# Patient Record
Sex: FEMALE | Race: WHITE | URBAN - METROPOLITAN AREA
[De-identification: names, ages, dates, MRNs, and addresses within clinical notes are randomized per-mention and may not be internally consistent; named-entity substitution may affect disease eponyms.]

---

## 2017-01-10 ENCOUNTER — INPATIENT (INPATIENT)
Facility: HOSPITAL | Age: 82
LOS: 9 days | Discharge: HOME HEALTH | End: 2017-01-20
Attending: FAMILY MEDICINE | Admitting: FAMILY MEDICINE
Payer: MEDICARE

## 2017-01-10 PROCEDURE — 71010: CPT | Mod: 26

## 2017-01-10 PROCEDURE — 93306 TTE W/DOPPLER COMPLETE: CPT | Mod: 26

## 2017-01-10 PROCEDURE — 93010 ELECTROCARDIOGRAM REPORT: CPT

## 2017-01-10 PROCEDURE — 70450 CT HEAD/BRAIN W/O DYE: CPT | Mod: 26

## 2017-01-10 PROCEDURE — 71275 CT ANGIOGRAPHY CHEST: CPT | Mod: 26

## 2017-01-10 PROCEDURE — 99285 EMERGENCY DEPT VISIT HI MDM: CPT

## 2017-01-11 PROCEDURE — 93010 ELECTROCARDIOGRAM REPORT: CPT

## 2017-01-12 PROCEDURE — 93975 VASCULAR STUDY: CPT | Mod: 26

## 2017-01-12 PROCEDURE — 93010 ELECTROCARDIOGRAM REPORT: CPT

## 2017-01-13 PROCEDURE — 93010 ELECTROCARDIOGRAM REPORT: CPT

## 2017-01-14 PROCEDURE — 70547 MR ANGIOGRAPHY NECK W/O DYE: CPT | Mod: 26

## 2017-01-14 PROCEDURE — 70544 MR ANGIOGRAPHY HEAD W/O DYE: CPT | Mod: 26,59

## 2017-01-14 PROCEDURE — 70551 MRI BRAIN STEM W/O DYE: CPT | Mod: 26

## 2017-01-16 PROCEDURE — 93970 EXTREMITY STUDY: CPT | Mod: 26

## 2017-01-18 PROCEDURE — 71010: CPT | Mod: 26

## 2017-01-24 ENCOUNTER — INPATIENT (INPATIENT)
Facility: HOSPITAL | Age: 82
LOS: 2 days | Discharge: HOME HEALTH | End: 2017-01-27
Attending: FAMILY MEDICINE | Admitting: FAMILY MEDICINE
Payer: MEDICARE

## 2017-01-24 PROCEDURE — 99285 EMERGENCY DEPT VISIT HI MDM: CPT

## 2017-01-24 PROCEDURE — 70450 CT HEAD/BRAIN W/O DYE: CPT | Mod: 26

## 2017-01-24 PROCEDURE — 71250 CT THORAX DX C-: CPT | Mod: 26

## 2017-01-24 PROCEDURE — 93010 ELECTROCARDIOGRAM REPORT: CPT

## 2017-01-24 PROCEDURE — 71010: CPT | Mod: 26

## 2017-01-25 DIAGNOSIS — I16.1 HYPERTENSIVE EMERGENCY: ICD-10-CM

## 2017-01-25 DIAGNOSIS — I24.8 OTHER FORMS OF ACUTE ISCHEMIC HEART DISEASE: ICD-10-CM

## 2017-01-25 DIAGNOSIS — M25.60 STIFFNESS OF UNSPECIFIED JOINT, NOT ELSEWHERE CLASSIFIED: ICD-10-CM

## 2017-01-25 DIAGNOSIS — M54.9 DORSALGIA, UNSPECIFIED: ICD-10-CM

## 2017-01-25 DIAGNOSIS — E78.5 HYPERLIPIDEMIA, UNSPECIFIED: ICD-10-CM

## 2017-01-25 DIAGNOSIS — J06.9 ACUTE UPPER RESPIRATORY INFECTION, UNSPECIFIED: ICD-10-CM

## 2017-01-25 DIAGNOSIS — K21.9 GASTRO-ESOPHAGEAL REFLUX DISEASE WITHOUT ESOPHAGITIS: ICD-10-CM

## 2017-01-25 DIAGNOSIS — Z88.8 ALLERGY STATUS TO OTHER DRUGS, MEDICAMENTS AND BIOLOGICAL SUBSTANCES STATUS: ICD-10-CM

## 2017-01-25 DIAGNOSIS — M19.90 UNSPECIFIED OSTEOARTHRITIS, UNSPECIFIED SITE: ICD-10-CM

## 2017-01-25 DIAGNOSIS — E03.9 HYPOTHYROIDISM, UNSPECIFIED: ICD-10-CM

## 2017-01-25 DIAGNOSIS — I25.10 ATHEROSCLEROTIC HEART DISEASE OF NATIVE CORONARY ARTERY WITHOUT ANGINA PECTORIS: ICD-10-CM

## 2017-01-25 DIAGNOSIS — T44.6X5A ADVERSE EFFECT OF ALPHA-ADRENORECEPTOR ANTAGONISTS, INITIAL ENCOUNTER: ICD-10-CM

## 2017-01-25 DIAGNOSIS — M85.80 OTHER SPECIFIED DISORDERS OF BONE DENSITY AND STRUCTURE, UNSPECIFIED SITE: ICD-10-CM

## 2017-01-25 DIAGNOSIS — Z85.42 PERSONAL HISTORY OF MALIGNANT NEOPLASM OF OTHER PARTS OF UTERUS: ICD-10-CM

## 2017-01-25 DIAGNOSIS — I10 ESSENTIAL (PRIMARY) HYPERTENSION: ICD-10-CM

## 2017-01-25 DIAGNOSIS — Z88.2 ALLERGY STATUS TO SULFONAMIDES: ICD-10-CM

## 2017-01-25 DIAGNOSIS — K27.9 PEPTIC ULCER, SITE UNSPECIFIED, UNSPECIFIED AS ACUTE OR CHRONIC, WITHOUT HEMORRHAGE OR PERFORATION: ICD-10-CM

## 2017-01-25 DIAGNOSIS — I95.1 ORTHOSTATIC HYPOTENSION: ICD-10-CM

## 2017-01-25 DIAGNOSIS — Z95.5 PRESENCE OF CORONARY ANGIOPLASTY IMPLANT AND GRAFT: ICD-10-CM

## 2017-01-25 PROCEDURE — 93010 ELECTROCARDIOGRAM REPORT: CPT

## 2017-01-26 PROCEDURE — 93010 ELECTROCARDIOGRAM REPORT: CPT

## 2017-01-26 PROCEDURE — 71020: CPT | Mod: 26

## 2017-01-26 PROCEDURE — 93308 TTE F-UP OR LMTD: CPT | Mod: 26

## 2017-01-27 PROCEDURE — 93306 TTE W/DOPPLER COMPLETE: CPT | Mod: 26

## 2017-01-27 PROCEDURE — 93010 ELECTROCARDIOGRAM REPORT: CPT

## 2017-01-30 DIAGNOSIS — R06.02 SHORTNESS OF BREATH: ICD-10-CM

## 2017-01-30 DIAGNOSIS — I10 ESSENTIAL (PRIMARY) HYPERTENSION: ICD-10-CM

## 2017-01-30 DIAGNOSIS — R79.89 OTHER SPECIFIED ABNORMAL FINDINGS OF BLOOD CHEMISTRY: ICD-10-CM

## 2017-01-31 DIAGNOSIS — I95.1 ORTHOSTATIC HYPOTENSION: ICD-10-CM

## 2017-01-31 DIAGNOSIS — Z85.42 PERSONAL HISTORY OF MALIGNANT NEOPLASM OF OTHER PARTS OF UTERUS: ICD-10-CM

## 2017-01-31 DIAGNOSIS — Z66 DO NOT RESUSCITATE: ICD-10-CM

## 2017-01-31 DIAGNOSIS — Z90.49 ACQUIRED ABSENCE OF OTHER SPECIFIED PARTS OF DIGESTIVE TRACT: ICD-10-CM

## 2017-01-31 DIAGNOSIS — Z88.5 ALLERGY STATUS TO NARCOTIC AGENT: ICD-10-CM

## 2017-01-31 DIAGNOSIS — K27.9 PEPTIC ULCER, SITE UNSPECIFIED, UNSPECIFIED AS ACUTE OR CHRONIC, WITHOUT HEMORRHAGE OR PERFORATION: ICD-10-CM

## 2017-01-31 DIAGNOSIS — J20.9 ACUTE BRONCHITIS, UNSPECIFIED: ICD-10-CM

## 2017-01-31 DIAGNOSIS — F41.8 OTHER SPECIFIED ANXIETY DISORDERS: ICD-10-CM

## 2017-01-31 DIAGNOSIS — Z79.02 LONG TERM (CURRENT) USE OF ANTITHROMBOTICS/ANTIPLATELETS: ICD-10-CM

## 2017-01-31 DIAGNOSIS — K21.9 GASTRO-ESOPHAGEAL REFLUX DISEASE WITHOUT ESOPHAGITIS: ICD-10-CM

## 2017-01-31 DIAGNOSIS — I16.1 HYPERTENSIVE EMERGENCY: ICD-10-CM

## 2017-01-31 DIAGNOSIS — Z90.710 ACQUIRED ABSENCE OF BOTH CERVIX AND UTERUS: ICD-10-CM

## 2017-01-31 DIAGNOSIS — I25.10 ATHEROSCLEROTIC HEART DISEASE OF NATIVE CORONARY ARTERY WITHOUT ANGINA PECTORIS: ICD-10-CM

## 2017-01-31 DIAGNOSIS — I24.8 OTHER FORMS OF ACUTE ISCHEMIC HEART DISEASE: ICD-10-CM

## 2017-01-31 DIAGNOSIS — R53.1 WEAKNESS: ICD-10-CM

## 2017-01-31 DIAGNOSIS — Z88.2 ALLERGY STATUS TO SULFONAMIDES: ICD-10-CM

## 2017-01-31 DIAGNOSIS — Z95.5 PRESENCE OF CORONARY ANGIOPLASTY IMPLANT AND GRAFT: ICD-10-CM

## 2017-01-31 DIAGNOSIS — E03.9 HYPOTHYROIDISM, UNSPECIFIED: ICD-10-CM

## 2017-01-31 DIAGNOSIS — E78.5 HYPERLIPIDEMIA, UNSPECIFIED: ICD-10-CM

## 2017-02-01 DIAGNOSIS — J84.10 PULMONARY FIBROSIS, UNSPECIFIED: ICD-10-CM

## 2017-02-15 ENCOUNTER — EMERGENCY (EMERGENCY)
Facility: HOSPITAL | Age: 82
LOS: 0 days | Discharge: HOME | End: 2017-02-15
Attending: EMERGENCY MEDICINE | Admitting: EMERGENCY MEDICINE
Payer: MEDICARE

## 2017-02-15 VITALS — DIASTOLIC BLOOD PRESSURE: 89 MMHG | SYSTOLIC BLOOD PRESSURE: 234 MMHG

## 2017-02-15 VITALS — DIASTOLIC BLOOD PRESSURE: 93 MMHG | HEART RATE: 89 BPM | SYSTOLIC BLOOD PRESSURE: 193 MMHG
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
## 2017-02-15 DIAGNOSIS — R51 HEADACHE: ICD-10-CM

## 2017-02-15 DIAGNOSIS — Z85.89 PERSONAL HISTORY OF MALIGNANT NEOPLASM OF OTHER ORGANS AND SYSTEMS: ICD-10-CM

## 2017-02-15 DIAGNOSIS — Z95.1 PRESENCE OF AORTOCORONARY BYPASS GRAFT: ICD-10-CM

## 2017-02-15 DIAGNOSIS — I25.10 ATHEROSCLEROTIC HEART DISEASE OF NATIVE CORONARY ARTERY WITHOUT ANGINA PECTORIS: ICD-10-CM

## 2017-02-15 DIAGNOSIS — E78.5 HYPERLIPIDEMIA, UNSPECIFIED: ICD-10-CM

## 2017-02-15 DIAGNOSIS — S00.83XA CONTUSION OF OTHER PART OF HEAD, INITIAL ENCOUNTER: ICD-10-CM
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
## 2017-02-15 DIAGNOSIS — I10 ESSENTIAL (PRIMARY) HYPERTENSION: ICD-10-CM
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
## 2017-02-15 PROCEDURE — 73130 X-RAY EXAM OF HAND: CPT | Mod: 26,LT

## 2017-02-15 PROCEDURE — 71010: CPT | Mod: 26

## 2017-02-15 PROCEDURE — 72125 CT NECK SPINE W/O DYE: CPT | Mod: 26

## 2017-02-15 PROCEDURE — 73502 X-RAY EXAM HIP UNI 2-3 VIEWS: CPT | Mod: 26

## 2017-02-15 PROCEDURE — 99284 EMERGENCY DEPT VISIT MOD MDM: CPT

## 2017-02-15 PROCEDURE — 73110 X-RAY EXAM OF WRIST: CPT | Mod: 26,LT

## 2017-02-15 PROCEDURE — 70450 CT HEAD/BRAIN W/O DYE: CPT | Mod: 26

## 2017-02-15 PROCEDURE — 73552 X-RAY EXAM OF FEMUR 2/>: CPT | Mod: 26

## 2017-02-15 PROCEDURE — 76377 3D RENDER W/INTRP POSTPROCES: CPT | Mod: 26

## 2017-02-15 PROCEDURE — 70480 CT ORBIT/EAR/FOSSA W/O DYE: CPT | Mod: 26,59

## 2017-02-15 RX ORDER — IBUPROFEN 200 MG
400 TABLET ORAL ONCE
Qty: 0 | Refills: 0 | Status: DISCONTINUED | OUTPATIENT
Start: 2017-02-15 | End: 2017-02-15

## 2017-02-15 RX ORDER — KETOROLAC TROMETHAMINE 30 MG/ML
30 SYRINGE (ML) INJECTION ONCE
Qty: 0 | Refills: 0 | Status: DISCONTINUED | OUTPATIENT
Start: 2017-02-15 | End: 2017-02-15

## 2017-02-15 RX ORDER — ACETAMINOPHEN 500 MG
650 TABLET ORAL ONCE
Qty: 0 | Refills: 0 | Status: COMPLETED | OUTPATIENT
Start: 2017-02-15 | End: 2017-02-15

## 2017-02-15 RX ORDER — METOPROLOL TARTRATE 50 MG
50 TABLET ORAL ONCE
Qty: 0 | Refills: 0 | Status: COMPLETED | OUTPATIENT
Start: 2017-02-15 | End: 2017-02-15

## 2017-02-15 RX ADMIN — Medication 50 MILLIGRAM(S): at 18:59

## 2017-02-15 RX ADMIN — Medication 650 MILLIGRAM(S): at 19:26

## 2017-02-15 NOTE — ED PROVIDER NOTE - MUSCULOSKELETAL MINIMAL EXAM
+ecchymosis and TTP to the left 4th and 5th metacarpal. +ecchymosis and TTP around the left eye with small abrasion. +ecchymosis and TTP to the left 4th and 5th metacarpal. +ecchymosis and TTP around the left eye with small abrasion. +TTP to the right proximal femur.

## 2017-02-15 NOTE — ED PROVIDER NOTE - OBJECTIVE STATEMENT
89 y/o female with PMHx of CAD with stents, HTN presents to the ED brought in by EMS s/p trip and fall PTA. Pt states that she 89 y/o female with PMHx of CAD with stents, HTN presents to the ED brought in by EMS s/p trip and fall PTA. Pt states that she tripped over a step and fell hitting the left side of her face on the floor, no LOC. Pt is c/o left wrist pain. Currently pt has no other complaints and denies chest pain, SOB, abd pain and n/v/d.

## 2017-02-15 NOTE — ED PROVIDER NOTE - DETAILS:
I, Omar Jorge, performed the initial face to face bedside interview with this patient regarding history of present illness, review of symptoms and relevant past medical, social and family history.  I completed an independent physical examination.  I was the initial provider who evaluated this patient.  The history, relevant review of systems, past medical and surgical history, medical decision making, and physical examination was documented by the scribe in my presence and I attest to the accuracy of the documentation.

## 2017-02-15 NOTE — ED PROVIDER NOTE - PSH
Ankle fracture, right  surgery 25 yrs ago - hardware removed  S/P coronary angiogram  2005, 2008, 2011 - drug eluding stents  S/P hernia repair  umbilical - 2008  S/P laparoscopic cholecystectomy  2008

## 2017-02-15 NOTE — ED PROVIDER NOTE - PMH
CAD (coronary artery disease)    Endometrial adenocarcinoma    GERD (gastroesophageal reflux disease)    [place] (hard of hearing)    HTN (hypertension)    Hyperlipidemia    Hypothyroid    Macular degeneration    Stented coronary artery

## 2017-06-24 ENCOUNTER — EMERGENCY (EMERGENCY)
Facility: HOSPITAL | Age: 82
LOS: 0 days | Discharge: HOME | End: 2017-06-25
Attending: EMERGENCY MEDICINE | Admitting: EMERGENCY MEDICINE
Payer: MEDICARE

## 2017-06-24 VITALS
DIASTOLIC BLOOD PRESSURE: 60 MMHG | OXYGEN SATURATION: 100 % | TEMPERATURE: 98 F | RESPIRATION RATE: 16 BRPM | SYSTOLIC BLOOD PRESSURE: 190 MMHG | HEART RATE: 77 BPM

## 2017-06-24 DIAGNOSIS — Z98.890 OTHER SPECIFIED POSTPROCEDURAL STATES: ICD-10-CM

## 2017-06-24 DIAGNOSIS — E03.9 HYPOTHYROIDISM, UNSPECIFIED: ICD-10-CM

## 2017-06-24 DIAGNOSIS — K21.9 GASTRO-ESOPHAGEAL REFLUX DISEASE WITHOUT ESOPHAGITIS: ICD-10-CM

## 2017-06-24 DIAGNOSIS — K11.21 ACUTE SIALOADENITIS: ICD-10-CM

## 2017-06-24 DIAGNOSIS — Z98.61 CORONARY ANGIOPLASTY STATUS: ICD-10-CM

## 2017-06-24 DIAGNOSIS — Z85.42 PERSONAL HISTORY OF MALIGNANT NEOPLASM OF OTHER PARTS OF UTERUS: ICD-10-CM

## 2017-06-24 DIAGNOSIS — R22.0 LOCALIZED SWELLING, MASS AND LUMP, HEAD: ICD-10-CM

## 2017-06-24 DIAGNOSIS — I25.10 ATHEROSCLEROTIC HEART DISEASE OF NATIVE CORONARY ARTERY WITHOUT ANGINA PECTORIS: ICD-10-CM

## 2017-06-24 DIAGNOSIS — E78.5 HYPERLIPIDEMIA, UNSPECIFIED: ICD-10-CM

## 2017-06-24 DIAGNOSIS — H91.90 UNSPECIFIED HEARING LOSS, UNSPECIFIED EAR: ICD-10-CM

## 2017-06-24 DIAGNOSIS — H35.30 UNSPECIFIED MACULAR DEGENERATION: ICD-10-CM

## 2017-06-24 PROCEDURE — 99284 EMERGENCY DEPT VISIT MOD MDM: CPT | Mod: 25

## 2017-06-24 NOTE — ED ADULT TRIAGE NOTE - CHIEF COMPLAINT QUOTE
swelling and pain to right mouth/face. pain for a few days. noticed swelling at 6pm. sent by Dr. Garcia (dentist).

## 2017-06-25 LAB
ALBUMIN SERPL ELPH-MCNC: 3.6 G/DL — SIGNIFICANT CHANGE UP (ref 3.3–5)
ALP SERPL-CCNC: 80 U/L — SIGNIFICANT CHANGE UP (ref 40–120)
ALT FLD-CCNC: 19 U/L — SIGNIFICANT CHANGE UP (ref 12–78)
ANION GAP SERPL CALC-SCNC: 5 MMOL/L — SIGNIFICANT CHANGE UP (ref 5–17)
AST SERPL-CCNC: 23 U/L — SIGNIFICANT CHANGE UP (ref 15–37)
BASOPHILS # BLD AUTO: 0.1 K/UL — SIGNIFICANT CHANGE UP (ref 0–0.2)
BASOPHILS NFR BLD AUTO: 1.1 % — SIGNIFICANT CHANGE UP (ref 0–2)
BILIRUB SERPL-MCNC: 0.4 MG/DL — SIGNIFICANT CHANGE UP (ref 0.2–1.2)
BUN SERPL-MCNC: 23 MG/DL — SIGNIFICANT CHANGE UP (ref 7–23)
CALCIUM SERPL-MCNC: 9.2 MG/DL — SIGNIFICANT CHANGE UP (ref 8.5–10.1)
CHLORIDE SERPL-SCNC: 107 MMOL/L — SIGNIFICANT CHANGE UP (ref 96–108)
CO2 SERPL-SCNC: 28 MMOL/L — SIGNIFICANT CHANGE UP (ref 22–31)
CREAT SERPL-MCNC: 1.12 MG/DL — SIGNIFICANT CHANGE UP (ref 0.5–1.3)
EOSINOPHIL # BLD AUTO: 0.2 K/UL — SIGNIFICANT CHANGE UP (ref 0–0.5)
EOSINOPHIL NFR BLD AUTO: 1.8 % — SIGNIFICANT CHANGE UP (ref 0–6)
GLUCOSE SERPL-MCNC: 101 MG/DL — HIGH (ref 70–99)
HCT VFR BLD CALC: 40.7 % — SIGNIFICANT CHANGE UP (ref 34.5–45)
HGB BLD-MCNC: 13.1 G/DL — SIGNIFICANT CHANGE UP (ref 11.5–15.5)
LYMPHOCYTES # BLD AUTO: 2.3 K/UL — SIGNIFICANT CHANGE UP (ref 1–3.3)
LYMPHOCYTES # BLD AUTO: 23 % — SIGNIFICANT CHANGE UP (ref 13–44)
MCHC RBC-ENTMCNC: 27.4 PG — SIGNIFICANT CHANGE UP (ref 27–34)
MCHC RBC-ENTMCNC: 32.1 GM/DL — SIGNIFICANT CHANGE UP (ref 32–36)
MCV RBC AUTO: 85.4 FL — SIGNIFICANT CHANGE UP (ref 80–100)
MONOCYTES # BLD AUTO: 0.8 K/UL — SIGNIFICANT CHANGE UP (ref 0–0.9)
MONOCYTES NFR BLD AUTO: 7.6 % — SIGNIFICANT CHANGE UP (ref 2–14)
NEUTROPHILS # BLD AUTO: 6.7 K/UL — SIGNIFICANT CHANGE UP (ref 1.8–7.4)
NEUTROPHILS NFR BLD AUTO: 66.4 % — SIGNIFICANT CHANGE UP (ref 43–77)
PLATELET # BLD AUTO: 301 K/UL — SIGNIFICANT CHANGE UP (ref 150–400)
POTASSIUM SERPL-MCNC: 4.9 MMOL/L — SIGNIFICANT CHANGE UP (ref 3.5–5.3)
POTASSIUM SERPL-SCNC: 4.9 MMOL/L — SIGNIFICANT CHANGE UP (ref 3.5–5.3)
PROT SERPL-MCNC: 7.3 GM/DL — SIGNIFICANT CHANGE UP (ref 6–8.3)
RBC # BLD: 4.77 M/UL — SIGNIFICANT CHANGE UP (ref 3.8–5.2)
RBC # FLD: 13.5 % — SIGNIFICANT CHANGE UP (ref 10.3–14.5)
SODIUM SERPL-SCNC: 140 MMOL/L — SIGNIFICANT CHANGE UP (ref 135–145)
WBC # BLD: 10.1 K/UL — SIGNIFICANT CHANGE UP (ref 3.8–10.5)
WBC # FLD AUTO: 10.1 K/UL — SIGNIFICANT CHANGE UP (ref 3.8–10.5)

## 2017-06-25 PROCEDURE — 70487 CT MAXILLOFACIAL W/DYE: CPT | Mod: 26

## 2017-06-25 RX ORDER — PENICILLIN V POTASSIUM 250 MG
500 TABLET ORAL ONCE
Qty: 0 | Refills: 0 | Status: COMPLETED | OUTPATIENT
Start: 2017-06-25 | End: 2017-06-25

## 2017-06-25 RX ORDER — PENICILLIN V POTASSIUM 250 MG
1 TABLET ORAL
Qty: 14 | Refills: 0 | OUTPATIENT
Start: 2017-06-25 | End: 2017-07-02

## 2017-06-25 RX ORDER — ACETAMINOPHEN 500 MG
650 TABLET ORAL ONCE
Qty: 0 | Refills: 0 | Status: COMPLETED | OUTPATIENT
Start: 2017-06-25 | End: 2017-06-25

## 2017-06-25 RX ADMIN — Medication 500 MILLIGRAM(S): at 02:57

## 2017-06-25 RX ADMIN — Medication 650 MILLIGRAM(S): at 01:21

## 2017-06-25 RX ADMIN — Medication 650 MILLIGRAM(S): at 02:49

## 2017-06-25 NOTE — ED PROVIDER NOTE - MEDICAL DECISION MAKING DETAILS
CT with left parotitis, without abscess.  Question extension of dental infection.  Pt started on PCN.  F/u with dentist and PCP this week.  Return precautions given.

## 2017-06-25 NOTE — ED PROVIDER NOTE - ENMT, MLM
Airway patent, Nasal mucosa clear. Mouth with normal mucosa. Throat has no vesicles, no oropharyngeal exudates and uvula is midline.  R first molar, crown, no abscess present.  Mild facial edema on right, without signs of overlying cellulitis, no fluctuance.  TTP over the right mandible.

## 2017-06-25 NOTE — ED PROVIDER NOTE - PMH
CAD (coronary artery disease)    Endometrial adenocarcinoma    GERD (gastroesophageal reflux disease)    [place] (hard of hearing)    HTN (hypertension)    Hyperlipidemia    Hypothyroid    Macular degeneration    Stented coronary artery

## 2017-06-25 NOTE — ED PROVIDER NOTE - OBJECTIVE STATEMENT
89 yo F presents with R facial edema, pain.  Pt states she's been having R dental pain at least 1 week, first molar, which she has a crown.  Denies purulent discharge.  Denies fever or chills.  Pt had R facial swelling noticed tonight, along with increased facial pain, R ear pain.  Denies any other symptoms.  No meds taken at home.  Called her dentist her urged her to come to ED for further evaluation.

## 2017-07-14 ENCOUNTER — EMERGENCY (EMERGENCY)
Facility: HOSPITAL | Age: 82
LOS: 0 days | Discharge: HOME | End: 2017-07-15
Attending: EMERGENCY MEDICINE | Admitting: EMERGENCY MEDICINE
Payer: MEDICARE

## 2017-07-14 VITALS
HEIGHT: 62 IN | DIASTOLIC BLOOD PRESSURE: 77 MMHG | HEART RATE: 75 BPM | TEMPERATURE: 97 F | RESPIRATION RATE: 18 BRPM | OXYGEN SATURATION: 100 % | SYSTOLIC BLOOD PRESSURE: 480 MMHG | WEIGHT: 149.91 LBS

## 2017-07-14 DIAGNOSIS — I10 ESSENTIAL (PRIMARY) HYPERTENSION: ICD-10-CM

## 2017-07-14 DIAGNOSIS — Z98.890 OTHER SPECIFIED POSTPROCEDURAL STATES: ICD-10-CM

## 2017-07-14 DIAGNOSIS — H35.30 UNSPECIFIED MACULAR DEGENERATION: ICD-10-CM

## 2017-07-14 DIAGNOSIS — Z85.42 PERSONAL HISTORY OF MALIGNANT NEOPLASM OF OTHER PARTS OF UTERUS: ICD-10-CM

## 2017-07-14 DIAGNOSIS — H91.90 UNSPECIFIED HEARING LOSS, UNSPECIFIED EAR: ICD-10-CM

## 2017-07-14 DIAGNOSIS — R55 SYNCOPE AND COLLAPSE: ICD-10-CM

## 2017-07-14 DIAGNOSIS — Z87.81 PERSONAL HISTORY OF (HEALED) TRAUMATIC FRACTURE: ICD-10-CM

## 2017-07-14 DIAGNOSIS — I25.10 ATHEROSCLEROTIC HEART DISEASE OF NATIVE CORONARY ARTERY WITHOUT ANGINA PECTORIS: ICD-10-CM

## 2017-07-14 DIAGNOSIS — Z88.5 ALLERGY STATUS TO NARCOTIC AGENT: ICD-10-CM

## 2017-07-14 DIAGNOSIS — Z79.02 LONG TERM (CURRENT) USE OF ANTITHROMBOTICS/ANTIPLATELETS: ICD-10-CM

## 2017-07-14 DIAGNOSIS — I67.89 OTHER CEREBROVASCULAR DISEASE: ICD-10-CM

## 2017-07-14 DIAGNOSIS — K21.9 GASTRO-ESOPHAGEAL REFLUX DISEASE WITHOUT ESOPHAGITIS: ICD-10-CM

## 2017-07-14 DIAGNOSIS — E03.9 HYPOTHYROIDISM, UNSPECIFIED: ICD-10-CM

## 2017-07-14 DIAGNOSIS — Z88.8 ALLERGY STATUS TO OTHER DRUGS, MEDICAMENTS AND BIOLOGICAL SUBSTANCES STATUS: ICD-10-CM

## 2017-07-14 DIAGNOSIS — Z88.2 ALLERGY STATUS TO SULFONAMIDES: ICD-10-CM

## 2017-07-14 DIAGNOSIS — Z79.82 LONG TERM (CURRENT) USE OF ASPIRIN: ICD-10-CM

## 2017-07-14 DIAGNOSIS — Z95.5 PRESENCE OF CORONARY ANGIOPLASTY IMPLANT AND GRAFT: ICD-10-CM

## 2017-07-14 DIAGNOSIS — Z98.61 CORONARY ANGIOPLASTY STATUS: ICD-10-CM

## 2017-07-14 DIAGNOSIS — E78.5 HYPERLIPIDEMIA, UNSPECIFIED: ICD-10-CM

## 2017-07-14 LAB
ALBUMIN SERPL ELPH-MCNC: 4.1 G/DL — SIGNIFICANT CHANGE UP (ref 3.3–5)
ALP SERPL-CCNC: 82 U/L — SIGNIFICANT CHANGE UP (ref 40–120)
ALT FLD-CCNC: 23 U/L — SIGNIFICANT CHANGE UP (ref 12–78)
ANION GAP SERPL CALC-SCNC: 8 MMOL/L — SIGNIFICANT CHANGE UP (ref 5–17)
AST SERPL-CCNC: 27 U/L — SIGNIFICANT CHANGE UP (ref 15–37)
BASOPHILS # BLD AUTO: 0.1 K/UL — SIGNIFICANT CHANGE UP (ref 0–0.2)
BASOPHILS NFR BLD AUTO: 0.9 % — SIGNIFICANT CHANGE UP (ref 0–2)
BILIRUB SERPL-MCNC: 0.4 MG/DL — SIGNIFICANT CHANGE UP (ref 0.2–1.2)
BUN SERPL-MCNC: 27 MG/DL — HIGH (ref 7–23)
CALCIUM SERPL-MCNC: 9.2 MG/DL — SIGNIFICANT CHANGE UP (ref 8.5–10.1)
CHLORIDE SERPL-SCNC: 103 MMOL/L — SIGNIFICANT CHANGE UP (ref 96–108)
CK SERPL-CCNC: 81 U/L — SIGNIFICANT CHANGE UP (ref 26–192)
CO2 SERPL-SCNC: 26 MMOL/L — SIGNIFICANT CHANGE UP (ref 22–31)
CREAT SERPL-MCNC: 1.25 MG/DL — SIGNIFICANT CHANGE UP (ref 0.5–1.3)
EOSINOPHIL # BLD AUTO: 0.1 K/UL — SIGNIFICANT CHANGE UP (ref 0–0.5)
EOSINOPHIL NFR BLD AUTO: 0.9 % — SIGNIFICANT CHANGE UP (ref 0–6)
GLUCOSE SERPL-MCNC: 88 MG/DL — SIGNIFICANT CHANGE UP (ref 70–99)
HCT VFR BLD CALC: 44 % — SIGNIFICANT CHANGE UP (ref 34.5–45)
HGB BLD-MCNC: 14.5 G/DL — SIGNIFICANT CHANGE UP (ref 11.5–15.5)
INR BLD: 0.94 RATIO — SIGNIFICANT CHANGE UP (ref 0.88–1.16)
LYMPHOCYTES # BLD AUTO: 2.9 K/UL — SIGNIFICANT CHANGE UP (ref 1–3.3)
LYMPHOCYTES # BLD AUTO: 25.8 % — SIGNIFICANT CHANGE UP (ref 13–44)
MCHC RBC-ENTMCNC: 28.1 PG — SIGNIFICANT CHANGE UP (ref 27–34)
MCHC RBC-ENTMCNC: 32.9 GM/DL — SIGNIFICANT CHANGE UP (ref 32–36)
MCV RBC AUTO: 85.5 FL — SIGNIFICANT CHANGE UP (ref 80–100)
MONOCYTES # BLD AUTO: 1.1 K/UL — HIGH (ref 0–0.9)
MONOCYTES NFR BLD AUTO: 9.9 % — SIGNIFICANT CHANGE UP (ref 2–14)
NEUTROPHILS # BLD AUTO: 7 K/UL — SIGNIFICANT CHANGE UP (ref 1.8–7.4)
NEUTROPHILS NFR BLD AUTO: 62.6 % — SIGNIFICANT CHANGE UP (ref 43–77)
PLATELET # BLD AUTO: 308 K/UL — SIGNIFICANT CHANGE UP (ref 150–400)
POTASSIUM SERPL-MCNC: 4.6 MMOL/L — SIGNIFICANT CHANGE UP (ref 3.5–5.3)
POTASSIUM SERPL-SCNC: 4.6 MMOL/L — SIGNIFICANT CHANGE UP (ref 3.5–5.3)
PROT SERPL-MCNC: 8.2 GM/DL — SIGNIFICANT CHANGE UP (ref 6–8.3)
PROTHROM AB SERPL-ACNC: 10.1 SEC — SIGNIFICANT CHANGE UP (ref 9.8–12.7)
RBC # BLD: 5.15 M/UL — SIGNIFICANT CHANGE UP (ref 3.8–5.2)
RBC # FLD: 13.3 % — SIGNIFICANT CHANGE UP (ref 10.3–14.5)
SODIUM SERPL-SCNC: 137 MMOL/L — SIGNIFICANT CHANGE UP (ref 135–145)
TROPONIN I SERPL-MCNC: 0.02 NG/ML — SIGNIFICANT CHANGE UP (ref 0.01–0.04)
WBC # BLD: 11.2 K/UL — HIGH (ref 3.8–10.5)
WBC # FLD AUTO: 11.2 K/UL — HIGH (ref 3.8–10.5)

## 2017-07-14 PROCEDURE — 99285 EMERGENCY DEPT VISIT HI MDM: CPT | Mod: 25

## 2017-07-14 PROCEDURE — 93010 ELECTROCARDIOGRAM REPORT: CPT

## 2017-07-14 PROCEDURE — 70450 CT HEAD/BRAIN W/O DYE: CPT | Mod: 26

## 2017-07-14 PROCEDURE — 71010: CPT | Mod: 26

## 2017-07-14 RX ORDER — SODIUM CHLORIDE 9 MG/ML
500 INJECTION INTRAMUSCULAR; INTRAVENOUS; SUBCUTANEOUS ONCE
Qty: 0 | Refills: 0 | Status: COMPLETED | OUTPATIENT
Start: 2017-07-14 | End: 2017-07-14

## 2017-07-14 RX ADMIN — SODIUM CHLORIDE 500 MILLILITER(S): 9 INJECTION INTRAMUSCULAR; INTRAVENOUS; SUBCUTANEOUS at 21:30

## 2017-07-14 NOTE — ED ADULT NURSE REASSESSMENT NOTE - NS ED NURSE REASSESS COMMENT FT1
pt assisted to the bathroom, pt and family updated on plan for 2nd trop and possible DC home if negative, pt resting comfortably will cont to monitor

## 2017-07-14 NOTE — ED PROVIDER NOTE - PMH
CAD (coronary artery disease)    Endometrial adenocarcinoma    GERD (gastroesophageal reflux disease)    [place] (hard of hearing)    HTN (hypertension)    Hyperlipidemia    Hypothyroid    Macular degeneration    Stented coronary artery

## 2017-07-14 NOTE — ED PROVIDER NOTE - OBJECTIVE STATEMENT
89 year old pt, with hx of orthostatic HTN, presents to the ED for evaluation. Pt states sx started with some blurred vision, then pt developed burning sensation to posterior head, neck and shoulders. Pt then woke up on the floor. Son who witnessed event states pt they had just finished eating dinner, pt walked from table to sink and then syncopized. LOC'ed for a few seconds. No HA prior to incident. Currently c/o weakness/no energy. BP taken at home immediately after incident: 142/61. No CP or SOB today or currently. 89 year old pt, with hx of orthostatic hypotension, presents to the ED for evaluation. Pt states sx started with some blurred vision, then pt developed burning sensation to posterior head, neck and shoulders. Pt then woke up on the floor. Son who witnessed event states pt they had just finished eating dinner, pt walked from table to sink and then syncopized. LOC'ed for a few seconds. No HA prior to incident. Currently c/o weakness/no energy. Denies chest pain, SOB, nause, vomiting, or any other symptoms.  BP taken at home immediately after incident: 142/61. No CP or SOB today or currently.

## 2017-07-14 NOTE — ED ADULT NURSE NOTE - OBJECTIVE STATEMENT
Per pt, has been feeling weak and a burning pain across back and arms over the past few days. Today experienced near syncopal episode, denies hitting head, denies LOC but unable to recall if she actually passed out. Pt denies any urinary f/u/d, denies N/V/D.

## 2017-07-14 NOTE — ED PROVIDER NOTE - MEDICAL DECISION MAKING DETAILS
CT head WNL.  CXR WNL.  Labs WNL, including two sets of troponin.  Pt feeling well, asymptomatic, able to ambulate without becoming symptomatic, asking for d/c home.  Okay for d/c home at this time, question vasovagal episode.  F/u with PCP in 1 week.  Return precautions given.

## 2017-07-15 VITALS
TEMPERATURE: 98 F | DIASTOLIC BLOOD PRESSURE: 67 MMHG | RESPIRATION RATE: 18 BRPM | HEART RATE: 76 BPM | SYSTOLIC BLOOD PRESSURE: 143 MMHG | OXYGEN SATURATION: 100 %

## 2017-07-15 LAB — TROPONIN I SERPL-MCNC: 0.03 NG/ML — SIGNIFICANT CHANGE UP (ref 0.01–0.04)

## 2017-07-15 RX ORDER — METOPROLOL TARTRATE 50 MG
50 TABLET ORAL DAILY
Qty: 0 | Refills: 0 | Status: DISCONTINUED | OUTPATIENT
Start: 2017-07-15 | End: 2017-07-15

## 2017-07-15 RX ADMIN — Medication 50 MILLIGRAM(S): at 00:53

## 2017-09-05 ENCOUNTER — INPATIENT (INPATIENT)
Facility: HOSPITAL | Age: 82
LOS: 4 days | Discharge: NURSING, REHAB OR LONG-TERM CARE | End: 2017-09-10
Attending: INTERNAL MEDICINE | Admitting: INTERNAL MEDICINE
Payer: MEDICARE

## 2017-09-05 VITALS
RESPIRATION RATE: 18 BRPM | SYSTOLIC BLOOD PRESSURE: 181 MMHG | WEIGHT: 139.99 LBS | OXYGEN SATURATION: 100 % | HEIGHT: 62 IN | HEART RATE: 105 BPM | DIASTOLIC BLOOD PRESSURE: 90 MMHG | TEMPERATURE: 99 F

## 2017-09-05 DIAGNOSIS — I25.10 ATHEROSCLEROTIC HEART DISEASE OF NATIVE CORONARY ARTERY WITHOUT ANGINA PECTORIS: ICD-10-CM

## 2017-09-05 DIAGNOSIS — R94.31 ABNORMAL ELECTROCARDIOGRAM [ECG] [EKG]: ICD-10-CM

## 2017-09-05 DIAGNOSIS — R07.89 OTHER CHEST PAIN: ICD-10-CM

## 2017-09-05 LAB
ANION GAP SERPL CALC-SCNC: 7 MMOL/L — SIGNIFICANT CHANGE UP (ref 5–17)
APTT BLD: 29.7 SEC — SIGNIFICANT CHANGE UP (ref 27.5–37.4)
BASOPHILS # BLD AUTO: 0.1 K/UL — SIGNIFICANT CHANGE UP (ref 0–0.2)
BASOPHILS NFR BLD AUTO: 0.6 % — SIGNIFICANT CHANGE UP (ref 0–2)
BUN SERPL-MCNC: 21 MG/DL — SIGNIFICANT CHANGE UP (ref 7–23)
CALCIUM SERPL-MCNC: 9.8 MG/DL — SIGNIFICANT CHANGE UP (ref 8.5–10.1)
CHLORIDE SERPL-SCNC: 101 MMOL/L — SIGNIFICANT CHANGE UP (ref 96–108)
CK SERPL-CCNC: 68 U/L — SIGNIFICANT CHANGE UP (ref 26–192)
CO2 SERPL-SCNC: 26 MMOL/L — SIGNIFICANT CHANGE UP (ref 22–31)
CREAT SERPL-MCNC: 1.02 MG/DL — SIGNIFICANT CHANGE UP (ref 0.5–1.3)
EOSINOPHIL # BLD AUTO: 0.1 K/UL — SIGNIFICANT CHANGE UP (ref 0–0.5)
EOSINOPHIL NFR BLD AUTO: 0.4 % — SIGNIFICANT CHANGE UP (ref 0–6)
ERYTHROCYTE [SEDIMENTATION RATE] IN BLOOD: 22 MM/HR — HIGH (ref 0–20)
GLUCOSE SERPL-MCNC: 106 MG/DL — HIGH (ref 70–99)
HCT VFR BLD CALC: 44.3 % — SIGNIFICANT CHANGE UP (ref 34.5–45)
HGB BLD-MCNC: 14.3 G/DL — SIGNIFICANT CHANGE UP (ref 11.5–15.5)
INR BLD: 0.96 RATIO — SIGNIFICANT CHANGE UP (ref 0.88–1.16)
LYMPHOCYTES # BLD AUTO: 12.6 % — LOW (ref 13–44)
LYMPHOCYTES # BLD AUTO: 2.7 K/UL — SIGNIFICANT CHANGE UP (ref 1–3.3)
MCHC RBC-ENTMCNC: 27.9 PG — SIGNIFICANT CHANGE UP (ref 27–34)
MCHC RBC-ENTMCNC: 32.3 GM/DL — SIGNIFICANT CHANGE UP (ref 32–36)
MCV RBC AUTO: 86.4 FL — SIGNIFICANT CHANGE UP (ref 80–100)
MONOCYTES # BLD AUTO: 1.2 K/UL — HIGH (ref 0–0.9)
MONOCYTES NFR BLD AUTO: 5.5 % — SIGNIFICANT CHANGE UP (ref 2–14)
NEUTROPHILS # BLD AUTO: 17.4 K/UL — HIGH (ref 1.8–7.4)
NEUTROPHILS NFR BLD AUTO: 81 % — HIGH (ref 43–77)
PLATELET # BLD AUTO: 313 K/UL — SIGNIFICANT CHANGE UP (ref 150–400)
POTASSIUM SERPL-MCNC: 3.9 MMOL/L — SIGNIFICANT CHANGE UP (ref 3.5–5.3)
POTASSIUM SERPL-SCNC: 3.9 MMOL/L — SIGNIFICANT CHANGE UP (ref 3.5–5.3)
PROTHROM AB SERPL-ACNC: 10.4 SEC — SIGNIFICANT CHANGE UP (ref 9.8–12.7)
RBC # BLD: 5.12 M/UL — SIGNIFICANT CHANGE UP (ref 3.8–5.2)
RBC # FLD: 14 % — SIGNIFICANT CHANGE UP (ref 10.3–14.5)
SODIUM SERPL-SCNC: 134 MMOL/L — LOW (ref 135–145)
TROPONIN I SERPL-MCNC: 0.03 NG/ML — SIGNIFICANT CHANGE UP (ref 0.01–0.04)
TROPONIN I SERPL-MCNC: 0.04 NG/ML — SIGNIFICANT CHANGE UP (ref 0.01–0.04)
TROPONIN I SERPL-MCNC: 0.04 NG/ML — SIGNIFICANT CHANGE UP (ref 0.01–0.04)
TROPONIN I SERPL-MCNC: 0.27 NG/ML — HIGH (ref 0.01–0.04)
WBC # BLD: 21.4 K/UL — HIGH (ref 3.8–10.5)
WBC # FLD AUTO: 21.4 K/UL — HIGH (ref 3.8–10.5)

## 2017-09-05 PROCEDURE — 99223 1ST HOSP IP/OBS HIGH 75: CPT | Mod: 25

## 2017-09-05 PROCEDURE — 93458 L HRT ARTERY/VENTRICLE ANGIO: CPT | Mod: 26

## 2017-09-05 PROCEDURE — 71275 CT ANGIOGRAPHY CHEST: CPT | Mod: 26

## 2017-09-05 PROCEDURE — 93010 ELECTROCARDIOGRAM REPORT: CPT

## 2017-09-05 PROCEDURE — 99285 EMERGENCY DEPT VISIT HI MDM: CPT

## 2017-09-05 PROCEDURE — 71010: CPT | Mod: 26

## 2017-09-05 RX ORDER — TRAMADOL HYDROCHLORIDE 50 MG/1
0 TABLET ORAL
Qty: 0 | Refills: 0 | COMMUNITY

## 2017-09-05 RX ORDER — ASPIRIN/CALCIUM CARB/MAGNESIUM 324 MG
162 TABLET ORAL ONCE
Qty: 0 | Refills: 0 | Status: COMPLETED | OUTPATIENT
Start: 2017-09-05 | End: 2017-09-05

## 2017-09-05 RX ORDER — LOSARTAN POTASSIUM 100 MG/1
25 TABLET, FILM COATED ORAL EVERY 12 HOURS
Qty: 0 | Refills: 0 | Status: DISCONTINUED | OUTPATIENT
Start: 2017-09-05 | End: 2017-09-07

## 2017-09-05 RX ORDER — METOPROLOL TARTRATE 50 MG
25 TABLET ORAL AT BEDTIME
Qty: 0 | Refills: 0 | Status: DISCONTINUED | OUTPATIENT
Start: 2017-09-05 | End: 2017-09-05

## 2017-09-05 RX ORDER — CHOLECALCIFEROL (VITAMIN D3) 125 MCG
2000 CAPSULE ORAL DAILY
Qty: 0 | Refills: 0 | Status: DISCONTINUED | OUTPATIENT
Start: 2017-09-05 | End: 2017-09-10

## 2017-09-05 RX ORDER — PANTOPRAZOLE SODIUM 20 MG/1
40 TABLET, DELAYED RELEASE ORAL
Qty: 0 | Refills: 0 | Status: DISCONTINUED | OUTPATIENT
Start: 2017-09-05 | End: 2017-09-10

## 2017-09-05 RX ORDER — NITROGLYCERIN 6.5 MG
10 CAPSULE, EXTENDED RELEASE ORAL
Qty: 50 | Refills: 0 | Status: DISCONTINUED | OUTPATIENT
Start: 2017-09-05 | End: 2017-09-05

## 2017-09-05 RX ORDER — ASPIRIN/CALCIUM CARB/MAGNESIUM 324 MG
325 TABLET ORAL EVERY 8 HOURS
Qty: 0 | Refills: 0 | Status: DISCONTINUED | OUTPATIENT
Start: 2017-09-05 | End: 2017-09-08

## 2017-09-05 RX ORDER — ACETAMINOPHEN 500 MG
650 TABLET ORAL ONCE
Qty: 0 | Refills: 0 | Status: COMPLETED | OUTPATIENT
Start: 2017-09-05 | End: 2017-09-05

## 2017-09-05 RX ORDER — INFLUENZA VIRUS VACCINE 15; 15; 15; 15 UG/.5ML; UG/.5ML; UG/.5ML; UG/.5ML
0.5 SUSPENSION INTRAMUSCULAR ONCE
Qty: 0 | Refills: 0 | Status: COMPLETED | OUTPATIENT
Start: 2017-09-05 | End: 2017-09-09

## 2017-09-05 RX ORDER — ZALEPLON 10 MG
5 CAPSULE ORAL AT BEDTIME
Qty: 0 | Refills: 0 | Status: DISCONTINUED | OUTPATIENT
Start: 2017-09-05 | End: 2017-09-10

## 2017-09-05 RX ORDER — CLOPIDOGREL BISULFATE 75 MG/1
75 TABLET, FILM COATED ORAL DAILY
Qty: 0 | Refills: 0 | Status: DISCONTINUED | OUTPATIENT
Start: 2017-09-05 | End: 2017-09-10

## 2017-09-05 RX ORDER — METOPROLOL TARTRATE 50 MG
50 TABLET ORAL DAILY
Qty: 0 | Refills: 0 | Status: DISCONTINUED | OUTPATIENT
Start: 2017-09-05 | End: 2017-09-08

## 2017-09-05 RX ORDER — TRAMADOL HYDROCHLORIDE 50 MG/1
25 TABLET ORAL
Qty: 0 | Refills: 0 | Status: DISCONTINUED | OUTPATIENT
Start: 2017-09-05 | End: 2017-09-10

## 2017-09-05 RX ORDER — ALPRAZOLAM 0.25 MG
0.25 TABLET ORAL DAILY
Qty: 0 | Refills: 0 | Status: DISCONTINUED | OUTPATIENT
Start: 2017-09-05 | End: 2017-09-09

## 2017-09-05 RX ORDER — ALPRAZOLAM 0.25 MG
0 TABLET ORAL
Qty: 0 | Refills: 0 | COMMUNITY

## 2017-09-05 RX ORDER — MIRTAZAPINE 45 MG/1
0.75 TABLET, ORALLY DISINTEGRATING ORAL
Qty: 0 | Refills: 0 | COMMUNITY

## 2017-09-05 RX ORDER — HEPARIN SODIUM 5000 [USP'U]/ML
5000 INJECTION INTRAVENOUS; SUBCUTANEOUS EVERY 12 HOURS
Qty: 0 | Refills: 0 | Status: DISCONTINUED | OUTPATIENT
Start: 2017-09-05 | End: 2017-09-10

## 2017-09-05 RX ORDER — LEVOTHYROXINE SODIUM 125 MCG
75 TABLET ORAL DAILY
Qty: 0 | Refills: 0 | Status: DISCONTINUED | OUTPATIENT
Start: 2017-09-05 | End: 2017-09-10

## 2017-09-05 RX ORDER — COLCHICINE 0.6 MG
0.6 TABLET ORAL DAILY
Qty: 0 | Refills: 0 | Status: DISCONTINUED | OUTPATIENT
Start: 2017-09-05 | End: 2017-09-10

## 2017-09-05 RX ADMIN — Medication 2000 UNIT(S): at 17:54

## 2017-09-05 RX ADMIN — Medication 0.25 MILLIGRAM(S): at 19:33

## 2017-09-05 RX ADMIN — Medication 325 MILLIGRAM(S): at 21:32

## 2017-09-05 RX ADMIN — Medication 650 MILLIGRAM(S): at 13:16

## 2017-09-05 RX ADMIN — Medication 5 MILLIGRAM(S): at 21:33

## 2017-09-05 RX ADMIN — Medication 75 MICROGRAM(S): at 17:54

## 2017-09-05 RX ADMIN — Medication 0.6 MILLIGRAM(S): at 17:54

## 2017-09-05 RX ADMIN — HEPARIN SODIUM 5000 UNIT(S): 5000 INJECTION INTRAVENOUS; SUBCUTANEOUS at 21:32

## 2017-09-05 RX ADMIN — CLOPIDOGREL BISULFATE 75 MILLIGRAM(S): 75 TABLET, FILM COATED ORAL at 17:54

## 2017-09-05 RX ADMIN — TRAMADOL HYDROCHLORIDE 25 MILLIGRAM(S): 50 TABLET ORAL at 19:53

## 2017-09-05 RX ADMIN — PANTOPRAZOLE SODIUM 40 MILLIGRAM(S): 20 TABLET, DELAYED RELEASE ORAL at 17:54

## 2017-09-05 RX ADMIN — Medication 50 MILLIGRAM(S): at 17:53

## 2017-09-05 RX ADMIN — LOSARTAN POTASSIUM 25 MILLIGRAM(S): 100 TABLET, FILM COATED ORAL at 17:54

## 2017-09-05 NOTE — H&P ADULT - NSHPOUTPATIENTPROVIDERS_GEN_ALL_CORE
PCP/card  Dr. FRANCISCO JAVIER Sánchez was contacted by Dr. Kim PCP/card  Dr. FRANCISCO JAVIER Sánchez was contacted by Dr. Judy Muhammad  ortho Dr. Pollack  gyn  Dr. Magana  pain management  Dr Ochoa

## 2017-09-05 NOTE — ED PROVIDER NOTE - CARE PLAN
Principal Discharge DX:	STEMI (ST elevation myocardial infarction) Principal Discharge DX:	STEMI (ST elevation myocardial infarction)  Secondary Diagnosis:	Chest pain  Secondary Diagnosis:	Weakness

## 2017-09-05 NOTE — ED ADULT NURSE REASSESSMENT NOTE - NS ED NURSE REASSESS COMMENT FT1
Pt had increased chest pain and repeat EKG was done. Pt having a STEMI. 2nd #20 IV placed in left forearm. Cardiac RN at bedside Pt had increased chest pain and repeat EKG was done. Pt having a STEMI. 2nd #20 IV placed in left forearm and repeat roponin drawn.. Cardiac RNs at bedside

## 2017-09-05 NOTE — CHART NOTE - NSCHARTNOTEFT_GEN_A_CORE
s/p [facility]     Patient feels well.  Denies  shortness of breath, dizziness or palpitations at this time. CP has subsided  Tridil drip started in cath lab for CP and hypertension    Right groin procedure site CDI.  no bleeding, no hematoma, site soft, non tender, positive pedal pulses bilaterally    HPI: 89 year old female with PMHx CAD, PCI, HTN, hypothyroidisms presented to ER with generalized weakness and CP which worsens on inspiration. Denies chills, fever N/V. Repeated EKG in ER revealed BRYON in II, III, AVF. Pt was taken urgently to the cath lab    now s/p [facility] revealing patent stents    -Admit to CCU  hospitalist service  -vital signs, diet and activity per post procedure orders  -ambulate ad clemente post bedrest  -encourage PO fluids  -plan of care discussed with patient, family and MD   -Colchicine 0.6 mg PO daily for suspected pericarditis  -Echo in AM  -BP control  -Increase Losartan 25 mg BID  -Toprol 50 mg in AM; Toprol 25 mg PO in PM  -post procedure instructions reviewed s/p [facility]     Patient feels well.  Denies  shortness of breath, dizziness or palpitations at this time. CP has subsided  Tridil drip started in cath lab for CP and hypertension    Right groin procedure site CDI.  no bleeding, no hematoma, site soft, non tender, positive pedal pulses bilaterally    HPI: 89 year old female with PMHx CAD, PCI, HTN, hypothyroidisms presented to ER with generalized weakness and CP which worsens on inspiration. Denies chills, fever N/V. Repeated EKG in ER revealed BRYON in II, III, AVF. Pt was taken urgently to the cath lab    now s/p [facility] revealing patent stents, non obstructive CAD    -Admit to CCU  hospitalist service  -vital signs, diet and activity per post procedure orders  -ambulate ad clemente post bedrest  -encourage PO fluids  -plan of care discussed with patient, family and MD   -Colchicine 0.6 mg PO daily for pericarditis  - mg PO every 8 hrs for pericarditis  -Echo in AM  -BP control  -Increase Losartan 25 mg BID  -Toprol 50 mg in AM; Toprol 25 mg PO in PM  -post procedure instructions reviewed

## 2017-09-05 NOTE — H&P ADULT - NSHPPHYSICALEXAM_GEN_ALL_CORE
Vital Signs Last 24 Hrs  T(C): 36.7 (05 Sep 2017 11:16), Max: 37 (05 Sep 2017 06:43)  T(F): 98.1 (05 Sep 2017 11:16), Max: 98.6 (05 Sep 2017 06:43)  HR: 87 (05 Sep 2017 19:00) (87 - 106)  BP: 131/52 (05 Sep 2017 19:00) (112/90 - 181/90)  BP(mean): 73 (05 Sep 2017 19:00) (73 - 95)  RR: 17 (05 Sep 2017 19:00) (16 - 21)  SpO2: 95% (05 Sep 2017 18:00) (95% - 100%)    Pleasant female w mild discomfrt lying flat in CCU bed  Appears younger than stated age  HEENT NC pupils reactive, anicteric  Neck no JVD no bruit  Chest clear breath sounds bilaterally  CW nontender to gentle palpation     Breats not examined  Cor RR S1 + S2   no murmur or rub appreciated  ( cannot have pt change positions to ausculatate due to groin cath site restrictions)  Abd + bowel sounds soft and nontender  Extrem no CCE distal NVI  MSK: + DJD w heberden's nodes  Neuro alert, O x 3, normal speech, follows commands  Skin: no lesions  /Vag/rectal not examined as not indicated for current complaint

## 2017-09-05 NOTE — PHYSICAL THERAPY INITIAL EVALUATION ADULT - MODALITIES TREATMENT COMMENTS
new pain c/o R UE into chest and back 8/10; nsg and MD aware new pain c/o R UE into chest and back 8/10; nsg and MD aware; spoke with MD/nsg regarding findings.MD awaiting CT chest. If negative, will start process of dc from ER to [facility] for rehab.

## 2017-09-05 NOTE — CONSULT NOTE ADULT - PROBLEM SELECTOR RECOMMENDATION 9
Chest pain with abnormal ECG suspicious for STEMI.  Plan Emergent cardiac catheterization for further evaluation.  Other possible etiologies include pericarditis, or coronary spasm for Malignant HTN. Further recommendations and treatment based on angiography results.

## 2017-09-05 NOTE — ED PROVIDER NOTE - PMH
CAD (coronary artery disease)    Endometrial adenocarcinoma    GERD (gastroesophageal reflux disease)    [place] (hard of hearing)    HTN (hypertension)    Hyperlipidemia    Hypothyroid    Macular degeneration    Stented coronary artery

## 2017-09-05 NOTE — CONSULT NOTE ADULT - ATTENDING COMMENTS
Risks, benefits, and alternatives of cardiac catheterization with percutaneous coronary intervention discussed with the patient/family including but not limited to death, myocardial infarction, stroke, bleeding, infection, or vascular injury. Patient/family expressed understanding of these risks and patient signed informed consent for procedure.

## 2017-09-05 NOTE — ED PROVIDER NOTE - MEDICAL DECISION MAKING DETAILS
see progress note. need for multiple bedside reassessments for cardivovasculat stability and any detioration in neurological exam

## 2017-09-05 NOTE — H&P ADULT - HISTORY OF PRESENT ILLNESS
89 yr old female w CAD s/p stents, HTN, HLD, hypothyroidism, hx uterine CA s/p hysterectomy presented to  ED by ambulance c/o weakness and chest and back pain that started at 04:00 AM.       She could not tell me what made it better or what made it worse.        SHe has felt very weak for over a month and has come to the ED on previous occasions but today had pain.  Chest > back w no other radiation       In the ED she had a CT angio that ruled out dissection or PE.  1st EKG was normal and cardiac enzymes were neg x 2.  Then additional EKG were done and she had ST-T elevations in inferior leads.  She was emergently taken to the cath lab by Dr. Kim.  Her prior cardiac stents are patent and her LV fx is normal.  He reported that she had pericarditis.       During my interview post cath, she still has discomfort but it is better than previously.    Past Med Hx  1) CAD s/p non-drug eluting stents  2) HTN  3) HLD  4) Hypothyroid  5) Uterine CA dx 5 years ago; s/p hysterectomy 89 yr old female w CAD s/p stents, HTN, HLD, hypothyroidism, hx uterine CA s/p hysterectomy presented to  ED by ambulance c/o weakness and chest and back pain that started at 04:00 AM.       She could not tell me what made it better or what made it worse. Grades it 7-10/10       SHe has felt very weak for over a month and has come to the ED on previous occasions but today had pain.  Chest > back w no other radiation.  No palpitations, diaphoresis, N, V       In the ED she had a CT angio that ruled out dissection or PE.  1st EKG was normal and cardiac enzymes were neg x 2.  Then additional EKG were done and she had ST-T elevations in inferior leads.  She was emergently taken to the cath lab by Dr. Kim.  Her prior cardiac stents are patent and her LV fx is normal.  He reported that she had pericarditis.       During my interview post cath, she still has discomfort but it is better than previously.    Past Med Hx  1) CAD s/p non-drug eluting stents  2) HTN  3) HLD  4) Hypothyroid  5) Uterine CA dx 5 years ago; s/p hysterectomy; RT 06 to   6) hx pericarditis in her 50s, twice    Past Surg Hx:  1) Cardiac stents placed at [facility] and here  2) Cholecystectomy   3) Hernia repair   4) Hysterectomy EMILY- BSO  at [facility] for CA  5) spinal epidural 02-

## 2017-09-05 NOTE — ED PROVIDER NOTE - PROGRESS NOTE DETAILS
pt with worsening pleuritic chest pain thorughout ed stay pe negative repeat ekg shows st elevation inferior leads consulted PCI interventional cardio taked to cath lab family and pt agree to plan of care

## 2017-09-05 NOTE — H&P ADULT - NSHPLABSRESULTS_GEN_ALL_CORE
3 vessel CAD with widely patent stents and NL LV FX.    1) Cath  LMCA: Diffuse irregularity.There is mild diffuse disease noted.  LAD: Diffuse irregularity.Widely patent stent.  Prox LAD: Proximal subsection.20% stenosis 16 mm length. Good runoff was   present.The lesion was previously treated with the following devices: non drug   eluting stent.    LCx: Diffuse irregularity.Widely patent stent.  Prox CX: Proximal subsection.0% stenosis 16 mm length.  The lesion was previously treated with the following devices: non drug   eluting stent.   2nd Ob Vonnie: Proximal subsection.10% stenosis 16 mm length.Pre procedure   SANDHYA III flow was noted. The lesion was diagnosed as a low risk lesion.   The lesion was previously treated with the following devices: non drug   eluting stent.     1st Ob Vonnie: Proximal subsection.10% stenosis 16 mm length.Pre procedure   SANDHYA III flow was noted. Good runoff was present.The lesion was diagnosed   as a low risk lesion.     The lesion was previously treated with the following devices: non drug   eluting stent.    RCA: Diffuse irregularity.Widely patent stent.     Prox RCA: Proximal subsection.20% stenosis 16 mm length. Pre procedure   SANDHYA   III flow was noted. Good runoff was present.The lesion was diagnosed as a   low risk lesion.    2) Initial EKG NSR no acute changes  additional EKGs in ED SR w ST elevation in inferior leads and hyperacute T wave changes    3) CTA no PE or dissection 3 vessel CAD with widely patent stents and NL LV FX.    1) Cath  LMCA: Diffuse irregularity.There is mild diffuse disease noted.  LAD: Diffuse irregularity.Widely patent stent.  Prox LAD: Proximal subsection.20% stenosis 16 mm length. Good runoff was   present.The lesion was previously treated with the following devices: non drug   eluting stent.    LCx: Diffuse irregularity.Widely patent stent.  Prox CX: Proximal subsection.0% stenosis 16 mm length.  The lesion was previously treated with the following devices: non drug   eluting stent.   2nd Ob Vonnie: Proximal subsection.10% stenosis 16 mm length.Pre procedure   SANDHYA III flow was noted. The lesion was diagnosed as a low risk lesion.   The lesion was previously treated with the following devices: non drug   eluting stent.     1st Ob Vonnie: Proximal subsection.10% stenosis 16 mm length.Pre procedure   SANDHYA III flow was noted. Good runoff was present. The lesion was diagnosed   as a low risk lesion.     The lesion was previously treated with the following devices: non drug   eluting stent.    RCA: Diffuse irregularity.Widely patent stent.     Prox RCA: Proximal subsection.20% stenosis 16 mm length. Pre procedure   SANDHYA   III flow was noted. Good runoff was present.The lesion was diagnosed as a   low risk lesion.    2) Initial EKG NSR no acute changes  additional EKGs in ED SR w ST elevation in inferior leads and hyperacute T wave changes    3) CTA no PE or dissection

## 2017-09-05 NOTE — PHYSICAL THERAPY INITIAL EVALUATION ADULT - PATIENT/FAMILY/SIGNIFICANT OTHER GOALS STATEMENT, PT EVAL
concerned about pt going home with bouts of weakness/diaphoresis she experiences; are open to rehab services

## 2017-09-05 NOTE — PHYSICAL THERAPY INITIAL EVALUATION ADULT - GENERAL OBSERVATIONS, REHAB EVAL
supine in bed with monitor supine in bed with monitor; 2 sons by bedside; states in the past couple of months, pt would have hot flashes where she would sweat/generalized weakness with a bout of passing out

## 2017-09-05 NOTE — PHYSICAL THERAPY INITIAL EVALUATION ADULT - ADDITIONAL COMMENTS
Live in a house alone with 1 small step to enter with no HR. bedroom and bathroom on first floor. laundry on first floor. PLOF: son would check in on pt in AM and unlock door for HHA to enter. HHA assists with breakfast/lunch/showering with HH showerhead and dressing post shower. Son comes back in evening to assist pt into bed for the night. Has a bedside commode for evening. Has dinner delivered. Has BareedEE/Lumatix. Also has a walker and cane. HHA puts laundry in and pt able to finish laundry.

## 2017-09-05 NOTE — PACU DISCHARGE NOTE - COMMENTS
Patient transferred to CCU. Report given to OFE Ceja RN. Patient a & o x4, V/S/S, tridal gtt infusing at 10mcg/min as ordered. Right groin dressing clean dry and intact, no signs bleeding or hematoma.

## 2017-09-05 NOTE — ED PROVIDER NOTE - OBJECTIVE STATEMENT
89 y/o female with PMHx of CAD, HTN, HLD, hypothyroidism presents to the ED c/o generalized weakness and gait instability for 1 month, worsening.  No new meds.  Lives alone. denies fever. denies HA or neck pain. no chest pain or sob. no abd pain. no n/v/d. no urinary f/u/d. no back pain. denies illicit drug use. no recent travel. no rash. no other acute issues symptoms or concerns 89 y/o female with PMHx of CAD, HTN, HLD, hypothyroidism presents to the ED c/o generalized weakness and gait instability for 1 month, worsening.  No new meds.  Lives alone. denies fever. denies HA or neck pain. + intermittent pleuritic chest pain no sob. no abd pain. no n/v/d. no urinary f/u/d. no back pain. denies illicit drug use. no recent travel. no rash. no other acute issues symptoms or concerns

## 2017-09-05 NOTE — ED ADULT NURSE REASSESSMENT NOTE - NS ED NURSE REASSESS COMMENT FT1
Assumed care of pt. @ 7:37 pt states her pain is from shoulder to shoulder across her chest and back, no difficulty breathing. Pt A&Ox3, calm and cooperative.

## 2017-09-05 NOTE — H&P ADULT - NSHPSOCIALHISTORY_GEN_ALL_CORE
2 years ago (  68 years)    Lives alone    Non-smoker, no alcohol    Son and daughter-in-law so to see pt multiple times a day

## 2017-09-05 NOTE — H&P ADULT - ASSESSMENT
1) Acute chest pain  A) concern for STEMI inf wall by new EKG changes  B) cath indicated patent stents, nl LV fx  C) chest + back pain CT ruled out dissection or PE  1 1) Acute chest pain  A) concern for STEMI inf wall by new EKG changes  B) cath indicated patent stents, nl LV fx  C) chest + back pain CT ruled out dissection or PE  D) acute pericarditis  1. ASA q 8 as per Dr. Kim  2. plavix  3. warm compresses  4. tramadol for added pain    2) HTN  1. continue beta blocker  2. continue ACE-I    3) HLD  diet  statin intolerant    4) hypothyroid  1. continue replacement    5) VTE sq hep

## 2017-09-05 NOTE — CONSULT NOTE ADULT - SUBJECTIVE AND OBJECTIVE BOX
PCP: Ailyn Álvarez MD    REQUESTING PHYSICIAN: Abram Edmonds MD    REASON FOR CONSULT/CC: Chest pain, ST elevations on ECG    HPI:  89 year old female who presented with chest pain described as pressure and pleuritic and some what positional for last 24 hrs and acute worsening this AM and came to ED.  In ER, initial ECG showed NSR without acute ischemic changes and initial tropinin was normal. Pt. was kept in ED for observation and she then experienced more pain around 2:40 PM and repeat ECG showed inferolateral ST elevations.  Emergent PCI consult was then called.     PAST MEDICAL & SURGICAL HISTORY:  Endometrial adenocarcinoma  Macular degeneration  Stented coronary artery  Hypothyroid  Hyperlipidemia  HTN (hypertension)  GERD (gastroesophageal reflux disease)  CAD (coronary artery disease)  [place] ([place] of hearing)  S/P hernia repair: umbilical - 2008  S/P laparoscopic cholecystectomy: 2008  Ankle fracture, right: surgery 25 yrs ago - hardware removed  S/P coronary angiogram: 2005, 2008, 2011 - drug eluding stents  Pericarditis 40 yrs. ago.    SOCIAL HISTORY: Non-Smoker/Social ETOH/ No Ilicit Drug use.    FAMILY HISTORY: No family history of CAD     ALLERGIES:  amlodipine (Unknown)  clonidine (Unknown)  Levatol (Unknown)  Lipitor (Unknown)  Lotrel (Unknown)  morphine (Other)  Plaquenil (Unknown)  statins (Other (Unknown))  sulfa drugs (Rash)    HOME MEDICATIONS: See Med Rec. Reviewed.    REVIEW OF SYSTEMS: 13 systems were reviewed and all negative except for comments above.    Vital Signs Last 24 Hrs  T(C): 36.7 (05 Sep 2017 11:16), Max: 37 (05 Sep 2017 06:43)  T(F): 98.1 (05 Sep 2017 11:16), Max: 98.6 (05 Sep 2017 06:43)  HR: 93 (05 Sep 2017 16:25) (91 - 106)  BP: 163/71 (05 Sep 2017 16:25) (131/63 - 220/120)  RR: 16 (05 Sep 2017 16:25) (16 - 21)  SpO2: 100% (05 Sep 2017 16:25) (97% - 100%)Daily Height in cm: 157.48 (05 Sep 2017 06:43)      PHYSICAL EXAM:    Constitutional: NAD, awake and alert, well-developed, 4/10 chest pain.   HEENT: PERRLA, EOMI,  No oral cyanosis. Oropharnx Clean and Dry.  Neck:  supple,  No JVD, No Thyroid enlargement. No Carotid Bruits bilaterally.  Respiratory: Breath sounds are clear bilaterally, No wheezing, rales or rhonchi  Cardiovascular: NL S1 and S2, RRR, +s4, 1/6 VICTORIANO to LUSB.  Gastrointestinal: Bowel Sounds present, soft, NT, ND, No HSM.   Extremities: No peripheral edema. No clubbing or cyanosis. Barbeau Test performed in RUE and Positive.  Vascular: 2+ peripheral pulses in LE   Neurological: A/O x 3, no gross focal motor deficits  Musculoskeletal: no calf tenderness or joint swelling.  Skin: No rashes.                          14.3   21.4  )-----------( 313      ( 05 Sep 2017 06:57 )             44.3     09-05    134<L>  |  101  |  21  ----------------------------<  106<H>  3.9   |  26  |  1.02    Ca    9.8      05 Sep 2017 06:57      CARDIAC MARKERS ( 05 Sep 2017 15:03 )  0.041 ng/mL / x     / x     / x     / x      CARDIAC MARKERS ( 05 Sep 2017 10:35 )  0.036 ng/mL / x     / x     / x     / x      CARDIAC MARKERS ( 05 Sep 2017 06:57 )  0.033 ng/mL / x     / x     / x     / x        PT/INR - ( 05 Sep 2017 06:57 )   PT: 10.4 sec;   INR: 0.96 ratio         PTT - ( 05 Sep 2017 06:57 )  PTT:29.7 sec    EKG: NSR, ST Elevations II, III, AVF, V4-V6.  KS segment depression. (II, III) KS seg elevation AVR.    < from: Xray Chest 1 View AP/PA. (09.05.17 @ 07:40) >  EXAM:  CHEST SINGLE VIEW FRONTAL                            PROCEDURE DATE:  09/05/2017          INTERPRETATION:  Portable chest radiograph dated 9/5/2017.    COMPARISON: 7/14/2017.    CLINICAL INFORMATION: Chest pain.    FINDINGS:    The airway ismidline. Poor inspiratory effort.  There are no airspace consolidations.  There is no pleural effusion or pneumothorax.   The cardiac size  can not be evaluated on this AP portable study.   The bones are normal.     IMPRESSION:    No acute cardiopulmonary findings.

## 2017-09-05 NOTE — H&P ADULT - NSHPREVIEWOFSYSTEMS_GEN_ALL_CORE
1) joint pains mariola R ankle, L knee    2) overall weakness    3) difficulty eating at times    4) chronic constipation

## 2017-09-06 LAB
ALBUMIN SERPL ELPH-MCNC: 3.3 G/DL — SIGNIFICANT CHANGE UP (ref 3.3–5)
ALP SERPL-CCNC: 64 U/L — SIGNIFICANT CHANGE UP (ref 40–120)
ALT FLD-CCNC: 19 U/L — SIGNIFICANT CHANGE UP (ref 12–78)
ANION GAP SERPL CALC-SCNC: 8 MMOL/L — SIGNIFICANT CHANGE UP (ref 5–17)
AST SERPL-CCNC: 16 U/L — SIGNIFICANT CHANGE UP (ref 15–37)
BILIRUB SERPL-MCNC: 1.2 MG/DL — SIGNIFICANT CHANGE UP (ref 0.2–1.2)
BUN SERPL-MCNC: 20 MG/DL — SIGNIFICANT CHANGE UP (ref 7–23)
CALCIUM SERPL-MCNC: 9 MG/DL — SIGNIFICANT CHANGE UP (ref 8.5–10.1)
CHLORIDE SERPL-SCNC: 103 MMOL/L — SIGNIFICANT CHANGE UP (ref 96–108)
CHOLEST SERPL-MCNC: 167 MG/DL — SIGNIFICANT CHANGE UP (ref 10–199)
CK SERPL-CCNC: 136 U/L — SIGNIFICANT CHANGE UP (ref 26–192)
CK SERPL-CCNC: 74 U/L — SIGNIFICANT CHANGE UP (ref 26–192)
CO2 SERPL-SCNC: 25 MMOL/L — SIGNIFICANT CHANGE UP (ref 22–31)
CREAT SERPL-MCNC: 1.42 MG/DL — HIGH (ref 0.5–1.3)
ERYTHROCYTE [SEDIMENTATION RATE] IN BLOOD: 33 MM/HR — HIGH (ref 0–20)
GLUCOSE SERPL-MCNC: 109 MG/DL — HIGH (ref 70–99)
HCT VFR BLD CALC: 36.6 % — SIGNIFICANT CHANGE UP (ref 34.5–45)
HDLC SERPL-MCNC: 90 MG/DL — SIGNIFICANT CHANGE UP (ref 40–125)
HGB BLD-MCNC: 12.1 G/DL — SIGNIFICANT CHANGE UP (ref 11.5–15.5)
LIPID PNL WITH DIRECT LDL SERPL: 60 MG/DL — SIGNIFICANT CHANGE UP
MCHC RBC-ENTMCNC: 27.8 PG — SIGNIFICANT CHANGE UP (ref 27–34)
MCHC RBC-ENTMCNC: 33.1 GM/DL — SIGNIFICANT CHANGE UP (ref 32–36)
MCV RBC AUTO: 84 FL — SIGNIFICANT CHANGE UP (ref 80–100)
PLATELET # BLD AUTO: 270 K/UL — SIGNIFICANT CHANGE UP (ref 150–400)
POTASSIUM SERPL-MCNC: 4.3 MMOL/L — SIGNIFICANT CHANGE UP (ref 3.5–5.3)
POTASSIUM SERPL-SCNC: 4.3 MMOL/L — SIGNIFICANT CHANGE UP (ref 3.5–5.3)
PROT SERPL-MCNC: 6.8 GM/DL — SIGNIFICANT CHANGE UP (ref 6–8.3)
RBC # BLD: 4.36 M/UL — SIGNIFICANT CHANGE UP (ref 3.8–5.2)
RBC # FLD: 14.3 % — SIGNIFICANT CHANGE UP (ref 10.3–14.5)
SODIUM SERPL-SCNC: 136 MMOL/L — SIGNIFICANT CHANGE UP (ref 135–145)
TOTAL CHOLESTEROL/HDL RATIO MEASUREMENT: 1.9 RATIO — LOW (ref 3.3–7.1)
TRIGL SERPL-MCNC: 83 MG/DL — SIGNIFICANT CHANGE UP (ref 10–149)
TROPONIN I SERPL-MCNC: 0.2 NG/ML — HIGH (ref 0.01–0.04)
TROPONIN I SERPL-MCNC: 0.25 NG/ML — HIGH (ref 0.01–0.04)
TSH SERPL-MCNC: 2.59 UIU/ML — SIGNIFICANT CHANGE UP (ref 0.36–3.74)
WBC # BLD: 12.7 K/UL — HIGH (ref 3.8–10.5)
WBC # FLD AUTO: 12.7 K/UL — HIGH (ref 3.8–10.5)

## 2017-09-06 PROCEDURE — 93306 TTE W/DOPPLER COMPLETE: CPT | Mod: 26

## 2017-09-06 RX ORDER — SODIUM CHLORIDE 9 MG/ML
1000 INJECTION INTRAMUSCULAR; INTRAVENOUS; SUBCUTANEOUS
Qty: 0 | Refills: 0 | Status: DISCONTINUED | OUTPATIENT
Start: 2017-09-06 | End: 2017-09-08

## 2017-09-06 RX ADMIN — Medication 2000 UNIT(S): at 11:28

## 2017-09-06 RX ADMIN — Medication 75 MICROGRAM(S): at 05:59

## 2017-09-06 RX ADMIN — CLOPIDOGREL BISULFATE 75 MILLIGRAM(S): 75 TABLET, FILM COATED ORAL at 11:28

## 2017-09-06 RX ADMIN — PANTOPRAZOLE SODIUM 40 MILLIGRAM(S): 20 TABLET, DELAYED RELEASE ORAL at 06:01

## 2017-09-06 RX ADMIN — Medication 50 MILLIGRAM(S): at 06:00

## 2017-09-06 RX ADMIN — LOSARTAN POTASSIUM 25 MILLIGRAM(S): 100 TABLET, FILM COATED ORAL at 06:00

## 2017-09-06 RX ADMIN — HEPARIN SODIUM 5000 UNIT(S): 5000 INJECTION INTRAVENOUS; SUBCUTANEOUS at 05:59

## 2017-09-06 RX ADMIN — Medication 325 MILLIGRAM(S): at 14:02

## 2017-09-06 RX ADMIN — Medication 0.6 MILLIGRAM(S): at 11:28

## 2017-09-06 RX ADMIN — HEPARIN SODIUM 5000 UNIT(S): 5000 INJECTION INTRAVENOUS; SUBCUTANEOUS at 17:10

## 2017-09-06 RX ADMIN — TRAMADOL HYDROCHLORIDE 25 MILLIGRAM(S): 50 TABLET ORAL at 06:00

## 2017-09-06 RX ADMIN — LOSARTAN POTASSIUM 25 MILLIGRAM(S): 100 TABLET, FILM COATED ORAL at 17:10

## 2017-09-06 RX ADMIN — Medication 325 MILLIGRAM(S): at 05:59

## 2017-09-06 RX ADMIN — Medication 325 MILLIGRAM(S): at 21:27

## 2017-09-06 NOTE — PROGRESS NOTE ADULT - SUBJECTIVE AND OBJECTIVE BOX
Patient is a 89y old  Female who presents with a chief complaint of chest pain and weakness (05 Sep 2017 17:51)      89 year old female who presented with chest pain described as pressure and pleuritic and some what positional for last 24 hrs and acute worsening this AM and came to ED.  In ER, initial ECG showed NSR without acute ischemic changes and initial tropinin was normal. Pt. was kept in ED for observation and she then experienced more pain around 2:40 PM and repeat ECG showed inferolateral ST elevations.  Emergent PCI consult was then called.   Cardiac Cath showed patent stents. Positive Troponins. Clinically, appears as though she has pericarditis/myocarditis.  Being treated with NSAIDS and feeling better today. Rather than Indocin, was placed on TID aspirin.     Allergies    amlodipine (Unknown)  clonidine (Unknown)  Levatol (Unknown)  Lipitor (Unknown)  Lotrel (Unknown)  methylPREDNISolone (Unknown)  morphine (Other)  Plaquenil (Unknown)  statins (Other (Unknown))  sulfa drugs (Rash)    Intolerances        MEDICATIONS  (STANDING):  clopidogrel Tablet 75 milliGRAM(s) Oral daily  metoprolol succinate ER 50 milliGRAM(s) Oral daily  pantoprazole    Tablet 40 milliGRAM(s) Oral before breakfast  levothyroxine 75 MICROGram(s) Oral daily  cholecalciferol 2000 Unit(s) Oral daily  losartan 25 milliGRAM(s) Oral every 12 hours  colchicine 0.6 milliGRAM(s) Oral daily  heparin  Injectable 5000 Unit(s) SubCutaneous every 12 hours  aspirin 325 milliGRAM(s) Oral every 8 hours  influenza   Vaccine 0.5 milliLiter(s) IntraMuscular once    MEDICATIONS  (PRN):  ALPRAZolam 0.25 milliGRAM(s) Oral daily PRN anxiety  zaleplon 5 milliGRAM(s) Oral at bedtime PRN Insomnia  traMADol 25 milliGRAM(s) Oral two times a day PRN Moderate Pain (4 - 6)    REVIEW OF SYSTEMS:    RESPIRATORY: No cough, wheezing, hemoptysis; No shortness of breath  CARDIOVASCULAR: No chest pain or palpitations  All other review of systems is negative unless indicated above      PHYSICAL EXAM:  Daily     Daily Weight in k (06 Sep 2017 05:00)  Vital Signs Last 24 Hrs  T(C): 36.7 (06 Sep 2017 07:30), Max: 36.9 (06 Sep 2017 05:00)  T(F): 98.1 (06 Sep 2017 07:30), Max: 98.4 (06 Sep 2017 05:00)  HR: 74 (06 Sep 2017 07:00) (74 - 106)  BP: 127/55 (06 Sep 2017 07:00) (102/42 - 177/65)  BP(mean): 71 (06 Sep 2017 07:00) (57 - 95)  RR: 17 (06 Sep 2017 07:00) (15 - 23)  SpO2: 95% (06 Sep 2017 07:00) (95% - 100%)    Constitutional: NAD, awake and alert, well-developed  HEENT: PERR, EOMI, Normal Hearing, MMM  Neck: Soft and supple, No LAD, No JVD  Respiratory: Breath sounds are clear bilaterally, No wheezing, rales or rhonchi  Cardiovascular: S1 and S2, regular rate and rhythm, no Murmurs, gallops or rubs  Gastrointestinal: Bowel Sounds present, soft, nontender, nondistended, no guarding, no rebound  Extremities: No peripheral edema  Vascular: 2+ peripheral pulses  Neurological: A/O x 3, no focal deficits  Musculoskeletal: 5/5 strength b/l upper and lower extremities  Skin: No rashes    LABS: All Labs Reviewed:                        12.1   12.7  )-----------( 270      ( 06 Sep 2017 05:55 )             36.6     09-06    136  |  103  |  20  ----------------------------<  109<H>  4.3   |  25  |  1.42<H>    Ca    9.0      06 Sep 2017 05:55    TPro  6.8  /  Alb  3.3  /  TBili  1.2  /  DBili  x   /  AST  16  /  ALT  19  /  AlkPhos  64  09-06    PT/INR - ( 05 Sep 2017 06:57 )   PT: 10.4 sec;   INR: 0.96 ratio         PTT - ( 05 Sep 2017 06:57 )  PTT:29.7 sec  CARDIAC MARKERS ( 06 Sep 2017 05:55 )  0.248 ng/mL / x     / 74 U/L / x     / x      CARDIAC MARKERS ( 05 Sep 2017 21:59 )  0.271 ng/mL / x     / 68 U/L / x     / x      CARDIAC MARKERS ( 05 Sep 2017 15:03 )  0.041 ng/mL / x     / x     / x     / x      CARDIAC MARKERS ( 05 Sep 2017 10:35 )  0.036 ng/mL / x     / x     / x     / x      CARDIAC MARKERS ( 05 Sep 2017 06:57 )  0.033 ng/mL / x     / x     / x     / x              TELEMETRY/EKG: NSR    Cardiac Cath:     Angiographic Findings     Cardiac Arteries and Lesion Findings    LMCA: Diffuse irregularity.There is mild diffuse disease noted.    LAD: Diffuse irregularity.Widely patent stent.     Prox LAD: Proximal subsection.20% stenosis 16 mm length.Good runoff was   present.The lesion was diagnosed as a low risk lesion.     The lesion was previously treated with the following devices: non drug   eluting stent.    LCx: Diffuse irregularity.Widely patent stent.     Prox CX: Proximal subsection.0% stenosis 16 mm length.     The lesion was previously treated with the following devices: non drug   eluting stent.     2nd Ob Vonnie: Proximal subsection.10% stenosis 16 mm length.Pre procedure   SANDHYA III flow was noted. The lesion was diagnosed as a lowrisk lesion.     The lesion was previously treated with the following devices: non drug   eluting stent.     1st Ob Vonnie: Proximal subsection.10% stenosis 16 mm length.Pre procedure   SANDHYA III flow was noted. Good runoff was present.The lesion was diagnosed   as a low risk lesion.     The lesion was previously treated with the following devices: non drug   eluting stent.    RCA: Diffuse irregularity.Widely patent stent.     Prox RCA: Proximal subsection.20% stenosis 16 mm length.Pre procedure   SANDHYA   III flow was noted. Good runoff was present.The lesion was diagnosed as a   low risk lesion.    Valves  +------+----------------------------+----------------------------+---------  +  !Valve !Stenosis                    !Insufficiency     !Comments !  +------+----------------------------+----------------------------+---------  +  !Aortic!No                          !Not assessed                !           !  +------+----------------------------+----------------------------+---------  +  !Mitral!Not assessed                !No insufficiency            !           !  +------+----------------------------+----------------------------+---------  +    LV function assessed as:Normal.  Ejection Fraction  +----------------------------------------------------------------------+---  +  !Method                                                                  !EF%!  +----------------------------------------------------------------------+---  +  !LV gram                                !70 !  +----------------------------------------------------------------------+---  +    VA  Ventriculography Findings:  Hyperdynamic left ventricular systolic function.     Impression     Diagnostic Conclusions     3 vessel CAD with widely patent stents and NL LV FX.     Recommendations     Manage with medical therapy for possible Pericarditis.    Estimated Blood Loss:4ml.    Complications:  No complication.     Signatures     ----------------------------------------------------------------   Electronically signed by Abram Kim MD([facility]   Physician) on 2017 16:52   ----------------------------------------

## 2017-09-06 NOTE — PROGRESS NOTE ADULT - SUBJECTIVE AND OBJECTIVE BOX
HPI:  89 yr old female w CAD s/p stents, HTN, HLD, hypothyroidism, hx uterine CA s/p hysterectomy presented to  ED by ambulance c/o weakness and chest and back pain, intensity  7-10/10, no aggregating factor, non radiating      #Review of system- rest of review of systems are negative except as mentioned in HPI    PHYSICAL EXAM:    Vital Signs Last 24 Hrs  T(C): 36.7 (06 Sep 2017 07:30), Max: 36.9 (06 Sep 2017 05:00)  T(F): 98.1 (06 Sep 2017 07:30), Max: 98.4 (06 Sep 2017 05:00)  HR: 70 (06 Sep 2017 11:00) (70 - 103)  BP: 116/51 (06 Sep 2017 11:00) (97/41 - 177/65)  BP(mean): 66 (06 Sep 2017 11:00) (56 - 95)  RR: 17 (06 Sep 2017 11:00) (14 - 23)  SpO2: 95% (06 Sep 2017 10:00) (95% - 100%)    GENERAL: comfortable   HEAD:  Atraumatic, Normocephalic  EYES: EOMI, PERRLA, conjunctiva and sclera clear  HEENT: Moist mucous membranes  NECK: Supple, No JVD  NERVOUS SYSTEM:  Alert & Oriented X3, Motor Strength 5/5 B/L upper and lower extremities; DTRs 2+ intact and symmetric  CHEST/LUNG: Clear to auscultation bilaterally; No rales, rhonchi, wheezing, or rubs  HEART:S1S2 normal, no murmer  ABDOMEN: Soft, Nontender, Nondistended; Bowel sounds present  GENITOURINARY- Voiding, no palpable bladder  EXTREMITIES:  2+ Peripheral Pulses, No clubbing, cyanosis, or edema  MUSCULOSKELTAL- No muscle tenderness, Muscle tone normal, No joint tenderness, no Joint swelling, Joint range of motion-normal  SKIN-no rash, no lesion  PSYCH- Mood stable  LYMPH Node- No palpable lymph node    LABS:                        12.1   12.7  )-----------( 270      ( 06 Sep 2017 05:55 )             36.6     09-06    136  |  103  |  20  ----------------------------<  109<H>  4.3   |  25  |  1.42<H>    Ca    9.0      06 Sep 2017 05:55    TPro  6.8  /  Alb  3.3  /  TBili  1.2  /  DBili  x   /  AST  16  /  ALT  19  /  AlkPhos  64  09-06    PT/INR - ( 05 Sep 2017 06:57 )   PT: 10.4 sec;   INR: 0.96 ratio         PTT - ( 05 Sep 2017 06:57 )  PTT:29.7 sec      CAPILLARY BLOOD GLUCOSE          CARDIAC MARKERS ( 06 Sep 2017 05:55 )  0.248 ng/mL / x     / 74 U/L / x     / x      CARDIAC MARKERS ( 05 Sep 2017 21:59 )  0.271 ng/mL / x     / 68 U/L / x     / x      CARDIAC MARKERS ( 05 Sep 2017 15:03 )  0.041 ng/mL / x     / x     / x     / x      CARDIAC MARKERS ( 05 Sep 2017 10:35 )  0.036 ng/mL / x     / x     / x     / x      CARDIAC MARKERS ( 05 Sep 2017 06:57 )  0.033 ng/mL / x     / x     / x     / x            Standing medicine  clopidogrel Tablet 75 milliGRAM(s) Oral daily  ALPRAZolam 0.25 milliGRAM(s) Oral daily PRN  metoprolol succinate ER 50 milliGRAM(s) Oral daily  pantoprazole    Tablet 40 milliGRAM(s) Oral before breakfast  levothyroxine 75 MICROGram(s) Oral daily  cholecalciferol 2000 Unit(s) Oral daily  losartan 25 milliGRAM(s) Oral every 12 hours  colchicine 0.6 milliGRAM(s) Oral daily  heparin  Injectable 5000 Unit(s) SubCutaneous every 12 hours  aspirin 325 milliGRAM(s) Oral every 8 hours  zaleplon 5 milliGRAM(s) Oral at bedtime PRN  traMADol 25 milliGRAM(s) Oral two times a day PRN  influenza   Vaccine 0.5 milliLiter(s) IntraMuscular once  sodium chloride 0.9%. 1000 milliLiter(s) IV Continuous <Continuous>

## 2017-09-07 RX ORDER — LOSARTAN POTASSIUM 100 MG/1
25 TABLET, FILM COATED ORAL ONCE
Qty: 0 | Refills: 0 | Status: COMPLETED | OUTPATIENT
Start: 2017-09-07 | End: 2017-09-07

## 2017-09-07 RX ORDER — LOSARTAN POTASSIUM 100 MG/1
50 TABLET, FILM COATED ORAL
Qty: 0 | Refills: 0 | Status: DISCONTINUED | OUTPATIENT
Start: 2017-09-07 | End: 2017-09-09

## 2017-09-07 RX ORDER — POLYETHYLENE GLYCOL 3350 17 G/17G
17 POWDER, FOR SOLUTION ORAL DAILY
Qty: 0 | Refills: 0 | Status: DISCONTINUED | OUTPATIENT
Start: 2017-09-07 | End: 2017-09-10

## 2017-09-07 RX ADMIN — Medication 325 MILLIGRAM(S): at 21:43

## 2017-09-07 RX ADMIN — POLYETHYLENE GLYCOL 3350 17 GRAM(S): 17 POWDER, FOR SOLUTION ORAL at 11:57

## 2017-09-07 RX ADMIN — Medication 325 MILLIGRAM(S): at 06:04

## 2017-09-07 RX ADMIN — LOSARTAN POTASSIUM 25 MILLIGRAM(S): 100 TABLET, FILM COATED ORAL at 06:04

## 2017-09-07 RX ADMIN — HEPARIN SODIUM 5000 UNIT(S): 5000 INJECTION INTRAVENOUS; SUBCUTANEOUS at 17:44

## 2017-09-07 RX ADMIN — CLOPIDOGREL BISULFATE 75 MILLIGRAM(S): 75 TABLET, FILM COATED ORAL at 11:57

## 2017-09-07 RX ADMIN — Medication 2 DROP(S): at 18:56

## 2017-09-07 RX ADMIN — Medication 0.6 MILLIGRAM(S): at 11:58

## 2017-09-07 RX ADMIN — Medication 325 MILLIGRAM(S): at 14:05

## 2017-09-07 RX ADMIN — Medication 0.25 MILLIGRAM(S): at 17:44

## 2017-09-07 RX ADMIN — Medication 2000 UNIT(S): at 11:57

## 2017-09-07 RX ADMIN — HEPARIN SODIUM 5000 UNIT(S): 5000 INJECTION INTRAVENOUS; SUBCUTANEOUS at 06:05

## 2017-09-07 RX ADMIN — PANTOPRAZOLE SODIUM 40 MILLIGRAM(S): 20 TABLET, DELAYED RELEASE ORAL at 11:57

## 2017-09-07 RX ADMIN — Medication 50 MILLIGRAM(S): at 06:05

## 2017-09-07 RX ADMIN — Medication 75 MICROGRAM(S): at 06:04

## 2017-09-07 RX ADMIN — LOSARTAN POTASSIUM 50 MILLIGRAM(S): 100 TABLET, FILM COATED ORAL at 17:44

## 2017-09-07 NOTE — PROGRESS NOTE ADULT - SUBJECTIVE AND OBJECTIVE BOX
Patient is a 89y old  Female who presents with a chief complaint of chest pain and weakness (05 Sep 2017 17:51)    89 year old female who presented with chest pain described as pressure and pleuritic and some what positional for last 24 hrs and acute worsening this AM and came to ED.  In ER, initial ECG showed NSR without acute ischemic changes and initial tropinin was normal. Pt. was kept in ED for observation and she then experienced more pain around 2:40 PM and repeat ECG showed inferolateral ST elevations.  Emergent PCI consult was then called.   Cardiac Cath showed patent stents. Positive Troponins. Clinically, appears as though she has pericarditis/myocarditis.  Being treated with NSAIDS and feeling better today. Rather than Indocin, was placed on TID aspirin.       feeling better. Complaining of knee and shoulder pain, constipation, dry eyes.   concerned that the aspirin will upset her stomach.   doesn't feel she is ready for discharge home.       Allergies    amlodipine (Unknown)  clonidine (Unknown)  Levatol (Unknown)  Lipitor (Unknown)  Lotrel (Unknown)  methylPREDNISolone (Unknown)  morphine (Other)  Plaquenil (Unknown)  statins (Other (Unknown))  sulfa drugs (Rash)    Intolerances        MEDICATIONS  (STANDING):  clopidogrel Tablet 75 milliGRAM(s) Oral daily  metoprolol succinate ER 50 milliGRAM(s) Oral daily  pantoprazole    Tablet 40 milliGRAM(s) Oral before breakfast  levothyroxine 75 MICROGram(s) Oral daily  cholecalciferol 2000 Unit(s) Oral daily  losartan 25 milliGRAM(s) Oral every 12 hours  colchicine 0.6 milliGRAM(s) Oral daily  heparin  Injectable 5000 Unit(s) SubCutaneous every 12 hours  aspirin 325 milliGRAM(s) Oral every 8 hours  influenza   Vaccine 0.5 milliLiter(s) IntraMuscular once  sodium chloride 0.9%. 1000 milliLiter(s) (75 mL/Hr) IV Continuous <Continuous>  polyethylene glycol 3350 Oral Powder - Peds 17 Gram(s) Oral daily  polyvinyl alcohol 1.4%/povidone 0.6% Ophthalmic Solution - Peds 2 Drop(s) Both EYES two times a day    MEDICATIONS  (PRN):  ALPRAZolam 0.25 milliGRAM(s) Oral daily PRN anxiety  zaleplon 5 milliGRAM(s) Oral at bedtime PRN Insomnia  traMADol 25 milliGRAM(s) Oral two times a day PRN Moderate Pain (4 - 6)    REVIEW OF SYSTEMS:    RESPIRATORY: No cough, wheezing, hemoptysis; No shortness of breath  CARDIOVASCULAR: No chest pain or palpitations  All other review of systems is negative unless indicated above      PHYSICAL EXAM:  Daily     Daily Weight in k (07 Sep 2017 01:37)  Vital Signs Last 24 Hrs  T(C): 36.8 (07 Sep 2017 05:00), Max: 36.8 (07 Sep 2017 00:36)  T(F): 98.2 (07 Sep 2017 05:00), Max: 98.2 (07 Sep 2017 00:36)  HR: 65 (07 Sep 2017 06:00) (63 - 88)  BP: 113/40 (07 Sep 2017 06:00) (89/30 - 190/148)  BP(mean): 59 (07 Sep 2017 06:00) (44 - 160)  RR: 17 (07 Sep 2017 06:00) (14 - 22)  SpO2: 100% (06 Sep 2017 19:00) (95% - 100%)    Constitutional: NAD, awake and alert, well-developed  HEENT: PERR, EOMI, Normal Hearing, MMM  Neck: Soft and supple, No LAD, No JVD  Respiratory: Breath sounds are clear bilaterally, No wheezing, rales or rhonchi  Cardiovascular: S1 and S2, regular rate and rhythm, no Murmurs, gallops or rubs  Gastrointestinal: Bowel Sounds present, soft, nontender, nondistended, no guarding, no rebound  Extremities: No peripheral edema  Vascular: 2+ peripheral pulses  Neurological: A/O x 3, no focal deficits  Musculoskeletal: 5/5 strength b/l upper and lower extremities  Skin: No rashes    LABS: All Labs Reviewed:                        12.1   12.7  )-----------( 270      ( 06 Sep 2017 05:55 )             36.6     09-06    136  |  103  |  20  ----------------------------<  109<H>  4.3   |  25  |  1.42<H>    Ca    9.0      06 Sep 2017 05:55    TPro  6.8  /  Alb  3.3  /  TBili  1.2  /  DBili  x   /  AST  16  /  ALT  19  /  AlkPhos  64  09-06      CARDIAC MARKERS ( 06 Sep 2017 13:53 )  0.202 ng/mL / x     / 136 U/L / x     / x      CARDIAC MARKERS ( 06 Sep 2017 05:55 )  0.248 ng/mL / x     / 74 U/L / x     / x      CARDIAC MARKERS ( 05 Sep 2017 21:59 )  0.271 ng/mL / x     / 68 U/L / x     / x      CARDIAC MARKERS ( 05 Sep 2017 15:03 )  0.041 ng/mL / x     / x     / x     / x      CARDIAC MARKERS ( 05 Sep 2017 10:35 )  0.036 ng/mL / x     / x     / x     / x              TELEMETRY/EKG: NSR

## 2017-09-07 NOTE — PROGRESS NOTE ADULT - SUBJECTIVE AND OBJECTIVE BOX
Patient is a 89y old  Female who presents with a chief complaint of chest pain and weakness (05 Sep 2017 17:51)    89 year old female who presented with chest pain described as pressure and pleuritic and some what positional for last 24 hrs and acute worsening this AM and came to ED.  In ER, initial ECG showed NSR without acute ischemic changes and initial tropinin was normal. Pt. was kept in ED for observation and she then experienced more pain around 2:40 PM and repeat ECG showed inferolateral ST elevations.  Emergent PCI consult was then called.   Cardiac Cath showed patent stents. Positive Troponins. Clinically, appears as though she has pericarditis/myocarditis.  Being treated with NSAIDS and feeling better today. Rather than Indocin, was placed on TID aspirin.     9.7: feeling better, less chest pain, less dyspnea. Complaining of knee and shoulder pain (chronic), constipation, dry eyes.   doesn't feel she is ready for discharge home.             REVIEW OF SYSTEMS:    CONSTITUTIONAL: + gen weakness, No fevers or chills  ENT: No ear ache, No sorethroat  NECK: No pain, No stiffness  RESPIRATORY: No cough, No wheezing, No hemoptysis; No dyspnea  CARDIOVASCULAR: No chest pain, No palpitations  GASTROINTESTINAL: No abd pain, No nausea, No vomiting, No hematemesis, No diarrhea or constipation. No melena, No hematochezia.  GENITOURINARY: No dysuria, No  hematuria  NEUROLOGICAL: No diplopia, No paresthesia, No motor dysfunction  MUSCULOSKELETAL: No arthralgia, No myalgia  SKIN: No rashes, or lesions   PSYCH: no anxiety, no suicidal ideation    All other review of systems is negative unless indicated above    Vital Signs Last 24 Hrs  T(C): 36.6 (07 Sep 2017 09:00), Max: 36.8 (07 Sep 2017 00:36)  T(F): 97.9 (07 Sep 2017 09:00), Max: 98.2 (07 Sep 2017 00:36)  HR: 70 (07 Sep 2017 08:00) (63 - 88)  BP: 161/62 (07 Sep 2017 08:00) (89/30 - 190/148)  BP(mean): 88 (07 Sep 2017 08:00) (44 - 160)  RR: 13 (07 Sep 2017 08:00) (13 - 18)  SpO2: 100% (06 Sep 2017 19:00) (100% - 100%)    PHYSICAL EXAM:    GENERAL: NAD, Well nourished  HEENT:  NC/AT, EOMI, PERRLA, No scleral icterus, Moist mucous membranes  NECK: Supple, No JVD  CNS:  Alert & Oriented X3, Motor Strength 5/5 B/L upper and lower extremities; DTRs 2+ intact   LUNG: Normal Breath sounds, Clear to auscultation bilaterally, No rales, No rhonchi, No wheezing  HEART: RRR; No murmurs, No rubs  ABDOMEN: +BS, ST/ND/NT  GENITOURINARY: Voiding, Bladder not distended  EXTREMITIES:  2+ Peripheral Pulses, No clubbing, No cyanosis, No tibial edema  MUSCULOSKELTAL: Joints normal ROM, No TTP, No effusion  VAGINAL: deferred  SKIN: no rashes  RECTAL: deferred, not indicated  BREAST: deferred                          12.1   12.7  )-----------( 270      ( 06 Sep 2017 05:55 )             36.6     09-06    136  |  103  |  20  ----------------------------<  109<H>  4.3   |  25  |  1.42<H>    Ca    9.0      06 Sep 2017 05:55    TPro  6.8  /  Alb  3.3  /  TBili  1.2  /  DBili  x   /  AST  16  /  ALT  19  /  AlkPhos  64  09-06    Vancomycin levels:   Cultures:     MEDICATIONS  (STANDING):  clopidogrel Tablet 75 milliGRAM(s) Oral daily  metoprolol succinate ER 50 milliGRAM(s) Oral daily  pantoprazole    Tablet 40 milliGRAM(s) Oral before breakfast  levothyroxine 75 MICROGram(s) Oral daily  cholecalciferol 2000 Unit(s) Oral daily  losartan 25 milliGRAM(s) Oral every 12 hours  colchicine 0.6 milliGRAM(s) Oral daily  heparin  Injectable 5000 Unit(s) SubCutaneous every 12 hours  aspirin 325 milliGRAM(s) Oral every 8 hours  influenza   Vaccine 0.5 milliLiter(s) IntraMuscular once  sodium chloride 0.9%. 1000 milliLiter(s) (75 mL/Hr) IV Continuous <Continuous>  polyethylene glycol 3350 Oral Powder - Peds 17 Gram(s) Oral daily  polyvinyl alcohol 1.4%/povidone 0.6% Ophthalmic Solution - Peds 2 Drop(s) Both EYES two times a day    MEDICATIONS  (PRN):  ALPRAZolam 0.25 milliGRAM(s) Oral daily PRN anxiety  zaleplon 5 milliGRAM(s) Oral at bedtime PRN Insomnia  traMADol 25 milliGRAM(s) Oral two times a day PRN Moderate Pain (4 - 6)      all labs reviewed  all imaging reviewed    Assessment and Plan:    1. Pericarditis/Myocarditis:  cw ASA 325mg po tid, cw Colchicine  GI prophy with PPI  Cardiac cath noted: patent stents    2. h/o CAD:  cw antiplatelets/statins/bblockers    3. OA:  cw analgesia prn

## 2017-09-08 RX ORDER — ASPIRIN/CALCIUM CARB/MAGNESIUM 324 MG
325 TABLET ORAL
Qty: 0 | Refills: 0 | Status: DISCONTINUED | OUTPATIENT
Start: 2017-09-08 | End: 2017-09-10

## 2017-09-08 RX ORDER — METOPROLOL TARTRATE 50 MG
50 TABLET ORAL ONCE
Qty: 0 | Refills: 0 | Status: COMPLETED | OUTPATIENT
Start: 2017-09-08 | End: 2017-09-08

## 2017-09-08 RX ORDER — FAMOTIDINE 10 MG/ML
20 INJECTION INTRAVENOUS DAILY
Qty: 0 | Refills: 0 | Status: DISCONTINUED | OUTPATIENT
Start: 2017-09-08 | End: 2017-09-10

## 2017-09-08 RX ADMIN — HEPARIN SODIUM 5000 UNIT(S): 5000 INJECTION INTRAVENOUS; SUBCUTANEOUS at 06:59

## 2017-09-08 RX ADMIN — POLYETHYLENE GLYCOL 3350 17 GRAM(S): 17 POWDER, FOR SOLUTION ORAL at 11:24

## 2017-09-08 RX ADMIN — Medication 2 DROP(S): at 07:56

## 2017-09-08 RX ADMIN — Medication 2 DROP(S): at 17:41

## 2017-09-08 RX ADMIN — LOSARTAN POTASSIUM 50 MILLIGRAM(S): 100 TABLET, FILM COATED ORAL at 17:36

## 2017-09-08 RX ADMIN — Medication 325 MILLIGRAM(S): at 17:37

## 2017-09-08 RX ADMIN — HEPARIN SODIUM 5000 UNIT(S): 5000 INJECTION INTRAVENOUS; SUBCUTANEOUS at 17:37

## 2017-09-08 RX ADMIN — Medication 0.6 MILLIGRAM(S): at 11:24

## 2017-09-08 RX ADMIN — LOSARTAN POTASSIUM 50 MILLIGRAM(S): 100 TABLET, FILM COATED ORAL at 06:59

## 2017-09-08 RX ADMIN — Medication 50 MILLIGRAM(S): at 12:13

## 2017-09-08 RX ADMIN — CLOPIDOGREL BISULFATE 75 MILLIGRAM(S): 75 TABLET, FILM COATED ORAL at 11:24

## 2017-09-08 RX ADMIN — Medication 50 MILLIGRAM(S): at 06:59

## 2017-09-08 RX ADMIN — Medication 75 MICROGRAM(S): at 06:59

## 2017-09-08 RX ADMIN — Medication 325 MILLIGRAM(S): at 13:02

## 2017-09-08 RX ADMIN — FAMOTIDINE 20 MILLIGRAM(S): 10 INJECTION INTRAVENOUS at 17:36

## 2017-09-08 RX ADMIN — PANTOPRAZOLE SODIUM 40 MILLIGRAM(S): 20 TABLET, DELAYED RELEASE ORAL at 06:59

## 2017-09-08 RX ADMIN — Medication 325 MILLIGRAM(S): at 06:59

## 2017-09-08 RX ADMIN — Medication 0.25 MILLIGRAM(S): at 21:24

## 2017-09-08 RX ADMIN — Medication 2000 UNIT(S): at 11:24

## 2017-09-08 NOTE — PROGRESS NOTE ADULT - ASSESSMENT
A/P    #Chest pain- possible pericarditis   -ct colchicine and monitoring   -s/p cardiac cath   -ct same cardiac meds, transfer to tele     #ARF- start iv fluids and monitoring     #CAD- ct home meds     #HLD- statin     #Hypothyroidism- home meds     #dvt pr
89 year old female who presented with chest pain described as pressure and pleuritic and some what positional for last 24 hrs and acute worsening this AM and came to ED.    suspect Percarditis/Myocarditis as the cardiac cath showed patent native arteries and stents.    Recommendations:  continue cardiac medications including TID aspirin.   transfer to telemetry and start gentle ambulation. if pain continues/breaks through, would switch to Indocin and even consider the addition of colchicine.   Will follow along.
89 year old female who presented with chest pain described as pressure and pleuritic and some what positional for last 24 hrs and acute worsening this AM and came to ED.    suspect Percarditis/Myocarditis as the cardiac cath showed patent native arteries and stents.    Recommendations:  continue cardiac medications including TID aspirin.   transfer to telemetry and start gentle ambulation. if pain continues/breaks through, would switch to Indocin and even consider the addition of colchicine.   Will follow along.     9/7-  transfer to med surg unit today with physical therapy.   miralax for constipation   artificial tears for eyes.   continue TID aspirin.
89 year old female who presented with chest pain described as pressure and pleuritic and some what positional for last 24 hrs and acute worsening this AM and came to ED.    suspect Percarditis/Myocarditis as the cardiac cath showed patent native arteries and stents.    Recommendations:  continue cardiac medications including TID aspirin.   transfer to telemetry and start gentle ambulation. if pain continues/breaks through, would switch to Indocin and even consider the addition of colchicine.   Will follow along.     9/7-  transfer to med surg unit today with physical therapy.   miralax for constipation   artificial tears for eyes.   continue TID aspirin.    9/8-  suspect her nausea and other symptoms related to the high dose of aspirin. she states she has had problems in the past with aspirin.  would decrease the aspirin to bid and change to an enteric coated tablet.   transfer to med surg.  physical therapy

## 2017-09-08 NOTE — PROGRESS NOTE ADULT - SUBJECTIVE AND OBJECTIVE BOX
Patient is a 89y old  Female who presents with a chief complaint of chest pain and weakness (05 Sep 2017 17:51)    89 year old female who presented with chest pain described as pressure and pleuritic and some what positional for last 24 hrs and acute worsening this AM and came to ED.  In ER, initial ECG showed NSR without acute ischemic changes and initial tropinin was normal. Pt. was kept in ED for observation and she then experienced more pain around 2:40 PM and repeat ECG showed inferolateral ST elevations.  Emergent PCI consult was then called.   Cardiac Cath showed patent stents. Positive Troponins. Clinically, appears as though she has pericarditis/myocarditis.  Being treated with NSAIDS and feeling better today. Rather than Indocin, was placed on TID aspirin.     9.7: feeling better, less chest pain, less dyspnea. Complaining of knee and shoulder pain (chronic), constipation, dry eyes.   doesn't feel she is ready for discharge home.  9.8: reluctant to go home, concerned that BP is not well controlled;              REVIEW OF SYSTEMS:    CONSTITUTIONAL: + gen weakness, No fevers or chills  ENT: No ear ache, No sorethroat  NECK: No pain, No stiffness  RESPIRATORY: No cough, No wheezing, No hemoptysis; No dyspnea  CARDIOVASCULAR: No chest pain, No palpitations  GASTROINTESTINAL: No abd pain, No nausea, No vomiting, No hematemesis, No diarrhea or constipation. No melena, No hematochezia.  GENITOURINARY: No dysuria, No  hematuria  NEUROLOGICAL: No diplopia, No paresthesia, No motor dysfunction  MUSCULOSKELETAL: No arthralgia, No myalgia  SKIN: No rashes, or lesions   PSYCH: no anxiety, no suicidal ideation    All other review of systems is negative unless indicated above    Vital Signs Last 24 Hrs  T(C): 36.4 (08 Sep 2017 15:38), Max: 36.9 (08 Sep 2017 05:00)  T(F): 97.5 (08 Sep 2017 15:38), Max: 98.4 (08 Sep 2017 05:00)  HR: 86 (08 Sep 2017 13:00) (69 - 86)  BP: 147/70 (08 Sep 2017 13:00) (109/53 - 191/65)  BP(mean): 89 (08 Sep 2017 13:00) (60 - 106)  RR: 22 (08 Sep 2017 13:00) (12 - 23)  SpO2: 99% (08 Sep 2017 12:12) (95% - 99%)  PHYSICAL EXAM:    GENERAL: NAD, Well nourished  HEENT:  NC/AT, EOMI, PERRLA, No scleral icterus, Moist mucous membranes  NECK: Supple, No JVD  CNS:  Alert & Oriented X3, Motor Strength 5/5 B/L upper and lower extremities; DTRs 2+ intact   LUNG: Normal Breath sounds, Clear to auscultation bilaterally, No rales, No rhonchi, No wheezing  HEART: RRR; No murmurs, No rubs  ABDOMEN: +BS, ST/ND/NT  GENITOURINARY: Voiding, Bladder not distended  EXTREMITIES:  2+ Peripheral Pulses, No clubbing, No cyanosis, No tibial edema  MUSCULOSKELTAL: Joints normal ROM, No TTP, No effusion  VAGINAL: deferred  SKIN: no rashes  RECTAL: deferred, not indicated  BREAST: deferred                          12.1   12.7  )-----------( 270      ( 06 Sep 2017 05:55 )             36.6     09-06    136  |  103  |  20  ----------------------------<  109<H>  4.3   |  25  |  1.42<H>    Ca    9.0      06 Sep 2017 05:55    TPro  6.8  /  Alb  3.3  /  TBili  1.2  /  DBili  x   /  AST  16  /  ALT  19  /  AlkPhos  64  09-06    Vancomycin levels:   Cultures:     MEDICATIONS  (STANDING):  clopidogrel Tablet 75 milliGRAM(s) Oral daily  pantoprazole    Tablet 40 milliGRAM(s) Oral before breakfast  levothyroxine 75 MICROGram(s) Oral daily  cholecalciferol 2000 Unit(s) Oral daily  colchicine 0.6 milliGRAM(s) Oral daily  heparin  Injectable 5000 Unit(s) SubCutaneous every 12 hours  influenza   Vaccine 0.5 milliLiter(s) IntraMuscular once  polyethylene glycol 3350 Oral Powder - Peds 17 Gram(s) Oral daily  polyvinyl alcohol 1.4%/povidone 0.6% Ophthalmic Solution - Peds 2 Drop(s) Both EYES two times a day  losartan 50 milliGRAM(s) Oral two times a day  aspirin enteric coated 325 milliGRAM(s) Oral two times a day        all labs reviewed  all imaging reviewed    Assessment and Plan:    1. Pericarditis/Myocarditis:  cw ASA 325mg po tid, cw Colchicine  GI prophy with PPI  Cardiac cath noted: patent stents    2. h/o CAD:  cw antiplatelets/statins/bblockers    3. OA:  cw analgesia prn    4. Htn: not controlled  Increase Toprol to 100mg/day  Increase Cozaar to 50mg po Bid    d/c planing to rehab in 24hrs Patient is a 89y old  Female who presents with a chief complaint of chest pain and weakness (05 Sep 2017 17:51)    89 year old female who presented with chest pain described as pressure and pleuritic and some what positional for last 24 hrs and acute worsening this AM and came to ED.  In ER, initial ECG showed NSR without acute ischemic changes and initial tropinin was normal. Pt. was kept in ED for observation and she then experienced more pain around 2:40 PM and repeat ECG showed inferolateral ST elevations.  Emergent PCI consult was then called.   Cardiac Cath showed patent stents. Positive Troponins. Clinically, appears as though she has pericarditis/myocarditis.  Being treated with NSAIDS and feeling better today. Rather than Indocin, was placed on TID aspirin.     9.7: feeling better, less chest pain, less dyspnea. Complaining of knee and shoulder pain (chronic), constipation, dry eyes.   doesn't feel she is ready for discharge home.  9.8: reluctant to go home, concerned that BP is not well controlled;    c/o nausea       REVIEW OF SYSTEMS:    CONSTITUTIONAL: + gen weakness, No fevers or chills  ENT: No ear ache, No sorethroat  NECK: No pain, No stiffness  RESPIRATORY: No cough, No wheezing, No hemoptysis; No dyspnea  CARDIOVASCULAR: No chest pain, No palpitations  GASTROINTESTINAL: No abd pain, + nausea, No vomiting, No hematemesis, No diarrhea or constipation. No melena, No hematochezia.  GENITOURINARY: No dysuria, No  hematuria  NEUROLOGICAL: No diplopia, No paresthesia, No motor dysfunction  MUSCULOSKELETAL: No arthralgia, No myalgia  SKIN: No rashes, or lesions   PSYCH: no anxiety, no suicidal ideation    All other review of systems is negative unless indicated above    Vital Signs Last 24 Hrs  T(C): 36.4 (08 Sep 2017 15:38), Max: 36.9 (08 Sep 2017 05:00)  T(F): 97.5 (08 Sep 2017 15:38), Max: 98.4 (08 Sep 2017 05:00)  HR: 86 (08 Sep 2017 13:00) (69 - 86)  BP: 147/70 (08 Sep 2017 13:00) (109/53 - 191/65)  BP(mean): 89 (08 Sep 2017 13:00) (60 - 106)  RR: 22 (08 Sep 2017 13:00) (12 - 23)  SpO2: 99% (08 Sep 2017 12:12) (95% - 99%)  PHYSICAL EXAM:    GENERAL: NAD, Well nourished  HEENT:  NC/AT, EOMI, PERRLA, No scleral icterus, Moist mucous membranes  NECK: Supple, No JVD  CNS:  Alert & Oriented X3, Motor Strength 5/5 B/L upper and lower extremities; DTRs 2+ intact   LUNG: Normal Breath sounds, Clear to auscultation bilaterally, No rales, No rhonchi, No wheezing  HEART: RRR; No murmurs, No rubs  ABDOMEN: +BS, ST/ND/NT  GENITOURINARY: Voiding, Bladder not distended  EXTREMITIES:  2+ Peripheral Pulses, No clubbing, No cyanosis, No tibial edema  MUSCULOSKELTAL: Joints normal ROM, No TTP, No effusion  VAGINAL: deferred  SKIN: no rashes  RECTAL: deferred, not indicated  BREAST: deferred                          12.1   12.7  )-----------( 270      ( 06 Sep 2017 05:55 )             36.6     09-06    136  |  103  |  20  ----------------------------<  109<H>  4.3   |  25  |  1.42<H>    Ca    9.0      06 Sep 2017 05:55    TPro  6.8  /  Alb  3.3  /  TBili  1.2  /  DBili  x   /  AST  16  /  ALT  19  /  AlkPhos  64  09-06    Vancomycin levels:   Cultures:     MEDICATIONS  (STANDING):  clopidogrel Tablet 75 milliGRAM(s) Oral daily  pantoprazole    Tablet 40 milliGRAM(s) Oral before breakfast  levothyroxine 75 MICROGram(s) Oral daily  cholecalciferol 2000 Unit(s) Oral daily  colchicine 0.6 milliGRAM(s) Oral daily  heparin  Injectable 5000 Unit(s) SubCutaneous every 12 hours  influenza   Vaccine 0.5 milliLiter(s) IntraMuscular once  polyethylene glycol 3350 Oral Powder - Peds 17 Gram(s) Oral daily  polyvinyl alcohol 1.4%/povidone 0.6% Ophthalmic Solution - Peds 2 Drop(s) Both EYES two times a day  losartan 50 milliGRAM(s) Oral two times a day  aspirin enteric coated 325 milliGRAM(s) Oral two times a day        all labs reviewed  all imaging reviewed    Assessment and Plan:    1. Pericarditis/Myocarditis:  cw ASA 325mg po Bid, cw Colchicine  GI prophy with PPI/Pepcid  Cardiac cath noted: patent stents    2. h/o CAD:  cw antiplatelets/statins/bblockers    3. OA:  cw analgesia prn    4. Htn: not controlled  Increase Toprol to 100mg/day  Increase Cozaar to 50mg po Bid    5. Nausea:  likely due to ASA; will decrease ASA to 325mg Bid  add Pepcid  cw PPI    d/c planing to rehab in 24hrs

## 2017-09-08 NOTE — PROGRESS NOTE ADULT - SUBJECTIVE AND OBJECTIVE BOX
Patient is a 89y old  Female who presents with a chief complaint of chest pain and weakness (05 Sep 2017 17:51)    89 year old female who presented with chest pain described as pressure and pleuritic and some what positional for last 24 hrs and acute worsening this AM and came to ED.  In ER, initial ECG showed NSR without acute ischemic changes and initial tropinin was normal. Pt. was kept in ED for observation and she then experienced more pain around 2:40 PM and repeat ECG showed inferolateral ST elevations.  Emergent PCI consult was then called.   Cardiac Cath showed patent stents. Positive Troponins. Clinically, appears as though she has pericarditis/myocarditis.  Being treated with NSAIDS and feeling better today. Rather than Indocin, was placed on TID aspirin.     9.7: feeling better, less chest pain, less dyspnea. Complaining of knee and shoulder pain (chronic), constipation, dry eyes.   doesn't feel she is ready for discharge home.     - having alot of nausea and GI distress. Complains of being weak. no chest pain or shortness of breath.      Allergies    amlodipine (Unknown)  clonidine (Unknown)  Levatol (Unknown)  Lipitor (Unknown)  Lotrel (Unknown)  methylPREDNISolone (Unknown)  morphine (Other)  Plaquenil (Unknown)  statins (Other (Unknown))  sulfa drugs (Rash)    Intolerances        MEDICATIONS  (STANDING):  clopidogrel Tablet 75 milliGRAM(s) Oral daily  pantoprazole    Tablet 40 milliGRAM(s) Oral before breakfast  levothyroxine 75 MICROGram(s) Oral daily  cholecalciferol 2000 Unit(s) Oral daily  colchicine 0.6 milliGRAM(s) Oral daily  heparin  Injectable 5000 Unit(s) SubCutaneous every 12 hours  influenza   Vaccine 0.5 milliLiter(s) IntraMuscular once  polyethylene glycol 3350 Oral Powder - Peds 17 Gram(s) Oral daily  polyvinyl alcohol 1.4%/povidone 0.6% Ophthalmic Solution - Peds 2 Drop(s) Both EYES two times a day  losartan 50 milliGRAM(s) Oral two times a day  aspirin enteric coated 325 milliGRAM(s) Oral two times a day    MEDICATIONS  (PRN):  ALPRAZolam 0.25 milliGRAM(s) Oral daily PRN anxiety  zaleplon 5 milliGRAM(s) Oral at bedtime PRN Insomnia  traMADol 25 milliGRAM(s) Oral two times a day PRN Moderate Pain (4 - 6)    REVIEW OF SYSTEMS:    RESPIRATORY: No cough, wheezing, hemoptysis; No shortness of breath  CARDIOVASCULAR: No chest pain or palpitations  All other review of systems is negative unless indicated above      PHYSICAL EXAM:  Daily     Daily Weight in k (08 Sep 2017 01:52)  Vital Signs Last 24 Hrs  T(C): 36.7 (08 Sep 2017 08:29), Max: 36.9 (08 Sep 2017 05:00)  T(F): 98 (08 Sep 2017 08:29), Max: 98.4 (08 Sep 2017 05:00)  HR: 81 (08 Sep 2017 12:12) (69 - 84)  BP: 157/71 (08 Sep 2017 12:12) (109/53 - 191/65)  BP(mean): 92 (08 Sep 2017 12:12) (60 - 106)  RR: 22 (08 Sep 2017 12:12) (12 - 23)  SpO2: 99% (08 Sep 2017 12:12) (95% - 99%)    Constitutional: NAD, awake and alert, well-developed  HEENT: PERR, EOMI, Normal Hearing, MMM  Neck: Soft and supple, No LAD, No JVD  Respiratory: Breath sounds are clear bilaterally, No wheezing, rales or rhonchi  Cardiovascular: S1 and S2, regular rate and rhythm, no Murmurs, gallops or rubs  Gastrointestinal: Bowel Sounds present, soft, nontender, nondistended, no guarding, no rebound  Extremities: No peripheral edema  Vascular: 2+ peripheral pulses  Neurological: A/O x 3, no focal deficits  Musculoskeletal: 5/5 strength b/l upper and lower extremities  Skin: No rashes    LABS: All Labs Reviewed:            CARDIAC MARKERS ( 06 Sep 2017 13:53 )  0.202 ng/mL / x     / 136 U/L / x     / x              TELEMETRY/EKG: NSR    ECHO:       EXAM:  2D ECHOCARDIOGRAM AD         PROCEDURE DATE:  2017        INTERPRETATION:  Transthoracic Echocardiography Report (TTE)     Demographics     Patient name        DAVIDA MIRZA  Age           89 year(s)     Med Rec #           [id number]         Gender        Female     Account #           [id number]           Date of Birth 1926     Interpreting        Zena Garcia MD   Room Number   0037   Physician     Referring Physician Vivian Dumont       Sonographer   Dallas Burnett RDCS     Date of study       2017 10:36                       AM     Height              62 in             Weight        169.1 pounds    Type of Study:     TTE procedure: 2D echocardiogram     BP: 127/55 mmHg     Study Location: [facility] Quality: Technically Difficullt    Indications   1) R07.9 - Chest pain    M-Mode Measurements (cm)     LVEDd: 3.81 cm            LVESd: 2.37 cm   IVSEd: 1.04 cm   LVPWd: 1.11 cm            AO Root Dimension: 2.9 cm                             ACS: 1.8 cm                        LA: 4.3 cm    Doppler Measurements:     AV Velocity:144 cm/s               MV Peak E-Wave: 80.5 cm/s   AV Peak Gradient: 8.29 mmHg        MV Peak A-Wave: 70.6 cm/s                                      MV E/A Ratio: 1.14 %   TR Velocity:162 cm/s               MV Peak Gradient: 2.59 mmHg   TR Gradient:10.4976 mmHg   Estimated RAP:5 mmHg   RVSP:19 mmHg     Findings     Mitral Valve   Fibrocalcific changes noted to the mitral valve leaflets with preserved   leaflet excursion.   Mild mitral annular calcification is present.   Trace to mild mitral regurgitation is present.     Aortic Valve   Fibrocalcific changes noted to the Aortic valve leaflets with preserved   leaflet excursion.     Tricuspid Valve   Mild (1+) tricuspid valve regurgitation is present.     Pulmonic Valve   Pulmonic valve not well seen, probably normal pulmonic valve function.   Mild pulmonic valvular regurgitation (1+) is present.     Left Atrium   The left atrium is mildly dilated.     Left Ventricle  Mild concentric left ventricular hypertrophy is present.   Estimated left ventricular ejection fraction is 60-65 %.     Right Atrium   Normal appearing right atrium.     Right Ventricle   Normal appearing right ventricle structure and function.     Pericardial Effusion   No evidence of pericardial effusion.     Pleural Effusion   No evidence of pleural effusion.     Miscellaneous   All visualized extra cardiac structures appears to be normal.     Impression     Summary     Fibrocalcific changes noted to the mitral valve leaflets with preserved   leaflet excursion.   Mild mitral annular calcification is present.   Trace to mild mitral regurgitation is present.   Fibrocalcific changes noted to the Aortic valve leaflets with preserved   leaflet excursion.   Mild (1+) tricuspid valve regurgitation is present.   Pulmonic valve not well seen, probably normal pulmonic valve function.   Mild pulmonic valvular regurgitation (1+) is present.   The left atrium is mildly dilated.   Mild concentric left ventricular hypertrophy is present.   Estimated left ventricular ejection fraction is 60-65 %.   Normal appearing right atrium.   Normal appearing right ventricle structure and function.     Signature     ----------------------------------------------------------------   Electronically signed by Zena Garcia MD(Interpreting

## 2017-09-09 ENCOUNTER — TRANSCRIPTION ENCOUNTER (OUTPATIENT)
Age: 82
End: 2017-09-09

## 2017-09-09 LAB
ANION GAP SERPL CALC-SCNC: 5 MMOL/L — SIGNIFICANT CHANGE UP (ref 5–17)
BUN SERPL-MCNC: 19 MG/DL — SIGNIFICANT CHANGE UP (ref 7–23)
CALCIUM SERPL-MCNC: 9.3 MG/DL — SIGNIFICANT CHANGE UP (ref 8.5–10.1)
CHLORIDE SERPL-SCNC: 107 MMOL/L — SIGNIFICANT CHANGE UP (ref 96–108)
CO2 SERPL-SCNC: 23 MMOL/L — SIGNIFICANT CHANGE UP (ref 22–31)
CREAT SERPL-MCNC: 1.01 MG/DL — SIGNIFICANT CHANGE UP (ref 0.5–1.3)
GLUCOSE SERPL-MCNC: 85 MG/DL — SIGNIFICANT CHANGE UP (ref 70–99)
HCT VFR BLD CALC: 41.7 % — SIGNIFICANT CHANGE UP (ref 34.5–45)
HGB BLD-MCNC: 13.3 G/DL — SIGNIFICANT CHANGE UP (ref 11.5–15.5)
MAGNESIUM SERPL-MCNC: 2.5 MG/DL — SIGNIFICANT CHANGE UP (ref 1.6–2.6)
MCHC RBC-ENTMCNC: 27.4 PG — SIGNIFICANT CHANGE UP (ref 27–34)
MCHC RBC-ENTMCNC: 31.9 GM/DL — LOW (ref 32–36)
MCV RBC AUTO: 85.8 FL — SIGNIFICANT CHANGE UP (ref 80–100)
PLATELET # BLD AUTO: 303 K/UL — SIGNIFICANT CHANGE UP (ref 150–400)
POTASSIUM SERPL-MCNC: 4.4 MMOL/L — SIGNIFICANT CHANGE UP (ref 3.5–5.3)
POTASSIUM SERPL-SCNC: 4.4 MMOL/L — SIGNIFICANT CHANGE UP (ref 3.5–5.3)
RBC # BLD: 4.85 M/UL — SIGNIFICANT CHANGE UP (ref 3.8–5.2)
RBC # FLD: 13.6 % — SIGNIFICANT CHANGE UP (ref 10.3–14.5)
SODIUM SERPL-SCNC: 135 MMOL/L — SIGNIFICANT CHANGE UP (ref 135–145)
WBC # BLD: 7.7 K/UL — SIGNIFICANT CHANGE UP (ref 3.8–10.5)
WBC # FLD AUTO: 7.7 K/UL — SIGNIFICANT CHANGE UP (ref 3.8–10.5)

## 2017-09-09 RX ORDER — CLOPIDOGREL BISULFATE 75 MG/1
1 TABLET, FILM COATED ORAL
Qty: 0 | Refills: 0 | COMMUNITY

## 2017-09-09 RX ORDER — LANOLIN ALCOHOL/MO/W.PET/CERES
1 CREAM (GRAM) TOPICAL
Qty: 30 | Refills: 11 | OUTPATIENT
Start: 2017-09-09 | End: 2018-09-03

## 2017-09-09 RX ORDER — ALPRAZOLAM 0.25 MG
0.25 TABLET ORAL THREE TIMES A DAY
Qty: 0 | Refills: 0 | Status: DISCONTINUED | OUTPATIENT
Start: 2017-09-09 | End: 2017-09-10

## 2017-09-09 RX ORDER — ASPIRIN/CALCIUM CARB/MAGNESIUM 324 MG
1 TABLET ORAL
Qty: 0 | Refills: 0 | COMMUNITY
Start: 2017-09-09

## 2017-09-09 RX ORDER — LOSARTAN POTASSIUM 100 MG/1
100 TABLET, FILM COATED ORAL AT BEDTIME
Qty: 0 | Refills: 0 | Status: DISCONTINUED | OUTPATIENT
Start: 2017-09-09 | End: 2017-09-10

## 2017-09-09 RX ORDER — COLCHICINE 0.6 MG
1 TABLET ORAL
Qty: 90 | Refills: 0 | OUTPATIENT
Start: 2017-09-09 | End: 2017-12-08

## 2017-09-09 RX ORDER — LOSARTAN POTASSIUM 100 MG/1
1 TABLET, FILM COATED ORAL
Qty: 0 | Refills: 0 | COMMUNITY

## 2017-09-09 RX ORDER — LOSARTAN POTASSIUM 100 MG/1
1 TABLET, FILM COATED ORAL
Qty: 0 | Refills: 0 | COMMUNITY
Start: 2017-09-09

## 2017-09-09 RX ORDER — ASPIRIN/CALCIUM CARB/MAGNESIUM 324 MG
1 TABLET ORAL
Qty: 0 | Refills: 0 | COMMUNITY

## 2017-09-09 RX ORDER — ACETAMINOPHEN 500 MG
2 TABLET ORAL
Qty: 0 | Refills: 0 | COMMUNITY
Start: 2017-09-09

## 2017-09-09 RX ORDER — ACETAMINOPHEN 500 MG
1000 TABLET ORAL EVERY 6 HOURS
Qty: 0 | Refills: 0 | Status: DISCONTINUED | OUTPATIENT
Start: 2017-09-09 | End: 2017-09-10

## 2017-09-09 RX ORDER — LABETALOL HCL 100 MG
100 TABLET ORAL EVERY 8 HOURS
Qty: 0 | Refills: 0 | Status: DISCONTINUED | OUTPATIENT
Start: 2017-09-09 | End: 2017-09-10

## 2017-09-09 RX ADMIN — CLOPIDOGREL BISULFATE 75 MILLIGRAM(S): 75 TABLET, FILM COATED ORAL at 12:36

## 2017-09-09 RX ADMIN — Medication 75 MICROGRAM(S): at 12:21

## 2017-09-09 RX ADMIN — HEPARIN SODIUM 5000 UNIT(S): 5000 INJECTION INTRAVENOUS; SUBCUTANEOUS at 17:38

## 2017-09-09 RX ADMIN — HEPARIN SODIUM 5000 UNIT(S): 5000 INJECTION INTRAVENOUS; SUBCUTANEOUS at 06:00

## 2017-09-09 RX ADMIN — Medication 1000 MILLIGRAM(S): at 12:45

## 2017-09-09 RX ADMIN — INFLUENZA VIRUS VACCINE 0.5 MILLILITER(S): 15; 15; 15; 15 SUSPENSION INTRAMUSCULAR at 14:34

## 2017-09-09 RX ADMIN — LOSARTAN POTASSIUM 50 MILLIGRAM(S): 100 TABLET, FILM COATED ORAL at 14:44

## 2017-09-09 RX ADMIN — Medication 100 MILLIGRAM(S): at 17:38

## 2017-09-09 RX ADMIN — LOSARTAN POTASSIUM 50 MILLIGRAM(S): 100 TABLET, FILM COATED ORAL at 12:20

## 2017-09-09 RX ADMIN — Medication 325 MILLIGRAM(S): at 12:19

## 2017-09-09 RX ADMIN — Medication 0.25 MILLIGRAM(S): at 21:54

## 2017-09-09 RX ADMIN — PANTOPRAZOLE SODIUM 40 MILLIGRAM(S): 20 TABLET, DELAYED RELEASE ORAL at 12:18

## 2017-09-09 RX ADMIN — Medication 0.25 MILLIGRAM(S): at 14:43

## 2017-09-09 RX ADMIN — Medication 1000 MILLIGRAM(S): at 12:15

## 2017-09-09 RX ADMIN — LOSARTAN POTASSIUM 100 MILLIGRAM(S): 100 TABLET, FILM COATED ORAL at 21:54

## 2017-09-09 RX ADMIN — Medication 2 DROP(S): at 06:00

## 2017-09-09 RX ADMIN — FAMOTIDINE 20 MILLIGRAM(S): 10 INJECTION INTRAVENOUS at 12:36

## 2017-09-09 RX ADMIN — Medication 0.6 MILLIGRAM(S): at 12:36

## 2017-09-09 RX ADMIN — Medication 2000 UNIT(S): at 12:36

## 2017-09-09 RX ADMIN — POLYETHYLENE GLYCOL 3350 17 GRAM(S): 17 POWDER, FOR SOLUTION ORAL at 12:36

## 2017-09-09 RX ADMIN — Medication 325 MILLIGRAM(S): at 17:38

## 2017-09-09 RX ADMIN — Medication 2 DROP(S): at 17:42

## 2017-09-09 RX ADMIN — Medication 100 MILLIGRAM(S): at 21:54

## 2017-09-09 NOTE — DISCHARGE NOTE ADULT - MEDICATION SUMMARY - MEDICATIONS TO TAKE
I will START or STAY ON the medications listed below when I get home from the hospital:    traMADol  -- 37.5 milligram(s) by mouth once a day  -- Indication: For pain    aspirin 325 mg oral delayed release tablet  -- 1 tab(s) by mouth 2 times a day  -- Indication: For pericarditis    acetaminophen 500 mg oral tablet  -- 2 tab(s) by mouth every 6 hours, As needed, Mild Pain (1 - 3) or HA  -- Indication: For pain, HA    losartan 100 mg oral tablet  -- 1 tab(s) by mouth once a day (at bedtime)  -- Indication: For HTN    Colcrys 0.6 mg oral tablet  -- 1 tab(s) by mouth once a day  -- Indication: For pericarditis    ALPRAZolam  -- 0.25 milligram(s) by mouth once a day, As Needed  -- Indication: For Anxiety    Toprol-XL 50 mg oral tablet, extended release  -- 1 tab(s) by mouth once a day  -- Indication: For HTn    melatonin 3 mg oral tablet  -- 1 tab(s) by mouth once a day (at bedtime)   -- Indication: For insomnia    ocular lubricant preserved ophthalmic solution  -- 2 drop(s) to each affected eye 2 times a day  -- Indication: For xerophthalmia    Protonix 40 mg oral delayed release tablet  -- 1 tab(s) by mouth once a day  -- Indication: For GI PPx    Synthroid 75 mcg (0.075 mg) oral tablet  -- 1 tab(s) by mouth once a day  -- Indication: For hypothyroidism    Vitamin D3 2000 intl units oral capsule  -- 1 cap(s) by mouth once a day  -- Indication: For general health I will START or STAY ON the medications listed below when I get home from the hospital:    traMADol  -- 37.5 milligram(s) by mouth once a day  -- Indication: For pain    aspirin 325 mg oral delayed release tablet  -- 1 tab(s) by mouth 2 times a day  -- Indication: For pericarditis    acetaminophen 500 mg oral tablet  -- 2 tab(s) by mouth every 6 hours, As needed, Mild Pain (1 - 3) or HA  -- Indication: For pain, HA    losartan 50 mg oral tablet  -- 1 tab(s) by mouth once a day  -- Indication: For HTN (hypertension)    Colcrys 0.6 mg oral tablet  -- 1 tab(s) by mouth once a day  -- Indication: For pericarditis    ALPRAZolam  -- 0.25 milligram(s) by mouth 3 times a day, As Needed  -- Indication: For Anxiety    melatonin 3 mg oral tablet  -- 1 tab(s) by mouth once a day (at bedtime)   -- Indication: For insomnia    ocular lubricant preserved ophthalmic solution  -- 2 drop(s) to each affected eye 2 times a day  -- Indication: For xerophthalmia    Protonix 40 mg oral delayed release tablet  -- 1 tab(s) by mouth once a day  -- Indication: For GI PPx    Synthroid 75 mcg (0.075 mg) oral tablet  -- 1 tab(s) by mouth once a day  -- Indication: For hypothyroidism    Vitamin D3 2000 intl units oral capsule  -- 1 cap(s) by mouth once a day  -- Indication: For general health

## 2017-09-09 NOTE — DISCHARGE NOTE ADULT - HOSPITAL COURSE
89 year old female who presented with chest pain described as pressure and pleuritic and some what positional for last 24 hrs and acute worsening this AM and came to ED.  In ER, initial ECG showed NSR without acute ischemic changes and initial tropinin was normal. Pt. was kept in ED for observation and she then experienced more pain around 2:40 PM and repeat ECG showed inferolateral ST elevations.  Emergent PCI consult was then called.   Cardiac Cath showed patent stents. Positive Troponins. Clinically, appears as though she has pericarditis/myocarditis.  Being treated with NSAIDS and feeling better today. Rather than Indocin, was placed on TID aspirin.     9.7: feeling better, less chest pain, less dyspnea. Complaining of knee and shoulder pain (chronic), constipation, dry eyes.   doesn't feel she is ready for discharge home.  9.8: reluctant to go home, concerned that BP is not well controlled;    c/o nausea     9/9: No O/N events. Confused about medication dosing, now more comfortable to leave for POOL. Denies focal complaints. Afebrile.  PHYSICAL EXAM:    GENERAL: NAD, Well nourished  HEENT:  NC/AT, EOMI, PERRLA, No scleral icterus, Moist mucous membranes  NECK: Supple, No JVD  CNS:  Alert & Oriented X3, Motor Strength 5/5 B/L upper and lower extremities; DTRs 2+ intact   LUNG: Normal Breath sounds, Clear to auscultation bilaterally, No rales, No rhonchi, No wheezing  HEART: RRR; No murmurs, No rubs  ABDOMEN: +BS, ST/ND/NT  GENITOURINARY: Voiding, Bladder not distended  EXTREMITIES:  2+ Peripheral Pulses, No clubbing, No cyanosis, No tibial edema  MUSCULOSKELTAL: Joints normal ROM, No TTP, No effusion  VAGINAL: deferred  SKIN: no rashes  RECTAL: deferred, not indicated  BREAST: deferred  T(C): 36.3 (09-09-17 @ 08:00), Max: 36.5 (09-08-17 @ 21:06)  HR: 69 (09-08-17 @ 21:00) (69 - 81)  BP: 163/58 (09-08-17 @ 21:00) (151/74 - 180/64)  RR: 24 (09-08-17 @ 21:00) (17 - 24)  SpO2: 100% (09-08-17 @ 21:00) (100% - 100%)  Wt(kg): --             13.3   7.7   )-----------( 303      ( 09 Sep 2017 11:46 )             41.7     09 Sep 2017 11:46    135    |  107    |  19     ----------------------------<  85     4.4     |  23     |  1.01     Ca    9.3        09 Sep 2017 11:46  Mg     2.5       09 Sep 2017 11:46  Assessment and Plan:    * Pericarditis/myocarditis- improving  -  BID until symptoms resolve  - colchicine x 3 months    * CAD, stable  - ASA/statin/BB  - hold Plavix while on high-dose ASA; discuss long-term need with Cardio, as you are 4+ out of PCI    * OA, stable  - c/w current management    * HTN, acceptable  - c/w adjusted regimen    * Nausea, 2/2 high-dose ASA, improving with reduced frequency  - c/w GI PPx  - take meds with food always    Dispo: D/C to POOL

## 2017-09-09 NOTE — DISCHARGE NOTE ADULT - CARE PLAN
Principal Discharge DX:	Pericarditis  Goal:	treat completely  Instructions for follow-up, activity and diet:	- take colchicine daily, with food, for 3 months  - take aspirin 325mg twice daily until chest pain sensation resolves, then resume aspirin 81mg daily  - hold Plavix while taking high-dose aspirin, OK to resume once you are taking low-dose aspirin  - discuss with your cardiologist long-term need for Plavix   - take Protonix everyday as prophylaxis against gastrointestinal issues while on multiple irritating medications  Secondary Diagnosis:	HTN (hypertension)  Goal:	optimize  Instructions for follow-up, activity and diet:	- note increased doses of medications  - monitor blood pressure daily and followup with primary care doctor once you leave rehab Principal Discharge DX:	Pericarditis  Goal:	treat completely  Instructions for follow-up, activity and diet:	- take colchicine daily, with food, for 3 months  - take aspirin 325mg twice daily until chest pain sensation resolves, then resume aspirin 81mg daily  - hold Plavix while taking high-dose aspirin, OK to resume once you are taking low-dose aspirin  - discuss with your cardiologist long-term need for Plavix   - take Protonix everyday as prophylaxis against gastrointestinal issues while on multiple irritating medications  Secondary Diagnosis:	HTN (hypertension)  Goal:	optimize  Instructions for follow-up, activity and diet:	- increase losartan dose & take in the morning  - continue Toprol XL but take dose in the evening before bed  - monitor blood pressure daily and followup with primary care doctor once you leave rehab

## 2017-09-09 NOTE — DISCHARGE NOTE ADULT - MEDICATION SUMMARY - MEDICATIONS TO CHANGE
I will SWITCH the dose or number of times a day I take the medications listed below when I get home from the hospital:    losartan 25 mg oral tablet  -- 1 tab(s) by mouth once a day    aspirin 81 mg oral tablet  -- 1 tab(s) by mouth once a day I will SWITCH the dose or number of times a day I take the medications listed below when I get home from the hospital:    losartan 25 mg oral tablet  -- 1 tab(s) by mouth once a day    aspirin 81 mg oral tablet  -- 1 tab(s) by mouth once a day    Toprol-XL 50 mg oral tablet, extended release  -- 1 tab(s) by mouth once a day

## 2017-09-09 NOTE — DISCHARGE NOTE ADULT - PLAN OF CARE
treat completely - take colchicine daily, with food, for 3 months  - take aspirin 325mg twice daily until chest pain sensation resolves, then resume aspirin 81mg daily  - hold Plavix while taking high-dose aspirin, OK to resume once you are taking low-dose aspirin  - discuss with your cardiologist long-term need for Plavix   - take Protonix everyday as prophylaxis against gastrointestinal issues while on multiple irritating medications optimize - note increased doses of medications  - monitor blood pressure daily and followup with primary care doctor once you leave rehab - increase losartan dose & take in the morning  - continue Toprol XL but take dose in the evening before bed  - monitor blood pressure daily and followup with primary care doctor once you leave rehab

## 2017-09-09 NOTE — DISCHARGE NOTE ADULT - CARE PROVIDER_API CALL
Mayra Sánchez), Cardiovascular Disease; Internal Medicine  [address] [place] [address]  Phone: [phone]  Fax: [phone]

## 2017-09-09 NOTE — DISCHARGE NOTE ADULT - PATIENT PORTAL LINK FT
“You can access the [facility] Patient Portal, offered by [facility], by registering with the following website: [email or web address]”

## 2017-09-09 NOTE — CHART NOTE - NSCHARTNOTEFT_GEN_A_CORE
Persistently elevated BP despite adjustments. Pt feels "not right."   Denies focal symptoms. Anxious @ baseline.  - hold D/C  - D/C Toprol  - start labetalol Q8 for added alpha blockade  - c/w max-dose losartan  - PRN anxiolytics

## 2017-09-10 VITALS — RESPIRATION RATE: 23 BRPM | HEART RATE: 85 BPM

## 2017-09-10 RX ORDER — LABETALOL HCL 100 MG
0.5 TABLET ORAL
Qty: 45 | Refills: 0 | OUTPATIENT
Start: 2017-09-10 | End: 2017-10-10

## 2017-09-10 RX ORDER — ALPRAZOLAM 0.25 MG
0.25 TABLET ORAL
Qty: 0 | Refills: 0 | COMMUNITY

## 2017-09-10 RX ORDER — METOPROLOL TARTRATE 50 MG
1 TABLET ORAL
Qty: 30 | Refills: 0 | OUTPATIENT
Start: 2017-09-10 | End: 2017-10-10

## 2017-09-10 RX ORDER — LABETALOL HCL 100 MG
50 TABLET ORAL EVERY 8 HOURS
Qty: 0 | Refills: 0 | Status: DISCONTINUED | OUTPATIENT
Start: 2017-09-10 | End: 2017-09-10

## 2017-09-10 RX ORDER — LOSARTAN POTASSIUM 100 MG/1
1 TABLET, FILM COATED ORAL
Qty: 0 | Refills: 0 | COMMUNITY

## 2017-09-10 RX ORDER — METOPROLOL TARTRATE 50 MG
1 TABLET ORAL
Qty: 0 | Refills: 0 | COMMUNITY

## 2017-09-10 RX ADMIN — Medication 100 MILLIGRAM(S): at 06:02

## 2017-09-10 RX ADMIN — PANTOPRAZOLE SODIUM 40 MILLIGRAM(S): 20 TABLET, DELAYED RELEASE ORAL at 06:02

## 2017-09-10 RX ADMIN — FAMOTIDINE 20 MILLIGRAM(S): 10 INJECTION INTRAVENOUS at 11:44

## 2017-09-10 RX ADMIN — HEPARIN SODIUM 5000 UNIT(S): 5000 INJECTION INTRAVENOUS; SUBCUTANEOUS at 06:02

## 2017-09-10 RX ADMIN — Medication 75 MICROGRAM(S): at 06:02

## 2017-09-10 RX ADMIN — Medication 2000 UNIT(S): at 11:44

## 2017-09-10 RX ADMIN — Medication 2 DROP(S): at 06:03

## 2017-09-10 RX ADMIN — Medication 325 MILLIGRAM(S): at 06:01

## 2017-09-10 RX ADMIN — Medication 0.6 MILLIGRAM(S): at 11:44

## 2017-09-13 DIAGNOSIS — Z88.2 ALLERGY STATUS TO SULFONAMIDES: ICD-10-CM

## 2017-09-13 DIAGNOSIS — E78.5 HYPERLIPIDEMIA, UNSPECIFIED: ICD-10-CM

## 2017-09-13 DIAGNOSIS — Z90.49 ACQUIRED ABSENCE OF OTHER SPECIFIED PARTS OF DIGESTIVE TRACT: ICD-10-CM

## 2017-09-13 DIAGNOSIS — Z95.5 PRESENCE OF CORONARY ANGIOPLASTY IMPLANT AND GRAFT: ICD-10-CM

## 2017-09-13 DIAGNOSIS — R07.9 CHEST PAIN, UNSPECIFIED: ICD-10-CM

## 2017-09-13 DIAGNOSIS — K21.9 GASTRO-ESOPHAGEAL REFLUX DISEASE WITHOUT ESOPHAGITIS: ICD-10-CM

## 2017-09-13 DIAGNOSIS — I25.10 ATHEROSCLEROTIC HEART DISEASE OF NATIVE CORONARY ARTERY WITHOUT ANGINA PECTORIS: ICD-10-CM

## 2017-09-13 DIAGNOSIS — H04.129 DRY EYE SYNDROME OF UNSPECIFIED LACRIMAL GLAND: ICD-10-CM

## 2017-09-13 DIAGNOSIS — R11.0 NAUSEA: ICD-10-CM

## 2017-09-13 DIAGNOSIS — R94.31 ABNORMAL ELECTROCARDIOGRAM [ECG] [EKG]: ICD-10-CM

## 2017-09-13 DIAGNOSIS — K59.09 OTHER CONSTIPATION: ICD-10-CM

## 2017-09-13 DIAGNOSIS — M19.90 UNSPECIFIED OSTEOARTHRITIS, UNSPECIFIED SITE: ICD-10-CM

## 2017-09-13 DIAGNOSIS — Z79.82 LONG TERM (CURRENT) USE OF ASPIRIN: ICD-10-CM

## 2017-09-13 DIAGNOSIS — Z79.02 LONG TERM (CURRENT) USE OF ANTITHROMBOTICS/ANTIPLATELETS: ICD-10-CM

## 2017-09-13 DIAGNOSIS — Z88.5 ALLERGY STATUS TO NARCOTIC AGENT: ICD-10-CM

## 2017-09-13 DIAGNOSIS — Z88.8 ALLERGY STATUS TO OTHER DRUGS, MEDICAMENTS AND BIOLOGICAL SUBSTANCES: ICD-10-CM

## 2017-09-13 DIAGNOSIS — I10 ESSENTIAL (PRIMARY) HYPERTENSION: ICD-10-CM

## 2017-09-13 DIAGNOSIS — E03.9 HYPOTHYROIDISM, UNSPECIFIED: ICD-10-CM

## 2017-09-13 DIAGNOSIS — N17.9 ACUTE KIDNEY FAILURE, UNSPECIFIED: ICD-10-CM

## 2017-09-13 DIAGNOSIS — I51.4 MYOCARDITIS, UNSPECIFIED: ICD-10-CM

## 2017-09-13 DIAGNOSIS — Z85.42 PERSONAL HISTORY OF MALIGNANT NEOPLASM OF OTHER PARTS OF UTERUS: ICD-10-CM
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
## 2017-09-13 DIAGNOSIS — T39.015A ADVERSE EFFECT OF ASPIRIN, INITIAL ENCOUNTER: ICD-10-CM

## 2017-09-13 DIAGNOSIS — I31.9 DISEASE OF PERICARDIUM, UNSPECIFIED: ICD-10-CM

## 2017-09-13 DIAGNOSIS — Z92.3 PERSONAL HISTORY OF IRRADIATION: ICD-10-CM

## 2017-11-09 NOTE — PATIENT PROFILE ADULT. - NS PRO ABUSE SCREEN SUSPICION NEGLECT YN
Encounter Date: 11/9/2017       History     Chief Complaint   Patient presents with    Shaking     i thing something with my nerves, twitching, i quit taking my psych meds, hx schizo affective denies suicidal/homicidal      24 y.o. male with medical history of depression and anxiety presents to ED for tremors.  Patient reports tremors began this morning around 8:20 AM.  Patient has a past medical history of schizoaffective disorder and was taken off psychiatric meds (Geodon, prozac and ativan) 1 year ago.  Since he was taken off psych meds, patient will have episodes of tremors that resolve within 10 minutes.  Patient reports he had a seizure 3 years ago but was not placed on prophylactic medication.  Patient denies fever, chills, chest pain, shortness of breath, weakness, syncope, confusion, changes in bowel, changes in urination, numbness, changes in vision, new medications.          Review of patient's allergies indicates:   Allergen Reactions    Amoxicillin      Past Medical History:   Diagnosis Date    Anxiety     Depression      Past Surgical History:   Procedure Laterality Date    APPENDECTOMY       Family History   Problem Relation Age of Onset    Mental illness Brother     Schizophrenia Maternal Uncle     Diabetes Maternal Grandmother     Diabetes Paternal Grandmother      Social History   Substance Use Topics    Smoking status: Light Tobacco Smoker     Types: Cigarettes    Smokeless tobacco: Never Used    Alcohol use 0.6 oz/week     1 Cans of beer per week      Comment: rarely     Review of Systems   Constitutional: Negative for chills, diaphoresis, fatigue and fever.   HENT: Negative for congestion and nosebleeds.    Eyes: Negative for visual disturbance.   Respiratory: Negative for cough and shortness of breath.    Cardiovascular: Negative for chest pain and leg swelling.   Gastrointestinal: Negative for abdominal distention, abdominal pain, nausea and vomiting.   Genitourinary: Negative for  dysuria, flank pain and hematuria.   Musculoskeletal: Negative for back pain and neck pain.   Skin: Negative for rash.   Neurological: Positive for tremors. Negative for syncope, weakness and headaches.   Psychiatric/Behavioral: Negative for confusion. The patient is not nervous/anxious.        Physical Exam     Initial Vitals [11/09/17 0838]   BP Pulse Resp Temp SpO2   139/61 80 16 98.3 °F (36.8 °C) 97 %      MAP       87         Physical Exam    Vitals reviewed.  Constitutional: He appears well-developed and well-nourished. He is not diaphoretic. No distress.   HENT:   Head: Normocephalic and atraumatic.   Nose: Nose normal.   Mouth/Throat: Oropharynx is clear and moist. No oropharyngeal exudate.   Eyes: Conjunctivae and EOM are normal. Pupils are equal, round, and reactive to light.   Neck: Normal range of motion. Neck supple. No thyromegaly present.   Cardiovascular: Normal rate and regular rhythm.   Pulmonary/Chest: Breath sounds normal. No respiratory distress. He has no wheezes. He exhibits no tenderness.   Abdominal: Soft. Bowel sounds are normal. He exhibits no distension. There is no tenderness. There is no rebound.   Musculoskeletal: Normal range of motion. He exhibits no edema.   Lymphadenopathy:     He has no cervical adenopathy.   Neurological: He is alert and oriented to person, place, and time. He has normal strength and normal reflexes. He displays normal reflexes. No cranial nerve deficit or sensory deficit.   Patient able to control tremors during conversation.   Skin: Skin is warm and dry. Capillary refill takes less than 2 seconds. No rash noted.   Psychiatric: He has a normal mood and affect.         ED Course   Procedures  Labs Reviewed   ISTAT PROCEDURE                   APC / Resident Notes:   24 y.o. male with medical history of depression and anxiety presents to ED for tremors.    DDX includes but is not limited to tremors, electrolyte abnormality, arrhythmia, conversion disorder, seizure,  "tardive dyskinesia, tourrettes. Will get EKG and IStat. EKG shows sinus bradycardia. Istat benign. "Shaking" has ceased and patient feels comfortable going home. Do not suspect seizure as etiology of tremors. Will give neurology follow up information. Discharged to home in stable condition, return to ED warnings given, follow up and patient care instructions given.      I have discussed and reviewed with my supervising physician.              ED Course      Clinical Impression:   The encounter diagnosis was Shaking.    Disposition:   Disposition: Discharged  Condition: Stable                        Cinthya Sheppard PA-C  11/09/17 1550    " no

## 2018-03-22 ENCOUNTER — EMERGENCY (EMERGENCY)
Facility: HOSPITAL | Age: 83
LOS: 0 days | Discharge: HOME | End: 2018-03-22
Attending: EMERGENCY MEDICINE | Admitting: EMERGENCY MEDICINE
Payer: MEDICARE

## 2018-03-22 VITALS
DIASTOLIC BLOOD PRESSURE: 103 MMHG | HEART RATE: 89 BPM | OXYGEN SATURATION: 98 % | SYSTOLIC BLOOD PRESSURE: 238 MMHG | TEMPERATURE: 98 F | RESPIRATION RATE: 19 BRPM

## 2018-03-22 VITALS
DIASTOLIC BLOOD PRESSURE: 71 MMHG | HEART RATE: 75 BPM | TEMPERATURE: 98 F | RESPIRATION RATE: 18 BRPM | SYSTOLIC BLOOD PRESSURE: 207 MMHG | OXYGEN SATURATION: 100 %

## 2018-03-22 PROCEDURE — 99283 EMERGENCY DEPT VISIT LOW MDM: CPT

## 2018-03-22 RX ORDER — METOPROLOL TARTRATE 50 MG
50 TABLET ORAL ONCE
Qty: 0 | Refills: 0 | Status: COMPLETED | OUTPATIENT
Start: 2018-03-22 | End: 2018-03-22

## 2018-03-22 RX ADMIN — Medication 50 MILLIGRAM(S): at 18:44

## 2018-03-22 NOTE — ED ADULT NURSE REASSESSMENT NOTE - NS ED NURSE REASSESS COMMENT FT1
Report taken at the change of shift at bedside. Pt awake alert and oriented x4 resting comfortably in bed with no acute distress noted. BP improved at thist hever. Dr. Zhao made aware. Ambulate to and from the bathroom with assistance. Denies cp,sob,ha,dz,n/v/d/fever/chills or urinary sx. Plan of care updated to pt and family at bedside.  Will cont to monitor for safety and comfort.
bp trending down, assisted to bathroom.  gait at baseline

## 2018-03-22 NOTE — ED ADULT NURSE NOTE - OBJECTIVE STATEMENT
patient states she an arthritic knee and wears a brace on her right foot.  states microwave door was open, she stepped backwards and lost her balance, falling backwards onto her buttocks. she pushed her emergency alert button because she couldn't get up from the floor.  no change in baseline pain, however bp was noted to be elevated

## 2018-03-22 NOTE — ED PROVIDER NOTE - MEDICAL DECISION MAKING DETAILS
No acute traumatic injuries on exam.  Pt able to ambulate without difficulty.  Asymptomatic HTN.  GIven night dose of metoprolol, with trending down BP.  Okay for d/c home.

## 2018-03-22 NOTE — ED ADULT TRIAGE NOTE - CHIEF COMPLAINT QUOTE
fall on asa. no head injury. denies pain. EMS noted BP high in route. Dr. Zhao notified in triage no trauma alert called.

## 2018-03-22 NOTE — ED PROVIDER NOTE - PMH
CAD (coronary artery disease)    Endometrial adenocarcinoma    GERD (gastroesophageal reflux disease)    [place] (hard of hearing)    HTN (hypertension)    Hyperlipidemia    Hypothyroid    Macular degeneration    Stented coronary artery

## 2018-03-22 NOTE — ED PROVIDER NOTE - OBJECTIVE STATEMENT
89 y/o f with PMHx of CAD with stents, HTN, HLD, ankle fx presenting to the ED BIBA for evaluation s/p fall today. Pt states she stepped backwards to avoid hitting open microwave door and lost her balance, falling backwards onto her buttocks. She pushed her emergency alert button because she couldn't get up from the floor and EMS brought her to ED after findings of elevated BP. States she has an arthritic knee and wears a brace on her right foot. Pt's condition is at baseline currently with no acute c/o at this time.
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
## 2018-03-23 DIAGNOSIS — T14.90XA INJURY, UNSPECIFIED, INITIAL ENCOUNTER: ICD-10-CM
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
## 2018-03-23 DIAGNOSIS — I10 ESSENTIAL (PRIMARY) HYPERTENSION: ICD-10-CM

## 2018-06-02 RX ADMIN — Medication 1000 MILLIGRAM(S): at 03:30

## 2018-06-26 ENCOUNTER — INPATIENT (INPATIENT)
Facility: HOSPITAL | Age: 83
LOS: 2 days | Discharge: NURSING, REHAB OR LONG-TERM CARE | End: 2018-06-29
Attending: INTERNAL MEDICINE | Admitting: INTERNAL MEDICINE
Payer: MEDICARE

## 2018-06-26 VITALS
SYSTOLIC BLOOD PRESSURE: 176 MMHG | OXYGEN SATURATION: 100 % | TEMPERATURE: 98 F | RESPIRATION RATE: 18 BRPM | WEIGHT: 179.9 LBS | HEART RATE: 81 BPM | DIASTOLIC BLOOD PRESSURE: 82 MMHG

## 2018-06-26 DIAGNOSIS — Z90.710 ACQUIRED ABSENCE OF BOTH CERVIX AND UTERUS: Chronic | ICD-10-CM

## 2018-06-26 LAB
ALBUMIN SERPL ELPH-MCNC: 4.2 G/DL — SIGNIFICANT CHANGE UP (ref 3.3–5)
ANION GAP SERPL CALC-SCNC: 6 MMOL/L — SIGNIFICANT CHANGE UP (ref 5–17)
APPEARANCE UR: CLEAR — SIGNIFICANT CHANGE UP
AST SERPL-CCNC: 28 U/L — SIGNIFICANT CHANGE UP (ref 15–37)
BASOPHILS # BLD AUTO: 0.06 K/UL — SIGNIFICANT CHANGE UP (ref 0–0.2)
BASOPHILS NFR BLD AUTO: 0.7 % — SIGNIFICANT CHANGE UP (ref 0–2)
BILIRUB UR-MCNC: NEGATIVE — SIGNIFICANT CHANGE UP
BUN SERPL-MCNC: 19 MG/DL — SIGNIFICANT CHANGE UP (ref 7–23)
CALCIUM SERPL-MCNC: 9.6 MG/DL — SIGNIFICANT CHANGE UP (ref 8.5–10.1)
CHLORIDE SERPL-SCNC: 104 MMOL/L — SIGNIFICANT CHANGE UP (ref 96–108)
CO2 SERPL-SCNC: 24 MMOL/L — SIGNIFICANT CHANGE UP (ref 22–31)
COLOR SPEC: YELLOW — SIGNIFICANT CHANGE UP
CREAT SERPL-MCNC: 1.12 MG/DL — SIGNIFICANT CHANGE UP (ref 0.5–1.3)
DIFF PNL FLD: NEGATIVE — SIGNIFICANT CHANGE UP
EOSINOPHIL # BLD AUTO: 0.08 K/UL — SIGNIFICANT CHANGE UP (ref 0–0.5)
EOSINOPHIL NFR BLD AUTO: 0.9 % — SIGNIFICANT CHANGE UP (ref 0–6)
GLUCOSE SERPL-MCNC: 141 MG/DL — HIGH (ref 70–99)
GLUCOSE UR QL: NEGATIVE MG/DL — SIGNIFICANT CHANGE UP
HCT VFR BLD CALC: 46.7 % — HIGH (ref 34.5–45)
HGB BLD-MCNC: 14.6 G/DL — SIGNIFICANT CHANGE UP (ref 11.5–15.5)
IMM GRANULOCYTES NFR BLD AUTO: 0.6 % — SIGNIFICANT CHANGE UP (ref 0–1.5)
KETONES UR-MCNC: NEGATIVE — SIGNIFICANT CHANGE UP
LACTATE SERPL-SCNC: 1.2 MMOL/L — SIGNIFICANT CHANGE UP (ref 0.7–2)
LEUKOCYTE ESTERASE UR-ACNC: NEGATIVE — SIGNIFICANT CHANGE UP
LYMPHOCYTES # BLD AUTO: 2.15 K/UL — SIGNIFICANT CHANGE UP (ref 1–3.3)
LYMPHOCYTES # BLD AUTO: 24.3 % — SIGNIFICANT CHANGE UP (ref 13–44)
MCHC RBC-ENTMCNC: 27.7 PG — SIGNIFICANT CHANGE UP (ref 27–34)
MCHC RBC-ENTMCNC: 31.3 GM/DL — LOW (ref 32–36)
MCV RBC AUTO: 88.6 FL — SIGNIFICANT CHANGE UP (ref 80–100)
MONOCYTES # BLD AUTO: 0.52 K/UL — SIGNIFICANT CHANGE UP (ref 0–0.9)
MONOCYTES NFR BLD AUTO: 5.9 % — SIGNIFICANT CHANGE UP (ref 2–14)
NEUTROPHILS # BLD AUTO: 6 K/UL — SIGNIFICANT CHANGE UP (ref 1.8–7.4)
NEUTROPHILS NFR BLD AUTO: 67.6 % — SIGNIFICANT CHANGE UP (ref 43–77)
NITRITE UR-MCNC: NEGATIVE — SIGNIFICANT CHANGE UP
PH UR: 6 — SIGNIFICANT CHANGE UP (ref 5–8)
PLATELET # BLD AUTO: 329 K/UL — SIGNIFICANT CHANGE UP (ref 150–400)
POTASSIUM SERPL-MCNC: 4 MMOL/L — SIGNIFICANT CHANGE UP (ref 3.5–5.3)
POTASSIUM SERPL-SCNC: 4 MMOL/L — SIGNIFICANT CHANGE UP (ref 3.5–5.3)
PROT UR-MCNC: NEGATIVE MG/DL — SIGNIFICANT CHANGE UP
RBC # BLD: 5.27 M/UL — HIGH (ref 3.8–5.2)
RBC # FLD: 15.1 % — HIGH (ref 10.3–14.5)
SODIUM SERPL-SCNC: 134 MMOL/L — LOW (ref 135–145)
SP GR SPEC: 1.01 — SIGNIFICANT CHANGE UP (ref 1.01–1.02)
UROBILINOGEN FLD QL: NEGATIVE MG/DL — SIGNIFICANT CHANGE UP
WBC # BLD: 8.86 K/UL — SIGNIFICANT CHANGE UP (ref 3.8–10.5)
WBC # FLD AUTO: 8.86 K/UL — SIGNIFICANT CHANGE UP (ref 3.8–10.5)

## 2018-06-26 PROCEDURE — 93010 ELECTROCARDIOGRAM REPORT: CPT

## 2018-06-26 PROCEDURE — 99285 EMERGENCY DEPT VISIT HI MDM: CPT

## 2018-06-26 PROCEDURE — 73721 MRI JNT OF LWR EXTRE W/O DYE: CPT | Mod: 26,RT

## 2018-06-26 PROCEDURE — 73630 X-RAY EXAM OF FOOT: CPT | Mod: 26

## 2018-06-26 PROCEDURE — 73600 X-RAY EXAM OF ANKLE: CPT | Mod: 26

## 2018-06-26 PROCEDURE — 73030 X-RAY EXAM OF SHOULDER: CPT | Mod: 26,RT

## 2018-06-26 RX ORDER — PANTOPRAZOLE SODIUM 20 MG/1
40 TABLET, DELAYED RELEASE ORAL
Qty: 0 | Refills: 0 | Status: DISCONTINUED | OUTPATIENT
Start: 2018-06-26 | End: 2018-06-29

## 2018-06-26 RX ORDER — LEVOTHYROXINE SODIUM 125 MCG
75 TABLET ORAL DAILY
Qty: 0 | Refills: 0 | Status: DISCONTINUED | OUTPATIENT
Start: 2018-06-26 | End: 2018-06-29

## 2018-06-26 RX ORDER — OXYCODONE AND ACETAMINOPHEN 5; 325 MG/1; MG/1
1 TABLET ORAL ONCE
Qty: 0 | Refills: 0 | Status: DISCONTINUED | OUTPATIENT
Start: 2018-06-26 | End: 2018-06-26

## 2018-06-26 RX ORDER — TRAMADOL HYDROCHLORIDE 50 MG/1
50 TABLET ORAL EVERY 6 HOURS
Qty: 0 | Refills: 0 | Status: DISCONTINUED | OUTPATIENT
Start: 2018-06-26 | End: 2018-06-29

## 2018-06-26 RX ORDER — CHOLECALCIFEROL (VITAMIN D3) 125 MCG
2000 CAPSULE ORAL DAILY
Qty: 0 | Refills: 0 | Status: DISCONTINUED | OUTPATIENT
Start: 2018-06-26 | End: 2018-06-29

## 2018-06-26 RX ORDER — ENOXAPARIN SODIUM 100 MG/ML
40 INJECTION SUBCUTANEOUS EVERY 24 HOURS
Qty: 0 | Refills: 0 | Status: DISCONTINUED | OUTPATIENT
Start: 2018-06-26 | End: 2018-06-29

## 2018-06-26 RX ORDER — LOSARTAN POTASSIUM 100 MG/1
1 TABLET, FILM COATED ORAL
Qty: 0 | Refills: 0 | COMMUNITY

## 2018-06-26 RX ORDER — SENNA PLUS 8.6 MG/1
2 TABLET ORAL AT BEDTIME
Qty: 0 | Refills: 0 | Status: DISCONTINUED | OUTPATIENT
Start: 2018-06-26 | End: 2018-06-29

## 2018-06-26 RX ORDER — ACETAMINOPHEN 500 MG
1000 TABLET ORAL ONCE
Qty: 0 | Refills: 0 | Status: COMPLETED | OUTPATIENT
Start: 2018-06-26 | End: 2018-06-26

## 2018-06-26 RX ORDER — SPIRONOLACTONE 25 MG/1
25 TABLET, FILM COATED ORAL DAILY
Qty: 0 | Refills: 0 | Status: DISCONTINUED | OUTPATIENT
Start: 2018-06-26 | End: 2018-06-28

## 2018-06-26 RX ORDER — METOPROLOL TARTRATE 50 MG
50 TABLET ORAL AT BEDTIME
Qty: 0 | Refills: 0 | Status: DISCONTINUED | OUTPATIENT
Start: 2018-06-26 | End: 2018-06-29

## 2018-06-26 RX ORDER — SODIUM CHLORIDE 9 MG/ML
500 INJECTION INTRAMUSCULAR; INTRAVENOUS; SUBCUTANEOUS ONCE
Qty: 0 | Refills: 0 | Status: DISCONTINUED | OUTPATIENT
Start: 2018-06-26 | End: 2018-06-26

## 2018-06-26 RX ORDER — ASPIRIN/CALCIUM CARB/MAGNESIUM 324 MG
325 TABLET ORAL DAILY
Qty: 0 | Refills: 0 | Status: DISCONTINUED | OUTPATIENT
Start: 2018-06-26 | End: 2018-06-29

## 2018-06-26 RX ORDER — SPIRONOLACTONE 25 MG/1
25 TABLET, FILM COATED ORAL ONCE
Qty: 0 | Refills: 0 | Status: COMPLETED | OUTPATIENT
Start: 2018-06-26 | End: 2018-06-26

## 2018-06-26 RX ORDER — ALPRAZOLAM 0.25 MG
0.25 TABLET ORAL EVERY 8 HOURS
Qty: 0 | Refills: 0 | Status: DISCONTINUED | OUTPATIENT
Start: 2018-06-26 | End: 2018-06-29

## 2018-06-26 RX ORDER — ONDANSETRON 8 MG/1
4 TABLET, FILM COATED ORAL EVERY 6 HOURS
Qty: 0 | Refills: 0 | Status: DISCONTINUED | OUTPATIENT
Start: 2018-06-26 | End: 2018-06-29

## 2018-06-26 RX ORDER — TRAMADOL HYDROCHLORIDE 50 MG/1
37.5 TABLET ORAL
Qty: 0 | Refills: 0 | COMMUNITY

## 2018-06-26 RX ORDER — ALPRAZOLAM 0.25 MG
0.25 TABLET ORAL ONCE
Qty: 0 | Refills: 0 | Status: DISCONTINUED | OUTPATIENT
Start: 2018-06-26 | End: 2018-06-26

## 2018-06-26 RX ORDER — TRAMADOL HYDROCHLORIDE 50 MG/1
25 TABLET ORAL EVERY 4 HOURS
Qty: 0 | Refills: 0 | Status: DISCONTINUED | OUTPATIENT
Start: 2018-06-26 | End: 2018-06-29

## 2018-06-26 RX ORDER — DOCUSATE SODIUM 100 MG
100 CAPSULE ORAL THREE TIMES A DAY
Qty: 0 | Refills: 0 | Status: DISCONTINUED | OUTPATIENT
Start: 2018-06-26 | End: 2018-06-29

## 2018-06-26 RX ORDER — ACETAMINOPHEN 500 MG
1000 TABLET ORAL EVERY 6 HOURS
Qty: 0 | Refills: 0 | Status: DISCONTINUED | OUTPATIENT
Start: 2018-06-26 | End: 2018-06-29

## 2018-06-26 RX ADMIN — Medication 50 MILLIGRAM(S): at 21:50

## 2018-06-26 RX ADMIN — Medication 100 MILLIGRAM(S): at 21:55

## 2018-06-26 RX ADMIN — ENOXAPARIN SODIUM 40 MILLIGRAM(S): 100 INJECTION SUBCUTANEOUS at 18:36

## 2018-06-26 RX ADMIN — OXYCODONE AND ACETAMINOPHEN 1 TABLET(S): 5; 325 TABLET ORAL at 11:00

## 2018-06-26 RX ADMIN — OXYCODONE AND ACETAMINOPHEN 1 TABLET(S): 5; 325 TABLET ORAL at 10:01

## 2018-06-26 RX ADMIN — Medication 1000 MILLIGRAM(S): at 15:17

## 2018-06-26 RX ADMIN — Medication 1 DROP(S): at 21:51

## 2018-06-26 RX ADMIN — Medication 2000 UNIT(S): at 18:37

## 2018-06-26 RX ADMIN — SPIRONOLACTONE 25 MILLIGRAM(S): 25 TABLET, FILM COATED ORAL at 10:01

## 2018-06-26 RX ADMIN — Medication 325 MILLIGRAM(S): at 21:51

## 2018-06-26 NOTE — H&P ADULT - HISTORY OF PRESENT ILLNESS
89yo/F with PMH CAD s/p stents x5, HTN, hypothyroidism, GERD, uterine CA s/p EMILY presented with worsening pain and swelling RT ankle. Patient was diagnosed with RT ankle stress fracture 2 months ago, was seen and f/u by DR Pollack as outpatient. Patient was then cleared for PT and was feeling better. Last few days she is c/o worsening of RT ankle pain and swelling and could not bear weight on it today. Denies any trauma, no falls. No fever/chills

## 2018-06-26 NOTE — H&P ADULT - NSHPPHYSICALEXAM_GEN_ALL_CORE
Vital Signs Last 24 Hrs  T(C): 36.6 (26 Jun 2018 15:02), Max: 36.6 (26 Jun 2018 08:09)  T(F): 97.9 (26 Jun 2018 15:02), Max: 97.9 (26 Jun 2018 08:09)  HR: 78 (26 Jun 2018 15:02) (75 - 81)  BP: 178/72 (26 Jun 2018 15:02) (176/82 - 180/81)  BP(mean): --  RR: 16 (26 Jun 2018 15:02) (16 - 18)  SpO2: 100% (26 Jun 2018 15:02) (100% - 100%)

## 2018-06-26 NOTE — H&P ADULT - PMH
CAD (coronary artery disease)    Endometrial adenocarcinoma    GERD (gastroesophageal reflux disease)    [place] (hard of hearing)    HTN (hypertension)    Hyperlipidemia    Hypothyroid    Macular degeneration    Stented coronary artery

## 2018-06-26 NOTE — H&P ADULT - NSHPLABSRESULTS_GEN_ALL_CORE
14.6   8.86  )-----------( 329      ( 2018 13:42 )             46.7     2018 13:42    134    |  104    |  19     ----------------------------<  141    4.0     |  24     |  1.12     Ca    9.6        2018 13:42  TPro  x      /  Alb  4.2    /  TBili  x      /  DBili  x      /  AST  28     /  ALT  x      /  AlkPhos  x      2018 13:42    LIVER FUNCTIONS - ( 2018 13:42 )  Alb: 4.2 g/dL / Pro: x     / ALK PHOS: x     / ALT: x     / AST: 28 U/L / GGT: x           Urinalysis Basic - ( 2018 14:16 )  Color: Yellow / Appearance: Clear / S.010 / pH: x  Gluc: x / Ketone: Negative  / Bili: Negative / Urobili: Negative mg/dL   Blood: x / Protein: Negative mg/dL / Nitrite: Negative   Leuk Esterase: Negative / RBC: x / WBC x   Sq Epi: x / Non Sq Epi: x / Bacteria: x

## 2018-06-26 NOTE — H&P ADULT - ASSESSMENT
#RT ankle pain and swelling  Admit to 2 [facility] eval  Imaging studies reviewed, no acute fracture  MRI RT ankle to r/o ligaments pathology  Pain meds prn  PT if MRI is negative    #CAD s/p stents x5  Usually on Aspirin 325mg BID at home, will reduce to daily dose as on Lovenox DVT proph as well    #HTN- continue home meds    #Dispo- inpatient admit, possible POOL on discharge

## 2018-06-26 NOTE — ED ADULT NURSE NOTE - OBJECTIVE STATEMENT
Pt reports that she is unable to bear weight on R ankle, describes injury 30 years prior.  Pain on movement, no visible deformity.  Pt also complains of pain in R shoulder and L knee.  Daughter at bedside.

## 2018-06-26 NOTE — CONSULT NOTE ADULT - SUBJECTIVE AND OBJECTIVE BOX
89y Female mostly household ambulator lives alone presents c/o Right ankle pain. She had seen Dr. Pollack several months back and was worked up with and MRI 3/20/18 that showed a possible stress fx. She was casted at that time. Then the cast was removed 3 weeks later and she began weight bearing. She was getting along fairly well until a few days ago when the pain came back to the point she cannot bear much weight. She had seen Dr. Tucker yesterday in the office for her Right shoulder, and said she could barely walk down the hallway in his office.  Denies any new injury. Denies numbness/tingling. No other. Denies fevers/chills. Notably she had a Right ankle fracture many years ago with ORIF and subsequent JONEL and developed "ankle issues" after that she says. She is now admitted for inability to ambulate. ortho is consulted for her ankle pain. Right ankle Xrays today are negative for fracture.    HEALTH ISSUES - PROBLEM Dx:  Stents, takes Aspirin       MEDICATIONS  (STANDING):  artificial  tears Solution 1 Drop(s) Both EYES three times a day  aspirin enteric coated 325 milliGRAM(s) Oral daily  cholecalciferol 2000 Unit(s) Oral daily  docusate sodium 100 milliGRAM(s) Oral three times a day  enoxaparin Injectable 40 milliGRAM(s) SubCutaneous every 24 hours  levothyroxine 75 MICROGram(s) Oral daily  metoprolol succinate ER 50 milliGRAM(s) Oral at bedtime  pantoprazole    Tablet 40 milliGRAM(s) Oral before breakfast  spironolactone 25 milliGRAM(s) Oral daily    Allergies    amlodipine (Unknown)  clonidine (Unknown)  Levatol (Unknown)  Lipitor (Unknown)  Lotrel (Unknown)  methylPREDNISolone (Unknown)  morphine (Other)  Plaquenil (Unknown)  statins (Other (Unknown))  sulfa drugs (Rash)    Intolerances      Imaging: < from: Xray Ankle 2 Views, Right (06.26.18 @ 10:03) >  EXAM:  XR FOOT-RIGHT-2 VIEWS                          EXAM:  XR ANKLE-RIGHT-2 VIEWS                            PROCEDURE DATE:  06/26/2018          INTERPRETATION:  XR ANKLE-RIGHT-2 VIEWS, XR FOOT-RIGHT-2 VIEWS    HISTORY: Right ankle and foot pain    VIEWS:  AP, oblique and lateral views of the right ankle and foot;        COMPARISON:  Right ankle 6/7/2012      FINDINGS:      Osseous structures reveal intact cortical margins. Trabecular   architecture is normal. There are no fractures.    Ankle mortise is well maintained. Severe degenerative changes at the   ankle joint. There is midfoot osteoarthritis.    There is focal lateral soft tissue swelling.    IMPRESSION:    Focal lateral ankle soft tissue swelling without evidence for acute   fracture or dislocation.    Severe ankle joint degeneration.    < end of copied text >                          14.6   8.86  )-----------( 329      ( 26 Jun 2018 13:42 )             46.7     26 Jun 2018 13:42    134    |  104    |  19     ----------------------------<  141    4.0     |  24     |  1.12     Ca    9.6        26 Jun 2018 13:42    TPro  x      /  Alb  4.2    /  TBili  x      /  DBili  x      /  AST  28     /  ALT  x      /  AlkPhos  x      26 Jun 2018 13:42      Vital Signs Last 24 Hrs  T(C): 36.6 (06-26-18 @ 15:02), Max: 36.6 (06-26-18 @ 08:09)  T(F): 97.9 (06-26-18 @ 15:02), Max: 97.9 (06-26-18 @ 08:09)  HR: 75 (06-26-18 @ 17:05) (75 - 81)  BP: 176/61 (06-26-18 @ 17:05) (176/61 - 180/81)  BP(mean): --  RR: 16 (06-26-18 @ 15:02) (16 - 18)  SpO2: 100% (06-26-18 @ 15:02) (100% - 100%)    Physical Exam  Gen: Nad, calm, awake, pleasant  Right LE: Skin intact, No erythema. No bony TTP medial/lateral malleolus, no bony ttp elsewhere of the foot or ankle. She is able to plantar and dorsiflex with some limited ROM, non painful. No pain on PROM of the ankle. +ehl/fhl/ta/gs, +dp/pt pulses palpable.  She has some increased sensitivity to touch of the medial ankle above the joint line that is sensitive to light touch.          A/P: 89y Female with Right ankle advanced arthritis, no fracture seen on Xray.  FU MRI ankle  Analgesia, antiinflammatories as tolerated  NWB Right LE until MRI results  No surgery planned or indicated at this time  SCDs, DVT PE ppx  I discussed with Dr. Pollack this evening 1730h

## 2018-06-26 NOTE — PHYSICAL THERAPY INITIAL EVALUATION ADULT - ADDITIONAL COMMENTS
Pt. resides in a ran house, alone where pt. ambulates w/ tripod rollator independently. Pt. has a HHA.

## 2018-06-26 NOTE — ED PROVIDER NOTE - OBJECTIVE STATEMENT
89 y/o female with a PMHx of CAD s/p coronary stent, endometrial adenocarcinoma, GERD, HTN, HLD, hypothyroid, macular degeneration  presents to the ED c/o right lower extremity pain. Pt notes she had a compound fracture 15 years ago in right ankle and then found a fracture in the same ankle 2 weeks ago. She states her pain is now worsening and is unable to ambulate x1 week. Pt took Tramadol for pain with no relief. Morning meds (Snythroid 75mg , Vitamin D3 2000, Protonix 40mg, ASA 325mg, Spironolactone 25mg), evening meds (Evening ASA 325mg, Torprol-xl 50 mg). Ortho Dr. Pollack [phone]. PCP Dr. Antonio 89 y/o female with a PMHx of CAD s/p coronary stent, endometrial adenocarcinoma, GERD, HTN, HLD, hypothyroid, macular degeneration  presents to the ED c/o right lower extremity pain. Pt notes she had a compound fracture 30 years ago in right ankle and then found a fracture in the same ankle 2 months ago. She states her pain is now worsening and is unable to ambulate x1 week. Pt took Tramadol for pain with no relief. Morning meds (Snythroid 75mg , Vitamin D3 2000, Protonix 40mg, ASA 325mg, Spironolactone 25mg), evening meds (Evening ASA 325mg, Torprol-xl 50 mg). Ortho Dr. Pollack [phone]. PCP Dr. Antonio

## 2018-06-26 NOTE — H&P ADULT - PSH
Ankle fracture, right  surgery 25 yrs ago - hardware removed  S/P coronary angiogram  2005, 2008, 2011 - drug eluding stents  S/P hernia repair  umbilical - 2008  S/P laparoscopic cholecystectomy  2008  S/P EMILY (total abdominal hysterectomy)

## 2018-06-26 NOTE — ED PROVIDER NOTE - MEDICAL DECISION MAKING DETAILS
89 yo f with right ankle pain, chronic but worse in the last 1 week rendering her unable to walk.  imaging, pt kavon, reassess

## 2018-06-26 NOTE — ED PROVIDER NOTE - PMH
CAD (coronary artery disease)    Endometrial adenocarcinoma    GERD (gastroesophageal reflux disease)    [place] (hard of hearing)    HTN (hypertension)    Hyperlipidemia    Hypothyroid    Macular degeneration    Stented coronary artery

## 2018-06-27 LAB
ANION GAP SERPL CALC-SCNC: 6 MMOL/L — SIGNIFICANT CHANGE UP (ref 5–17)
BUN SERPL-MCNC: 19 MG/DL — SIGNIFICANT CHANGE UP (ref 7–23)
CALCIUM SERPL-MCNC: 9.2 MG/DL — SIGNIFICANT CHANGE UP (ref 8.5–10.1)
CHLORIDE SERPL-SCNC: 105 MMOL/L — SIGNIFICANT CHANGE UP (ref 96–108)
CO2 SERPL-SCNC: 25 MMOL/L — SIGNIFICANT CHANGE UP (ref 22–31)
CREAT SERPL-MCNC: 1.07 MG/DL — SIGNIFICANT CHANGE UP (ref 0.5–1.3)
GLUCOSE SERPL-MCNC: 94 MG/DL — SIGNIFICANT CHANGE UP (ref 70–99)
HCT VFR BLD CALC: 42.6 % — SIGNIFICANT CHANGE UP (ref 34.5–45)
HGB BLD-MCNC: 13.1 G/DL — SIGNIFICANT CHANGE UP (ref 11.5–15.5)
MCHC RBC-ENTMCNC: 27.2 PG — SIGNIFICANT CHANGE UP (ref 27–34)
MCHC RBC-ENTMCNC: 30.8 GM/DL — LOW (ref 32–36)
MCV RBC AUTO: 88.6 FL — SIGNIFICANT CHANGE UP (ref 80–100)
NRBC # BLD: 0 /100 WBCS — SIGNIFICANT CHANGE UP (ref 0–0)
PLATELET # BLD AUTO: 299 K/UL — SIGNIFICANT CHANGE UP (ref 150–400)
POTASSIUM SERPL-MCNC: 4.9 MMOL/L — SIGNIFICANT CHANGE UP (ref 3.5–5.3)
POTASSIUM SERPL-SCNC: 4.9 MMOL/L — SIGNIFICANT CHANGE UP (ref 3.5–5.3)
RBC # BLD: 4.81 M/UL — SIGNIFICANT CHANGE UP (ref 3.8–5.2)
RBC # FLD: 15.4 % — HIGH (ref 10.3–14.5)
SODIUM SERPL-SCNC: 136 MMOL/L — SIGNIFICANT CHANGE UP (ref 135–145)
WBC # BLD: 9.23 K/UL — SIGNIFICANT CHANGE UP (ref 3.8–10.5)
WBC # FLD AUTO: 9.23 K/UL — SIGNIFICANT CHANGE UP (ref 3.8–10.5)

## 2018-06-27 RX ORDER — METOPROLOL TARTRATE 50 MG
25 TABLET ORAL ONCE
Qty: 0 | Refills: 0 | Status: COMPLETED | OUTPATIENT
Start: 2018-06-27 | End: 2018-06-27

## 2018-06-27 RX ORDER — METOPROLOL TARTRATE 50 MG
12.5 TABLET ORAL ONCE
Qty: 0 | Refills: 0 | Status: COMPLETED | OUTPATIENT
Start: 2018-06-27 | End: 2018-06-27

## 2018-06-27 RX ADMIN — Medication 1000 MILLIGRAM(S): at 11:07

## 2018-06-27 RX ADMIN — Medication 50 MILLIGRAM(S): at 21:16

## 2018-06-27 RX ADMIN — Medication 12.5 MILLIGRAM(S): at 13:09

## 2018-06-27 RX ADMIN — Medication 2000 UNIT(S): at 11:07

## 2018-06-27 RX ADMIN — Medication 100 MILLIGRAM(S): at 06:15

## 2018-06-27 RX ADMIN — Medication 1 DROP(S): at 06:15

## 2018-06-27 RX ADMIN — SPIRONOLACTONE 25 MILLIGRAM(S): 25 TABLET, FILM COATED ORAL at 06:15

## 2018-06-27 RX ADMIN — Medication 75 MICROGRAM(S): at 06:15

## 2018-06-27 RX ADMIN — Medication 325 MILLIGRAM(S): at 11:07

## 2018-06-27 RX ADMIN — ENOXAPARIN SODIUM 40 MILLIGRAM(S): 100 INJECTION SUBCUTANEOUS at 18:41

## 2018-06-27 RX ADMIN — Medication 1 DROP(S): at 13:10

## 2018-06-27 RX ADMIN — Medication 100 MILLIGRAM(S): at 21:15

## 2018-06-27 RX ADMIN — Medication 100 MILLIGRAM(S): at 13:09

## 2018-06-27 RX ADMIN — PANTOPRAZOLE SODIUM 40 MILLIGRAM(S): 20 TABLET, DELAYED RELEASE ORAL at 06:15

## 2018-06-27 RX ADMIN — Medication 25 MILLIGRAM(S): at 16:45

## 2018-06-27 NOTE — PROGRESS NOTE ADULT - SUBJECTIVE AND OBJECTIVE BOX
c/c right ankle swelling, inability to ambulate    HPI:  89yo/F with PMH CAD s/p stents x5, HTN, hypothyroidism, GERD, uterine CA s/p EMILY presented with worsening pain and swelling RT ankle. Patient was diagnosed with RT ankle stress fracture 2 months ago, was seen and f/u by DR Pollack as outpatient. Patient was then cleared for PT and was feeling better. Last few days she is c/o worsening of RT ankle pain and swelling and could not bear weight on it. She was admitted for further mx.     :pt seen and examined this am. No acute overnight events. Still with ankle pain.    Review of system- All 10 systems reviewed and is as per HPI otherwise negative.     VITALS  T(C): 36.4 (18 @ 10:58), Max: 36.7 (18 @ 00:00)  HR: 77 (18 @ 10:58) (67 - 88)  BP: 172/65 (18 @ 10:58) (155/56 - 178/72)  RR: 16 (18 @ 10:58) (16 - 18)  SpO2: 100% (18 @ 10:58) (98% - 100%)      PHYSICAL EXAM:    GENERAL: Comfortable, no acute distress  HEAD:  Atraumatic, Normocephalic  EYES: EOMI, PERRLA  HEENT: Moist mucous membranes  NECK: Supple, No JVD  NERVOUS SYSTEM:  Alert & Oriented X3, Motor Strength 5/5 B/L upper and lower extremities  CHEST/LUNG: Clear to auscultation bilaterally  HEART: Regular rate and rhythm; No murmurs, rubs, or gallops  ABDOMEN: Soft, Nontender, Nondistended; Bowel sounds present  GENITOURINARY- Voiding, no palpable bladder  EXTREMITIES:  No clubbing, cyanosis, or edema  MUSCULOSKELTAL-decreased rom right ankle, right shoulder  SKIN-no rash        LABS:                        13.1   9.23  )-----------( 299      ( 2018 05:49 )             42.6         136  |  105  |  19  ----------------------------<  94  4.9   |  25  |  1.07    Ca    9.2      2018 05:49    TPro  x   /  Alb  4.2  /  TBili  x   /  DBili  x   /  AST  28  /  ALT  x   /  AlkPhos  x         Urinalysis Basic - ( 2018 14:16 )    Color: Yellow / Appearance: Clear / S.010 / pH: x  Gluc: x / Ketone: Negative  / Bili: Negative / Urobili: Negative mg/dL   Blood: x / Protein: Negative mg/dL / Nitrite: Negative   Leuk Esterase: Negative / RBC: x / WBC x           MEDS  acetaminophen   Tablet. 1000 milliGRAM(s) Oral every 6 hours PRN  ALPRAZolam 0.25 milliGRAM(s) Oral every 8 hours PRN  aluminum hydroxide/magnesium hydroxide/simethicone Suspension 30 milliLiter(s) Oral every 4 hours PRN  artificial  tears Solution 1 Drop(s) Both EYES three times a day  aspirin enteric coated 325 milliGRAM(s) Oral daily  cholecalciferol 2000 Unit(s) Oral daily  docusate sodium 100 milliGRAM(s) Oral three times a day  enoxaparin Injectable 40 milliGRAM(s) SubCutaneous every 24 hours  levothyroxine 75 MICROGram(s) Oral daily  metoprolol succinate ER 50 milliGRAM(s) Oral at bedtime  ondansetron Injectable 4 milliGRAM(s) IV Push every 6 hours PRN  pantoprazole    Tablet 40 milliGRAM(s) Oral before breakfast  senna 2 Tablet(s) Oral at bedtime PRN  spironolactone 25 milliGRAM(s) Oral daily  traMADol 25 milliGRAM(s) Oral every 4 hours PRN  traMADol 50 milliGRAM(s) Oral every 6 hours PRN    89yo/F with PMH CAD s/p stents x5, HTN, hypothyroidism, GERD, uterine CA s/p EMILY, right ankle stress fx a/w:    1. 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  -no surgical intervention.  -pain control  -physical therapy  -will likely need rehab.     2. HTN:  -continue bb, spironolactone    3. Hypothyroidism:  -synthroid    4. GERD:  -ppi    5. DVT px

## 2018-06-27 NOTE — PROGRESS NOTE ADULT - SUBJECTIVE AND OBJECTIVE BOX
S: Feeling a little better, but still hard to walk on right ankle  O: Exam unchanged from last axel.  No new ankle swelling  +DP  Toes warm  Able to actively plantar and dorsiflex  Ankle ROM limited due to OA.   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 Pollack who saw and examined pt at bedside this AM.

## 2018-06-27 NOTE — PROGRESS NOTE ADULT - ASSESSMENT
A/P: 89y Female with Right ankle advanced arthritis  FU MRI ankle  Analgesia, antiinflammatories as tolerated  NWB Right LE until MRI results  No surgery planned or indicated at this time  SCDs, DVT PE ppx

## 2018-06-27 NOTE — PROGRESS NOTE ADULT - SUBJECTIVE AND OBJECTIVE BOX
Pt S/E at bedside, no acute events overnight, pain controlled    Vital Signs Last 24 Hrs  T(C): 36.7 (27 Jun 2018 00:00), Max: 36.7 (27 Jun 2018 00:00)  T(F): 98.1 (27 Jun 2018 00:00), Max: 98.1 (27 Jun 2018 00:00)  HR: 67 (27 Jun 2018 00:00) (67 - 88)  BP: 155/56 (27 Jun 2018 00:00) (155/56 - 180/81)  BP(mean): --  RR: 18 (27 Jun 2018 00:00) (16 - 18)  SpO2: 99% (27 Jun 2018 00:00) (98% - 100%)    Physical Exam  Gen: Nad, calm, awake, pleasant  Right LE: Skin intact, No erythema. No bony TTP medial/lateral malleolus, no bony ttp elsewhere of the foot or ankle. She is able to plantar and dorsiflex with some limited ROM, non painful. No pain on PROM of the ankle. +ehl/fhl/ta/gs, +dp/pt pulses palpable.  She has some increased sensitivity to touch of the medial ankle above the joint line that is sensitive to light touch.

## 2018-06-28 RX ORDER — SPIRONOLACTONE 25 MG/1
50 TABLET, FILM COATED ORAL DAILY
Qty: 0 | Refills: 0 | Status: DISCONTINUED | OUTPATIENT
Start: 2018-06-28 | End: 2018-06-29

## 2018-06-28 RX ORDER — SPIRONOLACTONE 25 MG/1
25 TABLET, FILM COATED ORAL ONCE
Qty: 0 | Refills: 0 | Status: COMPLETED | OUTPATIENT
Start: 2018-06-28 | End: 2018-06-28

## 2018-06-28 RX ADMIN — Medication 100 MILLIGRAM(S): at 22:11

## 2018-06-28 RX ADMIN — TRAMADOL HYDROCHLORIDE 25 MILLIGRAM(S): 50 TABLET ORAL at 09:43

## 2018-06-28 RX ADMIN — Medication 2000 UNIT(S): at 11:13

## 2018-06-28 RX ADMIN — ENOXAPARIN SODIUM 40 MILLIGRAM(S): 100 INJECTION SUBCUTANEOUS at 16:43

## 2018-06-28 RX ADMIN — TRAMADOL HYDROCHLORIDE 25 MILLIGRAM(S): 50 TABLET ORAL at 16:46

## 2018-06-28 RX ADMIN — Medication 50 MILLIGRAM(S): at 22:11

## 2018-06-28 RX ADMIN — Medication 100 MILLIGRAM(S): at 05:52

## 2018-06-28 RX ADMIN — SPIRONOLACTONE 25 MILLIGRAM(S): 25 TABLET, FILM COATED ORAL at 11:14

## 2018-06-28 RX ADMIN — SPIRONOLACTONE 25 MILLIGRAM(S): 25 TABLET, FILM COATED ORAL at 05:52

## 2018-06-28 RX ADMIN — Medication 100 MILLIGRAM(S): at 13:07

## 2018-06-28 RX ADMIN — TRAMADOL HYDROCHLORIDE 25 MILLIGRAM(S): 50 TABLET ORAL at 10:40

## 2018-06-28 RX ADMIN — Medication 325 MILLIGRAM(S): at 11:14

## 2018-06-28 RX ADMIN — PANTOPRAZOLE SODIUM 40 MILLIGRAM(S): 20 TABLET, DELAYED RELEASE ORAL at 05:52

## 2018-06-28 RX ADMIN — Medication 1 DROP(S): at 13:07

## 2018-06-28 RX ADMIN — Medication 75 MICROGRAM(S): at 05:51

## 2018-06-28 RX ADMIN — Medication 1 DROP(S): at 22:11

## 2018-06-28 NOTE — PROGRESS NOTE ADULT - SUBJECTIVE AND OBJECTIVE BOX
c/c right ankle swelling, inability to ambulate    HPI:  89yo/F with PMH CAD s/p stents x5, HTN, Orthostatic hypotension, Anxiety, hypothyroidism, GERD, uterine CA s/p EMILY presented with worsening pain and swelling RT ankle. Patient was diagnosed with RT ankle stress fracture 2 months ago, was seen and f/u by DR Pollack as outpatient. Patient was then cleared for PT and was feeling better. Last few days she is c/o worsening of RT ankle pain and swelling and could not bear weight on it. She was admitted for further mx.   Seen by ortho, recommended air cast/physical therapy. Hospital course notable for uncontrolled HTN.     : pt seen and examined. c/o aches all over, but predominantly Right ankle and right shoulder. No sob/chest pain. Takes metoprolol 50qhs and spironlactone 25mg daily at home. Was taken off losartan 2 weeks ago due to gen weakness. Was able to walk to bathroom but with a lot of difficulty due to pain.     Review of system- All 10 systems reviewed and is as per HPI otherwise negative.     Vital Signs Last 24 Hrs  T(C): 36.6 (2018 05:56), Max: 36.6 (2018 05:56)  T(F): 97.8 (2018 05:56), Max: 97.8 (2018 05:56)  HR: 74 (2018 05:56) (70 - 77)  BP: 178/79 (2018 05:56) (149/52 - 178/79)  BP(mean): 91 (2018 10:58) (91 - 91)  RR: 16 (2018 05:56) (16 - 16)  SpO2: 100% (2018 05:56) (98% - 100%)    PHYSICAL EXAM:    GENERAL: anxious  HEAD:  Atraumatic, Normocephalic  EYES: EOMI, PERRLA  HEENT: Moist mucous membranes  NECK: Supple, No JVD  NERVOUS SYSTEM:  Alert & Oriented X3, non focal.  CHEST/LUNG: Clear to auscultation bilaterally  HEART: Regular rate and rhythm; No murmurs, rubs, or gallops  ABDOMEN: Soft, Nontender, Nondistended; Bowel sounds present  GENITOURINARY- Voiding, no palpable bladder  EXTREMITIES:  No clubbing, cyanosis, edema.   MUSCULOSKELETAL-decreased rom right ankle, right shoulder  SKIN-no rash    LABS:                        13.1   9.23  )-----------( 299      ( 2018 05:49 )             42.6         136  |  105  |  19  ----------------------------<  94  4.9   |  25  |  1.07    Ca    9.2      2018 05:49    TPro  x   /  Alb  4.2  /  TBili  x   /  DBili  x   /  AST  28  /  ALT  x   /  AlkPhos  x         Urinalysis Basic - ( 2018 14:16 )    Color: Yellow / Appearance: Clear / S.010 / pH: x  Gluc: x / Ketone: Negative  / Bili: Negative / Urobili: Negative mg/dL   Blood: x / Protein: Negative mg/dL / Nitrite: Negative   Leuk Esterase: Negative / RBC: x / WBC x   Sq Epi: x / Non Sq Epi: x / Bacteria: x      MEDS  acetaminophen   Tablet. 1000 milliGRAM(s) Oral every 6 hours PRN  ALPRAZolam 0.25 milliGRAM(s) Oral every 8 hours PRN  aluminum hydroxide/magnesium hydroxide/simethicone Suspension 30 milliLiter(s) Oral every 4 hours PRN  artificial  tears Solution 1 Drop(s) Both EYES three times a day  aspirin enteric coated 325 milliGRAM(s) Oral daily  cholecalciferol 2000 Unit(s) Oral daily  docusate sodium 100 milliGRAM(s) Oral three times a day  enoxaparin Injectable 40 milliGRAM(s) SubCutaneous every 24 hours  levothyroxine 75 MICROGram(s) Oral daily  metoprolol succinate ER 50 milliGRAM(s) Oral at bedtime  ondansetron Injectable 4 milliGRAM(s) IV Push every 6 hours PRN  pantoprazole    Tablet 40 milliGRAM(s) Oral before breakfast  senna 2 Tablet(s) Oral at bedtime PRN  spironolactone 25 milliGRAM(s) Oral daily  traMADol 25 milliGRAM(s) Oral every 4 hours PRN  traMADol 50 milliGRAM(s) Oral every 6 hours PRN    89yo/F with PMH CAD s/p stents x5, HTN, hypothyroidism, GERD, uterine CA s/p EMILY, right ankle stress fx a/w:    1. 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  -no surgical intervention.  -pain control  -physical therapy      2. HTN with orthostatic hypotension  -cardio eval appreciated  -spironolactone increased to 50mg.  -continue bb at home dose.   -continue bb, spironolactone    3. Hypothyroidism:  -synthroid    4. GERD:  -ppi    5. Anxiety:  -xanax    6. DVT px

## 2018-06-28 NOTE — CONSULT NOTE ADULT - SUBJECTIVE AND OBJECTIVE BOX
89yo/F with PMH CAD s/p stents x5, HTN, hypothyroidism, GERD, uterine CA s/p EMILY presented with worsening pain and swelling RT ankle. Patient was diagnosed with RT ankle stress fracture 2 months ago, was seen and f/u by DR Pollack as outpatient. Patient was then cleared for PT and was feeling better. Last few days she is c/o worsening of RT ankle pain and swelling and could not bear weight on it today. Denies any trauma, no falls. No fever/chills (26 Jun 2018 16:03)    6/28-  has had intolerance to many blood pressure medications, the last being Losartan. It made her feel weak.  denies chest pain or shortness of breath.   weakness and pain at the left ankle. decreased range of motion at the right shoulder.       PAST MEDICAL & SURGICAL HISTORY:  Endometrial adenocarcinoma  Macular degeneration  Stented coronary artery  Hypothyroid  Hyperlipidemia  HTN (hypertension)  GERD (gastroesophageal reflux disease)  CAD (coronary artery disease)  [place] (hard of hearing)  S/P EMILY (total abdominal hysterectomy)  S/P hernia repair: umbilical - 2008  S/P laparoscopic cholecystectomy: 2008  Ankle fracture, right: surgery 25 yrs ago - hardware removed  S/P coronary angiogram: 2005, 2008, 2011 - drug eluding stents    MEDICATIONS  (STANDING):  artificial  tears Solution 1 Drop(s) Both EYES three times a day  aspirin enteric coated 325 milliGRAM(s) Oral daily  cholecalciferol 2000 Unit(s) Oral daily  docusate sodium 100 milliGRAM(s) Oral three times a day  enoxaparin Injectable 40 milliGRAM(s) SubCutaneous every 24 hours  levothyroxine 75 MICROGram(s) Oral daily  metoprolol succinate ER 50 milliGRAM(s) Oral at bedtime  pantoprazole    Tablet 40 milliGRAM(s) Oral before breakfast  spironolactone 50 milliGRAM(s) Oral daily    MEDICATIONS  (PRN):  acetaminophen   Tablet. 1000 milliGRAM(s) Oral every 6 hours PRN Mild Pain (1 - 3) or HA  ALPRAZolam 0.25 milliGRAM(s) Oral every 8 hours PRN anxiety  aluminum hydroxide/magnesium hydroxide/simethicone Suspension 30 milliLiter(s) Oral every 4 hours PRN Dyspepsia  ondansetron Injectable 4 milliGRAM(s) IV Push every 6 hours PRN Nausea  senna 2 Tablet(s) Oral at bedtime PRN Constipation  traMADol 25 milliGRAM(s) Oral every 4 hours PRN Moderate Pain (4 - 6)  traMADol 50 milliGRAM(s) Oral every 6 hours PRN Severe Pain (7 - 10)    Allergies    amlodipine (Unknown)  clonidine (Unknown)  Levatol (Unknown)  Lipitor (Unknown)  Lotrel (Unknown)  methylPREDNISolone (Unknown)  morphine (Other)  Plaquenil (Unknown)  statins (Other (Unknown))  sulfa drugs (Rash)    Intolerances      FAMILY HISTORY:  No pertinent family history in first degree relatives        REVIEW OF SYSTEMS:    CONSTITUTIONAL: No weakness, fevers or chills  EYES/ENT: No visual changes;  No vertigo or throat pain   NECK: No pain or stiffness  RESPIRATORY: No cough, wheezing, hemoptysis; No shortness of breath  CARDIOVASCULAR: No chest pain or palpitations  GASTROINTESTINAL: No abdominal or epigastric pain. No nausea, vomiting, or hematemesis; No diarrhea or constipation. No melena or hematochezia.  GENITOURINARY: No dysuria, frequency or hematuria  NEUROLOGICAL: No numbness or weakness  SKIN: No itching, burning, rashes, or lesions   All other review of systems is negative unless indicated above      PHYSICAL EXAM:  Daily     Daily   Vital Signs Last 24 Hrs  T(C): 36.6 (28 Jun 2018 05:56), Max: 36.6 (28 Jun 2018 05:56)  T(F): 97.8 (28 Jun 2018 05:56), Max: 97.8 (28 Jun 2018 05:56)  HR: 74 (28 Jun 2018 05:56) (70 - 77)  BP: 178/79 (28 Jun 2018 05:56) (149/52 - 178/79)  BP(mean): 91 (27 Jun 2018 10:58) (91 - 91)  RR: 16 (28 Jun 2018 05:56) (16 - 16)  SpO2: 100% (28 Jun 2018 05:56) (98% - 100%)    Constitutional: NAD, awake and alert, well-developed  HEENT: PERR, EOMI, Normal Hearing, MMM  Neck: Soft and supple, No LAD, No JVD  Respiratory: Breath sounds are clear bilaterally, No wheezing, rales or rhonchi  Cardiovascular: S1 and S2, regular rate and rhythm, no Murmurs, gallops or rubs  Gastrointestinal: Bowel Sounds present, soft, nontender, nondistended, no guarding, no rebound  Extremities: No peripheral edema  Vascular: 2+ peripheral pulses  Neurological: A/O x 3, no focal deficits  Musculoskeletal: 5/5 strength b/l upper and lower extremities  Skin: No rashes    LABS: All Labs Reviewed:                        13.1   9.23  )-----------( 299      ( 27 Jun 2018 05:49 )             42.6     06-27    136  |  105  |  19  ----------------------------<  94  4.9   |  25  |  1.07    Ca    9.2      27 Jun 2018 05:49    TPro  x   /  Alb  4.2  /  TBili  x   /  DBili  x   /  AST  28  /  ALT  x   /  AlkPhos  x   06-26      EKG:  < from: 12 Lead ECG (06.26.18 @ 08:19) >  Ventricular Rate 76 BPM    Atrial Rate 76 BPM    P-R Interval 260 ms    QRS Duration 88 ms    Q-T Interval 368 ms    QTC Calculation(Bezet) 414 ms    P Axis 58 degrees    R Axis 20 degrees    T Axis 84 degrees    Diagnosis Line Sinus rhythm with sinus arrhythmia with 1st degree A-V block  Otherwise normal ECG  Confirmed by ARELIS CASTELLANOS MD ([id number]) on 6/26/2018 8:04:35 PM    < end of copied text >

## 2018-06-28 NOTE — CONSULT NOTE ADULT - ASSESSMENT
89yo/F with PMH CAD s/p stents x5, HTN, hypothyroidism, GERD, uterine CA s/p EMILY presented with worsening pain and swelling RT ankle. Patient was diagnosed with RT ankle stress fracture 2 months ago, was seen and f/u by DR Pollack as outpatient. Patient was then cleared for PT and was feeling better. Last few days she is c/o worsening of RT ankle pain and swelling and could not bear weight on it today. Denies any trauma, no falls. No fever/chills (26 Jun 2018 16:03)  has had intolerance to many blood pressure medications, the last being Losartan. It made her feel weak.  denies chest pain or shortness of breath.   weakness and pain at the left ankle. decreased range of motion    6/28-  1. increase Spironolactone to 50 mg daily  2 continue metoprolol  3. goal systolic blood pressure less than 150 mmHG

## 2018-06-29 ENCOUNTER — TRANSCRIPTION ENCOUNTER (OUTPATIENT)
Age: 83
End: 2018-06-29

## 2018-06-29 VITALS
RESPIRATION RATE: 18 BRPM | OXYGEN SATURATION: 96 % | SYSTOLIC BLOOD PRESSURE: 190 MMHG | DIASTOLIC BLOOD PRESSURE: 73 MMHG | TEMPERATURE: 98 F

## 2018-06-29 RX ORDER — METOPROLOL TARTRATE 50 MG
1 TABLET ORAL
Qty: 0 | Refills: 0 | COMMUNITY

## 2018-06-29 RX ORDER — ASPIRIN/CALCIUM CARB/MAGNESIUM 324 MG
1 TABLET ORAL
Qty: 0 | Refills: 0 | COMMUNITY
Start: 2018-06-29

## 2018-06-29 RX ORDER — DOCUSATE SODIUM 100 MG
1 CAPSULE ORAL
Qty: 0 | Refills: 0 | COMMUNITY
Start: 2018-06-29

## 2018-06-29 RX ORDER — METOPROLOL TARTRATE 50 MG
1 TABLET ORAL
Qty: 0 | Refills: 0 | COMMUNITY
Start: 2018-06-29

## 2018-06-29 RX ORDER — ASPIRIN/CALCIUM CARB/MAGNESIUM 324 MG
325 TABLET ORAL
Qty: 0 | Refills: 0 | Status: DISCONTINUED | OUTPATIENT
Start: 2018-06-29 | End: 2018-06-29

## 2018-06-29 RX ORDER — ACETAMINOPHEN 500 MG
2 TABLET ORAL
Qty: 0 | Refills: 0 | COMMUNITY
Start: 2018-06-29

## 2018-06-29 RX ORDER — SENNA PLUS 8.6 MG/1
2 TABLET ORAL
Qty: 0 | Refills: 0 | COMMUNITY
Start: 2018-06-29

## 2018-06-29 RX ORDER — ASPIRIN/CALCIUM CARB/MAGNESIUM 324 MG
1 TABLET ORAL
Qty: 0 | Refills: 0 | DISCHARGE
Start: 2018-06-29

## 2018-06-29 RX ORDER — SPIRONOLACTONE 25 MG/1
25 TABLET, FILM COATED ORAL DAILY
Qty: 0 | Refills: 0 | Status: DISCONTINUED | OUTPATIENT
Start: 2018-06-30 | End: 2018-06-29

## 2018-06-29 RX ORDER — METOPROLOL TARTRATE 50 MG
25 TABLET ORAL DAILY
Qty: 0 | Refills: 0 | Status: DISCONTINUED | OUTPATIENT
Start: 2018-06-29 | End: 2018-06-29

## 2018-06-29 RX ADMIN — Medication 100 MILLIGRAM(S): at 06:05

## 2018-06-29 RX ADMIN — Medication 100 MILLIGRAM(S): at 13:05

## 2018-06-29 RX ADMIN — Medication 1000 MILLIGRAM(S): at 12:45

## 2018-06-29 RX ADMIN — ENOXAPARIN SODIUM 40 MILLIGRAM(S): 100 INJECTION SUBCUTANEOUS at 17:31

## 2018-06-29 RX ADMIN — Medication 1 DROP(S): at 20:39

## 2018-06-29 RX ADMIN — SPIRONOLACTONE 50 MILLIGRAM(S): 25 TABLET, FILM COATED ORAL at 06:04

## 2018-06-29 RX ADMIN — Medication 75 MICROGRAM(S): at 06:04

## 2018-06-29 RX ADMIN — Medication 1 DROP(S): at 06:04

## 2018-06-29 RX ADMIN — Medication 50 MILLIGRAM(S): at 20:47

## 2018-06-29 RX ADMIN — PANTOPRAZOLE SODIUM 40 MILLIGRAM(S): 20 TABLET, DELAYED RELEASE ORAL at 06:04

## 2018-06-29 RX ADMIN — Medication 325 MILLIGRAM(S): at 15:57

## 2018-06-29 RX ADMIN — Medication 25 MILLIGRAM(S): at 11:26

## 2018-06-29 RX ADMIN — Medication 2000 UNIT(S): at 11:26

## 2018-06-29 RX ADMIN — Medication 1000 MILLIGRAM(S): at 02:39

## 2018-06-29 RX ADMIN — Medication 1000 MILLIGRAM(S): at 13:40

## 2018-06-29 RX ADMIN — Medication 100 MILLIGRAM(S): at 20:39

## 2018-06-29 NOTE — DISCHARGE NOTE ADULT - CARE PLAN
Principal Discharge DX:	Ankle pain  Goal:	pain control/gait training  Assessment and plan of treatment:	physical therapy at rehab.  Secondary Diagnosis:	Orthostatic hypotension  Goal:	prevent symptoms.  Assessment and plan of treatment:	stay hydrated.   change positions very slowly  can try compression stockings while sitting in chair or walking.   sleep with head of bed elevated.

## 2018-06-29 NOTE — DISCHARGE NOTE ADULT - INSTRUCTIONS
Turn & reposition within the bed/chair to promote skin health & prevent and skin break down.  Never get out of bed/chair alone at rehab- always call for assistance first for safety

## 2018-06-29 NOTE — DISCHARGE NOTE ADULT - HOSPITAL COURSE
89yo/F with PMH CAD s/p stents x5, HTN, Orthostatic hypotension, Pericaridititis on asa bid, recently taken off colchicine, Anxiety, hypothyroidism, GERD, uterine CA s/p EMILY presented with worsening pain and swelling RT ankle. Patient was diagnosed with RT ankle stress fracture 2 months ago, was seen and f/u by Dr. Pollack as outpatient.  Patient was then cleared for PT and was feeling better. Last few days she is c/o worsening of RT ankle pain and swelling and could not bear weight on it. She was admitted for further mx.   Seen by ortho, recommended air cast/physical therapy. Hospital course notable for uncontrolled HTN. Then syncope due to orthostatic hypotension. Her medications have been adjusted. SHe is now medically stable for dc. She is somewhat still hypertensive, but will allow for some amount of hypertension given orthostatic hypotension.     Vital Signs Last 24 Hrs  T(C): 36.7 (29 Jun 2018 13:03), Max: 36.7 (29 Jun 2018 05:15)  T(F): 98.1 (29 Jun 2018 13:03), Max: 98.1 (29 Jun 2018 13:03)  HR: 73 (29 Jun 2018 13:03) (68 - 87)  BP: 177/71 (29 Jun 2018 13:03) (157/64 - 192/81)  RR: 17 (29 Jun 2018 13:03) (16 - 18)  SpO2: 100% (29 Jun 2018 09:05) (97% - 100%)    PHYSICAL EXAM:    GENERAL: anxious at times.  HEAD:  Normocephalic, atraumatic  EYES: EOMI, PERRLA  HEENT: Moist mucous membranes  NECK: Supple, No JVD  NERVOUS SYSTEM:  Alert & Oriented X3, Motor Strength 5/5 B/L upper and lower extremities  CHEST/LUNG: Clear to auscultation bilaterally  HEART: Regular rate and rhythm  ABDOMEN: Soft, Nontender, Nondistended, Bowel sounds present  GENITOURINARY: Voiding, no palpable bladder  EXTREMITIES:   No clubbing, cyanosis, or edema  MUSCULOSKELETAL- Right ankle with mild swelling  SKIN-no rash    LABS:  Complete Blood Count in AM (06.27.18 @ 05:49)    Nucleated RBC: 0 /100 WBCs    WBC Count: 9.23 K/uL    RBC Count: 4.81 M/uL    Hemoglobin: 13.1 g/dL    Hematocrit: 42.6 %    Mean Cell Volume: 88.6 fl    Mean Cell Hemoglobin: 27.2 pg    Mean Cell Hemoglobin Conc: 30.8 gm/dL    Red Cell Distrib Width: 15.4 %    Platelet Count - Automated: 299 K/uL    Basic Metabolic Panel in AM (06.27.18 @ 05:49)    Sodium, Serum: 136 mmol/L    Potassium, Serum: 4.9 mmol/L    Chloride, Serum: 105 mmol/L    Carbon Dioxide, Serum: 25 mmol/L    Anion Gap, Serum: 6 mmol/L    Blood Urea Nitrogen, Serum: 19 mg/dL    Creatinine, Serum: 1.07 mg/dL    Glucose, Serum: 94 mg/dL    Calcium, Total Serum: 9.2 mg/dL      1. Severe OA Right ankle with subchondral stress reaction on MRI  -wbat with platform walker  -aircast right ankle.  2. HTN with orthostatic hypotension  3. Syncopal episode due to orthostatic hypotension.  4. Hypothyroidism  5. GERD  6. Anxiety      time taken for dc 65 min  plan d/w patient/daughter at bedside in detail. 89yo/F with PMH CAD s/p stents x5, HTN, Orthostatic hypotension, Pericaridititis on asa bid, recently taken off colchicine, Anxiety, hypothyroidism, GERD, uterine CA s/p EMILY presented with worsening pain and swelling RT ankle. Patient was diagnosed with RT ankle stress fracture 2 months ago, was seen and f/u by Dr. Pollack as outpatient.  Patient was then cleared for PT and was feeling better. Last few days she is c/o worsening of RT ankle pain and swelling and could not bear weight on it. She was admitted for further mx.   Seen by ortho, recommended air cast/physical therapy. Hospital course notable for uncontrolled HTN. Then syncope due to orthostatic hypotension. Her medications have been adjusted. She is now medically stable for dc. She is somewhat still hypertensive, but will allow for some amount of hypertension given orthostatic hypotension.   She would benefit from platform walker given her RUE shoulder OA, RTC Arthropathy).     Vital Signs Last 24 Hrs  T(C): 36.7 (29 Jun 2018 13:03), Max: 36.7 (29 Jun 2018 05:15)  T(F): 98.1 (29 Jun 2018 13:03), Max: 98.1 (29 Jun 2018 13:03)  HR: 73 (29 Jun 2018 13:03) (68 - 87)  BP: 177/71 (29 Jun 2018 13:03) (157/64 - 192/81)  RR: 17 (29 Jun 2018 13:03) (16 - 18)  SpO2: 100% (29 Jun 2018 09:05) (97% - 100%)    PHYSICAL EXAM:    GENERAL: anxious at times.  HEAD:  Normocephalic, atraumatic  EYES: EOMI, PERRLA  HEENT: Moist mucous membranes  NECK: Supple, No JVD  NERVOUS SYSTEM:  Alert & Oriented X3, Motor Strength 5/5 B/L upper and lower extremities  CHEST/LUNG: Clear to auscultation bilaterally  HEART: Regular rate and rhythm  ABDOMEN: Soft, Nontender, Nondistended, Bowel sounds present  GENITOURINARY: Voiding, no palpable bladder  EXTREMITIES:   No clubbing, cyanosis, or edema  MUSCULOSKELETAL- Right ankle with mild swelling  SKIN-no rash    LABS:  Complete Blood Count in AM (06.27.18 @ 05:49)    Nucleated RBC: 0 /100 WBCs    WBC Count: 9.23 K/uL    RBC Count: 4.81 M/uL    Hemoglobin: 13.1 g/dL    Hematocrit: 42.6 %    Mean Cell Volume: 88.6 fl    Mean Cell Hemoglobin: 27.2 pg    Mean Cell Hemoglobin Conc: 30.8 gm/dL    Red Cell Distrib Width: 15.4 %    Platelet Count - Automated: 299 K/uL    Basic Metabolic Panel in AM (06.27.18 @ 05:49)    Sodium, Serum: 136 mmol/L    Potassium, Serum: 4.9 mmol/L    Chloride, Serum: 105 mmol/L    Carbon Dioxide, Serum: 25 mmol/L    Anion Gap, Serum: 6 mmol/L    Blood Urea Nitrogen, Serum: 19 mg/dL    Creatinine, Serum: 1.07 mg/dL    Glucose, Serum: 94 mg/dL    Calcium, Total Serum: 9.2 mg/dL      1. Severe OA Right ankle with subchondral stress reaction on MRI  -wbat with platform walker  -aircast right ankle.  2. HTN with orthostatic hypotension  3. Syncopal episode due to orthostatic hypotension.  4. Hypothyroidism  5. GERD  6. Anxiety      time taken for dc 65 min  plan d/w patient/daughter at bedside in detail.

## 2018-06-29 NOTE — DISCHARGE NOTE ADULT - MEDICATION SUMMARY - MEDICATIONS TO CHANGE
I will SWITCH the dose or number of times a day I take the medications listed below when I get home from the hospital:    Toprol-XL 50 mg oral tablet, extended release  -- 1 tab(s) by mouth once a day

## 2018-06-29 NOTE — DISCHARGE NOTE ADULT - MEDICATION SUMMARY - MEDICATIONS TO TAKE
I will START or STAY ON the medications listed below when I get home from the hospital:    spironolactone 25 mg oral tablet  -- 1 tab(s) by mouth once a day  -- Indication: For home medication    acetaminophen 500 mg oral tablet  -- 2 tab(s) by mouth every 6 hours, As needed, Mild Pain (1 - 3) or HA  -- Indication: For pain    aspirin 325 mg oral tablet  -- 1 tab(s) by mouth 2 times a day  -- Indication: For pericarditis    traMADol 50 mg oral tablet  -- 25 milligram(s) by mouth every 6 hours, As Needed for pain  -- Indication: For pain    ALPRAZolam  -- 0.25 milligram(s) by mouth 3 times a day, As Needed  -- Indication: For home med    metoprolol succinate 50 mg oral tablet, extended release  -- 1 tab(s) by mouth once a day (at bedtime)  -- Indication: For home med    metoprolol succinate 25 mg oral tablet, extended release  -- 1 tab(s) by mouth once a day in the morning  -- Indication: For extra dose to be taken in the morning for better blood pressure control    senna oral tablet  -- 2 tab(s) by mouth once a day (at bedtime), As needed, Constipation  -- Indication: For constipation    docusate sodium 100 mg oral capsule  -- 1 cap(s) by mouth 3 times a day, As Needed for constipation  -- Indication: For constipation    ocular lubricant ophthalmic solution  -- 1 drop(s) to each affected eye 3 times a day  -- Indication: For home med    Protonix 40 mg oral delayed release tablet  -- 1 tab(s) by mouth once a day  -- Indication: For home med    Synthroid 75 mcg (0.075 mg) oral tablet  -- 1 tab(s) by mouth once a day  -- Indication: For home med    Vitamin D3 2000 intl units oral capsule  -- 1 cap(s) by mouth once a day  -- Indication: For home med

## 2018-06-29 NOTE — DISCHARGE NOTE ADULT - PATIENT PORTAL LINK FT
You can access the [facility] Portal, offered by [facility], by registering with the following website: [email or web address]

## 2018-06-29 NOTE — DISCHARGE NOTE ADULT - CARE PROVIDER_API CALL
Mayra Sánchez), Cardiovascular Disease; Internal Medicine  [address] [place] [address]  Phone: [phone]  Fax: [phone]    Elkin Pollack), Orthopaedic Surgery  [address] [place] [address]  Phone: [phone]  Fax: [phone]

## 2018-06-29 NOTE — PROGRESS NOTE ADULT - SUBJECTIVE AND OBJECTIVE BOX
c/c right ankle swelling, inability to ambulate    HPI:  89yo/F with PMH CAD s/p stents x5, HTN, Orthostatic hypotension, Anxiety, hypothyroidism, GERD, uterine CA s/p EMILY presented with worsening pain and swelling RT ankle. Patient was diagnosed with RT ankle stress fracture 2 months ago, was seen and f/u by DR Pollack as outpatient. Patient was then cleared for PT and was feeling better. Last few days she is c/o worsening of RT ankle pain and swelling and could not bear weight on it. She was admitted for further mx.   Seen by ortho, recommended air cast/physical therapy. Hospital course notable for uncontrolled HTN. Then syncope due to othostatic hypotension.     6/29: pt seen and examined Multiple times throughout the day. Very nervous about medication changes. DOes not want to be discharged today.   Very hesitant to take tramadol although she took it the other day and did well with it.    Review of system- All 10 systems reviewed and is as per HPI otherwise negative.     Vital Signs Last 24 Hrs  T(C): 36.7 (29 Jun 2018 13:03), Max: 36.7 (29 Jun 2018 05:15)  T(F): 98.1 (29 Jun 2018 13:03), Max: 98.1 (29 Jun 2018 13:03)  HR: 73 (29 Jun 2018 13:03) (68 - 87)  BP: 177/71 (29 Jun 2018 13:03) (157/64 - 192/81)  RR: 17 (29 Jun 2018 13:03) (16 - 18)  SpO2: 100% (29 Jun 2018 09:05) (97% - 100%)    PHYSICAL EXAM:    GENERAL: anxious  HEAD:  Atraumatic, Normocephalic  EYES: EOMI, PERRLA  HEENT: Moist mucous membranes  NECK: Supple, No JVD  NERVOUS SYSTEM:  Alert & Oriented X3, non focal.  CHEST/LUNG: Clear to auscultation bilaterally  HEART: Regular rate and rhythm; No murmurs, rubs, or gallops  ABDOMEN: Soft, Nontender, Nondistended; Bowel sounds present  GENITOURINARY- Voiding, no palpable bladder  EXTREMITIES:  No clubbing, cyanosis, edema.   MUSCULOSKELETAL-decreased rom right ankle, right shoulder  SKIN-no rash  LABS: none today                  MEDS  acetaminophen   Tablet. 1000 milliGRAM(s) Oral every 6 hours PRN  ALPRAZolam 0.25 milliGRAM(s) Oral every 8 hours PRN  aluminum hydroxide/magnesium hydroxide/simethicone Suspension 30 milliLiter(s) Oral every 4 hours PRN  artificial  tears Solution 1 Drop(s) Both EYES three times a day  aspirin enteric coated 325 milliGRAM(s) Oral daily  cholecalciferol 2000 Unit(s) Oral daily  docusate sodium 100 milliGRAM(s) Oral three times a day  enoxaparin Injectable 40 milliGRAM(s) SubCutaneous every 24 hours  levothyroxine 75 MICROGram(s) Oral daily  metoprolol succinate ER 50 milliGRAM(s) Oral at bedtime  ondansetron Injectable 4 milliGRAM(s) IV Push every 6 hours PRN  pantoprazole    Tablet 40 milliGRAM(s) Oral before breakfast  senna 2 Tablet(s) Oral at bedtime PRN  spironolactone 25 milliGRAM(s) Oral daily  traMADol 25 milliGRAM(s) Oral every 4 hours PRN  traMADol 50 milliGRAM(s) Oral every 6 hours PRN    89yo/F with PMH CAD s/p stents x5, HTN, hypothyroidism, GERD, uterine CA s/p EMILY, right ankle stress fx a/w:    1. 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  -no surgical intervention.  -pain control  -physical therapy      2. HTN with orthostatic hypotension  -d/w Dr. Sánchez and patient  -decrease spironolactone back to home dose 25mg daily  -increase toprol to 25 bid    3. Hypothyroidism:  -synthroid    4. GERD:  -ppi    5. Anxiety:  -xanax    6. DVT px    7. Dispo:  medically stable for dc.

## 2018-07-05 DIAGNOSIS — I10 ESSENTIAL (PRIMARY) HYPERTENSION: ICD-10-CM

## 2018-07-05 DIAGNOSIS — E78.5 HYPERLIPIDEMIA, UNSPECIFIED: ICD-10-CM

## 2018-07-05 DIAGNOSIS — Z85.42 PERSONAL HISTORY OF MALIGNANT NEOPLASM OF OTHER PARTS OF UTERUS: ICD-10-CM

## 2018-07-05 DIAGNOSIS — F41.9 ANXIETY DISORDER, UNSPECIFIED: ICD-10-CM

## 2018-07-05 DIAGNOSIS — M02.871: ICD-10-CM

## 2018-07-05 DIAGNOSIS — I25.10 ATHEROSCLEROTIC HEART DISEASE OF NATIVE CORONARY ARTERY WITHOUT ANGINA PECTORIS: ICD-10-CM

## 2018-07-05 DIAGNOSIS — I95.1 ORTHOSTATIC HYPOTENSION: ICD-10-CM

## 2018-07-05 DIAGNOSIS — M17.0 BILATERAL PRIMARY OSTEOARTHRITIS OF KNEE: ICD-10-CM

## 2018-07-05 DIAGNOSIS — K21.9 GASTRO-ESOPHAGEAL REFLUX DISEASE WITHOUT ESOPHAGITIS: ICD-10-CM

## 2018-07-05 DIAGNOSIS — M19.071 PRIMARY OSTEOARTHRITIS, RIGHT ANKLE AND FOOT: ICD-10-CM

## 2018-07-05 DIAGNOSIS — M84.371D: ICD-10-CM

## 2018-07-05 DIAGNOSIS — E03.9 HYPOTHYROIDISM, UNSPECIFIED: ICD-10-CM

## 2018-07-05 DIAGNOSIS — H35.30 UNSPECIFIED MACULAR DEGENERATION: ICD-10-CM

## 2018-07-31 RX ORDER — CIPROFLOXACIN LACTATE 400MG/40ML
1 VIAL (ML) INTRAVENOUS
Qty: 0 | Refills: 0 | COMMUNITY
Start: 2018-07-31

## 2018-08-01 ENCOUNTER — INPATIENT (INPATIENT)
Facility: HOSPITAL | Age: 83
LOS: 3 days | Discharge: HOME HEALTH | End: 2018-08-05
Attending: FAMILY MEDICINE | Admitting: FAMILY MEDICINE
Payer: MEDICARE

## 2018-08-01 VITALS
WEIGHT: 171.96 LBS | SYSTOLIC BLOOD PRESSURE: 194 MMHG | DIASTOLIC BLOOD PRESSURE: 97 MMHG | OXYGEN SATURATION: 100 % | RESPIRATION RATE: 18 BRPM | TEMPERATURE: 98 F | HEART RATE: 101 BPM

## 2018-08-01 DIAGNOSIS — Z90.710 ACQUIRED ABSENCE OF BOTH CERVIX AND UTERUS: Chronic | ICD-10-CM

## 2018-08-01 LAB
ADD ON TEST-SPECIMEN IN LAB: SIGNIFICANT CHANGE UP
ALBUMIN SERPL ELPH-MCNC: 3.7 G/DL — SIGNIFICANT CHANGE UP (ref 3.3–5)
ALP SERPL-CCNC: 93 U/L — SIGNIFICANT CHANGE UP (ref 40–120)
ALT FLD-CCNC: 18 U/L — SIGNIFICANT CHANGE UP (ref 12–78)
ANION GAP SERPL CALC-SCNC: 10 MMOL/L — SIGNIFICANT CHANGE UP (ref 5–17)
APPEARANCE UR: CLEAR — SIGNIFICANT CHANGE UP
AST SERPL-CCNC: 15 U/L — SIGNIFICANT CHANGE UP (ref 15–37)
BILIRUB SERPL-MCNC: 0.6 MG/DL — SIGNIFICANT CHANGE UP (ref 0.2–1.2)
BILIRUB UR-MCNC: NEGATIVE — SIGNIFICANT CHANGE UP
BUN SERPL-MCNC: 16 MG/DL — SIGNIFICANT CHANGE UP (ref 7–23)
CALCIUM SERPL-MCNC: 9.4 MG/DL — SIGNIFICANT CHANGE UP (ref 8.5–10.1)
CHLORIDE SERPL-SCNC: 103 MMOL/L — SIGNIFICANT CHANGE UP (ref 96–108)
CK SERPL-CCNC: 62 U/L — SIGNIFICANT CHANGE UP (ref 26–192)
CO2 SERPL-SCNC: 24 MMOL/L — SIGNIFICANT CHANGE UP (ref 22–31)
COLOR SPEC: YELLOW — SIGNIFICANT CHANGE UP
CREAT SERPL-MCNC: 1.22 MG/DL — SIGNIFICANT CHANGE UP (ref 0.5–1.3)
DIFF PNL FLD: ABNORMAL
GLUCOSE SERPL-MCNC: 132 MG/DL — HIGH (ref 70–99)
GLUCOSE UR QL: NEGATIVE MG/DL — SIGNIFICANT CHANGE UP
HCT VFR BLD CALC: 43.9 % — SIGNIFICANT CHANGE UP (ref 34.5–45)
HGB BLD-MCNC: 13.7 G/DL — SIGNIFICANT CHANGE UP (ref 11.5–15.5)
INR BLD: 1.05 RATIO — SIGNIFICANT CHANGE UP (ref 0.88–1.16)
KETONES UR-MCNC: NEGATIVE — SIGNIFICANT CHANGE UP
LEUKOCYTE ESTERASE UR-ACNC: ABNORMAL
MCHC RBC-ENTMCNC: 27.7 PG — SIGNIFICANT CHANGE UP (ref 27–34)
MCHC RBC-ENTMCNC: 31.2 GM/DL — LOW (ref 32–36)
MCV RBC AUTO: 88.9 FL — SIGNIFICANT CHANGE UP (ref 80–100)
NITRITE UR-MCNC: NEGATIVE — SIGNIFICANT CHANGE UP
NRBC # BLD: 0 /100 WBCS — SIGNIFICANT CHANGE UP (ref 0–0)
PH UR: 7 — SIGNIFICANT CHANGE UP (ref 5–8)
PLATELET # BLD AUTO: 326 K/UL — SIGNIFICANT CHANGE UP (ref 150–400)
POTASSIUM SERPL-MCNC: 4.2 MMOL/L — SIGNIFICANT CHANGE UP (ref 3.5–5.3)
POTASSIUM SERPL-SCNC: 4.2 MMOL/L — SIGNIFICANT CHANGE UP (ref 3.5–5.3)
PROT SERPL-MCNC: 7.7 GM/DL — SIGNIFICANT CHANGE UP (ref 6–8.3)
PROT UR-MCNC: NEGATIVE MG/DL — SIGNIFICANT CHANGE UP
PROTHROM AB SERPL-ACNC: 11.3 SEC — SIGNIFICANT CHANGE UP (ref 9.8–12.7)
RBC # BLD: 4.94 M/UL — SIGNIFICANT CHANGE UP (ref 3.8–5.2)
RBC # FLD: 14.6 % — HIGH (ref 10.3–14.5)
SODIUM SERPL-SCNC: 137 MMOL/L — SIGNIFICANT CHANGE UP (ref 135–145)
SP GR SPEC: 1 — LOW (ref 1.01–1.02)
TROPONIN I SERPL-MCNC: 0.03 NG/ML — SIGNIFICANT CHANGE UP (ref 0.01–0.04)
TROPONIN I SERPL-MCNC: <0.015 NG/ML — SIGNIFICANT CHANGE UP (ref 0.01–0.04)
UROBILINOGEN FLD QL: NEGATIVE MG/DL — SIGNIFICANT CHANGE UP
WBC # BLD: 8.7 K/UL — SIGNIFICANT CHANGE UP (ref 3.8–10.5)
WBC # FLD AUTO: 8.7 K/UL — SIGNIFICANT CHANGE UP (ref 3.8–10.5)

## 2018-08-01 PROCEDURE — 99285 EMERGENCY DEPT VISIT HI MDM: CPT

## 2018-08-01 PROCEDURE — 70450 CT HEAD/BRAIN W/O DYE: CPT | Mod: 26

## 2018-08-01 PROCEDURE — 71045 X-RAY EXAM CHEST 1 VIEW: CPT | Mod: 26

## 2018-08-01 PROCEDURE — 93010 ELECTROCARDIOGRAM REPORT: CPT

## 2018-08-01 RX ORDER — SENNA PLUS 8.6 MG/1
2 TABLET ORAL AT BEDTIME
Qty: 0 | Refills: 0 | Status: DISCONTINUED | OUTPATIENT
Start: 2018-08-01 | End: 2018-08-02

## 2018-08-01 RX ORDER — CHOLECALCIFEROL (VITAMIN D3) 125 MCG
2000 CAPSULE ORAL DAILY
Qty: 0 | Refills: 0 | Status: DISCONTINUED | OUTPATIENT
Start: 2018-08-01 | End: 2018-08-05

## 2018-08-01 RX ORDER — ENOXAPARIN SODIUM 100 MG/ML
40 INJECTION SUBCUTANEOUS EVERY 24 HOURS
Qty: 0 | Refills: 0 | Status: DISCONTINUED | OUTPATIENT
Start: 2018-08-01 | End: 2018-08-02

## 2018-08-01 RX ORDER — ALPRAZOLAM 0.25 MG
0.25 TABLET ORAL
Qty: 0 | Refills: 0 | COMMUNITY

## 2018-08-01 RX ORDER — ALPRAZOLAM 0.25 MG
0.12 TABLET ORAL AT BEDTIME
Qty: 0 | Refills: 0 | Status: DISCONTINUED | OUTPATIENT
Start: 2018-08-01 | End: 2018-08-05

## 2018-08-01 RX ORDER — CIPROFLOXACIN LACTATE 400MG/40ML
250 VIAL (ML) INTRAVENOUS
Qty: 0 | Refills: 0 | Status: DISCONTINUED | OUTPATIENT
Start: 2018-08-01 | End: 2018-08-04

## 2018-08-01 RX ORDER — PANTOPRAZOLE SODIUM 20 MG/1
40 TABLET, DELAYED RELEASE ORAL
Qty: 0 | Refills: 0 | Status: DISCONTINUED | OUTPATIENT
Start: 2018-08-01 | End: 2018-08-05

## 2018-08-01 RX ORDER — DOCUSATE SODIUM 100 MG
100 CAPSULE ORAL THREE TIMES A DAY
Qty: 0 | Refills: 0 | Status: DISCONTINUED | OUTPATIENT
Start: 2018-08-01 | End: 2018-08-05

## 2018-08-01 RX ORDER — ACETAMINOPHEN 500 MG
500 TABLET ORAL EVERY 6 HOURS
Qty: 0 | Refills: 0 | Status: DISCONTINUED | OUTPATIENT
Start: 2018-08-01 | End: 2018-08-05

## 2018-08-01 RX ORDER — LEVOTHYROXINE SODIUM 125 MCG
75 TABLET ORAL ONCE
Qty: 0 | Refills: 0 | Status: COMPLETED | OUTPATIENT
Start: 2018-08-01 | End: 2018-08-01

## 2018-08-01 RX ORDER — SODIUM CHLORIDE 9 MG/ML
1000 INJECTION INTRAMUSCULAR; INTRAVENOUS; SUBCUTANEOUS
Qty: 0 | Refills: 0 | Status: DISCONTINUED | OUTPATIENT
Start: 2018-08-01 | End: 2018-08-05

## 2018-08-01 RX ORDER — ASPIRIN/CALCIUM CARB/MAGNESIUM 324 MG
325 TABLET ORAL DAILY
Qty: 0 | Refills: 0 | Status: DISCONTINUED | OUTPATIENT
Start: 2018-08-01 | End: 2018-08-05

## 2018-08-01 RX ORDER — METOPROLOL TARTRATE 50 MG
50 TABLET ORAL ONCE
Qty: 0 | Refills: 0 | Status: COMPLETED | OUTPATIENT
Start: 2018-08-01 | End: 2018-08-01

## 2018-08-01 RX ORDER — TRAMADOL HYDROCHLORIDE 50 MG/1
25 TABLET ORAL
Qty: 0 | Refills: 0 | COMMUNITY

## 2018-08-01 RX ORDER — SPIRONOLACTONE 25 MG/1
25 TABLET, FILM COATED ORAL ONCE
Qty: 0 | Refills: 0 | Status: COMPLETED | OUTPATIENT
Start: 2018-08-01 | End: 2018-08-01

## 2018-08-01 RX ADMIN — SODIUM CHLORIDE 50 MILLILITER(S): 9 INJECTION INTRAMUSCULAR; INTRAVENOUS; SUBCUTANEOUS at 18:26

## 2018-08-01 RX ADMIN — Medication 75 MICROGRAM(S): at 13:22

## 2018-08-01 RX ADMIN — Medication 0.12 MILLIGRAM(S): at 21:47

## 2018-08-01 RX ADMIN — Medication 100 MILLIGRAM(S): at 15:22

## 2018-08-01 RX ADMIN — ENOXAPARIN SODIUM 40 MILLIGRAM(S): 100 INJECTION SUBCUTANEOUS at 18:44

## 2018-08-01 RX ADMIN — Medication 250 MILLIGRAM(S): at 15:22

## 2018-08-01 RX ADMIN — PANTOPRAZOLE SODIUM 40 MILLIGRAM(S): 20 TABLET, DELAYED RELEASE ORAL at 15:22

## 2018-08-01 RX ADMIN — Medication 50 MILLIGRAM(S): at 13:22

## 2018-08-01 RX ADMIN — Medication 325 MILLIGRAM(S): at 15:22

## 2018-08-01 RX ADMIN — Medication 100 MILLIGRAM(S): at 21:44

## 2018-08-01 RX ADMIN — Medication 2000 UNIT(S): at 15:22

## 2018-08-01 RX ADMIN — SPIRONOLACTONE 25 MILLIGRAM(S): 25 TABLET, FILM COATED ORAL at 13:22

## 2018-08-01 NOTE — ED PROVIDER NOTE - OBJECTIVE STATEMENT
89 y/o female with a PMHx of CAD, GERD, HTN, HLD, stented coronary artery, macular degeneration presents to the ED s/p syncope. This morning, pt told home aid that she felt weak and then pt passed out. +nausea, abd pain.  Denies vomiting, diarrhea. Cardiologist: Dr. Antonio.

## 2018-08-01 NOTE — ED ADULT NURSE REASSESSMENT NOTE - NS ED NURSE REASSESS COMMENT FT1
Patient A&Ox4, resting in bed. Denies pain/discomfort, denies dizziness; no s/s of distress present. Hand-off report to [facility], awaiting transport to . Safety & comfort measures in place, hourly rounding on my time.

## 2018-08-01 NOTE — H&P ADULT - RS GEN PE MLT RESP DETAILS PC
no wheezes/no chest wall tenderness/no rhonchi/breath sounds equal/good air movement/no rales/clear to auscultation bilaterally/respirations non-labored

## 2018-08-01 NOTE — ED PROVIDER NOTE - CONSTITUTIONAL, MLM
normal... Elderly female laying in bed, well appearing, well nourished, awake, alert, oriented to person, place, time/situation and in no acute distress.

## 2018-08-01 NOTE — ED ADULT NURSE NOTE - OBJECTIVE STATEMENT
BIBA with c/o syncope today. Per son at bedside patient "passed out for a few seconds". Patient denies fall, reports feeling weak and reports vaginal bleeding yesterday. Per patient she was seen by PCP yesterday placed on antibiotics for UTI. EKG completed, cardiac monitoring in progress. Patient reports hx of cardiac stents.

## 2018-08-01 NOTE — H&P ADULT - HISTORY OF PRESENT ILLNESS
89 y/o F PMHx CAD s/p 5 stents (last- 6/2013), GERD, HTN, HLD, hypothyroidism presented to ED after syncopal event. As per patient, she had been feeling very fatigued over past few days, went to PCP yesterday, noted to have blood in urine, started on ciprofloxacin 250mg BID for UTI. This AM, patient states she was walking when she began feeling dizzy and started sweating. Patient states she blacked out. As per son at bedside, aide was with patient at time of syncopal event, aide was able to catch patient before fall, and put her carefully in a chair. As per son, patient was only out for 5-6 seconds. As per patient, does not remember passing out. Patient states she has had a syncopal event in the past- attributed to orthostatic hypotension. Patient states she had not taken any of her medications this AM.    In ED, patient found to be tachycardic- , BP- 194/97, SpO2- 100% on RA. Troponins neg x2, UA showing large blood, 25-50 RBC, trace leukocyte, few bacteria. EKG done showing NSR with 1st degree AV block- 83bpm.

## 2018-08-01 NOTE — H&P ADULT - NSHPOUTPATIENTPROVIDERS_GEN_ALL_CORE
Dr. Antonio (PCP/Cardio)  Dr. Muhammad (GI)  Dr. Magana (OB/GYN) Dr. Sánchez (PCP/Cardio)  Dr. Muhammad (GI)  Dr. Magana (OB/GYN)

## 2018-08-01 NOTE — ED ADULT TRIAGE NOTE - CHIEF COMPLAINT QUOTE
Pt BIBA after witnessed syncopal episode at home, 3-4 seconds, no fall.  Pt states she feels "very weak, very dizzy," and that she saw her PMD Paco yesterday because she "was bleeding."

## 2018-08-01 NOTE — ED PROVIDER NOTE - PMH
CAD (coronary artery disease)    Endometrial adenocarcinoma    GERD (gastroesophageal reflux disease)    [place] (hard of hearing)    HTN (hypertension)    Hyperlipidemia    Hypothyroid    Macular degeneration    Stented coronary artery

## 2018-08-01 NOTE — H&P ADULT - PMH
CAD (coronary artery disease)    Endometrial adenocarcinoma    GERD (gastroesophageal reflux disease)    [place] (hard of hearing)    HTN (hypertension)    Hyperlipidemia    Hypothyroid    Macular degeneration    Stented coronary artery

## 2018-08-01 NOTE — ED ADULT NURSE NOTE - PMH
CAD (coronary artery disease)    Endometrial adenocarcinoma    GERD (gastroesophageal reflux disease)    [place] (hard of hearing)    HTN (hypertension)    Hyperlipidemia    Hypothyroid    Macular degeneration    Stented coronary artery

## 2018-08-01 NOTE — H&P ADULT - ASSESSMENT
89 y/o F PMHx CAD s/p 5 stents (last- 6/2013), GERD, HTN, HLD, hypothyroidism presented to ED after syncopal event being admitted for syncope    #Syncope and collapse  -Admit to telemetry  -Possibly 2/2 orthostatic hypotension  -F/U orthostatics  -F/U 3rd set of troponin  -Continue ASA 325mg daily  -Cardiology consulted- F/U recommendations  -As per patient and son, had TTE about 2 months ago, F/U with cardiology if need for repeat TTE during this visit  -PT evaluation- fall risk protocol    #UTI  -UA positive for trace leukocyte, large blood, RBC 25-50, few bacteria- was started on ciprofloxacin 250mg BID- will continue  -F/U urine cx  -Monitor clinically    #HTN  -Continue metoprolol BID, spironolactone daily    #CAD  -Continue ASA 325mg daily    #GERD  -Patient does complain of generalized discomfort  -Continue protonix 40mg daily    #DVT ppx  -Lovenox 40mg daily 89 y/o F PMHx CAD s/p 5 stents (last- 6/2013), GERD, HTN, HLD, hypothyroidism presented to ED after syncopal event being admitted for syncope    #Syncope and collapse  -Admit to telemetry  -Possibly 2/2 orthostatic hypotension  -F/U orthostatics  -F/U 3rd set of troponin  -Continue ASA 325mg daily  -F/U CT head  -Cardiology consulted- F/U recommendations  -As per patient and son, had TTE about 2 months ago, F/U with cardiology if need for repeat TTE during this visit  -PT evaluation- fall risk protocol  -F/U AM labs    #UTI  -UA positive for trace leukocyte, large blood, RBC 25-50, few bacteria- was started on ciprofloxacin 250mg BID- will continue  -F/U urine cx  -Monitor clinically    #HTN  -Continue metoprolol BID, spironolactone daily    #CAD  -Continue ASA 325mg daily  -F/U Lipid panel    #GERD  -Patient does complain of generalized discomfort  -Continue protonix 40mg daily    #DVT ppx  -Lovenox 40mg daily    Total time spent on admission: 67 minutes 89 y/o F PMHx CAD s/p 5 stents (last- 6/2013), GERD, HTN, HLD, hypothyroidism presented to ED after syncopal event being admitted for syncope    #Syncope and collapse  -Admit to telemetry  -Possibly 2/2 orthostatic hypotension  -F/U orthostatics  -F/U 3rd set of troponin  -Continue ASA 325mg daily  -F/U CT head  -Cardiology consulted- F/U recommendations  -As per patient and son, had TTE about 2 months ago, F/U with cardiology if need for repeat TTE during this visit  -PT evaluation- fall risk protocol  -F/U AM labs    #UTI  -UA positive for trace leukocyte, large blood, RBC 25-50, few bacteria- was started on ciprofloxacin 250mg BID- will continue  -F/U urine cx  -Monitor clinically    #HTN  -Continue metoprolol BID, spironolactone daily    #CAD  -Continue ASA 325mg daily  -F/U Lipid panel    #GERD  -Patient does complain of generalized discomfort  -Continue protonix 40mg daily    #DVT ppx  -Lovenox 40mg daily    PCP aware of admission  Total time spent on admission: 67 minutes 89 y/o F PMHx CAD s/p 5 stents (last- 6/2013), GERD, HTN, HLD, hypothyroidism presented to ED after syncopal event being admitted for syncope    #Syncope and collapse  -Admit to telemetry  -Possibly 2/2 orthostatic hypotension  -F/U orthostatics  -F/U 3rd set of troponin  -Continue ASA 325mg daily  -F/U CT head  -Cardiology consulted- F/U recommendations  -As per patient and son, had TTE about 2 months ago, F/U with cardiology if need for repeat TTE during this visit  -PT evaluation- fall risk protocol  -F/U AM labs    #UTI  -UA positive for trace leukocyte, large blood, RBC 25-50, few bacteria- was started on ciprofloxacin 250mg BID- will continue  -F/U urine cx  -Monitor clinically    #HTN  -Continue metoprolol BID, spironolactone daily    #CAD  -Continue ASA 325mg daily  -F/U Lipid panel    #GERD  -Patient does complain of generalized discomfort  -Continue protonix 40mg daily    #DVT ppx  -Lovenox 40mg daily    PCP aware of admission  Total time spent on admission: 74 minutes

## 2018-08-01 NOTE — ED PROVIDER NOTE - PROGRESS NOTE DETAILS
Graeme Connelly for attending Dr. Thomas:  discussed with Dr. Antonio. Requests urine and urinalysis. Pt had full hysterectomy.  Will cancel CT at this time. Dr. Antonio would like pt to be admitted for syncope and further workup.

## 2018-08-02 LAB
ALBUMIN SERPL ELPH-MCNC: 3.5 G/DL — SIGNIFICANT CHANGE UP (ref 3.3–5)
ALP SERPL-CCNC: 86 U/L — SIGNIFICANT CHANGE UP (ref 40–120)
ALT FLD-CCNC: 16 U/L — SIGNIFICANT CHANGE UP (ref 12–78)
ANION GAP SERPL CALC-SCNC: 9 MMOL/L — SIGNIFICANT CHANGE UP (ref 5–17)
AST SERPL-CCNC: 14 U/L — LOW (ref 15–37)
BASOPHILS # BLD AUTO: 0.09 K/UL — SIGNIFICANT CHANGE UP (ref 0–0.2)
BASOPHILS NFR BLD AUTO: 0.9 % — SIGNIFICANT CHANGE UP (ref 0–2)
BILIRUB SERPL-MCNC: 0.6 MG/DL — SIGNIFICANT CHANGE UP (ref 0.2–1.2)
BUN SERPL-MCNC: 19 MG/DL — SIGNIFICANT CHANGE UP (ref 7–23)
CALCIUM SERPL-MCNC: 9.3 MG/DL — SIGNIFICANT CHANGE UP (ref 8.5–10.1)
CHLORIDE SERPL-SCNC: 105 MMOL/L — SIGNIFICANT CHANGE UP (ref 96–108)
CHOLEST SERPL-MCNC: 212 MG/DL — HIGH (ref 10–199)
CO2 SERPL-SCNC: 24 MMOL/L — SIGNIFICANT CHANGE UP (ref 22–31)
CREAT SERPL-MCNC: 1.1 MG/DL — SIGNIFICANT CHANGE UP (ref 0.5–1.3)
CULTURE RESULTS: SIGNIFICANT CHANGE UP
EOSINOPHIL # BLD AUTO: 0.21 K/UL — SIGNIFICANT CHANGE UP (ref 0–0.5)
EOSINOPHIL NFR BLD AUTO: 2 % — SIGNIFICANT CHANGE UP (ref 0–6)
GLUCOSE SERPL-MCNC: 98 MG/DL — SIGNIFICANT CHANGE UP (ref 70–99)
HCT VFR BLD CALC: 43.3 % — SIGNIFICANT CHANGE UP (ref 34.5–45)
HDLC SERPL-MCNC: 67 MG/DL — SIGNIFICANT CHANGE UP (ref 40–125)
HGB BLD-MCNC: 13.2 G/DL — SIGNIFICANT CHANGE UP (ref 11.5–15.5)
IMM GRANULOCYTES NFR BLD AUTO: 0.7 % — SIGNIFICANT CHANGE UP (ref 0–1.5)
LIPID PNL WITH DIRECT LDL SERPL: 114 MG/DL — SIGNIFICANT CHANGE UP
LYMPHOCYTES # BLD AUTO: 2.74 K/UL — SIGNIFICANT CHANGE UP (ref 1–3.3)
LYMPHOCYTES # BLD AUTO: 26.5 % — SIGNIFICANT CHANGE UP (ref 13–44)
MAGNESIUM SERPL-MCNC: 2.7 MG/DL — HIGH (ref 1.6–2.6)
MCHC RBC-ENTMCNC: 27.3 PG — SIGNIFICANT CHANGE UP (ref 27–34)
MCHC RBC-ENTMCNC: 30.5 GM/DL — LOW (ref 32–36)
MCV RBC AUTO: 89.6 FL — SIGNIFICANT CHANGE UP (ref 80–100)
MONOCYTES # BLD AUTO: 0.95 K/UL — HIGH (ref 0–0.9)
MONOCYTES NFR BLD AUTO: 9.2 % — SIGNIFICANT CHANGE UP (ref 2–14)
NEUTROPHILS # BLD AUTO: 6.27 K/UL — SIGNIFICANT CHANGE UP (ref 1.8–7.4)
NEUTROPHILS NFR BLD AUTO: 60.7 % — SIGNIFICANT CHANGE UP (ref 43–77)
NRBC # BLD: 0 /100 WBCS — SIGNIFICANT CHANGE UP (ref 0–0)
OB PNL STL: NEGATIVE — SIGNIFICANT CHANGE UP
PHOSPHATE SERPL-MCNC: 3.5 MG/DL — SIGNIFICANT CHANGE UP (ref 2.5–4.5)
PLATELET # BLD AUTO: 340 K/UL — SIGNIFICANT CHANGE UP (ref 150–400)
POTASSIUM SERPL-MCNC: 4.1 MMOL/L — SIGNIFICANT CHANGE UP (ref 3.5–5.3)
POTASSIUM SERPL-SCNC: 4.1 MMOL/L — SIGNIFICANT CHANGE UP (ref 3.5–5.3)
PROT SERPL-MCNC: 7.3 GM/DL — SIGNIFICANT CHANGE UP (ref 6–8.3)
RBC # BLD: 4.83 M/UL — SIGNIFICANT CHANGE UP (ref 3.8–5.2)
RBC # FLD: 14.6 % — HIGH (ref 10.3–14.5)
SODIUM SERPL-SCNC: 138 MMOL/L — SIGNIFICANT CHANGE UP (ref 135–145)
SPECIMEN SOURCE: SIGNIFICANT CHANGE UP
T3 SERPL-MCNC: 84 NG/DL — SIGNIFICANT CHANGE UP (ref 80–200)
T4 AB SER-ACNC: 7.8 UG/DL — SIGNIFICANT CHANGE UP (ref 4.6–12)
TOTAL CHOLESTEROL/HDL RATIO MEASUREMENT: 3.2 RATIO — LOW (ref 3.3–7.1)
TRIGL SERPL-MCNC: 156 MG/DL — HIGH (ref 10–149)
TSH SERPL-MCNC: 1.6 UU/ML — SIGNIFICANT CHANGE UP (ref 0.34–4.82)
WBC # BLD: 10.33 K/UL — SIGNIFICANT CHANGE UP (ref 3.8–10.5)
WBC # FLD AUTO: 10.33 K/UL — SIGNIFICANT CHANGE UP (ref 3.8–10.5)

## 2018-08-02 PROCEDURE — 74176 CT ABD & PELVIS W/O CONTRAST: CPT | Mod: 26

## 2018-08-02 RX ORDER — SENNA PLUS 8.6 MG/1
2 TABLET ORAL AT BEDTIME
Qty: 0 | Refills: 0 | Status: DISCONTINUED | OUTPATIENT
Start: 2018-08-02 | End: 2018-08-05

## 2018-08-02 RX ORDER — LEVOTHYROXINE SODIUM 125 MCG
75 TABLET ORAL DAILY
Qty: 0 | Refills: 0 | Status: DISCONTINUED | OUTPATIENT
Start: 2018-08-02 | End: 2018-08-05

## 2018-08-02 RX ORDER — METOPROLOL TARTRATE 50 MG
50 TABLET ORAL
Qty: 0 | Refills: 0 | Status: DISCONTINUED | OUTPATIENT
Start: 2018-08-02 | End: 2018-08-05

## 2018-08-02 RX ORDER — SPIRONOLACTONE 25 MG/1
25 TABLET, FILM COATED ORAL DAILY
Qty: 0 | Refills: 0 | Status: DISCONTINUED | OUTPATIENT
Start: 2018-08-02 | End: 2018-08-03

## 2018-08-02 RX ORDER — POLYETHYLENE GLYCOL 3350 17 G/17G
17 POWDER, FOR SOLUTION ORAL DAILY
Qty: 0 | Refills: 0 | Status: DISCONTINUED | OUTPATIENT
Start: 2018-08-02 | End: 2018-08-05

## 2018-08-02 RX ADMIN — Medication 50 MILLIGRAM(S): at 05:31

## 2018-08-02 RX ADMIN — SPIRONOLACTONE 25 MILLIGRAM(S): 25 TABLET, FILM COATED ORAL at 05:31

## 2018-08-02 RX ADMIN — Medication 50 MILLIGRAM(S): at 18:06

## 2018-08-02 RX ADMIN — Medication 75 MICROGRAM(S): at 05:31

## 2018-08-02 RX ADMIN — Medication 250 MILLIGRAM(S): at 05:32

## 2018-08-02 RX ADMIN — POLYETHYLENE GLYCOL 3350 17 GRAM(S): 17 POWDER, FOR SOLUTION ORAL at 13:08

## 2018-08-02 RX ADMIN — ENOXAPARIN SODIUM 40 MILLIGRAM(S): 100 INJECTION SUBCUTANEOUS at 18:05

## 2018-08-02 RX ADMIN — Medication 250 MILLIGRAM(S): at 18:06

## 2018-08-02 RX ADMIN — Medication 100 MILLIGRAM(S): at 05:32

## 2018-08-02 RX ADMIN — PANTOPRAZOLE SODIUM 40 MILLIGRAM(S): 20 TABLET, DELAYED RELEASE ORAL at 05:32

## 2018-08-02 RX ADMIN — SENNA PLUS 2 TABLET(S): 8.6 TABLET ORAL at 21:28

## 2018-08-02 RX ADMIN — Medication 100 MILLIGRAM(S): at 13:08

## 2018-08-02 RX ADMIN — Medication 100 MILLIGRAM(S): at 21:28

## 2018-08-02 RX ADMIN — Medication 2000 UNIT(S): at 13:08

## 2018-08-02 RX ADMIN — Medication 325 MILLIGRAM(S): at 13:08

## 2018-08-02 NOTE — PHYSICAL THERAPY INITIAL EVALUATION ADULT - MODALITIES TREATMENT COMMENTS
pt left seated in chair post Eval; chair alarm donned; HM in place; Dr. Wright present; callbell in reach; pt instructed not to get up alone; call nursing for assist; jaylen well; denied pain; RN Olivia made aware pt OOB

## 2018-08-02 NOTE — CONSULT NOTE ADULT - SUBJECTIVE AND OBJECTIVE BOX
89 y/o F PMHx CAD s/p 5 stents (last- 6/2013), GERD, HTN, HLD, hypothyroidism presented to ED after syncopal event. As per patient, she had been feeling very fatigued over past few days, went to PCP yesterday, noted to have blood in urine, started on ciprofloxacin 250mg BID for UTI. This AM, patient states she was walking when she began feeling dizzy and started sweating. Patient states she blacked out. As per son at bedside, aide was with patient at time of syncopal event, aide was able to catch patient before fall, and put her carefully in a chair. As per son, patient was only out for 5-6 seconds. As per patient, does not remember passing out. Patient states she has had a syncopal event in the past- attributed to orthostatic hypotension. Patient states she had not taken any of her medications this AM.    In ED, patient found to be tachycardic- , BP- 194/97, SpO2- 100% on RA. Troponins neg x2, UA showing large blood, 25-50 RBC, trace leukocyte, few bacteria. EKG done showing NSR with 1st degree AV block- 83bpm. (01 Aug 2018 13:23)    8/2-  Continues to have bleeding from the "vaginal area". she is s/p a total hysterectomy. denies urinary symptoms. feels weak.   no rhythm abnormalities on telemetry monitoring.       PAST MEDICAL & SURGICAL HISTORY:  Endometrial adenocarcinoma  Macular degeneration  Stented coronary artery  Hypothyroid  Hyperlipidemia  HTN (hypertension)  GERD (gastroesophageal reflux disease)  CAD (coronary artery disease)  [place] (hard of hearing)  S/P EMILY (total abdominal hysterectomy)  S/P hernia repair: umbilical - 2008  S/P laparoscopic cholecystectomy: 2008  Ankle fracture, right: surgery 25 yrs ago - hardware removed  S/P coronary angiogram: 2005, 2008, 2011 - drug eluding stents    MEDICATIONS  (STANDING):  aspirin 325 milliGRAM(s) Oral daily  cholecalciferol 2000 Unit(s) Oral daily  ciprofloxacin     Tablet 250 milliGRAM(s) Oral two times a day  docusate sodium 100 milliGRAM(s) Oral three times a day  enoxaparin Injectable 40 milliGRAM(s) SubCutaneous every 24 hours  levothyroxine 75 MICROGram(s) Oral daily  metoprolol succinate ER 50 milliGRAM(s) Oral two times a day  pantoprazole    Tablet 40 milliGRAM(s) Oral before breakfast  sodium chloride 0.9%. 1000 milliLiter(s) (50 mL/Hr) IV Continuous <Continuous>  spironolactone 25 milliGRAM(s) Oral daily    MEDICATIONS  (PRN):  acetaminophen   Tablet. 500 milliGRAM(s) Oral every 6 hours PRN Mild Pain (1 - 3) or HA  ALPRAZolam 0.125 milliGRAM(s) Oral at bedtime PRN SUSSY, insomnia  artificial  tears Solution 1 Drop(s) Both EYES three times a day PRN Dry Eyes  senna 2 Tablet(s) Oral at bedtime PRN Constipation    Allergies    amlodipine (Unknown)  clonidine (Unknown)  Levatol (Unknown)  Lipitor (Unknown)  Lotrel (Unknown)  methylPREDNISolone (Unknown)  morphine (Other)  Plaquenil (Unknown)  statins (Other (Unknown))  sulfa drugs (Rash)    Intolerances      FAMILY HISTORY:  Family history of brain cancer (Father)        REVIEW OF SYSTEMS:    CONSTITUTIONAL: No weakness, fevers or chills  EYES/ENT: No visual changes;  No vertigo or throat pain   NECK: No pain or stiffness  RESPIRATORY: No cough, wheezing, hemoptysis; No shortness of breath  CARDIOVASCULAR: No chest pain or palpitations  GASTROINTESTINAL: No abdominal or epigastric pain. No nausea, vomiting, or hematemesis; No diarrhea or constipation. No melena or hematochezia.  GENITOURINARY: No dysuria, frequency or hematuria  NEUROLOGICAL: No numbness or weakness  SKIN: No itching, burning, rashes, or lesions   All other review of systems is negative unless indicated above      PHYSICAL EXAM:  Daily     Daily   Vital Signs Last 24 Hrs  T(C): 36.7 (02 Aug 2018 04:29), Max: 36.7 (01 Aug 2018 08:45)  T(F): 98 (02 Aug 2018 04:29), Max: 98 (01 Aug 2018 08:45)  HR: 69 (02 Aug 2018 04:29) (69 - 101)  BP: 169/64 (02 Aug 2018 04:29) (161/78 - 194/97)  BP(mean): --  RR: 18 (01 Aug 2018 18:19) (16 - 18)  SpO2: 97% (02 Aug 2018 04:29) (95% - 100%)    Constitutional: NAD, awake and alert, well-developed  HEENT: PERR, EOMI, Normal Hearing, MMM  Neck: Soft and supple, No LAD, No JVD  Respiratory: Breath sounds are clear bilaterally, No wheezing, rales or rhonchi  Cardiovascular: S1 and S2, regular rate and rhythm, no Murmurs, gallops or rubs  Gastrointestinal: Bowel Sounds present, soft, nontender, nondistended, no guarding, no rebound  Extremities: No peripheral edema  Vascular: 2+ peripheral pulses  Neurological: A/O x 3, no focal deficits  Musculoskeletal: 5/5 strength b/l upper and lower extremities  Skin: No rashes    LABS: All Labs Reviewed:                        13.2   10.33 )-----------( 340      ( 02 Aug 2018 05:39 )             43.3     08-02    138  |  105  |  19  ----------------------------<  98  4.1   |  24  |  1.10    Ca    9.3      02 Aug 2018 05:39  Phos  3.5     08-02  Mg     2.7     08-02    TPro  7.3  /  Alb  3.5  /  TBili  0.6  /  DBili  x   /  AST  14<L>  /  ALT  16  /  AlkPhos  86  08-02    PT/INR - ( 01 Aug 2018 09:10 )   PT: 11.3 sec;   INR: 1.05 ratio           CARDIAC MARKERS ( 01 Aug 2018 20:54 )  0.030 ng/mL / x     / 62 U/L / x     / x      CARDIAC MARKERS ( 01 Aug 2018 12:15 )  <0.015 ng/mL / x     / x     / x     / x      CARDIAC MARKERS ( 01 Aug 2018 09:10 )  0.017 ng/mL / x     / x     / x     / x          Blood Culture:     RADIOLOGY:< from: CT Head No Cont (08.01.18 @ 14:42) >  EXAM:  CT BRAIN                            PROCEDURE DATE:  08/01/2018          INTERPRETATION:      CT head without IV contrast        CLINICAL INFORMATION:  Syncope collapse          TECHNIQUE: Contiguous axial 5 mm sections were obtained throughthe head.   Sagittal and coronal 2-D reformatted images were also obtained.   This   scan was performed using automatic exposure control (radiation dose   reduction software) to obtain a diagnostic image quality scan with   patient dose as low as reasonably achievable.     FINDINGS:   CT dated 7/14/2017 available for review.    The brain demonstrates mild periventricular white matter ischemia.   No   acute cerebral cortical infarct is seen.  No intracranial hemorrhage is   found.  No mass effect is found in the brain.      The ventricles, sulci and basal cisterns appear unremarkable.         The orbits are unremarkable.  The paranasal sinuses are clear.  The nasal   cavity appears intact.  The nasopharynx is symmetric.  The central skull   base, petrous temporal bones and calvarium remain intact.      IMPRESSION:   Mild periventricular white matter ischemia.              < end of copied text >    EKG: NSR, No ischemic changes

## 2018-08-02 NOTE — CONSULT NOTE ADULT - ASSESSMENT
89 y/o F PMHx CAD s/p 5 stents (last- 6/2013), GERD, HTN, HLD, hypothyroidism presented to ED after syncopal event. As per patient, she had been feeling very fatigued over past few days, went to PCP yesterday, noted to have blood in urine, started on ciprofloxacin 250mg BID for UTI. This AM, patient states she was walking when she began feeling dizzy and started sweating. Patient states she blacked out. As per son at bedside, aide was with patient at time of syncopal event, aide was able to catch patient before fall, and put her carefully in a chair. As per son, patient was only out for 5-6 seconds. As per patient, does not remember passing out. Patient states she has had a syncopal event in the past- attributed to orthostatic hypotension. Patient states she had not taken any of her medications this AM.  In ED, patient found to be tachycardic- , BP- 194/97, SpO2- 100% on RA. Troponins neg x2, UA showing large blood, 25-50 RBC, trace leukocyte, few bacteria. EKG done showing NSR with 1st degree AV block- 83bpm. (01 Aug 2018 13:23)    8/2-  This am, saw blood when she went to the bathroom.   continues on Cipro po for presumed UTI  will order a CT scan of the abdomen and pelvis. based on the results, will determine if she needs a gyn evaluation or urology eval.

## 2018-08-02 NOTE — PHYSICAL THERAPY INITIAL EVALUATION ADULT - CRITERIA FOR SKILLED THERAPEUTIC INTERVENTIONS
pt does not require further PT intervention in the acute-care setting; pt currently @ baseline functional status; recommend daily OOB / amb with RW as jaylen on nursing floor care

## 2018-08-03 LAB
ANION GAP SERPL CALC-SCNC: 8 MMOL/L — SIGNIFICANT CHANGE UP (ref 5–17)
BASOPHILS # BLD AUTO: 0.04 K/UL — SIGNIFICANT CHANGE UP (ref 0–0.2)
BASOPHILS NFR BLD AUTO: 0.4 % — SIGNIFICANT CHANGE UP (ref 0–2)
BUN SERPL-MCNC: 20 MG/DL — SIGNIFICANT CHANGE UP (ref 7–23)
CALCIUM SERPL-MCNC: 9.2 MG/DL — SIGNIFICANT CHANGE UP (ref 8.5–10.1)
CHLORIDE SERPL-SCNC: 105 MMOL/L — SIGNIFICANT CHANGE UP (ref 96–108)
CO2 SERPL-SCNC: 25 MMOL/L — SIGNIFICANT CHANGE UP (ref 22–31)
CREAT SERPL-MCNC: 0.96 MG/DL — SIGNIFICANT CHANGE UP (ref 0.5–1.3)
EOSINOPHIL # BLD AUTO: 0.15 K/UL — SIGNIFICANT CHANGE UP (ref 0–0.5)
EOSINOPHIL NFR BLD AUTO: 1.6 % — SIGNIFICANT CHANGE UP (ref 0–6)
GLUCOSE SERPL-MCNC: 97 MG/DL — SIGNIFICANT CHANGE UP (ref 70–99)
HCT VFR BLD CALC: 41.6 % — SIGNIFICANT CHANGE UP (ref 34.5–45)
HGB BLD-MCNC: 12.6 G/DL — SIGNIFICANT CHANGE UP (ref 11.5–15.5)
IMM GRANULOCYTES NFR BLD AUTO: 0.4 % — SIGNIFICANT CHANGE UP (ref 0–1.5)
LYMPHOCYTES # BLD AUTO: 1.98 K/UL — SIGNIFICANT CHANGE UP (ref 1–3.3)
LYMPHOCYTES # BLD AUTO: 21.6 % — SIGNIFICANT CHANGE UP (ref 13–44)
MCHC RBC-ENTMCNC: 27 PG — SIGNIFICANT CHANGE UP (ref 27–34)
MCHC RBC-ENTMCNC: 30.3 GM/DL — LOW (ref 32–36)
MCV RBC AUTO: 89.1 FL — SIGNIFICANT CHANGE UP (ref 80–100)
MONOCYTES # BLD AUTO: 0.85 K/UL — SIGNIFICANT CHANGE UP (ref 0–0.9)
MONOCYTES NFR BLD AUTO: 9.3 % — SIGNIFICANT CHANGE UP (ref 2–14)
NEUTROPHILS # BLD AUTO: 6.1 K/UL — SIGNIFICANT CHANGE UP (ref 1.8–7.4)
NEUTROPHILS NFR BLD AUTO: 66.7 % — SIGNIFICANT CHANGE UP (ref 43–77)
NRBC # BLD: 0 /100 WBCS — SIGNIFICANT CHANGE UP (ref 0–0)
PLATELET # BLD AUTO: 308 K/UL — SIGNIFICANT CHANGE UP (ref 150–400)
POTASSIUM SERPL-MCNC: 4.5 MMOL/L — SIGNIFICANT CHANGE UP (ref 3.5–5.3)
POTASSIUM SERPL-SCNC: 4.5 MMOL/L — SIGNIFICANT CHANGE UP (ref 3.5–5.3)
RBC # BLD: 4.67 M/UL — SIGNIFICANT CHANGE UP (ref 3.8–5.2)
RBC # FLD: 14.9 % — HIGH (ref 10.3–14.5)
SODIUM SERPL-SCNC: 138 MMOL/L — SIGNIFICANT CHANGE UP (ref 135–145)
WBC # BLD: 9.16 K/UL — SIGNIFICANT CHANGE UP (ref 3.8–10.5)
WBC # FLD AUTO: 9.16 K/UL — SIGNIFICANT CHANGE UP (ref 3.8–10.5)

## 2018-08-03 PROCEDURE — 76770 US EXAM ABDO BACK WALL COMP: CPT | Mod: 26

## 2018-08-03 RX ORDER — SPIRONOLACTONE 25 MG/1
25 TABLET, FILM COATED ORAL
Qty: 0 | Refills: 0 | Status: DISCONTINUED | OUTPATIENT
Start: 2018-08-03 | End: 2018-08-03

## 2018-08-03 RX ORDER — SPIRONOLACTONE 25 MG/1
25 TABLET, FILM COATED ORAL DAILY
Qty: 0 | Refills: 0 | Status: DISCONTINUED | OUTPATIENT
Start: 2018-08-03 | End: 2018-08-05

## 2018-08-03 RX ADMIN — PANTOPRAZOLE SODIUM 40 MILLIGRAM(S): 20 TABLET, DELAYED RELEASE ORAL at 06:03

## 2018-08-03 RX ADMIN — Medication 75 MICROGRAM(S): at 06:02

## 2018-08-03 RX ADMIN — Medication 50 MILLIGRAM(S): at 06:03

## 2018-08-03 RX ADMIN — Medication 0.12 MILLIGRAM(S): at 21:34

## 2018-08-03 RX ADMIN — SPIRONOLACTONE 25 MILLIGRAM(S): 25 TABLET, FILM COATED ORAL at 06:03

## 2018-08-03 RX ADMIN — Medication 50 MILLIGRAM(S): at 18:48

## 2018-08-03 RX ADMIN — Medication 100 MILLIGRAM(S): at 06:01

## 2018-08-03 RX ADMIN — Medication 2000 UNIT(S): at 13:27

## 2018-08-03 RX ADMIN — Medication 325 MILLIGRAM(S): at 13:27

## 2018-08-03 RX ADMIN — Medication 250 MILLIGRAM(S): at 06:01

## 2018-08-03 RX ADMIN — Medication 250 MILLIGRAM(S): at 18:48

## 2018-08-03 NOTE — CONSULT NOTE ADULT - SUBJECTIVE AND OBJECTIVE BOX
History and Physical:   Source of Information	Chart(s), Patient, Child	  Outpatient Providers	Dr. Sánchez (PCP/Cardio) Dr. Muhammad (GI) Dr. Magana (OB/GYN)	      History of Present Illness:  Reason for Admission: syncope	  History of Present Illness: 	  89 y/o F PMHx CAD s/p 5 stents (last- 6/2013), GERD, HTN, HLD, hypothyroidism presented to ED after syncopal event. As per patient, she had been feeling very fatigued over past few days, went to PCP yesterday, noted to have blood in urine, started on ciprofloxacin 250mg BID for UTI. This AM, patient states she was walking when she began feeling dizzy and started sweating. Patient states she blacked out. As per son at bedside, aide was with patient at time of syncopal event, aide was able to catch patient before fall, and put her carefully in a chair. As per son, patient was only out for 5-6 seconds. As per patient, does not remember passing out. Patient states she has had a syncopal event in the past- attributed to orthostatic hypotension. Patient states she had not taken any of her medications this AM.    Urology was consulted for gross hematuria. Pt states it has occurred intermittently. Mild dysuria, frequency, no flank pain, no hesitancy, no straining.    Review of Systems:  · Negative General Symptoms	no fever; no anorexia; no weight loss	  · General Symptoms	fatigue; weakness	  · Negative Skin Symptoms	no rash	  · Negative Ophthalmologic Symptoms	no blurred vision L; no blurred vision R	  · ENMT Comments	+ [place]	  · Negative Respiratory and Thorax Symptoms	no wheezing; no dyspnea; no cough	  · Negative Cardiovascular Symptoms	no chest pain; no palpitations; no orthopnea; no peripheral edema	  · Negative Gastrointestinal Symptoms	no nausea; no vomiting; no diarrhea; no constipation	  · Gastrointestinal Symptoms	abdominal pain	  · Negative General Genitourinary Symptoms	no flank pain L; no flank pain R; no dysuria	  · General Genitourinary Symptoms	hematuria; increased urinary frequency	  · Musculoskeletal Symptoms	joint pain	  · Neurological	negative	  · Hematology/Lymphatics	negative	  · Endocrine	negative	      Allergies and Intolerances:        Allergies:  	sulfa drugs: Drug Category, Rash  	morphine: Drug, Other, unknown  	statins: Drug Category, Other (Unknown), Originally Entered as [Unknown see Comment: Per pt, generalized muscle aches and weakness] reaction to [Statins-Hmg-Coa Reductase Inhibitors]  	Plaquenil: Drug, Unknown  	Levatol: Drug, Unknown  	methylPREDNISolone: Drug, Unknown  	clonidine: Drug, Unknown  	amlodipine: Drug, Unknown, Originally Entered as [Unknown] reaction to [AMLODIPINE]  	Lotrel: Drug, Unknown, Originally Entered as [Unknown] reaction to [LOTREL]  	Lipitor: Drug, Unknown, Originally Entered as [Unknown] reaction to [Lipitor]    Home Medications:   * Patient Currently Takes Medications as of 01-Aug-2018 13:43 documented in Structured Notes  · 	ocular lubricant ophthalmic solution: 1 drop(s) to each affected eye 3 times a day, Last Dose Taken:    · 	metoprolol succinate 50 mg oral tablet, extended release: 1 tab(s) orally 2 times a day, Last Dose Taken:    · 	metoprolol succinate 50 mg oral tablet, extended release: 1 tab(s) orally 2 times a day, Last Dose Taken:    · 	aspirin 325 mg oral tablet: 1 tab(s) orally once a day, Last Dose Taken:    · 	acetaminophen 500 mg oral tablet: 2 tab(s) orally every 6 hours, As needed, Mild Pain (1 - 3) or HA, Last Dose Taken:    · 	Protonix 40 mg oral delayed release tablet: 1 tab(s) orally once a day, Last Dose Taken:    · 	Synthroid 75 mcg (0.075 mg) oral tablet: 1 tab(s) orally once a day, Last Dose Taken:    · 	Vitamin D3 2000 intl units oral capsule: 1 cap(s) orally once a day, Last Dose Taken:    · 	spironolactone 25 mg oral tablet: 1 tab(s) orally once a day, Last Dose Taken:    · 	ALPRAZolam 0.25 mg oral tablet: 0.5 tab(s) orally once a day (at bedtime), As Needed, Last Dose Taken:      .    Patient History:   Past Medical History:  CAD (coronary artery disease)    Endometrial adenocarcinoma    GERD (gastroesophageal reflux disease)    [place] (hard of hearing)    HTN (hypertension)    Hyperlipidemia    Hypothyroid    Macular degeneration    Stented coronary artery.    Past Surgical History:  Ankle fracture, right  surgery 25 yrs ago - hardware removed  S/P coronary angiogram  2005, 2008, 2011 - drug eluding stents  S/P hernia repair  umbilical - 2008  S/P laparoscopic cholecystectomy  2008  S/P EMILY (total abdominal hysterectomy).    Family History:  Father  Still living? Unknown  Family history of brain cancer, Age at diagnosis: Age Unknown.    Social History:  Social History (marital status, living situation, occupation, tobacco use, alcohol and drug use, and sexual history): , lives alone, never smoked, never drank EtOH	      Physical Exam:  · Constitutional	detailed exam	  · Constitutional Details	well-developed; well-groomed; well-nourished; no distress	  · Eyes	detailed exam	  · Eyes Details	PERRL; EOMI; conjunctiva clear	  · ENMT	detailed exam	  · ENMT Details	nose; mouth	  · Nose	normal	  · Mouth	moist	  · Neck	detailed exam	  · Neck Details	supple; no JVD	  · Breasts	not examined	  · Back	detailed exam	  · Back Details	normal shape	  · Respiratory	detailed exam	  · Respiratory Details	breath sounds equal; good air movement; respirations non-labored; clear to auscultation bilaterally; no chest wall tenderness; no rales; no rhonchi; no wheezes	  · Cardiovascular	detailed exam	  · Cardiovascular Details	regular rate and rhythm	  · Cardiovascular Details	positive S1; positive S2	  · Gastrointestinal	detailed exam	  · GI Normal	soft; nontender; no distention; bowel sounds normal; no rebound tenderness; no guarding; no rigidity	  · Genitourinary	no SP tenderness, bladder nonpalpable	  · Rectal	patient refused	  · Extremities	detailed exam	  · Extremities Details	no clubbing; no cyanosis	  · Extremities Comments	mild edema R ankle	  · Vascular	detailed exam	  · Radial Pulse	right normal; left normal	  · DP Pulse	right normal; left normal	  · Neurological	detailed exam	  · Neurological Details	alert and oriented x 3; sensation intact; no spontaneous movement	  · Skin	detailed exam	  · Skin Details	warm and dry	  · Musculoskeletal	detailed exam	  · Musculoskeletal Details	normal; no joint erythema; no calf tenderness; decreased ROM due to pain	                            12.6   9.16  )-----------( 308      ( 03 Aug 2018 06:06 )             41.6     08-03    138  |  105  |  20  ----------------------------<  97  4.5   |  25  |  0.96    Ca    9.2      03 Aug 2018 06:06  Phos  3.5     08-02  Mg     2.7     08-02    TPro  7.3  /  Alb  3.5  /  TBili  0.6  /  DBili  x   /  AST  14<L>  /  ALT  16  /  AlkPhos  86  08-02    Urinalysis (08.01.18 @ 12:17)    pH Urine: 7.0    Glucose Qualitative, Urine: Negative mg/dL    Blood, Urine: Large    Color: Yellow    Urine Appearance: Clear    Bilirubin: Negative    Ketone - Urine: Negative    Specific Gravity: 1.005    Protein, Urine: Negative mg/dL    Urobilinogen: Negative mg/dL    Nitrite: Negative    Leukocyte Esterase Concentration: Trace    Urine Microscopic-Add On (NC) (08.01.18 @ 12:17)    Bacteria: Few    Epithelial Cells: Few    Red Blood Cell - Urine: 25-50 /HPF    White Blood Cell - Urine: 3-5    Culture - Urine (08.01.18 @ 12:17)    Specimen Source: .Urine None    Culture Results:   <10,000 CFU/ml Normal Urogenital nelson present    < from: CT Abdomen and Pelvis w/ Oral Cont (08.02.18 @ 15:42) >    EXAM:  CT ABDOMEN AND PELVIS OC                            PROCEDURE DATE:  08/02/2018          INTERPRETATION:    CT OF THE ABDOMEN AND PELVIS WITHOUT IV CONTRAST     CLINICAL INDICATION: vag bleeding, hx uterine ca    TECHNIQUE: A CT scan of the abdomen and pelvis was performed from the   domes of the diaphragm to the symphysis pubis with oral contrast only.    Sagittal and coronal reformatted images were provided.    COMPARISON: None    FINDINGS:   Note limited evaluation of the solid organsand vasculature in the   absence of intravenous contrast.    LOWER THORAX: Minimal bibasilar atelectasis..    LIVER:  Unremarkable.  GALLBLADDER: Not visualized.  BILIARY TREE: Unremarkable.  PANCREAS: Unremarkable.  SPLEEN: Unremarkable.  ADRENALS: Unremarkable.  KIDNEYS/URETERS: No nephrolithiasis or hydronephrosis.    BOWEL:  No bowel obstruction or inflammatory process.  Normal appendix.   PERITONEUM/RETROPERITONEUM:  No ascites, free air or significant   lymphadenopathy.    BLADDER: Unremarkable.  REPRODUCTIVE ORGANS: Status post supracervical hysterectomy. Evaluation   of the cervix is limited by CT exam.    VASCULATURE: Atherosclerotic changes  ABDOMINAL WALL:  Unremarkable  BONES: No aggressive osseous lesion.    IMPRESSION:  No acuteabnormalities. Status post supracervical hysterectomy.   Evaluation of the cervix is limited by CT exam..          < from: US Kidney and Bladder (08.03.18 @ 11:48) >    EXAM:  US KIDNEYS AND BLADDER                            PROCEDURE DATE:  08/03/2018          INTERPRETATION:  Undergoing information: Syncope. Bleeding. History of   hysterectomy.    COMPARISON: CT abdomen/pelvis August 02, 2018    TECHNIQUE: Sonography of the kidneys and bladder    FINDINGS:    Right kidney:  9.0 cm. No renal mass, hydronephrosis or calculus.  Normal   cortical echogenicity.    Left kidney:  8.5 cm. No renal mass, hydronephrosis or calculus.  Normal   cortical echogenicity.    Urinary bladder: No mass or wall thickening. Normally distended.   Bilateral ureteral jets noted.    Aorta: Normal caliber.    IMPRESSION:     Normal renal ultrasound.                    CHERI LINDSEY   This document has been electronically signed. Aug 3 2018  2:39PM                < end of copied text >

## 2018-08-03 NOTE — CONSULT NOTE ADULT - ASSESSMENT
89 yo F with gross hematuria. UA showed Leuks, few bacteria, but urine culture was negative. No evidence of renal stones or masses. Voiding freely, no evidence of retention. H/H stable.   No acute gu intervention. Pt will require routine outpatient cystoscopy for completion of hematuria work up.

## 2018-08-04 RX ORDER — LACTOBACILLUS ACIDOPHILUS 100MM CELL
1 CAPSULE ORAL
Qty: 0 | Refills: 0 | Status: DISCONTINUED | OUTPATIENT
Start: 2018-08-04 | End: 2018-08-05

## 2018-08-04 RX ADMIN — Medication 2000 UNIT(S): at 13:48

## 2018-08-04 RX ADMIN — PANTOPRAZOLE SODIUM 40 MILLIGRAM(S): 20 TABLET, DELAYED RELEASE ORAL at 06:08

## 2018-08-04 RX ADMIN — Medication 500 MILLIGRAM(S): at 13:51

## 2018-08-04 RX ADMIN — Medication 1 TABLET(S): at 18:50

## 2018-08-04 RX ADMIN — SPIRONOLACTONE 25 MILLIGRAM(S): 25 TABLET, FILM COATED ORAL at 06:08

## 2018-08-04 RX ADMIN — Medication 75 MICROGRAM(S): at 06:08

## 2018-08-04 RX ADMIN — Medication 0.12 MILLIGRAM(S): at 21:26

## 2018-08-04 RX ADMIN — Medication 50 MILLIGRAM(S): at 06:09

## 2018-08-04 RX ADMIN — Medication 325 MILLIGRAM(S): at 13:48

## 2018-08-04 RX ADMIN — Medication 50 MILLIGRAM(S): at 18:48

## 2018-08-04 RX ADMIN — Medication 250 MILLIGRAM(S): at 06:08

## 2018-08-04 NOTE — PROGRESS NOTE ADULT - SUBJECTIVE AND OBJECTIVE BOX
Subjective:  Patient is a 89y old  Female who presents with a chief complaint of syncope     HPI:     89 y/o F PMHx CAD s/p 5 stents (last- 2013), GERD, HTN, HLD, hypothyroidism presented to ED on 18  after syncopal event. As per patient, she had been feeling very fatigued over past few days, went to PCP yesterday, noted to have blood in urine, started on ciprofloxacin 250mg BID for UTI. This AM, patient states she was walking when she began feeling dizzy and started sweating. Patient states she blacked out. As per son at bedside, aide was with patient at time of syncopal event, aide was able to catch patient before fall, and put her carefully in a chair. As per son, patient was only out for 5-6 seconds. As per patient, does not remember passing out. Patient states she has had a syncopal event in the past- attributed to orthostatic hypotension. Patient states she had not taken any of her medications this AM.    In ED, patient found to be tachycardic- , BP- 194/97, SpO2- 100% on RA. Troponins neg x2, UA showing large blood, 25-50 RBC, trace leukocyte, few bacteria. EKG done showing NSR with 1st degree AV block- 83bpm. (01 Aug 2018 13:23)    18 - Patient seen and examined at bedside earlier today,  reports vaginal bleeding , denies abdominal pain, urinary sx, CP, palpitations, feels dizzy on standing, tele - no events     Review of system- Rest of the review of system are negative except mentioned in HPI    OBJECTIVE:   T(C): 36.4 (18 @ 16:24), Max: 36.8 (18 @ 10:54)  HR: 68 (18 @ 16:24) (68 - 78)  BP: 196/75 (18 @ 16:24) (131/61 - 196/75)  RR: 20 (18 @ 16:24) (17 - 20)  SpO2: 100% (18 @ 16:24) (97% - 100%)  Wt(kg): --  Daily     Daily     PHYSICAL EXAM:  GENERAL: NAD  NERVOUS SYSTEM:  Alert & Oriented X3, non- focal exam, Motor Strength 5/5 B/L upper and lower extremities; DTRs 2+ intact and symmetric  HEAD:  Atraumatic, Normocephalic  EYES: EOMI, PERRLA, conjunctiva and sclera clear  HEENT: Moist mucous membranes  NECK: Supple, No JVD  CHEST/LUNG: Clear to auscultation bilaterally; No rales, no rhonchi, no wheezing, or rubs  HEART: Regular rate and rhythm; No murmurs, rubs, or gallops  ABDOMEN: Soft, Nontender, Nondistended; Bowel sounds present  GENITOURINARY- Voiding, no suprapubic tenderness  EXTREMITIES:  2+ Peripheral Pulses, No clubbing, cyanosis, or edema  MUSCULOSKELETAL:- No muscle tenderness, Muscle tone normal, No joint tenderness, no Joint swelling, Joint range of motion-normal  SKIN-no rash, no lesion    LABS:                        13.2   10.33 )-----------( 340      ( 02 Aug 2018 05:39 )             43.3     08-02    138  |  105  |  19  ----------------------------<  98  4.1   |  24  |  1.10    Ca    9.3      02 Aug 2018 05:39  Phos  3.5     08-  Mg     2.7     08-02    TPro  7.3  /  Alb  3.5  /  TBili  0.6  /  DBili  x   /  AST  14<L>  /  ALT  16  /  AlkPhos  86  08-02    PT/INR - ( 01 Aug 2018 09:10 )   PT: 11.3 sec;   INR: 1.05 ratio        CARDIAC MARKERS ( 01 Aug 2018 20:54 )  0.030 ng/mL / x     / 62 U/L / x     / x      CARDIAC MARKERS ( 01 Aug 2018 12:15 )  <0.015 ng/mL / x     / x     / x     / x      CARDIAC MARKERS ( 01 Aug 2018 09:10 )  0.017 ng/mL / x     / x     / x     / x      Thyroid Stimulating Hormone, Serum (18 @ 05:39)    Thyroid Stimulating Hormone, Serum: 1.60 uU/mL        Urinalysis Basic - ( 01 Aug 2018 12:17 )    Color: Yellow / Appearance: Clear / S.005 / pH: x  Gluc: x / Ketone: Negative  / Bili: Negative / Urobili: Negative mg/dL   Blood: x / Protein: Negative mg/dL / Nitrite: Negative   Leuk Esterase: Trace / RBC: 25-50 /HPF / WBC 3-5   Sq Epi: x / Non Sq Epi: Few / Bacteria: Few        CAPILLARY BLOOD GLUCOSE    RECENT CULTURES:    RADIOLOGY & ADDITIONAL TESTS:    < from: CT Head No Cont (18 @ 14:42) >   Mild periventricular white matter ischemia.         < from: Xray Chest 1 View AP/PA. (18 @ 11:45) >  IMPRESSION:          The lungs are clear.  No pleural abnormality is seen.      Cardiomediastinal silhouette is intact.      < end of copied text >    < end of copied text >  Current medications:  acetaminophen   Tablet. 500 milliGRAM(s) Oral every 6 hours PRN  ALPRAZolam 0.125 milliGRAM(s) Oral at bedtime PRN  artificial  tears Solution 1 Drop(s) Both EYES three times a day PRN  aspirin 325 milliGRAM(s) Oral daily  cholecalciferol 2000 Unit(s) Oral daily  ciprofloxacin     Tablet 250 milliGRAM(s) Oral two times a day  docusate sodium 100 milliGRAM(s) Oral three times a day  enoxaparin Injectable 40 milliGRAM(s) SubCutaneous every 24 hours  levothyroxine 75 MICROGram(s) Oral daily  metoprolol succinate ER 50 milliGRAM(s) Oral two times a day  pantoprazole    Tablet 40 milliGRAM(s) Oral before breakfast  polyethylene glycol 3350 17 Gram(s) Oral daily  senna 2 Tablet(s) Oral at bedtime  sodium chloride 0.9%. 1000 milliLiter(s) IV Continuous <Continuous>  spironolactone 25 milliGRAM(s) Oral daily
Patient is a 89y old  Female who presents with a chief complaint of syncope (01 Aug 2018 13:23)  89 y/o F PMHx CAD s/p 5 stents (last- 6/2013), GERD, HTN, HLD, hypothyroidism presented to ED after syncopal event. As per patient, she had been feeling very fatigued over past few days, went to PCP yesterday, noted to have blood in urine, started on ciprofloxacin 250mg BID for UTI. This AM, patient states she was walking when she began feeling dizzy and started sweating. Patient states she blacked out. As per son at bedside, aide was with patient at time of syncopal event, aide was able to catch patient before fall, and put her carefully in a chair. As per son, patient was only out for 5-6 seconds. As per patient, does not remember passing out. Patient states she has had a syncopal event in the past- attributed to orthostatic hypotension. Patient states she had not taken any of her medications this AM.    In ED, patient found to be tachycardic- , BP- 194/97, SpO2- 100% on RA. Troponins neg x2, UA showing large blood, 25-50 RBC, trace leukocyte, few bacteria. EKG done showing NSR with 1st degree AV block- 83bpm. (01 Aug 2018 13:23)    8/2-  Continues to have bleeding from the "vaginal area". she is s/p a total hysterectomy. denies urinary symptoms. feels weak.   no rhythm abnormalities on telemetry monitoring.     8/3-  decreased bleeding.   CT scan was essentially negative.   complains of weakness.     Allergies    amlodipine (Unknown)  clonidine (Unknown)  Levatol (Unknown)  Lipitor (Unknown)  Lotrel (Unknown)  methylPREDNISolone (Unknown)  morphine (Other)  Plaquenil (Unknown)  statins (Other (Unknown))  sulfa drugs (Rash)    Intolerances        MEDICATIONS  (STANDING):  aspirin 325 milliGRAM(s) Oral daily  cholecalciferol 2000 Unit(s) Oral daily  ciprofloxacin     Tablet 250 milliGRAM(s) Oral two times a day  docusate sodium 100 milliGRAM(s) Oral three times a day  levothyroxine 75 MICROGram(s) Oral daily  metoprolol succinate ER 50 milliGRAM(s) Oral two times a day  pantoprazole    Tablet 40 milliGRAM(s) Oral before breakfast  polyethylene glycol 3350 17 Gram(s) Oral daily  senna 2 Tablet(s) Oral at bedtime  sodium chloride 0.9%. 1000 milliLiter(s) (50 mL/Hr) IV Continuous <Continuous>  spironolactone 25 milliGRAM(s) Oral daily    MEDICATIONS  (PRN):  acetaminophen   Tablet. 500 milliGRAM(s) Oral every 6 hours PRN Mild Pain (1 - 3) or HA  ALPRAZolam 0.125 milliGRAM(s) Oral at bedtime PRN SUSSY, insomnia  artificial  tears Solution 1 Drop(s) Both EYES three times a day PRN Dry Eyes    REVIEW OF SYSTEMS:    RESPIRATORY: No cough, wheezing, hemoptysis; No shortness of breath  CARDIOVASCULAR: No chest pain or palpitations  All other review of systems is negative unless indicated above      PHYSICAL EXAM:  Daily     Daily   Vital Signs Last 24 Hrs  T(C): 36.3 (03 Aug 2018 04:45), Max: 36.8 (02 Aug 2018 10:54)  T(F): 97.3 (03 Aug 2018 04:45), Max: 98.2 (02 Aug 2018 10:54)  HR: 68 (03 Aug 2018 04:45) (68 - 78)  BP: 163/56 (03 Aug 2018 04:45) (131/61 - 196/75)  BP(mean): --  RR: 20 (02 Aug 2018 16:24) (17 - 20)  SpO2: 99% (03 Aug 2018 04:45) (99% - 100%)    Constitutional: NAD, awake and alert, well-developed  HEENT: PERR, EOMI, Normal Hearing, MMM  Neck: Soft and supple, No LAD, No JVD  Respiratory: Breath sounds are clear bilaterally, No wheezing, rales or rhonchi  Cardiovascular: S1 and S2, regular rate and rhythm, no Murmurs, gallops or rubs  Gastrointestinal: Bowel Sounds present, soft, nontender, nondistended, no guarding, no rebound  Extremities: No peripheral edema  Vascular: 2+ peripheral pulses  Neurological: A/O x 3, no focal deficits  Musculoskeletal: 5/5 strength b/l upper and lower extremities  Skin: No rashes    LABS: All Labs Reviewed:                        12.6   9.16  )-----------( 308      ( 03 Aug 2018 06:06 )             41.6     08-03    138  |  105  |  20  ----------------------------<  97  4.5   |  25  |  0.96    Ca    9.2      03 Aug 2018 06:06  Phos  3.5     08-02  Mg     2.7     08-02    TPro  7.3  /  Alb  3.5  /  TBili  0.6  /  DBili  x   /  AST  14<L>  /  ALT  16  /  AlkPhos  86  08-02    PT/INR - ( 01 Aug 2018 09:10 )   PT: 11.3 sec;   INR: 1.05 ratio           CARDIAC MARKERS ( 01 Aug 2018 20:54 )  0.030 ng/mL / x     / 62 U/L / x     / x      CARDIAC MARKERS ( 01 Aug 2018 12:15 )  <0.015 ng/mL / x     / x     / x     / x      CARDIAC MARKERS ( 01 Aug 2018 09:10 )  0.017 ng/mL / x     / x     / x     / x              TELEMETRY/EKG: NSR
Subjective:  Patient is a 89y old  Female who presents with a chief complaint of syncope     HPI:     89 y/o F PMHx CAD s/p 5 stents (last- 2013), GERD, HTN, HLD, hypothyroidism presented to ED on 18  after syncopal event. As per patient, she had been feeling very fatigued over past few days, went to PCP yesterday, noted to have blood in urine, started on ciprofloxacin 250mg BID for UTI. This AM, patient states she was walking when she began feeling dizzy and started sweating. Patient states she blacked out. As per son at bedside, aide was with patient at time of syncopal event, aide was able to catch patient before fall, and put her carefully in a chair. As per son, patient was only out for 5-6 seconds. As per patient, does not remember passing out. Patient states she has had a syncopal event in the past- attributed to orthostatic hypotension. Patient states she had not taken any of her medications this AM.    In ED, patient found to be tachycardic- , BP- 194/97, SpO2- 100% on RA. Troponins neg x2, UA showing large blood, 25-50 RBC, trace leukocyte, few bacteria. EKG done showing NSR with 1st degree AV block- 83bpm. (01 Aug 2018 13:23)    18 - Patient seen and examined at bedside earlier today,  reports vaginal bleeding , denies abdominal pain, urinary sx, CP, palpitations, feels dizzy on standing, tele - no events   8/3 - pt reports minimal blood after urination, denies Cp, palpitations, tele - 1 st degree Av block, afebrile, denies new sx , POC discussed in length  18 - pt reports gross blood after urination this am, pt not sure if it rectal, vaginal or coming from uretra , denies other complains   Review of system- Rest of the review of system are negative except mentioned in HPI    T(C): 36.3 (18 @ 11:50), Max: 36.6 (18 @ 04:12)  T(F): 97.3 (18 @ 11:50), Max: 97.8 (18 @ 04:12)  HR: 70 (18 @ 11:50) (66 - 110)  BP: 101/35 (18 @ 11:50) (101/35 - 210/76)  RR: 19 (18 @ 11:50) (19 - 19)  SpO2: 99% (18 @ 11:50) (99% - 100%)  Wt(kg): --    PHYSICAL EXAM:  GENERAL: NAD  NERVOUS SYSTEM:  Alert & Oriented X3, non- focal exam, Motor Strength 5/5 B/L upper and lower extremities; DTRs 2+ intact and symmetric  HEAD:  Atraumatic, Normocephalic  EYES: EOMI, PERRLA, conjunctiva and sclera clear  HEENT: Moist mucous membranes  NECK: Supple, No JVD  CHEST/LUNG: Clear to auscultation bilaterally; No rales, no rhonchi, no wheezing, or rubs  HEART: Regular rate and rhythm; No murmurs, rubs, or gallops  ABDOMEN: Soft, Nontender, Nondistended; Bowel sounds present  GENITOURINARY- Voiding, no suprapubic tenderness  EXTREMITIES:  2+ Peripheral Pulses, No clubbing, cyanosis, or edema  MUSCULOSKELETAL:- No muscle tenderness, Muscle tone normal, No joint tenderness, no Joint swelling, Joint range of motion-normal  SKIN-no rash, no lesion    LABS:      138  |  105  |  20  ----------------------------<  97  4.5   |  25  |  0.96    Ca    9.2      03 Aug 2018 06:06                          12.6   9.16  )-----------( 308      ( 03 Aug 2018 06:06 )             41.6         138  |  105  |  20  ----------------------------<  97  4.5   |  25  |  0.96    Ca    9.2      03 Aug 2018 06:06  Phos  3.5     08  Mg     2.7         TPro  7.3  /  Alb  3.5  /  TBili  0.6  /  DBili  x   /  AST  14<L>  /  ALT  16  /  AlkPhos  86  02                            12.6   9.16  )-----------( 308      ( 03 Aug 2018 06:06 )             41.6       CARDIAC MARKERS ( 01 Aug 2018 20:54 )  0.030 ng/mL / x     / 62 U/L / x     / x        LIVER FUNCTIONS - ( 02 Aug 2018 05:39 )  Alb: 3.5 g/dL / Pro: 7.3 gm/dL / ALK PHOS: 86 U/L / ALT: 16 U/L / AST: 14 U/L / GGT: x                              13.2   10.33 )-----------( 340      ( 02 Aug 2018 05:39 )             43.3     08-02    138  |  105  |  19  ----------------------------<  98  4.1   |  24  |  1.10    Ca    9.3      02 Aug 2018 05:39  Phos  3.5     08-02  Mg     2.7     08-    TPro  7.3  /  Alb  3.5  /  TBili  0.6  /  DBili  x   /  AST  14<L>  /  ALT  16  /  AlkPhos  86  08-02    PT/INR - ( 01 Aug 2018 09:10 )   PT: 11.3 sec;   INR: 1.05 ratio        CARDIAC MARKERS ( 01 Aug 2018 20:54 )  0.030 ng/mL / x     / 62 U/L / x     / x      CARDIAC MARKERS ( 01 Aug 2018 12:15 )  <0.015 ng/mL / x     / x     / x     / x      CARDIAC MARKERS ( 01 Aug 2018 09:10 )  0.017 ng/mL / x     / x     / x     / x      Thyroid Stimulating Hormone, Serum (18 @ 05:39)    Thyroid Stimulating Hormone, Serum: 1.60 uU/mL        Urinalysis Basic - ( 01 Aug 2018 12:17 )    Color: Yellow / Appearance: Clear / S.005 / pH: x  Gluc: x / Ketone: Negative  / Bili: Negative / Urobili: Negative mg/dL   Blood: x / Protein: Negative mg/dL / Nitrite: Negative   Leuk Esterase: Trace / RBC: 25-50 /HPF / WBC 3-5   Sq Epi: x / Non Sq Epi: Few / Bacteria: Few        CAPILLARY BLOOD GLUCOSE    RECENT CULTURES:  Culture - Urine (18 @ 12:17)    Specimen Source: .Urine None    Culture Results:   <10,000 CFU/ml Normal Urogenital nelson present  Urinalysis (18 @ 12:17)    Glucose Qualitative, Urine: Negative mg/dL    Blood, Urine: Large    pH Urine: 7.0    Color: Yellow    Urine Appearance: Clear    Bilirubin: Negative    Ketone - Urine: Negative    Specific Gravity: 1.005    Protein, Urine: Negative mg/dL    Urobilinogen: Negative mg/dL    Nitrite: Negative    Leukocyte Esterase Concentration: Trace  Urine Microscopic-Add On (NC) (18 @ 12:17)    Bacteria: Few    Epithelial Cells: Few    Red Blood Cell - Urine: 25-50 /HPF    White Blood Cell - Urine: 3-5    RADIOLOGY & ADDITIONAL TESTS:    < from: US Kidney and Bladder (18 @ 11:48) >  Right kidney:  9.0 cm. No renal mass, hydronephrosis or calculus.  Normal   cortical echogenicity.    Left kidney:  8.5 cm. No renal mass, hydronephrosis or calculus.  Normal   cortical echogenicity.    Urinary bladder: No mass or wall thickening. Normally distended.   Bilateral ureteral jets noted.    Aorta: Normal caliber.    IMPRESSION:     Normal renal ultrasound.    < end of copied text >  < from: CT Abdomen and Pelvis w/ Oral Cont (18 @ 15:42) >  LOWER THORAX: Minimal bibasilar atelectasis..    LIVER:  Unremarkable.  GALLBLADDER: Not visualized.  BILIARY TREE: Unremarkable.  PANCREAS: Unremarkable.  SPLEEN: Unremarkable.  ADRENALS: Unremarkable.  KIDNEYS/URETERS: No nephrolithiasis or hydronephrosis.    BOWEL:  No bowel obstruction or inflammatory process.  Normal appendix.   PERITONEUM/RETROPERITONEUM:  No ascites, free air or significant   lymphadenopathy.    BLADDER: Unremarkable.  REPRODUCTIVE ORGANS: Status post supracervical hysterectomy. Evaluation   of the cervix is limited by CT exam.    VASCULATURE: Atherosclerotic changes  ABDOMINAL WALL:  Unremarkable  BONES: No aggressive osseous lesion.    IMPRESSION:  No acuteabnormalities. Status post supracervical hysterectomy.   Evaluation of the cervix is limited by CT exam..    < end of copied text >      < from: CT Head No Cont (18 @ 14:42) >   Mild periventricular white matter ischemia.         < from: Xray Chest 1 View AP/PA. (18 @ 11:45) >  IMPRESSION:          The lungs are clear.  No pleural abnormality is seen.      Cardiomediastinal silhouette is intact.      < end of copied text >  Culture - Urine (18 @ 12:17)    Specimen Source: .Urine None    Culture Results:   <10,000 CFU/ml Normal Urogenital nelson present      < end of copied text >      MEDICATIONS  (STANDING):  aspirin 325 milliGRAM(s) Oral daily  cholecalciferol 2000 Unit(s) Oral daily  docusate sodium 100 milliGRAM(s) Oral three times a day  levothyroxine 75 MICROGram(s) Oral daily  metoprolol succinate ER 50 milliGRAM(s) Oral two times a day  pantoprazole    Tablet 40 milliGRAM(s) Oral before breakfast  polyethylene glycol 3350 17 Gram(s) Oral daily  senna 2 Tablet(s) Oral at bedtime  sodium chloride 0.9%. 1000 milliLiter(s) (50 mL/Hr) IV Continuous <Continuous>  spironolactone 25 milliGRAM(s) Oral daily    MEDICATIONS  (PRN):  acetaminophen   Tablet. 500 milliGRAM(s) Oral every 6 hours PRN Mild Pain (1 - 3) or HA  ALPRAZolam 0.125 milliGRAM(s) Oral at bedtime PRN SUSSY, insomnia  artificial  tears Solution 1 Drop(s) Both EYES three times a day PRN Dry Eyes
Subjective:  Patient is a 89y old  Female who presents with a chief complaint of syncope     HPI:     89 y/o F PMHx CAD s/p 5 stents (last- 2013), GERD, HTN, HLD, hypothyroidism presented to ED on 18  after syncopal event. As per patient, she had been feeling very fatigued over past few days, went to PCP yesterday, noted to have blood in urine, started on ciprofloxacin 250mg BID for UTI. This AM, patient states she was walking when she began feeling dizzy and started sweating. Patient states she blacked out. As per son at bedside, aide was with patient at time of syncopal event, aide was able to catch patient before fall, and put her carefully in a chair. As per son, patient was only out for 5-6 seconds. As per patient, does not remember passing out. Patient states she has had a syncopal event in the past- attributed to orthostatic hypotension. Patient states she had not taken any of her medications this AM.    In ED, patient found to be tachycardic- , BP- 194/97, SpO2- 100% on RA. Troponins neg x2, UA showing large blood, 25-50 RBC, trace leukocyte, few bacteria. EKG done showing NSR with 1st degree AV block- 83bpm. (01 Aug 2018 13:23)    18 - Patient seen and examined at bedside earlier today,  reports vaginal bleeding , denies abdominal pain, urinary sx, CP, palpitations, feels dizzy on standing, tele - no events   8/3 - pt reports minimal blood after urination, denies Cp, palpitations, tele - 1 st degree Av block, afebrile, denies new sx , POC discussed in length  Review of system- Rest of the review of system are negative except mentioned in HPI    OBJECTIVE:   T(C): 36.3 (18 @ 16:02), Max: 36.6 (18 @ 12:05)  T(F): 97.3 (18 @ 16:02), Max: 97.8 (18 @ 12:05)  HR: 82 (18 @ 17:37) (68 - 110)  BP: 126/62 (08-03-18 @ 17:37) (126/62 - 210/76)  RR: 20 (18 @ 12:05) (20 - 20)  SpO2: 99% (18 @ 16:02) (99% - 99%)  Wt(kg): --  Daily     PHYSICAL EXAM:  GENERAL: NAD  NERVOUS SYSTEM:  Alert & Oriented X3, non- focal exam, Motor Strength 5/5 B/L upper and lower extremities; DTRs 2+ intact and symmetric  HEAD:  Atraumatic, Normocephalic  EYES: EOMI, PERRLA, conjunctiva and sclera clear  HEENT: Moist mucous membranes  NECK: Supple, No JVD  CHEST/LUNG: Clear to auscultation bilaterally; No rales, no rhonchi, no wheezing, or rubs  HEART: Regular rate and rhythm; No murmurs, rubs, or gallops  ABDOMEN: Soft, Nontender, Nondistended; Bowel sounds present  GENITOURINARY- Voiding, no suprapubic tenderness  EXTREMITIES:  2+ Peripheral Pulses, No clubbing, cyanosis, or edema  MUSCULOSKELETAL:- No muscle tenderness, Muscle tone normal, No joint tenderness, no Joint swelling, Joint range of motion-normal  SKIN-no rash, no lesion    LABS:      138  |  105  |  20  ----------------------------<  97  4.5   |  25  |  0.96    Ca    9.2      03 Aug 2018 06:06  Phos  3.5     -  Mg     2.7     -    TPro  7.3  /  Alb  3.5  /  TBili  0.6  /  DBili  x   /  AST  14<L>  /  ALT  16  /  AlkPhos  86  -                            12.6   9.16  )-----------( 308      ( 03 Aug 2018 06:06 )             41.6       CARDIAC MARKERS ( 01 Aug 2018 20:54 )  0.030 ng/mL / x     / 62 U/L / x     / x            LIVER FUNCTIONS - ( 02 Aug 2018 05:39 )  Alb: 3.5 g/dL / Pro: 7.3 gm/dL / ALK PHOS: 86 U/L / ALT: 16 U/L / AST: 14 U/L / GGT: x                                         13.2   10.33 )-----------( 340      ( 02 Aug 2018 05:39 )             43.3     08-    138  |  105  |  19  ----------------------------<  98  4.1   |  24  |  1.10    Ca    9.3      02 Aug 2018 05:39  Phos  3.5     08-  Mg     2.7     -    TPro  7.3  /  Alb  3.5  /  TBili  0.6  /  DBili  x   /  AST  14<L>  /  ALT  16  /  AlkPhos  86      PT/INR - ( 01 Aug 2018 09:10 )   PT: 11.3 sec;   INR: 1.05 ratio        CARDIAC MARKERS ( 01 Aug 2018 20:54 )  0.030 ng/mL / x     / 62 U/L / x     / x      CARDIAC MARKERS ( 01 Aug 2018 12:15 )  <0.015 ng/mL / x     / x     / x     / x      CARDIAC MARKERS ( 01 Aug 2018 09:10 )  0.017 ng/mL / x     / x     / x     / x      Thyroid Stimulating Hormone, Serum (18 @ 05:39)    Thyroid Stimulating Hormone, Serum: 1.60 uU/mL        Urinalysis Basic - ( 01 Aug 2018 12:17 )    Color: Yellow / Appearance: Clear / S.005 / pH: x  Gluc: x / Ketone: Negative  / Bili: Negative / Urobili: Negative mg/dL   Blood: x / Protein: Negative mg/dL / Nitrite: Negative   Leuk Esterase: Trace / RBC: 25-50 /HPF / WBC 3-5   Sq Epi: x / Non Sq Epi: Few / Bacteria: Few        CAPILLARY BLOOD GLUCOSE    RECENT CULTURES:    RADIOLOGY & ADDITIONAL TESTS:  < from: US Kidney and Bladder (18 @ 11:48) >  Right kidney:  9.0 cm. No renal mass, hydronephrosis or calculus.  Normal   cortical echogenicity.    Left kidney:  8.5 cm. No renal mass, hydronephrosis or calculus.  Normal   cortical echogenicity.    Urinary bladder: No mass or wall thickening. Normally distended.   Bilateral ureteral jets noted.    Aorta: Normal caliber.    IMPRESSION:     Normal renal ultrasound.    < end of copied text >  < from: CT Abdomen and Pelvis w/ Oral Cont (18 @ 15:42) >  LOWER THORAX: Minimal bibasilar atelectasis..    LIVER:  Unremarkable.  GALLBLADDER: Not visualized.  BILIARY TREE: Unremarkable.  PANCREAS: Unremarkable.  SPLEEN: Unremarkable.  ADRENALS: Unremarkable.  KIDNEYS/URETERS: No nephrolithiasis or hydronephrosis.    BOWEL:  No bowel obstruction or inflammatory process.  Normal appendix.   PERITONEUM/RETROPERITONEUM:  No ascites, free air or significant   lymphadenopathy.    BLADDER: Unremarkable.  REPRODUCTIVE ORGANS: Status post supracervical hysterectomy. Evaluation   of the cervix is limited by CT exam.    VASCULATURE: Atherosclerotic changes  ABDOMINAL WALL:  Unremarkable  BONES: No aggressive osseous lesion.    IMPRESSION:  No acuteabnormalities. Status post supracervical hysterectomy.   Evaluation of the cervix is limited by CT exam..    < end of copied text >      < from: CT Head No Cont (18 @ 14:42) >   Mild periventricular white matter ischemia.         < from: Xray Chest 1 View AP/PA. (18 @ 11:45) >  IMPRESSION:          The lungs are clear.  No pleural abnormality is seen.      Cardiomediastinal silhouette is intact.      < end of copied text >  Culture - Urine (18 @ 12:17)    Specimen Source: .Urine None    Culture Results:   <10,000 CFU/ml Normal Urogenital nelson present      < end of copied text >  MEDICATIONS  (STANDING):  aspirin 325 milliGRAM(s) Oral daily  cholecalciferol 2000 Unit(s) Oral daily  ciprofloxacin     Tablet 250 milliGRAM(s) Oral two times a day  docusate sodium 100 milliGRAM(s) Oral three times a day  levothyroxine 75 MICROGram(s) Oral daily  metoprolol succinate ER 50 milliGRAM(s) Oral two times a day  pantoprazole    Tablet 40 milliGRAM(s) Oral before breakfast  polyethylene glycol 3350 17 Gram(s) Oral daily  senna 2 Tablet(s) Oral at bedtime  sodium chloride 0.9%. 1000 milliLiter(s) (50 mL/Hr) IV Continuous <Continuous>  spironolactone 25 milliGRAM(s) Oral daily    MEDICATIONS  (PRN):  acetaminophen   Tablet. 500 milliGRAM(s) Oral every 6 hours PRN Mild Pain (1 - 3) or HA  ALPRAZolam 0.125 milliGRAM(s) Oral at bedtime PRN SUSSY, insomnia  artificial  tears Solution 1 Drop(s) Both EYES three times a day PRN Dry Eyes

## 2018-08-04 NOTE — PROGRESS NOTE ADULT - ASSESSMENT
89 y/o F PMHx CAD s/p 5 stents (last- 6/2013), GERD, HTN, HLD, hypothyroidism presented to ED after syncopal event being admitted for syncope    * Syncope and collapse, known h/o orthostatics hypotension   -telemetry  -Possibly 2/2 orthostatic hypotension  -F/U orthostatics  -F/U 3rd set of troponin  -Continue ASA 325mg daily  -F/U CT head - neg   -Cardiology consulted- F/U recommendations  -As per patient and son, had TTE about 2 months ago, F/U with cardiology if need for repeat TTE during this visit  -PT evaluation- fall risk protocol  -F/U AM labs    * Hematuria suspected UTI vs hematuria due to bladder pathology   * Vaginal bleeding h/o hysterectomy   -UA positive for  large blood, RBC 25-50, few bacteria  - was started on ciprofloxacin 250mg BID- will continue  -F/U urine cx  - pending    - Ct abd/pelv - to r/o bladder vs pelvic pathology   - d/c lovenox - will increase chance of worsening of the bleeding  - IPC   - check FOBT to r/o rectal bleeding   - Hb stable     * HTN  -Continue metoprolol BID, spironolactone daily  - orthostatics     * CAD  -Continue ASA 325mg daily  -F/U Lipid panel    * GERD  -Patient does complain of generalized discomfort  -Continue protonix 40mg daily    #DVT ppx - IPC , d/c lovenox  IMPROVE VTE Individual Risk Assessment    RISK                                                                Points    [  ] Previous VTE                                                  3    [  ] Thrombophilia                                               2    [  ] Lower limb paralysis                                      2        (unable to hold up >15 seconds)      [  ] Current Cancer                                              2         (within 6 months)    [  ] Immobilization > 24 hrs                                1    [  ] ICU/CCU stay > 24 hours                              1    [ x ] Age > 60                                                      1    IMPROVE VTE Score ______1___
89 y/o F PMHx CAD s/p 5 stents (last- 6/2013), GERD, HTN, HLD, hypothyroidism presented to ED after syncopal event being admitted for syncope    * Syncope and collapse, known h/o orthostatics hypotension   -telemetry  -Possibly 2/2 orthostatic hypotension  -F/U orthostatics  - 3rd set of troponin x3 neg   -Continue ASA 325mg daily  -F/U CT head - neg   -Cardiology consulted- F/U recommendations  -As per patient and son, had TTE about 2 months ago, F/U with cardiology if need for repeat TTE during this visit  -PT evaluation- fall risk protocol      * Hematuria ruled out UTI ,  can not rule out bleeder pathology, plan for outpatient cystoscopy   * Suspected intermittent  Vaginal bleeding h/o hysterectomy due to uterine ca   -UA positive for  large blood, RBC 25-50, few bacteria  - was started on ciprofloxacin 250mg BID- will  stop urine culture neg   -F/U urine cx  - neg    - Ct abd/pelv - neg   - US kidneys and bladder - neg   - d/c lovenox - will increase chance of worsening of the bleeding  - IPC   - check FOBT to r/o rectal bleeding  - neg   - Hb stable   - urology evaluation - need o/p cystoscopy   - GYN oncology consult     * HTN  -Continue metoprolol BID, spironolactone daily  - orthostatics     * CAD  -Continue ASA 325mg daily  -F/U Lipid panel       * GERD  -Patient does complain of generalized discomfort  -Continue protonix 40mg daily    #DVT ppx - IPC , d/c lovenox
89 y/o F PMHx CAD s/p 5 stents (last- 6/2013), GERD, HTN, HLD, hypothyroidism presented to ED after syncopal event being admitted for syncope    * Syncope and collapse, known h/o orthostatics hypotension   -telemetry  -Possibly 2/2 orthostatic hypotension  -F/U orthostatics  -F/U 3rd set of troponin  -Continue ASA 325mg daily  -F/U CT head - neg   -Cardiology consulted- F/U recommendations  -As per patient and son, had TTE about 2 months ago, F/U with cardiology if need for repeat TTE during this visit  -PT evaluation- fall risk protocol  -F/U AM labs    * Hematuria suspected UTI vs hematuria due to bladder pathology   * Vaginal bleeding h/o hysterectomy   -UA positive for  large blood, RBC 25-50, few bacteria  - was started on ciprofloxacin 250mg BID- will continue  -F/U urine cx  - pending    - Ct abd/pelv - neg   - US kidneys and bladder - neg   - d/c lovenox - will increase chance of worsening of the bleeding  - IPC   - check FOBT to r/o rectal bleeding  - neg   - Hb stable     * HTN  -Continue metoprolol BID, spironolactone daily  - orthostatics     * CAD  -Continue ASA 325mg daily  -F/U Lipid panel       * GERD  -Patient does complain of generalized discomfort  -Continue protonix 40mg daily    #DVT ppx - IPC , d/c lovenox
89 y/o F PMHx CAD s/p 5 stents (last- 6/2013), GERD, HTN, HLD, hypothyroidism presented to ED after syncopal event. As per patient, she had been feeling very fatigued over past few days, went to PCP yesterday, noted to have blood in urine, started on ciprofloxacin 250mg BID for UTI. This AM, patient states she was walking when she began feeling dizzy and started sweating. Patient states she blacked out. As per son at bedside, aide was with patient at time of syncopal event, aide was able to catch patient before fall, and put her carefully in a chair. As per son, patient was only out for 5-6 seconds. As per patient, does not remember passing out. Patient states she has had a syncopal event in the past- attributed to orthostatic hypotension. Patient states she had not taken any of her medications this AM.  In ED, patient found to be tachycardic- , BP- 194/97, SpO2- 100% on RA. Troponins neg x2, UA showing large blood, 25-50 RBC, trace leukocyte, few bacteria. EKG done showing NSR with 1st degree AV block- 83bpm. (01 Aug 2018 13:23)    8/2-  This am, saw blood when she went to the bathroom.   continues on Cipro po for presumed UTI  will order a CT scan of the abdomen and pelvis. based on the results, will determine if she needs a gyn evaluation or urology eval.     8/3-  US of the abdomen to check bladder  urology consult.   No active cardiac issues at this time.

## 2018-08-05 ENCOUNTER — TRANSCRIPTION ENCOUNTER (OUTPATIENT)
Age: 83
End: 2018-08-05

## 2018-08-05 VITALS
HEART RATE: 75 BPM | RESPIRATION RATE: 16 BRPM | OXYGEN SATURATION: 100 % | TEMPERATURE: 98 F | DIASTOLIC BLOOD PRESSURE: 51 MMHG | SYSTOLIC BLOOD PRESSURE: 153 MMHG

## 2018-08-05 DIAGNOSIS — C54.1 MALIGNANT NEOPLASM OF ENDOMETRIUM: ICD-10-CM

## 2018-08-05 DIAGNOSIS — R55 SYNCOPE AND COLLAPSE: ICD-10-CM

## 2018-08-05 LAB
ANION GAP SERPL CALC-SCNC: 9 MMOL/L — SIGNIFICANT CHANGE UP (ref 5–17)
APTT BLD: 28 SEC — SIGNIFICANT CHANGE UP (ref 27.5–37.4)
BUN SERPL-MCNC: 24 MG/DL — HIGH (ref 7–23)
CALCIUM SERPL-MCNC: 9.5 MG/DL — SIGNIFICANT CHANGE UP (ref 8.5–10.1)
CHLORIDE SERPL-SCNC: 105 MMOL/L — SIGNIFICANT CHANGE UP (ref 96–108)
CO2 SERPL-SCNC: 24 MMOL/L — SIGNIFICANT CHANGE UP (ref 22–31)
CREAT SERPL-MCNC: 0.9 MG/DL — SIGNIFICANT CHANGE UP (ref 0.5–1.3)
GLUCOSE SERPL-MCNC: 105 MG/DL — HIGH (ref 70–99)
HCT VFR BLD CALC: 40.5 % — SIGNIFICANT CHANGE UP (ref 34.5–45)
HGB BLD-MCNC: 12.6 G/DL — SIGNIFICANT CHANGE UP (ref 11.5–15.5)
INR BLD: 0.97 RATIO — SIGNIFICANT CHANGE UP (ref 0.88–1.16)
MCHC RBC-ENTMCNC: 27.6 PG — SIGNIFICANT CHANGE UP (ref 27–34)
MCHC RBC-ENTMCNC: 31.1 GM/DL — LOW (ref 32–36)
MCV RBC AUTO: 88.8 FL — SIGNIFICANT CHANGE UP (ref 80–100)
NRBC # BLD: 0 /100 WBCS — SIGNIFICANT CHANGE UP (ref 0–0)
PLATELET # BLD AUTO: 291 K/UL — SIGNIFICANT CHANGE UP (ref 150–400)
POTASSIUM SERPL-MCNC: 4 MMOL/L — SIGNIFICANT CHANGE UP (ref 3.5–5.3)
POTASSIUM SERPL-SCNC: 4 MMOL/L — SIGNIFICANT CHANGE UP (ref 3.5–5.3)
PROTHROM AB SERPL-ACNC: 10.5 SEC — SIGNIFICANT CHANGE UP (ref 9.8–12.7)
RBC # BLD: 4.56 M/UL — SIGNIFICANT CHANGE UP (ref 3.8–5.2)
RBC # FLD: 14.7 % — HIGH (ref 10.3–14.5)
SODIUM SERPL-SCNC: 138 MMOL/L — SIGNIFICANT CHANGE UP (ref 135–145)
WBC # BLD: 10.26 K/UL — SIGNIFICANT CHANGE UP (ref 3.8–10.5)
WBC # FLD AUTO: 10.26 K/UL — SIGNIFICANT CHANGE UP (ref 3.8–10.5)

## 2018-08-05 PROCEDURE — 99221 1ST HOSP IP/OBS SF/LOW 40: CPT

## 2018-08-05 RX ORDER — DOCUSATE SODIUM 100 MG
1 CAPSULE ORAL
Qty: 0 | Refills: 0 | COMMUNITY
Start: 2018-08-05

## 2018-08-05 RX ORDER — SENNA PLUS 8.6 MG/1
2 TABLET ORAL
Qty: 0 | Refills: 0 | COMMUNITY
Start: 2018-08-05

## 2018-08-05 RX ADMIN — Medication 100 MILLIGRAM(S): at 13:23

## 2018-08-05 RX ADMIN — Medication 50 MILLIGRAM(S): at 06:06

## 2018-08-05 RX ADMIN — Medication 2000 UNIT(S): at 13:20

## 2018-08-05 RX ADMIN — Medication 1 TABLET(S): at 13:21

## 2018-08-05 RX ADMIN — Medication 75 MICROGRAM(S): at 06:06

## 2018-08-05 RX ADMIN — Medication 325 MILLIGRAM(S): at 13:20

## 2018-08-05 RX ADMIN — PANTOPRAZOLE SODIUM 40 MILLIGRAM(S): 20 TABLET, DELAYED RELEASE ORAL at 06:06

## 2018-08-05 RX ADMIN — Medication 50 MILLIGRAM(S): at 17:42

## 2018-08-05 RX ADMIN — SPIRONOLACTONE 25 MILLIGRAM(S): 25 TABLET, FILM COATED ORAL at 06:06

## 2018-08-05 RX ADMIN — Medication 1 TABLET(S): at 17:42

## 2018-08-05 NOTE — DISCHARGE NOTE ADULT - OTHER SIGNIFICANT FINDINGS
< from: US Kidney and Bladder (08.03.18 @ 11:48) >  Right kidney:  9.0 cm. No renal mass, hydronephrosis or calculus.  Normal   cortical echogenicity.    Left kidney:  8.5 cm. No renal mass, hydronephrosis or calculus.  Normal   cortical echogenicity.    Urinary bladder: No mass or wall thickening. Normally distended.   Bilateral ureteral jets noted.    Aorta: Normal caliber.    IMPRESSION:     Normal renal ultrasound.      < end of copied text >  < from: CT Abdomen and Pelvis w/ Oral Cont (08.02.18 @ 15:42) >  LOWER THORAX: Minimal bibasilar atelectasis..    LIVER:  Unremarkable.  GALLBLADDER: Not visualized.  BILIARY TREE: Unremarkable.  PANCREAS: Unremarkable.  SPLEEN: Unremarkable.  ADRENALS: Unremarkable.  KIDNEYS/URETERS: No nephrolithiasis or hydronephrosis.    BOWEL:  No bowel obstruction or inflammatory process.  Normal appendix.   PERITONEUM/RETROPERITONEUM:  No ascites, free air or significant   lymphadenopathy.    BLADDER: Unremarkable.  REPRODUCTIVE ORGANS: Status post supracervical hysterectomy. Evaluation   of the cervix is limited by CT exam.    VASCULATURE: Atherosclerotic changes  ABDOMINAL WALL:  Unremarkable  BONES: No aggressive osseous lesion.    IMPRESSION:  No acuteabnormalities. Status post supracervical hysterectomy.   Evaluation of the cervix is limited by CT exam..      < end of copied text >  < from: CT Head No Cont (08.01.18 @ 14:42) >  The brain demonstrates mild periventricular white matter ischemia.   No   acute cerebral cortical infarct is seen.  No intracranial hemorrhage is   found.  No mass effect is found in the brain.      The ventricles, sulci and basal cisterns appear unremarkable.         The orbits are unremarkable.  The paranasal sinuses are clear.  The nasal   cavity appears intact.  The nasopharynx is symmetric.  The central skull   base, petrous temporal bones and calvarium remain intact.      IMPRESSION:   Mild periventricular white matter ischemia.                 < end of copied text >  Complete Blood Count in AM (08.05.18 @ 06:55)    Nucleated RBC: 0 /100 WBCs    WBC Count: 10.26 K/uL    RBC Count: 4.56 M/uL    Hemoglobin: 12.6 g/dL    Hematocrit: 40.5 %    Mean Cell Volume: 88.8 fl    Mean Cell Hemoglobin: 27.6 pg    Mean Cell Hemoglobin Conc: 31.1 gm/dL    Red Cell Distrib Width: 14.7 %    Platelet Count - Automated: 291 K/uL  Basic Metabolic Panel in AM (08.05.18 @ 06:55)    Sodium, Serum: 138 mmol/L    Potassium, Serum: 4.0 mmol/L    Chloride, Serum: 105 mmol/L    Carbon Dioxide, Serum: 24 mmol/L    Anion Gap, Serum: 9 mmol/L    Blood Urea Nitrogen, Serum: 24 mg/dL    Creatinine, Serum: 0.90 mg/dL    Glucose, Serum: 105 mg/dL    Calcium, Total Serum: 9.5 mg/dL   Thyroid Stimulating Hormone, Serum (08.02.18 @ 05:39)    Thyroid Stimulating Hormone, Serum: 1.60 uU/mL

## 2018-08-05 NOTE — DISCHARGE NOTE ADULT - HOSPITAL COURSE
Subjective:  Patient is a 89y old  Female who presents with a chief complaint of syncope     HPI:     89 y/o F PMHx CAD s/p 5 stents (last- 6/2013), GERD, HTN, HLD, hypothyroidism presented to ED on 8/1/18  after syncopal event. As per patient, she had been feeling very fatigued over past few days, went to PCP yesterday, noted to have blood in urine, started on ciprofloxacin 250mg BID for UTI. This AM, patient states she was walking when she began feeling dizzy and started sweating. Patient states she blacked out. As per son at bedside, aide was with patient at time of syncopal event, aide was able to catch patient before fall, and put her carefully in a chair. As per son, patient was only out for 5-6 seconds. As per patient, does not remember passing out. Patient states she has had a syncopal event in the past- attributed to orthostatic hypotension. Patient states she had not taken any of her medications this AM.    In ED, patient found to be tachycardic- , BP- 194/97, SpO2- 100% on RA. Troponins neg x2, UA showing large blood, 25-50 RBC, trace leukocyte, few bacteria. EKG done showing NSR with 1st degree AV block- 83bpm. (01 Aug 2018 13:23)    8/2/18 - Patient seen and examined at bedside earlier today,  reports vaginal bleeding , denies abdominal pain, urinary sx, CP, palpitations, feels dizzy on standing, tele - no events   8/3 - pt reports minimal blood after urination, denies Cp, palpitations, tele - 1 st degree Av block, afebrile, denies new sx , POC discussed in length  8/4/18 - pt reports gross blood after urination this am, pt not sure if it rectal, vaginal or coming from uretra , denies other complains   8/5/18 - pt reports again scant amount of blood after urination, denies dizziness, chest pains, palpitations, BP better controlled,   Review of system- Rest of the review of system are negative except mentioned in HPI  T(C): 36.7 (08-05-18 @ 11:13), Max: 36.7 (08-05-18 @ 11:13)  T(F): 98.1 (08-05-18 @ 11:13), Max: 98.1 (08-05-18 @ 11:13)  HR: 70 (08-05-18 @ 11:13) (70 - 75)  BP: 147/56 (08-05-18 @ 11:13) (147/56 - 169/58)  RR: 16 (08-05-18 @ 11:13) (16 - 16)  SpO2: 99% (08-05-18 @ 11:13) (98% - 100%)  Wt(kg): --  GENERAL: NAD  NERVOUS SYSTEM:  Alert & Oriented X3, non- focal exam, Motor Strength 5/5 B/L upper and lower extremities; DTRs 2+ intact and symmetric  HEAD:  Atraumatic, Normocephalic  EYES: EOMI, PERRLA, conjunctiva and sclera clear  HEENT: Moist mucous membranes  NECK: Supple, No JVD  CHEST/LUNG: Clear to auscultation bilaterally; No rales, no rhonchi, no wheezing, or rubs  HEART: Regular rate and rhythm; No murmurs, rubs, or gallops  ABDOMEN: Soft, Nontender, Nondistended; Bowel sounds present  GENITOURINARY- Voiding, no suprapubic tenderness  EXTREMITIES:  2+ Peripheral Pulses, No clubbing, cyanosis, or edema  MUSCULOSKELETAL:- No muscle tenderness, Muscle tone normal, No joint tenderness, no Joint swelling, Joint range of motion-normal  SKIN-no rash, no lesion  89 y/o F PMHx CAD s/p 5 stents (last- 6/2013), GERD, HTN, HLD, hypothyroidism presented to ED after syncopal event being admitted for syncope  * Syncope and collapse, known h/o orthostatics hypotension  due to orthostatic hypotension   -telemetry  -  3rd set of troponin x3 neg   -Continue ASA 325mg daily  -F/U CT head - neg   -Cardiology consulted- F/U recommendations  -As per patient and son, had TTE about 2 months ago, F/U with cardiology if need for repeat TTE during this visit  -PT evaluation- fall risk protocol  * Hematuria ruled out UTI ,  can not rule out bleeder pathology, plan for outpatient cystoscopy   * Suspected intermittent  Vaginal bleeding h/o hysterectomy due to uterine ca   -UA positive for  large blood, RBC 25-50, few bacteria  - was started on ciprofloxacin 250mg BID- will  stop urine culture neg   -F/U urine cx  - neg    - Ct abd/pelv - neg   - US kidneys and bladder - neg   - d/c lovenox - will increase chance of worsening of the bleeding  - check FOBT to r/o rectal bleeding  - neg   - Hb stable   - urology evaluation - need o/p cystoscopy   - GYN oncology consult  - o/p follow with her GYN as o/p , no need further evaluation, low suspicious for GYN bleeding     * HTN  -Continue metoprolol BID, spironolactone daily  - orthostatics     * CAD  -Continue ASA 325mg daily  -F/U Lipid panel       * GERD  -Patient does complain of generalized discomfort  -Continue protonix 40mg daily    Disposition - medically optimized to be discharged home with close follow up with PCP, cardiology and urologist within 1 week  return to ED if fever, abdominal pain, nausea, vomiting, chest pain, dyspnea  Discharge plan discussed with patient, RN  Patient advised to follow up with PCP within 3-7 days  time spend 40 min  PCP was following pt during admission

## 2018-08-05 NOTE — DISCHARGE NOTE ADULT - LAUNCH MEDICATION RECONCILIATION
Problem: Potential for Falls  Goal: Patient will remain free of falls  INTERVENTIONS:  - Assess patient frequently for physical needs  -  Identify cognitive and physical deficits and behaviors that affect risk of falls    -  [place] fall precautions as indicated by assessment   - Educate patient/family on patient safety including physical limitations  - Instruct patient to call for assistance with activity based on assessment  - Modify environment to reduce risk of injury  - Consider OT/PT consult to assist with strengthening/mobility   Outcome: Progressing <<-----Click here for Discharge Medication Review

## 2018-08-05 NOTE — DISCHARGE NOTE ADULT - PLAN OF CARE
prevent recurrence drink plenty of fluids, follow up with PCP , cardiologist within 1 week follow up with GYN Dr. Martinez within 1 week for further monitoring to r/o vaginal bleeding follow up with urologist within 1 week for outpatient cystoscopy

## 2018-08-05 NOTE — CONSULT NOTE ADULT - PROBLEM SELECTOR RECOMMENDATION 2
S/P HYSTEROMETRY AND STAGING FOLLOWED BY RADIATION 5 YEARS AGO .WITH ACTIVE VAGINAL BLEEDING . S/P HYSTEROMETRY AND STAGING FOLLOWED BY RADIATION 5 YEARS AGO .WITH  no ACTIVE VAGINAL BLEEDING .

## 2018-08-05 NOTE — DISCHARGE NOTE ADULT - MEDICATION SUMMARY - MEDICATIONS TO TAKE
I will START or STAY ON the medications listed below when I get home from the hospital:    spironolactone 25 mg oral tablet  -- 1 tab(s) by mouth once a day  -- Indication: For hypertention    acetaminophen 500 mg oral tablet  -- 2 tab(s) by mouth every 6 hours, As needed, Mild Pain (1 - 3) or HA  -- Indication: For pain    aspirin 325 mg oral tablet  -- 1 tab(s) by mouth once a day  -- Indication: For home meds    ALPRAZolam 0.25 mg oral tablet  -- 0.5 tab(s) by mouth once a day (at bedtime), As Needed  -- Indication: For home meds    metoprolol succinate 50 mg oral tablet, extended release  -- 1 tab(s) by mouth 2 times a day  -- Indication: For HTN    docusate sodium 100 mg oral capsule  -- 1 cap(s) by mouth 3 times a day  -- Indication: For constipation OTC     senna oral tablet  -- 2 tab(s) by mouth once a day (at bedtime)  -- Indication: For constipation OTC    ocular lubricant ophthalmic solution  -- 1 drop(s) to each affected eye 3 times a day  -- Indication: For home meds    Protonix 40 mg oral delayed release tablet  -- 1 tab(s) by mouth once a day  -- Indication: For home meds    Synthroid 75 mcg (0.075 mg) oral tablet  -- 1 tab(s) by mouth once a day  -- Indication: For home meds    Vitamin D3 2000 intl units oral capsule  -- 1 cap(s) by mouth once a day  -- Indication: For home meds

## 2018-08-05 NOTE — DISCHARGE NOTE ADULT - CARE PROVIDERS DIRECT ADDRESSES
[email or web address]

## 2018-08-05 NOTE — CONSULT NOTE ADULT - ATTENDING COMMENTS
Based on limited pelvic exam which reveled no evidence of bleeding in the vaginal area or bloody discharge  also no evidence of lump or mass appreciated . the suspicion of vaginal bleeding is slim  . other sources of bleeding may be investigated , patient can follow up with her gynecologist as out patient , thanks you for the consult . will discuss with DR guerrero

## 2018-08-05 NOTE — CONSULT NOTE ADULT - ASSESSMENT
89 Y/O female  with h/o endometrial cancer 5 years ago followed by radiation . stage not available at this point . presented with syncope and also complains of finding blood when wiping the vaginal area . limited pelvic exam failed to reveled any evidence of blood in the vaginal area and absent cervix .no masses or lesion was felt .

## 2018-08-05 NOTE — DISCHARGE NOTE ADULT - CARE PLAN
Principal Discharge DX:	Syncope  Goal:	prevent recurrence  Assessment and plan of treatment:	drink plenty of fluids, follow up with PCP , cardiologist within 1 week  Secondary Diagnosis:	Endometrial adenocarcinoma  Assessment and plan of treatment:	follow up with GYN Dr. Martinez within 1 week for further monitoring to r/o vaginal bleeding  Secondary Diagnosis:	Hematuria  Assessment and plan of treatment:	follow up with urologist within 1 week for outpatient cystoscopy

## 2018-08-05 NOTE — CONSULT NOTE ADULT - SUBJECTIVE AND OBJECTIVE BOX
HPI : 89 y/o female  with multiple medical problems presented to ER with suspected syncope, patient had history of CAD, HTN, HYPOTHYROIDISM , MULTIPLE HEART STENTS . patient was also complaining of blood traces every time she wipe the vaginal area , denies any heavy vaginal bleeding , no pelvic pain . no nausea or vomiting , been diagnosed with with endometrial cancer 5 years ago where she had complete hysterectomy by Dr paz at [facility] followed by radiation per patient stated  . she also noticed blood in urine went to her PCP  and treated for UTI with Cipro     PAST MEDICAL :  GERD, HTN, HLD, hypothyroidism , CAD     PAST SURGICAL : cholecystomy , hysterectomy for endometrial cancer .    ALLERGIES :  amlodipine , clonidine , levatol . Lipitor , methylprednisolone    FAMILY HISTORY : insignificant     SOCIAL HISTORY : no smoking alcohol or drug abuse      OB-GYN : 3 vaginal delivers . h/o hysterectomy     ROS :   NEURO : NO HEADACHES OR VISUAL CHANCES  RESP : NO CHEST PAIN OR SOB  CVS: NO CHEST PAIN OR PALPITATION   GI: COMPLAINS OF MILD DIARRHEA , NO ABDOMINAL PAIN ,   MUSCLE : NO MUSCLE OR JOINT PAIN   SKIN: NO SKIN CHANGES OR RASH   UROLOGY : NO FREQUENCY OR URGENCY   ENDOCRINE : NO HEAT OR COLD INTOLERANCE    PHYSICAL EXAM     PATIENT IS AWARE ALERT ORIENTED TO PLACE TIME AND PERSON   T: 97.3  P:68 /56  RR: 20   CHEST : was cleat to ascultation bilateral   cvs : normal s1 s1 no murmur   abdomin : soft lax no tenderness or organomegaly   ext : no cuff tenderness or edema   pelvic was limited due to absent of gyn bed , but bimanual exam failed to revel any evidence of lumps or lesion in the vaginal area , absent cervix NO EVIDENCE OF BLOOD OR BLOODY DISCHARGE ON PELVIC EXAM     CT SCAN OF ABDOMIN AND PELVIC REVIEWED HPI : 89 y/o female  with multiple medical problems presented to ER with suspected syncope, patient had history of CAD, HTN, HYPOTHYROIDISM , MULTIPLE HEART STENTS . patient was also complaining of blood traces every time she wipe the vaginal area , denies any heavy vaginal bleeding , no pelvic pain . no nausea or vomiting , been diagnosed with with endometrial cancer 5 years ago where she had complete hysterectomy by Dr paz at [facility] followed by radiation per patient stated  . she also noticed blood in urine went to her PCP  and treated for UTI with Cipro     PAST MEDICAL :  GERD, HTN, HLD, hypothyroidism , CAD     PAST SURGICAL : cholecystomy , hysterectomy for endometrial cancer .    ALLERGIES :  amlodipine , clonidine , levatol . Lipitor , methylprednisolone    FAMILY HISTORY : insignificant     SOCIAL HISTORY : no smoking alcohol or drug abuse      OB-GYN : 3 vaginal delivers . h/o hysterectomy     ROS :   NEURO : NO HEADACHES OR VISUAL CHANCES  RESP : NO CHEST PAIN OR SOB  CVS: NO CHEST PAIN OR PALPITATION   GI: COMPLAINS OF MILD DIARRHEA , NO ABDOMINAL PAIN ,   MUSCLE : NO MUSCLE OR JOINT PAIN   SKIN: NO SKIN CHANGES OR RASH   UROLOGY : NO FREQUENCY OR URGENCY   ENDOCRINE : NO HEAT OR COLD INTOLERANCE    PHYSICAL EXAM     PATIENT IS AWARE ALERT ORIENTED TO PLACE TIME AND PERSON   T: 97.3  P:68 /56  RR: 20   CHEST : was cleat to ascultation bilateral   cvs : normal s1 s1 no murmur   abdomin : soft lax no tenderness or organomegaly   ext : no cuff tenderness or edema   pelvic was limited due to absent of gyn bed , but bimanual exam failed to revel any evidence of lumps or lesion in the vaginal area , absent cervix NO EVIDENCE OF BLOOD OR BLOODY DISCHARGE ON PELVIC EXAM     CT SCAN OF ABDOMIN AND PELVIC REVIEWED .

## 2018-08-05 NOTE — DISCHARGE NOTE ADULT - CARE PROVIDER_API CALL
Mayra Sánchez), Cardiovascular Disease; Internal Medicine  [address] [place] [address]  Phone: [phone]  Fax: [phone]    Mary Magana), Obstetrics and Gynecology  [address] [place], [place] [id number]  Phone: [phone]  Fax: [phone]    Caesar Ramos), Urology  [address] [place] [address]  Phone: [phone]  Fax: [phone]

## 2018-08-05 NOTE — DISCHARGE NOTE ADULT - PATIENT PORTAL LINK FT
You can access the [facility] Patient Portal, offered by [facility], by registering with the following website: [email or web address]

## 2018-08-08 ENCOUNTER — APPOINTMENT (OUTPATIENT)
Dept: OBGYN | Facility: CLINIC | Age: 83
End: 2018-08-08

## 2018-08-10 DIAGNOSIS — E78.5 HYPERLIPIDEMIA, UNSPECIFIED: ICD-10-CM

## 2018-08-10 DIAGNOSIS — H91.90 UNSPECIFIED HEARING LOSS, UNSPECIFIED EAR: ICD-10-CM

## 2018-08-10 DIAGNOSIS — E03.9 HYPOTHYROIDISM, UNSPECIFIED: ICD-10-CM

## 2018-08-10 DIAGNOSIS — H35.30 UNSPECIFIED MACULAR DEGENERATION: ICD-10-CM

## 2018-08-10 DIAGNOSIS — Z90.710 ACQUIRED ABSENCE OF BOTH CERVIX AND UTERUS: ICD-10-CM

## 2018-08-10 DIAGNOSIS — Z92.3 PERSONAL HISTORY OF IRRADIATION: ICD-10-CM

## 2018-08-10 DIAGNOSIS — I44.0 ATRIOVENTRICULAR BLOCK, FIRST DEGREE: ICD-10-CM

## 2018-08-10 DIAGNOSIS — Z90.49 ACQUIRED ABSENCE OF OTHER SPECIFIED PARTS OF DIGESTIVE TRACT: ICD-10-CM

## 2018-08-10 DIAGNOSIS — N93.9 ABNORMAL UTERINE AND VAGINAL BLEEDING, UNSPECIFIED: ICD-10-CM

## 2018-08-10 DIAGNOSIS — Z79.82 LONG TERM (CURRENT) USE OF ASPIRIN: ICD-10-CM

## 2018-08-10 DIAGNOSIS — I10 ESSENTIAL (PRIMARY) HYPERTENSION: ICD-10-CM

## 2018-08-10 DIAGNOSIS — Z95.5 PRESENCE OF CORONARY ANGIOPLASTY IMPLANT AND GRAFT: ICD-10-CM

## 2018-08-10 DIAGNOSIS — I95.1 ORTHOSTATIC HYPOTENSION: ICD-10-CM

## 2018-08-10 DIAGNOSIS — R31.0 GROSS HEMATURIA: ICD-10-CM

## 2018-08-10 DIAGNOSIS — I25.10 ATHEROSCLEROTIC HEART DISEASE OF NATIVE CORONARY ARTERY WITHOUT ANGINA PECTORIS: ICD-10-CM

## 2018-08-10 DIAGNOSIS — R55 SYNCOPE AND COLLAPSE: ICD-10-CM

## 2018-08-10 DIAGNOSIS — Z85.42 PERSONAL HISTORY OF MALIGNANT NEOPLASM OF OTHER PARTS OF UTERUS: ICD-10-CM

## 2018-08-10 DIAGNOSIS — K21.9 GASTRO-ESOPHAGEAL REFLUX DISEASE WITHOUT ESOPHAGITIS: ICD-10-CM

## 2018-08-16 ENCOUNTER — APPOINTMENT (OUTPATIENT)
Dept: UROLOGY | Facility: CLINIC | Age: 83
End: 2018-08-16

## 2018-09-06 VITALS
WEIGHT: 169.98 LBS | HEART RATE: 73 BPM | RESPIRATION RATE: 16 BRPM | HEIGHT: 62 IN | OXYGEN SATURATION: 100 % | TEMPERATURE: 98 F | SYSTOLIC BLOOD PRESSURE: 196 MMHG | DIASTOLIC BLOOD PRESSURE: 102 MMHG

## 2018-09-06 NOTE — ASU PATIENT PROFILE, ADULT - PMH
CAD (coronary artery disease)    Endometrial adenocarcinoma    GERD (gastroesophageal reflux disease)    [place] (hard of hearing)    HTN (hypertension)    Hyperlipidemia    Hypothyroid    Macular degeneration    Stented coronary artery

## 2018-09-07 ENCOUNTER — INPATIENT (INPATIENT)
Facility: HOSPITAL | Age: 83
LOS: 4 days | Discharge: HOME | End: 2018-09-12
Attending: FAMILY MEDICINE | Admitting: HOSPITALIST
Payer: MEDICARE

## 2018-09-07 DIAGNOSIS — Z90.710 ACQUIRED ABSENCE OF BOTH CERVIX AND UTERUS: Chronic | ICD-10-CM

## 2018-09-07 LAB
ALBUMIN SERPL ELPH-MCNC: 3.7 G/DL — SIGNIFICANT CHANGE UP (ref 3.3–5)
ANION GAP SERPL CALC-SCNC: 10 MMOL/L — SIGNIFICANT CHANGE UP (ref 5–17)
APPEARANCE UR: CLEAR — SIGNIFICANT CHANGE UP
BILIRUB UR-MCNC: NEGATIVE — SIGNIFICANT CHANGE UP
BUN SERPL-MCNC: 16 MG/DL — SIGNIFICANT CHANGE UP (ref 7–23)
CALCIUM SERPL-MCNC: 9.5 MG/DL — SIGNIFICANT CHANGE UP (ref 8.5–10.1)
CHLORIDE SERPL-SCNC: 105 MMOL/L — SIGNIFICANT CHANGE UP (ref 96–108)
CO2 SERPL-SCNC: 23 MMOL/L — SIGNIFICANT CHANGE UP (ref 22–31)
COLOR SPEC: YELLOW — SIGNIFICANT CHANGE UP
CREAT SERPL-MCNC: 1 MG/DL — SIGNIFICANT CHANGE UP (ref 0.5–1.3)
DIFF PNL FLD: NEGATIVE — SIGNIFICANT CHANGE UP
GLUCOSE SERPL-MCNC: 117 MG/DL — HIGH (ref 70–99)
GLUCOSE UR QL: NEGATIVE MG/DL — SIGNIFICANT CHANGE UP
HCT VFR BLD CALC: 41.7 % — SIGNIFICANT CHANGE UP (ref 34.5–45)
HGB BLD-MCNC: 13.1 G/DL — SIGNIFICANT CHANGE UP (ref 11.5–15.5)
KETONES UR-MCNC: NEGATIVE — SIGNIFICANT CHANGE UP
LEUKOCYTE ESTERASE UR-ACNC: NEGATIVE — SIGNIFICANT CHANGE UP
MCHC RBC-ENTMCNC: 27.3 PG — SIGNIFICANT CHANGE UP (ref 27–34)
MCHC RBC-ENTMCNC: 31.4 GM/DL — LOW (ref 32–36)
MCV RBC AUTO: 87.1 FL — SIGNIFICANT CHANGE UP (ref 80–100)
NITRITE UR-MCNC: NEGATIVE — SIGNIFICANT CHANGE UP
NRBC # BLD: 0 /100 WBCS — SIGNIFICANT CHANGE UP (ref 0–0)
PH UR: 7 — SIGNIFICANT CHANGE UP (ref 5–8)
PHOSPHATE SERPL-MCNC: 3.1 MG/DL — SIGNIFICANT CHANGE UP (ref 2.5–4.5)
PLATELET # BLD AUTO: 323 K/UL — SIGNIFICANT CHANGE UP (ref 150–400)
POTASSIUM SERPL-MCNC: 4.5 MMOL/L — SIGNIFICANT CHANGE UP (ref 3.5–5.3)
POTASSIUM SERPL-SCNC: 4.5 MMOL/L — SIGNIFICANT CHANGE UP (ref 3.5–5.3)
PROT UR-MCNC: NEGATIVE MG/DL — SIGNIFICANT CHANGE UP
RBC # BLD: 4.79 M/UL — SIGNIFICANT CHANGE UP (ref 3.8–5.2)
RBC # FLD: 14.6 % — HIGH (ref 10.3–14.5)
SODIUM SERPL-SCNC: 138 MMOL/L — SIGNIFICANT CHANGE UP (ref 135–145)
SP GR SPEC: 1 — LOW (ref 1.01–1.02)
UROBILINOGEN FLD QL: NEGATIVE MG/DL — SIGNIFICANT CHANGE UP
WBC # BLD: 8.58 K/UL — SIGNIFICANT CHANGE UP (ref 3.8–10.5)
WBC # FLD AUTO: 8.58 K/UL — SIGNIFICANT CHANGE UP (ref 3.8–10.5)

## 2018-09-07 PROCEDURE — 93010 ELECTROCARDIOGRAM REPORT: CPT

## 2018-09-07 RX ORDER — FAMOTIDINE 10 MG/ML
20 INJECTION INTRAVENOUS ONCE
Qty: 0 | Refills: 0 | Status: DISCONTINUED | OUTPATIENT
Start: 2018-09-07 | End: 2018-09-07

## 2018-09-07 RX ORDER — ALPRAZOLAM 0.25 MG
0.25 TABLET ORAL AT BEDTIME
Qty: 0 | Refills: 0 | Status: DISCONTINUED | OUTPATIENT
Start: 2018-09-07 | End: 2018-09-09

## 2018-09-07 RX ORDER — HYDRALAZINE HCL 50 MG
10 TABLET ORAL EVERY 4 HOURS
Qty: 0 | Refills: 0 | Status: DISCONTINUED | OUTPATIENT
Start: 2018-09-07 | End: 2018-09-08

## 2018-09-07 RX ORDER — SPIRONOLACTONE 25 MG/1
50 TABLET, FILM COATED ORAL
Qty: 0 | Refills: 0 | Status: DISCONTINUED | OUTPATIENT
Start: 2018-09-07 | End: 2018-09-09

## 2018-09-07 RX ORDER — METOPROLOL TARTRATE 50 MG
50 TABLET ORAL DAILY
Qty: 0 | Refills: 0 | Status: DISCONTINUED | OUTPATIENT
Start: 2018-09-07 | End: 2018-09-08

## 2018-09-07 RX ORDER — ACETAMINOPHEN 500 MG
650 TABLET ORAL EVERY 8 HOURS
Qty: 0 | Refills: 0 | Status: DISCONTINUED | OUTPATIENT
Start: 2018-09-07 | End: 2018-09-12

## 2018-09-07 RX ORDER — PANTOPRAZOLE SODIUM 20 MG/1
40 TABLET, DELAYED RELEASE ORAL
Qty: 0 | Refills: 0 | Status: DISCONTINUED | OUTPATIENT
Start: 2018-09-07 | End: 2018-09-12

## 2018-09-07 RX ORDER — LEVOTHYROXINE SODIUM 125 MCG
75 TABLET ORAL DAILY
Qty: 0 | Refills: 0 | Status: DISCONTINUED | OUTPATIENT
Start: 2018-09-07 | End: 2018-09-12

## 2018-09-07 RX ORDER — SPIRONOLACTONE 25 MG/1
25 TABLET, FILM COATED ORAL DAILY
Qty: 0 | Refills: 0 | Status: DISCONTINUED | OUTPATIENT
Start: 2018-09-07 | End: 2018-09-07

## 2018-09-07 RX ORDER — ACETAMINOPHEN 500 MG
650 TABLET ORAL ONCE
Qty: 0 | Refills: 0 | Status: COMPLETED | OUTPATIENT
Start: 2018-09-07 | End: 2018-09-07

## 2018-09-07 RX ORDER — ALPRAZOLAM 0.25 MG
0.25 TABLET ORAL ONCE
Qty: 0 | Refills: 0 | Status: DISCONTINUED | OUTPATIENT
Start: 2018-09-07 | End: 2018-09-07

## 2018-09-07 RX ORDER — ACETAMINOPHEN 500 MG
975 TABLET ORAL ONCE
Qty: 0 | Refills: 0 | Status: COMPLETED | OUTPATIENT
Start: 2018-09-07 | End: 2018-09-07

## 2018-09-07 RX ADMIN — Medication 50 MILLIGRAM(S): at 16:07

## 2018-09-07 RX ADMIN — Medication 650 MILLIGRAM(S): at 22:03

## 2018-09-07 RX ADMIN — Medication 975 MILLIGRAM(S): at 11:58

## 2018-09-07 RX ADMIN — SPIRONOLACTONE 50 MILLIGRAM(S): 25 TABLET, FILM COATED ORAL at 17:34

## 2018-09-07 RX ADMIN — Medication 650 MILLIGRAM(S): at 17:35

## 2018-09-07 RX ADMIN — Medication 10 MILLIGRAM(S): at 12:40

## 2018-09-07 RX ADMIN — Medication 0.25 MILLIGRAM(S): at 17:35

## 2018-09-07 RX ADMIN — Medication 975 MILLIGRAM(S): at 12:00

## 2018-09-07 NOTE — H&P ADULT - ASSESSMENT
A/P    #Hypertensive urgency  -admit, monitor her bp and adjust meds, iv hydralazine stat, ct home meds     #CAD- ct her home meds     #Planned cystoscopy on monday for hematuria     #GERD- home meds     #HLD- home meds     #hypothyroidism -home meds     #dvt pr -scd for now

## 2018-09-07 NOTE — PATIENT PROFILE ADULT. - PMH
CAD (coronary artery disease)    Endometrial adenocarcinoma    GERD (gastroesophageal reflux disease)    [place] (hard of hearing)    HTN (hypertension)    Hyperlipidemia    Hypothyroid    Macular degeneration    Stented coronary artery

## 2018-09-07 NOTE — H&P ADULT - HISTORY OF PRESENT ILLNESS
89 y/o female came today for cystoscopy and found to have elevated blood pressure even though she has her home meds. She waited in presurgical area abut her blood pressure did not get better. On leading questions she denied any chest pain, dizzy, blurry vision

## 2018-09-07 NOTE — H&P ADULT - PMH
CAD (coronary artery disease)    Endometrial adenocarcinoma    GERD (gastroesophageal reflux disease)    [place] (hard of hearing)    HTN (hypertension)    Hyperlipidemia    Hypothyroid    Macular degeneration    Stented coronary artery

## 2018-09-07 NOTE — H&P ADULT - NSHPPHYSICALEXAM_GEN_ALL_CORE
Vital Signs Last 24 Hrs  T(C): 36.8 (07 Sep 2018 11:08), Max: 36.8 (06 Sep 2018 15:37)  T(F): 98.3 (07 Sep 2018 11:08), Max: 98.3 (06 Sep 2018 15:37)  HR: 73 (07 Sep 2018 11:08) (73 - 73)  BP: 188/80 (07 Sep 2018 11:08) (188/80 - 196/102)  BP(mean): --  RR: 16 (07 Sep 2018 11:08) (16 - 16)  SpO2: 99% (07 Sep 2018 11:08) (99% - 100%)    General: WN/WD NAD  Head- Atraumatic, normocephalic   Neurology: A&Ox3, nonfocal, CN II to XII intact, power intact 5/5 in all muscle group  HEENT- PERRLA, moist muscous membrane  Neck-supple, no JVD  Respiratory: Air entry equal b/l, CTA   CVS:  S1S2, no murmurs, rubs or gallops  Abdominal: Soft, NT, ND +BS,   Genitourinary- voiding, non palpable bladder  Extremities: No edema, + peripheral pulses  Skin- no rash, no ulcer  Psychiatric- mood stable   LN- no lymphadenopathy

## 2018-09-08 DIAGNOSIS — I25.10 ATHEROSCLEROTIC HEART DISEASE OF NATIVE CORONARY ARTERY WITHOUT ANGINA PECTORIS: ICD-10-CM

## 2018-09-08 DIAGNOSIS — R31.9 HEMATURIA, UNSPECIFIED: ICD-10-CM

## 2018-09-08 DIAGNOSIS — E03.9 HYPOTHYROIDISM, UNSPECIFIED: ICD-10-CM

## 2018-09-08 DIAGNOSIS — Z29.9 ENCOUNTER FOR PROPHYLACTIC MEASURES, UNSPECIFIED: ICD-10-CM

## 2018-09-08 DIAGNOSIS — K21.9 GASTRO-ESOPHAGEAL REFLUX DISEASE WITHOUT ESOPHAGITIS: ICD-10-CM

## 2018-09-08 DIAGNOSIS — I16.0 HYPERTENSIVE URGENCY: ICD-10-CM

## 2018-09-08 DIAGNOSIS — E78.5 HYPERLIPIDEMIA, UNSPECIFIED: ICD-10-CM

## 2018-09-08 DIAGNOSIS — I10 ESSENTIAL (PRIMARY) HYPERTENSION: ICD-10-CM

## 2018-09-08 LAB
ANION GAP SERPL CALC-SCNC: 10 MMOL/L — SIGNIFICANT CHANGE UP (ref 5–17)
BUN SERPL-MCNC: 19 MG/DL — SIGNIFICANT CHANGE UP (ref 7–23)
CALCIUM SERPL-MCNC: 9.4 MG/DL — SIGNIFICANT CHANGE UP (ref 8.5–10.1)
CHLORIDE SERPL-SCNC: 106 MMOL/L — SIGNIFICANT CHANGE UP (ref 96–108)
CO2 SERPL-SCNC: 23 MMOL/L — SIGNIFICANT CHANGE UP (ref 22–31)
CREAT SERPL-MCNC: 1.07 MG/DL — SIGNIFICANT CHANGE UP (ref 0.5–1.3)
GLUCOSE SERPL-MCNC: 102 MG/DL — HIGH (ref 70–99)
POTASSIUM SERPL-MCNC: 4.2 MMOL/L — SIGNIFICANT CHANGE UP (ref 3.5–5.3)
POTASSIUM SERPL-SCNC: 4.2 MMOL/L — SIGNIFICANT CHANGE UP (ref 3.5–5.3)
SODIUM SERPL-SCNC: 139 MMOL/L — SIGNIFICANT CHANGE UP (ref 135–145)

## 2018-09-08 RX ORDER — ONDANSETRON 8 MG/1
8 TABLET, FILM COATED ORAL THREE TIMES A DAY
Qty: 0 | Refills: 0 | Status: DISCONTINUED | OUTPATIENT
Start: 2018-09-08 | End: 2018-09-12

## 2018-09-08 RX ORDER — METOPROLOL TARTRATE 50 MG
75 TABLET ORAL
Qty: 0 | Refills: 0 | Status: DISCONTINUED | OUTPATIENT
Start: 2018-09-08 | End: 2018-09-11

## 2018-09-08 RX ORDER — METOPROLOL TARTRATE 50 MG
50 TABLET ORAL EVERY 12 HOURS
Qty: 0 | Refills: 0 | Status: DISCONTINUED | OUTPATIENT
Start: 2018-09-08 | End: 2018-09-08

## 2018-09-08 RX ORDER — METOPROLOL TARTRATE 50 MG
25 TABLET ORAL ONCE
Qty: 0 | Refills: 0 | Status: COMPLETED | OUTPATIENT
Start: 2018-09-08 | End: 2018-09-08

## 2018-09-08 RX ORDER — LABETALOL HCL 100 MG
10 TABLET ORAL ONCE
Qty: 0 | Refills: 0 | Status: DISCONTINUED | OUTPATIENT
Start: 2018-09-08 | End: 2018-09-08

## 2018-09-08 RX ORDER — LANOLIN ALCOHOL/MO/W.PET/CERES
3 CREAM (GRAM) TOPICAL AT BEDTIME
Qty: 0 | Refills: 0 | Status: DISCONTINUED | OUTPATIENT
Start: 2018-09-08 | End: 2018-09-12

## 2018-09-08 RX ADMIN — Medication 25 MILLIGRAM(S): at 15:35

## 2018-09-08 RX ADMIN — Medication 0.25 MILLIGRAM(S): at 20:10

## 2018-09-08 RX ADMIN — SPIRONOLACTONE 50 MILLIGRAM(S): 25 TABLET, FILM COATED ORAL at 05:24

## 2018-09-08 RX ADMIN — Medication 650 MILLIGRAM(S): at 18:50

## 2018-09-08 RX ADMIN — Medication 50 MILLIGRAM(S): at 17:16

## 2018-09-08 RX ADMIN — Medication 650 MILLIGRAM(S): at 19:49

## 2018-09-08 RX ADMIN — PANTOPRAZOLE SODIUM 40 MILLIGRAM(S): 20 TABLET, DELAYED RELEASE ORAL at 05:23

## 2018-09-08 RX ADMIN — Medication 75 MICROGRAM(S): at 05:24

## 2018-09-08 RX ADMIN — Medication 650 MILLIGRAM(S): at 12:26

## 2018-09-08 RX ADMIN — SPIRONOLACTONE 50 MILLIGRAM(S): 25 TABLET, FILM COATED ORAL at 17:16

## 2018-09-08 RX ADMIN — ONDANSETRON 8 MILLIGRAM(S): 8 TABLET, FILM COATED ORAL at 18:32

## 2018-09-08 RX ADMIN — Medication 50 MILLIGRAM(S): at 05:23

## 2018-09-08 NOTE — PROGRESS NOTE ADULT - SUBJECTIVE AND OBJECTIVE BOX
CHIEF COMPLAINT: Elevated Blood Pressure    SUBJECTIVE: HPI:  89 y/o female came in on 9/7/18 for cystoscopy and found to have elevated blood pressure even though she has her home meds. She waited in presurgical area but her blood pressure did not get better. On leading questions she denied any chest pain, dizzy, blurry vision (07 Sep 2018 11:33)      9/8: Pt was seen and examined at bedside this morning.  She denies any headache, blurry vision,     REVIEW OF SYSTEMS:    CONSTITUTIONAL: No weakness, fevers or chills  EYES/ENT: No visual changes;  No vertigo or throat pain   NECK: No pain or stiffness  RESPIRATORY: No cough, wheezing, hemoptysis; No shortness of breath  CARDIOVASCULAR: No chest pain or palpitations  GASTROINTESTINAL: No abdominal or epigastric pain. No nausea, vomiting, or hematemesis; No diarrhea or constipation. No melena or hematochezia.  GENITOURINARY: No dysuria, frequency or hematuria  NEUROLOGICAL: No numbness or weakness  SKIN: No itching, burning, rashes, or lesions   All other review of systems is negative unless indicated above    Vital Signs Last 24 Hrs  T(C): 36.6 (08 Sep 2018 17:16), Max: 36.7 (07 Sep 2018 20:54)  T(F): 97.8 (08 Sep 2018 17:16), Max: 98.1 (08 Sep 2018 05:12)  HR: 72 (08 Sep 2018 18:33) (70 - 81)  BP: 153/55 (08 Sep 2018 18:33) (139/56 - 179/64)  BP(mean): --  RR: 16 (08 Sep 2018 17:16) (14 - 18)  SpO2: 98% (08 Sep 2018 17:16) (98% - 100%)    I&O's Summary      CAPILLARY BLOOD GLUCOSE          PHYSICAL EXAM:    Constitutional: NAD, awake and alert, well-developed  HEENT: PERR, EOMI, Normal Hearing, MMM  Neck: Soft and supple, No LAD, No JVD  Respiratory: Breath sounds are clear bilaterally, No wheezing, rales or rhonchi  Cardiovascular: S1 and S2, regular rate and rhythm, no Murmurs, gallops or rubs  Gastrointestinal: Bowel Sounds present, soft, nontender, nondistended, no guarding, no rebound  Extremities: No peripheral edema  Vascular: 2+ peripheral pulses  Neurological: A/O x 3, no focal deficits  Musculoskeletal: 5/5 strength b/l upper and lower extremities  Skin: No rashes    MEDICATIONS:  MEDICATIONS  (STANDING):  levothyroxine 75 MICROGram(s) Oral daily  melatonin 3 milliGRAM(s) Oral at bedtime  metoprolol tartrate 50 milliGRAM(s) Oral every 12 hours  pantoprazole    Tablet 40 milliGRAM(s) Oral before breakfast  spironolactone 50 milliGRAM(s) Oral two times a day      LABS: All Labs Reviewed:                        13.1   8.58  )-----------( 323      ( 07 Sep 2018 11:54 )             41.7     09-08    139  |  106  |  19  ----------------------------<  102<H>  4.2   |  23  |  1.07    Ca    9.4      08 Sep 2018 07:46  Phos  3.1     09-07    TPro  x   /  Alb  3.7  /  TBili  x   /  DBili  x   /  AST  x   /  ALT  x   /  AlkPhos  x   09-07          Blood Culture:     RADIOLOGY/EKG:    DVT PPX:    ADVANCED DIRECTIVE:    DISPOSITION: CHIEF COMPLAINT: Elevated Blood Pressure    SUBJECTIVE: HPI:  89 y/o female came in on 9/7/18 for cystoscopy and found to have elevated blood pressure even though she has her home meds. She waited in presurgical area but her blood pressure did not get better. On leading questions she denied any chest pain, dizzy, blurry vision (07 Sep 2018 11:33)      9/8: Pt was seen and examined at bedside this morning.  She denies any dizziness, numbness, blurry vision, chest pain or SOB. She is experiencing HA and nausea intermittently, but denies vomiting. Pt is anxious because procedure as been deferred.    REVIEW OF SYSTEMS:    CONSTITUTIONAL: No weakness, fevers or chills  EYES/ENT: No visual changes;  No vertigo or throat pain   NECK: No pain or stiffness  RESPIRATORY: No cough, wheezing, hemoptysis; No shortness of breath  CARDIOVASCULAR: No chest pain or palpitations  GASTROINTESTINAL: No abdominal or epigastric pain. +Nausea, No vomiting, or hematemesis; No diarrhea or constipation. No melena or hematochezia.  GENITOURINARY + Hematuria,   NEUROLOGICAL: No numbness or weakness  SKIN: No itching, burning, rashes, or lesions   All other review of systems is negative unless indicated above    Vital Signs Last 24 Hrs  T(C): 36.6 (08 Sep 2018 17:16), Max: 36.7 (07 Sep 2018 20:54)  T(F): 97.8 (08 Sep 2018 17:16), Max: 98.1 (08 Sep 2018 05:12)  HR: 72 (08 Sep 2018 18:33) (70 - 81)  BP: 153/55 (08 Sep 2018 18:33) (139/56 - 179/64)  RR: 16 (08 Sep 2018 17:16) (14 - 18)  SpO2: 98% (08 Sep 2018 17:16) (98% - 100%)    PHYSICAL EXAM:  Constitutional: elderly woman in NAD, awake and alert, well-developed  HEENT: PERR, EOMI, Normal Hearing, MMM  Neck: Soft and supple, No LAD, No JVD  Respiratory: Breath sounds are clear bilaterally, No wheezing, rales or rhonchi  Cardiovascular: S1 and S2, regular rate and rhythm, no Murmurs, gallops or rubs  Gastrointestinal: Bowel Sounds present, soft, nontender, nondistended, no guarding, no rebound  Extremities: No peripheral edema  Vascular: 2+ peripheral pulses  Neurological: A/O x 3, no focal deficits  Musculoskeletal: 5/5 strength b/l upper and lower extremities  Skin: No rashes    MEDICATIONS:  MEDICATIONS  (STANDING):  levothyroxine 75 MICROGram(s) Oral daily  melatonin 3 milliGRAM(s) Oral at bedtime  metoprolol tartrate 50 milliGRAM(s) Oral every 12 hours  pantoprazole    Tablet 40 milliGRAM(s) Oral before breakfast  spironolactone 50 milliGRAM(s) Oral two times a day      LABS: All Labs Reviewed:                        13.1   8.58  )-----------( 323      ( 07 Sep 2018 11:54 )             41.7     09-08    139  |  106  |  19  ----------------------------<  102<H>  4.2   |  23  |  1.07    Ca    9.4      08 Sep 2018 07:46  Phos  3.1     09-07    TPro  x   /  Alb  3.7  /  TBili  x   /  DBili  x   /  AST  x   /  ALT  x   /  AlkPhos  x   09-07          DVT PPX: Ambulation/SCDs

## 2018-09-08 NOTE — PROGRESS NOTE ADULT - ASSESSMENT
89 year old female with bladder tumors scheduled for TURBT today but had to cancel due to elevated BP on 9/7. BP better controlled now    -cardiology note appreciated  -continue meds as per cardiology / medicine for BP control  -if BP remains improved and she is stable medically, will plan for TURBT on Monday. Currently scheduled for 12:30pm on Monday  -care per medicine

## 2018-09-08 NOTE — PROGRESS NOTE ADULT - SUBJECTIVE AND OBJECTIVE BOX
Patient seen and examined at bedside. Seen by cardiology this AM. BP is better controlled. Afebrile, otherwise hemodynamically stable. No fevers, chills, nausea, vomiting. Ambulating. Voiding well with no hematuria.     Home Medications:   * Patient Currently Takes Medications as of 07-Sep-2018 08:58 documented in Structured Notes  · 	ocular lubricant ophthalmic solution: 1 drop(s) to each affected eye 3 times a day, Last Dose Taken: 07-Sep-2018 8:00 AM  · 	metoprolol succinate 50 mg oral tablet, extended release: 1 tab(s) orally 2 times a day, Last Dose Taken: 07-Sep-2018 7:00 AM  · 	aspirin 325 mg oral tablet: 1 tab(s) orally once a day, Last Dose Taken: 31-Aug-2018   · 	acetaminophen 500 mg oral tablet: 2 tab(s) orally every 6 hours, As needed, Mild Pain (1 - 3) or HA, Last Dose Taken: 06-Sep-2018 4:00 PM  · 	Protonix 40 mg oral delayed release tablet: 1 tab(s) orally once a day, Last Dose Taken: 07-Sep-2018 7:00 AM  · 	Synthroid 75 mcg (0.075 mg) oral tablet: 1 tab(s) orally once a day, Last Dose Taken: 07-Sep-2018 7:00 AM  · 	Vitamin D3 2000 intl units oral capsule: 1 cap(s) orally once a day, Last Dose Taken: 31-Aug-2018   · 	spironolactone 25 mg oral tablet: 1 tab(s) orally once a day, Last Dose Taken: 07-Sep-2018 7:00 AM  · 	ALPRAZolam 0.25 mg oral tablet: 0.5 tab(s) orally once a day (at bedtime), As Needed, Last Dose Taken: 06-Sep-2018 8:00 PM     Past Medical History:  CAD (coronary artery disease)    Endometrial adenocarcinoma    GERD (gastroesophageal reflux disease)    [place] (hard of hearing)    HTN (hypertension)    Hyperlipidemia    Hypothyroid    Macular degeneration    Stented coronary artery.     Past Surgical History:  Ankle fracture, right  surgery 25 yrs ago - hardware removed  S/P coronary angiogram  2005, 2008, 2011 - drug eluding stents  S/P hernia repair  umbilical - 2008  S/P laparoscopic cholecystectomy  2008  S/P EMILY (total abdominal hysterectomy).    PE:  	General: NAD, lying in bed comfortably  	Head- Atraumatic, normocephalic   	HEENT- PERRLA, moist muscous membrane  	Neck-supple, no JVD  	Respiratory: no labored breathing  	Abdominal: Soft, NT, ND +BS,   	Genitourinary- non palpable bladder, no CVAT b/l   	Extremities: No edema, + peripheral pulses  	Skin- no rash, no ulcer  	Psychiatric- mood stable

## 2018-09-08 NOTE — PROGRESS NOTE ADULT - ASSESSMENT
89 yo F as described above presenting with elevated BP prior to scheduled procedure admitted for hypertensive urgency

## 2018-09-08 NOTE — CONSULT NOTE ADULT - SUBJECTIVE AND OBJECTIVE BOX
CHIEF COMPLAINT:    HPI:  89 y/o female came today for cystoscopy and found to have elevated blood pressure even though she has her home meds. She waited in presurgical area abut her blood pressure did not get better. On leading questions she denied any chest pain, dizzy, blurry vision (07 Sep 2018 11:33)  /56 with HR 72. Pt was taking lopressor 50 mg bid at home and aldactone once a day.  EKG showed sinus rhythm with 1st degree AVB, rate 78, nonspecific T wave abnormality. Daughter at bedside and I spoke to her and pt.     PAST MEDICAL & SURGICAL HISTORY:  Endometrial adenocarcinoma  Macular degeneration  Stented coronary artery  Hypothyroid  Hyperlipidemia  HTN (hypertension)  GERD (gastroesophageal reflux disease)  CAD (coronary artery disease)  [place] (hard of hearing)  S/P EMILY (total abdominal hysterectomy)  S/P hernia repair: umbilical - 2008  S/P laparoscopic cholecystectomy: 2008  Ankle fracture, right: surgery 25 yrs ago - hardware removed  S/P coronary angiogram: 2005, 2008, 2011 - drug eluding stents      Allergies    amlodipine (Unknown)  clonidine (Unknown)  Levatol (Unknown)  Lipitor (Unknown)  Lotrel (Unknown)  methylPREDNISolone (Unknown)  morphine (Other)  Plaquenil (Unknown)  statins (Other (Unknown))  sulfa drugs (Rash)    Intolerances        Occupation:  Alochol: Denied  Smoking: Denied  Drug Use: Denied  Marital Status:         FAMILY HISTORY:  Family history of brain cancer (Father)      REVIEW OF SYSTEMS:    CONSTITUTIONAL: No weakness, fevers or chills  EYES/ENT: No visual changes;  No vertigo or throat pain   NECK: No pain or stiffness  RESPIRATORY: No cough, wheezing, hemoptysis; No shortness of breath  CARDIOVASCULAR: No chest pain or palpitations  GASTROINTESTINAL: No abdominal or epigastric pain. No nausea, vomiting, or hematemesis; No diarrhea or constipation. No melena or hematochezia.  GENITOURINARY: No dysuria, frequency or hematuria  NEUROLOGICAL: No numbness or weakness  SKIN: No itching, burning, rashes, or lesions   All other review of systems is negative unless indicated above    Vital Signs Last 24 Hrs  T(C): 36.7 (08 Sep 2018 05:12), Max: 36.8 (07 Sep 2018 11:08)  T(F): 98.1 (08 Sep 2018 05:12), Max: 98.3 (07 Sep 2018 11:08)  HR: 70 (08 Sep 2018 05:12) (70 - 79)  BP: 139/56 (08 Sep 2018 05:12) (122/54 - 188/80)  BP(mean): --  RR: 18 (08 Sep 2018 05:12) (14 - 18)  SpO2: 98% (08 Sep 2018 05:12) (98% - 100%)    I&O's Summary      PHYSICAL EXAM:    Constitutional: NAD, awake and alert, well-developed  HEENT: PERR, EOMI,  No oral cyananosis.  Neck:  supple,  No JVD  Respiratory: Breath sounds are clear bilaterally, No wheezing, rales or rhonchi  Cardiovascular: S1 and S2, regular rate and rhythm, no Murmurs, gallops or rubs  Gastrointestinal: Bowel Sounds present, soft, nontender.   Extremities: No peripheral edema. No clubbing or cyanosis.  Vascular: 2+ peripheral pulses  Neurological: A/O x 3, no focal deficits  Musculoskeletal: no calf tenderness.  Skin: No rashes.    MEDICATIONS:  MEDICATIONS  (STANDING):  levothyroxine 75 MICROGram(s) Oral daily  metoprolol tartrate 50 milliGRAM(s) Oral every 12 hours  pantoprazole    Tablet 40 milliGRAM(s) Oral before breakfast  spironolactone 50 milliGRAM(s) Oral two times a day      LABS: All Labs Reviewed:                        13.1   8.58  )-----------( 323      ( 07 Sep 2018 11:54 )             41.7     08 Sep 2018 07:46    139    |  106    |  19     ----------------------------<  102    4.2     |  23     |  1.07   07 Sep 2018 11:54    138    |  105    |  16     ----------------------------<  117    4.5     |  23     |  1.00     Ca    9.4        08 Sep 2018 07:46  Ca    9.5        07 Sep 2018 11:54  Phos  3.1       07 Sep 2018 11:54    TPro  x      /  Alb  3.7    /  TBili  x      /  DBili  x      /  AST  x      /  ALT  x      /  AlkPhos  x      07 Sep 2018 11:54          Blood Culture:         RADIOLOGY/EKG:
89 year old female who was scheduled today for a cystoscopy, TURBT for bladder tumors found on cystoscopy done in office on 8/16/2018. Of note, she has positive urine cytology but recent CT urogram did not show any evidence of upper tract disease but revealed bladder tumors. Upon arrival to the holding area in the ambulatory surgery unit, her BP was found to be elevated to ~200/100. Her elevated BP persisted in holding despite her taking her BP meds this morning. After a lengthy discussion with the anesthesiologist, decision was made to cancel the surgery for today to no endanger patient given severely elevated BP. Patient and her family were informed and agreeable. Patient admitted to medicine team for BP control and will re-schedule TURBT for Monday. Currently scheduled for 12:30pm on Monday. Notes mild hematuria intermittently. No dysuria. No fevers, chills, nausea, vomiting.     Home Medications:   * Patient Currently Takes Medications as of 07-Sep-2018 08:58 documented in Structured Notes  · 	ocular lubricant ophthalmic solution: 1 drop(s) to each affected eye 3 times a day, Last Dose Taken: 07-Sep-2018 8:00 AM  · 	metoprolol succinate 50 mg oral tablet, extended release: 1 tab(s) orally 2 times a day, Last Dose Taken: 07-Sep-2018 7:00 AM  · 	aspirin 325 mg oral tablet: 1 tab(s) orally once a day, Last Dose Taken: 31-Aug-2018   · 	acetaminophen 500 mg oral tablet: 2 tab(s) orally every 6 hours, As needed, Mild Pain (1 - 3) or HA, Last Dose Taken: 06-Sep-2018 4:00 PM  · 	Protonix 40 mg oral delayed release tablet: 1 tab(s) orally once a day, Last Dose Taken: 07-Sep-2018 7:00 AM  · 	Synthroid 75 mcg (0.075 mg) oral tablet: 1 tab(s) orally once a day, Last Dose Taken: 07-Sep-2018 7:00 AM  · 	Vitamin D3 2000 intl units oral capsule: 1 cap(s) orally once a day, Last Dose Taken: 31-Aug-2018   · 	spironolactone 25 mg oral tablet: 1 tab(s) orally once a day, Last Dose Taken: 07-Sep-2018 7:00 AM  · 	ALPRAZolam 0.25 mg oral tablet: 0.5 tab(s) orally once a day (at bedtime), As Needed, Last Dose Taken: 06-Sep-2018 8:00 PM     Past Medical History:  CAD (coronary artery disease)    Endometrial adenocarcinoma    GERD (gastroesophageal reflux disease)    [place] (hard of hearing)    HTN (hypertension)    Hyperlipidemia    Hypothyroid    Macular degeneration    Stented coronary artery.     Past Surgical History:  Ankle fracture, right  surgery 25 yrs ago - hardware removed  S/P coronary angiogram  2005, 2008, 2011 - drug eluding stents  S/P hernia repair  umbilical - 2008  S/P laparoscopic cholecystectomy  2008  S/P EMILY (total abdominal hysterectomy).    PE:  	General: NAD, lying in bed comfortably  	Head- Atraumatic, normocephalic   	HEENT- PERRLA, moist muscous membrane  	Neck-supple, no JVD  	Respiratory: no labored breathing  	Abdominal: Soft, NT, ND +BS,   	Genitourinary- non palpable bladder, no CVAT b/l   	Extremities: No edema, + peripheral pulses  	Skin- no rash, no ulcer  	Psychiatric- mood stable

## 2018-09-08 NOTE — CONSULT NOTE ADULT - ASSESSMENT
#Hypertensive urgency  -admit, monitor her bp and adjust meds  -aldactone increased, watch for hyperkalemia  -continue lopressor 50 mg bid/q12h with parameters  -BP controlled at present    #CAD- ct her home meds     #Planned cystoscopy on monday for hematuria     #GERD- home meds     #HLD- home meds     #hypothyroidism -home meds     #dvt pr -scd for now
89 year old female with bladder tumors scheduled for TURBT today but had to cancel due to elevated BP.     -BP control as per medicine  -if BP is improved and she is stable medically, will plan for TURBT on Monday. Currently scheduled for 12:30pm on Monday  -care per medicine      Please don't hesitate to call with any questions or concerns - [phone].

## 2018-09-09 DIAGNOSIS — F41.9 ANXIETY DISORDER, UNSPECIFIED: ICD-10-CM

## 2018-09-09 LAB
ANION GAP SERPL CALC-SCNC: 8 MMOL/L — SIGNIFICANT CHANGE UP (ref 5–17)
BUN SERPL-MCNC: 17 MG/DL — SIGNIFICANT CHANGE UP (ref 7–23)
CALCIUM SERPL-MCNC: 9.7 MG/DL — SIGNIFICANT CHANGE UP (ref 8.5–10.1)
CHLORIDE SERPL-SCNC: 104 MMOL/L — SIGNIFICANT CHANGE UP (ref 96–108)
CO2 SERPL-SCNC: 27 MMOL/L — SIGNIFICANT CHANGE UP (ref 22–31)
CREAT SERPL-MCNC: 1.13 MG/DL — SIGNIFICANT CHANGE UP (ref 0.5–1.3)
GLUCOSE SERPL-MCNC: 98 MG/DL — SIGNIFICANT CHANGE UP (ref 70–99)
POTASSIUM SERPL-MCNC: 4.6 MMOL/L — SIGNIFICANT CHANGE UP (ref 3.5–5.3)
POTASSIUM SERPL-SCNC: 4.6 MMOL/L — SIGNIFICANT CHANGE UP (ref 3.5–5.3)
SODIUM SERPL-SCNC: 139 MMOL/L — SIGNIFICANT CHANGE UP (ref 135–145)

## 2018-09-09 RX ORDER — DIAZEPAM 5 MG
2.5 TABLET ORAL AT BEDTIME
Qty: 0 | Refills: 0 | Status: DISCONTINUED | OUTPATIENT
Start: 2018-09-09 | End: 2018-09-09

## 2018-09-09 RX ORDER — SPIRONOLACTONE 25 MG/1
50 TABLET, FILM COATED ORAL DAILY
Qty: 0 | Refills: 0 | Status: DISCONTINUED | OUTPATIENT
Start: 2018-09-09 | End: 2018-09-12

## 2018-09-09 RX ADMIN — PANTOPRAZOLE SODIUM 40 MILLIGRAM(S): 20 TABLET, DELAYED RELEASE ORAL at 05:27

## 2018-09-09 RX ADMIN — Medication 75 MILLIGRAM(S): at 17:59

## 2018-09-09 RX ADMIN — Medication 75 MILLIGRAM(S): at 05:27

## 2018-09-09 RX ADMIN — SPIRONOLACTONE 50 MILLIGRAM(S): 25 TABLET, FILM COATED ORAL at 05:27

## 2018-09-09 RX ADMIN — Medication 2.5 MILLIGRAM(S): at 21:11

## 2018-09-09 RX ADMIN — Medication 75 MICROGRAM(S): at 05:27

## 2018-09-09 RX ADMIN — Medication 650 MILLIGRAM(S): at 13:01

## 2018-09-09 NOTE — PROGRESS NOTE ADULT - SUBJECTIVE AND OBJECTIVE BOX
Pt has been seen and examined with FP resident, resident supervised agree with a/p       Patient is a 89y old  Female who presents with a chief complaint of elevated blood pressure (09 Sep 2018 08:32)        HPI:  89 y/o female came today for cystoscopy and found to have elevated blood pressure even though she has her home meds. She waited in presurgical area abut her blood pressure did not get better. On leading questions she denied any chest pain, dizzy, blurry vision (07 Sep 2018 11:33)        PHYSICAL EXAM:  Vital Signs Last 24 Hrs  T(C): 36.6 (09 Sep 2018 05:12), Max: 36.7 (08 Sep 2018 20:00)  T(F): 97.9 (09 Sep 2018 05:12), Max: 98 (08 Sep 2018 20:00)  HR: 68 (09 Sep 2018 05:12) (68 - 75)  BP: 156/68 (09 Sep 2018 05:12) (153/55 - 179/64)  BP(mean): --  RR: 17 (09 Sep 2018 05:12) (16 - 18)  SpO2: 98% (09 Sep 2018 05:12) (97% - 98%)  general- comfortable   -rs-aeeb,cta  -cvs-s1s2 normal   -p/a-soft,bs+  -extremity- no asymmetrical swelling noted   -cns- non focal         A/P    #ct same rx, bp within acceptable range for now     #Diazepam tonight for planned procedure tomorrow

## 2018-09-09 NOTE — PROGRESS NOTE ADULT - ASSESSMENT
#Hypertensive urgency  -admit, monitor her bp and adjust meds  -aldactone increased, watch for hyperkalemia  -continue lopressor 50 mg bid/q12h with parameters  -BP controlled at present    #CAD- ct her home meds     #Planned cystoscopy on monday for hematuria     #GERD- home meds     #HLD- home meds     #hypothyroidism -home meds     #dvt pr -scd for now

## 2018-09-09 NOTE — PROGRESS NOTE ADULT - PROBLEM SELECTOR PLAN 3
- Continue on Synthroid - Diazepan 2.5mg x1 dose at bedtime tonight  - Continue with Xanax PRN after tonight- skip tonight

## 2018-09-09 NOTE — PROGRESS NOTE ADULT - PROBLEM SELECTOR PLAN 2
- Cystoscopy with TURBT scheduled for Monday, 9/10/2018, with Urology - Cystoscopy with TURBT scheduled for Monday, 9/10/2018, with Urology  -NPO after midnight

## 2018-09-09 NOTE — PROGRESS NOTE ADULT - SUBJECTIVE AND OBJECTIVE BOX
HPI:  89 y/o female came in on 9/7/18 for cystoscopy and found to have elevated blood pressure even though she has her home meds. She waited in presurgical area but her blood pressure did not get better. On leading questions she denied any chest pain, dizzy, blurry vision (07 Sep 2018 11:33)      SUBJECTIVE:   9/9/2018:  No acute issues overnight. Pt this morning has no new complaints. BP overnight and today within range of 145-161/59-61. Pt denies palpitations, CP or dizziness. Pt is anxious about cystoscopy tomorrow. All pt's questions were answered at bedside.      REVIEW OF SYSTEMS:  CONSTITUTIONAL: No fevers or chills  EYES/ENT: No visual changes  NECK: No pain or stiffness  RESPIRATORY: No cough, no shortness of breath  CARDIOVASCULAR: No chest pain or palpitations  GASTROINTESTINAL: No abdominal pain. no nausea or vomiting, or hematemesis; No diarrhea or constipation  GENITOURINARY: No dysuria, +hematuria  NEUROLOGICAL: No numbness or weakness  SKIN: No itching, burning, rashes, or lesions   All other review of systems is negative unless indicated above    Vital Signs Last 24 Hrs  T(C): 36.6 (09 Sep 2018 05:12), Max: 36.7 (08 Sep 2018 20:00)  T(F): 97.9 (09 Sep 2018 05:12), Max: 98 (08 Sep 2018 20:00)  HR: 86 (09 Sep 2018 13:08) (68 - 86)  BP: 145/61 (09 Sep 2018 13:08) (145/61 - 179/64)  RR: 17 (09 Sep 2018 13:08) (16 - 18)  SpO2: 100% (09 Sep 2018 13:08) (97% - 100%)    I&O's Summary    08 Sep 2018 07:01  -  09 Sep 2018 07:00  --------------------------------------------------------  IN: 100 mL / OUT: 0 mL / NET: 100 mL      PHYSICAL EXAM:  Constitutional: NAD, awake and alert, well-appearing  HEENT: PERR, EOMI, Normal Hearing, MMM  Neck: Soft and supple, No LAD, No JVD  Respiratory: Breath sounds are clear bilaterally, no wheezing, rales or rhonchi  Cardiovascular: S1 and S2, regular rate and rhythm, no murmurs, gallops or rubs  Gastrointestinal: Bowel Sounds present, soft, nontender, nondistended, no guarding, no rebound  Extremities: No peripheral edema  Vascular: 2+ peripheral pulses  Neurological: A/O x 3, no focal deficits  Musculoskeletal: 5/5 strength b/l upper and lower extremities  Skin: No rashes    MEDICATIONS:  MEDICATIONS  (STANDING):  diazepam    Tablet 2.5 milliGRAM(s) Oral at bedtime  levothyroxine 75 MICROGram(s) Oral daily  melatonin 3 milliGRAM(s) Oral at bedtime  metoprolol tartrate 75 milliGRAM(s) Oral two times a day  pantoprazole    Tablet 40 milliGRAM(s) Oral before breakfast  spironolactone 50 milliGRAM(s) Oral daily      LABS: All Labs Reviewed:    09-09    139  |  104  |  17  ----------------------------<  98  4.6   |  27  |  1.13    Ca    9.7      09 Sep 2018 07:28

## 2018-09-09 NOTE — PROGRESS NOTE ADULT - ASSESSMENT
89 yo F a with h/o endometrial cancer, CAD s/p stents, Hypothyroidism, GERD and HLD, presenting with elevated BP prior to scheduled cystoscopy procedure, admitted for hypertensive urgency.

## 2018-09-09 NOTE — PROGRESS NOTE ADULT - SUBJECTIVE AND OBJECTIVE BOX
CHIEF COMPLAINT:    HPI:  89 y/o female came today for cystoscopy and found to have elevated blood pressure even though she has her home meds. She waited in presurgical area abut her blood pressure did not get better. On leading questions she denied any chest pain, dizzy, blurry vision (07 Sep 2018 11:33)  /56 with HR 72. Pt was taking lopressor 50 mg bid at home and aldactone once a day.  EKG showed sinus rhythm with 1st degree AVB, rate 78, nonspecific T wave abnormality. Daughter at bedside and I spoke to her and pt.     9/9: /68. Lopressor was increased to 75 mg bid. HR 68. She c/o frequent urination. She was only taking spironolactone 25 mg daily at home. Dose was increased, but I would decrease to 50 mg daily, not bid. Cleared for surgery tomorrow.     PAST MEDICAL & SURGICAL HISTORY:  Endometrial adenocarcinoma  Macular degeneration  Stented coronary artery  Hypothyroid  Hyperlipidemia  HTN (hypertension)  GERD (gastroesophageal reflux disease)  CAD (coronary artery disease)  [place] (hard of hearing)  S/P EMILY (total abdominal hysterectomy)  S/P hernia repair: umbilical - 2008  S/P laparoscopic cholecystectomy: 2008  Ankle fracture, right: surgery 25 yrs ago - hardware removed  S/P coronary angiogram: 2005, 2008, 2011 - drug eluding stents      Allergies    amlodipine (Unknown)  clonidine (Unknown)  Levatol (Unknown)  Lipitor (Unknown)  Lotrel (Unknown)  methylPREDNISolone (Unknown)  morphine (Other)  Plaquenil (Unknown)  statins (Other (Unknown))  sulfa drugs (Rash)    Intolerances        Occupation:  Alochol: Denied  Smoking: Denied  Drug Use: Denied  Marital Status:         FAMILY HISTORY:  Family history of brain cancer (Father)      REVIEW OF SYSTEMS:    CONSTITUTIONAL: No weakness, fevers or chills  EYES/ENT: No visual changes;  No vertigo or throat pain   NECK: No pain or stiffness  RESPIRATORY: No cough, wheezing, hemoptysis; No shortness of breath  CARDIOVASCULAR: No chest pain or palpitations  GASTROINTESTINAL: No abdominal or epigastric pain. No nausea, vomiting, or hematemesis; No diarrhea or constipation. No melena or hematochezia.  GENITOURINARY: No dysuria, frequency or hematuria  NEUROLOGICAL: No numbness or weakness  SKIN: No itching, burning, rashes, or lesions   All other review of systems is negative unless indicated above    Vital Signs Last 24 Hrs  T(C): 36.7 (08 Sep 2018 05:12), Max: 36.8 (07 Sep 2018 11:08)  T(F): 98.1 (08 Sep 2018 05:12), Max: 98.3 (07 Sep 2018 11:08)  HR: 70 (08 Sep 2018 05:12) (70 - 79)  BP: 139/56 (08 Sep 2018 05:12) (122/54 - 188/80)  BP(mean): --  RR: 18 (08 Sep 2018 05:12) (14 - 18)  SpO2: 98% (08 Sep 2018 05:12) (98% - 100%)    I&O's Summary      PHYSICAL EXAM:    Constitutional: NAD, awake and alert, well-developed  HEENT: PERR, EOMI,  No oral cyananosis.  Neck:  supple,  No JVD  Respiratory: Breath sounds are clear bilaterally, No wheezing, rales or rhonchi  Cardiovascular: S1 and S2, regular rate and rhythm, no Murmurs, gallops or rubs  Gastrointestinal: Bowel Sounds present, soft, nontender.   Extremities: No peripheral edema. No clubbing or cyanosis.  Vascular: 2+ peripheral pulses  Neurological: A/O x 3, no focal deficits  Musculoskeletal: no calf tenderness.  Skin: No rashes.    MEDICATIONS:  MEDICATIONS  (STANDING):  levothyroxine 75 MICROGram(s) Oral daily  metoprolol tartrate 50 milliGRAM(s) Oral every 12 hours  pantoprazole    Tablet 40 milliGRAM(s) Oral before breakfast  spironolactone 50 milliGRAM(s) Oral two times a day      LABS: All Labs Reviewed:                        13.1   8.58  )-----------( 323      ( 07 Sep 2018 11:54 )             41.7     08 Sep 2018 07:46    139    |  106    |  19     ----------------------------<  102    4.2     |  23     |  1.07   07 Sep 2018 11:54    138    |  105    |  16     ----------------------------<  117    4.5     |  23     |  1.00     Ca    9.4        08 Sep 2018 07:46  Ca    9.5        07 Sep 2018 11:54  Phos  3.1       07 Sep 2018 11:54    TPro  x      /  Alb  3.7    /  TBili  x      /  DBili  x      /  AST  x      /  ALT  x      /  AlkPhos  x      07 Sep 2018 11:54          Blood Culture:         RADIOLOGY/EKG:

## 2018-09-10 ENCOUNTER — RESULT REVIEW (OUTPATIENT)
Age: 83
End: 2018-09-10

## 2018-09-10 LAB
ANION GAP SERPL CALC-SCNC: 8 MMOL/L — SIGNIFICANT CHANGE UP (ref 5–17)
BUN SERPL-MCNC: 22 MG/DL — SIGNIFICANT CHANGE UP (ref 7–23)
CALCIUM SERPL-MCNC: 9.6 MG/DL — SIGNIFICANT CHANGE UP (ref 8.5–10.1)
CHLORIDE SERPL-SCNC: 105 MMOL/L — SIGNIFICANT CHANGE UP (ref 96–108)
CO2 SERPL-SCNC: 24 MMOL/L — SIGNIFICANT CHANGE UP (ref 22–31)
CREAT SERPL-MCNC: 1.18 MG/DL — SIGNIFICANT CHANGE UP (ref 0.5–1.3)
GLUCOSE SERPL-MCNC: 93 MG/DL — SIGNIFICANT CHANGE UP (ref 70–99)
HCT VFR BLD CALC: 42.1 % — SIGNIFICANT CHANGE UP (ref 34.5–45)
HGB BLD-MCNC: 13.1 G/DL — SIGNIFICANT CHANGE UP (ref 11.5–15.5)
MCHC RBC-ENTMCNC: 27.2 PG — SIGNIFICANT CHANGE UP (ref 27–34)
MCHC RBC-ENTMCNC: 31.1 GM/DL — LOW (ref 32–36)
MCV RBC AUTO: 87.5 FL — SIGNIFICANT CHANGE UP (ref 80–100)
NRBC # BLD: 0 /100 WBCS — SIGNIFICANT CHANGE UP (ref 0–0)
PLATELET # BLD AUTO: 327 K/UL — SIGNIFICANT CHANGE UP (ref 150–400)
POTASSIUM SERPL-MCNC: 4.8 MMOL/L — SIGNIFICANT CHANGE UP (ref 3.5–5.3)
POTASSIUM SERPL-SCNC: 4.8 MMOL/L — SIGNIFICANT CHANGE UP (ref 3.5–5.3)
RBC # BLD: 4.81 M/UL — SIGNIFICANT CHANGE UP (ref 3.8–5.2)
RBC # FLD: 14.8 % — HIGH (ref 10.3–14.5)
SODIUM SERPL-SCNC: 137 MMOL/L — SIGNIFICANT CHANGE UP (ref 135–145)
WBC # BLD: 10.34 K/UL — SIGNIFICANT CHANGE UP (ref 3.8–10.5)
WBC # FLD AUTO: 10.34 K/UL — SIGNIFICANT CHANGE UP (ref 3.8–10.5)

## 2018-09-10 PROCEDURE — 88342 IMHCHEM/IMCYTCHM 1ST ANTB: CPT | Mod: 26

## 2018-09-10 PROCEDURE — 88305 TISSUE EXAM BY PATHOLOGIST: CPT | Mod: 26

## 2018-09-10 PROCEDURE — 88341 IMHCHEM/IMCYTCHM EA ADD ANTB: CPT | Mod: 26

## 2018-09-10 PROCEDURE — 88307 TISSUE EXAM BY PATHOLOGIST: CPT | Mod: 26

## 2018-09-10 RX ORDER — MEPERIDINE HYDROCHLORIDE 50 MG/ML
12.5 INJECTION INTRAMUSCULAR; INTRAVENOUS; SUBCUTANEOUS
Qty: 0 | Refills: 0 | Status: DISCONTINUED | OUTPATIENT
Start: 2018-09-10 | End: 2018-09-10

## 2018-09-10 RX ORDER — SODIUM CHLORIDE 9 MG/ML
1000 INJECTION, SOLUTION INTRAVENOUS
Qty: 0 | Refills: 0 | Status: DISCONTINUED | OUTPATIENT
Start: 2018-09-10 | End: 2018-09-10

## 2018-09-10 RX ORDER — PANTOPRAZOLE SODIUM 20 MG/1
40 TABLET, DELAYED RELEASE ORAL ONCE
Qty: 0 | Refills: 0 | Status: COMPLETED | OUTPATIENT
Start: 2018-09-10 | End: 2018-09-10

## 2018-09-10 RX ORDER — ACETAMINOPHEN 500 MG
1000 TABLET ORAL ONCE
Qty: 0 | Refills: 0 | Status: COMPLETED | OUTPATIENT
Start: 2018-09-10 | End: 2018-09-10

## 2018-09-10 RX ORDER — ALPRAZOLAM 0.25 MG
0.25 TABLET ORAL AT BEDTIME
Qty: 0 | Refills: 0 | Status: DISCONTINUED | OUTPATIENT
Start: 2018-09-10 | End: 2018-09-12

## 2018-09-10 RX ORDER — FENTANYL CITRATE 50 UG/ML
25 INJECTION INTRAVENOUS
Qty: 0 | Refills: 0 | Status: DISCONTINUED | OUTPATIENT
Start: 2018-09-10 | End: 2018-09-10

## 2018-09-10 RX ORDER — ONDANSETRON 8 MG/1
4 TABLET, FILM COATED ORAL ONCE
Qty: 0 | Refills: 0 | Status: COMPLETED | OUTPATIENT
Start: 2018-09-10 | End: 2018-09-10

## 2018-09-10 RX ADMIN — Medication 75 MILLIGRAM(S): at 05:19

## 2018-09-10 RX ADMIN — PANTOPRAZOLE SODIUM 40 MILLIGRAM(S): 20 TABLET, DELAYED RELEASE ORAL at 16:22

## 2018-09-10 RX ADMIN — Medication 1000 MILLIGRAM(S): at 16:54

## 2018-09-10 RX ADMIN — ONDANSETRON 4 MILLIGRAM(S): 8 TABLET, FILM COATED ORAL at 14:36

## 2018-09-10 RX ADMIN — Medication 75 MICROGRAM(S): at 05:20

## 2018-09-10 RX ADMIN — Medication 0.62 MILLIGRAM(S): at 15:00

## 2018-09-10 RX ADMIN — Medication 75 MILLIGRAM(S): at 17:50

## 2018-09-10 RX ADMIN — Medication 0.25 MILLIGRAM(S): at 20:48

## 2018-09-10 RX ADMIN — Medication 400 MILLIGRAM(S): at 14:38

## 2018-09-10 RX ADMIN — SODIUM CHLORIDE 75 MILLILITER(S): 9 INJECTION, SOLUTION INTRAVENOUS at 15:09

## 2018-09-10 RX ADMIN — SPIRONOLACTONE 50 MILLIGRAM(S): 25 TABLET, FILM COATED ORAL at 05:19

## 2018-09-10 RX ADMIN — Medication 0.62 MILLIGRAM(S): at 16:07

## 2018-09-10 NOTE — PROGRESS NOTE ADULT - ASSESSMENT
89 yo F a with h/o endometrial cancer, CAD s/p stents, Hypothyroidism, GERD and HLD, presenting with elevated BP prior to scheduled cystoscopy procedure, admitted for hypertensive urgency, currently with improved and stable BP. Pt is s/p cystoscopy with biopsy and fulguration of the bladder.

## 2018-09-10 NOTE — BRIEF OPERATIVE NOTE - SPECIMENS
1. Bladder tumor  2. posterior wall of bladder  3. right lateral wall 4. dome of bladder  5. left lateral wall of bladder   6. trigone of bladder

## 2018-09-10 NOTE — PROGRESS NOTE ADULT - ASSESSMENT
89 year old female s/p cystoscopy, TURBT, bladder biopsies today. Tolerated procedure well. No intra-operative issues.     Plan:    -if remains medically stable, okay for discharge from  perspective with verma to leg bag  -can resume ASA 81mg in 24 hours if urine remains clear  -to follow up on Friday, 9/14, for TOV in office ([phone])  -to follow up in 2 weeks for pathology review  -plan discussed with son, Adama.

## 2018-09-10 NOTE — STUDENT SIGN OFF DOCUMENT - CO-SIGNATURE DATE
10-Sep-2018 42 yo female s/p trying to catch her falling kate tree 1 hour ago with her right hand, tree pulled on her right arm now c/o right shoulder and right sided neck/trapezius pain.  +numbness tingling.  No chest pain, no shortness of breath.

## 2018-09-10 NOTE — PROGRESS NOTE ADULT - SUBJECTIVE AND OBJECTIVE BOX
CHIEF COMPLAINT:    HPI:  89 y/o female came today for cystoscopy and found to have elevated blood pressure even though she has her home meds. She waited in presurgical area abut her blood pressure did not get better. On leading questions she denied any chest pain, dizzy, blurry vision (07 Sep 2018 11:33)  /56 with HR 72. Pt was taking lopressor 50 mg bid at home and aldactone once a day.  EKG showed sinus rhythm with 1st degree AVB, rate 78, nonspecific T wave abnormality. Daughter at bedside and I spoke to her and pt.     9/9: /68. Lopressor was increased to 75 mg bid. HR 68. She c/o frequent urination. She was only taking spironolactone 25 mg daily at home. Dose was increased, but I would decrease to 50 mg daily, not bid. Cleared for surgery tomorrow.     9/10: /56. Cleared for surgery.    PAST MEDICAL & SURGICAL HISTORY:  Endometrial adenocarcinoma  Macular degeneration  Stented coronary artery  Hypothyroid  Hyperlipidemia  HTN (hypertension)  GERD (gastroesophageal reflux disease)  CAD (coronary artery disease)  [place] (hard of hearing)  S/P EMILY (total abdominal hysterectomy)  S/P hernia repair: umbilical - 2008  S/P laparoscopic cholecystectomy: 2008  Ankle fracture, right: surgery 25 yrs ago - hardware removed  S/P coronary angiogram: 2005, 2008, 2011 - drug eluding stents      Allergies    amlodipine (Unknown)  clonidine (Unknown)  Levatol (Unknown)  Lipitor (Unknown)  Lotrel (Unknown)  methylPREDNISolone (Unknown)  morphine (Other)  Plaquenil (Unknown)  statins (Other (Unknown))  sulfa drugs (Rash)    Intolerances        Occupation:  Alochol: Denied  Smoking: Denied  Drug Use: Denied  Marital Status:         FAMILY HISTORY:  Family history of brain cancer (Father)      REVIEW OF SYSTEMS:    CONSTITUTIONAL: No weakness, fevers or chills  EYES/ENT: No visual changes;  No vertigo or throat pain   NECK: No pain or stiffness  RESPIRATORY: No cough, wheezing, hemoptysis; No shortness of breath  CARDIOVASCULAR: No chest pain or palpitations  GASTROINTESTINAL: No abdominal or epigastric pain. No nausea, vomiting, or hematemesis; No diarrhea or constipation. No melena or hematochezia.  GENITOURINARY: No dysuria, frequency or hematuria  NEUROLOGICAL: No numbness or weakness  SKIN: No itching, burning, rashes, or lesions   All other review of systems is negative unless indicated above    Vital Signs Last 24 Hrs  T(C): 36.7 (08 Sep 2018 05:12), Max: 36.8 (07 Sep 2018 11:08)  T(F): 98.1 (08 Sep 2018 05:12), Max: 98.3 (07 Sep 2018 11:08)  HR: 70 (08 Sep 2018 05:12) (70 - 79)  BP: 139/56 (08 Sep 2018 05:12) (122/54 - 188/80)  BP(mean): --  RR: 18 (08 Sep 2018 05:12) (14 - 18)  SpO2: 98% (08 Sep 2018 05:12) (98% - 100%)    I&O's Summary      PHYSICAL EXAM:    Constitutional: NAD, awake and alert, well-developed  HEENT: PERR, EOMI,  No oral cyananosis.  Neck:  supple,  No JVD  Respiratory: Breath sounds are clear bilaterally, No wheezing, rales or rhonchi  Cardiovascular: S1 and S2, regular rate and rhythm, no Murmurs, gallops or rubs  Gastrointestinal: Bowel Sounds present, soft, nontender.   Extremities: No peripheral edema. No clubbing or cyanosis.  Vascular: 2+ peripheral pulses  Neurological: A/O x 3, no focal deficits  Musculoskeletal: no calf tenderness.  Skin: No rashes.    MEDICATIONS:  MEDICATIONS  (STANDING):  levothyroxine 75 MICROGram(s) Oral daily  metoprolol tartrate 50 milliGRAM(s) Oral every 12 hours  pantoprazole    Tablet 40 milliGRAM(s) Oral before breakfast  spironolactone 50 milliGRAM(s) Oral two times a day      LABS: All Labs Reviewed:                        13.1   8.58  )-----------( 323      ( 07 Sep 2018 11:54 )             41.7     08 Sep 2018 07:46    139    |  106    |  19     ----------------------------<  102    4.2     |  23     |  1.07   07 Sep 2018 11:54    138    |  105    |  16     ----------------------------<  117    4.5     |  23     |  1.00     Ca    9.4        08 Sep 2018 07:46  Ca    9.5        07 Sep 2018 11:54  Phos  3.1       07 Sep 2018 11:54    TPro  x      /  Alb  3.7    /  TBili  x      /  DBili  x      /  AST  x      /  ALT  x      /  AlkPhos  x      07 Sep 2018 11:54          Blood Culture:         RADIOLOGY/EKG:

## 2018-09-10 NOTE — PROGRESS NOTE ADULT - SUBJECTIVE AND OBJECTIVE BOX
Patient seen and examined at bedside. BP better controlled. Cleared for surgery by cardiology. No f/c/n/v. No CP, SOB. No hematuria.     Discussed risks of TURBT, possible ureteral stent placement, possible bladder biopsy including risks of pain, infection, bleeding, ureteral injury, bladder perforation, recurrence of tumor, need for staged surgery, DVT/PE/MI/stroke/pneumonia, and death. She verbalized understanding of these risks and is willing to proceed today with proposed procedure. I answered all questions.

## 2018-09-10 NOTE — PHYSICAL THERAPY INITIAL EVALUATION ADULT - PERTINENT HX OF CURRENT PROBLEM, REHAB EVAL
Pt is a 89 y/o F admitted secondary to scheduled cystoscopy on 9/7/18 but procedure cancelled due to elevated BP (~200/100).

## 2018-09-10 NOTE — BRIEF OPERATIVE NOTE - PRE-OP DX
Abnormal urine cytology  09/10/2018    Active  John Cano  Bladder tumor  09/10/2018    Active  John Cano  Gross hematuria  09/10/2018    Active  John Cano

## 2018-09-10 NOTE — PROGRESS NOTE ADULT - PROBLEM SELECTOR PLAN 2
- s/p Cystoscopy with biopsy obtained and bladder fulguration - see Operative note  - Will f/u with pathology report of biopsy

## 2018-09-10 NOTE — BRIEF OPERATIVE NOTE - PROCEDURE
<<-----Click on this checkbox to enter Procedure Cystoscopy with biopsy and fulguration of bladder  09/10/2018    Active  CHIRAG  TURBT, using monopolar cautery  09/10/2018    Active  CHIRAG

## 2018-09-10 NOTE — PROGRESS NOTE ADULT - SUBJECTIVE AND OBJECTIVE BOX
HPI:  89 y/o female came in on 9/7/18 for cystoscopy and found to have elevated blood pressure even though she has her home meds. She waited in presurgical area but her blood pressure did not get better. On leading questions she denied any chest pain, dizzy, blurry vision (07 Sep 2018 11:33)      SUBJECTIVE:     9/10/2018:  Pt has BP of 145/56 which is improved from previous days. Pt denies any symptoms today. She was kept NPO overnight for cystoscopy procedure, which was done today, and was cleared by Cardiology prior to the procedure.      REVIEW OF SYSTEMS:  CONSTITUTIONAL: No weakness, fevers or chills  EYES/ENT: No visual changes  NECK: No pain or stiffness  RESPIRATORY: No cough, no shortness of breath  CARDIOVASCULAR: No chest pain or palpitations  GASTROINTESTINAL: No abdominal or epigastric pain. No nausea, vomiting, no diarrhea or constipation  GENITOURINARY: No dysuria  NEUROLOGICAL: No numbness or weakness  SKIN: No itching, burning, rashes, or lesions   All other review of systems is negative unless indicated above    Vital Signs Last 24 Hrs  T(C): 36.4 (10 Sep 2018 16:47), Max: 36.8 (10 Sep 2018 16:30)  T(F): 97.5 (10 Sep 2018 16:47), Max: 98.2 (10 Sep 2018 16:30)  HR: 70 (10 Sep 2018 16:47) (65 - 97)  BP: 152/55 (10 Sep 2018 16:47) (145/56 - 229/89)  RR: 18 (10 Sep 2018 16:47) (12 - 18)  SpO2: 100% (10 Sep 2018 16:47) (92% - 100%)    I&O's Summary    09 Sep 2018 07:01  -  10 Sep 2018 07:00  --------------------------------------------------------  IN: 50 mL / OUT: 0 mL / NET: 50 mL    10 Sep 2018 07:01  -  10 Sep 2018 20:11  --------------------------------------------------------  IN: 600 mL / OUT: 140 mL / NET: 460 mL        PHYSICAL EXAM:  Constitutional: NAD, awake and alert, appears well  HEENT: PERR, EOMI, Normal Hearing, MMM  Neck: Soft and supple, No LAD, No JVD  Respiratory: Breath sounds are clear bilaterally, no wheezing, rales or rhonchi  Cardiovascular: S1 and S2, regular rate and rhythm, no Murmurs, gallops or rubs  Gastrointestinal: Bowel sounds present, soft, nontender, nondistended  Extremities: No peripheral edema  Vascular: 2+ peripheral pulses  Neurological: A/O x 3, no focal deficits  Musculoskeletal: 5/5 strength b/l upper and lower extremities  Skin: No rashes    MEDICATIONS:  MEDICATIONS  (STANDING):  levothyroxine 75 MICROGram(s) Oral daily  melatonin 3 milliGRAM(s) Oral at bedtime  metoprolol tartrate 75 milliGRAM(s) Oral two times a day  pantoprazole    Tablet 40 milliGRAM(s) Oral before breakfast  spironolactone 50 milliGRAM(s) Oral daily      LABS: All Labs Reviewed:                        13.1   10.34 )-----------( 327      ( 10 Sep 2018 10:26 )             42.1     09-10    137  |  105  |  22  ----------------------------<  93  4.8   |  24  |  1.18    Ca    9.6      10 Sep 2018 10:26

## 2018-09-10 NOTE — BRIEF OPERATIVE NOTE - OPERATION/FINDINGS
+bladder tumors along left lateral wall of bladder encompassing ~3.0cm. +erythema on posterior wall of bladder.

## 2018-09-11 ENCOUNTER — TRANSCRIPTION ENCOUNTER (OUTPATIENT)
Age: 83
End: 2018-09-11

## 2018-09-11 DIAGNOSIS — R79.89 OTHER SPECIFIED ABNORMAL FINDINGS OF BLOOD CHEMISTRY: ICD-10-CM

## 2018-09-11 LAB
ANION GAP SERPL CALC-SCNC: 8 MMOL/L — SIGNIFICANT CHANGE UP (ref 5–17)
BUN SERPL-MCNC: 21 MG/DL — SIGNIFICANT CHANGE UP (ref 7–23)
CALCIUM SERPL-MCNC: 8.8 MG/DL — SIGNIFICANT CHANGE UP (ref 8.5–10.1)
CHLORIDE SERPL-SCNC: 100 MMOL/L — SIGNIFICANT CHANGE UP (ref 96–108)
CO2 SERPL-SCNC: 24 MMOL/L — SIGNIFICANT CHANGE UP (ref 22–31)
CREAT SERPL-MCNC: 1.32 MG/DL — HIGH (ref 0.5–1.3)
GLUCOSE SERPL-MCNC: 99 MG/DL — SIGNIFICANT CHANGE UP (ref 70–99)
HCT VFR BLD CALC: 37.6 % — SIGNIFICANT CHANGE UP (ref 34.5–45)
HGB BLD-MCNC: 11.7 G/DL — SIGNIFICANT CHANGE UP (ref 11.5–15.5)
MCHC RBC-ENTMCNC: 26.8 PG — LOW (ref 27–34)
MCHC RBC-ENTMCNC: 31.1 GM/DL — LOW (ref 32–36)
MCV RBC AUTO: 86 FL — SIGNIFICANT CHANGE UP (ref 80–100)
NRBC # BLD: 0 /100 WBCS — SIGNIFICANT CHANGE UP (ref 0–0)
PLATELET # BLD AUTO: 298 K/UL — SIGNIFICANT CHANGE UP (ref 150–400)
POTASSIUM SERPL-MCNC: 4.4 MMOL/L — SIGNIFICANT CHANGE UP (ref 3.5–5.3)
POTASSIUM SERPL-SCNC: 4.4 MMOL/L — SIGNIFICANT CHANGE UP (ref 3.5–5.3)
RBC # BLD: 4.37 M/UL — SIGNIFICANT CHANGE UP (ref 3.8–5.2)
RBC # FLD: 14.7 % — HIGH (ref 10.3–14.5)
SODIUM SERPL-SCNC: 132 MMOL/L — LOW (ref 135–145)
WBC # BLD: 12.89 K/UL — HIGH (ref 3.8–10.5)
WBC # FLD AUTO: 12.89 K/UL — HIGH (ref 3.8–10.5)

## 2018-09-11 RX ORDER — METOPROLOL TARTRATE 50 MG
50 TABLET ORAL
Qty: 0 | Refills: 0 | Status: DISCONTINUED | OUTPATIENT
Start: 2018-09-11 | End: 2018-09-12

## 2018-09-11 RX ORDER — ASPIRIN/CALCIUM CARB/MAGNESIUM 324 MG
325 TABLET ORAL DAILY
Qty: 0 | Refills: 0 | Status: DISCONTINUED | OUTPATIENT
Start: 2018-09-11 | End: 2018-09-12

## 2018-09-11 RX ORDER — POLYETHYLENE GLYCOL 3350 17 G/17G
17 POWDER, FOR SOLUTION ORAL ONCE
Qty: 0 | Refills: 0 | Status: COMPLETED | OUTPATIENT
Start: 2018-09-11 | End: 2018-09-11

## 2018-09-11 RX ORDER — LANOLIN ALCOHOL/MO/W.PET/CERES
1 CREAM (GRAM) TOPICAL
Qty: 0 | Refills: 0 | COMMUNITY
Start: 2018-09-11

## 2018-09-11 RX ORDER — SODIUM CHLORIDE 9 MG/ML
1000 INJECTION INTRAMUSCULAR; INTRAVENOUS; SUBCUTANEOUS
Qty: 0 | Refills: 0 | Status: DISCONTINUED | OUTPATIENT
Start: 2018-09-11 | End: 2018-09-12

## 2018-09-11 RX ADMIN — PANTOPRAZOLE SODIUM 40 MILLIGRAM(S): 20 TABLET, DELAYED RELEASE ORAL at 04:56

## 2018-09-11 RX ADMIN — POLYETHYLENE GLYCOL 3350 17 GRAM(S): 17 POWDER, FOR SOLUTION ORAL at 09:49

## 2018-09-11 RX ADMIN — Medication 650 MILLIGRAM(S): at 10:23

## 2018-09-11 RX ADMIN — Medication 650 MILLIGRAM(S): at 18:52

## 2018-09-11 RX ADMIN — SPIRONOLACTONE 50 MILLIGRAM(S): 25 TABLET, FILM COATED ORAL at 04:56

## 2018-09-11 RX ADMIN — Medication 0.25 MILLIGRAM(S): at 21:01

## 2018-09-11 RX ADMIN — Medication 650 MILLIGRAM(S): at 04:54

## 2018-09-11 RX ADMIN — Medication 650 MILLIGRAM(S): at 10:53

## 2018-09-11 RX ADMIN — Medication 325 MILLIGRAM(S): at 16:08

## 2018-09-11 RX ADMIN — Medication 30 MILLILITER(S): at 09:11

## 2018-09-11 RX ADMIN — Medication 75 MILLIGRAM(S): at 04:56

## 2018-09-11 RX ADMIN — Medication 75 MICROGRAM(S): at 04:56

## 2018-09-11 RX ADMIN — Medication 50 MILLIGRAM(S): at 18:52

## 2018-09-11 RX ADMIN — SODIUM CHLORIDE 50 MILLILITER(S): 9 INJECTION INTRAMUSCULAR; INTRAVENOUS; SUBCUTANEOUS at 09:12

## 2018-09-11 RX ADMIN — Medication 650 MILLIGRAM(S): at 03:25

## 2018-09-11 NOTE — DISCHARGE NOTE ADULT - CARE PROVIDER_API CALL
Mayra Sánchez), Cardiovascular Disease; Internal Medicine  [address] [place] [address]  Phone: [phone]  Fax: [phone]    John Cano), Urology Surgery  [address] [place] [address]  Phone: [phone]  Fax: [phone]

## 2018-09-11 NOTE — DISCHARGE NOTE ADULT - PLAN OF CARE
BP -130-140 -Continue to take Metoprolol and Aldactone as instructed   - Low salt diet  -Monitor your BP closely   -Take your blood pressure medicine exactly as directed. Don't skip doses. Missing doses can cause your blood pressure to get out of control.  If you do miss a dose (or doses) check with your healthcare provider about what to do.  -Avoid medicine that contain heart stimulants, including over-the-counter drugs. Check for warnings about high blood pressure on the label. Ask the pharmacist before purchasing something you haven't used before  -Check with your doctor or pharmacist before taking a decongestant. Some decongestants can worsen high blood pressure  -Limit canned, dried, packaged, and fast foods.  -Don't add salt to your food at the table.  -Season foods with herbs instead of salt when you cook.  -Request no added salt when you go to a restaurant.  -The American Heart Association's (AHA) "ideal" sodium intake recommendation is 1,500 milligrams per day. However, since American's eat so much salt, the AHA says a positive change can occur by cutting back to even 2,400 milligrams of sodium a day.  -Follow the DASH (Dietary Approaches to Stop Hypertension) eating plan. This plan recommends vegetables, fruits, whole gains, and other heart healthy foods. No bleeding ,abdominal pain -s/p cystoscopy, Transurethral Bladder resection, bladder biopsies 9/10.   -Tolerated procedure well. No intra-operative issues.   -follow up on Friday, 9/14, for [place] in office ([phone])  - follow up in 2 weeks for pathology review No chest pain ,Shortness of breath -continue home medication stable

## 2018-09-11 NOTE — DISCHARGE NOTE ADULT - MEDICATION SUMMARY - MEDICATIONS TO TAKE
I will START or STAY ON the medications listed below when I get home from the hospital:    spironolactone 25 mg oral tablet  -- 1 tab(s) by mouth once a day  -- Indication: For HTN (hypertension)    acetaminophen 500 mg oral tablet  -- 2 tab(s) by mouth every 6 hours, As needed, Mild Pain (1 - 3) or HA  -- Indication: For Analgesia    aspirin 325 mg oral tablet  -- 1 tab(s) by mouth once a day  -- Indication: For CAD (coronary artery disease)    ALPRAZolam 0.25 mg oral tablet  -- 0.5 tab(s) by mouth once a day (at bedtime), As Needed  -- Indication: For Anxiety    metoprolol succinate 50 mg oral tablet, extended release  -- 1 tab(s) by mouth 2 times a day  -- Indication: For HTN (hypertension)    ocular lubricant ophthalmic solution  -- 1 drop(s) to each affected eye 3 times a day  -- Indication: For Eye drops    Protonix 40 mg oral delayed release tablet  -- 1 tab(s) by mouth once a day  -- Indication: For GERD (gastroesophageal reflux disease)    Synthroid 75 mcg (0.075 mg) oral tablet  -- 1 tab(s) by mouth once a day  -- Indication: For Hypothyroid    Vitamin D3 2000 intl units oral capsule  -- 1 cap(s) by mouth once a day  -- Indication: For nutritional supplement

## 2018-09-11 NOTE — DISCHARGE NOTE ADULT - CARE PROVIDERS DIRECT ADDRESSES
[email or web address]

## 2018-09-11 NOTE — PROGRESS NOTE ADULT - SUBJECTIVE AND OBJECTIVE BOX
HPI:  89 y/o female came in on 9/7/18 for cystoscopy and found to have elevated blood pressure even though she has her home meds. She waited in presurgical area but her blood pressure did not get better. On leading questions she denied any chest pain, dizzy, blurry vision (07 Sep 2018 11:33)      INTERVAL HPI/OVERNIGHT EVENTS: Patient states procedure yesterday , went well she was in hold as BP was elevated in preprocedure      .Overnight she couldn't sleep had a bad night because A/C was broken in her room Complaining of discomfort in epigastric area which she has for the past few months takes Protonix Mylanta didn't help for pain .Uncomfortable with Valdes .    MEDICATIONS  (STANDING):  aspirin 325 milliGRAM(s) Oral daily  levothyroxine 75 MICROGram(s) Oral daily  melatonin 3 milliGRAM(s) Oral at bedtime  metoprolol tartrate 75 milliGRAM(s) Oral two times a day  pantoprazole    Tablet 40 milliGRAM(s) Oral before breakfast  sodium chloride 0.9%. 1000 milliLiter(s) (50 mL/Hr) IV Continuous <Continuous>  spironolactone 50 milliGRAM(s) Oral daily    MEDICATIONS  (PRN):  acetaminophen   Tablet .. 650 milliGRAM(s) Oral every 8 hours PRN Mild Pain (1 - 3)  ALPRAZolam 0.25 milliGRAM(s) Oral at bedtime PRN insomnia  aluminum hydroxide/magnesium hydroxide/simethicone Suspension 30 milliLiter(s) Oral every 6 hours PRN Dyspepsia  ondansetron   Disintegrating Tablet 8 milliGRAM(s) Oral three times a day PRN Nausea and/or Vomiting      Allergies    amlodipine (Unknown)  clonidine (Unknown)  Levatol (Unknown)  Lipitor (Unknown)  Lotrel (Unknown)  methylPREDNISolone (Unknown)  morphine (Other)  Plaquenil (Unknown)  statins (Other (Unknown))  sulfa drugs (Rash)    Intolerances        REVIEW OF SYSTEMS:  CONSTITUTIONAL: No fevers or chills  EYES/ENT: No visual changes  NECK: No pain or stiffness  RESPIRATORY: No cough, no shortness of breath  CARDIOVASCULAR: No chest pain or palpitations  GASTROINTESTINAL: +Burning sensation/discomfort in the epigastric area  no nausea or vomiting, or hematemesis; No diarrhea or constipation  GENITOURINARY: No dysuria, hematuria  NEUROLOGICAL: No numbness or weakness  SKIN: No itching, burning, rashes, or lesions   All other review of systems is negative unless indicated above      Vital Signs Last 24 Hrs  T(C): 36.7 (11 Sep 2018 10:43), Max: 36.8 (10 Sep 2018 16:30)  T(F): 98 (11 Sep 2018 10:43), Max: 98.2 (10 Sep 2018 16:30)  HR: 74 (11 Sep 2018 10:43) (65 - 97)  BP: 136/48 (11 Sep 2018 10:43) (107/41 - 229/89)  BP(mean): --  RR: 17 (11 Sep 2018 10:43) (12 - 18)  SpO2: 98% (11 Sep 2018 10:43) (97% - 100%)    PHYSICAL EXAM:  Constitutional: NAD, awake and alert, well-appearing  HEENT: PERR, EOMI, Normal Hearing, MMM  Neck: Soft and supple, No LAD, No JVD  Respiratory: Breath sounds are clear bilaterally, no wheezing, rales or rhonchi  Cardiovascular: S1 and S2, regular rate and rhythm, no murmurs, gallops or rubs  Gastrointestinal: Bowel Sounds present, soft, nontender, nondistended, no guarding, no rebound with Valdes   Extremities: No peripheral edema  Vascular: 2+ peripheral pulses  Neurological: A/O x 3, no focal deficits  Musculoskeletal: 5/5 strength b/l upper and lower extremities  Skin: No rashes      LABS:                        11.7   12.89 )-----------( 298      ( 11 Sep 2018 07:59 )             37.6     09-11    132<L>  |  100  |  21  ----------------------------<  99  4.4   |  24  |  1.32<H>    Ca    8.8      11 Sep 2018 07:59          CAPILLARY BLOOD GLUCOSE          RADIOLOGY & ADDITIONAL TESTS:    Imaging Personally Reviewed:  [ ] YES  [ ] NO    Consultant(s) Notes Reviewed:  [ ] YES  [ ] NO    Care Discussed with Consultants/Other Providers [ ] YES  [ ] NO HPI:  89 y/o female came in on 9/7/18 for cystoscopy and found to have elevated blood pressure even though she has her home meds. She waited in presurgical area but her blood pressure did not get better. On leading questions she denied any chest pain, dizzy, blurry vision (07 Sep 2018 11:33)      INTERVAL HPI/OVERNIGHT EVENTS: Patient states preprocedure  BP was elevated was given Vasotec and uncomplicated tolerated well procedure well .  Overnight she couldn't sleep had a bad night because A/C was broken in her room inaddition complaining of discomfort in epigastric area which she has for the past few months takes Protonix Mylanta didn't help for pain .Uncomfortable with Valdes- .    MEDICATIONS  (STANDING):  aspirin 325 milliGRAM(s) Oral daily  levothyroxine 75 MICROGram(s) Oral daily  melatonin 3 milliGRAM(s) Oral at bedtime  metoprolol tartrate 75 milliGRAM(s) Oral two times a day  pantoprazole    Tablet 40 milliGRAM(s) Oral before breakfast  sodium chloride 0.9%. 1000 milliLiter(s) (50 mL/Hr) IV Continuous <Continuous>  spironolactone 50 milliGRAM(s) Oral daily    MEDICATIONS  (PRN):  acetaminophen   Tablet .. 650 milliGRAM(s) Oral every 8 hours PRN Mild Pain (1 - 3)  ALPRAZolam 0.25 milliGRAM(s) Oral at bedtime PRN insomnia  aluminum hydroxide/magnesium hydroxide/simethicone Suspension 30 milliLiter(s) Oral every 6 hours PRN Dyspepsia  ondansetron   Disintegrating Tablet 8 milliGRAM(s) Oral three times a day PRN Nausea and/or Vomiting      Allergies    amlodipine (Unknown)  clonidine (Unknown)  Levatol (Unknown)  Lipitor (Unknown)  Lotrel (Unknown)  methylPREDNISolone (Unknown)  morphine (Other)  Plaquenil (Unknown)  statins (Other (Unknown))  sulfa drugs (Rash)    Intolerances        REVIEW OF SYSTEMS:  CONSTITUTIONAL: No fevers or chills  EYES/ENT: No visual changes  NECK: No pain or stiffness  RESPIRATORY: No cough, no shortness of breath  CARDIOVASCULAR: No chest pain or palpitations  GASTROINTESTINAL: +Burning sensation/discomfort in the epigastric area  no nausea or vomiting, or hematemesis; No diarrhea or constipation  GENITOURINARY: No dysuria, hematuria  NEUROLOGICAL: No numbness or weakness  SKIN: No itching, burning, rashes, or lesions   All other review of systems is negative unless indicated above      Vital Signs Last 24 Hrs  T(C): 36.7 (11 Sep 2018 10:43), Max: 36.8 (10 Sep 2018 16:30)  T(F): 98 (11 Sep 2018 10:43), Max: 98.2 (10 Sep 2018 16:30)  HR: 74 (11 Sep 2018 10:43) (65 - 97)  BP: 136/48 (11 Sep 2018 10:43) (107/41 - 229/89)  BP(mean): --  RR: 17 (11 Sep 2018 10:43) (12 - 18)  SpO2: 98% (11 Sep 2018 10:43) (97% - 100%)    PHYSICAL EXAM:  Constitutional: NAD, awake and alert, well-appearing  HEENT: PERR, EOMI, Normal Hearing, MMM  Neck: Soft and supple, No LAD, No JVD  Respiratory: Breath sounds are clear bilaterally, no wheezing, rales or rhonchi  Cardiovascular: S1 and S2, regular rate and rhythm, no murmurs, gallops or rubs  Gastrointestinal: Bowel Sounds present, soft, nontender, nondistended, no guarding, no rebound with Valdes   Extremities: No peripheral edema  Vascular: 2+ peripheral pulses  Neurological: A/O x 3, no focal deficits  Musculoskeletal: 5/5 strength b/l upper and lower extremities  Skin: No rashes      LABS:                        11.7   12.89 )-----------( 298      ( 11 Sep 2018 07:59 )             37.6     09-11    132<L>  |  100  |  21  ----------------------------<  99  4.4   |  24  |  1.32<H>    Ca    8.8      11 Sep 2018 07:59          CAPILLARY BLOOD GLUCOSE          RADIOLOGY & ADDITIONAL TESTS:    Imaging Personally Reviewed:  [ ] YES  [ ] NO    Consultant(s) Notes Reviewed:  [ ] YES  [ ] NO    Care Discussed with Consultants/Other Providers [ ] YES  [ ] NO HPI:  89 y/o female came in on 9/7/18 for cystoscopy and found to have elevated blood pressure even though she has her home meds. She waited in presurgical area but her blood pressure did not get better. On leading questions she denied any chest pain, dizzy, blurry vision (07 Sep 2018 11:33)      INTERVAL HPI/OVERNIGHT EVENTS: Patient states preprocedure  BP was elevated was given Vasotec and uncomplicated tolerated well procedure well .  Overnight she couldn't sleep had a bad night because A/C was broken in her room inaddition complaining of discomfort in epigastric area which she has for the past few months takes Protonix Mylanta didn't help for pain likely related to stress and anxiety  .Uncomfortable with Valdes .    MEDICATIONS  (STANDING):  aspirin 325 milliGRAM(s) Oral daily  levothyroxine 75 MICROGram(s) Oral daily  melatonin 3 milliGRAM(s) Oral at bedtime  metoprolol tartrate 75 milliGRAM(s) Oral two times a day  pantoprazole    Tablet 40 milliGRAM(s) Oral before breakfast  sodium chloride 0.9%. 1000 milliLiter(s) (50 mL/Hr) IV Continuous <Continuous>  spironolactone 50 milliGRAM(s) Oral daily    MEDICATIONS  (PRN):  acetaminophen   Tablet .. 650 milliGRAM(s) Oral every 8 hours PRN Mild Pain (1 - 3)  ALPRAZolam 0.25 milliGRAM(s) Oral at bedtime PRN insomnia  aluminum hydroxide/magnesium hydroxide/simethicone Suspension 30 milliLiter(s) Oral every 6 hours PRN Dyspepsia  ondansetron   Disintegrating Tablet 8 milliGRAM(s) Oral three times a day PRN Nausea and/or Vomiting      Allergies    amlodipine (Unknown)  clonidine (Unknown)  Levatol (Unknown)  Lipitor (Unknown)  Lotrel (Unknown)  methylPREDNISolone (Unknown)  morphine (Other)  Plaquenil (Unknown)  statins (Other (Unknown))  sulfa drugs (Rash)    Intolerances        REVIEW OF SYSTEMS:  CONSTITUTIONAL: No fevers or chills  EYES/ENT: No visual changes  NECK: No pain or stiffness  RESPIRATORY: No cough, no shortness of breath  CARDIOVASCULAR: No chest pain or palpitations  GASTROINTESTINAL: +Burning sensation/discomfort in the epigastric area  no nausea or vomiting, or hematemesis; No diarrhea or constipation  GENITOURINARY: No dysuria, hematuria  NEUROLOGICAL: No numbness or weakness  SKIN: No itching, burning, rashes, or lesions   All other review of systems is negative unless indicated above      Vital Signs Last 24 Hrs  T(C): 36.7 (11 Sep 2018 10:43), Max: 36.8 (10 Sep 2018 16:30)  T(F): 98 (11 Sep 2018 10:43), Max: 98.2 (10 Sep 2018 16:30)  HR: 74 (11 Sep 2018 10:43) (65 - 97)  BP: 136/48 (11 Sep 2018 10:43) (107/41 - 229/89)  BP(mean): --  RR: 17 (11 Sep 2018 10:43) (12 - 18)  SpO2: 98% (11 Sep 2018 10:43) (97% - 100%)    PHYSICAL EXAM:  Constitutional: NAD, awake and alert, well-appearing  HEENT: PERR, EOMI, Normal Hearing, MMM  Neck: Soft and supple, No LAD, No JVD  Respiratory: Breath sounds are clear bilaterally, no wheezing, rales or rhonchi  Cardiovascular: S1 and S2, regular rate and rhythm, no murmurs, gallops or rubs  Gastrointestinal: Bowel Sounds present, soft, nontender, nondistended, no guarding, no rebound with Valdes   Extremities: No peripheral edema  Vascular: 2+ peripheral pulses  Neurological: A/O x 3, no focal deficits  Musculoskeletal: 5/5 strength b/l upper and lower extremities  Skin: No rashes      LABS:                        11.7   12.89 )-----------( 298      ( 11 Sep 2018 07:59 )             37.6     09-11    132<L>  |  100  |  21  ----------------------------<  99  4.4   |  24  |  1.32<H>    Ca    8.8      11 Sep 2018 07:59          CAPILLARY BLOOD GLUCOSE          RADIOLOGY & ADDITIONAL TESTS:    Imaging Personally Reviewed:  [ ] YES  [ ] NO    Consultant(s) Notes Reviewed:  [ ] YES  [ ] NO    Care Discussed with Consultants/Other Providers [ ] YES  [ ] NO HPI:  89 y/o female came in on 9/7/18 for cystoscopy and found to have elevated blood pressure even though she has her home meds. She waited in presurgical area but her blood pressure did not get better. On leading questions she denied any chest pain, dizzy, blurry vision (07 Sep 2018 11:33)      INTERVAL HPI/OVERNIGHT EVENTS: Patient states preprocedure  BP was elevated was given Vasotec ,BP was acceptable , she had an uncomplicated tolerated procedure very well  . Overnight she couldn't sleep had a bad night because A/C was broken in her room inaddition complaining of discomfort in epigastric area which she has for the past few months takes Protonix Mylanta didn't help for pain likely related to stress and anxiety  .Uncomfortable with Valdes .    MEDICATIONS  (STANDING):  aspirin 325 milliGRAM(s) Oral daily  levothyroxine 75 MICROGram(s) Oral daily  melatonin 3 milliGRAM(s) Oral at bedtime  metoprolol tartrate 75 milliGRAM(s) Oral two times a day  pantoprazole    Tablet 40 milliGRAM(s) Oral before breakfast  sodium chloride 0.9%. 1000 milliLiter(s) (50 mL/Hr) IV Continuous <Continuous>  spironolactone 50 milliGRAM(s) Oral daily    MEDICATIONS  (PRN):  acetaminophen   Tablet .. 650 milliGRAM(s) Oral every 8 hours PRN Mild Pain (1 - 3)  ALPRAZolam 0.25 milliGRAM(s) Oral at bedtime PRN insomnia  aluminum hydroxide/magnesium hydroxide/simethicone Suspension 30 milliLiter(s) Oral every 6 hours PRN Dyspepsia  ondansetron   Disintegrating Tablet 8 milliGRAM(s) Oral three times a day PRN Nausea and/or Vomiting      Allergies    amlodipine (Unknown)  clonidine (Unknown)  Levatol (Unknown)  Lipitor (Unknown)  Lotrel (Unknown)  methylPREDNISolone (Unknown)  morphine (Other)  Plaquenil (Unknown)  statins (Other (Unknown))  sulfa drugs (Rash)    Intolerances        REVIEW OF SYSTEMS:  CONSTITUTIONAL: No fevers or chills  EYES/ENT: No visual changes  NECK: No pain or stiffness  RESPIRATORY: No cough, no shortness of breath  CARDIOVASCULAR: No chest pain or palpitations  GASTROINTESTINAL: +Burning sensation/discomfort in the epigastric area  no nausea or vomiting, or hematemesis; No diarrhea or constipation  GENITOURINARY: No dysuria, hematuria  NEUROLOGICAL: No numbness or weakness  SKIN: No itching, burning, rashes, or lesions   All other review of systems is negative unless indicated above      Vital Signs Last 24 Hrs  T(C): 36.7 (11 Sep 2018 10:43), Max: 36.8 (10 Sep 2018 16:30)  T(F): 98 (11 Sep 2018 10:43), Max: 98.2 (10 Sep 2018 16:30)  HR: 74 (11 Sep 2018 10:43) (65 - 97)  BP: 136/48 (11 Sep 2018 10:43) (107/41 - 229/89)  BP(mean): --  RR: 17 (11 Sep 2018 10:43) (12 - 18)  SpO2: 98% (11 Sep 2018 10:43) (97% - 100%)    PHYSICAL EXAM:  Constitutional: NAD, awake and alert, well-appearing  HEENT: PERR, EOMI, Normal Hearing, MMM  Neck: Soft and supple, No LAD, No JVD  Respiratory: Breath sounds are clear bilaterally, no wheezing, rales or rhonchi  Cardiovascular: S1 and S2, regular rate and rhythm, no murmurs, gallops or rubs  Gastrointestinal: Bowel Sounds present, soft, nontender, nondistended, no guarding, no rebound with Valdes   Extremities: No peripheral edema  Vascular: 2+ peripheral pulses  Neurological: A/O x 3, no focal deficits  Musculoskeletal: 5/5 strength b/l upper and lower extremities  Skin: No rashes      LABS:                        11.7   12.89 )-----------( 298      ( 11 Sep 2018 07:59 )             37.6     09-11    132<L>  |  100  |  21  ----------------------------<  99  4.4   |  24  |  1.32<H>    Ca    8.8      11 Sep 2018 07:59          CAPILLARY BLOOD GLUCOSE          RADIOLOGY & ADDITIONAL TESTS:    Imaging Personally Reviewed:  [ ] YES  [ ] NO    Consultant(s) Notes Reviewed:  [ ] YES  [ ] NO    Care Discussed with Consultants/Other Providers [ ] YES  [ ] NO

## 2018-09-11 NOTE — PROGRESS NOTE ADULT - PROBLEM SELECTOR PLAN 3
- Diazepan 2.5mg x1 dose at bedtime tonight  - Continue with Xanax PRN after tonight- skip tonight -  s/p Cystoscopy with TURBT POD -1  -follow Outpatient with Urology for TOV 9/14  -d/w with daughter and patient

## 2018-09-11 NOTE — DISCHARGE NOTE ADULT - PATIENT PORTAL LINK FT
s/p pacemaker placement last week, now with chest pain, SOB, generalized pain and weakness You can access the [facility] Patient Portal, offered by [facility], by registering with the following website: [email or web address]

## 2018-09-11 NOTE — PROGRESS NOTE ADULT - SUBJECTIVE AND OBJECTIVE BOX
CHIEF COMPLAINT:    HPI:  89 y/o female came today for cystoscopy and found to have elevated blood pressure even though she has her home meds. She waited in presurgical area abut her blood pressure did not get better. On leading questions she denied any chest pain, dizzy, blurry vision (07 Sep 2018 11:33)  /56 with HR 72. Pt was taking lopressor 50 mg bid at home and aldactone once a day.  EKG showed sinus rhythm with 1st degree AVB, rate 78, nonspecific T wave abnormality. Daughter at bedside and I spoke to her and pt.     9/9: /68. Lopressor was increased to 75 mg bid. HR 68. She c/o frequent urination. She was only taking spironolactone 25 mg daily at home. Dose was increased, but I would decrease to 50 mg daily, not bid. Cleared for surgery tomorrow.     9/10: /56. Cleared for surgery.    9/11: /43 today. Yesterday after cystoscopy, BP was elevated and she was given IV enalapril. She says she thinks she is on too much BP medication, and I told her her BP is good at this time and I would not decrease dose. Rash on L arm, petechial, but plts are normal.    PAST MEDICAL & SURGICAL HISTORY:  Endometrial adenocarcinoma  Macular degeneration  Stented coronary artery  Hypothyroid  Hyperlipidemia  HTN (hypertension)  GERD (gastroesophageal reflux disease)  CAD (coronary artery disease)  [place] (hard of hearing)  S/P EMILY (total abdominal hysterectomy)  S/P hernia repair: umbilical - 2008  S/P laparoscopic cholecystectomy: 2008  Ankle fracture, right: surgery 25 yrs ago - hardware removed  S/P coronary angiogram: 2005, 2008, 2011 - drug eluding stents      Allergies    amlodipine (Unknown)  clonidine (Unknown)  Levatol (Unknown)  Lipitor (Unknown)  Lotrel (Unknown)  methylPREDNISolone (Unknown)  morphine (Other)  Plaquenil (Unknown)  statins (Other (Unknown))  sulfa drugs (Rash)    Intolerances        Occupation:  Alochol: Denied  Smoking: Denied  Drug Use: Denied  Marital Status:         FAMILY HISTORY:  Family history of brain cancer (Father)      REVIEW OF SYSTEMS:    CONSTITUTIONAL: No weakness, fevers or chills  EYES/ENT: No visual changes;  No vertigo or throat pain   NECK: No pain or stiffness  RESPIRATORY: No cough, wheezing, hemoptysis; No shortness of breath  CARDIOVASCULAR: No chest pain or palpitations  GASTROINTESTINAL: No abdominal or epigastric pain. No nausea, vomiting, or hematemesis; No diarrhea or constipation. No melena or hematochezia.  GENITOURINARY: No dysuria, frequency or hematuria  NEUROLOGICAL: No numbness or weakness  SKIN: No itching, burning, rashes, or lesions   All other review of systems is negative unless indicated above    Vital Signs Last 24 Hrs  T(C): 36.7 (08 Sep 2018 05:12), Max: 36.8 (07 Sep 2018 11:08)  T(F): 98.1 (08 Sep 2018 05:12), Max: 98.3 (07 Sep 2018 11:08)  HR: 70 (08 Sep 2018 05:12) (70 - 79)  BP: 139/56 (08 Sep 2018 05:12) (122/54 - 188/80)  BP(mean): --  RR: 18 (08 Sep 2018 05:12) (14 - 18)  SpO2: 98% (08 Sep 2018 05:12) (98% - 100%)    I&O's Summary      PHYSICAL EXAM:    Constitutional: NAD, awake and alert, well-developed  HEENT: PERR, EOMI,  No oral cyananosis.  Neck:  supple,  No JVD  Respiratory: Breath sounds are clear bilaterally, No wheezing, rales or rhonchi  Cardiovascular: S1 and S2, regular rate and rhythm, no Murmurs, gallops or rubs  Gastrointestinal: Bowel Sounds present, soft, nontender.   Extremities: No peripheral edema. No clubbing or cyanosis.  Vascular: 2+ peripheral pulses  Neurological: A/O x 3, no focal deficits  Musculoskeletal: no calf tenderness.  Skin: No rashes.    MEDICATIONS:  MEDICATIONS  (STANDING):  levothyroxine 75 MICROGram(s) Oral daily  metoprolol tartrate 50 milliGRAM(s) Oral every 12 hours  pantoprazole    Tablet 40 milliGRAM(s) Oral before breakfast  spironolactone 50 milliGRAM(s) Oral two times a day      LABS: All Labs Reviewed:                        13.1   8.58  )-----------( 323      ( 07 Sep 2018 11:54 )             41.7     08 Sep 2018 07:46    139    |  106    |  19     ----------------------------<  102    4.2     |  23     |  1.07   07 Sep 2018 11:54    138    |  105    |  16     ----------------------------<  117    4.5     |  23     |  1.00     Ca    9.4        08 Sep 2018 07:46  Ca    9.5        07 Sep 2018 11:54  Phos  3.1       07 Sep 2018 11:54    TPro  x      /  Alb  3.7    /  TBili  x      /  DBili  x      /  AST  x      /  ALT  x      /  AlkPhos  x      07 Sep 2018 11:54          Blood Culture:         RADIOLOGY/EKG:

## 2018-09-11 NOTE — DISCHARGE NOTE ADULT - CARE PLAN
Principal Discharge DX:	Hypertensive urgency  Goal:	BP -130-140  Assessment and plan of treatment:	-Continue to take Metoprolol and Aldactone as instructed   - Low salt diet  -Monitor your BP closely   -Take your blood pressure medicine exactly as directed. Don't skip doses. Missing doses can cause your blood pressure to get out of control.  If you do miss a dose (or doses) check with your healthcare provider about what to do.  -Avoid medicine that contain heart stimulants, including over-the-counter drugs. Check for warnings about high blood pressure on the label. Ask the pharmacist before purchasing something you haven't used before  -Check with your doctor or pharmacist before taking a decongestant. Some decongestants can worsen high blood pressure  -Limit canned, dried, packaged, and fast foods.  -Don't add salt to your food at the table.  -Season foods with herbs instead of salt when you cook.  -Request no added salt when you go to a restaurant.  -The American Heart Association's (AHA) "ideal" sodium intake recommendation is 1,500 milligrams per day. However, since American's eat so much salt, the AHA says a positive change can occur by cutting back to even 2,400 milligrams of sodium a day.  -Follow the DASH (Dietary Approaches to Stop Hypertension) eating plan. This plan recommends vegetables, fruits, whole gains, and other heart healthy foods.  Secondary Diagnosis:	Hematuria  Goal:	No bleeding ,abdominal pain  Assessment and plan of treatment:	-s/p cystoscopy, Transurethral Bladder resection, bladder biopsies 9/10.   -Tolerated procedure well. No intra-operative issues.   -follow up on Friday, 9/14, for [place] of Voiding in office ([phone])  - follow up in 2 weeks for pathology review  Secondary Diagnosis:	CAD (coronary artery disease)  Goal:	No chest pain ,Shortness of breath  Assessment and plan of treatment:	-continue home medication  Secondary Diagnosis:	Hyperlipidemia  Goal:	stable  Assessment and plan of treatment:	-continue home medication

## 2018-09-11 NOTE — DISCHARGE NOTE ADULT - ADDITIONAL INSTRUCTIONS
follow up on Friday, 9/14, for [place] of voiding in office ([phone]) with Dr Silva -Urologist   follow up with PCP within week to check for Blood pressure -Dr Sánchez

## 2018-09-11 NOTE — DISCHARGE NOTE ADULT - HOSPITAL COURSE
89 y/o female comes  for cystoscopy/Transurethral bladder Resection on 9/7 but   and found to have elevated blood pressure even though she has her home meds. She waited in presurgical area abut her blood pressure did not get better. At that time she was asymptomatic  denied any chest pain, dizzy, blurry vision.  Over hospital course  her BP medication was titrated up to Aldactone 50 mg once a day and Metoprolol 75 mg twice a day on day of procedure her BP was 140s went for the procedure as per Urology note she well tolerated procedure which was done 9/10 without any complications and follow up with Urology for possible removal of Valdes cath in his Office on 9/14 .follow up in clinic for pathology results. And follow up with Primary care physician in 1 week to follow up Blood pressure . 89 y/o female comes  for cystoscopy/Transurethral bladder Resection on 9/7 but   and found to have elevated blood pressure even though she has her home meds. She waited in presurgical area abut her blood pressure did not get better. At that time she was asymptomatic  denied any chest pain, dizzy, blurry vision.  Over hospital course  her BP medication was titrated up to Aldactone 50 mg once a day and Metoprolol 75 mg twice a day on day of procedure her BP was 140s went for the procedure as per Urology note she well tolerated procedure which was done 9/10 without any complications and follow up with Urology for possible removal of Valdes cath in his Office on 9/14 .follow up in clinic for pathology results. And follow up with Primary care physician in 1 week to follow up Blood pressure .       Labs on Admission                        11.7   11.64 )-----------( 284      ( 12 Sep 2018 10:34 )             37.7       138  |  106  |  16  ----------------------------<  108<H>  4.4   |  25  |  1.04    Ca    9.1      12 Sep 2018 10:34 89 y/o female comes  for cystoscopy/Transurethral bladder Resection on 9/7 but   and found to have elevated blood pressure even though she has her home meds. She waited in presurgical area abut her blood pressure did not get better. At that time she was asymptomatic  denied any chest pain, dizzy, blurry vision.  Over hospital course  her BP medication was titrated up to Aldactone 50 mg once a day and Metoprolol 75 mg twice a day on day of procedure her BP was 140s went for the procedure as per Urology note she well tolerated procedure which was done 9/10 without any complications and follow up with Urology for possible removal of Valdes cath in his Office on 9/14 .follow up in clinic for pathology results. And follow up with Primary care physician in 1 week to follow up Blood pressure .Called and left message to patient PCP Dr Sánchez as she was busy seeing patient and left call back number if needed .       Labs on Admission                        11.7   11.64 )-----------( 284      ( 12 Sep 2018 10:34 )             37.7       138  |  106  |  16  ----------------------------<  108<H>  4.4   |  25  |  1.04    Ca    9.1      12 Sep 2018 10:34

## 2018-09-11 NOTE — PROGRESS NOTE ADULT - PROBLEM SELECTOR PLAN 2
- Cystoscopy with TURBT scheduled for Monday, 9/10/2018, with Urology  -NPO after midnight -posop likely secondary to dehydration  -Gentle IVF  -continue to monitor for now

## 2018-09-12 VITALS
HEART RATE: 75 BPM | DIASTOLIC BLOOD PRESSURE: 51 MMHG | TEMPERATURE: 98 F | OXYGEN SATURATION: 98 % | RESPIRATION RATE: 18 BRPM | SYSTOLIC BLOOD PRESSURE: 150 MMHG

## 2018-09-12 LAB
ANION GAP SERPL CALC-SCNC: 7 MMOL/L — SIGNIFICANT CHANGE UP (ref 5–17)
BUN SERPL-MCNC: 16 MG/DL — SIGNIFICANT CHANGE UP (ref 7–23)
CALCIUM SERPL-MCNC: 9.1 MG/DL — SIGNIFICANT CHANGE UP (ref 8.5–10.1)
CHLORIDE SERPL-SCNC: 106 MMOL/L — SIGNIFICANT CHANGE UP (ref 96–108)
CO2 SERPL-SCNC: 25 MMOL/L — SIGNIFICANT CHANGE UP (ref 22–31)
CREAT SERPL-MCNC: 1.04 MG/DL — SIGNIFICANT CHANGE UP (ref 0.5–1.3)
GLUCOSE SERPL-MCNC: 108 MG/DL — HIGH (ref 70–99)
HCT VFR BLD CALC: 37.7 % — SIGNIFICANT CHANGE UP (ref 34.5–45)
HGB BLD-MCNC: 11.7 G/DL — SIGNIFICANT CHANGE UP (ref 11.5–15.5)
MCHC RBC-ENTMCNC: 27.2 PG — SIGNIFICANT CHANGE UP (ref 27–34)
MCHC RBC-ENTMCNC: 31 GM/DL — LOW (ref 32–36)
MCV RBC AUTO: 87.7 FL — SIGNIFICANT CHANGE UP (ref 80–100)
NRBC # BLD: 0 /100 WBCS — SIGNIFICANT CHANGE UP (ref 0–0)
PLATELET # BLD AUTO: 284 K/UL — SIGNIFICANT CHANGE UP (ref 150–400)
POTASSIUM SERPL-MCNC: 4.4 MMOL/L — SIGNIFICANT CHANGE UP (ref 3.5–5.3)
POTASSIUM SERPL-SCNC: 4.4 MMOL/L — SIGNIFICANT CHANGE UP (ref 3.5–5.3)
RBC # BLD: 4.3 M/UL — SIGNIFICANT CHANGE UP (ref 3.8–5.2)
RBC # FLD: 14.8 % — HIGH (ref 10.3–14.5)
SODIUM SERPL-SCNC: 138 MMOL/L — SIGNIFICANT CHANGE UP (ref 135–145)
WBC # BLD: 11.64 K/UL — HIGH (ref 3.8–10.5)
WBC # FLD AUTO: 11.64 K/UL — HIGH (ref 3.8–10.5)

## 2018-09-12 RX ORDER — INFLUENZA VIRUS VACCINE 15; 15; 15; 15 UG/.5ML; UG/.5ML; UG/.5ML; UG/.5ML
0.5 SUSPENSION INTRAMUSCULAR ONCE
Qty: 0 | Refills: 0 | Status: COMPLETED | OUTPATIENT
Start: 2018-09-12 | End: 2018-09-12

## 2018-09-12 RX ADMIN — SPIRONOLACTONE 50 MILLIGRAM(S): 25 TABLET, FILM COATED ORAL at 05:23

## 2018-09-12 RX ADMIN — INFLUENZA VIRUS VACCINE 0.5 MILLILITER(S): 15; 15; 15; 15 SUSPENSION INTRAMUSCULAR at 14:23

## 2018-09-12 RX ADMIN — Medication 325 MILLIGRAM(S): at 11:19

## 2018-09-12 RX ADMIN — Medication 50 MILLIGRAM(S): at 05:23

## 2018-09-12 RX ADMIN — Medication 75 MICROGRAM(S): at 05:23

## 2018-09-12 RX ADMIN — PANTOPRAZOLE SODIUM 40 MILLIGRAM(S): 20 TABLET, DELAYED RELEASE ORAL at 05:24

## 2018-09-12 NOTE — PROGRESS NOTE ADULT - ATTENDING COMMENTS
on exam- comfortable   -[id number]-[id number],cta  -cvs-s1s2 normal     #ct same rx, better reading today     #ct to monitor bp meds and adjust meds as per reading
Patient seen and examined with Family Medicine Residents Naila Smith, Edward Kayserian, Steph Ivey, Emi Carvajal and Medical Student Leigha on the Family Medicine Teaching Service.  Agree with history, physical, labs and plan which were reviewed in detail after a face to face encounter with the patient.
Patient seen and examined with Family Medicine Residents Naila Smith, Edward Kayserian and Medical Student Leigha on the Family Medicine Teaching Service.  Agree with history, physical, labs and plan which were reviewed in detail after a face to face encounter with the patient.

## 2018-09-12 NOTE — PROGRESS NOTE ADULT - PROVIDER SPECIALTY LIST ADULT
Cardiology
Cardiology
Family Medicine
Hospitalist
Urology
Cardiology

## 2018-09-12 NOTE — PROGRESS NOTE ADULT - ASSESSMENT
89 yo F a with h/o endometrial cancer, CAD s/p stents, Hypothyroidism, GERD and HLD, presenting with elevated BP prior to scheduled cystoscopy procedure, admitted for hypertensive urgency. Pt is doing well and hemodynamically stable, discharge planned for today.                  #Anemia   -Hb of 11.7   - Recommend continued outpt monitoring with PCP    #Hyponatremia   -Likely secondary to Dehydration - resolved with current level of 138

## 2018-09-12 NOTE — PROGRESS NOTE ADULT - REASON FOR ADMISSION
elevated blood pressure

## 2018-09-12 NOTE — PROGRESS NOTE ADULT - PROBLEM SELECTOR PLAN 2
-postop likely secondary to dehydration- now improved to 1.04  -Gentle IVF  -continue to monitor for now

## 2018-09-12 NOTE — PROGRESS NOTE ADULT - PROBLEM SELECTOR PLAN 1
- BP checks - currently within range of 145-160/59-60 on 9/9  - Continue on metoprolol and aldactone  - Multiple intolerances/allergies to multiple antihypertensive meds -avoid amlodipine, hydralazine, ACEI, clonidine  - Cardiology consult appreciated
- BP checks - stable for now  - Continue on metoprolol and aldactone  - Multiple intolerances/allergies to multiple antihypertensive meds -avoid amlodipine, hydralazine, ACEI, clonidine  - Cardiology consult appreciated
- BP checks - stable for now  - Continue on metoprolol and aldactone outpatient  - Multiple intolerances/allergies to multiple antihypertensive meds -avoid amlodipine, hydralazine, ACEI, clonidine  - Cardiology consult appreciated
- BP improved- currently 145/56  - Continue on metoprolol and aldactone  - Multiple intolerances/allergies to multiple antihypertensive meds -avoid amlodipine, hydralazine, ACEI, clonidine  - Cardiology consult appreciated
- BP checks  - Metoprolol and aldactone  - Multiple intolerances/allergies to multiple antihypertensive meds -avoid amlodipine, hydralazine, ACEI, clonidine  - Cardiology consult appreciated

## 2018-09-12 NOTE — PROGRESS NOTE ADULT - PROBLEM SELECTOR PLAN 3
-  s/p Cystoscopy with TURBT POD -1  -follow Outpatient with Urology for TOV 9/14  -d/w with daughter and patient  - F/U outpt with Urology on 9/14

## 2018-09-12 NOTE — PROGRESS NOTE ADULT - SUBJECTIVE AND OBJECTIVE BOX
CHIEF COMPLAINT: hypertensive urgency    SUBJECTIVE:     9/12/2018:  There were no acute events overnight. Pt reports that she is doing better this morning and is excited to be discharged today with paln to f/u outpatient with Urology for verma cath removal.    REVIEW OF SYSTEMS:  CONSTITUTIONAL: No weakness, fevers or chills  EYES/ENT: No visual changes;  No vertigo or throat pain   NECK: No pain or stiffness  RESPIRATORY: No cough, wheezing, hemoptysis; No shortness of breath  CARDIOVASCULAR: No chest pain or palpitations  GASTROINTESTINAL: No abdominal or epigastric pain. No nausea, vomiting, or hematemesis; No diarrhea or constipation. No melena or hematochezia.  GENITOURINARY: No dysuria, frequency or hematuria  NEUROLOGICAL: No numbness or weakness  SKIN: No itching, burning, rashes, or lesions   All other review of systems is negative unless indicated above    Vital Signs Last 24 Hrs  T(C): 36.7 (12 Sep 2018 12:02), Max: 37 (11 Sep 2018 16:38)  T(F): 98.1 (12 Sep 2018 12:02), Max: 98.6 (11 Sep 2018 16:38)  HR: 75 (12 Sep 2018 12:02) (70 - 92)  BP: 150/51 (12 Sep 2018 12:02) (124/47 - 150/51)  RR: 18 (12 Sep 2018 12:02) (18 - 18)  SpO2: 98% (12 Sep 2018 12:02) (98% - 100%)    I&O's Summary    11 Sep 2018 07:01  -  12 Sep 2018 07:00  --------------------------------------------------------  IN: 240 mL / OUT: 400 mL / NET: -160 mL      PHYSICAL EXAM:  Constitutional: NAD, awake and alert, well-appearing  HEENT: EOMI, Normal Hearing, MMM  Neck: Soft and supple, No LAD, No JVD  Respiratory: Breath sounds are clear bilaterally, No wheezing, rales or rhonchi  Cardiovascular: S1 and S2, regular rate and rhythm, no Murmurs, gallops or rubs  Gastrointestinal: Bowel sounds present, soft, nontender, nondistended, no guarding, no rebound  Extremities: No peripheral edema  Vascular: 2+ peripheral pulses  Neurological: A/O x 3, no focal deficits  Musculoskeletal: 5/5 strength b/l upper and lower extremities  Skin: No rashes    MEDICATIONS:  MEDICATIONS  (STANDING):  aspirin 325 milliGRAM(s) Oral daily  levothyroxine 75 MICROGram(s) Oral daily  melatonin 3 milliGRAM(s) Oral at bedtime  metoprolol tartrate 50 milliGRAM(s) Oral two times a day  pantoprazole    Tablet 40 milliGRAM(s) Oral before breakfast  sodium chloride 0.9%. 1000 milliLiter(s) (50 mL/Hr) IV Continuous <Continuous>  spironolactone 50 milliGRAM(s) Oral daily      LABS: All Labs Reviewed:                        11.7   11.64 )-----------( 284      ( 12 Sep 2018 10:34 )             37.7     09-12    138  |  106  |  16  ----------------------------<  108<H>  4.4   |  25  |  1.04    Ca    9.1      12 Sep 2018 10:34

## 2018-09-14 LAB — SURGICAL PATHOLOGY FINAL REPORT - CH: SIGNIFICANT CHANGE UP

## 2018-09-22 DIAGNOSIS — R79.89 OTHER SPECIFIED ABNORMAL FINDINGS OF BLOOD CHEMISTRY: ICD-10-CM

## 2018-09-22 DIAGNOSIS — I16.0 HYPERTENSIVE URGENCY: ICD-10-CM

## 2018-09-22 DIAGNOSIS — Z85.44 PERSONAL HISTORY OF MALIGNANT NEOPLASM OF OTHER FEMALE GENITAL ORGANS: ICD-10-CM

## 2018-09-22 DIAGNOSIS — E78.5 HYPERLIPIDEMIA, UNSPECIFIED: ICD-10-CM

## 2018-09-22 DIAGNOSIS — Z90.722 ACQUIRED ABSENCE OF OVARIES, BILATERAL: ICD-10-CM

## 2018-09-22 DIAGNOSIS — E86.0 DEHYDRATION: ICD-10-CM

## 2018-09-22 DIAGNOSIS — Z88.8 ALLERGY STATUS TO OTHER DRUGS, MEDICAMENTS AND BIOLOGICAL SUBSTANCES: ICD-10-CM

## 2018-09-22 DIAGNOSIS — Z90.710 ACQUIRED ABSENCE OF BOTH CERVIX AND UTERUS: ICD-10-CM

## 2018-09-22 DIAGNOSIS — Z88.2 ALLERGY STATUS TO SULFONAMIDES: ICD-10-CM

## 2018-09-22 DIAGNOSIS — E87.1 HYPO-OSMOLALITY AND HYPONATREMIA: ICD-10-CM

## 2018-09-22 DIAGNOSIS — H35.30 UNSPECIFIED MACULAR DEGENERATION: ICD-10-CM

## 2018-09-22 DIAGNOSIS — K21.9 GASTRO-ESOPHAGEAL REFLUX DISEASE WITHOUT ESOPHAGITIS: ICD-10-CM

## 2018-09-22 DIAGNOSIS — C67.8 MALIGNANT NEOPLASM OF OVERLAPPING SITES OF BLADDER: ICD-10-CM

## 2018-09-22 DIAGNOSIS — Z95.5 PRESENCE OF CORONARY ANGIOPLASTY IMPLANT AND GRAFT: ICD-10-CM

## 2018-09-22 DIAGNOSIS — Z90.49 ACQUIRED ABSENCE OF OTHER SPECIFIED PARTS OF DIGESTIVE TRACT: ICD-10-CM

## 2018-09-22 DIAGNOSIS — F41.9 ANXIETY DISORDER, UNSPECIFIED: ICD-10-CM

## 2018-10-06 ENCOUNTER — INPATIENT (INPATIENT)
Facility: HOSPITAL | Age: 83
LOS: 7 days | Discharge: HOME | End: 2018-10-14
Attending: INTERNAL MEDICINE | Admitting: FAMILY MEDICINE
Payer: MEDICARE

## 2018-10-06 VITALS
HEART RATE: 91 BPM | SYSTOLIC BLOOD PRESSURE: 137 MMHG | HEIGHT: 62 IN | RESPIRATION RATE: 18 BRPM | TEMPERATURE: 98 F | OXYGEN SATURATION: 98 % | DIASTOLIC BLOOD PRESSURE: 69 MMHG | WEIGHT: 169.09 LBS

## 2018-10-06 DIAGNOSIS — R53.1 WEAKNESS: ICD-10-CM

## 2018-10-06 DIAGNOSIS — C67.9 MALIGNANT NEOPLASM OF BLADDER, UNSPECIFIED: ICD-10-CM

## 2018-10-06 DIAGNOSIS — N39.0 URINARY TRACT INFECTION, SITE NOT SPECIFIED: ICD-10-CM

## 2018-10-06 DIAGNOSIS — Z90.710 ACQUIRED ABSENCE OF BOTH CERVIX AND UTERUS: Chronic | ICD-10-CM

## 2018-10-06 DIAGNOSIS — A41.9 SEPSIS, UNSPECIFIED ORGANISM: ICD-10-CM

## 2018-10-06 LAB
ALBUMIN SERPL ELPH-MCNC: 3.7 G/DL — SIGNIFICANT CHANGE UP (ref 3.3–5)
ALP SERPL-CCNC: 73 U/L — SIGNIFICANT CHANGE UP (ref 40–120)
ALT FLD-CCNC: 16 U/L — SIGNIFICANT CHANGE UP (ref 12–78)
ANION GAP SERPL CALC-SCNC: 8 MMOL/L — SIGNIFICANT CHANGE UP (ref 5–17)
APPEARANCE UR: ABNORMAL
AST SERPL-CCNC: 15 U/L — SIGNIFICANT CHANGE UP (ref 15–37)
BACTERIA # UR AUTO: ABNORMAL
BASOPHILS # BLD AUTO: 0.04 K/UL — SIGNIFICANT CHANGE UP (ref 0–0.2)
BASOPHILS NFR BLD AUTO: 0.3 % — SIGNIFICANT CHANGE UP (ref 0–2)
BILIRUB SERPL-MCNC: 0.7 MG/DL — SIGNIFICANT CHANGE UP (ref 0.2–1.2)
BILIRUB UR-MCNC: NEGATIVE — SIGNIFICANT CHANGE UP
BUN SERPL-MCNC: 19 MG/DL — SIGNIFICANT CHANGE UP (ref 7–23)
CALCIUM SERPL-MCNC: 9 MG/DL — SIGNIFICANT CHANGE UP (ref 8.5–10.1)
CHLORIDE SERPL-SCNC: 98 MMOL/L — SIGNIFICANT CHANGE UP (ref 96–108)
CO2 SERPL-SCNC: 22 MMOL/L — SIGNIFICANT CHANGE UP (ref 22–31)
COLOR SPEC: YELLOW — SIGNIFICANT CHANGE UP
CREAT SERPL-MCNC: 1.39 MG/DL — HIGH (ref 0.5–1.3)
DIFF PNL FLD: ABNORMAL
EOSINOPHIL # BLD AUTO: 0.54 K/UL — HIGH (ref 0–0.5)
EOSINOPHIL NFR BLD AUTO: 3.7 % — SIGNIFICANT CHANGE UP (ref 0–6)
EPI CELLS # UR: SIGNIFICANT CHANGE UP
GLUCOSE SERPL-MCNC: 160 MG/DL — HIGH (ref 70–99)
GLUCOSE UR QL: NEGATIVE MG/DL — SIGNIFICANT CHANGE UP
HCT VFR BLD CALC: 41.8 % — SIGNIFICANT CHANGE UP (ref 34.5–45)
HGB BLD-MCNC: 13 G/DL — SIGNIFICANT CHANGE UP (ref 11.5–15.5)
IMM GRANULOCYTES NFR BLD AUTO: 0.4 % — SIGNIFICANT CHANGE UP (ref 0–1.5)
KETONES UR-MCNC: NEGATIVE — SIGNIFICANT CHANGE UP
LACTATE SERPL-SCNC: 2.1 MMOL/L — HIGH (ref 0.7–2)
LACTATE SERPL-SCNC: 2.8 MMOL/L — HIGH (ref 0.7–2)
LEUKOCYTE ESTERASE UR-ACNC: ABNORMAL
LYMPHOCYTES # BLD AUTO: 0.73 K/UL — LOW (ref 1–3.3)
LYMPHOCYTES # BLD AUTO: 5 % — LOW (ref 13–44)
MCHC RBC-ENTMCNC: 27.1 PG — SIGNIFICANT CHANGE UP (ref 27–34)
MCHC RBC-ENTMCNC: 31.1 GM/DL — LOW (ref 32–36)
MCV RBC AUTO: 87.1 FL — SIGNIFICANT CHANGE UP (ref 80–100)
MONOCYTES # BLD AUTO: 0.71 K/UL — SIGNIFICANT CHANGE UP (ref 0–0.9)
MONOCYTES NFR BLD AUTO: 4.9 % — SIGNIFICANT CHANGE UP (ref 2–14)
NEUTROPHILS # BLD AUTO: 12.54 K/UL — HIGH (ref 1.8–7.4)
NEUTROPHILS NFR BLD AUTO: 85.7 % — HIGH (ref 43–77)
NITRITE UR-MCNC: NEGATIVE — SIGNIFICANT CHANGE UP
NRBC # BLD: 0 /100 WBCS — SIGNIFICANT CHANGE UP (ref 0–0)
PH UR: 5 — SIGNIFICANT CHANGE UP (ref 5–8)
PLATELET # BLD AUTO: 304 K/UL — SIGNIFICANT CHANGE UP (ref 150–400)
POTASSIUM SERPL-MCNC: 4.2 MMOL/L — SIGNIFICANT CHANGE UP (ref 3.5–5.3)
POTASSIUM SERPL-SCNC: 4.2 MMOL/L — SIGNIFICANT CHANGE UP (ref 3.5–5.3)
PROT SERPL-MCNC: 7.7 GM/DL — SIGNIFICANT CHANGE UP (ref 6–8.3)
PROT UR-MCNC: 100 MG/DL
RBC # BLD: 4.8 M/UL — SIGNIFICANT CHANGE UP (ref 3.8–5.2)
RBC # FLD: 15.6 % — HIGH (ref 10.3–14.5)
RBC CASTS # UR COMP ASSIST: ABNORMAL /HPF (ref 0–4)
SODIUM SERPL-SCNC: 128 MMOL/L — LOW (ref 135–145)
SP GR SPEC: 1.02 — SIGNIFICANT CHANGE UP (ref 1.01–1.02)
UROBILINOGEN FLD QL: NEGATIVE MG/DL — SIGNIFICANT CHANGE UP
WBC # BLD: 14.62 K/UL — HIGH (ref 3.8–10.5)
WBC # FLD AUTO: 14.62 K/UL — HIGH (ref 3.8–10.5)
WBC UR QL: >50

## 2018-10-06 PROCEDURE — 93010 ELECTROCARDIOGRAM REPORT: CPT

## 2018-10-06 PROCEDURE — 71045 X-RAY EXAM CHEST 1 VIEW: CPT | Mod: 26

## 2018-10-06 PROCEDURE — 99285 EMERGENCY DEPT VISIT HI MDM: CPT

## 2018-10-06 RX ORDER — ALPRAZOLAM 0.25 MG
0.25 TABLET ORAL AT BEDTIME
Qty: 0 | Refills: 0 | Status: DISCONTINUED | OUTPATIENT
Start: 2018-10-06 | End: 2018-10-13

## 2018-10-06 RX ORDER — ACETAMINOPHEN 500 MG
500 TABLET ORAL EVERY 6 HOURS
Qty: 0 | Refills: 0 | Status: DISCONTINUED | OUTPATIENT
Start: 2018-10-06 | End: 2018-10-14

## 2018-10-06 RX ORDER — CEFTRIAXONE 500 MG/1
1000 INJECTION, POWDER, FOR SOLUTION INTRAMUSCULAR; INTRAVENOUS ONCE
Qty: 0 | Refills: 0 | Status: COMPLETED | OUTPATIENT
Start: 2018-10-06 | End: 2018-10-06

## 2018-10-06 RX ORDER — ASPIRIN/CALCIUM CARB/MAGNESIUM 324 MG
325 TABLET ORAL DAILY
Qty: 0 | Refills: 0 | Status: DISCONTINUED | OUTPATIENT
Start: 2018-10-06 | End: 2018-10-14

## 2018-10-06 RX ORDER — CHOLECALCIFEROL (VITAMIN D3) 125 MCG
1000 CAPSULE ORAL DAILY
Qty: 0 | Refills: 0 | Status: DISCONTINUED | OUTPATIENT
Start: 2018-10-06 | End: 2018-10-14

## 2018-10-06 RX ORDER — PANTOPRAZOLE SODIUM 20 MG/1
40 TABLET, DELAYED RELEASE ORAL
Qty: 0 | Refills: 0 | Status: DISCONTINUED | OUTPATIENT
Start: 2018-10-06 | End: 2018-10-14

## 2018-10-06 RX ORDER — SODIUM CHLORIDE 9 MG/ML
1000 INJECTION INTRAMUSCULAR; INTRAVENOUS; SUBCUTANEOUS
Qty: 0 | Refills: 0 | Status: DISCONTINUED | OUTPATIENT
Start: 2018-10-06 | End: 2018-10-08

## 2018-10-06 RX ORDER — METOPROLOL TARTRATE 50 MG
50 TABLET ORAL
Qty: 0 | Refills: 0 | Status: DISCONTINUED | OUTPATIENT
Start: 2018-10-06 | End: 2018-10-14

## 2018-10-06 RX ORDER — LEVOTHYROXINE SODIUM 125 MCG
75 TABLET ORAL DAILY
Qty: 0 | Refills: 0 | Status: DISCONTINUED | OUTPATIENT
Start: 2018-10-06 | End: 2018-10-14

## 2018-10-06 RX ORDER — CEFTRIAXONE 500 MG/1
1 INJECTION, POWDER, FOR SOLUTION INTRAMUSCULAR; INTRAVENOUS ONCE
Qty: 0 | Refills: 0 | Status: DISCONTINUED | OUTPATIENT
Start: 2018-10-06 | End: 2018-10-06

## 2018-10-06 RX ORDER — HEPARIN SODIUM 5000 [USP'U]/ML
5000 INJECTION INTRAVENOUS; SUBCUTANEOUS EVERY 12 HOURS
Qty: 0 | Refills: 0 | Status: DISCONTINUED | OUTPATIENT
Start: 2018-10-06 | End: 2018-10-14

## 2018-10-06 RX ORDER — SPIRONOLACTONE 25 MG/1
25 TABLET, FILM COATED ORAL DAILY
Qty: 0 | Refills: 0 | Status: DISCONTINUED | OUTPATIENT
Start: 2018-10-06 | End: 2018-10-14

## 2018-10-06 RX ORDER — SODIUM CHLORIDE 9 MG/ML
1000 INJECTION INTRAMUSCULAR; INTRAVENOUS; SUBCUTANEOUS ONCE
Qty: 0 | Refills: 0 | Status: COMPLETED | OUTPATIENT
Start: 2018-10-06 | End: 2018-10-06

## 2018-10-06 RX ADMIN — Medication 50 MILLIGRAM(S): at 21:37

## 2018-10-06 RX ADMIN — Medication 0.25 MILLIGRAM(S): at 21:58

## 2018-10-06 RX ADMIN — SODIUM CHLORIDE 1000 MILLILITER(S): 9 INJECTION INTRAMUSCULAR; INTRAVENOUS; SUBCUTANEOUS at 09:40

## 2018-10-06 RX ADMIN — Medication 1 DROP(S): at 15:13

## 2018-10-06 RX ADMIN — SODIUM CHLORIDE 1000 MILLILITER(S): 9 INJECTION INTRAMUSCULAR; INTRAVENOUS; SUBCUTANEOUS at 10:40

## 2018-10-06 RX ADMIN — CEFTRIAXONE 1000 MILLIGRAM(S): 500 INJECTION, POWDER, FOR SOLUTION INTRAMUSCULAR; INTRAVENOUS at 09:47

## 2018-10-06 RX ADMIN — HEPARIN SODIUM 5000 UNIT(S): 5000 INJECTION INTRAVENOUS; SUBCUTANEOUS at 20:54

## 2018-10-06 NOTE — ED ADULT TRIAGE NOTE - CHIEF COMPLAINT QUOTE
urinary symptoms, generalized weakness. being treated for UTI by dr simmons. hx of bladder CA with indwelling catheter.

## 2018-10-06 NOTE — ED ADULT NURSE NOTE - NSIMPLEMENTINTERV_GEN_ALL_ED
Implemented All Fall Risk Interventions:  [place] to call system. Call bell, personal items and telephone within reach. Instruct patient to call for assistance. Room bathroom lighting operational. Non-slip footwear when patient is off stretcher. Physically safe environment: no spills, clutter or unnecessary equipment. Stretcher in lowest position, wheels locked, appropriate side rails in place. Provide visual cue, wrist band, yellow gown, etc. Monitor gait and stability. Monitor for mental status changes and reorient to person, place, and time. Review medications for side effects contributing to fall risk. Reinforce activity limits and safety measures with patient and family.

## 2018-10-06 NOTE — ED PROVIDER NOTE - PMH
CAD (coronary artery disease)    Endometrial adenocarcinoma    GERD (gastroesophageal reflux disease)    [place] (hard of hearing)    HTN (hypertension)    Hyperlipidemia    Hypothyroid    Macular degeneration    Stented coronary artery

## 2018-10-06 NOTE — ED ADULT NURSE NOTE - NS ED NURSE LEVEL OF CONSCIOUSNESS AFFECT
HISTORY: Chest pain



COMPARISONS: August 07, 2014



TECHNIQUE: Multiple contiguous axial CT scans of the chest were obtained after the

administration of nonionic intravenous contrast, timed to the pulmonary arterial phase of

contrast enhancement.. Coronal and sagittal multiplanar reformations are also submitted

for review.        



FINDINGS: Evaluation is limited by patient breathing motion artifact.



NECK AND THYROID: The lower neck and thyroid are unremarkable.

CHEST WALL: There is no lower cervical, axillary, or supraclavicular lymphadenopathy by

size criteria.



HEART AND PERICARDIUM: The heart is unremarkable.

AORTA AND PULMONARY VASCULATURE: There is no pulmonary arterial filling defect to suggest

pulmonary embolism. There is no linear filling defect within the aorta to suggest aortic

dissection.



MEDIASTINUM: There is no mediastinal lymphadenopathy by size criteria.

CROW: There is no hilar lymphadenopathy by size criteria.



AIRWAY AND ESOPHAGUS: The airway is unremarkable, without endobronchial filling defect.

The esophagus is grossly normal.

LUNG PARENCHYMA: There is centrilobular emphysematous change. There is patchy groundglass

opacification of the right lung base.. This area is obscured on the previous examination.

PLEURA: No pleural abnormalities are noted.



UPPER ABDOMEN: There is a 1.5 cm right adrenal adenoma.

BONES AND SOFT TISSUES: No bone or soft tissue abnormalities are noted.



OTHER: None.



IMPRESSION: 

1.  NO PULMONARY ARTERIAL FILLING DEFECTS TO SUGGEST PULMONARY EMBOLISM.

2.  EMPHYSEMA.

3.  MILD AIRSPACE DISEASE OF THE RIGHT LUNG BASE. RECOMMEND FOLLOW-UP UNTIL RESOLUTION TO

EXCLUDE UNDERLYING PULMONARY PARENCHYMAL PATHOLOGY. Calm/Appropriate

## 2018-10-06 NOTE — ED ADULT NURSE NOTE - PMH
CAD (coronary artery disease)    Endometrial adenocarcinoma    GERD (gastroesophageal reflux disease)    [place] (hard of hearing)    HTN (hypertension)    Hyperlipidemia    Hypothyroid    Macular degeneration    Stented coronary artery

## 2018-10-06 NOTE — ED ADULT NURSE NOTE - OBJECTIVE STATEMENT
Pt BIBA c/o worsening weakness over the last week. Pt reports she had a verma cath inserted on Friday and placed on Macrobid for UTI. Pt states she has been getting weaker and now can't walk secondary to weakness. Pt is A&Ox3 and lives at home with HHA that comes daily. Pt reports hx of bladder ca. Pt VS WNL in triage but had rectal temp of 101.1 upon placement in room. Pt Verma cath sent, urine set to lab for analysis.

## 2018-10-06 NOTE — ED PROVIDER NOTE - CHPI ED SYMPTOMS POS
+generalized weakness, decreased appetite, difficulty with ambulation, GRIDER/DYSURIA DYSURIA/+generalized weakness, decreased appetite, difficulty with ambulation, GRIDER, chills

## 2018-10-06 NOTE — H&P ADULT - NSHPPHYSICALEXAM_GEN_ALL_CORE
Physical Exam: Vital Signs Last 24 Hrs  T(C): 38.4 (06 Oct 2018 09:26), Max: 38.4 (06 Oct 2018 09:26)  T(F): 101.1 (06 Oct 2018 09:26), Max: 101.1 (06 Oct 2018 09:26)  HR: 75 (06 Oct 2018 12:30) (75 - 91)  BP: 141/56 (06 Oct 2018 12:30) (137/69 - 142/62)  BP(mean): --  RR: 18 (06 Oct 2018 12:30) (18 - 18)  SpO2: 96% (06 Oct 2018 12:30) (96% - 98%)        HEENT: PRRL EOMI    MOUTH/TEETH/GUMS: Clear    NECK: no JVD    LUNGS: Clear    HEART: S1,S2 RR    ABDOMEN: soft nontender    EXTREMITIES: leg edema:     MUSCULOSKELETAL: arthritic changes, hands, generalized weakness,    NEURO: no tremor, no focal signs.    SKIN: no rash    : CVA negative,

## 2018-10-06 NOTE — H&P ADULT - ASSESSMENT
89 yo F a with h/o Bladder CA s/p ,TURBT on 09/10/2018  , CAD s/p stents, Hypothyroidism,DJD, GERD,HLD,Uterine CA s/p Hysterectomy and radiations, cholecystectomy,  who develped fever 101, weakness, nausea yesterday.  She had intermitten hematureia and urine retention after her TURBT and she had a Valdes catheter placed. She  had recent urology visit 2 days ago and she was started on Macrobid for UTI. 89 yo F a with h/o Bladder CA s/p ,TURBT on 09/10/2018  , CAD s/p stents, Hypothyroidism,DJD, GERD,HLD,Uterine CA s/p Hysterectomy and radiation, cholecystectomy,  who developed fever 101, weakness, nausea yesterday.  She had intermittent  hematuria and urine retention after her TURBT and she had a Valdes catheter placed. She  had recent urology visit 2 days ago and she was started on Macrobid for UTI.

## 2018-10-06 NOTE — H&P ADULT - HISTORY OF PRESENT ILLNESS
89 yo F a with h/o Bladder CA s/p ,TURBT on 09/10/2018  , CAD s/p stents, Hypothyroidism,DJD, GERD,HLD,Uterine CA s/p Hysterectomy and radiations, cholecystectomy,  who develped fever 101, weakness, nausea yesterday.  She had intermitten hematureia and urine retention after her TURBT and she had a Valdes catheter placed. She  had recent urology visit 2 days ago and she was started on Macrobid for UTI.   Family Hx. father  of Brain T[facility], Mothr  of MI at age 85, 89 yo F a with h/o Bladder CA s/p ,TURBT on 09/10/2018  , CAD s/p stents, Hypothyroidism,DJD, GERD,HLD,Uterine CA s/p Hysterectomy and radiation, cholecystectomy,  who developed fever 101, weakness, nausea yesterday.  She had intermittent  hematuria and urine retention after her TURBT and she had a Valdes catheter placed. She  had recent urology visit 2 days ago and she was started on Macrobid for UTI.   Family Hx. father  of Brain TU, Mothr  of MI at age 85,

## 2018-10-06 NOTE — H&P ADULT - PMH
CAD (coronary artery disease)    Endometrial adenocarcinoma    GERD (gastroesophageal reflux disease)    [place] (hard of hearing)    HTN (hypertension)    Hyperlipidemia    Hypothyroid    Macular degeneration    Stented coronary artery

## 2018-10-06 NOTE — ED ADULT NURSE REASSESSMENT NOTE - NS ED NURSE REASSESS COMMENT FT1
Patient resting comfortably in bed. VSS, denies pain/discomfort. No s/s of distress present. Valdes in place, draining yellow urine. Hand-off report to KACI Viera, awaiting transport to . Safety & comfort measures in place, purposeful active rounding.

## 2018-10-06 NOTE — ED PROVIDER NOTE - MEDICAL DECISION MAKING DETAILS
89 y/o F presenting with 89 y/o F presenting with fevers, chills, weakness. Febrile here. Recently treated for recurrent UTI. Concern for urosepsis. Will treat with IV Abx, sepsis workup, admit.

## 2018-10-06 NOTE — H&P ADULT - NSHPLABSRESULTS_GEN_ALL_CORE
13.0   14.62 )-----------( 304      ( 06 Oct 2018 09:37 )             41.8       10-06    128<L>  |  98  |  19  ----------------------------<  160<H>  4.2   |  22  |  1.39<H>    Ca    9.0      06 Oct 2018 09:37    TPro  7.7  /  Alb  3.7  /  TBili  0.7  /  DBili  x   /  AST  15  /  ALT  16  /  AlkPhos  73  10-06    EKG NSR,   UA wbc >50

## 2018-10-06 NOTE — H&P ADULT - NSHPREVIEWOFSYSTEMS_GEN_ALL_CORE
Review of Systems: as in HPI  Eyes: no changes in vision    ENT/Mouth: no changes    Cardiovascular: no chest pain    Respiratory: no SOB    Gastrointestinal: no diarrhea, pp+ has  nausea, pp- no vomiting    Genitourinary: pp+, urine retention , intermittent hematuria pp+,    Breast: no pain    Musculoskeletal: no pain, pp+ generalized weakness,     Integumentary: no itching    Neurological: No Headache, no tremor,    Psychiatric: no suicidal ideations    Endocrine: no excessive thirst,     Hematologic/Lymphatic: no swollen glands    Allergic/Immunologic: no itching

## 2018-10-06 NOTE — ED ADULT NURSE REASSESSMENT NOTE - NS ED NURSE REASSESS COMMENT FT1
Pt admitted to hospital, awaiting completion of orders and bed assignment. Pt  and family at bedside updated on plan of care. VSS

## 2018-10-06 NOTE — ED PROVIDER NOTE - OBJECTIVE STATEMENT
89 y/o F with PMHx of Bladder CA, CAD s/p stents, HTN, HLD, Iron Deficiency, GERD, and Hypothyroidism presenting to the ED via EMS from home c/o generalized weakness and difficulty w/ ambulation. +GRIDER. Pt reports that she was experiencing weakness for multiple weeks, one episode of dysuria, and decreased appetite. Evaluated by Urologist Dr. Cano, dx with UTI, and placed on Macrobid. Since this time, her weakness has worsened and since yesterday, pt cannot walk or stand by herself. Pt reports she can stand and ambulate at baseline. Denies any fevers, chills, CP, abd pain, N/V/D. PMD: Dr. Antonio. Urologist: Dr. Cano. 89 y/o F with PMHx of Bladder CA, CAD s/p stents, HTN, HLD, Iron Deficiency, GERD, and Hypothyroidism presenting to the ED via EMS from home c/o generalized weakness and difficulty w/ ambulation. +GRIDER. Pt reports that she was experiencing weakness for multiple weeks, one episode of dysuria, and decreased appetite. She has been evaluated multiple times by Urologist Dr. Cano for these symptoms. Pt has been placed on multiple Abx but has not been able to tolerate them orally, so was placed on Macrobid two days ago. +chills. In the past few days, her weakness has worsened and since yesterday, pt cannot walk or stand by herself. Pt reports she can stand and ambulate at baseline. Denies any known fevers, chills, CP, abd pain, N/V/D. PMD: Dr. Antonio. Urologist: Dr. Cano. 89 y/o F with PMHx of Bladder CA, CAD s/p stents, HTN, HLD, Iron Deficiency, GERD, and Hypothyroidism presenting to the ED via EMS from home c/o generalized weakness and difficulty w/ ambulation. +GRIDER. Pt reports that she was experiencing weakness for multiple weeks, one episode of dysuria, and decreased appetite. She has been evaluated multiple times by Urologist Dr. Cano for these symptoms. Pt has been placed on multiple Abx but has not been able to tolerate them orally, so was placed on Macrobid two days ago. +chills. In the past few days, her weakness has worsened and since yesterday, pt cannot walk or stand by herself. Pt reports she can stand and ambulate at baseline. Denies any known fevers, CP, abd pain, N/V/D. PMD: Dr. Antonio. Urologist: Dr. Cano. 89 y/o F with PMHx of Bladder CA, CAD s/p stents, HTN, HLD, Iron Deficiency, GERD, and Hypothyroidism presenting to the ED via EMS from home c/o generalized weakness and difficulty w/ ambulation. +chills, GRIDER. Pt reports that she was experiencing weakness for multiple weeks, one episode of dysuria, and decreased appetite. She has been evaluated multiple times by Urologist Dr. Cano for these symptoms. Pt has been placed on multiple Abx but has not been able to tolerate them orally, so was placed on Macrobid two days ago. In the past few days, her weakness has worsened and since yesterday, pt cannot walk or stand by herself. Pt reports she can stand and ambulate at baseline. Denies any fevers at home, temp 101.1F in triage vitals. Denies any CP, abd pain, N/V/D. PMD: Dr. Antonio. Urologist: Dr. Cano.

## 2018-10-07 LAB
ANION GAP SERPL CALC-SCNC: 9 MMOL/L — SIGNIFICANT CHANGE UP (ref 5–17)
BASOPHILS # BLD AUTO: 0.03 K/UL — SIGNIFICANT CHANGE UP (ref 0–0.2)
BASOPHILS NFR BLD AUTO: 0.4 % — SIGNIFICANT CHANGE UP (ref 0–2)
BUN SERPL-MCNC: 18 MG/DL — SIGNIFICANT CHANGE UP (ref 7–23)
CALCIUM SERPL-MCNC: 8.5 MG/DL — SIGNIFICANT CHANGE UP (ref 8.5–10.1)
CHLORIDE SERPL-SCNC: 108 MMOL/L — SIGNIFICANT CHANGE UP (ref 96–108)
CO2 SERPL-SCNC: 22 MMOL/L — SIGNIFICANT CHANGE UP (ref 22–31)
CREAT SERPL-MCNC: 1.03 MG/DL — SIGNIFICANT CHANGE UP (ref 0.5–1.3)
EOSINOPHIL # BLD AUTO: 0.71 K/UL — HIGH (ref 0–0.5)
EOSINOPHIL NFR BLD AUTO: 8.4 % — HIGH (ref 0–6)
GLUCOSE SERPL-MCNC: 148 MG/DL — HIGH (ref 70–99)
HCT VFR BLD CALC: 35.1 % — SIGNIFICANT CHANGE UP (ref 34.5–45)
HGB BLD-MCNC: 10.8 G/DL — LOW (ref 11.5–15.5)
IMM GRANULOCYTES NFR BLD AUTO: 0.5 % — SIGNIFICANT CHANGE UP (ref 0–1.5)
LYMPHOCYTES # BLD AUTO: 1.31 K/UL — SIGNIFICANT CHANGE UP (ref 1–3.3)
LYMPHOCYTES # BLD AUTO: 15.6 % — SIGNIFICANT CHANGE UP (ref 13–44)
MCHC RBC-ENTMCNC: 27.1 PG — SIGNIFICANT CHANGE UP (ref 27–34)
MCHC RBC-ENTMCNC: 30.8 GM/DL — LOW (ref 32–36)
MCV RBC AUTO: 88.2 FL — SIGNIFICANT CHANGE UP (ref 80–100)
MONOCYTES # BLD AUTO: 0.56 K/UL — SIGNIFICANT CHANGE UP (ref 0–0.9)
MONOCYTES NFR BLD AUTO: 6.7 % — SIGNIFICANT CHANGE UP (ref 2–14)
NEUTROPHILS # BLD AUTO: 5.77 K/UL — SIGNIFICANT CHANGE UP (ref 1.8–7.4)
NEUTROPHILS NFR BLD AUTO: 68.4 % — SIGNIFICANT CHANGE UP (ref 43–77)
NRBC # BLD: 0 /100 WBCS — SIGNIFICANT CHANGE UP (ref 0–0)
PLATELET # BLD AUTO: 254 K/UL — SIGNIFICANT CHANGE UP (ref 150–400)
POTASSIUM SERPL-MCNC: 4.1 MMOL/L — SIGNIFICANT CHANGE UP (ref 3.5–5.3)
POTASSIUM SERPL-SCNC: 4.1 MMOL/L — SIGNIFICANT CHANGE UP (ref 3.5–5.3)
RBC # BLD: 3.98 M/UL — SIGNIFICANT CHANGE UP (ref 3.8–5.2)
RBC # FLD: 15.9 % — HIGH (ref 10.3–14.5)
SODIUM SERPL-SCNC: 139 MMOL/L — SIGNIFICANT CHANGE UP (ref 135–145)
WBC # BLD: 8.42 K/UL — SIGNIFICANT CHANGE UP (ref 3.8–10.5)
WBC # FLD AUTO: 8.42 K/UL — SIGNIFICANT CHANGE UP (ref 3.8–10.5)

## 2018-10-07 RX ORDER — CEFTRIAXONE 500 MG/1
1 INJECTION, POWDER, FOR SOLUTION INTRAMUSCULAR; INTRAVENOUS EVERY 24 HOURS
Qty: 0 | Refills: 0 | Status: DISCONTINUED | OUTPATIENT
Start: 2018-10-07 | End: 2018-10-07

## 2018-10-07 RX ORDER — CEFTRIAXONE 500 MG/1
1000 INJECTION, POWDER, FOR SOLUTION INTRAMUSCULAR; INTRAVENOUS EVERY 24 HOURS
Qty: 0 | Refills: 0 | Status: DISCONTINUED | OUTPATIENT
Start: 2018-10-07 | End: 2018-10-09

## 2018-10-07 RX ADMIN — PANTOPRAZOLE SODIUM 40 MILLIGRAM(S): 20 TABLET, DELAYED RELEASE ORAL at 05:31

## 2018-10-07 RX ADMIN — CEFTRIAXONE 1000 MILLIGRAM(S): 500 INJECTION, POWDER, FOR SOLUTION INTRAMUSCULAR; INTRAVENOUS at 16:49

## 2018-10-07 RX ADMIN — SODIUM CHLORIDE 75 MILLILITER(S): 9 INJECTION INTRAMUSCULAR; INTRAVENOUS; SUBCUTANEOUS at 07:11

## 2018-10-07 RX ADMIN — Medication 75 MICROGRAM(S): at 05:30

## 2018-10-07 RX ADMIN — Medication 0.25 MILLIGRAM(S): at 22:10

## 2018-10-07 RX ADMIN — SPIRONOLACTONE 25 MILLIGRAM(S): 25 TABLET, FILM COATED ORAL at 05:30

## 2018-10-07 RX ADMIN — Medication 50 MILLIGRAM(S): at 05:30

## 2018-10-07 RX ADMIN — Medication 1 DROP(S): at 16:49

## 2018-10-07 RX ADMIN — HEPARIN SODIUM 5000 UNIT(S): 5000 INJECTION INTRAVENOUS; SUBCUTANEOUS at 19:26

## 2018-10-07 RX ADMIN — HEPARIN SODIUM 5000 UNIT(S): 5000 INJECTION INTRAVENOUS; SUBCUTANEOUS at 05:30

## 2018-10-07 RX ADMIN — Medication 1 DROP(S): at 05:30

## 2018-10-07 RX ADMIN — Medication 50 MILLIGRAM(S): at 19:26

## 2018-10-07 RX ADMIN — Medication 325 MILLIGRAM(S): at 11:20

## 2018-10-07 RX ADMIN — Medication 1000 UNIT(S): at 11:20

## 2018-10-07 NOTE — CONSULT NOTE ADULT - SUBJECTIVE AND OBJECTIVE BOX
Patient is a 89y old  Female who presents with a chief complaint of UTI r/o sepsis (06 Oct 2018 13:59)    HPI:  89 yo F a with h/o Bladder CA s/p ,TURBT on 09/10/2018  , CAD s/p stents, Hypothyroidism,DJD, GERD,HLD,Uterine CA s/p Hysterectomy and radiation, cholecystectomy,  who developed fever 101, weakness, nausea 10/5 She had intermittent  hematuria and urine retention after her TURBT and she had a Verma catheter placed. She  had recent urology visit 2 days ago and she was started on Macrobid for UTI. Here noted with leukocytosis, grossly abnormal UA, JAYRO, xray with early marking in LLL ? PNA, denies resp sxs, was given IV rocephin.       PMH: as above  PSH: as above  Meds: per reconciliation sheet, noted below  MEDICATIONS  (STANDING):  artificial  tears Solution 1 Drop(s) Both EYES three times a day  aspirin 325 milliGRAM(s) Oral daily  cefTRIAXone   IVPB 1 Gram(s) IV Intermittent every 24 hours  cholecalciferol 1000 Unit(s) Oral daily  heparin  Injectable 5000 Unit(s) SubCutaneous every 12 hours  levothyroxine 75 MICROGram(s) Oral daily  metoprolol succinate ER 50 milliGRAM(s) Oral two times a day  pantoprazole    Tablet 40 milliGRAM(s) Oral before breakfast  sodium chloride 0.9%. 1000 milliLiter(s) (75 mL/Hr) IV Continuous <Continuous>  spironolactone 25 milliGRAM(s) Oral daily    Allergies    amlodipine (Unknown)  clonidine (Unknown)  hydrocodone (Unknown)  Levatol (Unknown)  Lipitor (Unknown)  Lotrel (Unknown)  methylPREDNISolone (Unknown)  morphine (Other)  Plaquenil (Unknown)  statins (Other (Unknown))  sulfa drugs (Rash)    Intolerances      Social: no smoking, no alcohol, no illegal drugs; no recent travel, no exposure to TB  FAMILY HISTORY:  Family history of brain cancer (Father)     no history of premature cardiovascular disease in first degree relatives    ROS:  no HA, no dizziness, no sore throat, no blurry vision, no CP, no palpitations, no sob, no abdominal pain, no diarrhea, no N/V, no leg pain, no claudication, no rash, no joint aches, no rectal pain or bleeding, no night sweats  All other systems reviewed and are negative    Vital Signs Last 24 Hrs  T(C): 36.9 (07 Oct 2018 05:24), Max: 37.7 (06 Oct 2018 15:15)  T(F): 98.5 (07 Oct 2018 05:24), Max: 99.9 (06 Oct 2018 15:15)  HR: 73 (07 Oct 2018 05:24) (73 - 73)  BP: 128/51 (07 Oct 2018 05:24) (127/45 - 141/53)  BP(mean): --  RR: 18 (07 Oct 2018 05:24) (17 - 18)  SpO2: 95% (07 Oct 2018 05:24) (95% - 99%)  Daily     Daily     PE:    Constitutional: frail looking  HEENT: NC/AT, EOMI, PERRLA, conjunctivae clear; ears and nose atraumatic; pharynx benign  Neck: supple; thyroid not palpable  Back: no tenderness  Respiratory: respiratory effort normal; clear to auscultation  Cardiovascular: S1S2 regular, no murmurs  Abdomen: soft, not tender, not distended, positive BS; liver and spleen WNL  Genitourinary: no suprapubic tenderness verma in place clear   Lymphatic: no LN palpable  Musculoskeletal: no muscle tenderness, no joint swelling or tenderness  Extremities: no pedal edema  Neurological/ Psychiatric: moving all extremities  Skin: no rashes; no palpable lesions    Labs: all available labs reviewed                        10.8   8.42  )-----------( 254      ( 07 Oct 2018 11:04 )             35.1     10-07    139  |  108  |  18  ----------------------------<  148<H>  4.1   |  22  |  1.03    Ca    8.5      07 Oct 2018 11:04    TPro  7.7  /  Alb  3.7  /  TBili  0.7  /  DBili  x   /  AST  15  /  ALT  16  /  AlkPhos  73  10-06     LIVER FUNCTIONS - ( 06 Oct 2018 09:37 )  Alb: 3.7 g/dL / Pro: 7.7 gm/dL / ALK PHOS: 73 U/L / ALT: 16 U/L / AST: 15 U/L / GGT: x           Urinalysis Basic - ( 06 Oct 2018 09:48 )    Color: Yellow / Appearance: very cloudy / S.020 / pH: x  Gluc: x / Ketone: Negative  / Bili: Negative / Urobili: Negative mg/dL   Blood: x / Protein: 100 mg/dL / Nitrite: Negative   Leuk Esterase: Moderate / RBC: 11-25 /HPF / WBC >50   Sq Epi: x / Non Sq Epi: Few / Bacteria: Few          Radiology: all available radiological tests reviewed      < from: Xray Chest 1 View AP/PA. (10.06.18 @ 11:48) >    EXAM:  XR CHEST AP OR PA 1V                            PROCEDURE DATE:  10/06/2018          INTERPRETATION:  Exam Date: 10/6/2018 11:48 AM    History: Weakness    Technique: Single frontal portable view of the chest with comparison to    2018    Findings:    The heart is mildly enlarged. Slight increased markings in left lower   lobe may represent early pneumonia.. The apices and hemidiaphragms are   unremarkable. Degenerative changes of the visualized osseous structures.        Impression:    Slight increased markings in left lower lobe may represent early   pneumonia.      Advanced directives addressed: full resuscitation

## 2018-10-07 NOTE — CONSULT NOTE ADULT - ASSESSMENT
89 yo F a with h/o Bladder CA s/p ,TURBT on 09/10/2018  , CAD s/p stents, Hypothyroidism,DJD, GERD,HLD,Uterine CA s/p Hysterectomy and radiation, cholecystectomy,  who developed fever 101, weakness, nausea 10/5 She had intermittent  hematuria and urine retention after her TURBT and she had a Verma catheter placed. She  had recent urology visit 2 days ago and she was started on Macrobid for UTI. Here noted with leukocytosis, grossly abnormal UA, JAYRO, xray with early marking in LLL ? PNA, denies resp sxs, was given IV rocephin.       1. sepsis/cystitis/hematuria/urinary retention/bladder-uterine ca/JAYRO  - improving, wbc ct normalized, hematuria resolved  - xray reviewed, no resp sx, doubt PNA  - verma in place  - on rocephin 1gm q24h   - continue with antibiotic coverage  - urology eval  - f/u urine cx/blood cx  - monitor temps  - tolerating abx well so far; no side effects noted  - reason for abx use and side effects reviewed with patient  - supportive care  - f/u cbc    2. other issues -care per medicine

## 2018-10-08 RX ORDER — LACTULOSE 10 G/15ML
20 SOLUTION ORAL ONCE
Qty: 0 | Refills: 0 | Status: COMPLETED | OUTPATIENT
Start: 2018-10-08 | End: 2018-10-08

## 2018-10-08 RX ORDER — ONDANSETRON 8 MG/1
4 TABLET, FILM COATED ORAL ONCE
Qty: 0 | Refills: 0 | Status: COMPLETED | OUTPATIENT
Start: 2018-10-08 | End: 2018-10-08

## 2018-10-08 RX ORDER — DIPHENHYDRAMINE HYDROCHLORIDE AND LIDOCAINE HYDROCHLORIDE AND ALUMINUM HYDROXIDE AND MAGNESIUM HYDRO
10 KIT EVERY 4 HOURS
Qty: 0 | Refills: 0 | Status: DISCONTINUED | OUTPATIENT
Start: 2018-10-08 | End: 2018-10-14

## 2018-10-08 RX ORDER — POLYETHYLENE GLYCOL 3350 17 G/17G
17 POWDER, FOR SOLUTION ORAL DAILY
Qty: 0 | Refills: 0 | Status: DISCONTINUED | OUTPATIENT
Start: 2018-10-08 | End: 2018-10-14

## 2018-10-08 RX ADMIN — Medication 1 DROP(S): at 21:44

## 2018-10-08 RX ADMIN — Medication 50 MILLIGRAM(S): at 06:00

## 2018-10-08 RX ADMIN — Medication 325 MILLIGRAM(S): at 11:53

## 2018-10-08 RX ADMIN — HEPARIN SODIUM 5000 UNIT(S): 5000 INJECTION INTRAVENOUS; SUBCUTANEOUS at 06:00

## 2018-10-08 RX ADMIN — PANTOPRAZOLE SODIUM 40 MILLIGRAM(S): 20 TABLET, DELAYED RELEASE ORAL at 06:00

## 2018-10-08 RX ADMIN — HEPARIN SODIUM 5000 UNIT(S): 5000 INJECTION INTRAVENOUS; SUBCUTANEOUS at 17:54

## 2018-10-08 RX ADMIN — Medication 1000 UNIT(S): at 11:53

## 2018-10-08 RX ADMIN — LACTULOSE 20 GRAM(S): 10 SOLUTION ORAL at 17:54

## 2018-10-08 RX ADMIN — DIPHENHYDRAMINE HYDROCHLORIDE AND LIDOCAINE HYDROCHLORIDE AND ALUMINUM HYDROXIDE AND MAGNESIUM HYDRO 10 MILLILITER(S): KIT at 21:44

## 2018-10-08 RX ADMIN — DIPHENHYDRAMINE HYDROCHLORIDE AND LIDOCAINE HYDROCHLORIDE AND ALUMINUM HYDROXIDE AND MAGNESIUM HYDRO 10 MILLILITER(S): KIT at 17:54

## 2018-10-08 RX ADMIN — CEFTRIAXONE 1000 MILLIGRAM(S): 500 INJECTION, POWDER, FOR SOLUTION INTRAMUSCULAR; INTRAVENOUS at 17:54

## 2018-10-08 RX ADMIN — POLYETHYLENE GLYCOL 3350 17 GRAM(S): 17 POWDER, FOR SOLUTION ORAL at 11:54

## 2018-10-08 RX ADMIN — ONDANSETRON 4 MILLIGRAM(S): 8 TABLET, FILM COATED ORAL at 06:45

## 2018-10-08 RX ADMIN — Medication 50 MILLIGRAM(S): at 17:57

## 2018-10-08 RX ADMIN — Medication 75 MICROGRAM(S): at 05:59

## 2018-10-08 RX ADMIN — Medication 0.25 MILLIGRAM(S): at 21:44

## 2018-10-08 RX ADMIN — SPIRONOLACTONE 25 MILLIGRAM(S): 25 TABLET, FILM COATED ORAL at 06:00

## 2018-10-08 NOTE — CONSULT NOTE ADULT - SUBJECTIVE AND OBJECTIVE BOX
89M well known to me s/p TURBT on 9/10 with recently diagnosed HGT1, CIS bladder cancer. Recently seen in office for urinary retention in setting of UTI. Verma placed in office and patient started on macrobid BID. Her urine culture in office grew Enterococcus sensitive to macrobid. However, she notes she get progressively weaker and spiked a fever of ~101 at home which prompted her to come to ER.     WBC on admission 14.62, but downtrended to 8.42.   Urine culture  - +Enterococcus  blood cx - negative    Patient notes that verma catheter was removed today and that she is voiding well. No dysuria, hematuria. Afebrile, hemodynamically stable.     PAST MEDICAL & SURGICAL HISTORY:  Endometrial adenocarcinoma  Macular degeneration  Stented coronary artery  Hypothyroid  Hyperlipidemia  HTN (hypertension)  GERD (gastroesophageal reflux disease)  CAD (coronary artery disease)  [place] (hard of hearing)  S/P EMILY (total abdominal hysterectomy)  S/P hernia repair: umbilical - 2008  S/P laparoscopic cholecystectomy: 2008  Ankle fracture, right: surgery 25 yrs ago - hardware removed  S/P coronary angiogram: 2005, 2008, 2011 - drug eluding stents      Allergies    amlodipine (Unknown)  clonidine (Unknown)  hydrocodone (Unknown)  Levatol (Unknown)  Lipitor (Unknown)  Lotrel (Unknown)  methylPREDNISolone (Unknown)  morphine (Other)  Plaquenil (Unknown)  statins (Other (Unknown))  sulfa drugs (Rash)    Intolerances    MEDICATIONS  (STANDING):  artificial  tears Solution 1 Drop(s) Both EYES three times a day  aspirin 325 milliGRAM(s) Oral daily  cefTRIAXone Injectable. 1000 milliGRAM(s) IV Push every 24 hours  cholecalciferol 1000 Unit(s) Oral daily  heparin  Injectable 5000 Unit(s) SubCutaneous every 12 hours  levothyroxine 75 MICROGram(s) Oral daily  metoprolol succinate ER 50 milliGRAM(s) Oral two times a day  pantoprazole    Tablet 40 milliGRAM(s) Oral before breakfast  polyethylene glycol 3350 17 Gram(s) Oral daily  spironolactone 25 milliGRAM(s) Oral daily      REVIEW OF SYSTEMS    General: No weight change, fevers, chills, night sweats, fatigue, malaise  Skin: no new skin lesions, hair changes, prutitus  Ophthalmologic: No eye pain,  swelling,  redness, discharge, vision changes  ENMT: No hearing changes,  ear pain,  nasal congestion, sinus pain,  hoarseness, dysphagia, rhinorrhea	  Respiratory and Thorax: No cough, sputum, dyspnea, wheezing  Cardiovascular: No chest pain, SOB, PND, dyspnea on exertion, orthopnea, edema, palpitations  Gastrointestinal:	No diarrhea, constipation, nausea, vomiting, anorexia, hematochezia, melena.   Genitourinary: see above  Musculoskeletal:	No arthralgias, myalgias, joint swelling, joint stiffness, back pain  Neurological:	 No  numbness, paresthesias, syncope, loss of consciousness, headache, dizziness  Psychiatric: No anxiety, depression, delusions. No SI/HI/AH/VH.  Hematology/Lymphatics:	No bruising, lymphadenopathy  Endocrine: No temperature intolerance      PHYSICAL EXAM:    Constitutional:  alert and cooperative, NAD, lying in bed comfortably   Eyes: EOMI, vision is grossly intact  Neck: neck supple, non-tender without lymphadenopathy, masses  Back: no CVA tenderness bilaterally  Cardiovascular: no peripheral edema, cyanosis, or pallor. Extremities are warm and well perfused.   Gastrointestinal: soft, non-tender, non-distended, no guarding, no rebound tenderness. Bladder non-palpable  Extremities: no significant deformity or joint abnormality.   Neurological: no focal deficits. A&O x3  Skin: normal skin color, texture and turgor with no new, prominent lesions  Lymph Nodes: no appreciable lymphadenopathy

## 2018-10-08 NOTE — CONSULT NOTE ADULT - ASSESSMENT
89 year old F with HGTI, CIS bladder ca s/p TURBT on 9/10 admitted with UTI, hematuria, urinary retention.     -ID note appreciated - continue antibiotics as per ID  -please measure PVR via bladder scan after voiding to assure that patient is emptying her bladder adequately  -would check urine culture right before discharge as she must have a negative urine culture before she can start intravesical BCG therapy for her bladder cancer. I will follow up urine culture in office.   -advised on PO hydration  -OOB/ambulate  -to follow up with me as scheduled for first BCG treatment as per of her induction cycle. She  understands.     2. Rest of issues - care per medicine      Thank you for allowing to take part in this patient's care.   Please feel free to call me with any questions/concerns - [phone].

## 2018-10-08 NOTE — PHYSICAL THERAPY INITIAL EVALUATION ADULT - MODALITIES TREATMENT COMMENTS
pt left in bed supine post Eval @ pt request; bed alarm on; callbell in reach; jaylen well; denied pain

## 2018-10-08 NOTE — PHYSICAL THERAPY INITIAL EVALUATION ADULT - CRITERIA FOR SKILLED THERAPEUTIC INTERVENTIONS
will attempt completion of Eval @ later date @ pt request; further course of PT intervention pending

## 2018-10-08 NOTE — CDI QUERY NOTE - NSCDIOTHERTXTBX_GEN_ALL_CORE_HH
As per medical record patient has bladder cancer with indwelling catheter and presents with UTI and Sepsis. If clinically significant, please clarify the relationship among these findings in the medical record:   A. sepsis secondary to UTI associated with indwelling catheter  B. sepsis secondary to UTI not associated with indwelling catheter  C. other/ not clinically significant

## 2018-10-09 RX ORDER — AMPICILLIN SODIUM AND SULBACTAM SODIUM 250; 125 MG/ML; MG/ML
1.5 INJECTION, POWDER, FOR SUSPENSION INTRAMUSCULAR; INTRAVENOUS EVERY 8 HOURS
Qty: 0 | Refills: 0 | Status: DISCONTINUED | OUTPATIENT
Start: 2018-10-09 | End: 2018-10-13

## 2018-10-09 RX ADMIN — Medication 1000 UNIT(S): at 11:07

## 2018-10-09 RX ADMIN — Medication 325 MILLIGRAM(S): at 11:07

## 2018-10-09 RX ADMIN — Medication 50 MILLIGRAM(S): at 06:16

## 2018-10-09 RX ADMIN — PANTOPRAZOLE SODIUM 40 MILLIGRAM(S): 20 TABLET, DELAYED RELEASE ORAL at 06:22

## 2018-10-09 RX ADMIN — POLYETHYLENE GLYCOL 3350 17 GRAM(S): 17 POWDER, FOR SOLUTION ORAL at 11:07

## 2018-10-09 RX ADMIN — AMPICILLIN SODIUM AND SULBACTAM SODIUM 100 GRAM(S): 250; 125 INJECTION, POWDER, FOR SUSPENSION INTRAMUSCULAR; INTRAVENOUS at 22:34

## 2018-10-09 RX ADMIN — HEPARIN SODIUM 5000 UNIT(S): 5000 INJECTION INTRAVENOUS; SUBCUTANEOUS at 06:16

## 2018-10-09 RX ADMIN — Medication 75 MICROGRAM(S): at 06:16

## 2018-10-09 RX ADMIN — AMPICILLIN SODIUM AND SULBACTAM SODIUM 100 GRAM(S): 250; 125 INJECTION, POWDER, FOR SUSPENSION INTRAMUSCULAR; INTRAVENOUS at 14:37

## 2018-10-09 RX ADMIN — Medication 1 DROP(S): at 06:17

## 2018-10-09 RX ADMIN — Medication 1 DROP(S): at 14:37

## 2018-10-09 RX ADMIN — SPIRONOLACTONE 25 MILLIGRAM(S): 25 TABLET, FILM COATED ORAL at 06:17

## 2018-10-09 RX ADMIN — Medication 50 MILLIGRAM(S): at 17:23

## 2018-10-09 RX ADMIN — HEPARIN SODIUM 5000 UNIT(S): 5000 INJECTION INTRAVENOUS; SUBCUTANEOUS at 17:23

## 2018-10-09 RX ADMIN — Medication 0.25 MILLIGRAM(S): at 22:45

## 2018-10-10 RX ADMIN — Medication 50 MILLIGRAM(S): at 06:40

## 2018-10-10 RX ADMIN — SPIRONOLACTONE 25 MILLIGRAM(S): 25 TABLET, FILM COATED ORAL at 06:40

## 2018-10-10 RX ADMIN — Medication 1000 UNIT(S): at 11:33

## 2018-10-10 RX ADMIN — Medication 1 DROP(S): at 06:39

## 2018-10-10 RX ADMIN — HEPARIN SODIUM 5000 UNIT(S): 5000 INJECTION INTRAVENOUS; SUBCUTANEOUS at 17:11

## 2018-10-10 RX ADMIN — Medication 500 MILLIGRAM(S): at 18:41

## 2018-10-10 RX ADMIN — Medication 325 MILLIGRAM(S): at 11:32

## 2018-10-10 RX ADMIN — PANTOPRAZOLE SODIUM 40 MILLIGRAM(S): 20 TABLET, DELAYED RELEASE ORAL at 06:39

## 2018-10-10 RX ADMIN — Medication 500 MILLIGRAM(S): at 03:53

## 2018-10-10 RX ADMIN — AMPICILLIN SODIUM AND SULBACTAM SODIUM 100 GRAM(S): 250; 125 INJECTION, POWDER, FOR SUSPENSION INTRAMUSCULAR; INTRAVENOUS at 14:38

## 2018-10-10 RX ADMIN — DIPHENHYDRAMINE HYDROCHLORIDE AND LIDOCAINE HYDROCHLORIDE AND ALUMINUM HYDROXIDE AND MAGNESIUM HYDRO 10 MILLILITER(S): KIT at 06:39

## 2018-10-10 RX ADMIN — AMPICILLIN SODIUM AND SULBACTAM SODIUM 100 GRAM(S): 250; 125 INJECTION, POWDER, FOR SUSPENSION INTRAMUSCULAR; INTRAVENOUS at 06:39

## 2018-10-10 RX ADMIN — Medication 0.25 MILLIGRAM(S): at 22:14

## 2018-10-10 RX ADMIN — Medication 50 MILLIGRAM(S): at 17:12

## 2018-10-10 RX ADMIN — Medication 1 DROP(S): at 14:40

## 2018-10-10 RX ADMIN — AMPICILLIN SODIUM AND SULBACTAM SODIUM 100 GRAM(S): 250; 125 INJECTION, POWDER, FOR SUSPENSION INTRAMUSCULAR; INTRAVENOUS at 22:15

## 2018-10-10 RX ADMIN — Medication 75 MICROGRAM(S): at 06:40

## 2018-10-10 RX ADMIN — HEPARIN SODIUM 5000 UNIT(S): 5000 INJECTION INTRAVENOUS; SUBCUTANEOUS at 06:39

## 2018-10-10 NOTE — PROGRESS NOTE ADULT - ASSESSMENT
89 yo F a with h/o Bladder CA s/p ,TURBT on 09/10/2018  , CAD s/p stents, Hypothyroidism,DJD, GERD,HLD,Uterine CA s/p Hysterectomy and radiation, cholecystectomy,  who developed fever 101, weakness, nausea 10/5 She had intermittent  hematuria and urine retention after her TURBT and she had a Verma catheter placed. She  had recent urology visit 2 days ago and she was started on Macrobid for UTI. Here noted with leukocytosis, grossly abnormal UA, JAYRO, xray with early marking in LLL ? PNA, denies resp sxs, was given IV rocephin.       1. sepsis/E faecalis cystitis/hematuria/urinary retention/bladder-uterine ca/JAYRO  - improving, wbc ct normalized, hematuria resolved, verma out  - xray reviewed, no resp sx, doubt PNA  - s/p rocephin #2   - now on unasyn #2  - continue with antibiotic coverage  - urine cx reviewed growing e faecalis amp S  - on dc plan for augmentin [id number] to complete 7 days  - monitor temps  - tolerating abx well so far; no side effects noted  - reason for abx use and side effects reviewed with patient  - supportive care  - f/u cbc    2. other issues -care per medicine

## 2018-10-10 NOTE — PROGRESS NOTE ADULT - ASSESSMENT
89 year old F with HGTI, CIS bladder ca s/p TURBT on 9/10 admitted with UTI, hematuria, urinary retention.     -ID note appreciated - continue antibiotics as per ID  -please measure PVR   -please check urine culture right before discharge as she must have a negative urine culture before she can start intravesical BCG therapy for her bladder cancer.   -advised on PO hydration  -OOB/ambulate  -to follow up with me as scheduled for first BCG treatment as per of her induction cycle on 10/23/18.  She  understands.   -d/w patient and her daughter    2. Rest of issues - care per medicine      Thank you for allowing to take part in this patient's care.   Please feel free to call me with any questions/concerns - [phone].

## 2018-10-11 LAB
ANION GAP SERPL CALC-SCNC: 9 MMOL/L — SIGNIFICANT CHANGE UP (ref 5–17)
BUN SERPL-MCNC: 13 MG/DL — SIGNIFICANT CHANGE UP (ref 7–23)
CALCIUM SERPL-MCNC: 9.3 MG/DL — SIGNIFICANT CHANGE UP (ref 8.5–10.1)
CHLORIDE SERPL-SCNC: 103 MMOL/L — SIGNIFICANT CHANGE UP (ref 96–108)
CO2 SERPL-SCNC: 25 MMOL/L — SIGNIFICANT CHANGE UP (ref 22–31)
CREAT SERPL-MCNC: 0.99 MG/DL — SIGNIFICANT CHANGE UP (ref 0.5–1.3)
CULTURE RESULTS: SIGNIFICANT CHANGE UP
GLUCOSE SERPL-MCNC: 92 MG/DL — SIGNIFICANT CHANGE UP (ref 70–99)
HCT VFR BLD CALC: 40.6 % — SIGNIFICANT CHANGE UP (ref 34.5–45)
HGB BLD-MCNC: 12.3 G/DL — SIGNIFICANT CHANGE UP (ref 11.5–15.5)
MCHC RBC-ENTMCNC: 26.9 PG — LOW (ref 27–34)
MCHC RBC-ENTMCNC: 30.3 GM/DL — LOW (ref 32–36)
MCV RBC AUTO: 88.8 FL — SIGNIFICANT CHANGE UP (ref 80–100)
NRBC # BLD: 0 /100 WBCS — SIGNIFICANT CHANGE UP (ref 0–0)
PLATELET # BLD AUTO: 326 K/UL — SIGNIFICANT CHANGE UP (ref 150–400)
POTASSIUM SERPL-MCNC: 4.4 MMOL/L — SIGNIFICANT CHANGE UP (ref 3.5–5.3)
POTASSIUM SERPL-SCNC: 4.4 MMOL/L — SIGNIFICANT CHANGE UP (ref 3.5–5.3)
RBC # BLD: 4.57 M/UL — SIGNIFICANT CHANGE UP (ref 3.8–5.2)
RBC # FLD: 15.5 % — HIGH (ref 10.3–14.5)
SODIUM SERPL-SCNC: 137 MMOL/L — SIGNIFICANT CHANGE UP (ref 135–145)
SPECIMEN SOURCE: SIGNIFICANT CHANGE UP
WBC # BLD: 10.53 K/UL — HIGH (ref 3.8–10.5)
WBC # FLD AUTO: 10.53 K/UL — HIGH (ref 3.8–10.5)

## 2018-10-11 RX ADMIN — SPIRONOLACTONE 25 MILLIGRAM(S): 25 TABLET, FILM COATED ORAL at 05:20

## 2018-10-11 RX ADMIN — Medication 500 MILLIGRAM(S): at 22:54

## 2018-10-11 RX ADMIN — Medication 1 DROP(S): at 14:41

## 2018-10-11 RX ADMIN — Medication 50 MILLIGRAM(S): at 17:24

## 2018-10-11 RX ADMIN — PANTOPRAZOLE SODIUM 40 MILLIGRAM(S): 20 TABLET, DELAYED RELEASE ORAL at 05:32

## 2018-10-11 RX ADMIN — Medication 500 MILLIGRAM(S): at 21:21

## 2018-10-11 RX ADMIN — Medication 50 MILLIGRAM(S): at 05:20

## 2018-10-11 RX ADMIN — HEPARIN SODIUM 5000 UNIT(S): 5000 INJECTION INTRAVENOUS; SUBCUTANEOUS at 05:20

## 2018-10-11 RX ADMIN — Medication 1000 UNIT(S): at 12:12

## 2018-10-11 RX ADMIN — AMPICILLIN SODIUM AND SULBACTAM SODIUM 100 GRAM(S): 250; 125 INJECTION, POWDER, FOR SUSPENSION INTRAMUSCULAR; INTRAVENOUS at 05:19

## 2018-10-11 RX ADMIN — AMPICILLIN SODIUM AND SULBACTAM SODIUM 100 GRAM(S): 250; 125 INJECTION, POWDER, FOR SUSPENSION INTRAMUSCULAR; INTRAVENOUS at 14:41

## 2018-10-11 RX ADMIN — Medication 0.25 MILLIGRAM(S): at 21:21

## 2018-10-11 RX ADMIN — HEPARIN SODIUM 5000 UNIT(S): 5000 INJECTION INTRAVENOUS; SUBCUTANEOUS at 17:23

## 2018-10-11 RX ADMIN — POLYETHYLENE GLYCOL 3350 17 GRAM(S): 17 POWDER, FOR SOLUTION ORAL at 12:12

## 2018-10-11 RX ADMIN — DIPHENHYDRAMINE HYDROCHLORIDE AND LIDOCAINE HYDROCHLORIDE AND ALUMINUM HYDROXIDE AND MAGNESIUM HYDRO 10 MILLILITER(S): KIT at 12:12

## 2018-10-11 RX ADMIN — AMPICILLIN SODIUM AND SULBACTAM SODIUM 100 GRAM(S): 250; 125 INJECTION, POWDER, FOR SUSPENSION INTRAMUSCULAR; INTRAVENOUS at 22:47

## 2018-10-11 RX ADMIN — Medication 325 MILLIGRAM(S): at 12:12

## 2018-10-11 RX ADMIN — Medication 75 MICROGRAM(S): at 05:20

## 2018-10-11 NOTE — PROGRESS NOTE ADULT - ASSESSMENT
89 year old F with HGTI, CIS bladder ca s/p TURBT on 9/10 admitted with UTI, hematuria, urinary retention. Voiding now without any issues.     -f/u ID - consider changing of antibiotics givens flushing she is experiencing with Unasyn.   -please check urine culture right before discharge as she must have a negative urine culture before she can start intravesical BCG therapy for her bladder cancer.   -advised on PO hydration  -OOB/ambulate  -to follow up with me as scheduled for first BCG treatment as per of her induction cycle on 10/23/18.  She  understands.       2. Rest of issues - care per medicine      Thank you for allowing to take part in this patient's care.   Please feel free to call me with any questions/concerns - [phone].

## 2018-10-12 RX ADMIN — HEPARIN SODIUM 5000 UNIT(S): 5000 INJECTION INTRAVENOUS; SUBCUTANEOUS at 05:08

## 2018-10-12 RX ADMIN — Medication 75 MICROGRAM(S): at 05:09

## 2018-10-12 RX ADMIN — AMPICILLIN SODIUM AND SULBACTAM SODIUM 100 GRAM(S): 250; 125 INJECTION, POWDER, FOR SUSPENSION INTRAMUSCULAR; INTRAVENOUS at 05:58

## 2018-10-12 RX ADMIN — Medication 50 MILLIGRAM(S): at 05:09

## 2018-10-12 RX ADMIN — DIPHENHYDRAMINE HYDROCHLORIDE AND LIDOCAINE HYDROCHLORIDE AND ALUMINUM HYDROXIDE AND MAGNESIUM HYDRO 10 MILLILITER(S): KIT at 21:58

## 2018-10-12 RX ADMIN — Medication 325 MILLIGRAM(S): at 11:01

## 2018-10-12 RX ADMIN — Medication 1 DROP(S): at 21:55

## 2018-10-12 RX ADMIN — Medication 1 DROP(S): at 13:56

## 2018-10-12 RX ADMIN — PANTOPRAZOLE SODIUM 40 MILLIGRAM(S): 20 TABLET, DELAYED RELEASE ORAL at 05:09

## 2018-10-12 RX ADMIN — Medication 1 DROP(S): at 05:08

## 2018-10-12 RX ADMIN — Medication 1000 UNIT(S): at 11:01

## 2018-10-12 RX ADMIN — HEPARIN SODIUM 5000 UNIT(S): 5000 INJECTION INTRAVENOUS; SUBCUTANEOUS at 17:37

## 2018-10-12 RX ADMIN — Medication 50 MILLIGRAM(S): at 17:37

## 2018-10-12 RX ADMIN — AMPICILLIN SODIUM AND SULBACTAM SODIUM 100 GRAM(S): 250; 125 INJECTION, POWDER, FOR SUSPENSION INTRAMUSCULAR; INTRAVENOUS at 14:19

## 2018-10-12 RX ADMIN — AMPICILLIN SODIUM AND SULBACTAM SODIUM 100 GRAM(S): 250; 125 INJECTION, POWDER, FOR SUSPENSION INTRAMUSCULAR; INTRAVENOUS at 21:49

## 2018-10-12 RX ADMIN — Medication 500 MILLIGRAM(S): at 20:28

## 2018-10-12 RX ADMIN — SPIRONOLACTONE 25 MILLIGRAM(S): 25 TABLET, FILM COATED ORAL at 05:09

## 2018-10-12 RX ADMIN — Medication 0.25 MILLIGRAM(S): at 21:55

## 2018-10-12 NOTE — PROGRESS NOTE ADULT - ASSESSMENT
89 year old F with HGTI, CIS bladder ca s/p TURBT on 9/10 admitted with UTI, hematuria, urinary retention. Voiding now without any issues. Recent UCx - negative.     -stable for discharge from  perspective  -advised on PO hydration at home  -f/u ID for antibiotic regimen upon discharge  -to follow up with me as scheduled for first BCG treatment as per of her induction cycle on 10/23/18.  She  understands.     2. Rest of issues - care per medicine      Thank you for allowing to take part in this patient's care.   Please feel free to call me with any questions/concerns - [phone].

## 2018-10-13 RX ADMIN — Medication 325 MILLIGRAM(S): at 11:05

## 2018-10-13 RX ADMIN — HEPARIN SODIUM 5000 UNIT(S): 5000 INJECTION INTRAVENOUS; SUBCUTANEOUS at 18:07

## 2018-10-13 RX ADMIN — AMPICILLIN SODIUM AND SULBACTAM SODIUM 100 GRAM(S): 250; 125 INJECTION, POWDER, FOR SUSPENSION INTRAMUSCULAR; INTRAVENOUS at 05:13

## 2018-10-13 RX ADMIN — Medication 0.25 MILLIGRAM(S): at 21:56

## 2018-10-13 RX ADMIN — Medication 500 MILLIGRAM(S): at 18:07

## 2018-10-13 RX ADMIN — PANTOPRAZOLE SODIUM 40 MILLIGRAM(S): 20 TABLET, DELAYED RELEASE ORAL at 05:11

## 2018-10-13 RX ADMIN — SPIRONOLACTONE 25 MILLIGRAM(S): 25 TABLET, FILM COATED ORAL at 05:11

## 2018-10-13 RX ADMIN — Medication 50 MILLIGRAM(S): at 18:06

## 2018-10-13 RX ADMIN — Medication 1 TABLET(S): at 18:13

## 2018-10-13 RX ADMIN — HEPARIN SODIUM 5000 UNIT(S): 5000 INJECTION INTRAVENOUS; SUBCUTANEOUS at 05:23

## 2018-10-13 RX ADMIN — Medication 75 MICROGRAM(S): at 05:11

## 2018-10-13 RX ADMIN — Medication 1 DROP(S): at 05:19

## 2018-10-13 RX ADMIN — Medication 1000 UNIT(S): at 11:05

## 2018-10-13 RX ADMIN — Medication 50 MILLIGRAM(S): at 05:11

## 2018-10-13 RX ADMIN — Medication 1 DROP(S): at 21:57

## 2018-10-13 NOTE — PROGRESS NOTE ADULT - REASON FOR ADMISSION
UTI r/o sepsis
UTI
UTI r/o sepsis

## 2018-10-13 NOTE — PROGRESS NOTE ADULT - PROVIDER SPECIALTY LIST ADULT
Family Medicine
Family Medicine
Hospitalist
Infectious Disease
Urology
Hospitalist

## 2018-10-14 ENCOUNTER — TRANSCRIPTION ENCOUNTER (OUTPATIENT)
Age: 83
End: 2018-10-14

## 2018-10-14 VITALS
SYSTOLIC BLOOD PRESSURE: 169 MMHG | HEART RATE: 81 BPM | RESPIRATION RATE: 18 BRPM | TEMPERATURE: 98 F | DIASTOLIC BLOOD PRESSURE: 73 MMHG | OXYGEN SATURATION: 97 %

## 2018-10-14 RX ADMIN — Medication 1000 UNIT(S): at 13:09

## 2018-10-14 RX ADMIN — Medication 1 DROP(S): at 06:14

## 2018-10-14 RX ADMIN — Medication 50 MILLIGRAM(S): at 06:13

## 2018-10-14 RX ADMIN — SPIRONOLACTONE 25 MILLIGRAM(S): 25 TABLET, FILM COATED ORAL at 06:13

## 2018-10-14 RX ADMIN — Medication 325 MILLIGRAM(S): at 13:09

## 2018-10-14 RX ADMIN — HEPARIN SODIUM 5000 UNIT(S): 5000 INJECTION INTRAVENOUS; SUBCUTANEOUS at 06:13

## 2018-10-14 RX ADMIN — Medication 75 MICROGRAM(S): at 06:13

## 2018-10-14 RX ADMIN — PANTOPRAZOLE SODIUM 40 MILLIGRAM(S): 20 TABLET, DELAYED RELEASE ORAL at 06:13

## 2018-10-14 RX ADMIN — Medication 1 TABLET(S): at 06:13

## 2018-10-14 NOTE — DISCHARGE NOTE ADULT - MEDICATION SUMMARY - MEDICATIONS TO TAKE
I will START or STAY ON the medications listed below when I get home from the hospital:    spironolactone 25 mg oral tablet  -- 1 tab(s) by mouth once a day  -- Indication: For edema    aspirin 325 mg oral tablet  -- 1 tab(s) by mouth once a day  -- Indication: For CAD    ALPRAZolam 0.25 mg oral tablet  -- 0.5 tab(s) by mouth once a day (at bedtime), As Needed  -- Indication: For anxiety    metoprolol succinate 50 mg oral tablet, extended release  -- 1 tab(s) by mouth 2 times a day  -- Indication: For HTN    ocular lubricant ophthalmic solution  -- 1 drop(s) to each affected eye 3 times a day  -- Indication: For eye drop    amoxicillin-clavulanate 500 mg-125 mg oral tablet  -- 1 tab(s) by mouth 2 times a day  -- Indication: For UTI    Protonix 40 mg oral delayed release tablet  -- 1 tab(s) by mouth once a day  -- Indication: For GERD    Synthroid 75 mcg (0.075 mg) oral tablet  -- 1 tab(s) by mouth once a day  -- Indication: For Hypothyroidism    Vitamin D3 2000 intl units oral capsule  -- 1 cap(s) by mouth once a day  -- Indication: For Supplement

## 2018-10-14 NOTE — DISCHARGE NOTE ADULT - HOSPITAL COURSE
89 yo F a with h/o endometrial cancer, CAD s/p stents, Hypothyroidism, GERD and HLD, presenting with hematuria.    Hospital course:     Patient was admitted to medical floor. She was initially started on IV rocephin seen by ID. urine cultures were growing Enterrococcus. He antibitic was then changed to IV unasyn. She also seen by  due to bladder cancer.  wanted her repeat urine cultures clean before discharge. He repeat urine cultures: no growth. She was switched on po augmentin. Medically stable for discharge.       10/10/18: Patient seen and examined. No new complaints. Discussed with patient in length regarding management and d/c plan.  10/11/18: Patient seen and examined. No active issues.    10/12/18: Patient seen and examined. Feels weak and tired today. Wants to go home tomorrow.   10/13/18: Patient seen and examined. Complaining of flushed face and weakness. Lying in bed.         Vital Signs Last 24 Hrs  T(C): 36.9 (14 Oct 2018 11:18), Max: 36.9 (14 Oct 2018 11:18)  T(F): 98.4 (14 Oct 2018 11:18), Max: 98.4 (14 Oct 2018 11:18)  HR: 81 (14 Oct 2018 11:18) (73 - 81)  BP: 169/73 (14 Oct 2018 11:18) (153/66 - 169/73)  BP(mean): --  RR: 18 (14 Oct 2018 11:18) (18 - 18)  SpO2: 97% (14 Oct 2018 11:18) (97% - 99%)          PHYSICAL EXAM:    Constitutional: NAD, awake and alert, well-appearing  HEENT: EOMI, Normal Hearing, MMM  Neck: Soft and supple, No LAD, No JVD  Respiratory: Breath sounds are clear bilaterally, No wheezing, rales or rhonchi  Cardiovascular: S1 and S2, regular rate and rhythm, no Murmurs, gallops or rubs  Gastrointestinal: Bowel sounds present, soft, nontender, nondistended, no guarding, no rebound  Extremities: No peripheral edema  Vascular: 2+ peripheral pulses  Neurological: A/O x 3, no focal deficits  Musculoskeletal: 5/5 strength b/l upper and lower extremities  Skin: No rashes        Assessment and plan:     89 yo F a with h/o endometrial cancer, CAD s/p stents, Hypothyroidism, GERD and HLD, presenting with hematuria    * s/p sepsis sec to UTI ( 50,000 enteroccocus)  - abx per ID- On Unasyn, changed to po augmentin  - verma dc 10/8- check postvoidal- pt came with verma  -ID follow up appreciated  -Follow repeat urine cultures: no growth so far    * Stable CAD  Continue  aspirin, lopressor.    * Hypothyroidism-  Continue synthroid.    * Bladder ca- treatment planned in the near future   follow up appreciated  Outpatient follow up with  for intravesical BCG treatment.     Home today.  PMD was notified.  Spent more than 30 minutes to prepare the discharge.

## 2018-10-14 NOTE — DISCHARGE NOTE ADULT - PATIENT PORTAL LINK FT
You can access the [employer] Patient Portal, offered by [facility], by registering with the following website: [email or web address]

## 2018-10-14 NOTE — DISCHARGE NOTE ADULT - CARE PLAN
Principal Discharge DX:	Urinary tract infection without hematuria, site unspecified  Goal:	prevent further attack  Assessment and plan of treatment:	Follow up with urology

## 2018-10-14 NOTE — DISCHARGE NOTE ADULT - CARE PROVIDER_API CALL
Mayra Sánchez), Cardiovascular Disease; Internal Medicine  [address] [place] [address]  Phone: [phone]  Fax: [phone]

## 2018-10-18 DIAGNOSIS — C67.9 MALIGNANT NEOPLASM OF BLADDER, UNSPECIFIED: ICD-10-CM

## 2018-10-18 DIAGNOSIS — Z90.710 ACQUIRED ABSENCE OF BOTH CERVIX AND UTERUS: ICD-10-CM

## 2018-10-18 DIAGNOSIS — Z95.5 PRESENCE OF CORONARY ANGIOPLASTY IMPLANT AND GRAFT: ICD-10-CM

## 2018-10-18 DIAGNOSIS — Z79.82 LONG TERM (CURRENT) USE OF ASPIRIN: ICD-10-CM

## 2018-10-18 DIAGNOSIS — B95.2 ENTEROCOCCUS AS THE CAUSE OF DISEASES CLASSIFIED ELSEWHERE: ICD-10-CM

## 2018-10-18 DIAGNOSIS — R31.9 HEMATURIA, UNSPECIFIED: ICD-10-CM

## 2018-10-18 DIAGNOSIS — I10 ESSENTIAL (PRIMARY) HYPERTENSION: ICD-10-CM

## 2018-10-18 DIAGNOSIS — H35.30 UNSPECIFIED MACULAR DEGENERATION: ICD-10-CM

## 2018-10-18 DIAGNOSIS — A41.9 SEPSIS, UNSPECIFIED ORGANISM: ICD-10-CM

## 2018-10-18 DIAGNOSIS — E78.5 HYPERLIPIDEMIA, UNSPECIFIED: ICD-10-CM

## 2018-10-18 DIAGNOSIS — R33.9 RETENTION OF URINE, UNSPECIFIED: ICD-10-CM

## 2018-10-18 DIAGNOSIS — Z88.5 ALLERGY STATUS TO NARCOTIC AGENT: ICD-10-CM

## 2018-10-18 DIAGNOSIS — N17.9 ACUTE KIDNEY FAILURE, UNSPECIFIED: ICD-10-CM

## 2018-10-18 DIAGNOSIS — Z85.42 PERSONAL HISTORY OF MALIGNANT NEOPLASM OF OTHER PARTS OF UTERUS: ICD-10-CM

## 2018-10-18 DIAGNOSIS — T36.0X5A ADVERSE EFFECT OF PENICILLINS, INITIAL ENCOUNTER: ICD-10-CM

## 2018-10-18 DIAGNOSIS — R23.2 FLUSHING: ICD-10-CM

## 2018-10-18 DIAGNOSIS — I25.10 ATHEROSCLEROTIC HEART DISEASE OF NATIVE CORONARY ARTERY WITHOUT ANGINA PECTORIS: ICD-10-CM
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
## 2018-10-18 DIAGNOSIS — Z88.8 ALLERGY STATUS TO OTHER DRUGS, MEDICAMENTS AND BIOLOGICAL SUBSTANCES: ICD-10-CM

## 2018-10-18 DIAGNOSIS — Z92.3 PERSONAL HISTORY OF IRRADIATION: ICD-10-CM

## 2018-10-18 DIAGNOSIS — Z88.2 ALLERGY STATUS TO SULFONAMIDES: ICD-10-CM

## 2018-10-18 DIAGNOSIS — R39.15 URGENCY OF URINATION: ICD-10-CM

## 2018-10-18 DIAGNOSIS — E03.9 HYPOTHYROIDISM, UNSPECIFIED: ICD-10-CM

## 2018-10-18 DIAGNOSIS — N39.0 URINARY TRACT INFECTION, SITE NOT SPECIFIED: ICD-10-CM

## 2018-11-02 ENCOUNTER — EMERGENCY (EMERGENCY)
Facility: HOSPITAL | Age: 83
LOS: 0 days | Discharge: HOME | End: 2018-11-02
Attending: EMERGENCY MEDICINE | Admitting: EMERGENCY MEDICINE
Payer: MEDICARE

## 2018-11-02 VITALS
OXYGEN SATURATION: 97 % | HEART RATE: 90 BPM | RESPIRATION RATE: 14 BRPM | TEMPERATURE: 98 F | SYSTOLIC BLOOD PRESSURE: 154 MMHG | DIASTOLIC BLOOD PRESSURE: 72 MMHG

## 2018-11-02 VITALS — HEIGHT: 63 IN | WEIGHT: 119.93 LBS

## 2018-11-02 DIAGNOSIS — E03.9 HYPOTHYROIDISM, UNSPECIFIED: ICD-10-CM

## 2018-11-02 DIAGNOSIS — H35.30 UNSPECIFIED MACULAR DEGENERATION: ICD-10-CM

## 2018-11-02 DIAGNOSIS — Z95.5 PRESENCE OF CORONARY ANGIOPLASTY IMPLANT AND GRAFT: ICD-10-CM

## 2018-11-02 DIAGNOSIS — Z90.710 ACQUIRED ABSENCE OF BOTH CERVIX AND UTERUS: Chronic | ICD-10-CM

## 2018-11-02 DIAGNOSIS — R53.1 WEAKNESS: ICD-10-CM

## 2018-11-02 DIAGNOSIS — K21.9 GASTRO-ESOPHAGEAL REFLUX DISEASE WITHOUT ESOPHAGITIS: ICD-10-CM

## 2018-11-02 DIAGNOSIS — I25.10 ATHEROSCLEROTIC HEART DISEASE OF NATIVE CORONARY ARTERY WITHOUT ANGINA PECTORIS: ICD-10-CM

## 2018-11-02 DIAGNOSIS — Z79.82 LONG TERM (CURRENT) USE OF ASPIRIN: ICD-10-CM

## 2018-11-02 DIAGNOSIS — E78.5 HYPERLIPIDEMIA, UNSPECIFIED: ICD-10-CM

## 2018-11-02 DIAGNOSIS — N39.0 URINARY TRACT INFECTION, SITE NOT SPECIFIED: ICD-10-CM

## 2018-11-02 DIAGNOSIS — I10 ESSENTIAL (PRIMARY) HYPERTENSION: ICD-10-CM

## 2018-11-02 LAB
ALBUMIN SERPL ELPH-MCNC: 3.6 G/DL — SIGNIFICANT CHANGE UP (ref 3.3–5)
ALP SERPL-CCNC: 71 U/L — SIGNIFICANT CHANGE UP (ref 40–120)
ALT FLD-CCNC: 19 U/L — SIGNIFICANT CHANGE UP (ref 12–78)
ANION GAP SERPL CALC-SCNC: 9 MMOL/L — SIGNIFICANT CHANGE UP (ref 5–17)
APPEARANCE UR: CLEAR — SIGNIFICANT CHANGE UP
AST SERPL-CCNC: 21 U/L — SIGNIFICANT CHANGE UP (ref 15–37)
BACTERIA # UR AUTO: ABNORMAL
BASOPHILS # BLD AUTO: 0.06 K/UL — SIGNIFICANT CHANGE UP (ref 0–0.2)
BASOPHILS NFR BLD AUTO: 0.6 % — SIGNIFICANT CHANGE UP (ref 0–2)
BILIRUB SERPL-MCNC: 0.4 MG/DL — SIGNIFICANT CHANGE UP (ref 0.2–1.2)
BILIRUB UR-MCNC: NEGATIVE — SIGNIFICANT CHANGE UP
BUN SERPL-MCNC: 20 MG/DL — SIGNIFICANT CHANGE UP (ref 7–23)
CALCIUM SERPL-MCNC: 9 MG/DL — SIGNIFICANT CHANGE UP (ref 8.5–10.1)
CHLORIDE SERPL-SCNC: 100 MMOL/L — SIGNIFICANT CHANGE UP (ref 96–108)
CO2 SERPL-SCNC: 24 MMOL/L — SIGNIFICANT CHANGE UP (ref 22–31)
COLOR SPEC: YELLOW — SIGNIFICANT CHANGE UP
CREAT SERPL-MCNC: 1.35 MG/DL — HIGH (ref 0.5–1.3)
DIFF PNL FLD: NEGATIVE — SIGNIFICANT CHANGE UP
EOSINOPHIL # BLD AUTO: 0.14 K/UL — SIGNIFICANT CHANGE UP (ref 0–0.5)
EOSINOPHIL NFR BLD AUTO: 1.5 % — SIGNIFICANT CHANGE UP (ref 0–6)
EPI CELLS # UR: ABNORMAL
GLUCOSE SERPL-MCNC: 106 MG/DL — HIGH (ref 70–99)
GLUCOSE UR QL: NEGATIVE MG/DL — SIGNIFICANT CHANGE UP
HCT VFR BLD CALC: 38.7 % — SIGNIFICANT CHANGE UP (ref 34.5–45)
HGB BLD-MCNC: 12 G/DL — SIGNIFICANT CHANGE UP (ref 11.5–15.5)
IMM GRANULOCYTES NFR BLD AUTO: 0.3 % — SIGNIFICANT CHANGE UP (ref 0–1.5)
KETONES UR-MCNC: NEGATIVE — SIGNIFICANT CHANGE UP
LACTATE SERPL-SCNC: 1.4 MMOL/L — SIGNIFICANT CHANGE UP (ref 0.7–2)
LEUKOCYTE ESTERASE UR-ACNC: ABNORMAL
LYMPHOCYTES # BLD AUTO: 2.47 K/UL — SIGNIFICANT CHANGE UP (ref 1–3.3)
LYMPHOCYTES # BLD AUTO: 25.6 % — SIGNIFICANT CHANGE UP (ref 13–44)
MCHC RBC-ENTMCNC: 26.6 PG — LOW (ref 27–34)
MCHC RBC-ENTMCNC: 31 GM/DL — LOW (ref 32–36)
MCV RBC AUTO: 85.8 FL — SIGNIFICANT CHANGE UP (ref 80–100)
MONOCYTES # BLD AUTO: 0.92 K/UL — HIGH (ref 0–0.9)
MONOCYTES NFR BLD AUTO: 9.5 % — SIGNIFICANT CHANGE UP (ref 2–14)
NEUTROPHILS # BLD AUTO: 6.02 K/UL — SIGNIFICANT CHANGE UP (ref 1.8–7.4)
NEUTROPHILS NFR BLD AUTO: 62.5 % — SIGNIFICANT CHANGE UP (ref 43–77)
NITRITE UR-MCNC: NEGATIVE — SIGNIFICANT CHANGE UP
NRBC # BLD: 0 /100 WBCS — SIGNIFICANT CHANGE UP (ref 0–0)
PH UR: 6 — SIGNIFICANT CHANGE UP (ref 5–8)
PLATELET # BLD AUTO: 315 K/UL — SIGNIFICANT CHANGE UP (ref 150–400)
POTASSIUM SERPL-MCNC: 4.7 MMOL/L — SIGNIFICANT CHANGE UP (ref 3.5–5.3)
POTASSIUM SERPL-SCNC: 4.7 MMOL/L — SIGNIFICANT CHANGE UP (ref 3.5–5.3)
PROT SERPL-MCNC: 7.5 GM/DL — SIGNIFICANT CHANGE UP (ref 6–8.3)
PROT UR-MCNC: NEGATIVE MG/DL — SIGNIFICANT CHANGE UP
RBC # BLD: 4.51 M/UL — SIGNIFICANT CHANGE UP (ref 3.8–5.2)
RBC # FLD: 15.9 % — HIGH (ref 10.3–14.5)
RBC CASTS # UR COMP ASSIST: ABNORMAL /HPF (ref 0–4)
SODIUM SERPL-SCNC: 133 MMOL/L — LOW (ref 135–145)
SP GR SPEC: 1.01 — SIGNIFICANT CHANGE UP (ref 1.01–1.02)
UROBILINOGEN FLD QL: NEGATIVE MG/DL — SIGNIFICANT CHANGE UP
WBC # BLD: 9.64 K/UL — SIGNIFICANT CHANGE UP (ref 3.8–10.5)
WBC # FLD AUTO: 9.64 K/UL — SIGNIFICANT CHANGE UP (ref 3.8–10.5)
WBC UR QL: ABNORMAL

## 2018-11-02 PROCEDURE — 99283 EMERGENCY DEPT VISIT LOW MDM: CPT

## 2018-11-02 PROCEDURE — 71045 X-RAY EXAM CHEST 1 VIEW: CPT | Mod: 26

## 2018-11-02 PROCEDURE — 93010 ELECTROCARDIOGRAM REPORT: CPT

## 2018-11-02 RX ORDER — CEPHALEXIN 500 MG
1 CAPSULE ORAL
Qty: 14 | Refills: 0 | OUTPATIENT
Start: 2018-11-02 | End: 2018-11-08

## 2018-11-02 RX ORDER — SODIUM CHLORIDE 9 MG/ML
2000 INJECTION INTRAMUSCULAR; INTRAVENOUS; SUBCUTANEOUS ONCE
Qty: 0 | Refills: 0 | Status: COMPLETED | OUTPATIENT
Start: 2018-11-02 | End: 2018-11-02

## 2018-11-02 RX ORDER — CEPHALEXIN 500 MG
500 CAPSULE ORAL ONCE
Qty: 0 | Refills: 0 | Status: COMPLETED | OUTPATIENT
Start: 2018-11-02 | End: 2018-11-02

## 2018-11-02 RX ADMIN — Medication 500 MILLIGRAM(S): at 22:20

## 2018-11-02 RX ADMIN — SODIUM CHLORIDE 1000 MILLILITER(S): 9 INJECTION INTRAMUSCULAR; INTRAVENOUS; SUBCUTANEOUS at 19:10

## 2018-11-02 RX ADMIN — SODIUM CHLORIDE 2000 MILLILITER(S): 9 INJECTION INTRAMUSCULAR; INTRAVENOUS; SUBCUTANEOUS at 20:10

## 2018-11-02 NOTE — ED STATDOCS - PROGRESS NOTE DETAILS
Graeme FARRIS for ED attending, Dr. Elizondo: 89 y/o female with PMHx of bladder cancer (on chemotherapy), endometrial adenocarcinoma, macular degeneration, hypothyroid, HLD, HTN, GERD, CAD s/p stent, s/p total abdominal hysterectomy, s/p hernia repair, s/p laparoscopic cholecystectomy, presents to the ED c/o fever, weakness and shakiness since yesterday. Will send pt to main ED for further evaluation.

## 2018-11-02 NOTE — ED PROVIDER NOTE - OBJECTIVE STATEMENT
89 y/o female with a PMHx of CAD, stented coronary artery, HTN, HLD GERD, hypothyroid,  presents to the ED c/o generalized weakness since yesterday. Pt states that since yesterday she has had worsening generalized weakness and chills. Pt states when she is lying or sitting down she feels fine but once she gets up she feels weak and has shakes. +decrease in eating. Pt has been receiving bladder treatment. Last bladder treatment was last week. Denies abd pain, chest pain, vomiting, SOB. Pt walks with a walker. Pt was recently admitted to  for a UTI

## 2018-11-02 NOTE — ED STATDOCS - NS_ATTENDINGSCRIBE_ED_ALL_ED
This is a chronic condition, stable and well controlled with diet and exercise. His lipid profile is as follows:  Component      Latest Ref Rng & Units 11/7/2017           9:03 AM   Cholesterol,Tot      100 - 199 mg/dL 191   Triglycerides      0 - 149 mg/dL 106   HDL      >=40 mg/dL 48   LDL      <100 mg/dL 122 (H)   He does not take statins or any other cholesterol lowering medications. He was encouraged to continue with healthy low-fat, low-cholesterol diet, regular physical activity and continued efforts towards weight loss. We will recheck labs in one year.   I personally performed the service described in the documentation recorded by the scribe in my presence, and it accurately and completely records my words and actions.

## 2018-11-02 NOTE — ED STATDOCS - PMH
CAD (coronary artery disease)    Endometrial adenocarcinoma    GERD (gastroesophageal reflux disease)    [place] (hard of hearing)    HTN (hypertension)    Hyperlipidemia    Hypothyroid    Macular degeneration    Stented coronary artery

## 2018-11-02 NOTE — ED ADULT NURSE NOTE - NSIMPLEMENTINTERV_GEN_ALL_ED
Implemented All Fall Risk Interventions:  [place] to call system. Call bell, personal items and telephone within reach. Instruct patient to call for assistance. Room bathroom lighting operational. Non-slip footwear when patient is off stretcher. Physically safe environment: no spills, clutter or unnecessary equipment. Stretcher in lowest position, wheels locked, appropriate side rails in place. Provide visual cue, wrist band, yellow gown, etc. Monitor gait and stability. Monitor for mental status changes and reorient to person, place, and time. Review medications for side effects contributing to fall risk. Reinforce activity limits and safety measures with patient and family.

## 2018-11-02 NOTE — ED PROVIDER NOTE - PMH
CAD (coronary artery disease)    Endometrial adenocarcinoma    GERD (gastroesophageal reflux disease)    [place] (hard of hearing)    HTN (hypertension)    Hyperlipidemia    Hypothyroid    Macular degeneration    Stented coronary artery

## 2018-11-03 LAB
CULTURE RESULTS: SIGNIFICANT CHANGE UP
SPECIMEN SOURCE: SIGNIFICANT CHANGE UP

## 2018-11-08 LAB
CULTURE RESULTS: SIGNIFICANT CHANGE UP
CULTURE RESULTS: SIGNIFICANT CHANGE UP
SPECIMEN SOURCE: SIGNIFICANT CHANGE UP
SPECIMEN SOURCE: SIGNIFICANT CHANGE UP

## 2018-12-09 ENCOUNTER — INPATIENT (INPATIENT)
Facility: HOSPITAL | Age: 83
LOS: 4 days | Discharge: NURSING, REHAB OR LONG-TERM CARE | End: 2018-12-14
Attending: FAMILY MEDICINE | Admitting: FAMILY MEDICINE
Payer: MEDICARE

## 2018-12-09 VITALS — WEIGHT: 169.09 LBS | HEIGHT: 62 IN

## 2018-12-09 DIAGNOSIS — Z90.710 ACQUIRED ABSENCE OF BOTH CERVIX AND UTERUS: Chronic | ICD-10-CM

## 2018-12-09 LAB
ALBUMIN SERPL ELPH-MCNC: 3.9 G/DL — SIGNIFICANT CHANGE UP (ref 3.3–5)
ALP SERPL-CCNC: 78 U/L — SIGNIFICANT CHANGE UP (ref 40–120)
ALT FLD-CCNC: 25 U/L — SIGNIFICANT CHANGE UP (ref 12–78)
ANION GAP SERPL CALC-SCNC: 10 MMOL/L — SIGNIFICANT CHANGE UP (ref 5–17)
APPEARANCE UR: CLEAR — SIGNIFICANT CHANGE UP
APTT BLD: 26.7 SEC — LOW (ref 27.5–36.3)
AST SERPL-CCNC: 29 U/L — SIGNIFICANT CHANGE UP (ref 15–37)
BACTERIA # UR AUTO: ABNORMAL
BASOPHILS # BLD AUTO: 0.04 K/UL — SIGNIFICANT CHANGE UP (ref 0–0.2)
BASOPHILS NFR BLD AUTO: 0.4 % — SIGNIFICANT CHANGE UP (ref 0–2)
BILIRUB SERPL-MCNC: 0.7 MG/DL — SIGNIFICANT CHANGE UP (ref 0.2–1.2)
BILIRUB UR-MCNC: NEGATIVE — SIGNIFICANT CHANGE UP
BUN SERPL-MCNC: 15 MG/DL — SIGNIFICANT CHANGE UP (ref 7–23)
CALCIUM SERPL-MCNC: 9.4 MG/DL — SIGNIFICANT CHANGE UP (ref 8.5–10.1)
CHLORIDE SERPL-SCNC: 102 MMOL/L — SIGNIFICANT CHANGE UP (ref 96–108)
CO2 SERPL-SCNC: 25 MMOL/L — SIGNIFICANT CHANGE UP (ref 22–31)
COLOR SPEC: YELLOW — SIGNIFICANT CHANGE UP
COMMENT - URINE: SIGNIFICANT CHANGE UP
CREAT SERPL-MCNC: 1.07 MG/DL — SIGNIFICANT CHANGE UP (ref 0.5–1.3)
DIFF PNL FLD: ABNORMAL
EOSINOPHIL # BLD AUTO: 0.07 K/UL — SIGNIFICANT CHANGE UP (ref 0–0.5)
EOSINOPHIL NFR BLD AUTO: 0.7 % — SIGNIFICANT CHANGE UP (ref 0–6)
EPI CELLS # UR: SIGNIFICANT CHANGE UP
GLUCOSE SERPL-MCNC: 88 MG/DL — SIGNIFICANT CHANGE UP (ref 70–99)
GLUCOSE UR QL: NEGATIVE MG/DL — SIGNIFICANT CHANGE UP
HCT VFR BLD CALC: 45.2 % — HIGH (ref 34.5–45)
HGB BLD-MCNC: 14 G/DL — SIGNIFICANT CHANGE UP (ref 11.5–15.5)
IMM GRANULOCYTES NFR BLD AUTO: 0.3 % — SIGNIFICANT CHANGE UP (ref 0–1.5)
INR BLD: 0.97 RATIO — SIGNIFICANT CHANGE UP (ref 0.88–1.16)
KETONES UR-MCNC: NEGATIVE — SIGNIFICANT CHANGE UP
LEUKOCYTE ESTERASE UR-ACNC: ABNORMAL
LYMPHOCYTES # BLD AUTO: 2.5 K/UL — SIGNIFICANT CHANGE UP (ref 1–3.3)
LYMPHOCYTES # BLD AUTO: 24.9 % — SIGNIFICANT CHANGE UP (ref 13–44)
MCHC RBC-ENTMCNC: 27.6 PG — SIGNIFICANT CHANGE UP (ref 27–34)
MCHC RBC-ENTMCNC: 31 GM/DL — LOW (ref 32–36)
MCV RBC AUTO: 89.2 FL — SIGNIFICANT CHANGE UP (ref 80–100)
MONOCYTES # BLD AUTO: 0.66 K/UL — SIGNIFICANT CHANGE UP (ref 0–0.9)
MONOCYTES NFR BLD AUTO: 6.6 % — SIGNIFICANT CHANGE UP (ref 2–14)
NEUTROPHILS # BLD AUTO: 6.73 K/UL — SIGNIFICANT CHANGE UP (ref 1.8–7.4)
NEUTROPHILS NFR BLD AUTO: 67.1 % — SIGNIFICANT CHANGE UP (ref 43–77)
NITRITE UR-MCNC: NEGATIVE — SIGNIFICANT CHANGE UP
NRBC # BLD: 0 /100 WBCS — SIGNIFICANT CHANGE UP (ref 0–0)
PH UR: 7 — SIGNIFICANT CHANGE UP (ref 5–8)
PLATELET # BLD AUTO: 356 K/UL — SIGNIFICANT CHANGE UP (ref 150–400)
POTASSIUM SERPL-MCNC: 4.4 MMOL/L — SIGNIFICANT CHANGE UP (ref 3.5–5.3)
POTASSIUM SERPL-SCNC: 4.4 MMOL/L — SIGNIFICANT CHANGE UP (ref 3.5–5.3)
PROT SERPL-MCNC: 8.1 GM/DL — SIGNIFICANT CHANGE UP (ref 6–8.3)
PROT UR-MCNC: 30 MG/DL
PROTHROM AB SERPL-ACNC: 10.7 SEC — SIGNIFICANT CHANGE UP (ref 10–12.9)
RBC # BLD: 5.07 M/UL — SIGNIFICANT CHANGE UP (ref 3.8–5.2)
RBC # FLD: 16.6 % — HIGH (ref 10.3–14.5)
RBC CASTS # UR COMP ASSIST: SIGNIFICANT CHANGE UP /HPF (ref 0–4)
SODIUM SERPL-SCNC: 137 MMOL/L — SIGNIFICANT CHANGE UP (ref 135–145)
SP GR SPEC: 1 — LOW (ref 1.01–1.02)
UROBILINOGEN FLD QL: NEGATIVE MG/DL — SIGNIFICANT CHANGE UP
WBC # BLD: 10.03 K/UL — SIGNIFICANT CHANGE UP (ref 3.8–10.5)
WBC # FLD AUTO: 10.03 K/UL — SIGNIFICANT CHANGE UP (ref 3.8–10.5)
WBC UR QL: SIGNIFICANT CHANGE UP

## 2018-12-09 PROCEDURE — 72131 CT LUMBAR SPINE W/O DYE: CPT | Mod: 26

## 2018-12-09 PROCEDURE — 99285 EMERGENCY DEPT VISIT HI MDM: CPT

## 2018-12-09 PROCEDURE — 73552 X-RAY EXAM OF FEMUR 2/>: CPT | Mod: 26,RT

## 2018-12-09 PROCEDURE — 73502 X-RAY EXAM HIP UNI 2-3 VIEWS: CPT | Mod: 26,RT

## 2018-12-09 PROCEDURE — 99221 1ST HOSP IP/OBS SF/LOW 40: CPT

## 2018-12-09 RX ORDER — ACETAMINOPHEN 500 MG
650 TABLET ORAL ONCE
Qty: 0 | Refills: 0 | Status: COMPLETED | OUTPATIENT
Start: 2018-12-09 | End: 2018-12-09

## 2018-12-09 RX ORDER — METOPROLOL TARTRATE 50 MG
50 TABLET ORAL
Qty: 0 | Refills: 0 | Status: DISCONTINUED | OUTPATIENT
Start: 2018-12-09 | End: 2018-12-14

## 2018-12-09 RX ORDER — DEXAMETHASONE 0.5 MG/5ML
8 ELIXIR ORAL ONCE
Qty: 0 | Refills: 0 | Status: COMPLETED | OUTPATIENT
Start: 2018-12-09 | End: 2018-12-09

## 2018-12-09 RX ORDER — LEVOTHYROXINE SODIUM 125 MCG
75 TABLET ORAL DAILY
Qty: 0 | Refills: 0 | Status: DISCONTINUED | OUTPATIENT
Start: 2018-12-09 | End: 2018-12-14

## 2018-12-09 RX ORDER — ALPRAZOLAM 0.25 MG
0.5 TABLET ORAL
Qty: 0 | Refills: 0 | COMMUNITY

## 2018-12-09 RX ORDER — POLYETHYLENE GLYCOL 3350 17 G/17G
17 POWDER, FOR SOLUTION ORAL DAILY
Qty: 0 | Refills: 0 | Status: DISCONTINUED | OUTPATIENT
Start: 2018-12-09 | End: 2018-12-14

## 2018-12-09 RX ORDER — OXYCODONE AND ACETAMINOPHEN 5; 325 MG/1; MG/1
1 TABLET ORAL EVERY 6 HOURS
Qty: 0 | Refills: 0 | Status: DISCONTINUED | OUTPATIENT
Start: 2018-12-09 | End: 2018-12-14

## 2018-12-09 RX ORDER — ALPRAZOLAM 0.25 MG
0.25 TABLET ORAL ONCE
Qty: 0 | Refills: 0 | Status: DISCONTINUED | OUTPATIENT
Start: 2018-12-09 | End: 2018-12-09

## 2018-12-09 RX ORDER — MAGNESIUM HYDROXIDE 400 MG/1
30 TABLET, CHEWABLE ORAL DAILY
Qty: 0 | Refills: 0 | Status: DISCONTINUED | OUTPATIENT
Start: 2018-12-09 | End: 2018-12-14

## 2018-12-09 RX ORDER — CYCLOBENZAPRINE HYDROCHLORIDE 10 MG/1
5 TABLET, FILM COATED ORAL ONCE
Qty: 0 | Refills: 0 | Status: COMPLETED | OUTPATIENT
Start: 2018-12-09 | End: 2018-12-09

## 2018-12-09 RX ORDER — CYCLOBENZAPRINE HYDROCHLORIDE 10 MG/1
5 TABLET, FILM COATED ORAL THREE TIMES A DAY
Qty: 0 | Refills: 0 | Status: DISCONTINUED | OUTPATIENT
Start: 2018-12-09 | End: 2018-12-14

## 2018-12-09 RX ORDER — ENOXAPARIN SODIUM 100 MG/ML
30 INJECTION SUBCUTANEOUS EVERY 24 HOURS
Qty: 0 | Refills: 0 | Status: DISCONTINUED | OUTPATIENT
Start: 2018-12-09 | End: 2018-12-14

## 2018-12-09 RX ORDER — PANTOPRAZOLE SODIUM 20 MG/1
40 TABLET, DELAYED RELEASE ORAL
Qty: 0 | Refills: 0 | Status: DISCONTINUED | OUTPATIENT
Start: 2018-12-09 | End: 2018-12-14

## 2018-12-09 RX ORDER — HYDRALAZINE HCL 50 MG
10 TABLET ORAL ONCE
Qty: 0 | Refills: 0 | Status: DISCONTINUED | OUTPATIENT
Start: 2018-12-09 | End: 2018-12-09

## 2018-12-09 RX ORDER — ASPIRIN/CALCIUM CARB/MAGNESIUM 324 MG
325 TABLET ORAL DAILY
Qty: 0 | Refills: 0 | Status: DISCONTINUED | OUTPATIENT
Start: 2018-12-09 | End: 2018-12-14

## 2018-12-09 RX ORDER — OXYCODONE AND ACETAMINOPHEN 5; 325 MG/1; MG/1
1 TABLET ORAL ONCE
Qty: 0 | Refills: 0 | Status: DISCONTINUED | OUTPATIENT
Start: 2018-12-09 | End: 2018-12-09

## 2018-12-09 RX ORDER — FERROUS SULFATE 325(65) MG
325 TABLET ORAL DAILY
Qty: 0 | Refills: 0 | Status: DISCONTINUED | OUTPATIENT
Start: 2018-12-09 | End: 2018-12-14

## 2018-12-09 RX ORDER — SPIRONOLACTONE 25 MG/1
25 TABLET, FILM COATED ORAL DAILY
Qty: 0 | Refills: 0 | Status: DISCONTINUED | OUTPATIENT
Start: 2018-12-09 | End: 2018-12-14

## 2018-12-09 RX ORDER — ALPRAZOLAM 0.25 MG
0.12 TABLET ORAL DAILY
Qty: 0 | Refills: 0 | Status: DISCONTINUED | OUTPATIENT
Start: 2018-12-09 | End: 2018-12-14

## 2018-12-09 RX ORDER — PANTOPRAZOLE SODIUM 20 MG/1
1 TABLET, DELAYED RELEASE ORAL
Qty: 0 | Refills: 0 | COMMUNITY

## 2018-12-09 RX ADMIN — OXYCODONE AND ACETAMINOPHEN 1 TABLET(S): 5; 325 TABLET ORAL at 22:00

## 2018-12-09 RX ADMIN — Medication 650 MILLIGRAM(S): at 12:18

## 2018-12-09 RX ADMIN — OXYCODONE AND ACETAMINOPHEN 1 TABLET(S): 5; 325 TABLET ORAL at 15:21

## 2018-12-09 RX ADMIN — OXYCODONE AND ACETAMINOPHEN 1 TABLET(S): 5; 325 TABLET ORAL at 12:18

## 2018-12-09 RX ADMIN — Medication 101.6 MILLIGRAM(S): at 18:11

## 2018-12-09 RX ADMIN — Medication 0.25 MILLIGRAM(S): at 15:21

## 2018-12-09 RX ADMIN — Medication 0.12 MILLIGRAM(S): at 22:00

## 2018-12-09 RX ADMIN — Medication 650 MILLIGRAM(S): at 11:07

## 2018-12-09 RX ADMIN — CYCLOBENZAPRINE HYDROCHLORIDE 5 MILLIGRAM(S): 10 TABLET, FILM COATED ORAL at 12:17

## 2018-12-09 RX ADMIN — Medication 50 MILLIGRAM(S): at 22:00

## 2018-12-09 NOTE — ED PROVIDER NOTE - OBJECTIVE STATEMENT
89 y/o female with a PMHx of GERD, HTN, HLD, CAD s/p 5 coronary stents, bladder CA (last chemotherapy treatment 12/4/18), compound fracture of right ankle presents to the ED c/o severe, sharp right hip/lower back pain, radiating down to ankle. Pt reports the pain is chronic although noticed worsening of the pain last night while laying in bed. Pt notes the pain prevented her from sleeping last night. Pt notes she took Tylenol for the pain with no relief. Unsure of any recent injury or trauma. Pt reports the pain is originating from the lower back. Pain is exacerbated with movement of RLE. +numbness of RLE at baseline. Denies fevers, urinary/bowel incontinence.

## 2018-12-09 NOTE — PROGRESS NOTE ADULT - SUBJECTIVE AND OBJECTIVE BOX
Neurosurgery:  89 y/o female with a PMHx of GERD, HTN, HLD, CAD s/p 5 coronary stents, bladder CA (last chemotherapy treatment 12/4/18), compound fracture of right ankle presents to the ED c/o severe, sharp right hip/lower back pain, radiating down to ankle. Pt reports the pain is chronic although noticed worsening of the pain last night while laying in bed, feels agrivated with frequent Chemo treatments for her bladder CA and laying flat on hard table. Pt notes the pain prevented her from sleeping last night. Pt notes she took Tylenol for the pain with no relief. Unsure of any recent injury or trauma. Pt reports the pain is originating from the lower back. Pain is exacerbated with movement of RLE. +numbness of RLE at baseline. Denies fevers, urinary/bowel incontinence.      PAST MEDICAL & SURGICAL HISTORY:  Endometrial adenocarcinoma  Macular degeneration  Stented coronary artery  Hypothyroid  Hyperlipidemia  HTN (hypertension)  GERD (gastroesophageal reflux disease)  CAD (coronary artery disease)  [place] (hard of hearing)  S/P EMILY (total abdominal hysterectomy)  S/P hernia repair: umbilical - 2008  S/P laparoscopic cholecystectomy: 2008  Ankle fracture, right: surgery 25 yrs ago - hardware removed  S/P coronary angiogram: 2005, 2008, 2011 - drug eluding stents      FAMILY HISTORY:  Family history of brain cancer (Father)    Social Hx:  Nonsmoker, no drug or alcohol use    Allergies  amlodipine (Unknown)  clonidine (Unknown)  hydrocodone (Unknown)  Levatol (Unknown)  Lipitor (Unknown)  Lotrel (Unknown)  methylPREDNISolone (Unknown)  morphine (Other)  Plaquenil (Unknown)  statins (Other (Unknown))  sulfa drugs (Rash)    Home Medications:   * Patient Currently Takes Medications as of 09-Dec-2018 09:20 documented in Structured Notes  · 	ocular lubricant ophthalmic solution: Last Dose Taken:  , 1 drop(s) to each affected eye 3 times a day  · 	metoprolol succinate 50 mg oral tablet, extended release: Last Dose Taken:  , 1 tab(s) orally 2 times a day  · 	aspirin 325 mg oral tablet: Last Dose Taken:  , 1 tab(s) orally once a day  · 	Protonix 40 mg oral delayed release tablet: Last Dose Taken:  , 1 tab(s) orally once a day  · 	Synthroid 75 mcg (0.075 mg) oral tablet: Last Dose Taken:  , 1 tab(s) orally once a day  · 	Vitamin D3 2000 intl units oral capsule: Last Dose Taken:  , 1 cap(s) orally once a day  · 	spironolactone 25 mg oral tablet: Last Dose Taken:  , 1 tab(s) orally once a day  · 	ALPRAZolam 0.25 mg oral tablet: Last Dose Taken:  , 0.5 tab(s) orally once a day (at bedtime), As Needed    ROS: Pertinent positives in HPI, all other ROS were reviewed and are negative.      Vital Signs Last 24 Hrs  T(C): 36.6 (09 Dec 2018 09:22), Max: 36.6 (09 Dec 2018 09:22)  T(F): 97.8 (09 Dec 2018 09:22), Max: 97.8 (09 Dec 2018 09:22)  HR: 70 (09 Dec 2018 11:08) (70 - 88)  BP: 217/71 (09 Dec 2018 11:08) (209/82 - 217/71)  BP(mean): --  RR: 18 (09 Dec 2018 11:08) (18 - 18)  SpO2: 100% (09 Dec 2018 11:08) (100% - 100%)    Radiology report:  - CT L spine: 	IMPRESSION:  Multilevel diffuse disc bulging resulting in moderate   	narrowing of the bilateral neural foramen at L1/L2-L4/L5. No high-grade   	spinal canal stenosis is present.              - Xray Pelvis: Impression:There is no evidence of acute fracture or dislocation. There is moderate   	bilateral hip arthrosis. There is moderate pubic symphysis arthrosis.   	There is lower lumbar spondylosis. There is mild bilateral sacroiliac   	joint arthrosis. There is mild tricompartmental arthrosis of the knee.   	There is no knee joint effusion. Atherosclerotic disease is noted.        Physical Exam:  Constitutional: Awake / alert  HEENT: PERRLA, EOMI  Neck: Supple  Respiratory: Breath sounds are clear bilaterally  Cardiovascular: S1 and S2, regular rhythm  Gastrointestinal: Soft, NT/ND  Extremities:  1+ dependent edema. b/l knee OA  Vascular: No carotid Bruit  Musculoskeletal: no joint swelling/tenderness, no abnormal movements  Skin: No rashes    Neurological Exam:  HF: A x O x 3, appropriately interactive, normal affect, speech fluent, no aphasia or paraphasic errors. Naming /repetition intact   CN: PERRL, EOMI, VFF, facial sensation normal, no NLFD, tongue midline  Motor: No pronator drift, Strength 5/5 in all upper ext  LLE 4/5 power throughout, contralateral SLR at 30 degrees  RLE 3+/5 power, Limited prox due to pain. No pain with passive SLR.  + pain with active SLR  No pain with hip int / ext rotation  Sens: Intact to light touch  Reflexes: Symmetric and normal, downgoing toes b/l  Coord:  No FNFA, dysmetria, CLEVE intact   Gait/Balance: Cannot test    A/P:  89 y/o female bladder CA, finished last treatment 4-5 days ago, BIB son c/o gradual worsening right hip/lower back pain without acute injury. + Pain LBP along Sciatic notch on the right. Passive SLR no pain, Active SLR + exacurbation of pain syndrome.     - No acute neurosurg intervention  - Pt requires MRI of Lspine / Pelvis to r/o neuro-foraminal disc on the right and or suspicion of possible Saccral insufficiency fracture.  - Pain rx  - PT/OT when able to get oob  - close I/O monitoring with frequent bladder scanning  - medicine admit for intractable LBP with radiculopathy to E  - d/w Dr. miranda in detail no acute neurosurgical intervention.    Care Time spent > 38 minutes in evaluating patient, medical record, imaging studies accordingly

## 2018-12-09 NOTE — ED PROVIDER NOTE - PMH
CAD (coronary artery disease)    Endometrial adenocarcinoma    GERD (gastroesophageal reflux disease)    [place] (hard of hearing)    HTN (hypertension)    Hyperlipidemia    Hypothyroid    Macular degeneration    Stented coronary artery

## 2018-12-09 NOTE — ED PROVIDER NOTE - NEUROLOGICAL, MLM
Alert and oriented, no focal deficits, no motor or sensory deficits. Normal speech. Cranial nerves intact.

## 2018-12-09 NOTE — H&P ADULT - PMH
CAD (coronary artery disease)    Endometrial adenocarcinoma    GERD (gastroesophageal reflux disease)    [place] (hard of hearing)    HTN (hypertension)    Hyperlipidemia    Hypothyroid    Macular degeneration    Stented coronary artery

## 2018-12-09 NOTE — ED ADULT NURSE REASSESSMENT NOTE - NS ED NURSE REASSESS COMMENT FT1
MD to bedside at pt request. Pt refusing BP medication at this time. Medicated with xanax as ordered and will monitor for relief. Pt awaiting decadron from pharmacy.  Pt and family updated on POC with verbalized understanding at this time.
Pt noted to cont to be hypertensive with .  Pt denies headache or dizziness.  Reports pain to the right hip.  Dr. Umanzor notified and pt medicated for pain with Tylenol. Will cont to monitor for relief.
change of shift report received from Cathleen Hagen. pt is A&Ox3, in [facility]. vital signs as charted. pt assisted to bathroom by aide. pt c/o increase of difficulty/pain on urination, mild pain. pt states that she has hx of bladder cancer and is concerned. family at the bedside. MD Nunn made aware, to f/u shortly with patient. vital signs as charted. will continue to monitor.

## 2018-12-09 NOTE — H&P ADULT - NSHPPHYSICALEXAM_GEN_ALL_CORE
PHYSICAL EXAM:    GENERAL: NAD, Alert & Oriented X3, Well-groomed, Well-developed  HEENT: Moist mucous membranes  HEAD:  Atraumatic, Normocephalic  EYES: EOMI, PERRLA, Conjunctiva and sclera clear  NECK: Supple, No JVD, No lymphadenopathy  CHEST/LUNG: Clear to auscultation bilaterally; No rales, rhonchi, wheezing, or rubs  HEART: Regular rate and rhythm; No murmurs, rubs, or gallops  ABDOMEN: Soft, Non-tender, Non-distended; Bowel sounds present  GENITOURINARY- Voiding, No palpable bladder  EXTREMITIES:  2+ Peripheral Pulses, No clubbing, cyanosis. +2 le edema bilaterally  MUSCULOSKELTAL- No muscle tenderness, Muscle tone normal, No joint tenderness, no Joint swelling, FROM  SKIN: No rash, No lesion  NEURO: CN II-XII intact, Motor Strength- 5/5 B/L upper and lower extremities; DTRs 2+ intact and symmetric

## 2018-12-09 NOTE — ED PROVIDER NOTE - MUSCULOSKELETAL, MLM
Neck non tender, supple without pain. +L5 TTP, no vertebral step off deformities +right para vertebral lumbar muscle TTP +right sciatic notch TTP +right hip/thigh lateral tenderness. Pt unable to right SLR due to pain, poor right hip AROM due to pain. RLE distal normal motor sensory exam. DP pulse adequate. Left thigh non tender, no gross deformities

## 2018-12-09 NOTE — H&P ADULT - HISTORY OF PRESENT ILLNESS
89 y/o female a with h/o bladder ca s/p TURBT on 09/10/2018, last chemotherapy treatment 18, cad s/p stents x5, hypothyroidism, DJD, GERD, HLD, uterine ca s/p hysterectomy and radiation, cholecystectomy presents c/o severe, sharp right hip/lower back pain, radiating down to ankle. Pt reports the pain is chronic although noticed worsening of the pain last night while laying in bed, feels aggrevated after chemo treatments for her bladder ca and laying flat on hard table. pt notes the pain prevented her from sleeping last night.  associated with lower back pain. took Tylenol for the pain with no relief. unsure of any recent injury or trauma. denies numbness, le weakness, or discoloration.    In ED, oxycodone, flexeril, decardon, xanax with mild relief.  still in pain       PAST MEDICAL HISTORY:  as below    PAST SURGICAL HISTORY: as below    fmhx- father  of brain tumor, mother  of MI at age 85,

## 2018-12-09 NOTE — ED PROVIDER NOTE - CONSTITUTIONAL, MLM
normal... Elderly white female, alert, No respiratory distress, moderate discomfort due to pain, non toxic, well nourished, awake, alert, oriented to person, place, time/situation.

## 2018-12-09 NOTE — ED PROVIDER NOTE - PROGRESS NOTE DETAILS
Graeme GAMBINO for Dr. Umanzor:  systolic, pt takes BP meds in AM and in evening, not due for any dose currently. No focal neuro deficits. Pt refuses IV Hydralazine for BP control, is requesting Xanax for increased anxiety which oftentimes exacerbates HTN. LBP/right hip pain slightly improved with PO meds but pt not able to get up and walk. IV decadron ordered, PO Xanax ordered and spine consult paged. disced spine deonna miranda agrees with managedmt workup done advied [facility] admission for pin manageme will follow as consult Graeme Umanzor: Discussed with Spine Dr. Pond who agrees with management workup in ED and advised medicine admission for pain management, will follow as consult. Dr. Umanzor:  Dr. Kael nowak of admission to floor.
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
## 2018-12-09 NOTE — ED PROVIDER NOTE - MEDICAL DECISION MAKING DETAILS
89 y/o female bladder CA, finished last treatment 4-5 days ago, BIB son c/o gradual worsening right hip/lower back pain without acute injury. +lower back and right sciatic notch TTP, more so than focal hip TTP. Pt unable to right SLR due to pain. RLE distal +NVI. Plan xray right hip and femur, likely will need CT imaging, pain medication, observe and reassess.

## 2018-12-09 NOTE — ED ADULT NURSE NOTE - NSIMPLEMENTINTERV_GEN_ALL_ED
Implemented All Fall with Harm Risk Interventions:  [place] to call system. Call bell, personal items and telephone within reach. Instruct patient to call for assistance. Room bathroom lighting operational. Non-slip footwear when patient is off stretcher. Physically safe environment: no spills, clutter or unnecessary equipment. Stretcher in lowest position, wheels locked, appropriate side rails in place. Provide visual cue, wrist band, yellow gown, etc. Monitor gait and stability. Monitor for mental status changes and reorient to person, place, and time. Review medications for side effects contributing to fall risk. Reinforce activity limits and safety measures with patient and family. Provide visual clues: red socks.

## 2018-12-09 NOTE — H&P ADULT - ASSESSMENT
89 y/o female       intractable right hip/back pain with radiculopathy, h/o arthrosis/osteoarthritis seen on xrays  -admit to med-surg   -NS consult appreciated, recommend mri L-spine / pelvis to r/o neuro-foraminal disc on the right and or suspicion of possible saccral insufficiency fracture  -pain meds as needed  -will start stool regimen  -pt consult     +yeast on u/a, -nitrites. no dysuria, increased frequency or discharge, likely contaminate from vaginitis   -will cover with lotrimin for candidal vaginitis  -hold off on abx for now, f/u ucx    cad s/p stent x 5   -cont asa, not on statin     htn   -cont metoprolol BID, spironolactone     gerd   -cont ppi   dvt prophylaxis   -lovenox sc    disposition   -home vs [facility]

## 2018-12-09 NOTE — ED ADULT TRIAGE NOTE - CHIEF COMPLAINT QUOTE
c/o chronic  Rt hip pain , rt leg pain , took arthritis pill at 0200 with minimal relief , denies injury: hx lupus, bladder ca,

## 2018-12-10 LAB
ALBUMIN SERPL ELPH-MCNC: 3.7 G/DL — SIGNIFICANT CHANGE UP (ref 3.3–5)
ALP SERPL-CCNC: 96 U/L — SIGNIFICANT CHANGE UP (ref 40–120)
ALT FLD-CCNC: 40 U/L — SIGNIFICANT CHANGE UP (ref 12–78)
ANION GAP SERPL CALC-SCNC: 9 MMOL/L — SIGNIFICANT CHANGE UP (ref 5–17)
AST SERPL-CCNC: 28 U/L — SIGNIFICANT CHANGE UP (ref 15–37)
BASOPHILS # BLD AUTO: 0.01 K/UL — SIGNIFICANT CHANGE UP (ref 0–0.2)
BASOPHILS NFR BLD AUTO: 0.1 % — SIGNIFICANT CHANGE UP (ref 0–2)
BILIRUB SERPL-MCNC: 0.8 MG/DL — SIGNIFICANT CHANGE UP (ref 0.2–1.2)
BUN SERPL-MCNC: 23 MG/DL — SIGNIFICANT CHANGE UP (ref 7–23)
CALCIUM SERPL-MCNC: 9.5 MG/DL — SIGNIFICANT CHANGE UP (ref 8.5–10.1)
CHLORIDE SERPL-SCNC: 102 MMOL/L — SIGNIFICANT CHANGE UP (ref 96–108)
CO2 SERPL-SCNC: 24 MMOL/L — SIGNIFICANT CHANGE UP (ref 22–31)
CREAT SERPL-MCNC: 1.11 MG/DL — SIGNIFICANT CHANGE UP (ref 0.5–1.3)
EOSINOPHIL # BLD AUTO: 0 K/UL — SIGNIFICANT CHANGE UP (ref 0–0.5)
EOSINOPHIL NFR BLD AUTO: 0 % — SIGNIFICANT CHANGE UP (ref 0–6)
GLUCOSE SERPL-MCNC: 123 MG/DL — HIGH (ref 70–99)
HCT VFR BLD CALC: 43.5 % — SIGNIFICANT CHANGE UP (ref 34.5–45)
HGB BLD-MCNC: 13.6 G/DL — SIGNIFICANT CHANGE UP (ref 11.5–15.5)
IMM GRANULOCYTES NFR BLD AUTO: 0.4 % — SIGNIFICANT CHANGE UP (ref 0–1.5)
LYMPHOCYTES # BLD AUTO: 1.66 K/UL — SIGNIFICANT CHANGE UP (ref 1–3.3)
LYMPHOCYTES # BLD AUTO: 16.8 % — SIGNIFICANT CHANGE UP (ref 13–44)
MAGNESIUM SERPL-MCNC: 2.4 MG/DL — SIGNIFICANT CHANGE UP (ref 1.6–2.6)
MCHC RBC-ENTMCNC: 27.7 PG — SIGNIFICANT CHANGE UP (ref 27–34)
MCHC RBC-ENTMCNC: 31.3 GM/DL — LOW (ref 32–36)
MCV RBC AUTO: 88.6 FL — SIGNIFICANT CHANGE UP (ref 80–100)
MONOCYTES # BLD AUTO: 0.34 K/UL — SIGNIFICANT CHANGE UP (ref 0–0.9)
MONOCYTES NFR BLD AUTO: 3.4 % — SIGNIFICANT CHANGE UP (ref 2–14)
NEUTROPHILS # BLD AUTO: 7.83 K/UL — HIGH (ref 1.8–7.4)
NEUTROPHILS NFR BLD AUTO: 79.3 % — HIGH (ref 43–77)
NRBC # BLD: 0 /100 WBCS — SIGNIFICANT CHANGE UP (ref 0–0)
PHOSPHATE SERPL-MCNC: 4.6 MG/DL — HIGH (ref 2.5–4.5)
PLATELET # BLD AUTO: 357 K/UL — SIGNIFICANT CHANGE UP (ref 150–400)
POTASSIUM SERPL-MCNC: 5.2 MMOL/L — SIGNIFICANT CHANGE UP (ref 3.5–5.3)
POTASSIUM SERPL-SCNC: 5.2 MMOL/L — SIGNIFICANT CHANGE UP (ref 3.5–5.3)
PROT SERPL-MCNC: 7.8 GM/DL — SIGNIFICANT CHANGE UP (ref 6–8.3)
RBC # BLD: 4.91 M/UL — SIGNIFICANT CHANGE UP (ref 3.8–5.2)
RBC # FLD: 16.9 % — HIGH (ref 10.3–14.5)
SODIUM SERPL-SCNC: 135 MMOL/L — SIGNIFICANT CHANGE UP (ref 135–145)
WBC # BLD: 9.88 K/UL — SIGNIFICANT CHANGE UP (ref 3.8–10.5)
WBC # FLD AUTO: 9.88 K/UL — SIGNIFICANT CHANGE UP (ref 3.8–10.5)

## 2018-12-10 PROCEDURE — 72148 MRI LUMBAR SPINE W/O DYE: CPT | Mod: 26

## 2018-12-10 PROCEDURE — 72195 MRI PELVIS W/O DYE: CPT | Mod: 26

## 2018-12-10 PROCEDURE — 93010 ELECTROCARDIOGRAM REPORT: CPT

## 2018-12-10 RX ADMIN — Medication 50 MILLIGRAM(S): at 18:52

## 2018-12-10 RX ADMIN — PANTOPRAZOLE SODIUM 40 MILLIGRAM(S): 20 TABLET, DELAYED RELEASE ORAL at 06:09

## 2018-12-10 RX ADMIN — SPIRONOLACTONE 25 MILLIGRAM(S): 25 TABLET, FILM COATED ORAL at 06:10

## 2018-12-10 RX ADMIN — Medication 325 MILLIGRAM(S): at 11:01

## 2018-12-10 RX ADMIN — OXYCODONE AND ACETAMINOPHEN 1 TABLET(S): 5; 325 TABLET ORAL at 11:01

## 2018-12-10 RX ADMIN — Medication 50 MILLIGRAM(S): at 06:09

## 2018-12-10 RX ADMIN — OXYCODONE AND ACETAMINOPHEN 1 TABLET(S): 5; 325 TABLET ORAL at 20:19

## 2018-12-10 RX ADMIN — Medication 1 DROP(S): at 15:34

## 2018-12-10 RX ADMIN — Medication 75 MICROGRAM(S): at 06:09

## 2018-12-10 RX ADMIN — ENOXAPARIN SODIUM 30 MILLIGRAM(S): 100 INJECTION SUBCUTANEOUS at 06:09

## 2018-12-10 RX ADMIN — OXYCODONE AND ACETAMINOPHEN 1 TABLET(S): 5; 325 TABLET ORAL at 20:40

## 2018-12-10 RX ADMIN — Medication 1 DROP(S): at 06:11

## 2018-12-10 RX ADMIN — Medication 0.12 MILLIGRAM(S): at 18:56

## 2018-12-10 NOTE — PROGRESS NOTE ADULT - SUBJECTIVE AND OBJECTIVE BOX
HOSPITAL COURSE AND ASSESSMENT  89 y/o female a with h/o bladder ca s/p TURBT on 09/10/2018, last chemotherapy treatment 12/4/18, cad s/p stents x5, hypothyroidism, DJD, GERD, HLD, uterine ca s/p hysterectomy and radiation, cholecystectomy presents c/o severe, sharp right hip/lower back pain, radiating down to ankle. Pt reports the pain is chronic although noticed worsening of the pain last night while laying in bed, feels aggrevated after chemo treatments for her bladder ca and laying flat on hard table. pt notes the pain prevented her from sleeping last night.  associated with lower back pain. took Tylenol for the pain with no relief.    In ED, oxycodone, flexeril, decardon, xanax with mild relief. Pt was admitted to medicine for further evaluation. MRI was ordered.     Await MRI, PT for discharge planning.          intractable right hip/back pain with radiculopathy in the setting of known arthrosis/osteoarthritis  -XRAY: moderate bilateral hip arthrosis. moderate pubic symphysis arthrosis. lower lumbar spondylosis. mild bilateral sacroiliac joint arthrosis. mild tricompartmental arthrosis of the knee.   no knee joint effusion. Atherosclerotic disease is noted.  -CT L Spine:  Multilevel diffuse disc bulging resulting in moderate narrowing at L1/L2-L4/L5. No high-grade spinal canal stenosis  -NS consult appreciated, recommend mri L-spine / pelvis to r/o neuro-foraminal disc on the right and or suspicion of possible saccral insufficiency fracture  -pain meds as needed (limited by extensive allergy list)  -PT consult     *Candida vaginitis with known bladder cancer s/p TURBT and immunosuppression with chemotherapy  +yeast on u/a, no dysuria  -lotrimin for candidal vaginitis  -hold off on abx for now, f/u ucx    cad s/p stent x 5   -cont asa, not on statin     gerd   -cont ppi   dvt prophylaxis   -lovenox sc    Chronic diastolic heart failure  -echo 2017 with preserved EF  -CXR with vascular congestion  -BNP ordered  -on metoprolol and aldactone for mortality benefit            ----------------------------------------------------------------------------------------------  CHIEF COMPLAINT:  intractible pain    SUBJECTIVE:     tolerating PO. feels some movement has returned to lower extremities without precipitating pain    REVIEW OF SYSTEMS:  All other review of systems is negative unless indicated above    Vital Signs Last 24 Hrs  T(C): 36.4 (10 Dec 2018 05:35), Max: 36.9 (09 Dec 2018 21:29)  T(F): 97.6 (10 Dec 2018 05:35), Max: 98.5 (09 Dec 2018 21:29)  HR: 82 (10 Dec 2018 05:35) (70 - 95)  BP: 153/71 (10 Dec 2018 05:35) (137/68 - 217/71)  BP(mean): --  RR: 16 (10 Dec 2018 05:35) (16 - 18)  SpO2: 99% (10 Dec 2018 05:35) (99% - 100%)  CAPILLARY BLOOD GLUCOSE          PHYSICAL EXAM:  Constitutional: NAD, NCAT, obese  HEENT: EOMI, Normal Hearing, MMM, fair dentition  Neck: trachea midline, No JVD  Respiratory: Breath sounds are clear, unlabored respiration  Cardiovascular: S1 and S2, regular rate   Gastrointestinal: Bowel Sounds present, soft, nontender, nondistended  Extremities: No peripheral edema  Vascular: 2+ radial pulse  Neurological: awake, alert, follows commands, sensation grossly intact  Psych: appropriate affect, fair insight  Musculoskeletal: moves all extremities  Skin: No rashes    ALLERGIES  amlodipine (Unknown)  clonidine (Unknown)  hydrocodone (Unknown)  Levatol (Unknown)  Lipitor (Unknown)  Lotrel (Unknown)  methylPREDNISolone (Unknown)  morphine (Other)  Plaquenil (Unknown)  statins (Other (Unknown))  sulfa drugs (Rash)      MEDICATIONS  (STANDING):  artificial tears (preservative free) Ophthalmic Solution 1 Drop(s) Both EYES three times a day  aspirin 325 milliGRAM(s) Oral daily  clotrimazole 2% Vaginal Cream 1 Applicatorful Vaginal daily  enoxaparin Injectable 30 milliGRAM(s) SubCutaneous every 24 hours  ferrous    sulfate 325 milliGRAM(s) Oral daily  levothyroxine 75 MICROGram(s) Oral daily  metoprolol succinate ER 50 milliGRAM(s) Oral two times a day  pantoprazole    Tablet 40 milliGRAM(s) Oral before breakfast  polyethylene glycol 3350 17 Gram(s) Oral daily  spironolactone 25 milliGRAM(s) Oral daily      LABS: All Labs Reviewed:                        13.6   9.88  )-----------( 357      ( 10 Dec 2018 07:45 )             43.5     12-10    135  |  102  |  23  ----------------------------<  123<H>  5.2   |  24  |  1.11    Ca    9.5      10 Dec 2018 07:45  Phos  4.6     12-10  Mg     2.4     12-10    TPro  7.8  /  Alb  3.7  /  TBili  0.8  /  DBili  x   /  AST  28  /  ALT  40  /  AlkPhos  96  12-10    PT/INR - ( 09 Dec 2018 19:20 )   PT: 10.7 sec;   INR: 0.97 ratio         PTT - ( 09 Dec 2018 19:20 )  PTT:26.7 sec      Blood Culture: HOSPITAL COURSE AND ASSESSMENT  89 y/o female a with h/o bladder ca s/p TURBT on 09/10/2018, last chemotherapy treatment 12/4/18, cad s/p stents x5, hypothyroidism, DJD, GERD, HLD, uterine ca s/p hysterectomy and radiation, cholecystectomy presents c/o severe, sharp right hip/lower back pain, radiating down to ankle. Pt reports the pain is chronic although noticed worsening of the pain last night while laying in bed, feels aggrevated after chemo treatments for her bladder ca and laying flat on hard table. pt notes the pain prevented her from sleeping last night.  associated with lower back pain. took Tylenol for the pain with no relief.    In ED, oxycodone, flexeril, decardon, xanax with mild relief. Pt was admitted to medicine for further evaluation. MRI was ordered.     Await MRI, PT for discharge planning.          intractable right hip/back pain with radiculopathy in the setting of known arthrosis/osteoarthritis  -XRAY: moderate bilateral hip arthrosis. moderate pubic symphysis arthrosis. lower lumbar spondylosis. mild bilateral sacroiliac joint arthrosis. mild tricompartmental arthrosis of the knee.   no knee joint effusion. Atherosclerotic disease is noted.  -CT L Spine:  Multilevel diffuse disc bulging resulting in moderate narrowing at L1/L2-L4/L5. No high-grade spinal canal stenosis  -NS consult appreciated, recommend mri L-spine / pelvis to r/o neuro-foraminal disc on the right and or suspicion of possible saccral insufficiency fracture  -pain meds as needed (limited by extensive allergy list)  -PT consult     *Candida vaginitis with known bladder cancer s/p TURBT and immunosuppression with chemotherapy  +yeast on u/a, no dysuria  -lotrimin for candidal vaginitis  -hold off on abx for now, f/u ucx    cad s/p stent x 5   -cont asa, not on statin     gerd   -cont ppi   dvt prophylaxis   -lovenox sc    Chronic diastolic heart failure  -echo 2017 with preserved EF  -CXR with vascular congestion  -BNP ordered  -on metoprolol and aldactone for mortality benefit    Advanced Care Planning  -pt has capacity  -DNR  -lives home alone, uses walker            ----------------------------------------------------------------------------------------------  CHIEF COMPLAINT:  intractible pain    SUBJECTIVE:     tolerating PO. feels some movement has returned to lower extremities without precipitating pain    REVIEW OF SYSTEMS:  All other review of systems is negative unless indicated above    Vital Signs Last 24 Hrs  T(C): 36.4 (10 Dec 2018 05:35), Max: 36.9 (09 Dec 2018 21:29)  T(F): 97.6 (10 Dec 2018 05:35), Max: 98.5 (09 Dec 2018 21:29)  HR: 82 (10 Dec 2018 05:35) (70 - 95)  BP: 153/71 (10 Dec 2018 05:35) (137/68 - 217/71)  BP(mean): --  RR: 16 (10 Dec 2018 05:35) (16 - 18)  SpO2: 99% (10 Dec 2018 05:35) (99% - 100%)  CAPILLARY BLOOD GLUCOSE          PHYSICAL EXAM:  Constitutional: NAD, NCAT, obese  HEENT: EOMI, Normal Hearing, MMM, fair dentition  Neck: trachea midline, No JVD  Respiratory: Breath sounds are clear, unlabored respiration  Cardiovascular: S1 and S2, regular rate   Gastrointestinal: Bowel Sounds present, soft, nontender, nondistended  Extremities: No peripheral edema  Vascular: 2+ radial pulse  Neurological: awake, alert, follows commands, sensation grossly intact  Psych: appropriate affect, fair insight  Musculoskeletal: moves all extremities  Skin: No rashes    ALLERGIES  amlodipine (Unknown)  clonidine (Unknown)  hydrocodone (Unknown)  Levatol (Unknown)  Lipitor (Unknown)  Lotrel (Unknown)  methylPREDNISolone (Unknown)  morphine (Other)  Plaquenil (Unknown)  statins (Other (Unknown))  sulfa drugs (Rash)      MEDICATIONS  (STANDING):  artificial tears (preservative free) Ophthalmic Solution 1 Drop(s) Both EYES three times a day  aspirin 325 milliGRAM(s) Oral daily  clotrimazole 2% Vaginal Cream 1 Applicatorful Vaginal daily  enoxaparin Injectable 30 milliGRAM(s) SubCutaneous every 24 hours  ferrous    sulfate 325 milliGRAM(s) Oral daily  levothyroxine 75 MICROGram(s) Oral daily  metoprolol succinate ER 50 milliGRAM(s) Oral two times a day  pantoprazole    Tablet 40 milliGRAM(s) Oral before breakfast  polyethylene glycol 3350 17 Gram(s) Oral daily  spironolactone 25 milliGRAM(s) Oral daily      LABS: All Labs Reviewed:                        13.6   9.88  )-----------( 357      ( 10 Dec 2018 07:45 )             43.5     12-10    135  |  102  |  23  ----------------------------<  123<H>  5.2   |  24  |  1.11    Ca    9.5      10 Dec 2018 07:45  Phos  4.6     12-10  Mg     2.4     12-10    TPro  7.8  /  Alb  3.7  /  TBili  0.8  /  DBili  x   /  AST  28  /  ALT  40  /  AlkPhos  96  12-10    PT/INR - ( 09 Dec 2018 19:20 )   PT: 10.7 sec;   INR: 0.97 ratio         PTT - ( 09 Dec 2018 19:20 )  PTT:26.7 sec      Blood Culture:

## 2018-12-11 LAB
-  AMIKACIN: SIGNIFICANT CHANGE UP
-  AMOXICILLIN/CLAVULANIC ACID: SIGNIFICANT CHANGE UP
-  AMPICILLIN/SULBACTAM: SIGNIFICANT CHANGE UP
-  AMPICILLIN: SIGNIFICANT CHANGE UP
-  AMPICILLIN: SIGNIFICANT CHANGE UP
-  AZTREONAM: SIGNIFICANT CHANGE UP
-  CEFAZOLIN: SIGNIFICANT CHANGE UP
-  CEFEPIME: SIGNIFICANT CHANGE UP
-  CEFOXITIN: SIGNIFICANT CHANGE UP
-  CEFTRIAXONE: SIGNIFICANT CHANGE UP
-  CIPROFLOXACIN: SIGNIFICANT CHANGE UP
-  CIPROFLOXACIN: SIGNIFICANT CHANGE UP
-  ERTAPENEM: SIGNIFICANT CHANGE UP
-  GENTAMICIN: SIGNIFICANT CHANGE UP
-  IMIPENEM: SIGNIFICANT CHANGE UP
-  LEVOFLOXACIN: SIGNIFICANT CHANGE UP
-  LEVOFLOXACIN: SIGNIFICANT CHANGE UP
-  MEROPENEM: SIGNIFICANT CHANGE UP
-  NITROFURANTOIN: SIGNIFICANT CHANGE UP
-  NITROFURANTOIN: SIGNIFICANT CHANGE UP
-  PIPERACILLIN/TAZOBACTAM: SIGNIFICANT CHANGE UP
-  TETRACYCLINE: SIGNIFICANT CHANGE UP
-  TIGECYCLINE: SIGNIFICANT CHANGE UP
-  TOBRAMYCIN: SIGNIFICANT CHANGE UP
-  TRIMETHOPRIM/SULFAMETHOXAZOLE: SIGNIFICANT CHANGE UP
-  VANCOMYCIN: SIGNIFICANT CHANGE UP
CULTURE RESULTS: SIGNIFICANT CHANGE UP
GLUCOSE BLDC GLUCOMTR-MCNC: 110 MG/DL — HIGH (ref 70–99)
METHOD TYPE: SIGNIFICANT CHANGE UP
METHOD TYPE: SIGNIFICANT CHANGE UP
ORGANISM # SPEC MICROSCOPIC CNT: SIGNIFICANT CHANGE UP
SPECIMEN SOURCE: SIGNIFICANT CHANGE UP

## 2018-12-11 PROCEDURE — 99231 SBSQ HOSP IP/OBS SF/LOW 25: CPT

## 2018-12-11 RX ORDER — PROCHLORPERAZINE MALEATE 5 MG
5 TABLET ORAL EVERY 8 HOURS
Qty: 0 | Refills: 0 | Status: DISCONTINUED | OUTPATIENT
Start: 2018-12-11 | End: 2018-12-14

## 2018-12-11 RX ORDER — DEXAMETHASONE 0.5 MG/5ML
4 ELIXIR ORAL DAILY
Qty: 0 | Refills: 0 | Status: DISCONTINUED | OUTPATIENT
Start: 2018-12-11 | End: 2018-12-14

## 2018-12-11 RX ORDER — GABAPENTIN 400 MG/1
100 CAPSULE ORAL
Qty: 0 | Refills: 0 | Status: DISCONTINUED | OUTPATIENT
Start: 2018-12-11 | End: 2018-12-12

## 2018-12-11 RX ADMIN — Medication 1 DROP(S): at 20:51

## 2018-12-11 RX ADMIN — Medication 50 MILLIGRAM(S): at 05:12

## 2018-12-11 RX ADMIN — Medication 325 MILLIGRAM(S): at 10:25

## 2018-12-11 RX ADMIN — Medication 0.12 MILLIGRAM(S): at 21:32

## 2018-12-11 RX ADMIN — PANTOPRAZOLE SODIUM 40 MILLIGRAM(S): 20 TABLET, DELAYED RELEASE ORAL at 05:12

## 2018-12-11 RX ADMIN — OXYCODONE AND ACETAMINOPHEN 1 TABLET(S): 5; 325 TABLET ORAL at 11:25

## 2018-12-11 RX ADMIN — ENOXAPARIN SODIUM 30 MILLIGRAM(S): 100 INJECTION SUBCUTANEOUS at 05:12

## 2018-12-11 RX ADMIN — SPIRONOLACTONE 25 MILLIGRAM(S): 25 TABLET, FILM COATED ORAL at 05:12

## 2018-12-11 RX ADMIN — Medication 4 MILLIGRAM(S): at 17:31

## 2018-12-11 RX ADMIN — Medication 325 MILLIGRAM(S): at 10:26

## 2018-12-11 RX ADMIN — Medication 75 MICROGRAM(S): at 05:12

## 2018-12-11 RX ADMIN — OXYCODONE AND ACETAMINOPHEN 1 TABLET(S): 5; 325 TABLET ORAL at 10:25

## 2018-12-11 RX ADMIN — Medication 50 MILLIGRAM(S): at 17:35

## 2018-12-11 RX ADMIN — GABAPENTIN 100 MILLIGRAM(S): 400 CAPSULE ORAL at 17:35

## 2018-12-11 NOTE — PROGRESS NOTE ADULT - ASSESSMENT
A/P:  89 y/o female bladder CA, finished last treatment 4-5 days ago, BIB son c/o gradual worsening right hip/lower back pain without acute injury. + Pain LBP along Sciatic notch on the right. Passive SLR no pain, Active SLR + exacurbation of pain syndrome.     - No acute neurosurg intervention  - L4 disc is not large but pain is in classic L4 distribution  - Pt does not wish to pursue surgical options at this point and would conservative mgmt  - Would try Medrol dose pack to help with pain  - Possible outpt injection by pain mgmt doc  - May follow up with Dr Pond in the office as needed  - Will sign off for now, reconsult PRN    D/w Dr. Pond who was present for examination and discussion fo options with pt

## 2018-12-11 NOTE — PROGRESS NOTE ADULT - SUBJECTIVE AND OBJECTIVE BOX
HOSPITAL COURSE AND ASSESSMENT  89 y/o female a with h/o bladder ca s/p TURBT on 09/10/2018, last chemotherapy treatment 12/4/18, cad s/p stents x5, hypothyroidism, DJD, GERD, HLD, uterine ca s/p hysterectomy and radiation, cholecystectomy presents c/o severe, sharp right hip/lower back pain, radiating down to ankle. Pt reports the pain is chronic although noticed worsening of the pain last night while laying in bed, feels aggrevated after chemo treatments for her bladder ca and laying flat on hard table. pt notes the pain prevented her from sleeping last night.  associated with lower back pain. took Tylenol for the pain with no relief.    In ED, oxycodone, flexeril, decardon, xanax with mild relief. Pt was admitted to medicine for further evaluation. MRI showed disc impingement at     Await PT for discharge planning.          intractable right hip/back pain with radiculopathy in the setting of known arthrosis/osteoarthritis  -XRAY: moderate bilateral hip arthrosis. moderate pubic symphysis arthrosis. lower lumbar spondylosis. mild bilateral sacroiliac joint arthrosis. mild tricompartmental arthrosis of the knee. no knee joint effusion. Atherosclerotic disease is noted.  -CT L Spine:  Multilevel diffuse disc bulging resulting in moderate narrowing at L1/L2-L4/L5. No high-grade spinal canal stenosis  -MRI L spine: L4 disc impingement  -NS consult appreciated, recommend steroids for pain control. pt declined surgical intervention  -pain meds as needed (limited by extensive allergy list). Decadron dose pack (medrol allergy), neurontin  -PT consult     *Candida vaginitis with known bladder cancer s/p TURBT and immunosuppression with chemotherapy  +yeast on u/a, no dysuria  -lotrimin for candidal vaginitis  -hold off on abx for now, f/u ucx    cad s/p stent x 5   -cont asa, not on statin     gerd   -cont ppi   dvt prophylaxis   -lovenox sc    Chronic diastolic heart failure  -echo 2017 with preserved EF  -CXR with vascular congestion  -BNP ordered  -on metoprolol and aldactone for mortality benefit    Advanced Care Planning  -pt has capacity  -DNR  -lives home alone, uses walker    ----------------------------------------------------------------------------------------------  CHIEF COMPLAINT:  back pain    SUBJECTIVE:     tolerating PO. waiting for PT. refused yesterday. reports some improvement in pain overall.     REVIEW OF SYSTEMS:  All other review of systems is negative unless indicated above    Vital Signs Last 24 Hrs  T(C): 36.3 (11 Dec 2018 05:20), Max: 36.8 (10 Dec 2018 17:38)  T(F): 97.3 (11 Dec 2018 05:20), Max: 98.3 (10 Dec 2018 17:38)  HR: 64 (11 Dec 2018 05:20) (64 - 92)  BP: 131/68 (11 Dec 2018 05:20) (131/68 - 169/71)  BP(mean): --  RR: 18 (11 Dec 2018 05:20) (16 - 18)  SpO2: 100% (11 Dec 2018 05:20) (100% - 100%)  CAPILLARY BLOOD GLUCOSE          PHYSICAL EXAM:  Constitutional: NAD, NCAT, obese  HEENT: EOMI, Normal Hearing, MMM, fair dentition  Neck: trachea midline, No JVD  Respiratory: Breath sounds are clear, unlabored respiration  Cardiovascular: S1 and S2, regular rate   Gastrointestinal: Bowel Sounds present, soft, nontender, nondistended  Extremities: No peripheral edema  Vascular: 2+ radial pulse  Neurological: awake, alert, follows commands, sensation grossly intact  Psych: appropriate affect, fair insight  Musculoskeletal: moves all extremities, + SLR   Skin: No rashes    ALLERGIES  amlodipine (Unknown)  clonidine (Unknown)  hydrocodone (Unknown)  Levatol (Unknown)  Lipitor (Unknown)  Lotrel (Unknown)  methylPREDNISolone (Unknown)  morphine (Other)  Plaquenil (Unknown)  statins (Other (Unknown))  sulfa drugs (Rash)      MEDICATIONS  (STANDING):  artificial tears (preservative free) Ophthalmic Solution 1 Drop(s) Both EYES three times a day  aspirin 325 milliGRAM(s) Oral daily  clotrimazole 2% Vaginal Cream 1 Applicatorful Vaginal daily  dexamethasone     Tablet 4 milliGRAM(s) Oral daily  enoxaparin Injectable 30 milliGRAM(s) SubCutaneous every 24 hours  ferrous    sulfate 325 milliGRAM(s) Oral daily  gabapentin 100 milliGRAM(s) Oral two times a day  levothyroxine 75 MICROGram(s) Oral daily  metoprolol succinate ER 50 milliGRAM(s) Oral two times a day  pantoprazole    Tablet 40 milliGRAM(s) Oral before breakfast  polyethylene glycol 3350 17 Gram(s) Oral daily  spironolactone 25 milliGRAM(s) Oral daily      LABS: All Labs Reviewed:                        13.6   9.88  )-----------( 357      ( 10 Dec 2018 07:45 )             43.5     12-10    135  |  102  |  23  ----------------------------<  123<H>  5.2   |  24  |  1.11    Ca    9.5      10 Dec 2018 07:45  Phos  4.6     12-10  Mg     2.4     12-10    TPro  7.8  /  Alb  3.7  /  TBili  0.8  /  DBili  x   /  AST  28  /  ALT  40  /  AlkPhos  96  12-10    PT/INR - ( 09 Dec 2018 19:20 )   PT: 10.7 sec;   INR: 0.97 ratio         PTT - ( 09 Dec 2018 19:20 )  PTT:26.7 sec      Blood Culture: 12-09 @ 18:14  Organism --  Gram Stain Blood -- Gram Stain --  Specimen Source .Urine None  Culture-Blood --

## 2018-12-11 NOTE — PROGRESS NOTE ADULT - SUBJECTIVE AND OBJECTIVE BOX
Patient is a 89y old  Female who presents with a chief complaint of right hip/back pain (10 Dec 2018 10:46)      HPI:  89 y/o female with a PMHx of GERD, HTN, HLD, CAD s/p 5 coronary stents, bladder CA (last chemotherapy treatment 12/4/18), compound fracture of right ankle presents to the ED c/o severe, sharp right hip/lower back pain, radiating down to ankle. Pt reports the pain is chronic although noticed worsening of the pain last night while laying in bed, feels aggravated with frequent Chemo treatments for her bladder CA and laying flat on hard table. Pt notes the pain prevented her from sleeping last night. Pt notes she took Tylenol for the pain with no relief. Unsure of any recent injury or trauma. Pt reports the pain is originating from the lower back. Pain is exacerbated with movement of RLE. +numbness of RLE at baseline. Denies fevers, urinary/bowel incontinence.    12/11 - Pt seen and examined with Dr Pond earlier today, in NAD. Appears comfortable, denies new complaints, no overnight events. MRI reviewed, small Right sided L4 HNP        ALPRAZolam 0.125 milliGRAM(s) Oral daily PRN  artificial tears (preservative free) Ophthalmic Solution 1 Drop(s) Both EYES three times a day  aspirin 325 milliGRAM(s) Oral daily  clotrimazole 2% Vaginal Cream 1 Applicatorful Vaginal daily  cyclobenzaprine 5 milliGRAM(s) Oral three times a day PRN  enoxaparin Injectable 30 milliGRAM(s) SubCutaneous every 24 hours  ferrous    sulfate 325 milliGRAM(s) Oral daily  gabapentin 100 milliGRAM(s) Oral two times a day  levothyroxine 75 MICROGram(s) Oral daily  magnesium hydroxide Suspension 30 milliLiter(s) Oral daily PRN  metoprolol succinate ER 50 milliGRAM(s) Oral two times a day  oxyCODONE    5 mG/acetaminophen 325 mG 1 Tablet(s) Oral every 6 hours PRN  pantoprazole    Tablet 40 milliGRAM(s) Oral before breakfast  polyethylene glycol 3350 17 Gram(s) Oral daily  spironolactone 25 milliGRAM(s) Oral daily        Vital Signs Last 24 Hrs  T(C): 36.3 (11 Dec 2018 05:20), Max: 36.8 (10 Dec 2018 17:38)  T(F): 97.3 (11 Dec 2018 05:20), Max: 98.3 (10 Dec 2018 17:38)  HR: 64 (11 Dec 2018 05:20) (64 - 92)  BP: 131/68 (11 Dec 2018 05:20) (131/68 - 169/71)  RR: 18 (11 Dec 2018 05:20) (16 - 18)  SpO2: 100% (11 Dec 2018 05:20) (100% - 100%)                            13.6   9.88  )-----------( 357      ( 10 Dec 2018 07:45 )             43.5     135  |  102  |  23  ----------------------------<  123<H>  5.2   |  24  |  1.11    Ca    9.5      10 Dec 2018 07:45  Phos  4.6     12-10  Mg     2.4     12-10    TPro  7.8  /  Alb  3.7  /  TBili  0.8  /  DBili  x   /  AST  28  /  ALT  40  /  AlkPhos  96  12-10    PT/INR - ( 09 Dec 2018 19:20 )   PT: 10.7 sec;   INR: 0.97 ratio      PTT - ( 09 Dec 2018 19:20 )  PTT:26.7 sec        Radiology:  MR Lumbar Spine No Cont (12.10.18 @ 18:46) >  1. No fracture.   2. Disc disease and degenerative changes with moderate central stenosis   at L4-L5. There is a right foraminal disc extrusion at this level   compressing the exiting right L4 nerve root.        Physical Exam:  Constitutional: Awake / alert  HEENT: PERRLA, EOMI  Neck: Supple  Respiratory: Breath sounds are clear bilaterally  Cardiovascular: S1 and S2, regular rhythm  Gastrointestinal: Soft, NT/ND  Extremities:  1+ dependent edema. b/l knee OA  Vascular: No carotid Bruit  Musculoskeletal: no joint swelling/tenderness, no abnormal movements  Skin: No rashes    Neurological Exam:  HF: A x O x 3, appropriately interactive, normal affect, speech fluent, no aphasia or paraphasic errors. Naming /repetition intact   CN: PERRL, EOMI, VFF, facial sensation normal, no NLFD, tongue midline  Motor: No pronator drift, Strength 5/5 in all upper ext  LLE 4/5 power throughout, contralateral SLR at 30 degrees  RLE 3+/5 power, Limited prox due to pain. No pain with passive SLR.  + pain with active SLR  No pain with hip int / ext rotation  Sens: Intact to light touch  Reflexes: Symmetric and normal, downgoing toes b/l  Coord:  No FNFA, dysmetria, CLEVE intact   Gait/Balance: Cannot test

## 2018-12-11 NOTE — CHART NOTE - NSCHARTNOTEFT_GEN_A_CORE
RRT was called for syncope. Pt is a 89 yo F w/ PMHx of CAD s/p stents x5, hypothyroidism, HLD, bladder cancer s/p TURBT (9/2018), and uterine cancer s/p hysterectomy and radiation who was in the bathroom with a nurse. Per nurse, pt reported she was feeling nauseous and unwell immediately prior to syncopal episode. Pt was caught by nurse, did not fall, and no head trauma. Pt continues to feel nauseous.     Vitals  T 97.4, , /102, RR 16, O2 sat 100% on NC   Gen: elderly, frail female sitting up in chair, vomiting   CV: RRR, nl s1/s2, no M/R/G  Pulm: normal respiratory effort, CTAB    a/p: 89 yo F w/ PMHx of CAD s/p stents x5 with syncopal episode in the restroom likely vasovagal   - continue to monitor on med/surg  - tachycardia likely due to anxiety and excitement   - repeat vitals in 1 hr  - zofran for nausea     pt seen and examined with hospitalist and Dr. Barrios, PGY3 RRT was called for syncope. Pt is a 89 yo F w/ PMHx of CAD s/p stents x5, hypothyroidism, HLD, bladder cancer s/p TURBT (9/2018), and uterine cancer s/p hysterectomy and radiation who was in the bathroom with a nurse. Per nurse, pt reported she was feeling nauseous and unwell immediately prior to syncopal episode. Pt was caught by nurse, did not fall, and no head trauma. Pt continues to feel nauseous.     Vitals  T 97.4, , /102, RR 16, O2 sat 100% on NC   Gen: elderly, frail female sitting up in chair, vomiting   CV: RRR, nl s1/s2, no M/R/G  Pulm: normal respiratory effort, CTAB    a/p: 89 yo F w/ PMHx of CAD s/p stents x5 with syncopal episode in the restroom likely vasovagal   - continue to monitor on med/surg  - tachycardia likely due to anxiety and excitement   - repeat vitals in 1 hr  - zofran for nausea     pt seen and examined with Dr. Young and Dr. Barrios, PGY3

## 2018-12-12 LAB
ANION GAP SERPL CALC-SCNC: 8 MMOL/L — SIGNIFICANT CHANGE UP (ref 5–17)
BUN SERPL-MCNC: 27 MG/DL — HIGH (ref 7–23)
CALCIUM SERPL-MCNC: 9.3 MG/DL — SIGNIFICANT CHANGE UP (ref 8.5–10.1)
CHLORIDE SERPL-SCNC: 101 MMOL/L — SIGNIFICANT CHANGE UP (ref 96–108)
CO2 SERPL-SCNC: 25 MMOL/L — SIGNIFICANT CHANGE UP (ref 22–31)
CREAT SERPL-MCNC: 1.06 MG/DL — SIGNIFICANT CHANGE UP (ref 0.5–1.3)
GLUCOSE SERPL-MCNC: 123 MG/DL — HIGH (ref 70–99)
NT-PROBNP SERPL-SCNC: 239 PG/ML — SIGNIFICANT CHANGE UP (ref 0–450)
POTASSIUM SERPL-MCNC: 4.7 MMOL/L — SIGNIFICANT CHANGE UP (ref 3.5–5.3)
POTASSIUM SERPL-SCNC: 4.7 MMOL/L — SIGNIFICANT CHANGE UP (ref 3.5–5.3)
SODIUM SERPL-SCNC: 134 MMOL/L — LOW (ref 135–145)

## 2018-12-12 RX ORDER — ALPRAZOLAM 0.25 MG
0.12 TABLET ORAL ONCE
Qty: 0 | Refills: 0 | Status: DISCONTINUED | OUTPATIENT
Start: 2018-12-12 | End: 2018-12-12

## 2018-12-12 RX ORDER — GABAPENTIN 400 MG/1
200 CAPSULE ORAL
Qty: 0 | Refills: 0 | Status: DISCONTINUED | OUTPATIENT
Start: 2018-12-12 | End: 2018-12-14

## 2018-12-12 RX ADMIN — Medication 75 MICROGRAM(S): at 05:50

## 2018-12-12 RX ADMIN — OXYCODONE AND ACETAMINOPHEN 1 TABLET(S): 5; 325 TABLET ORAL at 12:00

## 2018-12-12 RX ADMIN — ENOXAPARIN SODIUM 30 MILLIGRAM(S): 100 INJECTION SUBCUTANEOUS at 05:50

## 2018-12-12 RX ADMIN — PANTOPRAZOLE SODIUM 40 MILLIGRAM(S): 20 TABLET, DELAYED RELEASE ORAL at 05:51

## 2018-12-12 RX ADMIN — Medication 325 MILLIGRAM(S): at 11:04

## 2018-12-12 RX ADMIN — Medication 0.12 MILLIGRAM(S): at 21:10

## 2018-12-12 RX ADMIN — Medication 1 DROP(S): at 14:38

## 2018-12-12 RX ADMIN — Medication 4 MILLIGRAM(S): at 05:50

## 2018-12-12 RX ADMIN — Medication 0.12 MILLIGRAM(S): at 16:35

## 2018-12-12 RX ADMIN — Medication 50 MILLIGRAM(S): at 05:50

## 2018-12-12 RX ADMIN — GABAPENTIN 200 MILLIGRAM(S): 400 CAPSULE ORAL at 16:29

## 2018-12-12 RX ADMIN — GABAPENTIN 100 MILLIGRAM(S): 400 CAPSULE ORAL at 05:50

## 2018-12-12 RX ADMIN — Medication 50 MILLIGRAM(S): at 16:28

## 2018-12-12 RX ADMIN — POLYETHYLENE GLYCOL 3350 17 GRAM(S): 17 POWDER, FOR SOLUTION ORAL at 11:06

## 2018-12-12 RX ADMIN — OXYCODONE AND ACETAMINOPHEN 1 TABLET(S): 5; 325 TABLET ORAL at 11:04

## 2018-12-12 RX ADMIN — Medication 1 DROP(S): at 21:10

## 2018-12-12 RX ADMIN — Medication 1 DROP(S): at 05:51

## 2018-12-12 NOTE — PHYSICAL THERAPY INITIAL EVALUATION ADULT - DIAGNOSIS, PT EVAL
Sciatica Rt side radiating to RLE worse on standing and walking, Difficulty amb, MRI showed disc impingement at L4.
R lumbar radiculopathy/ R sciatica,gait impairment

## 2018-12-12 NOTE — PHYSICAL THERAPY INITIAL EVALUATION ADULT - GENERAL OBSERVATIONS, REHAB EVAL
Pt was found lying in bed, son in room, pt is willing to participate in PT
resting supine in bed with son Adama at bedside, after RAPID RESPONSE earlier in bathroom with nurse; felt nauseous and fainted,caught by nurse ; now clutching emesis basin ,c/o persistent nausea she is attributing to eating shrimp for lunch

## 2018-12-12 NOTE — PHYSICAL THERAPY INITIAL EVALUATION ADULT - PERTINENT HX OF CURRENT PROBLEM, REHAB EVAL
Pt presents with c/o severe, sharp right hip/lower back pain, radiating down to ankle. Pt reports the pain is chronic although noticed worsening of the pain while laying in bed, feels aggravated after chemo treatments for her bladder ca and laying flat on hard table. pt notes the pain prevented her from sleeping , MRI showed disc impingement at L4. Decadron was continued and neurontin was started. Course complicated by syncopal episode while using the bathroom yesterday which was a RR.
R hip/thigh pain ,sciatica with difficulty ambulating

## 2018-12-12 NOTE — PHYSICAL THERAPY INITIAL EVALUATION ADULT - CRITERIA FOR SKILLED THERAPEUTIC INTERVENTIONS
predicted duration of therapy intervention/impairments found/anticipated equipment needs at discharge/anticipated discharge recommendation/risk reduction/prevention/rehab potential/functional limitations in following categories/therapy frequency

## 2018-12-12 NOTE — PROGRESS NOTE ADULT - REASON FOR ADMISSION
intractable LBP with radicular pain from SI region along sciatic distribution to ankle
right hip/back pain

## 2018-12-12 NOTE — PROGRESS NOTE ADULT - SUBJECTIVE AND OBJECTIVE BOX
HOSPITAL COURSE AND ASSESSMENT  89 y/o female a with h/o bladder ca s/p TURBT on 09/10/2018, last chemotherapy treatment 12/4/18, cad s/p stents x5, hypothyroidism, DJD, GERD, HLD, uterine ca s/p hysterectomy and radiation, cholecystectomy presents c/o severe, sharp right hip/lower back pain, radiating down to ankle. Pt reports the pain is chronic although noticed worsening of the pain last night while laying in bed, feels aggrevated after chemo treatments for her bladder ca and laying flat on hard table. pt notes the pain prevented her from sleeping last night.  associated with lower back pain. took Tylenol for the pain with no relief.    In ED, oxycodone, flexeril, decadron, xanax with mild relief. Pt was admitted to medicine for further evaluation. MRI showed disc impingement at L4. Decadron was continued and neurontin was started. Course complicated by syncopal episode while using the bathroom which was an RRT. Her pain improved slightly. PT was consulted to evaluate her for disposition.     Await PT for discharge planning.      Syncope with known orthostatic hypotension  -RRT 12/11  -with exertion after limited mobility for 4 days, oxycodone, and OH  -discussed move to telemetry with patient, but patient and son declined as pt would not want any intervention  -modify/slow get up and go  -PT evaluation    intractable right hip/back pain with radiculopathy in the setting of known arthrosis/osteoarthritis  -XRAY: moderate bilateral hip arthrosis. moderate pubic symphysis arthrosis. lower lumbar spondylosis. mild bilateral sacroiliac joint arthrosis. mild tricompartmental arthrosis of the knee. no knee joint effusion. Atherosclerotic disease is noted.  -CT L Spine:  Multilevel diffuse disc bulging resulting in moderate narrowing at L1/L2-L4/L5. No high-grade spinal canal stenosis  -MRI L spine: L4 disc impingement  -NS consult appreciated, recommend steroids for pain control. pt declined surgical intervention  -pain meds as needed (limited by extensive allergy list). Decadron dose pack (medrol allergy), neurontin  -PT consult     *Candida vaginitis with known bladder cancer s/p TURBT and immunosuppression with chemotherapy  +yeast on u/a, no dysuria  -lotrimin for candidal vaginitis  -hold off on abx     cad s/p stent x 5   -cont asa, not on statin PTA    gerd   -cont ppi     dvt prophylaxis   -lovenox sc    Chronic diastolic heart failure  -echo 2017 with preserved EF  -CXR with vascular congestion  -BNP ordered  -on metoprolol and aldactone for mortality benefit    Advanced Care Planning  -pt has capacity  -DNR  -lives home alone, uses walker  -son Adama would be HCP      ----------------------------------------------------------------------------------------------  CHIEF COMPLAINT:  LBP    SUBJECTIVE:     tolerating PO. OOB minimally. irate she has not been out of bed more, despite her own refusal of PT two days ago. reports she can not go home and can not go to nursing home. she is adamant she wants an epidural injection.    REVIEW OF SYSTEMS:  All other review of systems is negative unless indicated above    Vital Signs Last 24 Hrs  T(C): 36.3 (12 Dec 2018 05:20), Max: 36.6 (11 Dec 2018 16:29)  T(F): 97.3 (12 Dec 2018 05:20), Max: 97.8 (11 Dec 2018 16:29)  HR: 88 (12 Dec 2018 05:20) (69 - 88)  BP: 114/60 (12 Dec 2018 05:20) (114/60 - 164/59)  BP(mean): --  RR: 18 (12 Dec 2018 05:20) (17 - 18)  SpO2: 97% (12 Dec 2018 05:20) (97% - 100%)  CAPILLARY BLOOD GLUCOSE      POCT Blood Glucose.: 110 mg/dL (11 Dec 2018 13:33)      PHYSICAL EXAM:  Constitutional: NAD, NCAT  HEENT: EOMI, Normal Hearing, MMM, fair dentition  Neck: trachea midline, No JVD  Respiratory: Breath sounds are clear, unlabored respiration  Cardiovascular: S1 and S2, regular rate   Gastrointestinal: Bowel Sounds present, soft, nontender, nondistended  Extremities: No peripheral edema  Vascular: 2+ radial pulse  Neurological: awake, alert, follows commands, sensation grossly intact  Psych: appropriate affect, fair  insight  Musculoskeletal: moves all extremities, + SLR  Skin: No rashes    ALLERGIES  amlodipine (Unknown)  clonidine (Unknown)  hydrocodone (Unknown)  Levatol (Unknown)  Lipitor (Unknown)  Lotrel (Unknown)  methylPREDNISolone (Unknown)  morphine (Other)  Plaquenil (Unknown)  statins (Other (Unknown))  sulfa drugs (Rash)      MEDICATIONS  (STANDING):  artificial tears (preservative free) Ophthalmic Solution 1 Drop(s) Both EYES three times a day  aspirin 325 milliGRAM(s) Oral daily  clotrimazole 2% Vaginal Cream 1 Applicatorful Vaginal daily  dexamethasone     Tablet 4 milliGRAM(s) Oral daily  enoxaparin Injectable 30 milliGRAM(s) SubCutaneous every 24 hours  ferrous    sulfate 325 milliGRAM(s) Oral daily  gabapentin 100 milliGRAM(s) Oral two times a day  levothyroxine 75 MICROGram(s) Oral daily  metoprolol succinate ER 50 milliGRAM(s) Oral two times a day  pantoprazole    Tablet 40 milliGRAM(s) Oral before breakfast  polyethylene glycol 3350 17 Gram(s) Oral daily  spironolactone 25 milliGRAM(s) Oral daily      LABS: All Labs Reviewed:    12-12    134<L>  |  101  |  27<H>  ----------------------------<  123<H>  4.7   |  25  |  1.06    Ca    9.3      12 Dec 2018 06:55            Blood Culture: 12-09 @ 18:14  Organism Enterococcus faecalis  Gram Stain Blood -- Gram Stain --  Specimen Source .Urine None  Culture-Blood --

## 2018-12-13 ENCOUNTER — TRANSCRIPTION ENCOUNTER (OUTPATIENT)
Age: 83
End: 2018-12-13

## 2018-12-13 RX ORDER — GABAPENTIN 400 MG/1
2 CAPSULE ORAL
Qty: 0 | Refills: 0 | DISCHARGE
Start: 2018-12-13

## 2018-12-13 RX ORDER — DEXAMETHASONE 0.5 MG/5ML
1 ELIXIR ORAL
Qty: 0 | Refills: 0 | COMMUNITY
Start: 2018-12-13

## 2018-12-13 RX ORDER — CYCLOBENZAPRINE HYDROCHLORIDE 10 MG/1
1 TABLET, FILM COATED ORAL
Qty: 0 | Refills: 0 | DISCHARGE
Start: 2018-12-13

## 2018-12-13 RX ORDER — POLYETHYLENE GLYCOL 3350 17 G/17G
17 POWDER, FOR SOLUTION ORAL
Qty: 0 | Refills: 0 | DISCHARGE
Start: 2018-12-13

## 2018-12-13 RX ADMIN — OXYCODONE AND ACETAMINOPHEN 1 TABLET(S): 5; 325 TABLET ORAL at 12:26

## 2018-12-13 RX ADMIN — ENOXAPARIN SODIUM 30 MILLIGRAM(S): 100 INJECTION SUBCUTANEOUS at 05:01

## 2018-12-13 RX ADMIN — Medication 325 MILLIGRAM(S): at 12:26

## 2018-12-13 RX ADMIN — GABAPENTIN 200 MILLIGRAM(S): 400 CAPSULE ORAL at 05:01

## 2018-12-13 RX ADMIN — Medication 1 DROP(S): at 05:02

## 2018-12-13 RX ADMIN — Medication 325 MILLIGRAM(S): at 15:36

## 2018-12-13 RX ADMIN — OXYCODONE AND ACETAMINOPHEN 1 TABLET(S): 5; 325 TABLET ORAL at 13:12

## 2018-12-13 RX ADMIN — MAGNESIUM HYDROXIDE 30 MILLILITER(S): 400 TABLET, CHEWABLE ORAL at 05:24

## 2018-12-13 RX ADMIN — Medication 4 MILLIGRAM(S): at 05:02

## 2018-12-13 RX ADMIN — Medication 0.12 MILLIGRAM(S): at 22:07

## 2018-12-13 RX ADMIN — Medication 50 MILLIGRAM(S): at 05:02

## 2018-12-13 RX ADMIN — Medication 75 MICROGRAM(S): at 05:02

## 2018-12-13 RX ADMIN — PANTOPRAZOLE SODIUM 40 MILLIGRAM(S): 20 TABLET, DELAYED RELEASE ORAL at 05:02

## 2018-12-13 RX ADMIN — GABAPENTIN 200 MILLIGRAM(S): 400 CAPSULE ORAL at 17:05

## 2018-12-13 RX ADMIN — Medication 50 MILLIGRAM(S): at 17:05

## 2018-12-13 RX ADMIN — Medication 1 DROP(S): at 13:12

## 2018-12-13 RX ADMIN — POLYETHYLENE GLYCOL 3350 17 GRAM(S): 17 POWDER, FOR SOLUTION ORAL at 15:36

## 2018-12-13 RX ADMIN — SPIRONOLACTONE 25 MILLIGRAM(S): 25 TABLET, FILM COATED ORAL at 05:02

## 2018-12-13 NOTE — DISCHARGE NOTE ADULT - PLAN OF CARE
to minimize pain Take medication as directed.   Decadron course to complete in 5 days.  Rehabilitation as directed by the facility.  Follow up with Neurosurgery should you decide you would be interested in surgery or injection.

## 2018-12-13 NOTE — DISCHARGE NOTE ADULT - MEDICATION SUMMARY - MEDICATIONS TO TAKE
I will START or STAY ON the medications listed below when I get home from the hospital:    dexamethasone 4 mg oral tablet  -- 1 tab(s) by mouth once a day  -- Indication: For to decrease inflammation    spironolactone 25 mg oral tablet  -- 1 tab(s) by mouth once a day  -- Indication: For home medication    aspirin 325 mg oral tablet  -- 1 tab(s) by mouth once a day  -- Indication: For heart health    acetaminophen 500 mg oral tablet  -- 2 tab(s) by mouth every 6 hours, As Needed - for moderate pain  -- Indication: For pain    oxycodone-acetaminophen 5 mg-325 mg oral tablet  -- 1 tab(s) by mouth every 6 hours, As needed, Moderate Pain (4 - 6)  -- Indication: For pain    gabapentin 100 mg oral capsule  -- 2 cap(s) by mouth 2 times a day  -- Indication: For pain    ALPRAZolam 0.25 mg oral tablet  -- 0.5 tab(s) by mouth 3 times a day, As Needed - for anxiety  -- Indication: For mood    metoprolol succinate 50 mg oral tablet, extended release  -- 1 tab(s) by mouth 2 times a day  -- Indication: For blood pressure    ferrous sulfate 325 mg (65 mg elemental iron) oral tablet  -- 1 tab(s) by mouth once a day  -- Indication: For Supplement    polyethylene glycol 3350 oral powder for reconstitution  -- 17 gram(s) by mouth once a day  -- Indication: For bowels    cyclobenzaprine 5 mg oral tablet  -- 1 tab(s) by mouth 3 times a day, As needed, Muscle Spasm  -- Indication: For muscles    ocular lubricant ophthalmic solution  -- 1 drop(s) to each affected eye 3 times a day  -- Indication: For eye health    pantoprazole 40 mg oral delayed release tablet  -- 1 tab(s) by mouth once a day  -- Indication: For reflux    Synthroid 75 mcg (0.075 mg) oral tablet  -- 1 tab(s) by mouth once a day  -- Indication: For thyroid    Vitamin D3 2000 intl units oral capsule  -- 1 cap(s) by mouth once a day  -- Indication: For Supplement

## 2018-12-13 NOTE — DISCHARGE NOTE ADULT - PATIENT PORTAL LINK FT
You can access the [facility], offered by [facility], by registering with the following website: [email or web address]

## 2018-12-13 NOTE — DISCHARGE NOTE ADULT - HOSPITAL COURSE
89 y/o female a with h/o bladder ca s/p TURBT on 09/10/2018, last chemotherapy treatment 12/4/18, cad s/p stents x5, hypothyroidism, DJD, GERD, HLD, uterine ca s/p hysterectomy and radiation, cholecystectomy presents c/o severe, sharp right hip/lower back pain, radiating down to ankle. Pt reports the pain is chronic although noticed worsening of the pain last night while laying in bed, feels aggrevated after chemo treatments for her bladder ca and laying flat on hard table. pt notes the pain prevented her from sleeping last night.  associated with lower back pain. took Tylenol for the pain with no relief.    In ED, oxycodone, flexeril, decadron, xanax with mild relief. Pt was admitted to medicine for further evaluation. MRI showed disc impingement at L4. Decadron was continued and neurontin was started. Course complicated by syncopal episode while using the bathroom which was an RRT. Her pain improved slightly. PT was consulted to evaluate her for disposition and recommended rehab. Medically stable for discharge.        Syncope with known orthostatic hypotension  -RRT 12/11  -with exertion after limited mobility for 4 days, oxycodone, and OH  -discussed move to telemetry with patient, but patient and son declined as pt would not want any intervention  -modify/slow get up and go  -PT evaluation recs rehab    intractable right hip/back pain with radiculopathy in the setting of known arthrosis/osteoarthritis  -XRAY: moderate bilateral hip arthrosis. moderate pubic symphysis arthrosis. lower lumbar spondylosis. mild bilateral sacroiliac joint arthrosis. mild tricompartmental arthrosis of the knee. no knee joint effusion. Atherosclerotic disease is noted.  -CT L Spine:  Multilevel diffuse disc bulging resulting in moderate narrowing at L1/L2-L4/L5. No high-grade spinal canal stenosis  -MRI L spine: L4 disc impingement  -NS consult appreciated, recommend steroids for pain control. pt declined surgical intervention  -pain meds as needed (limited by extensive allergy list). Decadron dose pack (medrol allergy), neurontin  -PT consult as above    *Candida vaginitis with known bladder cancer s/p TURBT and immunosuppression with chemotherapy  +yeast on u/a, no dysuria  -lotrimin for candidal vaginitis  -hold off on abx     cad s/p stent x 5   -cont asa, not on statin PTA    gerd   -cont ppi     dvt prophylaxis   -lovenox sc    Chronic diastolic heart failure  -echo 2017 with preserved EF  -CXR with vascular congestion  -oxygen stable  -on metoprolol and aldactone for mortality benefit    Advanced Care Planning  -pt has capacity  -DNR  -lives home alone, uses walker  -son Adama would be HCP      total time, including coordination of care: 35 minutes  GEN: NAD  EYES: eomi  CV: no edema  RESP: unlabored

## 2018-12-14 VITALS
DIASTOLIC BLOOD PRESSURE: 61 MMHG | TEMPERATURE: 97 F | HEART RATE: 71 BPM | RESPIRATION RATE: 18 BRPM | SYSTOLIC BLOOD PRESSURE: 130 MMHG | OXYGEN SATURATION: 96 %

## 2018-12-14 RX ADMIN — GABAPENTIN 200 MILLIGRAM(S): 400 CAPSULE ORAL at 06:06

## 2018-12-14 RX ADMIN — Medication 75 MICROGRAM(S): at 06:06

## 2018-12-14 RX ADMIN — Medication 50 MILLIGRAM(S): at 06:06

## 2018-12-14 RX ADMIN — PANTOPRAZOLE SODIUM 40 MILLIGRAM(S): 20 TABLET, DELAYED RELEASE ORAL at 06:06

## 2018-12-14 RX ADMIN — Medication 4 MILLIGRAM(S): at 06:08

## 2018-12-14 RX ADMIN — ENOXAPARIN SODIUM 30 MILLIGRAM(S): 100 INJECTION SUBCUTANEOUS at 06:05

## 2018-12-14 RX ADMIN — SPIRONOLACTONE 25 MILLIGRAM(S): 25 TABLET, FILM COATED ORAL at 06:06

## 2018-12-14 RX ADMIN — Medication 1 DROP(S): at 06:07

## 2018-12-18 DIAGNOSIS — Z95.5 PRESENCE OF CORONARY ANGIOPLASTY IMPLANT AND GRAFT: ICD-10-CM

## 2018-12-18 DIAGNOSIS — I25.10 ATHEROSCLEROTIC HEART DISEASE OF NATIVE CORONARY ARTERY WITHOUT ANGINA PECTORIS: ICD-10-CM

## 2018-12-18 DIAGNOSIS — R55 SYNCOPE AND COLLAPSE: ICD-10-CM

## 2018-12-18 DIAGNOSIS — Z88.8 ALLERGY STATUS TO OTHER DRUGS, MEDICAMENTS AND BIOLOGICAL SUBSTANCES: ICD-10-CM

## 2018-12-18 DIAGNOSIS — R00.0 TACHYCARDIA, UNSPECIFIED: ICD-10-CM

## 2018-12-18 DIAGNOSIS — M19.90 UNSPECIFIED OSTEOARTHRITIS, UNSPECIFIED SITE: ICD-10-CM

## 2018-12-18 DIAGNOSIS — E03.9 HYPOTHYROIDISM, UNSPECIFIED: ICD-10-CM

## 2018-12-18 DIAGNOSIS — M51.16 INTERVERTEBRAL DISC DISORDERS WITH RADICULOPATHY, LUMBAR REGION: ICD-10-CM

## 2018-12-18 DIAGNOSIS — M25.551 PAIN IN RIGHT HIP: ICD-10-CM

## 2018-12-18 DIAGNOSIS — H35.30 UNSPECIFIED MACULAR DEGENERATION: ICD-10-CM

## 2018-12-18 DIAGNOSIS — H91.8X9 OTHER SPECIFIED HEARING LOSS, UNSPECIFIED EAR: ICD-10-CM

## 2018-12-18 DIAGNOSIS — Z88.6 ALLERGY STATUS TO ANALGESIC AGENT: ICD-10-CM

## 2018-12-18 DIAGNOSIS — I50.32 CHRONIC DIASTOLIC (CONGESTIVE) HEART FAILURE: ICD-10-CM

## 2018-12-18 DIAGNOSIS — F41.9 ANXIETY DISORDER, UNSPECIFIED: ICD-10-CM

## 2018-12-18 DIAGNOSIS — I11.0 HYPERTENSIVE HEART DISEASE WITH HEART FAILURE: ICD-10-CM

## 2018-12-18 DIAGNOSIS — Z88.5 ALLERGY STATUS TO NARCOTIC AGENT: ICD-10-CM

## 2018-12-18 DIAGNOSIS — Z85.42 PERSONAL HISTORY OF MALIGNANT NEOPLASM OF OTHER PARTS OF UTERUS: ICD-10-CM

## 2018-12-18 DIAGNOSIS — Z88.2 ALLERGY STATUS TO SULFONAMIDES: ICD-10-CM

## 2018-12-18 DIAGNOSIS — C67.9 MALIGNANT NEOPLASM OF BLADDER, UNSPECIFIED: ICD-10-CM

## 2018-12-18 DIAGNOSIS — K21.9 GASTRO-ESOPHAGEAL REFLUX DISEASE WITHOUT ESOPHAGITIS: ICD-10-CM

## 2018-12-18 DIAGNOSIS — B37.3 CANDIDIASIS OF VULVA AND VAGINA: ICD-10-CM

## 2019-04-16 ENCOUNTER — INPATIENT (INPATIENT)
Facility: HOSPITAL | Age: 84
LOS: 5 days | Discharge: HOME HEALTH | End: 2019-04-22
Attending: FAMILY MEDICINE | Admitting: FAMILY MEDICINE
Payer: MEDICARE

## 2019-04-16 VITALS
RESPIRATION RATE: 19 BRPM | TEMPERATURE: 97 F | OXYGEN SATURATION: 98 % | WEIGHT: 154.98 LBS | DIASTOLIC BLOOD PRESSURE: 66 MMHG | HEIGHT: 66 IN | HEART RATE: 80 BPM | SYSTOLIC BLOOD PRESSURE: 203 MMHG

## 2019-04-16 DIAGNOSIS — Z90.710 ACQUIRED ABSENCE OF BOTH CERVIX AND UTERUS: Chronic | ICD-10-CM

## 2019-04-16 LAB
APPEARANCE UR: CLEAR — SIGNIFICANT CHANGE UP
BACTERIA # UR AUTO: ABNORMAL
BASOPHILS # BLD AUTO: 0.06 K/UL — SIGNIFICANT CHANGE UP (ref 0–0.2)
BASOPHILS NFR BLD AUTO: 0.6 % — SIGNIFICANT CHANGE UP (ref 0–2)
BILIRUB UR-MCNC: NEGATIVE — SIGNIFICANT CHANGE UP
COLOR SPEC: YELLOW — SIGNIFICANT CHANGE UP
DIFF PNL FLD: NEGATIVE — SIGNIFICANT CHANGE UP
EOSINOPHIL # BLD AUTO: 0.09 K/UL — SIGNIFICANT CHANGE UP (ref 0–0.5)
EOSINOPHIL NFR BLD AUTO: 1 % — SIGNIFICANT CHANGE UP (ref 0–6)
EPI CELLS # UR: SIGNIFICANT CHANGE UP
GLUCOSE UR QL: NEGATIVE MG/DL — SIGNIFICANT CHANGE UP
HCT VFR BLD CALC: 45.7 % — HIGH (ref 34.5–45)
HGB BLD-MCNC: 14.1 G/DL — SIGNIFICANT CHANGE UP (ref 11.5–15.5)
HYALINE CASTS # UR AUTO: ABNORMAL /LPF
IMM GRANULOCYTES NFR BLD AUTO: 0.5 % — SIGNIFICANT CHANGE UP (ref 0–1.5)
KETONES UR-MCNC: NEGATIVE — SIGNIFICANT CHANGE UP
LEUKOCYTE ESTERASE UR-ACNC: ABNORMAL
LYMPHOCYTES # BLD AUTO: 3.35 K/UL — HIGH (ref 1–3.3)
LYMPHOCYTES # BLD AUTO: 35.6 % — SIGNIFICANT CHANGE UP (ref 13–44)
MCHC RBC-ENTMCNC: 28.3 PG — SIGNIFICANT CHANGE UP (ref 27–34)
MCHC RBC-ENTMCNC: 30.9 GM/DL — LOW (ref 32–36)
MCV RBC AUTO: 91.6 FL — SIGNIFICANT CHANGE UP (ref 80–100)
MONOCYTES # BLD AUTO: 0.64 K/UL — SIGNIFICANT CHANGE UP (ref 0–0.9)
MONOCYTES NFR BLD AUTO: 6.8 % — SIGNIFICANT CHANGE UP (ref 2–14)
NEUTROPHILS # BLD AUTO: 5.21 K/UL — SIGNIFICANT CHANGE UP (ref 1.8–7.4)
NEUTROPHILS NFR BLD AUTO: 55.5 % — SIGNIFICANT CHANGE UP (ref 43–77)
NITRITE UR-MCNC: NEGATIVE — SIGNIFICANT CHANGE UP
NRBC # BLD: 0 /100 WBCS — SIGNIFICANT CHANGE UP (ref 0–0)
PH UR: 7 — SIGNIFICANT CHANGE UP (ref 5–8)
PLATELET # BLD AUTO: 289 K/UL — SIGNIFICANT CHANGE UP (ref 150–400)
PROT UR-MCNC: 15 MG/DL
RBC # BLD: 4.99 M/UL — SIGNIFICANT CHANGE UP (ref 3.8–5.2)
RBC # FLD: 14 % — SIGNIFICANT CHANGE UP (ref 10.3–14.5)
RBC CASTS # UR COMP ASSIST: NEGATIVE /HPF — SIGNIFICANT CHANGE UP (ref 0–4)
SP GR SPEC: 1 — LOW (ref 1.01–1.02)
TROPONIN I SERPL-MCNC: 0.03 NG/ML — SIGNIFICANT CHANGE UP (ref 0.01–0.04)
TROPONIN I SERPL-MCNC: 0.03 NG/ML — SIGNIFICANT CHANGE UP (ref 0.01–0.04)
UROBILINOGEN FLD QL: NEGATIVE MG/DL — SIGNIFICANT CHANGE UP
WBC # BLD: 9.4 K/UL — SIGNIFICANT CHANGE UP (ref 3.8–10.5)
WBC # FLD AUTO: 9.4 K/UL — SIGNIFICANT CHANGE UP (ref 3.8–10.5)
WBC UR QL: SIGNIFICANT CHANGE UP

## 2019-04-16 PROCEDURE — 93010 ELECTROCARDIOGRAM REPORT: CPT

## 2019-04-16 PROCEDURE — 70450 CT HEAD/BRAIN W/O DYE: CPT | Mod: 26

## 2019-04-16 PROCEDURE — 93971 EXTREMITY STUDY: CPT | Mod: 26,RT

## 2019-04-16 PROCEDURE — 71045 X-RAY EXAM CHEST 1 VIEW: CPT | Mod: 26

## 2019-04-16 RX ORDER — SPIRONOLACTONE 25 MG/1
25 TABLET, FILM COATED ORAL DAILY
Qty: 0 | Refills: 0 | Status: DISCONTINUED | OUTPATIENT
Start: 2019-04-16 | End: 2019-04-18

## 2019-04-16 RX ORDER — LEVOTHYROXINE SODIUM 125 MCG
75 TABLET ORAL DAILY
Qty: 0 | Refills: 0 | Status: DISCONTINUED | OUTPATIENT
Start: 2019-04-16 | End: 2019-04-22

## 2019-04-16 RX ORDER — METOPROLOL TARTRATE 50 MG
50 TABLET ORAL DAILY
Qty: 0 | Refills: 0 | Status: DISCONTINUED | OUTPATIENT
Start: 2019-04-16 | End: 2019-04-22

## 2019-04-16 RX ORDER — CHOLECALCIFEROL (VITAMIN D3) 125 MCG
1000 CAPSULE ORAL DAILY
Qty: 0 | Refills: 0 | Status: DISCONTINUED | OUTPATIENT
Start: 2019-04-16 | End: 2019-04-22

## 2019-04-16 RX ORDER — ALPRAZOLAM 0.25 MG
0.25 TABLET ORAL ONCE
Qty: 0 | Refills: 0 | Status: DISCONTINUED | OUTPATIENT
Start: 2019-04-16 | End: 2019-04-16

## 2019-04-16 RX ORDER — PANTOPRAZOLE SODIUM 20 MG/1
40 TABLET, DELAYED RELEASE ORAL ONCE
Qty: 0 | Refills: 0 | Status: DISCONTINUED | OUTPATIENT
Start: 2019-04-16 | End: 2019-04-16

## 2019-04-16 RX ORDER — ASPIRIN/CALCIUM CARB/MAGNESIUM 324 MG
325 TABLET ORAL DAILY
Qty: 0 | Refills: 0 | Status: DISCONTINUED | OUTPATIENT
Start: 2019-04-16 | End: 2019-04-22

## 2019-04-16 RX ORDER — METOPROLOL TARTRATE 50 MG
50 TABLET ORAL ONCE
Qty: 0 | Refills: 0 | Status: COMPLETED | OUTPATIENT
Start: 2019-04-16 | End: 2019-04-16

## 2019-04-16 RX ORDER — LEVOTHYROXINE SODIUM 125 MCG
75 TABLET ORAL ONCE
Qty: 0 | Refills: 0 | Status: COMPLETED | OUTPATIENT
Start: 2019-04-16 | End: 2019-04-16

## 2019-04-16 RX ORDER — NITROGLYCERIN 6.5 MG
1.5 CAPSULE, EXTENDED RELEASE ORAL EVERY 6 HOURS
Qty: 0 | Refills: 0 | Status: DISCONTINUED | OUTPATIENT
Start: 2019-04-16 | End: 2019-04-22

## 2019-04-16 RX ORDER — ACETAMINOPHEN 500 MG
500 TABLET ORAL EVERY 6 HOURS
Qty: 0 | Refills: 0 | Status: DISCONTINUED | OUTPATIENT
Start: 2019-04-16 | End: 2019-04-22

## 2019-04-16 RX ORDER — METOPROLOL TARTRATE 50 MG
50 TABLET ORAL ONCE
Qty: 0 | Refills: 0 | Status: DISCONTINUED | OUTPATIENT
Start: 2019-04-16 | End: 2019-04-16

## 2019-04-16 RX ORDER — HEPARIN SODIUM 5000 [USP'U]/ML
5000 INJECTION INTRAVENOUS; SUBCUTANEOUS EVERY 12 HOURS
Qty: 0 | Refills: 0 | Status: DISCONTINUED | OUTPATIENT
Start: 2019-04-16 | End: 2019-04-22

## 2019-04-16 RX ORDER — PANTOPRAZOLE SODIUM 20 MG/1
40 TABLET, DELAYED RELEASE ORAL
Qty: 0 | Refills: 0 | Status: DISCONTINUED | OUTPATIENT
Start: 2019-04-16 | End: 2019-04-22

## 2019-04-16 RX ORDER — SPIRONOLACTONE 25 MG/1
25 TABLET, FILM COATED ORAL ONCE
Qty: 0 | Refills: 0 | Status: COMPLETED | OUTPATIENT
Start: 2019-04-16 | End: 2019-04-16

## 2019-04-16 RX ORDER — ASPIRIN/CALCIUM CARB/MAGNESIUM 324 MG
325 TABLET ORAL ONCE
Qty: 0 | Refills: 0 | Status: COMPLETED | OUTPATIENT
Start: 2019-04-16 | End: 2019-04-16

## 2019-04-16 RX ADMIN — Medication 0.25 MILLIGRAM(S): at 22:19

## 2019-04-16 RX ADMIN — HEPARIN SODIUM 5000 UNIT(S): 5000 INJECTION INTRAVENOUS; SUBCUTANEOUS at 18:01

## 2019-04-16 RX ADMIN — Medication 50 MILLIGRAM(S): at 22:19

## 2019-04-16 RX ADMIN — SPIRONOLACTONE 25 MILLIGRAM(S): 25 TABLET, FILM COATED ORAL at 11:19

## 2019-04-16 RX ADMIN — Medication 500 MILLIGRAM(S): at 18:02

## 2019-04-16 RX ADMIN — Medication 50 MILLIGRAM(S): at 11:19

## 2019-04-16 RX ADMIN — Medication 325 MILLIGRAM(S): at 11:19

## 2019-04-16 RX ADMIN — Medication 0.25 MILLIGRAM(S): at 12:54

## 2019-04-16 NOTE — ED PROVIDER NOTE - CLINICAL SUMMARY MEDICAL DECISION MAKING FREE TEXT BOX
89y/o F with syncope. EKG, CXR, CT head, doppler, labs, UA, admit. 89y/o F with syncope. EKG, CXR, CT head, doppler, labs, UA, place in obs

## 2019-04-16 NOTE — H&P ADULT - HISTORY OF PRESENT ILLNESS
HPI:The patient is a  89 y/o female  with PMHx of Syncope (18 while in ),CAD s/p stentsx5, hypothyroidism, HTN, HLD, DJD multiple joints, Bladder CA s/p resection, Sty RUL, anxety disorder who had  a syncopal episode this AM after she got out of the shower. She state she felt weak  sat down and had LOC for few seconds. Family called 911    PMHx: Syncope (18 while in ),CAD s/p stentsx5, hypothyroidism, HTN, HLD, DJD multiple joints, Bladder CA s/p resection, Sty RUL, anxety disorder     PSHx:  PCI stent, TURBT (9/10/2018), R ankle surgery for fracture, Hysterectomy for uterin CA, s/p rad. Tx.    Family Hx: father  of Brain TU at age 52, Mothr  of MI at age 88    Social Hx.: not smoking, no alcohol use     Review of Systems:    Eyes: no changes in vision, has RUL sty    ENT/Mouth: no changes    Cardiovascular: no chest pain    Respiratory: no SOB    Gastrointestinal: no diarrhea, no nausea, no vomiting    Genitourinary: no dysuria    Breast: no pain    Musculoskeletal: has arthritic pain in shoulders , knees,     Integumentary: no itching    Neurological: No Headache, no tremor,    Psychiatric: no suicidal ideations    Endocrine: no excessive thirst,     Hematologic/Lymphatic: no swollen glands    Allergic/Immunologic: no itching    Physical Exam: Physical Exam: Vital Signs Last 24 Hrs  T(C): 36.7 (2019 13:23), Max: 36.7 (2019 13:23)  T(F): 98.1 (2019 13:23), Max: 98.1 (2019 13:23)  HR: 66 (2019 13:23) (66 - 80)  BP: 159/81 (2019 13:23) (155/77 - 203/66)  BP(mean): --  RR: 17 (2019 13:23) (17 - 19)  SpO2: 100% (2019 13:23) (98% - 100%)        HEENT: PRRL EOMI, R Upper eyelid sty, conjunctiva clear,    MOUTH/TEETH/GUMS: Clear    NECK: no JVD    LUNGS: Clear    HEART: S1,S2 RR    ABDOMEN: soft nontender    EXTREMITIES: trace pedal edema ,     MUSCULOSKELETAL:  s/p surgery scar R ankle, has arthritic deformity of hands, ulnar deviation.    NEURO: no tremor, no focal signs.    SKIN: no rash    : CVA negative,     Lab:                      14.1   9.40  )-----------( 289      ( 2019 09:48 )             45.7       04-16    132<L>  |  101  |  17  ----------------------------<  107<H>  5.2   |  24  |  1.12    Ca    9.2      2019 11:30  Mg     2.6     04-16    TPro  8.0  /  Alb  4.0  /  TBili  0.5  /  DBili  x   /  AST  13<L>  /  ALT  15  /  AlkPhos  77  04-16    CTH  neg   ,  Troponin  0.02  EKG  NSR  bilat LE venous doppler neg for DVT.     EKG  NSR , no ST changes

## 2019-04-16 NOTE — ED ADULT NURSE REASSESSMENT NOTE - NS ED NURSE REASSESS COMMENT FT1
MD Elizondo aware of pt BP, pt states she did not take her BP medication this AM, will medicate as directed.

## 2019-04-16 NOTE — ED ADULT NURSE NOTE - CHPI ED NUR SYMPTOMS NEG
no fever/no nausea/no change in level of consciousness/no confusion/no blurred vision/no numbness/no vomiting/no dizziness

## 2019-04-16 NOTE — ED PROVIDER NOTE - PMH
CAD (coronary artery disease)    Endometrial adenocarcinoma    GERD (gastroesophageal reflux disease)    [place] (hard of hearing)    HTN (hypertension)    Hyperlipidemia    Hypothyroid    Macular degeneration    Stented coronary artery

## 2019-04-16 NOTE — ED ADULT NURSE NOTE - NSIMPLEMENTINTERV_GEN_ALL_ED
Implemented All Fall with Harm Risk Interventions:  [place] to call system. Call bell, personal items and telephone within reach. Instruct patient to call for assistance. Room bathroom lighting operational. Non-slip footwear when patient is off stretcher. Physically safe environment: no spills, clutter or unnecessary equipment. Stretcher in lowest position, wheels locked, appropriate side rails in place. Provide visual cue, wrist band, yellow gown, etc. Monitor gait and stability. Monitor for mental status changes and reorient to person, place, and time. Review medications for side effects contributing to fall risk. Reinforce activity limits and safety measures with patient and family. Provide visual clues: red socks.

## 2019-04-16 NOTE — ED ADULT NURSE REASSESSMENT NOTE - GENERAL PATIENT STATE
cooperative/resting/sleeping/comfortable appearance
family/SO at bedside/cooperative/resting/sleeping/comfortable appearance
cooperative/family/SO at bedside/anxious

## 2019-04-16 NOTE — ED PROVIDER NOTE - OBJECTIVE STATEMENT
89 y/o F with PMHx of CAD s/p stent, hypothyroidism, HTN, HLD presenting to the ED BIBA s/p syncopal episode. Pt notes that this morning pt did not feel well so she only took her fibroid medication and then ate breakfast. After breakfast pt took a shower and noticed that she felt weak in the shower. Pt then got out, sat on a chair and fainted. +R calf pain. +Shaking. +SOB. Denies fever, or CP. Allergies: Sulfa, Morphine, Plaquenil, Levatol, Methylprednisolone, amlodipine, Lipitor. Cardiologist/PMD: Dr. Antonio. 89 y/o F with PMHx of CAD s/p stent, hypothyroidism, HTN, HLD presenting to the ED BIBA s/p syncopal episode. Pt notes that this morning pt did not feel well and then ate breakfast. After breakfast pt took a shower and noticed that she felt weak in the shower. Pt then got out, sat on a chair and fainted. +R calf pain. +Shaking. +SOB. Denies fever, or CP. Allergies: Sulfa, Morphine, Plaquenil, Levatol, Methylprednisolone, amlodipine, Lipitor. Cardiologist/PMD: Dr. Antonio.

## 2019-04-16 NOTE — ED PROVIDER NOTE - MUSCULOSKELETAL, MLM
Spine appears normal, range of motion is not limited, no muscle or joint tenderness. +Mild right calf TTP.

## 2019-04-16 NOTE — ED ADULT NURSE NOTE - OBJECTIVE STATEMENT
Pt is a 89y female, A &o x 3, presents to ED w/ syncope, witnessed in bathroom, aide guided pt to floor, pt states right lower calf pain and generalized weakness, denies chest pain, c/o mild shortness of breath, denies nausea, vomiting or diarrhea. Pt in no apparent distress, will monitor.

## 2019-04-16 NOTE — H&P ADULT - ASSESSMENT
89 y/o female  with PMHx of Syncope (12/12/18 while in ),CAD s/p stentsx5, hypothyroidism, HTN, HLD, DJD multiple joints, Bladder CA s/p resection, Sty RUL, anxety disorder who had  a syncopal episode this AM after she got out of the shower  Assessment:                        1. Syncope                       2. HTN uncontrolled                       3. CAD                       4. DJD                       5. Anxiety                       6. RUL sty    Plan:    admit to telemetry for observation, r/o ACS /NSTMI,                medications: Started on NTG patch and Xanax in  the ED for HTN, and anxiety                Labs: stable                Radiology: CTH neg.                Cardiac diagnostics: EKG NSR,                Consults: Cardiology Dr. Antonio, 89 y/o female  with PMHx of Syncope (12/12/18 while in ),CAD s/p stentsx5, hypothyroidism, HTN, HLD, DJD multiple joints, Bladder CA s/p resection, Sty RUL, anxety disorder who had  a syncopal episode this AM after she got out of the shower  Assessment:                        1. Syncope                       2. HTN uncontrolled                       3. CAD                       4. DJD                       5. Anxiety                       6. RUL sty    Plan:    admit to telemetry for observation, r/o ACS /NSTMI,                medications: Started on NTG patch and Xanax in  the ED for HTN, and anxiety , continue current meds as per med rec.               wears on patch for R eyelid sty.                Labs: stable                Radiology: CTH neg.                Cardiac diagnostics: EKG NSR,                Consults: Cardiology Dr. Antonio,

## 2019-04-16 NOTE — ED ADULT NURSE REASSESSMENT NOTE - COMFORT CARE
side rails up/ambulated to bathroom/repositioned
side rails up/ambulated to bathroom/repositioned
assisted to bedpan/plan of care explained/darkened lights/side rails up/wait time explained

## 2019-04-16 NOTE — ED ADULT TRIAGE NOTE - CHIEF COMPLAINT QUOTE
pt BIBEMS s/p syncopal episode in shower, no fall, caught by aid and assisted into chair. pt reports weakness. hx bladder ca, stents. denies cp.

## 2019-04-17 LAB
ANION GAP SERPL CALC-SCNC: 8 MMOL/L — SIGNIFICANT CHANGE UP (ref 5–17)
BASOPHILS # BLD AUTO: 0.04 K/UL — SIGNIFICANT CHANGE UP (ref 0–0.2)
BASOPHILS NFR BLD AUTO: 0.4 % — SIGNIFICANT CHANGE UP (ref 0–2)
BUN SERPL-MCNC: 19 MG/DL — SIGNIFICANT CHANGE UP (ref 7–23)
CALCIUM SERPL-MCNC: 9 MG/DL — SIGNIFICANT CHANGE UP (ref 8.5–10.1)
CHLORIDE SERPL-SCNC: 104 MMOL/L — SIGNIFICANT CHANGE UP (ref 96–108)
CO2 SERPL-SCNC: 23 MMOL/L — SIGNIFICANT CHANGE UP (ref 22–31)
CREAT SERPL-MCNC: 1.01 MG/DL — SIGNIFICANT CHANGE UP (ref 0.5–1.3)
CULTURE RESULTS: SIGNIFICANT CHANGE UP
EOSINOPHIL # BLD AUTO: 0.15 K/UL — SIGNIFICANT CHANGE UP (ref 0–0.5)
EOSINOPHIL NFR BLD AUTO: 1.6 % — SIGNIFICANT CHANGE UP (ref 0–6)
GLUCOSE SERPL-MCNC: 94 MG/DL — SIGNIFICANT CHANGE UP (ref 70–99)
HCT VFR BLD CALC: 42.3 % — SIGNIFICANT CHANGE UP (ref 34.5–45)
HGB BLD-MCNC: 13.1 G/DL — SIGNIFICANT CHANGE UP (ref 11.5–15.5)
IMM GRANULOCYTES NFR BLD AUTO: 0.4 % — SIGNIFICANT CHANGE UP (ref 0–1.5)
LYMPHOCYTES # BLD AUTO: 2.51 K/UL — SIGNIFICANT CHANGE UP (ref 1–3.3)
LYMPHOCYTES # BLD AUTO: 26.6 % — SIGNIFICANT CHANGE UP (ref 13–44)
MCHC RBC-ENTMCNC: 28.2 PG — SIGNIFICANT CHANGE UP (ref 27–34)
MCHC RBC-ENTMCNC: 31 GM/DL — LOW (ref 32–36)
MCV RBC AUTO: 91.2 FL — SIGNIFICANT CHANGE UP (ref 80–100)
MONOCYTES # BLD AUTO: 0.84 K/UL — SIGNIFICANT CHANGE UP (ref 0–0.9)
MONOCYTES NFR BLD AUTO: 8.9 % — SIGNIFICANT CHANGE UP (ref 2–14)
NEUTROPHILS # BLD AUTO: 5.86 K/UL — SIGNIFICANT CHANGE UP (ref 1.8–7.4)
NEUTROPHILS NFR BLD AUTO: 62.1 % — SIGNIFICANT CHANGE UP (ref 43–77)
NRBC # BLD: 0 /100 WBCS — SIGNIFICANT CHANGE UP (ref 0–0)
PLATELET # BLD AUTO: 255 K/UL — SIGNIFICANT CHANGE UP (ref 150–400)
POTASSIUM SERPL-MCNC: 5.2 MMOL/L — SIGNIFICANT CHANGE UP (ref 3.5–5.3)
POTASSIUM SERPL-SCNC: 5.2 MMOL/L — SIGNIFICANT CHANGE UP (ref 3.5–5.3)
RBC # BLD: 4.64 M/UL — SIGNIFICANT CHANGE UP (ref 3.8–5.2)
RBC # FLD: 14.2 % — SIGNIFICANT CHANGE UP (ref 10.3–14.5)
SODIUM SERPL-SCNC: 135 MMOL/L — SIGNIFICANT CHANGE UP (ref 135–145)
SPECIMEN SOURCE: SIGNIFICANT CHANGE UP
WBC # BLD: 9.44 K/UL — SIGNIFICANT CHANGE UP (ref 3.8–10.5)
WBC # FLD AUTO: 9.44 K/UL — SIGNIFICANT CHANGE UP (ref 3.8–10.5)

## 2019-04-17 PROCEDURE — 93306 TTE W/DOPPLER COMPLETE: CPT | Mod: 26

## 2019-04-17 PROCEDURE — 99285 EMERGENCY DEPT VISIT HI MDM: CPT

## 2019-04-17 RX ORDER — SODIUM CHLORIDE 9 MG/ML
1000 INJECTION INTRAMUSCULAR; INTRAVENOUS; SUBCUTANEOUS
Qty: 0 | Refills: 0 | Status: DISCONTINUED | OUTPATIENT
Start: 2019-04-17 | End: 2019-04-22

## 2019-04-17 RX ORDER — ALPRAZOLAM 0.25 MG
0.25 TABLET ORAL
Qty: 0 | Refills: 0 | Status: DISCONTINUED | OUTPATIENT
Start: 2019-04-17 | End: 2019-04-22

## 2019-04-17 RX ADMIN — PANTOPRAZOLE SODIUM 40 MILLIGRAM(S): 20 TABLET, DELAYED RELEASE ORAL at 05:40

## 2019-04-17 RX ADMIN — Medication 500 MILLIGRAM(S): at 22:08

## 2019-04-17 RX ADMIN — Medication 325 MILLIGRAM(S): at 11:30

## 2019-04-17 RX ADMIN — HEPARIN SODIUM 5000 UNIT(S): 5000 INJECTION INTRAVENOUS; SUBCUTANEOUS at 05:40

## 2019-04-17 RX ADMIN — Medication 0.25 MILLIGRAM(S): at 16:42

## 2019-04-17 RX ADMIN — HEPARIN SODIUM 5000 UNIT(S): 5000 INJECTION INTRAVENOUS; SUBCUTANEOUS at 18:22

## 2019-04-17 RX ADMIN — Medication 50 MILLIGRAM(S): at 11:30

## 2019-04-17 RX ADMIN — SODIUM CHLORIDE 75 MILLILITER(S): 9 INJECTION INTRAMUSCULAR; INTRAVENOUS; SUBCUTANEOUS at 16:43

## 2019-04-17 RX ADMIN — Medication 1000 UNIT(S): at 11:30

## 2019-04-17 RX ADMIN — SPIRONOLACTONE 25 MILLIGRAM(S): 25 TABLET, FILM COATED ORAL at 05:40

## 2019-04-17 RX ADMIN — Medication 75 MICROGRAM(S): at 05:40

## 2019-04-17 RX ADMIN — Medication 500 MILLIGRAM(S): at 13:31

## 2019-04-17 NOTE — CONSULT NOTE ADULT - SUBJECTIVE AND OBJECTIVE BOX
89 year old that this patient is being evaluated by EP for Bradycardia and Syncope.    PAST MEDICAL & SURGICAL HISTORY:  Endometrial adenocarcinoma  Macular degeneration  Stented coronary artery  Hypothyroid  Hyperlipidemia  HTN (hypertension)  GERD (gastroesophageal reflux disease)  CAD (coronary artery disease)  [place] (hard of hearing)  S/P EMILY (total abdominal hysterectomy)  S/P hernia repair: umbilical -   S/P laparoscopic cholecystectomy:   Ankle fracture, right: surgery 25 yrs ago - hardware removed  S/P coronary angiogram: , ,  - drug eluding stents  Syncope 2018  FAMILY Hx:  Family history of brain cancer (Father)        MEDICATIONS  (STANDING):  aspirin 325 milliGRAM(s) Oral daily  cholecalciferol 1000 Unit(s) Oral daily  heparin  Injectable 5000 Unit(s) SubCutaneous every 12 hours  levothyroxine 75 MICROGram(s) Oral daily  metoprolol succinate ER 50 milliGRAM(s) Oral daily  pantoprazole    Tablet 40 milliGRAM(s) Oral before breakfast  spironolactone 25 milliGRAM(s) Oral daily    MEDICATIONS  (PRN):  acetaminophen   Tablet .. 500 milliGRAM(s) Oral every 6 hours PRN Temp greater or equal to 38C (100.4F), Mild Pain (1 - 3)  nitroglycerin    2% Ointment 1.5 Inch(s) Transdermal every 6 hours PRN SBP> 160      Allergies    amlodipine (Unknown)  clonidine (Unknown)  hydrocodone (Unknown)  Levatol (Unknown)  Lipitor (Unknown)  Lotrel (Unknown)  methylPREDNISolone (Unknown)  morphine (Other)  Plaquenil (Unknown)  statins (Other (Unknown))  sulfa drugs (Rash)        Vital Signs Last 24 Hrs  T(C): 36.3 (2019 04:28), Max: 36.7 (2019 13:23)  T(F): 97.4 (2019 04:28), Max: 98.1 (2019 13:23)  HR: 66 (2019 04:28) (66 - 75)  BP: 159/58 (2019 04:28) (138/89 - 190/72)  BP(mean): --  RR: 19 (2019 16:16) (17 - 19)  SpO2: 100% (2019 04:28) (99% - 100%)    REVIEW OF SYSTEMS:    CONSTITUTIONAL:  As per HPI.  HEENT:  Eyes:  No diplopia or blurred vision. ENT:  No earache, sore throat or runny nose.  CARDIOVASCULAR:  No pressure, squeezing, strangling, tightness, heaviness or aching about the chest, neck, axilla or epigastrium.  RESPIRATORY:  No cough, shortness of breath, PND or orthopnea.  GASTROINTESTINAL:  No nausea, vomiting or diarrhea.  GENITOURINARY:  No dysuria, frequency or urgency.  MUSCULOSKELETAL:  As per HPI.  SKIN:  No change in skin, hair or nails.  NEUROLOGIC:  No paresthesias, fasciculations, seizures or weakness.  PSYCHIATRIC:  No disorder of thought or mood.  ENDOCRINE:  No heat or cold intolerance, polyuria or polydipsia.  HEMATOLOGICAL:  No easy bruising or bleedings:  .     PHYSICAL EXAMINATION:    GENERAL APPEARANCE:  Pt. is not currently dyspneic, in no distress. Pt. is alert, oriented, and pleasant.  HEENT:  Pupils are normal and react normally. No icterus. Mucous membranes well colored.  NECK:  Supple. No lymphadenopathy. Jugular venous pressure not elevated. Carotids equal.   HEART:   The cardiac impulse has a normal quality. There are no murmurs, rubs or gallops noted  CHEST:  Chest is clear to auscultation. Normal respiratory effort.  ABDOMEN:  Soft and nontender.   EXTREMITIES:  There is no edema.   SKIN:  No rash or significant lesions are noted.    I&O's Summary      LABS:                        13.1   9.44  )-----------( 255      ( 2019 05:29 )             42.3     04-17    135  |  104  |  19  ----------------------------<  94  5.2   |  23  |  1.01    Ca    9.0      2019 05:29  Mg     2.6     04-16    TPro  8.0  /  Alb  4.0  /  TBili  0.5  /  DBili  x   /  AST  13<L>  /  ALT  15  /  AlkPhos  77  04-16    LIVER FUNCTIONS - ( 2019 11:30 )  Alb: 4.0 g/dL / Pro: 8.0 gm/dL / ALK PHOS: 77 U/L / ALT: 15 U/L / AST: 13 U/L / GGT: x           PT/INR - ( 2019 11:30 )   PT: 10.5 sec;   INR: 0.95 ratio         PTT - ( 2019 11:30 )  PTT:32.1 sec  CARDIAC MARKERS ( 2019 16:50 )  0.029 ng/mL / x     / x     / x     / x      CARDIAC MARKERS ( 2019 14:11 )  0.032 ng/mL / x     / x     / x     / x      CARDIAC MARKERS ( 2019 11:30 )  0.024 ng/mL / x     / x     / x     / x          Urinalysis Basic - ( 2019 11:20 )    Color: Yellow / Appearance: Clear / S.005 / pH: x  Gluc: x / Ketone: Negative  / Bili: Negative / Urobili: Negative mg/dL   Blood: x / Protein: 15 mg/dL / Nitrite: Negative   Leuk Esterase: Trace / RBC: Negative /HPF / WBC 3-5   Sq Epi: x / Non Sq Epi: Few / Bacteria: Few        EKG  NSR , no ST changes (2019 14:15)      TELEMETRY:  Sr at 76 BPM with one sinus pause during slee hurs 23:78 the pasaue was 2.1 seconds     CARDIAC TESTS:  < from: Transthoracic Echocardiogram (17 @ 11:44) >   Summary     Fibrocalcific changes noted to the mitral valve leaflets with preserved   leaflet excursion.   Mild mitral annular calcification is present.   Trace to mild mitral regurgitation is present.   Fibrocalcific changes noted to the Aortic valve leaflets with preserved   leaflet excursion.   Mild (1+) tricuspid valve regurgitation is present.   Pulmonic valve not well seen, probably normal pulmonic valve function.   Mild pulmonic valvular regurgitation (1+) is present.   The left atrium is mildly dilated.   Mild concentric left ventricular hypertrophy is present.   Estimated left ventricular ejection fraction is 60-65 %.   Normal appearing right atrium.   Normal appearing right ventricle structure and function.          RADIOLOGY & ADDITIONAL STUDIES:

## 2019-04-17 NOTE — CONSULT NOTE ADULT - SUBJECTIVE AND OBJECTIVE BOX
HPI:The patient is a  89 y/o female  with PMHx of Syncope (18 while in ),CAD s/p stentsx5, hypothyroidism, HTN, HLD, DJD multiple joints, Bladder CA s/p resection, Sty RUL, anxety disorder who had  a syncopal episode this AM after she got out of the shower. She state she felt weak  sat down and had LOC for few seconds. Family called 911      Social Hx.: not smoking, no alcohol use     Review of Systems:    Eyes: no changes in vision, has RUL sty    ENT/Mouth: no changes    Cardiovascular: no chest pain    Respiratory: no SOB    Gastrointestinal: no diarrhea, no nausea, no vomiting    Genitourinary: no dysuria    Breast: no pain    Musculoskeletal: has arthritic pain in shoulders , knees,     Integumentary: no itching    Neurological: No Headache, no tremor,    Psychiatric: no suicidal ideations    Endocrine: no excessive thirst,     Hematologic/Lymphatic: no swollen glands    Allergic/Immunologic: no itching    Physical Exam: Physical Exam: Vital Signs Last 24 Hrs  T(C): 36.7 (2019 13:23), Max: 36.7 (2019 13:23)  T(F): 98.1 (2019 13:23), Max: 98.1 (2019 13:23)  HR: 66 (2019 13:23) (66 - 80)  BP: 159/81 (2019 13:23) (155/77 - 203/66)  BP(mean): --  RR: 17 (2019 13:23) (17 - 19)  SpO2: 100% (2019 13:23) (98% - 100%)        HEENT: PRRL EOMI, R Upper eyelid sty, conjunctiva clear,    MOUTH/TEETH/GUMS: Clear    NECK: no JVD    LUNGS: Clear    HEART: S1,S2 RR    ABDOMEN: soft nontender    EXTREMITIES: trace pedal edema ,     MUSCULOSKELETAL:  s/p surgery scar R ankle, has arthritic deformity of hands, ulnar deviation.    NEURO: no tremor, no focal signs.    SKIN: no rash    : CVA negative,     Lab:                      14.1   9.40  )-----------( 289      ( 2019 09:48 )             45.7       04-16    132<L>  |  101  |  17  ----------------------------<  107<H>  5.2   |  24  |  1.12    Ca    9.2      2019 11:30  Mg     2.6     04-16    TPro  8.0  /  Alb  4.0  /  TBili  0.5  /  DBili  x   /  AST  13<L>  /  ALT  15  /  AlkPhos  77  04-16    CTH  neg   ,  Troponin  0.02  EKG  NSR  bilat LE venous doppler neg for DVT.     EKG  NSR , no ST changes (2019 14:15)      PAST MEDICAL & SURGICAL HISTORY:  Endometrial adenocarcinoma  Macular degeneration  Hypothyroid  Hyperlipidemia  HTN (hypertension)  GERD (gastroesophageal reflux disease)  CAD (coronary artery disease)  [place] (hard of hearing)  S/P EMILY (total abdominal hysterectomy)  S/P hernia repair: umbilical -   S/P laparoscopic cholecystectomy:   Ankle fracture, right: surgery 25 yrs ago - hardware removed  S/P coronary angiogram: , ,  - drug eluding stents  DJD multiple joints   Bladder CA s/p resection,  Sty RUL  anxiety disorder   PCI stent  R ankle surgery for fracture    MEDICATIONS  (STANDING):  aspirin 325 milliGRAM(s) Oral daily  cholecalciferol 1000 Unit(s) Oral daily  heparin  Injectable 5000 Unit(s) SubCutaneous every 12 hours  levothyroxine 75 MICROGram(s) Oral daily  metoprolol succinate ER 50 milliGRAM(s) Oral daily  pantoprazole    Tablet 40 milliGRAM(s) Oral before breakfast  spironolactone 25 milliGRAM(s) Oral daily    MEDICATIONS  (PRN):  acetaminophen   Tablet .. 500 milliGRAM(s) Oral every 6 hours PRN Temp greater or equal to 38C (100.4F), Mild Pain (1 - 3)  nitroglycerin    2% Ointment 1.5 Inch(s) Transdermal every 6 hours PRN SBP> 160    Allergies    amlodipine (Unknown)  clonidine (Unknown)  hydrocodone (Unknown)  Levatol (Unknown)  Lipitor (Unknown)  Lotrel (Unknown)  methylPREDNISolone (Unknown)  morphine (Other)  Plaquenil (Unknown)  statins (Other (Unknown))  sulfa drugs (Rash)    Intolerances      FAMILY HISTORY:  father  of Brain TU at age 52, Mother  of MI at age 88        REVIEW OF SYSTEMS:    CONSTITUTIONAL: No weakness, fevers or chills  EYES/ENT: No visual changes;  No vertigo or throat pain   NECK: No pain or stiffness  RESPIRATORY: No cough, wheezing, hemoptysis; No shortness of breath  CARDIOVASCULAR: No chest pain or palpitations  GASTROINTESTINAL: No abdominal or epigastric pain. No nausea, vomiting, or hematemesis; No diarrhea or constipation. No melena or hematochezia.  GENITOURINARY: No dysuria, frequency or hematuria  NEUROLOGICAL: No numbness or weakness  SKIN: No itching, burning, rashes, or lesions   All other review of systems is negative unless indicated above      PHYSICAL EXAM:  Daily Height in cm: 167.64 (2019 09:39)    Daily   Vital Signs Last 24 Hrs  T(C): 36.3 (2019 04:28), Max: 36.7 (2019 13:23)  T(F): 97.4 (2019 04:28), Max: 98.1 (2019 13:23)  HR: 66 (2019 04:28) (66 - 80)  BP: 159/58 (2019 04:28) (138/89 - 203/66)  BP(mean): --  RR: 19 (2019 16:16) (17 - 19)  SpO2: 100% (2019 04:28) (98% - 100%)    Constitutional: NAD, awake and alert, well-developed  HEENT: PERR, EOMI, Normal Hearing, MMM  Neck: Soft and supple, No LAD, No JVD  Respiratory: Breath sounds are clear bilaterally, No wheezing, rales or rhonchi  Cardiovascular: S1 and S2, regular rate and rhythm, no Murmurs, gallops or rubs  Gastrointestinal: Bowel Sounds present, soft, nontender, nondistended, no guarding, no rebound  Extremities: No peripheral edema  Vascular: 2+ peripheral pulses  Neurological: A/O x 3, no focal deficits  Musculoskeletal: 5/5 strength b/l upper and lower extremities  Skin: No rashes    LABS: All Labs Reviewed:                        13.1   9.44  )-----------( 255      ( 2019 05:29 )             42.3     04-    135  |  104  |  19  ----------------------------<  94  5.2   |  23  |  1.01    Ca    9.0      2019 05:29  Mg     2.6     -16    TPro  8.0  /  Alb  4.0  /  TBili  0.5  /  DBili  x   /  AST  13<L>  /  ALT  15  /  AlkPhos  77  04-16    PT/INR - ( 2019 11:30 )   PT: 10.5 sec;   INR: 0.95 ratio         PTT - ( 2019 11:30 )  PTT:32.1 sec  CARDIAC MARKERS ( 2019 16:50 )  0.029 ng/mL / x     / x     / x     / x      CARDIAC MARKERS ( 2019 14:11 )  0.032 ng/mL / x     / x     / x     / x      CARDIAC MARKERS ( 2019 11:30 )  0.024 ng/mL / x     / x     / x     / x          Blood Culture:     RADIOLOGY:< from: Xray Chest 1 View- PORTABLE-Urgent (19 @ 11:20) >  EXAM:  XR CHEST PORTABLE URGENT 1V                            PROCEDURE DATE:  2019          INTERPRETATION:  History: Dyspnea    Chest:  one view.      Comparison: 2018    AP radiograph of the chest demonstrates subsegmental atelectasisin the   LEFT lower lobe. The cardiac silhouette is normal in size. Osseous   structures show degenerative changes of the RIGHT shoulder..    Impression:Subsegmental atelectasis is seen in the LEFT lower lobe.    < end of copied text >    EKG: On Telemetry monitoring, a non conducted APC and a 2 second pause.     CARDIOLOGY TESTING:

## 2019-04-17 NOTE — CONSULT NOTE ADULT - ASSESSMENT
89 y/o female  with PMHx of Syncope (12/12/18 while in ),CAD s/p stentsx5, hypothyroidism, HTN, HLD, DJD multiple joints, Bladder CA s/p resection, Sty RUL, anxiety disorder who had  a syncopal episode this AM after she got out of the shower. She state she felt weak  sat down and had LOC for few seconds.   On telemetry, has had a 2 second pause.     4/17/2019  echocardiogram.  orthostatic vital signs.   EP consult: one episode of pause and non conducted APC

## 2019-04-17 NOTE — PHYSICAL THERAPY INITIAL EVALUATION ADULT - MODALITIES TREATMENT COMMENTS
+ orthostatics, see vitals flowsheet.  Patient returned to bed by request, call bell in reach and bed alarm active. RN, MD informed. Patient not symptomatic.

## 2019-04-17 NOTE — CONSULT NOTE ADULT - ASSESSMENT
ASSESSMENT & PLAN:  89 year old female who is admitted with her second episode of syncope and has CAD.  Last LVEF in 2017 was WNL. EKG shows first degree HB  Dr. Pires to further evaluate  Maintain normal lytes and Mg  Obtain new ECHO results        Thank-you for letting the EP Service  participate in the care of your patient. ASSESSMENT & PLAN:  89 year old female who is admitted with her second episode of syncope and has CAD.  Last LVEF in 2017 was WNL. EKG shows first degree HB.  Pt is noted to be severely orthostatic and I doubt that pt would tolerate a TILT Study.  Primary symptom was most likely due to orthostatics not pause during sleep.    As per Dr. Pires  Hydrate pt  Consider Alternative medications other than Aldactone.  Dr. Pires to further evaluate  Maintain normal lytes and Mg  Obtain new ECHO results        Thank-you for letting the EP Service  participate in the care of your patient.

## 2019-04-17 NOTE — PROGRESS NOTE ADULT - SUBJECTIVE AND OBJECTIVE BOX
Patient is a 89y old  Female who presents with a chief complaint of Syncope (2019 10:20)      SUBJECTIVE:   HPI:  HPI:The patient is a  89 y/o female  with PMHx of Syncope (18 while in ),CAD s/p stentsx5, hypothyroidism, HTN, HLD, DJD multiple joints, Bladder CA s/p resection, Sty RUL, anxety disorder who had  a syncopal episode this AM after she got out of the shower. She state she felt weak  sat down and had LOC for few seconds. Family called 911        Sub: had 2.3 sec pause overnight. ++ orthostasis. Feels weak.     PMHx: Syncope (18 while in ),CAD s/p stentsx5, hypothyroidism, HTN, HLD, DJD multiple joints, Bladder CA s/p resection, Sty RUL, anxety disorder     PSHx:  PCI stent, TURBT (9/10/2018), R ankle surgery for fracture, Hysterectomy for uterin CA, s/p rad. Tx.    Family Hx: father  of Brain [facility] at age 52, Mothr  of MI at age 88    Social Hx.: not smoking, no alcohol use          REVIEW OF SYSTEMS:    CONSTITUTIONAL: No weakness, fevers or chills  EYES/ENT: No visual changes;  No vertigo or throat pain   NECK: No pain or stiffness  RESPIRATORY: No cough, wheezing, hemoptysis; No shortness of breath  CARDIOVASCULAR: No chest pain or palpitations  GASTROINTESTINAL: No abdominal or epigastric pain. No nausea, vomiting, or hematemesis; No diarrhea or constipation. No melena or hematochezia.  GENITOURINARY: No dysuria, frequency or hematuria  NEUROLOGICAL: No numbness or weakness  SKIN: No itching, burning, rashes, or lesions   All other review of systems is negative unless indicated above                PHYSICAL EXAM:    Constitutional: NAD, awake and alert, well-developed  HEENT: PERR, EOMI, Normal Hearing, MMM  Neck: Soft and supple, No LAD, No JVD  Respiratory: Breath sounds are clear bilaterally, No wheezing, rales or rhonchi  Cardiovascular: S1 and S2, regular rate and rhythm, no Murmurs, gallops or rubs  Gastrointestinal: Bowel Sounds present, soft, nontender, nondistended, no guarding, no rebound  Extremities: No peripheral edema  Vascular: 2+ peripheral pulses  Neurological: A/O x 3, no focal deficits  Musculoskeletal: 5/5 strength b/l upper and lower extremities  Skin: No rashes            Lab:                      14.1   9.40  )-----------( 289      ( 2019 09:48 )             45.7       04-16    132<L>  |  101  |  17  ----------------------------<  107<H>  5.2   |  24  |  1.12    Ca    9.2      2019 11:30  Mg     2.6     04-16    TPro  8.0  /  Alb  4.0  /  TBili  0.5  /  DBili  x   /  AST  13<L>  /  ALT  15  /  AlkPhos  77  -16    CTH  neg   ,  Troponin  0.02  EKG  NSR  bilat LE venous doppler neg for DVT.     EKG  NSR , no ST changes (2019 14:15)              RADIOLOGY/EKG: reviewed

## 2019-04-18 LAB — CORTIS AM PEAK SERPL-MCNC: 7.9 UG/DL — SIGNIFICANT CHANGE UP (ref 6–18.4)

## 2019-04-18 RX ORDER — SODIUM CHLORIDE 9 MG/ML
500 INJECTION INTRAMUSCULAR; INTRAVENOUS; SUBCUTANEOUS ONCE
Qty: 0 | Refills: 0 | Status: COMPLETED | OUTPATIENT
Start: 2019-04-18 | End: 2019-04-18

## 2019-04-18 RX ADMIN — SODIUM CHLORIDE 125 MILLILITER(S): 9 INJECTION INTRAMUSCULAR; INTRAVENOUS; SUBCUTANEOUS at 10:54

## 2019-04-18 RX ADMIN — Medication 0.25 MILLIGRAM(S): at 21:47

## 2019-04-18 RX ADMIN — Medication 75 MICROGRAM(S): at 05:55

## 2019-04-18 RX ADMIN — Medication 325 MILLIGRAM(S): at 12:50

## 2019-04-18 RX ADMIN — HEPARIN SODIUM 5000 UNIT(S): 5000 INJECTION INTRAVENOUS; SUBCUTANEOUS at 17:36

## 2019-04-18 RX ADMIN — Medication 1000 UNIT(S): at 12:50

## 2019-04-18 RX ADMIN — Medication 500 MILLIGRAM(S): at 05:56

## 2019-04-18 RX ADMIN — Medication 1 DROP(S): at 21:48

## 2019-04-18 RX ADMIN — Medication 500 MILLIGRAM(S): at 21:05

## 2019-04-18 RX ADMIN — PANTOPRAZOLE SODIUM 40 MILLIGRAM(S): 20 TABLET, DELAYED RELEASE ORAL at 05:55

## 2019-04-18 RX ADMIN — HEPARIN SODIUM 5000 UNIT(S): 5000 INJECTION INTRAVENOUS; SUBCUTANEOUS at 05:54

## 2019-04-18 RX ADMIN — Medication 50 MILLIGRAM(S): at 05:55

## 2019-04-18 RX ADMIN — Medication 500 MILLIGRAM(S): at 20:10

## 2019-04-18 RX ADMIN — SPIRONOLACTONE 25 MILLIGRAM(S): 25 TABLET, FILM COATED ORAL at 05:55

## 2019-04-18 NOTE — PROGRESS NOTE ADULT - SUBJECTIVE AND OBJECTIVE BOX
Patient is a 89y old  Female who presents with a chief complaint of Syncope (2019 10:20)      SUBJECTIVE:   HPI:  HPI:The patient is a  89 y/o female  with PMHx of Syncope (18 while in ),CAD s/p stentsx5, hypothyroidism, HTN, HLD, DJD multiple joints, Bladder CA s/p resection, Sty RUL, anxety disorder who had  a syncopal episode this AM after she got out of the shower. She state she felt weak  sat down and had LOC for few seconds. Family called 911        Sub:  + orthostasis. Feels weak. no tele events    PMHx: Syncope (18 while in ),CAD s/p stentsx5, hypothyroidism, HTN, HLD, DJD multiple joints, Bladder CA s/p resection, Sty RUL, anxety disorder     PSHx:  PCI stent, TURBT (9/10/2018), R ankle surgery for fracture, Hysterectomy for uterin CA, s/p rad. Tx.    Family Hx: father  of Brain [facility] at age 52, Mothr  of MI at age 88    Social Hx.: not smoking, no alcohol use          REVIEW OF SYSTEMS:    CONSTITUTIONAL: No weakness, fevers or chills  EYES/ENT: No visual changes;  No vertigo or throat pain   NECK: No pain or stiffness  RESPIRATORY: No cough, wheezing, hemoptysis; No shortness of breath  CARDIOVASCULAR: No chest pain or palpitations  GASTROINTESTINAL: No abdominal or epigastric pain. No nausea, vomiting, or hematemesis; No diarrhea or constipation. No melena or hematochezia.  GENITOURINARY: No dysuria, frequency or hematuria  NEUROLOGICAL: No numbness or weakness  SKIN: No itching, burning, rashes, or lesions   All other review of systems is negative unless indicated above            ICU Vital Signs Last 24 Hrs  T(C): 36.5 (2019 10:20), Max: 36.7 (2019 20:13)  T(F): 97.7 (2019 10:20), Max: 98 (2019 20:13)  HR: 72 (2019 10:20) (67 - 80)  BP: 161/57 (2019 10:20) (136/55 - 173/77)  BP(mean): 83 (2019 10:20) (83 - 83)  ABP: --  ABP(mean): --  RR: 18 (2019 10:20) (18 - 18)  SpO2: 98% (2019 10:20) (98% - 100%)      PHYSICAL EXAM:    Constitutional: NAD, awake and alert, well-developed  HEENT: PERR, EOMI, Normal Hearing, MMM  Neck: Soft and supple, No LAD, No JVD  Respiratory: Breath sounds are clear bilaterally, No wheezing, rales or rhonchi  Cardiovascular: S1 and S2, regular rate and rhythm, no Murmurs, gallops or rubs  Gastrointestinal: Bowel Sounds present, soft, nontender, nondistended, no guarding, no rebound  Extremities: No peripheral edema  Vascular: 2+ peripheral pulses  Neurological: A/O x 3, no focal deficits  Musculoskeletal: 5/5 strength b/l upper and lower extremities  Skin: No rashes            Lab:                      14.1   9.40  )-----------( 289      ( 2019 09:48 )             45.7       04-16    132<L>  |  101  |  17  ----------------------------<  107<H>  5.2   |  24  |  1.12    Ca    9.2      2019 11:30  Mg     2.6     04-16    TPro  8.0  /  Alb  4.0  /  TBili  0.5  /  DBili  x   /  AST  13<L>  /  ALT  15  /  AlkPhos  77  04-16    CTH  neg   ,  Troponin  0.02  EKG  NSR  bilat LE venous doppler neg for DVT.     EKG  NSR , no ST changes (2019 14:15)              RADIOLOGY/EKG: reviewed

## 2019-04-18 NOTE — DISCHARGE NOTE ADULT - BECAUSE OF A PHYSICAL, MENTAL OR EMOTIONAL CONDITION, DO YOU HAVE DIFFICULTY DOING  ERRANDS ALONE LIKE VISITING A DOCTOR'S OFFICE OR SHOPPING (15 YEARS AND OLDER)
General Sunscreen Counseling: Wide brimmed hats and sunprotective clothing recommended.  I recommended a broad spectrum sunscreen with a SPF of 30 or higher in an adequate amount with reapplication every 2 hours or every 1 hour if sweating or in water.\\n\\nSigns and symptoms of melanoma and non-melanoma skin cancer reviewed. Detail Level: Generalized Yes

## 2019-04-18 NOTE — PROGRESS NOTE ADULT - SUBJECTIVE AND OBJECTIVE BOX
Patient is a 89y old  Female who presents with a chief complaint of Syncope (17 Apr 2019 12:06)    The patient is a  89 y/o female  with PMHx of Syncope (12/12/18 while in ),CAD s/p stentsx5, hypothyroidism, HTN, HLD, DJD multiple joints, Bladder CA s/p resection, Sty RUL, anxety disorder who had  a syncopal episode this AM after she got out of the shower. She state she felt weak  sat down and had LOC for few seconds. Family called 911    4/18-  yesterday, on monitor, on episode of pause. Has not happened again.  dose of metoprolol decreased when admitted.   orthostatic vitals suggest volume depletion.   complains of bilateral knee pain. the left is chronic but the right is new. she has other joint issues but they are not able to be treated. In the past, with a cortisone injection, has had flushed, swollen face. Because of this, she doesn't want to have an injection at this time.   reiterates that she is so weak.     Allergies    amlodipine (Unknown)  clonidine (Unknown)  hydrocodone (Unknown)  Levatol (Unknown)  Lipitor (Unknown)  Lotrel (Unknown)  methylPREDNISolone (Unknown)  morphine (Other)  Plaquenil (Unknown)  statins (Other (Unknown))  sulfa drugs (Rash)    Intolerances        MEDICATIONS  (STANDING):  aspirin 325 milliGRAM(s) Oral daily  cholecalciferol 1000 Unit(s) Oral daily  heparin  Injectable 5000 Unit(s) SubCutaneous every 12 hours  levothyroxine 75 MICROGram(s) Oral daily  metoprolol succinate ER 50 milliGRAM(s) Oral daily  pantoprazole    Tablet 40 milliGRAM(s) Oral before breakfast  sodium chloride 0.9% Bolus 500 milliLiter(s) IV Bolus once  sodium chloride 0.9%. 1000 milliLiter(s) (75 mL/Hr) IV Continuous <Continuous>  spironolactone 25 milliGRAM(s) Oral daily    MEDICATIONS  (PRN):  acetaminophen   Tablet .. 500 milliGRAM(s) Oral every 6 hours PRN Temp greater or equal to 38C (100.4F), Mild Pain (1 - 3)  ALPRAZolam 0.25 milliGRAM(s) Oral two times a day PRN anxiety  nitroglycerin    2% Ointment 1.5 Inch(s) Transdermal every 6 hours PRN SBP> 160    REVIEW OF SYSTEMS:    RESPIRATORY: No cough, wheezing, hemoptysis; No shortness of breath  CARDIOVASCULAR: No chest pain or palpitations  All other review of systems is negative unless indicated above      PHYSICAL EXAM:  Daily     Daily   Vital Signs Last 24 Hrs  T(C): 36.3 (18 Apr 2019 04:54), Max: 36.7 (17 Apr 2019 11:37)  T(F): 97.4 (18 Apr 2019 04:54), Max: 98.1 (17 Apr 2019 11:37)  HR: 67 (18 Apr 2019 04:54) (67 - 80)  BP: 154/57 (18 Apr 2019 04:54) (136/55 - 173/77)  BP(mean): --  RR: 18 (17 Apr 2019 20:13) (18 - 18)  SpO2: 100% (18 Apr 2019 04:54) (98% - 100%)    Constitutional: NAD, awake and alert, well-developed  HEENT: PERR, EOMI, Normal Hearing, MMM  Neck: Soft and supple, No LAD, No JVD  Respiratory: Breath sounds are clear bilaterally, No wheezing, rales or rhonchi  Cardiovascular: S1 and S2, regular rate and rhythm, no Murmurs, gallops or rubs  Gastrointestinal: Bowel Sounds present, soft, nontender, nondistended, no guarding, no rebound  Extremities: No peripheral edema  Vascular: 2+ peripheral pulses  Neurological: A/O x 3, no focal deficits  Musculoskeletal: 5/5 strength b/l upper and lower extremities  Skin: No rashes    LABS: All Labs Reviewed:                        13.1   9.44  )-----------( 255      ( 17 Apr 2019 05:29 )             42.3     04-17    135  |  104  |  19  ----------------------------<  94  5.2   |  23  |  1.01    Ca    9.0      17 Apr 2019 05:29  Mg     2.6     04-16    TPro  8.0  /  Alb  4.0  /  TBili  0.5  /  DBili  x   /  AST  13<L>  /  ALT  15  /  AlkPhos  77  04-16    PT/INR - ( 16 Apr 2019 11:30 )   PT: 10.5 sec;   INR: 0.95 ratio         PTT - ( 16 Apr 2019 11:30 )  PTT:32.1 sec  CARDIAC MARKERS ( 16 Apr 2019 16:50 )  0.029 ng/mL / x     / x     / x     / x      CARDIAC MARKERS ( 16 Apr 2019 14:11 )  0.032 ng/mL / x     / x     / x     / x      CARDIAC MARKERS ( 16 Apr 2019 11:30 )  0.024 ng/mL / x     / x     / x     / x              TELEMETRY/EKG: NSR

## 2019-04-18 NOTE — CHART NOTE - NSCHARTNOTEFT_GEN_A_CORE
Dr. Pires reviewed the 2 second pause from 4/16 2019  Atrial tach with one episode of 2:1 heart block during sleep hours.  He recommended that pt increase hydration, have orthostatic Vitals signs in am , if she continues to be orthostatic that Florinef should be considered.

## 2019-04-19 ENCOUNTER — TRANSCRIPTION ENCOUNTER (OUTPATIENT)
Age: 84
End: 2019-04-19

## 2019-04-19 RX ORDER — FLUDROCORTISONE ACETATE 0.1 MG/1
0.1 TABLET ORAL DAILY
Qty: 0 | Refills: 0 | Status: DISCONTINUED | OUTPATIENT
Start: 2019-04-19 | End: 2019-04-20

## 2019-04-19 RX ADMIN — PANTOPRAZOLE SODIUM 40 MILLIGRAM(S): 20 TABLET, DELAYED RELEASE ORAL at 05:59

## 2019-04-19 RX ADMIN — Medication 1.5 INCH(S): at 18:01

## 2019-04-19 RX ADMIN — Medication 50 MILLIGRAM(S): at 05:59

## 2019-04-19 RX ADMIN — Medication 1000 UNIT(S): at 11:50

## 2019-04-19 RX ADMIN — Medication 1 DROP(S): at 22:08

## 2019-04-19 RX ADMIN — Medication 0.25 MILLIGRAM(S): at 22:08

## 2019-04-19 RX ADMIN — FLUDROCORTISONE ACETATE 0.1 MILLIGRAM(S): 0.1 TABLET ORAL at 08:42

## 2019-04-19 RX ADMIN — Medication 500 MILLIGRAM(S): at 15:00

## 2019-04-19 RX ADMIN — HEPARIN SODIUM 5000 UNIT(S): 5000 INJECTION INTRAVENOUS; SUBCUTANEOUS at 18:00

## 2019-04-19 RX ADMIN — Medication 500 MILLIGRAM(S): at 14:13

## 2019-04-19 RX ADMIN — Medication 75 MICROGRAM(S): at 05:59

## 2019-04-19 RX ADMIN — Medication 500 MILLIGRAM(S): at 02:44

## 2019-04-19 RX ADMIN — Medication 325 MILLIGRAM(S): at 11:50

## 2019-04-19 RX ADMIN — HEPARIN SODIUM 5000 UNIT(S): 5000 INJECTION INTRAVENOUS; SUBCUTANEOUS at 05:59

## 2019-04-19 RX ADMIN — Medication 500 MILLIGRAM(S): at 03:40

## 2019-04-19 NOTE — PROGRESS NOTE ADULT - SUBJECTIVE AND OBJECTIVE BOX
Patient is a 89y old  Female who presents with a chief complaint of Syncope (18 Apr 2019 12:28)    The patient is a  89 y/o female  with PMHx of Syncope (12/12/18 while in ),CAD s/p stentsx5, hypothyroidism, HTN, HLD, DJD multiple joints, Bladder CA s/p resection, Sty RUL, anxety disorder who had  a syncopal episode this AM after she got out of the shower. She state she felt weak  sat down and had LOC for few seconds. Family called 911    4/18-  yesterday, on monitor, on episode of pause. Has not happened again.  dose of metoprolol decreased when admitted.   orthostatic vitals suggest volume depletion.   complains of bilateral knee pain. the left is chronic but the right is new. she has other joint issues but they are not able to be treated. In the past, with a cortisone injection, has had flushed, swollen face. Because of this, she doesn't want to have an injection at this time.   reiterates that she is so weak.     4/19-  continues to be orthostatic. this might explain her constant complaints of feeling weak.   will try florinef 0.1 mg today.   didn't sleep well last night.   does NOT want to go to rehab.     Allergies    amlodipine (Unknown)  clonidine (Unknown)  hydrocodone (Unknown)  Levatol (Unknown)  Lipitor (Unknown)  Lotrel (Unknown)  methylPREDNISolone (Unknown)  morphine (Other)  Plaquenil (Unknown)  statins (Other (Unknown))  sulfa drugs (Rash)    Intolerances        MEDICATIONS  (STANDING):  artificial tears (preservative free) Ophthalmic Solution 1 Drop(s) Right EYE at bedtime  aspirin 325 milliGRAM(s) Oral daily  cholecalciferol 1000 Unit(s) Oral daily  fludroCORTISONE 0.1 milliGRAM(s) Oral daily  heparin  Injectable 5000 Unit(s) SubCutaneous every 12 hours  levothyroxine 75 MICROGram(s) Oral daily  metoprolol succinate ER 50 milliGRAM(s) Oral daily  pantoprazole    Tablet 40 milliGRAM(s) Oral before breakfast  sodium chloride 0.9%. 1000 milliLiter(s) (75 mL/Hr) IV Continuous <Continuous>    MEDICATIONS  (PRN):  acetaminophen   Tablet .. 500 milliGRAM(s) Oral every 6 hours PRN Temp greater or equal to 38C (100.4F), Mild Pain (1 - 3)  ALPRAZolam 0.25 milliGRAM(s) Oral two times a day PRN anxiety  nitroglycerin    2% Ointment 1.5 Inch(s) Transdermal every 6 hours PRN SBP> 160    REVIEW OF SYSTEMS:    RESPIRATORY: No cough, wheezing, hemoptysis; No shortness of breath  CARDIOVASCULAR: No chest pain or palpitations  All other review of systems is negative unless indicated above      PHYSICAL EXAM:  Daily     Daily   Vital Signs Last 24 Hrs  T(C): 36.3 (19 Apr 2019 04:51), Max: 36.7 (18 Apr 2019 16:58)  T(F): 97.3 (19 Apr 2019 04:51), Max: 98.1 (18 Apr 2019 16:58)  HR: 62 (19 Apr 2019 04:51) (62 - 72)  BP: 150/49 (19 Apr 2019 04:51) (148/55 - 161/57)  BP(mean): 83 (18 Apr 2019 10:20) (83 - 83)  RR: 19 (18 Apr 2019 16:58) (18 - 19)  SpO2: 100% (19 Apr 2019 04:51) (98% - 100%)    Constitutional: NAD, awake and alert, well-developed  HEENT: PERR, EOMI, Normal Hearing, MMM  Neck: Soft and supple, No LAD, No JVD  Respiratory: Breath sounds are clear bilaterally, No wheezing, rales or rhonchi  Cardiovascular: S1 and S2, regular rate and rhythm, no Murmurs, gallops or rubs  Gastrointestinal: Bowel Sounds present, soft, nontender, nondistended, no guarding, no rebound  Extremities: No peripheral edema  Vascular: 2+ peripheral pulses  Neurological: A/O x 3, no focal deficits  Musculoskeletal: 5/5 strength b/l upper and lower extremities  Skin: No rashes    LABS: All Labs Reviewed:

## 2019-04-19 NOTE — DISCHARGE NOTE NURSING/CASE MANAGEMENT/SOCIAL WORK - NSDCVIVACCINE_GEN_ALL_CORE_FT
Influenza , 2017/9/9 14:31 , Kerri Covington (RN)  Influenza , 2017/9/9 14:53 , Kerri Covington (RN)  Influenza , 2018/9/12 14:23 , Anisha Odom (RN)

## 2019-04-19 NOTE — PROGRESS NOTE ADULT - SUBJECTIVE AND OBJECTIVE BOX
Patient is a 89y old  Female who presents with a chief complaint of Syncope (2019 10:20)      SUBJECTIVE:   HPI:  HPI:The patient is a  89 y/o female  with PMHx of Syncope (18 while in ),CAD s/p stentsx5, hypothyroidism, HTN, HLD, DJD multiple joints, Bladder CA s/p resection, Sty RUL, anxety disorder who had  a syncopal episode this AM after she got out of the shower. She state she felt weak  sat down and had LOC for few seconds. Family called 911        Sub:  + orthostasis. nl cortisol level. Still feels no improvement after IVF challenge    PMHx: Syncope (18 while in ),CAD s/p stentsx5, hypothyroidism, HTN, HLD, DJD multiple joints, Bladder CA s/p resection, Sty RUL, anxety disorder     PSHx:  PCI stent, TURBT (9/10/2018), R ankle surgery for fracture, Hysterectomy for uterin CA, s/p rad. Tx.    Family Hx: father  of Brain [facility] at age 52, Mothr  of MI at age 88    Social Hx.: not smoking, no alcohol use          REVIEW OF SYSTEMS:    CONSTITUTIONAL: No weakness, fevers or chills  EYES/ENT: No visual changes;  No vertigo or throat pain   NECK: No pain or stiffness  RESPIRATORY: No cough, wheezing, hemoptysis; No shortness of breath  CARDIOVASCULAR: No chest pain or palpitations  GASTROINTESTINAL: No abdominal or epigastric pain. No nausea, vomiting, or hematemesis; No diarrhea or constipation. No melena or hematochezia.  GENITOURINARY: No dysuria, frequency or hematuria  NEUROLOGICAL: No numbness or weakness  SKIN: No itching, burning, rashes, or lesions   All other review of systems is negative unless indicated above          ICU Vital Signs Last 24 Hrs  T(C): 36.1 (2019 12:10), Max: 36.7 (2019 16:58)  T(F): 97 (2019 12:10), Max: 98.1 (2019 16:58)  HR: 84 (2019 12:10) (62 - 84)  BP: 153/70 (2019 12:10) (148/55 - 153/70)  BP(mean): --  ABP: --  ABP(mean): --  RR: 18 (2019 12:10) (18 - 19)  SpO2: 100% (2019 12:10) (100% - 100%)        PHYSICAL EXAM:    Constitutional: NAD, awake and alert, well-developed  HEENT: PERR, EOMI, Normal Hearing, MMM  Neck: Soft and supple, No LAD, No JVD  Respiratory: Breath sounds are clear bilaterally, No wheezing, rales or rhonchi  Cardiovascular: S1 and S2, regular rate and rhythm, no Murmurs, gallops or rubs  Gastrointestinal: Bowel Sounds present, soft, nontender, nondistended, no guarding, no rebound  Extremities: No peripheral edema  Vascular: 2+ peripheral pulses  Neurological: A/O x 3, no focal deficits  Musculoskeletal: 5/5 strength b/l upper and lower extremities  Skin: No rashes            Lab:                      14.1   9.40  )-----------( 289      ( 2019 09:48 )             45.7       04-16    132<L>  |  101  |  17  ----------------------------<  107<H>  5.2   |  24  |  1.12    Ca    9.2      2019 11:30  Mg     2.6     04-16    TPro  8.0  /  Alb  4.0  /  TBili  0.5  /  DBili  x   /  AST  13<L>  /  ALT  15  /  AlkPhos  77  04-16    CTH  neg   ,  Troponin  0.02  EKG  NSR  bilat LE venous doppler neg for DVT.     EKG  NSR , no ST changes (2019 14:15)              RADIOLOGY/EKG: reviewed

## 2019-04-19 NOTE — DISCHARGE NOTE NURSING/CASE MANAGEMENT/SOCIAL WORK - NSDCDPATPORTLINK_GEN_ALL_CORE
You can access the [facility] Patient Portal, offered by [facility], by registering with the following website: [email or web address]

## 2019-04-20 RX ADMIN — HEPARIN SODIUM 5000 UNIT(S): 5000 INJECTION INTRAVENOUS; SUBCUTANEOUS at 05:19

## 2019-04-20 RX ADMIN — HEPARIN SODIUM 5000 UNIT(S): 5000 INJECTION INTRAVENOUS; SUBCUTANEOUS at 17:26

## 2019-04-20 RX ADMIN — Medication 500 MILLIGRAM(S): at 12:16

## 2019-04-20 RX ADMIN — FLUDROCORTISONE ACETATE 0.1 MILLIGRAM(S): 0.1 TABLET ORAL at 05:19

## 2019-04-20 RX ADMIN — Medication 75 MICROGRAM(S): at 05:19

## 2019-04-20 RX ADMIN — Medication 325 MILLIGRAM(S): at 11:28

## 2019-04-20 RX ADMIN — Medication 1 DROP(S): at 21:23

## 2019-04-20 RX ADMIN — Medication 50 MILLIGRAM(S): at 05:19

## 2019-04-20 RX ADMIN — PANTOPRAZOLE SODIUM 40 MILLIGRAM(S): 20 TABLET, DELAYED RELEASE ORAL at 05:19

## 2019-04-20 RX ADMIN — Medication 500 MILLIGRAM(S): at 04:09

## 2019-04-20 RX ADMIN — Medication 500 MILLIGRAM(S): at 21:22

## 2019-04-20 RX ADMIN — Medication 0.25 MILLIGRAM(S): at 21:24

## 2019-04-20 RX ADMIN — Medication 500 MILLIGRAM(S): at 11:26

## 2019-04-20 RX ADMIN — Medication 1.5 INCH(S): at 07:00

## 2019-04-20 RX ADMIN — Medication 1000 UNIT(S): at 11:29

## 2019-04-20 NOTE — PROGRESS NOTE ADULT - SUBJECTIVE AND OBJECTIVE BOX
ECHO results noted.     Left ventricular wall motion is normal. Estimated left ventricular   ejection fraction is 70 %.   Normal appearing right ventricle structure and function.   Fibrocalcific changes noted to the mitral valve leaflets with preserved   leaflet excursion. Mitral annular calcification. Trace mitral   regurgitation.   The aortic valve is well visualized, appears mildly sclerotic.    EP follow up appreciated.  No further intervention warranted.  Follow up outpt with cards and EP for cardiac issues.  Signing off. ECHO results noted.     Left ventricular wall motion is normal. Estimated left ventricular   ejection fraction is 70 %.   Normal appearing right ventricle structure and function.   Fibrocalcific changes noted to the mitral valve leaflets with preserved   leaflet excursion. Mitral annular calcification. Trace mitral   regurgitation.   The aortic valve is well visualized, appears mildly sclerotic.    EP follow up appreciated.  No further intervention warranted.  Follow up outpt with cards and EP for cardiac issues.  Call back as needed.

## 2019-04-20 NOTE — PROGRESS NOTE ADULT - SUBJECTIVE AND OBJECTIVE BOX
Patient is a 89y old  Female who presents with a chief complaint of Syncope (18 Apr 2019 12:28)    The patient is a  89 y/o female  with PMHx of Syncope (12/12/18 while in ),CAD s/p stentsx5, hypothyroidism, HTN, HLD, DJD multiple joints, Bladder CA s/p resection, Sty RUL, anxety disorder who had  a syncopal episode this AM after she got out of the shower. She state she felt weak  sat down and had LOC for few seconds. Family called 911    4/18-  yesterday, on monitor, on episode of pause. Has not happened again.  dose of metoprolol decreased when admitted.   orthostatic vitals suggest volume depletion.   complains of bilateral knee pain. the left is chronic but the right is new. she has other joint issues but they are not able to be treated. In the past, with a cortisone injection, has had flushed, swollen face. Because of this, she doesn't want to have an injection at this time.   reiterates that she is so weak.     4/19-  continues to be orthostatic. this might explain her constant complaints of feeling weak.   will try florinef 0.1 mg today.   didn't sleep well last night.   does NOT want to go to rehab.     4/20 - feels still weak in her legs, now with significant permanent headache after starting fludrocortisone. Not having syncope or presyncope recently.     Allergies    amlodipine (Unknown)  clonidine (Unknown)  hydrocodone (Unknown)  Levatol (Unknown)  Lipitor (Unknown)  Lotrel (Unknown)  methylPREDNISolone (Unknown)  morphine (Other)  Plaquenil (Unknown)  statins (Other (Unknown))  sulfa drugs (Rash)    Intolerances        MEDICATIONS  (STANDING):  artificial tears (preservative free) Ophthalmic Solution 1 Drop(s) Right EYE at bedtime  aspirin 325 milliGRAM(s) Oral daily  cholecalciferol 1000 Unit(s) Oral daily  fludroCORTISONE 0.1 milliGRAM(s) Oral daily  heparin  Injectable 5000 Unit(s) SubCutaneous every 12 hours  levothyroxine 75 MICROGram(s) Oral daily  metoprolol succinate ER 50 milliGRAM(s) Oral daily  pantoprazole    Tablet 40 milliGRAM(s) Oral before breakfast  sodium chloride 0.9%. 1000 milliLiter(s) (75 mL/Hr) IV Continuous <Continuous>    MEDICATIONS  (PRN):  acetaminophen   Tablet .. 500 milliGRAM(s) Oral every 6 hours PRN Temp greater or equal to 38C (100.4F), Mild Pain (1 - 3)  ALPRAZolam 0.25 milliGRAM(s) Oral two times a day PRN anxiety  nitroglycerin    2% Ointment 1.5 Inch(s) Transdermal every 6 hours PRN SBP> 160    REVIEW OF SYSTEMS:    RESPIRATORY: No cough, wheezing, hemoptysis; No shortness of breath  CARDIOVASCULAR: No chest pain or palpitations  All other review of systems is negative unless indicated above      PHYSICAL EXAM:  Daily     Daily   Vital Signs Last 24 Hrs  T(C): 36.3 (19 Apr 2019 04:51), Max: 36.7 (18 Apr 2019 16:58)  T(F): 97.3 (19 Apr 2019 04:51), Max: 98.1 (18 Apr 2019 16:58)  HR: 62 (19 Apr 2019 04:51) (62 - 72)  BP: 150/49 (19 Apr 2019 04:51) (148/55 - 161/57)  BP(mean): 83 (18 Apr 2019 10:20) (83 - 83)  RR: 19 (18 Apr 2019 16:58) (18 - 19)  SpO2: 100% (19 Apr 2019 04:51) (98% - 100%)    Constitutional: NAD, awake and alert, well-developed  HEENT: PERR, EOMI, Normal Hearing, MMM  Neck: Soft and supple, No LAD, No JVD  Respiratory: Breath sounds are clear bilaterally, No wheezing, rales or rhonchi  Cardiovascular: S1 and S2, regular rate and rhythm, no Murmurs, gallops or rubs  Gastrointestinal: Bowel Sounds present, soft, nontender, nondistended, no guarding, no rebound  Extremities: No peripheral edema  Vascular: 2+ peripheral pulses  Neurological: A/O x 3, no focal deficits  Musculoskeletal: 5/5 strength b/l upper and lower extremities  Skin: No rashes    LABS: All Labs Reviewed:

## 2019-04-20 NOTE — CHART NOTE - NSCHARTNOTEFT_GEN_A_CORE
Pt is complaining of headaches, Florinef.  Florinef will be discontinued and pt will have F/U with Dr. Pires in 2- 4 weeks.  Pt encouraged to increase fluid intake  Increase Na intake  Wear support stockings

## 2019-04-20 NOTE — PROGRESS NOTE ADULT - SUBJECTIVE AND OBJECTIVE BOX
Subjective:  c/o headache    MEDICATIONS  (STANDING):  artificial tears (preservative free) Ophthalmic Solution 1 Drop(s) Right EYE at bedtime  aspirin 325 milliGRAM(s) Oral daily  cholecalciferol 1000 Unit(s) Oral daily  heparin  Injectable 5000 Unit(s) SubCutaneous every 12 hours  levothyroxine 75 MICROGram(s) Oral daily  metoprolol succinate ER 50 milliGRAM(s) Oral daily  pantoprazole    Tablet 40 milliGRAM(s) Oral before breakfast  sodium chloride 0.9%. 1000 milliLiter(s) (75 mL/Hr) IV Continuous <Continuous>    MEDICATIONS  (PRN):  acetaminophen   Tablet .. 500 milliGRAM(s) Oral every 6 hours PRN Temp greater or equal to 38C (100.4F), Mild Pain (1 - 3)  ALPRAZolam 0.25 milliGRAM(s) Oral two times a day PRN anxiety  nitroglycerin    2% Ointment 1.5 Inch(s) Transdermal every 6 hours PRN SBP> 160      Allergies    amlodipine (Unknown)  clonidine (Unknown)  hydrocodone (Unknown)  Levatol (Unknown)  Lipitor (Unknown)  Lotrel (Unknown)  methylPREDNISolone (Unknown)  morphine (Other)  Plaquenil (Unknown)  statins (Other (Unknown))  sulfa drugs (Rash)    Intolerances        REVIEW OF SYSTEMS:    CONSTITUTIONAL:  As per HPI.  HEENT:  Eyes:  No diplopia or blurred vision. ENT:  No earache, sore throat or runny nose.  CARDIOVASCULAR:  No pressure, squeezing, tightness, heaviness or aching about the chest, neck, axilla or epigastrium.  RESPIRATORY:  No cough, shortness of breath, PND or orthopnea.  GASTROINTESTINAL:  No nausea, vomiting or diarrhea.  GENITOURINARY:  No dysuria, frequency or urgency.  MUSCULOSKELETAL:  no joint pain, deformity, tenderness  EXTREMITIES: no clubbing cyanosis,edema  SKIN:  No change in skin, hair or nails.  NEUROLOGIC:  No paresthesias, fasciculations, seizures or weakness.  PSYCHIATRIC:  No disorder of thought or mood.  ENDOCRINE:  No heat or cold intolerance, polyuria or polydipsia.  HEMATOLOGICAL:  No easy bruising or bleedings:    Vital Signs Last 24 Hrs  T(C): 36.5 (20 Apr 2019 10:37), Max: 36.6 (19 Apr 2019 17:30)  T(F): 97.7 (20 Apr 2019 10:37), Max: 97.8 (19 Apr 2019 17:30)  HR: 80 (20 Apr 2019 10:37) (65 - 84)  BP: 186/66 (20 Apr 2019 10:37) (139/52 - 188/74)  BP(mean): --  RR: 18 (20 Apr 2019 10:37) (16 - 18)  SpO2: 98% (20 Apr 2019 10:37) (95% - 100%)    PHYSICAL EXAMINATION:  SKIN: no rashes  HEAD: NC/AT  EYES: PERRLA, EOMI  EARS: TM's intact  NOSE: no abnormalities  NECK:  Supple. No lymphadenopathy. Jugular venous pressure not elevated. Carotids equal.   HEART:   The cardiac impulse has a normal quality. Reg., Nl S1 and S2.  There are no murmurs, rubs or gallops noted  CHEST:  Chest is clear to auscultation. Normal respiratory effort.  ABDOMEN:  Soft and nontender.   EXTREMITIES:  no C/C/E  NEURO: AAO x 3, no focal deficts       LABS:                RADIOLOGY & ADDITIONAL TESTS:

## 2019-04-21 RX ADMIN — HEPARIN SODIUM 5000 UNIT(S): 5000 INJECTION INTRAVENOUS; SUBCUTANEOUS at 05:29

## 2019-04-21 RX ADMIN — Medication 500 MILLIGRAM(S): at 21:02

## 2019-04-21 RX ADMIN — Medication 0.25 MILLIGRAM(S): at 21:35

## 2019-04-21 RX ADMIN — Medication 50 MILLIGRAM(S): at 05:29

## 2019-04-21 RX ADMIN — PANTOPRAZOLE SODIUM 40 MILLIGRAM(S): 20 TABLET, DELAYED RELEASE ORAL at 05:29

## 2019-04-21 RX ADMIN — Medication 500 MILLIGRAM(S): at 15:05

## 2019-04-21 RX ADMIN — Medication 500 MILLIGRAM(S): at 17:38

## 2019-04-21 RX ADMIN — Medication 75 MICROGRAM(S): at 05:29

## 2019-04-21 RX ADMIN — Medication 325 MILLIGRAM(S): at 11:53

## 2019-04-21 RX ADMIN — HEPARIN SODIUM 5000 UNIT(S): 5000 INJECTION INTRAVENOUS; SUBCUTANEOUS at 17:40

## 2019-04-21 RX ADMIN — Medication 0.25 MILLIGRAM(S): at 09:27

## 2019-04-21 RX ADMIN — Medication 1000 UNIT(S): at 11:53

## 2019-04-21 RX ADMIN — Medication 1 DROP(S): at 21:03

## 2019-04-21 NOTE — PROGRESS NOTE ADULT - SUBJECTIVE AND OBJECTIVE BOX
Subjective:  still c/o feeling weak  ambulating  headache better since d/c florine    MEDICATIONS  (STANDING):  artificial tears (preservative free) Ophthalmic Solution 1 Drop(s) Right EYE at bedtime  aspirin 325 milliGRAM(s) Oral daily  cholecalciferol 1000 Unit(s) Oral daily  heparin  Injectable 5000 Unit(s) SubCutaneous every 12 hours  levothyroxine 75 MICROGram(s) Oral daily  metoprolol succinate ER 50 milliGRAM(s) Oral daily  pantoprazole    Tablet 40 milliGRAM(s) Oral before breakfast  sodium chloride 0.9%. 1000 milliLiter(s) (75 mL/Hr) IV Continuous <Continuous>    MEDICATIONS  (PRN):  acetaminophen   Tablet .. 500 milliGRAM(s) Oral every 6 hours PRN Temp greater or equal to 38C (100.4F), Mild Pain (1 - 3)  ALPRAZolam 0.25 milliGRAM(s) Oral two times a day PRN anxiety  nitroglycerin    2% Ointment 1.5 Inch(s) Transdermal every 6 hours PRN SBP> 160      Allergies    amlodipine (Unknown)  clonidine (Unknown)  hydrocodone (Unknown)  Levatol (Unknown)  Lipitor (Unknown)  Lotrel (Unknown)  methylPREDNISolone (Unknown)  morphine (Other)  Plaquenil (Unknown)  statins (Other (Unknown))  sulfa drugs (Rash)    Intolerances        REVIEW OF SYSTEMS:    CONSTITUTIONAL:  As per HPI.  HEENT:  Eyes:  No diplopia or blurred vision. ENT:  No earache, sore throat or runny nose.  CARDIOVASCULAR:  No pressure, squeezing, tightness, heaviness or aching about the chest, neck, axilla or epigastrium.  RESPIRATORY:  No cough, shortness of breath, PND or orthopnea.  GASTROINTESTINAL:  No nausea, vomiting or diarrhea.  GENITOURINARY:  No dysuria, frequency or urgency.  MUSCULOSKELETAL:  no joint pain, deformity, tenderness  EXTREMITIES: no clubbing cyanosis,edema  SKIN:  No change in skin, hair or nails.  NEUROLOGIC:  No paresthesias, fasciculations, seizures or weakness.  PSYCHIATRIC:  No disorder of thought or mood.  ENDOCRINE:  No heat or cold intolerance, polyuria or polydipsia.  HEMATOLOGICAL:  No easy bruising or bleedings:    Vital Signs Last 24 Hrs  T(C): 36.3 (21 Apr 2019 04:20), Max: 36.5 (20 Apr 2019 10:37)  T(F): 97.3 (21 Apr 2019 04:20), Max: 97.7 (20 Apr 2019 10:37)  HR: 65 (21 Apr 2019 04:20) (65 - 80)  BP: 133/45 (21 Apr 2019 04:20) (133/45 - 186/66)  BP(mean): --  RR: 18 (21 Apr 2019 04:20) (18 - 18)  SpO2: 100% (21 Apr 2019 04:20) (98% - 100%)    PHYSICAL EXAMINATION:  SKIN: no rashes  HEAD: NC/AT  EYES: PERRLA, EOMI  EARS: TM's intact  NOSE: no abnormalities  NECK:  Supple. No lymphadenopathy. Jugular venous pressure not elevated. Carotids equal.   HEART:   The cardiac impulse has a normal quality. Reg., Nl S1 and S2.  There are no murmurs, rubs or gallops noted  CHEST:  Chest is clear to auscultation. Normal respiratory effort.  ABDOMEN:  Soft and nontender.   EXTREMITIES:  no C/C/E  NEURO: AAO x 3, no focal deficts       LABS:                RADIOLOGY & ADDITIONAL TESTS:

## 2019-04-22 ENCOUNTER — TRANSCRIPTION ENCOUNTER (OUTPATIENT)
Age: 84
End: 2019-04-22

## 2019-04-22 VITALS
SYSTOLIC BLOOD PRESSURE: 140 MMHG | HEART RATE: 79 BPM | OXYGEN SATURATION: 98 % | RESPIRATION RATE: 18 BRPM | DIASTOLIC BLOOD PRESSURE: 67 MMHG | TEMPERATURE: 98 F

## 2019-04-22 LAB
ANION GAP SERPL CALC-SCNC: 6 MMOL/L — SIGNIFICANT CHANGE UP (ref 5–17)
BUN SERPL-MCNC: 20 MG/DL — SIGNIFICANT CHANGE UP (ref 7–23)
CALCIUM SERPL-MCNC: 9.1 MG/DL — SIGNIFICANT CHANGE UP (ref 8.5–10.1)
CHLORIDE SERPL-SCNC: 104 MMOL/L — SIGNIFICANT CHANGE UP (ref 96–108)
CO2 SERPL-SCNC: 26 MMOL/L — SIGNIFICANT CHANGE UP (ref 22–31)
CREAT SERPL-MCNC: 1.12 MG/DL — SIGNIFICANT CHANGE UP (ref 0.5–1.3)
GLUCOSE SERPL-MCNC: 100 MG/DL — HIGH (ref 70–99)
HCT VFR BLD CALC: 37.8 % — SIGNIFICANT CHANGE UP (ref 34.5–45)
HGB BLD-MCNC: 11.6 G/DL — SIGNIFICANT CHANGE UP (ref 11.5–15.5)
MCHC RBC-ENTMCNC: 28.6 PG — SIGNIFICANT CHANGE UP (ref 27–34)
MCHC RBC-ENTMCNC: 30.7 GM/DL — LOW (ref 32–36)
MCV RBC AUTO: 93.1 FL — SIGNIFICANT CHANGE UP (ref 80–100)
NRBC # BLD: 0 /100 WBCS — SIGNIFICANT CHANGE UP (ref 0–0)
PLATELET # BLD AUTO: 250 K/UL — SIGNIFICANT CHANGE UP (ref 150–400)
POTASSIUM SERPL-MCNC: 3.9 MMOL/L — SIGNIFICANT CHANGE UP (ref 3.5–5.3)
POTASSIUM SERPL-SCNC: 3.9 MMOL/L — SIGNIFICANT CHANGE UP (ref 3.5–5.3)
RBC # BLD: 4.06 M/UL — SIGNIFICANT CHANGE UP (ref 3.8–5.2)
RBC # FLD: 14.4 % — SIGNIFICANT CHANGE UP (ref 10.3–14.5)
SODIUM SERPL-SCNC: 136 MMOL/L — SIGNIFICANT CHANGE UP (ref 135–145)
WBC # BLD: 8.89 K/UL — SIGNIFICANT CHANGE UP (ref 3.8–10.5)
WBC # FLD AUTO: 8.89 K/UL — SIGNIFICANT CHANGE UP (ref 3.8–10.5)

## 2019-04-22 RX ORDER — METOPROLOL TARTRATE 50 MG
1 TABLET ORAL
Qty: 0 | Refills: 0 | DISCHARGE
Start: 2019-04-22

## 2019-04-22 RX ORDER — METOPROLOL TARTRATE 50 MG
1 TABLET ORAL
Qty: 0 | Refills: 0 | COMMUNITY

## 2019-04-22 RX ORDER — SPIRONOLACTONE 25 MG/1
1 TABLET, FILM COATED ORAL
Qty: 0 | Refills: 0 | COMMUNITY

## 2019-04-22 RX ADMIN — PANTOPRAZOLE SODIUM 40 MILLIGRAM(S): 20 TABLET, DELAYED RELEASE ORAL at 05:33

## 2019-04-22 RX ADMIN — Medication 50 MILLIGRAM(S): at 05:33

## 2019-04-22 RX ADMIN — HEPARIN SODIUM 5000 UNIT(S): 5000 INJECTION INTRAVENOUS; SUBCUTANEOUS at 05:33

## 2019-04-22 RX ADMIN — Medication 325 MILLIGRAM(S): at 12:34

## 2019-04-22 RX ADMIN — Medication 75 MICROGRAM(S): at 05:33

## 2019-04-22 RX ADMIN — Medication 1000 UNIT(S): at 12:34

## 2019-04-22 NOTE — PROGRESS NOTE ADULT - PROVIDER SPECIALTY LIST ADULT
Cardiology
Hospitalist
Cardiology

## 2019-04-22 NOTE — DISCHARGE NOTE PROVIDER - CARE PROVIDERS DIRECT ADDRESSES
[email or web address]

## 2019-04-22 NOTE — DISCHARGE NOTE PROVIDER - CARE PROVIDER_API CALL
Mayra Sánchez)  Cardiovascular Disease; Internal Medicine  [address] [place] [address]  Phone: [phone]  Fax: [phone]  Follow Up Time: 1 week    Mike Pires)  Cardiac Electrophysiology; Cardiovascular Disease  [address] [place] [address]  Phone: [phone]  Fax: [phone]  Follow Up Time: 2 weeks

## 2019-04-22 NOTE — DISCHARGE NOTE PROVIDER - HOSPITAL COURSE
HPI:    HPI:The patient is a  89 y/o female  with PMHx of Syncope (12/12/18 while in ),CAD s/p stentsx5, hypothyroidism, HTN, HLD, DJD multiple joints, Bladder CA s/p resection, Sty RUL, anxety disorder who had  a syncopal episode this AM after she got out of the shower. She state she felt weak  sat down and had LOC for few seconds. Family called 911        Hospital course: Ct head negative. Dopples negative for DVT. Pt found to have orthostatic hypotension. Treated with IV fluids with improvement. Followed by cardio and EP. Did not tolerate florinef. Encouraged PO hydtration and compression stockings. Cleared by cardio and EP for d/c and outpatient follow up. Seen by PT. Pt has home health aids. HPI:    HPI:The patient is a  89 y/o female  with PMHx of Syncope (12/12/18 while in ),CAD s/p stentsx5, hypothyroidism, HTN, HLD, DJD multiple joints, Bladder CA s/p resection, Sty RUL, anxety disorder who had  a syncopal episode this AM after she got out of the shower. She state she felt weak  sat down and had LOC for few seconds. Family called 911        Hospital course: Ct head negative. Dopples negative for DVT. Pt found to have orthostatic hypotension. Treated with IV fluids with improvement. Followed by cardio and EP. Did not tolerate florinef. Encouraged PO hydtration and compression stockings. Cleared by cardio and EP for d/c and outpatient follow up. Seen by PT. Pt has home health aids.         PCP/Cardio Dr. Sánchez's office notified of discharge

## 2019-04-22 NOTE — DISCHARGE NOTE PROVIDER - PROVIDER TOKENS
PROVIDER:[TOKEN:[[id number]],FOLLOWUP:[1 week]],PROVIDER:[TOKEN:[[id number]],FOLLOWUP:[2 weeks]]

## 2019-04-22 NOTE — PROGRESS NOTE ADULT - ASSESSMENT
89 y/o female  with PMHx of Syncope (12/12/18 while in ),CAD s/p stentsx5, hypothyroidism, HTN, HLD, DJD multiple joints, Bladder CA s/p resection, Sty RUL, anxiety disorder who had  a syncopal episode this AM after she got out of the shower. She state she felt weak  sat down and had LOC for few seconds.   On telemetry, has had a 2 second pause.     4/17/2019  echocardiogram.  orthostatic vital signs.   EP consult: one episode of pause and non conducted APC    4/18-  orthostatics noted on vitals. will give a fluid bolus today  physical therapy for the right knee  continue the lower dose of metoprolol.    4/19-  trial of florinef today  continue to monitor the orthostatic vital signs.   blood pressure goal is 150-160 systolic. A normal blood pressure would actually be too low for her.   physical therapy today.     4/20 - Tele reviewed, no AV block noted - only blocked PACs. Pt with significant headache now while on fludrocortisone, suggested to dc it myself and observe but patient insisted that EP gets involved in follow up and let EP team decide about it and also regarding other med options.
89 y/o female  with PMHx of Syncope (12/12/18 while in ),CAD s/p stentsx5, hypothyroidism, HTN, HLD, DJD multiple joints, Bladder CA s/p resection, Sty RUL, anxety disorder who had  a syncopal episode  A:  Syncope  ++Orthostasis  HTN  Anxiety                      Plan:      Echo reviewed  did not tolerate florinef  encourage PO hydration  compression stockings  EP consult noted. No intervention recc  cardio consult appreciated    Dispo: d/c home today and will follow up with PCP as outpatient
89 y/o female  with PMHx of Syncope (12/12/18 while in ),CAD s/p stentsx5, hypothyroidism, HTN, HLD, DJD multiple joints, Bladder CA s/p resection, Sty RUL, anxety disorder who had  a syncopal episode  A:  Syncope  APCs/Puuses  ++Orthostasis  HTN  Anxiety                      Plan:      Echo  Begin IVF for orthostasis  If ineffective may need to stop BB ans start fludro  EP consult ongoing  Check TSH
89 y/o female  with PMHx of Syncope (12/12/18 while in ),CAD s/p stentsx5, hypothyroidism, HTN, HLD, DJD multiple joints, Bladder CA s/p resection, Sty RUL, anxiety disorder who had  a syncopal episode this AM after she got out of the shower. She state she felt weak  sat down and had LOC for few seconds.   On telemetry, has had a 2 second pause.     4/17/2019  echocardiogram.  orthostatic vital signs.   EP consult: one episode of pause and non conducted APC    4/18-  orthostatics noted on vitals. will give a fluid bolus today  physical therapy for the right knee  continue the lower dose of metoprolol.    4/19-  trial of florinef today  continue to monitor the orthostatic vital signs.   blood pressure goal is 150-160 systolic. A normal blood pressure would actually be too low for her.   physical therapy today.
89 y/o female  with PMHx of Syncope (12/12/18 while in ),CAD s/p stentsx5, hypothyroidism, HTN, HLD, DJD multiple joints, Bladder CA s/p resection, Sty RUL, anxety disorder who had  a syncopal episode  A:  Syncope  ++Orthostasis  HTN  Anxiety                      Plan:      Echo read pending  Continue IVF for orthostasis  Check cortisol level to r/o adrenal insuff  EP consult noted. No intervention recc  Check TSH
89 y/o female  with PMHx of Syncope (12/12/18 while in ),CAD s/p stentsx5, hypothyroidism, HTN, HLD, DJD multiple joints, Bladder CA s/p resection, Sty RUL, anxety disorder who had  a syncopal episode  A:  Syncope  ++Orthostasis  HTN  Anxiety                      Plan:      Echo read pending  Continue IVF for orthostasis  Check cortisol level to r/o adrenal insuff: random cortisol level wnl  florinef trial  EP consult noted. No intervention recc      Dispo: monitor response after 1st dose of florinef. PT kavon
89 y/o female  with PMHx of Syncope (12/12/18 while in ),CAD s/p stentsx5, hypothyroidism, HTN, HLD, DJD multiple joints, Bladder CA s/p resection, Sty RUL, anxiety disorder who had  a syncopal episode this AM after she got out of the shower. She state she felt weak  sat down and had LOC for few seconds.   On telemetry, has had a 2 second pause.     4/17/2019  echocardiogram.  orthostatic vital signs.   EP consult: one episode of pause and non conducted APC    4/18-  orthostatics noted on vitals. will give a fluid bolus today  physical therapy for the right knee  continue the lower dose of metoprolol.
Assessment and Plan:   · Assessment		  89 y/o female  with PMHx of Syncope (12/12/18 while in ),CAD s/p stentsx5, hypothyroidism, HTN, HLD, DJD multiple joints, Bladder CA s/p resection, Sty RUL, anxety disorder who had  a syncopal episode  A:  Syncope  ++Orthostasis  HTN  Anxiety                      Plan:      - did not tolerate florinef  - cont to complain of weakness but did ambulate   - cardiology and EP signed off, however, patient does not want to be discharged.  - labs in am
Assessment and Plan:   · Assessment		  89 y/o female  with PMHx of Syncope (12/12/18 while in ),CAD s/p stentsx5, hypothyroidism, HTN, HLD, DJD multiple joints, Bladder CA s/p resection, Sty RUL, anxety disorder who had  a syncopal episode  A:  Syncope  ++Orthostasis  HTN  Anxiety                      Plan:      Echo read pending  Continue IVF for orthostasis  Check cortisol level to r/o adrenal insuff: random cortisol level wnl  florinef trial discontinued due to headache  EP consult noted. No intervention recc

## 2019-04-22 NOTE — PROGRESS NOTE ADULT - SUBJECTIVE AND OBJECTIVE BOX
Patient is a 89y old  Female who presents with a chief complaint of Syncope (17 Apr 2019 10:20)      SUBJECTIVE:   HPI:  HPI:The patient is a  89 y/o female  with PMHx of Syncope (12/12/18 while in ),CAD s/p stentsx5, hypothyroidism, HTN, HLD, DJD multiple joints, Bladder CA s/p resection, Sty RUL, anxety disorder who had  a syncopal episode this AM after she got out of the shower. She state she felt weak  sat down and had LOC for few seconds. Family called 911        Sub:  +orthostatics but improved. Still with fatigue but improving. Son at bedside. Cleared by cardio and EP for d/c yesterday.     REVIEW OF SYSTEMS:    CONSTITUTIONAL: No weakness, fevers or chills  EYES/ENT: No visual changes;  No vertigo or throat pain   NECK: No pain or stiffness  RESPIRATORY: No cough, wheezing, hemoptysis; No shortness of breath  CARDIOVASCULAR: No chest pain or palpitations  GASTROINTESTINAL: No abdominal or epigastric pain. No nausea, vomiting, or hematemesis; No diarrhea or constipation. No melena or hematochezia.  GENITOURINARY: No dysuria, frequency or hematuria  NEUROLOGICAL: No numbness or weakness  SKIN: No itching, burning, rashes, or lesions   All other review of systems is negative unless indicated above    Vital Signs Last 24 Hrs  T(C): 36.5 (22 Apr 2019 10:45), Max: 36.5 (22 Apr 2019 10:45)  T(F): 97.7 (22 Apr 2019 10:45), Max: 97.7 (22 Apr 2019 10:45)  HR: 79 (22 Apr 2019 10:45) (71 - 79)  BP: 140/67 (22 Apr 2019 10:45) (140/67 - 168/57)  BP(mean): --  RR: 18 (22 Apr 2019 10:45) (18 - 18)  SpO2: 98% (22 Apr 2019 10:45) (98% - 99%)    PHYSICAL EXAM:    Constitutional: NAD, awake and alert, well-developed  HEENT: PERR, EOMI, Normal Hearing, MMM  Neck: Soft and supple, No LAD, No JVD  Respiratory: Breath sounds are clear bilaterally, No wheezing, rales or rhonchi  Cardiovascular: S1 and S2, regular rate and rhythm, no Murmurs, gallops or rubs  Gastrointestinal: Bowel Sounds present, soft, nontender, nondistended, no guarding, no rebound  Extremities: No peripheral edema  Vascular: 2+ peripheral pulses  Neurological: A/O x 3, no focal deficits  Musculoskeletal: 5/5 strength b/l upper and lower extremities  Skin: No rashes      Labs                              11.6   8.89  )-----------( 250      ( 22 Apr 2019 05:45 )             37.8     22 Apr 2019 05:45    136    |  104    |  20     ----------------------------<  100    3.9     |  26     |  1.12     Ca    9.1        22 Apr 2019 05:45        CAPILLARY BLOOD GLUCOSE

## 2019-04-26 DIAGNOSIS — Z85.51 PERSONAL HISTORY OF MALIGNANT NEOPLASM OF BLADDER: ICD-10-CM

## 2019-04-26 DIAGNOSIS — Z95.5 PRESENCE OF CORONARY ANGIOPLASTY IMPLANT AND GRAFT: ICD-10-CM

## 2019-04-26 DIAGNOSIS — H00.011 HORDEOLUM EXTERNUM RIGHT UPPER EYELID: ICD-10-CM

## 2019-04-26 DIAGNOSIS — I95.1 ORTHOSTATIC HYPOTENSION: ICD-10-CM

## 2019-04-26 DIAGNOSIS — Z88.8 ALLERGY STATUS TO OTHER DRUGS, MEDICAMENTS AND BIOLOGICAL SUBSTANCES: ICD-10-CM

## 2019-04-26 DIAGNOSIS — E78.5 HYPERLIPIDEMIA, UNSPECIFIED: ICD-10-CM

## 2019-04-26 DIAGNOSIS — Z85.42 PERSONAL HISTORY OF MALIGNANT NEOPLASM OF OTHER PARTS OF UTERUS: ICD-10-CM

## 2019-04-26 DIAGNOSIS — E03.9 HYPOTHYROIDISM, UNSPECIFIED: ICD-10-CM

## 2019-04-26 DIAGNOSIS — F41.9 ANXIETY DISORDER, UNSPECIFIED: ICD-10-CM

## 2019-04-26 DIAGNOSIS — K21.9 GASTRO-ESOPHAGEAL REFLUX DISEASE WITHOUT ESOPHAGITIS: ICD-10-CM

## 2019-04-26 DIAGNOSIS — I10 ESSENTIAL (PRIMARY) HYPERTENSION: ICD-10-CM

## 2019-04-26 DIAGNOSIS — Z88.2 ALLERGY STATUS TO SULFONAMIDES: ICD-10-CM

## 2019-04-26 DIAGNOSIS — I25.10 ATHEROSCLEROTIC HEART DISEASE OF NATIVE CORONARY ARTERY WITHOUT ANGINA PECTORIS: ICD-10-CM

## 2019-04-26 DIAGNOSIS — H35.30 UNSPECIFIED MACULAR DEGENERATION: ICD-10-CM

## 2019-05-01 ENCOUNTER — NON-APPOINTMENT (OUTPATIENT)
Age: 84
End: 2019-05-01

## 2019-05-01 ENCOUNTER — APPOINTMENT (OUTPATIENT)
Dept: ELECTROPHYSIOLOGY | Facility: CLINIC | Age: 84
End: 2019-05-01
Payer: MEDICARE

## 2019-05-01 VITALS
OXYGEN SATURATION: 98 % | BODY MASS INDEX: 30.32 KG/M2 | DIASTOLIC BLOOD PRESSURE: 90 MMHG | HEIGHT: 62.5 IN | SYSTOLIC BLOOD PRESSURE: 190 MMHG | WEIGHT: 169 LBS | HEART RATE: 71 BPM

## 2019-05-01 DIAGNOSIS — I95.1 ORTHOSTATIC HYPOTENSION: ICD-10-CM

## 2019-05-01 PROCEDURE — 99215 OFFICE O/P EST HI 40 MIN: CPT

## 2019-05-01 RX ORDER — DULOXETINE HYDROCHLORIDE 30 MG/1
30 CAPSULE, DELAYED RELEASE PELLETS ORAL
Qty: 30 | Refills: 0 | Status: COMPLETED | COMMUNITY
Start: 2019-01-04

## 2019-05-01 RX ORDER — ASPIRIN 325 MG/1
325 TABLET, FILM COATED ORAL
Refills: 0 | Status: ACTIVE | COMMUNITY

## 2019-05-01 RX ORDER — MIDODRINE HYDROCHLORIDE 2.5 MG/1
2.5 TABLET ORAL
Qty: 60 | Refills: 3 | Status: ACTIVE | COMMUNITY
Start: 2019-05-01 | End: 1900-01-01

## 2019-05-01 RX ORDER — BACITRACIN 500 [USP'U]/G
500 OINTMENT OPHTHALMIC
Qty: 4 | Refills: 0 | Status: COMPLETED | COMMUNITY
Start: 2019-04-01

## 2019-05-01 RX ORDER — SPIRONOLACTONE 25 MG/1
25 TABLET ORAL
Qty: 90 | Refills: 0 | Status: COMPLETED | COMMUNITY
Start: 2019-01-04

## 2019-05-01 RX ORDER — PANTOPRAZOLE 40 MG/1
40 TABLET, DELAYED RELEASE ORAL
Qty: 90 | Refills: 0 | Status: ACTIVE | COMMUNITY
Start: 2018-12-22

## 2019-05-01 RX ORDER — METOPROLOL SUCCINATE 50 MG/1
50 TABLET, EXTENDED RELEASE ORAL
Qty: 180 | Refills: 0 | Status: ACTIVE | COMMUNITY
Start: 2018-11-06

## 2019-05-01 RX ORDER — ALPRAZOLAM 0.25 MG/1
0.25 TABLET ORAL
Qty: 60 | Refills: 0 | Status: ACTIVE | COMMUNITY
Start: 2018-11-09

## 2019-05-01 RX ORDER — DICLOFENAC SODIUM 10 MG/G
1 GEL TOPICAL
Qty: 100 | Refills: 0 | Status: COMPLETED | COMMUNITY
Start: 2019-03-29

## 2019-05-01 RX ORDER — MENTHOL/CAMPHOR 0.5 %-0.5%
1000 LOTION (ML) TOPICAL
Refills: 0 | Status: COMPLETED | COMMUNITY

## 2019-05-02 NOTE — CARDIOLOGY SUMMARY
[LVEF ___%] : LVEF [unfilled]% [___] : [unfilled] [None] : no pulmonary hypertension [Mild] : mild mitral regurgitation [Normal] : normal LA size

## 2019-05-02 NOTE — ASSESSMENT
[FreeTextEntry1] : /8 sitting HR 77 /70 standing HR 81.  No symptoms with standing. \par \par This is a 89-year-old woman with orthostatic hypotension, who was initially tried on fludrocortisone, but did not tolerate due to a headache after the first test. She does not wish to give Florinef. Another try. She is clearly symptomatic from her orthostatic hypotension, and according to her and her family. She is compliant with hydration. Will try low dose Midrin and start with 2.5 mg once a day with followup for symptoms.

## 2019-05-02 NOTE — PHYSICAL EXAM
[General Appearance - Well Developed] : well developed [Normal Appearance] : normal appearance [Well Groomed] : well groomed [General Appearance - Well Nourished] : well nourished [No Deformities] : no deformities [General Appearance - In No Acute Distress] : no acute distress [Eyelids - No Xanthelasma] : the eyelids demonstrated no xanthelasmas [Normal Conjunctiva] : the conjunctiva exhibited no abnormalities [No Oral Pallor] : no oral pallor [Normal Oral Mucosa] : normal oral mucosa [No Oral Cyanosis] : no oral cyanosis [Normal Jugular Venous A Waves Present] : normal jugular venous A waves present [Normal Jugular Venous V Waves Present] : normal jugular venous V waves present [No Jugular Venous Zuñiga A Waves] : no jugular venous zuñiga A waves [Exaggerated Use Of Accessory Muscles For Inspiration] : no accessory muscle use [Respiration, Rhythm And Depth] : normal respiratory rhythm and effort [Auscultation Breath Sounds / Voice Sounds] : lungs were clear to auscultation bilaterally [Heart Rate And Rhythm] : heart rate and rhythm were normal [Heart Sounds] : normal S1 and S2 [Murmurs] : no murmurs present [Abdomen Tenderness] : non-tender [Abdomen Soft] : soft [Abdomen Mass (___ Cm)] : no abdominal mass palpated [Abnormal Walk] : normal gait [Gait - Sufficient For Exercise Testing] : the gait was sufficient for exercise testing [Petechial Hemorrhages (___cm)] : no petechial hemorrhages [Nail Clubbing] : no clubbing of the fingernails [Cyanosis, Localized] : no localized cyanosis [] : no ischemic changes [Affect] : the affect was normal [Mood] : the mood was normal [Oriented To Time, Place, And Person] : oriented to person, place, and time [No Anxiety] : not feeling anxious

## 2019-05-02 NOTE — REVIEW OF SYSTEMS
[Headache] : headache [Feeling Fatigued] : feeling fatigued [Blurry Vision] : blurred vision [see HPI] : see HPI [Joint Pain] : joint pain [Dizziness] : dizziness [Tremor] : a tremor was seen [Confusion] : confusion was observed [Negative] : Heme/Lymph

## 2019-05-02 NOTE — HISTORY OF PRESENT ILLNESS
[FreeTextEntry1] : This is a 89-year-old woman with a past history of syncope, coronary artery disease, status post multiple stents, hypothyroidism, hypertension, orthostatic hypotension, hyperlipidemia, degenerative joint disease, bladder cancer, status post resection, who was recently hospitalized for an episode of syncope and found to have profound orthostatic hypotension. She was tried on Florinef. Received one dose complained of headaches and the Florinef was discontinued. She now presents with continued symptoms. She reports that she is drinking fluids and has increase the salt in her diet. She has periodic lightheadedness, dizziness, shaking, and a feeling of anxiety. She's had chest pressure with exertion in the past, though has been asymptomatic recently. She denies any edema, orthopnea, or PND.

## 2019-05-21 ENCOUNTER — APPOINTMENT (OUTPATIENT)
Dept: ELECTROPHYSIOLOGY | Facility: CLINIC | Age: 84
End: 2019-05-21

## 2019-06-12 RX ORDER — ACETAMINOPHEN 500 MG
975 TABLET ORAL ONCE
Refills: 0 | Status: COMPLETED | OUTPATIENT
Start: 2019-06-13 | End: 2019-06-13

## 2019-06-13 ENCOUNTER — RESULT REVIEW (OUTPATIENT)
Age: 84
End: 2019-06-13

## 2019-06-13 ENCOUNTER — OUTPATIENT (OUTPATIENT)
Dept: OUTPATIENT SERVICES | Facility: HOSPITAL | Age: 84
LOS: 1 days | Discharge: HOME | End: 2019-06-13
Payer: MEDICARE

## 2019-06-13 VITALS
DIASTOLIC BLOOD PRESSURE: 91 MMHG | WEIGHT: 175.93 LBS | SYSTOLIC BLOOD PRESSURE: 199 MMHG | RESPIRATION RATE: 14 BRPM | TEMPERATURE: 98 F | OXYGEN SATURATION: 98 % | HEART RATE: 73 BPM

## 2019-06-13 DIAGNOSIS — Z90.710 ACQUIRED ABSENCE OF BOTH CERVIX AND UTERUS: Chronic | ICD-10-CM

## 2019-06-13 PROCEDURE — 88307 TISSUE EXAM BY PATHOLOGIST: CPT | Mod: 26

## 2019-06-13 PROCEDURE — 88108 CYTOPATH CONCENTRATE TECH: CPT | Mod: 26

## 2019-06-13 PROCEDURE — 99232 SBSQ HOSP IP/OBS MODERATE 35: CPT

## 2019-06-13 PROCEDURE — 88305 TISSUE EXAM BY PATHOLOGIST: CPT | Mod: 26

## 2019-06-13 RX ORDER — ACETAMINOPHEN 500 MG
650 TABLET ORAL EVERY 6 HOURS
Refills: 0 | Status: DISCONTINUED | OUTPATIENT
Start: 2019-06-13 | End: 2019-06-14

## 2019-06-13 RX ORDER — SODIUM CHLORIDE 9 MG/ML
3 INJECTION INTRAMUSCULAR; INTRAVENOUS; SUBCUTANEOUS EVERY 8 HOURS
Refills: 0 | Status: DISCONTINUED | OUTPATIENT
Start: 2019-06-13 | End: 2019-06-13

## 2019-06-13 RX ORDER — FENTANYL CITRATE 50 UG/ML
50 INJECTION INTRAVENOUS
Refills: 0 | Status: DISCONTINUED | OUTPATIENT
Start: 2019-06-13 | End: 2019-06-13

## 2019-06-13 RX ORDER — DIPHENHYDRAMINE HCL 50 MG
12.5 CAPSULE ORAL ONCE
Refills: 0 | Status: COMPLETED | OUTPATIENT
Start: 2019-06-13 | End: 2019-06-13

## 2019-06-13 RX ORDER — FERROUS SULFATE 325(65) MG
1 TABLET ORAL
Qty: 0 | Refills: 0 | DISCHARGE

## 2019-06-13 RX ORDER — ALPRAZOLAM 0.25 MG
0.25 TABLET ORAL
Refills: 0 | Status: DISCONTINUED | OUTPATIENT
Start: 2019-06-13 | End: 2019-06-14

## 2019-06-13 RX ORDER — LABETALOL HCL 100 MG
5 TABLET ORAL ONCE
Refills: 0 | Status: DISCONTINUED | OUTPATIENT
Start: 2019-06-13 | End: 2019-06-13

## 2019-06-13 RX ORDER — METOPROLOL TARTRATE 50 MG
50 TABLET ORAL
Refills: 0 | Status: DISCONTINUED | OUTPATIENT
Start: 2019-06-14 | End: 2019-06-14

## 2019-06-13 RX ORDER — LEVOTHYROXINE SODIUM 125 MCG
75 TABLET ORAL DAILY
Refills: 0 | Status: DISCONTINUED | OUTPATIENT
Start: 2019-06-13 | End: 2019-06-14

## 2019-06-13 RX ORDER — METOPROLOL TARTRATE 50 MG
25 TABLET ORAL ONCE
Refills: 0 | Status: COMPLETED | OUTPATIENT
Start: 2019-06-13 | End: 2019-06-13

## 2019-06-13 RX ORDER — HYDRALAZINE HCL 50 MG
10 TABLET ORAL ONCE
Refills: 0 | Status: DISCONTINUED | OUTPATIENT
Start: 2019-06-13 | End: 2019-06-13

## 2019-06-13 RX ORDER — ASPIRIN/CALCIUM CARB/MAGNESIUM 324 MG
325 TABLET ORAL DAILY
Refills: 0 | Status: DISCONTINUED | OUTPATIENT
Start: 2019-06-14 | End: 2019-06-14

## 2019-06-13 RX ORDER — OXYCODONE HYDROCHLORIDE 5 MG/1
5 TABLET ORAL ONCE
Refills: 0 | Status: DISCONTINUED | OUTPATIENT
Start: 2019-06-13 | End: 2019-06-13

## 2019-06-13 RX ORDER — SODIUM CHLORIDE 9 MG/ML
1000 INJECTION, SOLUTION INTRAVENOUS
Refills: 0 | Status: DISCONTINUED | OUTPATIENT
Start: 2019-06-13 | End: 2019-06-13

## 2019-06-13 RX ORDER — ONDANSETRON 8 MG/1
4 TABLET, FILM COATED ORAL EVERY 6 HOURS
Refills: 0 | Status: DISCONTINUED | OUTPATIENT
Start: 2019-06-13 | End: 2019-06-14

## 2019-06-13 RX ORDER — METOPROLOL TARTRATE 50 MG
50 TABLET ORAL DAILY
Refills: 0 | Status: DISCONTINUED | OUTPATIENT
Start: 2019-06-14 | End: 2019-06-14

## 2019-06-13 RX ORDER — ONDANSETRON 8 MG/1
4 TABLET, FILM COATED ORAL ONCE
Refills: 0 | Status: DISCONTINUED | OUTPATIENT
Start: 2019-06-13 | End: 2019-06-13

## 2019-06-13 RX ORDER — LORATADINE 10 MG/1
10 TABLET ORAL DAILY
Refills: 0 | Status: DISCONTINUED | OUTPATIENT
Start: 2019-06-13 | End: 2019-06-14

## 2019-06-13 RX ADMIN — Medication 12.5 MILLIGRAM(S): at 14:16

## 2019-06-13 RX ADMIN — Medication 650 MILLIGRAM(S): at 17:35

## 2019-06-13 RX ADMIN — Medication 650 MILLIGRAM(S): at 22:58

## 2019-06-13 RX ADMIN — Medication 0.25 MILLIGRAM(S): at 18:27

## 2019-06-13 RX ADMIN — Medication 25 MILLIGRAM(S): at 17:45

## 2019-06-13 RX ADMIN — LORATADINE 10 MILLIGRAM(S): 10 TABLET ORAL at 17:45

## 2019-06-13 RX ADMIN — Medication 25 MILLIGRAM(S): at 17:09

## 2019-06-13 NOTE — CHART NOTE - NSCHARTNOTEFT_GEN_A_CORE
89 year old female POD 0 s/p TURBT for bladder cancer presents with elevated BP.  As per nurse patient's /62. Patient seen and examined at bedside. Complains of headache and facial flushing/burning.  As per patient she experienced similar symptoms after her last surgery.  Per patient's family, patient's BP normal runs high at a systolic of ~170.  Patient also has a history of orthostatic hypotension. Patient received claritan for facial symptoms which provided no relief. Patient also complains of suprapubic pain.  Patient denies chest pain, SOB, N/V. Patient with multiple drug allergies.    Allergies    amlodipine (Unknown)  clonidine (Unknown)  hydrocodone (Unknown)  Levatol (Unknown)  Lipitor (Unknown)  Lotrel (Unknown)  methylPREDNISolone (Unknown)  morphine (Other)  Plaquenil (Unknown)  statins (Other (Unknown))  sulfa drugs (Rash)        MEDICATIONS  (STANDING):  levothyroxine 75 MICROGram(s) Oral daily  loratadine 10 milliGRAM(s) Oral daily    MEDICATIONS  (PRN):  acetaminophen   Tablet .. 650 milliGRAM(s) Oral every 6 hours PRN Temp greater or equal to 38C (100.4F), Mild Pain (1 - 3)  ALPRAZolam 0.25 milliGRAM(s) Oral two times a day PRN anxiety  ondansetron Injectable 4 milliGRAM(s) IV Push every 6 hours PRN Nausea and/or Vomiting      ICU Vital Signs Last 24 Hrs  T(C): 36.7 (13 Jun 2019 20:56), Max: 37.2 (13 Jun 2019 11:18)  T(F): 98.1 (13 Jun 2019 20:56), Max: 98.9 (13 Jun 2019 11:18)  HR: 80 (13 Jun 2019 20:56) (66 - 80)  BP: 191/69 (13 Jun 2019 20:56) (145/61 - 208/76)  BP(mean): --  ABP: --  ABP(mean): --  RR: 15 (13 Jun 2019 20:56) (13 - 16)  SpO2: 99% (13 Jun 2019 20:56) (98% - 100%)      Gen: NAD, appear uncomfortable in bed  Heart: S1/S2  Lungs: CTAB  : suprapubic tenderness  Skin: face with malar erythema - no warmth. trunk and extremities without rashes.  Neuro: A&Ox3    89 year old female POD 0 s/p TURBT for bladder cancer presents with elevated BP & suprapubic pain, probable urinary retention  - patient sat up in bed - repeat /55  - medicine resident notified  - d/w Dr. Cano - verma placed with UOP >500cc  - will continue to monitor

## 2019-06-13 NOTE — CONSULT NOTE ADULT - SUBJECTIVE AND OBJECTIVE BOX
c/c: I think i'm having an allergic reaction    HPI: 89F, pmh of CAD, stents X 5, HTN, HLD, Hypothyroidism, bladder ca s/p resection, anxiety, orthostatic hypotension causing syncope, who underwent TURBT today. Post procedure she felt her cheeks were warm and red and has had elevated blood pressures. She also has had 3 episodes of diarrhea. She has been able to void. She took her bb this am along with protonix, synthroid and MVI. She received labetolol IV while here for elevated bp. +headache. Ate two cookies and has been drinking fluids.   She denies cp/sob/feeling of throat closing. No fevers/chills/rigors. No n/v/abd pain. +    ROS: all 10 systems reviewed and is as above otherwise negative.    PMH: as above  PSH: TURBT, Right ankle fracture surgery, hysterectomy  F/H: father-brain tumor, mother-MI  Social: no tobacco/etoh  Allergies  amlodipine (Unknown)  clonidine (Unknown)  hydrocodone (Unknown)  Levatol (Unknown)  Lipitor (Unknown)  Lotrel (Unknown)  methylPREDNISolone (Unknown)  morphine (Other)  Plaquenil (Unknown)  statins (Other (Unknown))  sulfa drugs (Rash)    Home meds: see med rec.    Vital Signs Last 24 Hrs  T(C): 37.2 (13 Jun 2019 11:18), Max: 37.2 (13 Jun 2019 11:18)  T(F): 98.9 (13 Jun 2019 11:18), Max: 98.9 (13 Jun 2019 11:18)  HR: 70 (13 Jun 2019 15:29) (66 - 74)  BP: 194/80 (13 Jun 2019 15:29) (145/61 - 208/76)  RR: 14 (13 Jun 2019 15:29) (13 - 16)  SpO2: 100% (13 Jun 2019 15:29) (98% - 100%)    PHYSICAL EXAM:    GENERAL: Comfortable, slightly anxious, no acute distress   HEAD:  Normocephalic, atraumatic  EYES: EOMI, PERRLA  HEENT: Moist mucous membranes  NECK: Supple, No JVD  NERVOUS SYSTEM:  Alert & Oriented X3, Motor Strength 5/5 B/L upper and lower extremities  CHEST/LUNG: Clear to auscultation bilaterally  HEART: Regular rate and rhythm  ABDOMEN: Soft, Nontender, Nondistended, Bowel sounds present  GENITOURINARY: Voiding, no palpable bladder  EXTREMITIES:   No clubbing, cyanosis, or edema  MUSCULOSKELETAL- No muscle tenderness, no joint tenderness  SKIN-no rash    LABS:  none    MEDICATIONS  (STANDING):  acetaminophen   Tablet .. 975 milliGRAM(s) Oral once  lactated ringers. 1000 milliLiter(s) (75 mL/Hr) IV Continuous <Continuous>  loratadine 10 milliGRAM(s) Oral daily    MEDICATIONS  (PRN):  fentaNYL    Injectable 50 MICROGram(s) IV Push every 5 minutes PRN Severe Pain (7 - 10)  ondansetron Injectable 4 milliGRAM(s) IV Push once PRN Nausea and/or Vomiting  oxyCODONE    IR 5 milliGRAM(s) Oral once PRN Moderate Pain (4 - 6)    ASSESSMENT AND PLAN:  89f, PMH AS ABOVE WITH:    1. Uncontrolled HTN:  -would observe overnight  -give extra dose bb now.  -claritin for possible allergic reaction  -xanax for anxiety.  -dc ivf.     2. Diarrhea:  -?reaction to abx.  -encourage po fluids.    3.  HLD:  -statin    4. Hypothyroid:  -synthroid    5. Bladder ca s/p tumor resection:  -f/u path    6. CAd/stents:  -asa/statin/bb    7. Dispo:  observe overnight as 23 hour  d/w Dr. Cano, RN in [facility] c/c: I think i'm having an allergic reaction    HPI: 89F, pmh of CAD, stents X 5, HTN, HLD, Hypothyroidism, bladder ca s/p resection, anxiety, orthostatic hypotension causing syncope, who underwent TURBT today. Post procedure she felt her cheeks were warm and red and has had elevated blood pressures. She also has had 3 episodes of diarrhea. She has been able to void. She took her bb this am along with protonix, synthroid and MVI. She received labetolol IV while here for elevated bp. +headache. Ate two cookies and has been drinking fluids.   She denies cp/sob/feeling of throat closing. No fevers/chills/rigors. No n/v/abd pain.    ROS: all 10 systems reviewed and is as above otherwise negative.    PMH: as above  PSH: TURBT, Right ankle fracture surgery, hysterectomy  F/H: father-brain tumor, mother-MI  Social: no tobacco/etoh  Allergies  amlodipine (Unknown)  clonidine (Unknown)  hydrocodone (Unknown)  Levatol (Unknown)  Lipitor (Unknown)  Lotrel (Unknown)  methylPREDNISolone (Unknown)  morphine (Other)  Plaquenil (Unknown)  statins (Other (Unknown))  sulfa drugs (Rash)    Home meds: see med rec.    Vital Signs Last 24 Hrs  T(C): 37.2 (13 Jun 2019 11:18), Max: 37.2 (13 Jun 2019 11:18)  T(F): 98.9 (13 Jun 2019 11:18), Max: 98.9 (13 Jun 2019 11:18)  HR: 70 (13 Jun 2019 15:29) (66 - 74)  BP: 194/80 (13 Jun 2019 15:29) (145/61 - 208/76)  RR: 14 (13 Jun 2019 15:29) (13 - 16)  SpO2: 100% (13 Jun 2019 15:29) (98% - 100%)    PHYSICAL EXAM:    GENERAL: Comfortable, slightly anxious, no acute distress   HEAD:  Normocephalic, atraumatic  EYES: EOMI, PERRLA  HEENT: Moist mucous membranes  NECK: Supple, No JVD  NERVOUS SYSTEM:  Alert & Oriented X3, Motor Strength 5/5 B/L upper and lower extremities  CHEST/LUNG: Clear to auscultation bilaterally  HEART: Regular rate and rhythm  ABDOMEN: Soft, Nontender, Nondistended, Bowel sounds present  GENITOURINARY: Voiding, no palpable bladder  EXTREMITIES:   No clubbing, cyanosis, or edema  MUSCULOSKELETAL- No muscle tenderness, no joint tenderness  SKIN-no rash    LABS:  none    MEDICATIONS  (STANDING):  acetaminophen   Tablet .. 975 milliGRAM(s) Oral once  lactated ringers. 1000 milliLiter(s) (75 mL/Hr) IV Continuous <Continuous>  loratadine 10 milliGRAM(s) Oral daily    MEDICATIONS  (PRN):  fentaNYL    Injectable 50 MICROGram(s) IV Push every 5 minutes PRN Severe Pain (7 - 10)  ondansetron Injectable 4 milliGRAM(s) IV Push once PRN Nausea and/or Vomiting  oxyCODONE    IR 5 milliGRAM(s) Oral once PRN Moderate Pain (4 - 6)    ASSESSMENT AND PLAN:  89f, PMH AS ABOVE WITH:    1. Uncontrolled HTN:  -would observe overnight  -give extra dose bb now.  -would not be too aggressive with correction of bp given h/o orthostatic hypotension.   -claritin for possible allergic reaction  -xanax for anxiety.  -dc ivf.     2. Diarrhea:  -?reaction to abx.  -encourage po fluids.    3. Orthostatic hypotension:  -has not tolerated florinef in the past.   -compression stockings when out of bed.  -encourage po fluids  -keep head end of bed elevated at least 30 degrees  -change positions very gradually    4.  HLD:  -statin    5. Hypothyroid:  -synthroid    6. Bladder ca s/p tumor resection:  -f/u path    7. CAd/stents:  -asa/statin/bb    8. Dispo:  observe overnight as 23 hour  d/w Dr. Cano, RN in [facility]

## 2019-06-13 NOTE — ASU PATIENT PROFILE, ADULT - PMH
CAD (coronary artery disease)    Endometrial adenocarcinoma    GERD (gastroesophageal reflux disease)    [place] (hard of hearing)    HTN (hypertension)    Hyperlipidemia    Hypothyroid    Macular degeneration    Stented coronary artery

## 2019-06-13 NOTE — BRIEF OPERATIVE NOTE - SPECIMENS
1. urine cytology, 2. bladder tumor, 3. deep margin of bladder tumor, 4. right lateral wall of bladder, 5. dome of bladder, 6. left lateral wall of bladder, 7. trigone of bladder

## 2019-06-13 NOTE — BRIEF OPERATIVE NOTE - NSICDXBRIEFPOSTOP_GEN_ALL_CORE_FT
POST-OP DIAGNOSIS:  Abnormal urine cytology 13-Jun-2019 11:11:54  John Cano  Bladder cancer 13-Jun-2019 11:11:39  John Cano

## 2019-06-13 NOTE — BRIEF OPERATIVE NOTE - NSICDXBRIEFPROCEDURE_GEN_ALL_CORE_FT
PROCEDURES:  Insertion of Valdes catheter 13-Jun-2019 11:13:37  John Cano  Cystoscopy, with bladder biopsy 13-Jun-2019 11:11:19  John Cano  Cystourethroscopy with bladder tumor resection, medium 13-Jun-2019 11:11:09  John Cano

## 2019-06-13 NOTE — CHART NOTE - NSCHARTNOTEFT_GEN_A_CORE
I was called by Surgery PA to evaluate pt with elevated BP of 198/56 who is also complaining of headache, facial flushing. Pt is s/p bladder ca resection (TURBT) today. Hospitalist, Dr. Partida was consulted on pt s/p OR procedure as pt complained of cheeks feeling warm and with elevated BP (Please see consult note).  Pt with h/o CAD (with stents x5), htn, hld, orthostatic Hypotension.    Pt was seen and examined at bedside, daughter was at bedside. Pt reported feeling flushed on the cheeks, reported headache, reported having shaking of the hands earlier Pt is also c/o pain at surgical wound site. Pt denied CP/palpitation, dizziness and SOB when asked.    /56 (RN advised to have vitals put in computer)    PE: pt is awake, lying in bed with head of bed elevated; in no acute discomfort  Face: flushed cheeks L>R  Eyes: PERRL, EOMI  Lungs: clear on auscultation b/l  CV: regular rate, normal s1/s2  Neurologic: alert and oriented x3, +symmetry of face, speech fluent      A/P:  89 yo female s/p TURBT with elevated BP and sxs, concerning for hypertensive emergency.  - Above case discussed with senior resident, agrees with STAT labetalol 5mg IV x1 dose  - STAT EKG  - Repeat BP s/p IV med to reevaluate BP.  - Surgical resident is on the floor- made aware of above plan. Surgical resident also discussing with surgeon about pain management overnight for pt.  - Pt's RN made aware of above plan I was called by Surgery PA to evaluate pt with elevated BP of 198/56 who is also complaining of headache, facial flushing. Pt is s/p bladder ca resection (TURBT) today. Hospitalist, Dr. Partida was consulted on pt s/p OR procedure as pt complained of cheeks feeling warm and with elevated BP (Please see consult note).  Pt with h/o CAD (with stents x5), htn, hld, orthostatic Hypotension.    Pt was seen and examined at bedside, daughter was at bedside. Pt reported feeling flushed on the cheeks, reported headache, reported having shaking of the hands earlier Pt is also c/o pain at surgical wound site. Pt denied CP/palpitation, dizziness and SOB when asked.    /56 (RN advised to have vitals put in computer)    PE: pt is awake, lying in bed with head of bed elevated; in no acute discomfort  Face: flushed cheeks L>R  Eyes: PERRL, EOMI  Lungs: clear on auscultation b/l  CV: regular rate, normal s1/s2  Neurologic: alert and oriented x3, +symmetry of face, speech fluent      A/P:  89 yo female s/p TURBT with elevated BP and sxs, concerning for hypertensive emergency, flushing and HA can also be secondary to s/p anesthesia.  - Above case discussed with senior resident, agrees with STAT labetalol 5mg IV x1 dose  - STAT EKG  - Repeat BP s/p IV med to reevaluate BP.  - Surgical resident is on the floor- made aware of above plan. Surgical resident also discussing with surgeon about pain management overnight for pt.  - Pt's RN made aware of above plan

## 2019-06-13 NOTE — BRIEF OPERATIVE NOTE - NSICDXBRIEFPREOP_GEN_ALL_CORE_FT
PRE-OP DIAGNOSIS:  Bladder cancer 13-Jun-2019 11:11:35  John Cano  Abnormal urine cytology 13-Jun-2019 11:11:50  John Cano

## 2019-06-13 NOTE — ASU PREOP CHECKLIST - BLOOD AVAILABLE
Pt c/o rt jaw dental pain x 2 days. Emergency Department Nursing Plan of Care       The Nursing Plan of Care is developed from the Nursing assessment and Emergency Department Attending provider initial evaluation. The plan of care may be reviewed in the ED Provider note.     The Plan of Care was developed with the following considerations:   Patient / Family readiness to learn indicated by:verbalized understanding  Persons(s) to be included in education: patient  Barriers to Learning/Limitations:No    Signed     Blanche Dominguez RN    7/9/2017   12:20 PM yes

## 2019-06-14 ENCOUNTER — TRANSCRIPTION ENCOUNTER (OUTPATIENT)
Age: 84
End: 2019-06-14

## 2019-06-14 VITALS
SYSTOLIC BLOOD PRESSURE: 169 MMHG | OXYGEN SATURATION: 99 % | RESPIRATION RATE: 16 BRPM | TEMPERATURE: 97 F | DIASTOLIC BLOOD PRESSURE: 57 MMHG | HEART RATE: 75 BPM

## 2019-06-14 LAB
ANION GAP SERPL CALC-SCNC: 9 MMOL/L — SIGNIFICANT CHANGE UP (ref 5–17)
BASOPHILS # BLD AUTO: 0.04 K/UL — SIGNIFICANT CHANGE UP (ref 0–0.2)
BASOPHILS NFR BLD AUTO: 0.3 % — SIGNIFICANT CHANGE UP (ref 0–2)
BUN SERPL-MCNC: 11 MG/DL — SIGNIFICANT CHANGE UP (ref 7–23)
CALCIUM SERPL-MCNC: 8.8 MG/DL — SIGNIFICANT CHANGE UP (ref 8.5–10.1)
CHLORIDE SERPL-SCNC: 102 MMOL/L — SIGNIFICANT CHANGE UP (ref 96–108)
CO2 SERPL-SCNC: 24 MMOL/L — SIGNIFICANT CHANGE UP (ref 22–31)
CREAT SERPL-MCNC: 0.92 MG/DL — SIGNIFICANT CHANGE UP (ref 0.5–1.3)
EOSINOPHIL # BLD AUTO: 0.07 K/UL — SIGNIFICANT CHANGE UP (ref 0–0.5)
EOSINOPHIL NFR BLD AUTO: 0.5 % — SIGNIFICANT CHANGE UP (ref 0–6)
GLUCOSE SERPL-MCNC: 105 MG/DL — HIGH (ref 70–99)
HCT VFR BLD CALC: 39.2 % — SIGNIFICANT CHANGE UP (ref 34.5–45)
HGB BLD-MCNC: 12.2 G/DL — SIGNIFICANT CHANGE UP (ref 11.5–15.5)
IMM GRANULOCYTES NFR BLD AUTO: 0.4 % — SIGNIFICANT CHANGE UP (ref 0–1.5)
LYMPHOCYTES # BLD AUTO: 1.99 K/UL — SIGNIFICANT CHANGE UP (ref 1–3.3)
LYMPHOCYTES # BLD AUTO: 14.8 % — SIGNIFICANT CHANGE UP (ref 13–44)
MAGNESIUM SERPL-MCNC: 2.2 MG/DL — SIGNIFICANT CHANGE UP (ref 1.6–2.6)
MCHC RBC-ENTMCNC: 28 PG — SIGNIFICANT CHANGE UP (ref 27–34)
MCHC RBC-ENTMCNC: 31.1 GM/DL — LOW (ref 32–36)
MCV RBC AUTO: 89.9 FL — SIGNIFICANT CHANGE UP (ref 80–100)
MONOCYTES # BLD AUTO: 1 K/UL — HIGH (ref 0–0.9)
MONOCYTES NFR BLD AUTO: 7.4 % — SIGNIFICANT CHANGE UP (ref 2–14)
NEUTROPHILS # BLD AUTO: 10.29 K/UL — HIGH (ref 1.8–7.4)
NEUTROPHILS NFR BLD AUTO: 76.6 % — SIGNIFICANT CHANGE UP (ref 43–77)
PHOSPHATE SERPL-MCNC: 3.7 MG/DL — SIGNIFICANT CHANGE UP (ref 2.5–4.5)
PLATELET # BLD AUTO: 258 K/UL — SIGNIFICANT CHANGE UP (ref 150–400)
POTASSIUM SERPL-MCNC: 4.2 MMOL/L — SIGNIFICANT CHANGE UP (ref 3.5–5.3)
POTASSIUM SERPL-SCNC: 4.2 MMOL/L — SIGNIFICANT CHANGE UP (ref 3.5–5.3)
RBC # BLD: 4.36 M/UL — SIGNIFICANT CHANGE UP (ref 3.8–5.2)
RBC # FLD: 14.2 % — SIGNIFICANT CHANGE UP (ref 10.3–14.5)
SODIUM SERPL-SCNC: 135 MMOL/L — SIGNIFICANT CHANGE UP (ref 135–145)
WBC # BLD: 13.44 K/UL — HIGH (ref 3.8–10.5)
WBC # FLD AUTO: 13.44 K/UL — HIGH (ref 3.8–10.5)

## 2019-06-14 RX ADMIN — Medication 650 MILLIGRAM(S): at 00:01

## 2019-06-14 RX ADMIN — Medication 1 DROP(S): at 14:16

## 2019-06-14 RX ADMIN — Medication 0.25 MILLIGRAM(S): at 12:54

## 2019-06-14 RX ADMIN — Medication 75 MICROGRAM(S): at 05:38

## 2019-06-14 RX ADMIN — Medication 325 MILLIGRAM(S): at 12:54

## 2019-06-14 RX ADMIN — Medication 650 MILLIGRAM(S): at 10:40

## 2019-06-14 RX ADMIN — Medication 50 MILLIGRAM(S): at 05:38

## 2019-06-14 NOTE — PROGRESS NOTE ADULT - SUBJECTIVE AND OBJECTIVE BOX
Patient seen and examined at bedside  BP improved  Endorses weakness and poor appetite  No f/c/n/v  No pain      Medications  acetaminophen   Tablet .. 650 milliGRAM(s) Oral every 6 hours PRN  ALPRAZolam 0.25 milliGRAM(s) Oral two times a day PRN  aspirin 325 milliGRAM(s) Oral daily  levothyroxine 75 MICROGram(s) Oral daily  loratadine 10 milliGRAM(s) Oral daily  metoprolol succinate ER 50 milliGRAM(s) Oral daily  metoprolol succinate ER 50 milliGRAM(s) Oral two times a day  ondansetron Injectable 4 milliGRAM(s) IV Push every 6 hours PRN      ROS  General: No weight change, fevers, chills, night sweats, fatigue, malaise  Ophthalmologic: No eye pain,  swelling,  redness, discharge, vision changes  ENMT: No hearing changes,  ear pain,  nasal congestion, sinus pain	  Respiratory and Thorax: No cough, sputum, dyspnea, wheezing  Cardiovascular: No chest pain, SOB, PND  Gastrointestinal:	No diarrhea, constipation, nausea, vomiting   Genitourinary: see above  Musculoskeletal:	No arthralgias, myalgias,  Neurological:	 No syncope, loss of consciousness, headache, dizziness  Psychiatric: No anxiety, depression, delusions. No SI/HI/AH/VH.  Hematology/Lymphatics:	No bruising, lymphadenopathy  Endocrine: No temperature intolerance    Vital Signs Last 24 Hrs  T(C): 37.1 (14 Jun 2019 04:46), Max: 37.2 (13 Jun 2019 11:18)  T(F): 98.7 (14 Jun 2019 04:46), Max: 98.9 (13 Jun 2019 11:18)  HR: 77 (14 Jun 2019 04:46) (66 - 84)  BP: 127/46 (14 Jun 2019 04:46) (125/50 - 208/76)  BP(mean): --  RR: 17 (14 Jun 2019 04:46) (13 - 17)  SpO2: 96% (14 Jun 2019 04:46) (96% - 100%)    PHYSICAL EXAM:  Constitutional: NAD, lying in bed comfortably   Eyes: EOMI, vision is grossly intact  Neck: neck supple, non-tender  Back: no CVA tenderness bilaterally  Respiratory: no labored breathing  Cardiovascular: no peripheral edema, cyanosis, or pallor. Extremities are warm and well perfused.   Gastrointestinal: soft, non-tender, non-distended, no guarding, no rebound tenderness. Bladder non-palpable  Genitourinary: verma in place - draining clear, yellow urine  Neurological: no focal deficits. A&O x3  Musculoskeletal: moves all 4 extremities  Psychiatric: normal mood and affect        I/O    06-13-19 @ 07:01  -  06-14-19 @ 07:00  --------------------------------------------------------  IN: 0 mL / OUT: 1250 mL / NET: -1250 mL        Labs:  CBC Full  -  ( 14 Jun 2019 06:45 )  WBC Count : 13.44 K/uL  Hemoglobin : 12.2 g/dL  Hematocrit : 39.2 %  Platelet Count - Automated : 258 K/uL  Mean Cell Volume : 89.9 fl  Mean Cell Hemoglobin : 28.0 pg  Mean Cell Hemoglobin Concentration : 31.1 gm/dL  Auto Neutrophil # : 10.29 K/uL  Auto Lymphocyte # : 1.99 K/uL  Auto Monocyte # : 1.00 K/uL  Auto Eosinophil # : 0.07 K/uL  Auto Basophil # : 0.04 K/uL  Auto Neutrophil % : 76.6 %  Auto Lymphocyte % : 14.8 %  Auto Monocyte % : 7.4 %  Auto Eosinophil % : 0.5 %  Auto Basophil % : 0.3 %    06-14    135  |  102  |  11  ----------------------------<  105<H>  4.2   |  24  |  0.92    Ca    8.8      14 Jun 2019 06:45  Phos  3.7     06-14  Mg     2.2     06-14

## 2019-06-14 NOTE — ED ADULT TRIAGE NOTE - HEART RATE (BEATS/MIN)
[General Appearance - Well Developed] : well developed [Well Groomed] : well groomed [Normal Appearance] : normal appearance [General Appearance - Well Nourished] : well nourished [Normal Conjunctiva] : the conjunctiva exhibited no abnormalities [General Appearance - In No Acute Distress] : no acute distress [No Deformities] : no deformities [Normal Oral Mucosa] : normal oral mucosa [Eyelids - No Xanthelasma] : the eyelids demonstrated no xanthelasmas [No Oral Cyanosis] : no oral cyanosis [No Oral Pallor] : no oral pallor [Exaggerated Use Of Accessory Muscles For Inspiration] : no accessory muscle use [Respiration, Rhythm And Depth] : normal respiratory rhythm and effort 80 [Auscultation Breath Sounds / Voice Sounds] : lungs were clear to auscultation bilaterally [Abdomen Soft] : soft [Abdomen Tenderness] : non-tender [Abdomen Mass (___ Cm)] : no abdominal mass palpated [Gait - Sufficient For Exercise Testing] : the gait was sufficient for exercise testing [Abnormal Walk] : normal gait [Petechial Hemorrhages (___cm)] : no petechial hemorrhages [Nail Clubbing] : no clubbing of the fingernails [Cyanosis, Localized] : no localized cyanosis [Skin Color & Pigmentation] : normal skin color and pigmentation [] : no rash [No Venous Stasis] : no venous stasis [Skin Lesions] : no skin lesions [No Xanthoma] : no  xanthoma was observed [No Skin Ulcers] : no skin ulcer [Oriented To Time, Place, And Person] : oriented to person, place, and time [Affect] : the affect was normal [Mood] : the mood was normal [Rhythm Regular] : regular [No Anxiety] : not feeling anxious [Normal Rate] : normal [No Murmur] : no murmurs heard [No Pitting Edema] : no pitting edema present [2+] : left 2+ [FreeTextEntry1] : No JVD [Rt] : no varicose veins of the right leg

## 2019-06-14 NOTE — DISCHARGE NOTE NURSING/CASE MANAGEMENT/SOCIAL WORK - NSDCDPATPORTLINK_GEN_ALL_CORE
You can access the [facility] Patient Portal, offered by [facility], by registering with the following website: [email or web address]

## 2019-06-14 NOTE — PROGRESS NOTE ADULT - ASSESSMENT
s/p TURBT, bladder biopsies - admitted post-op with HTN    Plan:    -stable for discharge from  perspective - will d/c home with verma to leg bag and will do TOV early next week in office  -will await medicine evaluation this AM to determine if admission to medicine is warranted  -continue medications as per medicine      Thank you for allowing me to assist in the care of this patient  Please feel free to call with any questions/concerns    John Cano MD  [facility]  W: [phone]

## 2019-06-14 NOTE — PROGRESS NOTE ADULT - SUBJECTIVE AND OBJECTIVE BOX
c/c: I think i'm having an allergic reaction    HPI: 89F, pmh of CAD, stents X 5, HTN, HLD, Hypothyroidism, bladder ca s/p resection, anxiety, orthostatic hypotension causing syncope, who underwent TURBT 6/13.  Post procedure she felt her cheeks were warm and red and has had elevated blood pressures. She also has had 3 episodes of diarrhea. She was kept overnight for observation.     6/14: pt seen and examined. FEeling better. Required verma for urinary retention. No sob/chest pain. Tolerating po. no f/c/r.    ROS: all 10 systems reviewed and is as above otherwise negative.    Vital Signs Last 24 Hrs  T(C): 36.2 (14 Jun 2019 11:40), Max: 37.1 (14 Jun 2019 04:46)  T(F): 97.2 (14 Jun 2019 11:40), Max: 98.7 (14 Jun 2019 04:46)  HR: 75 (14 Jun 2019 11:40) (70 - 84)  BP: 169/57 (14 Jun 2019 11:40) (125/50 - 194/80)  RR: 16 (14 Jun 2019 11:40) (14 - 17)  SpO2: 99% (14 Jun 2019 11:40) (96% - 100%)  PHYSICAL EXAM:    GENERAL: Comfortable, slightly anxious, no acute distress   HEAD:  Normocephalic, atraumatic  EYES: EOMI, PERRLA  HEENT: Moist mucous membranes  NECK: Supple, No JVD  NERVOUS SYSTEM:  Alert & Oriented X3, Motor Strength 5/5 B/L upper and lower extremities  CHEST/LUNG: Clear to auscultation bilaterally  HEART: Regular rate and rhythm  ABDOMEN: Soft, Nontender, Nondistended, Bowel sounds present  GENITOURINARY: +verma  EXTREMITIES:   No clubbing, cyanosis, or edema  MUSCULOSKELETAL- No muscle tenderness, no joint tenderness  SKIN-no rash, facial erythema improved    LABS:                        12.2   13.44 )-----------( 258      ( 14 Jun 2019 06:45 )             39.2     06-14    135  |  102  |  11  ----------------------------<  105<H>  4.2   |  24  |  0.92    Ca    8.8      14 Jun 2019 06:45  Phos  3.7     06-14  Mg     2.2     06-14    MEDICATIONS  (STANDING):  aspirin 325 milliGRAM(s) Oral daily  levothyroxine 75 MICROGram(s) Oral daily  loratadine 10 milliGRAM(s) Oral daily  metoprolol succinate ER 50 milliGRAM(s) Oral daily  metoprolol succinate ER 50 milliGRAM(s) Oral two times a day    MEDICATIONS  (PRN):  acetaminophen   Tablet .. 650 milliGRAM(s) Oral every 6 hours PRN Temp greater or equal to 38C (100.4F), Mild Pain (1 - 3)  ALPRAZolam 0.25 milliGRAM(s) Oral two times a day PRN anxiety  artificial tears (preservative free) Ophthalmic Solution 1 Drop(s) Both EYES three times a day PRN Dry Eyes  ondansetron Injectable 4 milliGRAM(s) IV Push every 6 hours PRN Nausea and/or Vomiting    ASSESSMENT AND PLAN:  89f, PMH AS ABOVE WITH:    1. Uncontrolled HTN:  -improved.   -continue home meds.   -would not be too aggressive with correction of bp given h/o orthostatic hypotension.       2. Diarrhea:  -?reaction to abx.  -improved    3. Orthostatic hypotension:  -has not tolerated florinef in the past.   -compression stockings when out of bed.  -encourage po fluids  -keep head end of bed elevated at least 30 degrees  -change positions very gradually    4.  HLD:  -statin    5. Hypothyroid:  -synthroid    6. Bladder ca s/p tumor resection:  -foleyin place for urinary retention.   -to f/u with urology as outpt.    7. CAd/stents:  -asa/statin/bb    8. Dispo:  no medical contraindications for dc home.   d/w urology.

## 2019-06-17 DIAGNOSIS — Z88.8 ALLERGY STATUS TO OTHER DRUGS, MEDICAMENTS AND BIOLOGICAL SUBSTANCES: ICD-10-CM

## 2019-06-17 DIAGNOSIS — D49.4 NEOPLASM OF UNSPECIFIED BEHAVIOR OF BLADDER: ICD-10-CM

## 2019-06-17 DIAGNOSIS — K21.9 GASTRO-ESOPHAGEAL REFLUX DISEASE WITHOUT ESOPHAGITIS: ICD-10-CM

## 2019-06-17 DIAGNOSIS — I25.10 ATHEROSCLEROTIC HEART DISEASE OF NATIVE CORONARY ARTERY WITHOUT ANGINA PECTORIS: ICD-10-CM

## 2019-06-17 DIAGNOSIS — D09.0 CARCINOMA IN SITU OF BLADDER: ICD-10-CM

## 2019-06-17 DIAGNOSIS — Z85.42 PERSONAL HISTORY OF MALIGNANT NEOPLASM OF OTHER PARTS OF UTERUS: ICD-10-CM

## 2019-06-17 DIAGNOSIS — Z88.2 ALLERGY STATUS TO SULFONAMIDES: ICD-10-CM

## 2019-06-17 DIAGNOSIS — M48.00 SPINAL STENOSIS, SITE UNSPECIFIED: ICD-10-CM

## 2019-06-17 DIAGNOSIS — I10 ESSENTIAL (PRIMARY) HYPERTENSION: ICD-10-CM

## 2019-06-17 DIAGNOSIS — Z95.5 PRESENCE OF CORONARY ANGIOPLASTY IMPLANT AND GRAFT: ICD-10-CM

## 2019-06-17 DIAGNOSIS — Z88.5 ALLERGY STATUS TO NARCOTIC AGENT: ICD-10-CM

## 2019-06-17 LAB
NON-GYNECOLOGICAL CYTOLOGY STUDY: SIGNIFICANT CHANGE UP
SURGICAL PATHOLOGY STUDY: SIGNIFICANT CHANGE UP

## 2019-06-18 ENCOUNTER — INPATIENT (INPATIENT)
Facility: HOSPITAL | Age: 84
LOS: 7 days | Discharge: NURSING, REHAB OR LONG-TERM CARE | End: 2019-06-26
Attending: INTERNAL MEDICINE | Admitting: INTERNAL MEDICINE
Payer: MEDICARE

## 2019-06-18 VITALS
WEIGHT: 175.93 LBS | SYSTOLIC BLOOD PRESSURE: 137 MMHG | RESPIRATION RATE: 18 BRPM | TEMPERATURE: 98 F | DIASTOLIC BLOOD PRESSURE: 51 MMHG | OXYGEN SATURATION: 99 % | HEART RATE: 92 BPM | HEIGHT: 62 IN

## 2019-06-18 DIAGNOSIS — Z90.710 ACQUIRED ABSENCE OF BOTH CERVIX AND UTERUS: Chronic | ICD-10-CM

## 2019-06-18 LAB
ALBUMIN SERPL ELPH-MCNC: 3.4 G/DL — SIGNIFICANT CHANGE UP (ref 3.3–5)
ALP SERPL-CCNC: 70 U/L — SIGNIFICANT CHANGE UP (ref 40–120)
ALT FLD-CCNC: 17 U/L — SIGNIFICANT CHANGE UP (ref 12–78)
ANION GAP SERPL CALC-SCNC: 10 MMOL/L — SIGNIFICANT CHANGE UP (ref 5–17)
APPEARANCE UR: ABNORMAL
APTT BLD: 30.3 SEC — SIGNIFICANT CHANGE UP (ref 27.5–36.3)
AST SERPL-CCNC: 12 U/L — LOW (ref 15–37)
BACTERIA # UR AUTO: ABNORMAL
BASOPHILS # BLD AUTO: 0.03 K/UL — SIGNIFICANT CHANGE UP (ref 0–0.2)
BASOPHILS NFR BLD AUTO: 0.2 % — SIGNIFICANT CHANGE UP (ref 0–2)
BILIRUB SERPL-MCNC: 0.7 MG/DL — SIGNIFICANT CHANGE UP (ref 0.2–1.2)
BILIRUB UR-MCNC: NEGATIVE — SIGNIFICANT CHANGE UP
BUN SERPL-MCNC: 12 MG/DL — SIGNIFICANT CHANGE UP (ref 7–23)
CALCIUM SERPL-MCNC: 9.2 MG/DL — SIGNIFICANT CHANGE UP (ref 8.5–10.1)
CHLORIDE SERPL-SCNC: 96 MMOL/L — SIGNIFICANT CHANGE UP (ref 96–108)
CO2 SERPL-SCNC: 24 MMOL/L — SIGNIFICANT CHANGE UP (ref 22–31)
COLOR SPEC: ABNORMAL
CREAT SERPL-MCNC: 0.94 MG/DL — SIGNIFICANT CHANGE UP (ref 0.5–1.3)
DIFF PNL FLD: ABNORMAL
EOSINOPHIL # BLD AUTO: 0.06 K/UL — SIGNIFICANT CHANGE UP (ref 0–0.5)
EOSINOPHIL NFR BLD AUTO: 0.4 % — SIGNIFICANT CHANGE UP (ref 0–6)
EPI CELLS # UR: SIGNIFICANT CHANGE UP
GLUCOSE SERPL-MCNC: 110 MG/DL — HIGH (ref 70–99)
GLUCOSE UR QL: NEGATIVE MG/DL — SIGNIFICANT CHANGE UP
HCT VFR BLD CALC: 39 % — SIGNIFICANT CHANGE UP (ref 34.5–45)
HGB BLD-MCNC: 12.7 G/DL — SIGNIFICANT CHANGE UP (ref 11.5–15.5)
IMM GRANULOCYTES NFR BLD AUTO: 0.5 % — SIGNIFICANT CHANGE UP (ref 0–1.5)
INR BLD: 1.04 RATIO — SIGNIFICANT CHANGE UP (ref 0.88–1.16)
KETONES UR-MCNC: NEGATIVE — SIGNIFICANT CHANGE UP
LACTATE SERPL-SCNC: 1.5 MMOL/L — SIGNIFICANT CHANGE UP (ref 0.7–2)
LEUKOCYTE ESTERASE UR-ACNC: ABNORMAL
LYMPHOCYTES # BLD AUTO: 1.42 K/UL — SIGNIFICANT CHANGE UP (ref 1–3.3)
LYMPHOCYTES # BLD AUTO: 9.6 % — LOW (ref 13–44)
MCHC RBC-ENTMCNC: 28.5 PG — SIGNIFICANT CHANGE UP (ref 27–34)
MCHC RBC-ENTMCNC: 32.6 GM/DL — SIGNIFICANT CHANGE UP (ref 32–36)
MCV RBC AUTO: 87.4 FL — SIGNIFICANT CHANGE UP (ref 80–100)
MONOCYTES # BLD AUTO: 1.21 K/UL — HIGH (ref 0–0.9)
MONOCYTES NFR BLD AUTO: 8.2 % — SIGNIFICANT CHANGE UP (ref 2–14)
NEUTROPHILS # BLD AUTO: 12.05 K/UL — HIGH (ref 1.8–7.4)
NEUTROPHILS NFR BLD AUTO: 81.1 % — HIGH (ref 43–77)
NITRITE UR-MCNC: POSITIVE
PH UR: 7 — SIGNIFICANT CHANGE UP (ref 5–8)
PLATELET # BLD AUTO: 320 K/UL — SIGNIFICANT CHANGE UP (ref 150–400)
POTASSIUM SERPL-MCNC: 4.3 MMOL/L — SIGNIFICANT CHANGE UP (ref 3.5–5.3)
POTASSIUM SERPL-SCNC: 4.3 MMOL/L — SIGNIFICANT CHANGE UP (ref 3.5–5.3)
PROT SERPL-MCNC: 7.4 GM/DL — SIGNIFICANT CHANGE UP (ref 6–8.3)
PROT UR-MCNC: 100 MG/DL
PROTHROM AB SERPL-ACNC: 11.6 SEC — SIGNIFICANT CHANGE UP (ref 10–12.9)
RBC # BLD: 4.46 M/UL — SIGNIFICANT CHANGE UP (ref 3.8–5.2)
RBC # FLD: 13.7 % — SIGNIFICANT CHANGE UP (ref 10.3–14.5)
RBC CASTS # UR COMP ASSIST: ABNORMAL /HPF (ref 0–4)
SODIUM SERPL-SCNC: 130 MMOL/L — LOW (ref 135–145)
SP GR SPEC: 1 — LOW (ref 1.01–1.02)
UROBILINOGEN FLD QL: NEGATIVE MG/DL — SIGNIFICANT CHANGE UP
WBC # BLD: 14.84 K/UL — HIGH (ref 3.8–10.5)
WBC # FLD AUTO: 14.84 K/UL — HIGH (ref 3.8–10.5)
WBC UR QL: >50

## 2019-06-18 PROCEDURE — 71045 X-RAY EXAM CHEST 1 VIEW: CPT | Mod: 26

## 2019-06-18 PROCEDURE — 93010 ELECTROCARDIOGRAM REPORT: CPT

## 2019-06-18 PROCEDURE — 99285 EMERGENCY DEPT VISIT HI MDM: CPT

## 2019-06-18 RX ORDER — DOCUSATE SODIUM 100 MG
100 CAPSULE ORAL THREE TIMES A DAY
Refills: 0 | Status: DISCONTINUED | OUTPATIENT
Start: 2019-06-18 | End: 2019-06-26

## 2019-06-18 RX ORDER — ASPIRIN/CALCIUM CARB/MAGNESIUM 324 MG
325 TABLET ORAL DAILY
Refills: 0 | Status: DISCONTINUED | OUTPATIENT
Start: 2019-06-18 | End: 2019-06-26

## 2019-06-18 RX ORDER — CEFEPIME 1 G/1
1000 INJECTION, POWDER, FOR SOLUTION INTRAMUSCULAR; INTRAVENOUS EVERY 8 HOURS
Refills: 0 | Status: DISCONTINUED | OUTPATIENT
Start: 2019-06-18 | End: 2019-06-18

## 2019-06-18 RX ORDER — ACETAMINOPHEN 500 MG
650 TABLET ORAL EVERY 6 HOURS
Refills: 0 | Status: DISCONTINUED | OUTPATIENT
Start: 2019-06-18 | End: 2019-06-21

## 2019-06-18 RX ORDER — SODIUM CHLORIDE 9 MG/ML
2500 INJECTION INTRAMUSCULAR; INTRAVENOUS; SUBCUTANEOUS ONCE
Refills: 0 | Status: COMPLETED | OUTPATIENT
Start: 2019-06-18 | End: 2019-06-18

## 2019-06-18 RX ORDER — LEVOTHYROXINE SODIUM 125 MCG
75 TABLET ORAL DAILY
Refills: 0 | Status: DISCONTINUED | OUTPATIENT
Start: 2019-06-18 | End: 2019-06-26

## 2019-06-18 RX ORDER — ALPRAZOLAM 0.25 MG
0 TABLET ORAL
Qty: 0 | Refills: 0 | DISCHARGE

## 2019-06-18 RX ORDER — METOPROLOL TARTRATE 50 MG
50 TABLET ORAL
Refills: 0 | Status: DISCONTINUED | OUTPATIENT
Start: 2019-06-18 | End: 2019-06-25

## 2019-06-18 RX ORDER — ALPRAZOLAM 0.25 MG
0.25 TABLET ORAL AT BEDTIME
Refills: 0 | Status: DISCONTINUED | OUTPATIENT
Start: 2019-06-18 | End: 2019-06-20

## 2019-06-18 RX ORDER — MULTIVIT-MIN/FERROUS GLUCONATE 9 MG/15 ML
1 LIQUID (ML) ORAL DAILY
Refills: 0 | Status: DISCONTINUED | OUTPATIENT
Start: 2019-06-18 | End: 2019-06-26

## 2019-06-18 RX ORDER — PANTOPRAZOLE SODIUM 20 MG/1
40 TABLET, DELAYED RELEASE ORAL
Refills: 0 | Status: DISCONTINUED | OUTPATIENT
Start: 2019-06-18 | End: 2019-06-26

## 2019-06-18 RX ORDER — ACETAMINOPHEN 500 MG
2 TABLET ORAL
Qty: 0 | Refills: 0 | DISCHARGE

## 2019-06-18 RX ORDER — SENNA PLUS 8.6 MG/1
2 TABLET ORAL AT BEDTIME
Refills: 0 | Status: DISCONTINUED | OUTPATIENT
Start: 2019-06-18 | End: 2019-06-26

## 2019-06-18 RX ORDER — ACETAMINOPHEN 500 MG
1000 TABLET ORAL ONCE
Refills: 0 | Status: COMPLETED | OUTPATIENT
Start: 2019-06-18 | End: 2019-06-18

## 2019-06-18 RX ORDER — CEFEPIME 1 G/1
1000 INJECTION, POWDER, FOR SOLUTION INTRAMUSCULAR; INTRAVENOUS EVERY 8 HOURS
Refills: 0 | Status: COMPLETED | OUTPATIENT
Start: 2019-06-18 | End: 2019-06-25

## 2019-06-18 RX ORDER — CEPHALEXIN 500 MG
1 CAPSULE ORAL
Qty: 0 | Refills: 0 | DISCHARGE
Start: 2019-06-18 | End: 2019-06-22

## 2019-06-18 RX ORDER — PANTOPRAZOLE SODIUM 20 MG/1
0 TABLET, DELAYED RELEASE ORAL
Qty: 0 | Refills: 0 | DISCHARGE

## 2019-06-18 RX ORDER — HEPARIN SODIUM 5000 [USP'U]/ML
5000 INJECTION INTRAVENOUS; SUBCUTANEOUS EVERY 12 HOURS
Refills: 0 | Status: DISCONTINUED | OUTPATIENT
Start: 2019-06-18 | End: 2019-06-26

## 2019-06-18 RX ORDER — CHOLECALCIFEROL (VITAMIN D3) 125 MCG
2000 CAPSULE ORAL DAILY
Refills: 0 | Status: DISCONTINUED | OUTPATIENT
Start: 2019-06-18 | End: 2019-06-26

## 2019-06-18 RX ADMIN — Medication 1000 MILLIGRAM(S): at 19:06

## 2019-06-18 RX ADMIN — CEFEPIME 1000 MILLIGRAM(S): 1 INJECTION, POWDER, FOR SOLUTION INTRAMUSCULAR; INTRAVENOUS at 17:50

## 2019-06-18 RX ADMIN — Medication 650 MILLIGRAM(S): at 21:11

## 2019-06-18 RX ADMIN — SODIUM CHLORIDE 2500 MILLILITER(S): 9 INJECTION INTRAMUSCULAR; INTRAVENOUS; SUBCUTANEOUS at 15:45

## 2019-06-18 RX ADMIN — Medication 1 DROP(S): at 21:14

## 2019-06-18 RX ADMIN — Medication 1000 MILLIGRAM(S): at 17:58

## 2019-06-18 RX ADMIN — Medication 50 MILLIGRAM(S): at 21:11

## 2019-06-18 RX ADMIN — Medication 0.25 MILLIGRAM(S): at 21:11

## 2019-06-18 RX ADMIN — SODIUM CHLORIDE 2500 MILLILITER(S): 9 INJECTION INTRAMUSCULAR; INTRAVENOUS; SUBCUTANEOUS at 19:06

## 2019-06-18 NOTE — ED PROVIDER NOTE - NS_ ATTENDINGSCRIBEDETAILS _ED_A_ED_FT
I, Trent Baltazar MD,  performed the initial face to face bedside interview with this patient regarding history of present illness, review of symptoms and relevant past medical, social and family history.  I completed an independent physical examination.    The history, relevant review of systems, past medical and surgical history, medical decision making, and physical examination was documented by the scribe in my presence and I attest to the accuracy of the documentation.

## 2019-06-18 NOTE — ED ADULT NURSE NOTE - NSIMPLEMENTINTERV_GEN_ALL_ED
Implemented All Fall with Harm Risk Interventions:  [place] to call system. Call bell, personal items and telephone within reach. Instruct patient to call for assistance. Room bathroom lighting operational. Non-slip footwear when patient is off stretcher. Physically safe environment: no spills, clutter or unnecessary equipment. Stretcher in lowest position, wheels locked, appropriate side rails in place. Provide visual cue, wrist band, yellow gown, etc. Monitor gait and stability. Monitor for mental status changes and reorient to person, place, and time. Review medications for side effects contributing to fall risk. Reinforce activity limits and safety measures with patient and family. Provide visual clues: red socks.

## 2019-06-18 NOTE — ED PROVIDER NOTE - PMH
CAD (coronary artery disease)    Endometrial adenocarcinoma    GERD (gastroesophageal reflux disease)    [place] (hard of hearing)    HTN (hypertension)    Hyperlipidemia    Hypothyroid    Macular degeneration    Stented coronary artery

## 2019-06-18 NOTE — ED ADULT NURSE NOTE - OBJECTIVE STATEMENT
c/o generalized weakness x 1 week, pt s/p bladder sx 6/13/19 and verma cath removed yesterday. Pt started on keflex this AM for c/o dysuria. Hx arthritis,

## 2019-06-18 NOTE — ED PROVIDER NOTE - OBJECTIVE STATEMENT
89 y/o female with a PMHx of endometrial adenocarcinoma s/p EMILY, macular degeneration, CAD s/p stented coronary artery, hypothyroidism, HLD, HTN, GERD presents to the ED c/o increasing generalized weakness for 4 days. +burning urination. Pt recent surgery which included insertion of Valdes catheter, cystoscopy with bladder biopsy, and cystourethroscopy with bladder tumor resection on 6/13/19 by Dr. Cano. Pt called Dr. Cano's office and told to come to ED for suspected UTI. Denies fevers. PMD: Dr. Sánchez

## 2019-06-18 NOTE — H&P ADULT - HISTORY OF PRESENT ILLNESS
89 y/o F with PMH of CAD s/p PCI, HTN, dyslipidemia, hypothyroidism, bladder cancer s/p resection, anxiety, orthostatic hypotension, DJD multiple joints, anxiety, uterine cancer s/p hysterectomy, p/w weakness. Today patient was unable to get up from bed and had difficulty walking due to weakness. Daughter called doctor who thought she may have a UTI and prescribed outpatient medications. However, patient was so weak, they came to ED. Patient also c/o dysuria and increased urinary frequency. +Suprapubic pain. Denies flank pain, fever, chills, nausea, vomiting, cough, runny nose, sore throat, SOB, CP.       PSH: TURBT, R ankle surgery, hysterectomy, cholecystectomy      Social Hx: Denies x 3, lives at home alone     Family Hx: Father -  of brain cancer at 51 y/o, mother -  of old age

## 2019-06-18 NOTE — H&P ADULT - ASSESSMENT
89 y/o F with PMH of CAD s/p PCI, HTN, dyslipidemia, hypothyroidism, bladder cancer s/p resection, anxiety, orthostatic hypotension, DJD multiple joints, anxiety, uterine cancer s/p hysterectomy, p/w weakness    *Generalized weakness 2/2 Sepsis 2/2 UTI  -C/w cefepime  -ID consult   -Urine Cx / blood cx  -Lactate = 1.5  -PT consult  -Social work consult  -Patient has a h/o bladder cancer w/ recent TURBT / bladder biopsies - will consult urologist Dr. Cano  -Vit B12 / TSH   -Fall precautions     *Hyponatremia  -NS 2.5L in ED -> will repeat Na level to check for resolution     *H/o CAD s/p PCI / HTN / dyslipidemia / hypothyroidism / anxiety / orthostatic hypotension / DJD / anxiety / uterine cancer  -C/w home meds and if conditions remain stable -> f/u outpatient for further management     *DVT ppx  -Heparin SubQ

## 2019-06-19 LAB
ALBUMIN SERPL ELPH-MCNC: 2.8 G/DL — LOW (ref 3.3–5)
ALP SERPL-CCNC: 57 U/L — SIGNIFICANT CHANGE UP (ref 40–120)
ALT FLD-CCNC: 16 U/L — SIGNIFICANT CHANGE UP (ref 12–78)
ANION GAP SERPL CALC-SCNC: 7 MMOL/L — SIGNIFICANT CHANGE UP (ref 5–17)
APTT BLD: 29.7 SEC — SIGNIFICANT CHANGE UP (ref 27.5–36.3)
AST SERPL-CCNC: 11 U/L — LOW (ref 15–37)
BASOPHILS # BLD AUTO: 0.05 K/UL — SIGNIFICANT CHANGE UP (ref 0–0.2)
BASOPHILS NFR BLD AUTO: 0.4 % — SIGNIFICANT CHANGE UP (ref 0–2)
BILIRUB SERPL-MCNC: 0.7 MG/DL — SIGNIFICANT CHANGE UP (ref 0.2–1.2)
BUN SERPL-MCNC: 10 MG/DL — SIGNIFICANT CHANGE UP (ref 7–23)
CALCIUM SERPL-MCNC: 8.8 MG/DL — SIGNIFICANT CHANGE UP (ref 8.5–10.1)
CHLORIDE SERPL-SCNC: 107 MMOL/L — SIGNIFICANT CHANGE UP (ref 96–108)
CO2 SERPL-SCNC: 24 MMOL/L — SIGNIFICANT CHANGE UP (ref 22–31)
CREAT SERPL-MCNC: 0.93 MG/DL — SIGNIFICANT CHANGE UP (ref 0.5–1.3)
EOSINOPHIL # BLD AUTO: 0.16 K/UL — SIGNIFICANT CHANGE UP (ref 0–0.5)
EOSINOPHIL NFR BLD AUTO: 1.4 % — SIGNIFICANT CHANGE UP (ref 0–6)
GLUCOSE SERPL-MCNC: 95 MG/DL — SIGNIFICANT CHANGE UP (ref 70–99)
HCT VFR BLD CALC: 35.8 % — SIGNIFICANT CHANGE UP (ref 34.5–45)
HGB BLD-MCNC: 11.2 G/DL — LOW (ref 11.5–15.5)
IMM GRANULOCYTES NFR BLD AUTO: 0.3 % — SIGNIFICANT CHANGE UP (ref 0–1.5)
INR BLD: 1.12 RATIO — SIGNIFICANT CHANGE UP (ref 0.88–1.16)
LYMPHOCYTES # BLD AUTO: 1.71 K/UL — SIGNIFICANT CHANGE UP (ref 1–3.3)
LYMPHOCYTES # BLD AUTO: 14.5 % — SIGNIFICANT CHANGE UP (ref 13–44)
MAGNESIUM SERPL-MCNC: 2.4 MG/DL — SIGNIFICANT CHANGE UP (ref 1.6–2.6)
MCHC RBC-ENTMCNC: 28.1 PG — SIGNIFICANT CHANGE UP (ref 27–34)
MCHC RBC-ENTMCNC: 31.3 GM/DL — LOW (ref 32–36)
MCV RBC AUTO: 89.7 FL — SIGNIFICANT CHANGE UP (ref 80–100)
MONOCYTES # BLD AUTO: 1.05 K/UL — HIGH (ref 0–0.9)
MONOCYTES NFR BLD AUTO: 8.9 % — SIGNIFICANT CHANGE UP (ref 2–14)
NEUTROPHILS # BLD AUTO: 8.75 K/UL — HIGH (ref 1.8–7.4)
NEUTROPHILS NFR BLD AUTO: 74.5 % — SIGNIFICANT CHANGE UP (ref 43–77)
PHOSPHATE SERPL-MCNC: 4.1 MG/DL — SIGNIFICANT CHANGE UP (ref 2.5–4.5)
PLATELET # BLD AUTO: 262 K/UL — SIGNIFICANT CHANGE UP (ref 150–400)
POTASSIUM SERPL-MCNC: 4.7 MMOL/L — SIGNIFICANT CHANGE UP (ref 3.5–5.3)
POTASSIUM SERPL-SCNC: 4.7 MMOL/L — SIGNIFICANT CHANGE UP (ref 3.5–5.3)
PROT SERPL-MCNC: 6.3 GM/DL — SIGNIFICANT CHANGE UP (ref 6–8.3)
PROTHROM AB SERPL-ACNC: 12.5 SEC — SIGNIFICANT CHANGE UP (ref 10–12.9)
RBC # BLD: 3.99 M/UL — SIGNIFICANT CHANGE UP (ref 3.8–5.2)
RBC # FLD: 14.2 % — SIGNIFICANT CHANGE UP (ref 10.3–14.5)
SODIUM SERPL-SCNC: 138 MMOL/L — SIGNIFICANT CHANGE UP (ref 135–145)
TSH SERPL-MCNC: 1.32 UU/ML — SIGNIFICANT CHANGE UP (ref 0.34–4.82)
VIT B12 SERPL-MCNC: 433 PG/ML — SIGNIFICANT CHANGE UP (ref 232–1245)
WBC # BLD: 11.76 K/UL — HIGH (ref 3.8–10.5)
WBC # FLD AUTO: 11.76 K/UL — HIGH (ref 3.8–10.5)

## 2019-06-19 RX ORDER — ACETAMINOPHEN 500 MG
1000 TABLET ORAL ONCE
Refills: 0 | Status: COMPLETED | OUTPATIENT
Start: 2019-06-19 | End: 2019-06-19

## 2019-06-19 RX ORDER — PHENAZOPYRIDINE HCL 100 MG
100 TABLET ORAL EVERY 8 HOURS
Refills: 0 | Status: DISCONTINUED | OUTPATIENT
Start: 2019-06-19 | End: 2019-06-23

## 2019-06-19 RX ADMIN — Medication 1 DROP(S): at 21:44

## 2019-06-19 RX ADMIN — CEFEPIME 1000 MILLIGRAM(S): 1 INJECTION, POWDER, FOR SOLUTION INTRAMUSCULAR; INTRAVENOUS at 17:32

## 2019-06-19 RX ADMIN — CEFEPIME 1000 MILLIGRAM(S): 1 INJECTION, POWDER, FOR SOLUTION INTRAMUSCULAR; INTRAVENOUS at 10:32

## 2019-06-19 RX ADMIN — Medication 2000 UNIT(S): at 12:32

## 2019-06-19 RX ADMIN — Medication 1 DROP(S): at 12:34

## 2019-06-19 RX ADMIN — Medication 1 TABLET(S): at 12:32

## 2019-06-19 RX ADMIN — HEPARIN SODIUM 5000 UNIT(S): 5000 INJECTION INTRAVENOUS; SUBCUTANEOUS at 17:32

## 2019-06-19 RX ADMIN — CEFEPIME 1000 MILLIGRAM(S): 1 INJECTION, POWDER, FOR SOLUTION INTRAMUSCULAR; INTRAVENOUS at 01:52

## 2019-06-19 RX ADMIN — Medication 325 MILLIGRAM(S): at 12:32

## 2019-06-19 RX ADMIN — PANTOPRAZOLE SODIUM 40 MILLIGRAM(S): 20 TABLET, DELAYED RELEASE ORAL at 05:26

## 2019-06-19 RX ADMIN — Medication 0.25 MILLIGRAM(S): at 21:44

## 2019-06-19 RX ADMIN — Medication 50 MILLIGRAM(S): at 17:33

## 2019-06-19 RX ADMIN — Medication 1000 MILLIGRAM(S): at 21:54

## 2019-06-19 RX ADMIN — Medication 75 MICROGRAM(S): at 05:27

## 2019-06-19 RX ADMIN — HEPARIN SODIUM 5000 UNIT(S): 5000 INJECTION INTRAVENOUS; SUBCUTANEOUS at 05:25

## 2019-06-19 RX ADMIN — Medication 1000 MILLIGRAM(S): at 20:28

## 2019-06-19 RX ADMIN — Medication 50 MILLIGRAM(S): at 05:28

## 2019-06-19 RX ADMIN — Medication 1 DROP(S): at 05:29

## 2019-06-19 NOTE — CONSULT NOTE ADULT - ASSESSMENT
89 y/o Female with h/o CAD s/p PCI, HTN, dyslipidemia, hypothyroidism, bladder cancer s/p resection, anxiety, orthostatic hypotension, DJD multiple joints, anxiety, uterine cancer s/p hysterectomy was admitted on 6/18 for increased weakness. She has dysuria and increased urinary frequency and suprapubic pain. The patient was unable to get up from bed and had difficulty walking due to weakness. The patient was so weak, they came to ED. Denies flank pain, fever, chills at home. In ER she received cefepime.    1. Dysuria. Pyuria. Probable UTI. Bladder Ca s/p surgery  -leukocytosis  -obtain BC x 2, urine c/s  -start cefepime 1 gm IV q8h  -reason for abx use and side effects reviewed with patient; monitor BMP   -old chart reviewed to assess prior cultures  -monitor temps  -f/u CBC  -supportive care  2. Other issues:   -care per medicine 89 y/o Female with h/o CAD s/p PCI, HTN, dyslipidemia, hypothyroidism, bladder cancer s/p resection in October 2018, s/p intravesical therapy s/p new resection due to recurrence 1 week PTA, anxiety, orthostatic hypotension, DJD multiple joints, anxiety, uterine cancer s/p hysterectomy was admitted on 6/18 for increased weakness. She has dysuria and increased urinary frequency and suprapubic pain. The patient was unable to get up from bed and had difficulty walking due to weakness. The patient was so weak, they came to ED. Denies flank pain, fever, chills at home. In ER she received cefepime.    1. Dysuria. Pyuria. Probable UTI. Bladder Ca s/p recent surgery  -leukocytosis  -obtain BC x 2, urine c/s  -start cefepime 1 gm IV q8h  -reason for abx use and side effects reviewed with patient; monitor BMP   -old chart reviewed to assess prior cultures  -urology evaluation  -monitor temps  -f/u CBC  -supportive care  2. Other issues:   -care per medicine

## 2019-06-19 NOTE — PHYSICAL THERAPY INITIAL EVALUATION ADULT - MANUAL MUSCLE TESTING RESULTS, REHAB EVAL
Right shoulder motions 2+-3/5 throughout grossly all other 3+/5. Left UE 3+-4/5 throughout grossly. Right LE strength 3+-4/5 throughout grossly DF 3/5. Left LE strength 3+-4/5 throughout grossly. All MMT performed within available ROM.

## 2019-06-19 NOTE — CONSULT NOTE ADULT - SUBJECTIVE AND OBJECTIVE BOX
Patient is a 89y old  Female who presents with a chief complaint of weakness (2019 20:27)    HPI:  89 y/o Female with h/o CAD s/p PCI, HTN, dyslipidemia, hypothyroidism, bladder cancer s/p resection, anxiety, orthostatic hypotension, DJD multiple joints, anxiety, uterine cancer s/p hysterectomy was admitted on  for increased weakness. She has dysuria and increased urinary frequency and suprapubic pain. The patient was unable to get up from bed and had difficulty walking due to weakness. The patient was so weak, they came to ED. Denies flank pain, fever, chills at home. In ER she received cefepime.      PMH: as above  PSH: TURBT, R ankle surgery, hysterectomy, cholecystectomy    Meds: per reconciliation sheet, noted below  MEDICATIONS  (STANDING):  artificial  tears Solution 1 Drop(s) Both EYES three times a day  aspirin 325 milliGRAM(s) Oral daily  cefepime  Injectable. 1000 milliGRAM(s) IV Push every 8 hours  cholecalciferol 2000 Unit(s) Oral daily  heparin  Injectable 5000 Unit(s) SubCutaneous every 12 hours  levothyroxine 75 MICROGram(s) Oral daily  metoprolol succinate ER 50 milliGRAM(s) Oral two times a day  multivitamin/minerals 1 Tablet(s) Oral daily  pantoprazole    Tablet 40 milliGRAM(s) Oral before breakfast    MEDICATIONS  (PRN):  acetaminophen   Tablet .. 650 milliGRAM(s) Oral every 6 hours PRN Moderate Pain (4 - 6)  ALPRAZolam 0.25 milliGRAM(s) Oral at bedtime PRN anxiety  docusate sodium 100 milliGRAM(s) Oral three times a day PRN Constipation  senna 2 Tablet(s) Oral at bedtime PRN Constipation    Allergies    amlodipine (Unknown)  clonidine (Unknown)  Florinef Acetate (Unknown)  hydrocodone (Unknown)  Levatol (Unknown)  Lipitor (Unknown)  Lotrel (Unknown)  methylPREDNISolone (Unknown)  morphine (Other)  Plaquenil (Unknown)  statins (Other (Unknown))  sulfa drugs (Rash)    Intolerances      Social: no smoking, no alcohol, no illegal drugs; no recent travel, no exposure to TB  FAMILY HISTORY:  Family history of brain cancer (Father)    no history of premature cardiovascular disease in first degree relatives  Father -  of brain cancer at 51 y/o, mother -  of old age    ROS: the patient denies fever, no chills, no HA, no seizures, no dizziness, no sore throat, no nasal congestion, no blurry vision, no CP, no palpitations, no SOB, no cough, no abdominal pain, no diarrhea, no N/V, has dysuria, no leg pain, no claudication, no rash, no joint aches, no rectal pain or bleeding, no night sweats; has suprapubic discomfort  All other systems reviewed and are negative    Vital Signs Last 24 Hrs  T(C): 36.8 (2019 04:26), Max: 36.8 (2019 14:54)  T(F): 98.2 (2019 04:26), Max: 98.3 (2019 14:54)  HR: 73 (2019 04:26) (73 - 92)  BP: 155/54 (2019 04:26) (137/51 - 199/71)  BP(mean): --  RR: 17 (2019 04:26) (17 - 20)  SpO2: 100% (2019 04:26) (96% - 100%)  Daily Height in cm: 157.48 (2019 14:54)    Daily     PE:    Constitutional: frail looking  HEENT: NC/AT, EOMI, PERRLA, conjunctivae clear; ears and nose atraumatic; pharynx benign  Neck: supple; thyroid not palpable  Back: no tenderness  Respiratory: respiratory effort normal; clear to auscultation  Cardiovascular: S1S2 regular, no murmurs  Abdomen: soft, not tender, not distended, positive BS; no liver or spleen organomegaly  Genitourinary: has mild suprapubic tenderness  Lymphatic: no LN palpable  Musculoskeletal: no muscle tenderness, no joint swelling or tenderness  Extremities: no pedal edema  Neurological/ Psychiatric: AxOx3, judgement and insight normal; moving all extremities  Skin: no rashes; no palpable lesions    Labs: all available labs reviewed                        . )-----------( 262      ( 2019 06:27 )             35.8     -    138  |  107  |  10  ----------------------------<  95  4.7   |  24  |  0.93    Ca    8.8      2019 06:27  Phos  4.1     -  Mg     2.4     -    TPro  6.3  /  Alb  2.8<L>  /  TBili  0.7  /  DBili  x   /  AST  11<L>  /  ALT  16  /  AlkPhos  57  -     LIVER FUNCTIONS - ( 2019 06:27 )  Alb: 2.8 g/dL / Pro: 6.3 gm/dL / ALK PHOS: 57 U/L / ALT: 16 U/L / AST: 11 U/L / GGT: x           Urinalysis Basic - ( 2019 15:59 )    Color: Pink / Appearance: very cloudy / S.005 / pH: x  Gluc: x / Ketone: Negative  / Bili: Negative / Urobili: Negative mg/dL   Blood: x / Protein: 100 mg/dL / Nitrite: Positive   Leuk Esterase: Moderate / RBC: 25-50 /HPF / WBC >50   Sq Epi: x / Non Sq Epi: Few / Bacteria: Few      Radiology: all available radiological tests reviewed    < from: Xray Chest 1 View-PORTABLE IMMEDIATE (19 @ 16:38) >  No acute disease  No significant interval change as compared to  2019    < end of copied text >      Advanced directives addressed: full resuscitation Patient is a 89y old  Female who presents with a chief complaint of weakness (2019 20:27)    HPI:  89 y/o Female with h/o CAD s/p PCI, HTN, dyslipidemia, hypothyroidism, bladder cancer s/p resection in 2018, s/p intravesical therapy s/p new resection due to recurrence 1 week PTA, anxiety, orthostatic hypotension, DJD multiple joints, anxiety, uterine cancer s/p hysterectomy was admitted on  for increased weakness. She has dysuria and increased urinary frequency and suprapubic pain. The patient was unable to get up from bed and had difficulty walking due to weakness. The patient was so weak, they came to ED. Denies flank pain, fever, chills at home. In ER she received cefepime.      PMH: as above  PSH: TURBT, R ankle surgery, hysterectomy, cholecystectomy    Meds: per reconciliation sheet, noted below  MEDICATIONS  (STANDING):  artificial  tears Solution 1 Drop(s) Both EYES three times a day  aspirin 325 milliGRAM(s) Oral daily  cefepime  Injectable. 1000 milliGRAM(s) IV Push every 8 hours  cholecalciferol 2000 Unit(s) Oral daily  heparin  Injectable 5000 Unit(s) SubCutaneous every 12 hours  levothyroxine 75 MICROGram(s) Oral daily  metoprolol succinate ER 50 milliGRAM(s) Oral two times a day  multivitamin/minerals 1 Tablet(s) Oral daily  pantoprazole    Tablet 40 milliGRAM(s) Oral before breakfast    MEDICATIONS  (PRN):  acetaminophen   Tablet .. 650 milliGRAM(s) Oral every 6 hours PRN Moderate Pain (4 - 6)  ALPRAZolam 0.25 milliGRAM(s) Oral at bedtime PRN anxiety  docusate sodium 100 milliGRAM(s) Oral three times a day PRN Constipation  senna 2 Tablet(s) Oral at bedtime PRN Constipation    Allergies    amlodipine (Unknown)  clonidine (Unknown)  Florinef Acetate (Unknown)  hydrocodone (Unknown)  Levatol (Unknown)  Lipitor (Unknown)  Lotrel (Unknown)  methylPREDNISolone (Unknown)  morphine (Other)  Plaquenil (Unknown)  statins (Other (Unknown))  sulfa drugs (Rash)    Intolerances      Social: no smoking, no alcohol, no illegal drugs; no recent travel, no exposure to TB  FAMILY HISTORY:  Family history of brain cancer (Father)    no history of premature cardiovascular disease in first degree relatives  Father -  of brain cancer at 53 y/o, mother -  of old age    ROS: the patient denies fever, no chills, no HA, no seizures, no dizziness, no sore throat, no nasal congestion, no blurry vision, no CP, no palpitations, no SOB, no cough, no abdominal pain, no diarrhea, no N/V, has dysuria, no leg pain, no claudication, no rash, no joint aches, no rectal pain or bleeding, no night sweats; has suprapubic discomfort  All other systems reviewed and are negative    Vital Signs Last 24 Hrs  T(C): 36.8 (2019 04:26), Max: 36.8 (2019 14:54)  T(F): 98.2 (2019 04:26), Max: 98.3 (2019 14:54)  HR: 73 (2019 04:26) (73 - 92)  BP: 155/54 (2019 04:26) (137/51 - 199/71)  BP(mean): --  RR: 17 (2019 04:26) (17 - 20)  SpO2: 100% (2019 04:26) (96% - 100%)  Daily Height in cm: 157.48 (2019 14:54)    Daily     PE:    Constitutional: frail looking  HEENT: NC/AT, EOMI, PERRLA, conjunctivae clear; ears and nose atraumatic; pharynx benign  Neck: supple; thyroid not palpable  Back: no tenderness  Respiratory: respiratory effort normal; clear to auscultation  Cardiovascular: S1S2 regular, no murmurs  Abdomen: soft, not tender, not distended, positive BS; no liver or spleen organomegaly  Genitourinary: has mild suprapubic tenderness  Lymphatic: no LN palpable  Musculoskeletal: no muscle tenderness, no joint swelling or tenderness  Extremities: no pedal edema  Neurological/ Psychiatric: AxOx3, judgement and insight normal; moving all extremities  Skin: no rashes; no palpable lesions    Labs: all available labs reviewed                        . )-----------( 262      ( 2019 06:27 )             35.8     06-19    138  |  107  |  10  ----------------------------<  95  4.7   |  24  |  0.93    Ca    8.8      2019 06:27  Phos  4.1     -  Mg     2.4     -    TPro  6.3  /  Alb  2.8<L>  /  TBili  0.7  /  DBili  x   /  AST  11<L>  /  ALT  16  /  AlkPhos  57  -     LIVER FUNCTIONS - ( 2019 06:27 )  Alb: 2.8 g/dL / Pro: 6.3 gm/dL / ALK PHOS: 57 U/L / ALT: 16 U/L / AST: 11 U/L / GGT: x           Urinalysis Basic - ( 2019 15:59 )    Color: Pink / Appearance: very cloudy / S.005 / pH: x  Gluc: x / Ketone: Negative  / Bili: Negative / Urobili: Negative mg/dL   Blood: x / Protein: 100 mg/dL / Nitrite: Positive   Leuk Esterase: Moderate / RBC: 25-50 /HPF / WBC >50   Sq Epi: x / Non Sq Epi: Few / Bacteria: Few      Radiology: all available radiological tests reviewed    < from: Xray Chest 1 View-PORTABLE IMMEDIATE (19 @ 16:38) >  No acute disease  No significant interval change as compared to  2019    < end of copied text >      Advanced directives addressed: full resuscitation

## 2019-06-19 NOTE — CONSULT NOTE ADULT - SUBJECTIVE AND OBJECTIVE BOX
UROLOGY CONSULTATION    [place]  [id number]      HPI  Patient is a 89y yo Female who is admitted for URINARY TRACT INFECTION    Patient follows with my partner and has a history of BCG refractory HG T1 and CIS  SHe is s/p recent TURBT which was complicated by post op retention  She passed TOV in the office    Presented to ED for weakness and found to have UTI    Patient seen and examined at bedside.   She feels better    Current complaints: frequency of urination    Denies dysuria, urgency, gross hematuria, fevers, chills, nausea, vomiting or weight loss    ROS  12 point ROS negative except that outlined in HPI    PMHX  URINARY TRACT INFECTION  Family history of brain cancer (Father)  No pertinent family history in first degree relatives  Endometrial adenocarcinoma  Uterine cancer  Macular degeneration  Stented coronary artery  Hypothyroid  Hyperlipidemia  HTN (hypertension)  GERD (gastroesophageal reflux disease)  CAD (coronary artery disease)  [place] ([place] of hearing[place]  S/P EMILY (total abdominal hysterectomy)  S/P hernia repair  S/P laparoscopic cholecystectomy  Ankle fracture, right  S/P coronary angiogram      MEDS  acetaminophen   Tablet .. 650 milliGRAM(s) Oral every 6 hours PRN  ALPRAZolam 0.25 milliGRAM(s) Oral at bedtime PRN  artificial  tears Solution 1 Drop(s) Both EYES three times a day  aspirin 325 milliGRAM(s) Oral daily  cefepime  Injectable. 1000 milliGRAM(s) IV Push every 8 hours  cholecalciferol 2000 Unit(s) Oral daily  docusate sodium 100 milliGRAM(s) Oral three times a day PRN  heparin  Injectable 5000 Unit(s) SubCutaneous every 12 hours  levothyroxine 75 MICROGram(s) Oral daily  metoprolol succinate ER 50 milliGRAM(s) Oral two times a day  multivitamin/minerals 1 Tablet(s) Oral daily  pantoprazole    Tablet 40 milliGRAM(s) Oral before breakfast  phenazopyridine 100 milliGRAM(s) Oral every 8 hours PRN  senna 2 Tablet(s) Oral at bedtime PRN      Allergies  amlodipine (Unknown)  clonidine (Unknown)  Florinef Acetate (Unknown)  hydrocodone (Unknown)  Levatol (Unknown)  Lipitor (Unknown)  Lotrel (Unknown)  methylPREDNISolone (Unknown)  morphine (Other)  Plaquenil (Unknown)  statins (Other (Unknown))  sulfa drugs (Rash)        VITALS  T(C): 36.5 (06-19-19 @ 11:32), Max: 36.8 (06-19-19 @ 04:26)  HR: 78 (06-19-19 @ 17:34)  BP: 132/46 (06-19-19 @ 17:34)  RR: 16 (06-19-19 @ 11:32)  SpO2: 99% (06-19-19 @ 11:32)      Gen: elderly Female in NAD, well nourished  HEENT: normocephalic, anicteric sclera, moist mucus membranes; hearing intact  Chest: non labored breathing  Abd: soft, NT, ND, no masses  : no suprapubic distension or tenderness, no verma  Psych: AAOx3, normal affect & mood  Ext: moves all four extremities freely, no peripheral edema    LABS  06-19    138  |  107  |  10  ----------------------------<  95  4.7   |  24  |  0.93    Ca    8.8      19 Jun 2019 06:27  Phos  4.1     06-19  Mg     2.4     06-19    TPro  6.3  /  Alb  2.8<L>  /  TBili  0.7  /  DBili  x   /  AST  11<L>  /  ALT  16  /  AlkPhos  57  06-19    CBC Full  -  ( 19 Jun 2019 06:27 )  WBC Count : 11.76 K/uL  Hemoglobin : 11.2 g/dL  Hematocrit : 35.8 %  Platelet Count - Automated : 262 K/uL  Mean Cell Volume : 89.7 fl  Mean Cell Hemoglobin : 28.1 pg  Mean Cell Hemoglobin Concentration : 31.3 gm/dL  Auto Neutrophil # : 8.75 K/uL  Auto Lymphocyte # : 1.71 K/uL  Auto Monocyte # : 1.05 K/uL  Auto Eosinophil # : 0.16 K/uL  Auto Basophil # : 0.05 K/uL  Auto Neutrophil % : 74.5 %  Auto Lymphocyte % : 14.5 %  Auto Monocyte % : 8.9 %  Auto Eosinophil % : 1.4 %  Auto Basophil % : 0.4 %      UA: pos nitrite, large LE, mod blood      Culture - Urine (collected 18 Jun 2019 15:59)  Source: .Urine None  Preliminary Report (19 Jun 2019 14:59):    >100,000 CFU/ml Gram Negative Rods          ASSESSMENT & PLAN      UTI  - f/u cx  -c/w antbx     Urinary frequency  - obtain bladder scan to check post void residual. If PVR>200 then insert verma and discharge patient with verma to leg. TOV as outpatient        Thank you for allowing me to participate in the care of this patient  Please call if there are questions or concerns    Gage Solorzano MD  [facility]  [phone]    06-19-19 @ 19:47

## 2019-06-19 NOTE — PHYSICAL THERAPY INITIAL EVALUATION ADULT - DIAGNOSIS, PT EVAL
Generalized weakness secondary to sepsis secondary to UTI, hyponatremia. Generalized weakness secondary to sepsis, secondary to UTI, hyponatremia.

## 2019-06-19 NOTE — PROGRESS NOTE ADULT - SUBJECTIVE AND OBJECTIVE BOX
HOSPITAL COURSE AND ASSESSMENT  89 y/o F with PMH of CAD s/p PCI, HTN, dyslipidemia, hypothyroidism, bladder cancer s/p resection, anxiety, orthostatic hypotension, DJD multiple joints, anxiety, uterine cancer s/p hysterectomy, p/w weakness. Today patient was unable to get up from bed and had difficulty walking due to weakness. Daughter called doctor who thought she may have a UTI and prescribed outpatient medications. However, patient was so weak, they came to ED. Patient also c/o dysuria and increased urinary frequency. +Suprapubic pain. Denies flank pain, fever, chills, nausea, vomiting, cough, runny nose, sore throat, SOB, CP.         *Generalized weakness 2/2 Sepsis 2/2 UTI  -C/w cefepime  -ID consult   -Urine Cx / blood cx  -Lactate = 1.5  -PT consult  -Social work consult  -Patient has a h/o bladder cancer w/ recent TURBT / bladder biopsies - will consult urologist Dr. Cano  -Vit B12 / TSH   -Fall precautions     *Hyponatremia  -NS 2.5L in ED -> will repeat Na level to check for resolution     *H/o CAD s/p PCI / HTN / dyslipidemia / hypothyroidism / anxiety / orthostatic hypotension / DJD / anxiety / uterine cancer  -C/w home meds and if conditions remain stable -> f/u outpatient for further management     *DVT ppx  -Heparin SubQ        ----------------------------------------------------------------------------------------------  CHIEF COMPLAINT:  UTI    SUBJECTIVE:     tolerating PO. OOB minimally. reports wanting hospitalist switch    REVIEW OF SYSTEMS:  All other review of systems is negative unless indicated above    Vital Signs Last 24 Hrs  T(C): 36.5 (19 Jun 2019 11:32), Max: 36.8 (18 Jun 2019 14:54)  T(F): 97.7 (19 Jun 2019 11:32), Max: 98.3 (18 Jun 2019 14:54)  HR: 75 (19 Jun 2019 11:32) (73 - 92)  BP: 135/46 (19 Jun 2019 11:32) (135/46 - 199/71)  BP(mean): --  RR: 16 (19 Jun 2019 11:32) (16 - 20)  SpO2: 99% (19 Jun 2019 11:32) (96% - 100%)  CAPILLARY BLOOD GLUCOSE          PHYSICAL EXAM:  Constitutional: NAD, NCAT  HEENT: EOMI, Normal Hearing, MMM, fair dentition  Neck: trachea midline, No JVD  Respiratory: Breath sounds are clear, unlabored respiration  Cardiovascular: S1 and S2, regular rate   Gastrointestinal: Bowel Sounds present, soft, nontender, nondistended  Extremities: peripheral edema  Vascular: 2+ radial pulse  Neurological: awake, alert, follows commands, sensation grossly intact  Psych: appropriate affect,  insight  Musculoskeletal: moves all extremities  Skin: No rashes    ALLERGIES  amlodipine (Unknown)  clonidine (Unknown)  Florinef Acetate (Unknown)  hydrocodone (Unknown)  Levatol (Unknown)  Lipitor (Unknown)  Lotrel (Unknown)  methylPREDNISolone (Unknown)  morphine (Other)  Plaquenil (Unknown)  statins (Other (Unknown))  sulfa drugs (Rash)      MEDICATIONS  (STANDING):  artificial  tears Solution 1 Drop(s) Both EYES three times a day  aspirin 325 milliGRAM(s) Oral daily  cefepime  Injectable. 1000 milliGRAM(s) IV Push every 8 hours  cholecalciferol 2000 Unit(s) Oral daily  heparin  Injectable 5000 Unit(s) SubCutaneous every 12 hours  levothyroxine 75 MICROGram(s) Oral daily  metoprolol succinate ER 50 milliGRAM(s) Oral two times a day  multivitamin/minerals 1 Tablet(s) Oral daily  pantoprazole    Tablet 40 milliGRAM(s) Oral before breakfast      LABS: All Labs Reviewed:                        11.2   11.76 )-----------( 262      ( 19 Jun 2019 06:27 )             35.8     06-19    138  |  107  |  10  ----------------------------<  95  4.7   |  24  |  0.93    Ca    8.8      19 Jun 2019 06:27  Phos  4.1     06-19  Mg     2.4     06-19    TPro  6.3  /  Alb  2.8<L>  /  TBili  0.7  /  DBili  x   /  AST  11<L>  /  ALT  16  /  AlkPhos  57  06-19    PT/INR - ( 19 Jun 2019 06:27 )   PT: 12.5 sec;   INR: 1.12 ratio         PTT - ( 19 Jun 2019 06:27 )  PTT:29.7 sec      Blood Culture:

## 2019-06-20 LAB
-  AMIKACIN: SIGNIFICANT CHANGE UP
-  AMPICILLIN/SULBACTAM: SIGNIFICANT CHANGE UP
-  AMPICILLIN: SIGNIFICANT CHANGE UP
-  AZTREONAM: SIGNIFICANT CHANGE UP
-  CEFAZOLIN: SIGNIFICANT CHANGE UP
-  CEFEPIME: SIGNIFICANT CHANGE UP
-  CEFOXITIN: SIGNIFICANT CHANGE UP
-  CEFTRIAXONE: SIGNIFICANT CHANGE UP
-  CIPROFLOXACIN: SIGNIFICANT CHANGE UP
-  COAGULASE NEGATIVE STAPHYLOCOCCUS: SIGNIFICANT CHANGE UP
-  ERTAPENEM: SIGNIFICANT CHANGE UP
-  GENTAMICIN: SIGNIFICANT CHANGE UP
-  IMIPENEM: SIGNIFICANT CHANGE UP
-  LEVOFLOXACIN: SIGNIFICANT CHANGE UP
-  MEROPENEM: SIGNIFICANT CHANGE UP
-  NITROFURANTOIN: SIGNIFICANT CHANGE UP
-  PIPERACILLIN/TAZOBACTAM: SIGNIFICANT CHANGE UP
-  TIGECYCLINE: SIGNIFICANT CHANGE UP
-  TOBRAMYCIN: SIGNIFICANT CHANGE UP
-  TRIMETHOPRIM/SULFAMETHOXAZOLE: SIGNIFICANT CHANGE UP
CULTURE RESULTS: SIGNIFICANT CHANGE UP
GRAM STN FLD: SIGNIFICANT CHANGE UP
METHOD TYPE: SIGNIFICANT CHANGE UP
METHOD TYPE: SIGNIFICANT CHANGE UP
ORGANISM # SPEC MICROSCOPIC CNT: SIGNIFICANT CHANGE UP
ORGANISM # SPEC MICROSCOPIC CNT: SIGNIFICANT CHANGE UP
SPECIMEN SOURCE: SIGNIFICANT CHANGE UP

## 2019-06-20 RX ORDER — ALPRAZOLAM 0.25 MG
0.25 TABLET ORAL
Refills: 0 | Status: DISCONTINUED | OUTPATIENT
Start: 2019-06-20 | End: 2019-06-21

## 2019-06-20 RX ADMIN — Medication 100 MILLIGRAM(S): at 12:02

## 2019-06-20 RX ADMIN — Medication 650 MILLIGRAM(S): at 07:43

## 2019-06-20 RX ADMIN — Medication 325 MILLIGRAM(S): at 11:53

## 2019-06-20 RX ADMIN — Medication 75 MICROGRAM(S): at 05:45

## 2019-06-20 RX ADMIN — Medication 50 MILLIGRAM(S): at 18:03

## 2019-06-20 RX ADMIN — Medication 1 DROP(S): at 05:46

## 2019-06-20 RX ADMIN — Medication 650 MILLIGRAM(S): at 22:00

## 2019-06-20 RX ADMIN — HEPARIN SODIUM 5000 UNIT(S): 5000 INJECTION INTRAVENOUS; SUBCUTANEOUS at 05:45

## 2019-06-20 RX ADMIN — HEPARIN SODIUM 5000 UNIT(S): 5000 INJECTION INTRAVENOUS; SUBCUTANEOUS at 18:04

## 2019-06-20 RX ADMIN — Medication 0.25 MILLIGRAM(S): at 14:25

## 2019-06-20 RX ADMIN — Medication 50 MILLIGRAM(S): at 05:45

## 2019-06-20 RX ADMIN — Medication 650 MILLIGRAM(S): at 13:10

## 2019-06-20 RX ADMIN — CEFEPIME 1000 MILLIGRAM(S): 1 INJECTION, POWDER, FOR SOLUTION INTRAMUSCULAR; INTRAVENOUS at 02:02

## 2019-06-20 RX ADMIN — Medication 650 MILLIGRAM(S): at 21:44

## 2019-06-20 RX ADMIN — Medication 2000 UNIT(S): at 11:53

## 2019-06-20 RX ADMIN — Medication 650 MILLIGRAM(S): at 11:56

## 2019-06-20 RX ADMIN — SENNA PLUS 2 TABLET(S): 8.6 TABLET ORAL at 21:43

## 2019-06-20 RX ADMIN — PANTOPRAZOLE SODIUM 40 MILLIGRAM(S): 20 TABLET, DELAYED RELEASE ORAL at 05:46

## 2019-06-20 RX ADMIN — CEFEPIME 1000 MILLIGRAM(S): 1 INJECTION, POWDER, FOR SOLUTION INTRAMUSCULAR; INTRAVENOUS at 18:03

## 2019-06-20 RX ADMIN — Medication 1 TABLET(S): at 11:53

## 2019-06-20 RX ADMIN — Medication 1 DROP(S): at 21:44

## 2019-06-20 RX ADMIN — CEFEPIME 1000 MILLIGRAM(S): 1 INJECTION, POWDER, FOR SOLUTION INTRAMUSCULAR; INTRAVENOUS at 09:53

## 2019-06-20 NOTE — PROVIDER CONTACT NOTE (CRITICAL VALUE NOTIFICATION) - SITUATION
pt w/ prelim culture results: growth in aerobic bottle, gram positive cocci in clusters pt w/ prelim blood culture results: growth in aerobic bottle, gram positive cocci in clusters

## 2019-06-20 NOTE — PROVIDER CONTACT NOTE (CRITICAL VALUE NOTIFICATION) - TEST AND RESULT REPORTED:
prelim culture results: growth in aerobic bottle, gram positive cocci in clusters prelim blood culture results: growth in aerobic bottle, gram positive cocci in clusters

## 2019-06-20 NOTE — PROGRESS NOTE ADULT - SUBJECTIVE AND OBJECTIVE BOX
HOSPITAL COURSE AND ASSESSMENT    89 y/o F with PMH of CAD s/p PCI, HTN, dyslipidemia, hypothyroidism, bladder cancer s/p resection, anxiety, orthostatic hypotension, DJD multiple joints, anxiety, uterine cancer s/p hysterectomy, p/w weakness. Today patient was unable to get up from bed and had difficulty walking due to weakness. Daughter called doctor who thought she may have a UTI and prescribed outpatient medications. However, patient was so weak, they came to ED. Patient also c/o dysuria and increased urinary frequency. +Suprapubic pain. Denies flank pain, fever, chills, nausea, vomiting, cough, runny nose, sore throat, SOB, CP.     Pt was admitted to medicine for evaluation of her UTI. Urology and ID were involved. As her verma was recently removed, PVR was checked to ensure urinary retention not contributing to UTI.    Anticipate discharge in 1-2 days with final culture results and PT recommendations.         *Generalized weakness 2/2 Sepsis  POA due to GNR UTI  -Urine Cx GNR / blood cx CNS (likely contaminant)  -Lactate = 1.5  -Patient has a h/o bladder cancer w/ recent TURBT / bladder biopsies - will consult urologist Dr. Cano  -Fall precautions   -C/w cefepime  -ID consult appreciated    *Hyponatremia  -NS 2.5L in ED -> will repeat Na level to check for resolution     *CAD s/p PCI / HTN / dyslipidemia / hypothyroidism / anxiety / orthostatic hypotension / DJD / anxiety / uterine cancer  -C/w home meds and if conditions remain stable -> f/u outpatient for further management     *Moderate Protein Calorie Malnutrition  -albumin 2.8  -supplement as able    *DVT ppx  -Heparin SubQ    ----------------------------------------------------------------------------------------------  CHIEF COMPLAINT:  UTI    SUBJECTIVE:     tolerating PO. OOB to chair. feels slightly improved.     REVIEW OF SYSTEMS:  All other review of systems is negative unless indicated above    Vital Signs Last 24 Hrs  T(C): 36.3 (20 Jun 2019 05:34), Max: 36.7 (19 Jun 2019 20:16)  T(F): 97.3 (20 Jun 2019 05:34), Max: 98.1 (19 Jun 2019 20:16)  HR: 66 (20 Jun 2019 05:34) (66 - 78)  BP: 169/49 (20 Jun 2019 05:34) (132/46 - 169/49)  BP(mean): 79 (20 Jun 2019 05:34) (79 - 79)  RR: 18 (20 Jun 2019 05:34) (16 - 18)  SpO2: 100% (20 Jun 2019 05:34) (99% - 100%)  CAPILLARY BLOOD GLUCOSE          PHYSICAL EXAM:  Constitutional: NAD, NCAT  HEENT: EOMI, Normal Hearing, MMM, fair dentition  Neck: trachea midline, No JVD  Respiratory: Breath sounds are clear, unlabored respiration  Cardiovascular: S1 and S2, regular rate   Gastrointestinal: Bowel Sounds present, soft, nontender, nondistended  Extremities: No peripheral edema  Vascular: 2+ radial pulse  Neurological: awake, alert, follows commands, sensation grossly intact  Psych: appropriate affect, fair insight  Musculoskeletal: moves all extremities  Skin: No rashes    ALLERGIES  amlodipine (Unknown)  clonidine (Unknown)  Florinef Acetate (Unknown)  hydrocodone (Unknown)  Levatol (Unknown)  Lipitor (Unknown)  Lotrel (Unknown)  methylPREDNISolone (Unknown)  morphine (Other)  Plaquenil (Unknown)  statins (Other (Unknown))  sulfa drugs (Rash)      MEDICATIONS  (STANDING):  artificial  tears Solution 1 Drop(s) Both EYES three times a day  aspirin 325 milliGRAM(s) Oral daily  cefepime  Injectable. 1000 milliGRAM(s) IV Push every 8 hours  cholecalciferol 2000 Unit(s) Oral daily  heparin  Injectable 5000 Unit(s) SubCutaneous every 12 hours  levothyroxine 75 MICROGram(s) Oral daily  metoprolol succinate ER 50 milliGRAM(s) Oral two times a day  multivitamin/minerals 1 Tablet(s) Oral daily  pantoprazole    Tablet 40 milliGRAM(s) Oral before breakfast      LABS: All Labs Reviewed:                        11.2   11.76 )-----------( 262      ( 19 Jun 2019 06:27 )             35.8     06-19    138  |  107  |  10  ----------------------------<  95  4.7   |  24  |  0.93    Ca    8.8      19 Jun 2019 06:27  Phos  4.1     06-19  Mg     2.4     06-19    TPro  6.3  /  Alb  2.8<L>  /  TBili  0.7  /  DBili  x   /  AST  11<L>  /  ALT  16  /  AlkPhos  57  06-19    PT/INR - ( 19 Jun 2019 06:27 )   PT: 12.5 sec;   INR: 1.12 ratio         PTT - ( 19 Jun 2019 06:27 )  PTT:29.7 sec      Blood Culture: 06-18 @ 15:59  Organism Blood Culture PCR  Gram Stain Blood -- Gram Stain   Growth in aerobic bottle: Gram Positive Cocci in Clusters  Specimen Source .Blood None  Culture-Blood -- HOSPITAL COURSE AND ASSESSMENT    89 y/o F with PMH of CAD s/p PCI, HTN, dyslipidemia, hypothyroidism, bladder cancer s/p resection, anxiety, orthostatic hypotension, DJD multiple joints, anxiety, uterine cancer s/p hysterectomy, p/w weakness. Today patient was unable to get up from bed and had difficulty walking due to weakness. Daughter called doctor who thought she may have a UTI and prescribed outpatient medications. However, patient was so weak, they came to ED. Patient also c/o dysuria and increased urinary frequency. +Suprapubic pain. Denies flank pain, fever, chills, nausea, vomiting, cough, runny nose, sore throat, SOB, CP.     Pt was admitted to medicine for evaluation of her UTI. Urology and ID were involved. As her verma was recently removed, PVR was checked to ensure urinary retention not contributing to UTI.    Anticipate discharge in 1-2 days with final culture results and PT recommendations.         *Generalized weakness 2/2 Sepsis  POA due to GNR UTI  -bladder cancer w/ recent TURBT / bladder biopsies and recent verma removal  -Urine Cx GNR / blood cx CNS (likely contaminant)  -Lactate = 1.5  -Fall precautions   -C/w cefepime  -ID consult appreciated  -Urology consult also appreciated: check PVR. if >200, place verma and outpatient TOV    *Hyponatremia  -NS 2.5L in ED -> will repeat Na level to check for resolution     *CAD s/p PCI / HTN / dyslipidemia / hypothyroidism / anxiety / orthostatic hypotension / DJD / anxiety / uterine cancer  -C/w home meds and if conditions remain stable -> f/u outpatient for further management     *Moderate Protein Calorie Malnutrition  -albumin 2.8  -supplement as able    *DVT ppx  -Heparin SubQ    ----------------------------------------------------------------------------------------------  CHIEF COMPLAINT:  UTI    SUBJECTIVE:     tolerating PO. OOB to chair. feels slightly improved.     REVIEW OF SYSTEMS:  All other review of systems is negative unless indicated above    Vital Signs Last 24 Hrs  T(C): 36.3 (20 Jun 2019 05:34), Max: 36.7 (19 Jun 2019 20:16)  T(F): 97.3 (20 Jun 2019 05:34), Max: 98.1 (19 Jun 2019 20:16)  HR: 66 (20 Jun 2019 05:34) (66 - 78)  BP: 169/49 (20 Jun 2019 05:34) (132/46 - 169/49)  BP(mean): 79 (20 Jun 2019 05:34) (79 - 79)  RR: 18 (20 Jun 2019 05:34) (16 - 18)  SpO2: 100% (20 Jun 2019 05:34) (99% - 100%)  CAPILLARY BLOOD GLUCOSE          PHYSICAL EXAM:  Constitutional: NAD, NCAT  HEENT: EOMI, Normal Hearing, MMM, fair dentition  Neck: trachea midline, No JVD  Respiratory: Breath sounds are clear, unlabored respiration  Cardiovascular: S1 and S2, regular rate   Gastrointestinal: Bowel Sounds present, soft, nontender, nondistended  Extremities: No peripheral edema  Vascular: 2+ radial pulse  Neurological: awake, alert, follows commands, sensation grossly intact  Psych: appropriate affect, fair insight  Musculoskeletal: moves all extremities  Skin: No rashes    ALLERGIES  amlodipine (Unknown)  clonidine (Unknown)  Florinef Acetate (Unknown)  hydrocodone (Unknown)  Levatol (Unknown)  Lipitor (Unknown)  Lotrel (Unknown)  methylPREDNISolone (Unknown)  morphine (Other)  Plaquenil (Unknown)  statins (Other (Unknown))  sulfa drugs (Rash)      MEDICATIONS  (STANDING):  artificial  tears Solution 1 Drop(s) Both EYES three times a day  aspirin 325 milliGRAM(s) Oral daily  cefepime  Injectable. 1000 milliGRAM(s) IV Push every 8 hours  cholecalciferol 2000 Unit(s) Oral daily  heparin  Injectable 5000 Unit(s) SubCutaneous every 12 hours  levothyroxine 75 MICROGram(s) Oral daily  metoprolol succinate ER 50 milliGRAM(s) Oral two times a day  multivitamin/minerals 1 Tablet(s) Oral daily  pantoprazole    Tablet 40 milliGRAM(s) Oral before breakfast      LABS: All Labs Reviewed:                        11.2   11.76 )-----------( 262      ( 19 Jun 2019 06:27 )             35.8     06-19    138  |  107  |  10  ----------------------------<  95  4.7   |  24  |  0.93    Ca    8.8      19 Jun 2019 06:27  Phos  4.1     06-19  Mg     2.4     06-19    TPro  6.3  /  Alb  2.8<L>  /  TBili  0.7  /  DBili  x   /  AST  11<L>  /  ALT  16  /  AlkPhos  57  06-19    PT/INR - ( 19 Jun 2019 06:27 )   PT: 12.5 sec;   INR: 1.12 ratio         PTT - ( 19 Jun 2019 06:27 )  PTT:29.7 sec      Blood Culture: 06-18 @ 15:59  Organism Blood Culture PCR  Gram Stain Blood -- Gram Stain   Growth in aerobic bottle: Gram Positive Cocci in Clusters  Specimen Source .Blood None  Culture-Blood --

## 2019-06-20 NOTE — CDI QUERY NOTE - NSCDIOTHERTXTBX_GEN_ALL_CORE_HH
Patient admitted with Sepsis   Sepsis  POA due to GNR UTI    bladder cancer w/ recent TURBT / bladder biopsies and recent verma removal    Please clarify if there is a cause and effect relationship between the Sepsis/ UTI and the recent surgery or verma  a) Sepsis and UTI suspected to be associated with or related to the recent surgery  b)  Sepsis and UTI suspected to be associated with or related to the recent verma catheter  c) No clinical indication that the sepsis and uti are related to the recent surgery or verma  d) Unable to determine a cause and effect relationship between the sepsis and uti and recent surgery or verma

## 2019-06-20 NOTE — CHART NOTE - NSCHARTNOTEFT_GEN_A_CORE
Notified by RN, Growth in aerobic bottle, GPC in clusters.   Pt already on cefepime Principal Discharge DX:	Gallbladder hydrops  Secondary Diagnosis:	Elevated LFTs  Secondary Diagnosis:	LUIS ALBERTO (acute kidney injury)  Secondary Diagnosis:	Hyponatremia

## 2019-06-20 NOTE — PROGRESS NOTE ADULT - ASSESSMENT
89 y/o Female with h/o CAD s/p PCI, HTN, dyslipidemia, hypothyroidism, bladder cancer s/p resection in October 2018, s/p intravesical therapy s/p new resection due to recurrence 1 week PTA, anxiety, orthostatic hypotension, DJD multiple joints, anxiety, uterine cancer s/p hysterectomy was admitted on 6/18 for increased weakness. She has dysuria and increased urinary frequency and suprapubic pain. The patient was unable to get up from bed and had difficulty walking due to weakness. The patient was so weak, they came to ED. Denies flank pain, fever, chills at home. In ER she received cefepime.    1. Dysuria. Pyuria. UTI with GNR. Bacteremia with CNST likely contaminant. Bladder Ca s/p recent surgery  -leukocytosis  - BC x 2, urine c/s reviewed  -on cefepime 1 gm IV q8h # 2  -tolerating abx well so far; no side effects noted  -repeat BCx 2  -continue abx coverage  -f/u cultures  -urology evaluation  -monitor temps  -f/u CBC  -supportive care  2. Other issues:   -care per medicine

## 2019-06-20 NOTE — PROGRESS NOTE ADULT - SUBJECTIVE AND OBJECTIVE BOX
Date of service: 19 @ 10:18    Reported with bacteremia  OOB to chair  Dysuria is improving  Has poor appetite    ROS: no fever or chills; denies dizziness, no HA, no SOB or cough, no abdominal pain, no diarrhea or constipation; no legs pain, no rashes    MEDICATIONS  (STANDING):  artificial  tears Solution 1 Drop(s) Both EYES three times a day  aspirin 325 milliGRAM(s) Oral daily  cefepime  Injectable. 1000 milliGRAM(s) IV Push every 8 hours  cholecalciferol 2000 Unit(s) Oral daily  heparin  Injectable 5000 Unit(s) SubCutaneous every 12 hours  levothyroxine 75 MICROGram(s) Oral daily  metoprolol succinate ER 50 milliGRAM(s) Oral two times a day  multivitamin/minerals 1 Tablet(s) Oral daily  pantoprazole    Tablet 40 milliGRAM(s) Oral before breakfast      Vital Signs Last 24 Hrs  T(C): 36.3 (2019 05:34), Max: 36.7 (2019 20:16)  T(F): 97.3 (2019 05:34), Max: 98.1 (2019 20:16)  HR: 66 (2019 05:34) (66 - 78)  BP: 169/49 (2019 05:34) (132/46 - 169/49)  BP(mean): 79 (2019 05:34) (79 - 79)  RR: 18 (2019 05:34) (16 - 18)  SpO2: 100% (2019 05:34) (99% - 100%)    Physical Exam:      Constitutional: frail looking  HEENT: NC/AT, EOMI, PERRLA, conjunctivae clear  Neck: supple; thyroid not palpable  Back: no tenderness  Respiratory: respiratory effort normal; clear to auscultation  Cardiovascular: S1S2 regular, no murmurs  Abdomen: soft, not tender, not distended, positive BS  Genitourinary: has mild suprapubic tenderness  Lymphatic: no LN palpable  Musculoskeletal: no muscle tenderness, no joint swelling or tenderness  Extremities: no pedal edema  Neurological/ Psychiatric: AxOx3, moving all extremities  Skin: no rashes; no palpable lesions    Labs: reviewed               11.2   11.76 )-----------( 262      ( 2019 06:27 )             35.8         138  |  107  |  10  ----------------------------<  95  4.7   |  24  |  0.93    Ca    8.8      2019 06:27  Phos  4.1       Mg     2.4         TPro  6.3  /  Alb  2.8<L>  /  TBili  0.7  /  DBili  x   /  AST  11<L>  /  ALT  16  /  AlkPhos  57  19     LIVER FUNCTIONS - ( 2019 06:27 )  Alb: 2.8 g/dL / Pro: 6.3 gm/dL / ALK PHOS: 57 U/L / ALT: 16 U/L / AST: 11 U/L / GGT: x           Urinalysis Basic - ( 2019 15:59 )    Color: Pink / Appearance: very cloudy / S.005 / pH: x  Gluc: x / Ketone: Negative  / Bili: Negative / Urobili: Negative mg/dL   Blood: x / Protein: 100 mg/dL / Nitrite: Positive   Leuk Esterase: Moderate / RBC: 25-50 /HPF / WBC >50   Sq Epi: x / Non Sq Epi: Few / Bacteria: Few      Culture - Urine (collected 2019 15:59)  Source: .Urine None  Preliminary Report (2019 14:59):    >100,000 CFU/ml Gram Negative Rods    Culture - Blood (collected 2019 15:59)  Source: .Blood None  Gram Stain (2019 06:00):    Growth in aerobic bottle: Gram Positive Cocci in Clusters  Preliminary Report (2019 06:00):    Growth in aerobic bottle: Gram Positive Cocci in Clusters  Organism: Blood Culture PCR (2019 07:22)      -  Coagulase negative Staphylococcus: Detec      Method Type: PCR    Culture - Blood (collected 2019 15:59)  Source: .Blood None  Preliminary Report (2019 22:03):    No growth to date.        Radiology: all available radiological tests reviewed    < from: Xray Chest 1 View-PORTABLE IMMEDIATE (19 @ 16:38) >  No acute disease  No significant interval change as compared to  2019    < end of copied text >      Advanced directives addressed: full resuscitation

## 2019-06-21 ENCOUNTER — TRANSCRIPTION ENCOUNTER (OUTPATIENT)
Age: 84
End: 2019-06-21

## 2019-06-21 LAB
CULTURE RESULTS: SIGNIFICANT CHANGE UP
HCT VFR BLD CALC: 37.2 % — SIGNIFICANT CHANGE UP (ref 34.5–45)
HGB BLD-MCNC: 11.3 G/DL — LOW (ref 11.5–15.5)
MCHC RBC-ENTMCNC: 27.5 PG — SIGNIFICANT CHANGE UP (ref 27–34)
MCHC RBC-ENTMCNC: 30.4 GM/DL — LOW (ref 32–36)
MCV RBC AUTO: 90.5 FL — SIGNIFICANT CHANGE UP (ref 80–100)
ORGANISM # SPEC MICROSCOPIC CNT: SIGNIFICANT CHANGE UP
ORGANISM # SPEC MICROSCOPIC CNT: SIGNIFICANT CHANGE UP
PLATELET # BLD AUTO: 291 K/UL — SIGNIFICANT CHANGE UP (ref 150–400)
RBC # BLD: 4.11 M/UL — SIGNIFICANT CHANGE UP (ref 3.8–5.2)
RBC # FLD: 14 % — SIGNIFICANT CHANGE UP (ref 10.3–14.5)
SPECIMEN SOURCE: SIGNIFICANT CHANGE UP
WBC # BLD: 8.92 K/UL — SIGNIFICANT CHANGE UP (ref 3.8–10.5)
WBC # FLD AUTO: 8.92 K/UL — SIGNIFICANT CHANGE UP (ref 3.8–10.5)

## 2019-06-21 RX ORDER — METOPROLOL TARTRATE 50 MG
25 TABLET ORAL ONCE
Refills: 0 | Status: COMPLETED | OUTPATIENT
Start: 2019-06-21 | End: 2019-06-21

## 2019-06-21 RX ORDER — ACETAMINOPHEN 500 MG
650 TABLET ORAL EVERY 6 HOURS
Refills: 0 | Status: DISCONTINUED | OUTPATIENT
Start: 2019-06-21 | End: 2019-06-26

## 2019-06-21 RX ORDER — ALPRAZOLAM 0.25 MG
0.12 TABLET ORAL
Refills: 0 | Status: DISCONTINUED | OUTPATIENT
Start: 2019-06-21 | End: 2019-06-26

## 2019-06-21 RX ADMIN — CEFEPIME 1000 MILLIGRAM(S): 1 INJECTION, POWDER, FOR SOLUTION INTRAMUSCULAR; INTRAVENOUS at 02:09

## 2019-06-21 RX ADMIN — Medication 75 MICROGRAM(S): at 05:57

## 2019-06-21 RX ADMIN — CEFEPIME 1000 MILLIGRAM(S): 1 INJECTION, POWDER, FOR SOLUTION INTRAMUSCULAR; INTRAVENOUS at 09:00

## 2019-06-21 RX ADMIN — Medication 325 MILLIGRAM(S): at 09:01

## 2019-06-21 RX ADMIN — PANTOPRAZOLE SODIUM 40 MILLIGRAM(S): 20 TABLET, DELAYED RELEASE ORAL at 05:58

## 2019-06-21 RX ADMIN — Medication 0.12 MILLIGRAM(S): at 21:18

## 2019-06-21 RX ADMIN — Medication 25 MILLIGRAM(S): at 13:17

## 2019-06-21 RX ADMIN — Medication 1 TABLET(S): at 09:01

## 2019-06-21 RX ADMIN — Medication 650 MILLIGRAM(S): at 12:31

## 2019-06-21 RX ADMIN — Medication 1 DROP(S): at 21:18

## 2019-06-21 RX ADMIN — Medication 650 MILLIGRAM(S): at 17:33

## 2019-06-21 RX ADMIN — Medication 50 MILLIGRAM(S): at 05:58

## 2019-06-21 RX ADMIN — Medication 650 MILLIGRAM(S): at 17:22

## 2019-06-21 RX ADMIN — Medication 0.25 MILLIGRAM(S): at 08:56

## 2019-06-21 RX ADMIN — HEPARIN SODIUM 5000 UNIT(S): 5000 INJECTION INTRAVENOUS; SUBCUTANEOUS at 17:26

## 2019-06-21 RX ADMIN — Medication 2000 UNIT(S): at 09:01

## 2019-06-21 RX ADMIN — Medication 1 DROP(S): at 05:57

## 2019-06-21 RX ADMIN — CEFEPIME 1000 MILLIGRAM(S): 1 INJECTION, POWDER, FOR SOLUTION INTRAMUSCULAR; INTRAVENOUS at 17:30

## 2019-06-21 RX ADMIN — Medication 650 MILLIGRAM(S): at 12:54

## 2019-06-21 RX ADMIN — HEPARIN SODIUM 5000 UNIT(S): 5000 INJECTION INTRAVENOUS; SUBCUTANEOUS at 05:58

## 2019-06-21 RX ADMIN — Medication 50 MILLIGRAM(S): at 17:26

## 2019-06-21 NOTE — DISCHARGE NOTE NURSING/CASE MANAGEMENT/SOCIAL WORK - NSDCDPATPORTLINK_GEN_ALL_CORE
You can access the [facility] Patient Portal, offered by [facility], by registering with the following website: [email or web address]

## 2019-06-21 NOTE — PROGRESS NOTE ADULT - ASSESSMENT
89 y/o Female with h/o CAD s/p PCI, HTN, dyslipidemia, hypothyroidism, bladder cancer s/p resection in October 2018, s/p intravesical therapy s/p new resection due to recurrence 1 week PTA, anxiety, orthostatic hypotension, DJD multiple joints, anxiety, uterine cancer s/p hysterectomy was admitted on 6/18 for increased weakness. She has dysuria and increased urinary frequency and suprapubic pain. The patient was unable to get up from bed and had difficulty walking due to weakness. The patient was so weak, they came to ED. Denies flank pain, fever, chills at home. In ER she received cefepime.    1. UTI with ENCL. Bacteremia with CNST likely contaminant. Bladder Ca s/p recent surgery  -no further fever  -leukocytosis resolving  - BC x 2, urine c/s reviewed  -on cefepime 1 gm IV q8h # 3  -tolerating abx well so far; no side effects noted  -repeat BC x 2 were collected  -continue abx coverage  -f/u cultures  -urology evaluation  -monitor temps  -f/u CBC  -supportive care  2. Other issues:   -care per medicine

## 2019-06-21 NOTE — PROGRESS NOTE ADULT - SUBJECTIVE AND OBJECTIVE BOX
CC: Weakness    HPI:  89 y/o F with PMH of CAD s/p PCI, HTN, dyslipidemia, hypothyroidism, bladder cancer s/p resection, anxiety, orthostatic hypotension, DJD multiple joints, anxiety, uterine cancer s/p hysterectomy, p/w weakness. Today patient was unable to get up from bed and had difficulty walking due to weakness. Daughter called doctor who thought she may have a UTI and prescribed outpatient medications. However, patient was so weak, they came to ED. Patient also c/o dysuria and increased urinary frequency. +Suprapubic pain. Denies flank pain, fever, chills, nausea, vomiting, cough, runny nose, sore throat, SOB, CP.  Pt was admitted to medicine for evaluation of her UTI. Urology and ID were involved. As her verma was recently removed, PVR was checked to ensure urinary retention not contributing to UTI.    6/21: Pt complaining of generalized aches/pains from arthritis  She still feels very weak and does not feel she can go home due to this.  She feels she will be back in 3 days if she leaves now.  She is being treated for enterobacter UTI.  Otherwise HD stable.  No fever, chills, n, v.    REVIEW OF SYSTEMS: All other review of systems is negative unless indicated above.    Vital Signs Last 24 Hrs  T(C): 36.5 (21 Jun 2019 05:03), Max: 36.5 (21 Jun 2019 05:03)  T(F): 97.7 (21 Jun 2019 05:03), Max: 97.7 (21 Jun 2019 05:03)  HR: 71 (21 Jun 2019 05:03) (70 - 74)  BP: 173/53 (21 Jun 2019 05:03) (153/54 - 173/53)  BP(mean): 82 (21 Jun 2019 05:03) (82 - 82)  RR: 18 (21 Jun 2019 05:03) (16 - 18)  SpO2: 100% (21 Jun 2019 05:03) (98% - 100%)    PHYSICAL EXAM:    Constitutional: NAD, awake and alert, weak appearing, frail  HEENT: PERR, EOMI, Normal Hearing, MMM  Neck: Soft and supple  Respiratory: Breath sounds are clear bilaterally, No wheezing, rales or rhonchi  Cardiovascular: S1 and S2, regular rate and rhythm, no Murmurs, gallops or rubs  Gastrointestinal: Bowel Sounds present, soft, nontender, nondistended, no guarding, no rebound  Extremities: No peripheral edema  Neurological: A/O x 3, no focal deficits in my limited exam  Skin: No rashes    MEDICATIONS  (STANDING):  artificial  tears Solution 1 Drop(s) Both EYES three times a day  aspirin 325 milliGRAM(s) Oral daily  cefepime  Injectable. 1000 milliGRAM(s) IV Push every 8 hours  cholecalciferol 2000 Unit(s) Oral daily  heparin  Injectable 5000 Unit(s) SubCutaneous every 12 hours  levothyroxine 75 MICROGram(s) Oral daily  metoprolol succinate ER 50 milliGRAM(s) Oral two times a day  multivitamin/minerals 1 Tablet(s) Oral daily  pantoprazole    Tablet 40 milliGRAM(s) Oral before breakfast    MEDICATIONS  (PRN):  acetaminophen   Tablet .. 650 milliGRAM(s) Oral every 6 hours PRN Moderate Pain (4 - 6)  ALPRAZolam 0.25 milliGRAM(s) Oral two times a day PRN anxiety  docusate sodium 100 milliGRAM(s) Oral three times a day PRN Constipation  phenazopyridine 100 milliGRAM(s) Oral every 8 hours PRN dysuria  senna 2 Tablet(s) Oral at bedtime PRN Constipation                              11.3   8.92  )-----------( 291      ( 21 Jun 2019 06:25 )             37.2           CAPILLARY BLOOD GLUCOSE    A/P: 89 year old woman who presents with weakness found to have UTI.    Generalized weakness verma catheter associated sepsis UTI:   -pt still with significant weakness  -bladder cancer w/ recent TURBT / bladder biopsies and recent verma removal  -UCx showing enterobacter cloacae  -BCx likely contaminate  -c/w cefepime  -ID consult appreciated      *Hyponatremia: RESOLVED  -s/p IVFs    *CAD s/p PCI / HTN / dyslipidemia / hypothyroidism / anxiety / orthostatic hypotension / DJD / anxiety / uterine cancer  -C/w home meds and if conditions remain stable -> f/u outpatient for further management     *Moderate Protein Calorie Malnutrition  -albumin 2.8  -supplement as able    *DVT ppx  -Heparin SubQ    Dispo:  -d/c home with home PT/care once medically stable CC: Weakness    HPI:  89 y/o F with PMH of CAD s/p PCI, HTN, dyslipidemia, hypothyroidism, bladder cancer s/p resection, anxiety, orthostatic hypotension, DJD multiple joints, anxiety, uterine cancer s/p hysterectomy, p/w weakness. Today patient was unable to get up from bed and had difficulty walking due to weakness. Daughter called doctor who thought she may have a UTI and prescribed outpatient medications. However, patient was so weak, they came to ED. Patient also c/o dysuria and increased urinary frequency. +Suprapubic pain. Denies flank pain, fever, chills, nausea, vomiting, cough, runny nose, sore throat, SOB, CP.  Pt was admitted to medicine for evaluation of her UTI. Urology and ID were involved. As her verma was recently removed, PVR was checked to ensure urinary retention not contributing to UTI.    6/21: Pt complaining of generalized aches/pains from arthritis  She still feels very weak and does not feel she can go home due to this.  She feels she will be back in 3 days if she leaves now.  She is being treated for enterobacter UTI.  Otherwise HD stable.  No fever, chills, n, v.    REVIEW OF SYSTEMS: All other review of systems is negative unless indicated above.    Vital Signs Last 24 Hrs  T(C): 36.5 (21 Jun 2019 05:03), Max: 36.5 (21 Jun 2019 05:03)  T(F): 97.7 (21 Jun 2019 05:03), Max: 97.7 (21 Jun 2019 05:03)  HR: 71 (21 Jun 2019 05:03) (70 - 74)  BP: 173/53 (21 Jun 2019 05:03) (153/54 - 173/53)  BP(mean): 82 (21 Jun 2019 05:03) (82 - 82)  RR: 18 (21 Jun 2019 05:03) (16 - 18)  SpO2: 100% (21 Jun 2019 05:03) (98% - 100%)    PHYSICAL EXAM:    Constitutional: NAD, awake and alert, weak appearing, frail  HEENT: PERR, EOMI, Normal Hearing, MMM  Neck: Soft and supple  Respiratory: Breath sounds are clear bilaterally, No wheezing, rales or rhonchi  Cardiovascular: S1 and S2, regular rate and rhythm, no Murmurs, gallops or rubs  Gastrointestinal: Bowel Sounds present, soft, nontender, nondistended, no guarding, no rebound  Extremities: No peripheral edema  Neurological: A/O x 3, no focal deficits in my limited exam  Skin: No rashes    MEDICATIONS  (STANDING):  artificial  tears Solution 1 Drop(s) Both EYES three times a day  aspirin 325 milliGRAM(s) Oral daily  cefepime  Injectable. 1000 milliGRAM(s) IV Push every 8 hours  cholecalciferol 2000 Unit(s) Oral daily  heparin  Injectable 5000 Unit(s) SubCutaneous every 12 hours  levothyroxine 75 MICROGram(s) Oral daily  metoprolol succinate ER 50 milliGRAM(s) Oral two times a day  multivitamin/minerals 1 Tablet(s) Oral daily  pantoprazole    Tablet 40 milliGRAM(s) Oral before breakfast    MEDICATIONS  (PRN):  acetaminophen   Tablet .. 650 milliGRAM(s) Oral every 6 hours PRN Moderate Pain (4 - 6)  ALPRAZolam 0.25 milliGRAM(s) Oral two times a day PRN anxiety  docusate sodium 100 milliGRAM(s) Oral three times a day PRN Constipation  phenazopyridine 100 milliGRAM(s) Oral every 8 hours PRN dysuria  senna 2 Tablet(s) Oral at bedtime PRN Constipation                              11.3   8.92  )-----------( 291      ( 21 Jun 2019 06:25 )             37.2           CAPILLARY BLOOD GLUCOSE    A/P: 89 year old woman who presents with weakness found to have UTI.    Generalized weakness verma catheter associated sepsis UTI:   -pt still with significant weakness  -bladder cancer w/ recent TURBT / bladder biopsies and recent verma removal  -UCx showing enterobacter cloacae  -BCx likely contaminate  -c/w cefepime  -ID consult appreciated      *Hyponatremia: RESOLVED  -s/p IVFs    *CAD s/p PCI / HTN / dyslipidemia / hypothyroidism / anxiety / orthostatic hypotension / DJD / anxiety / uterine cancer  -C/w home meds and if conditions remain stable -> f/u outpatient for further management   -BP elevated, will monitor for now given pt sensitivity to medications and side effects.    *Moderate Protein Calorie Malnutrition  -albumin 2.8  -supplement as able    *DVT ppx  -Heparin SubQ    Dispo:  -d/c home with home PT/care once medically stable

## 2019-06-21 NOTE — PROGRESS NOTE ADULT - SUBJECTIVE AND OBJECTIVE BOX
Date of service: 19 @ 09:14    OOB to chair  Feels stronger  Denies dysuria    ROS: no fever or chills; denies dizziness, no HA, no SOB or cough, no abdominal pain, no diarrhea or constipation; no legs pain, no rashes    MEDICATIONS  (STANDING):  artificial  tears Solution 1 Drop(s) Both EYES three times a day  aspirin 325 milliGRAM(s) Oral daily  cefepime  Injectable. 1000 milliGRAM(s) IV Push every 8 hours  cholecalciferol 2000 Unit(s) Oral daily  heparin  Injectable 5000 Unit(s) SubCutaneous every 12 hours  levothyroxine 75 MICROGram(s) Oral daily  metoprolol succinate ER 50 milliGRAM(s) Oral two times a day  multivitamin/minerals 1 Tablet(s) Oral daily  pantoprazole    Tablet 40 milliGRAM(s) Oral before breakfast      Vital Signs Last 24 Hrs  T(C): 36.5 (2019 05:03), Max: 36.5 (2019 05:03)  T(F): 97.7 (2019 05:03), Max: 97.7 (2019 05:03)  HR: 71 (2019 05:03) (70 - 74)  BP: 173/53 (2019 05:03) (153/54 - 173/53)  BP(mean): 82 (2019 05:03) (82 - 82)  RR: 18 (2019 05:03) (16 - 18)  SpO2: 100% (2019 05:03) (98% - 100%)    Physical Exam:      Constitutional: frail looking  HEENT: NC/AT, EOMI, PERRLA, conjunctivae clear  Neck: supple; thyroid not palpable  Back: no tenderness  Respiratory: respiratory effort normal; clear to auscultation  Cardiovascular: S1S2 regular, no murmurs  Abdomen: soft, not tender, not distended, positive BS  Genitourinary: has mild suprapubic tenderness  Lymphatic: no LN palpable  Musculoskeletal: no muscle tenderness, no joint swelling or tenderness  Extremities: no pedal edema  Neurological/ Psychiatric: AxOx3, moving all extremities  Skin: no rashes; no palpable lesions    Labs: reviewed                        11.3   8.92  )-----------( 291      ( 2019 06:25 )             37.2                11.2   11.76 )-----------( 262      ( 2019 06:27 )             35.8     -    138  |  107  |  10  ----------------------------<  95  4.7   |  24  |  0.93    Ca    8.8      2019 06:27  Phos  4.1     -  Mg     2.4         TPro  6.3  /  Alb  2.8<L>  /  TBili  0.7  /  DBili  x   /  AST  11<L>  /  ALT  16  /  AlkPhos  57       LIVER FUNCTIONS - ( 2019 06:27 )  Alb: 2.8 g/dL / Pro: 6.3 gm/dL / ALK PHOS: 57 U/L / ALT: 16 U/L / AST: 11 U/L / GGT: x           Urinalysis Basic - ( 2019 15:59 )    Color: Pink / Appearance: very cloudy / S.005 / pH: x  Gluc: x / Ketone: Negative  / Bili: Negative / Urobili: Negative mg/dL   Blood: x / Protein: 100 mg/dL / Nitrite: Positive   Leuk Esterase: Moderate / RBC: 25-50 /HPF / WBC >50   Sq Epi: x / Non Sq Epi: Few / Bacteria: Few    Culture - Urine (19 @ 15:59)    -  Amikacin: S <=16    -  Cefazolin: R >16    -  Aztreonam: S <=4    -  Ampicillin: R >16 These ampicillin results predict results for amoxicillin    -  Ampicillin/Sulbactam: R >16/8 Enterobacter, Citrobacter, and Serratia may develop resistance during prolonged therapy (3-4 days)    -  Cefoxitin: R >16    -  Cefepime: S <=4    -  Ciprofloxacin: S <=1    -  Ertapenem: S <=1    -  Gentamicin: S <=4    -  Ceftriaxone: S <=1 Enterobacter, Citrobacter, and Serratia may develop resistance during prolonged therapy    -  Imipenem: S <=1    -  Levofloxacin: S <=2    -  Meropenem: S <=1    -  Nitrofurantoin: R >64 Should not be used to treat pyelonephritis    -  Trimethoprim/Sulfamethoxazole: S <=2/38    -  Tobramycin: S <=4    -  Piperacillin/Tazobactam: S <=16    -  Tigecycline: S <=2    Specimen Source: .Urine None    Culture Results:   >100,000 CFU/ml Enterobacter cloacae    Organism Identification: Enterobacter cloacae    Organism: Enterobacter cloacae    Method Type: TINA    Culture - Blood (collected 2019 15:59)  Source: .Blood None  Gram Stain (2019 06:00):    Growth in aerobic bottle: Gram Positive Cocci in Clusters  Preliminary Report (2019 06:00):    Growth in aerobic bottle: Gram Positive Cocci in Clusters  Organism: Blood Culture PCR (2019 07:22)      -  Coagulase negative Staphylococcus: Detec      Method Type: PCR    Culture - Blood (collected 2019 15:59)  Source: .Blood None  Preliminary Report (2019 22:03):    No growth to date.    Radiology: all available radiological tests reviewed    < from: Xray Chest 1 View-PORTABLE IMMEDIATE (19 @ 16:38) >  No acute disease  No significant interval change as compared to  2019    < end of copied text >      Advanced directives addressed: full resuscitation

## 2019-06-22 LAB
APPEARANCE UR: ABNORMAL
BACTERIA # UR AUTO: ABNORMAL
BILIRUB UR-MCNC: NEGATIVE — SIGNIFICANT CHANGE UP
COLOR SPEC: ABNORMAL
DIFF PNL FLD: ABNORMAL
EPI CELLS # UR: SIGNIFICANT CHANGE UP
GLUCOSE UR QL: NEGATIVE MG/DL — SIGNIFICANT CHANGE UP
KETONES UR-MCNC: ABNORMAL
LEUKOCYTE ESTERASE UR-ACNC: ABNORMAL
NITRITE UR-MCNC: POSITIVE
PH UR: 6 — SIGNIFICANT CHANGE UP (ref 5–8)
PROT UR-MCNC: 100 MG/DL
RBC CASTS # UR COMP ASSIST: ABNORMAL /HPF (ref 0–4)
SP GR SPEC: 1.01 — SIGNIFICANT CHANGE UP (ref 1.01–1.02)
UROBILINOGEN FLD QL: NEGATIVE MG/DL — SIGNIFICANT CHANGE UP
WBC UR QL: ABNORMAL

## 2019-06-22 RX ORDER — MAGNESIUM HYDROXIDE 400 MG/1
30 TABLET, CHEWABLE ORAL DAILY
Refills: 0 | Status: DISCONTINUED | OUTPATIENT
Start: 2019-06-22 | End: 2019-06-26

## 2019-06-22 RX ADMIN — Medication 650 MILLIGRAM(S): at 11:47

## 2019-06-22 RX ADMIN — Medication 100 MILLIGRAM(S): at 10:23

## 2019-06-22 RX ADMIN — Medication 1 DROP(S): at 05:05

## 2019-06-22 RX ADMIN — Medication 650 MILLIGRAM(S): at 05:06

## 2019-06-22 RX ADMIN — CEFEPIME 1000 MILLIGRAM(S): 1 INJECTION, POWDER, FOR SOLUTION INTRAMUSCULAR; INTRAVENOUS at 17:52

## 2019-06-22 RX ADMIN — Medication 650 MILLIGRAM(S): at 17:56

## 2019-06-22 RX ADMIN — Medication 650 MILLIGRAM(S): at 02:00

## 2019-06-22 RX ADMIN — Medication 1 TABLET(S): at 10:17

## 2019-06-22 RX ADMIN — Medication 325 MILLIGRAM(S): at 10:16

## 2019-06-22 RX ADMIN — Medication 50 MILLIGRAM(S): at 17:52

## 2019-06-22 RX ADMIN — MAGNESIUM HYDROXIDE 30 MILLILITER(S): 400 TABLET, CHEWABLE ORAL at 14:42

## 2019-06-22 RX ADMIN — HEPARIN SODIUM 5000 UNIT(S): 5000 INJECTION INTRAVENOUS; SUBCUTANEOUS at 17:52

## 2019-06-22 RX ADMIN — Medication 650 MILLIGRAM(S): at 06:00

## 2019-06-22 RX ADMIN — Medication 1 DROP(S): at 13:34

## 2019-06-22 RX ADMIN — Medication 650 MILLIGRAM(S): at 01:07

## 2019-06-22 RX ADMIN — PANTOPRAZOLE SODIUM 40 MILLIGRAM(S): 20 TABLET, DELAYED RELEASE ORAL at 05:06

## 2019-06-22 RX ADMIN — CEFEPIME 1000 MILLIGRAM(S): 1 INJECTION, POWDER, FOR SOLUTION INTRAMUSCULAR; INTRAVENOUS at 10:16

## 2019-06-22 RX ADMIN — Medication 1 DROP(S): at 21:11

## 2019-06-22 RX ADMIN — CEFEPIME 1000 MILLIGRAM(S): 1 INJECTION, POWDER, FOR SOLUTION INTRAMUSCULAR; INTRAVENOUS at 01:07

## 2019-06-22 RX ADMIN — Medication 650 MILLIGRAM(S): at 17:55

## 2019-06-22 RX ADMIN — HEPARIN SODIUM 5000 UNIT(S): 5000 INJECTION INTRAVENOUS; SUBCUTANEOUS at 05:05

## 2019-06-22 RX ADMIN — Medication 50 MILLIGRAM(S): at 05:06

## 2019-06-22 RX ADMIN — Medication 100 MILLIGRAM(S): at 21:11

## 2019-06-22 RX ADMIN — Medication 2000 UNIT(S): at 10:16

## 2019-06-22 RX ADMIN — Medication 0.12 MILLIGRAM(S): at 11:37

## 2019-06-22 RX ADMIN — Medication 75 MICROGRAM(S): at 05:06

## 2019-06-22 NOTE — PROGRESS NOTE ADULT - ASSESSMENT
89 y/o Female with h/o CAD s/p PCI, HTN, dyslipidemia, hypothyroidism, bladder cancer s/p resection in October 2018, s/p intravesical therapy s/p new resection due to recurrence 1 week PTA, anxiety, orthostatic hypotension, DJD multiple joints, anxiety, uterine cancer s/p hysterectomy was admitted on 6/18 for increased weakness. She has dysuria and increased urinary frequency and suprapubic pain. The patient was unable to get up from bed and had difficulty walking due to weakness. The patient was so weak, they came to ED. Denies flank pain, fever, chills at home. In ER she received cefepime.    1. UTI with ENCL. Bacteremia with CNST likely contaminant. Bladder Ca s/p recent surgery  -no further fever  -leukocytosis resolving  - BC x 2, urine c/s reviewed  -on cefepime 1 gm IV q8h # 3  -tolerating abx well so far; no side effects noted  -f/u cultures  -continue abx coverage  -urology evaluation  -monitor temps  -f/u CBC  -supportive care  2. Other issues:   -care per medicine

## 2019-06-22 NOTE — PROGRESS NOTE ADULT - SUBJECTIVE AND OBJECTIVE BOX
Date of service: 19 @ 08:16    Lying in bed in NAD  Dysuria is improving  Denies pain    ROS: no fever or chills; denies dizziness, no HA, no SOB or cough, no abdominal pain, no diarrhea or constipation; no legs pain, no rashes    MEDICATIONS  (STANDING):  acetaminophen   Tablet .. 650 milliGRAM(s) Oral every 6 hours  artificial  tears Solution 1 Drop(s) Both EYES three times a day  aspirin 325 milliGRAM(s) Oral daily  cefepime  Injectable. 1000 milliGRAM(s) IV Push every 8 hours  cholecalciferol 2000 Unit(s) Oral daily  heparin  Injectable 5000 Unit(s) SubCutaneous every 12 hours  levothyroxine 75 MICROGram(s) Oral daily  metoprolol succinate ER 50 milliGRAM(s) Oral two times a day  multivitamin/minerals 1 Tablet(s) Oral daily  pantoprazole    Tablet 40 milliGRAM(s) Oral before breakfast      Vital Signs Last 24 Hrs  T(C): 36.4 (2019 05:23), Max: 36.6 (2019 20:30)  T(F): 97.5 (2019 05:23), Max: 97.8 (2019 20:30)  HR: 64 (2019 05:23) (64 - 73)  BP: 184/56 (2019 05:23) (170/57 - 199/61)  BP(mean): --  RR: 18 (2019 05:23) (17 - 18)  SpO2: 100% (2019 20:30) (100% - 100%)    Physical Exam:      Constitutional: frail looking  HEENT: NC/AT, EOMI, PERRLA, conjunctivae clear  Neck: supple; thyroid not palpable  Back: no tenderness  Respiratory: respiratory effort normal; clear to auscultation  Cardiovascular: S1S2 regular, no murmurs  Abdomen: soft, not tender, not distended, positive BS  Genitourinary: has mild suprapubic tenderness  Lymphatic: no LN palpable  Musculoskeletal: no muscle tenderness, no joint swelling or tenderness  Extremities: no pedal edema  Neurological/ Psychiatric: AxOx3, moving all extremities  Skin: no rashes; no palpable lesions    Labs: reviewed                        11.3   8.92  )-----------( 291      ( 2019 06:25 )             37.2                11.2   11.76 )-----------( 262      ( 2019 06:27 )             35.8     -    138  |  107  |  10  ----------------------------<  95  4.7   |  24  |  0.93    Ca    8.8      2019 06:27  Phos  4.1     -  Mg     2.4         TPro  6.3  /  Alb  2.8<L>  /  TBili  0.7  /  DBili  x   /  AST  11<L>  /  ALT  16  /  AlkPhos  57       LIVER FUNCTIONS - ( 2019 06:27 )  Alb: 2.8 g/dL / Pro: 6.3 gm/dL / ALK PHOS: 57 U/L / ALT: 16 U/L / AST: 11 U/L / GGT: x           Urinalysis Basic - ( 2019 15:59 )    Color: Pink / Appearance: very cloudy / S.005 / pH: x  Gluc: x / Ketone: Negative  / Bili: Negative / Urobili: Negative mg/dL   Blood: x / Protein: 100 mg/dL / Nitrite: Positive   Leuk Esterase: Moderate / RBC: 25-50 /HPF / WBC >50   Sq Epi: x / Non Sq Epi: Few / Bacteria: Few    Culture - Urine (19 @ 15:59)    -  Amikacin: S <=16    -  Cefazolin: R >16    -  Aztreonam: S <=4    -  Ampicillin: R >16 These ampicillin results predict results for amoxicillin    -  Ampicillin/Sulbactam: R >16/8 Enterobacter, Citrobacter, and Serratia may develop resistance during prolonged therapy (3-4 days)    -  Cefoxitin: R >16    -  Cefepime: S <=4    -  Ciprofloxacin: S <=1    -  Ertapenem: S <=1    -  Gentamicin: S <=4    -  Ceftriaxone: S <=1 Enterobacter, Citrobacter, and Serratia may develop resistance during prolonged therapy    -  Imipenem: S <=1    -  Levofloxacin: S <=2    -  Meropenem: S <=1    -  Nitrofurantoin: R >64 Should not be used to treat pyelonephritis    -  Trimethoprim/Sulfamethoxazole: S <=2/38    -  Tobramycin: S <=4    -  Piperacillin/Tazobactam: S <=16    -  Tigecycline: S <=2    Specimen Source: .Urine None    Culture Results:   >100,000 CFU/ml Enterobacter cloacae    Organism Identification: Enterobacter cloacae    Organism: Enterobacter cloacae    Method Type: TINA    Culture - Blood (collected 2019 15:59)  Source: .Blood None  Gram Stain (2019 06:00):    Growth in aerobic bottle: Gram Positive Cocci in Clusters  Preliminary Report (2019 06:00):    Growth in aerobic bottle: Gram Positive Cocci in Clusters  Organism: Blood Culture PCR (2019 07:22)      -  Coagulase negative Staphylococcus: Detec      Method Type: PCR    Culture - Blood (collected 2019 15:59)  Source: .Blood None  Preliminary Report (2019 22:03):    No growth to date.        Radiology: all available radiological tests reviewed    < from: Xray Chest 1 View-PORTABLE IMMEDIATE (19 @ 16:38) >  No acute disease  No significant interval change as compared to  2019    < end of copied text >      Advanced directives addressed: full resuscitation

## 2019-06-22 NOTE — PROGRESS NOTE ADULT - SUBJECTIVE AND OBJECTIVE BOX
UROLOGY FOLLOW UP NOTE    SUBJECTIVE    Patient seen and examined at bedside.  No acute events overnight.    Patient now reports gross hematuria with one clot  Continues to have increased frequency of urination    PVR this morning was >300mL but she refused a verma  She states that she didnt want to actually void and that is why her PVR was high.    Patient is concerned about verma care when she goes home because she lives alone and has arthritis which affects ability to empty the bag    No BMs for 5 days    Son is at bedside        OBJECTIVE    T(C): 36.6 (06-22-19 @ 11:36), Max: 36.6 (06-21-19 @ 20:30)  HR: 75 (06-22-19 @ 11:36)  BP: 161/64 (06-22-19 @ 11:36)  RR: 18 (06-22-19 @ 11:36)  SpO2: 100% (06-22-19 @ 11:36)    Gen: elderly Female in NAD, well nourished  HEENT: normocephalic, hearing intact, moist mucus membranes  Chest: non labored breathing  ABd: soft, NT, ND, no masses  Back: no CVA tenderness  : + suprapubic distension or tenderness  Psych: normal affect and mood  Ext: moves all four extremities freely            CBC Full  -  ( 21 Jun 2019 06:25 )  WBC Count : 8.92 K/uL  Hemoglobin : 11.3 g/dL  Hematocrit : 37.2 %  Platelet Count - Automated : 291 K/uL  Mean Cell Volume : 90.5 fl  Mean Cell Hemoglobin : 27.5 pg  Mean Cell Hemoglobin Concentration : 30.4 gm/dL  Auto Neutrophil # : x  Auto Lymphocyte # : x  Auto Monocyte # : x  Auto Eosinophil # : x  Auto Basophil # : x  Auto Neutrophil % : x  Auto Lymphocyte % : x  Auto Monocyte % : x  Auto Eosinophil % : x  Auto Basophil % : x      acetaminophen   Tablet .. 650 milliGRAM(s) Oral every 6 hours  ALPRAZolam 0.125 milliGRAM(s) Oral two times a day PRN  artificial  tears Solution 1 Drop(s) Both EYES three times a day  aspirin 325 milliGRAM(s) Oral daily  cefepime  Injectable. 1000 milliGRAM(s) IV Push every 8 hours  cholecalciferol 2000 Unit(s) Oral daily  docusate sodium 100 milliGRAM(s) Oral three times a day PRN  heparin  Injectable 5000 Unit(s) SubCutaneous every 12 hours  levothyroxine 75 MICROGram(s) Oral daily  metoprolol succinate ER 50 milliGRAM(s) Oral two times a day  multivitamin/minerals 1 Tablet(s) Oral daily  pantoprazole    Tablet 40 milliGRAM(s) Oral before breakfast  phenazopyridine 100 milliGRAM(s) Oral every 8 hours PRN  senna 2 Tablet(s) Oral at bedtime PRN    Ucx: enterobacter    ASSESSMENT & PLAN    Gross hematuria  - cannot comment if she needs CBI since she is refusing a catheter and not emptying her bladder well    Increased frequency of urination  Retention of urine  - Increased frequency related to retention.  - Retention may be due to constipation. Milk of mg ordered  - Patient in agreement to repeat PVR once again and if greater than 200mL then verma will be inserted    UTI  -c/w antibiotics as per ID    Please have  visit patient for possible STR vs LTR upon discharge.    Thank you for allowing me to participate in the care of this patient  Please call with questions or concerns    Gage Solorzano MD    [facility]  [phone]

## 2019-06-22 NOTE — PROGRESS NOTE ADULT - SUBJECTIVE AND OBJECTIVE BOX
CC: Weakness    HPI:  89 y/o F with PMH of CAD s/p PCI, HTN, dyslipidemia, hypothyroidism, bladder cancer s/p resection, anxiety, orthostatic hypotension, DJD multiple joints, anxiety, uterine cancer s/p hysterectomy, p/w weakness. Today patient was unable to get up from bed and had difficulty walking due to weakness. Daughter called doctor who thought she may have a UTI and prescribed outpatient medications. However, patient was so weak, they came to ED. Patient also c/o dysuria and increased urinary frequency. +Suprapubic pain. Denies flank pain, fever, chills, nausea, vomiting, cough, runny nose, sore throat, SOB, CP.  Pt was admitted to medicine for evaluation of her UTI. Urology and ID were involved. As her verma was recently removed, PVR was checked to ensure urinary retention not contributing to UTI.    6/21: Pt complaining of generalized aches/pains from arthritis  She still feels very weak and does not feel she can go home due to this.  She feels she will be back in 3 days if she leaves now.  She is being treated for enterobacter UTI.  Otherwise HD stable.  No fever, chills, n, v.    6/22: Pt states she still have hematuria, still feeling very weak.  No fever, chills, n, v.    REVIEW OF SYSTEMS: All other review of systems is negative unless indicated above.    Vital Signs Last 24 Hrs  T(C): 36.6 (22 Jun 2019 11:36), Max: 36.6 (21 Jun 2019 20:30)  T(F): 97.9 (22 Jun 2019 11:36), Max: 97.9 (22 Jun 2019 11:36)  HR: 75 (22 Jun 2019 11:36) (64 - 75)  BP: 161/64 (22 Jun 2019 11:36) (161/64 - 199/61)  BP(mean): --  RR: 18 (22 Jun 2019 11:36) (17 - 18)  SpO2: 100% (22 Jun 2019 11:36) (100% - 100%)    PHYSICAL EXAM:    Constitutional: NAD, awake and alert, weak appearing, frail  HEENT: PERR, EOMI, Normal Hearing, MMM  Neck: Soft and supple  Respiratory: Breath sounds are clear bilaterally, No wheezing, rales or rhonchi  Cardiovascular: S1 and S2, regular rate and rhythm, no Murmurs, gallops or rubs  Gastrointestinal: Bowel Sounds present, soft, nontender, nondistended, no guarding, no rebound  Extremities: No peripheral edema  Neurological: A/O x 3, no focal deficits in my limited exam  Skin: No rashes    MEDICATIONS  (STANDING):  acetaminophen   Tablet .. 650 milliGRAM(s) Oral every 6 hours  artificial  tears Solution 1 Drop(s) Both EYES three times a day  aspirin 325 milliGRAM(s) Oral daily  cefepime  Injectable. 1000 milliGRAM(s) IV Push every 8 hours  cholecalciferol 2000 Unit(s) Oral daily  heparin  Injectable 5000 Unit(s) SubCutaneous every 12 hours  levothyroxine 75 MICROGram(s) Oral daily  metoprolol succinate ER 50 milliGRAM(s) Oral two times a day  multivitamin/minerals 1 Tablet(s) Oral daily  pantoprazole    Tablet 40 milliGRAM(s) Oral before breakfast    MEDICATIONS  (PRN):  ALPRAZolam 0.125 milliGRAM(s) Oral two times a day PRN anxiety  docusate sodium 100 milliGRAM(s) Oral three times a day PRN Constipation  magnesium hydroxide Suspension 30 milliLiter(s) Oral daily PRN Constipation  phenazopyridine 100 milliGRAM(s) Oral every 8 hours PRN dysuria  senna 2 Tablet(s) Oral at bedtime PRN Constipation                              11.3   8.92  )-----------( 291      ( 21 Jun 2019 06:25 )             37.2           CAPILLARY BLOOD GLUCOSE      A/P: 89 year old woman who presents with weakness found to have UTI.    Generalized weakness with verma catheter associated sepsis UTI:   -pt still with significant weakness  -bladder cancer w/ recent TURBT / bladder biopsies and recent verma removal  -UCx showing enterobacter cloacae  -BCx likely contaminate with repeat cultures no growth  -c/w cefepime day # 3 per ID    Urinary retention:  -if continues to have increase PVR will replace verma per Uro recs.    Constipation:  -bowel regimen    HTN: Uncontrolled  -c/w home meds  -per pt request for cardiologist evaluation     *Hyponatremia: RESOLVED  -s/p IVFs    *CAD s/p PCI / dyslipidemia / hypothyroidism / anxiety / orthostatic hypotension / DJD / anxiety / uterine cancer  -C/w home meds and if conditions remain stable -> f/u outpatient for further management       *Moderate Protein Calorie Malnutrition  -albumin 2.8  -supplement as able    *DVT ppx  -Heparin SubQ    Dispo:  -d/c home with home PT/care once medically stable

## 2019-06-23 LAB
CULTURE RESULTS: SIGNIFICANT CHANGE UP
GLUCOSE BLDC GLUCOMTR-MCNC: 151 MG/DL — HIGH (ref 70–99)
SPECIMEN SOURCE: SIGNIFICANT CHANGE UP

## 2019-06-23 PROCEDURE — 93010 ELECTROCARDIOGRAM REPORT: CPT

## 2019-06-23 RX ADMIN — CEFEPIME 1000 MILLIGRAM(S): 1 INJECTION, POWDER, FOR SOLUTION INTRAMUSCULAR; INTRAVENOUS at 01:10

## 2019-06-23 RX ADMIN — Medication 2000 UNIT(S): at 11:58

## 2019-06-23 RX ADMIN — Medication 1 DROP(S): at 21:16

## 2019-06-23 RX ADMIN — Medication 650 MILLIGRAM(S): at 11:56

## 2019-06-23 RX ADMIN — Medication 100 MILLIGRAM(S): at 05:45

## 2019-06-23 RX ADMIN — Medication 50 MILLIGRAM(S): at 05:46

## 2019-06-23 RX ADMIN — Medication 0.12 MILLIGRAM(S): at 21:16

## 2019-06-23 RX ADMIN — Medication 650 MILLIGRAM(S): at 12:00

## 2019-06-23 RX ADMIN — Medication 1 TABLET(S): at 11:58

## 2019-06-23 RX ADMIN — Medication 75 MICROGRAM(S): at 05:45

## 2019-06-23 RX ADMIN — Medication 100 MILLIGRAM(S): at 21:16

## 2019-06-23 RX ADMIN — Medication 650 MILLIGRAM(S): at 18:15

## 2019-06-23 RX ADMIN — Medication 650 MILLIGRAM(S): at 18:08

## 2019-06-23 RX ADMIN — Medication 1 DROP(S): at 16:31

## 2019-06-23 RX ADMIN — HEPARIN SODIUM 5000 UNIT(S): 5000 INJECTION INTRAVENOUS; SUBCUTANEOUS at 18:08

## 2019-06-23 RX ADMIN — Medication 325 MILLIGRAM(S): at 11:58

## 2019-06-23 RX ADMIN — PANTOPRAZOLE SODIUM 40 MILLIGRAM(S): 20 TABLET, DELAYED RELEASE ORAL at 05:46

## 2019-06-23 RX ADMIN — Medication 1 DROP(S): at 05:46

## 2019-06-23 RX ADMIN — Medication 650 MILLIGRAM(S): at 05:46

## 2019-06-23 RX ADMIN — CEFEPIME 1000 MILLIGRAM(S): 1 INJECTION, POWDER, FOR SOLUTION INTRAMUSCULAR; INTRAVENOUS at 18:09

## 2019-06-23 RX ADMIN — CEFEPIME 1000 MILLIGRAM(S): 1 INJECTION, POWDER, FOR SOLUTION INTRAMUSCULAR; INTRAVENOUS at 11:58

## 2019-06-23 RX ADMIN — Medication 0.12 MILLIGRAM(S): at 11:57

## 2019-06-23 RX ADMIN — Medication 50 MILLIGRAM(S): at 18:09

## 2019-06-23 RX ADMIN — Medication 650 MILLIGRAM(S): at 06:51

## 2019-06-23 RX ADMIN — HEPARIN SODIUM 5000 UNIT(S): 5000 INJECTION INTRAVENOUS; SUBCUTANEOUS at 05:45

## 2019-06-23 NOTE — CHART NOTE - NSCHARTNOTEFT_GEN_A_CORE
RRT called as patient became momentarily unresponsive while bearing down in bathroom. Patient brought back to bed and symtpoms resolved. Patient now back at baseline.     89 y/o F with PMH of CAD s/p PCI, HTN, dyslipidemia, hypothyroidism, bladder cancer s/p resection, anxiety, orthostatic hypotension, DJD multiple joints, anxiety, uterine cancer s/p hysterectomy, p/w weakness now being treated for UTI. Patient now s/p verma catheter removal.     Vitals: Bp 210/84 HR 85 O2 sat 100%    Repeat 186/62 RRT called as patient became momentarily unresponsive while bearing down in bathroom. Patient brought back to bed and symtpoms resolved. Patient now back at baseline.     89 y/o F with PMH of CAD s/p PCI, HTN, dyslipidemia, hypothyroidism, bladder cancer s/p resection, anxiety, orthostatic hypotension, DJD multiple joints, anxiety, uterine cancer s/p hysterectomy, p/w weakness now being treated for UTI. Patient now s/p verma catheter removal.     Vitals: Bp 210/84 HR 85 O2 sat 100%    Repeat 186/62    PE:   Gen: elderly female, frail, NAD, lying in bed  CV: RRR  REsp: CTABL  Abd: soft, ND, + BS  Ext: no peripheral edema    A/P: 89yo F admitted for UTI treatment now with vasovagal episode.   - STAT EKG ordered  _ Dr. Alvarado, primary Hospitalist present at bedside.     Dr. Clemens, PGY-3 and Dr. Young, Intensivist present

## 2019-06-23 NOTE — PROGRESS NOTE ADULT - ASSESSMENT
89 y/o Female with h/o CAD s/p PCI, HTN, dyslipidemia, hypothyroidism, bladder cancer s/p resection in October 2018, s/p intravesical therapy s/p new resection due to recurrence 1 week PTA, anxiety, orthostatic hypotension, DJD multiple joints, anxiety, uterine cancer s/p hysterectomy was admitted on 6/18 for increased weakness. She has dysuria and increased urinary frequency and suprapubic pain. The patient was unable to get up from bed and had difficulty walking due to weakness. The patient was so weak, they came to ED. Denies flank pain, fever, chills at home. In ER she received cefepime.    1. UTI with ENCL. Bacteremia with CNST likely contaminant. Bladder Ca s/p recent surgery  -urinary bladder dysfunction  -improving  -leukocytosis resolving  -repeat BC x 2 are negative to date  -on cefepime 1 gm IV q8h # 4  -tolerating abx well so far; no side effects noted  -f/u cultures  -continue abx coverage  -urology evaluation appreciated  -monitor temps  -f/u CBC  -supportive care  2. Other issues:   -care per medicine

## 2019-06-23 NOTE — PROGRESS NOTE ADULT - SUBJECTIVE AND OBJECTIVE BOX
Date of service: 19 @ 06:21    Lying in bed in NAD  Alert, but sleepy  Denies pain  Has urinary frequency    ROS: no fever or chills; denies dizziness, no HA, no SOB or cough, no abdominal pain, no diarrhea or constipation; no dysuria, no legs pain, no rashes    MEDICATIONS  (STANDING):  acetaminophen   Tablet .. 650 milliGRAM(s) Oral every 6 hours  artificial  tears Solution 1 Drop(s) Both EYES three times a day  aspirin 325 milliGRAM(s) Oral daily  cefepime  Injectable. 1000 milliGRAM(s) IV Push every 8 hours  cholecalciferol 2000 Unit(s) Oral daily  heparin  Injectable 5000 Unit(s) SubCutaneous every 12 hours  levothyroxine 75 MICROGram(s) Oral daily  metoprolol succinate ER 50 milliGRAM(s) Oral two times a day  multivitamin/minerals 1 Tablet(s) Oral daily  pantoprazole    Tablet 40 milliGRAM(s) Oral before breakfast      Vital Signs Last 24 Hrs  T(C): 36.7 (2019 04:46), Max: 36.7 (2019 19:58)  T(F): 98.1 (2019 04:46), Max: 98.1 (2019 04:46)  HR: 76 (2019 04:46) (73 - 77)  BP: 154/54 (2019 04:46) (150/58 - 161/64)  BP(mean): --  RR: 18 (2019 04:46) (18 - 18)  SpO2: 99% (2019 04:46) (99% - 100%)    Physical Exam:      Constitutional: frail looking  HEENT: NC/AT, EOMI, PERRLA, conjunctivae clear  Neck: supple; thyroid not palpable  Back: no tenderness  Respiratory: respiratory effort normal; clear to auscultation  Cardiovascular: S1S2 regular, no murmurs  Abdomen: soft, not tender, not distended, positive BS  Genitourinary: has mild suprapubic tenderness  Lymphatic: no LN palpable  Musculoskeletal: no muscle tenderness, no joint swelling or tenderness  Extremities: no pedal edema  Neurological/ Psychiatric: AxOx3, moving all extremities  Skin: no rashes; no palpable lesions    Labs: reviewed                        11.3   8.92  )-----------( 291      ( 2019 06:25 )             37.2                11.2   11.76 )-----------( 262      ( 2019 06:27 )             35.8         138  |  107  |  10  ----------------------------<  95  4.7   |  24  |  0.93    Ca    8.8      2019 06:27  Phos  4.1       Mg     2.4         TPro  6.3  /  Alb  2.8<L>  /  TBili  0.7  /  DBili  x   /  AST  11<L>  /  ALT  16  /  AlkPhos  57       LIVER FUNCTIONS - ( 2019 06:27 )  Alb: 2.8 g/dL / Pro: 6.3 gm/dL / ALK PHOS: 57 U/L / ALT: 16 U/L / AST: 11 U/L / GGT: x           Urinalysis Basic - ( 2019 15:59 )    Color: Pink / Appearance: very cloudy / S.005 / pH: x  Gluc: x / Ketone: Negative  / Bili: Negative / Urobili: Negative mg/dL   Blood: x / Protein: 100 mg/dL / Nitrite: Positive   Leuk Esterase: Moderate / RBC: 25-50 /HPF / WBC >50   Sq Epi: x / Non Sq Epi: Few / Bacteria: Few    Culture - Urine (19 @ 15:59)    -  Amikacin: S <=16    -  Cefazolin: R >16    -  Aztreonam: S <=4    -  Ampicillin: R >16 These ampicillin results predict results for amoxicillin    -  Ampicillin/Sulbactam: R >16/8 Enterobacter, Citrobacter, and Serratia may develop resistance during prolonged therapy (3-4 days)    -  Cefoxitin: R >16    -  Cefepime: S <=4    -  Ciprofloxacin: S <=1    -  Ertapenem: S <=1    -  Gentamicin: S <=4    -  Ceftriaxone: S <=1 Enterobacter, Citrobacter, and Serratia may develop resistance during prolonged therapy    -  Imipenem: S <=1    -  Levofloxacin: S <=2    -  Meropenem: S <=1    -  Nitrofurantoin: R >64 Should not be used to treat pyelonephritis    -  Trimethoprim/Sulfamethoxazole: S <=2/38    -  Tobramycin: S <=4    -  Piperacillin/Tazobactam: S <=16    -  Tigecycline: S <=2    Specimen Source: .Urine None    Culture Results:   >100,000 CFU/ml Enterobacter cloacae    Organism Identification: Enterobacter cloacae    Organism: Enterobacter cloacae    Method Type: TINA    Culture - Blood (collected 2019 15:59)  Source: .Blood None  Gram Stain (2019 06:00):    Growth in aerobic bottle: Gram Positive Cocci in Clusters  Preliminary Report (2019 06:00):    Growth in aerobic bottle: Gram Positive Cocci in Clusters  Organism: Blood Culture PCR (2019 07:22)      -  Coagulase negative Staphylococcus: Detec      Method Type: PCR    Culture - Blood (collected 2019 15:59)  Source: .Blood None  Preliminary Report (2019 22:03):    No growth to date.    Culture - Blood in AM (19 @ 06:25)    Specimen Source: .Blood None    Culture Results:   No growth to date.    Radiology: all available radiological tests reviewed    < from: Xray Chest 1 View-PORTABLE IMMEDIATE (19 @ 16:38) >  No acute disease  No significant interval change as compared to  2019    < end of copied text >      Advanced directives addressed: full resuscitation

## 2019-06-23 NOTE — PROGRESS NOTE ADULT - SUBJECTIVE AND OBJECTIVE BOX
CC: Weakness    HPI:  89 y/o F with PMH of CAD s/p PCI, HTN, dyslipidemia, hypothyroidism, bladder cancer s/p resection, anxiety, orthostatic hypotension, DJD multiple joints, anxiety, uterine cancer s/p hysterectomy, p/w weakness. Today patient was unable to get up from bed and had difficulty walking due to weakness. Daughter called doctor who thought she may have a UTI and prescribed outpatient medications. However, patient was so weak, they came to ED. Patient also c/o dysuria and increased urinary frequency. +Suprapubic pain. Denies flank pain, fever, chills, nausea, vomiting, cough, runny nose, sore throat, SOB, CP.  Pt was admitted to medicine for evaluation of her UTI. Urology and ID were involved. As her verma was recently removed, PVR was checked to ensure urinary retention not contributing to UTI.    : Pt complaining of generalized aches/pains from arthritis  She still feels very weak and does not feel she can go home due to this.  She feels she will be back in 3 days if she leaves now.  She is being treated for enterobacter UTI.  Otherwise HD stable.  No fever, chills, n, v.    : Pt states she still have hematuria, still feeling very weak.  No fever, chills, n, v.  : Rapid response called this AM due to unresponsiveness in the bathroom.  Pt was about to have a bowel movement when suddently became unresponsive.  She was brought to bed where mentation improved to baseline.  Currently pt alert, comfortable, no fever, chills, n, v.  Verma in place due to urinary retention.    REVIEW OF SYSTEMS: All other review of systems is negative unless indicated above.    Vital Signs Last 24 Hrs  T(C): 36.7 (2019 04:46), Max: 36.7 (2019 19:58)  T(F): 98.1 (2019 04:46), Max: 98.1 (2019 04:46)  HR: 76 (2019 04:46) (73 - 77)  BP: 154/54 (2019 04:46) (150/58 - 161/64)  BP(mean): --  RR: 18 (2019 04:46) (18 - 18)  SpO2: 99% (2019 04:46) (99% - 100%)    PHYSICAL EXAM:    Constitutional: NAD, awake and alert, weak appearing, frail  HEENT: PERR, EOMI, Normal Hearing, MMM  Neck: Soft and supple  Respiratory: Breath sounds are clear bilaterally, No wheezing, rales or rhonchi  Cardiovascular: S1 and S2, regular rate and rhythm, no Murmurs, gallops or rubs  Gastrointestinal: Bowel Sounds present, soft, nontender, nondistended, no guarding, no rebound  Extremities: No peripheral edema  Neurological: A/O x 3, no focal deficits in my limited exam  Skin: No rashes    MEDICATIONS  (STANDING):  acetaminophen   Tablet .. 650 milliGRAM(s) Oral every 6 hours  artificial  tears Solution 1 Drop(s) Both EYES three times a day  aspirin 325 milliGRAM(s) Oral daily  cefepime  Injectable. 1000 milliGRAM(s) IV Push every 8 hours  cholecalciferol 2000 Unit(s) Oral daily  heparin  Injectable 5000 Unit(s) SubCutaneous every 12 hours  levothyroxine 75 MICROGram(s) Oral daily  metoprolol succinate ER 50 milliGRAM(s) Oral two times a day  multivitamin/minerals 1 Tablet(s) Oral daily  pantoprazole    Tablet 40 milliGRAM(s) Oral before breakfast    MEDICATIONS  (PRN):  ALPRAZolam 0.125 milliGRAM(s) Oral two times a day PRN anxiety  docusate sodium 100 milliGRAM(s) Oral three times a day PRN Constipation  magnesium hydroxide Suspension 30 milliLiter(s) Oral daily PRN Constipation  senna 2 Tablet(s) Oral at bedtime PRN Constipation      CAPILLARY BLOOD GLUCOSE      POCT Blood Glucose.: 151 mg/dL (2019 09:26)        Urinalysis Basic - ( 2019 14:30 )    Color: Brown / Appearance: Slightly Turbid / S.010 / pH: x  Gluc: x / Ketone: Trace  / Bili: Negative / Urobili: Negative mg/dL   Blood: x / Protein: 100 mg/dL / Nitrite: Positive   Leuk Esterase: Moderate / RBC: 25-50 /HPF / WBC 26-50   Sq Epi: x / Non Sq Epi: Few / Bacteria: Few          A/P: 89 year old woman who presents with weakness found to have UTI.    Generalized weakness with verma catheter associated sepsis UTI:   -pt still with significant weakness, worsening -- PT re-evaluation for POOL  -bladder cancer w/ recent TURBT / bladder biopsies and recent verma removal  -UCx showing enterobacter cloacae  -BCx likely contaminate with repeat cultures no growth  -c/w cefepime day # 4 per ID     Urinary retention:  -high post void residuals  -verma re-placed  per urology    Constipation:  -bowel regimen    HTN with episode of vasovagal: Due to orthostatics and labile BP  -c/w home meds  -obtain EKG  -cardiac eval    *Hyponatremia: RESOLVED  -s/p IVFs    *CAD s/p PCI / dyslipidemia / hypothyroidism / anxiety / orthostatic hypotension / DJD / anxiety / uterine cancer  -C/w home meds and if conditions remain stable -> f/u outpatient for further management       *Moderate Protein Calorie Malnutrition  -albumin 2.8  -supplement as able    *DVT ppx  -Heparin SubQ    Dispo:  -pt will likely need POOL placement once medically stable

## 2019-06-24 ENCOUNTER — TRANSCRIPTION ENCOUNTER (OUTPATIENT)
Age: 84
End: 2019-06-24

## 2019-06-24 RX ORDER — METOPROLOL TARTRATE 50 MG
1 TABLET ORAL
Qty: 0 | Refills: 0 | DISCHARGE
Start: 2019-06-24

## 2019-06-24 RX ORDER — LOSARTAN POTASSIUM 100 MG/1
25 TABLET, FILM COATED ORAL DAILY
Refills: 0 | Status: DISCONTINUED | OUTPATIENT
Start: 2019-06-24 | End: 2019-06-26

## 2019-06-24 RX ORDER — ACETAMINOPHEN 500 MG
2 TABLET ORAL
Qty: 0 | Refills: 0 | DISCHARGE

## 2019-06-24 RX ORDER — LOSARTAN POTASSIUM 100 MG/1
1 TABLET, FILM COATED ORAL
Qty: 0 | Refills: 0 | DISCHARGE
Start: 2019-06-24

## 2019-06-24 RX ORDER — ACETAMINOPHEN 500 MG
2 TABLET ORAL
Qty: 0 | Refills: 0 | DISCHARGE
Start: 2019-06-24

## 2019-06-24 RX ORDER — SENNA PLUS 8.6 MG/1
2 TABLET ORAL
Qty: 0 | Refills: 0 | DISCHARGE
Start: 2019-06-24

## 2019-06-24 RX ORDER — MAGNESIUM HYDROXIDE 400 MG/1
30 TABLET, CHEWABLE ORAL
Qty: 0 | Refills: 0 | DISCHARGE
Start: 2019-06-24

## 2019-06-24 RX ORDER — ASPIRIN/CALCIUM CARB/MAGNESIUM 324 MG
1 TABLET ORAL
Qty: 0 | Refills: 0 | DISCHARGE
Start: 2019-06-24

## 2019-06-24 RX ADMIN — Medication 75 MICROGRAM(S): at 05:22

## 2019-06-24 RX ADMIN — Medication 1 DROP(S): at 17:37

## 2019-06-24 RX ADMIN — Medication 1 TABLET(S): at 11:31

## 2019-06-24 RX ADMIN — Medication 50 MILLIGRAM(S): at 05:22

## 2019-06-24 RX ADMIN — Medication 1 DROP(S): at 05:22

## 2019-06-24 RX ADMIN — Medication 650 MILLIGRAM(S): at 11:31

## 2019-06-24 RX ADMIN — Medication 0.12 MILLIGRAM(S): at 17:35

## 2019-06-24 RX ADMIN — Medication 50 MILLIGRAM(S): at 17:36

## 2019-06-24 RX ADMIN — Medication 650 MILLIGRAM(S): at 17:36

## 2019-06-24 RX ADMIN — Medication 1 DROP(S): at 21:55

## 2019-06-24 RX ADMIN — Medication 100 MILLIGRAM(S): at 21:55

## 2019-06-24 RX ADMIN — CEFEPIME 1000 MILLIGRAM(S): 1 INJECTION, POWDER, FOR SOLUTION INTRAMUSCULAR; INTRAVENOUS at 01:53

## 2019-06-24 RX ADMIN — HEPARIN SODIUM 5000 UNIT(S): 5000 INJECTION INTRAVENOUS; SUBCUTANEOUS at 05:22

## 2019-06-24 RX ADMIN — Medication 2000 UNIT(S): at 11:32

## 2019-06-24 RX ADMIN — Medication 0.12 MILLIGRAM(S): at 21:54

## 2019-06-24 RX ADMIN — CEFEPIME 1000 MILLIGRAM(S): 1 INJECTION, POWDER, FOR SOLUTION INTRAMUSCULAR; INTRAVENOUS at 09:25

## 2019-06-24 RX ADMIN — CEFEPIME 1000 MILLIGRAM(S): 1 INJECTION, POWDER, FOR SOLUTION INTRAMUSCULAR; INTRAVENOUS at 17:36

## 2019-06-24 RX ADMIN — HEPARIN SODIUM 5000 UNIT(S): 5000 INJECTION INTRAVENOUS; SUBCUTANEOUS at 17:37

## 2019-06-24 RX ADMIN — PANTOPRAZOLE SODIUM 40 MILLIGRAM(S): 20 TABLET, DELAYED RELEASE ORAL at 05:22

## 2019-06-24 RX ADMIN — LOSARTAN POTASSIUM 25 MILLIGRAM(S): 100 TABLET, FILM COATED ORAL at 11:31

## 2019-06-24 RX ADMIN — Medication 325 MILLIGRAM(S): at 11:32

## 2019-06-24 RX ADMIN — SENNA PLUS 2 TABLET(S): 8.6 TABLET ORAL at 21:54

## 2019-06-24 NOTE — DISCHARGE NOTE PROVIDER - HOSPITAL COURSE
CC: Weakness        HPI:    89 y/o F with PMH of CAD s/p PCI, HTN, dyslipidemia, hypothyroidism, bladder cancer s/p resection, anxiety, orthostatic hypotension, DJD multiple joints, anxiety, uterine cancer s/p hysterectomy, p/w weakness. Today patient was unable to get up from bed and had difficulty walking due to weakness. Daughter called doctor who thought she may have a UTI and prescribed outpatient medications. However, patient was so weak, they came to ED. Patient also c/o dysuria and increased urinary frequency. +Suprapubic pain. Denies flank pain, fever, chills, nausea, vomiting, cough, runny nose, sore throat, SOB, CP.  Pt was admitted to medicine for evaluation of her UTI. Urology and ID were involved. As her verma was recently removed, PVR was checked to ensure urinary retention not contributing to UTI.        6/21: Pt complaining of generalized aches/pains from arthritis  She still feels very weak and does not feel she can go home due to this.  She feels she will be back in 3 days if she leaves now.  She is being treated for enterobacter UTI.  Otherwise HD stable.  No fever, chills, n, v.        6/22: Pt states she still have hematuria, still feeling very weak.  No fever, chills, n, v.    6/23: Rapid response called this AM due to unresponsiveness in the bathroom.  Pt was about to have a bowel movement when suddently became unresponsive.  She was brought to bed where mentation improved to baseline.  Currently pt alert, comfortable, no fever, chills, n, v.  Verma in place due to urinary retention.        6/24: Pt still very weak.  Verma now in place for retention.  Pt s/p BM yesterday.  At this time mentation at baseline, HD stable.  She was evaluated by cardiologist and started on losartan.  Metoprolol decreased from BID to qhs.  Pt has labile BP and prone to orthostasis though in general her SBP runs high.  She will be going to [facility].  Per urology she is to maintain her verma and follow up outpatient for re-evaluation.        REVIEW OF SYSTEMS: All other review of systems is negative unless indicated above.        Vital Signs Last 24 Hrs    T(C): 36.6 (24 Jun 2019 11:49), Max: 37.3 (23 Jun 2019 16:58)    T(F): 97.9 (24 Jun 2019 11:49), Max: 99.1 (23 Jun 2019 16:58)    HR: 80 (24 Jun 2019 11:49) (74 - 80)    BP: 141/55 (24 Jun 2019 11:49) (141/55 - 184/65)    BP(mean): --    RR: 16 (24 Jun 2019 11:49) (16 - 17)    SpO2: 98% (24 Jun 2019 11:49) (98% - 99%)        PHYSICAL EXAM:        Constitutional: NAD, awake and alert, weak appearing, frail    HEENT: PERR, EOMI, Normal Hearing, MMM    Neck: Soft and supple    Respiratory: Breath sounds are clear bilaterally, No wheezing, rales or rhonchi    Cardiovascular: S1 and S2, regular rate and rhythm, no Murmurs, gallops or rubs    Gastrointestinal: Bowel Sounds present, soft, nontender, nondistended, no guarding, no rebound    Extremities: No peripheral edema    Neurological: A/O x 3, no focal deficits in my limited exam    Skin: No rashes        meds/labs: Reviewed        A/P: 89 year old woman who presents with weakness found to have UTI.        Generalized weakness with bladder cancer and verma catheter associated sepsis UTI:     -pt still with significant weakness, worsening -- PT re-evaluation for POOL    -bladder cancer w/ recent TURBT / bladder biopsies and recent verma removal    -UCx showing enterobacter cloacae    -BCx likely contaminate with repeat cultures no growth    -antibiotic therapy with cefepime per ID.        Urinary retention:    -high post void residuals    -verma re-placed 6/22 per urology.  Do not remove until outpatient follow up.        Constipation: bowel regimen        HTN with episode of vasovagal: Due to orthostatics and labile BP    -EKG reviewed, no new changes compared to old.    -cardiac eval appreciated, losartan added.  metoprolol changed from BID to QHS.        *Hyponatremia: RESOLVED        *CAD s/p PCI / dyslipidemia / hypothyroidism / anxiety / orthostatic hypotension / DJD / anxiety / uterine cancer    -C/w home meds and if conditions remain stable -> f/u outpatient for further management         *Moderate Protein Calorie Malnutrition    -albumin 2.8    -supplement as able        Dispo: to [facility]        Attending Statement: 40 minutes spent on total encounter and discharge planning. CC: Weakness        HPI:    89 y/o F with PMH of CAD s/p PCI, HTN, dyslipidemia, hypothyroidism, bladder cancer s/p resection, anxiety, orthostatic hypotension, DJD multiple joints, anxiety, uterine cancer s/p hysterectomy, p/w weakness. Today patient was unable to get up from bed and had difficulty walking due to weakness. Daughter called doctor who thought she may have a UTI and prescribed outpatient medications. However, patient was so weak, they came to ED. Patient also c/o dysuria and increased urinary frequency. +Suprapubic pain. Denies flank pain, fever, chills, nausea, vomiting, cough, runny nose, sore throat, SOB, CP.  Pt was admitted to medicine for evaluation of her UTI. Urology and ID were involved. As her verma was recently removed, PVR was checked to ensure urinary retention not contributing to UTI.        6/21: Pt complaining of generalized aches/pains from arthritis  She still feels very weak and does not feel she can go home due to this.  She feels she will be back in 3 days if she leaves now.  She is being treated for enterobacter UTI.  Otherwise HD stable.  No fever, chills, n, v.        6/22: Pt states she still have hematuria, still feeling very weak.  No fever, chills, n, v.    6/23: Rapid response called this AM due to unresponsiveness in the bathroom.  Pt was about to have a bowel movement when suddently became unresponsive.  She was brought to bed where mentation improved to baseline.  Currently pt alert, comfortable, no fever, chills, n, v.  Verma in place due to urinary retention.        6/24: Pt still very weak.  Verma now in place for retention.  Pt s/p BM yesterday.  At this time mentation at baseline, HD stable.  She was evaluated by cardiologist and started on losartan.  Metoprolol decreased from BID to qhs.  Pt has labile BP and prone to orthostasis though in general her SBP runs high.  She will be going to [facility].  Per urology she is to maintain her verma and follow up outpatient for re-evaluation.        REVIEW OF SYSTEMS: All other review of systems is negative unless indicated above.        Vital Signs Last 24 Hrs.    T(C): 36.6 (24 Jun 2019 11:49), Max: 37.3 (23 Jun 2019 16:58)    T(F): 97.9 (24 Jun 2019 11:49), Max: 99.1 (23 Jun 2019 16:58)    HR: 80 (24 Jun 2019 11:49) (74 - 80)    BP: 141/55 (24 Jun 2019 11:49) (141/55 - 184/65)    BP(mean): --    RR: 16 (24 Jun 2019 11:49) (16 - 17)    SpO2: 98% (24 Jun 2019 11:49) (98% - 99%)        PHYSICAL EXAM:        Constitutional: NAD, awake and alert, weak appearing, frail    HEENT: PERR, EOMI, Normal Hearing, MMM    Neck: Soft and supple    Respiratory: Breath sounds are clear bilaterally, No wheezing, rales or rhonchi    Cardiovascular: S1 and S2, regular rate and rhythm, no Murmurs, gallops or rubs    Gastrointestinal: Bowel Sounds present, soft, nontender, nondistended, no guarding, no rebound    Extremities: No peripheral edema    Neurological: A/O x 3, no focal deficits in my limited exam    Skin: No rashes        meds/labs: Reviewed        A/P: 89 year old woman who presents with weakness found to have UTI.        Generalized weakness with bladder cancer and verma catheter associated sepsis UTI:     -pt still with significant weakness, worsening -- PT re-evaluation for POOL    -bladder cancer w/ recent TURBT / bladder biopsies and recent verma removal    -UCx showing enterobacter cloacae    -BCx likely contaminate with repeat cultures no growth    -antibiotic therapy with cefepime per ID.        Urinary retention:    -high post void residuals    -verma re-placed 6/22 per urology.  Do not remove until outpatient follow up.        Constipation: bowel regimen        HTN with episode of vasovagal: Due to orthostatics and labile BP    -EKG reviewed, no new changes compared to old.    -cardiac eval appreciated, losartan added.  metoprolol changed from BID to QHS.        *Hyponatremia: RESOLVED        *CAD s/p PCI / dyslipidemia / hypothyroidism / anxiety / orthostatic hypotension / DJD / anxiety / uterine cancer    -C/w home meds and if conditions remain stable -> f/u outpatient for further management         *Moderate Protein Calorie Malnutrition    -albumin 2.8    -supplement as able        [facility]        Attending Statement: 40 minutes spent on total encounter and discharge planning.

## 2019-06-24 NOTE — PROGRESS NOTE ADULT - ASSESSMENT
89 y/o Female with h/o CAD s/p PCI, HTN, dyslipidemia, hypothyroidism, bladder cancer s/p resection in October 2018, s/p intravesical therapy s/p new resection due to recurrence 1 week PTA, anxiety, orthostatic hypotension, DJD multiple joints, anxiety, uterine cancer s/p hysterectomy was admitted on 6/18 for increased weakness. She has dysuria and increased urinary frequency and suprapubic pain. The patient was unable to get up from bed and had difficulty walking due to weakness. The patient was so weak, they came to ED. Denies flank pain, fever, chills at home. In ER she received cefepime.    1. UTI with ENCL. Bacteremia with CNST likely contaminant. Bladder Ca s/p recent surgery  -urinary bladder dysfunction  -improving  -leukocytosis resolving  -repeat BC x 2 are negative to date  -on cefepime 1 gm IV q8h # 5  -tolerating abx well so far; no side effects noted  -f/u cultures  -continue abx coverage until tomorrow, then d/c  -urology evaluation appreciated  -monitor temps  -f/u CBC  -supportive care  2. Other issues:   -care per medicine

## 2019-06-24 NOTE — DISCHARGE NOTE PROVIDER - NSDCCPCAREPLAN_GEN_ALL_CORE_FT
PRINCIPAL DISCHARGE DIAGNOSIS  Diagnosis: UTI (urinary tract infection)  Assessment and Plan of Treatment: antibiotic therapy.      SECONDARY DISCHARGE DIAGNOSES  Diagnosis: Urinary retention  Assessment and Plan of Treatment: with bladder cancer history.  Continue verma until outpatient urological follow up.    Diagnosis: Constipation  Assessment and Plan of Treatment: continue bowel regimen.    Diagnosis: Hypertension  Assessment and Plan of Treatment: with labile BP and hx of orthostasis.  Continue med management with close follow up.    Diagnosis: Weakness  Assessment and Plan of Treatment: with arthritis.  Supportive care and sub-acute rehab.

## 2019-06-24 NOTE — CONSULT NOTE ADULT - ASSESSMENT
89 y/o F with PMH of CAD s/p PCI, HTN, dyslipidemia, hypothyroidism, bladder cancer s/p resection, anxiety, orthostatic hypotension, DJD multiple joints, anxiety, uterine cancer s/p hysterectomy, p/w weakness.   Her blood pressure has been up and down.   blood pressure medications have been changed while at the hospital.   Yesterday, a rapid response when she had a syncopal episode in the bathroom.   urine shows inflammatory changes while the culture is negative.   she feels ill and weak.    6/24-  decrease the metoprolol to 50 mg qhs and adding Losartan 25 mg daily. At home, she has been taking Losartan 50 mg daily.   goal systolic blood pressure is 130-160.  need to check with urology about plan for bladder issues/ urinary issues.

## 2019-06-24 NOTE — DISCHARGE NOTE PROVIDER - PROVIDER TOKENS
PROVIDER:[TOKEN:[[id number]],FOLLOWUP:[1 week]],PROVIDER:[TOKEN:[[id number]],FOLLOWUP:[1 week]]

## 2019-06-24 NOTE — PROGRESS NOTE ADULT - SUBJECTIVE AND OBJECTIVE BOX
UROLOGY FOLLOW UP NOTE    SUBJECTIVE      Patient had a syncopal episode 2 days ago    Catheter was inserted 2 days ago for elevated PVR    Patient seen and examined at bedside.  No acute events overnight.    Current complaints are: none    No discomfort from the catheter            OBJECTIVE    T(C): 36.6 (06-24-19 @ 11:49), Max: 37.3 (06-23-19 @ 16:58)  HR: 80 (06-24-19 @ 11:49)  BP: 141/55 (06-24-19 @ 11:49)  RR: 16 (06-24-19 @ 11:49)  SpO2: 98% (06-24-19 @ 11:49)    06-23-19 @ 07:01  -  06-24-19 @ 07:00  --------------------------------------------------------  IN: 0 mL / OUT: 3025 mL / NET: -3025 mL      Gen: elderly Female in NAD, well nourished  HEENT: normocephalic, hearing intact, moist mucus membranes  Chest: non labored breathing  ABd: soft, NT, ND, no masses  Back: no CVA tenderness  : no suprapubic distension or tenderness, * verma draining clear yellow urine  Psych: normal affect and mood  Ext: moves all four extremities freely                acetaminophen   Tablet .. 650 milliGRAM(s) Oral every 6 hours  ALPRAZolam 0.125 milliGRAM(s) Oral two times a day PRN  artificial  tears Solution 1 Drop(s) Both EYES three times a day  aspirin 325 milliGRAM(s) Oral daily  cefepime  Injectable. 1000 milliGRAM(s) IV Push every 8 hours  cholecalciferol 2000 Unit(s) Oral daily  docusate sodium 100 milliGRAM(s) Oral three times a day PRN  heparin  Injectable 5000 Unit(s) SubCutaneous every 12 hours  levothyroxine 75 MICROGram(s) Oral daily  losartan 25 milliGRAM(s) Oral daily  magnesium hydroxide Suspension 30 milliLiter(s) Oral daily PRN  metoprolol succinate ER 50 milliGRAM(s) Oral two times a day  multivitamin/minerals 1 Tablet(s) Oral daily  pantoprazole    Tablet 40 milliGRAM(s) Oral before breakfast  senna 2 Tablet(s) Oral at bedtime PRN      ASSESSMENT & PLAN    Retention of urine  - continue with verma catheter  - Start flomax 0.4mg after dinner  - TOV as outpatient    Hematuria  - resolved    UTI  - Despite the antibiotics, her UA still showed an infection from the catheterization sample 2 days ago. This is likely due to her retention of urine  Now that her bladder is emptied her UTI should improve faster. She will need another 7 days of antibiotics    f/u with Dr Cano in 1 week        Thank you for allowing me to participate in the care of this patient  Please call with questions or concerns    Gage Solorzano MD    [facility]  [phone]

## 2019-06-24 NOTE — DISCHARGE NOTE PROVIDER - CARE PROVIDER_API CALL
John Cano)  Urology  [address] [place] [address]  Phone: [phone]  Fax: [phone]  Follow Up Time: 1 week    Mayra Sánchez)  Cardiovascular Disease; Internal Medicine  [address] [place] [address]  Phone: [phone]  Fax: [phone]  Follow Up Time: 1 week

## 2019-06-24 NOTE — CONSULT NOTE ADULT - SUBJECTIVE AND OBJECTIVE BOX
89 y/o F with PMH of CAD s/p PCI, HTN, dyslipidemia, hypothyroidism, bladder cancer s/p resection, anxiety, orthostatic hypotension, DJD multiple joints, anxiety, uterine cancer s/p hysterectomy, p/w weakness. Today patient was unable to get up from bed and had difficulty walking due to weakness. Daughter called doctor who thought she may have a UTI and prescribed outpatient medications. However, patient was so weak, they came to ED. Patient also c/o dysuria and increased urinary frequency. +Suprapubic pain. Denies flank pain, fever, chills, nausea, vomiting, cough, runny nose, sore throat, SOB, CP.     -  Yesterday, a rapid response was called when she was found unresponsiveness in the bathroom.  Pt was about to have a bowel movement when suddenly became unresponsive.  She was brought to bed where mentation improved to baseline.  Currently pt alert, comfortable, no fever, chills, n, v.  Valdes in place due to urinary retention.  Today, feels very weak.   goal systolic blood pressure has been 140-160.  denies chest pain or shortness of breath. she feels very "weak and disgusted."            PAST MEDICAL & SURGICAL HISTORY:  Endometrial adenocarcinoma  Macular degeneration  Stented coronary artery  Hypothyroid  Hyperlipidemia  HTN (hypertension)  GERD (gastroesophageal reflux disease)  CAD (coronary artery disease)  [place] (hard of hearing)  S/P EMILY (total abdominal hysterectomy)  S/P hernia repair: umbilical -   S/P laparoscopic cholecystectomy:   Ankle fracture, right: surgery 25 yrs ago - hardware removed  S/P coronary angiogram: , ,  - drug eluding stents  TURBT  cholecystectomy      Social Hx: Denies x 3, lives at home alone       MEDICATIONS  (STANDING):  acetaminophen   Tablet .. 650 milliGRAM(s) Oral every 6 hours  artificial  tears Solution 1 Drop(s) Both EYES three times a day  aspirin 325 milliGRAM(s) Oral daily  cefepime  Injectable. 1000 milliGRAM(s) IV Push every 8 hours  cholecalciferol 2000 Unit(s) Oral daily  heparin  Injectable 5000 Unit(s) SubCutaneous every 12 hours  levothyroxine 75 MICROGram(s) Oral daily  losartan 25 milliGRAM(s) Oral daily  metoprolol succinate ER 50 milliGRAM(s) Oral two times a day  multivitamin/minerals 1 Tablet(s) Oral daily  pantoprazole    Tablet 40 milliGRAM(s) Oral before breakfast    MEDICATIONS  (PRN):  ALPRAZolam 0.125 milliGRAM(s) Oral two times a day PRN anxiety  docusate sodium 100 milliGRAM(s) Oral three times a day PRN Constipation  magnesium hydroxide Suspension 30 milliLiter(s) Oral daily PRN Constipation  senna 2 Tablet(s) Oral at bedtime PRN Constipation    Allergies    amlodipine (Unknown)  clonidine (Unknown)  Florinef Acetate (Unknown)  hydrocodone (Unknown)  Levatol (Unknown)  Lipitor (Unknown)  Lotrel (Unknown)  methylPREDNISolone (Unknown)  morphine (Other)  Plaquenil (Unknown)  statins (Other (Unknown))  sulfa drugs (Rash)    Intolerances      FAMILY HISTORY:    Family Hx: Father -  of brain cancer at 51 y/o, mother -  of old age (2019 20:27)      REVIEW OF SYSTEMS:    CONSTITUTIONAL: No weakness, fevers or chills  EYES/ENT: No visual changes;  No vertigo or throat pain   NECK: No pain or stiffness  RESPIRATORY: No cough, wheezing, hemoptysis; No shortness of breath  CARDIOVASCULAR: No chest pain or palpitations  GASTROINTESTINAL: No abdominal or epigastric pain. No nausea, vomiting, or hematemesis; No diarrhea or constipation. No melena or hematochezia.  GENITOURINARY: No dysuria, frequency or hematuria  NEUROLOGICAL: No numbness or weakness  SKIN: No itching, burning, rashes, or lesions   All other review of systems is negative unless indicated above      PHYSICAL EXAM:  Daily     Daily   Vital Signs Last 24 Hrs  T(C): 36.5 (2019 04:47), Max: 37.3 (2019 16:58)  T(F): 97.7 (2019 04:47), Max: 99.1 (2019 16:58)  HR: 74 (2019 04:47) (71 - 79)  BP: 157/58 (2019 04:47) (157/58 - 184/65)  BP(mean): --  RR: 17 (2019 04:47) (16 - 17)  SpO2: 99% (2019 04:47) (99% - 99%)    Constitutional: NAD, awake and alert, well-developed  HEENT: PERR, EOMI, Normal Hearing, MMM  Neck: Soft and supple, No LAD, No JVD  Respiratory: Breath sounds are clear bilaterally, No wheezing, rales or rhonchi  Cardiovascular: S1 and S2, regular rate and rhythm, no Murmurs, gallops or rubs  Gastrointestinal: Bowel Sounds present, soft, nontender, nondistended, no guarding, no rebound  Extremities: No peripheral edema  Vascular: 2+ peripheral pulses  Neurological: A/O x 3, no focal deficits  Musculoskeletal: 5/5 strength b/l upper and lower extremities  Skin: No rashes    LABS: All Labs Reviewed:    Blood Culture: Organism --  Gram Stain Blood -- Gram Stain --  Specimen Source .Blood None  Culture-Blood --        RADIOLOGY: < from: Xray Chest 1 View-PORTABLE IMMEDIATE (19 @ 16:38) >  EXAM:  XR CHEST PORTABLE IMMED 1V                            PROCEDURE DATE:  2019          INTERPRETATION:  Exam Date: 2019 4:38 PM    History: Sepsis    Technique: Single frontal portable view of the chest with comparison to    2019    Findings:    The heart is mildly prominent. The lungs are grossly clear. The apices   and hemidiaphragms are unremarkable. Degenerative changes of the   visualized osseous structures.        Impression:    No acute disease    No significant interval change as compared to  2019    < end of copied text >    EKG: NSR, 1st degree AV Block    CARDIOLOGY TESTING:< from: Transthoracic Echocardiogram (19 @ 10:19) >   EXAM:  ECHO TTE WO CON COMP W DOP         PROCEDURE DATE:  2019        INTERPRETATION:  Transthoracic Echocardiography Report (TTE)     Demographics     Patient name          DAVIDA MIRZA   Age           89 year(s)     Med Rec #   [id number]          Gender        Female     Account #             [id number]            Date of Birth 1926     Interpreting          Sj Mckeon MD  Room Number   0349   Physician     Referring Physician   Mayra Sánchez MD Sonographer   [facility]     Date of study         2019 10:01                         AM     Height                65.75 in           Weight        154.32 pounds    Type of Study:     TTE procedure: ECHO TTE WO CON COMP W DOP     HR: 89 bpmBP: 117/54 mmHg     Study Location: 3ETechnical Quality: Good    Indications   1) R55 - Syncope and collapse    M-Mode Measurements (cm)     LVEDd: 3.73 cm            LVESd: 2.23 cm   IVSEd: 1.24 cm   LVPWd: 1.03 cm            AO Root Dimension: 2.9 cm                  ACS: 1.5 cm                             LA: 3.9 cm                             LVOT: 2.1 cm    Doppler Measurements:     AV Velocity:121 cm/s                MV Peak E-Wave: 129 cm/s   AV Peak Gradient: 5.86 mmHg                 MV Peak Gradient: 6.66 mmHg     Findings     Mitral Valve   Fibrocalcific changes noted to the mitral valve leaflets with preserved   leaflet excursion. Mitral annular calcification.   Trace mitral regurgitation.     Aortic Valve   Theaortic valve is well visualized, appears mildly sclerotic. Valve   opening seems to be normal.     Tricuspid Valve   The tricuspid valve leaflets are well seen and appear thin and pliable   with preserved leaflets excursion.     Pulmonic Valve   Pulmonic valve not well seen.     Left Atrium   The left atrium is normal.     Left Ventricle   Left ventricular wall motion is normal. Estimated left ventricular   ejection fraction is 70 %.     Right Atrium   Normal appearing right atrium.     Right Ventricle   Normal appearing right ventricle structure and function.     Pericardial Effusion   No evidence of pericardial effusion.     Pleural Effusion   No evidence of pleural effusion.     Miscellaneous   All visualized extra cardiac structures appears to be normal.     Impression     Summary     Left ventricular wall motion is normal. Estimated left ventricular   ejection fraction is 70 %.   Normal appearing right ventricle structure and function.   Fibrocalcific changes noted to the mitral valve leaflets with preserved   leaflet excursion. Mitral annular calcification. Trace mitral   regurgitation.   The aortic valve is well visualized, appears mildly sclerotic.    < end of copied text >

## 2019-06-24 NOTE — PROGRESS NOTE ADULT - SUBJECTIVE AND OBJECTIVE BOX
Date of service: 19 @ 11:25    Weak looking  No further hematuria  Poor appetite    ROS: no fever or chills; denies dizziness, no HA, no SOB or cough, no abdominal pain, no diarrhea or constipation; no legs pain, no rashes    MEDICATIONS  (STANDING):  acetaminophen   Tablet .. 650 milliGRAM(s) Oral every 6 hours  artificial  tears Solution 1 Drop(s) Both EYES three times a day  aspirin 325 milliGRAM(s) Oral daily  cefepime  Injectable. 1000 milliGRAM(s) IV Push every 8 hours  cholecalciferol 2000 Unit(s) Oral daily  heparin  Injectable 5000 Unit(s) SubCutaneous every 12 hours  levothyroxine 75 MICROGram(s) Oral daily  losartan 25 milliGRAM(s) Oral daily  metoprolol succinate ER 50 milliGRAM(s) Oral two times a day  multivitamin/minerals 1 Tablet(s) Oral daily  pantoprazole    Tablet 40 milliGRAM(s) Oral before breakfast      Vital Signs Last 24 Hrs  T(C): 36.5 (2019 04:47), Max: 37.3 (2019 16:58)  T(F): 97.7 (2019 04:47), Max: 99.1 (2019 16:58)  HR: 74 (2019 04:47) (71 - 79)  BP: 157/58 (2019 04:47) (157/58 - 184/65)  BP(mean): --  RR: 17 (2019 04:47) (16 - 17)  SpO2: 99% (2019 04:47) (99% - 99%)    Physical Exam:      Constitutional: frail looking  HEENT: NC/AT, EOMI, PERRLA, conjunctivae clear  Neck: supple; thyroid not palpable  Back: no tenderness  Respiratory: respiratory effort normal; decreased BS at bases  Cardiovascular: S1S2 regular, no murmurs  Abdomen: soft, not tender, not distended, positive BS  Genitourinary: has mild suprapubic tenderness  Lymphatic: no LN palpable  Musculoskeletal: no muscle tenderness, no joint swelling or tenderness  Extremities: no pedal edema  Neurological/ Psychiatric: AxOx3, moving all extremities  Skin: no rashes; no palpable lesions    Labs: reviewed                        11.3   8.92  )-----------( 291      ( 2019 06:25 )             37.2                11.2   11.76 )-----------( 262      ( 2019 06:27 )             35.8         138  |  107  |  10  ----------------------------<  95  4.7   |  24  |  0.93    Ca    8.8      2019 06:27  Phos  4.1       Mg     2.4         TPro  6.3  /  Alb  2.8<L>  /  TBili  0.7  /  DBili  x   /  AST  11<L>  /  ALT  16  /  AlkPhos  57       LIVER FUNCTIONS - ( 2019 06:27 )  Alb: 2.8 g/dL / Pro: 6.3 gm/dL / ALK PHOS: 57 U/L / ALT: 16 U/L / AST: 11 U/L / GGT: x           Urinalysis Basic - ( 2019 15:59 )    Color: Pink / Appearance: very cloudy / S.005 / pH: x  Gluc: x / Ketone: Negative  / Bili: Negative / Urobili: Negative mg/dL   Blood: x / Protein: 100 mg/dL / Nitrite: Positive   Leuk Esterase: Moderate / RBC: 25-50 /HPF / WBC >50   Sq Epi: x / Non Sq Epi: Few / Bacteria: Few    Culture - Urine (19 @ 15:59)    -  Amikacin: S <=16    -  Cefazolin: R >16    -  Aztreonam: S <=4    -  Ampicillin: R >16 These ampicillin results predict results for amoxicillin    -  Ampicillin/Sulbactam: R >16/8 Enterobacter, Citrobacter, and Serratia may develop resistance during prolonged therapy (3-4 days)    -  Cefoxitin: R >16    -  Cefepime: S <=4    -  Ciprofloxacin: S <=1    -  Ertapenem: S <=1    -  Gentamicin: S <=4    -  Ceftriaxone: S <=1 Enterobacter, Citrobacter, and Serratia may develop resistance during prolonged therapy    -  Imipenem: S <=1    -  Levofloxacin: S <=2    -  Meropenem: S <=1    -  Nitrofurantoin: R >64 Should not be used to treat pyelonephritis    -  Trimethoprim/Sulfamethoxazole: S <=2/38    -  Tobramycin: S <=4    -  Piperacillin/Tazobactam: S <=16    -  Tigecycline: S <=2    Specimen Source: .Urine None    Culture Results:   >100,000 CFU/ml Enterobacter cloacae    Organism Identification: Enterobacter cloacae    Organism: Enterobacter cloacae    Method Type: TINA    Culture - Blood (collected 2019 15:59)  Source: .Blood None  Gram Stain (2019 06:00):    Growth in aerobic bottle: Gram Positive Cocci in Clusters  Preliminary Report (2019 06:00):    Growth in aerobic bottle: Gram Positive Cocci in Clusters  Organism: Blood Culture PCR (2019 07:22)      -  Coagulase negative Staphylococcus: Detec      Method Type: PCR    Culture - Blood (collected 2019 15:59)  Source: .Blood None  Preliminary Report (2019 22:03):    No growth to date.    Culture - Blood in AM (19 @ 06:25)    Specimen Source: .Blood None    Culture Results:   No growth to date.    Radiology: all available radiological tests reviewed    < from: Xray Chest 1 View-PORTABLE IMMEDIATE (19 @ 16:38) >  No acute disease  No significant interval change as compared to  2019    < end of copied text >      Advanced directives addressed: full resuscitation

## 2019-06-25 RX ORDER — METOPROLOL TARTRATE 50 MG
50 TABLET ORAL AT BEDTIME
Refills: 0 | Status: DISCONTINUED | OUTPATIENT
Start: 2019-06-25 | End: 2019-06-26

## 2019-06-25 RX ORDER — TAMSULOSIN HYDROCHLORIDE 0.4 MG/1
1 CAPSULE ORAL
Qty: 0 | Refills: 0 | DISCHARGE
Start: 2019-06-25

## 2019-06-25 RX ORDER — TAMSULOSIN HYDROCHLORIDE 0.4 MG/1
0.4 CAPSULE ORAL AT BEDTIME
Refills: 0 | Status: DISCONTINUED | OUTPATIENT
Start: 2019-06-25 | End: 2019-06-25

## 2019-06-25 RX ADMIN — PANTOPRAZOLE SODIUM 40 MILLIGRAM(S): 20 TABLET, DELAYED RELEASE ORAL at 05:24

## 2019-06-25 RX ADMIN — Medication 650 MILLIGRAM(S): at 12:48

## 2019-06-25 RX ADMIN — Medication 0.12 MILLIGRAM(S): at 21:51

## 2019-06-25 RX ADMIN — CEFEPIME 1000 MILLIGRAM(S): 1 INJECTION, POWDER, FOR SOLUTION INTRAMUSCULAR; INTRAVENOUS at 18:52

## 2019-06-25 RX ADMIN — Medication 650 MILLIGRAM(S): at 13:18

## 2019-06-25 RX ADMIN — Medication 0.12 MILLIGRAM(S): at 10:19

## 2019-06-25 RX ADMIN — Medication 2000 UNIT(S): at 12:48

## 2019-06-25 RX ADMIN — HEPARIN SODIUM 5000 UNIT(S): 5000 INJECTION INTRAVENOUS; SUBCUTANEOUS at 05:22

## 2019-06-25 RX ADMIN — Medication 650 MILLIGRAM(S): at 18:52

## 2019-06-25 RX ADMIN — Medication 1 DROP(S): at 05:26

## 2019-06-25 RX ADMIN — HEPARIN SODIUM 5000 UNIT(S): 5000 INJECTION INTRAVENOUS; SUBCUTANEOUS at 18:52

## 2019-06-25 RX ADMIN — CEFEPIME 1000 MILLIGRAM(S): 1 INJECTION, POWDER, FOR SOLUTION INTRAMUSCULAR; INTRAVENOUS at 10:19

## 2019-06-25 RX ADMIN — Medication 1 DROP(S): at 21:51

## 2019-06-25 RX ADMIN — CEFEPIME 1000 MILLIGRAM(S): 1 INJECTION, POWDER, FOR SOLUTION INTRAMUSCULAR; INTRAVENOUS at 01:26

## 2019-06-25 RX ADMIN — Medication 50 MILLIGRAM(S): at 21:52

## 2019-06-25 RX ADMIN — Medication 1 TABLET(S): at 12:48

## 2019-06-25 RX ADMIN — LOSARTAN POTASSIUM 25 MILLIGRAM(S): 100 TABLET, FILM COATED ORAL at 06:15

## 2019-06-25 RX ADMIN — Medication 325 MILLIGRAM(S): at 12:48

## 2019-06-25 RX ADMIN — Medication 1 DROP(S): at 12:49

## 2019-06-25 RX ADMIN — Medication 50 MILLIGRAM(S): at 05:23

## 2019-06-25 RX ADMIN — Medication 75 MICROGRAM(S): at 05:23

## 2019-06-25 NOTE — PROGRESS NOTE ADULT - SUBJECTIVE AND OBJECTIVE BOX
Patient seen and examined at bedside  Feels well  No pain  No f/c/n/v  Pending discharge in AM    Medications  acetaminophen   Tablet .. 650 milliGRAM(s) Oral every 6 hours  ALPRAZolam 0.125 milliGRAM(s) Oral two times a day PRN  artificial  tears Solution 1 Drop(s) Both EYES three times a day  aspirin 325 milliGRAM(s) Oral daily  cholecalciferol 2000 Unit(s) Oral daily  docusate sodium 100 milliGRAM(s) Oral three times a day PRN  heparin  Injectable 5000 Unit(s) SubCutaneous every 12 hours  levothyroxine 75 MICROGram(s) Oral daily  losartan 25 milliGRAM(s) Oral daily  magnesium hydroxide Suspension 30 milliLiter(s) Oral daily PRN  metoprolol succinate ER 50 milliGRAM(s) Oral at bedtime  multivitamin/minerals 1 Tablet(s) Oral daily  pantoprazole    Tablet 40 milliGRAM(s) Oral before breakfast  senna 2 Tablet(s) Oral at bedtime PRN      ROS  General: No weight change, fevers, chills, night sweats, fatigue, malaise  Ophthalmologic: No eye pain,  swelling,  ENMT: No hearing changes,  ear pain,  nasal congestion  Respiratory and Thorax: No cough, sputum, dyspnea, wheezing  Cardiovascular: No chest pain, SOB, dyspnea on exertion, orthopnea, edema, palpitations  Gastrointestinal:	No diarrhea, constipation, nausea, vomiting   Genitourinary: see above  Musculoskeletal:	No arthralgias, myalgias, joint swelling, joint stiffness, back pain  Neurological:	 No syncope, loss of consciousness, headache, dizziness  Psychiatric: No anxiety, depression, delusions. No SI/HI/AH/VH.  Hematology/Lymphatics:	No bruising, lymphadenopathy  Endocrine: No temperature intolerance    Vital Signs Last 24 Hrs  T(C): 36.7 (25 Jun 2019 13:05), Max: 36.8 (24 Jun 2019 21:55)  T(F): 98 (25 Jun 2019 13:05), Max: 98.3 (25 Jun 2019 05:08)  HR: 80 (25 Jun 2019 13:05) (71 - 80)  BP: 132/39 (25 Jun 2019 13:05) (123/46 - 180/51)  BP(mean): --  RR: 18 (25 Jun 2019 13:05) (17 - 18)  SpO2: 100% (25 Jun 2019 13:05) (98% - 100%)    PHYSICAL EXAM:  Constitutional: NAD, lying in bed comfortably   Eyes: EOMI, vision is grossly intact  Neck: neck supple, non-tender  Back: no CVA tenderness bilaterally, spine normal without deformity or tenderness  Respiratory: no labored breathing  Cardiovascular: no peripheral edema, cyanosis, or pallor. Extremities are warm and well perfused.   Gastrointestinal: soft, non-tender, non-distended, no guarding, no rebound tenderness. Bladder non-palpable  Genitourinary: verma in place - draining clear, yellow urine  Extremities: no significant deformity or joint abnormality.   Neurological: A&O x3  Skin: normal skin color, texture and turgor  Musculoskeletal: moves all 4 extremities  Psychiatric: normal mood and affect        I/O    06-24-19 @ 07:01  -  06-25-19 @ 07:00  --------------------------------------------------------  IN: 0 mL / OUT: 2350 mL / NET: -2350 mL

## 2019-06-25 NOTE — PROGRESS NOTE ADULT - ASSESSMENT
1. 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 She understands that she will need further treatment as outpatient. To follow up as outpatient for 2nd induction course of BCG given pathology findings.     2. Urinary retention, UTI    -d/c home with verma to leg bag - to follow up in 1 week for TOV in office  -discontinued flomax given patient's history of orthostatic hypotension  -continue antibiotics as per medicine      Thank you for allowing me to assist in the care of this patient  Please feel free to call with any questions/concerns    John Cano MD  [facility]  W: [phone]

## 2019-06-25 NOTE — PROGRESS NOTE ADULT - REASON FOR ADMISSION
weakness

## 2019-06-25 NOTE — ASU PATIENT PROFILE, ADULT - PRO INTERPRETER NEED 2
Visit Date: 6/25/2019    Patient: Geri Flores  YOB: 1977    Dear Dr. Juana Borjas, APRN - CNP,           As you know we are treating your patient for obesity (as indicated by elevated  BMI of 30 kg/(m^2) or greater)  with a gastric bypass. Tamica had surgery on Shelley 10, 2019. She was discharged home uneventfully. Her current Weight: 206 lb (93.4 kg),  her  Body mass index is 34.81 kg/m². Over the next year the patient will be scheduled with our team at  1 month, 3 months, 6 months, 9 months, 1 year, and annually as well. At each visit we will review nutrition related labs, weight, vitamin/mineral intake, and overall health. Ashok Zhang will have the opportunity to partner with the dietitian to continue to review health related goals. The team here would very much appreciate your assistance in encouraging your patient to attend these very important appointments. As our patients begin to heal from surgery, lose weight, and experience improved quality of life there will inevitably be struggles due to the chronic and relapsing nature of obesity. We offer closer follow up as needed, support groups and a  to support them. Thank you for allowing me to participate in the care of your patient. If you have any questions or concerns, please do not hesitate to call.       Sincerely,   Dr. Elvia Villanueva MD, [facility]  [address] [facility], [address] [place] [address] Gregoria: [phone]  F: [phone] English

## 2019-06-26 VITALS
OXYGEN SATURATION: 98 % | HEART RATE: 92 BPM | RESPIRATION RATE: 18 BRPM | DIASTOLIC BLOOD PRESSURE: 53 MMHG | TEMPERATURE: 98 F | SYSTOLIC BLOOD PRESSURE: 156 MMHG

## 2019-06-26 RX ADMIN — Medication 75 MICROGRAM(S): at 05:44

## 2019-06-26 RX ADMIN — Medication 2000 UNIT(S): at 11:39

## 2019-06-26 RX ADMIN — Medication 650 MILLIGRAM(S): at 12:00

## 2019-06-26 RX ADMIN — HEPARIN SODIUM 5000 UNIT(S): 5000 INJECTION INTRAVENOUS; SUBCUTANEOUS at 05:44

## 2019-06-26 RX ADMIN — Medication 650 MILLIGRAM(S): at 17:22

## 2019-06-26 RX ADMIN — Medication 325 MILLIGRAM(S): at 11:39

## 2019-06-26 RX ADMIN — Medication 0.12 MILLIGRAM(S): at 12:27

## 2019-06-26 RX ADMIN — Medication 1 DROP(S): at 11:41

## 2019-06-26 RX ADMIN — Medication 650 MILLIGRAM(S): at 11:40

## 2019-06-26 RX ADMIN — Medication 1 TABLET(S): at 11:40

## 2019-06-26 RX ADMIN — LOSARTAN POTASSIUM 25 MILLIGRAM(S): 100 TABLET, FILM COATED ORAL at 05:43

## 2019-06-26 RX ADMIN — PANTOPRAZOLE SODIUM 40 MILLIGRAM(S): 20 TABLET, DELAYED RELEASE ORAL at 05:44

## 2019-06-26 RX ADMIN — HEPARIN SODIUM 5000 UNIT(S): 5000 INJECTION INTRAVENOUS; SUBCUTANEOUS at 17:22

## 2019-06-26 NOTE — PROVIDER CONTACT NOTE (OTHER) - SITUATION
DC Note faxed tp Dr Mayra Sánchez @ [phone]
Spoke with Chel in the answering service regarding consult
Spoke with Henrik in answering service regarding consult
office made aware @ [phone]

## 2019-07-01 NOTE — PATIENT PROFILE ADULT - NSPROREFERSVCHOMECARDIO_GEN_A_NUR
Discharge Summary    Date of Admission: 6/3/2019    Date of Discharge: 7/1/2019     Reason for Discharge: Completed    Discharge Diagnosis: Generalized anxiety disorder; Mixed obsession thoughts and acts; Adjustment disorder with depressed mood      Summary of IOP Treatment:  Pt attended 12 sessions.  Pt attended groups on a regular basis and was an active participant.  Pt’s focus while in the group was to increase his self-esteem and self-worth, challenge his negative thoughts, and learn to let go of the past.  Pt. showed good progress through use of the skills taught.  He was successful in using techniques to challenge negative thoughts, mindfulness, and S.M.A.R.T goals as coping skills to work on his goals.  Pt. will follow up with an individual therapist through [facility] and is recommended to follow up with outpatient providers and utilize outside resources as needed, including alumni groups.    Barbara Denney, QUINTON         
no

## 2019-07-02 DIAGNOSIS — R33.9 RETENTION OF URINE, UNSPECIFIED: ICD-10-CM

## 2019-07-02 DIAGNOSIS — Z79.82 LONG TERM (CURRENT) USE OF ASPIRIN: ICD-10-CM

## 2019-07-02 DIAGNOSIS — E44.0 MODERATE PROTEIN-CALORIE MALNUTRITION: ICD-10-CM

## 2019-07-02 DIAGNOSIS — T83.511A INFECTION AND INFLAMMATORY REACTION DUE TO INDWELLING URETHRAL CATHETER, INITIAL ENCOUNTER: ICD-10-CM
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
## 2019-07-02 DIAGNOSIS — Z88.2 ALLERGY STATUS TO SULFONAMIDES: ICD-10-CM

## 2019-07-02 DIAGNOSIS — Z79.899 OTHER LONG TERM (CURRENT) DRUG THERAPY: ICD-10-CM

## 2019-07-02 DIAGNOSIS — Z88.8 ALLERGY STATUS TO OTHER DRUGS, MEDICAMENTS AND BIOLOGICAL SUBSTANCES STATUS: ICD-10-CM

## 2019-07-02 DIAGNOSIS — N39.0 URINARY TRACT INFECTION, SITE NOT SPECIFIED: ICD-10-CM

## 2019-07-02 DIAGNOSIS — I10 ESSENTIAL (PRIMARY) HYPERTENSION: ICD-10-CM

## 2019-07-02 DIAGNOSIS — K59.00 CONSTIPATION, UNSPECIFIED: ICD-10-CM

## 2019-07-02 DIAGNOSIS — E03.9 HYPOTHYROIDISM, UNSPECIFIED: ICD-10-CM

## 2019-07-02 DIAGNOSIS — E87.1 HYPO-OSMOLALITY AND HYPONATREMIA: ICD-10-CM

## 2019-07-02 DIAGNOSIS — Z88.5 ALLERGY STATUS TO NARCOTIC AGENT: ICD-10-CM

## 2019-07-02 DIAGNOSIS — I95.1 ORTHOSTATIC HYPOTENSION: ICD-10-CM

## 2019-07-02 DIAGNOSIS — E78.5 HYPERLIPIDEMIA, UNSPECIFIED: ICD-10-CM

## 2019-07-02 DIAGNOSIS — F41.9 ANXIETY DISORDER, UNSPECIFIED: ICD-10-CM

## 2019-07-02 DIAGNOSIS — C67.9 MALIGNANT NEOPLASM OF BLADDER, UNSPECIFIED: ICD-10-CM

## 2019-07-02 DIAGNOSIS — I25.10 ATHEROSCLEROTIC HEART DISEASE OF NATIVE CORONARY ARTERY WITHOUT ANGINA PECTORIS: ICD-10-CM

## 2019-07-02 DIAGNOSIS — A41.9 SEPSIS, UNSPECIFIED ORGANISM: ICD-10-CM

## 2019-07-02 DIAGNOSIS — R31.9 HEMATURIA, UNSPECIFIED: ICD-10-CM

## 2019-07-03 ENCOUNTER — INPATIENT (INPATIENT)
Facility: HOSPITAL | Age: 84
LOS: 4 days | Discharge: NURSING, REHAB OR LONG-TERM CARE | End: 2019-07-08
Attending: INTERNAL MEDICINE | Admitting: INTERNAL MEDICINE
Payer: MEDICARE

## 2019-07-03 ENCOUNTER — TRANSCRIPTION ENCOUNTER (OUTPATIENT)
Age: 84
End: 2019-07-03

## 2019-07-03 VITALS
SYSTOLIC BLOOD PRESSURE: 161 MMHG | TEMPERATURE: 98 F | RESPIRATION RATE: 16 BRPM | HEIGHT: 62 IN | DIASTOLIC BLOOD PRESSURE: 50 MMHG | OXYGEN SATURATION: 96 % | WEIGHT: 173.94 LBS | HEART RATE: 93 BPM

## 2019-07-03 DIAGNOSIS — Z90.710 ACQUIRED ABSENCE OF BOTH CERVIX AND UTERUS: Chronic | ICD-10-CM

## 2019-07-03 LAB
ALBUMIN SERPL ELPH-MCNC: 3.4 G/DL — SIGNIFICANT CHANGE UP (ref 3.3–5)
ALP SERPL-CCNC: 86 U/L — SIGNIFICANT CHANGE UP (ref 40–120)
ALT FLD-CCNC: 25 U/L — SIGNIFICANT CHANGE UP (ref 12–78)
ANION GAP SERPL CALC-SCNC: 10 MMOL/L — SIGNIFICANT CHANGE UP (ref 5–17)
APPEARANCE UR: ABNORMAL
AST SERPL-CCNC: 16 U/L — SIGNIFICANT CHANGE UP (ref 15–37)
BACTERIA # UR AUTO: ABNORMAL
BASOPHILS # BLD AUTO: 0.06 K/UL — SIGNIFICANT CHANGE UP (ref 0–0.2)
BASOPHILS NFR BLD AUTO: 0.3 % — SIGNIFICANT CHANGE UP (ref 0–2)
BILIRUB SERPL-MCNC: 0.6 MG/DL — SIGNIFICANT CHANGE UP (ref 0.2–1.2)
BILIRUB UR-MCNC: NEGATIVE — SIGNIFICANT CHANGE UP
BUN SERPL-MCNC: 19 MG/DL — SIGNIFICANT CHANGE UP (ref 7–23)
CALCIUM SERPL-MCNC: 9.1 MG/DL — SIGNIFICANT CHANGE UP (ref 8.5–10.1)
CHLORIDE SERPL-SCNC: 97 MMOL/L — SIGNIFICANT CHANGE UP (ref 96–108)
CO2 SERPL-SCNC: 24 MMOL/L — SIGNIFICANT CHANGE UP (ref 22–31)
COLOR SPEC: ABNORMAL
CREAT SERPL-MCNC: 1.2 MG/DL — SIGNIFICANT CHANGE UP (ref 0.5–1.3)
DIFF PNL FLD: ABNORMAL
EOSINOPHIL # BLD AUTO: 0.08 K/UL — SIGNIFICANT CHANGE UP (ref 0–0.5)
EOSINOPHIL NFR BLD AUTO: 0.4 % — SIGNIFICANT CHANGE UP (ref 0–6)
EPI CELLS # UR: SIGNIFICANT CHANGE UP
GLUCOSE SERPL-MCNC: 156 MG/DL — HIGH (ref 70–99)
GLUCOSE UR QL: NEGATIVE MG/DL — SIGNIFICANT CHANGE UP
HCT VFR BLD CALC: 35.4 % — SIGNIFICANT CHANGE UP (ref 34.5–45)
HGB BLD-MCNC: 11.1 G/DL — LOW (ref 11.5–15.5)
IMM GRANULOCYTES NFR BLD AUTO: 1 % — SIGNIFICANT CHANGE UP (ref 0–1.5)
KETONES UR-MCNC: NEGATIVE — SIGNIFICANT CHANGE UP
LACTATE SERPL-SCNC: 1.9 MMOL/L — SIGNIFICANT CHANGE UP (ref 0.7–2)
LACTATE SERPL-SCNC: 2.2 MMOL/L — HIGH (ref 0.7–2)
LACTATE SERPL-SCNC: 2.4 MMOL/L — HIGH (ref 0.7–2)
LEUKOCYTE ESTERASE UR-ACNC: ABNORMAL
LYMPHOCYTES # BLD AUTO: 1.92 K/UL — SIGNIFICANT CHANGE UP (ref 1–3.3)
LYMPHOCYTES # BLD AUTO: 9.5 % — LOW (ref 13–44)
MCHC RBC-ENTMCNC: 28 PG — SIGNIFICANT CHANGE UP (ref 27–34)
MCHC RBC-ENTMCNC: 31.4 GM/DL — LOW (ref 32–36)
MCV RBC AUTO: 89.2 FL — SIGNIFICANT CHANGE UP (ref 80–100)
MONOCYTES # BLD AUTO: 1.18 K/UL — HIGH (ref 0–0.9)
MONOCYTES NFR BLD AUTO: 5.9 % — SIGNIFICANT CHANGE UP (ref 2–14)
NEUTROPHILS # BLD AUTO: 16.72 K/UL — HIGH (ref 1.8–7.4)
NEUTROPHILS NFR BLD AUTO: 82.9 % — HIGH (ref 43–77)
NITRITE UR-MCNC: NEGATIVE — SIGNIFICANT CHANGE UP
PH UR: 5 — SIGNIFICANT CHANGE UP (ref 5–8)
PLATELET # BLD AUTO: 436 K/UL — HIGH (ref 150–400)
POTASSIUM SERPL-MCNC: 4.4 MMOL/L — SIGNIFICANT CHANGE UP (ref 3.5–5.3)
POTASSIUM SERPL-SCNC: 4.4 MMOL/L — SIGNIFICANT CHANGE UP (ref 3.5–5.3)
PROT SERPL-MCNC: 7.3 GM/DL — SIGNIFICANT CHANGE UP (ref 6–8.3)
PROT UR-MCNC: 30 MG/DL
RBC # BLD: 3.97 M/UL — SIGNIFICANT CHANGE UP (ref 3.8–5.2)
RBC # FLD: 13.7 % — SIGNIFICANT CHANGE UP (ref 10.3–14.5)
RBC CASTS # UR COMP ASSIST: >50 /HPF (ref 0–4)
SODIUM SERPL-SCNC: 131 MMOL/L — LOW (ref 135–145)
SP GR SPEC: 1 — LOW (ref 1.01–1.02)
UROBILINOGEN FLD QL: NEGATIVE MG/DL — SIGNIFICANT CHANGE UP
WBC # BLD: 20.16 K/UL — HIGH (ref 3.8–10.5)
WBC # FLD AUTO: 20.16 K/UL — HIGH (ref 3.8–10.5)
WBC UR QL: ABNORMAL

## 2019-07-03 PROCEDURE — 99285 EMERGENCY DEPT VISIT HI MDM: CPT

## 2019-07-03 PROCEDURE — 70450 CT HEAD/BRAIN W/O DYE: CPT | Mod: 26

## 2019-07-03 PROCEDURE — 93010 ELECTROCARDIOGRAM REPORT: CPT

## 2019-07-03 PROCEDURE — 74176 CT ABD & PELVIS W/O CONTRAST: CPT | Mod: 26

## 2019-07-03 RX ORDER — CHOLECALCIFEROL (VITAMIN D3) 125 MCG
2000 CAPSULE ORAL DAILY
Refills: 0 | Status: DISCONTINUED | OUTPATIENT
Start: 2019-07-03 | End: 2019-07-08

## 2019-07-03 RX ORDER — LEVOTHYROXINE SODIUM 125 MCG
75 TABLET ORAL DAILY
Refills: 0 | Status: DISCONTINUED | OUTPATIENT
Start: 2019-07-03 | End: 2019-07-08

## 2019-07-03 RX ORDER — ALPRAZOLAM 0.25 MG
0.12 TABLET ORAL AT BEDTIME
Refills: 0 | Status: DISCONTINUED | OUTPATIENT
Start: 2019-07-03 | End: 2019-07-08

## 2019-07-03 RX ORDER — ALPRAZOLAM 0.25 MG
0.5 TABLET ORAL
Qty: 0 | Refills: 0 | DISCHARGE

## 2019-07-03 RX ORDER — CEFTRIAXONE 500 MG/1
1000 INJECTION, POWDER, FOR SOLUTION INTRAMUSCULAR; INTRAVENOUS ONCE
Refills: 0 | Status: COMPLETED | OUTPATIENT
Start: 2019-07-03 | End: 2019-07-03

## 2019-07-03 RX ORDER — LOSARTAN POTASSIUM 100 MG/1
25 TABLET, FILM COATED ORAL DAILY
Refills: 0 | Status: DISCONTINUED | OUTPATIENT
Start: 2019-07-03 | End: 2019-07-08

## 2019-07-03 RX ORDER — MAGNESIUM HYDROXIDE 400 MG/1
30 TABLET, CHEWABLE ORAL DAILY
Refills: 0 | Status: DISCONTINUED | OUTPATIENT
Start: 2019-07-03 | End: 2019-07-08

## 2019-07-03 RX ORDER — CEFTRIAXONE 500 MG/1
1000 INJECTION, POWDER, FOR SOLUTION INTRAMUSCULAR; INTRAVENOUS EVERY 24 HOURS
Refills: 0 | Status: DISCONTINUED | OUTPATIENT
Start: 2019-07-03 | End: 2019-07-06

## 2019-07-03 RX ORDER — SODIUM CHLORIDE 9 MG/ML
1000 INJECTION INTRAMUSCULAR; INTRAVENOUS; SUBCUTANEOUS
Refills: 0 | Status: DISCONTINUED | OUTPATIENT
Start: 2019-07-03 | End: 2019-07-05

## 2019-07-03 RX ORDER — SENNA PLUS 8.6 MG/1
2 TABLET ORAL AT BEDTIME
Refills: 0 | Status: DISCONTINUED | OUTPATIENT
Start: 2019-07-03 | End: 2019-07-08

## 2019-07-03 RX ORDER — SODIUM CHLORIDE 9 MG/ML
1000 INJECTION INTRAMUSCULAR; INTRAVENOUS; SUBCUTANEOUS ONCE
Refills: 0 | Status: COMPLETED | OUTPATIENT
Start: 2019-07-03 | End: 2019-07-03

## 2019-07-03 RX ORDER — LEVOTHYROXINE SODIUM 125 MCG
1 TABLET ORAL
Qty: 0 | Refills: 0 | DISCHARGE

## 2019-07-03 RX ORDER — PANTOPRAZOLE SODIUM 20 MG/1
1 TABLET, DELAYED RELEASE ORAL
Qty: 0 | Refills: 0 | DISCHARGE

## 2019-07-03 RX ORDER — CEFTRIAXONE 500 MG/1
1000 INJECTION, POWDER, FOR SOLUTION INTRAMUSCULAR; INTRAVENOUS EVERY 24 HOURS
Refills: 0 | Status: DISCONTINUED | OUTPATIENT
Start: 2019-07-03 | End: 2019-07-03

## 2019-07-03 RX ORDER — ACETAMINOPHEN 500 MG
650 TABLET ORAL EVERY 6 HOURS
Refills: 0 | Status: DISCONTINUED | OUTPATIENT
Start: 2019-07-03 | End: 2019-07-08

## 2019-07-03 RX ORDER — PANTOPRAZOLE SODIUM 20 MG/1
40 TABLET, DELAYED RELEASE ORAL
Refills: 0 | Status: DISCONTINUED | OUTPATIENT
Start: 2019-07-03 | End: 2019-07-08

## 2019-07-03 RX ORDER — ONDANSETRON 8 MG/1
4 TABLET, FILM COATED ORAL ONCE
Refills: 0 | Status: COMPLETED | OUTPATIENT
Start: 2019-07-03 | End: 2019-07-03

## 2019-07-03 RX ORDER — MULTIVIT-MIN/FERROUS GLUCONATE 9 MG/15 ML
1 LIQUID (ML) ORAL
Qty: 0 | Refills: 0 | DISCHARGE

## 2019-07-03 RX ORDER — SODIUM CHLORIDE 9 MG/ML
500 INJECTION INTRAMUSCULAR; INTRAVENOUS; SUBCUTANEOUS ONCE
Refills: 0 | Status: COMPLETED | OUTPATIENT
Start: 2019-07-03 | End: 2019-07-03

## 2019-07-03 RX ORDER — ONDANSETRON 8 MG/1
4 TABLET, FILM COATED ORAL EVERY 6 HOURS
Refills: 0 | Status: DISCONTINUED | OUTPATIENT
Start: 2019-07-03 | End: 2019-07-08

## 2019-07-03 RX ORDER — MULTIVIT-MIN/FERROUS GLUCONATE 9 MG/15 ML
1 LIQUID (ML) ORAL DAILY
Refills: 0 | Status: DISCONTINUED | OUTPATIENT
Start: 2019-07-03 | End: 2019-07-08

## 2019-07-03 RX ORDER — METOPROLOL TARTRATE 50 MG
50 TABLET ORAL DAILY
Refills: 0 | Status: DISCONTINUED | OUTPATIENT
Start: 2019-07-03 | End: 2019-07-08

## 2019-07-03 RX ORDER — CHOLECALCIFEROL (VITAMIN D3) 125 MCG
1 CAPSULE ORAL
Qty: 0 | Refills: 0 | DISCHARGE

## 2019-07-03 RX ADMIN — SODIUM CHLORIDE 500 MILLILITER(S): 9 INJECTION INTRAMUSCULAR; INTRAVENOUS; SUBCUTANEOUS at 05:52

## 2019-07-03 RX ADMIN — Medication 650 MILLIGRAM(S): at 08:35

## 2019-07-03 RX ADMIN — Medication 650 MILLIGRAM(S): at 14:47

## 2019-07-03 RX ADMIN — ONDANSETRON 4 MILLIGRAM(S): 8 TABLET, FILM COATED ORAL at 04:08

## 2019-07-03 RX ADMIN — SODIUM CHLORIDE 500 MILLILITER(S): 9 INJECTION INTRAMUSCULAR; INTRAVENOUS; SUBCUTANEOUS at 04:52

## 2019-07-03 RX ADMIN — Medication 50 MILLIGRAM(S): at 14:23

## 2019-07-03 RX ADMIN — SODIUM CHLORIDE 75 MILLILITER(S): 9 INJECTION INTRAMUSCULAR; INTRAVENOUS; SUBCUTANEOUS at 14:30

## 2019-07-03 RX ADMIN — LOSARTAN POTASSIUM 25 MILLIGRAM(S): 100 TABLET, FILM COATED ORAL at 14:22

## 2019-07-03 RX ADMIN — Medication 2000 UNIT(S): at 14:22

## 2019-07-03 RX ADMIN — Medication 0.12 MILLIGRAM(S): at 21:35

## 2019-07-03 RX ADMIN — PANTOPRAZOLE SODIUM 40 MILLIGRAM(S): 20 TABLET, DELAYED RELEASE ORAL at 14:23

## 2019-07-03 RX ADMIN — SODIUM CHLORIDE 1000 MILLILITER(S): 9 INJECTION INTRAMUSCULAR; INTRAVENOUS; SUBCUTANEOUS at 06:54

## 2019-07-03 RX ADMIN — Medication 1 DROP(S): at 17:34

## 2019-07-03 RX ADMIN — SODIUM CHLORIDE 1000 MILLILITER(S): 9 INJECTION INTRAMUSCULAR; INTRAVENOUS; SUBCUTANEOUS at 05:54

## 2019-07-03 RX ADMIN — Medication 1 TABLET(S): at 14:23

## 2019-07-03 RX ADMIN — CEFTRIAXONE 1000 MILLIGRAM(S): 500 INJECTION, POWDER, FOR SOLUTION INTRAMUSCULAR; INTRAVENOUS at 04:52

## 2019-07-03 RX ADMIN — Medication 75 MICROGRAM(S): at 14:30

## 2019-07-03 NOTE — H&P ADULT - ASSESSMENT
Assessment    # Hematuria, Urinary retention after bladder resection in 6/2018  # Clots after bladder resection on CBI in ER   # ?Near syncope in ER   # Hx of Bladder tumor  # UA with bacteria but obtained from verma  # Leukocytosis, lactic acidosis, HR > 90 probably due to sepsis due to UTI  # HTN, CAD s/p PCI, orthostatic hypotension, HLD, hypothyroidism    Plan:  - received IV ceftriaxone in ER (recent urine Cx with Enterobacter cloacae)  - IVF, encourage po hydration, ID consult, continue ceftriaxone for now (was sensitive on previous urine Cx)  - continue losartan, toprol, synthroid  - continue alprazolam 1/2 tablet 0.25mg at bedtime prn  - Cardiology evaluation for possible near syncope  -  evaluation for CBI and hematuria    PT yancial Assessment    # Hematuria, Urinary retention after bladder resection in 6/2018  # Clots after bladder resection on CBI in ER   # ?Near syncope in ER   # Hx of Bladder tumor  # UA with bacteria but obtained from verma  # Leukocytosis, lactic acidosis, HR > 90 probably due to sepsis due to UTI  # HTN, CAD s/p PCI, orthostatic hypotension, HLD, hypothyroidism    Plan:  - received IV ceftriaxone in ER (recent urine Cx with Enterobacter cloacae)  - IVF, encourage po hydration, ID consult, continue ceftriaxone for now (was sensitive on previous urine Cx)  - continue losartan, toprol, synthroid  - Hold ASA 325mg  - orthostatic BP  - continue alprazolam 1/2 tablet 0.25mg at bedtime prn  - Cardiology evaluation for possible near syncope  -  evaluation for CBI and hematuria    PT kavon

## 2019-07-03 NOTE — H&P ADULT - NSICDXPASTSURGICALHX_GEN_ALL_CORE_FT
PAST SURGICAL HISTORY:  Ankle fracture, right surgery 25 yrs ago - hardware removed    S/P coronary angiogram 2005, 2008, 2011 - drug eluding stents    S/P hernia repair umbilical - 2008    S/P laparoscopic cholecystectomy 2008    S/P EMILY (total abdominal hysterectomy)

## 2019-07-03 NOTE — H&P ADULT - NSHPREVIEWOFSYSTEMS_GEN_ALL_CORE
REVIEW OF SYSTEMS:    CONSTITUTIONAL: No weakness, fevers or chills  EYES/ENT: No visual changes;  No vertigo or throat pain   NECK: No pain or stiffness  RESPIRATORY: No cough, wheezing, hemoptysis; No shortness of breath  CARDIOVASCULAR: No chest pain or palpitations  GASTROINTESTINAL: No abdominal or epigastric pain. No nausea, vomiting, or hematemesis; No diarrhea or constipation. No melena or hematochezia.  GENITOURINARY: No dysuria, frequency   NEUROLOGICAL: No numbness or weakness  SKIN: No itching, burning, rashes, or lesions   All other review of systems is negative unless indicated above

## 2019-07-03 NOTE — ED ADULT NURSE REASSESSMENT NOTE - NS ED NURSE REASSESS COMMENT FT1
pt received at 0700 from arianna Gray RN, pt alert and orientedx4, VSS afebrule, pt c.o bladder discomfort, pt  verma catheter irrigated and clots removed, CBI in place, and urine drinaage running clear at this time. pt verbalized relief after irrigation. pt  bedding chaged and pt repositioned for comfort. pt able to move all extremitites, neuros intact, safety maintained, breakfast ordered, will continue to monitor

## 2019-07-03 NOTE — ED PROVIDER NOTE - CARE PLAN
Principal Discharge DX:	Gross hematuria  Secondary Diagnosis:	Urinary tract infection associated with indwelling urethral catheter, subsequent encounter  Secondary Diagnosis:	Syncope, unspecified syncope type

## 2019-07-03 NOTE — ED ADULT NURSE NOTE - NSIMPLEMENTINTERV_GEN_ALL_ED
Implemented All Fall with Harm Risk Interventions:  [place] to call system. Call bell, personal items and telephone within reach. Instruct patient to call for assistance. Room bathroom lighting operational. Non-slip footwear when patient is off stretcher. Physically safe environment: no spills, clutter or unnecessary equipment. Stretcher in lowest position, wheels locked, appropriate side rails in place. Provide visual cue, wrist band, yellow gown, etc. Monitor gait and stability. Monitor for mental status changes and reorient to person, place, and time. Review medications for side effects contributing to fall risk. Reinforce activity limits and safety measures with patient and family. Provide visual clues: red socks.

## 2019-07-03 NOTE — ED PROVIDER NOTE - PMH
CAD (coronary artery disease)    Endometrial adenocarcinoma    GERD (gastroesophageal reflux disease)    [place] (hard of hearing)    HTN (hypertension)    Hyperlipidemia    Hypothyroid    Macular degeneration    Stented coronary artery

## 2019-07-03 NOTE — ED PROVIDER NOTE - OBJECTIVE STATEMENT
89 y/o F with h/o bladder CA with recent tumor removal by Dr. China GREEN for hematuria in her Valdes catheter with abdominal pain and headache.   Pt is a [place] and had her Valdes removed 24 hours ago.  She then had urinary retention so it was replaced about 12 hours PTA and blood was noted shortly afterwards.  Pt had a witness syncopal episode at EMS triage but is now back to baseline mental status.

## 2019-07-03 NOTE — ED PROVIDER NOTE - NS_BEDUNITTYPES_ED_ALL_ED
8/14/2018 11:17 AM 
 
Patient:  Benji Rivera YOB: 1980 Date of Visit: 8/14/2018 Dear Michela Rueda MD 
[id number] [address] VIA Facsimile: [phone] 
 : Thank you for referring Mr. Donato Navarro to me for evaluation/treatment. Below are the relevant portions of my assessment and plan of care. If you have questions, please do not hesitate to call me. I look forward to following Raheel Anne along with you. Sincerely, Diane Caldwell MD 
 

TELEMETRY

## 2019-07-03 NOTE — CONSULT NOTE ADULT - SUBJECTIVE AND OBJECTIVE BOX
History of Present Illness:  Reason for Admission: Hematuria	  History of Present Illness: 	  89 y/o F with PMH of CAD s/p PCI x 5, HTN, dyslipidemia, hypothyroidism, bladder cancer s/p resection x 2 , anxiety, orthostatic hypotension, DJD multiple joints, anxiety, uterine cancer s/p hysterectomy, p/w Hematuria from verma catheter. Patient is s/p recent bladder tumor resection in 6/2018 and hospitalized at  [facility] and patient subsequently had hematuria followed by urinary retention. Patient was sent in for further evaluation. She denies abd pain, n/v/d/c, fever, chills, CP, SOB.   Once she arrived here, with son present with EMS, she had an episode of near syncope. The patient states that she does not feel as if she "LOC". The son states for a very brief period "seconds", she tilted her head backwards and did not respond the EMS verbally.     Examined at bed side, feels well now, has no recollection of near syncope episode. Denies any current or recent angina, palpitations, garces, orthopnea. States she has had several episodes of passing out always related to drop in BP.      Review of Systems:  Review of Systems: REVIEW OF SYSTEMS:   CONSTITUTIONAL: No weakness, fevers or chills  EYES/ENT: No visual changes;  No vertigo or throat pain   NECK: No pain or stiffness  RESPIRATORY: No cough, wheezing, hemoptysis; No shortness of breath  CARDIOVASCULAR: No chest pain or palpitations  GASTROINTESTINAL: No abdominal or epigastric pain. No nausea, vomiting, or hematemesis; No diarrhea or constipation. No melena or hematochezia.  GENITOURINARY: No dysuria, frequency   NEUROLOGICAL: No numbness or weakness  SKIN: No itching, burning, rashes, or lesions  All other review of systems is negative unless indicated above	      Allergies and Intolerances:        Allergies:  	sulfa drugs: Drug Category, Rash  	morphine: Drug, Other, unknown  	statins: Drug Category, Other (Unknown), Originally Entered as [Unknown see Comment: Per pt, generalized muscle aches and weakness] reaction to [Statins-Hmg-Coa Reductase Inhibitors]  	Plaquenil: Drug, Unknown  	Levatol: Drug, Unknown  	methylPREDNISolone: Drug, Unknown  	clonidine: Drug, Unknown  	hydrocodone: Drug, Unknown  	Florinef Acetate: Drug, Unknown  	amlodipine: Drug, Unknown, Originally Entered as [Unknown] reaction to [AMLODIPINE]  	Lotrel: Drug, Unknown, Originally Entered as [Unknown] reaction to [LOTREL]  	Lipitor: Drug, Unknown, Originally Entered as [Unknown] reaction to [Lipitor]    Home Medications:   * Patient Currently Takes Medications as of 26-Jun-2019 13:47 documented in Structured Notes  · 	tamsulosin 0.4 mg oral capsule: 1 cap(s) orally once a day (at bedtime)  · 	senna oral tablet: 2 tab(s) orally once a day (at bedtime), As needed, Constipation  · 	metoprolol succinate 50 mg oral tablet, extended release: 1 tab(s) orally once a day (at bedtime)  · 	magnesium hydroxide 8% oral suspension: 30 milliliter(s) orally once a day, As needed, Constipation  · 	losartan 25 mg oral tablet: 1 tab(s) orally once a day  · 	aspirin 325 mg oral tablet: 1 tab(s) orally once a day  · 	acetaminophen 325 mg oral tablet: 2 tab(s) orally every 6 hours  · 	Synthroid 75 mcg (0.075 mg) oral tablet: 1 tab(s) orally once a day 30  minutes before first meal of day  · 	Vitamin D3 2000 intl units oral capsule: 1 cap(s) orally once a day  · 	pantoprazole 40 mg oral delayed release tablet: 1 tab(s) orally once a day  · 	Ocuvite oral tablet: 1 tab(s) orally once a day  · 	ocular lubricant ophthalmic solution: 1 drop(s) in each eye 3 times a day  · 	ALPRAZolam 0.25 mg oral tablet: 0.5 tab(s) (12.5mg) orally once a day and at bedtime as needed for anxiety. MDD3   	***RX states 1 tab three times daily prn but patient prefers half a tab***    .    Patient History:    Past Medical, Past Surgical, and Family History:  PAST MEDICAL HISTORY:  CAD (coronary artery disease)     Endometrial adenocarcinoma     GERD (gastroesophageal reflux disease)     [place] (hard of hearing)     HTN (hypertension)     Hyperlipidemia     Hypothyroid     Macular degeneration     Stented coronary artery.     PAST SURGICAL HISTORY:  Ankle fracture, right surgery 25 yrs ago - hardware removed    S/P coronary angiogram 2005, 2008, 2011 - drug eluding stents    S/P hernia repair umbilical - 2008    S/P laparoscopic cholecystectomy 2008    S/P EMILY (total abdominal hysterectomy).     FAMILY HISTORY:  Father  Still living? Unknown  Family history of brain cancer, Age at diagnosis: Age Unknown.     Social History:  Social History (marital status, living situation, occupation, tobacco use, alcohol and drug use, and sexual history): Came from [place]  Lives alone otherwise, son helps with groceries  Does not drive Does not smoke, drinking	          Physical Exam:  Physical Exam: PHYSICAL EXAM:   Constitutional: NAD, awake and alert, well-developed  HEENT: PERR, EOMI, Normal Hearing, MMM  Neck: Soft and supple, No LAD, No JVD  Respiratory: Breath sounds are clear bilaterally, No wheezing, rales or rhonchi  Cardiovascular: S1 and S2, regular rate and rhythm, no Murmurs, gallops or rubs  Gastrointestinal: Bowel Sounds present, soft, nontender, nondistended, no guarding, no rebound  Extremities: No peripheral edema  Vascular: 2+ peripheral pulses  Neurological: A/O x 3, no focal deficits  Musculoskeletal: 5/5 strength b/l upper and lower extremities Skin: No rashes	       Labs and Results: Reviewed.       Assessment and Plan:     # ?Near syncope in ER   ECG and tele negative. Recent echo within acceptable limits.   Can have rest of syncope workup as outpatient with Dr Sánchez.     # HTN, CAD s/p PCI, HLD, - Stable  Cont current regimen
Patient is a 89y old  Female who presents with a chief complaint of Hematuria (2019 12:13)    HPI:  89 y/o Female with h/o CAD s/p PCI x 5, HTN, dyslipidemia, hypothyroidism, bladder cancer s/p resection x 2 , anxiety, orthostatic hypotension, DJD multiple joints, anxiety, uterine cancer s/p hysterectomy was admitted on 7/3 for cloudy urine and hematuria from verma catheter. Patient is had recent bladder tumor resection in 2018 at  and [facility] and patient subsequently had hematuria followed by urinary retention. Patient was sent in for further evaluation. No fever or chills reported at NH. In ER, she had an episode of near syncope. The patient stated that she does not feel as if she "LOC". The son stated for a very brief period "seconds", she tilted her head backwards and did not respond the EMS verbally. In ER she received ceftriaxone.       PMH: as above  PSH: as above  Meds: per reconciliation sheet, noted below  MEDICATIONS  (STANDING):  artificial  tears Solution 1 Drop(s) Both EYES three times a day  cefTRIAXone Injectable. 1000 milliGRAM(s) IV Push every 24 hours  cholecalciferol Oral Tab/Cap - Peds 2000 Unit(s) Oral daily  levothyroxine 75 MICROGram(s) Oral daily  losartan 25 milliGRAM(s) Oral daily  metoprolol succinate ER 50 milliGRAM(s) Oral daily  multivitamin/minerals 1 Tablet(s) Oral daily  pantoprazole    Tablet 40 milliGRAM(s) Oral before breakfast  sodium chloride 0.9%. 1000 milliLiter(s) (75 mL/Hr) IV Continuous <Continuous>    MEDICATIONS  (PRN):  acetaminophen   Tablet .. 650 milliGRAM(s) Oral every 6 hours PRN Temp greater or equal to 38C (100.4F), Mild Pain (1 - 3)  ALPRAZolam 0.125 milliGRAM(s) Oral at bedtime PRN insomnia, anxiety  magnesium hydroxide Suspension 30 milliLiter(s) Oral daily PRN Constipation  ondansetron Injectable 4 milliGRAM(s) IV Push every 6 hours PRN Nausea and/or Vomiting  senna 2 Tablet(s) Oral at bedtime PRN Constipation    Allergies    amlodipine (Unknown)  clonidine (Unknown)  Florinef Acetate (Unknown)  hydrocodone (Unknown)  Levatol (Unknown)  Lipitor (Unknown)  Lotrel (Unknown)  methylPREDNISolone (Unknown)  morphine (Other)  Plaquenil (Unknown)  statins (Other (Unknown))  sulfa drugs (Rash)    Intolerances      Social: no smoking, no alcohol, no illegal drugs; no recent travel, no exposure to TB  FAMILY HISTORY:  Family history of brain cancer (Father)    no history of premature cardiovascular disease in first degree relatives    ROS: the patient denies fever, no chills, no HA, no seizures, no dizziness, no sore throat, no nasal congestion, no blurry vision, no CP, no palpitations, no SOB, no cough, no abdominal pain, no diarrhea, no N/V, has dysuria, has hematuria, no leg pain, no claudication, no rash, no joint aches, no rectal pain or bleeding, no night sweats  All other systems reviewed and are negative    Vital Signs Last 24 Hrs  T(C): 36.6 (2019 10:45), Max: 37.3 (2019 09:16)  T(F): 97.9 (2019 10:45), Max: 99.2 (2019 09:16)  HR: 88 (2019 10:45) (85 - 96)  BP: 160/51 (2019 10:45) (145/49 - 161/65)  BP(mean): --  RR: 17 (2019 10:45) (14 - 17)  SpO2: 100% (2019 10:45) (95% - 100%)  Daily Height in cm: 157.48 (2019 12:13)    Daily     PE:    Constitutional: frail looking  HEENT: NC/AT, EOMI, PERRLA, conjunctivae clear; ears and nose atraumatic; pharynx benign  Neck: supple; thyroid not palpable  Back: no tenderness  Respiratory: respiratory effort normal; clear to auscultation  Cardiovascular: S1S2 regular, no murmurs  Abdomen: soft, not tender, not distended, positive BS; no liver or spleen organomegaly  Genitourinary: no suprapubic tenderness  Lymphatic: no LN palpable  Musculoskeletal: no muscle tenderness, no joint swelling or tenderness  Extremities: no pedal edema  Neurological/ Psychiatric: AxOx3, judgement and insight normal; moving all extremities  Skin: no rashes; no palpable lesions    Labs: all available labs reviewed                        11.1   20.16 )-----------( 436      ( 2019 02:02 )             35.4     07-03    131<L>  |  97  |  19  ----------------------------<  156<H>  4.4   |  24  |  1.20    Ca    9.1      2019 02:02    TPro  7.3  /  Alb  3.4  /  TBili  0.6  /  DBili  x   /  AST  16  /  ALT  25  /  AlkPhos  86  07-03     LIVER FUNCTIONS - ( 2019 02:02 )  Alb: 3.4 g/dL / Pro: 7.3 gm/dL / ALK PHOS: 86 U/L / ALT: 25 U/L / AST: 16 U/L / GGT: x           Urinalysis Basic - ( 2019 03:46 )    Color: Red / Appearance: bloody / S.000 / pH: x  Gluc: x / Ketone: Negative  / Bili: Negative / Urobili: Negative mg/dL   Blood: x / Protein: 30 mg/dL / Nitrite: Negative   Leuk Esterase: Moderate / RBC: >50 /HPF / WBC 11-25   Sq Epi: x / Non Sq Epi: Few / Bacteria: Few          Radiology: all available radiological tests reviewed    Advanced directives addressed: full resuscitation
UROLOGY CONSULT    HPI: patient is 89y Female with multiple co-morbidities s/p TURBT, bladder biopsies on 6/13/19 with recent admission to hospital with UTI. discharged home with verma catheter due to recurrent urinary retention. Notes verma catheter was removed in rehab facility and then she developed gross hematuria and urinary retention. Verma placed in ER and irrigated to evacuate clots. Notes abdominal pain is improved. No fevers, chills, nausea, vomiting    Elevated WBC, lactate on labs    CT scan - blood clots in bladder with bilateral hydronephrosis      PMH/PSH  HEMATURIA  Family history of brain cancer (Father)  No pertinent family history in first degree relatives  Endometrial adenocarcinoma  Uterine cancer  Macular degeneration  Stented coronary artery  Hypothyroid  Hyperlipidemia  HTN (hypertension)  GERD (gastroesophageal reflux disease)  CAD (coronary artery disease)  [place] (hard of hearing)  S/P EMILY (total abdominal hysterectomy)  S/P hernia repair  S/P laparoscopic cholecystectomy  Ankle fracture, right  S/P coronary angiogram      Family History:  Family history of brain cancer (Father)      Allergies  amlodipine (Unknown)  clonidine (Unknown)  Florinef Acetate (Unknown)  hydrocodone (Unknown)  Levatol (Unknown)  Lipitor (Unknown)  Lotrel (Unknown)  methylPREDNISolone (Unknown)  morphine (Other)  Plaquenil (Unknown)  statins (Other (Unknown))  sulfa drugs (Rash)      Medications  acetaminophen   Tablet .. 650 milliGRAM(s) Oral every 6 hours PRN  ALPRAZolam 0.125 milliGRAM(s) Oral at bedtime PRN  artificial  tears Solution 1 Drop(s) Both EYES three times a day  cefTRIAXone Injectable. 1000 milliGRAM(s) IV Push every 24 hours  cholecalciferol Oral Tab/Cap - Peds 2000 Unit(s) Oral daily  levothyroxine 75 MICROGram(s) Oral daily  losartan 25 milliGRAM(s) Oral daily  magnesium hydroxide Suspension 30 milliLiter(s) Oral daily PRN  metoprolol succinate ER 50 milliGRAM(s) Oral daily  multivitamin/minerals 1 Tablet(s) Oral daily  ondansetron Injectable 4 milliGRAM(s) IV Push every 6 hours PRN  pantoprazole    Tablet 40 milliGRAM(s) Oral before breakfast  senna 2 Tablet(s) Oral at bedtime PRN  sodium chloride 0.9%. 1000 milliLiter(s) IV Continuous <Continuous>      ROS  General: No weight change, fevers, chills, night sweats, fatigue, malaise  Skin: no new skin lesions, hair changes, prutitus  Ophthalmologic: No eye pain,  swelling,  redness, discharge, vision changes  ENMT: No hearing changes,  ear pain,  nasal congestion	  Respiratory and Thorax: No cough, sputum, dyspnea, wheezing  Cardiovascular: No chest pain, SOB, orthopnea, edema, palpitations  Gastrointestinal:	No diarrhea, constipation, nausea, vomiting, anorexia, hematochezia, melena.   Genitourinary: see above  Neurological:	 No headache, dizziness  Psychiatric:  No SI/HI/AH/VH.    Vital Signs Last 24 Hrs  T(C): 37.3 (03 Jul 2019 09:16), Max: 37.3 (03 Jul 2019 09:16)  T(F): 99.2 (03 Jul 2019 09:16), Max: 99.2 (03 Jul 2019 09:16)  HR: 93 (03 Jul 2019 09:16) (85 - 96)  BP: 150/67 (03 Jul 2019 09:16) (145/49 - 161/65)  BP(mean): --  RR: 17 (03 Jul 2019 09:16) (14 - 17)  SpO2: 100% (03 Jul 2019 09:16) (95% - 100%)    PHYSICAL EXAM:  Constitutional: NAD, lying in bed comfortably   Eyes: EOMI, vision is grossly intact  Neck: neck supple, non-tender without lymphadenopathy, masses, or thyromegaly  Back: no CVA tenderness bilaterally, spine normal without deformity or tenderness  Respiratory: no labored breathing  Cardiovascular: no peripheral edema, cyanosis, or pallor. Extremities are warm and well perfused.   Gastrointestinal: soft, non-tender, non-distended, no guarding, no rebound tenderness.   Genitourinary: Bladder non-palpable  Extremities: no significant deformity or joint abnormality.   Neurological: A&O x3  Musculoskeletal: moves all 4 extremities  Psychiatric: normal mood and affect        I/O      Labs:  CBC Full  -  ( 03 Jul 2019 02:02 )  WBC Count : 20.16 K/uL  Hemoglobin : 11.1 g/dL  Hematocrit : 35.4 %  Platelet Count - Automated : 436 K/uL  Mean Cell Volume : 89.2 fl  Mean Cell Hemoglobin : 28.0 pg  Mean Cell Hemoglobin Concentration : 31.4 gm/dL  Auto Neutrophil # : 16.72 K/uL  Auto Lymphocyte # : 1.92 K/uL  Auto Monocyte # : 1.18 K/uL  Auto Eosinophil # : 0.08 K/uL  Auto Basophil # : 0.06 K/uL  Auto Neutrophil % : 82.9 %  Auto Lymphocyte % : 9.5 %  Auto Monocyte % : 5.9 %  Auto Eosinophil % : 0.4 %  Auto Basophil % : 0.3 %    07-03    131<L>  |  97  |  19  ----------------------------<  156<H>  4.4   |  24  |  1.20    Ca    9.1      03 Jul 2019 02:02    TPro  7.3  /  Alb  3.4  /  TBili  0.6  /  DBili  x   /  AST  16  /  ALT  25  /  AlkPhos  86  07-03      Radiology:  CT reviewed    Procedure:  Verma catheter irrigated - small clots evacuated with eventual efflux of light pink urine

## 2019-07-03 NOTE — ED ADULT NURSE REASSESSMENT NOTE - NS ED NURSE REASSESS COMMENT FT1
verma removed, cbi placed per md order, patient tolerated procedure well. Multiple clots / irrigation needed for patency of verma, total of 2000ml of dark red urine received from verma bag. Pt urine is now light pink with + relief of lower abd pain. verma removed, cbi placed per md order, patient tolerated procedure well. Multiple clots / irrigation needed for patency of verma, total of 2000ml of dark red urine received from verma bag, 2 CBI bags hung at 3:15, total of 6,000 ml. Pt urine is now light pink with + relief of lower abd pain.

## 2019-07-03 NOTE — CONSULT NOTE ADULT - ASSESSMENT
1. Gross hematuria, urinary retention  2. UTI    Plan:    -continue verma to gravity drainage - no indicated for CBI at this time. RN instructed to irrigate prn.   -will eventually d/c home with verma to leg bag given recent recurrent episodes of urinary retention and high chance of failing TOV  -continue IV abx  -adequate PO hydration  -follow up Ucx  -care per medicine    Thank you for allowing me to assist in the care of this patient  Please feel free to call with any questions/concerns    John Cano MD  [facility]  W: [phone]

## 2019-07-03 NOTE — H&P ADULT - NSHPSOCIALHISTORY_GEN_ALL_CORE
Came from [facility]  Lives alone otherwise, son helps with groceries  Does not drive  Does not smoke, drinking

## 2019-07-03 NOTE — ED PROVIDER NOTE - SECONDARY DIAGNOSIS.
Urinary tract infection associated with indwelling urethral catheter, subsequent encounter Syncope, unspecified syncope type

## 2019-07-03 NOTE — ED ADULT TRIAGE NOTE - BP NONINVASIVE SYSTOLIC (MM HG)
161 Problem: Safety  Goal: Will remain free from injury  Outcome: PROGRESSING SLOWER THAN EXPECTED  Pt attempted to exit bed without staff assistance. Bed alarm on.    Problem: Bowel/Gastric:  Goal: Normal bowel function is maintained or improved  Outcome: PROGRESSING AS EXPECTED

## 2019-07-03 NOTE — H&P ADULT - HISTORY OF PRESENT ILLNESS
89 y/o F with PMH of CAD s/p PCI x 5, HTN, dyslipidemia, hypothyroidism, bladder cancer s/p resection x 2 , anxiety, orthostatic hypotension, DJD multiple joints, anxiety, uterine cancer s/p hysterectomy, p/w weakness. Today patient was unable to get up from bed and had difficulty walking due to weakness. Daughter called doctor who thought she may have a UTI and prescribed outpatient medications. However, patient was so weak, they came to ED. Patient also c/o dysuria and increased urinary frequency. +Suprapubic pain. Denies flank pain, fever, chills, nausea, vomiting, cough, runny nose, sore throat, SOB, CP.  Pt was admitted to medicine for evaluation of her UTI. Urology and ID were involved. As her verma was recently removed, PVR was checked to ensure urinary retention not contributing to UTI. 89 y/o F with PMH of CAD s/p PCI x 5, HTN, dyslipidemia, hypothyroidism, bladder cancer s/p resection x 2 , anxiety, orthostatic hypotension, DJD multiple joints, anxiety, uterine cancer s/p hysterectomy, p/w Hematuria from verma catheter. Patient is s/p recent bladder tumor resection in 6/2018 and hospitalized at  and discharged to [facility] rehab. [facility] and patient subsequently had hematuria followed by urinary retention. Patient was sent in for further evaluation. She denies abd pain, n/v/d/c, fever, chills, CP, SOB.   Once she arrived here, with son present with EMS, she had an episode of near syncope. The patient states that she does not feel as if she "LOC". The son states for a very brief period "seconds", she tilted her head backwards and did not respond the EMS verbally.

## 2019-07-03 NOTE — DISCHARGE NOTE NURSING/CASE MANAGEMENT/SOCIAL WORK - NSDCDPATPORTLINK_GEN_ALL_CORE
You can access the [facility] Patient Portal, offered by [facility], by registering with the following website: [email or web address]

## 2019-07-03 NOTE — ED ADULT TRIAGE NOTE - CHIEF COMPLAINT QUOTE
pt. brought in from [facility] for eval of hematuria. Pt. has hx of bladder cancer and had verma placed in hospital then d/c to [facility] where verma was removed but pt. c/o retention and pain so verma was replaced, staff notice blood in urine and pt. c/o of increased "pressure and pain in abdomen and rectum." While in triage pt. had episode of unresponsiveness where pt. had decreased respiratory rate, pt. b/l hands became clenched. Episode lasted approx. 15-20secs and ED MD called to bedside for eval. Pt. became immediately responsive after episode stating she has "a terrible headache."

## 2019-07-03 NOTE — ED ADULT NURSE NOTE - OBJECTIVE STATEMENT
patient brought in to ed from [facility], patient was recently here for bladder ca, verma placed, @ nursing home patient was having hematuria and lower abd pain pain. Pt had episode of unresponsiveness at triage, but patient is now able to answer questions @ bedside, nsr on monitor, bp stable.

## 2019-07-03 NOTE — CONSULT NOTE ADULT - ASSESSMENT
89 y/o Female with h/o CAD s/p PCI x 5, HTN, dyslipidemia, hypothyroidism, bladder cancer s/p resection x 2 , anxiety, orthostatic hypotension, DJD multiple joints, anxiety, uterine cancer s/p hysterectomy was admitted on 7/3 for cloudy urine and hematuria from verma catheter. Patient is had recent bladder tumor resection in 6/2018 at  and discharged to [facility] rehab. [facility] and patient subsequently had hematuria followed by urinary retention. Patient was sent in for further evaluation. No fever or chills reported at NH. In ER, she had an episode of near syncope. The patient stated that she does not feel as if she "LOC". The son stated for a very brief period "seconds", she tilted her head backwards and did not respond the EMS verbally. In ER she received ceftriaxone.     1. Low grade fever. Hematuria. Pyuria. Possible UTI. Bladder Ca s/p surgery.  -leukocytosis  -obtain BC x 2, urine c/s  -start ceftriaxone 1 gm IV qd   -reason for abx use and side effects reviewed with patient; monitor BMP and vancomycin trough levels  -old chart reviewed to assess prior cultures  -monitor temps  -f/u CBC  -supportive care  2. Other issues:   -care per medicine 89 y/o Female with h/o CAD s/p PCI x 5, HTN, dyslipidemia, hypothyroidism, bladder cancer s/p resection x 2 , anxiety, orthostatic hypotension, DJD multiple joints, anxiety, uterine cancer s/p hysterectomy was admitted on 7/3 for cloudy urine and hematuria from verma catheter. Patient is had recent bladder tumor resection in 6/2018 at  and discharged to [facility] rehab. [facility] and patient subsequently had hematuria followed by urinary retention. Patient was sent in for further evaluation. No fever or chills reported at NH. In ER, she had an episode of near syncope. The patient stated that she does not feel as if she "LOC". The son stated for a very brief period "seconds", she tilted her head backwards and did not respond the EMS verbally. In ER she received ceftriaxone.     1. Low grade fever. Possible sepsis. Hematuria. Pyuria. Possible UTI. Urinary retention with verma in place. CRF stage 3. Bladder Ca s/p surgery.  -leukocytosis  -obtain BC x 2, urine c/s  -start ceftriaxone 1 gm IV qd   -reason for abx use and side effects reviewed with patient; monitor BMP and vancomycin trough levels  -old chart reviewed to assess prior cultures  -syncopal episode ?vasovagal; cardiology evaluation  -urology evaluation  -monitor temps  -f/u CBC  -supportive care  2. Other issues:   -care per medicine 89 y/o Female with h/o CAD s/p PCI x 5, HTN, dyslipidemia, hypothyroidism, bladder cancer s/p resection x 2 , anxiety, orthostatic hypotension, DJD multiple joints, anxiety, uterine cancer s/p hysterectomy was admitted on 7/3 for cloudy urine and hematuria from verma catheter. Patient is had recent bladder tumor resection in 6/2018 at  and discharged to [facility] rehab. [facility] and patient subsequently had hematuria followed by urinary retention. Patient was sent in for further evaluation. No fever or chills reported at NH. In ER, she had an episode of near syncope. The patient stated that she does not feel as if she "LOC". The son stated for a very brief period "seconds", she tilted her head backwards and did not respond the EMS verbally. In ER she received ceftriaxone.     1. Low grade fever. Possible sepsis. Hematuria. Pyuria. Possible UTI. Urinary retention with verma in place. CRF stage 3. Bladder Ca s/p surgery.  -severe leukocytosis  -obtain BC x 2, urine c/s  -start ceftriaxone 1 gm IV qd   -reason for abx use and side effects reviewed with patient; monitor BMP   -old chart reviewed to assess prior cultures  -syncopal episode ?vasovagal; cardiology evaluation  -urology evaluation  -monitor temps  -f/u CBC  -supportive care  2. Other issues:   -care per medicine

## 2019-07-03 NOTE — H&P ADULT - NSICDXPASTMEDICALHX_GEN_ALL_CORE_FT
PAST MEDICAL HISTORY:  CAD (coronary artery disease)     Endometrial adenocarcinoma     GERD (gastroesophageal reflux disease)     [place] (hard of hearing)     HTN (hypertension)     Hyperlipidemia     Hypothyroid     Macular degeneration     Stented coronary artery

## 2019-07-03 NOTE — ED PROVIDER NOTE - PROGRESS NOTE DETAILS
Dr. Atkinson of Urology aware of patient and will manage hematuria.  He states patient should be admitted to medicine given her comorbidities and other presenting complaints.   Pt endorsed to Dr. Stapleton. Omar Jorge, DO

## 2019-07-03 NOTE — ED ADULT NURSE REASSESSMENT NOTE - NS ED NURSE REASSESS COMMENT FT1
patient on her second set of cbi bags, still pain free, no other complaints, urine is light red, minimal clots coming out.

## 2019-07-03 NOTE — ED ADULT NURSE NOTE - PMH
CAD (coronary artery disease)    Endometrial adenocarcinoma    GERD (gastroesophageal reflux disease)    [place] (hard of hearing)    HTN (hypertension)    Hyperlipidemia    Hypothyroid    Macular degeneration    Stented coronary artery

## 2019-07-03 NOTE — ED ADULT NURSE NOTE - CHIEF COMPLAINT QUOTE
pt. brought in from [facility] for eval of hematuria. Pt. has hx of bladder cancer and had verma placed in hospital then d/c to [facility] where verma was removed but pt. c/o retention and pain so verma was replaced, staff notice blood in urine and pt. c/o of increased "pressure and pain in abdomen and rectum." While in triage pt. had episode of unresponsiveness where pt. had decreased respiratory rate, pt. b/l hands became clenched. Episode lasted approx. 15-20secs and ED MD called to bedside for eval. Pt. became immediately responsive after episode stating she has "a terrible headache."

## 2019-07-03 NOTE — H&P ADULT - NSICDXFAMILYHX_GEN_ALL_CORE_FT
FAMILY HISTORY:  Father  Still living? Unknown  Family history of brain cancer, Age at diagnosis: Age Unknown

## 2019-07-04 LAB
ALBUMIN SERPL ELPH-MCNC: 2.7 G/DL — LOW (ref 3.3–5)
ALP SERPL-CCNC: 66 U/L — SIGNIFICANT CHANGE UP (ref 40–120)
ALT FLD-CCNC: 17 U/L — SIGNIFICANT CHANGE UP (ref 12–78)
ANION GAP SERPL CALC-SCNC: 6 MMOL/L — SIGNIFICANT CHANGE UP (ref 5–17)
AST SERPL-CCNC: 13 U/L — LOW (ref 15–37)
BASOPHILS # BLD AUTO: 0.07 K/UL — SIGNIFICANT CHANGE UP (ref 0–0.2)
BASOPHILS NFR BLD AUTO: 0.7 % — SIGNIFICANT CHANGE UP (ref 0–2)
BILIRUB SERPL-MCNC: 0.3 MG/DL — SIGNIFICANT CHANGE UP (ref 0.2–1.2)
BUN SERPL-MCNC: 10 MG/DL — SIGNIFICANT CHANGE UP (ref 7–23)
CALCIUM SERPL-MCNC: 8.9 MG/DL — SIGNIFICANT CHANGE UP (ref 8.5–10.1)
CHLORIDE SERPL-SCNC: 107 MMOL/L — SIGNIFICANT CHANGE UP (ref 96–108)
CO2 SERPL-SCNC: 25 MMOL/L — SIGNIFICANT CHANGE UP (ref 22–31)
CREAT SERPL-MCNC: 0.81 MG/DL — SIGNIFICANT CHANGE UP (ref 0.5–1.3)
EOSINOPHIL # BLD AUTO: 0.48 K/UL — SIGNIFICANT CHANGE UP (ref 0–0.5)
EOSINOPHIL NFR BLD AUTO: 4.6 % — SIGNIFICANT CHANGE UP (ref 0–6)
GLUCOSE SERPL-MCNC: 89 MG/DL — SIGNIFICANT CHANGE UP (ref 70–99)
HCT VFR BLD CALC: 28.6 % — LOW (ref 34.5–45)
HGB BLD-MCNC: 8.9 G/DL — LOW (ref 11.5–15.5)
IMM GRANULOCYTES NFR BLD AUTO: 0.7 % — SIGNIFICANT CHANGE UP (ref 0–1.5)
LYMPHOCYTES # BLD AUTO: 1.85 K/UL — SIGNIFICANT CHANGE UP (ref 1–3.3)
LYMPHOCYTES # BLD AUTO: 17.8 % — SIGNIFICANT CHANGE UP (ref 13–44)
MCHC RBC-ENTMCNC: 28.1 PG — SIGNIFICANT CHANGE UP (ref 27–34)
MCHC RBC-ENTMCNC: 31.1 GM/DL — LOW (ref 32–36)
MCV RBC AUTO: 90.2 FL — SIGNIFICANT CHANGE UP (ref 80–100)
MONOCYTES # BLD AUTO: 0.92 K/UL — HIGH (ref 0–0.9)
MONOCYTES NFR BLD AUTO: 8.9 % — SIGNIFICANT CHANGE UP (ref 2–14)
NEUTROPHILS # BLD AUTO: 6.99 K/UL — SIGNIFICANT CHANGE UP (ref 1.8–7.4)
NEUTROPHILS NFR BLD AUTO: 67.3 % — SIGNIFICANT CHANGE UP (ref 43–77)
PLATELET # BLD AUTO: 334 K/UL — SIGNIFICANT CHANGE UP (ref 150–400)
POTASSIUM SERPL-MCNC: 4.9 MMOL/L — SIGNIFICANT CHANGE UP (ref 3.5–5.3)
POTASSIUM SERPL-SCNC: 4.9 MMOL/L — SIGNIFICANT CHANGE UP (ref 3.5–5.3)
PROT SERPL-MCNC: 6.2 GM/DL — SIGNIFICANT CHANGE UP (ref 6–8.3)
RBC # BLD: 3.17 M/UL — LOW (ref 3.8–5.2)
RBC # FLD: 14.4 % — SIGNIFICANT CHANGE UP (ref 10.3–14.5)
SODIUM SERPL-SCNC: 138 MMOL/L — SIGNIFICANT CHANGE UP (ref 135–145)
WBC # BLD: 10.38 K/UL — SIGNIFICANT CHANGE UP (ref 3.8–10.5)
WBC # FLD AUTO: 10.38 K/UL — SIGNIFICANT CHANGE UP (ref 3.8–10.5)

## 2019-07-04 RX ADMIN — CEFTRIAXONE 1000 MILLIGRAM(S): 500 INJECTION, POWDER, FOR SOLUTION INTRAMUSCULAR; INTRAVENOUS at 05:21

## 2019-07-04 RX ADMIN — LOSARTAN POTASSIUM 25 MILLIGRAM(S): 100 TABLET, FILM COATED ORAL at 06:25

## 2019-07-04 RX ADMIN — Medication 75 MICROGRAM(S): at 06:25

## 2019-07-04 RX ADMIN — SODIUM CHLORIDE 75 MILLILITER(S): 9 INJECTION INTRAMUSCULAR; INTRAVENOUS; SUBCUTANEOUS at 22:52

## 2019-07-04 RX ADMIN — Medication 1 DROP(S): at 05:21

## 2019-07-04 RX ADMIN — Medication 650 MILLIGRAM(S): at 22:20

## 2019-07-04 RX ADMIN — PANTOPRAZOLE SODIUM 40 MILLIGRAM(S): 20 TABLET, DELAYED RELEASE ORAL at 06:25

## 2019-07-04 RX ADMIN — Medication 50 MILLIGRAM(S): at 06:25

## 2019-07-04 RX ADMIN — Medication 0.12 MILLIGRAM(S): at 22:14

## 2019-07-04 RX ADMIN — Medication 2000 UNIT(S): at 12:23

## 2019-07-04 RX ADMIN — Medication 1 DROP(S): at 22:55

## 2019-07-04 RX ADMIN — Medication 650 MILLIGRAM(S): at 12:25

## 2019-07-04 RX ADMIN — Medication 650 MILLIGRAM(S): at 23:00

## 2019-07-04 RX ADMIN — Medication 1 TABLET(S): at 12:24

## 2019-07-04 RX ADMIN — SODIUM CHLORIDE 75 MILLILITER(S): 9 INJECTION INTRAMUSCULAR; INTRAVENOUS; SUBCUTANEOUS at 18:12

## 2019-07-04 NOTE — PROGRESS NOTE ADULT - SUBJECTIVE AND OBJECTIVE BOX
Date of service: 19 @ 10:42    Feels better  Fever is down  Urine in bag is clearing    ROS: no fever or chills; denies dizziness, no HA, no SOB or cough, no abdominal pain, no diarrhea or constipation; no legs pain, no rashes    MEDICATIONS  (STANDING):  artificial  tears Solution 1 Drop(s) Both EYES three times a day  cefTRIAXone Injectable. 1000 milliGRAM(s) IV Push every 24 hours  cholecalciferol Oral Tab/Cap - Peds 2000 Unit(s) Oral daily  levothyroxine 75 MICROGram(s) Oral daily  losartan 25 milliGRAM(s) Oral daily  metoprolol succinate ER 50 milliGRAM(s) Oral daily  multivitamin/minerals 1 Tablet(s) Oral daily  pantoprazole    Tablet 40 milliGRAM(s) Oral before breakfast  sodium chloride 0.9%. 1000 milliLiter(s) (75 mL/Hr) IV Continuous <Continuous>      Vital Signs Last 24 Hrs  T(C): 36.4 (2019 04:41), Max: 36.7 (2019 17:14)  T(F): 97.5 (2019 04:41), Max: 98.1 (2019 17:14)  HR: 73 (2019 04:41) (73 - 88)  BP: 139/47 (2019 04:41) (134/49 - 161/54)  BP(mean): --  RR: 18 (2019 04:41) (16 - 18)  SpO2: 100% (2019 04:41) (99% - 100%)    Physical Exam:      Constitutional: frail looking  HEENT: NC/AT, EOMI, PERRLA, conjunctivae clear  Neck: supple; thyroid not palpable  Back: no tenderness  Respiratory: respiratory effort normal; clear to auscultation  Cardiovascular: S1S2 regular, no murmurs  Abdomen: soft, not tender, not distended, positive BS  Genitourinary: no suprapubic tenderness  Lymphatic: no LN palpable  Musculoskeletal: no muscle tenderness, no joint swelling or tenderness  Extremities: no pedal edema  Neurological/ Psychiatric: AxOx3, moving all extremities  Skin: no rashes; no palpable lesions    Labs: reviewed                        8.9    10.38 )-----------( 334      ( 2019 07:04 )             28.6     07-04    138  |  107  |  10  ----------------------------<  89  4.9   |  25  |  0.81    Ca    8.9      2019 07:04    TPro  6.2  /  Alb  2.7<L>  /  TBili  0.3  /  DBili  x   /  AST  13<L>  /  ALT  17  /  AlkPhos  66  07-04                        11.1   20.16 )-----------( 436      ( 2019 02:02 )             35.4     07-03    131<L>  |  97  |  19  ----------------------------<  156<H>  4.4   |  24  |  1.20    Ca    9.1      2019 02:02    TPro  7.3  /  Alb  3.4  /  TBili  0.6  /  DBili  x   /  AST  16  /  ALT  25  /  AlkPhos  86  07-03     LIVER FUNCTIONS - ( 2019 02:02 )  Alb: 3.4 g/dL / Pro: 7.3 gm/dL / ALK PHOS: 86 U/L / ALT: 25 U/L / AST: 16 U/L / GGT: x           Urinalysis Basic - ( 2019 03:46 )    Color: Red / Appearance: bloody / S.000 / pH: x  Gluc: x / Ketone: Negative  / Bili: Negative / Urobili: Negative mg/dL   Blood: x / Protein: 30 mg/dL / Nitrite: Negative   Leuk Esterase: Moderate / RBC: >50 /HPF / WBC 11-25   Sq Epi: x / Non Sq Epi: Few / Bacteria: Few      Culture - Blood (collected 2019 04:15)  Source: .Blood None  Preliminary Report (2019 09:01):    No growth to date.    Culture - Blood (collected 2019 04:15)  Source: .Blood None  Preliminary Report (2019 09:01):    No growth to date.    Radiology: all available radiological tests reviewed    < from: CT Abdomen and Pelvis No Cont (19 @ 02:42) >  Mild to moderate right and mild left hydroureteronephrosis to the level   of the distended bladder containing blood products/debris. Underlying   recurrent or residual neoplasm cannot be excluded given history. Further   evaluation with additional imaging (CT or MR urogram) and/or direct   visualization would be helpful.    A moderate amount of stool in the rectum. Nonspecific mild perirectal   stranding. Recommend clinical correlation to assess proctitis/stercoral   colitis.    < end of copied text >      Advanced directives addressed: full resuscitation

## 2019-07-04 NOTE — PROGRESS NOTE ADULT - SUBJECTIVE AND OBJECTIVE BOX
HOSPITALIST ATTENDING PROGRESS NOTE    Chart and meds reviewed.  Patient seen and examined.    HPI: 89 y/o F with PMH of CAD s/p PCI x 5, HTN, dyslipidemia, hypothyroidism, bladder cancer s/p resection x 2 , anxiety, orthostatic hypotension, DJD multiple joints, anxiety, uterine cancer s/p hysterectomy, p/w Hematuria from verma catheter. Patient is s/p recent bladder tumor resection in 2018 and hospitalized at  [facility] and patient subsequently had hematuria followed by urinary retention. Patient was sent in for further evaluation. She denies abd pain, n/v/d/c, fever, chills, CP, SOB.   Once she arrived here, with son present with EMS, she had an episode of near syncope. The patient states that she does not feel as if she "LOC". The son states for a very brief period "seconds", she tilted her head backwards and did not respond the EMS verbally.      19 Pt seen and examined, on abx,  note appreciated.     All 10 systems reviewed and found to be negative with the exception of what has been described above.    MEDICATIONS  (STANDING):  artificial  tears Solution 1 Drop(s) Both EYES three times a day  cefTRIAXone Injectable. 1000 milliGRAM(s) IV Push every 24 hours  cholecalciferol Oral Tab/Cap - Peds 2000 Unit(s) Oral daily  levothyroxine 75 MICROGram(s) Oral daily  losartan 25 milliGRAM(s) Oral daily  metoprolol succinate ER 50 milliGRAM(s) Oral daily  multivitamin/minerals 1 Tablet(s) Oral daily  pantoprazole    Tablet 40 milliGRAM(s) Oral before breakfast  sodium chloride 0.9%. 1000 milliLiter(s) (75 mL/Hr) IV Continuous <Continuous>    MEDICATIONS  (PRN):  acetaminophen   Tablet .. 650 milliGRAM(s) Oral every 6 hours PRN Temp greater or equal to 38C (100.4F), Mild Pain (1 - 3)  ALPRAZolam 0.125 milliGRAM(s) Oral at bedtime PRN insomnia, anxiety  magnesium hydroxide Suspension 30 milliLiter(s) Oral daily PRN Constipation  ondansetron Injectable 4 milliGRAM(s) IV Push every 6 hours PRN Nausea and/or Vomiting  senna 2 Tablet(s) Oral at bedtime PRN Constipation      VITALS:  T(F): 97.7 (19 @ 11:40), Max: 98.1 (19 @ 17:14)  HR: 78 (19 @ 11:40) (73 - 78)  BP: 141/50 (19 @ 11:40) (134/49 - 161/54)  RR: 17 (19 @ 11:40) (16 - 18)  SpO2: 100% (19 @ 11:40) (99% - 100%)  Wt(kg): --    I&O's Summary    2019 07:01  -  2019 07:00  --------------------------------------------------------  IN: 0 mL / OUT: 2350 mL / NET: -2350 mL        CAPILLARY BLOOD GLUCOSE          PHYSICAL EXAM:    HEENT:  pupils equal and reactive, EOMI, no oropharyngeal lesions, erythema, exudates, oral thrush  NECK:   supple, no carotid bruits, no palpable lymph nodes, no thyromegaly  CV:  +S1, +S2, regular, no murmurs or rubs  RESP:   lungs clear to auscultation bilaterally, no wheezing, rales, rhonchi, good air entry bilaterally  BREAST:  not examined  GI:  abdomen soft, non-tender, non-distended, normal BS, no bruits, no abdominal masses, no palpable masses  RECTAL:  not examined  :  not examined  MSK:   normal muscle tone, no atrophy, no rigidity, no contractions  EXT:  no clubbing, no cyanosis, no edema, no calf pain, swelling or erythema  VASCULAR:  pulses equal and symmetric in the upper and lower extremities  NEURO:  AAOX3, no focal neurological deficits, follows all commands, able to move extremities spontaneously  SKIN:  no ulcers, lesions or rashes    LABS:                            8.9    10.38 )-----------( 334      ( 2019 07:04 )             28.6     07-04    138  |  107  |  10  ----------------------------<  89  4.9   |  25  |  0.81    Ca    8.9      2019 07:04    TPro  6.2  /  Alb  2.7<L>  /  TBili  0.3  /  DBili  x   /  AST  13<L>  /  ALT  17  /  AlkPhos  66  07-04        LIVER FUNCTIONS - ( 2019 07:04 )  Alb: 2.7 g/dL / Pro: 6.2 gm/dL / ALK PHOS: 66 U/L / ALT: 17 U/L / AST: 13 U/L / GGT: x             Urinalysis Basic - ( 2019 03:46 )    Color: Red / Appearance: bloody / S.000 / pH: x  Gluc: x / Ketone: Negative  / Bili: Negative / Urobili: Negative mg/dL   Blood: x / Protein: 30 mg/dL / Nitrite: Negative   Leuk Esterase: Moderate / RBC: >50 /HPF / WBC 11-25   Sq Epi: x / Non Sq Epi: Few / Bacteria: Few                                    CULTURES:

## 2019-07-05 ENCOUNTER — TRANSCRIPTION ENCOUNTER (OUTPATIENT)
Age: 84
End: 2019-07-05

## 2019-07-05 LAB
-  AMPICILLIN: SIGNIFICANT CHANGE UP
-  CIPROFLOXACIN: SIGNIFICANT CHANGE UP
-  LEVOFLOXACIN: SIGNIFICANT CHANGE UP
-  NITROFURANTOIN: SIGNIFICANT CHANGE UP
-  TETRACYCLINE: SIGNIFICANT CHANGE UP
-  VANCOMYCIN: SIGNIFICANT CHANGE UP
CULTURE RESULTS: SIGNIFICANT CHANGE UP
HCT VFR BLD CALC: 28.5 % — LOW (ref 34.5–45)
HGB BLD-MCNC: 8.7 G/DL — LOW (ref 11.5–15.5)
MCHC RBC-ENTMCNC: 27.5 PG — SIGNIFICANT CHANGE UP (ref 27–34)
MCHC RBC-ENTMCNC: 30.5 GM/DL — LOW (ref 32–36)
MCV RBC AUTO: 90.2 FL — SIGNIFICANT CHANGE UP (ref 80–100)
METHOD TYPE: SIGNIFICANT CHANGE UP
ORGANISM # SPEC MICROSCOPIC CNT: SIGNIFICANT CHANGE UP
ORGANISM # SPEC MICROSCOPIC CNT: SIGNIFICANT CHANGE UP
PLATELET # BLD AUTO: 328 K/UL — SIGNIFICANT CHANGE UP (ref 150–400)
RBC # BLD: 3.16 M/UL — LOW (ref 3.8–5.2)
RBC # FLD: 14.3 % — SIGNIFICANT CHANGE UP (ref 10.3–14.5)
SPECIMEN SOURCE: SIGNIFICANT CHANGE UP
WBC # BLD: 9.16 K/UL — SIGNIFICANT CHANGE UP (ref 3.8–10.5)
WBC # FLD AUTO: 9.16 K/UL — SIGNIFICANT CHANGE UP (ref 3.8–10.5)

## 2019-07-05 RX ADMIN — PANTOPRAZOLE SODIUM 40 MILLIGRAM(S): 20 TABLET, DELAYED RELEASE ORAL at 05:40

## 2019-07-05 RX ADMIN — LOSARTAN POTASSIUM 25 MILLIGRAM(S): 100 TABLET, FILM COATED ORAL at 05:40

## 2019-07-05 RX ADMIN — Medication 75 MICROGRAM(S): at 05:40

## 2019-07-05 RX ADMIN — Medication 1 DROP(S): at 05:41

## 2019-07-05 RX ADMIN — Medication 0.12 MILLIGRAM(S): at 21:41

## 2019-07-05 RX ADMIN — Medication 2000 UNIT(S): at 12:05

## 2019-07-05 RX ADMIN — SENNA PLUS 2 TABLET(S): 8.6 TABLET ORAL at 21:41

## 2019-07-05 RX ADMIN — Medication 50 MILLIGRAM(S): at 05:40

## 2019-07-05 RX ADMIN — Medication 1 TABLET(S): at 12:05

## 2019-07-05 RX ADMIN — Medication 650 MILLIGRAM(S): at 17:54

## 2019-07-05 RX ADMIN — Medication 650 MILLIGRAM(S): at 12:05

## 2019-07-05 RX ADMIN — CEFTRIAXONE 1000 MILLIGRAM(S): 500 INJECTION, POWDER, FOR SOLUTION INTRAMUSCULAR; INTRAVENOUS at 05:39

## 2019-07-05 RX ADMIN — Medication 1 DROP(S): at 16:23

## 2019-07-05 RX ADMIN — Medication 1 DROP(S): at 21:41

## 2019-07-05 NOTE — PROGRESS NOTE ADULT - SUBJECTIVE AND OBJECTIVE BOX
HOSPITALIST ATTENDING PROGRESS NOTE    Chart and meds reviewed.  Patient seen and examined.    HPI: 89 y/o F with PMH of CAD s/p PCI x 5, HTN, dyslipidemia, hypothyroidism, bladder cancer s/p resection x 2 , anxiety, orthostatic hypotension, DJD multiple joints, anxiety, uterine cancer s/p hysterectomy, p/w Hematuria from verma catheter. Patient is s/p recent bladder tumor resection in 6/2018 and [facility] and patient subsequently had hematuria followed by urinary retention. Patient was sent in for further evaluation. She denies abd pain, n/v/d/c, fever, chills, CP, SOB.   Once she arrived here, with son present with EMS, she had an episode of near syncope. The patient states that she does not feel as if she "LOC". The son states for a very brief period "seconds", she tilted her head backwards and did not respond the EMS verbally.      7/4/19 Pt seen and examined, on abx,  note appreciated.     7/5/19 pt seen and examined, urine clear, urine Cx noted, ID follow up/ follow up appreciated.     All 10 systems reviewed and found to be negative with the exception of what has been described above.    MEDICATIONS  (STANDING):  artificial  tears Solution 1 Drop(s) Both EYES three times a day  cefTRIAXone Injectable. 1000 milliGRAM(s) IV Push every 24 hours  cholecalciferol Oral Tab/Cap - Peds 2000 Unit(s) Oral daily  levothyroxine 75 MICROGram(s) Oral daily  losartan 25 milliGRAM(s) Oral daily  metoprolol succinate ER 50 milliGRAM(s) Oral daily  multivitamin/minerals 1 Tablet(s) Oral daily  pantoprazole    Tablet 40 milliGRAM(s) Oral before breakfast    MEDICATIONS  (PRN):  acetaminophen   Tablet .. 650 milliGRAM(s) Oral every 6 hours PRN Temp greater or equal to 38C (100.4F), Mild Pain (1 - 3)  ALPRAZolam 0.125 milliGRAM(s) Oral at bedtime PRN insomnia, anxiety  magnesium hydroxide Suspension 30 milliLiter(s) Oral daily PRN Constipation  ondansetron Injectable 4 milliGRAM(s) IV Push every 6 hours PRN Nausea and/or Vomiting  senna 2 Tablet(s) Oral at bedtime PRN Constipation      VITALS:  T(F): 97.3 (07-05-19 @ 10:35), Max: 97.9 (07-04-19 @ 22:30)  HR: 80 (07-05-19 @ 10:35) (73 - 80)  BP: 147/51 (07-05-19 @ 10:35) (147/51 - 158/72)  RR: 18 (07-05-19 @ 10:35) (17 - 18)  SpO2: 99% (07-05-19 @ 10:35) (99% - 100%)  Wt(kg): --    I&O's Summary    04 Jul 2019 07:01  -  05 Jul 2019 07:00  --------------------------------------------------------  IN: 900 mL / OUT: 1400 mL / NET: -500 mL        CAPILLARY BLOOD GLUCOSE          PHYSICAL EXAM:    HEENT:  pupils equal and reactive, EOMI, no oropharyngeal lesions, erythema, exudates, oral thrush  NECK:   supple, no carotid bruits, no palpable lymph nodes, no thyromegaly  CV:  +S1, +S2, regular, no murmurs or rubs  RESP:   lungs clear to auscultation bilaterally, no wheezing, rales, rhonchi, good air entry bilaterally  BREAST:  not examined  GI:  abdomen soft, non-tender, non-distended, normal BS, no bruits, no abdominal masses, no palpable masses  RECTAL:  not examined  :  not examined  MSK:   normal muscle tone, no atrophy, no rigidity, no contractions  EXT:  no clubbing, no cyanosis, no edema, no calf pain, swelling or erythema  VASCULAR:  pulses equal and symmetric in the upper and lower extremities  NEURO:  AAOX3, no focal neurological deficits, follows all commands, able to move extremities spontaneously  SKIN:  no ulcers, lesions or rashes    LABS:                            8.7    9.16  )-----------( 328      ( 05 Jul 2019 07:49 )             28.5     07-04    138  |  107  |  10  ----------------------------<  89  4.9   |  25  |  0.81    Ca    8.9      04 Jul 2019 07:04    TPro  6.2  /  Alb  2.7<L>  /  TBili  0.3  /  DBili  x   /  AST  13<L>  /  ALT  17  /  AlkPhos  66  07-04        LIVER FUNCTIONS - ( 04 Jul 2019 07:04 )  Alb: 2.7 g/dL / Pro: 6.2 gm/dL / ALK PHOS: 66 U/L / ALT: 17 U/L / AST: 13 U/L / GGT: x                                             CULTURES:

## 2019-07-05 NOTE — DISCHARGE NOTE PROVIDER - CARE PROVIDER_API CALL
Amish Culp)  Infectious Disease; Internal Medicine  [address], [address] [address]  Phone: [phone]  Fax: [phone]  Follow Up Time:     John Cano)  Urology  [address] [place] [address]  Phone: [phone]  Fax: [phone]  Follow Up Time:

## 2019-07-05 NOTE — DISCHARGE NOTE PROVIDER - NSDCCPCAREPLAN_GEN_ALL_CORE_FT
PRINCIPAL DISCHARGE DIAGNOSIS  Diagnosis: Gross hematuria  Assessment and Plan of Treatment:       SECONDARY DISCHARGE DIAGNOSES  Diagnosis: Syncope, unspecified syncope type  Assessment and Plan of Treatment:     Diagnosis: Urinary tract infection associated with indwelling urethral catheter, subsequent encounter  Assessment and Plan of Treatment: PRINCIPAL DISCHARGE DIAGNOSIS  Diagnosis: Gross hematuria  Assessment and Plan of Treatment: now resolved, briley secondary to recent surgery and uti      SECONDARY DISCHARGE DIAGNOSES  Diagnosis: Urinary tract infection associated with indwelling urethral catheter, subsequent encounter  Assessment and Plan of Treatment: continue with oral augmentin for 5 more days.

## 2019-07-05 NOTE — PHYSICAL THERAPY INITIAL EVALUATION ADULT - ADDITIONAL COMMENTS
pt lives alone in a private home one step to enter. uses rw to ambulate.  part-time aide/few hours a day.

## 2019-07-05 NOTE — PROGRESS NOTE ADULT - SUBJECTIVE AND OBJECTIVE BOX
Date of service: 19 @ 09:58    Lying in bed in NAD  Dysuria is improving  No fever    ROS: no fever or chills; denies dizziness, no HA, no SOB or cough, no abdominal pain, no diarrhea or constipation; no legs pain, no rashes    MEDICATIONS  (STANDING):  artificial  tears Solution 1 Drop(s) Both EYES three times a day  cefTRIAXone Injectable. 1000 milliGRAM(s) IV Push every 24 hours  cholecalciferol Oral Tab/Cap - Peds 2000 Unit(s) Oral daily  levothyroxine 75 MICROGram(s) Oral daily  losartan 25 milliGRAM(s) Oral daily  metoprolol succinate ER 50 milliGRAM(s) Oral daily  multivitamin/minerals 1 Tablet(s) Oral daily  pantoprazole    Tablet 40 milliGRAM(s) Oral before breakfast  sodium chloride 0.9%. 1000 milliLiter(s) (75 mL/Hr) IV Continuous <Continuous>      Vital Signs Last 24 Hrs  T(C): 36.4 (2019 05:06), Max: 36.6 (2019 22:30)  T(F): 97.5 (2019 05:06), Max: 97.9 (2019 22:30)  HR: 73 (2019 05:06) (73 - 78)  BP: 151/53 (2019 05:06) (141/50 - 158/72)  BP(mean): --  RR: 17 (2019 05:06) (17 - 18)  SpO2: 100% (2019 05:06) (99% - 100%)    Physical Exam:      Constitutional: frail looking  HEENT: NC/AT, EOMI, PERRLA, conjunctivae clear  Neck: supple; thyroid not palpable  Back: no tenderness  Respiratory: respiratory effort normal; clear to auscultation  Cardiovascular: S1S2 regular, no murmurs  Abdomen: soft, not tender, not distended, positive BS  Genitourinary: no suprapubic tenderness  Lymphatic: no LN palpable  Musculoskeletal: no muscle tenderness, no joint swelling or tenderness  Extremities: no pedal edema  Neurological/ Psychiatric: AxOx3, moving all extremities  Skin: no rashes; no palpable lesions    Labs: reviewed                        8.7    9.16  )-----------( 328      ( 2019 07:49 )             28.5     07-04    138  |  107  |  10  ----------------------------<  89  4.9   |  25  |  0.81    Ca    8.9      2019 07:04    TPro  6.2  /  Alb  2.7<L>  /  TBili  0.3  /  DBili  x   /  AST  13<L>  /  ALT  17  /  AlkPhos  66  07-04                       11.1   20.16 )-----------( 436      ( 2019 02:02 )             35.4     07-03    131<L>  |  97  |  19  ----------------------------<  156<H>  4.4   |  24  |  1.20    Ca    9.1      2019 02:02    TPro  7.3  /  Alb  3.4  /  TBili  0.6  /  DBili  x   /  AST  16  /  ALT  25  /  AlkPhos  86  07-03     LIVER FUNCTIONS - ( 2019 02:02 )  Alb: 3.4 g/dL / Pro: 7.3 gm/dL / ALK PHOS: 86 U/L / ALT: 25 U/L / AST: 16 U/L / GGT: x           Urinalysis Basic - ( 2019 03:46 )    Color: Red / Appearance: bloody / S.000 / pH: x  Gluc: x / Ketone: Negative  / Bili: Negative / Urobili: Negative mg/dL   Blood: x / Protein: 30 mg/dL / Nitrite: Negative   Leuk Esterase: Moderate / RBC: >50 /HPF / WBC 11-25   Sq Epi: x / Non Sq Epi: Few / Bacteria: Few      Culture - Blood (collected 2019 04:15)  Source: .Blood None  Preliminary Report (2019 09:01):    No growth to date.    Culture - Blood (collected 2019 04:15)  Source: .Blood None  Preliminary Report (2019 09:01):    No growth to date.    Culture - Urine (collected 2019 03:46)  Source: .Urine Catheterized  Preliminary Report (2019 11:07):    10,000 - 49,000 CFU/mL Gram positive organisms        Radiology: all available radiological tests reviewed    < from: CT Abdomen and Pelvis No Cont (07.03.19 @ 02:42) >  Mild to moderate right and mild left hydroureteronephrosis to the level   of the distended bladder containing blood products/debris. Underlying   recurrent or residual neoplasm cannot be excluded given history. Further   evaluation with additional imaging (CT or MR urogram) and/or direct   visualization would be helpful.    A moderate amount of stool in the rectum. Nonspecific mild perirectal   stranding. Recommend clinical correlation to assess proctitis/stercoral   colitis.    < end of copied text >      Advanced directives addressed: full resuscitation

## 2019-07-05 NOTE — DISCHARGE NOTE PROVIDER - HOSPITAL COURSE
89 y/o F with PMH of CAD s/p PCI x 5, HTN, dyslipidemia, hypothyroidism, bladder cancer s/p resection x 2 , anxiety, orthostatic hypotension, DJD multiple joints, anxiety, uterine cancer s/p hysterectomy, p/w Hematuria from verma catheter. Patient is s/p recent bladder tumor resection in 6/2018 and hospitalized at  and discharged to [facility] rehab. The verma was removed at [facility] rehab and patient subsequently had hematuria followed by urinary retention. Patient was sent in for further evaluation. She denies abd pain, n/v/d/c, fever, chills, CP, SOB.     Once she arrived here, with son present with EMS, she had an episode of near syncope. The patient states that she does not feel as if she "LOC". The son states for a very brief period "seconds", she tilted her head backwards and did not respond the EMS verbally.          7/4/19 Pt seen and examined, on abx,  note appreciated.             PHYSICAL EXAM:        Constitutional: NAD, awake and alert, well-developed    HEENT: PERR, EOMI, Normal Hearing, MMM    Neck: Soft and supple, No LAD, No JVD    Respiratory: Breath sounds are clear bilaterally, No wheezing, rales or rhonchi    Cardiovascular: S1 and S2, regular rate and rhythm, no Murmurs, gallops or rubs    Gastrointestinal: Bowel Sounds present, soft, nontender, nondistended, no guarding, no rebound    Extremities: No peripheral edema    Vascular: 2+ peripheral pulses    Neurological: A/O x 3, no focal deficits    Musculoskeletal: 5/5 strength b/l upper and lower extremities    Skin: No rashes        Assessment        # Hematuria, Urinary retention after bladder resection in 6/2018    # Clots after bladder resection on CBI in ER     # ?Near syncope in ER     # Hx of Bladder tumor    # UA with bacteria but obtained from verma    # Leukocytosis, lactic acidosis, HR > 90 probably due to sepsis due to UTI    # HTN, CAD s/p PCI, orthostatic hypotension, HLD, hypothyroidism        Plan:    - received IV ceftriaxone in ER (recent urine Cx with Enterobacter cloacae)    - IVF, encourage po hydration, ID consult, continue ceftriaxone for now (was sensitive on previous urine Cx)    - continue losartan, toprol, synthroid    - Hold ASA 325mg    - orthostatic BP    - continue alprazolam 1/2 tablet 0.25mg at bedtime prn    - Cardiology evaluation appreciated    -  evaluation for CBI and hematuria        PT eval        Downgrade to medical floor, preferably 5S Vital Signs Last 24 Hrs    T(C): 36.6 (08 Jul 2019 11:03), Max: 36.8 (08 Jul 2019 05:13)    T(F): 97.9 (08 Jul 2019 11:03), Max: 98.2 (08 Jul 2019 05:13)    HR: 74 (08 Jul 2019 11:03) (74 - 79)    BP: 149/51 (08 Jul 2019 11:03) (143/46 - 183/55)    BP(mean): --    RR: 16 (08 Jul 2019 11:03) (16 - 18)    SpO2: 100% (08 Jul 2019 11:03) (100% - 100%)        HEENT:   pupils equal and reactive, EOMI, no oropharyngeal lesions, erythema, exudates, oral thrush        NECK:   supple, no carotid bruits, no palpable lymph nodes, no thyromegaly        CV:  +S1, +S2, regular, no murmurs or rubs        RESP:   lungs clear to auscultation bilaterally, no wheezing, rales, rhonchi, good air entry bilaterally        BREAST:  not examined        GI:  abdomen soft, non-tender, non-distended, normal BS, no bruits, no abdominal masses, no palpable masses        RECTAL:  not examined        :  not examined        MSK:   normal muscle tone, no atrophy, no rigidity, no contractions        EXT:   no clubbing, no cyanosis, no edema, no calf pain, swelling or erythema        VASCULAR:  pulses equal and symmetric in the upper and lower extremities        NEURO:  AAOX3, no focal neurological deficits, follows all commands, able to move extremities spontaneously        SKIN:  no ulcers, lesions or rashes                Hospital Course:            89 y/o F with PMH of CAD s/p PCI x 5, HTN, dyslipidemia, hypothyroidism, bladder cancer s/p resection x 2 , anxiety, orthostatic hypotension, DJD multiple joints, anxiety, uterine cancer s/p hysterectomy, p/w Hematuria from verma catheter. Patient is s/p recent bladder tumor resection in 6/2018 and hospitalized at  and discharged to [facility] rehab. The verma was removed at [facility] rehab and patient subsequently had hematuria followed by urinary retention. Patient was sent in for further evaluation. She denies abd pain, n/v/d/c, fever, chills, CP, SOB.     Once she arrived here, with son present with EMS, she had an episode of near syncope. The patient states that she does not feel as if she "LOC". The son states for a very brief period "seconds", she tilted her head backwards and did not respond the EMS verbally.        Hematuria,  and UTI  w/ Urinary retention after bladder resection in 6/2018    # Clots after bladder resection on CBI in ER     # ?Near syncope in ER     # Hx of Bladder tumor    # UA with bacteria but obtained from verma >>> c/w oral antibotics augmentin for 5 more days. Enterococcus in urine.

## 2019-07-05 NOTE — PHYSICAL THERAPY INITIAL EVALUATION ADULT - MODALITIES TREATMENT COMMENTS
jaylen tx fair limited by decreased endurance, pt would benefit from PT to increase overall functional mobility, left in chair, all needs in reach, alarm intact

## 2019-07-05 NOTE — CDI QUERY NOTE - NSCDIOTHERTXTBX_GEN_ALL_CORE_HH
Patient admitted with cloudy urine and hematuria   has chronic verma catheter  S/P bladder tumor resection  s/p TURBT, bladder biopsies on 6/13/19     Documentation reveals : Leukocytosis, lactic acidosis, HR > 90 probably due to sepsis due to UTI  Patient on IV ceftriaxone    Please clarify if there is a cause and effect relationship between the Sepsis/ UTI and the verma catheter or recent biopsy  a) Sepsis and UTI due to or related to the verma catheter  b) Sepsis and UTI due to or related to recent TURP and bladder biopsy  c) Sepsis and UTI  not associated with the verma or recent surgery  d) Unable to determine a cause and effect relationship between the Sepsis and UTI with the verma catheter or recent surgery  e) other, please clarify

## 2019-07-05 NOTE — PHYSICAL THERAPY INITIAL EVALUATION ADULT - PERTINENT HX OF CURRENT PROBLEM, REHAB EVAL
89 y/o Female with h/o CAD s/p PCI x 5, HTN, dyslipidemia, hypothyroidism, bladder cancer s/p resection x 2 , anxiety, orthostatic hypotension, DJD multiple joints, anxiety, uterine cancer s/p hysterectomy was admitted on 7/3 for cloudy urine and hematuria from verma catheter.

## 2019-07-05 NOTE — PROGRESS NOTE ADULT - SUBJECTIVE AND OBJECTIVE BOX
Patient seen and examined at bedside  verma draining clear, yellow urine  Endorses weakness  No pain  No f/c/n/v      Medications  acetaminophen   Tablet .. 650 milliGRAM(s) Oral every 6 hours PRN  ALPRAZolam 0.125 milliGRAM(s) Oral at bedtime PRN  artificial  tears Solution 1 Drop(s) Both EYES three times a day  cefTRIAXone Injectable. 1000 milliGRAM(s) IV Push every 24 hours  cholecalciferol Oral Tab/Cap - Peds 2000 Unit(s) Oral daily  levothyroxine 75 MICROGram(s) Oral daily  losartan 25 milliGRAM(s) Oral daily  magnesium hydroxide Suspension 30 milliLiter(s) Oral daily PRN  metoprolol succinate ER 50 milliGRAM(s) Oral daily  multivitamin/minerals 1 Tablet(s) Oral daily  ondansetron Injectable 4 milliGRAM(s) IV Push every 6 hours PRN  pantoprazole    Tablet 40 milliGRAM(s) Oral before breakfast  senna 2 Tablet(s) Oral at bedtime PRN  sodium chloride 0.9%. 1000 milliLiter(s) IV Continuous <Continuous>      ROS  General: fevers, chills, night sweats, fatigue, malaise  Skin: no new skin lesions, hair changes, prutitus  Ophthalmologic: No eye pain,  swelling,  redness, discharge, vision changes  ENMT: No hearing changes,  ear pain,  nasal congestion, sinus pain	  Respiratory and Thorax: No cough, sputum, dyspnea, wheezing  Cardiovascular: No chest pain, SOB, PND, dyspnea on exertion, orthopnea, edema, palpitations  Gastrointestinal:	No diarrhea, constipation, nausea, vomiting, anorexia, hematochezia, melena.   Genitourinary: see above  Neurological:	 No syncope, loss of consciousness, headache, dizziness  Psychiatric: No anxiety, depression, delusions. No SI/HI/AH/VH.  Hematology/Lymphatics:	No bruising, lymphadenopathy  Endocrine: No temperature intolerance    Vital Signs Last 24 Hrs  T(C): 36.4 (05 Jul 2019 05:06), Max: 36.6 (04 Jul 2019 22:30)  T(F): 97.5 (05 Jul 2019 05:06), Max: 97.9 (04 Jul 2019 22:30)  HR: 73 (05 Jul 2019 05:06) (73 - 78)  BP: 151/53 (05 Jul 2019 05:06) (141/50 - 158/72)  BP(mean): --  RR: 17 (05 Jul 2019 05:06) (17 - 18)  SpO2: 100% (05 Jul 2019 05:06) (99% - 100%)    PHYSICAL EXAM:  Constitutional:  NAD, lying in bed comfortably   Eyes: EOMI, vision is grossly intact  Back: no CVA tenderness bilaterally  Respiratory: no labored breathing  Gastrointestinal: soft, non-tender, non-distended, no guarding, no rebound tenderness.   Genitourinary: verma in place - draining clear, yellow urine  Extremities: no significant deformity or joint abnormality.   Neurological:  A&O x3  Musculoskeletal: moves all 4 extremities  Psychiatric: normal mood and affect        I/O    07-04-19 @ 07:01  -  07-05-19 @ 07:00  --------------------------------------------------------  IN: 0 mL / OUT: 1400 mL / NET: -1400 mL        Labs:  CBC Full  -  ( 05 Jul 2019 07:49 )  WBC Count : 9.16 K/uL  Hemoglobin : 8.7 g/dL  Hematocrit : 28.5 %  Platelet Count - Automated : 328 K/uL  Mean Cell Volume : 90.2 fl  Mean Cell Hemoglobin : 27.5 pg  Mean Cell Hemoglobin Concentration : 30.5 gm/dL  Auto Neutrophil # : x  Auto Lymphocyte # : x  Auto Monocyte # : x  Auto Eosinophil # : x  Auto Basophil # : x  Auto Neutrophil % : x  Auto Lymphocyte % : x  Auto Monocyte % : x  Auto Eosinophil % : x  Auto Basophil % : x    07-04    138  |  107  |  10  ----------------------------<  89  4.9   |  25  |  0.81    Ca    8.9      04 Jul 2019 07:04    TPro  6.2  /  Alb  2.7<L>  /  TBili  0.3  /  DBili  x   /  AST  13<L>  /  ALT  17  /  AlkPhos  66  07-04        Culture - Blood (collected 03 Jul 2019 04:15)  Source: .Blood None  Preliminary Report (04 Jul 2019 09:01):    No growth to date.    Culture - Blood (collected 03 Jul 2019 04:15)  Source: .Blood None  Preliminary Report (04 Jul 2019 09:01):    No growth to date.    Culture - Urine (collected 03 Jul 2019 03:46)  Source: .Urine Catheterized  Preliminary Report (04 Jul 2019 11:07):    10,000 - 49,000 CFU/mL Gram positive organisms

## 2019-07-06 LAB
ANION GAP SERPL CALC-SCNC: 9 MMOL/L — SIGNIFICANT CHANGE UP (ref 5–17)
BUN SERPL-MCNC: 8 MG/DL — SIGNIFICANT CHANGE UP (ref 7–23)
CALCIUM SERPL-MCNC: 8.9 MG/DL — SIGNIFICANT CHANGE UP (ref 8.5–10.1)
CHLORIDE SERPL-SCNC: 104 MMOL/L — SIGNIFICANT CHANGE UP (ref 96–108)
CO2 SERPL-SCNC: 25 MMOL/L — SIGNIFICANT CHANGE UP (ref 22–31)
CREAT SERPL-MCNC: 0.75 MG/DL — SIGNIFICANT CHANGE UP (ref 0.5–1.3)
GLUCOSE SERPL-MCNC: 95 MG/DL — SIGNIFICANT CHANGE UP (ref 70–99)
HCT VFR BLD CALC: 28.6 % — LOW (ref 34.5–45)
HGB BLD-MCNC: 8.9 G/DL — LOW (ref 11.5–15.5)
MCHC RBC-ENTMCNC: 28.2 PG — SIGNIFICANT CHANGE UP (ref 27–34)
MCHC RBC-ENTMCNC: 31.1 GM/DL — LOW (ref 32–36)
MCV RBC AUTO: 90.5 FL — SIGNIFICANT CHANGE UP (ref 80–100)
PLATELET # BLD AUTO: 365 K/UL — SIGNIFICANT CHANGE UP (ref 150–400)
POTASSIUM SERPL-MCNC: 4.3 MMOL/L — SIGNIFICANT CHANGE UP (ref 3.5–5.3)
POTASSIUM SERPL-SCNC: 4.3 MMOL/L — SIGNIFICANT CHANGE UP (ref 3.5–5.3)
RBC # BLD: 3.16 M/UL — LOW (ref 3.8–5.2)
RBC # FLD: 14.2 % — SIGNIFICANT CHANGE UP (ref 10.3–14.5)
SODIUM SERPL-SCNC: 138 MMOL/L — SIGNIFICANT CHANGE UP (ref 135–145)
WBC # BLD: 8.29 K/UL — SIGNIFICANT CHANGE UP (ref 3.8–10.5)
WBC # FLD AUTO: 8.29 K/UL — SIGNIFICANT CHANGE UP (ref 3.8–10.5)

## 2019-07-06 RX ORDER — NYSTATIN CREAM 100000 [USP'U]/G
1 CREAM TOPICAL THREE TIMES A DAY
Refills: 0 | Status: DISCONTINUED | OUTPATIENT
Start: 2019-07-06 | End: 2019-07-08

## 2019-07-06 RX ORDER — FUROSEMIDE 40 MG
20 TABLET ORAL DAILY
Refills: 0 | Status: COMPLETED | OUTPATIENT
Start: 2019-07-06 | End: 2019-07-07

## 2019-07-06 RX ORDER — HEPARIN SODIUM 5000 [USP'U]/ML
5000 INJECTION INTRAVENOUS; SUBCUTANEOUS EVERY 12 HOURS
Refills: 0 | Status: DISCONTINUED | OUTPATIENT
Start: 2019-07-06 | End: 2019-07-08

## 2019-07-06 RX ADMIN — PANTOPRAZOLE SODIUM 40 MILLIGRAM(S): 20 TABLET, DELAYED RELEASE ORAL at 06:14

## 2019-07-06 RX ADMIN — Medication 1 DROP(S): at 21:34

## 2019-07-06 RX ADMIN — CEFTRIAXONE 1000 MILLIGRAM(S): 500 INJECTION, POWDER, FOR SOLUTION INTRAMUSCULAR; INTRAVENOUS at 06:14

## 2019-07-06 RX ADMIN — Medication 0.12 MILLIGRAM(S): at 21:34

## 2019-07-06 RX ADMIN — HEPARIN SODIUM 5000 UNIT(S): 5000 INJECTION INTRAVENOUS; SUBCUTANEOUS at 18:00

## 2019-07-06 RX ADMIN — Medication 2000 UNIT(S): at 12:04

## 2019-07-06 RX ADMIN — Medication 1 DROP(S): at 06:15

## 2019-07-06 RX ADMIN — Medication 20 MILLIGRAM(S): at 12:04

## 2019-07-06 RX ADMIN — Medication 50 MILLIGRAM(S): at 06:14

## 2019-07-06 RX ADMIN — Medication 650 MILLIGRAM(S): at 00:35

## 2019-07-06 RX ADMIN — Medication 1 TABLET(S): at 18:00

## 2019-07-06 RX ADMIN — Medication 1 TABLET(S): at 12:04

## 2019-07-06 RX ADMIN — LOSARTAN POTASSIUM 25 MILLIGRAM(S): 100 TABLET, FILM COATED ORAL at 06:14

## 2019-07-06 RX ADMIN — NYSTATIN CREAM 1 APPLICATION(S): 100000 CREAM TOPICAL at 21:44

## 2019-07-06 RX ADMIN — Medication 650 MILLIGRAM(S): at 21:44

## 2019-07-06 RX ADMIN — Medication 1 DROP(S): at 13:48

## 2019-07-06 RX ADMIN — Medication 650 MILLIGRAM(S): at 13:49

## 2019-07-06 RX ADMIN — Medication 75 MICROGRAM(S): at 06:14

## 2019-07-06 NOTE — PROGRESS NOTE ADULT - SUBJECTIVE AND OBJECTIVE BOX
Date of service: 19 @ 10:53    Lying in bed in NAD  Weak looking  Dose not seem in pain    ROS: no chills; poorly verbal    MEDICATIONS  (STANDING):  artificial  tears Solution 1 Drop(s) Both EYES three times a day  cefTRIAXone Injectable. 1000 milliGRAM(s) IV Push every 24 hours  cholecalciferol Oral Tab/Cap - Peds 2000 Unit(s) Oral daily  furosemide    Tablet 20 milliGRAM(s) Oral daily  heparin  Injectable 5000 Unit(s) SubCutaneous every 12 hours  levothyroxine 75 MICROGram(s) Oral daily  losartan 25 milliGRAM(s) Oral daily  metoprolol succinate ER 50 milliGRAM(s) Oral daily  multivitamin/minerals 1 Tablet(s) Oral daily  pantoprazole    Tablet 40 milliGRAM(s) Oral before breakfast      Vital Signs Last 24 Hrs  T(C): 36.4 (2019 05:01), Max: 37.2 (2019 17:29)  T(F): 97.5 (2019 05:01), Max: 99 (2019 17:29)  HR: 70 (2019 05:01) (70 - 81)  BP: 165/60 (2019 05:01) (157/48 - 165/60)  BP(mean): --  RR: 18 (2019 05:01) (18 - 18)  SpO2: 99% (2019 05:01) (99% - 100%)    Physical Exam:      Constitutional: frail looking  HEENT: NC/AT, EOMI, PERRLA, conjunctivae clear  Neck: supple; thyroid not palpable  Back: no tenderness  Respiratory: respiratory effort normal; clear to auscultation  Cardiovascular: S1S2 regular, no murmurs  Abdomen: soft, not tender, not distended, positive BS  Genitourinary: no suprapubic tenderness  Lymphatic: no LN palpable  Musculoskeletal: no muscle tenderness, no joint swelling or tenderness  Extremities: no pedal edema  Neurological/ Psychiatric: AxOx3, moving all extremities  Skin: no rashes; no palpable lesions    Labs: reviewed                        8.9    8.29  )-----------( 365      ( 2019 07:31 )             28.6     07-06    138  |  104  |  8   ----------------------------<  95  4.3   |  25  |  0.75    Ca    8.9      2019 07:31                       11.1   20.16 )-----------( 436      ( 2019 02:02 )             35.4     07-03    131<L>  |  97  |  19  ----------------------------<  156<H>  4.4   |  24  |  1.20    Ca    9.1      2019 02:02    TPro  7.3  /  Alb  3.4  /  TBili  0.6  /  DBili  x   /  AST  16  /  ALT  25  /  AlkPhos  86  07-03     LIVER FUNCTIONS - ( 2019 02:02 )  Alb: 3.4 g/dL / Pro: 7.3 gm/dL / ALK PHOS: 86 U/L / ALT: 25 U/L / AST: 16 U/L / GGT: x           Urinalysis Basic - ( 2019 03:46 )    Color: Red / Appearance: bloody / S.000 / pH: x  Gluc: x / Ketone: Negative  / Bili: Negative / Urobili: Negative mg/dL   Blood: x / Protein: 30 mg/dL / Nitrite: Negative   Leuk Esterase: Moderate / RBC: >50 /HPF / WBC 11-25   Sq Epi: x / Non Sq Epi: Few / Bacteria: Few      Culture - Blood (collected 2019 04:15)  Source: .Blood None  Preliminary Report (2019 09:01):    No growth to date.    Culture - Blood (collected 2019 04:15)  Source: .Blood None  Preliminary Report (2019 09:01):    No growth to date.    Culture - Urine (collected 2019 03:46)  Source: .Urine Catheterized  Final Report (2019 14:11):    10,000 - 49,000 CFU/mL Enterococcus faecalis  Organism: Enterococcus faecalis (2019 14:11)  Organism: Enterococcus faecalis (2019 14:11)      -  Ampicillin: S <=2 Predicts results to ampicillin/sulbactam, amoxacillin-clavulanate and  piperacillin-tazobactam.      -  Ciprofloxacin: S <=1      -  Levofloxacin: S <=1      -  Nitrofurantoin: S <=32 Should not be used to treat pyelonephritis.      -  Tetra/Doxy: R >8      -  Vancomycin: S 2      Method Type: TINA      Radiology: all available radiological tests reviewed    < from: CT Abdomen and Pelvis No Cont (19 @ 02:42) >  Mild to moderate right and mild left hydroureteronephrosis to the level   of the distended bladder containing blood products/debris. Underlying   recurrent or residual neoplasm cannot be excluded given history. Further   evaluation with additional imaging (CT or MR urogram) and/or direct   visualization would be helpful.    A moderate amount of stool in the rectum. Nonspecific mild perirectal   stranding. Recommend clinical correlation to assess proctitis/stercoral   colitis.    < end of copied text >      Advanced directives addressed: full resuscitation

## 2019-07-06 NOTE — PROGRESS NOTE ADULT - SUBJECTIVE AND OBJECTIVE BOX
HOSPITALIST ATTENDING PROGRESS NOTE    Chart and meds reviewed.  Patient seen and examined.    HPI: 89 y/o F with PMH of CAD s/p PCI x 5, HTN, dyslipidemia, hypothyroidism, bladder cancer s/p resection x 2 , anxiety, orthostatic hypotension, DJD multiple joints, anxiety, uterine cancer s/p hysterectomy, p/w Hematuria from verma catheter. Patient is s/p recent bladder tumor resection in 6/2018 and hospitalized at  and [facility] and patient subsequently had hematuria followed by urinary retention. Patient was sent in for further evaluation. She denies abd pain, n/v/d/c, fever, chills, CP, SOB.   Once she arrived here, with son present with EMS, she had an episode of near syncope. The patient states that she does not feel as if she "LOC". The son states for a very brief period "seconds", she tilted her head backwards and did not respond the EMS verbally.      7/4/19 Pt seen and examined, on abx,  note appreciated.     7/5/19 pt seen and examined, urine clear, urine Cx noted, ID follow up/ follow up appreciated.     7/6/19 pt seen and examined, feels well, urine clear. c/o leg "feeling heavy with fluid".       All 10 systems reviewed and found to be negative with the exception of what has been described above.    MEDICATIONS  (STANDING):  artificial  tears Solution 1 Drop(s) Both EYES three times a day  cefTRIAXone Injectable. 1000 milliGRAM(s) IV Push every 24 hours  cholecalciferol Oral Tab/Cap - Peds 2000 Unit(s) Oral daily  levothyroxine 75 MICROGram(s) Oral daily  losartan 25 milliGRAM(s) Oral daily  metoprolol succinate ER 50 milliGRAM(s) Oral daily  multivitamin/minerals 1 Tablet(s) Oral daily  pantoprazole    Tablet 40 milliGRAM(s) Oral before breakfast    MEDICATIONS  (PRN):  acetaminophen   Tablet .. 650 milliGRAM(s) Oral every 6 hours PRN Temp greater or equal to 38C (100.4F), Mild Pain (1 - 3)  ALPRAZolam 0.125 milliGRAM(s) Oral at bedtime PRN insomnia, anxiety  magnesium hydroxide Suspension 30 milliLiter(s) Oral daily PRN Constipation  ondansetron Injectable 4 milliGRAM(s) IV Push every 6 hours PRN Nausea and/or Vomiting  senna 2 Tablet(s) Oral at bedtime PRN Constipation      VITALS:  T(F): 97.5 (07-06-19 @ 05:01), Max: 99 (07-05-19 @ 17:29)  HR: 70 (07-06-19 @ 05:01) (70 - 81)  BP: 165/60 (07-06-19 @ 05:01) (147/51 - 165/60)  RR: 18 (07-06-19 @ 05:01) (18 - 18)  SpO2: 99% (07-06-19 @ 05:01) (99% - 100%)  Wt(kg): --    I&O's Summary    05 Jul 2019 07:01  -  06 Jul 2019 07:00  --------------------------------------------------------  IN: 0 mL / OUT: 3250 mL / NET: -3250 mL        CAPILLARY BLOOD GLUCOSE          PHYSICAL EXAM:    HEENT:  pupils equal and reactive, EOMI, no oropharyngeal lesions, erythema, exudates, oral thrush  NECK:   supple, no carotid bruits, no palpable lymph nodes, no thyromegaly  CV:  +S1, +S2, regular, no murmurs or rubs  RESP:   lungs clear to auscultation bilaterally, no wheezing, rales, rhonchi, good air entry bilaterally  BREAST:  not examined  GI:  abdomen soft, non-tender, non-distended, normal BS, no bruits, no abdominal masses, no palpable masses  RECTAL:  not examined  :  not examined  MSK:   normal muscle tone, no atrophy, no rigidity, no contractions  EXT:  no clubbing, no cyanosis, no edema, no calf pain, swelling or erythema  VASCULAR:  pulses equal and symmetric in the upper and lower extremities  NEURO:  AAOX3, no focal neurological deficits, follows all commands, able to move extremities spontaneously  SKIN:  no ulcers, lesions or rashes    LABS:                            8.9    8.29  )-----------( 365      ( 06 Jul 2019 07:31 )             28.6     07-06    138  |  104  |  8   ----------------------------<  95  4.3   |  25  |  0.75    Ca    8.9      06 Jul 2019 07:31                                              CULTURES:

## 2019-07-07 RX ORDER — HYDRALAZINE HCL 50 MG
10 TABLET ORAL ONCE
Refills: 0 | Status: COMPLETED | OUTPATIENT
Start: 2019-07-07 | End: 2019-07-07

## 2019-07-07 RX ADMIN — Medication 1 TABLET(S): at 12:33

## 2019-07-07 RX ADMIN — Medication 20 MILLIGRAM(S): at 05:34

## 2019-07-07 RX ADMIN — NYSTATIN CREAM 1 APPLICATION(S): 100000 CREAM TOPICAL at 13:53

## 2019-07-07 RX ADMIN — NYSTATIN CREAM 1 APPLICATION(S): 100000 CREAM TOPICAL at 21:20

## 2019-07-07 RX ADMIN — Medication 1 DROP(S): at 21:20

## 2019-07-07 RX ADMIN — Medication 1 TABLET(S): at 05:34

## 2019-07-07 RX ADMIN — Medication 50 MILLIGRAM(S): at 05:34

## 2019-07-07 RX ADMIN — Medication 2000 UNIT(S): at 12:33

## 2019-07-07 RX ADMIN — Medication 1 DROP(S): at 13:53

## 2019-07-07 RX ADMIN — PANTOPRAZOLE SODIUM 40 MILLIGRAM(S): 20 TABLET, DELAYED RELEASE ORAL at 05:34

## 2019-07-07 RX ADMIN — Medication 75 MICROGRAM(S): at 05:34

## 2019-07-07 RX ADMIN — NYSTATIN CREAM 1 APPLICATION(S): 100000 CREAM TOPICAL at 05:34

## 2019-07-07 RX ADMIN — Medication 650 MILLIGRAM(S): at 12:33

## 2019-07-07 RX ADMIN — Medication 1 TABLET(S): at 17:55

## 2019-07-07 RX ADMIN — Medication 0.12 MILLIGRAM(S): at 21:20

## 2019-07-07 RX ADMIN — Medication 1 DROP(S): at 05:34

## 2019-07-07 RX ADMIN — Medication 10 MILLIGRAM(S): at 21:18

## 2019-07-07 RX ADMIN — HEPARIN SODIUM 5000 UNIT(S): 5000 INJECTION INTRAVENOUS; SUBCUTANEOUS at 05:35

## 2019-07-07 RX ADMIN — HEPARIN SODIUM 5000 UNIT(S): 5000 INJECTION INTRAVENOUS; SUBCUTANEOUS at 17:55

## 2019-07-07 RX ADMIN — LOSARTAN POTASSIUM 25 MILLIGRAM(S): 100 TABLET, FILM COATED ORAL at 05:34

## 2019-07-07 NOTE — PROGRESS NOTE ADULT - ASSESSMENT
Re-evaluated patient at bedside this PM.   Irrigate verma with efflux of clear urine with minimal clots  Hematuria essentially resolved at this time    Plan:    -continue verma - irrigate prn  -continue PO/IV hydration  -continue IV abx
1. Gross hematuria, urinary retention  2. UTI    Plan:    -hematuria resolved - no further intervention necessary  -stable for discharge from  perspective with verma to leg bag  -my impression is that her urine culture may be contaminated since it is growing gram + organisms - follow with ID regarding need for further abx  -adequate PO hydration advised  -to follow up within 1 week of discharge for further evaluation/management. Will likely require urodynamics prior to verma removal - this will be done as outpatient.     Thank you for allowing me to assist in the care of this patient  Please feel free to call with any questions/concerns    John Cano MD  [facility]  W: [phone]
89 y/o Female with h/o CAD s/p PCI x 5, HTN, dyslipidemia, hypothyroidism, bladder cancer s/p resection x 2 , anxiety, orthostatic hypotension, DJD multiple joints, anxiety, uterine cancer s/p hysterectomy was admitted on 7/3 for cloudy urine and hematuria from verma catheter. Patient is had recent bladder tumor resection in 6/2018 at  and discharged to [facility] rehab. [facility] and patient subsequently had hematuria followed by urinary retention. Patient was sent in for further evaluation. No fever or chills reported at NH. In ER, she had an episode of near syncope. The patient stated that she does not feel as if she "LOC". The son stated for a very brief period "seconds", she tilted her head backwards and did not respond the EMS verbally. In ER she received ceftriaxone.     1. Low grade fever. Pyuria. UTI with ENFAE. Urinary retention with verma in place. CRF stage 3. Bladder Ca s/p surgery.  -leukocytosis improving  -f/u BC x 2 reviewed  -urine c/s showing growth - will follow  -on ceftriaxone 1 gm IV qd # 4  -tolerating abx well so far; no side effects noted  -change abx to augmentin 875 mg PO q12h  -continue abx coverage  -monitor temps  -f/u CBC  -supportive care  2. Other issues:   -care per medicine
89 y/o Female with h/o CAD s/p PCI x 5, HTN, dyslipidemia, hypothyroidism, bladder cancer s/p resection x 2 , anxiety, orthostatic hypotension, DJD multiple joints, anxiety, uterine cancer s/p hysterectomy was admitted on 7/3 for cloudy urine and hematuria from verma catheter. Patient is had recent bladder tumor resection in 6/2018 at  and discharged to [facility] rehab. [id number] and patient subsequently had hematuria followed by urinary retention. Patient was sent in for further evaluation. No fever or chills reported at NH. In ER, she had an episode of near syncope. The patient stated that she does not feel as if she "LOC". The son stated for a very brief period "seconds", she tilted her head backwards and did not respond the EMS verbally. In ER she received ceftriaxone.     1. Low grade fever. Possible sepsis. Hematuria improving. Pyuria. Possible UTI. Urinary retention with verma in place. CRF stage 3. Bladder Ca s/p surgery.  -leukocytosis improving  -f/u BC x 2, urine c/s  -on ceftriaxone 1 gm IV qd # 2  -tolerating abx well so far; no side effects noted  -syncopal episode ?vasovagal; cardiology evaluation  -urology evaluation appreciated  -continue abx coverage  -monitor temps  -f/u CBC  -supportive care  2. Other issues:   -care per medicine
89 y/o Female with h/o CAD s/p PCI x 5, HTN, dyslipidemia, hypothyroidism, bladder cancer s/p resection x 2 , anxiety, orthostatic hypotension, DJD multiple joints, anxiety, uterine cancer s/p hysterectomy was admitted on 7/3 for cloudy urine and hematuria from verma catheter. Patient is had recent bladder tumor resection in 6/2018 at  and discharged to [facility] rehab. [facility] patient subsequently had hematuria followed by urinary retention. Patient was sent in for further evaluation. No fever or chills reported at NH. In ER, she had an episode of near syncope. The patient stated that she does not feel as if she "LOC". The son stated for a very brief period "seconds", she tilted her head backwards and did not respond the EMS verbally. In ER she received ceftriaxone.     1. Low grade fever resolved.  UTI with ENFAE. Urinary retention with verma in place. CRF stage 3. Bladder Ca s/p surgery.  -leukocytosis improving  -f/u BC x 2 reviewed  -urine c/s showing growth - will follow  -s/p ceftriaxone 1 gm IV qd # 4 days  -on augmentin 875 mg PO q12h # 2  -tolerating abx well so far; no side effects noted  -continue abx coverage for 5 more days  -monitor temps  -f/u CBC  -supportive care  2. Other issues:   -care per medicine
89 y/o Female with h/o CAD s/p PCI x 5, HTN, dyslipidemia, hypothyroidism, bladder cancer s/p resection x 2 , anxiety, orthostatic hypotension, DJD multiple joints, anxiety, uterine cancer s/p hysterectomy was admitted on 7/3 for cloudy urine and hematuria from verma catheter. Patient is had recent bladder tumor resection in 6/2018 at  and discharged to [facility] rehab. [facility] and patient subsequently had hematuria followed by urinary retention. Patient was sent in for further evaluation. No fever or chills reported at NH. In ER, she had an episode of near syncope. The patient stated that she does not feel as if she "LOC". The son stated for a very brief period "seconds", she tilted her head backwards and did not respond the EMS verbally. In ER she received ceftriaxone.     1. Low grade fever resolving. Hematuria improving. Pyuria. Possible UTI. Urinary retention with verma in place. CRF stage 3. Bladder Ca s/p surgery.  -leukocytosis improving  -f/u BC x 2 reviewed  -urine c/s showing growth - will follow  -on ceftriaxone 1 gm IV qd # 3  -tolerating abx well so far; no side effects noted  -syncopal episode ?vasovagal; cardiology evaluation  -urology evaluation appreciated  -continue abx coverage  -monitor temps  -f/u CBC  -supportive care  2. Other issues:   -care per medicine
Assessment    # Hematuria, Urinary retention after bladder resection in 6/2018  # Clots after bladder resection on CBI in ER   # ?Near syncope in ER   # Hx of Bladder tumor  # UA with bacteria but obtained from verma  # Leukocytosis, lactic acidosis, HR > 90 probably due to sepsis due to UTI  # HTN, CAD s/p PCI, orthostatic hypotension, HLD, hypothyroidism    Plan:  - received IV ceftriaxone in ER (recent urine Cx with Enterobacter cloacae)  - IVF, encourage po hydration, ID consult, continue ceftriaxone for now (was sensitive on previous urine Cx)  - continue losartan, toprol, synthroid  - Hold ASA 325mg  - orthostatic BP  - continue alprazolam 1/2 tablet 0.25mg at bedtime prn  - Cardiology evaluation appreciated  -  evaluation for CBI and hematuria    PT eval    Downgrade to medical floor, preferably 5S
Assessment    # Hematuria, Urinary retention after bladder resection in 6/2018  # Clots after bladder resection on CBI in ER   # ?Near syncope in ER   # Hx of Bladder tumor  # UA with bacteria but obtained from verma  # Leukocytosis, lactic acidosis, HR > 90 probably due to sepsis due to UTI  # HTN, CAD s/p PCI, orthostatic hypotension, HLD, hypothyroidism  # Unable to determine a cause and effect relationship between the Sepsis and UTI with the verma catheter or recent surgery    Plan:  - received IV ceftriaxone in ER (recent urine Cx with Enterobacter cloacae), ID follow up appreciated  - DC IVF and encourage po hydration  - continue losartan, toprol, synthroid  - consider resuming ASA 325mg   - continue alprazolam 1/2 tablet 0.25mg at bedtime prn  - Cardiology evaluation appreciated  -  evaluation for CBI and hematuria appreciated, resolved  - Lasix 20mg po, elevate legs    POOL on monday  ambulate as tolerated    Downgrade to medical floor, preferably 5S    d/w RN and CM and patient
Assessment    # Hematuria, Urinary retention after bladder resection in 6/2018  # Clots after bladder resection on CBI in ER   # ?Near syncope in ER   # Hx of Bladder tumor  # UA with bacteria but obtained from verma  # Leukocytosis, lactic acidosis, HR > 90 probably due to sepsis due to UTI  # HTN, CAD s/p PCI, orthostatic hypotension, HLD, hypothyroidism  # Unable to determine a cause and effect relationship between the Sepsis and UTI with the verma catheter or recent surgery    Plan:  - received IV ceftriaxone in ER (recent urine Cx with Enterobacter cloacae), ID follow up appreciated  - DC IVF and encourage po hydration  - continue losartan, toprol, synthroid  - consider resuming ASA 325mg   - continue alprazolam 1/2 tablet 0.25mg at bedtime prn  - Cardiology evaluation appreciated  -  evaluation for CBI and hematuria appreciated, resolved  - Will discuss with ID if need further abx    PT eval pending today and likely DC to POOL    Downgrade to medical floor, preferably 5S    d/w RN and CM and patient
Assessment    # Hematuria, Urinary retention after bladder resection in 6/2018  # Clots after bladder resection on CBI in ER   # ?Near syncope in ER   # Hx of Bladder tumor  # UA with bacteria but obtained from verma  # Leukocytosis, lactic acidosis, HR > 90 probably due to sepsis due to UTI  # HTN, CAD s/p PCI, orthostatic hypotension, HLD, hypothyroidism  # Unable to determine a cause and effect relationship between the Sepsis and UTI with the verma catheter or recent surgery    Plan:  - received IV ceftriaxone in ER (recent urine Cx with Enterobacter cloacae), ID follow up appreciated, changed to po abx  - DC IVF and encourage po hydration  - continue losartan, toprol, synthroid  - resuming ASA 325mg   - continue alprazolam 1/2 tablet 0.25mg at bedtime prn  - Cardiology evaluation appreciated  -  evaluation for CBI and hematuria appreciated, resolved  - Lasix 20mg po x 2 days, elevate legs    POOL on monday, family has contacted [facility] already, f/u wtih CM  Downgrade to medical floor    DC noted started, med rec needs to be done upon DC.

## 2019-07-07 NOTE — PROGRESS NOTE ADULT - SUBJECTIVE AND OBJECTIVE BOX
HOSPITALIST ATTENDING PROGRESS NOTE    Chart and meds reviewed.  Patient seen and examined.    HPI: 89 y/o F with PMH of CAD s/p PCI x 5, HTN, dyslipidemia, hypothyroidism, bladder cancer s/p resection x 2 , anxiety, orthostatic hypotension, DJD multiple joints, anxiety, uterine cancer s/p hysterectomy, p/w Hematuria from verma catheter. Patient is s/p recent bladder tumor resection in 6/2018 and hospitalized at  [facility] had hematuria followed by urinary retention. Patient was sent in for further evaluation. She denies abd pain, n/v/d/c, fever, chills, CP, SOB.   Once she arrived here, with son present with EMS, she had an episode of near syncope. The patient states that she does not feel as if she "LOC". The son states for a very brief period "seconds", she tilted her head backwards and did not respond the EMS verbally.      7/4/19 Pt seen and examined, on abx,  note appreciated.     7/5/19 pt seen and examined, urine clear, urine Cx noted, ID follow up/ follow up appreciated.     7/6/19 pt seen and examined, feels well, urine clear. c/o leg "feeling heavy with fluid".     7/7/19 pt seen and examined, son at bedside. They called [facility] and want to go there.     All 10 systems reviewed and found to be negative with the exception of what has been described above.    MEDICATIONS  (STANDING):  amoxicillin  875 milliGRAM(s)/clavulanate 1 Tablet(s) Oral every 12 hours  artificial  tears Solution 1 Drop(s) Both EYES three times a day  cholecalciferol Oral Tab/Cap - Peds 2000 Unit(s) Oral daily  heparin  Injectable 5000 Unit(s) SubCutaneous every 12 hours  levothyroxine 75 MICROGram(s) Oral daily  losartan 25 milliGRAM(s) Oral daily  metoprolol succinate ER 50 milliGRAM(s) Oral daily  multivitamin/minerals 1 Tablet(s) Oral daily  nystatin Powder 1 Application(s) Topical three times a day  pantoprazole    Tablet 40 milliGRAM(s) Oral before breakfast    MEDICATIONS  (PRN):  acetaminophen   Tablet .. 650 milliGRAM(s) Oral every 6 hours PRN Temp greater or equal to 38C (100.4F), Mild Pain (1 - 3)  ALPRAZolam 0.125 milliGRAM(s) Oral at bedtime PRN insomnia, anxiety  magnesium hydroxide Suspension 30 milliLiter(s) Oral daily PRN Constipation  ondansetron Injectable 4 milliGRAM(s) IV Push every 6 hours PRN Nausea and/or Vomiting  senna 2 Tablet(s) Oral at bedtime PRN Constipation      VITALS:  T(F): 97.7 (07-07-19 @ 11:40), Max: 98.8 (07-06-19 @ 17:37)  HR: 75 (07-07-19 @ 11:40) (72 - 75)  BP: 144/44 (07-07-19 @ 11:40) (144/44 - 178/53)  RR: 17 (07-07-19 @ 11:40) (17 - 18)  SpO2: 99% (07-07-19 @ 11:40) (97% - 100%)  Wt(kg): --    I&O's Summary    06 Jul 2019 07:01  -  07 Jul 2019 07:00  --------------------------------------------------------  IN: 0 mL / OUT: 3200 mL / NET: -3200 mL        CAPILLARY BLOOD GLUCOSE          PHYSICAL EXAM:    HEENT:  pupils equal and reactive, EOMI, no oropharyngeal lesions, erythema, exudates, oral thrush  NECK:   supple, no carotid bruits, no palpable lymph nodes, no thyromegaly  CV:  +S1, +S2, regular, no murmurs or rubs  RESP:   lungs clear to auscultation bilaterally, no wheezing, rales, rhonchi, good air entry bilaterally  BREAST:  not examined  GI:  abdomen soft, non-tender, non-distended, normal BS, no bruits, no abdominal masses, no palpable masses  RECTAL:  not examined  :  not examined  MSK:   normal muscle tone, no atrophy, no rigidity, no contractions  EXT:  no clubbing, no cyanosis, no edema, no calf pain, swelling or erythema  VASCULAR:  pulses equal and symmetric in the upper and lower extremities  NEURO:  AAOX3, no focal neurological deficits, follows all commands, able to move extremities spontaneously  SKIN:  no ulcers, lesions or rashes    LABS:                            8.9    8.29  )-----------( 365      ( 06 Jul 2019 07:31 )             28.6     07-06    138  |  104  |  8   ----------------------------<  95  4.3   |  25  |  0.75    Ca    8.9      06 Jul 2019 07:31                                              CULTURES:

## 2019-07-07 NOTE — PROGRESS NOTE ADULT - SUBJECTIVE AND OBJECTIVE BOX
Date of service: 19 @ 09:34    OOB to chair  Has more energy  No fever    ROS: denies dizziness, no HA, no SOB or cough, no abdominal pain, no diarrhea or constipation; no legs pain, no rashes    MEDICATIONS  (STANDING):  amoxicillin  875 milliGRAM(s)/clavulanate 1 Tablet(s) Oral every 12 hours  artificial  tears Solution 1 Drop(s) Both EYES three times a day  cholecalciferol Oral Tab/Cap - Peds 2000 Unit(s) Oral daily  heparin  Injectable 5000 Unit(s) SubCutaneous every 12 hours  levothyroxine 75 MICROGram(s) Oral daily  losartan 25 milliGRAM(s) Oral daily  metoprolol succinate ER 50 milliGRAM(s) Oral daily  multivitamin/minerals 1 Tablet(s) Oral daily  nystatin Powder 1 Application(s) Topical three times a day  pantoprazole    Tablet 40 milliGRAM(s) Oral before breakfast      Vital Signs Last 24 Hrs  T(C): 36.4 (2019 04:42), Max: 37.1 (2019 17:37)  T(F): 97.5 (2019 04:42), Max: 98.8 (2019 17:37)  HR: 72 (2019 04:42) (72 - 75)  BP: 160/56 (2019 04:42) (159/58 - 178/53)  BP(mean): --  RR: 18 (2019 04:42) (18 - 18)  SpO2: 97% (2019 04:42) (97% - 100%)    Physical Exam:      Constitutional: frail looking  HEENT: NC/AT, EOMI, PERRLA, conjunctivae clear  Neck: supple; thyroid not palpable  Back: no tenderness  Respiratory: respiratory effort normal; clear to auscultation  Cardiovascular: S1S2 regular, no murmurs  Abdomen: soft, not tender, not distended, positive BS  Genitourinary: no suprapubic tenderness  Lymphatic: no LN palpable  Musculoskeletal: no muscle tenderness, no joint swelling or tenderness  Extremities: no pedal edema  Neurological/ Psychiatric: AxOx3, moving all extremities  Skin: no rashes; no palpable lesions    Labs: reviewed                        8.9    8.29  )-----------( 365      ( 2019 07:31 )             28.6     07-06    138  |  104  |  8   ----------------------------<  95  4.3   |  25  |  0.75    Ca    8.9      2019 07:31                       11.1   20.16 )-----------( 436      ( 2019 02:02 )             35.4     07-03    131<L>  |  97  |  19  ----------------------------<  156<H>  4.4   |  24  |  1.20    Ca    9.1      2019 02:02    TPro  7.3  /  Alb  3.4  /  TBili  0.6  /  DBili  x   /  AST  16  /  ALT  25  /  AlkPhos  86  07-03     LIVER FUNCTIONS - ( 2019 02:02 )  Alb: 3.4 g/dL / Pro: 7.3 gm/dL / ALK PHOS: 86 U/L / ALT: 25 U/L / AST: 16 U/L / GGT: x           Urinalysis Basic - ( 2019 03:46 )    Color: Red / Appearance: bloody / S.000 / pH: x  Gluc: x / Ketone: Negative  / Bili: Negative / Urobili: Negative mg/dL   Blood: x / Protein: 30 mg/dL / Nitrite: Negative   Leuk Esterase: Moderate / RBC: >50 /HPF / WBC 11-25   Sq Epi: x / Non Sq Epi: Few / Bacteria: Few      Culture - Blood (collected 2019 04:15)  Source: .Blood None  Preliminary Report (2019 09:01):    No growth to date.    Culture - Blood (collected 2019 04:15)  Source: .Blood None  Preliminary Report (2019 09:01):    No growth to date.    Culture - Urine (collected 2019 03:46)  Source: .Urine Catheterized  Final Report (2019 14:11):    10,000 - 49,000 CFU/mL Enterococcus faecalis  Organism: Enterococcus faecalis (2019 14:11)  Organism: Enterococcus faecalis (2019 14:11)      -  Ampicillin: S <=2 Predicts results to ampicillin/sulbactam, amoxacillin-clavulanate and  piperacillin-tazobactam.      -  Ciprofloxacin: S <=1      -  Levofloxacin: S <=1      -  Nitrofurantoin: S <=32 Should not be used to treat pyelonephritis.      -  Tetra/Doxy: R >8      -  Vancomycin: S 2      Method Type: TINA      Radiology: all available radiological tests reviewed    < from: CT Abdomen and Pelvis No Cont (19 @ 02:42) >  Mild to moderate right and mild left hydroureteronephrosis to the level   of the distended bladder containing blood products/debris. Underlying   recurrent or residual neoplasm cannot be excluded given history. Further   evaluation with additional imaging (CT or MR urogram) and/or direct   visualization would be helpful.    A moderate amount of stool in the rectum. Nonspecific mild perirectal   stranding. Recommend clinical correlation to assess proctitis/stercoral   colitis.    < end of copied text >      Advanced directives addressed: full resuscitation

## 2019-07-08 VITALS
RESPIRATION RATE: 16 BRPM | DIASTOLIC BLOOD PRESSURE: 51 MMHG | TEMPERATURE: 98 F | HEART RATE: 74 BPM | SYSTOLIC BLOOD PRESSURE: 149 MMHG | OXYGEN SATURATION: 100 %

## 2019-07-08 RX ADMIN — Medication 1 TABLET(S): at 06:00

## 2019-07-08 RX ADMIN — HEPARIN SODIUM 5000 UNIT(S): 5000 INJECTION INTRAVENOUS; SUBCUTANEOUS at 06:00

## 2019-07-08 RX ADMIN — PANTOPRAZOLE SODIUM 40 MILLIGRAM(S): 20 TABLET, DELAYED RELEASE ORAL at 06:00

## 2019-07-08 RX ADMIN — Medication 1 TABLET(S): at 11:41

## 2019-07-08 RX ADMIN — Medication 1 DROP(S): at 06:01

## 2019-07-08 RX ADMIN — NYSTATIN CREAM 1 APPLICATION(S): 100000 CREAM TOPICAL at 13:22

## 2019-07-08 RX ADMIN — Medication 50 MILLIGRAM(S): at 06:00

## 2019-07-08 RX ADMIN — Medication 1 DROP(S): at 13:22

## 2019-07-08 RX ADMIN — Medication 75 MICROGRAM(S): at 06:00

## 2019-07-08 RX ADMIN — Medication 2000 UNIT(S): at 11:41

## 2019-07-08 RX ADMIN — NYSTATIN CREAM 1 APPLICATION(S): 100000 CREAM TOPICAL at 06:01

## 2019-07-08 RX ADMIN — LOSARTAN POTASSIUM 25 MILLIGRAM(S): 100 TABLET, FILM COATED ORAL at 06:00

## 2019-07-08 RX ADMIN — Medication 650 MILLIGRAM(S): at 13:21

## 2019-07-11 DIAGNOSIS — A41.9 SEPSIS, UNSPECIFIED ORGANISM: ICD-10-CM

## 2019-07-11 DIAGNOSIS — I12.9 HYPERTENSIVE CHRONIC KIDNEY DISEASE WITH STAGE 1 THROUGH STAGE 4 CHRONIC KIDNEY DISEASE, OR UNSPECIFIED CHRONIC KIDNEY DISEASE: ICD-10-CM

## 2019-07-11 DIAGNOSIS — E87.2 ACIDOSIS: ICD-10-CM

## 2019-07-11 DIAGNOSIS — I95.1 ORTHOSTATIC HYPOTENSION: ICD-10-CM

## 2019-07-11 DIAGNOSIS — E78.5 HYPERLIPIDEMIA, UNSPECIFIED: ICD-10-CM

## 2019-07-11 DIAGNOSIS — Z79.82 LONG TERM (CURRENT) USE OF ASPIRIN: ICD-10-CM

## 2019-07-11 DIAGNOSIS — Z90.710 ACQUIRED ABSENCE OF BOTH CERVIX AND UTERUS: ICD-10-CM

## 2019-07-11 DIAGNOSIS — Z85.51 PERSONAL HISTORY OF MALIGNANT NEOPLASM OF BLADDER: ICD-10-CM

## 2019-07-11 DIAGNOSIS — N18.3 CHRONIC KIDNEY DISEASE, STAGE 3 (MODERATE): ICD-10-CM

## 2019-07-11 DIAGNOSIS — N39.0 URINARY TRACT INFECTION, SITE NOT SPECIFIED: ICD-10-CM

## 2019-07-11 DIAGNOSIS — F41.9 ANXIETY DISORDER, UNSPECIFIED: ICD-10-CM

## 2019-07-11 DIAGNOSIS — E03.9 HYPOTHYROIDISM, UNSPECIFIED: ICD-10-CM

## 2019-07-11 DIAGNOSIS — Z88.2 ALLERGY STATUS TO SULFONAMIDES: ICD-10-CM

## 2019-07-11 DIAGNOSIS — R31.9 HEMATURIA, UNSPECIFIED: ICD-10-CM

## 2019-07-11 DIAGNOSIS — N13.30 UNSPECIFIED HYDRONEPHROSIS: ICD-10-CM

## 2019-07-27 RX ORDER — CEPHALEXIN 500 MG
1 CAPSULE ORAL
Qty: 0 | Refills: 0 | DISCHARGE
Start: 2019-07-27 | End: 2019-08-05

## 2019-07-30 RX ORDER — CIPROFLOXACIN LACTATE 400MG/40ML
1 VIAL (ML) INTRAVENOUS
Qty: 0 | Refills: 0 | DISCHARGE
Start: 2019-07-30 | End: 2019-08-05

## 2019-08-02 NOTE — CONSULT NOTE ADULT - CONSULT REQUESTED BY NAME
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  Patient presented to [facility] from Transplant Clinic on 7/18/19 for significant hyponatremia.  On arrival, admitted to Hospital Medicine for management of hyponatremia and decompensated IZAGUIRRE cirrhosis complicated by acute metabolic encephalopathy, ascites (s/p paracentesis on 7/19, 7/24, 7/31), coagulopathy, pancytopenia, protein calorie malnutrition, electrolyte derangement, and physical debility.  Hepatology following for co-management.  Transplant team consulted and initiated transplant work-up.  During work-up, incidental R renal mass found on CT.  Urology consulted and recommended outpatient surveillance.  Hospital course further complicated by ileus with subsequent worsening encephalopathy 2/2 lactulose intolerance requiring intubation for acute hypoxemic respiratory failure and ICU transfer on 7/24/19.  Extubated on 7/27/19.  Stepped down to floor on 7/29/19.  Currently not candidate for liver transplant 2/2 frailty and physical debility.  PT and OT following.        Functional History: Patient lives in [place], [place] with her  in a single story home with 3 steps to enter.  Prior to admission, she was (I) with ADLs and mobility.  Functional decline 2/2 fatigue/lethargy x 1 month prior to admission.  DME: [facility], grab bar.   Dr. Gomez

## 2019-08-07 NOTE — ED ADULT NURSE REASSESSMENT NOTE - NS ED NURSE REASSESS COMMENT FT1
"[facility] RCAT     Date: 08/07/2019  Admission Dx: Complaints of sudden onset of chills, shortness of breath and chest pain. COPD EX  Pulmonary History: COPD  Home Nebulizer/MDI Use: Arnuity every day,   Home Oxygen: None  Acuity Level (RCAT flow sheet): 3  Aerosol Therapy initiated: Duoneb QID, Albuterol Q2 prn, Arunity QD  Pulmonary Hygiene initiated: Cough and deep breathing techniques  Volume Expansion initiated: IS TID  Current Oxygen Requirements: Room Air  Current SpO2: 98%  Re-evaluation date: 08/10/2019  Patient Education: Education was performed with the patient in regards to indications/benefits and possible side effects of bronchodilators and the importance of rinsing her mouth out following the use of her inhaled steriod. Will continue to do education with patient.     See \"RT Assessments\" flow sheet for patient assessment scoring and Acuity Level Details.     Vital signs:  Temp: 98.5  F (36.9  C) Temp src: Temporal BP: 111/65   Heart Rate: 81 Resp: 18 SpO2: 98 % O2 Device: None (Room air) Oxygen Delivery: 1 LPM Height: 149.9 cm (4' 11.02\") Weight: 47.9 kg (105 lb 8 oz)  Estimated body mass index is 21.3 kg/m  as calculated from the following:    Height as of this encounter: 1.499 m (4' 11.02\").    Weight as of this encounter: 47.9 kg (105 lb 8 oz).    Past Medical History:   Diagnosis Date     AV block, 2nd degree 5/16/2016     Cerebrovascular accident (H) 8/22/2016     COKER (dyspnea on exertion) 8/22/2016     Generalized weakness 10/12/2016     GIB (gastrointestinal bleeding)      History of colonic polyps 8/22/2016     HTN (hypertension) 8/22/2016     Iron deficiency anemia      Melanoma of skin (H)-rt arm 8/22/2016     Mixed hyperlipidemia 8/22/2016     Pacemaker     implanted 5-     PAF (paroxysmal atrial fibrillation) (H)      SSS (sick sinus syndrome) (H) 8/22/2016       Past Surgical History:   Procedure Laterality Date     APPENDECTOMY  1960s     C LIGATE FALLOPIAN TUBE  1960s     C REMV " pt cleaned, turned and repositioned. Pt given sandwich and ginger ale ,VSS, pending admission orders. Safety maintained, will monitor. CATARACT INTRACAP,INSERT LENS Right 09/20/2017     CV CORONARY ANGIOGRAM N/A 7/17/2019    Procedure: Coronary Angiogram;  Surgeon: Irvin Hutson MD;  Location:  HEART CARDIAC CATH LAB     CV HEART CATHETERIZATION WITH POSSIBLE INTERVENTION N/A 7/18/2019    Procedure: Heart Catheterization with Possible Intervention;  Surgeon: Jayleen Torres MD;  Location:  HEART CARDIAC CATH LAB     CV LEFT HEART CATH N/A 7/17/2019    Procedure: Left Heart Cath;  Surgeon: Irvin Hutson MD;  Location:  HEART CARDIAC CATH LAB     CV LEFT VENTRICULOGRAM N/A 7/17/2019    Procedure: Left Ventriculogram;  Surgeon: Irvin Hutson MD;  Location:  HEART CARDIAC CATH LAB     CV PCI STENT DRUG ELUTING N/A 7/18/2019    Procedure: PCI Stent Drug Eluting;  Surgeon: Jayleen Torres MD;  Location:  HEART CARDIAC CATH LAB     ESOPHAGOSCOPY, GASTROSCOPY, DUODENOSCOPY (EGD), COMBINED N/A 11/20/2018    Procedure: ESOPHAGOSCOPY, GASTROSCOPY, DUODENOSCOPY (EGD)  Room 523 with biopsies using cold biopsy forceps;  Surgeon: Ayala Soto MD;  Location:  GI     HC REMV CATARACT EXTRACAP,INSERT LENS Left 10/04/2017     IMPLANT PACEMAKER  05/17/2016       Family History   Problem Relation Age of Onset     Coronary Artery Disease Father      Coronary Artery Disease Brother      Coronary Artery Disease Sister      Colon Cancer No family hx of        Social History     Tobacco Use     Smoking status: Never Smoker     Smokeless tobacco: Never Used   Substance Use Topics     Alcohol use: No     Alcohol/week: 0.0 oz     Study Result     PORTABLE CHEST ONE VIEW  8/4/2019 12:51 PM      HISTORY: Shortness of breath.     COMPARISON: 7/22/2019.                                                                      IMPRESSION: Stable cardiac silhouette and left chest wall pacemaker.  Slightly increased prominence of ill-defined focal opacity in the  right lower lung which could represent subsegmental atelectasis  versus  worsening airspace disease. Mild subsegmental atelectasis in the left  lower lung. Mild interstitial pulmonary edema has increased.  Calcification seen in the aortic knob.     VANESSA ROUSE MD     Prior to Admission medications    Medication Sig Last Dose Taking? Auth Provider   acetaminophen (TYLENOL) 325 MG tablet Take 650 mg by mouth every 6 hours as needed for mild pain    Yes Unknown, Entered By History   amLODIPine (NORVASC) 5 MG tablet Take 5 mg by mouth daily  8/4/2019 at Unknown time Yes Reported, Patient   aspirin (ASA) 81 MG EC tablet Take 1 tablet (81 mg) by mouth daily 8/4/2019 at Unknown time Yes Alex Ramsay MD   atorvastatin (LIPITOR) 20 MG tablet Take 20 mg by mouth At Bedtime 8/3/2019 at Unknown time Yes Unknown, Entered By History   carvedilol (COREG) 12.5 MG tablet Take 1 tablet (12.5 mg) by mouth 2 times daily (with meals) 8/4/2019 at Unknown time Yes Alex Ramsay MD   clopidogrel (PLAVIX) 75 MG tablet Take 1 tablet (75 mg) by mouth daily 8/4/2019 at Unknown time Yes Alex Ramsay MD   ferrous sulfate (IRON) 325 (65 FE) MG tablet Take 325 mg by mouth 2 times daily  8/4/2019 at Unknown time Yes Unknown, Entered By History   fluticasone furoate (ARNUITY ELLIPTA) 200 MCG/ACT inhalation powder Inhale 1 puff into the lungs daily 8/4/2019 at Unknown time Yes Ariel Bhaena MD   lisinopril (PRINIVIL,ZESTRIL) 40 MG tablet Take 40 mg by mouth daily 8/4/2019 at Unknown time Yes Reported, Patient   Multiple Vitamins-Minerals (ICAPS AREDS FORMULA) TABS Take 1 tablet by mouth 2 times daily  8/4/2019 at Unknown time Yes Unknown, Entered By History   nitroGLYcerin (NITROSTAT) 0.4 MG sublingual tablet For chest pain place 1 tablet under the tongue every 5 minutes for 3 doses. If symptoms persist 5 minutes after 1st dose call 911.   Yes Alex Ramsay MD   OMEPRAZOLE PO Take 20 mg by mouth every morning  8/4/2019 at Unknown time Yes Unknown, Entered By History    tolterodine (DETROL LA) 4 MG 24 hr capsule Take 4 mg by mouth every morning 8/4/2019 at Unknown time Yes Unknown, Entered By History                     Alf Dee RT  8/7/2019

## 2019-08-08 ENCOUNTER — INPATIENT (INPATIENT)
Facility: HOSPITAL | Age: 84
LOS: 5 days | Discharge: HOME HEALTH | DRG: 872 | End: 2019-08-14
Attending: INTERNAL MEDICINE | Admitting: HOSPITALIST
Payer: MEDICARE

## 2019-08-08 VITALS
HEART RATE: 99 BPM | HEIGHT: 62 IN | DIASTOLIC BLOOD PRESSURE: 43 MMHG | TEMPERATURE: 98 F | WEIGHT: 171.96 LBS | RESPIRATION RATE: 19 BRPM | SYSTOLIC BLOOD PRESSURE: 149 MMHG | OXYGEN SATURATION: 98 %

## 2019-08-08 DIAGNOSIS — E03.9 HYPOTHYROIDISM, UNSPECIFIED: ICD-10-CM

## 2019-08-08 DIAGNOSIS — N39.0 URINARY TRACT INFECTION, SITE NOT SPECIFIED: ICD-10-CM

## 2019-08-08 DIAGNOSIS — Z90.710 ACQUIRED ABSENCE OF BOTH CERVIX AND UTERUS: Chronic | ICD-10-CM

## 2019-08-08 DIAGNOSIS — Z29.9 ENCOUNTER FOR PROPHYLACTIC MEASURES, UNSPECIFIED: ICD-10-CM

## 2019-08-08 DIAGNOSIS — R53.1 WEAKNESS: ICD-10-CM

## 2019-08-08 DIAGNOSIS — A41.9 SEPSIS, UNSPECIFIED ORGANISM: ICD-10-CM

## 2019-08-08 LAB
ALBUMIN SERPL ELPH-MCNC: 3.9 G/DL — SIGNIFICANT CHANGE UP (ref 3.3–5)
ALP SERPL-CCNC: 87 U/L — SIGNIFICANT CHANGE UP (ref 40–120)
ALT FLD-CCNC: 22 U/L — SIGNIFICANT CHANGE UP (ref 12–78)
ANION GAP SERPL CALC-SCNC: 6 MMOL/L — SIGNIFICANT CHANGE UP (ref 5–17)
APPEARANCE UR: CLEAR — SIGNIFICANT CHANGE UP
APTT BLD: 30 SEC — SIGNIFICANT CHANGE UP (ref 27.5–36.3)
AST SERPL-CCNC: 25 U/L — SIGNIFICANT CHANGE UP (ref 15–37)
BASOPHILS # BLD AUTO: 0.06 K/UL — SIGNIFICANT CHANGE UP (ref 0–0.2)
BASOPHILS NFR BLD AUTO: 0.3 % — SIGNIFICANT CHANGE UP (ref 0–2)
BILIRUB SERPL-MCNC: 0.7 MG/DL — SIGNIFICANT CHANGE UP (ref 0.2–1.2)
BILIRUB UR-MCNC: NEGATIVE — SIGNIFICANT CHANGE UP
BUN SERPL-MCNC: 18 MG/DL — SIGNIFICANT CHANGE UP (ref 7–23)
CALCIUM SERPL-MCNC: 9.7 MG/DL — SIGNIFICANT CHANGE UP (ref 8.5–10.1)
CHLORIDE SERPL-SCNC: 103 MMOL/L — SIGNIFICANT CHANGE UP (ref 96–108)
CO2 SERPL-SCNC: 26 MMOL/L — SIGNIFICANT CHANGE UP (ref 22–31)
COLOR SPEC: YELLOW — SIGNIFICANT CHANGE UP
CREAT SERPL-MCNC: 1.14 MG/DL — SIGNIFICANT CHANGE UP (ref 0.5–1.3)
DIFF PNL FLD: ABNORMAL
EOSINOPHIL # BLD AUTO: 0.22 K/UL — SIGNIFICANT CHANGE UP (ref 0–0.5)
EOSINOPHIL NFR BLD AUTO: 1 % — SIGNIFICANT CHANGE UP (ref 0–6)
GLUCOSE SERPL-MCNC: 112 MG/DL — HIGH (ref 70–99)
GLUCOSE UR QL: NEGATIVE MG/DL — SIGNIFICANT CHANGE UP
HCT VFR BLD CALC: 36.4 % — SIGNIFICANT CHANGE UP (ref 34.5–45)
HGB BLD-MCNC: 11.3 G/DL — LOW (ref 11.5–15.5)
IMM GRANULOCYTES NFR BLD AUTO: 0.7 % — SIGNIFICANT CHANGE UP (ref 0–1.5)
INR BLD: 1.04 RATIO — SIGNIFICANT CHANGE UP (ref 0.88–1.16)
KETONES UR-MCNC: NEGATIVE — SIGNIFICANT CHANGE UP
LACTATE SERPL-SCNC: 1.9 MMOL/L — SIGNIFICANT CHANGE UP (ref 0.7–2)
LEUKOCYTE ESTERASE UR-ACNC: ABNORMAL
LYMPHOCYTES # BLD AUTO: 0.84 K/UL — LOW (ref 1–3.3)
LYMPHOCYTES # BLD AUTO: 3.8 % — LOW (ref 13–44)
MCHC RBC-ENTMCNC: 27.3 PG — SIGNIFICANT CHANGE UP (ref 27–34)
MCHC RBC-ENTMCNC: 31 GM/DL — LOW (ref 32–36)
MCV RBC AUTO: 87.9 FL — SIGNIFICANT CHANGE UP (ref 80–100)
MONOCYTES # BLD AUTO: 0.75 K/UL — SIGNIFICANT CHANGE UP (ref 0–0.9)
MONOCYTES NFR BLD AUTO: 3.4 % — SIGNIFICANT CHANGE UP (ref 2–14)
NEUTROPHILS # BLD AUTO: 20.07 K/UL — HIGH (ref 1.8–7.4)
NEUTROPHILS NFR BLD AUTO: 90.8 % — HIGH (ref 43–77)
NITRITE UR-MCNC: NEGATIVE — SIGNIFICANT CHANGE UP
PH UR: 6 — SIGNIFICANT CHANGE UP (ref 5–8)
PLATELET # BLD AUTO: 415 K/UL — HIGH (ref 150–400)
POTASSIUM SERPL-MCNC: 4.3 MMOL/L — SIGNIFICANT CHANGE UP (ref 3.5–5.3)
POTASSIUM SERPL-SCNC: 4.3 MMOL/L — SIGNIFICANT CHANGE UP (ref 3.5–5.3)
PROT SERPL-MCNC: 8.2 GM/DL — SIGNIFICANT CHANGE UP (ref 6–8.3)
PROT UR-MCNC: 15 MG/DL
PROTHROM AB SERPL-ACNC: 11.6 SEC — SIGNIFICANT CHANGE UP (ref 10–12.9)
RBC # BLD: 4.14 M/UL — SIGNIFICANT CHANGE UP (ref 3.8–5.2)
RBC # FLD: 15.1 % — HIGH (ref 10.3–14.5)
SODIUM SERPL-SCNC: 135 MMOL/L — SIGNIFICANT CHANGE UP (ref 135–145)
SP GR SPEC: 1.01 — SIGNIFICANT CHANGE UP (ref 1.01–1.02)
UROBILINOGEN FLD QL: NEGATIVE MG/DL — SIGNIFICANT CHANGE UP
WBC # BLD: 22.09 K/UL — HIGH (ref 3.8–10.5)
WBC # FLD AUTO: 22.09 K/UL — HIGH (ref 3.8–10.5)

## 2019-08-08 PROCEDURE — 36415 COLL VENOUS BLD VENIPUNCTURE: CPT

## 2019-08-08 PROCEDURE — 93005 ELECTROCARDIOGRAM TRACING: CPT

## 2019-08-08 PROCEDURE — 83735 ASSAY OF MAGNESIUM: CPT

## 2019-08-08 PROCEDURE — 93010 ELECTROCARDIOGRAM REPORT: CPT | Mod: 76

## 2019-08-08 PROCEDURE — 82962 GLUCOSE BLOOD TEST: CPT

## 2019-08-08 PROCEDURE — 97530 THERAPEUTIC ACTIVITIES: CPT | Mod: GP

## 2019-08-08 PROCEDURE — 71045 X-RAY EXAM CHEST 1 VIEW: CPT | Mod: 26

## 2019-08-08 PROCEDURE — 84484 ASSAY OF TROPONIN QUANT: CPT

## 2019-08-08 PROCEDURE — 74176 CT ABD & PELVIS W/O CONTRAST: CPT

## 2019-08-08 PROCEDURE — 85027 COMPLETE CBC AUTOMATED: CPT

## 2019-08-08 PROCEDURE — 97116 GAIT TRAINING THERAPY: CPT | Mod: GP

## 2019-08-08 PROCEDURE — 97162 PT EVAL MOD COMPLEX 30 MIN: CPT | Mod: GP

## 2019-08-08 PROCEDURE — 82607 VITAMIN B-12: CPT

## 2019-08-08 PROCEDURE — 83540 ASSAY OF IRON: CPT

## 2019-08-08 PROCEDURE — 80048 BASIC METABOLIC PNL TOTAL CA: CPT

## 2019-08-08 PROCEDURE — 82728 ASSAY OF FERRITIN: CPT

## 2019-08-08 PROCEDURE — 74176 CT ABD & PELVIS W/O CONTRAST: CPT | Mod: 26

## 2019-08-08 RX ORDER — SODIUM CHLORIDE 9 MG/ML
1000 INJECTION INTRAMUSCULAR; INTRAVENOUS; SUBCUTANEOUS ONCE
Refills: 0 | Status: DISCONTINUED | OUTPATIENT
Start: 2019-08-08 | End: 2019-08-08

## 2019-08-08 RX ORDER — SENNOSIDES/DOCUSATE SODIUM 8.6MG-50MG
2 TABLET ORAL
Qty: 0 | Refills: 0 | DISCHARGE

## 2019-08-08 RX ORDER — ACETAMINOPHEN 500 MG
1000 TABLET ORAL ONCE
Refills: 0 | Status: COMPLETED | OUTPATIENT
Start: 2019-08-08 | End: 2019-08-08

## 2019-08-08 RX ORDER — CEFTRIAXONE 500 MG/1
1000 INJECTION, POWDER, FOR SOLUTION INTRAMUSCULAR; INTRAVENOUS ONCE
Refills: 0 | Status: COMPLETED | OUTPATIENT
Start: 2019-08-08 | End: 2019-08-08

## 2019-08-08 RX ORDER — PANTOPRAZOLE SODIUM 20 MG/1
40 TABLET, DELAYED RELEASE ORAL
Refills: 0 | Status: DISCONTINUED | OUTPATIENT
Start: 2019-08-08 | End: 2019-08-14

## 2019-08-08 RX ORDER — CEFTRIAXONE 500 MG/1
1000 INJECTION, POWDER, FOR SOLUTION INTRAMUSCULAR; INTRAVENOUS ONCE
Refills: 0 | Status: COMPLETED | OUTPATIENT
Start: 2019-08-09 | End: 2019-08-09

## 2019-08-08 RX ORDER — LEVOTHYROXINE SODIUM 125 MCG
75 TABLET ORAL DAILY
Refills: 0 | Status: DISCONTINUED | OUTPATIENT
Start: 2019-08-08 | End: 2019-08-14

## 2019-08-08 RX ORDER — METOPROLOL TARTRATE 50 MG
50 TABLET ORAL DAILY
Refills: 0 | Status: DISCONTINUED | OUTPATIENT
Start: 2019-08-08 | End: 2019-08-10

## 2019-08-08 RX ORDER — ASPIRIN/CALCIUM CARB/MAGNESIUM 324 MG
325 TABLET ORAL DAILY
Refills: 0 | Status: DISCONTINUED | OUTPATIENT
Start: 2019-08-08 | End: 2019-08-14

## 2019-08-08 RX ORDER — SODIUM CHLORIDE 9 MG/ML
2000 INJECTION INTRAMUSCULAR; INTRAVENOUS; SUBCUTANEOUS ONCE
Refills: 0 | Status: COMPLETED | OUTPATIENT
Start: 2019-08-08 | End: 2019-08-08

## 2019-08-08 RX ORDER — ALPRAZOLAM 0.25 MG
0.25 TABLET ORAL AT BEDTIME
Refills: 0 | Status: DISCONTINUED | OUTPATIENT
Start: 2019-08-08 | End: 2019-08-12

## 2019-08-08 RX ORDER — SODIUM CHLORIDE 9 MG/ML
1000 INJECTION INTRAMUSCULAR; INTRAVENOUS; SUBCUTANEOUS
Refills: 0 | Status: DISCONTINUED | OUTPATIENT
Start: 2019-08-08 | End: 2019-08-10

## 2019-08-08 RX ORDER — CEFTRIAXONE 500 MG/1
1000 INJECTION, POWDER, FOR SOLUTION INTRAMUSCULAR; INTRAVENOUS ONCE
Refills: 0 | Status: DISCONTINUED | OUTPATIENT
Start: 2019-08-08 | End: 2019-08-08

## 2019-08-08 RX ORDER — ACETAMINOPHEN 500 MG
650 TABLET ORAL EVERY 4 HOURS
Refills: 0 | Status: DISCONTINUED | OUTPATIENT
Start: 2019-08-08 | End: 2019-08-14

## 2019-08-08 RX ORDER — ACETAMINOPHEN 500 MG
1 TABLET ORAL
Qty: 0 | Refills: 0 | DISCHARGE

## 2019-08-08 RX ORDER — ENOXAPARIN SODIUM 100 MG/ML
40 INJECTION SUBCUTANEOUS DAILY
Refills: 0 | Status: DISCONTINUED | OUTPATIENT
Start: 2019-08-08 | End: 2019-08-14

## 2019-08-08 RX ORDER — ONDANSETRON 8 MG/1
4 TABLET, FILM COATED ORAL EVERY 6 HOURS
Refills: 0 | Status: DISCONTINUED | OUTPATIENT
Start: 2019-08-08 | End: 2019-08-14

## 2019-08-08 RX ORDER — SODIUM CHLORIDE 9 MG/ML
500 INJECTION INTRAMUSCULAR; INTRAVENOUS; SUBCUTANEOUS ONCE
Refills: 0 | Status: COMPLETED | OUTPATIENT
Start: 2019-08-08 | End: 2019-08-08

## 2019-08-08 RX ORDER — ALPRAZOLAM 0.25 MG
0.5 TABLET ORAL
Qty: 0 | Refills: 0 | DISCHARGE

## 2019-08-08 RX ADMIN — SODIUM CHLORIDE 2000 MILLILITER(S): 9 INJECTION INTRAMUSCULAR; INTRAVENOUS; SUBCUTANEOUS at 12:16

## 2019-08-08 RX ADMIN — SODIUM CHLORIDE 500 MILLILITER(S): 9 INJECTION INTRAMUSCULAR; INTRAVENOUS; SUBCUTANEOUS at 12:17

## 2019-08-08 RX ADMIN — Medication 650 MILLIGRAM(S): at 22:21

## 2019-08-08 RX ADMIN — SODIUM CHLORIDE 500 MILLILITER(S): 9 INJECTION INTRAMUSCULAR; INTRAVENOUS; SUBCUTANEOUS at 09:21

## 2019-08-08 RX ADMIN — Medication 0.25 MILLIGRAM(S): at 22:21

## 2019-08-08 RX ADMIN — Medication 1000 MILLIGRAM(S): at 10:04

## 2019-08-08 RX ADMIN — Medication 1000 MILLIGRAM(S): at 13:40

## 2019-08-08 RX ADMIN — SODIUM CHLORIDE 125 MILLILITER(S): 9 INJECTION INTRAMUSCULAR; INTRAVENOUS; SUBCUTANEOUS at 19:54

## 2019-08-08 RX ADMIN — CEFTRIAXONE 1000 MILLIGRAM(S): 500 INJECTION, POWDER, FOR SOLUTION INTRAMUSCULAR; INTRAVENOUS at 11:37

## 2019-08-08 NOTE — H&P ADULT - ASSESSMENT
89F w/PMH CAD/stents, hypothyroid, endometrial ca, bladder ca (diagnosed 2018), recurred since June, scheduled to start chemo today, presents this AM c/o generalized weakness since 0400 when she woke up.  Pt was diagnosed w/ UTI 1.5 weeks ago, initially placed on keflex from urgent care, later changed to cipro by her PCP, but as of yesterday she was changed to macrobid, of which she had one dose last night.     In ED, WBC 22, hg 11 (prior 8.9 in July), platelets 415 (300's July), UTI (weakly pos), low bp, received IV rocephin and 2L ivf in ED, LA 1.9, decreased GFR from prior (Cr 1.1- prior 0.7).  CXR neg, CT a/p pending.

## 2019-08-08 NOTE — ED PROVIDER NOTE - OBJECTIVE STATEMENT
89F w/ pmh CAD s/p PCI x 5, HTN, dyslipidemia, hypothyroidism, bladder cancer s/p resection x 2 , anxiety, orthostatic hypotension, DJD multiple joints, anxiety, uterine cancer s/p hysterectomy -- p/w severe weakness today and lightheadedness, unable to stand - ambulates at baseline w/ walker, unable to do that. Has been treated for UTI x 2-3 wks - was on keflex, then cipro x 5 days, then took first does macrobid last night. +myalgias. No recent travel. No fever, n/v/d/c, chest / abd pain. +cough x several months.    pcp: davina francis

## 2019-08-08 NOTE — H&P ADULT - HISTORY OF PRESENT ILLNESS
HPI:  89F w/PMH CAD/stents, hypothyroid, endometrial ca, bladder ca (diagnosed ), recurred since , scheduled to start chemo today, presents this AM c/o generalized weakness since 400 when she woke up.  Pt was diagnosed w/ UTI 1.5 weeks ago, initially placed on keflex from urgent care, later changed to cipro by her PCP, but as of yesterday she was changed to macrobid, of which she had one dose last night.  She woke this AM and was too weak to get out of bed, she eventually got up and went to the bathroom, but was too weak to get up from there.  EMS called, pt was aaox3, has 24H caregiver who was present w/ her.  She denies any dizziness, lightheadedness, n/v/d, abd pain, cp, sob, fever/chills.  She's had recurrent UTI, but this is first time she's felt this way.     In ED, WBC 22, hg 11 (prior 8.9 in July), platelets 415 (300's July), UTI (weakly pos), low bp, received IV rocephin and 2L ivf in ED, LA 1.9, decreased GFR from prior (Cr 1.1- prior 0.7).  CXR neg, CT a/p pending.     PAST MEDICAL & SURGICAL HISTORY:  Endometrial adenocarcinoma ()  Bladder cancer (), recurred 2019  Macular degeneration  Stented coronary artery  Hypothyroid  Hyperlipidemia  HTN (hypertension)  GERD (gastroesophageal reflux disease)  CAD (coronary artery disease)  [place] (hard of hearing)  S/P EMILY (total abdominal hysterectomy)  S/P hernia repair: umbilical -   S/P laparoscopic cholecystectomy:   Ankle fracture, right: surgery 25 yrs ago - hardware removed  S/P coronary angiogram: , ,  - drug eluding stents      Review of Systems:   CONSTITUTIONAL: No fever.  ++WEAKNESS  EYES: No eye pain or discharge.  ENMT:  No sinus or throat pain  NECK: No pain or stiffness  RESPIRATORY: No cough, wheezing, chills or hemoptysis; No shortness of breath  CARDIOVASCULAR: No chest pain, palpitations, dizziness, or leg swelling  GASTROINTESTINAL: No abdominal or epigastric pain. No nausea, vomiting, or hematemesis; No diarrhea or constipation. No melena or hematochezia.  GENITOURINARY: No dysuria or incontinence  NEUROLOGICAL: No headaches, memory loss, loss of strength, numbness, or tremors  SKIN: No rashes.  LYMPH NODES: No enlarged glands  ENDOCRINE: No heat or cold intolerance; No hair loss  MUSCULOSKELETAL: No joint pain or swelling; No muscle, back, or extremity pain  PSYCHIATRIC: No depression, anxiety, mood swings, or difficulty sleeping  HEME/LYMPH: No easy bruising, or bleeding gums  ALLERY AND IMMUNOLOGIC: No hives or eczema    Allergies    amlodipine (Unknown)  clonidine (Unknown)  Florinef Acetate (Unknown)  hydrocodone (Unknown)  Levatol (Unknown)  Lipitor (Unknown)  Lotrel (Unknown)  methylPREDNISolone (Unknown)  morphine (Other)  Plaquenil (Unknown)  statins (Other (Unknown))  sulfa drugs (Rash)    Intolerances        Social History:   LIVES ALONE, HAS 24H CAREGIVER  USES FWW  IND ADLs    FAMILY HISTORY:  Family history of brain cancer      Home Medications:  acetaminophen 500 mg oral tablet: 2 tab(s) orally 2 times a day  (08 Aug 2019 13:08)  aspirin 325 mg oral tablet: 1 tab(s) orally once a day (08 Aug 2019 13:08)  cephalexin 500 mg oral capsule: 1 cap(s) orally every 12 hours  ****Course Stopped*** (08 Aug 2019 13:08)  ciprofloxacin 500 mg oral tablet: 1 tab(s) orally every 12 hours  ***Course Stopped**** (08 Aug 2019 13:08)  I-James oral tablet: 1 tab(s) orally once a day (08 Aug 2019 13:08)  losartan 25 mg oral tablet: 1 tab(s) orally once a day (08 Aug 2019 13:08)  metoprolol succinate 50 mg oral tablet, extended release: 1 tab(s) orally once a day (08 Aug 2019 13:08)  pantoprazole 40 mg oral delayed release tablet: 1 tab(s) orally once a day (08 Aug 2019 13:08)  Refresh ophthalmic solution: 1 drop(s) to each affected eye 3 times a day (08 Aug 2019 13:08)  Synthroid 75 mcg (0.075 mg) oral tablet: 1 tab(s) orally once a day (08 Aug 2019 13:08)  Vitamin D3 1000 intl units oral tablet: 2 tab(s) orally once a day (08 Aug 2019 13:08)  Xanax 0.25 mg oral tablet: 0.5 tab(s) orally once a day (at bedtime) (08 Aug 2019 13:08)      MEDICATIONS  (STANDING):  ALPRAZolam 0.25 milliGRAM(s) Oral at bedtime  aspirin 325 milliGRAM(s) Oral daily  enoxaparin Injectable 40 milliGRAM(s) SubCutaneous daily  levothyroxine 75 MICROGram(s) Oral daily  metoprolol succinate ER 50 milliGRAM(s) Oral daily  pantoprazole    Tablet 40 milliGRAM(s) Oral before breakfast  sodium chloride 0.9%. 1000 milliLiter(s) (125 mL/Hr) IV Continuous <Continuous>    MEDICATIONS  (PRN):  acetaminophen   Tablet .. 650 milliGRAM(s) Oral every 4 hours PRN Temp greater or equal to 38C (100.4F), Mild Pain (1 - 3)  ondansetron Injectable 4 milliGRAM(s) IV Push every 6 hours PRN Nausea        PHYSICAL EXAM:  Vital Signs Last 24 Hrs  T(C): 38.1 (08 Aug 2019 12:55), Max: 38.1 (08 Aug 2019 12:55)  T(F): 100.5 (08 Aug 2019 12:55), Max: 100.5 (08 Aug 2019 12:55)  HR: 92 (08 Aug 2019 12:55) (92 - 99)  BP: 127/34 (08 Aug 2019 12:55) (102/41 - 149/43)  RR: 18 (08 Aug 2019 12:55) (17 - 19)  SpO2: 96% (08 Aug 2019 12:55) (94% - 100%)  GENERAL: NAD, FRAIL  HEAD:  Atraumatic, Normocephalic  EYES: EOMI, PERRLA, conjunctiva and sclera clear  ENT: normal hearing, no nasal discharge, dentition normal for age  NECK: Supple, No JVD, no LAD, no thyromegaly  CHEST/LUNG: Clear to auscultation bilaterally; No wheeze, resp unlabored  HEART: Regular rate and rhythm; No murmurs, rubs, or gallops  ABDOMEN: Soft, Nondistended; Bowel sounds present, RLQ TTP++, no HSM  EXTREMITIES:  2+ Peripheral Pulses, No clubbing, cyanosis, NON PITTING ANKLE EDEMA, R>l (CHRONIC, ANKLE SURGERY)  PSYCH: AAOx3, normal behavior  NEUROLOGY: non-focal, sensory and cn2-12intact  SKIN: No visible rashes or lesions    LABS:                        11.3   22.09 )-----------( 415      ( 08 Aug 2019 09:17 )             36.4         135  |  103  |  18  ----------------------------<  112<H>  4.3   |  26  |  1.14    Ca    9.7      08 Aug 2019 09:17    TPro  8.2  /  Alb  3.9  /  TBili  0.7  /  DBili  x   /  AST  25  /  ALT  22  /  AlkPhos  87  08    PT/INR - ( 08 Aug 2019 09:17 )   PT: 11.6 sec;   INR: 1.04 ratio         PTT - ( 08 Aug 2019 09:17 )  PTT:30.0 sec  CARDIAC MARKERS ( 08 Aug 2019 09:17 )  <0.015 ng/mL / x     / x     / x     / x          Urinalysis Basic - ( 08 Aug 2019 11:32 )    Color: Yellow / Appearance: Clear / S.010 / pH: x  Gluc: x / Ketone: Negative  / Bili: Negative / Urobili: Negative mg/dL   Blood: x / Protein: 15 mg/dL / Nitrite: Negative   Leuk Esterase: Small / RBC: 3-5 /HPF / WBC 11-25   Sq Epi: x / Non Sq Epi: Occasional / Bacteria: Occasional        RADIOLOGY & ADDITIONAL TESTS:    Imaging Personally Reviewed:  cxr neg    EKG Personally Reviewed:  sinus tachycardia

## 2019-08-08 NOTE — CHART NOTE - NSCHARTNOTEFT_GEN_A_CORE
Nurse called the rapid response on the patient after she was unresponsive after having a bowel movement. When the resident team responded patient was sitting in the chair, not-responding to the commands. Per nurse, there was no loss of consciousness.  After few seconds, the patient started responding to questions spontaneously. Stated that she felt weak but knew where she was.       Exam   Constitutional: AAOx3  CVS: s1s2,  2+pulses   Respiratory: normal air entry   Neuro: no lips smacking, no twitching, no tonic-clonic activity noted,     Vitals:   BP: 131/53  HR:95  O2: 97%      Assessment   -Transient unresponsiveness in elderly      Plan  -?unsure etiology (neurogenic vs cardiogenic)   -EKG  -vitals stable   -Pt will be transferred to tele for further management

## 2019-08-08 NOTE — ED ADULT NURSE REASSESSMENT NOTE - NS ED NURSE REASSESS COMMENT FT1
Patient received from previous nurse. Pt is A&Ox4, VSS on RA. Pt is resting comfortably in their bed at this time, denies any pain or discomfort at this time. Safety and comfort measures in place. Pt is awaiting a bed assignment. Hourly rounding will be done on my time. Will continue to monitor.

## 2019-08-08 NOTE — ED PROVIDER NOTE - ATTENDING CONTRIBUTION TO CARE
I personally saw the patient with the resident, and completed the key components of the history and physical exam. I then discussed the management plan with the resident.   gen in nad resp clear cardiac no murmur abd soft neuro intact

## 2019-08-08 NOTE — ED ADULT TRIAGE NOTE - CHIEF COMPLAINT QUOTE
Pt BIBEMS for evaluation of weakness, chills, started nitrofurantoin last night, was supposed to start chemo treatment today

## 2019-08-08 NOTE — ED PROVIDER NOTE - PHYSICAL EXAMINATION
*GEN:   in no acute distress, chronically ill appearing, AOx3  *EYES:   pupils equally round and reactive to light, extra-occular movements intact  *HEENT:   airway patent, moist mucosal membranes, full ROM neck  *CV:   regular rate and rhythm  *RESP:   clear to auscultation bilaterally, non-labored  *ABD:   soft, non-tender throughout  *:   no cva/flank tenderness  *EXTREM:   no MSK tenderness, full ROM throughout, no leg swelling  *SKIN:   dry, intact  *NEURO:   AOx3, no focal weakness or loss of sensation

## 2019-08-08 NOTE — H&P ADULT - PROBLEM SELECTOR PLAN 3
w/ hypotension/tachycardia/leukocytosis/weakness  w/ some contribution of dehydration likely   2/2 likely GNO  will treat as above  and check cultures  will hold losartan for low bp- resume when more stable  c/w metoprolol- w/ hold parameters

## 2019-08-08 NOTE — PROGRESS NOTE ADULT - SUBJECTIVE AND OBJECTIVE BOX
s/p RRT    patient with syncopal episode on returning from bathroom  now no complaints stable vitals bp, no complaints  states she has had multiple complaints  furhter w/u per primary team  ? vagal episode   cant contact hospitalist, message left on admit phone

## 2019-08-08 NOTE — PROVIDER CONTACT NOTE (CHANGE IN STATUS NOTIFICATION) - SITUATION
pt admitted with UTI, pt in bathroom s/p having a bowel movement. When pt was wheeled back to bed by 2 RN's she stopped responding

## 2019-08-09 LAB
ANION GAP SERPL CALC-SCNC: 6 MMOL/L — SIGNIFICANT CHANGE UP (ref 5–17)
BUN SERPL-MCNC: 18 MG/DL — SIGNIFICANT CHANGE UP (ref 7–23)
CALCIUM SERPL-MCNC: 8.9 MG/DL — SIGNIFICANT CHANGE UP (ref 8.5–10.1)
CHLORIDE SERPL-SCNC: 108 MMOL/L — SIGNIFICANT CHANGE UP (ref 96–108)
CO2 SERPL-SCNC: 22 MMOL/L — SIGNIFICANT CHANGE UP (ref 22–31)
CREAT SERPL-MCNC: 0.97 MG/DL — SIGNIFICANT CHANGE UP (ref 0.5–1.3)
GLUCOSE SERPL-MCNC: 89 MG/DL — SIGNIFICANT CHANGE UP (ref 70–99)
HCT VFR BLD CALC: 32.9 % — LOW (ref 34.5–45)
HGB BLD-MCNC: 10 G/DL — LOW (ref 11.5–15.5)
MCHC RBC-ENTMCNC: 27 PG — SIGNIFICANT CHANGE UP (ref 27–34)
MCHC RBC-ENTMCNC: 30.4 GM/DL — LOW (ref 32–36)
MCV RBC AUTO: 88.7 FL — SIGNIFICANT CHANGE UP (ref 80–100)
PLATELET # BLD AUTO: 361 K/UL — SIGNIFICANT CHANGE UP (ref 150–400)
POTASSIUM SERPL-MCNC: 4.4 MMOL/L — SIGNIFICANT CHANGE UP (ref 3.5–5.3)
POTASSIUM SERPL-SCNC: 4.4 MMOL/L — SIGNIFICANT CHANGE UP (ref 3.5–5.3)
RBC # BLD: 3.71 M/UL — LOW (ref 3.8–5.2)
RBC # FLD: 15.4 % — HIGH (ref 10.3–14.5)
SODIUM SERPL-SCNC: 136 MMOL/L — SIGNIFICANT CHANGE UP (ref 135–145)
WBC # BLD: 10.23 K/UL — SIGNIFICANT CHANGE UP (ref 3.8–10.5)
WBC # FLD AUTO: 10.23 K/UL — SIGNIFICANT CHANGE UP (ref 3.8–10.5)

## 2019-08-09 RX ADMIN — Medication 650 MILLIGRAM(S): at 21:40

## 2019-08-09 RX ADMIN — PANTOPRAZOLE SODIUM 40 MILLIGRAM(S): 20 TABLET, DELAYED RELEASE ORAL at 06:13

## 2019-08-09 RX ADMIN — SODIUM CHLORIDE 125 MILLILITER(S): 9 INJECTION INTRAMUSCULAR; INTRAVENOUS; SUBCUTANEOUS at 11:59

## 2019-08-09 RX ADMIN — ENOXAPARIN SODIUM 40 MILLIGRAM(S): 100 INJECTION SUBCUTANEOUS at 11:08

## 2019-08-09 RX ADMIN — Medication 1 DROP(S): at 19:32

## 2019-08-09 RX ADMIN — Medication 650 MILLIGRAM(S): at 00:03

## 2019-08-09 RX ADMIN — Medication 0.25 MILLIGRAM(S): at 21:40

## 2019-08-09 RX ADMIN — Medication 50 MILLIGRAM(S): at 06:13

## 2019-08-09 RX ADMIN — Medication 325 MILLIGRAM(S): at 11:07

## 2019-08-09 RX ADMIN — Medication 650 MILLIGRAM(S): at 22:02

## 2019-08-09 RX ADMIN — SODIUM CHLORIDE 125 MILLILITER(S): 9 INJECTION INTRAMUSCULAR; INTRAVENOUS; SUBCUTANEOUS at 21:41

## 2019-08-09 RX ADMIN — Medication 650 MILLIGRAM(S): at 13:45

## 2019-08-09 RX ADMIN — Medication 650 MILLIGRAM(S): at 13:12

## 2019-08-09 RX ADMIN — Medication 75 MICROGRAM(S): at 06:13

## 2019-08-09 RX ADMIN — CEFTRIAXONE 1000 MILLIGRAM(S): 500 INJECTION, POWDER, FOR SOLUTION INTRAMUSCULAR; INTRAVENOUS at 11:08

## 2019-08-09 NOTE — CONSULT NOTE ADULT - ASSESSMENT
89 year old female admitted with weakness.   she has had no appetite for 2 days.     8/9/2019  iv fluids  await urine culture  continue medications for blood pressure

## 2019-08-09 NOTE — CONSULT NOTE ADULT - SUBJECTIVE AND OBJECTIVE BOX
PCP:    REQUESTING PHYSICIAN:    REASON FOR CONSULT:    CHIEF COMPLAINT:    HPI:  HPI:  89F w/PMH CAD/stents, hypothyroid, endometrial ca, bladder ca (diagnosed ), recurred since , scheduled to start chemo today, presents this AM c/o generalized weakness since 400 when she woke up.  Pt was diagnosed w/ UTI 1.5 weeks ago, initially placed on keflex from urgent care, later changed to cipro by her PCP, but as of yesterday she was changed to macrobid, of which she had one dose last night.  She woke this AM and was too weak to get out of bed, she eventually got up and went to the bathroom, but was too weak to get up from there.  EMS called, pt was aaox3, has 24H caregiver who was present w/ her.  She denies any dizziness, lightheadedness, n/v/d, abd pain, cp, sob, fever/chills.  She's had recurrent UTI, but this is first time she's felt this way.     In ED, WBC 22, hg 11 (prior 8.9 in July), platelets 415 (300's July), UTI (weakly pos), low bp, received IV rocephin and 2L ivf in ED, LA 1.9, decreased GFR from prior (Cr 1.1- prior 0.7).  CXR neg, CT a/p pending.     PAST MEDICAL & SURGICAL HISTORY:  Endometrial adenocarcinoma ()  Bladder cancer (), recurred 2019  Macular degeneration  Stented coronary artery  Hypothyroid  Hyperlipidemia  HTN (hypertension)  GERD (gastroesophageal reflux disease)  CAD (coronary artery disease)  [place] (hard of hearing)  S/P EMILY (total abdominal hysterectomy)  S/P hernia repair: umbilical -   S/P laparoscopic cholecystectomy:   Ankle fracture, right: surgery 25 yrs ago - hardware removed  S/P coronary angiogram: , ,  - drug eluding stents      Review of Systems:   CONSTITUTIONAL: No fever.  ++WEAKNESS  EYES: No eye pain or discharge.  ENMT:  No sinus or throat pain  NECK: No pain or stiffness  RESPIRATORY: No cough, wheezing, chills or hemoptysis; No shortness of breath  CARDIOVASCULAR: No chest pain, palpitations, dizziness, or leg swelling  GASTROINTESTINAL: No abdominal or epigastric pain. No nausea, vomiting, or hematemesis; No diarrhea or constipation. No melena or hematochezia.  GENITOURINARY: No dysuria or incontinence  NEUROLOGICAL: No headaches, memory loss, loss of strength, numbness, or tremors  SKIN: No rashes.  LYMPH NODES: No enlarged glands  ENDOCRINE: No heat or cold intolerance; No hair loss  MUSCULOSKELETAL: No joint pain or swelling; No muscle, back, or extremity pain  PSYCHIATRIC: No depression, anxiety, mood swings, or difficulty sleeping  HEME/LYMPH: No easy bruising, or bleeding gums  ALLERY AND IMMUNOLOGIC: No hives or eczema    Allergies    amlodipine (Unknown)  clonidine (Unknown)  Florinef Acetate (Unknown)  hydrocodone (Unknown)  Levatol (Unknown)  Lipitor (Unknown)  Lotrel (Unknown)  methylPREDNISolone (Unknown)  morphine (Other)  Plaquenil (Unknown)  statins (Other (Unknown))  sulfa drugs (Rash)    Intolerances        Social History:   LIVES ALONE, HAS 24H CAREGIVER  USES F[employer]  IND ADLs    FAMILY HISTORY:  Family history of brain cancer      Home Medications:  acetaminophen 500 mg oral tablet: 2 tab(s) orally 2 times a day  (08 Aug 2019 13:08)  aspirin 325 mg oral tablet: 1 tab(s) orally once a day (08 Aug 2019 13:08)  cephalexin 500 mg oral capsule: 1 cap(s) orally every 12 hours  ****Course Stopped*** (08 Aug 2019 13:08)  ciprofloxacin 500 mg oral tablet: 1 tab(s) orally every 12 hours  ***Course Stopped**** (08 Aug 2019 13:08)  I-James oral tablet: 1 tab(s) orally once a day (08 Aug 2019 13:08)  losartan 25 mg oral tablet: 1 tab(s) orally once a day (08 Aug 2019 13:08)  metoprolol succinate 50 mg oral tablet, extended release: 1 tab(s) orally once a day (08 Aug 2019 13:08)  pantoprazole 40 mg oral delayed release tablet: 1 tab(s) orally once a day (08 Aug 2019 13:08)  Refresh ophthalmic solution: 1 drop(s) to each affected eye 3 times a day (08 Aug 2019 13:08)  Synthroid 75 mcg (0.075 mg) oral tablet: 1 tab(s) orally once a day (08 Aug 2019 13:08)  Vitamin D3 1000 intl units oral tablet: 2 tab(s) orally once a day (08 Aug 2019 13:08)  Xanax 0.25 mg oral tablet: 0.5 tab(s) orally once a day (at bedtime) (08 Aug 2019 13:08)      MEDICATIONS  (STANDING):  ALPRAZolam 0.25 milliGRAM(s) Oral at bedtime  aspirin 325 milliGRAM(s) Oral daily  enoxaparin Injectable 40 milliGRAM(s) SubCutaneous daily  levothyroxine 75 MICROGram(s) Oral daily  metoprolol succinate ER 50 milliGRAM(s) Oral daily  pantoprazole    Tablet 40 milliGRAM(s) Oral before breakfast  sodium chloride 0.9%. 1000 milliLiter(s) (125 mL/Hr) IV Continuous <Continuous>    MEDICATIONS  (PRN):  acetaminophen   Tablet .. 650 milliGRAM(s) Oral every 4 hours PRN Temp greater or equal to 38C (100.4F), Mild Pain (1 - 3)  ondansetron Injectable 4 milliGRAM(s) IV Push every 6 hours PRN Nausea        PHYSICAL EXAM:  Vital Signs Last 24 Hrs  T(C): 38.1 (08 Aug 2019 12:55), Max: 38.1 (08 Aug 2019 12:55)  T(F): 100.5 (08 Aug 2019 12:55), Max: 100.5 (08 Aug 2019 12:55)  HR: 92 (08 Aug 2019 12:55) (92 - 99)  BP: 127/34 (08 Aug 2019 12:55) (102/41 - 149/43)  RR: 18 (08 Aug 2019 12:55) (17 - 19)  SpO2: 96% (08 Aug 2019 12:55) (94% - 100%)  GENERAL: NAD, FRAIL  HEAD:  Atraumatic, Normocephalic  EYES: EOMI, PERRLA, conjunctiva and sclera clear  ENT: normal hearing, no nasal discharge, dentition normal for age  NECK: Supple, No JVD, no LAD, no thyromegaly  CHEST/LUNG: Clear to auscultation bilaterally; No wheeze, resp unlabored  HEART: Regular rate and rhythm; No murmurs, rubs, or gallops  ABDOMEN: Soft, Nondistended; Bowel sounds present, RLQ TTP++, no HSM  EXTREMITIES:  2+ Peripheral Pulses, No clubbing, cyanosis, NON PITTING ANKLE EDEMA, R>l (CHRONIC, ANKLE SURGERY)  PSYCH: AAOx3, normal behavior  NEUROLOGY: non-focal, sensory and cn2-12intact  SKIN: No visible rashes or lesions    LABS:                        11.3   22.09 )-----------( 415      ( 08 Aug 2019 09:17 )             36.4         135  |  103  |  18  ----------------------------<  112<H>  4.3   |  26  |  1.14    Ca    9.7      08 Aug 2019 09:17    TPro  8.2  /  Alb  3.9  /  TBili  0.7  /  DBili  x   /  AST  25  /  ALT  22  /  AlkPhos  87  08    PT/INR - ( 08 Aug 2019 09:17 )   PT: 11.6 sec;   INR: 1.04 ratio         PTT - ( 08 Aug 2019 09:17 )  PTT:30.0 sec  CARDIAC MARKERS ( 08 Aug 2019 09:17 )  <0.015 ng/mL / x     / x     / x     / x          Urinalysis Basic - ( 08 Aug 2019 11:32 )    Color: Yellow / Appearance: Clear / S.010 / pH: x  Gluc: x / Ketone: Negative  / Bili: Negative / Urobili: Negative mg/dL   Blood: x / Protein: 15 mg/dL / Nitrite: Negative   Leuk Esterase: Small / RBC: 3-5 /HPF / WBC 11-25   Sq Epi: x / Non Sq Epi: Occasional / Bacteria: Occasional        RADIOLOGY & ADDITIONAL TESTS:    Imaging Personally Reviewed:  cxr neg    EKG Personally Reviewed:  sinus tachycardia (08 Aug 2019 13:42)      PAST MEDICAL & SURGICAL HISTORY:  Endometrial adenocarcinoma  Macular degeneration  Stented coronary artery  Hypothyroid  Hyperlipidemia  HTN (hypertension)  GERD (gastroesophageal reflux disease)  CAD (coronary artery disease)  [place] (hard of hearing)  S/P EMILY (total abdominal hysterectomy)  S/P hernia repair: umbilical -   S/P laparoscopic cholecystectomy:   Ankle fracture, right: surgery 25 yrs ago - hardware removed  S/P coronary angiogram: , ,  - drug eluding stents    MEDICATIONS  (STANDING):  ALPRAZolam 0.25 milliGRAM(s) Oral at bedtime  artificial tears (preservative free) Ophthalmic Solution 1 Drop(s) Both EYES three times a day  aspirin 325 milliGRAM(s) Oral daily  enoxaparin Injectable 40 milliGRAM(s) SubCutaneous daily  levothyroxine 75 MICROGram(s) Oral daily  metoprolol succinate ER 50 milliGRAM(s) Oral daily  pantoprazole    Tablet 40 milliGRAM(s) Oral before breakfast  sodium chloride 0.9%. 1000 milliLiter(s) (125 mL/Hr) IV Continuous <Continuous>    MEDICATIONS  (PRN):  acetaminophen   Tablet .. 650 milliGRAM(s) Oral every 4 hours PRN Temp greater or equal to 38C (100.4F), Mild Pain (1 - 3)  ondansetron Injectable 4 milliGRAM(s) IV Push every 6 hours PRN Nausea    Allergies    amlodipine (Unknown)  clonidine (Unknown)  Florinef Acetate (Unknown)  hydrocodone (Unknown)  Levatol (Unknown)  Lipitor (Unknown)  Lotrel (Unknown)  methylPREDNISolone (Unknown)  morphine (Other)  Plaquenil (Unknown)  statins (Other (Unknown))  sulfa drugs (Rash)    Intolerances      FAMILY HISTORY:  Family history of brain cancer        REVIEW OF SYSTEMS:    CONSTITUTIONAL: No weakness, fevers or chills  EYES/ENT: No visual changes;  No vertigo or throat pain   NECK: No pain or stiffness  RESPIRATORY: No cough, wheezing, hemoptysis; No shortness of breath  CARDIOVASCULAR: No chest pain or palpitations  GASTROINTESTINAL: No abdominal or epigastric pain. No nausea, vomiting, or hematemesis; No diarrhea or constipation. No melena or hematochezia.  GENITOURINARY: No dysuria, frequency or hematuria  NEUROLOGICAL: No numbness or weakness  SKIN: No itching, burning, rashes, or lesions   All other review of systems is negative unless indicated above      PHYSICAL EXAM:  Daily     Daily   Vital Signs Last 24 Hrs  T(C): 36.4 (09 Aug 2019 19:39), Max: 36.7 (09 Aug 2019 06:07)  T(F): 97.6 (09 Aug 2019 19:39), Max: 98.1 (09 Aug 2019 06:07)  HR: 79 (09 Aug 2019 22:39) (77 - 85)  BP: 180/64 (09 Aug 2019 22:39) (115/58 - 187/69)  BP(mean): --  RR: 18 (09 Aug 2019 19:39) (18 - 18)  SpO2: 100% (09 Aug 2019 19:39) (93% - 100%)    Constitutional: NAD, awake and alert, well-developed  HEENT: PERR, EOMI, Normal Hearing, MMM  Neck: Soft and supple, No LAD, No JVD  Respiratory: Breath sounds are clear bilaterally, No wheezing, rales or rhonchi  Cardiovascular: S1 and S2, regular rate and rhythm, no Murmurs, gallops or rubs  Gastrointestinal: Bowel Sounds present, soft, nontender, nondistended, no guarding, no rebound  Extremities: No peripheral edema  Vascular: 2+ peripheral pulses  Neurological: A/O x 3, no focal deficits  Musculoskeletal: 5/5 strength b/l upper and lower extremities  Skin: No rashes    LABS: All Labs Reviewed:                        10.0   10. )-----------( 361      ( 09 Aug 2019 08:17 )             32.9         136  |  108  |  18  ----------------------------<  89  4.4   |  22  |  0.97    Ca    8.9      09 Aug 2019 08:17    TPro  8.2  /  Alb  3.9  /  TBili  0.7  /  DBili  x   /  AST  25  /  ALT  22  /  AlkPhos  87  08-08    PT/INR - ( 08 Aug 2019 09:17 )   PT: 11.6 sec;   INR: 1.04 ratio         PTT - ( 08 Aug 2019 09:17 )  PTT:30.0 sec  CARDIAC MARKERS ( 08 Aug 2019 13:36 )  <0.015 ng/mL / x     / x     / x     / x      CARDIAC MARKERS ( 08 Aug 2019 09:17 )  <0.015 ng/mL / x     / x     / x     / x          Blood Culture: Organism --  Gram Stain Blood -- Gram Stain --  Specimen Source .Urine None  Culture-Blood --    Organism --  Gram Stain Blood -- Gram Stain --  Specimen Source .Blood None  Culture-Blood --        RADIOLOGY:  EXAM:  CT ABDOMEN AND PELVIS                            PROCEDURE DATE:  2019          INTERPRETATION:  CLINICAL STATEMENT: uti, r/o pyelo, other pathology    TECHNIQUE: CT of the abdomen and pelvis was performed in the axial plane   utilizing thin slices without IV or oral contrast. Sagittal and coronal   reformatting was performed.    COMPARISON: 7/3/2019    FINDINGS:    The lower chest demonstrates mild scarring. Coronary artery calcification   is noted. Tracheobronchial calcification is seen. There is mitral annular   calcification as well.    The evaluation of the abdominal viscera and the bowel is limited without   contrast.    The liver is grossly unremarkable. The gallbladder is not identified and   may be surgically absent    The spleen, pancreas and adrenal glands are grossly unremarkable.    There is no hydronephrosis or urinary calculus. The bladder is   unremarkable    There is no bowel obstruction.  There is no intraperitoneal free air.    There is no free fluid.The appendix is not seen. However there are no   secondary signs of appendicitis    The aorta is not aneurysmal. There is atherosclerotic calcification of   the abdominal aorta and its branch There is no significant abdominal,   retroperitoneal or pelvic lymphadenopathy. Pelvic structures are   remarkable for probable hysterectomy.    The bony structures demonstrate degenerative changes of the spine and   osteopenia.    IMPRESSION: Limited evaluation for pyelonephritis without IV contrast.    No hydronephrosis or urinary calculus. Previously seen hydronephrosis is   no longer identified. Previously seen blood products in the bladder are   no longer identified.

## 2019-08-09 NOTE — CONSULT NOTE ADULT - ASSESSMENT
89 y/o Female with h/o CAD/stents, hypothyroidism, endometrial Ca, recurrent bladder Ca, scheduled to start chemo, recurrent UTI's was admitted on 8/8 for generalized weakness x several hours. Pt was diagnosed w/ UTI 1.5 weeks ago, initially placed on keflex from urgent care, later changed to cipro by her PCP, but as of yesterday she was changed to macrobid, of which she had one dose last night.  She was too weak to get out of bed.  EMS was called. In ER she was found febrile and was given ceftriaxone.     1. Febrile syndrome. Pyuria. Probable UTI. 89 y/o Female with h/o CAD/stents, hypothyroidism, endometrial Ca, recurrent bladder Ca, scheduled to start chemo, recurrent UTI's was admitted on 8/8 for generalized weakness x several hours. Pt was diagnosed w/ UTI 1.5 weeks ago, initially placed on keflex from urgent care, later changed to cipro by her PCP, but as of yesterday she was changed to macrobid, of which she had one dose last night.  She was too weak to get out of bed.  EMS was called. In ER she was found febrile and was given ceftriaxone.     1. Fatigue. Febrile syndrome. Pyuria. Probable UTI. Bladder Ca.  -no clinical evidence of pneumonia  -cause of fatigue is likely multifactorial: underlying malignancy, probable ongoing infection  -obtain BC x 2, urine c/s  -agree with ceftriaxone 1 gm IV qd  -reason for abx use and side effects reviewed with patient; monitor BMP  -old chart reviewed to assess prior cultures  -monitor temps  -f/u CBC  -supportive care  2. Other issues:   -care per medicine

## 2019-08-09 NOTE — CONSULT NOTE ADULT - SUBJECTIVE AND OBJECTIVE BOX
Patient is a 89y old  Female who presents with a chief complaint of weakness     HPI:  89 y/o Female with h/o CAD/stents, hypothyroidism, endometrial Ca, recurrent bladder Ca, scheduled to start chemo, recurrent UTI's was admitted on  for generalized weakness x several hours. Pt was diagnosed w/ UTI 1.5 weeks ago, initially placed on keflex from urgent care, later changed to cipro by her PCP, but as of yesterday she was changed to macrobid, of which she had one dose last night.  She was too weak to get out of bed.  EMS was called. In ER she was found febrile and was given ceftriaxone.     PAST MEDICAL & SURGICAL HISTORY:  Endometrial adenocarcinoma ()  Bladder cancer (2018), recurred 2019  Macular degeneration  Stented coronary artery  Hypothyroid  Hyperlipidemia  HTN (hypertension)  GERD (gastroesophageal reflux disease)  CAD (coronary artery disease)  [place] (hard of hearing)  S/P EMILY (total abdominal hysterectomy)  S/P hernia repair: umbilical -   S/P laparoscopic cholecystectomy:   Ankle fracture, right: surgery 25 yrs ago - hardware removed  S/P coronary angiogram: , ,  - drug eluding stents      Meds: per reconciliation sheet, noted below  MEDICATIONS  (STANDING):  ALPRAZolam 0.25 milliGRAM(s) Oral at bedtime  aspirin 325 milliGRAM(s) Oral daily  cefTRIAXone Injectable. 1000 milliGRAM(s) IV Push once  enoxaparin Injectable 40 milliGRAM(s) SubCutaneous daily  levothyroxine 75 MICROGram(s) Oral daily  metoprolol succinate ER 50 milliGRAM(s) Oral daily  pantoprazole    Tablet 40 milliGRAM(s) Oral before breakfast  sodium chloride 0.9%. 1000 milliLiter(s) (125 mL/Hr) IV Continuous <Continuous>    MEDICATIONS  (PRN):  acetaminophen   Tablet .. 650 milliGRAM(s) Oral every 4 hours PRN Temp greater or equal to 38C (100.4F), Mild Pain (1 - 3)  ondansetron Injectable 4 milliGRAM(s) IV Push every 6 hours PRN Nausea    Allergies    amlodipine (Unknown)  clonidine (Unknown)  Florinef Acetate (Unknown)  hydrocodone (Unknown)  Levatol (Unknown)  Lipitor (Unknown)  Lotrel (Unknown)  methylPREDNISolone (Unknown)  morphine (Other)  Plaquenil (Unknown)  statins (Other (Unknown))  sulfa drugs (Rash)    Intolerances    Social: no smoking, no alcohol, no illegal drugs; no recent travel, no exposure to TB  FAMILY HISTORY:  Family history of brain cancer    no history of premature cardiovascular disease in first degree relatives  brain Ca    ROS: the patient denies fever, no chills, no HA, no seizures, no dizziness, no sore throat, no nasal congestion, no blurry vision, no CP, no palpitations, no SOB, no cough, no abdominal pain, no diarrhea, no N/V, no dysuria, has urinary frequency, no leg pain, no claudication, no rash, no joint aches, no rectal pain or bleeding, no night sweats  All other systems reviewed and are negative    Vital Signs Last 24 Hrs  T(C): 36.7 (09 Aug 2019 06:07), Max: 38.1 (08 Aug 2019 12:55)  T(F): 98.1 (09 Aug 2019 06:07), Max: 100.5 (08 Aug 2019 12:55)  HR: 85 (09 Aug 2019 06:07) (85 - 96)  BP: 115/58 (09 Aug 2019 06:07) (102/41 - 153/51)  BP(mean): --  RR: 18 (09 Aug 2019 06:07) (16 - 20)  SpO2: 93% (09 Aug 2019 06:07) (93% - 100%)  Daily     Daily     PE:    Constitutional: frail looking  HEENT: NC/AT, EOMI, PERRLA, conjunctivae clear; ears and nose atraumatic; pharynx benign  Neck: supple; thyroid not palpable  Back: no tenderness  Respiratory: respiratory effort normal; clear to auscultation  Cardiovascular: S1S2 regular, no murmurs  Abdomen: soft, not tender, not distended, positive BS; no liver or spleen organomegaly  Genitourinary: no suprapubic tenderness  Lymphatic: no LN palpable  Musculoskeletal: no muscle tenderness, no joint swelling or tenderness  Extremities: no pedal edema  Neurological/ Psychiatric: AxOx3, judgement and insight normal; moving all extremities  Skin: no rashes; no palpable lesions    Labs: all available labs reviewed                        10.0   10.23 )-----------( 361      ( 09 Aug 2019 08:17 )             32.9     -    136  |  108  |  18  ----------------------------<  89  4.4   |  22  |  0.97    Ca    8.9      09 Aug 2019 08:17    TPro  8.2  /  Alb  3.9  /  TBili  0.7  /  DBili  x   /  AST  25  /  ALT  22  /  AlkPhos  87  08-08     LIVER FUNCTIONS - ( 08 Aug 2019 09:17 )  Alb: 3.9 g/dL / Pro: 8.2 gm/dL / ALK PHOS: 87 U/L / ALT: 22 U/L / AST: 25 U/L / GGT: x           Urinalysis Basic - ( 08 Aug 2019 11:32 )    Color: Yellow / Appearance: Clear / S.010 / pH: x  Gluc: x / Ketone: Negative  / Bili: Negative / Urobili: Negative mg/dL   Blood: x / Protein: 15 mg/dL / Nitrite: Negative   Leuk Esterase: Small / RBC: 3-5 /HPF / WBC 11-25   Sq Epi: x / Non Sq Epi: Occasional / Bacteria: Occasional      Radiology: all available radiological tests reviewed    < from: CT Abdomen and Pelvis No Cont (19 @ 12:47) >  Limited evaluation for pyelonephritis without IV contrast.    No hydronephrosis or urinary calculus. Previously seen hydronephrosis is   no longer identified. Previously seen blood products in the bladder are   no longer identified.    < end of copied text >    < from: Xray Chest 1 View- PORTABLE-Urgent (19 @ 09:06) >  Mild increased interstitial lung markings without significant change. No   gross new consolidation or pleural effusion.    < end of copied text >      Advanced directives addressed: full resuscitation

## 2019-08-09 NOTE — CONSULT NOTE ADULT - ASSESSMENT
1. Recurrent UTIs  2. Recurrent HG T1 bladder cancer    Plan:    -follow up urine culture  -continue IV antibiotics as per primary team  -please measure PVR to assure she is adequately emptying bladder as she has had a recent history of recurrent urinary retention  -will start on methenamine 1g BID after completing current course of antibiotics to help prevent future development of UTIs  -to follow up as outpatient within 1 week of discharge to discuss a 2nd induction course of intravesical BCG for recurrent bladder cancer. She understands.     Thank you for allowing me to assist in the care of this patient  Please feel free to call with any questions/concerns    John Cano MD  [facility]  W: [phone]

## 2019-08-09 NOTE — PROGRESS NOTE ADULT - ASSESSMENT
· Assessment		  89F w/PMH CAD/stents, hypothyroid, endometrial ca, bladder ca (diagnosed 2018), recurred since June, scheduled to start chemo today, presents this AM c/o generalized weakness since 0400 when she woke up.  Pt was diagnosed w/ UTI 1.5 weeks ago, initially placed on keflex from urgent care, later changed to cipro by her PCP, but as of yesterday she was changed to macrobid, of which she had one dose last night.     In ED, WBC 22, hg 11 (prior 8.9 in July), platelets 415 (300's July), UTI (weakly pos), low bp, received IV rocephin and 2L ivf in ED, LA 1.9, decreased GFR from prior (Cr 1.1- prior 0.7).  CXR neg, CT a/p pending.     Problem/Plan - 1:  ·  Problem: Generalized weakness.  Plan: 2/2 UTI, dehydration, medication (macrobid)  treat underlying cause as below  PT eval when more stable.     Problem/Plan - 2:  ·  Problem: UTI (urinary tract infection).  Plan: received IV rocephin  will check urine/blood cultures  ID cs  IVF  IV rocephin.     Problem/Plan - 3:  ·  Problem: Sepsis.  Plan: w/ hypotension/tachycardia/leukocytosis/weakness  w/ some contribution of dehydration likely   2/2 likely GNO  will treat as above  and check cultures  will hold losartan for low bp- resume when more stable  c/w metoprolol- w/ hold parameters.     Problem/Plan - 4:  ·  Problem: Hypothyroid.  Plan: c/w home dose of synthroid  stable. · Assessment		  89F w/PMH CAD/stents, hypothyroid, endometrial ca, bladder ca (diagnosed 2018), recurred since June, scheduled to start chemo today, presents this AM c/o generalized weakness since 0400 when she woke up.  Pt was diagnosed w/ UTI 1.5 weeks ago, initially placed on keflex from urgent care, later changed to cipro by her PCP, but as of yesterday she was changed to macrobid, of which she had one dose last night.     In ED, WBC 22, hg 11 (prior 8.9 in July), platelets 415 (300's July), UTI (weakly pos), low bp, received IV rocephin and 2L ivf in ED, LA 1.9, decreased GFR from prior (Cr 1.1- prior 0.7).  CXR neg, CT a/p pending.     A/P  Gen weakness 2/2 Sepsis 2/2 UTI,  pt w/ hypotension, tachycardia, leukocytosis, weakness, dehydration  IV rocephin  will check urine/blood cultures    Syncope  multifactorial: infn and orthostatic hypotension  will hold losartan for low bp  stop BB also  IVFs, possibly KAYLEIGH stocking, might midodrine low dose if needed with permissive HTN    CADs/p PCI  might restart low dose BB if possible  re-eval after IVFs and improvement of infn       Hypothyroid  Synthroid    Bladder Ca s/p BCG induction course, next rx 1 week after dc home  f/u with Urology    h/o EM Ca s/p EMILY      Other - lives alone, wants to go home with home care  discussed with RN

## 2019-08-09 NOTE — PROGRESS NOTE ADULT - SUBJECTIVE AND OBJECTIVE BOX
Patient is a 89y old  Female who presents with a chief complaint of weakness (09 Aug 2019 10:04)    Pt had rapid response for syncopal episode after returning from having BM, assisted by RN.  I s/w Intensivist, RN on floor, went to see pt  she's not aware that she syncopized.  She just keeps stating that she feels very weak.  She often gets up and after moving around for a short while, she has to sit down as she feels very weak. She states this has happened before.      will move pt to tele,  cardio, check orthostatic vitals.    SUBJECTIVE:   sleeping, easily arousable or awake  no cp palps sob  no abdo pain  last bm   tolerating a po diet  no tingling or numbness anywhere      T(C): 36.6 (19 @ 11:45), Max: 36.7 (19 @ 06:07)  HR: 83 (19 @ 11:45) (83 - 85)  BP: 140/55 (19 @ 11:45) (115/58 - 140/55)  RR: 18 (19 @ 11:45) (18 - 18)  SpO2: 95% (19 @ 11:45) (93% - 95%)  Wt(kg): --      LABS:                        10.0   10.23 )-----------( 361      ( 09 Aug 2019 08:17 )             32.9         136  |  108  |  18  ----------------------------<  89  4.4   |  22  |  0.97    Ca    8.9      09 Aug 2019 08:17    TPro  8.2  /  Alb  3.9  /  TBili  0.7  /  DBili  x   /  AST  25  /  ALT  22  /  AlkPhos  87  -    PT/INR - ( 08 Aug 2019 09:17 )   PT: 11.6 sec;   INR: 1.04 ratio         PTT - ( 08 Aug 2019 09:17 )  PTT:30.0 sec  Urinalysis Basic - ( 08 Aug 2019 11:32 )    Color: Yellow / Appearance: Clear / S.010 / pH: x  Gluc: x / Ketone: Negative  / Bili: Negative / Urobili: Negative mg/dL   Blood: x / Protein: 15 mg/dL / Nitrite: Negative   Leuk Esterase: Small / RBC: 3-5 /HPF / WBC 11-25   Sq Epi: x / Non Sq Epi: Occasional / Bacteria: Occasional        CAPILLARY BLOOD GLUCOSE          CARDIAC MARKERS ( 08 Aug 2019 13:36 )  <0.015 ng/mL / x     / x     / x     / x      CARDIAC MARKERS ( 08 Aug 2019 09:17 )  <0.015 ng/mL / x     / x     / x     / x              RECENT CULTURES:        IMAGING reviewed by me:    EKG/Tele reviewed by me:      PHYSICAL EXAM:  GENERAL: awake, alert, oriented   HEAD:  Atraumatic, Normocephalic  EYES: EOMI, PERRLA, conjunctiva and sclera clear  ENMT: Moist mucous membranes  NECK: Supple, No JVD, thyroid nontender  NERVOUS SYSTEM:  Alert & Oriented X3, PERRL, no facial asymmetry, tongue midline, moving all extremities, no focal motor or sensory deficits  CHEST/LUNG: Clear to auscultation bilaterally; No rales, rhonchi, wheezing, or rubs  HEART: Regular rate and rhythm; No murmurs, rubs, or gallops  ABDOMEN: Soft, Nontender, Nondistended; Bowel sounds present  EXTREMITIES:  2+ Peripheral Pulses, No clubbing, cyanosis, or edema  MUSCULOSKELTAL- no joint swelling or erythema  SKIN-no rash, no lesion  CATHETERS AND LINES:      acetaminophen   Tablet .. 650 milliGRAM(s) Oral every 4 hours PRN  ALPRAZolam 0.25 milliGRAM(s) Oral at bedtime  artificial tears (preservative free) Ophthalmic Solution 1 Drop(s) Both EYES three times a day  aspirin 325 milliGRAM(s) Oral daily  enoxaparin Injectable 40 milliGRAM(s) SubCutaneous daily  levothyroxine 75 MICROGram(s) Oral daily  metoprolol succinate ER 50 milliGRAM(s) Oral daily  ondansetron Injectable 4 milliGRAM(s) IV Push every 6 hours PRN  pantoprazole    Tablet 40 milliGRAM(s) Oral before breakfast  sodium chloride 0.9%. 1000 milliLiter(s) IV Continuous <Continuous> Patient is a 89y old  Female who presents with a chief complaint of weakness (09 Aug 2019 10:04)    Overnight pt had an RRT for syncopal episode after returning from having BM, assisted by RN.  Pt reports feeling very weak and out of it for a few minutes.  Orthostats are positive. Pt lives alone at home and wants to return home. She states she will absolutely not go to a rehab    SUBJECTIVE:   awake, alert oriented  no cp palps sob  feels better this morning      T(C): 36.6 (19 @ 11:45), Max: 36.7 (19 @ 06:07)  HR: 83 (19 @ 11:45) (83 - 85)  BP: 140/55 (19 @ 11:45) (115/58 - 140/55)    LABS:                        10.0   10.23 )-----------( 361      ( 09 Aug 2019 08:17 )             32.9     Urinalysis Basic - ( 08 Aug 2019 11:32 )    Color: Yellow / Appearance: Clear / S.010 / pH: x  Gluc: x / Ketone: Negative  / Bili: Negative / Urobili: Negative mg/dL   Blood: x / Protein: 15 mg/dL / Nitrite: Negative   Leuk Esterase: Small / RBC: 3-5 /HPF / WBC 11-25   Sq Epi: x / Non Sq Epi: Occasional / Bacteria: Occasional    IMAGING reviewed by me:  < from: CT Abdomen and Pelvis No Cont (19 @ 12:47) >  IMPRESSION: Limited evaluation for pyelonephritis without IV contrast.    No hydronephrosis or urinary calculus. Previously seen hydronephrosis is   no longer identified. Previously seen blood products in the bladder are   no longer identified.    PHYSICAL EXAM:  GENERAL: awake, alert, oriented   HEAD:  Atraumatic, Normocephalic  EYES: EOMI, PERRLA, conjunctiva and sclera clear  ENMT: Moist mucous membranes  NECK: Supple, No JVD, thyroid nontender  NERVOUS SYSTEM:  Alert & Oriented X3, PERRL, no facial asymmetry, tongue midline, moving all extremities, no focal motor or sensory deficits  CHEST/LUNG: Clear to auscultation bilaterally; No rales, rhonchi, wheezing, or rubs  HEART: Regular rate and rhythm; No murmurs, rubs, or gallops  ABDOMEN: Soft, Nontender, Nondistended; Bowel sounds present  EXTREMITIES:  2+ Peripheral Pulses, No clubbing, cyanosis, or edema  MUSCULOSKELTAL- no joint swelling or erythema  SKIN-no rash, no lesion        acetaminophen   Tablet .. 650 milliGRAM(s) Oral every 4 hours PRN  ALPRAZolam 0.25 milliGRAM(s) Oral at bedtime  artificial tears (preservative free) Ophthalmic Solution 1 Drop(s) Both EYES three times a day  aspirin 325 milliGRAM(s) Oral daily  enoxaparin Injectable 40 milliGRAM(s) SubCutaneous daily  levothyroxine 75 MICROGram(s) Oral daily  metoprolol succinate ER 50 milliGRAM(s) Oral daily  ondansetron Injectable 4 milliGRAM(s) IV Push every 6 hours PRN  pantoprazole    Tablet 40 milliGRAM(s) Oral before breakfast  sodium chloride 0.9%. 1000 milliLiter(s) IV Continuous <Continuous>

## 2019-08-09 NOTE — CONSULT NOTE ADULT - SUBJECTIVE AND OBJECTIVE BOX
UROLOGY CONSULT    HPI: patient is 89y Female with multiple comorbidities with recurrent HG T1 bladder cancer s/p BCG induction course who presented with weakness and overall malaise, fatigue. Admitted for UTI. Recent history of recurrent UTIs - most recently diagnosed with UTI at urgent care and placed on ciprofloxacin which she could not tolerated. Denies dysuria, hematuria. Voiding well with no sense of incomplete bladder emptying. No fevers, chills, nausea, vomiting.       PMH/PSH  Urinary tract infection  Family history of brain cancer  No pertinent family history in first degree relatives  Endometrial adenocarcinoma  Uterine cancer  Macular degeneration  Stented coronary artery  Hypothyroid  Hyperlipidemia  HTN (hypertension)  GERD (gastroesophageal reflux disease)  CAD (coronary artery disease)  [place] (hard of hearing)  S/P EMILY (total abdominal hysterectomy)  S/P hernia repair  S/P laparoscopic cholecystectomy  Ankle fracture, right  S/P coronary angiogram      Family History:  FAMILY HISTORY:  Family history of brain cancer      Allergies  amlodipine (Unknown)  clonidine (Unknown)  Florinef Acetate (Unknown)  hydrocodone (Unknown)  Levatol (Unknown)  Lipitor (Unknown)  Lotrel (Unknown)  methylPREDNISolone (Unknown)  morphine (Other)  Plaquenil (Unknown)  statins (Other (Unknown))  sulfa drugs (Rash)      Medications  acetaminophen   Tablet .. 650 milliGRAM(s) Oral every 4 hours PRN  ALPRAZolam 0.25 milliGRAM(s) Oral at bedtime  aspirin 325 milliGRAM(s) Oral daily  cefTRIAXone Injectable. 1000 milliGRAM(s) IV Push once  enoxaparin Injectable 40 milliGRAM(s) SubCutaneous daily  levothyroxine 75 MICROGram(s) Oral daily  metoprolol succinate ER 50 milliGRAM(s) Oral daily  ondansetron Injectable 4 milliGRAM(s) IV Push every 6 hours PRN  pantoprazole    Tablet 40 milliGRAM(s) Oral before breakfast  sodium chloride 0.9%. 1000 milliLiter(s) IV Continuous <Continuous>      ROS  General: No weight change, fevers, chills, night sweats, fatigue, malaise  Skin: no new skin lesions, hair changes, prutitus  ENMT: No hearing changes,  ear pain,  nasal congestion	  Respiratory and Thorax: No cough, sputum, dyspnea, wheezing  Cardiovascular: No chest pain, SOB  Gastrointestinal:	No diarrhea, constipation, nausea, vomiting, anorexia, hematochezia, melena.   Genitourinary: see above  Neurological: +weakness. No loss of consciousness, headache, dizziness  Psychiatric: No anxiety, depression, delusions. No SI/HI/AH/VH.  Hematology/Lymphatics:	No bruising, lymphadenopathy  Endocrine: No temperature intolerance    Vital Signs Last 24 Hrs  T(C): 36.7 (09 Aug 2019 06:07), Max: 38.1 (08 Aug 2019 12:55)  T(F): 98.1 (09 Aug 2019 06:07), Max: 100.5 (08 Aug 2019 12:55)  HR: 85 (09 Aug 2019 06:07) (85 - 99)  BP: 115/58 (09 Aug 2019 06:07) (102/41 - 153/51)  BP(mean): --  RR: 18 (09 Aug 2019 06:07) (16 - 20)  SpO2: 93% (09 Aug 2019 06:07) (93% - 100%)    PHYSICAL EXAM:  Constitutional: elderly female, NAD, lying in bed comfortably   Eyes: EOMI, vision is grossly intact  Neck: neck supple  Back: no CVA tenderness bilaterally  Respiratory: no labored breathing  Cardiovascular: no peripheral edema, cyanosis, or pallor. Extremities are warm and well perfused.   Gastrointestinal: soft, non-tender, non-distended, no guarding, no rebound tenderness.   Genitourinary: Bladder non-palpable  Extremities: no significant deformity or joint abnormality.   Neurological: A&O x3  Psychiatric: normal mood and affect        I/O      Labs:  CBC Full  -  ( 08 Aug 2019 09:17 )  WBC Count : 22.09 K/uL  Hemoglobin : 11.3 g/dL  Hematocrit : 36.4 %  Platelet Count - Automated : 415 K/uL  Mean Cell Volume : 87.9 fl  Mean Cell Hemoglobin : 27.3 pg  Mean Cell Hemoglobin Concentration : 31.0 gm/dL  Auto Neutrophil # : 20.07 K/uL  Auto Lymphocyte # : 0.84 K/uL  Auto Monocyte # : 0.75 K/uL  Auto Eosinophil # : 0.22 K/uL  Auto Basophil # : 0.06 K/uL  Auto Neutrophil % : 90.8 %  Auto Lymphocyte % : 3.8 %  Auto Monocyte % : 3.4 %  Auto Eosinophil % : 1.0 %  Auto Basophil % : 0.3 %    08-08    135  |  103  |  18  ----------------------------<  112<H>  4.3   |  26  |  1.14    Ca    9.7      08 Aug 2019 09:17    TPro  8.2  /  Alb  3.9  /  TBili  0.7  /  DBili  x   /  AST  25  /  ALT  22  /  AlkPhos  87  08-08          Radiology:  CT reviewed - no sinister findings

## 2019-08-10 LAB
ANION GAP SERPL CALC-SCNC: 8 MMOL/L — SIGNIFICANT CHANGE UP (ref 5–17)
BUN SERPL-MCNC: 12 MG/DL — SIGNIFICANT CHANGE UP (ref 7–23)
CALCIUM SERPL-MCNC: 9.1 MG/DL — SIGNIFICANT CHANGE UP (ref 8.5–10.1)
CHLORIDE SERPL-SCNC: 110 MMOL/L — HIGH (ref 96–108)
CO2 SERPL-SCNC: 23 MMOL/L — SIGNIFICANT CHANGE UP (ref 22–31)
CREAT SERPL-MCNC: 0.76 MG/DL — SIGNIFICANT CHANGE UP (ref 0.5–1.3)
GLUCOSE SERPL-MCNC: 90 MG/DL — SIGNIFICANT CHANGE UP (ref 70–99)
HCT VFR BLD CALC: 31.6 % — LOW (ref 34.5–45)
HGB BLD-MCNC: 9.6 G/DL — LOW (ref 11.5–15.5)
MAGNESIUM SERPL-MCNC: 2.3 MG/DL — SIGNIFICANT CHANGE UP (ref 1.6–2.6)
MCHC RBC-ENTMCNC: 26.7 PG — LOW (ref 27–34)
MCHC RBC-ENTMCNC: 30.4 GM/DL — LOW (ref 32–36)
MCV RBC AUTO: 88 FL — SIGNIFICANT CHANGE UP (ref 80–100)
PLATELET # BLD AUTO: 341 K/UL — SIGNIFICANT CHANGE UP (ref 150–400)
POTASSIUM SERPL-MCNC: 4.2 MMOL/L — SIGNIFICANT CHANGE UP (ref 3.5–5.3)
POTASSIUM SERPL-SCNC: 4.2 MMOL/L — SIGNIFICANT CHANGE UP (ref 3.5–5.3)
RBC # BLD: 3.59 M/UL — LOW (ref 3.8–5.2)
RBC # FLD: 15.4 % — HIGH (ref 10.3–14.5)
SODIUM SERPL-SCNC: 141 MMOL/L — SIGNIFICANT CHANGE UP (ref 135–145)
WBC # BLD: 8.83 K/UL — SIGNIFICANT CHANGE UP (ref 3.8–10.5)
WBC # FLD AUTO: 8.83 K/UL — SIGNIFICANT CHANGE UP (ref 3.8–10.5)

## 2019-08-10 RX ORDER — LOSARTAN POTASSIUM 100 MG/1
25 TABLET, FILM COATED ORAL DAILY
Refills: 0 | Status: DISCONTINUED | OUTPATIENT
Start: 2019-08-10 | End: 2019-08-11

## 2019-08-10 RX ORDER — METOPROLOL TARTRATE 50 MG
50 TABLET ORAL
Refills: 0 | Status: DISCONTINUED | OUTPATIENT
Start: 2019-08-10 | End: 2019-08-10

## 2019-08-10 RX ORDER — METOPROLOL TARTRATE 50 MG
50 TABLET ORAL DAILY
Refills: 0 | Status: DISCONTINUED | OUTPATIENT
Start: 2019-08-10 | End: 2019-08-11

## 2019-08-10 RX ADMIN — ENOXAPARIN SODIUM 40 MILLIGRAM(S): 100 INJECTION SUBCUTANEOUS at 12:38

## 2019-08-10 RX ADMIN — Medication 1 DROP(S): at 21:02

## 2019-08-10 RX ADMIN — Medication 650 MILLIGRAM(S): at 21:04

## 2019-08-10 RX ADMIN — Medication 50 MILLIGRAM(S): at 05:33

## 2019-08-10 RX ADMIN — LOSARTAN POTASSIUM 25 MILLIGRAM(S): 100 TABLET, FILM COATED ORAL at 12:39

## 2019-08-10 RX ADMIN — PANTOPRAZOLE SODIUM 40 MILLIGRAM(S): 20 TABLET, DELAYED RELEASE ORAL at 05:32

## 2019-08-10 RX ADMIN — Medication 0.25 MILLIGRAM(S): at 21:03

## 2019-08-10 RX ADMIN — Medication 75 MICROGRAM(S): at 05:32

## 2019-08-10 RX ADMIN — Medication 325 MILLIGRAM(S): at 12:39

## 2019-08-10 RX ADMIN — Medication 1 DROP(S): at 12:39

## 2019-08-10 RX ADMIN — Medication 1 DROP(S): at 06:22

## 2019-08-10 NOTE — PROGRESS NOTE ADULT - ASSESSMENT
89 year old female admitted with weakness.   she has had no appetite for 2 days.     8/9/2019  iv fluids  await urine culture  continue medications for blood pressure    8/10-  urine culture is negative  continue pre-admission blood pressure medications.   ambulation with physical therapy today.   resume losartan 25 mg daily and Toprol xl 50 mg bid.

## 2019-08-10 NOTE — PROGRESS NOTE ADULT - SUBJECTIVE AND OBJECTIVE BOX
Patient is a 89y old  Female who presents with a chief complaint of weakness (09 Aug 2019 22:54)    89F w/PMH CAD/stents, hypothyroid, endometrial ca, bladder ca (diagnosed 2018), recurred since June, scheduled to start chemo today, presents this AM c/o generalized weakness since 0400 when she woke up.  Pt was diagnosed w/ UTI 1.5 weeks ago, initially placed on keflex from urgent care, later changed to cipro by her PCP, but as of yesterday she was changed to macrobid, of which she had one dose last night.  She woke this AM and was too weak to get out of bed, she eventually got up and went to the bathroom, but was too weak to get up from there.  EMS called, pt was aaox3, has 24H caregiver who was present w/ her.  She denies any dizziness, lightheadedness, n/v/d, abd pain, cp, sob, fever/chills.  She's had recurrent UTI, but this is first time she's felt this way.   In ED, WBC 22, hg 11 (prior 8.9 in July), platelets 415 (300's July), UTI (weakly pos), low bp, received IV rocephin and 2L ivf in ED, LA 1.9, decreased GFR from prior (Cr 1.1- prior 0.7).  CXR neg, CT a/p pending.     8/10  she is feeling better today  ate a good breakfast.   wants to get up out of bed and walk with walker.         Allergies    amlodipine (Unknown)  clonidine (Unknown)  Florinef Acetate (Unknown)  hydrocodone (Unknown)  Levatol (Unknown)  Lipitor (Unknown)  Lotrel (Unknown)  methylPREDNISolone (Unknown)  morphine (Other)  Plaquenil (Unknown)  statins (Other (Unknown))  sulfa drugs (Rash)    Intolerances        MEDICATIONS  (STANDING):  ALPRAZolam 0.25 milliGRAM(s) Oral at bedtime  artificial tears (preservative free) Ophthalmic Solution 1 Drop(s) Both EYES three times a day  aspirin 325 milliGRAM(s) Oral daily  enoxaparin Injectable 40 milliGRAM(s) SubCutaneous daily  levothyroxine 75 MICROGram(s) Oral daily  metoprolol succinate ER 50 milliGRAM(s) Oral daily  pantoprazole    Tablet 40 milliGRAM(s) Oral before breakfast  sodium chloride 0.9%. 1000 milliLiter(s) (125 mL/Hr) IV Continuous <Continuous>    MEDICATIONS  (PRN):  acetaminophen   Tablet .. 650 milliGRAM(s) Oral every 4 hours PRN Temp greater or equal to 38C (100.4F), Mild Pain (1 - 3)  ondansetron Injectable 4 milliGRAM(s) IV Push every 6 hours PRN Nausea    REVIEW OF SYSTEMS:    RESPIRATORY: No cough, wheezing, hemoptysis; No shortness of breath  CARDIOVASCULAR: No chest pain or palpitations  All other review of systems is negative unless indicated above      PHYSICAL EXAM:  Daily     Daily   Vital Signs Last 24 Hrs  T(C): 36.3 (10 Aug 2019 05:30), Max: 36.6 (09 Aug 2019 11:45)  T(F): 97.4 (10 Aug 2019 05:30), Max: 97.9 (09 Aug 2019 11:45)  HR: 75 (10 Aug 2019 01:42) (75 - 83)  BP: 165/52 (10 Aug 2019 01:42) (140/55 - 187/69)  BP(mean): --  RR: 16 (10 Aug 2019 05:30) (16 - 18)  SpO2: 99% (10 Aug 2019 05:30) (95% - 100%)    Constitutional: NAD, awake and alert, well-developed  HEENT: PERR, EOMI, Normal Hearing, MMM  Neck: Soft and supple, No LAD, No JVD  Respiratory: Breath sounds are clear bilaterally, No wheezing, rales or rhonchi  Cardiovascular: S1 and S2, regular rate and rhythm, no Murmurs, gallops or rubs  Gastrointestinal: Bowel Sounds present, soft, nontender, nondistended, no guarding, no rebound  Extremities: No peripheral edema  Vascular: 2+ peripheral pulses  Neurological: A/O x 3, no focal deficits  Musculoskeletal: 5/5 strength b/l upper and lower extremities  Skin: No rashes    LABS: All Labs Reviewed:                        9.6    8.83  )-----------( 341      ( 10 Aug 2019 07:56 )             31.6     08-10    141  |  110<H>  |  12  ----------------------------<  90  4.2   |  23  |  0.76    Ca    9.1      10 Aug 2019 07:56  Mg     2.3     08-10    TPro  8.2  /  Alb  3.9  /  TBili  0.7  /  DBili  x   /  AST  25  /  ALT  22  /  AlkPhos  87  08-08    PT/INR - ( 08 Aug 2019 09:17 )   PT: 11.6 sec;   INR: 1.04 ratio         PTT - ( 08 Aug 2019 09:17 )  PTT:30.0 sec  CARDIAC MARKERS ( 08 Aug 2019 13:36 )  <0.015 ng/mL / x     / x     / x     / x      CARDIAC MARKERS ( 08 Aug 2019 09:17 )  <0.015 ng/mL / x     / x     / x     / x              TELEMETRY/EKG: NSR, 1st degree block. unchanged.

## 2019-08-10 NOTE — PROGRESS NOTE ADULT - SUBJECTIVE AND OBJECTIVE BOX
Patient is a 89y old  Female who presents with a chief complaint of weakness (10 Aug 2019 09:01)    SUBJECTIVE:   seen in AM  off IVFs, orthostats still positive sitting 171/68 and standing 149/54  no cp palps lightheadedness, will be OOB to chair today      T(C): 36.7 (08-10-19 @ 11:43), Max: 36.7 (08-10-19 @ 11:30)  HR: 82 (08-10-19 @ 11:30) (75 - 82)  BP: 166/69 (08-10-19 @ 11:30) (165/52 - 187/69)  RR: 16 (08-10-19 @ 11:30) (16 - 18)  SpO2: 100% (08-10-19 @ 11:30) (99% - 100%)  Wt(kg): --      LABS:                        9.6    8.83  )-----------( 341      ( 10 Aug 2019 07:56 )             31.6     IMAGING reviewed by me:  < from: CT Abdomen and Pelvis No Cont (08.08.19 @ 12:47) >  IMPRESSION: Limited evaluation for pyelonephritis without IV contrast.    No hydronephrosis or urinary calculus. Previously seen hydronephrosis is   no longer identified. Previously seen blood products in the bladder are   no longer identified.    PHYSICAL EXAM:  GENERAL: elserly lady, awake, alert, conversant  HEAD:  Atraumatic, Normocephalic  EYES: EOMI, PERRLA, conjunctiva and sclera clear  ENMT: Moist mucous membranes  NECK: Supple, No JVD, thyroid nontender  NERVOUS SYSTEM:  Alert & Oriented X3, PERRL, no facial asymmetry, tongue midline, moving all extremities, no focal motor or sensory deficits  CHEST/LUNG: Clear to auscultation bilaterally; No rales, rhonchi, wheezing, or rubs  HEART: Regular rate and rhythm; No murmurs, rubs, or gallops  ABDOMEN: Soft, Nontender, Nondistended; Bowel sounds present  EXTREMITIES:  2+ Peripheral Pulses, No clubbing, cyanosis, or edema    acetaminophen   Tablet .. 650 milliGRAM(s) Oral every 4 hours PRN  ALPRAZolam 0.25 milliGRAM(s) Oral at bedtime  artificial tears (preservative free) Ophthalmic Solution 1 Drop(s) Both EYES three times a day  aspirin 325 milliGRAM(s) Oral daily  enoxaparin Injectable 40 milliGRAM(s) SubCutaneous daily  levothyroxine 75 MICROGram(s) Oral daily  losartan 25 milliGRAM(s) Oral daily  metoprolol succinate ER 50 milliGRAM(s) Oral two times a day  ondansetron Injectable 4 milliGRAM(s) IV Push every 6 hours PRN  pantoprazole    Tablet 40 milliGRAM(s) Oral before breakfast  sodium chloride 0.9%. 1000 milliLiter(s) IV Continuous <Continuous>

## 2019-08-10 NOTE — PROGRESS NOTE ADULT - ASSESSMENT
89F w/PMH CAD/stents, hypothyroid, endometrial ca, bladder ca (diagnosed 2018), recurred since June, scheduled to start chemo today, presents this AM c/o generalized weakness since 0400 when she woke up.  Pt was diagnosed w/ UTI 1.5 weeks ago, initially placed on keflex from urgent care, later changed to cipro by her PCP, but as of yesterday she was changed to macrobid, of which she had one dose last night.     In ED, WBC 22, hg 11 (prior 8.9 in July), platelets 415 (300's July), UTI (weakly pos), low bp, received IV rocephin and 2L ivf in ED, LA 1.9, decreased GFR from prior (Cr 1.1- prior 0.7).  CXR neg, CT a/p pending.     A/P  Gen weakness 2/2 Sepsis 2/2 UTI,  pt w/ hypotension, tachycardia, leukocytosis, weakness,  dehydration  IV rocephin  will check urine/blood cultures    Syncope  multifactorial: infn and orthostatic hypotension  ARB and BB on hold - were resumed by Card today - if unable to tolerate will decrease doses  watch off IVFs    CADs/p PCI  might restart low dose BB if possible     Hypothyroid  Synthroid    Bladder Ca s/p BCG induction course, next rx 1 week after dc home  f/u with Urology    h/o EM Ca s/p EMILY    Other - lives alone, wants to go home with home care  discussed with RN

## 2019-08-11 LAB
ANION GAP SERPL CALC-SCNC: 8 MMOL/L — SIGNIFICANT CHANGE UP (ref 5–17)
BUN SERPL-MCNC: 13 MG/DL — SIGNIFICANT CHANGE UP (ref 7–23)
CALCIUM SERPL-MCNC: 9.2 MG/DL — SIGNIFICANT CHANGE UP (ref 8.5–10.1)
CHLORIDE SERPL-SCNC: 110 MMOL/L — HIGH (ref 96–108)
CO2 SERPL-SCNC: 23 MMOL/L — SIGNIFICANT CHANGE UP (ref 22–31)
CREAT SERPL-MCNC: 0.83 MG/DL — SIGNIFICANT CHANGE UP (ref 0.5–1.3)
FERRITIN SERPL-MCNC: 41 NG/ML — SIGNIFICANT CHANGE UP (ref 15–150)
GLUCOSE SERPL-MCNC: 91 MG/DL — SIGNIFICANT CHANGE UP (ref 70–99)
HCT VFR BLD CALC: 31.7 % — LOW (ref 34.5–45)
HGB BLD-MCNC: 9.6 G/DL — LOW (ref 11.5–15.5)
IRON SATN MFR SERPL: 24 UG/DL — LOW (ref 30–160)
MAGNESIUM SERPL-MCNC: 2.1 MG/DL — SIGNIFICANT CHANGE UP (ref 1.6–2.6)
MCHC RBC-ENTMCNC: 26.5 PG — LOW (ref 27–34)
MCHC RBC-ENTMCNC: 30.3 GM/DL — LOW (ref 32–36)
MCV RBC AUTO: 87.6 FL — SIGNIFICANT CHANGE UP (ref 80–100)
PLATELET # BLD AUTO: 363 K/UL — SIGNIFICANT CHANGE UP (ref 150–400)
POTASSIUM SERPL-MCNC: 4.4 MMOL/L — SIGNIFICANT CHANGE UP (ref 3.5–5.3)
POTASSIUM SERPL-SCNC: 4.4 MMOL/L — SIGNIFICANT CHANGE UP (ref 3.5–5.3)
RBC # BLD: 3.62 M/UL — LOW (ref 3.8–5.2)
RBC # FLD: 15.1 % — HIGH (ref 10.3–14.5)
SODIUM SERPL-SCNC: 141 MMOL/L — SIGNIFICANT CHANGE UP (ref 135–145)
VIT B12 SERPL-MCNC: 554 PG/ML — SIGNIFICANT CHANGE UP (ref 232–1245)
WBC # BLD: 8.82 K/UL — SIGNIFICANT CHANGE UP (ref 3.8–10.5)
WBC # FLD AUTO: 8.82 K/UL — SIGNIFICANT CHANGE UP (ref 3.8–10.5)

## 2019-08-11 RX ORDER — SODIUM CHLORIDE 9 MG/ML
1000 INJECTION, SOLUTION INTRAVENOUS
Refills: 0 | Status: DISCONTINUED | OUTPATIENT
Start: 2019-08-11 | End: 2019-08-12

## 2019-08-11 RX ORDER — CEFTRIAXONE 500 MG/1
1000 INJECTION, POWDER, FOR SOLUTION INTRAMUSCULAR; INTRAVENOUS EVERY 24 HOURS
Refills: 0 | Status: DISCONTINUED | OUTPATIENT
Start: 2019-08-11 | End: 2019-08-12

## 2019-08-11 RX ORDER — CEFTRIAXONE 500 MG/1
1000 INJECTION, POWDER, FOR SOLUTION INTRAMUSCULAR; INTRAVENOUS EVERY 24 HOURS
Refills: 0 | Status: DISCONTINUED | OUTPATIENT
Start: 2019-08-11 | End: 2019-08-11

## 2019-08-11 RX ORDER — SODIUM CHLORIDE 9 MG/ML
250 INJECTION INTRAMUSCULAR; INTRAVENOUS; SUBCUTANEOUS ONCE
Refills: 0 | Status: COMPLETED | OUTPATIENT
Start: 2019-08-11 | End: 2019-08-11

## 2019-08-11 RX ADMIN — Medication 1 DROP(S): at 17:54

## 2019-08-11 RX ADMIN — LOSARTAN POTASSIUM 25 MILLIGRAM(S): 100 TABLET, FILM COATED ORAL at 06:27

## 2019-08-11 RX ADMIN — Medication 75 MICROGRAM(S): at 06:27

## 2019-08-11 RX ADMIN — Medication 650 MILLIGRAM(S): at 22:10

## 2019-08-11 RX ADMIN — SODIUM CHLORIDE 250 MILLILITER(S): 9 INJECTION INTRAMUSCULAR; INTRAVENOUS; SUBCUTANEOUS at 11:31

## 2019-08-11 RX ADMIN — Medication 1 DROP(S): at 22:00

## 2019-08-11 RX ADMIN — Medication 1 DROP(S): at 06:27

## 2019-08-11 RX ADMIN — Medication 0.25 MILLIGRAM(S): at 21:57

## 2019-08-11 RX ADMIN — PANTOPRAZOLE SODIUM 40 MILLIGRAM(S): 20 TABLET, DELAYED RELEASE ORAL at 06:28

## 2019-08-11 RX ADMIN — ENOXAPARIN SODIUM 40 MILLIGRAM(S): 100 INJECTION SUBCUTANEOUS at 11:32

## 2019-08-11 RX ADMIN — SODIUM CHLORIDE 70 MILLILITER(S): 9 INJECTION, SOLUTION INTRAVENOUS at 14:09

## 2019-08-11 RX ADMIN — Medication 50 MILLIGRAM(S): at 06:28

## 2019-08-11 RX ADMIN — Medication 650 MILLIGRAM(S): at 14:09

## 2019-08-11 RX ADMIN — CEFTRIAXONE 1000 MILLIGRAM(S): 500 INJECTION, POWDER, FOR SOLUTION INTRAMUSCULAR; INTRAVENOUS at 21:59

## 2019-08-11 RX ADMIN — Medication 650 MILLIGRAM(S): at 01:19

## 2019-08-11 RX ADMIN — Medication 650 MILLIGRAM(S): at 22:50

## 2019-08-11 RX ADMIN — Medication 650 MILLIGRAM(S): at 14:48

## 2019-08-11 RX ADMIN — Medication 325 MILLIGRAM(S): at 11:32

## 2019-08-11 NOTE — PROGRESS NOTE ADULT - SUBJECTIVE AND OBJECTIVE BOX
Patient is a 89y old  Female who presents with a chief complaint of weakness (11 Aug 2019 08:42)    SUBJECTIVE:   awake, has orthostatic hypotension, lightheadedness  sitting 157/59, HR 86 and standing 83/31 HR 95      T(C): 36.3 (08-11-19 @ 12:02), Max: 36.7 (08-11-19 @ 06:25)  HR: 82 (08-11-19 @ 12:02) (82 - 83)  BP: 151/60 (08-11-19 @ 12:02) (151/60 - 190/68)    LABS:                        9.6    8.82  )-----------( 363      ( 11 Aug 2019 08:12 )             31.7   IMAGING reviewed by me:    < from: CT Abdomen and Pelvis No Cont (08.08.19 @ 12:47) >  IMPRESSION: Limited evaluation for pyelonephritis without IV contrast.    No hydronephrosis or urinary calculus. Previously seen hydronephrosis is   no longer identified. Previously seen blood products in the bladder are   no longer identified.    PHYSICAL EXAM:    GENERAL: elderly lady, awake, alert, conversant  HEAD:  Atraumatic, Normocephalic  EYES: EOMI, PERRLA, conjunctiva and sclera clear  ENMT: Moist mucous membranes  NECK: Supple, No JVD, thyroid nontender  NERVOUS SYSTEM:  Alert & Oriented X3, PERRL, no facial asymmetry, tongue midline, moving all extremities, no focal motor or sensory deficits  CHEST/LUNG: Clear to auscultation bilaterally; No rales, rhonchi, wheezing, or rubs  HEART: Regular rate and rhythm; No murmurs, rubs, or gallops  ABDOMEN: Soft, Nontender, Nondistended; Bowel sounds present  EXTREMITIES:  2+ Peripheral Pulses, No clubbing, cyanosis, or edema    acetaminophen   Tablet .. 650 milliGRAM(s) Oral every 4 hours PRN  ALPRAZolam 0.25 milliGRAM(s) Oral at bedtime  artificial tears (preservative free) Ophthalmic Solution 1 Drop(s) Both EYES three times a day  aspirin 325 milliGRAM(s) Oral daily  enoxaparin Injectable 40 milliGRAM(s) SubCutaneous daily  levothyroxine 75 MICROGram(s) Oral daily  ondansetron Injectable 4 milliGRAM(s) IV Push every 6 hours PRN  pantoprazole    Tablet 40 milliGRAM(s) Oral before breakfast  sodium chloride 0.45%. 1000 milliLiter(s) IV Continuous <Continuous>

## 2019-08-11 NOTE — PROGRESS NOTE ADULT - ASSESSMENT
89 year old female admitted with weakness.   she has had no appetite for 2 days.     8/9/2019  iv fluids  await urine culture  continue medications for blood pressure    8/10-  urine culture is negative  continue pre-admission blood pressure medications.   ambulation with physical therapy today.   resume losartan 25 mg daily and Toprol xl 50 mg bid.     8/11  continue the blood pressure medications as they are given at home: Toprol xl 50 mg daily and Losartan 25 mg daily.   OOB and ambulating today  recheck urine analysis and culture.   Iron studies and B12 level for anemia.   as an outpatient, should start on Hiprex to prophylax against UTI

## 2019-08-11 NOTE — PROGRESS NOTE ADULT - ASSESSMENT
89F w/PMH CAD/stents, hypothyroid, endometrial ca, bladder ca (diagnosed 2018), recurred since June, scheduled to start chemo today, presents this AM c/o generalized weakness since 0400 when she woke up.  Pt was diagnosed w/ UTI 1.5 weeks ago, initially placed on keflex from urgent care, later changed to cipro by her PCP, but as of yesterday she was changed to macrobid, of which she had one dose last night.     In ED, WBC 22, hg 11 (prior 8.9 in July), platelets 415 (300's July), UTI (weakly pos), low bp, received IV rocephin and 2L ivf in ED, LA 1.9, decreased GFR from prior (Cr 1.1- prior 0.7).  CXR neg, CT a/p pending.     A/P  Gen weakness 2/2 Sepsis 2/2 UTI,  pt w/ hypotension, tachycardia, leukocytosis, weakness,  dehydration  IV rocephin  will check urine/blood cultures    Syncope  multifactorial: infn and orthostatic hypotension  ARB and BB on hold, discussed with Card  start IVFs and reassess in AM  doubt she will need midodrine, at this time rec permissive HTN due to severe orthostatic hypotension    CADs/p PCI  might restart low dose BB if possible     Hypothyroid  Synthroid    Bladder Ca s/p BCG induction course, next treatment seesion 1 week after dc home  f/u with Urology    h/o EM Ca s/p EMILY    Other - lives alone, wants to go home with home care, discussed with FANNY  dc home in 2-3 days  discussed with RN

## 2019-08-11 NOTE — PROGRESS NOTE ADULT - SUBJECTIVE AND OBJECTIVE BOX
Patient is a 89y old  Female who presents with a chief complaint of weakness (10 Aug 2019 17:47)  89F w/PMH CAD/stents, hypothyroid, endometrial ca, bladder ca (diagnosed 2018), recurred since June, scheduled to start chemo today, presents this AM c/o generalized weakness since 0400 when she woke up.  Pt was diagnosed w/ UTI 1.5 weeks ago, initially placed on keflex from urgent care, later changed to cipro by her PCP, but as of yesterday she was changed to macrobid, of which she had one dose last night.  She woke this AM and was too weak to get out of bed, she eventually got up and went to the bathroom, but was too weak to get up from there.  EMS called, pt was aaox3, has 24H caregiver who was present w/ her.  She denies any dizziness, lightheadedness, n/v/d, abd pain, cp, sob, fever/chills.  She's had recurrent UTI, but this is first time she's felt this way.   In ED, WBC 22, hg 11 (prior 8.9 in July), platelets 415 (300's July), UTI (weakly pos), low bp, received IV rocephin and 2L ivf in ED, LA 1.9, decreased GFR from prior (Cr 1.1- prior 0.7).  CXR neg, CT a/p pending.     8/10  she is feeling better today  ate a good breakfast.   wants to get up out of bed and walk with walker.     8/11  weak.   wants to get out of the bed but says they told her to stay.   on no further antibiotics.   usually takes the metoprolol once a day.       Allergies    amlodipine (Unknown)  clonidine (Unknown)  Florinef Acetate (Unknown)  hydrocodone (Unknown)  Levatol (Unknown)  Lipitor (Unknown)  Lotrel (Unknown)  methylPREDNISolone (Unknown)  morphine (Other)  Plaquenil (Unknown)  statins (Other (Unknown))  sulfa drugs (Rash)    Intolerances        MEDICATIONS  (STANDING):  ALPRAZolam 0.25 milliGRAM(s) Oral at bedtime  artificial tears (preservative free) Ophthalmic Solution 1 Drop(s) Both EYES three times a day  aspirin 325 milliGRAM(s) Oral daily  enoxaparin Injectable 40 milliGRAM(s) SubCutaneous daily  levothyroxine 75 MICROGram(s) Oral daily  losartan 25 milliGRAM(s) Oral daily  metoprolol succinate ER 50 milliGRAM(s) Oral daily  pantoprazole    Tablet 40 milliGRAM(s) Oral before breakfast    MEDICATIONS  (PRN):  acetaminophen   Tablet .. 650 milliGRAM(s) Oral every 4 hours PRN Temp greater or equal to 38C (100.4F), Mild Pain (1 - 3)  ondansetron Injectable 4 milliGRAM(s) IV Push every 6 hours PRN Nausea    REVIEW OF SYSTEMS:    RESPIRATORY: No cough, wheezing, hemoptysis; No shortness of breath  CARDIOVASCULAR: No chest pain or palpitations  All other review of systems is negative unless indicated above      PHYSICAL EXAM:  Daily     Daily   Vital Signs Last 24 Hrs  T(C): 36.7 (11 Aug 2019 06:25), Max: 36.7 (10 Aug 2019 11:30)  T(F): 98 (11 Aug 2019 06:25), Max: 98.1 (10 Aug 2019 11:43)  HR: 82 (11 Aug 2019 06:25) (82 - 83)  BP: 178/60 (11 Aug 2019 06:25) (166/69 - 190/68)  BP(mean): --  RR: 18 (11 Aug 2019 06:25) (16 - 18)  SpO2: 98% (11 Aug 2019 06:25) (98% - 100%)    Constitutional: NAD, awake and alert, well-developed  HEENT: PERR, EOMI, Normal Hearing, MMM  Neck: Soft and supple, No LAD, No JVD  Respiratory: Breath sounds are clear bilaterally, No wheezing, rales or rhonchi  Cardiovascular: S1 and S2, regular rate and rhythm, no Murmurs, gallops or rubs  Gastrointestinal: Bowel Sounds present, soft, nontender, nondistended, no guarding, no rebound  Extremities: No peripheral edema  Vascular: 2+ peripheral pulses  Neurological: A/O x 3, no focal deficits  Musculoskeletal: 5/5 strength b/l upper and lower extremities  Skin: No rashes    LABS: All Labs Reviewed:                        9.6    8.83  )-----------( 341      ( 10 Aug 2019 07:56 )             31.6     08-10    141  |  110<H>  |  12  ----------------------------<  90  4.2   |  23  |  0.76    Ca    9.1      10 Aug 2019 07:56  Mg     2.3     08-10

## 2019-08-12 LAB
ANION GAP SERPL CALC-SCNC: 10 MMOL/L — SIGNIFICANT CHANGE UP (ref 5–17)
BUN SERPL-MCNC: 11 MG/DL — SIGNIFICANT CHANGE UP (ref 7–23)
CALCIUM SERPL-MCNC: 9.2 MG/DL — SIGNIFICANT CHANGE UP (ref 8.5–10.1)
CHLORIDE SERPL-SCNC: 107 MMOL/L — SIGNIFICANT CHANGE UP (ref 96–108)
CO2 SERPL-SCNC: 22 MMOL/L — SIGNIFICANT CHANGE UP (ref 22–31)
CREAT SERPL-MCNC: 0.85 MG/DL — SIGNIFICANT CHANGE UP (ref 0.5–1.3)
GLUCOSE SERPL-MCNC: 123 MG/DL — HIGH (ref 70–99)
HCT VFR BLD CALC: 32.7 % — LOW (ref 34.5–45)
HGB BLD-MCNC: 9.7 G/DL — LOW (ref 11.5–15.5)
MAGNESIUM SERPL-MCNC: 2 MG/DL — SIGNIFICANT CHANGE UP (ref 1.6–2.6)
MCHC RBC-ENTMCNC: 26.6 PG — LOW (ref 27–34)
MCHC RBC-ENTMCNC: 29.7 GM/DL — LOW (ref 32–36)
MCV RBC AUTO: 89.8 FL — SIGNIFICANT CHANGE UP (ref 80–100)
PLATELET # BLD AUTO: 354 K/UL — SIGNIFICANT CHANGE UP (ref 150–400)
POTASSIUM SERPL-MCNC: 3.5 MMOL/L — SIGNIFICANT CHANGE UP (ref 3.5–5.3)
POTASSIUM SERPL-SCNC: 3.5 MMOL/L — SIGNIFICANT CHANGE UP (ref 3.5–5.3)
RBC # BLD: 3.64 M/UL — LOW (ref 3.8–5.2)
RBC # FLD: 15.1 % — HIGH (ref 10.3–14.5)
SODIUM SERPL-SCNC: 139 MMOL/L — SIGNIFICANT CHANGE UP (ref 135–145)
WBC # BLD: 8.95 K/UL — SIGNIFICANT CHANGE UP (ref 3.8–10.5)
WBC # FLD AUTO: 8.95 K/UL — SIGNIFICANT CHANGE UP (ref 3.8–10.5)

## 2019-08-12 RX ORDER — FERROUS SULFATE 325(65) MG
325 TABLET ORAL DAILY
Refills: 0 | Status: DISCONTINUED | OUTPATIENT
Start: 2019-08-12 | End: 2019-08-14

## 2019-08-12 RX ORDER — LOSARTAN POTASSIUM 100 MG/1
25 TABLET, FILM COATED ORAL DAILY
Refills: 0 | Status: DISCONTINUED | OUTPATIENT
Start: 2019-08-12 | End: 2019-08-14

## 2019-08-12 RX ORDER — ALPRAZOLAM 0.25 MG
0.12 TABLET ORAL AT BEDTIME
Refills: 0 | Status: DISCONTINUED | OUTPATIENT
Start: 2019-08-12 | End: 2019-08-14

## 2019-08-12 RX ORDER — METOPROLOL TARTRATE 50 MG
50 TABLET ORAL DAILY
Refills: 0 | Status: DISCONTINUED | OUTPATIENT
Start: 2019-08-12 | End: 2019-08-14

## 2019-08-12 RX ADMIN — Medication 650 MILLIGRAM(S): at 22:45

## 2019-08-12 RX ADMIN — Medication 0.12 MILLIGRAM(S): at 22:12

## 2019-08-12 RX ADMIN — Medication 650 MILLIGRAM(S): at 13:18

## 2019-08-12 RX ADMIN — Medication 650 MILLIGRAM(S): at 22:12

## 2019-08-12 RX ADMIN — LOSARTAN POTASSIUM 25 MILLIGRAM(S): 100 TABLET, FILM COATED ORAL at 11:01

## 2019-08-12 RX ADMIN — Medication 75 MICROGRAM(S): at 06:14

## 2019-08-12 RX ADMIN — SODIUM CHLORIDE 70 MILLILITER(S): 9 INJECTION, SOLUTION INTRAVENOUS at 06:15

## 2019-08-12 RX ADMIN — ENOXAPARIN SODIUM 40 MILLIGRAM(S): 100 INJECTION SUBCUTANEOUS at 11:01

## 2019-08-12 RX ADMIN — Medication 325 MILLIGRAM(S): at 11:01

## 2019-08-12 RX ADMIN — PANTOPRAZOLE SODIUM 40 MILLIGRAM(S): 20 TABLET, DELAYED RELEASE ORAL at 06:14

## 2019-08-12 RX ADMIN — Medication 50 MILLIGRAM(S): at 11:01

## 2019-08-12 RX ADMIN — Medication 1 DROP(S): at 06:13

## 2019-08-12 NOTE — PHYSICAL THERAPY INITIAL EVALUATION ADULT - PERTINENT HX OF CURRENT PROBLEM, REHAB EVAL
89 yo F admitted c/o weakness. H/O  endometrial ca, bladder ca (diagnosed 2018), recurred since June, scheduled to start chemo today, presents this AM c/o generalized weakness since 0400 when she woke up.  Pt was diagnosed w/ UTI 1.5 weeks ago,

## 2019-08-12 NOTE — PHYSICAL THERAPY INITIAL EVALUATION ADULT - GENERAL OBSERVATIONS, REHAB EVAL
Patient received out of bed in chair, +HM, +IV.  Patient c/o chronic pain in R shoulder and ankle, L knee.

## 2019-08-12 NOTE — PHYSICAL THERAPY INITIAL EVALUATION ADULT - PATIENT/FAMILY/SIGNIFICANT OTHER GOALS STATEMENT, PT EVAL
Patient refuses to return to [facility], was at [facility] and [facility] this year.

## 2019-08-12 NOTE — PROGRESS NOTE ADULT - SUBJECTIVE AND OBJECTIVE BOX
Patient is a 89y old  Female who presents with a chief complaint of weakness (11 Aug 2019 19:19)    Patient is a 89y old  Female who presents with a chief complaint of weakness (10 Aug 2019 17:47)  89F w/PMH CAD/stents, hypothyroid, endometrial ca, bladder ca (diagnosed 2018), recurred since June, scheduled to start chemo today, presents this AM c/o generalized weakness since 0400 when she woke up.  Pt was diagnosed w/ UTI 1.5 weeks ago, initially placed on keflex from urgent care, later changed to cipro by her PCP, but as of yesterday she was changed to macrobid, of which she had one dose last night.  She woke this AM and was too weak to get out of bed, she eventually got up and went to the bathroom, but was too weak to get up from there.  EMS called, pt was aaox3, has 24H caregiver who was present w/ her.  She denies any dizziness, lightheadedness, n/v/d, abd pain, cp, sob, fever/chills.  She's had recurrent UTI, but this is first time she's felt this way.   In ED, WBC 22, hg 11 (prior 8.9 in July), platelets 415 (300's July), UTI (weakly pos), low bp, received IV rocephin and 2L ivf in ED, LA 1.9, decreased GFR from prior (Cr 1.1- prior 0.7).  CXR neg, CT a/p pending.     8/10  she is feeling better today  ate a good breakfast.   wants to get up out of bed and walk with walker.     8/11  weak.   wants to get out of the bed but says they told her to stay.   on no further antibiotics.   usually takes the metoprolol once a day.     8/12  blood pressure medications held yesterday because she was orthostatic.   received IV Fluids,   sleeping soundly this morning.   iron deficiency on blood work.     Allergies    amlodipine (Unknown)  clonidine (Unknown)  Florinef Acetate (Unknown)  hydrocodone (Unknown)  Levatol (Unknown)  Lipitor (Unknown)  Lotrel (Unknown)  methylPREDNISolone (Unknown)  morphine (Other)  Plaquenil (Unknown)  statins (Other (Unknown))  sulfa drugs (Rash)    Intolerances        MEDICATIONS  (STANDING):  ALPRAZolam 0.25 milliGRAM(s) Oral at bedtime  artificial tears (preservative free) Ophthalmic Solution 1 Drop(s) Both EYES three times a day  aspirin 325 milliGRAM(s) Oral daily  cefTRIAXone Injectable. 1000 milliGRAM(s) IV Push every 24 hours  enoxaparin Injectable 40 milliGRAM(s) SubCutaneous daily  ferrous    sulfate 325 milliGRAM(s) Oral daily  levothyroxine 75 MICROGram(s) Oral daily  pantoprazole    Tablet 40 milliGRAM(s) Oral before breakfast  sodium chloride 0.45%. 1000 milliLiter(s) (70 mL/Hr) IV Continuous <Continuous>    MEDICATIONS  (PRN):  acetaminophen   Tablet .. 650 milliGRAM(s) Oral every 4 hours PRN Temp greater or equal to 38C (100.4F), Mild Pain (1 - 3)  ondansetron Injectable 4 milliGRAM(s) IV Push every 6 hours PRN Nausea    REVIEW OF SYSTEMS:    RESPIRATORY: No cough, wheezing, hemoptysis; No shortness of breath  CARDIOVASCULAR: No chest pain or palpitations  All other review of systems is negative unless indicated above      PHYSICAL EXAM:  Daily     Daily   Vital Signs Last 24 Hrs  T(C): 36.6 (12 Aug 2019 05:57), Max: 36.6 (11 Aug 2019 21:22)  T(F): 97.8 (12 Aug 2019 05:57), Max: 97.9 (11 Aug 2019 21:22)  HR: 84 (12 Aug 2019 05:57) (76 - 84)  BP: 180/62 (12 Aug 2019 07:05) (151/60 - 189/68)  BP(mean): --  RR: 18 (12 Aug 2019 05:57) (18 - 18)  SpO2: 100% (12 Aug 2019 05:57) (96% - 100%)    Constitutional: NAD, awake and alert, well-developed  HEENT: PERR, EOMI, Normal Hearing, MMM  Neck: Soft and supple, No LAD, No JVD  Respiratory: Breath sounds are clear bilaterally, No wheezing, rales or rhonchi  Cardiovascular: S1 and S2, regular rate and rhythm, no Murmurs, gallops or rubs  Gastrointestinal: Bowel Sounds present, soft, nontender, nondistended, no guarding, no rebound  Extremities: No peripheral edema  Vascular: 2+ peripheral pulses  Neurological: A/O x 3, no focal deficits  Musculoskeletal: 5/5 strength b/l upper and lower extremities  Skin: No rashes    LABS: All Labs Reviewed:                        9.7    8.95  )-----------( 354      ( 12 Aug 2019 08:33 )             32.7     08-11    141  |  110<H>  |  13  ----------------------------<  91  4.4   |  23  |  0.83    Ca    9.2      11 Aug 2019 08:12  Mg     2.1     08-11                TELEMETRY/EKG:

## 2019-08-12 NOTE — PHYSICAL THERAPY INITIAL EVALUATION ADULT - ADDITIONAL COMMENTS
pt lives alone in a private home one step to enter. uses rw to ambulate.  part-time aide/few hours a day. Recent 24/7.

## 2019-08-12 NOTE — PROGRESS NOTE ADULT - ASSESSMENT
89F w/PMH CAD/stents, hypothyroid, endometrial ca, bladder ca (diagnosed 2018), recurred since June, scheduled to start chemo today, presents this AM c/o generalized weakness since 0400 when she woke up.  Pt was diagnosed w/ UTI 1.5 weeks ago, initially placed on keflex from urgent care, later changed to cipro by her PCP, but as of yesterday she was changed to macrobid, of which she had one dose last night.     In ED, WBC 22, hg 11 (prior 8.9 in July), platelets 415 (300's July), UTI (weakly pos), low bp, received IV rocephin and 2L ivf in ED, LA 1.9, decreased GFR from prior (Cr 1.1- prior 0.7).  CXR neg, CT a/p pending.     A/P  Gen weakness   ucx neg dc abx    Syncope  vasovagal with poss orthostatic component  resume antiHTN before she has a cva      CADs/p PCI  might restart low dose BB if possible     Hypothyroid  Synthroid    Bladder Ca s/p BCG induction course, next treatment session 1 week after dc home  f/u with Urology    h/o EM Ca s/p EMILY    dc planning

## 2019-08-12 NOTE — PHYSICAL THERAPY INITIAL EVALUATION ADULT - MODALITIES TREATMENT COMMENTS
Patient  left in chair with CBIR and chair alarm active. Performed therapeutic exercises. No SOB.  RN informed of session/status.

## 2019-08-12 NOTE — PHYSICAL THERAPY INITIAL EVALUATION ADULT - PLANNED THERAPY INTERVENTIONS, PT EVAL
today: therapeutic exercises x 10 min, gait training x 15 min/bed mobility training/gait training/transfer training

## 2019-08-12 NOTE — PHYSICAL THERAPY INITIAL EVALUATION ADULT - ACTIVE RANGE OF MOTION EXAMINATION, REHAB EVAL
except R shoulder with little ROM due to "Frozen shoulder"/bilateral upper extremity Active ROM was WFL (within functional limits)/bilateral  lower extremity Active ROM was WFL (within functional limits)

## 2019-08-12 NOTE — PROGRESS NOTE ADULT - ASSESSMENT
89 year old female admitted with weakness.   she has had no appetite for 2 days.     8/9/2019  iv fluids  await urine culture  continue medications for blood pressure    8/10-  urine culture is negative  continue pre-admission blood pressure medications.   ambulation with physical therapy today.   resume losartan 25 mg daily and Toprol xl 50 mg bid.     8/11  continue the blood pressure medications as they are given at home: Toprol xl 50 mg daily and Losartan 25 mg daily.   OOB and ambulating today  recheck urine analysis and culture.   Iron studies and B12 level for anemia.   as an outpatient, should start on Hiprex to prophylax against UTI    8/12  rechecking orthostatic vitals today  start ferrous sulfate 325 mg daily.

## 2019-08-12 NOTE — PHARMACOTHERAPY INTERVENTION NOTE - COMMENTS
Recommended changing alprazolam 0.25 mg at bedtime to 0.125 mg at bedtime to reflect continuation of home dose and in consideration of patient's advanced age.

## 2019-08-12 NOTE — PROGRESS NOTE ADULT - SUBJECTIVE AND OBJECTIVE BOX
Date of service: 19 @ 16:20    Feels better  Still has episodes of dizziness and fatigue  Denies dysuria    ROS: no fever or chills; no HA, no SOB or cough, no abdominal pain, no diarrhea or constipation; no legs pain, no rashes    MEDICATIONS  (STANDING):  ALPRAZolam 0.125 milliGRAM(s) Oral at bedtime  artificial tears (preservative free) Ophthalmic Solution 1 Drop(s) Both EYES three times a day  aspirin 325 milliGRAM(s) Oral daily  enoxaparin Injectable 40 milliGRAM(s) SubCutaneous daily  ferrous    sulfate 325 milliGRAM(s) Oral daily  levothyroxine 75 MICROGram(s) Oral daily  losartan 25 milliGRAM(s) Oral daily  metoprolol succinate ER 50 milliGRAM(s) Oral daily  pantoprazole    Tablet 40 milliGRAM(s) Oral before breakfast      Vital Signs Last 24 Hrs  T(C): 36.8 (12 Aug 2019 13:20), Max: 36.8 (12 Aug 2019 13:20)  T(F): 98.2 (12 Aug 2019 13:20), Max: 98.2 (12 Aug 2019 13:20)  HR: 80 (12 Aug 2019 13:20) (76 - 84)  BP: 141/55 (12 Aug 2019 13:20) (141/55 - 189/68)  BP(mean): --  RR: 18 (12 Aug 2019 12:29) (18 - 18)  SpO2: 99% (12 Aug 2019 12:29) (97% - 100%)    Physical Exam:      Constitutional: frail looking  HEENT: NC/AT, EOMI, PERRLA, conjunctivae clear  Neck: supple; thyroid not palpable  Back: no tenderness  Respiratory: respiratory effort normal; clear to auscultation  Cardiovascular: S1S2 regular, no murmurs  Abdomen: soft, not tender, not distended, positive BS  Genitourinary: no suprapubic tenderness  Lymphatic: no LN palpable  Musculoskeletal: no muscle tenderness, no joint swelling or tenderness  Extremities: no pedal edema  Neurological/ Psychiatric: AxOx3, judgement and insight normal; moving all extremities  Skin: no rashes; no palpable lesions    Labs: all available labs reviewed                        10.0   10.23 )-----------( 361      ( 09 Aug 2019 08:17 )             32.9     08-09    136  |  108  |  18  ----------------------------<  89  4.4   |  22  |  0.97    Ca    8.9      09 Aug 2019 08:17    TPro  8.2  /  Alb  3.9  /  TBili  0.7  /  DBili  x   /  AST  25  /  ALT  22  /  AlkPhos  87  08-08     LIVER FUNCTIONS - ( 08 Aug 2019 09:17 )  Alb: 3.9 g/dL / Pro: 8.2 gm/dL / ALK PHOS: 87 U/L / ALT: 22 U/L / AST: 25 U/L / GGT: x           Urinalysis Basic - ( 08 Aug 2019 11:32 )    Color: Yellow / Appearance: Clear / S.010 / pH: x  Gluc: x / Ketone: Negative  / Bili: Negative / Urobili: Negative mg/dL   Blood: x / Protein: 15 mg/dL / Nitrite: Negative   Leuk Esterase: Small / RBC: 3-5 /HPF / WBC 11-25   Sq Epi: x / Non Sq Epi: Occasional / Bacteria: Occasional      Culture - Urine (collected 08 Aug 2019 11:32)  Source: .Urine None  Final Report (09 Aug 2019 13:19):    No growth    Culture - Blood (collected 08 Aug 2019 09:17)  Source: .Blood None  Preliminary Report (09 Aug 2019 15:01):    No growth to date.    Culture - Blood (collected 08 Aug 2019 09:17)  Source: .Blood None  Preliminary Report (09 Aug 2019 15:01):    No growth to date.      Radiology: all available radiological tests reviewed    < from: CT Abdomen and Pelvis No Cont (19 @ 12:47) >  Limited evaluation for pyelonephritis without IV contrast.    No hydronephrosis or urinary calculus. Previously seen hydronephrosis is   no longer identified. Previously seen blood products in the bladder are   no longer identified.    < end of copied text >    < from: Xray Chest 1 View- PORTABLE-Urgent (19 @ 09:06) >  Mild increased interstitial lung markings without significant change. No   gross new consolidation or pleural effusion.    < end of copied text >      Advanced directives addressed: full resuscitation

## 2019-08-12 NOTE — PROGRESS NOTE ADULT - SUBJECTIVE AND OBJECTIVE BOX
Patient is a 89y old  Female who presents with a chief complaint of weakness  c/w weakness per pt and doesn't want to go home      HEAD:  Atraumatic, Normocephalic  EYES: EOMI, PERRLA, conjunctiva and sclera clear  ENMT: Moist mucous membranes  NECK: Supple, No JVD, thyroid nontender  NERVOUS SYSTEM:  Alert & Oriented X3, PERRL, mot str 2/5   CHEST/LUNG: Clear to auscultation bilaterally; No rales, rhonchi, wheezing, or rubs  HEART: Regular rate and rhythm; No murmurs, rubs, or gallops  ABDOMEN: Soft, Nontender, Nondistended; Bowel sounds present  EXTREMITIES:  2+ Peripheral Pulses, No clubbing, cyanosis, or edema

## 2019-08-12 NOTE — PROGRESS NOTE ADULT - ASSESSMENT
89 y/o Female with h/o CAD/stents, hypothyroidism, endometrial Ca, recurrent bladder Ca, scheduled to start chemo, recurrent UTI's was admitted on 8/8 for generalized weakness x several hours. Pt was diagnosed w/ UTI 1.5 weeks ago, initially placed on keflex from urgent care, later changed to cipro by her PCP, but as of yesterday she was changed to macrobid, of which she had one dose last night.  She was too weak to get out of bed.  EMS was called. In ER she was found febrile and was given ceftriaxone.     1. Fatigue. Febrile syndrome resolved. Pyuria. Bladder Ca.  -pyuria could be related to underlying bladder Ca  -no clinical evidence of pneumonia  -cause of fatigue is likely multifactorial: underlying malignancy  -BC x 2, urine c/s reviewed  -s/p ceftriaxone 1 gm IV qd  -tolerating abx well so far; no side effects noted  -off abx now  -continue to observe off abx therapy  -monitor temps  -f/u CBC  -supportive care  2. Other issues:   -care per medicine

## 2019-08-13 RX ORDER — SODIUM CHLORIDE 9 MG/ML
500 INJECTION INTRAMUSCULAR; INTRAVENOUS; SUBCUTANEOUS ONCE
Refills: 0 | Status: COMPLETED | OUTPATIENT
Start: 2019-08-13 | End: 2019-08-13

## 2019-08-13 RX ADMIN — ENOXAPARIN SODIUM 40 MILLIGRAM(S): 100 INJECTION SUBCUTANEOUS at 12:48

## 2019-08-13 RX ADMIN — Medication 325 MILLIGRAM(S): at 12:48

## 2019-08-13 RX ADMIN — Medication 650 MILLIGRAM(S): at 21:17

## 2019-08-13 RX ADMIN — Medication 1 DROP(S): at 18:08

## 2019-08-13 RX ADMIN — Medication 75 MICROGRAM(S): at 06:17

## 2019-08-13 RX ADMIN — LOSARTAN POTASSIUM 25 MILLIGRAM(S): 100 TABLET, FILM COATED ORAL at 06:17

## 2019-08-13 RX ADMIN — Medication 0.12 MILLIGRAM(S): at 21:12

## 2019-08-13 RX ADMIN — Medication 50 MILLIGRAM(S): at 06:17

## 2019-08-13 RX ADMIN — Medication 650 MILLIGRAM(S): at 22:18

## 2019-08-13 RX ADMIN — PANTOPRAZOLE SODIUM 40 MILLIGRAM(S): 20 TABLET, DELAYED RELEASE ORAL at 06:17

## 2019-08-13 RX ADMIN — Medication 1 DROP(S): at 06:17

## 2019-08-13 RX ADMIN — Medication 650 MILLIGRAM(S): at 12:48

## 2019-08-13 RX ADMIN — Medication 1 DROP(S): at 00:20

## 2019-08-13 RX ADMIN — Medication 1 DROP(S): at 21:12

## 2019-08-13 RX ADMIN — SODIUM CHLORIDE 125 MILLILITER(S): 9 INJECTION INTRAMUSCULAR; INTRAVENOUS; SUBCUTANEOUS at 10:53

## 2019-08-13 NOTE — PROGRESS NOTE ADULT - ASSESSMENT
89 year old female admitted with weakness.   she has had no appetite for 2 days.     8/9/2019  iv fluids  await urine culture  continue medications for blood pressure    8/10-  urine culture is negative  continue pre-admission blood pressure medications.   ambulation with physical therapy today.   resume losartan 25 mg daily and Toprol xl 50 mg bid.     8/11  continue the blood pressure medications as they are given at home: Toprol xl 50 mg daily and Losartan 25 mg daily.   OOB and ambulating today  recheck urine analysis and culture.   Iron studies and B12 level for anemia.   as an outpatient, should start on Hiprex to prophylax against UTI    8/12  rechecking orthostatic vitals today  start ferrous sulfate 325 mg daily.     8/13  remains orthostatic.   will give a 500 cc IV Saline bolus today.   restart Metoprolol and if needed, losartan.   ambulation.   physical therapy.   I am away the remaining part of the week. If cardiology coverage is needed, please reach out to Dr Holt.

## 2019-08-13 NOTE — PROGRESS NOTE ADULT - PROVIDER SPECIALTY LIST ADULT
Cardiology
Critical Care
Hospitalist
Cardiology
Infectious Disease

## 2019-08-13 NOTE — PROGRESS NOTE ADULT - SUBJECTIVE AND OBJECTIVE BOX
Patient is a 89y old  Female who presents with a chief complaint of weakness  c/w weakness per pt and doesn't want to go home    Vital Signs Last 24 Hrs  T(C): 36.7 (13 Aug 2019 11:30), Max: 36.7 (13 Aug 2019 11:30)  T(F): 98 (13 Aug 2019 11:30), Max: 98 (13 Aug 2019 11:30)  HR: 87 (12 Aug 2019 20:10) (87 - 87)  BP: 166/59 (13 Aug 2019 00:20) (166/59 - 177/67)  BP(mean): --  RR: 18 (12 Aug 2019 20:10) (18 - 18)  SpO2: 100% (13 Aug 2019 06:08) (100% - 100%)      HEAD:  Atraumatic, Normocephalic  EYES: EOMI, PERRLA, conjunctiva and sclera clear  ENMT: Moist mucous membranes  NECK: Supple, No JVD, thyroid nontender  NERVOUS SYSTEM:  Alert & Oriented X3, PERRL, mot str 2/5   CHEST/LUNG: Clear to auscultation bilaterally; No rales, rhonchi, wheezing, or rubs  HEART: Regular rate and rhythm; No murmurs, rubs, or gallops  ABDOMEN: Soft, Nontender, Nondistended; Bowel sounds present  EXTREMITIES:  2+ Peripheral Pulses, No clubbing, cyanosis, or edema

## 2019-08-13 NOTE — PROGRESS NOTE ADULT - ASSESSMENT
89F w/PMH CAD/stents, hypothyroid, endometrial ca, bladder ca (diagnosed 2018), recurred since June, scheduled to start chemo today, presents this AM c/o generalized weakness since 0400 when she woke up.  Pt was diagnosed w/ UTI 1.5 weeks ago, initially placed on keflex from urgent care, later changed to cipro by her PCP, but as of yesterday she was changed to macrobid, of which she had one dose last night.     In ED, WBC 22, hg 11 (prior 8.9 in July), platelets 415 (300's July), UTI (weakly pos), low bp, received IV rocephin and 2L ivf in ED, LA 1.9, decreased GFR from prior (Cr 1.1- prior 0.7).  CXR neg, CT a/p pending.     A/P  Gen weakness   ucx neg     Syncope  vasovagal with poss orthostatic component  resume antiHTN before she has a cva      CADs/p PCI  might restart low dose BB if possible     Hypothyroid  Synthroid    Bladder Ca s/p BCG induction course, next treatment session 1 week after dc home  f/u with Urology    h/o EM Ca s/p EMILY    dc planning

## 2019-08-13 NOTE — PROGRESS NOTE ADULT - SUBJECTIVE AND OBJECTIVE BOX
Patient is a 89y old  Female who presents with a chief complaint of weakness (12 Aug 2019 16:19)    Patient is a 89y old  Female who presents with a chief complaint of weakness (10 Aug 2019 17:47)  89F w/PMH CAD/stents, hypothyroid, endometrial ca, bladder ca (diagnosed 2018), recurred since June, scheduled to start chemo today, presents this AM c/o generalized weakness since 0400 when she woke up.  Pt was diagnosed w/ UTI 1.5 weeks ago, initially placed on keflex from urgent care, later changed to cipro by her PCP, but as of yesterday she was changed to macrobid, of which she had one dose last night.  She woke this AM and was too weak to get out of bed, she eventually got up and went to the bathroom, but was too weak to get up from there.  EMS called, pt was aaox3, has 24H caregiver who was present w/ her.  She denies any dizziness, lightheadedness, n/v/d, abd pain, cp, sob, fever/chills.  She's had recurrent UTI, but this is first time she's felt this way.   In ED, WBC 22, hg 11 (prior 8.9 in July), platelets 415 (300's July), UTI (weakly pos), low bp, received IV rocephin and 2L ivf in ED, LA 1.9, decreased GFR from prior (Cr 1.1- prior 0.7).  CXR neg, CT a/p pending.     8/10  she is feeling better today  ate a good breakfast.   wants to get up out of bed and walk with walker.     8/11  weak.   wants to get out of the bed but says they told her to stay.   on no further antibiotics.   usually takes the metoprolol once a day.     8/12  blood pressure medications held yesterday because she was orthostatic.   received IV Fluids,   sleeping soundly this morning.   iron deficiency on blood work.     8/13  remains orthostatic.   she complains of weakness.   has not been put back on the blood pressure medications.     Allergies    amlodipine (Unknown)  clonidine (Unknown)  Florinef Acetate (Unknown)  hydrocodone (Unknown)  Levatol (Unknown)  Lipitor (Unknown)  Lotrel (Unknown)  methylPREDNISolone (Unknown)  morphine (Other)  Plaquenil (Unknown)  statins (Other (Unknown))  sulfa drugs (Rash)    Intolerances        MEDICATIONS  (STANDING):  ALPRAZolam 0.125 milliGRAM(s) Oral at bedtime  artificial tears (preservative free) Ophthalmic Solution 1 Drop(s) Both EYES three times a day  aspirin 325 milliGRAM(s) Oral daily  enoxaparin Injectable 40 milliGRAM(s) SubCutaneous daily  ferrous    sulfate 325 milliGRAM(s) Oral daily  levothyroxine 75 MICROGram(s) Oral daily  losartan 25 milliGRAM(s) Oral daily  metoprolol succinate ER 50 milliGRAM(s) Oral daily  pantoprazole    Tablet 40 milliGRAM(s) Oral before breakfast  sodium chloride 0.9% Bolus 500 milliLiter(s) IV Bolus once    MEDICATIONS  (PRN):  acetaminophen   Tablet .. 650 milliGRAM(s) Oral every 4 hours PRN Temp greater or equal to 38C (100.4F), Mild Pain (1 - 3)  ondansetron Injectable 4 milliGRAM(s) IV Push every 6 hours PRN Nausea    REVIEW OF SYSTEMS:    RESPIRATORY: No cough, wheezing, hemoptysis; No shortness of breath  CARDIOVASCULAR: No chest pain or palpitations  All other review of systems is negative unless indicated above      PHYSICAL EXAM:  Daily     Daily   Vital Signs Last 24 Hrs  T(C): 36.6 (13 Aug 2019 06:08), Max: 36.8 (12 Aug 2019 13:20)  T(F): 97.9 (13 Aug 2019 06:08), Max: 98.2 (12 Aug 2019 13:20)  HR: 87 (12 Aug 2019 20:10) (80 - 87)  BP: 166/59 (13 Aug 2019 00:20) (141/55 - 182/57)  BP(mean): --  RR: 18 (12 Aug 2019 20:10) (18 - 18)  SpO2: 100% (13 Aug 2019 06:08) (99% - 100%)    Constitutional: NAD, awake and alert, well-developed  HEENT: PERR, EOMI, Normal Hearing, MMM  Neck: Soft and supple, No LAD, No JVD  Respiratory: Breath sounds are clear bilaterally, No wheezing, rales or rhonchi  Cardiovascular: S1 and S2, regular rate and rhythm, no Murmurs, gallops or rubs  Gastrointestinal: Bowel Sounds present, soft, nontender, nondistended, no guarding, no rebound  Extremities: No peripheral edema  Vascular: 2+ peripheral pulses  Neurological: A/O x 3, no focal deficits  Musculoskeletal: 5/5 strength b/l upper and lower extremities  Skin: No rashes    LABS: All Labs Reviewed:                        9.7    8.95  )-----------( 354      ( 12 Aug 2019 08:33 )             32.7     08-12    139  |  107  |  11  ----------------------------<  123<H>  3.5   |  22  |  0.85    Ca    9.2      12 Aug 2019 08:33  Mg     2.0     08-12

## 2019-08-14 ENCOUNTER — TRANSCRIPTION ENCOUNTER (OUTPATIENT)
Age: 84
End: 2019-08-14

## 2019-08-14 VITALS
HEART RATE: 90 BPM | RESPIRATION RATE: 18 BRPM | OXYGEN SATURATION: 100 % | DIASTOLIC BLOOD PRESSURE: 63 MMHG | SYSTOLIC BLOOD PRESSURE: 159 MMHG | TEMPERATURE: 97 F

## 2019-08-14 RX ADMIN — Medication 75 MICROGRAM(S): at 06:08

## 2019-08-14 RX ADMIN — LOSARTAN POTASSIUM 25 MILLIGRAM(S): 100 TABLET, FILM COATED ORAL at 06:09

## 2019-08-14 RX ADMIN — Medication 1 DROP(S): at 13:05

## 2019-08-14 RX ADMIN — Medication 325 MILLIGRAM(S): at 13:05

## 2019-08-14 RX ADMIN — Medication 1 DROP(S): at 06:09

## 2019-08-14 RX ADMIN — Medication 50 MILLIGRAM(S): at 06:08

## 2019-08-14 RX ADMIN — ENOXAPARIN SODIUM 40 MILLIGRAM(S): 100 INJECTION SUBCUTANEOUS at 13:05

## 2019-08-14 RX ADMIN — PANTOPRAZOLE SODIUM 40 MILLIGRAM(S): 20 TABLET, DELAYED RELEASE ORAL at 06:08

## 2019-08-14 RX ADMIN — Medication 650 MILLIGRAM(S): at 14:33

## 2019-08-14 NOTE — DISCHARGE NOTE NURSING/CASE MANAGEMENT/SOCIAL WORK - NSDCDPATPORTLINK_GEN_ALL_CORE
You can access the [facility], offered by [facility], by registering with the following website: [email or web address]

## 2019-08-14 NOTE — DISCHARGE NOTE PROVIDER - NSDCCPCAREPLAN_GEN_ALL_CORE_FT
PRINCIPAL DISCHARGE DIAGNOSIS  Diagnosis: UTI (urinary tract infection)  Assessment and Plan of Treatment: no UTI

## 2019-08-14 NOTE — DISCHARGE NOTE PROVIDER - CARE PROVIDER_API CALL
Mayra Sánchez (MD)  Cardiovascular Disease; Internal Medicine  [address] [place] [address]  Phone: [phone]  Fax: [phone]  Follow Up Time:

## 2019-08-14 NOTE — DISCHARGE NOTE PROVIDER - HOSPITAL COURSE
Patient is a 89y old  Female who presents with a chief complaint of weakness    c/w weakness per pt and doesn't want to go home        Vital Signs Last 24 Hrs    T(C): 36.7 (13 Aug 2019 11:30), Max: 36.7 (13 Aug 2019 11:30)    T(F): 98 (13 Aug 2019 11:30), Max: 98 (13 Aug 2019 11:30)    HR: 87 (12 Aug 2019 20:10) (87 - 87)    BP: 166/59 (13 Aug 2019 00:20) (166/59 - 177/67)    BP(mean): --    RR: 18 (12 Aug 2019 20:10) (18 - 18)    SpO2: 100% (13 Aug 2019 06:08) (100% - 100%)            HEAD:  Atraumatic, Normocephalic    EYES: EOMI, PERRLA, conjunctiva and sclera clear    ENMT: Moist mucous membranes    NECK: Supple, No JVD, thyroid nontender    NERVOUS SYSTEM:  Alert & Oriented X3, PERRL, mot str 2/5     CHEST/LUNG: Clear to auscultation bilaterally; No rales, rhonchi, wheezing, or rubs    HEART: Regular rate and rhythm; No murmurs, rubs, or gallops    ABDOMEN: Soft, Nontender, Nondistended; Bowel sounds present    EXTREMITIES:  2+ Peripheral Pulses, No clubbing, cyanosis, or edema                                    Assessment and Plan:     · Assessment	    89F w/PMH CAD/stents, hypothyroid, endometrial ca, bladder ca (diagnosed 2018), recurred since June, scheduled to start chemo today, presents this AM c/o generalized weakness since 0400 when she woke up.  Pt was diagnosed w/ UTI 1.5 weeks ago, initially placed on keflex from urgent care, later changed to cipro by her PCP, but as of yesterday she was changed to macrobid, of which she had one dose last night.         In ED, WBC 22, hg 11 (prior 8.9 in July), platelets 415 (300's July), UTI (weakly pos), low bp, received IV rocephin and 2L ivf in ED, LA 1.9, decreased GFR from prior (Cr 1.1- prior 0.7).  CXR neg, CT a/p pending.         A/P    Gen weakness     ucx neg         Syncope    vasovagal with poss orthostatic component                CADs/p PCI    might restart low dose BB if possible         Hypothyroid    Synthroid        Bladder Ca s/p BCG induction course, next treatment session 1 week after dc home    f/u with Urology            PCP Dr. Álvarez aware

## 2019-08-15 ENCOUNTER — EMERGENCY (EMERGENCY)
Facility: HOSPITAL | Age: 84
LOS: 0 days | Discharge: NURSING, REHAB OR LONG-TERM CARE | End: 2019-08-16
Attending: EMERGENCY MEDICINE
Payer: MEDICARE

## 2019-08-15 VITALS
SYSTOLIC BLOOD PRESSURE: 155 MMHG | WEIGHT: 171.96 LBS | OXYGEN SATURATION: 97 % | TEMPERATURE: 98 F | HEIGHT: 62 IN | HEART RATE: 86 BPM | RESPIRATION RATE: 18 BRPM | DIASTOLIC BLOOD PRESSURE: 64 MMHG

## 2019-08-15 DIAGNOSIS — R29.898 OTHER SYMPTOMS AND SIGNS INVOLVING THE MUSCULOSKELETAL SYSTEM: ICD-10-CM

## 2019-08-15 DIAGNOSIS — K21.9 GASTRO-ESOPHAGEAL REFLUX DISEASE WITHOUT ESOPHAGITIS: ICD-10-CM

## 2019-08-15 DIAGNOSIS — Z79.82 LONG TERM (CURRENT) USE OF ASPIRIN: ICD-10-CM

## 2019-08-15 DIAGNOSIS — E78.5 HYPERLIPIDEMIA, UNSPECIFIED: ICD-10-CM

## 2019-08-15 DIAGNOSIS — I10 ESSENTIAL (PRIMARY) HYPERTENSION: ICD-10-CM

## 2019-08-15 DIAGNOSIS — E03.9 HYPOTHYROIDISM, UNSPECIFIED: ICD-10-CM

## 2019-08-15 DIAGNOSIS — I25.10 ATHEROSCLEROTIC HEART DISEASE OF NATIVE CORONARY ARTERY WITHOUT ANGINA PECTORIS: ICD-10-CM

## 2019-08-15 DIAGNOSIS — Z87.440 PERSONAL HISTORY OF URINARY (TRACT) INFECTIONS: ICD-10-CM

## 2019-08-15 DIAGNOSIS — M17.0 BILATERAL PRIMARY OSTEOARTHRITIS OF KNEE: ICD-10-CM

## 2019-08-15 DIAGNOSIS — Z85.51 PERSONAL HISTORY OF MALIGNANT NEOPLASM OF BLADDER: ICD-10-CM

## 2019-08-15 DIAGNOSIS — Z95.5 PRESENCE OF CORONARY ANGIOPLASTY IMPLANT AND GRAFT: ICD-10-CM

## 2019-08-15 DIAGNOSIS — Z79.899 OTHER LONG TERM (CURRENT) DRUG THERAPY: ICD-10-CM

## 2019-08-15 DIAGNOSIS — Z90.710 ACQUIRED ABSENCE OF BOTH CERVIX AND UTERUS: Chronic | ICD-10-CM

## 2019-08-15 DIAGNOSIS — R53.1 WEAKNESS: ICD-10-CM

## 2019-08-15 LAB
ALBUMIN SERPL ELPH-MCNC: 3.7 G/DL — SIGNIFICANT CHANGE UP (ref 3.3–5)
ALP SERPL-CCNC: 69 U/L — SIGNIFICANT CHANGE UP (ref 40–120)
ALT FLD-CCNC: 20 U/L — SIGNIFICANT CHANGE UP (ref 12–78)
ANION GAP SERPL CALC-SCNC: 8 MMOL/L — SIGNIFICANT CHANGE UP (ref 5–17)
APPEARANCE UR: CLEAR — SIGNIFICANT CHANGE UP
APTT BLD: 29.4 SEC — SIGNIFICANT CHANGE UP (ref 27.5–36.3)
AST SERPL-CCNC: 20 U/L — SIGNIFICANT CHANGE UP (ref 15–37)
BILIRUB SERPL-MCNC: 0.3 MG/DL — SIGNIFICANT CHANGE UP (ref 0.2–1.2)
BILIRUB UR-MCNC: NEGATIVE — SIGNIFICANT CHANGE UP
BUN SERPL-MCNC: 19 MG/DL — SIGNIFICANT CHANGE UP (ref 7–23)
CALCIUM SERPL-MCNC: 9.4 MG/DL — SIGNIFICANT CHANGE UP (ref 8.5–10.1)
CHLORIDE SERPL-SCNC: 104 MMOL/L — SIGNIFICANT CHANGE UP (ref 96–108)
CO2 SERPL-SCNC: 26 MMOL/L — SIGNIFICANT CHANGE UP (ref 22–31)
COLOR SPEC: YELLOW — SIGNIFICANT CHANGE UP
CREAT SERPL-MCNC: 1.05 MG/DL — SIGNIFICANT CHANGE UP (ref 0.5–1.3)
DIFF PNL FLD: ABNORMAL
GLUCOSE SERPL-MCNC: 104 MG/DL — HIGH (ref 70–99)
GLUCOSE UR QL: NEGATIVE MG/DL — SIGNIFICANT CHANGE UP
HCT VFR BLD CALC: 33.7 % — LOW (ref 34.5–45)
HGB BLD-MCNC: 10.4 G/DL — LOW (ref 11.5–15.5)
INR BLD: 0.98 RATIO — SIGNIFICANT CHANGE UP (ref 0.88–1.16)
KETONES UR-MCNC: NEGATIVE — SIGNIFICANT CHANGE UP
LEUKOCYTE ESTERASE UR-ACNC: ABNORMAL
MAGNESIUM SERPL-MCNC: 2.7 MG/DL — HIGH (ref 1.6–2.6)
MCHC RBC-ENTMCNC: 26.7 PG — LOW (ref 27–34)
MCHC RBC-ENTMCNC: 30.9 GM/DL — LOW (ref 32–36)
MCV RBC AUTO: 86.6 FL — SIGNIFICANT CHANGE UP (ref 80–100)
NITRITE UR-MCNC: NEGATIVE — SIGNIFICANT CHANGE UP
PH UR: 6.5 — SIGNIFICANT CHANGE UP (ref 5–8)
PLATELET # BLD AUTO: 393 K/UL — SIGNIFICANT CHANGE UP (ref 150–400)
POTASSIUM SERPL-MCNC: 4.2 MMOL/L — SIGNIFICANT CHANGE UP (ref 3.5–5.3)
POTASSIUM SERPL-SCNC: 4.2 MMOL/L — SIGNIFICANT CHANGE UP (ref 3.5–5.3)
PROT SERPL-MCNC: 7.4 GM/DL — SIGNIFICANT CHANGE UP (ref 6–8.3)
PROT UR-MCNC: NEGATIVE MG/DL — SIGNIFICANT CHANGE UP
PROTHROM AB SERPL-ACNC: 10.9 SEC — SIGNIFICANT CHANGE UP (ref 10–12.9)
RBC # BLD: 3.89 M/UL — SIGNIFICANT CHANGE UP (ref 3.8–5.2)
RBC # FLD: 15.2 % — HIGH (ref 10.3–14.5)
SODIUM SERPL-SCNC: 138 MMOL/L — SIGNIFICANT CHANGE UP (ref 135–145)
SP GR SPEC: 1.01 — SIGNIFICANT CHANGE UP (ref 1.01–1.02)
TROPONIN I SERPL-MCNC: 0.02 NG/ML — SIGNIFICANT CHANGE UP (ref 0.01–0.04)
UROBILINOGEN FLD QL: NEGATIVE MG/DL — SIGNIFICANT CHANGE UP
WBC # BLD: 9.56 K/UL — SIGNIFICANT CHANGE UP (ref 3.8–10.5)
WBC # FLD AUTO: 9.56 K/UL — SIGNIFICANT CHANGE UP (ref 3.8–10.5)

## 2019-08-15 PROCEDURE — 70450 CT HEAD/BRAIN W/O DYE: CPT

## 2019-08-15 PROCEDURE — 93010 ELECTROCARDIOGRAM REPORT: CPT

## 2019-08-15 PROCEDURE — 84484 ASSAY OF TROPONIN QUANT: CPT | Mod: 91

## 2019-08-15 PROCEDURE — 97116 GAIT TRAINING THERAPY: CPT | Mod: GP

## 2019-08-15 PROCEDURE — 96372 THER/PROPH/DIAG INJ SC/IM: CPT | Mod: 59

## 2019-08-15 PROCEDURE — 81001 URINALYSIS AUTO W/SCOPE: CPT

## 2019-08-15 PROCEDURE — 83735 ASSAY OF MAGNESIUM: CPT

## 2019-08-15 PROCEDURE — 96361 HYDRATE IV INFUSION ADD-ON: CPT

## 2019-08-15 PROCEDURE — 80048 BASIC METABOLIC PNL TOTAL CA: CPT

## 2019-08-15 PROCEDURE — 85025 COMPLETE CBC W/AUTO DIFF WBC: CPT

## 2019-08-15 PROCEDURE — 85610 PROTHROMBIN TIME: CPT

## 2019-08-15 PROCEDURE — 70450 CT HEAD/BRAIN W/O DYE: CPT | Mod: 26

## 2019-08-15 PROCEDURE — 36415 COLL VENOUS BLD VENIPUNCTURE: CPT

## 2019-08-15 PROCEDURE — 80053 COMPREHEN METABOLIC PANEL: CPT

## 2019-08-15 PROCEDURE — 71045 X-RAY EXAM CHEST 1 VIEW: CPT

## 2019-08-15 PROCEDURE — 99285 EMERGENCY DEPT VISIT HI MDM: CPT

## 2019-08-15 PROCEDURE — G0378: CPT

## 2019-08-15 PROCEDURE — 87040 BLOOD CULTURE FOR BACTERIA: CPT | Mod: 91

## 2019-08-15 PROCEDURE — 96374 THER/PROPH/DIAG INJ IV PUSH: CPT

## 2019-08-15 PROCEDURE — 85027 COMPLETE CBC AUTOMATED: CPT

## 2019-08-15 PROCEDURE — 93005 ELECTROCARDIOGRAM TRACING: CPT

## 2019-08-15 PROCEDURE — 97162 PT EVAL MOD COMPLEX 30 MIN: CPT | Mod: GP

## 2019-08-15 PROCEDURE — 71045 X-RAY EXAM CHEST 1 VIEW: CPT | Mod: 26

## 2019-08-15 PROCEDURE — 99285 EMERGENCY DEPT VISIT HI MDM: CPT | Mod: 25

## 2019-08-15 PROCEDURE — 85730 THROMBOPLASTIN TIME PARTIAL: CPT

## 2019-08-15 RX ORDER — ACETAMINOPHEN 500 MG
1000 TABLET ORAL ONCE
Refills: 0 | Status: COMPLETED | OUTPATIENT
Start: 2019-08-15 | End: 2019-08-15

## 2019-08-15 RX ORDER — SODIUM CHLORIDE 9 MG/ML
1000 INJECTION INTRAMUSCULAR; INTRAVENOUS; SUBCUTANEOUS ONCE
Refills: 0 | Status: COMPLETED | OUTPATIENT
Start: 2019-08-15 | End: 2019-08-15

## 2019-08-15 RX ORDER — CEFTRIAXONE 500 MG/1
1000 INJECTION, POWDER, FOR SOLUTION INTRAMUSCULAR; INTRAVENOUS ONCE
Refills: 0 | Status: COMPLETED | OUTPATIENT
Start: 2019-08-15 | End: 2019-08-15

## 2019-08-15 RX ORDER — ASPIRIN/CALCIUM CARB/MAGNESIUM 324 MG
81 TABLET ORAL ONCE
Refills: 0 | Status: COMPLETED | OUTPATIENT
Start: 2019-08-15 | End: 2019-08-15

## 2019-08-15 RX ORDER — MULTIVIT-MIN/FERROUS GLUCONATE 9 MG/15 ML
1 LIQUID (ML) ORAL
Qty: 0 | Refills: 0 | DISCHARGE

## 2019-08-15 RX ORDER — ACETAMINOPHEN 500 MG
2 TABLET ORAL
Qty: 0 | Refills: 0 | DISCHARGE

## 2019-08-15 RX ADMIN — Medication 1000 MILLIGRAM(S): at 22:12

## 2019-08-15 RX ADMIN — SODIUM CHLORIDE 1000 MILLILITER(S): 9 INJECTION INTRAMUSCULAR; INTRAVENOUS; SUBCUTANEOUS at 20:59

## 2019-08-15 RX ADMIN — CEFTRIAXONE 1000 MILLIGRAM(S): 500 INJECTION, POWDER, FOR SOLUTION INTRAMUSCULAR; INTRAVENOUS at 22:12

## 2019-08-15 RX ADMIN — Medication 1000 MILLIGRAM(S): at 21:17

## 2019-08-15 RX ADMIN — SODIUM CHLORIDE 1000 MILLILITER(S): 9 INJECTION INTRAMUSCULAR; INTRAVENOUS; SUBCUTANEOUS at 20:16

## 2019-08-15 NOTE — ED PROVIDER NOTE - NS_ ATTENDINGSCRIBEDETAILS _ED_A_ED_FT
I Harry Guerrier MD saw and examined the patient. Scribe documented for me and under my supervision. I have modified the scribe's documentation where necessary to reflect my history, physical exam and other relevant documentations pertinent to the care of the patient.

## 2019-08-15 NOTE — ED PROVIDER NOTE - PMH
CAD (coronary artery disease)    Endometrial adenocarcinoma    GERD (gastroesophageal reflux disease)    [place] (hard of hearing)    HTN (hypertension)    Hyperlipidemia    Hypothyroid    Macular degeneration    Stented coronary artery

## 2019-08-15 NOTE — ED PROVIDER NOTE - EYES, MLM
Clear bilaterally, pupils equal, round and 2 mm reactive to light. Clear bilaterally, pupils equal, round and 2 mm reactive to light. EOMI.

## 2019-08-15 NOTE — ED ADULT TRIAGE NOTE - CHIEF COMPLAINT QUOTE
Weakness , pt was discharged yesterday and can not walk on her own. pt was lowered to the floor by her daughter in the bathroom. no fall occurred.

## 2019-08-15 NOTE — ED PROVIDER NOTE - CARE PLAN
Principal Discharge DX:	Weakness of lower extremity, unspecified laterality  Secondary Diagnosis:	Social problem

## 2019-08-15 NOTE — ED PROVIDER NOTE - OBJECTIVE STATEMENT
89 y/o female with a PMHx of CAD, HTN, HLD, Adhesive capsulitis of right shoulder, GERD, presents to the ED c/o weakness in lower extremity. Pt started to fall, daughter caught her, no fall occurred. Unable to ambulate due to weakness. Has hx of numerous UTI and syncope. Pt was DC'd yesterday from [facility] after being admitted x6 days for high WBC count. +Fogginess. +Lightheadedness. Denies dizziness, CP, dysuria. Pt states she went to wash her face, felt weakness in her leg and couldn't stand. When pt was DC'd yesterday, her sx were not completely resolved, and was not back to baseline. ID states they "saw something in her bladder." as per pt. Had bladder CA, received chemo. Pt was in [facility] post rehab after removal of bladder tumors. Progressive gait weakness x2 weeks. Normal PO intake. PCP: Gonzalo.

## 2019-08-15 NOTE — ED PROVIDER NOTE - CARDIAC, MLM
Normal rate, regular rhythm.  Heart sounds S1, S2.  No murmurs, rubs or gallops. Normal rate, regular rhythm.  Heart sounds S1, S2.  No murmurs, rubs or gallops. +2+ pulses in arteries Normal rate, regular rhythm.  Heart sounds S1, S2.  No murmurs, rubs or gallops. +2+ pulses in bilateral arteries. Normal rate, regular rhythm.  Heart sounds S1, S2.  No rubs or gallops. 2+ pulses in bilateral radial and dp arteries.

## 2019-08-15 NOTE — ED ADULT NURSE NOTE - CHIEF COMPLAINT QUOTE
No Weakness , pt was discharged yesterday and can not walk on her own. pt was lowered to the floor by her daughter in the bathroom. no fall occurred.

## 2019-08-15 NOTE — ED PROVIDER NOTE - GASTROINTESTINAL, MLM
Abdomen soft, non-tender, no guarding. Mild TTP. No rebound. Abdomen soft, non-tender, no guarding. Mild TTP. No rebound. Non-distended.

## 2019-08-15 NOTE — ED ADULT NURSE NOTE - OBJECTIVE STATEMENT
Patient was just discharged home alone from [facility] and was feeling weak and unable to walk.  She has a history of bladder cancer and gets frequent UTI's.  She feels fine but just feels weak and unable to be home alone at this time.  She had home health aides in the past but she said her most recent one found another job.  IV inserted and straight cath for urine under sterile technique.  Family at bedside.

## 2019-08-16 ENCOUNTER — TRANSCRIPTION ENCOUNTER (OUTPATIENT)
Age: 84
End: 2019-08-16

## 2019-08-16 VITALS
TEMPERATURE: 98 F | SYSTOLIC BLOOD PRESSURE: 137 MMHG | RESPIRATION RATE: 18 BRPM | OXYGEN SATURATION: 95 % | DIASTOLIC BLOOD PRESSURE: 60 MMHG | HEART RATE: 83 BPM

## 2019-08-16 DIAGNOSIS — K21.9 GASTRO-ESOPHAGEAL REFLUX DISEASE WITHOUT ESOPHAGITIS: ICD-10-CM

## 2019-08-16 DIAGNOSIS — I10 ESSENTIAL (PRIMARY) HYPERTENSION: ICD-10-CM

## 2019-08-16 DIAGNOSIS — E78.5 HYPERLIPIDEMIA, UNSPECIFIED: ICD-10-CM

## 2019-08-16 DIAGNOSIS — F41.9 ANXIETY DISORDER, UNSPECIFIED: ICD-10-CM

## 2019-08-16 DIAGNOSIS — R53.1 WEAKNESS: ICD-10-CM

## 2019-08-16 DIAGNOSIS — Z98.890 OTHER SPECIFIED POSTPROCEDURAL STATES: Chronic | ICD-10-CM

## 2019-08-16 DIAGNOSIS — E03.9 HYPOTHYROIDISM, UNSPECIFIED: ICD-10-CM

## 2019-08-16 DIAGNOSIS — D50.9 IRON DEFICIENCY ANEMIA, UNSPECIFIED: ICD-10-CM

## 2019-08-16 DIAGNOSIS — I25.10 ATHEROSCLEROTIC HEART DISEASE OF NATIVE CORONARY ARTERY WITHOUT ANGINA PECTORIS: ICD-10-CM

## 2019-08-16 DIAGNOSIS — K59.00 CONSTIPATION, UNSPECIFIED: ICD-10-CM

## 2019-08-16 DIAGNOSIS — Z29.9 ENCOUNTER FOR PROPHYLACTIC MEASURES, UNSPECIFIED: ICD-10-CM

## 2019-08-16 DIAGNOSIS — R29.898 OTHER SYMPTOMS AND SIGNS INVOLVING THE MUSCULOSKELETAL SYSTEM: ICD-10-CM

## 2019-08-16 LAB
ANION GAP SERPL CALC-SCNC: 6 MMOL/L — SIGNIFICANT CHANGE UP (ref 5–17)
BASOPHILS # BLD AUTO: 0.07 K/UL — SIGNIFICANT CHANGE UP (ref 0–0.2)
BASOPHILS NFR BLD AUTO: 0.8 % — SIGNIFICANT CHANGE UP (ref 0–2)
BUN SERPL-MCNC: 17 MG/DL — SIGNIFICANT CHANGE UP (ref 7–23)
CALCIUM SERPL-MCNC: 9.1 MG/DL — SIGNIFICANT CHANGE UP (ref 8.5–10.1)
CHLORIDE SERPL-SCNC: 106 MMOL/L — SIGNIFICANT CHANGE UP (ref 96–108)
CO2 SERPL-SCNC: 26 MMOL/L — SIGNIFICANT CHANGE UP (ref 22–31)
CREAT SERPL-MCNC: 0.83 MG/DL — SIGNIFICANT CHANGE UP (ref 0.5–1.3)
EOSINOPHIL # BLD AUTO: 0.6 K/UL — HIGH (ref 0–0.5)
EOSINOPHIL NFR BLD AUTO: 6.5 % — HIGH (ref 0–6)
GLUCOSE SERPL-MCNC: 95 MG/DL — SIGNIFICANT CHANGE UP (ref 70–99)
HCT VFR BLD CALC: 32.3 % — LOW (ref 34.5–45)
HGB BLD-MCNC: 9.9 G/DL — LOW (ref 11.5–15.5)
IMM GRANULOCYTES NFR BLD AUTO: 0.4 % — SIGNIFICANT CHANGE UP (ref 0–1.5)
LYMPHOCYTES # BLD AUTO: 2.43 K/UL — SIGNIFICANT CHANGE UP (ref 1–3.3)
LYMPHOCYTES # BLD AUTO: 26.5 % — SIGNIFICANT CHANGE UP (ref 13–44)
MCHC RBC-ENTMCNC: 26.9 PG — LOW (ref 27–34)
MCHC RBC-ENTMCNC: 30.7 GM/DL — LOW (ref 32–36)
MCV RBC AUTO: 87.8 FL — SIGNIFICANT CHANGE UP (ref 80–100)
MONOCYTES # BLD AUTO: 0.75 K/UL — SIGNIFICANT CHANGE UP (ref 0–0.9)
MONOCYTES NFR BLD AUTO: 8.2 % — SIGNIFICANT CHANGE UP (ref 2–14)
NEUTROPHILS # BLD AUTO: 5.29 K/UL — SIGNIFICANT CHANGE UP (ref 1.8–7.4)
NEUTROPHILS NFR BLD AUTO: 57.6 % — SIGNIFICANT CHANGE UP (ref 43–77)
PLATELET # BLD AUTO: 370 K/UL — SIGNIFICANT CHANGE UP (ref 150–400)
POTASSIUM SERPL-MCNC: 4.1 MMOL/L — SIGNIFICANT CHANGE UP (ref 3.5–5.3)
POTASSIUM SERPL-SCNC: 4.1 MMOL/L — SIGNIFICANT CHANGE UP (ref 3.5–5.3)
RBC # BLD: 3.68 M/UL — LOW (ref 3.8–5.2)
RBC # FLD: 15.5 % — HIGH (ref 10.3–14.5)
SODIUM SERPL-SCNC: 138 MMOL/L — SIGNIFICANT CHANGE UP (ref 135–145)
TROPONIN I SERPL-MCNC: 0.03 NG/ML — SIGNIFICANT CHANGE UP (ref 0.01–0.04)
WBC # BLD: 9.18 K/UL — SIGNIFICANT CHANGE UP (ref 3.8–10.5)
WBC # FLD AUTO: 9.18 K/UL — SIGNIFICANT CHANGE UP (ref 3.8–10.5)

## 2019-08-16 RX ORDER — ALPRAZOLAM 0.25 MG
0.25 TABLET ORAL THREE TIMES A DAY
Refills: 0 | Status: DISCONTINUED | OUTPATIENT
Start: 2019-08-16 | End: 2019-08-16

## 2019-08-16 RX ORDER — CHOLECALCIFEROL (VITAMIN D3) 125 MCG
2000 CAPSULE ORAL DAILY
Refills: 0 | Status: DISCONTINUED | OUTPATIENT
Start: 2019-08-16 | End: 2019-08-16

## 2019-08-16 RX ORDER — METOPROLOL TARTRATE 50 MG
50 TABLET ORAL DAILY
Refills: 0 | Status: DISCONTINUED | OUTPATIENT
Start: 2019-08-16 | End: 2019-08-16

## 2019-08-16 RX ORDER — METOPROLOL TARTRATE 50 MG
1 TABLET ORAL
Qty: 0 | Refills: 0 | DISCHARGE

## 2019-08-16 RX ORDER — ACETAMINOPHEN 500 MG
650 TABLET ORAL EVERY 6 HOURS
Refills: 0 | Status: DISCONTINUED | OUTPATIENT
Start: 2019-08-16 | End: 2019-08-16

## 2019-08-16 RX ORDER — HEPARIN SODIUM 5000 [USP'U]/ML
5000 INJECTION INTRAVENOUS; SUBCUTANEOUS THREE TIMES A DAY
Refills: 0 | Status: DISCONTINUED | OUTPATIENT
Start: 2019-08-16 | End: 2019-08-16

## 2019-08-16 RX ORDER — FERROUS SULFATE 325(65) MG
325 TABLET ORAL DAILY
Refills: 0 | Status: DISCONTINUED | OUTPATIENT
Start: 2019-08-16 | End: 2019-08-16

## 2019-08-16 RX ORDER — PANTOPRAZOLE SODIUM 20 MG/1
40 TABLET, DELAYED RELEASE ORAL
Refills: 0 | Status: DISCONTINUED | OUTPATIENT
Start: 2019-08-16 | End: 2019-08-16

## 2019-08-16 RX ORDER — LACTULOSE 10 G/15ML
20 SOLUTION ORAL
Refills: 0 | Status: DISCONTINUED | OUTPATIENT
Start: 2019-08-16 | End: 2019-08-16

## 2019-08-16 RX ORDER — LOSARTAN POTASSIUM 100 MG/1
25 TABLET, FILM COATED ORAL DAILY
Refills: 0 | Status: DISCONTINUED | OUTPATIENT
Start: 2019-08-16 | End: 2019-08-16

## 2019-08-16 RX ORDER — ASPIRIN/CALCIUM CARB/MAGNESIUM 324 MG
325 TABLET ORAL DAILY
Refills: 0 | Status: DISCONTINUED | OUTPATIENT
Start: 2019-08-16 | End: 2019-08-16

## 2019-08-16 RX ORDER — ALPRAZOLAM 0.25 MG
0.25 TABLET ORAL ONCE
Refills: 0 | Status: DISCONTINUED | OUTPATIENT
Start: 2019-08-16 | End: 2019-08-16

## 2019-08-16 RX ORDER — MIDODRINE HYDROCHLORIDE 2.5 MG/1
2.5 TABLET ORAL
Refills: 0 | Status: DISCONTINUED | OUTPATIENT
Start: 2019-08-16 | End: 2019-08-16

## 2019-08-16 RX ORDER — ALPRAZOLAM 0.25 MG
0.5 TABLET ORAL
Qty: 0 | Refills: 0 | DISCHARGE

## 2019-08-16 RX ORDER — LEVOTHYROXINE SODIUM 125 MCG
75 TABLET ORAL DAILY
Refills: 0 | Status: DISCONTINUED | OUTPATIENT
Start: 2019-08-16 | End: 2019-08-16

## 2019-08-16 RX ADMIN — Medication 650 MILLIGRAM(S): at 14:36

## 2019-08-16 RX ADMIN — Medication 1 DROP(S): at 14:37

## 2019-08-16 RX ADMIN — MIDODRINE HYDROCHLORIDE 2.5 MILLIGRAM(S): 2.5 TABLET ORAL at 18:40

## 2019-08-16 RX ADMIN — Medication 75 MICROGRAM(S): at 07:16

## 2019-08-16 RX ADMIN — HEPARIN SODIUM 5000 UNIT(S): 5000 INJECTION INTRAVENOUS; SUBCUTANEOUS at 14:35

## 2019-08-16 RX ADMIN — Medication 2000 UNIT(S): at 12:36

## 2019-08-16 RX ADMIN — LACTULOSE 20 GRAM(S): 10 SOLUTION ORAL at 06:14

## 2019-08-16 RX ADMIN — PANTOPRAZOLE SODIUM 40 MILLIGRAM(S): 20 TABLET, DELAYED RELEASE ORAL at 07:16

## 2019-08-16 RX ADMIN — Medication 650 MILLIGRAM(S): at 15:33

## 2019-08-16 RX ADMIN — Medication 325 MILLIGRAM(S): at 12:37

## 2019-08-16 RX ADMIN — Medication 50 MILLIGRAM(S): at 07:16

## 2019-08-16 RX ADMIN — Medication 0.25 MILLIGRAM(S): at 00:35

## 2019-08-16 RX ADMIN — LOSARTAN POTASSIUM 25 MILLIGRAM(S): 100 TABLET, FILM COATED ORAL at 07:16

## 2019-08-16 NOTE — H&P ADULT - NSHPPHYSICALEXAM_GEN_ALL_CORE
Vital Signs Last 24 Hrs  T(C): 36.4 (16 Aug 2019 00:00), Max: 36.7 (15 Aug 2019 22:15)  T(F): 97.6 (16 Aug 2019 00:00), Max: 98 (15 Aug 2019 22:15)  HR: 80 (16 Aug 2019 00:00) (80 - 86)  BP: 159/73 (16 Aug 2019 00:00) (155/64 - 168/61)  BP(mean): --  RR: 18 (16 Aug 2019 00:00) (15 - 18)  SpO2: 98% (16 Aug 2019 00:00) (97% - 100%) Vital Signs Last 24 Hrs  T(C): 36.4 (16 Aug 2019 00:00), Max: 36.7 (15 Aug 2019 22:15)  T(F): 97.6 (16 Aug 2019 00:00), Max: 98 (15 Aug 2019 22:15)  HR: 80 (16 Aug 2019 00:00) (80 - 86)  BP: 159/73 (16 Aug 2019 00:00) (155/64 - 168/61)  RR: 18 (16 Aug 2019 00:00) (15 - 18)  SpO2: 98% (16 Aug 2019 00:00) (97% - 100%)

## 2019-08-16 NOTE — H&P ADULT - PROBLEM SELECTOR PLAN 1
Possible failure to thrive component with noted decreased PO intake. r/o cardiac etiology,   -Admit to tele, monitor for events that may contribute to fall  -IVF LR 100cc/hr   -Repeat Orthostatics in AM  -PT consult Possible failure to thrive component with noted decreased PO intake. r/o cardiac etiology,   -Admit to tele, monitor for events that may contribute to fall  -Repeat Orthostatics in AM, if positive, start IVF LR 100cc/hr  -PT consult

## 2019-08-16 NOTE — PHYSICAL THERAPY INITIAL EVALUATION ADULT - ACTIVE RANGE OF MOTION EXAMINATION, REHAB EVAL
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 B

## 2019-08-16 NOTE — PHYSICAL THERAPY INITIAL EVALUATION ADULT - ADDITIONAL COMMENTS
Pt had a HHA private then on hospital admission had to get another job and she lost her pvt HHA. Dgt in from [place] and pt almost fell and dgt controlled the fall

## 2019-08-16 NOTE — H&P ADULT - ATTENDING COMMENTS
89 y/o Female with PMHx CAD w stents, HTN, Orthostatic Hypotension, Bladder CA and Intrauterine CA presenting to the ED after an episode of weakness and nearly falling to the ground in her bathroom yesterday afternoon. Her daughter was with her and was able to catch her before she fell. Pt was recently discharged from  the previous day 8/14/19, hospitalized for leukocytosis and weakness. Pt states she felt weak during her last admission and even after discharge. Once home, had difficulty moving and adjusting herself in the bed due to a heavy feeling all throughout her body. She also had the urge to urinate through the night but was too weak to get up. The next morning, she was able to eat breakfast and lunch, ambulate from her room to kitchen until her episode around 3 pm. Denies Fever, chills, dizziness, lightheadedness.   ROS: as above.  On Exam: as above.    A/P:    1.  Near syncope  Generalized weakness  h/o Fall  -Possible failure to thrive component with noted decreased PO intake.   -Admit to tele, monitor for events that may contribute to fall  -Repeat Orthostatics in AM, if positive, start IVF LR 100cc/hr  -PT consult    2.  HTN  CAD  GERD  Hypothyroidism  -stable  -continue Home meds    3.  Heparin for DVT ppx

## 2019-08-16 NOTE — H&P ADULT - NSICDXPASTSURGICALHX_GEN_ALL_CORE_FT
PAST SURGICAL HISTORY:  Ankle fracture, right surgery 25 yrs ago - hardware removed    History of bladder surgery     S/P coronary angiogram 2005, 2008, 2011 - drug eluding stents    S/P hernia repair umbilical - 2008    S/P laparoscopic cholecystectomy 2008    S/P EMILY (total abdominal hysterectomy)

## 2019-08-16 NOTE — H&P ADULT - NEUROLOGICAL DETAILS
weak  strength/alert and oriented x 3/responds to verbal commands cranial nerves intact/normal strength/responds to pain/responds to verbal commands/sensation intact/deep reflexes intact/alert and oriented x 3

## 2019-08-16 NOTE — PROGRESS NOTE ADULT - SUBJECTIVE AND OBJECTIVE BOX
Pt has been seen and examined with FP resident, resident supervised agree with a/p       Patient is a 89y old  Female who presents with a chief complaint of CC: generalized weakness (16 Aug 2019 04:00)    PHYSICAL EXAM:  Vital Signs Last 24 Hrs  T(C): 36.3 (16 Aug 2019 06:15), Max: 36.7 (15 Aug 2019 22:15)  T(F): 97.3 (16 Aug 2019 06:15), Max: 98 (15 Aug 2019 22:15)  HR: 74 (16 Aug 2019 06:15) (74 - 86)  BP: 153/53 (16 Aug 2019 06:15) (153/53 - 168/61)  BP(mean): --  RR: 18 (16 Aug 2019 06:15) (15 - 18)  SpO2: 100% (16 Aug 2019 06:15) (97% - 100%)  general- comfortable   -rs-aeeb,cta  -cvs-s1s2 normal   -p/a-soft,bs+          A/P    #etiology most likely due to function decline as well as due to 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 Discussed with her and family at bedside in detail need for PT, ambulation, management of arthritis, loosing weight. All questions have been answered     #D/c today with further management as an outpt. Time spent 35 minutes

## 2019-08-16 NOTE — H&P ADULT - NSICDXPASTMEDICALHX_GEN_ALL_CORE_FT
PAST MEDICAL HISTORY:  Bladder cancer     CAD (coronary artery disease)     Endometrial adenocarcinoma     GERD (gastroesophageal reflux disease)     [place] (hard of hearing)     HTN (hypertension)     Hyperlipidemia     Hypothyroid     Macular degeneration     Stented coronary artery

## 2019-08-16 NOTE — PHYSICAL THERAPY INITIAL EVALUATION ADULT - MODALITIES TREATMENT COMMENTS
Pt returned to supine due to fatigue and request. +HM, NAD, 2 ice packs given to L knee. CBIR, +alarm

## 2019-08-16 NOTE — H&P ADULT - NSHPLABSRESULTS_GEN_ALL_CORE
Troponin I, Serum (08.15.19 @ 23:13)    Troponin I, Serum: 0.028: High Sensitivity Troponin and new reference  range effective 7/6/2016 ng/mL  Urine Microscopic-Add On (NC) (08.15.19 @ 19:46)    Comment - Urine: Occasional WBC in clusters    Bacteria: Occasional    Epithelial Cells: Occasional    White Blood Cell - Urine: 26-50    Red Blood Cell - Urine: 3-5 /HPF  Urinalysis (08.15.19 @ 19:46)    pH Urine: 6.5    Blood, Urine: Small    Glucose Qualitative, Urine: Negative mg/dL    Color: Yellow    Urine Appearance: Clear    Bilirubin: Negative    Ketone - Urine: Negative    Specific Gravity: 1.010    Protein, Urine: Negative mg/dL    Urobilinogen: Negative mg/dL    Nitrite: Negative    Leukocyte Esterase Concentration: Small  Complete Blood Count (08.15.19 @ 19:46)    WBC Count: 9.56 K/uL    RBC Count: 3.89 M/uL    Hemoglobin: 10.4 g/dL    Hematocrit: 33.7 %    Mean Cell Volume: 86.6 fl    Mean Cell Hemoglobin: 26.7 pg    Mean Cell Hemoglobin Conc: 30.9 gm/dL    Red Cell Distrib Width: 15.2 %    Platelet Count - Automated: 393 K/uL  Comprehensive Metabolic Panel (08.15.19 @ 19:46)    Sodium, Serum: 138 mmol/L    Potassium, Serum: 4.2 mmol/L    Chloride, Serum: 104 mmol/L    Carbon Dioxide, Serum: 26 mmol/L    Anion Gap, Serum: 8 mmol/L    Blood Urea Nitrogen, Serum: 19 mg/dL    Creatinine, Serum: 1.05 mg/dL    Glucose, Serum: 104 mg/dL    Calcium, Total Serum: 9.4 mg/dL    Protein Total, Serum: 7.4 gm/dL    Albumin, Serum: 3.7 g/dL    Bilirubin Total, Serum: 0.3 mg/dL    Alkaline Phosphatase, Serum: 69 U/L    Aspartate Aminotransferase (AST/SGOT): 20 U/L    Alanine Aminotransferase (ALT/SGPT): 20 U/L    eGFR if Non : 46: Interpretative comment  The units for eGFR are mL/min/1.73M2 (normalized body surface area). The  eGFR is calculated from a serum creatinine using the CKD-EPI equation.  Other variables required for calculation are race, age and sex. Among  patients with chronic kidney disease (CKD), the eGFR is useful in  determining the stage of disease according to KDOQI CKD classification.  All eGFR results are reported numerically with the following  interpretation.          GFR                    With                 Without     (ml/min/1.73 m2)    Kidney Damage       Kidney Damage        >= 90                    Stage 1                     Normal        60-89                    Stage 2                     Decreased GFR        30-59     Stage 3                     Stage 3        15-29                    Stage 4                     Stage 4        < 15                      Stage 5                     Stage 5  Each stage of CKD assumes that the associated GFR level has been in  effect for at least 3 months. Determination of stages one and two (with  eGFR > 59 ml/min/m2) requires estimation of kidney damage for at least 3  months as defined by structural or functional abnormalities.  Limitations: All estimates of GFR will be less accurate for patients at  extremes of muscle mass (including but not limited to frail elderly,  critically ill, or cancer patients), those with unusual diets, and those  with conditions associated with reduced secretion or extrarenal  elimination of creatinine. The eGFR equation is not recommended for use  in patients with unstable creatinine levels. mL/min/1.73M2    eGFR if African American: 53 mL/min/1.73M2    CT Head w/o Contrast  IMPRESSION:       No acute intracranial findings.

## 2019-08-16 NOTE — DISCHARGE NOTE NURSING/CASE MANAGEMENT/SOCIAL WORK - NSDCDPATPORTLINK_GEN_ALL_CORE
You can access the [facility] Patient [facility], offered by [facility], by registering with the following website: [email or web address]

## 2019-08-16 NOTE — PHYSICAL THERAPY INITIAL EVALUATION ADULT - GENERAL OBSERVATIONS, REHAB EVAL
Pt received supine on 3N, +HM, NAD, c/o 9/10 L knee pain upon standing states its chronic and R shld is "frozen" and B hands OA and painful

## 2019-08-16 NOTE — H&P ADULT - ASSESSMENT
Pt is a 89 y/o F PMHx CAD with stents, HTN, bladder CA and endometrial adenocarcinoma admitted for near syncopal episode and generalized weakness. Pt is a 89 y/o F PMHx CAD with stents, HTN, bladder CA and endometrial adenocarcinoma being admitted for generalized weakness and failure to thrive.

## 2019-08-16 NOTE — H&P ADULT - PROBLEM SELECTOR PLAN 3
-Cont Losartan 25mg PO QD  -Cont Metoprolol 50mg PO BID -Cont Losartan 25mg PO QD  -Cont Metoprolol 50mg PO QD, may be contributing to orthostatic nature  -Cont Midodrine 2.5 BID

## 2019-08-16 NOTE — DISCHARGE NOTE PROVIDER - CARE PROVIDER_API CALL
Mayra Sánchez (MD)  Cardiovascular Disease; Internal Medicine  [address] [place] [address]  Phone: [phone]  Fax: [phone]  Follow Up Time:

## 2019-08-16 NOTE — DISCHARGE NOTE PROVIDER - HOSPITAL COURSE
Patient is a 89 year old female presented to hospital with reported weakness at home. She was admitted to the hospital on 8/16. She reported Patient is a 89 year old female presented to hospital with reported weakness at home. She was admitted to the hospital on 8/16. She reported that she has had trouble keeping stable on her feet leading her unable to walk. She was monitored on the floors. No acute events. Vitals stable and labs within normal limits. Discussed with patient hx of arthritis, cancer hx, and age related weakness, discussed option to go to rehab. Patient and family agreed to this plan. Patient has been medically optimized to be discharged to rehab today 8/16. Patient to continue home meds.            SUBJECTIVE: Patient seen and examined today. No acute events. Plans for discharge to rehab have been discussed and agreed upon.        REVIEW OF SYSTEMS:    CONSTITUTIONAL: No weakness, fevers or chills    EYES/ENT: No visual changes;  No vertigo or throat pain     NECK: No pain or stiffness    RESPIRATORY: No cough, wheezing, hemoptysis; No shortness of breath    CARDIOVASCULAR: No chest pain or palpitations    GASTROINTESTINAL: No abdominal or epigastric pain. No nausea, vomiting, or hematemesis; No diarrhea or constipation. No melena or hematochezia.    GENITOURINARY: No dysuria, frequency or hematuria    MSK: b/l joint weakness    SKIN: No itching, burning, rashes, or lesions     All other review of systems is negative unless indicated above        Vital Signs Last 24 Hrs    T(C): 36.3 (16 Aug 2019 06:15), Max: 36.7 (15 Aug 2019 22:15)    T(F): 97.3 (16 Aug 2019 06:15), Max: 98 (15 Aug 2019 22:15)    HR: 74 (16 Aug 2019 06:15) (74 - 86)    BP: 153/53 (16 Aug 2019 06:15) (153/53 - 168/61)    RR: 18 (16 Aug 2019 06:15) (15 - 18)    SpO2: 100% (16 Aug 2019 06:15) (97% - 100%)            PHYSICAL EXAM:    Constitutional: NAD, awake and alert, well-developed    HEENT: PERR, EOMI, Normal Hearing, MMM    Neck: Soft and supple, No LAD, No JVD    Respiratory: Breath sounds are clear bilaterally, No wheezing, rales or rhonchi    Cardiovascular: S1 and S2, regular rate and rhythm, no Murmurs, gallops or rubs    Gastrointestinal: Bowel Sounds present, soft, nontender, nondistended, no guarding, no rebound    Extremities: No peripheral edema    Vascular: 2+ peripheral pulses    Neurological: A/O x 3, no focal deficits    Musculoskeletal: 5/5 strength b/l upper, 3/5 b/l lower extremities, reduced mobility of left knee joint    Skin: No rashes        MEDICATIONS:    MEDICATIONS  (STANDING):    artificial  tears Solution 1 Drop(s) Both EYES three times a day    aspirin 325 milliGRAM(s) Oral daily    cholecalciferol 2000 Unit(s) Oral daily    ferrous    sulfate 325 milliGRAM(s) Oral daily    heparin  Injectable 5000 Unit(s) SubCutaneous three times a day    levothyroxine 75 MICROGram(s) Oral daily    losartan 25 milliGRAM(s) Oral daily    metoprolol succinate ER 50 milliGRAM(s) Oral daily    midodrine 2.5 milliGRAM(s) Oral <User Schedule>    pantoprazole    Tablet 40 milliGRAM(s) Oral before breakfast            LABS: All Labs Reviewed:                            9.9      9.18  )-----------( 370      ( 16 Aug 2019 08:45 )               32.3         08-16        138  |  106  |  17    ----------------------------<  95    4.1   |  26  |  0.83        Ca    9.1      16 Aug 2019 08:45    Mg     2.7     08-15        TPro  7.4  /  Alb  3.7  /  TBili  0.3  /  DBili  x   /  AST  20  /  ALT  20  /  AlkPhos  69  08-15        PT/INR - ( 15 Aug 2019 19:46 )   PT: 10.9 sec;   INR: 0.98 ratio               PTT - ( 15 Aug 2019 19:46 )  PTT:29.4 sec    CARDIAC MARKERS ( 15 Aug 2019 23:13 )    0.028 ng/mL / x     / x     / x     / x        CARDIAC MARKERS ( 15 Aug 2019 19:46 )    0.021 ng/mL / x     / x     / x     / x Patient is a 89 year old female w/  bladder cancer and history of orthostatic hypotension presented to hospital with reported weakness at home. She was admitted to the hospital on 8/16, after recent discharge from  for treatment of UTI. She reported that she has had trouble keeping stable on her feet leading her unable to walk. She was monitored on the telemetry, no arrythmias noted, and U/A was WNL. CT Head w/o contrast showed no abnormalities. Vitals stable and labs within normal limits. At this time, patient's weakness is likely due to functional decline of patient due to repeated hospital stays in past 2 months. Patient seen and evaluated by PT, and was deemed eligible for POOL.  Patient and family agreed to this plan. Patient has been medically optimized to be [facility]         SUBJECTIVE: Patient seen and examined today. No acute events since admission and notes she was able to work with PT. Plans for discharge to rehab have been discussed and agreed upon.        REVIEW OF SYSTEMS:    CONSTITUTIONAL: No weakness, fevers or chills    EYES/ENT: No visual changes;  No vertigo or throat pain     NECK: No pain or stiffness    RESPIRATORY: No cough, wheezing, hemoptysis; No shortness of breath    CARDIOVASCULAR: No chest pain or palpitations    GASTROINTESTINAL: No abdominal or epigastric pain. No nausea, vomiting, or hematemesis; No diarrhea or constipation. No melena or hematochezia.    GENITOURINARY: No dysuria, frequency or hematuria    MSK: b/l joint weakness    SKIN: No itching, burning, rashes, or lesions     All other review of systems is negative unless indicated above        Vital Signs Last 24 Hrs    T(C): 36.3 (16 Aug 2019 06:15), Max: 36.7 (15 Aug 2019 22:15)    T(F): 97.3 (16 Aug 2019 06:15), Max: 98 (15 Aug 2019 22:15)    HR: 74 (16 Aug 2019 06:15) (74 - 86)    BP: 153/53 (16 Aug 2019 06:15) (153/53 - 168/61)    RR: 18 (16 Aug 2019 06:15) (15 - 18)    SpO2: 100% (16 Aug 2019 06:15) (97% - 100%)            PHYSICAL EXAM:    Constitutional: NAD, awake and alert, well-developed    HEENT: PERR, EOMI, Normal Hearing, MMM    Neck: Soft and supple, No LAD, No JVD    Respiratory: Breath sounds are clear bilaterally, No wheezing, rales or rhonchi    Cardiovascular: S1 and S2, regular rate and rhythm, no Murmurs, gallops or rubs    Gastrointestinal: Bowel Sounds present, soft, nontender, nondistended, no guarding, no rebound    Extremities: No peripheral edema    Vascular: 2+ peripheral pulses    Neurological: A/O x 3, no focal deficits    Musculoskeletal: 5/5 strength b/l upper, 3/5 b/l lower extremities, reduced mobility of left knee joint    Skin: No rashes        MEDICATIONS:    MEDICATIONS  (STANDING):    artificial  tears Solution 1 Drop(s) Both EYES three times a day    aspirin 325 milliGRAM(s) Oral daily    cholecalciferol 2000 Unit(s) Oral daily    ferrous    sulfate 325 milliGRAM(s) Oral daily    heparin  Injectable 5000 Unit(s) SubCutaneous three times a day    levothyroxine 75 MICROGram(s) Oral daily    losartan 25 milliGRAM(s) Oral daily    metoprolol succinate ER 50 milliGRAM(s) Oral daily    midodrine 2.5 milliGRAM(s) Oral <User Schedule>    pantoprazole    Tablet 40 milliGRAM(s) Oral before breakfast            LABS: All Labs Reviewed:                            9.9      9.18  )-----------( 370      ( 16 Aug 2019 08:45 )               32.3         08-16        138  |  106  |  17    ----------------------------<  95    4.1   |  26  |  0.83        Ca    9.1      16 Aug 2019 08:45    Mg     2.7     08-15        TPro  7.4  /  Alb  3.7  /  TBili  0.3  /  DBili  x   /  AST  20  /  ALT  20  /  AlkPhos  69  08-15        PT/INR - ( 15 Aug 2019 19:46 )   PT: 10.9 sec;   INR: 0.98 ratio               PTT - ( 15 Aug 2019 19:46 )  PTT:29.4 sec    CARDIAC MARKERS ( 15 Aug 2019 23:13 )    0.028 ng/mL / x     / x     / x     / x        CARDIAC MARKERS ( 15 Aug 2019 19:46 )    0.021 ng/mL / x     / x     / x     / x

## 2019-08-16 NOTE — PHYSICAL THERAPY INITIAL EVALUATION ADULT - PERTINENT HX OF CURRENT PROBLEM, REHAB EVAL
Pt is a 89 y/o F  bladder CA and endometrial adenocarcinoma being admitted for generalized weakness and failure to thrive.

## 2019-08-16 NOTE — DISCHARGE NOTE PROVIDER - NSDCCPCAREPLAN_GEN_ALL_CORE_FT
PRINCIPAL DISCHARGE DIAGNOSIS  Diagnosis: Weakness of lower extremity, unspecified laterality  Assessment and Plan of Treatment: - Will be discharged to Rehab  - Recommend outpatient follow up with PCP      SECONDARY DISCHARGE DIAGNOSES  Diagnosis: Generalized weakness  Assessment and Plan of Treatment: - Rehab PRINCIPAL DISCHARGE DIAGNOSIS  Diagnosis: Weakness of lower extremity, unspecified laterality  Assessment and Plan of Treatment: Weakness is likley due to debility due to recent multiple hospitalizations. Please continue physical therapy at Rehab.   - Recommend outpatient follow up with PCP      SECONDARY DISCHARGE DIAGNOSES  Diagnosis: Benign essential HTN  Assessment and Plan of Treatment: Your blood pressure was controlled during hospitalization. Please continue home medications.    Diagnosis: Bladder cancer  Assessment and Plan of Treatment: No issues at this time, continue ouptatient follow-up with your oncologist.

## 2019-08-16 NOTE — H&P ADULT - RS GEN PE MLT RESP DETAILS PC
breath sounds equal/good air movement/clear to auscultation bilaterally/respirations non-labored breath sounds equal/good air movement/clear to auscultation bilaterally/airway patent/respirations non-labored

## 2019-08-19 DIAGNOSIS — E03.9 HYPOTHYROIDISM, UNSPECIFIED: ICD-10-CM

## 2019-08-19 DIAGNOSIS — I10 ESSENTIAL (PRIMARY) HYPERTENSION: ICD-10-CM

## 2019-08-19 DIAGNOSIS — F41.9 ANXIETY DISORDER, UNSPECIFIED: ICD-10-CM

## 2019-08-19 DIAGNOSIS — E78.5 HYPERLIPIDEMIA, UNSPECIFIED: ICD-10-CM

## 2019-08-19 DIAGNOSIS — I25.10 ATHEROSCLEROTIC HEART DISEASE OF NATIVE CORONARY ARTERY WITHOUT ANGINA PECTORIS: ICD-10-CM

## 2019-08-19 DIAGNOSIS — A41.9 SEPSIS, UNSPECIFIED ORGANISM: ICD-10-CM

## 2019-08-19 DIAGNOSIS — R55 SYNCOPE AND COLLAPSE: ICD-10-CM

## 2019-08-19 DIAGNOSIS — Z85.42 PERSONAL HISTORY OF MALIGNANT NEOPLASM OF OTHER PARTS OF UTERUS: ICD-10-CM

## 2019-08-19 DIAGNOSIS — Z79.82 LONG TERM (CURRENT) USE OF ASPIRIN: ICD-10-CM

## 2019-08-19 DIAGNOSIS — I95.1 ORTHOSTATIC HYPOTENSION: ICD-10-CM

## 2019-08-19 DIAGNOSIS — N39.0 URINARY TRACT INFECTION, SITE NOT SPECIFIED: ICD-10-CM

## 2019-08-19 DIAGNOSIS — Z90.710 ACQUIRED ABSENCE OF BOTH CERVIX AND UTERUS: ICD-10-CM

## 2019-08-19 DIAGNOSIS — E86.0 DEHYDRATION: ICD-10-CM

## 2019-08-19 DIAGNOSIS — C67.9 MALIGNANT NEOPLASM OF BLADDER, UNSPECIFIED: ICD-10-CM
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
## 2019-10-26 ENCOUNTER — EMERGENCY (EMERGENCY)
Facility: HOSPITAL | Age: 84
LOS: 0 days | Discharge: HOME | End: 2019-10-26
Attending: EMERGENCY MEDICINE
Payer: MEDICARE

## 2019-10-26 VITALS
SYSTOLIC BLOOD PRESSURE: 169 MMHG | RESPIRATION RATE: 18 BRPM | OXYGEN SATURATION: 99 % | TEMPERATURE: 98 F | WEIGHT: 220.02 LBS | DIASTOLIC BLOOD PRESSURE: 70 MMHG | HEART RATE: 82 BPM | HEIGHT: 63 IN

## 2019-10-26 VITALS
OXYGEN SATURATION: 99 % | HEART RATE: 72 BPM | TEMPERATURE: 98 F | SYSTOLIC BLOOD PRESSURE: 180 MMHG | DIASTOLIC BLOOD PRESSURE: 72 MMHG | RESPIRATION RATE: 17 BRPM

## 2019-10-26 DIAGNOSIS — Z90.710 ACQUIRED ABSENCE OF BOTH CERVIX AND UTERUS: Chronic | ICD-10-CM

## 2019-10-26 DIAGNOSIS — I25.10 ATHEROSCLEROTIC HEART DISEASE OF NATIVE CORONARY ARTERY WITHOUT ANGINA PECTORIS: ICD-10-CM

## 2019-10-26 DIAGNOSIS — Z85.51 PERSONAL HISTORY OF MALIGNANT NEOPLASM OF BLADDER: ICD-10-CM

## 2019-10-26 DIAGNOSIS — Z95.5 PRESENCE OF CORONARY ANGIOPLASTY IMPLANT AND GRAFT: ICD-10-CM

## 2019-10-26 DIAGNOSIS — I10 ESSENTIAL (PRIMARY) HYPERTENSION: ICD-10-CM

## 2019-10-26 DIAGNOSIS — E86.0 DEHYDRATION: ICD-10-CM

## 2019-10-26 DIAGNOSIS — Z98.890 OTHER SPECIFIED POSTPROCEDURAL STATES: Chronic | ICD-10-CM

## 2019-10-26 DIAGNOSIS — R53.1 WEAKNESS: ICD-10-CM

## 2019-10-26 LAB
ALBUMIN SERPL ELPH-MCNC: 3.6 G/DL — SIGNIFICANT CHANGE UP (ref 3.3–5)
ALP SERPL-CCNC: 78 U/L — SIGNIFICANT CHANGE UP (ref 40–120)
ALT FLD-CCNC: 17 U/L — SIGNIFICANT CHANGE UP (ref 12–78)
ANION GAP SERPL CALC-SCNC: 7 MMOL/L — SIGNIFICANT CHANGE UP (ref 5–17)
APPEARANCE UR: CLEAR — SIGNIFICANT CHANGE UP
APTT BLD: 31.8 SEC — SIGNIFICANT CHANGE UP (ref 27.5–36.3)
AST SERPL-CCNC: 16 U/L — SIGNIFICANT CHANGE UP (ref 15–37)
BASOPHILS # BLD AUTO: 0.04 K/UL — SIGNIFICANT CHANGE UP (ref 0–0.2)
BASOPHILS NFR BLD AUTO: 0.5 % — SIGNIFICANT CHANGE UP (ref 0–2)
BILIRUB SERPL-MCNC: 0.5 MG/DL — SIGNIFICANT CHANGE UP (ref 0.2–1.2)
BILIRUB UR-MCNC: NEGATIVE — SIGNIFICANT CHANGE UP
BUN SERPL-MCNC: 26 MG/DL — HIGH (ref 7–23)
CALCIUM SERPL-MCNC: 9.1 MG/DL — SIGNIFICANT CHANGE UP (ref 8.5–10.1)
CHLORIDE SERPL-SCNC: 106 MMOL/L — SIGNIFICANT CHANGE UP (ref 96–108)
CO2 SERPL-SCNC: 25 MMOL/L — SIGNIFICANT CHANGE UP (ref 22–31)
COLOR SPEC: YELLOW — SIGNIFICANT CHANGE UP
CREAT SERPL-MCNC: 1.06 MG/DL — SIGNIFICANT CHANGE UP (ref 0.5–1.3)
DIFF PNL FLD: ABNORMAL
EOSINOPHIL # BLD AUTO: 0.16 K/UL — SIGNIFICANT CHANGE UP (ref 0–0.5)
EOSINOPHIL NFR BLD AUTO: 2 % — SIGNIFICANT CHANGE UP (ref 0–6)
GLUCOSE SERPL-MCNC: 94 MG/DL — SIGNIFICANT CHANGE UP (ref 70–99)
GLUCOSE UR QL: NEGATIVE MG/DL — SIGNIFICANT CHANGE UP
HCT VFR BLD CALC: 41.4 % — SIGNIFICANT CHANGE UP (ref 34.5–45)
HGB BLD-MCNC: 12.4 G/DL — SIGNIFICANT CHANGE UP (ref 11.5–15.5)
IMM GRANULOCYTES NFR BLD AUTO: 0.2 % — SIGNIFICANT CHANGE UP (ref 0–1.5)
INR BLD: 0.99 RATIO — SIGNIFICANT CHANGE UP (ref 0.88–1.16)
KETONES UR-MCNC: NEGATIVE — SIGNIFICANT CHANGE UP
LEUKOCYTE ESTERASE UR-ACNC: NEGATIVE — SIGNIFICANT CHANGE UP
LYMPHOCYTES # BLD AUTO: 2.64 K/UL — SIGNIFICANT CHANGE UP (ref 1–3.3)
LYMPHOCYTES # BLD AUTO: 32.4 % — SIGNIFICANT CHANGE UP (ref 13–44)
MCHC RBC-ENTMCNC: 26.7 PG — LOW (ref 27–34)
MCHC RBC-ENTMCNC: 30 GM/DL — LOW (ref 32–36)
MCV RBC AUTO: 89 FL — SIGNIFICANT CHANGE UP (ref 80–100)
MONOCYTES # BLD AUTO: 0.61 K/UL — SIGNIFICANT CHANGE UP (ref 0–0.9)
MONOCYTES NFR BLD AUTO: 7.5 % — SIGNIFICANT CHANGE UP (ref 2–14)
NEUTROPHILS # BLD AUTO: 4.68 K/UL — SIGNIFICANT CHANGE UP (ref 1.8–7.4)
NEUTROPHILS NFR BLD AUTO: 57.4 % — SIGNIFICANT CHANGE UP (ref 43–77)
NITRITE UR-MCNC: NEGATIVE — SIGNIFICANT CHANGE UP
PH UR: 5 — SIGNIFICANT CHANGE UP (ref 5–8)
PLATELET # BLD AUTO: 329 K/UL — SIGNIFICANT CHANGE UP (ref 150–400)
POTASSIUM SERPL-MCNC: 4.3 MMOL/L — SIGNIFICANT CHANGE UP (ref 3.5–5.3)
POTASSIUM SERPL-SCNC: 4.3 MMOL/L — SIGNIFICANT CHANGE UP (ref 3.5–5.3)
PROT SERPL-MCNC: 7.7 GM/DL — SIGNIFICANT CHANGE UP (ref 6–8.3)
PROT UR-MCNC: NEGATIVE MG/DL — SIGNIFICANT CHANGE UP
PROTHROM AB SERPL-ACNC: 11 SEC — SIGNIFICANT CHANGE UP (ref 10–12.9)
RBC # BLD: 4.65 M/UL — SIGNIFICANT CHANGE UP (ref 3.8–5.2)
RBC # FLD: 15.5 % — HIGH (ref 10.3–14.5)
SODIUM SERPL-SCNC: 138 MMOL/L — SIGNIFICANT CHANGE UP (ref 135–145)
SP GR SPEC: 1 — LOW (ref 1.01–1.02)
UROBILINOGEN FLD QL: NEGATIVE MG/DL — SIGNIFICANT CHANGE UP
WBC # BLD: 8.15 K/UL — SIGNIFICANT CHANGE UP (ref 3.8–10.5)
WBC # FLD AUTO: 8.15 K/UL — SIGNIFICANT CHANGE UP (ref 3.8–10.5)

## 2019-10-26 PROCEDURE — 70450 CT HEAD/BRAIN W/O DYE: CPT

## 2019-10-26 PROCEDURE — 85730 THROMBOPLASTIN TIME PARTIAL: CPT

## 2019-10-26 PROCEDURE — 80053 COMPREHEN METABOLIC PANEL: CPT

## 2019-10-26 PROCEDURE — 99284 EMERGENCY DEPT VISIT MOD MDM: CPT

## 2019-10-26 PROCEDURE — 71045 X-RAY EXAM CHEST 1 VIEW: CPT

## 2019-10-26 PROCEDURE — 87086 URINE CULTURE/COLONY COUNT: CPT

## 2019-10-26 PROCEDURE — 85025 COMPLETE CBC W/AUTO DIFF WBC: CPT

## 2019-10-26 PROCEDURE — 81001 URINALYSIS AUTO W/SCOPE: CPT

## 2019-10-26 PROCEDURE — 87040 BLOOD CULTURE FOR BACTERIA: CPT

## 2019-10-26 PROCEDURE — 71045 X-RAY EXAM CHEST 1 VIEW: CPT | Mod: 26

## 2019-10-26 PROCEDURE — 36415 COLL VENOUS BLD VENIPUNCTURE: CPT

## 2019-10-26 PROCEDURE — 85610 PROTHROMBIN TIME: CPT

## 2019-10-26 PROCEDURE — 99284 EMERGENCY DEPT VISIT MOD MDM: CPT | Mod: 25

## 2019-10-26 PROCEDURE — 96374 THER/PROPH/DIAG INJ IV PUSH: CPT

## 2019-10-26 PROCEDURE — 70450 CT HEAD/BRAIN W/O DYE: CPT | Mod: 26

## 2019-10-26 RX ORDER — KETOROLAC TROMETHAMINE 30 MG/ML
30 SYRINGE (ML) INJECTION ONCE
Refills: 0 | Status: DISCONTINUED | OUTPATIENT
Start: 2019-10-26 | End: 2019-10-26

## 2019-10-26 RX ORDER — SODIUM CHLORIDE 9 MG/ML
1000 INJECTION INTRAMUSCULAR; INTRAVENOUS; SUBCUTANEOUS ONCE
Refills: 0 | Status: COMPLETED | OUTPATIENT
Start: 2019-10-26 | End: 2019-10-26

## 2019-10-26 RX ADMIN — SODIUM CHLORIDE 1000 MILLILITER(S): 9 INJECTION INTRAMUSCULAR; INTRAVENOUS; SUBCUTANEOUS at 21:25

## 2019-10-26 RX ADMIN — Medication 30 MILLIGRAM(S): at 21:24

## 2019-10-26 NOTE — ED PROVIDER NOTE - OBJECTIVE STATEMENT
89 y/o female with a PMHx of Bladder CA, CAD s/p stents, HTN, GERD, Hypothyroidism presents to the ED c/o worsening weakness x today. +HA. Pt states that she feels so weak that it is difficult to walk. Denies fevers, diarrhea, bloody stools. Pt states that she is prone to UTI's. Pt states that she receives bladder washes for her CA but has not had any treatment in a while. Cardio/PCP: Paco

## 2019-10-26 NOTE — ED PROVIDER NOTE - PATIENT PORTAL LINK FT
You can access the FollowMyHealth Patient Portal offered by [facility] by registering at the following website: [email or web address]. By joining [employer]’s FollowMyHealth portal, you will also be able to view your health information using other applications (apps) compatible with our system.

## 2019-10-26 NOTE — ED PROVIDER NOTE - NSFOLLOWUPINSTRUCTIONS_ED_ALL_ED_FT
Weakness    WHAT YOU NEED TO KNOW:    Weakness is a loss of muscle strength. It may be caused by brain, nerve, or muscle problems. Physical and mental conditions such as heart problems, pregnancy, dehydration, or depression may also cause weakness. Reactions to certain drugs can cause weakness. Parts of your body may become weak if you need to wear a cast or splint or have been on bed rest for a long time.    DISCHARGE INSTRUCTIONS:    Call 911 for any of the following:     You have any of the following signs of a stroke:   Numbness or drooping on one side of your face       Weakness in an arm or leg      Confusion or difficulty speaking      Dizziness, a severe headache, or vision loss      You lose feeling in your weakened body area.      You have electric shock-like feelings down your arms and legs when you flex or move your neck.      You have sudden or increased trouble speaking, swallowing, or breathing.    Return to the emergency department if:     You have severe pain in your back, arms, or legs that worsens.      You have sudden or worsened muscle weakness or loss of movement.      You are not able to control when you urinate or have a bowel movement.    Contact your healthcare provider if:     You feel depressed or anxious.       You have questions or concerns about your condition or care.     Manage weakness:     Use assistive devices as directed. These help protect you from injury. Examples include a walker or cane. Have someone install handrails in your home. These will help you get out of a bathtub or stand up from a toilet. Use a shower chair so you can sit while you shower. Sit down on the toilet or another chair to dry off and put on your clothes. Get help going up and down stairs if your legs are weak.       Go to physical or occupational therapy if directed. A physical therapist can teach you exercises to help strengthen weak muscles. An occupational therapist can show you ways to do your daily activities more easily. For example, light forks and spoons can be easier to use if you have hand weakness. You may also learn ways to organize your household items so you are not moving heavy items.      Balance rest with exercise. Exercise can help increase your muscle strength and energy. Do not exercise for long periods at a time. Take breaks often to rest. Too much exercise can cause muscle strain or make you more tired. Ask your healthcare provider how much exercise is right for you.      Eat a variety of healthy foods. Too much or too little food may cause weakness or tiredness. Ask your healthcare provider what a healthy amount of food is for you. Healthy foods include fruits, vegetables, whole-grain breads, low-fat dairy products, lean meats and fish, nuts, and cooked beans.      Do not smoke. Nicotine and other chemicals in cigarettes and cigars can make your symptoms worse, and can cause lung damage. Ask your healthcare provider for information if you currently smoke and need help to quit. E-cigarettes or smokeless tobacco still contain nicotine. Talk to your healthcare provider before you use these products.       Do not use caffeine, alcohol, or illegal drugs. These may cause muscle twitching, which could lead to worsened weakness.     Follow up with your healthcare provider as directed: Write down your questions so you remember to ask them during your visits

## 2019-10-26 NOTE — ED PROVIDER NOTE - PMH
Bladder cancer    CAD (coronary artery disease)    Endometrial adenocarcinoma    GERD (gastroesophageal reflux disease)    [place] (hard of hearing)    HTN (hypertension)    Hyperlipidemia    Hypothyroid    Macular degeneration    Stented coronary artery

## 2019-10-27 PROBLEM — C67.9 MALIGNANT NEOPLASM OF BLADDER, UNSPECIFIED: Chronic | Status: ACTIVE | Noted: 2019-08-16

## 2019-10-28 LAB
CULTURE RESULTS: SIGNIFICANT CHANGE UP
SPECIMEN SOURCE: SIGNIFICANT CHANGE UP

## 2019-11-13 RX ORDER — METHENAMINE MANDELATE 1 G
1 TABLET ORAL
Qty: 0 | Refills: 0 | DISCHARGE
Start: 2019-11-13 | End: 2019-11-22

## 2019-11-14 ENCOUNTER — INPATIENT (INPATIENT)
Facility: HOSPITAL | Age: 84
LOS: 5 days | Discharge: HOME HEALTH | DRG: 690 | End: 2019-11-20
Attending: INTERNAL MEDICINE | Admitting: INTERNAL MEDICINE
Payer: MEDICARE

## 2019-11-14 VITALS
SYSTOLIC BLOOD PRESSURE: 154 MMHG | WEIGHT: 173.94 LBS | HEART RATE: 91 BPM | TEMPERATURE: 98 F | RESPIRATION RATE: 18 BRPM | OXYGEN SATURATION: 99 % | DIASTOLIC BLOOD PRESSURE: 54 MMHG

## 2019-11-14 DIAGNOSIS — N30.00 ACUTE CYSTITIS WITHOUT HEMATURIA: ICD-10-CM

## 2019-11-14 DIAGNOSIS — Z90.710 ACQUIRED ABSENCE OF BOTH CERVIX AND UTERUS: Chronic | ICD-10-CM

## 2019-11-14 DIAGNOSIS — N39.0 URINARY TRACT INFECTION, SITE NOT SPECIFIED: ICD-10-CM

## 2019-11-14 DIAGNOSIS — Z98.890 OTHER SPECIFIED POSTPROCEDURAL STATES: Chronic | ICD-10-CM

## 2019-11-14 DIAGNOSIS — Z29.9 ENCOUNTER FOR PROPHYLACTIC MEASURES, UNSPECIFIED: ICD-10-CM

## 2019-11-14 DIAGNOSIS — C67.9 MALIGNANT NEOPLASM OF BLADDER, UNSPECIFIED: ICD-10-CM

## 2019-11-14 DIAGNOSIS — R53.1 WEAKNESS: ICD-10-CM

## 2019-11-14 LAB
ALBUMIN SERPL ELPH-MCNC: 3.5 G/DL — SIGNIFICANT CHANGE UP (ref 3.3–5)
ALP SERPL-CCNC: 76 U/L — SIGNIFICANT CHANGE UP (ref 40–120)
ALT FLD-CCNC: 20 U/L — SIGNIFICANT CHANGE UP (ref 12–78)
ANION GAP SERPL CALC-SCNC: 8 MMOL/L — SIGNIFICANT CHANGE UP (ref 5–17)
APPEARANCE UR: CLEAR — SIGNIFICANT CHANGE UP
APTT BLD: 32.2 SEC — SIGNIFICANT CHANGE UP (ref 27.5–36.3)
AST SERPL-CCNC: 19 U/L — SIGNIFICANT CHANGE UP (ref 15–37)
BASOPHILS # BLD AUTO: 0.04 K/UL — SIGNIFICANT CHANGE UP (ref 0–0.2)
BASOPHILS NFR BLD AUTO: 0.2 % — SIGNIFICANT CHANGE UP (ref 0–2)
BILIRUB SERPL-MCNC: 0.6 MG/DL — SIGNIFICANT CHANGE UP (ref 0.2–1.2)
BILIRUB UR-MCNC: NEGATIVE — SIGNIFICANT CHANGE UP
BUN SERPL-MCNC: 20 MG/DL — SIGNIFICANT CHANGE UP (ref 7–23)
CALCIUM SERPL-MCNC: 9 MG/DL — SIGNIFICANT CHANGE UP (ref 8.5–10.1)
CHLORIDE SERPL-SCNC: 108 MMOL/L — SIGNIFICANT CHANGE UP (ref 96–108)
CO2 SERPL-SCNC: 23 MMOL/L — SIGNIFICANT CHANGE UP (ref 22–31)
COLOR SPEC: YELLOW — SIGNIFICANT CHANGE UP
CREAT SERPL-MCNC: 1 MG/DL — SIGNIFICANT CHANGE UP (ref 0.5–1.3)
DIFF PNL FLD: ABNORMAL
EOSINOPHIL # BLD AUTO: 0.27 K/UL — SIGNIFICANT CHANGE UP (ref 0–0.5)
EOSINOPHIL NFR BLD AUTO: 1.6 % — SIGNIFICANT CHANGE UP (ref 0–6)
GLUCOSE SERPL-MCNC: 123 MG/DL — HIGH (ref 70–99)
GLUCOSE UR QL: NEGATIVE MG/DL — SIGNIFICANT CHANGE UP
HCT VFR BLD CALC: 39.5 % — SIGNIFICANT CHANGE UP (ref 34.5–45)
HGB BLD-MCNC: 12.1 G/DL — SIGNIFICANT CHANGE UP (ref 11.5–15.5)
IMM GRANULOCYTES NFR BLD AUTO: 0.4 % — SIGNIFICANT CHANGE UP (ref 0–1.5)
INR BLD: 1.06 RATIO — SIGNIFICANT CHANGE UP (ref 0.88–1.16)
KETONES UR-MCNC: NEGATIVE — SIGNIFICANT CHANGE UP
LACTATE SERPL-SCNC: 1.9 MMOL/L — SIGNIFICANT CHANGE UP (ref 0.7–2)
LEUKOCYTE ESTERASE UR-ACNC: ABNORMAL
LYMPHOCYTES # BLD AUTO: 0.83 K/UL — LOW (ref 1–3.3)
LYMPHOCYTES # BLD AUTO: 5 % — LOW (ref 13–44)
MCHC RBC-ENTMCNC: 26.5 PG — LOW (ref 27–34)
MCHC RBC-ENTMCNC: 30.6 GM/DL — LOW (ref 32–36)
MCV RBC AUTO: 86.4 FL — SIGNIFICANT CHANGE UP (ref 80–100)
MONOCYTES # BLD AUTO: 0.66 K/UL — SIGNIFICANT CHANGE UP (ref 0–0.9)
MONOCYTES NFR BLD AUTO: 4 % — SIGNIFICANT CHANGE UP (ref 2–14)
NEUTROPHILS # BLD AUTO: 14.73 K/UL — HIGH (ref 1.8–7.4)
NEUTROPHILS NFR BLD AUTO: 88.8 % — HIGH (ref 43–77)
NITRITE UR-MCNC: NEGATIVE — SIGNIFICANT CHANGE UP
PH UR: 5 — SIGNIFICANT CHANGE UP (ref 5–8)
PLATELET # BLD AUTO: 310 K/UL — SIGNIFICANT CHANGE UP (ref 150–400)
POTASSIUM SERPL-MCNC: 3.8 MMOL/L — SIGNIFICANT CHANGE UP (ref 3.5–5.3)
POTASSIUM SERPL-SCNC: 3.8 MMOL/L — SIGNIFICANT CHANGE UP (ref 3.5–5.3)
PROT SERPL-MCNC: 7.2 GM/DL — SIGNIFICANT CHANGE UP (ref 6–8.3)
PROT UR-MCNC: 30 MG/DL
PROTHROM AB SERPL-ACNC: 11.8 SEC — SIGNIFICANT CHANGE UP (ref 10–12.9)
RBC # BLD: 4.57 M/UL — SIGNIFICANT CHANGE UP (ref 3.8–5.2)
RBC # FLD: 14.6 % — HIGH (ref 10.3–14.5)
SODIUM SERPL-SCNC: 139 MMOL/L — SIGNIFICANT CHANGE UP (ref 135–145)
SP GR SPEC: 1.01 — SIGNIFICANT CHANGE UP (ref 1.01–1.02)
UROBILINOGEN FLD QL: NEGATIVE MG/DL — SIGNIFICANT CHANGE UP
WBC # BLD: 16.6 K/UL — HIGH (ref 3.8–10.5)
WBC # FLD AUTO: 16.6 K/UL — HIGH (ref 3.8–10.5)

## 2019-11-14 PROCEDURE — 74177 CT ABD & PELVIS W/CONTRAST: CPT

## 2019-11-14 PROCEDURE — 36415 COLL VENOUS BLD VENIPUNCTURE: CPT

## 2019-11-14 PROCEDURE — 85027 COMPLETE CBC AUTOMATED: CPT

## 2019-11-14 PROCEDURE — 71045 X-RAY EXAM CHEST 1 VIEW: CPT | Mod: 26

## 2019-11-14 PROCEDURE — 80048 BASIC METABOLIC PNL TOTAL CA: CPT

## 2019-11-14 PROCEDURE — 97116 GAIT TRAINING THERAPY: CPT | Mod: GP

## 2019-11-14 PROCEDURE — 97530 THERAPEUTIC ACTIVITIES: CPT | Mod: GP

## 2019-11-14 PROCEDURE — 97162 PT EVAL MOD COMPLEX 30 MIN: CPT | Mod: GP

## 2019-11-14 PROCEDURE — 93010 ELECTROCARDIOGRAM REPORT: CPT

## 2019-11-14 RX ORDER — LEVOTHYROXINE SODIUM 125 MCG
75 TABLET ORAL DAILY
Refills: 0 | Status: DISCONTINUED | OUTPATIENT
Start: 2019-11-14 | End: 2019-11-20

## 2019-11-14 RX ORDER — PANTOPRAZOLE SODIUM 20 MG/1
40 TABLET, DELAYED RELEASE ORAL
Refills: 0 | Status: DISCONTINUED | OUTPATIENT
Start: 2019-11-14 | End: 2019-11-20

## 2019-11-14 RX ORDER — ASPIRIN/CALCIUM CARB/MAGNESIUM 324 MG
325 TABLET ORAL DAILY
Refills: 0 | Status: DISCONTINUED | OUTPATIENT
Start: 2019-11-14 | End: 2019-11-15

## 2019-11-14 RX ORDER — MIDODRINE HYDROCHLORIDE 2.5 MG/1
2.5 TABLET ORAL
Refills: 0 | Status: DISCONTINUED | OUTPATIENT
Start: 2019-11-14 | End: 2019-11-15

## 2019-11-14 RX ORDER — ONDANSETRON 8 MG/1
4 TABLET, FILM COATED ORAL EVERY 6 HOURS
Refills: 0 | Status: DISCONTINUED | OUTPATIENT
Start: 2019-11-14 | End: 2019-11-20

## 2019-11-14 RX ORDER — ACETAMINOPHEN 500 MG
650 TABLET ORAL EVERY 4 HOURS
Refills: 0 | Status: DISCONTINUED | OUTPATIENT
Start: 2019-11-14 | End: 2019-11-20

## 2019-11-14 RX ORDER — CEFTRIAXONE 500 MG/1
1000 INJECTION, POWDER, FOR SOLUTION INTRAMUSCULAR; INTRAVENOUS ONCE
Refills: 0 | Status: COMPLETED | OUTPATIENT
Start: 2019-11-14 | End: 2019-11-14

## 2019-11-14 RX ORDER — CEFTRIAXONE 500 MG/1
1000 INJECTION, POWDER, FOR SOLUTION INTRAMUSCULAR; INTRAVENOUS ONCE
Refills: 0 | Status: DISCONTINUED | OUTPATIENT
Start: 2019-11-14 | End: 2019-11-14

## 2019-11-14 RX ORDER — CEFTRIAXONE 500 MG/1
1000 INJECTION, POWDER, FOR SOLUTION INTRAMUSCULAR; INTRAVENOUS EVERY 24 HOURS
Refills: 0 | Status: COMPLETED | OUTPATIENT
Start: 2019-11-14 | End: 2019-11-16

## 2019-11-14 RX ORDER — ALPRAZOLAM 0.25 MG
0.25 TABLET ORAL ONCE
Refills: 0 | Status: DISCONTINUED | OUTPATIENT
Start: 2019-11-14 | End: 2019-11-14

## 2019-11-14 RX ORDER — ACETAMINOPHEN 500 MG
975 TABLET ORAL ONCE
Refills: 0 | Status: COMPLETED | OUTPATIENT
Start: 2019-11-14 | End: 2019-11-14

## 2019-11-14 RX ORDER — ALPRAZOLAM 0.25 MG
0.25 TABLET ORAL THREE TIMES A DAY
Refills: 0 | Status: DISCONTINUED | OUTPATIENT
Start: 2019-11-14 | End: 2019-11-20

## 2019-11-14 RX ORDER — ENOXAPARIN SODIUM 100 MG/ML
30 INJECTION SUBCUTANEOUS DAILY
Refills: 0 | Status: DISCONTINUED | OUTPATIENT
Start: 2019-11-14 | End: 2019-11-15

## 2019-11-14 RX ORDER — SODIUM CHLORIDE 9 MG/ML
1000 INJECTION INTRAMUSCULAR; INTRAVENOUS; SUBCUTANEOUS
Refills: 0 | Status: DISCONTINUED | OUTPATIENT
Start: 2019-11-14 | End: 2019-11-15

## 2019-11-14 RX ORDER — LOSARTAN POTASSIUM 100 MG/1
25 TABLET, FILM COATED ORAL DAILY
Refills: 0 | Status: DISCONTINUED | OUTPATIENT
Start: 2019-11-14 | End: 2019-11-15

## 2019-11-14 RX ADMIN — SODIUM CHLORIDE 125 MILLILITER(S): 9 INJECTION INTRAMUSCULAR; INTRAVENOUS; SUBCUTANEOUS at 22:31

## 2019-11-14 RX ADMIN — CEFTRIAXONE 1000 MILLIGRAM(S): 500 INJECTION, POWDER, FOR SOLUTION INTRAMUSCULAR; INTRAVENOUS at 20:19

## 2019-11-14 RX ADMIN — Medication 0.25 MILLIGRAM(S): at 21:02

## 2019-11-14 RX ADMIN — Medication 975 MILLIGRAM(S): at 21:37

## 2019-11-14 NOTE — H&P ADULT - HISTORY OF PRESENT ILLNESS
HPI:  89 F w/PMH hypothyroid, CAD, HTN, bladder ca (currently undergoing tx), recurrent UTI, presents c/o increased generalized weakness.  Pt went for her 2nd bladder ca tx yesterday, found w/ UTI and started on macrobid, which she's been taking.  However, today, she was unable to ambulate d/t weakness (similar to prior UTI), called her  office and instructed to come to ED for tx.  She denies any fever/chills, n/v/d, abd pain, dysuria/freq/urg.  She had similar presentations and hospitalizations for UTI.      In ED, WBC 16, vitals stable, +UA, given rocephin IV.        PAST MEDICAL & SURGICAL HISTORY:  Bladder cancer  Endometrial adenocarcinoma  Macular degeneration  Stented coronary artery  Hypothyroid  Hyperlipidemia  HTN (hypertension)  GERD (gastroesophageal reflux disease)  CAD (coronary artery disease)  [place] (hard of hearing)  History of bladder surgery  S/P EMILY (total abdominal hysterectomy)  S/P hernia repair: umbilical -   S/P laparoscopic cholecystectomy:   Ankle fracture, right: surgery 25 yrs ago - hardware removed  S/P coronary angiogram: , ,  - drug eluding stents      Review of Systems:   CONSTITUTIONAL: No fever. + GEN WEAKNESS  EYES: No eye pain or discharge.  ENMT:  No sinus or throat pain  NECK: No pain or stiffness  RESPIRATORY: No cough, wheezing, chills or hemoptysis; No shortness of breath  CARDIOVASCULAR: No chest pain, palpitations, dizziness, or leg swelling  GASTROINTESTINAL: No abdominal or epigastric pain. No nausea, vomiting, or hematemesis; No diarrhea or constipation. No melena or hematochezia.  GENITOURINARY: No dysuria or incontinence  NEUROLOGICAL: No headaches, memory loss, loss of strength, numbness, or tremors  SKIN: No rashes.  MUSCULOSKELETAL: No joint pain or swelling; No muscle, back, or extremity pain  PSYCHIATRIC: No depression, anxiety, mood swings, or difficulty sleeping    Allergies  amlodipine (Unknown)  clonidine (Unknown)  Florinef Acetate (Unknown)  hydrocodone (Unknown)  Levatol (Unknown)  Lipitor (Unknown)  Lotrel (Unknown)  methylPREDNISolone (Unknown)  morphine (Other)  Plaquenil (Unknown)  statins (Other (Unknown))  sulfa drugs (Rash)    Social History:   lives alone  ind als  uses FWW  son nearby     FAMILY HISTORY:  Family history of brain cancer      Home Medications:  acetaminophen 500 mg oral tablet: 2 tab(s) orally every 6 hours, As Needed - for moderate pain (2019 21:43)  aspirin 325 mg oral tablet: 1 tab(s) orally once a day (2019 21:43)  ferrous sulfate 325 mg (65 mg elemental iron) oral tablet: 1 tab(s) orally once a day (2019 21:43)  losartan 25 mg oral tablet: 1 tab(s) orally once a day (2019 21:43)  metoprolol succinate 50 mg oral tablet, extended release: 1 tab(s) orally 2 times a day (2019 21:43)  midodrine 2.5 mg oral tablet: 1 tab(s) orally 2 times a day (2019 21:43)  pantoprazole 40 mg oral delayed release tablet: 1 tab(s) orally once a day (2019 21:43)  Refresh ophthalmic solution: 1 drop(s) to each affected eye 3 times a day (2019 21:43)  Synthroid 75 mcg (0.075 mg) oral tablet: 1 tab(s) orally once a day (2019 21:43)  Vitamin D3 1000 intl units oral tablet: 2 tab(s) orally once a day (2019 21:43)  Xanax 0.25 mg oral tablet: 1 tab(s) orally 3 times a day, As Needed for anxiety (2019 21:43)      MEDICATIONS  (STANDING):  artificial  tears Solution 1 Drop(s) Both EYES three times a day  aspirin 325 milliGRAM(s) Oral daily  cefTRIAXone Injectable. 1000 milliGRAM(s) IV Push every 24 hours  enoxaparin Injectable 30 milliGRAM(s) SubCutaneous daily  levothyroxine 75 MICROGram(s) Oral daily  losartan 25 milliGRAM(s) Oral daily  midodrine. 2.5 milliGRAM(s) Oral <User Schedule>  pantoprazole    Tablet 40 milliGRAM(s) Oral before breakfast  sodium chloride 0.9%. 1000 milliLiter(s) (125 mL/Hr) IV Continuous <Continuous>    MEDICATIONS  (PRN):  acetaminophen   Tablet .. 650 milliGRAM(s) Oral every 4 hours PRN Temp greater or equal to 38C (100.4F), Mild Pain (1 - 3)  ALPRAZolam 0.25 milliGRAM(s) Oral three times a day PRN ANXIETY  ondansetron Injectable 4 milliGRAM(s) IV Push every 6 hours PRN Nausea      PHYSICAL EXAM:  Vital Signs Last 24 Hrs  T(C): 36.9 (2019 21:16), Max: 36.9 (2019 21:16)  T(F): 98.4 (2019 21:16), Max: 98.4 (2019 21:16)  HR: 85 (2019 21:16) (85 - 91)  BP: 165/59 (2019 21:16) (151/49 - 165/59)  RR: 18 (2019 21:16) (18 - 18)  SpO2: 97% (2019 21:16) (97% - 99%)  GENERAL: NAD, well-developed  HEAD:  Atraumatic, Normocephalic  EYES: EOMI, PERRLA, conjunctiva and sclera clear  ENT: normal hearing, no nasal discharge, throat clear, normal dentition for age  NECK: Supple, No JVD, No LAD, no thyromegaly   CHEST/LUNG: Clear to auscultation bilaterally; No wheeze, resp unlabored  HEART: Regular rate and rhythm; No murmurs, rubs, or gallops  ABDOMEN: Soft, Nontender, Nondistended; Bowel sounds present, no HSM  EXTREMITIES:  2+ Peripheral Pulses, No clubbing, cyanosis, or edema  PSYCH: AAOx3, normal behavior  NEUROLOGY: non-focal, sensory and cn2-12intact  SKIN: No visible rashes or lesions    LABS:                        12.1   16.60 )-----------( 310      ( 2019 18:34 )             39.5     11-14    139  |  108  |  20  ----------------------------<  123<H>  3.8   |  23  |  1.00    Ca    9.0      2019 18:34    TPro  7.2  /  Alb  3.5  /  TBili  0.6  /  DBili  x   /  AST  19  /  ALT  20  /  AlkPhos  76  11-14    PT/INR - ( 2019 18:34 )   PT: 11.8 sec;   INR: 1.06 ratio         PTT - ( 2019 18:34 )  PTT:32.2 sec      Urinalysis Basic - ( 2019 18:30 )    Color: Yellow / Appearance: Clear / S.010 / pH: x  Gluc: x / Ketone: Negative  / Bili: Negative / Urobili: Negative mg/dL   Blood: x / Protein: 30 mg/dL / Nitrite: Negative   Leuk Esterase: Small / RBC: 0-2 /HPF / WBC 11-25   Sq Epi: x / Non Sq Epi: Few / Bacteria: Moderate        RADIOLOGY & ADDITIONAL TESTS:    Imaging Personally Reviewed:  cxr neg   EKG Personally Reviewed:  nsr

## 2019-11-14 NOTE — ED PROVIDER NOTE - NS_ ATTENDINGSCRIBEDETAILS _ED_A_ED_FT
I, Walker Jones MD,  performed the initial face to face bedside interview with this patient regarding history of present illness, review of symptoms and relevant past medical, social and family history.  I completed an independent physical examination.  I was the initial provider who evaluated this patient.  The history, relevant review of systems, past medical and surgical history, medical decision making, and physical examination was documented by the scribe in my presence and I attest to the accuracy of the documentation.

## 2019-11-14 NOTE — ED PROVIDER NOTE - CLINICAL SUMMARY MEDICAL DECISION MAKING FREE TEXT BOX
89 y/o F with hx of  Bladder CA,  CAD, s/p 5 stents, HTN, HLD, present to ED due to failed outpatient tx for UTI, sent by urologist. Exam is nonfocal. Will do septic work up, sx control and reassess.

## 2019-11-14 NOTE — ED PROVIDER NOTE - PSH
Ankle fracture, right  surgery 25 yrs ago - hardware removed  History of bladder surgery    S/P coronary angiogram  2005, 2008, 2011 - drug eluding stents  S/P hernia repair  umbilical - 2008  S/P laparoscopic cholecystectomy  2008  S/P EMILY (total abdominal hysterectomy)

## 2019-11-14 NOTE — ED ADULT TRIAGE NOTE - CHIEF COMPLAINT QUOTE
Pt complains of weakness, feels she has a UTI.  Pt receiving oral treatment for bladder cancer.  Pt denies fever but took Tylenol prior to arrival.

## 2019-11-14 NOTE — ED PROVIDER NOTE - PROGRESS NOTE DETAILS
pt with + UTI weakness inability to ambulate from weakness - concern for failed outpatient treatment of UTI - endorsed to Dr. Clay - BETTY. Walker Jones M.D., Attending Physician

## 2019-11-14 NOTE — ED PROVIDER NOTE - NS ED ROS FT
Constitutional: No fever or chills  Eyes: No visual changes  HEENT: No throat pain  CV: No chest pain  Resp: No SOB no cough  GI: No abd pain, nausea or vomiting  : +dysuria  MSK: No musculoskeletal pain. + weakness  Skin: No rash  Neuro: + headache.

## 2019-11-14 NOTE — ED PROVIDER NOTE - PMH
Bladder cancer    CAD (coronary artery disease)    Endometrial adenocarcinoma    GERD (gastroesophageal reflux disease)    [place] (hard of hearing)    HTN (hypertension)    Hyperlipidemia    Hypothyroid    Macular degeneration    Stented coronary artery

## 2019-11-14 NOTE — ED PROVIDER NOTE - OBJECTIVE STATEMENT
89 y/o female with a PMHx of Bladder CA being treated with chemo, bladder surgery CAD, s/p 5 stents, HTN, HLD, GERD, Hypothyroidism presents to the ED c/o worsening weakness and urinary sx of UTI.  +urinary frequency, HA, +cough.   Pt took Tylenol PTA. Denies fever. Pt is currently being treated with Macrobid 100mg for UTI. Took one dose yesterday and two today. Difficulty ambulating due to weakness. Last chemo treatment 1 week. Previous multiple admissions for UTI. Nonsmoker. No EtOH use. allergic to protozoan, sulfa, prednisone. Lives at home alone.  PMD: Dr. Sánchez. Urology: [facility].

## 2019-11-14 NOTE — ED ADULT NURSE REASSESSMENT NOTE - COMFORT CARE
assisted in cleaning and changing patient brief. patient is clean and dry at this time./repositioned/side rails up

## 2019-11-14 NOTE — ED ADULT NURSE NOTE - OBJECTIVE STATEMENT
pt aaox4, is 89 yo female with pmhx of bladder CA, bibems for urinary symptoms.  As per pt, pt was at the doctor's office, obtained the urine sample via straight cath sent to lab, came back positive for UTI.  pt started her macrobid oral medications yesterday, started to have generalized weakness last night, worsening today, denies fever, chills.  pt denies abd pain, burning upon urination.

## 2019-11-14 NOTE — H&P ADULT - ASSESSMENT
89 F w/PMH hypothyroid, CAD, HTN, bladder ca (currently undergoing tx), recurrent UTI, presents c/o increased generalized weakness.      In ED, WBC 16, vitals stable, +UA, given rocephin IV.

## 2019-11-14 NOTE — ED PROVIDER NOTE - PHYSICAL EXAMINATION
Constitutional: NAD AAOx3  Eyes: PERRLA EOMI  Head: Normocephalic atraumatic  Mouth: MMM  Cardiac: regular rate   Resp: Lungs CTAB  GI: Abd s/nt/nd  Neuro: CN2-12 intact  Skin: No rashes Constitutional: mild distress  Eyes: PERRLA EOMI  Head: Normocephalic atraumatic  Mouth: MMM  Cardiac: regular rate   Resp: Lungs CTAB  GI: Abd s/nt/nd  Neuro: CN2-12 intact  Skin: No rashes

## 2019-11-15 ENCOUNTER — TRANSCRIPTION ENCOUNTER (OUTPATIENT)
Age: 84
End: 2019-11-15

## 2019-11-15 LAB
HCT VFR BLD CALC: 37.9 % — SIGNIFICANT CHANGE UP (ref 34.5–45)
HGB BLD-MCNC: 11.7 G/DL — SIGNIFICANT CHANGE UP (ref 11.5–15.5)
MCHC RBC-ENTMCNC: 27 PG — SIGNIFICANT CHANGE UP (ref 27–34)
MCHC RBC-ENTMCNC: 30.9 GM/DL — LOW (ref 32–36)
MCV RBC AUTO: 87.3 FL — SIGNIFICANT CHANGE UP (ref 80–100)
PLATELET # BLD AUTO: 283 K/UL — SIGNIFICANT CHANGE UP (ref 150–400)
RBC # BLD: 4.34 M/UL — SIGNIFICANT CHANGE UP (ref 3.8–5.2)
RBC # FLD: 14.8 % — HIGH (ref 10.3–14.5)
WBC # BLD: 15.96 K/UL — HIGH (ref 3.8–10.5)
WBC # FLD AUTO: 15.96 K/UL — HIGH (ref 3.8–10.5)

## 2019-11-15 PROCEDURE — 74177 CT ABD & PELVIS W/CONTRAST: CPT | Mod: 26

## 2019-11-15 RX ORDER — METOPROLOL TARTRATE 50 MG
50 TABLET ORAL DAILY
Refills: 0 | Status: DISCONTINUED | OUTPATIENT
Start: 2019-11-15 | End: 2019-11-20

## 2019-11-15 RX ORDER — MIDODRINE HYDROCHLORIDE 2.5 MG/1
1 TABLET ORAL
Qty: 0 | Refills: 0 | DISCHARGE

## 2019-11-15 RX ORDER — ALPRAZOLAM 0.25 MG
0.5 TABLET ORAL
Qty: 0 | Refills: 0 | DISCHARGE

## 2019-11-15 RX ORDER — SODIUM CHLORIDE 9 MG/ML
1000 INJECTION INTRAMUSCULAR; INTRAVENOUS; SUBCUTANEOUS
Refills: 0 | Status: COMPLETED | OUTPATIENT
Start: 2019-11-15 | End: 2019-11-15

## 2019-11-15 RX ORDER — FERROUS SULFATE 325(65) MG
1 TABLET ORAL
Qty: 0 | Refills: 0 | DISCHARGE

## 2019-11-15 RX ORDER — ASPIRIN/CALCIUM CARB/MAGNESIUM 324 MG
1 TABLET ORAL
Qty: 0 | Refills: 0 | DISCHARGE

## 2019-11-15 RX ORDER — LOSARTAN POTASSIUM 100 MG/1
1 TABLET, FILM COATED ORAL
Qty: 0 | Refills: 0 | DISCHARGE

## 2019-11-15 RX ORDER — ENOXAPARIN SODIUM 100 MG/ML
40 INJECTION SUBCUTANEOUS DAILY
Refills: 0 | Status: DISCONTINUED | OUTPATIENT
Start: 2019-11-15 | End: 2019-11-20

## 2019-11-15 RX ORDER — CHOLECALCIFEROL (VITAMIN D3) 125 MCG
2 CAPSULE ORAL
Qty: 0 | Refills: 0 | DISCHARGE

## 2019-11-15 RX ORDER — CHOLECALCIFEROL (VITAMIN D3) 125 MCG
2000 CAPSULE ORAL
Refills: 0 | Status: DISCONTINUED | OUTPATIENT
Start: 2019-11-15 | End: 2019-11-20

## 2019-11-15 RX ORDER — METOPROLOL TARTRATE 50 MG
1 TABLET ORAL
Qty: 0 | Refills: 0 | DISCHARGE

## 2019-11-15 RX ADMIN — Medication 1 DROP(S): at 21:03

## 2019-11-15 RX ADMIN — Medication 1 DROP(S): at 11:54

## 2019-11-15 RX ADMIN — Medication 1 DROP(S): at 05:29

## 2019-11-15 RX ADMIN — Medication 650 MILLIGRAM(S): at 22:34

## 2019-11-15 RX ADMIN — LOSARTAN POTASSIUM 25 MILLIGRAM(S): 100 TABLET, FILM COATED ORAL at 05:29

## 2019-11-15 RX ADMIN — ENOXAPARIN SODIUM 40 MILLIGRAM(S): 100 INJECTION SUBCUTANEOUS at 11:54

## 2019-11-15 RX ADMIN — CEFTRIAXONE 1000 MILLIGRAM(S): 500 INJECTION, POWDER, FOR SOLUTION INTRAMUSCULAR; INTRAVENOUS at 21:03

## 2019-11-15 RX ADMIN — Medication 75 MICROGRAM(S): at 05:29

## 2019-11-15 RX ADMIN — Medication 650 MILLIGRAM(S): at 13:28

## 2019-11-15 RX ADMIN — SODIUM CHLORIDE 125 MILLILITER(S): 9 INJECTION INTRAMUSCULAR; INTRAVENOUS; SUBCUTANEOUS at 06:34

## 2019-11-15 RX ADMIN — Medication 2000 UNIT(S): at 17:15

## 2019-11-15 RX ADMIN — Medication 650 MILLIGRAM(S): at 22:04

## 2019-11-15 RX ADMIN — PANTOPRAZOLE SODIUM 40 MILLIGRAM(S): 20 TABLET, DELAYED RELEASE ORAL at 05:29

## 2019-11-15 RX ADMIN — SODIUM CHLORIDE 75 MILLILITER(S): 9 INJECTION INTRAMUSCULAR; INTRAVENOUS; SUBCUTANEOUS at 15:56

## 2019-11-15 RX ADMIN — Medication 650 MILLIGRAM(S): at 14:40

## 2019-11-15 NOTE — PROGRESS NOTE ADULT - SUBJECTIVE AND OBJECTIVE BOX
HPI:  89 F w/PMH hypothyroid, CAD, HTN, bladder ca (currently undergoing tx), recurrent UTI, presents c/o increased generalized weakness.  Pt went for her 2nd bladder ca tx yesterday, found w/ UTI and started on macrobid, which she's been taking.  However, today, she was unable to ambulate d/t weakness (similar to prior UTI), called her  office and instructed to come to ED for tx.  She denies any fever/chills, n/v/d, abd pain, dysuria/freq/urg.  She had similar presentations and hospitalizations for UTI. In ED, WBC 16, vitals stable, +UA, given rocephin IV.      11/15: Pt was seen and examined with pt's daughter at bedside. She says she's feeling somewhat better but these UTIs recur every time the pt receives therapy for the bladder CA. Pt and daughter were inquiring about prophylactic measures since the pt has 5 more therapies. Pt is still experiencing weakness in LEs, difficulty walking and standing. Pt lives alone with no aides at home.    ROS: All systems reviewed and negative with the exception of above.    Vital Signs Last 24 Hrs  T(C): 37.1 (15 Nov 2019 11:07), Max: 37.5 (15 Nov 2019 05:08)  T(F): 98.7 (15 Nov 2019 11:07), Max: 99.5 (15 Nov 2019 05:08)  HR: 85 (15 Nov 2019 11:07) (81 - 93)  BP: 151/57 (15 Nov 2019 11:07) (123/45 - 165/59)  BP(mean): --  RR: 20 (15 Nov 2019 11:07) (17 - 20)  SpO2: 98% (15 Nov 2019 11:07) (95% - 99%)    General: Elderly female, NAD  Eyes: PERRLA, EOMI, no discharge  ENMT: Oral mucosa without exudate or lesions  Neck: Supple, no JVD, no lymphadenopathy  Respiratory: Clear to auscultation bilaterally, no wheezes, rales, or rhonchi  Cardiovascular: +S1, +S2, RRR, no murmurs, rubs, or gallops  GI: Abdomen soft, no guarding, +BS, no pain upon palpation   Extremities: No clubbing, cyanosis  Neurological: AOx4, no focal deficits  Skin: Warm, no lesions, rashes, or scars  MSK: No muscle or joint tenderness    Labs:             11.7   15.96 )-----------( 283      ( 15 Nov 2019 07:46 )             37.9     11-14    139  |  108  |  20  ----------------------------<  123<H>  3.8   |  23  |  1.00    Ca    9.0      14 Nov 2019 18:34    TPro  7.2  /  Alb  3.5  /  TBili  0.6  /  DBili  x   /  AST  19  /  ALT  20  /  AlkPhos  76  11-14    Lactate: 1.9 (14-Nov-2019 20:19) HPI:  89 F w/PMH hypothyroid, CAD, HTN, bladder ca (currently undergoing tx), recurrent UTI, presents c/o increased generalized weakness.  Pt went for her 2nd bladder ca tx yesterday, found w/ UTI and started on macrobid, which she's been taking.  However, today, she was unable to ambulate d/t weakness (similar to prior UTI), called her  office and instructed to come to ED for tx.  She denies any fever/chills, n/v/d, abd pain, dysuria/freq/urg.  She had similar presentations and hospitalizations for UTI. In ED, WBC 16, vitals stable, +UA, given rocephin IV.      11/15: Pt was seen and examined with pt's daughter at bedside. She says she's feeling somewhat better but these UTIs recur every time the pt receives therapy for the bladder CA. Pt and daughter were inquiring about prophylactic measures since the pt has 5 more therapies. Pt is still experiencing weakness in LEs, difficulty walking and standing. Pt lives alone with no aides at home.    ROS: All systems reviewed and negative with the exception of above.    Vital Signs Last 24 Hrs  T(C): 37.1 (15 Nov 2019 11:07), Max: 37.5 (15 Nov 2019 05:08)  T(F): 98.7 (15 Nov 2019 11:07), Max: 99.5 (15 Nov 2019 05:08)  HR: 85 (15 Nov 2019 11:07) (81 - 93)  BP: 151/57 (15 Nov 2019 11:07) (123/45 - 165/59)  BP(mean): --  RR: 20 (15 Nov 2019 11:07) (17 - 20)  SpO2: 98% (15 Nov 2019 11:07) (95% - 99%)    General: Elderly female, NAD  Eyes: PERRLA, EOMI, no discharge  ENMT: Oral mucosa without exudate or lesions  Neck: Supple, no JVD, no lymphadenopathy  Respiratory: Clear to auscultation bilaterally, no wheezes, rales, or rhonchi  Cardiovascular: +S1, +S2, RRR, no murmurs, rubs, or gallops  GI: Abdomen soft, no guarding, +BS, no pain upon palpation   Extremities: No clubbing, cyanosis  Neurological: AOx4, right LE 4/5 strength, left LE 4/5 strength  Skin: Warm, no lesions, rashes, or scars  MSK: No muscle or joint tenderness    Labs:             11.7   15.96 )-----------( 283      ( 15 Nov 2019 07:46 )             37.9     11-14    139  |  108  |  20  ----------------------------<  123<H>  3.8   |  23  |  1.00    Ca    9.0      14 Nov 2019 18:34    TPro  7.2  /  Alb  3.5  /  TBili  0.6  /  DBili  x   /  AST  19  /  ALT  20  /  AlkPhos  76  11-14    Lactate: 1.9 (14-Nov-2019 20:19)

## 2019-11-15 NOTE — PROGRESS NOTE ADULT - ASSESSMENT
89 F w/PMH hypothyroid, CAD, HTN, bladder ca (currently undergoing tx), recurrent UTI, presents c/o increased generalized weakness.  In ED, WBC 16, vitals stable, +UA, given rocephin IV.      #Acute cystitis without hematuria  - Continue IV rocephin day #2  - F/u ID consult about recurring infection or any prophylactic measures  - IVF    #Weakness  - Likely 2/2 to cystits  - PT evaluation for dispo after d/c  - Tx as above    #Bladder CA  - Undergoing treatment outpatient  - F/u urology consult    #DVT prophylaxis  - Lovenox 89 F w/PMH hypothyroid, CAD, HTN, bladder ca (currently undergoing tx), recurrent UTI, presents c/o increased generalized weakness.  In ED, WBC 16, vitals stable, +UA, given rocephin IV.      #Acute cystitis without hematuria  - Continue IV rocephin day #2  - F/u ID consult about recurring infection or any prophylactic measures  - IVF    #Weakness  - Likely 2/2 to cystits  - PT evaluation for dispo after d/c  - Tx as above    #Bladder CA  - Undergoing treatment outpatient  - F/u urology consult    patient seen and examined with PA student Ainta Apodaca. I was physically present for the key portions of the evaluation and management (E/M) service provided.  I agree with the above history, physical, and plan which I have reviewed and edited where appropriate.  - ID and urology input noted  - continue IVF  - continue rocephin    #DVT prophylaxis  - Lovenox

## 2019-11-15 NOTE — DISCHARGE NOTE NURSING/CASE MANAGEMENT/SOCIAL WORK - PATIENT PORTAL LINK FT
You can access the FollowMyHealth Patient Portal offered by [facility] by registering at the following website: [email or web address]. By joining [facility]’s FollowMyHealth portal, you will also be able to view your health information using other applications (apps) compatible with our system.

## 2019-11-15 NOTE — CONSULT NOTE ADULT - SUBJECTIVE AND OBJECTIVE BOX
Patient is a 89y old  Female who presents with a chief complaint of weakness, UTI (15 Nov 2019 12:59)    HPI:  HPI:  89 F w/PMH hypothyroid, CAD, HTN, bladder ca (currently undergoing tx), recurrent UTI, endometrial adenocarcinoma, hyperlipidemia, hypertension, gerd, s/p EMILY and cholecystectomy admitted on  for evaluation of increased weakness, burning with urination after recent chemotherapy for recurrence of her bladder cancer; the patient notes mild suprapubic pain as well. No other specific complaints.            PAST MEDICAL & SURGICAL HISTORY:  Bladder cancer  Endometrial adenocarcinoma  Macular degeneration  Stented coronary artery  Hypothyroid  Hyperlipidemia  HTN (hypertension)  GERD (gastroesophageal reflux disease)  CAD (coronary artery disease)  [place] (hard of hearing)  History of bladder surgery  S/P EMILY (total abdominal hysterectomy)  S/P hernia repair: umbilical -   S/P laparoscopic cholecystectomy:   Ankle fracture, right: surgery 25 yrs ago - hardware removed  S/P coronary angiogram: , ,  - drug eluding stents        PMH: as above  PSH: as above  Meds: per reconciliation sheet, noted below  MEDICATIONS  (STANDING):  artificial  tears Solution 1 Drop(s) Both EYES three times a day  cefTRIAXone Injectable. 1000 milliGRAM(s) IV Push every 24 hours  cholecalciferol 2000 Unit(s) Oral two times a day  enoxaparin Injectable 40 milliGRAM(s) SubCutaneous daily  levothyroxine 75 MICROGram(s) Oral daily  metoprolol succinate ER 50 milliGRAM(s) Oral daily  pantoprazole    Tablet 40 milliGRAM(s) Oral before breakfast  sodium chloride 0.9%. 1000 milliLiter(s) (125 mL/Hr) IV Continuous <Continuous>    MEDICATIONS  (PRN):  acetaminophen   Tablet .. 650 milliGRAM(s) Oral every 4 hours PRN Temp greater or equal to 38C (100.4F), Mild Pain (1 - 3)  ALPRAZolam 0.25 milliGRAM(s) Oral three times a day PRN ANXIETY  ondansetron Injectable 4 milliGRAM(s) IV Push every 6 hours PRN Nausea    Allergies    amlodipine (Unknown)  clonidine (Unknown)  Florinef Acetate (Unknown)  hydrocodone (Unknown)  Levatol (Unknown)  Lipitor (Unknown)  Lotrel (Unknown)  methylPREDNISolone (Unknown)  morphine (Other)  Plaquenil (Unknown)  statins (Other (Unknown))  sulfa drugs (Rash)    Intolerances      Social: no smoking, no alcohol, no illegal drugs; no recent travel, no exposure to TB  FAMILY HISTORY:  Family history of brain cancer     no history of premature cardiovascular disease in first degree relatives  ROS: the patient denies fever, no chills, no HA, no dizziness, no sore throat, no blurry vision, no CP, no palpitations, no SOB, no cough, no abdominal pain, no diarrhea, no N/V,  no leg pain, no claudication, no rash, no joint aches, no rectal pain or bleeding, no night sweats  All other systems reviewed and are negative    Vital Signs Last 24 Hrs  T(C): 37.1 (15 Nov 2019 11:07), Max: 37.5 (15 Nov 2019 05:08)  T(F): 98.7 (15 Nov 2019 11:07), Max: 99.5 (15 Nov 2019 05:08)  HR: 85 (15 Nov 2019 11:07) (81 - 93)  BP: 151/57 (15 Nov 2019 11:07) (123/45 - 165/59)  BP(mean): --  RR: 20 (15 Nov 2019 11:07) (17 - 20)  SpO2: 98% (15 Nov 2019 11:07) (95% - 99%)  Daily     Daily     PE:    Constitutional: frail looking  HEENT: NC/AT, EOMI, PERRLA, conjunctivae clear; ears and nose atraumatic; pharynx clear  Neck: supple; thyroid not palpable  Back: no tenderness  Respiratory: respiratory effort normal; clear to auscultation  Cardiovascular: S1S2 regular, no murmurs  Abdomen: soft, not tender, not distended, positive BS; no liver or spleen organomegaly  Genitourinary: mild suprapubic tenderness  Musculoskeletal: no muscle tenderness, no joint swelling or tenderness  Neurological/ Psychiatric: AxOx3, judgement and insight normal;  moving all extremities  Skin: no rashes; no palpable lesions    Labs: all available labs reviewed                        11.7   15.96 )-----------( 283      ( 15 Nov 2019 07:46 )             37.9     -    139  |  108  |  20  ----------------------------<  123<H>  3.8   |  23  |  1.00    Ca    9.0      2019 18:34    TPro  7.2  /  Alb  3.5  /  TBili  0.6  /  DBili  x   /  AST  19  /  ALT  20  /  AlkPhos  76  11-14     LIVER FUNCTIONS - ( 2019 18:34 )  Alb: 3.5 g/dL / Pro: 7.2 gm/dL / ALK PHOS: 76 U/L / ALT: 20 U/L / AST: 19 U/L / GGT: x           Urinalysis Basic - ( 2019 18:30 )    Color: Yellow / Appearance: Clear / S.010 / pH: x  Gluc: x / Ketone: Negative  / Bili: Negative / Urobili: Negative mg/dL   Blood: x / Protein: 30 mg/dL / Nitrite: Negative   Leuk Esterase: Small / RBC: 0-2 /HPF / WBC 11-25   Sq Epi: x / Non Sq Epi: Few / Bacteria: Moderate    < from: CT Abdomen and Pelvis w/ IV Cont (11.15.19 @ 10:04) >    EXAM:  CT ABDOMEN AND PELVIS IC                            PROCEDURE DATE:  11/15/2019          INTERPRETATION:  CLINICAL INFORMATION: Bladder cancer. Evaluate for   metastases.    COMPARISON: CT scan of the abdomen and pelvis without contrast performed   on 2019    PROCEDURE:   CT of the Abdomen and Pelvis was performed with intravenous contrast.   Intravenous contrast: 90 ml Omnipaque 350. 10 ml discarded.  Oral contrast: None.  Sagittal and coronal reformats were performed.    FINDINGS:    LOWER CHEST: Fibrotic changes and/or atelectasis at the lung bases.   Vascular calcifications including the coronary arteries.    LIVER: Within normal limits.  BILE DUCTS: Distended CBD measuring up to approximately 1.2 cm and mild   intrahepatic biliary dilatation.. This may be postsurgical. Please   correlate clinically.  GALLBLADDER: Status post cholecystectomy.  SPLEEN: Within normal limits.  PANCREAS: Within normal limits.  ADRENALS: Within normal limits.  KIDNEYS/URETERS: Within normal limits.    BLADDER: Wall thickening along the left side of the bladder.  REPRODUCTIVE ORGANS: Status post hysterectomy.    BOWEL: No bowel obstruction. Appendix not well-visualized. No evidence   for inflammation the right lower quadrant.  PERITONEUM: No ascites.  VESSELS: Vascular calcifications.  RETROPERITONEUM/LYMPH NODES: No lymphadenopathy.    ABDOMINAL WALL: Left inguinal hernia containing fat. High attenuation in   the anterior abdominal wall seen best on series 2 image 86 through 74.   Has there been a ventral hernia repair?  BONES: Within normal limits.    IMPRESSION:     Wall thickening along the left side of the bladder in this patient with   reported bladder carcinoma. No gross evidence for metastatic disease.    Biliary dilatation which may be postsurgical. Additional findings as   above.    < end of copied text >        Radiology: all available radiological tests reviewed    Advanced directives addressed: full resuscitation

## 2019-11-15 NOTE — PROCEDURE NOTE - SUPERVISORY STATEMENT
Referred by: Kodi Moffett MD; Medical Diagnosis (from order):    Diagnosis Information      Diagnosis    724.6 (ICD-9-CM) - M53.3 (ICD-10-CM) - Sacroiliac joint dysfunction of left side                Physical Therapy -  Daily Treatment Note    Visit:  8     SUBJECTIVE                                                                                                             Had contrast MRI on Monday, which made Patient very sore and cancelled her PT appointment.  Used Tylenol and heat to assist with symptoms, which did have an effect on symptoms.     Functional Change: \"very sore\" left hip today from increased activities yesterday in the yard.     Pain / Symptoms:  Pain rating (out of 10): Current: 5     OBJECTIVE                                                                                                                       Palpation:     Comments / Details: Tender distal lumbar paraspinals  Tender right Sacroiliac region  Alignment:   Comments / Details: Left on right backward sacral torsion   FRS Right L4          TREATMENT                                                                                                                  Therapeutic Exercise:  Supine wide leg knee drops Right/Left   ^in between Muscle Energy Technique corrections     Prone Physioball hip extensions Right/Left 2 x 15  Prone Physioball Bilateral hip extension + scissors x fatigue   Manual Therapy:  Assessment of pelvic alignment   Muscle Energy Technique correction   Prone lateral table edge Quadriceps and Hip Flexor stretches  Cryotherapy (86940): Cold Pack  Location: Right anterior hip  Position: To go  Duration: 15 minutes    Skilled input: verbal instruction/cues and posture correction    Home Exercise Program: (*above indicates provided as part of home exercise program)  11/4/19: Hip Flexor stretches * Active hip extension       ASSESSMENT                                                                                                                  Patient Tolerated today's treatment well.  Used ice to wrap at right anterior hip due to irritation and soreness today in general.  Will be going to [facility] for a reprint of her work restrictions.     Pain/symptoms after session: 5  Patient Education:   Results of above outlined education: Verbalizes understanding   PLAN                                                                                                                             Suggestions for next session as indicated: Progress per plan of care  * Pelvic alignment * Trigger Point Release Hip Flexor * PROM right hip *       Procedures and total treatment time documented Time Entry flowsheet.     Cardiac catheterization performed via femoral access without complication.   Results: 3 vessel coronary artery disease. Normal left ventricular function. Patent stents LAD, LCX and RCA.  Plan: CCU care, treatment for pericarditis with aspirin and colchicine, echo, follow enzymes, BP control with increased losartan and metoprolol. Dr. Álvarez to follow up in AM.  D/w Dr. Álvarez. Cardiac catheterization performed via femoral access without complication.   Results: 3 vessel coronary artery disease. Normal left ventricular function. Patent stents LAD, LCX and RCA.  Plan: CCU care, treatment for pericarditis with aspirin and colchicine, echo, follow enzymes, BP control with increased losartan and metoprolol. Dr. Álvarez to follow up in AM.  D/w Dr. Álvarez and Dr. Lemus.

## 2019-11-15 NOTE — PROVIDER CONTACT NOTE (OTHER) - SITUATION
Orders received to bladder scan pt. Bladder scanned patient after voiding and had 264ml. We waited about 3 minutes for her to void again. Rescanned and had 234ml. Voicemail left on Dr. Bethea's cell.

## 2019-11-15 NOTE — CONSULT NOTE ADULT - SUBJECTIVE AND OBJECTIVE BOX
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   She started 2nd BCG induction 2 weeks ago  Seen in the office 11/13/19 for BCG #2/6 but it was cancelled due to UA suspicious for UTI. PVR was low  She was started on macrobid. Culture has not returned as of this morning    Patient sent to ED due to multiple phone calls to our office stating she is very weak and was unable to walk. Lactate normal but WBC was elevated    Patient seen and examined at bedside.     Current complaints: weakness    Denies dysuria, urgency, frequency, gross hematuria, fevers, chills, nausea, vomiting or weight loss    ROS  12 point ROS negative except that outlined in HPI    PMHX  Urinary tract infection  Family history of brain cancer  No pertinent family history in first degree relatives  Bladder cancer  Endometrial adenocarcinoma  Uterine cancer  Macular degeneration  Stented coronary artery  Hypothyroid  Hyperlipidemia  HTN (hypertension)  GERD (gastroesophageal reflux disease)  CAD (coronary artery disease)  [place] (hard of hearing)  History of bladder surgery  S/P EMILY (total abdominal hysterectomy)  S/P hernia repair  S/P laparoscopic cholecystectomy  Ankle fracture, right  S/P coronary angiogram        MEDS  acetaminophen   Tablet .. 650 milliGRAM(s) Oral every 4 hours PRN  ALPRAZolam 0.25 milliGRAM(s) Oral three times a day PRN  artificial  tears Solution 1 Drop(s) Both EYES three times a day  aspirin 325 milliGRAM(s) Oral daily  cefTRIAXone Injectable. 1000 milliGRAM(s) IV Push every 24 hours  enoxaparin Injectable 30 milliGRAM(s) SubCutaneous daily  levothyroxine 75 MICROGram(s) Oral daily  losartan 25 milliGRAM(s) Oral daily  midodrine. 2.5 milliGRAM(s) Oral <User Schedule>  ondansetron Injectable 4 milliGRAM(s) IV Push every 6 hours PRN  pantoprazole    Tablet 40 milliGRAM(s) Oral before breakfast  sodium chloride 0.9%. 1000 milliLiter(s) IV Continuous <Continuous>      Allergies  amlodipine (Unknown)  clonidine (Unknown)  Florinef Acetate (Unknown)  hydrocodone (Unknown)  Levatol (Unknown)  Lipitor (Unknown)  Lotrel (Unknown)  methylPREDNISolone (Unknown)  morphine (Other)  Plaquenil (Unknown)  statins (Other (Unknown))  sulfa drugs (Rash)        VITALS  T(C): 37.5 (11-15-19 @ 05:08), Max: 37.5 (11-15-19 @ 05:08)  HR: 93 (11-15-19 @ 05:08)  BP: 140/55 (11-15-19 @ 05:08)  RR: 18 (11-15-19 @ 05:08)  SpO2: 99% (11-15-19 @ 05:08)    11-14-19 @ 07:01  -  11-15-19 @ 07:00  --------------------------------------------------------  IN: 990 mL / OUT: 0 mL / NET: 990 mL        Gen: elderly Female in NAD, well nourished  HEENT: normocephalic, anicteric sclera, moist mucus membranes; hearing intact  Chest: non labored breathing  Abd: soft, NT, ND, no masses  : no suprapubic distension or tenderness  Psych: AAOx3, normal affect & mood  Ext: moves all four extremities freely, no peripheral edema    LABS  11-14    139  |  108  |  20  ----------------------------<  123<H>  3.8   |  23  |  1.00    Ca    9.0      14 Nov 2019 18:34    TPro  7.2  /  Alb  3.5  /  TBili  0.6  /  DBili  x   /  AST  19  /  ALT  20  /  AlkPhos  76  11-14    CBC Full  -  ( 15 Nov 2019 07:46 )  WBC Count : 15.96 K/uL  Hemoglobin : 11.7 g/dL  Hematocrit : 37.9 %  Platelet Count - Automated : 283 K/uL  Mean Cell Volume : 87.3 fl  Mean Cell Hemoglobin : 27.0 pg  Mean Cell Hemoglobin Concentration : 30.9 gm/dL  Auto Neutrophil # : x  Auto Lymphocyte # : x  Auto Monocyte # : x  Auto Eosinophil # : x  Auto Basophil # : x  Auto Neutrophil % : x  Auto Lymphocyte % : x  Auto Monocyte % : x  Auto Eosinophil % : x  Auto Basophil % : x      UA:  11/13/19 (office): full field WBCs, large LE, mod blood  11/14/19: 11-25 WBC, 0-2 RBC, mod bacteria      RADIOLOGY  8/2019 CT A/P: no lymphadenopathy, hydro or bladder masses       ASSESSMENT & PLAN    UTI  - Patient's infection appears to be improving (quantity of WBCs in UA decreased) with the macrobid however she is admitted due to persistent weakness  -F/u cx  - c/w antibiotics  - check post void bladder scan since she has a history of urinary retention. call MD if PVR>200mL    weakness  - non specific and a chronic issue for the patient  - obtain CT A/P with contrast to rule metastatic spread as a cause of her persistent weakness     Bladder cancer - high risk  - Will resume BCG induction after discharge  - patient understands she is a poor surgical candidate and is not interested in a cystectomy       Thank you for allowing me to participate in the care of this patient  Please call if there are questions or concerns    >25 minutes spent in face to face time with patient. I explained the various issues and complexities regarding their current urological condition(s) and treatment options. They verbalized understanding and I answered all of their questions to satisfaction.     Gage Solorzano MD  [facility]  [phone]    11-15-19 @ 08:19

## 2019-11-15 NOTE — CONSULT NOTE ADULT - ASSESSMENT
89 F w/PMH hypothyroid, CAD, HTN, bladder ca (currently undergoing tx), recurrent UTI, endometrial adenocarcinoma, hyperlipidemia, hypertension, gerd, s/p EMILY and cholecystectomy admitted on 11/14 for evaluation of increased weakness, burning with urination after recent chemotherapy for recurrence of her bladder cancer; the patient notes mild suprapubic pain as well. No other specific complaints.     1. Patient admitted with recurrent urinary tract infection after chemotherapy  - follow up cultures   - iv hydration and supportive care   - serial cbc and monitor temperature   - reviewed prior medical records to evaluate for resistant or atypical pathogens and in past patient had sensitive Enterobacter cloacae  - agree with ceftriaxone as ordered  2. other issues: per medicine

## 2019-11-16 LAB
ANION GAP SERPL CALC-SCNC: 6 MMOL/L — SIGNIFICANT CHANGE UP (ref 5–17)
BUN SERPL-MCNC: 15 MG/DL — SIGNIFICANT CHANGE UP (ref 7–23)
CALCIUM SERPL-MCNC: 8.3 MG/DL — LOW (ref 8.5–10.1)
CHLORIDE SERPL-SCNC: 115 MMOL/L — HIGH (ref 96–108)
CO2 SERPL-SCNC: 22 MMOL/L — SIGNIFICANT CHANGE UP (ref 22–31)
CREAT SERPL-MCNC: 0.92 MG/DL — SIGNIFICANT CHANGE UP (ref 0.5–1.3)
CULTURE RESULTS: SIGNIFICANT CHANGE UP
GLUCOSE SERPL-MCNC: 101 MG/DL — HIGH (ref 70–99)
HCT VFR BLD CALC: 35.4 % — SIGNIFICANT CHANGE UP (ref 34.5–45)
HGB BLD-MCNC: 10.9 G/DL — LOW (ref 11.5–15.5)
MCHC RBC-ENTMCNC: 27.1 PG — SIGNIFICANT CHANGE UP (ref 27–34)
MCHC RBC-ENTMCNC: 30.8 GM/DL — LOW (ref 32–36)
MCV RBC AUTO: 88.1 FL — SIGNIFICANT CHANGE UP (ref 80–100)
PLATELET # BLD AUTO: 261 K/UL — SIGNIFICANT CHANGE UP (ref 150–400)
POTASSIUM SERPL-MCNC: 3.6 MMOL/L — SIGNIFICANT CHANGE UP (ref 3.5–5.3)
POTASSIUM SERPL-SCNC: 3.6 MMOL/L — SIGNIFICANT CHANGE UP (ref 3.5–5.3)
RBC # BLD: 4.02 M/UL — SIGNIFICANT CHANGE UP (ref 3.8–5.2)
RBC # FLD: 15 % — HIGH (ref 10.3–14.5)
SODIUM SERPL-SCNC: 143 MMOL/L — SIGNIFICANT CHANGE UP (ref 135–145)
SPECIMEN SOURCE: SIGNIFICANT CHANGE UP
WBC # BLD: 7.54 K/UL — SIGNIFICANT CHANGE UP (ref 3.8–10.5)
WBC # FLD AUTO: 7.54 K/UL — SIGNIFICANT CHANGE UP (ref 3.8–10.5)

## 2019-11-16 RX ADMIN — Medication 75 MICROGRAM(S): at 06:02

## 2019-11-16 RX ADMIN — CEFTRIAXONE 1000 MILLIGRAM(S): 500 INJECTION, POWDER, FOR SOLUTION INTRAMUSCULAR; INTRAVENOUS at 21:14

## 2019-11-16 RX ADMIN — ENOXAPARIN SODIUM 40 MILLIGRAM(S): 100 INJECTION SUBCUTANEOUS at 12:34

## 2019-11-16 RX ADMIN — Medication 50 MILLIGRAM(S): at 06:02

## 2019-11-16 RX ADMIN — Medication 650 MILLIGRAM(S): at 12:36

## 2019-11-16 RX ADMIN — Medication 1 DROP(S): at 21:14

## 2019-11-16 RX ADMIN — PANTOPRAZOLE SODIUM 40 MILLIGRAM(S): 20 TABLET, DELAYED RELEASE ORAL at 06:02

## 2019-11-16 RX ADMIN — Medication 1 DROP(S): at 06:02

## 2019-11-16 RX ADMIN — Medication 650 MILLIGRAM(S): at 17:49

## 2019-11-16 RX ADMIN — Medication 2000 UNIT(S): at 17:49

## 2019-11-16 RX ADMIN — Medication 1 DROP(S): at 14:27

## 2019-11-16 RX ADMIN — Medication 2000 UNIT(S): at 06:02

## 2019-11-16 NOTE — PROGRESS NOTE ADULT - ASSESSMENT
89 F w/PMH hypothyroid, CAD, HTN, bladder ca (currently undergoing tx), recurrent UTI, presents c/o increased generalized weakness.  In ED, WBC 16, vitals stable, +UA, given rocephin IV.      #Acute cystitis without hematuria  - Continue IV rocephin day #3  - F/u ID consult about recurring infection or any prophylactic measures  - IVF    #Weakness  - Likely 2/2 to cystits  - PT evaluation for dispo after d/c  - Tx as above    #Bladder CA  - Undergoing treatment outpatient  - F/u urology consult  - ID and urology input noted  - continue IVF  - continue rocephin    #DVT prophylaxis  - Lovenox    patient seen and examined with PA student Anita Apodaca. I was physically present for the key portions of the evaluation and management (E/M) service provided.  I agree with the above history, physical, and plan which I have reviewed and edited where appropriate. 89 F w/PMH hypothyroid, CAD, HTN, bladder ca (currently undergoing tx), recurrent UTI, presents c/o increased generalized weakness.  In ED, WBC 16, vitals stable, +UA, given rocephin IV.      #Acute cystitis without hematuria  - Continue IV rocephin day #3  - F/u ID consult about recurring infection or any prophylactic measures  - IVF    #Weakness  - Likely 2/2 to cystits  - PT evaluation for dispo after d/c  - Tx as above    #Bladder CA  - Undergoing treatment outpatient  - F/u urology consult  - ID and urology input noted  - continue IVF  - continue rocephin    #DVT prophylaxis  - Lovenox    patient seen and examined with PA student Anita Apodaca. I was physically present for the key portions of the evaluation and management (E/M) service provided.  I agree with the above history, physical, and plan which I have reviewed and edited where appropriate.  - case d/w daughter at bedside and plan to continue abx and possible dc in AM  - await PT eval - pt not sure about rehab

## 2019-11-16 NOTE — PHYSICAL THERAPY INITIAL EVALUATION ADULT - CRITERIA FOR SKILLED THERAPEUTIC INTERVENTIONS
anticipated equipment needs at discharge/impairments found/rehab potential/predicted duration of therapy intervention/therapy frequency/anticipated discharge recommendation/functional limitations in following categories/risk reduction/prevention

## 2019-11-16 NOTE — PROGRESS NOTE ADULT - ASSESSMENT
89 F w/PMH hypothyroid, CAD, HTN, bladder ca (currently undergoing tx), recurrent UTI, endometrial adenocarcinoma, hyperlipidemia, hypertension, gerd, s/p EMILY and cholecystectomy admitted on 11/14 for evaluation of increased weakness, burning with urination after recent chemotherapy for recurrence of her bladder cancer; the patient notes mild suprapubic pain as well. No other specific complaints.     1. Cystitis. Likely UTI. Bladder Ca s/p chemotherapy. Immunocompromised host.  -leukocytosis  -cultures reviewed  -on ceftriaxone 1 gm IV qd # 2  -tolerating abx well so far; no side effects noted  - follow up cultures   - iv hydration   - serial cbc and monitor temperature   -continue abx coverage  -monitor temps  -f/u CBC  -supportive care  2. Other issues:   -care per medicine

## 2019-11-16 NOTE — PHYSICAL THERAPY INITIAL EVALUATION ADULT - LIVES WITH, PROFILE
pt has some HHA at home but she on medical leave for few weeks, Family is looking for some other help/assist to get at home, son lives nearby, checks on pt everyday, pt has life alert system on/alone

## 2019-11-16 NOTE — PHYSICAL THERAPY INITIAL EVALUATION ADULT - PERTINENT HX OF CURRENT PROBLEM, REHAB EVAL
Pt presents c/o increased generalized weakness.  Pt went for her 2nd bladder CA tx yesterday, found w/ UTI, she was unable to ambulate d/t weakness (similar to prior UTI), called her  office and instructed to come to ED for tx., h/o  bladder ca (currently undergoing tx),

## 2019-11-16 NOTE — PHYSICAL THERAPY INITIAL EVALUATION ADULT - DISCHARGE DISPOSITION, PT EVAL
Pt refusing POOL-says she has been there so many times, getting frustrated with thought of going to POOL/home w/ home PT/home w/ assist

## 2019-11-16 NOTE — PROGRESS NOTE ADULT - SUBJECTIVE AND OBJECTIVE BOX
Date of service: 19 @ 16:39    Lying in bed in NAD  Has dysuria  Weak looking    ROS: no fever or chills; denies dizziness, no HA, no SOB or cough, no abdominal pain, no diarrhea or constipation; no legs pain, no rashes    MEDICATIONS  (STANDING):  artificial  tears Solution 1 Drop(s) Both EYES three times a day  cefTRIAXone Injectable. 1000 milliGRAM(s) IV Push every 24 hours  cholecalciferol 2000 Unit(s) Oral two times a day  enoxaparin Injectable 40 milliGRAM(s) SubCutaneous daily  levothyroxine 75 MICROGram(s) Oral daily  metoprolol succinate ER 50 milliGRAM(s) Oral daily  pantoprazole    Tablet 40 milliGRAM(s) Oral before breakfast      Vital Signs Last 24 Hrs  T(C): 36.7 (2019 11:12), Max: 37.2 (2019 05:58)  T(F): 98 (2019 11:12), Max: 98.9 (2019 05:58)  HR: 78 (2019 11:12) (78 - 90)  BP: 151/54 (2019 11:12) (150/57 - 160/73)  BP(mean): --  RR: 18 (2019 11:12) (18 - 20)  SpO2: 99% (2019 11:12) (97% - 99%)    Physical Exam:      Constitutional: frail looking  HEENT: NC/AT, EOMI, PERRLA, conjunctivae clear  Neck: supple; thyroid not palpable  Back: no tenderness  Respiratory: respiratory effort normal; clear to auscultation  Cardiovascular: S1S2 regular, no murmurs  Abdomen: soft, not tender, not distended, positive BS  Genitourinary: mild suprapubic tenderness  Musculoskeletal: no muscle tenderness, no joint swelling or tenderness  Neurological/ Psychiatric: AxOx3, moving all extremities  Skin: no rashes; no palpable lesions    Labs: reviewed                        10.9   7.54  )-----------( 261      ( 2019 07:14 )             35.4     -    143  |  115<H>  |  15  ----------------------------<  101<H>  3.6   |  22  |  0.92    Ca    8.3<L>      2019 07:14    TPro  7.2  /  Alb  3.5  /  TBili  0.6  /  DBili  x   /  AST  19  /  ALT  20  /  AlkPhos  76                          11.7   15.96 )-----------( 283      ( 15 Nov 2019 07:46 )             37.9     11-14    139  |  108  |  20  ----------------------------<  123<H>  3.8   |  23  |  1.00    Ca    9.0      2019 18:34    TPro  7.2  /  Alb  3.5  /  TBili  0.6  /  DBili  x   /  AST  19  /  ALT  20  /  AlkPhos  76  1114     LIVER FUNCTIONS - ( 2019 18:34 )  Alb: 3.5 g/dL / Pro: 7.2 gm/dL / ALK PHOS: 76 U/L / ALT: 20 U/L / AST: 19 U/L / GGT: x           Urinalysis Basic - ( 2019 18:30 )    Color: Yellow / Appearance: Clear / S.010 / pH: x  Gluc: x / Ketone: Negative  / Bili: Negative / Urobili: Negative mg/dL   Blood: x / Protein: 30 mg/dL / Nitrite: Negative   Leuk Esterase: Small / RBC: 0-2 /HPF / WBC 11-25   Sq Epi: x / Non Sq Epi: Few / Bacteria: Moderate      Culture - Blood (collected 2019 20:14)  Source: .Blood None  Preliminary Report (2019 04:00):    No growth to date.    Culture - Blood (collected 2019 18:34)  Source: .Blood Blood-Peripheral  Preliminary Report (2019 01:01):    No growth to date.    Culture - Urine (collected 2019 18:30)  Source: .Urine Clean Catch (Midstream)  Final Report (2019 10:10):    50,000 - 99,000 CFU/mL Normal Urogenital nelson present      < from: CT Abdomen and Pelvis w/ IV Cont (11.15.19 @ 10:04) >    EXAM:  CT ABDOMEN AND PELVIS IC                          Wall thickening along the left side of the bladder in this patient with   reported bladder carcinoma. No gross evidence for metastatic disease.  Biliary dilatation which may be postsurgical. Additional findings as   above.    < end of copied text >    Radiology: all available radiological tests reviewed    Advanced directives addressed: full resuscitation

## 2019-11-17 LAB
ANION GAP SERPL CALC-SCNC: 8 MMOL/L — SIGNIFICANT CHANGE UP (ref 5–17)
BUN SERPL-MCNC: 13 MG/DL — SIGNIFICANT CHANGE UP (ref 7–23)
CALCIUM SERPL-MCNC: 9.1 MG/DL — SIGNIFICANT CHANGE UP (ref 8.5–10.1)
CHLORIDE SERPL-SCNC: 112 MMOL/L — HIGH (ref 96–108)
CO2 SERPL-SCNC: 21 MMOL/L — LOW (ref 22–31)
CREAT SERPL-MCNC: 0.98 MG/DL — SIGNIFICANT CHANGE UP (ref 0.5–1.3)
GLUCOSE SERPL-MCNC: 102 MG/DL — HIGH (ref 70–99)
HCT VFR BLD CALC: 35.9 % — SIGNIFICANT CHANGE UP (ref 34.5–45)
HGB BLD-MCNC: 11.2 G/DL — LOW (ref 11.5–15.5)
MCHC RBC-ENTMCNC: 27.1 PG — SIGNIFICANT CHANGE UP (ref 27–34)
MCHC RBC-ENTMCNC: 31.2 GM/DL — LOW (ref 32–36)
MCV RBC AUTO: 86.7 FL — SIGNIFICANT CHANGE UP (ref 80–100)
PLATELET # BLD AUTO: 295 K/UL — SIGNIFICANT CHANGE UP (ref 150–400)
POTASSIUM SERPL-MCNC: 3.8 MMOL/L — SIGNIFICANT CHANGE UP (ref 3.5–5.3)
POTASSIUM SERPL-SCNC: 3.8 MMOL/L — SIGNIFICANT CHANGE UP (ref 3.5–5.3)
RBC # BLD: 4.14 M/UL — SIGNIFICANT CHANGE UP (ref 3.8–5.2)
RBC # FLD: 14.8 % — HIGH (ref 10.3–14.5)
SODIUM SERPL-SCNC: 141 MMOL/L — SIGNIFICANT CHANGE UP (ref 135–145)
WBC # BLD: 8.57 K/UL — SIGNIFICANT CHANGE UP (ref 3.8–10.5)
WBC # FLD AUTO: 8.57 K/UL — SIGNIFICANT CHANGE UP (ref 3.8–10.5)

## 2019-11-17 RX ORDER — HYDRALAZINE HCL 50 MG
25 TABLET ORAL EVERY 6 HOURS
Refills: 0 | Status: DISCONTINUED | OUTPATIENT
Start: 2019-11-17 | End: 2019-11-18

## 2019-11-17 RX ORDER — VANCOMYCIN HCL 1 G
750 VIAL (EA) INTRAVENOUS EVERY 12 HOURS
Refills: 0 | Status: COMPLETED | OUTPATIENT
Start: 2019-11-17 | End: 2019-11-19

## 2019-11-17 RX ORDER — POLYETHYLENE GLYCOL 3350 17 G/17G
17 POWDER, FOR SOLUTION ORAL
Refills: 0 | Status: DISCONTINUED | OUTPATIENT
Start: 2019-11-17 | End: 2019-11-20

## 2019-11-17 RX ORDER — SENNA PLUS 8.6 MG/1
2 TABLET ORAL AT BEDTIME
Refills: 0 | Status: DISCONTINUED | OUTPATIENT
Start: 2019-11-17 | End: 2019-11-20

## 2019-11-17 RX ORDER — HYDRALAZINE HCL 50 MG
10 TABLET ORAL ONCE
Refills: 0 | Status: COMPLETED | OUTPATIENT
Start: 2019-11-17 | End: 2019-11-17

## 2019-11-17 RX ADMIN — Medication 0.25 MILLIGRAM(S): at 13:48

## 2019-11-17 RX ADMIN — ENOXAPARIN SODIUM 40 MILLIGRAM(S): 100 INJECTION SUBCUTANEOUS at 11:20

## 2019-11-17 RX ADMIN — Medication 250 MILLIGRAM(S): at 17:18

## 2019-11-17 RX ADMIN — Medication 10 MILLIGRAM(S): at 12:12

## 2019-11-17 RX ADMIN — Medication 650 MILLIGRAM(S): at 21:50

## 2019-11-17 RX ADMIN — Medication 25 MILLIGRAM(S): at 09:20

## 2019-11-17 RX ADMIN — Medication 75 MICROGRAM(S): at 05:39

## 2019-11-17 RX ADMIN — Medication 2000 UNIT(S): at 17:18

## 2019-11-17 RX ADMIN — Medication 25 MILLIGRAM(S): at 21:06

## 2019-11-17 RX ADMIN — Medication 650 MILLIGRAM(S): at 11:45

## 2019-11-17 RX ADMIN — Medication 50 MILLIGRAM(S): at 05:39

## 2019-11-17 RX ADMIN — SENNA PLUS 2 TABLET(S): 8.6 TABLET ORAL at 21:05

## 2019-11-17 RX ADMIN — Medication 650 MILLIGRAM(S): at 03:40

## 2019-11-17 RX ADMIN — Medication 1 DROP(S): at 21:05

## 2019-11-17 RX ADMIN — Medication 1 DROP(S): at 14:16

## 2019-11-17 RX ADMIN — Medication 650 MILLIGRAM(S): at 21:06

## 2019-11-17 RX ADMIN — PANTOPRAZOLE SODIUM 40 MILLIGRAM(S): 20 TABLET, DELAYED RELEASE ORAL at 05:39

## 2019-11-17 RX ADMIN — Medication 650 MILLIGRAM(S): at 13:46

## 2019-11-17 RX ADMIN — Medication 650 MILLIGRAM(S): at 03:06

## 2019-11-17 RX ADMIN — POLYETHYLENE GLYCOL 3350 17 GRAM(S): 17 POWDER, FOR SOLUTION ORAL at 17:04

## 2019-11-17 RX ADMIN — Medication 2000 UNIT(S): at 05:39

## 2019-11-17 RX ADMIN — Medication 1 DROP(S): at 05:39

## 2019-11-17 NOTE — PROGRESS NOTE ADULT - ASSESSMENT
89 F w/PMH hypothyroid, CAD, HTN, bladder ca (currently undergoing tx), recurrent UTI, endometrial adenocarcinoma, hyperlipidemia, hypertension, gerd, s/p EMILY and cholecystectomy admitted on 11/14 for evaluation of increased weakness, burning with urination after recent chemotherapy for recurrence of her bladder cancer; the patient notes mild suprapubic pain as well. No other specific complaints.     1. Cystitis. Likely UTI with MRSA. Bladder Ca s/p chemotherapy. Immunocompromised host.  -outpatient urine c/s: MRSA per Dry Solorzano  -leukocytosis resolving  -cultures reviewed  -on ceftriaxone 1 gm IV qd # 3  -tolerating abx well so far; no side effects noted  -cultures reviewed  -add vancomycin 750 mg IV q12h  -reason for abx use and side effects reviewed with patient; monitor BMP and vancomycin trough levels   - serial cbc and monitor temperature   -continue abx coverage  -monitor temps  -f/u CBC  -supportive care  2. Other issues:   -care per medicine

## 2019-11-17 NOTE — PROGRESS NOTE ADULT - SUBJECTIVE AND OBJECTIVE BOX
UROLOGY FOLLOW UP NOTE    SUBJECTIVE    Patient seen and examined at bedside.  No acute events overnight.    Current complaints are:        OBJECTIVE    T(C): 36.9 (11-17-19 @ 11:55), Max: 36.9 (11-17-19 @ 11:55)  HR: 81 (11-17-19 @ 11:55)  BP: 199/72 (11-17-19 @ 11:55)  RR: 17 (11-17-19 @ 11:55)  SpO2: 98% (11-17-19 @ 11:55)    Gen: elderly Female in NAD, well nourished  HEENT: normocephalic, hearing intact, moist mucus membranes  Chest: non labored breathing  ABd: soft, NT, ND, no masses  Back: no CVA tenderness  : no suprapubic distension or tenderness, * verma  Psych: normal affect and mood  Ext: moves all four extremities freely    11-17    141  |  112<H>  |  13  ----------------------------<  102<H>  3.8   |  21<L>  |  0.98    Ca    9.1      17 Nov 2019 07:25        CBC Full  -  ( 17 Nov 2019 07:25 )  WBC Count : 8.57 K/uL  Hemoglobin : 11.2 g/dL  Hematocrit : 35.9 %  Platelet Count - Automated : 295 K/uL  Mean Cell Volume : 86.7 fl  Mean Cell Hemoglobin : 27.1 pg  Mean Cell Hemoglobin Concentration : 31.2 gm/dL  Auto Neutrophil # : x  Auto Lymphocyte # : x  Auto Monocyte # : x  Auto Eosinophil # : x  Auto Basophil # : x  Auto Neutrophil % : x  Auto Lymphocyte % : x  Auto Monocyte % : x  Auto Eosinophil % : x  Auto Basophil % : x      acetaminophen   Tablet .. 650 milliGRAM(s) Oral every 4 hours PRN  ALPRAZolam 0.25 milliGRAM(s) Oral three times a day PRN  artificial  tears Solution 1 Drop(s) Both EYES three times a day  cholecalciferol 2000 Unit(s) Oral two times a day  enoxaparin Injectable 40 milliGRAM(s) SubCutaneous daily  hydrALAZINE 25 milliGRAM(s) Oral every 6 hours PRN  levothyroxine 75 MICROGram(s) Oral daily  metoprolol succinate ER 50 milliGRAM(s) Oral daily  ondansetron Injectable 4 milliGRAM(s) IV Push every 6 hours PRN  pantoprazole    Tablet 40 milliGRAM(s) Oral before breakfast    11/13/19 Ucx (office): 25-50K Staph aureus sen nitrofurantoin, tetracyclines, bactrim, vanco, zyvox. Resistant to PCN, cipro, PCN    ASSESSMENT & PLAN    1.        Thank you for allowing me to participate in the care of this patient  Please call with questions or concerns    > *** minutes of spent in face to face time with patient. I explained the various issues and complexities regarding their current urologic condition(s) and treatmet options. The patient and/or family verbalized understanding and I answered all questions to their satisfaction.     Gage Solorzano MD    [facility]  [phone] UROLOGY FOLLOW UP NOTE    SUBJECTIVE    Patient seen and examined at bedside.  No acute events overnight.    Patient is afebrile and leukocytosis resolved    Current complaints are: weakness    Daughter is at bedside    Denies hematuria, difficulty urinating, flank pain, N/V, SOB    OBJECTIVE    T(C): 36.9 (11-17-19 @ 11:55), Max: 36.9 (11-17-19 @ 11:55)  HR: 81 (11-17-19 @ 11:55)  BP: 199/72 (11-17-19 @ 11:55)  RR: 17 (11-17-19 @ 11:55)  SpO2: 98% (11-17-19 @ 11:55)    Gen: elderly Female in NAD, well nourished  HEENT: normocephalic, hearing intact, moist mucus membranes  Chest: non labored breathing  ABd: soft, NT, ND, no masses  Back: no CVA tenderness  : no suprapubic distension or tenderness  Psych: normal affect and mood  Ext: moves all four extremities freely    11-17    141  |  112<H>  |  13  ----------------------------<  102<H>  3.8   |  21<L>  |  0.98    Ca    9.1      17 Nov 2019 07:25        CBC Full  -  ( 17 Nov 2019 07:25 )  WBC Count : 8.57 K/uL  Hemoglobin : 11.2 g/dL  Hematocrit : 35.9 %  Platelet Count - Automated : 295 K/uL  Mean Cell Volume : 86.7 fl  Mean Cell Hemoglobin : 27.1 pg  Mean Cell Hemoglobin Concentration : 31.2 gm/dL  Auto Neutrophil # : x  Auto Lymphocyte # : x  Auto Monocyte # : x  Auto Eosinophil # : x  Auto Basophil # : x  Auto Neutrophil % : x  Auto Lymphocyte % : x  Auto Monocyte % : x  Auto Eosinophil % : x  Auto Basophil % : x      acetaminophen   Tablet .. 650 milliGRAM(s) Oral every 4 hours PRN  ALPRAZolam 0.25 milliGRAM(s) Oral three times a day PRN  artificial  tears Solution 1 Drop(s) Both EYES three times a day  cholecalciferol 2000 Unit(s) Oral two times a day  enoxaparin Injectable 40 milliGRAM(s) SubCutaneous daily  hydrALAZINE 25 milliGRAM(s) Oral every 6 hours PRN  levothyroxine 75 MICROGram(s) Oral daily  metoprolol succinate ER 50 milliGRAM(s) Oral daily  ondansetron Injectable 4 milliGRAM(s) IV Push every 6 hours PRN  pantoprazole    Tablet 40 milliGRAM(s) Oral before breakfast    11/13/19 Ucx (office): 25-50K Staph aureus sen nitrofurantoin, tetracyclines, bactrim, vanco, zyvox. Resistant to PCN, cipro, PCN    11/14/19 Ucx ([facility]): 50-99K normal nelson    RADIOLOGY  11/15/19 CT A/P: left lateral bladder wall thickening, no adenopathy or evidence of metastatic disease    ASSESSMENT & PLAN    1. UTI  - Urine culture from ED non specific  - Discussed office cx results with Hospitalist Dr Bethea. He will change antibiotics based on ID recs  - d/c ceftriaxone and start nitrofurantoin for now    High risk bladder cancer  Bladder wall thickening  - Bladder wall thickening posisbly cancer recurrence vs post TUR changes. Recent office cystoscopy negative and due to advanced age i would not recommend biopsy/TUR. She was recommended to continue with BCG induction    F/u as outpatient    Thank you for allowing me to participate in the care of this patient  Please call with questions or concerns    > 25 minutes of spent in face to face time with patient. I explained the various issues and complexities regarding their current urologic condition(s) and treatment options. The patient and/or family verbalized understanding and I answered all questions to their satisfaction.     Gage Solorzano MD    [facility]  [phone]

## 2019-11-17 NOTE — PROGRESS NOTE ADULT - ASSESSMENT
89 F w/PMH hypothyroid, CAD, HTN, bladder ca (currently undergoing tx), recurrent UTI, presents c/o increased generalized weakness.  In ED, WBC 16, vitals stable, +UA, given rocephin IV.      #Acute cystitis without hematuria  - S/p IV rocephin day #4  - F/u ID consult about recurring infection or any prophylactic measures  - IVF    #Cough, afebrile with no leukocytosis  - Pt denies hx of lung issues, monitor    #Weakness  - Likely 2/2 to cystits  - PT evaluation for dispo after d/c  - Tx as above    #Bladder CA  - Undergoing treatment outpatient  - F/u urology consult  - ID and urology input noted  - continue IVF  - continue rocephin    #DVT prophylaxis  - Lovenox    patient seen and examined with PA student Anita Apodaca. I was physically present for the key portions of the evaluation and management (E/M) service provided.  I agree with the above history, physical, and plan which I have reviewed and edited where appropriate.  - case d/w daughter at bedside and plan to continue abx and possible dc in AM  - await PT eval - pt not sure about rehab 89 F w/PMH hypothyroid, CAD, HTN, bladder ca (currently undergoing tx), recurrent UTI, presents c/o increased generalized weakness.  In ED, WBC 16, vitals stable, +UA, given rocephin IV.      #Acute cystitis without hematuria  - S/p IV rocephin day #4  - F/u ID consult about recurring infection or any prophylactic measures  - IVF    #Cough, afebrile with no leukocytosis  - Pt denies hx of lung issues, monitor    #HTN urgency  - Began hydralazine 25 mg PO q6h  - Continue metoprolol    #Weakness  - Likely 2/2 to cystits  - PT evaluation for dispo after d/c  - Tx as above    #Bladder CA  - Undergoing treatment outpatient  - F/u urology consult  - ID and urology input noted  - continue IVF  - continue rocephin    #DVT prophylaxis  - Lovenox    patient seen and examined with PA student Anita Apodaca. I was physically present for the key portions of the evaluation and management (E/M) service provided.  I agree with the above history, physical, and plan which I have reviewed and edited where appropriate.  - case d/w daughter at bedside and plan to continue abx and possible dc in AM  - await PT eval - pt not sure about rehab 89 F w/PMH hypothyroid, CAD, HTN, bladder ca (currently undergoing tx), recurrent UTI, presents c/o increased generalized weakness.  In ED, WBC 16, vitals stable, +UA, given rocephin IV.      #Acute cystitis without hematuria  - S/p IV rocephin day #4  - F/u ID consult about recurring infection or any prophylactic measures  - IVF    #Cough, afebrile with no leukocytosis  - Pt denies hx of lung issues, monitor    #HTN urgency  - Began hydralazine 25 mg PO q6h  - Continue metoprolol    #Weakness  - Likely 2/2 to cystits  - PT evaluation for dispo after d/c  - Tx as above    #Bladder CA  - Undergoing treatment outpatient  - F/u urology consult  - ID and urology input noted  - continue IVF  - continue rocephin    #DVT prophylaxis  - Lovenox    patient seen and examined with PA student Anita Apodaca. I was physically present for the key portions of the evaluation and management (E/M) service provided.  I agree with the above history, physical, and plan which I have reviewed and edited where appropriate.  - case d/w daughter at bedside   - case d/w ID and urology - ucx with MRSA in dr redmond office - will start vanco today with plan to dc on doxy upon dc  - await PT eval - pt not sure about rehab 89 F w/PMH hypothyroid, CAD, HTN, bladder ca (currently undergoing tx), recurrent UTI, presents c/o increased generalized weakness.  In ED, WBC 16, vitals stable, +UA, given rocephin IV.      #Acute cystitis without hematuria  - S/p IV rocephin day #4 and now with ucx showing MRSA at Dr. Solorzano's office will change to iv vanco as per ID and dc on doxy tomorrow if stable    # constipation - senna and miralax    #Cough, afebrile with no leukocytosis  - Pt denies hx of lung issues, monitor    #HTN urgency - continue toprol xl. pts baseline around 160s as per daughter  - Began hydralazine 25 mg PO q6h prn with goal sbp < 160   - Continue metoprolol    #Weakness  - Likely 2/2 to cystits  - PT evaluation for dispo after d/c  - Tx as above    #Bladder CA  - Undergoing treatment outpatient  - F/u urology consult  - ID and urology input noted  - continue IVF  - continue rocephin    #DVT prophylaxis  - Lovenox    patient seen and examined with PA student Anita Apodaca. I was physically present for the key portions of the evaluation and management (E/M) service provided.  I agree with the above history, physical, and plan which I have reviewed and edited where appropriate.  - case d/w daughter at bedside   - case d/w ID and urology - ucx with MRSA in dr solorzano office - will start vanco today with plan to dc on doxy upon dc  - add hydralazine prn and optimize bp prior to dc   - await PT eval - pt not sure about rehab

## 2019-11-17 NOTE — PROGRESS NOTE ADULT - SUBJECTIVE AND OBJECTIVE BOX
HPI:  89 F w/PMH hypothyroid, CAD, HTN, bladder ca (currently undergoing tx), recurrent UTI, presents c/o increased generalized weakness.  Pt went for her 2nd bladder ca tx yesterday, found w/ UTI and started on macrobid, which she's been taking.  However, today, she was unable to ambulate d/t weakness (similar to prior UTI), called her  office and instructed to come to ED for tx.  She denies any fever/chills, n/v/d, abd pain, dysuria/freq/urg.  She had similar presentations and hospitalizations for UTI. In ED, WBC 16, vitals stable, +UA, given rocephin IV.      11/15: Pt was seen and examined with pt's daughter at bedside. She says she's feeling somewhat better but these UTIs recur every time the pt receives therapy for the bladder CA. Pt and daughter were inquiring about prophylactic measures since the pt has 5 more therapies. Pt is still experiencing weakness in LEs, difficulty walking and standing. Pt lives alone with no aides at home.  11/16: Pt seen and examined  11/17: Pt seen and examined, pt said she "felt sick" overnight and BP heriberto to 200s systolic. Pt said she feels feverish but has not spiked a fever. Pt c/o non-productive cough since last night. Pt denies chest pain, palpitations, HA    ROS: All systems reviewed and negative with the exception of above.    Vital Signs Last 24 Hrs  T(C): 36.6 (17 Nov 2019 07:31), Max: 36.7 (16 Nov 2019 11:12)  T(F): 97.9 (17 Nov 2019 07:31), Max: 98 (16 Nov 2019 11:12)  HR: 79 (17 Nov 2019 07:31) (74 - 84)  BP: 211/73 (17 Nov 2019 07:31) (149/50 - 211/73)  BP(mean): --  RR: 17 (17 Nov 2019 07:31) (17 - 18)  SpO2: 95% (17 Nov 2019 07:31) (95% - 99%)    General: Elderly female, NAD  Eyes: PERRLA, EOMI, no discharge  ENMT: Oral mucosa without exudate or lesions  Neck: Supple, no JVD, no lymphadenopathy  Respiratory: Clear to auscultation bilaterally, no wheezes, rales, or rhonchi  Cardiovascular: +S1, +S2, RRR, no murmurs, rubs, or gallops  GI: Abdomen soft, no guarding, +BS, no pain upon palpation   Extremities: No clubbing, cyanosis  Neurological: AOx4, right LE 4/5 strength, left LE 4/5 strength  Skin: Warm, no lesions, rashes, or scars  MSK: No muscle or joint tenderness    Labs:                        11.2   8.57  )-----------( 295      ( 17 Nov 2019 07:25 )             35.9     11-17    141  |  112<H>  |  13  ----------------------------<  102<H>  3.8   |  21<L>  |  0.98    Ca    9.1      17 Nov 2019 07:25    Lactate: 1.9 (14-Nov-2019 20:19)    < from: CT Abdomen and Pelvis w/ IV Cont (11.15.19 @ 10:04) >  IMPRESSION:   Wall thickening along the left side of the bladder in this patient with   reported bladder carcinoma. No gross evidence for metastatic disease.  Biliary dilatation which may be postsurgical. Additional findings as   above.  < end of copied text > HPI:  89 F w/PMH hypothyroid, CAD, HTN, bladder ca (currently undergoing tx), recurrent UTI, presents c/o increased generalized weakness.  Pt went for her 2nd bladder ca tx yesterday, found w/ UTI and started on macrobid, which she's been taking.  However, today, she was unable to ambulate d/t weakness (similar to prior UTI), called her  office and instructed to come to ED for tx.  She denies any fever/chills, n/v/d, abd pain, dysuria/freq/urg.  She had similar presentations and hospitalizations for UTI. In ED, WBC 16, vitals stable, +UA, given rocephin IV.      11/15: Pt was seen and examined with pt's daughter at bedside. She says she's feeling somewhat better but these UTIs recur every time the pt receives therapy for the bladder CA. Pt and daughter were inquiring about prophylactic measures since the pt has 5 more therapies. Pt is still experiencing weakness in LEs, difficulty walking and standing. Pt lives alone with no aides at home.  11/16: Pt seen and examined  11/17: Pt seen and examined, pt said she "felt sick" overnight and BP heriberto to 200s systolic. Given metoprolol by nurses, BP did not improve. Pt said she feels feverish but has not spiked a fever. Pt c/o non-productive cough since last night. Pt denies chest pain, palpitations, HA    ROS: All systems reviewed and negative with the exception of above.    Vital Signs Last 24 Hrs  T(C): 36.6 (17 Nov 2019 07:31), Max: 36.7 (16 Nov 2019 11:12)  T(F): 97.9 (17 Nov 2019 07:31), Max: 98 (16 Nov 2019 11:12)  HR: 79 (17 Nov 2019 07:31) (74 - 84)  BP: 211/73 (17 Nov 2019 07:31) (149/50 - 211/73)  BP(mean): --  RR: 17 (17 Nov 2019 07:31) (17 - 18)  SpO2: 95% (17 Nov 2019 07:31) (95% - 99%)    General: Elderly female, NAD  Eyes: PERRLA, EOMI, no discharge  ENMT: Oral mucosa without exudate or lesions  Neck: Supple, no JVD, no lymphadenopathy  Respiratory: Clear to auscultation bilaterally, no wheezes, rales, or rhonchi  Cardiovascular: +S1, +S2, RRR, no murmurs, rubs, or gallops  GI: Abdomen soft, no guarding, +BS, no pain upon palpation   Extremities: No clubbing, cyanosis  Neurological: AOx4, right LE 4/5 strength, left LE 4/5 strength  Skin: Warm, no lesions, rashes, or scars  MSK: No muscle or joint tenderness    Labs:                        11.2   8.57  )-----------( 295      ( 17 Nov 2019 07:25 )             35.9     11-17    141  |  112<H>  |  13  ----------------------------<  102<H>  3.8   |  21<L>  |  0.98    Ca    9.1      17 Nov 2019 07:25    Lactate: 1.9 (14-Nov-2019 20:19)    < from: CT Abdomen and Pelvis w/ IV Cont (11.15.19 @ 10:04) >  IMPRESSION:   Wall thickening along the left side of the bladder in this patient with   reported bladder carcinoma. No gross evidence for metastatic disease.  Biliary dilatation which may be postsurgical. Additional findings as   above.  < end of copied text >

## 2019-11-17 NOTE — PROGRESS NOTE ADULT - SUBJECTIVE AND OBJECTIVE BOX
Date of service: 19 @ 14:48    Has suprapubic tenderness  Has urinary frequency  Dr. Solorzano reported that recent urine culture collected in office is showing MRSA    ROS: no fever or chills; denies dizziness, no HA, no SOB or cough, no abdominal pain, no diarrhea or constipation; no legs pain, no rashes    MEDICATIONS  (STANDING):  artificial  tears Solution 1 Drop(s) Both EYES three times a day  cholecalciferol 2000 Unit(s) Oral two times a day  enoxaparin Injectable 40 milliGRAM(s) SubCutaneous daily  levothyroxine 75 MICROGram(s) Oral daily  metoprolol succinate ER 50 milliGRAM(s) Oral daily  pantoprazole    Tablet 40 milliGRAM(s) Oral before breakfast  polyethylene glycol 3350 17 Gram(s) Oral two times a day  senna 2 Tablet(s) Oral at bedtime  vancomycin  IVPB 750 milliGRAM(s) IV Intermittent every 12 hours      Vital Signs Last 24 Hrs  T(C): 36.4 (2019 13:43), Max: 36.9 (2019 11:55)  T(F): 97.6 (2019 13:43), Max: 98.4 (2019 11:55)  HR: 85 (2019 13:43) (74 - 85)  BP: 165/49 (2019 13:43) (149/50 - 211/73)  BP(mean): --  RR: 17 (2019 13:43) (17 - 18)  SpO2: 98% (2019 13:43) (95% - 99%)    Physical Exam:      Constitutional: frail looking  HEENT: NC/AT, EOMI, PERRLA, conjunctivae clear  Neck: supple; thyroid not palpable  Back: no tenderness  Respiratory: respiratory effort normal; clear to auscultation  Cardiovascular: S1S2 regular, no murmurs  Abdomen: soft, not tender, not distended, positive BS  Genitourinary: mild suprapubic tenderness  Musculoskeletal: no muscle tenderness, no joint swelling or tenderness  Neurological/ Psychiatric: AxOx3, moving all extremities  Skin: no rashes; no palpable lesions    Labs: reviewed                        11.2   8.57  )-----------( 295      ( 2019 07:25 )             35.9     11-17    141  |  112<H>  |  13  ----------------------------<  102<H>  3.8   |  21<L>  |  0.98    Ca    9.1      2019 07:25                                  11.7   15.96 )-----------( 283      ( 15 Nov 2019 07:46 )             37.9     11-14    139  |  108  |  20  ----------------------------<  123<H>  3.8   |  23  |  1.00    Ca    9.0      2019 18:34    TPro  7.2  /  Alb  3.5  /  TBili  0.6  /  DBili  x   /  AST  19  /  ALT  20  /  AlkPhos  76  11-14     LIVER FUNCTIONS - ( 2019 18:34 )  Alb: 3.5 g/dL / Pro: 7.2 gm/dL / ALK PHOS: 76 U/L / ALT: 20 U/L / AST: 19 U/L / GGT: x           Urinalysis Basic - ( 2019 18:30 )    Color: Yellow / Appearance: Clear / S.010 / pH: x  Gluc: x / Ketone: Negative  / Bili: Negative / Urobili: Negative mg/dL   Blood: x / Protein: 30 mg/dL / Nitrite: Negative   Leuk Esterase: Small / RBC: 0-2 /HPF / WBC 11-25   Sq Epi: x / Non Sq Epi: Few / Bacteria: Moderate      Culture - Blood (collected 2019 20:14)  Source: .Blood None  Preliminary Report (2019 04:00):    No growth to date.    Culture - Blood (collected 2019 18:34)  Source: .Blood Blood-Peripheral  Preliminary Report (2019 01:01):    No growth to date.    Culture - Urine (collected 2019 18:30)  Source: .Urine Clean Catch (Midstream)  Final Report (2019 10:10):    50,000 - 99,000 CFU/mL Normal Urogenital nelson present    Outpatient urine c/s: MRSA    < from: CT Abdomen and Pelvis w/ IV Cont (11.15.19 @ 10:04) >    EXAM:  CT ABDOMEN AND PELVIS IC                          Wall thickening along the left side of the bladder in this patient with   reported bladder carcinoma. No gross evidence for metastatic disease.  Biliary dilatation which may be postsurgical. Additional findings as   above.    < end of copied text >    Radiology: all available radiological tests reviewed    Advanced directives addressed: full resuscitation

## 2019-11-18 ENCOUNTER — TRANSCRIPTION ENCOUNTER (OUTPATIENT)
Age: 84
End: 2019-11-18

## 2019-11-18 RX ORDER — ISOSORBIDE MONONITRATE 60 MG/1
30 TABLET, EXTENDED RELEASE ORAL DAILY
Refills: 0 | Status: DISCONTINUED | OUTPATIENT
Start: 2019-11-18 | End: 2019-11-20

## 2019-11-18 RX ORDER — METOPROLOL TARTRATE 50 MG
25 TABLET ORAL ONCE
Refills: 0 | Status: COMPLETED | OUTPATIENT
Start: 2019-11-18 | End: 2019-11-18

## 2019-11-18 RX ADMIN — ISOSORBIDE MONONITRATE 60 MILLIGRAM(S): 60 TABLET, EXTENDED RELEASE ORAL at 11:01

## 2019-11-18 RX ADMIN — Medication 0.25 MILLIGRAM(S): at 12:52

## 2019-11-18 RX ADMIN — Medication 650 MILLIGRAM(S): at 18:41

## 2019-11-18 RX ADMIN — Medication 650 MILLIGRAM(S): at 08:38

## 2019-11-18 RX ADMIN — Medication 650 MILLIGRAM(S): at 01:46

## 2019-11-18 RX ADMIN — Medication 2000 UNIT(S): at 17:50

## 2019-11-18 RX ADMIN — Medication 650 MILLIGRAM(S): at 09:00

## 2019-11-18 RX ADMIN — Medication 2000 UNIT(S): at 05:14

## 2019-11-18 RX ADMIN — Medication 50 MILLIGRAM(S): at 05:14

## 2019-11-18 RX ADMIN — PANTOPRAZOLE SODIUM 40 MILLIGRAM(S): 20 TABLET, DELAYED RELEASE ORAL at 05:14

## 2019-11-18 RX ADMIN — Medication 25 MILLIGRAM(S): at 05:14

## 2019-11-18 RX ADMIN — Medication 250 MILLIGRAM(S): at 05:15

## 2019-11-18 RX ADMIN — ENOXAPARIN SODIUM 40 MILLIGRAM(S): 100 INJECTION SUBCUTANEOUS at 11:04

## 2019-11-18 RX ADMIN — Medication 1 DROP(S): at 05:14

## 2019-11-18 RX ADMIN — POLYETHYLENE GLYCOL 3350 17 GRAM(S): 17 POWDER, FOR SOLUTION ORAL at 17:50

## 2019-11-18 RX ADMIN — Medication 650 MILLIGRAM(S): at 19:08

## 2019-11-18 RX ADMIN — Medication 75 MICROGRAM(S): at 05:14

## 2019-11-18 RX ADMIN — Medication 650 MILLIGRAM(S): at 01:14

## 2019-11-18 RX ADMIN — Medication 1 DROP(S): at 21:41

## 2019-11-18 RX ADMIN — Medication 250 MILLIGRAM(S): at 17:51

## 2019-11-18 RX ADMIN — Medication 25 MILLIGRAM(S): at 11:05

## 2019-11-18 NOTE — PROGRESS NOTE ADULT - ASSESSMENT
89 F w/PMH hypothyroid, CAD, HTN, bladder ca (currently undergoing tx), recurrent UTI, presents c/o increased generalized weakness.  In ED, WBC 16, vitals stable, +UA, given rocephin IV.      #Acute cystitis without hematuria  - S/p IV rocephin day #4 and now with ucx showing MRSA at Dr. Solorzano's office  - Case d/w Dr. Ball. Continue IV vanco today and tomorrow, plan for d/c tomorrow on 4 more days of doxycycline    #Constipation   - senna and miralax    #Cough, afebrile with no leukocytosis  - Pt denies hx of lung issues, monitor    #HTN urgency - continue toprol xl. pts baseline around 160s as per daughter  - Began Imdur 60 mg PO, once daily  - Continue metoprolol    #Weakness  - Likely 2/2 to cystits  - PT initial evaluation: pt refusing POOL, PT recommended d/c home with home PT and commode, rolling walker  - Tx as above    #Bladder CA  - continue IVF  - Undergoing treatment outpatient  - F/u urology consult  - ID and urology input noted    #DVT prophylaxis  - Lovenox    patient seen and examined with PA student Anita Apodaca. I was physically present for the key portions of the evaluation and management (E/M) service provided.  I agree with the above history, physical, and plan which I have reviewed and edited where appropriate.  - case d/w daughter at bedside   - case d/w ID and urology - ucx with MRSA in dr solorzano office - will start vanco today with plan to dc on doxy upon dc  - add hydralazine prn and optimize bp prior to dc   - await PT eval - pt not sure about rehab 89 F w/PMH hypothyroid, CAD, HTN, bladder ca (currently undergoing tx), recurrent UTI, presents c/o increased generalized weakness.  In ED, WBC 16, vitals stable, +UA, given rocephin IV.      #Acute cystitis without hematuria  - S/p IV rocephin day #4 and now with ucx showing MRSA at Dr. Solorzano's office  - Case d/w Dr. Ball. Continue IV vanco today and tomorrow, plan for d/c tomorrow on 4 more days of doxycycline    #Constipation   - senna and miralax    #Cough, afebrile with no leukocytosis  - Pt denies hx of lung issues, monitor    #HTN urgency - continue toprol xl. pts baseline around 160s as per daughter  - Began Imdur 60 mg PO, once daily  - Pt's SBP improved to 119, decrease to Imdur 30 mg PO once daily tomorrow  - Continue metoprolol    #Weakness  - Likely 2/2 to cystits  - PT initial evaluation: pt refusing POOL, PT recommended d/c home with home PT and commode, rolling walker  - Tx as above    #Bladder CA  - continue IVF  - Undergoing treatment outpatient  - F/u urology consult  - ID and urology input noted    #DVT prophylaxis  - Lovenox    patient seen and examined with PA student Anita Apodaca. I was physically present for the key portions of the evaluation and management (E/M) service provided.  I agree with the above history, physical, and plan which I have reviewed and edited where appropriate.  - case d/w daughter at bedside   - case d/w ID and urology - ucx with MRSA in dr solorzano office - will start vanco today with plan to dc on doxy upon dc  - add hydralazine prn and optimize bp prior to dc   - await PT eval - pt not sure about rehab 89 F w/PMH hypothyroid, CAD, HTN, bladder ca (currently undergoing tx), recurrent UTI, presents c/o increased generalized weakness.  In ED, WBC 16, vitals stable, +UA, given rocephin IV.      #Acute cystitis without hematuria  - S/p IV rocephin day #4 and now with ucx showing MRSA at Dr. Redmond's office  - Case d/w Dr. Ball. Continue IV vanco today and tomorrow, plan for d/c tomorrow on 4 more days of doxycycline    #Constipation   - senna and miralax    #Cough, afebrile with no leukocytosis  - Pt denies hx of lung issues, monitor    #HTN - improved with imdur.  given multiple allergies will dc on imdur 30 mg and toprol XL which can be titrated to 75 mg if needed  - given hx of orthostasis -160s acceptable as per family d/w dr perez  - Began Imdur 60 mg PO X 1 today and will decrease to 30 mg daily     #Weakness  - Likely 2/2 to cystits  - PT initial evaluation: pt refusing POOL, PT recommended d/c home with home PT and commode, rolling walker  - Tx as above    #Bladder CA  - continue IVF  - Undergoing treatment outpatient  - F/u urology consult  - ID and urology input noted    #DVT prophylaxis  - Lovenox    patient seen and examined with PA student Anita Apodaca. I was physically present for the key portions of the evaluation and management (E/M) service provided.  I agree with the above history, physical, and plan which I have reviewed and edited where appropriate.  - pt weak and plan for POOL  - case d/w ID and urology - ucx with MRSA in dr redmond office - vanco for one more day then doxy for 4 days  - imdur for BP since pt has multiple allergies. continue toprol xl   - await PT eval - POOL strongly recommended

## 2019-11-18 NOTE — PROGRESS NOTE ADULT - SUBJECTIVE AND OBJECTIVE BOX
Date of service: 11-18-19 @ 14:42      Patient complains of weakness; not getting out of bed  Afebrile      ROS: unable to obtain secondary to patient medical condition     MEDICATIONS  (STANDING):  artificial  tears Solution 1 Drop(s) Both EYES three times a day  cholecalciferol 2000 Unit(s) Oral two times a day  enoxaparin Injectable 40 milliGRAM(s) SubCutaneous daily  isosorbide   mononitrate ER Tablet (IMDUR) 60 milliGRAM(s) Oral daily  levothyroxine 75 MICROGram(s) Oral daily  metoprolol succinate ER 50 milliGRAM(s) Oral daily  pantoprazole    Tablet 40 milliGRAM(s) Oral before breakfast  polyethylene glycol 3350 17 Gram(s) Oral two times a day  senna 2 Tablet(s) Oral at bedtime  vancomycin  IVPB 750 milliGRAM(s) IV Intermittent every 12 hours    MEDICATIONS  (PRN):  acetaminophen   Tablet .. 650 milliGRAM(s) Oral every 4 hours PRN Temp greater or equal to 38C (100.4F), Mild Pain (1 - 3)  ALPRAZolam 0.25 milliGRAM(s) Oral three times a day PRN ANXIETY  ondansetron Injectable 4 milliGRAM(s) IV Push every 6 hours PRN Nausea      Vital Signs Last 24 Hrs  T(C): 36.6 (18 Nov 2019 11:02), Max: 36.8 (17 Nov 2019 21:03)  T(F): 97.8 (18 Nov 2019 11:02), Max: 98.3 (17 Nov 2019 21:03)  HR: 76 (18 Nov 2019 11:02) (62 - 81)  BP: 157/60 (18 Nov 2019 11:02) (157/60 - 191/67)  BP(mean): --  RR: 17 (18 Nov 2019 11:02) (17 - 19)  SpO2: 98% (18 Nov 2019 11:02) (98% - 99%)    Physical Exam:        Constitutional: frail looking  HEENT: NC/AT, EOMI, PERRLA, conjunctivae clear  Neck: supple; thyroid not palpable  Back: no tenderness  Respiratory: respiratory effort normal; clear to auscultation  Cardiovascular: S1S2 regular, no murmurs  Abdomen: soft, not tender, not distended, positive BS  Genitourinary: mild suprapubic tenderness  Musculoskeletal: no muscle tenderness, no joint swelling or tenderness  Neurological/ Psychiatric: AxOx3, moving all extremities  Skin: no rashes; no palpable lesions    Labs: reviewed                       Labs:                        11.2   8.57  )-----------( 295      ( 17 Nov 2019 07:25 )             35.9     11-17    141  |  112<H>  |  13  ----------------------------<  102<H>  3.8   |  21<L>  |  0.98    Ca    9.1      17 Nov 2019 07:25             Cultures:       Culture - Blood (collected 11-14-19 @ 20:14)  Source: .Blood None  Preliminary Report (11-16-19 @ 04:00):    No growth to date.    Culture - Blood (collected 11-14-19 @ 18:34)  Source: .Blood Blood-Peripheral  Preliminary Report (11-16-19 @ 01:01):    No growth to date.    Culture - Urine (collected 11-14-19 @ 18:30)  Source: .Urine Clean Catch (Midstream)  Final Report (11-16-19 @ 10:10):    50,000 - 99,000 CFU/mL Normal Urogenital nelson present            Outpatient urine c/s: MRSA    < from: CT Abdomen and Pelvis w/ IV Cont (11.15.19 @ 10:04) >    EXAM:  CT ABDOMEN AND PELVIS IC                          Wall thickening along the left side of the bladder in this patient with   reported bladder carcinoma. No gross evidence for metastatic disease.  Biliary dilatation which may be postsurgical. Additional findings as   above.    < end of copied text >    Radiology: all available radiological tests reviewed    Advanced directives addressed: full resuscitation

## 2019-11-18 NOTE — DISCHARGE NOTE PROVIDER - HOSPITAL COURSE
89 F w/PMH hypothyroid, CAD, HTN, bladder ca (currently undergoing tx), recurrent UTI, presents c/o increased generalized weakness.  Pt went for her 2nd bladder ca tx yesterday, found w/ UTI and started on macrobid, which she's been taking.  However, today, she was unable to ambulate d/t weakness (similar to prior UTI), called her  office and instructed to come to ED for tx.  She denies any fever/chills, n/v/d, abd pain, dysuria/freq/urg.  She had similar presentations and hospitalizations for UTI. In ED, WBC 16, vitals stable, +UA, given rocephin IV. ID consult started the pt on Ceftriaxone, after cultures returned from pt's outpatient urologist as MRSA, switched to IV  vancomycin. Pt is to be d/c on doxycycline. Pt's Urologist saw the pt in the hospital, who informed the team of the culture results and determined that the patient should follow up with urology outpatient in regards to her bladder CA, current recommendation is to continue BCG inductions.        #Acute cystitis without hematuria    - S/p IV rocephin day #4 and vancomycin day #2,and now with ucx showing MRSA at Dr. Solorzano's office. D/c on doxycycline        # constipation     - senna and miralax        #Cough, afebrile with no leukocytosis    - Pt denies hx of lung issues, monitor        #HTN urgency - continue toprol xl. pts baseline around 160s as per daughter    - Began hydralazine 25 mg PO q6h prn with goal sbp < 160     - Continue metoprolol        #Weakness    - Likely 2/2 to cystits    - PT evaluation for dispo after d/c    - Tx as above        #Bladder CA    - Undergoing treatment outpatient    - F/u urology consult 89 F w/PMH hypothyroid, CAD, HTN, bladder ca (currently undergoing tx), recurrent UTI, presents c/o increased generalized weakness.  Pt went for her 2nd bladder ca tx yesterday, found w/ UTI and started on macrobid, which she's been taking.  However, today, she was unable to ambulate d/t weakness (similar to prior UTI), called her  office and instructed to come to ED for tx.  She denies any fever/chills, n/v/d, abd pain, dysuria/freq/urg.  She had similar presentations and hospitalizations for UTI. In ED, WBC 16, vitals stable, +UA, given rocephin IV. ID consult started the pt on Ceftriaxone, after cultures returned from pt's outpatient urologist as MRSA, switched to IV  vancomycin. Pt is to be d/c on doxycycline. Pt's Urologist saw the pt in the hospital, who informed the team of the culture results and determined that the patient should follow up with urology outpatient in regards to her bladder CA, current recommendation is to continue BCG inductions.        #Acute cystitis without hematuria    - S/p IV rocephin day #4     - S/p vancomycin day #3,and now with ucx showing MRSA at Dr. Solorzano's office. D/c on doxycycline for 4 more days        # constipation     - senna and miralax        #Cough, afebrile with no leukocytosis    - Pt denies hx of lung issues, monitor        #HTN urgency - continue toprol xl. pts baseline around 160s as per daughter    - Began hydralazine 25 mg PO q6h prn with goal sbp < 160     - Continue metoprolol        #Weakness    - Likely 2/2 to cystits    - PT evaluation for dispo after d/c    - Tx as above        #Bladder CA    - Undergoing treatment outpatient    - F/u urology consult 89 F w/PMH hypothyroid, CAD, HTN, bladder ca (currently undergoing tx), recurrent UTI, presents c/o increased generalized weakness.  Pt went for her 2nd bladder ca tx yesterday, found w/ UTI and started on macrobid, which she's been taking.  However, today, she was unable to ambulate d/t weakness (similar to prior UTI), called her  office and instructed to come to ED for tx.  She denies any fever/chills, n/v/d, abd pain, dysuria/freq/urg.  She had similar presentations and hospitalizations for UTI. In ED, WBC 16, vitals stable, +UA, given rocephin IV. ID consult started the pt on Ceftriaxone, after cultures returned from pt's outpatient urologist as MRSA, switched to IV  vancomycin. Pt is to be d/c on doxycycline. Pt's Urologist saw the pt in the hospital, who informed the team of the culture results and determined that the patient should follow up with urology outpatient in regards to her bladder CA, current recommendation is to continue BCG inductions.        89 F w/PMH hypothyroid, CAD, HTN, bladder ca (currently undergoing tx), recurrent UTI, presents c/o increased generalized weakness.  In ED, WBC 16, vitals stable, +UA, given rocephin IV.          #Acute cystitis without hematuria    - S/p IV rocephin day #5 and now with ucx showing MRSA at Dr. Solorzano's office    -  Continue IV vanco , plan for d/c tomorrow on 4 more days of doxycycline (had 1st dose this morning)    - ID consult appreciated        #Orthostatic hypotension    - given IV fluid bolus. monitor orthostatic vitals. pt asymptomatic. take     -midodrine prn        #Constipation     - senna and miralax        #Cough, afebrile with no leukocytosis    - Pt denies hx of lung issues, monitor        #HTN - improved with imdur.  toprol XL which     - given hx of orthostasis -160s acceptable as per family d/w dr perez    - Began Imdur 60 mg PO X 1 and decreased to 30 mg daily         #Weakness    - Likely 2/2 to cystits    - PT initial evaluation: pt refusing POOL. PT recommended d/c home with home PT and commode, rolling walker    - Tx as above        #Bladder CA    - continue IVF    - Undergoing treatment outpatient    - F/u urology consult    - ID and urology input noted        Dispo: discharge to home w/ home care in stable condition        Final diagnosis, treatment plan, and follow-up recommendations were discussed and explained to the patient. The patient was given an opportunity to ask questions concerning the diagnosis and treatment plan. The patient acknowledged understanding of the diagnosis, treatment, and follow-up recommendations. The patient was advised to seek urgent care upon discharge if worsening symptoms develop prior to scheduled follow-up. Time spent on discharge included time with the patient, and also coordinating discharge care as outlined below.        Total time spent: 50 min

## 2019-11-18 NOTE — DISCHARGE NOTE PROVIDER - CARE PROVIDER_API CALL
Mayra Sánchez)  Cardiovascular Disease; Internal Medicine  [address] [place] [address]  Phone: [phone]  Fax: [phone]  Follow Up Time:     Gage Solorzano)  Urology  [address] [place] [address]  Phone: [phone]  Fax: [phone]  Follow Up Time:

## 2019-11-18 NOTE — PROGRESS NOTE ADULT - ASSESSMENT
89 F w/PMH hypothyroid, CAD, HTN, bladder ca (currently undergoing tx), recurrent UTI, endometrial adenocarcinoma, hyperlipidemia, hypertension, gerd, s/p EMILY and cholecystectomy admitted on 11/14 for evaluation of increased weakness, burning with urination after recent chemotherapy for recurrence of her bladder cancer; the patient notes mild suprapubic pain as well. No other specific complaints.     1. Cystitis. Likely UTI with MRSA. Bladder Ca s/p chemotherapy. Immunocompromised host.  -outpatient urine c/s: MRSA per Josafat Solorzano  -leukocytosis resolving  -cultures reviewed  -on ceftriaxone 1 gm IV qd # 3  -tolerating abx well so far; no side effects noted  -cultures reviewed  -day #2 vancomycin 750 mg IV q12h  -reason for abx use and side effects reviewed with patient; monitor BMP and vancomycin trough levels   - serial cbc and monitor temperature   -continue abx coverage  -monitor temps  -f/u CBC  -supportive care  - expect another 24 hours of vancomycin then discharge home on 4 days doxycycline 100 mg po q 12 hours  2. Other issues:   -care per medicine

## 2019-11-18 NOTE — DISCHARGE NOTE PROVIDER - NSDCCPCAREPLAN_GEN_ALL_CORE_FT
PRINCIPAL DISCHARGE DIAGNOSIS  Diagnosis: UTI (urinary tract infection)  Assessment and Plan of Treatment:       SECONDARY DISCHARGE DIAGNOSES  Diagnosis: Weakness  Assessment and Plan of Treatment: PRINCIPAL DISCHARGE DIAGNOSIS  Diagnosis: UTI (urinary tract infection)  Assessment and Plan of Treatment: MRSA infection treating with antibiotic.      SECONDARY DISCHARGE DIAGNOSES  Diagnosis: Hypertensive urgency  Assessment and Plan of Treatment: on metoprolol, added imdur    Diagnosis: Weakness  Assessment and Plan of Treatment: Physical therapy ordered    Diagnosis: Orthostatic hypotension  Assessment and Plan of Treatment: has history of it. monitor closely. currently asymptomatic. take midodrine as need if symptomatic. f/u with Dr. Sánchez

## 2019-11-18 NOTE — PROGRESS NOTE ADULT - SUBJECTIVE AND OBJECTIVE BOX
HPI:  89 F w/PMH hypothyroid, CAD, HTN, bladder ca (currently undergoing tx), recurrent UTI, presents c/o increased generalized weakness.  Pt went for her 2nd bladder ca tx yesterday, found w/ UTI and started on macrobid, which she's been taking.  However, today, she was unable to ambulate d/t weakness (similar to prior UTI), called her  office and instructed to come to ED for tx.  She denies any fever/chills, n/v/d, abd pain, dysuria/freq/urg.  She had similar presentations and hospitalizations for UTI. In ED, WBC 16, vitals stable, +UA, given rocephin IV.      11/15: Pt was seen and examined with pt's daughter at bedside. She says she's feeling somewhat better but these UTIs recur every time the pt receives therapy for the bladder CA. Pt and daughter were inquiring about prophylactic measures since the pt has 5 more therapies. Pt is still experiencing weakness in LEs, difficulty walking and standing. Pt lives alone with no aides at home.  11/16: Pt seen and examined  11/17: Pt seen and examined, pt said she "felt sick" overnight and BP heriberto to 200s systolic. Given metoprolol by nurses, BP did not improve. Pt said she feels feverish but has not spiked a fever. Pt c/o non-productive cough since last night. Pt denies chest pain, palpitations, HA  11/18: Pt seen and examined, pt still had high blood pressure overnight in 180s-190s. Pt is still c/o cough, denies chest pain and HA    ROS: All systems reviewed and negative with the exception of above.    Vital Signs Last 24 Hrs  T(C): 36.6 (18 Nov 2019 11:02), Max: 36.8 (17 Nov 2019 21:03)  T(F): 97.8 (18 Nov 2019 11:02), Max: 98.3 (17 Nov 2019 21:03)  HR: 76 (18 Nov 2019 11:02) (62 - 81)  BP: 157/60 (18 Nov 2019 11:02) (157/60 - 191/67)  BP(mean): --  RR: 17 (18 Nov 2019 11:02) (17 - 19)  SpO2: 98% (18 Nov 2019 11:02) (98% - 99%)    General: Elderly female, NAD  Eyes: PERRLA, EOMI, no discharge  ENMT: Oral mucosa without exudate or lesions  Neck: Supple, no JVD, no lymphadenopathy  Respiratory: Clear to auscultation bilaterally, no wheezes, rales, or rhonchi  Cardiovascular: +S1, +S2, RRR, no murmurs, rubs, or gallops  GI: Abdomen soft, no guarding, +BS, no pain upon palpation   Extremities: No clubbing, cyanosis  Neurological: AOx4, right LE 4/5 strength, left LE 4/5 strength  Skin: Warm, no lesions, rashes, or scars  MSK: No muscle or joint tenderness    Labs:                        11.2   8.57  )-----------( 295      ( 17 Nov 2019 07:25 )             35.9     11-17    141  |  112<H>  |  13  ----------------------------<  102<H>  3.8   |  21<L>  |  0.98    Ca    9.1      17 Nov 2019 07:25    Lactate: 1.9 (14-Nov-2019 20:19)    < from: CT Abdomen and Pelvis w/ IV Cont (11.15.19 @ 10:04) >  IMPRESSION:   Wall thickening along the left side of the bladder in this patient with   reported bladder carcinoma. No gross evidence for metastatic disease.  Biliary dilatation which may be postsurgical. Additional findings as   above.  < end of copied text >

## 2019-11-18 NOTE — DISCHARGE NOTE PROVIDER - NSDCMRMEDTOKEN_GEN_ALL_CORE_FT
acetaminophen 500 mg oral tablet: 2 tab(s) orally every 6 hours, As Needed - for moderate pain  Macrobid 100 mg oral capsule: 1 cap(s) orally 2 times a day  ** course not completed  metoprolol succinate 50 mg oral tablet, extended release: 1 tab(s) orally once a day  pantoprazole 40 mg oral delayed release tablet: 1 tab(s) orally once a day  Refresh ophthalmic solution: 1 drop(s) to each affected eye 3 times a day  Synthroid 75 mcg (0.075 mg) oral tablet: 1 tab(s) orally once a day  Vitamin D3 1000 intl units oral tablet: 4000 units orally once a day  Xanax 0.25 mg oral tablet: 1 tab(s) orally 3 times a day, As Needed for anxiety acetaminophen 500 mg oral tablet: 2 tab(s) orally every 6 hours, As Needed - for moderate pain  doxycycline monohydrate 100 mg oral capsule: 1 cap(s) orally every 12 hours  isosorbide mononitrate 30 mg oral tablet, extended release: 1 tab(s) orally once a day  metoprolol succinate 50 mg oral tablet, extended release: 1 tab(s) orally once a day  midodrine 2.5 mg oral tablet: 1 tab(s) orally 2 times a day, As Needed for orthostatic hypotension. do NOT take if systolic blood pressure &gt; 155  pantoprazole 40 mg oral delayed release tablet: 1 tab(s) orally once a day  Refresh ophthalmic solution: 1 drop(s) to each affected eye 3 times a day  Synthroid 75 mcg (0.075 mg) oral tablet: 1 tab(s) orally once a day  Vitamin D3 1000 intl units oral tablet: 4000 units orally once a day  Xanax 0.25 mg oral tablet: 1 tab(s) orally 3 times a day, As Needed for anxiety

## 2019-11-19 RX ORDER — SODIUM CHLORIDE 9 MG/ML
500 INJECTION INTRAMUSCULAR; INTRAVENOUS; SUBCUTANEOUS ONCE
Refills: 0 | Status: COMPLETED | OUTPATIENT
Start: 2019-11-19 | End: 2019-11-19

## 2019-11-19 RX ADMIN — Medication 650 MILLIGRAM(S): at 11:56

## 2019-11-19 RX ADMIN — Medication 650 MILLIGRAM(S): at 11:44

## 2019-11-19 RX ADMIN — Medication 1 DROP(S): at 05:27

## 2019-11-19 RX ADMIN — Medication 250 MILLIGRAM(S): at 05:27

## 2019-11-19 RX ADMIN — ENOXAPARIN SODIUM 40 MILLIGRAM(S): 100 INJECTION SUBCUTANEOUS at 10:46

## 2019-11-19 RX ADMIN — SODIUM CHLORIDE 500 MILLILITER(S): 9 INJECTION INTRAMUSCULAR; INTRAVENOUS; SUBCUTANEOUS at 10:45

## 2019-11-19 RX ADMIN — Medication 50 MILLIGRAM(S): at 04:40

## 2019-11-19 RX ADMIN — Medication 650 MILLIGRAM(S): at 04:55

## 2019-11-19 RX ADMIN — Medication 75 MICROGRAM(S): at 04:40

## 2019-11-19 RX ADMIN — ISOSORBIDE MONONITRATE 30 MILLIGRAM(S): 60 TABLET, EXTENDED RELEASE ORAL at 10:46

## 2019-11-19 RX ADMIN — Medication 2000 UNIT(S): at 05:27

## 2019-11-19 RX ADMIN — SENNA PLUS 2 TABLET(S): 8.6 TABLET ORAL at 21:06

## 2019-11-19 RX ADMIN — Medication 650 MILLIGRAM(S): at 04:25

## 2019-11-19 RX ADMIN — Medication 250 MILLIGRAM(S): at 17:19

## 2019-11-19 RX ADMIN — Medication 2000 UNIT(S): at 17:20

## 2019-11-19 RX ADMIN — PANTOPRAZOLE SODIUM 40 MILLIGRAM(S): 20 TABLET, DELAYED RELEASE ORAL at 04:41

## 2019-11-19 RX ADMIN — Medication 1 DROP(S): at 10:58

## 2019-11-19 RX ADMIN — POLYETHYLENE GLYCOL 3350 17 GRAM(S): 17 POWDER, FOR SOLUTION ORAL at 17:20

## 2019-11-19 RX ADMIN — Medication 1 DROP(S): at 21:06

## 2019-11-19 RX ADMIN — POLYETHYLENE GLYCOL 3350 17 GRAM(S): 17 POWDER, FOR SOLUTION ORAL at 05:27

## 2019-11-19 RX ADMIN — Medication 650 MILLIGRAM(S): at 17:25

## 2019-11-19 RX ADMIN — Medication 650 MILLIGRAM(S): at 17:54

## 2019-11-19 NOTE — PROGRESS NOTE ADULT - ASSESSMENT
ASSESSMENT/PLAN:  89 F w/PMH hypothyroid, CAD, HTN, bladder ca (currently undergoing tx), recurrent UTI, presents c/o increased generalized weakness.  In ED, WBC 16, vitals stable, +UA, given rocephin IV.      #Acute cystitis without hematuria  - S/p IV rocephin day #5 and now with ucx showing MRSA at Dr. Solorzano's office  -  Continue IV vanco today, plan for d/c tomorrow on 4 more days of doxycycline  - ID consult appreciated    #Orthostatic hypotension  - given IV fluid bolus. monitor orthostatic vitals    #Constipation   - senna and miralax    #Cough, afebrile with no leukocytosis  - Pt denies hx of lung issues, monitor    #HTN - improved with imdur.  toprol XL which can be titrated to 75 mg if needed  - given hx of orthostasis -160s acceptable as per family d/w dr perez  - Began Imdur 60 mg PO X 1 today and will decrease to 30 mg daily     #Weakness  - Likely 2/2 to cystits  - PT initial evaluation: pt refusing POOL, PT recommended d/c home with home PT and commode, rolling walker  - Tx as above    #Bladder CA  - continue IVF  - Undergoing treatment outpatient  - F/u urology consult  - ID and urology input noted    #DVT prophylaxis  - Lovenox

## 2019-11-19 NOTE — PROGRESS NOTE ADULT - SUBJECTIVE AND OBJECTIVE BOX
Date of service: 11-19-19 @ 15:49      Patient sitting in chair; weakness continues  Physical therapy to start with patient      ROS unable to obtain secondary to patient medical condition     MEDICATIONS  (STANDING):  artificial  tears Solution 1 Drop(s) Both EYES three times a day  cholecalciferol 2000 Unit(s) Oral two times a day  enoxaparin Injectable 40 milliGRAM(s) SubCutaneous daily  isosorbide   mononitrate ER Tablet (IMDUR) 30 milliGRAM(s) Oral daily  levothyroxine 75 MICROGram(s) Oral daily  metoprolol succinate ER 50 milliGRAM(s) Oral daily  pantoprazole    Tablet 40 milliGRAM(s) Oral before breakfast  polyethylene glycol 3350 17 Gram(s) Oral two times a day  senna 2 Tablet(s) Oral at bedtime  vancomycin  IVPB 750 milliGRAM(s) IV Intermittent every 12 hours    MEDICATIONS  (PRN):  acetaminophen   Tablet .. 650 milliGRAM(s) Oral every 4 hours PRN Temp greater or equal to 38C (100.4F), Mild Pain (1 - 3)  ALPRAZolam 0.25 milliGRAM(s) Oral three times a day PRN ANXIETY  ondansetron Injectable 4 milliGRAM(s) IV Push every 6 hours PRN Nausea      Vital Signs Last 24 Hrs  T(C): 36.7 (19 Nov 2019 12:26), Max: 36.7 (19 Nov 2019 04:32)  T(F): 98 (19 Nov 2019 12:26), Max: 98 (19 Nov 2019 04:32)  HR: 81 (19 Nov 2019 04:32) (78 - 81)  BP: 164/62 (19 Nov 2019 04:32) (151/63 - 164/62)  BP(mean): --  RR: 17 (19 Nov 2019 12:26) (16 - 18)  SpO2: 97% (19 Nov 2019 12:26) (97% - 97%)    Physical Exam:        Constitutional: frail looking  HEENT: NC/AT, EOMI, PERRLA, conjunctivae clear  Neck: supple; thyroid not palpable  Back: no tenderness  Respiratory: respiratory effort normal; clear to auscultation  Cardiovascular: S1S2 regular, no murmurs  Abdomen: soft, not tender, not distended, positive BS  Genitourinary: mild suprapubic tenderness  Musculoskeletal: no muscle tenderness, no joint swelling or tenderness  Neurological/ Psychiatric: AxOx3, moving all extremities  Skin: no rashes; no palpable lesions    Labs: reviewed                       Labs:                        11.2   8.57  )-----------( 295      ( 17 Nov 2019 07:25 )             35.9     11-17    141  |  112<H>  |  13  ----------------------------<  102<H>  3.8   |  21<L>  |  0.98    Ca    9.1      17 Nov 2019 07:25             Cultures:       Culture - Blood (collected 11-14-19 @ 20:14)  Source: .Blood None  Preliminary Report (11-16-19 @ 04:00):    No growth to date.    Culture - Blood (collected 11-14-19 @ 18:34)  Source: .Blood Blood-Peripheral  Preliminary Report (11-16-19 @ 01:01):    No growth to date.    Culture - Urine (collected 11-14-19 @ 18:30)  Source: .Urine Clean Catch (Midstream)  Final Report (11-16-19 @ 10:10):    50,000 - 99,000 CFU/mL Normal Urogenital nelson present            Outpatient urine c/s: MRSA    < from: CT Abdomen and Pelvis w/ IV Cont (11.15.19 @ 10:04) >    EXAM:  CT ABDOMEN AND PELVIS IC                          Wall thickening along the left side of the bladder in this patient with   reported bladder carcinoma. No gross evidence for metastatic disease.  Biliary dilatation which may be postsurgical. Additional findings as   above.    < end of copied text >    Radiology: all available radiological tests reviewed    Advanced directives addressed: full resuscitation

## 2019-11-19 NOTE — PROGRESS NOTE ADULT - SUBJECTIVE AND OBJECTIVE BOX
HPI:  89 F w/PMH hypothyroid, CAD, HTN, bladder ca (currently undergoing tx), recurrent UTI, presents c/o increased generalized weakness.  Pt went for her 2nd bladder ca tx yesterday, found w/ UTI and started on macrobid, which she's been taking.  However, today, she was unable to ambulate d/t weakness (similar to prior UTI), called her  office and instructed to come to ED for tx.  She denies any fever/chills, n/v/d, abd pain, dysuria/freq/urg.  She had similar presentations and hospitalizations for UTI. In ED, WBC 16, vitals stable, +UA, given rocephin IV.      11/15: Pt was seen and examined with pt's daughter at bedside. She says she's feeling somewhat better but these UTIs recur every time the pt receives therapy for the bladder CA. Pt and daughter were inquiring about prophylactic measures since the pt has 5 more therapies. Pt is still experiencing weakness in LEs, difficulty walking and standing. Pt lives alone with no aides at home.  11/16: Pt seen and examined  11/17: Pt seen and examined, pt said she "felt sick" overnight and BP heriberto to 200s systolic. Given metoprolol by nurses, BP did not improve. Pt said she feels feverish but has not spiked a fever. Pt c/o non-productive cough since last night. Pt denies chest pain, palpitations, HA  11/18: Pt seen and examined, pt still had high blood pressure overnight in 180s-190s. Pt is still c/o cough, denies chest pain and HA  11/19: *    ROS: All systems reviewed and negative with the exception of above.    Vital Signs Last 24 Hrs  T(C): 37.1 (20 Nov 2019 05:02), Max: 37.1 (20 Nov 2019 05:02)  T(F): 98.8 (20 Nov 2019 05:02), Max: 98.8 (20 Nov 2019 05:02)  HR: 77 (20 Nov 2019 05:02) (77 - 79)  BP: 165/62 (20 Nov 2019 05:02) (137/54 - 165/62)  BP(mean): --  RR: 17 (20 Nov 2019 05:02) (16 - 17)  SpO2: 99% (20 Nov 2019 05:02) (97% - 99%)    General: Elderly female, NAD  Eyes: PERRLA, EOMI, no discharge  ENMT: Oral mucosa without exudate or lesions  Neck: Supple, no JVD, no lymphadenopathy  Respiratory: Clear to auscultation bilaterally, no wheezes, rales, or rhonchi  Cardiovascular: +S1, +S2, RRR, no murmurs, rubs, or gallops  GI: Abdomen soft, no guarding, +BS, no pain upon palpation   Extremities: No clubbing, cyanosis  Neurological: AOx4, right LE 4/5 strength, left LE 4/5 strength  Skin: Warm, no lesions, rashes, or scars  MSK: No muscle or joint tenderness    Labs:                        11.2   8.57  )-----------( 295      ( 17 Nov 2019 07:25 )             35.9     11-17    141  |  112<H>  |  13  ----------------------------<  102<H>  3.8   |  21<L>  |  0.98    Ca    9.1      17 Nov 2019 07:25    Lactate: 1.9 (14-Nov-2019 20:19)    < from: CT Abdomen and Pelvis w/ IV Cont (11.15.19 @ 10:04) >  IMPRESSION:   Wall thickening along the left side of the bladder in this patient with   reported bladder carcinoma. No gross evidence for metastatic disease.  Biliary dilatation which may be postsurgical. Additional findings as   above.  < end of copied text > HPI:  89 F w/PMH hypothyroid, CAD, HTN, bladder ca (currently undergoing tx), recurrent UTI, presents c/o increased generalized weakness.  Pt went for her 2nd bladder ca tx yesterday, found w/ UTI and started on macrobid, which she's been taking.  However, today, she was unable to ambulate d/t weakness (similar to prior UTI), called her  office and instructed to come to ED for tx.  She denies any fever/chills, n/v/d, abd pain, dysuria/freq/urg.  She had similar presentations and hospitalizations for UTI. In ED, WBC 16, vitals stable, +UA, given rocephin IV.      11/15: Pt was seen and examined with pt's daughter at bedside. She says she's feeling somewhat better but these UTIs recur every time the pt receives therapy for the bladder CA. Pt and daughter were inquiring about prophylactic measures since the pt has 5 more therapies. Pt is still experiencing weakness in LEs, difficulty walking and standing. Pt lives alone with no aides at home.  11/16: Pt seen and examined  11/17: Pt seen and examined, pt said she "felt sick" overnight and BP heriberto to 200s systolic. Given metoprolol by nurses, BP did not improve. Pt said she feels feverish but has not spiked a fever. Pt c/o non-productive cough since last night. Pt denies chest pain, palpitations, HA  11/18: Pt seen and examined, pt still had high blood pressure overnight in 180s-190s. Pt is still c/o cough, denies chest pain and HA  11/19: had orthostatic hypotension. feels weak (generalized). refuses rehab    ROS: All systems reviewed and negative with the exception of above.    Vital Signs Last 24 Hrs  T(C): 37.1 (20 Nov 2019 05:02), Max: 37.1 (20 Nov 2019 05:02)  T(F): 98.8 (20 Nov 2019 05:02), Max: 98.8 (20 Nov 2019 05:02)  HR: 77 (20 Nov 2019 05:02) (77 - 79)  BP: 165/62 (20 Nov 2019 05:02) (137/54 - 165/62)  BP(mean): --  RR: 17 (20 Nov 2019 05:02) (16 - 17)  SpO2: 99% (20 Nov 2019 05:02) (97% - 99%)    General: Elderly female, NAD  Eyes: PERRLA, EOMI, no discharge  ENMT: Oral mucosa without exudate or lesions  Neck: Supple, no JVD, no lymphadenopathy  Respiratory: Clear to auscultation bilaterally, no wheezes, rales, or rhonchi  Cardiovascular: +S1, +S2, RRR, no murmurs, rubs, or gallops  GI: Abdomen soft, no guarding, +BS, no pain upon palpation   Extremities: No clubbing, cyanosis  Neurological: AOx4, right LE 4/5 strength, left LE 4/5 strength  Skin: Warm, no lesions, rashes, or scars  MSK: No muscle or joint tenderness    Labs:                        11.2   8.57  )-----------( 295      ( 17 Nov 2019 07:25 )             35.9     11-17    141  |  112<H>  |  13  ----------------------------<  102<H>  3.8   |  21<L>  |  0.98    Ca    9.1      17 Nov 2019 07:25    Lactate: 1.9 (14-Nov-2019 20:19)    < from: CT Abdomen and Pelvis w/ IV Cont (11.15.19 @ 10:04) >  IMPRESSION:   Wall thickening along the left side of the bladder in this patient with   reported bladder carcinoma. No gross evidence for metastatic disease.  Biliary dilatation which may be postsurgical. Additional findings as   above.  < end of copied text >

## 2019-11-19 NOTE — PROGRESS NOTE ADULT - ASSESSMENT
89 F w/PMH hypothyroid, CAD, HTN, bladder ca (currently undergoing tx), recurrent UTI, endometrial adenocarcinoma, hyperlipidemia, hypertension, gerd, s/p EMILY and cholecystectomy admitted on 11/14 for evaluation of increased weakness, burning with urination after recent chemotherapy for recurrence of her bladder cancer; the patient notes mild suprapubic pain as well. No other specific complaints.     1. Cystitis. Likely UTI with MRSA. Bladder Ca s/p chemotherapy. Immunocompromised host.  -outpatient urine c/s: MRSA per Josafat Solorzano  -day #3 vancomycin 750 mg IV q12h; will stop after today dose and start doxycycline in am for 4 more days  -reason for abx use and side effects reviewed with patient; monitor BMP and vancomycin trough levels   - serial cbc and monitor temperature   -continue abx coverage  -monitor temps  -f/u CBC  -supportive care  - expect another 24 hours of vancomycin then discharge home on 4 days doxycycline 100 mg po q 12 hours  2. Other issues:   -care per medicine  If further ID issues please reconsult

## 2019-11-20 VITALS — HEART RATE: 83 BPM | OXYGEN SATURATION: 96 % | TEMPERATURE: 98 F | RESPIRATION RATE: 18 BRPM

## 2019-11-20 RX ORDER — ISOSORBIDE MONONITRATE 60 MG/1
1 TABLET, EXTENDED RELEASE ORAL
Qty: 30 | Refills: 0
Start: 2019-11-20

## 2019-11-20 RX ORDER — MIDODRINE HYDROCHLORIDE 2.5 MG/1
1 TABLET ORAL
Qty: 0 | Refills: 0 | DISCHARGE
Start: 2019-11-20

## 2019-11-20 RX ORDER — MIDODRINE HYDROCHLORIDE 2.5 MG/1
2.5 TABLET ORAL
Refills: 0 | Status: DISCONTINUED | OUTPATIENT
Start: 2019-11-20 | End: 2019-11-20

## 2019-11-20 RX ORDER — MIDODRINE HYDROCHLORIDE 2.5 MG/1
1 TABLET ORAL
Qty: 30 | Refills: 0
Start: 2019-11-20

## 2019-11-20 RX ORDER — NITROFURANTOIN MACROCRYSTAL 50 MG
1 CAPSULE ORAL
Qty: 0 | Refills: 0 | DISCHARGE

## 2019-11-20 RX ORDER — MIDODRINE HYDROCHLORIDE 2.5 MG/1
2.5 TABLET ORAL THREE TIMES A DAY
Refills: 0 | Status: DISCONTINUED | OUTPATIENT
Start: 2019-11-20 | End: 2019-11-20

## 2019-11-20 RX ADMIN — Medication 1 DROP(S): at 05:14

## 2019-11-20 RX ADMIN — Medication 50 MILLIGRAM(S): at 05:14

## 2019-11-20 RX ADMIN — Medication 2000 UNIT(S): at 05:14

## 2019-11-20 RX ADMIN — POLYETHYLENE GLYCOL 3350 17 GRAM(S): 17 POWDER, FOR SOLUTION ORAL at 05:14

## 2019-11-20 RX ADMIN — PANTOPRAZOLE SODIUM 40 MILLIGRAM(S): 20 TABLET, DELAYED RELEASE ORAL at 05:14

## 2019-11-20 RX ADMIN — Medication 650 MILLIGRAM(S): at 14:10

## 2019-11-20 RX ADMIN — Medication 100 MILLIGRAM(S): at 05:14

## 2019-11-20 RX ADMIN — Medication 75 MICROGRAM(S): at 05:14

## 2019-11-20 RX ADMIN — Medication 1 DROP(S): at 14:10

## 2019-11-20 RX ADMIN — ENOXAPARIN SODIUM 40 MILLIGRAM(S): 100 INJECTION SUBCUTANEOUS at 12:57

## 2019-11-20 NOTE — PROGRESS NOTE ADULT - ASSESSMENT
ASSESSMENT/PLAN:  89 F w/PMH hypothyroid, CAD, HTN, bladder ca (currently undergoing tx), recurrent UTI, presents c/o increased generalized weakness.  In ED, WBC 16, vitals stable, +UA, given rocephin IV.      #Acute cystitis without hematuria  - S/p IV rocephin  and then with ucx showing MRSA at Dr. Solorzano's office  -  s/p IV vanco , plan for d/c today on 4 more days of doxycycline (got first dose this morning)  - ID consult appreciated    #Orthostatic hypotension  - has prior history  - given IV fluid bolus. monitor orthostatic vitals  - midodrine prn    #Constipation   - senna and miralax    #Cough, afebrile with no leukocytosis  - Pt denies hx of lung issues, monitor    #HTN - improved with imdur.  toprol XL which can be titrated to 75 mg if needed  - given hx of orthostasis -160s acceptable as per family d/w dr perez  - Began Imdur 60 mg PO X 1 today and will decrease to 30 mg daily     #Weakness  - Likely 2/2 to cystits  - PT initial evaluation: pt refusing POOL, PT recommended d/c home with home PT and commode, rolling walker  - Tx as above    #Bladder CA  - s/p IVF  - Undergoing treatment outpatient  - F/u urology consult  - ID and urology input noted    #DVT prophylaxis  - Lovenox    pt wants to go home and refuses rehab. she demonstrated to me and the PCT that she can sit at the edge of the bed and stand up and hold on to her walker and take steps without loosing her balance and without any orthostatic symptoms. her orthostatic vital signs show a drop her BP when standing but she is completely asymptomatic. her SBP > 100. she wants to go home and reports her son is hiring caregiver. pt will f/u w/ her cardiologist Dr. Sánchez

## 2019-11-20 NOTE — PROGRESS NOTE ADULT - SUBJECTIVE AND OBJECTIVE BOX
HPI:  89 F w/PMH hypothyroid, CAD, HTN, bladder ca (currently undergoing tx), recurrent UTI, presents c/o increased generalized weakness.  Pt went for her 2nd bladder ca tx yesterday, found w/ UTI and started on macrobid, which she's been taking.  However, today, she was unable to ambulate d/t weakness (similar to prior UTI), called her  office and instructed to come to ED for tx.  She denies any fever/chills, n/v/d, abd pain, dysuria/freq/urg.  She had similar presentations and hospitalizations for UTI. In ED, WBC 16, vitals stable, +UA, given rocephin IV.      11/15: Pt was seen and examined with pt's daughter at bedside. She says she's feeling somewhat better but these UTIs recur every time the pt receives therapy for the bladder CA. Pt and daughter were inquiring about prophylactic measures since the pt has 5 more therapies. Pt is still experiencing weakness in LEs, difficulty walking and standing. Pt lives alone with no aides at home.  11/16: Pt seen and examined  11/17: Pt seen and examined, pt said she "felt sick" overnight and BP heriberto to 200s systolic. Given metoprolol by nurses, BP did not improve. Pt said she feels feverish but has not spiked a fever. Pt c/o non-productive cough since last night. Pt denies chest pain, palpitations, HA  11/18: Pt seen and examined, pt still had high blood pressure overnight in 180s-190s. Pt is still c/o cough, denies chest pain and HA  11/19: had orthostatic hypotension. feels weak (generalized). refuses rehab  11/20: no complaints. wants to go home    ROS: All systems reviewed and negative with the exception of above.    Physical exam:    T(C): 36.6 (11-20-19 @ 15:17), Max: 37.1 (11-20-19 @ 05:02)  HR: 83 (11-20-19 @ 15:17) (77 - 83)  BP: 168/60 (11-20-19 @ 12:54) (161/57 - 168/60)  RR: 18 (11-20-19 @ 15:17) (17 - 18)  SpO2: 96% (11-20-19 @ 15:17) (96% - 99%)General: Elderly female, NAD    Eyes: PERRLA, EOMI, no discharge  ENMT: Oral mucosa without exudate or lesions  Neck: Supple, no JVD, no lymphadenopathy  Respiratory: Clear to auscultation bilaterally, no wheezes, rales, or rhonchi  Cardiovascular: +S1, +S2, RRR, no murmurs, rubs, or gallops  GI: Abdomen soft, no guarding, +BS, no pain upon palpation   Extremities: No clubbing, cyanosis  Neurological: AOx4, right LE 4/5 strength, left LE 4/5 strength  Skin: Warm, no lesions, rashes, or scars  MSK: No muscle or joint tenderness    Labs:                        11.2   8.57  )-----------( 295      ( 17 Nov 2019 07:25 )             35.9     11-17    141  |  112<H>  |  13  ----------------------------<  102<H>  3.8   |  21<L>  |  0.98    Ca    9.1      17 Nov 2019 07:25    Lactate: 1.9 (14-Nov-2019 20:19)    < from: CT Abdomen and Pelvis w/ IV Cont (11.15.19 @ 10:04) >  IMPRESSION:   Wall thickening along the left side of the bladder in this patient with   reported bladder carcinoma. No gross evidence for metastatic disease.  Biliary dilatation which may be postsurgical. Additional findings as   above.  < end of copied text >

## 2019-11-20 NOTE — PROGRESS NOTE ADULT - REASON FOR ADMISSION
weakness, UTI

## 2019-11-20 NOTE — PROGRESS NOTE ADULT - NSHPATTENDINGPLANDISCUSS_GEN_ALL_CORE
Dr. Bethea and daughter at bedside
staff, RN, social work/ during rounds
staff, RN, social work/ during rounds

## 2019-11-24 DIAGNOSIS — K21.9 GASTRO-ESOPHAGEAL REFLUX DISEASE WITHOUT ESOPHAGITIS: ICD-10-CM

## 2019-11-24 DIAGNOSIS — C67.9 MALIGNANT NEOPLASM OF BLADDER, UNSPECIFIED: ICD-10-CM

## 2019-11-24 DIAGNOSIS — B95.62 METHICILLIN RESISTANT STAPHYLOCOCCUS AUREUS INFECTION AS THE CAUSE OF DISEASES CLASSIFIED ELSEWHERE: ICD-10-CM

## 2019-11-24 DIAGNOSIS — Z95.5 PRESENCE OF CORONARY ANGIOPLASTY IMPLANT AND GRAFT: ICD-10-CM

## 2019-11-24 DIAGNOSIS — I95.1 ORTHOSTATIC HYPOTENSION: ICD-10-CM

## 2019-11-24 DIAGNOSIS — E03.9 HYPOTHYROIDISM, UNSPECIFIED: ICD-10-CM

## 2019-11-24 DIAGNOSIS — I10 ESSENTIAL (PRIMARY) HYPERTENSION: ICD-10-CM

## 2019-11-24 DIAGNOSIS — N30.00 ACUTE CYSTITIS WITHOUT HEMATURIA: ICD-10-CM

## 2019-11-24 DIAGNOSIS — I25.10 ATHEROSCLEROTIC HEART DISEASE OF NATIVE CORONARY ARTERY WITHOUT ANGINA PECTORIS: ICD-10-CM

## 2019-12-06 ENCOUNTER — INPATIENT (INPATIENT)
Facility: HOSPITAL | Age: 84
LOS: 13 days | Discharge: NURSING, REHAB OR LONG-TERM CARE | DRG: 312 | End: 2019-12-20
Attending: FAMILY MEDICINE | Admitting: FAMILY MEDICINE
Payer: MEDICARE

## 2019-12-06 VITALS — WEIGHT: 169.98 LBS | HEIGHT: 65 IN

## 2019-12-06 DIAGNOSIS — Z90.710 ACQUIRED ABSENCE OF BOTH CERVIX AND UTERUS: Chronic | ICD-10-CM

## 2019-12-06 DIAGNOSIS — Z98.890 OTHER SPECIFIED POSTPROCEDURAL STATES: Chronic | ICD-10-CM

## 2019-12-06 LAB
ALBUMIN SERPL ELPH-MCNC: 3.5 G/DL — SIGNIFICANT CHANGE UP (ref 3.3–5)
ALP SERPL-CCNC: 75 U/L — SIGNIFICANT CHANGE UP (ref 40–120)
ALT FLD-CCNC: 23 U/L — SIGNIFICANT CHANGE UP (ref 12–78)
ANION GAP SERPL CALC-SCNC: 7 MMOL/L — SIGNIFICANT CHANGE UP (ref 5–17)
APPEARANCE UR: CLEAR — SIGNIFICANT CHANGE UP
AST SERPL-CCNC: 20 U/L — SIGNIFICANT CHANGE UP (ref 15–37)
BILIRUB SERPL-MCNC: 0.3 MG/DL — SIGNIFICANT CHANGE UP (ref 0.2–1.2)
BILIRUB UR-MCNC: NEGATIVE — SIGNIFICANT CHANGE UP
BUN SERPL-MCNC: 25 MG/DL — HIGH (ref 7–23)
CALCIUM SERPL-MCNC: 9.3 MG/DL — SIGNIFICANT CHANGE UP (ref 8.5–10.1)
CHLORIDE SERPL-SCNC: 106 MMOL/L — SIGNIFICANT CHANGE UP (ref 96–108)
CO2 SERPL-SCNC: 23 MMOL/L — SIGNIFICANT CHANGE UP (ref 22–31)
COLOR SPEC: YELLOW — SIGNIFICANT CHANGE UP
CREAT SERPL-MCNC: 1.17 MG/DL — SIGNIFICANT CHANGE UP (ref 0.5–1.3)
DIFF PNL FLD: ABNORMAL
GLUCOSE SERPL-MCNC: 86 MG/DL — SIGNIFICANT CHANGE UP (ref 70–99)
GLUCOSE UR QL: NEGATIVE MG/DL — SIGNIFICANT CHANGE UP
HCT VFR BLD CALC: 40.1 % — SIGNIFICANT CHANGE UP (ref 34.5–45)
HGB BLD-MCNC: 12.2 G/DL — SIGNIFICANT CHANGE UP (ref 11.5–15.5)
KETONES UR-MCNC: NEGATIVE — SIGNIFICANT CHANGE UP
LEUKOCYTE ESTERASE UR-ACNC: ABNORMAL
MAGNESIUM SERPL-MCNC: 2.3 MG/DL — SIGNIFICANT CHANGE UP (ref 1.6–2.6)
MCHC RBC-ENTMCNC: 26.3 PG — LOW (ref 27–34)
MCHC RBC-ENTMCNC: 30.4 GM/DL — LOW (ref 32–36)
MCV RBC AUTO: 86.6 FL — SIGNIFICANT CHANGE UP (ref 80–100)
NITRITE UR-MCNC: NEGATIVE — SIGNIFICANT CHANGE UP
PH UR: 6 — SIGNIFICANT CHANGE UP (ref 5–8)
PLATELET # BLD AUTO: 385 K/UL — SIGNIFICANT CHANGE UP (ref 150–400)
POTASSIUM SERPL-MCNC: 4.4 MMOL/L — SIGNIFICANT CHANGE UP (ref 3.5–5.3)
POTASSIUM SERPL-SCNC: 4.4 MMOL/L — SIGNIFICANT CHANGE UP (ref 3.5–5.3)
PROT SERPL-MCNC: 7.7 GM/DL — SIGNIFICANT CHANGE UP (ref 6–8.3)
PROT UR-MCNC: 15 MG/DL
RBC # BLD: 4.63 M/UL — SIGNIFICANT CHANGE UP (ref 3.8–5.2)
RBC # FLD: 14.3 % — SIGNIFICANT CHANGE UP (ref 10.3–14.5)
SODIUM SERPL-SCNC: 136 MMOL/L — SIGNIFICANT CHANGE UP (ref 135–145)
SP GR SPEC: 1.01 — SIGNIFICANT CHANGE UP (ref 1.01–1.02)
TROPONIN I SERPL-MCNC: 0.02 NG/ML — SIGNIFICANT CHANGE UP (ref 0.01–0.04)
TROPONIN I SERPL-MCNC: <0.015 NG/ML — SIGNIFICANT CHANGE UP (ref 0.01–0.04)
TSH SERPL-MCNC: 2.89 UU/ML — SIGNIFICANT CHANGE UP (ref 0.34–4.82)
UROBILINOGEN FLD QL: NEGATIVE MG/DL — SIGNIFICANT CHANGE UP
WBC # BLD: 10.62 K/UL — HIGH (ref 3.8–10.5)
WBC # FLD AUTO: 10.62 K/UL — HIGH (ref 3.8–10.5)

## 2019-12-06 PROCEDURE — 70450 CT HEAD/BRAIN W/O DYE: CPT | Mod: 26

## 2019-12-06 PROCEDURE — 71045 X-RAY EXAM CHEST 1 VIEW: CPT | Mod: 26

## 2019-12-06 PROCEDURE — 72148 MRI LUMBAR SPINE W/O DYE: CPT | Mod: 26

## 2019-12-06 RX ORDER — SODIUM CHLORIDE 9 MG/ML
1000 INJECTION INTRAMUSCULAR; INTRAVENOUS; SUBCUTANEOUS ONCE
Refills: 0 | Status: COMPLETED | OUTPATIENT
Start: 2019-12-06 | End: 2019-12-06

## 2019-12-06 RX ORDER — METOPROLOL TARTRATE 50 MG
50 TABLET ORAL DAILY
Refills: 0 | Status: DISCONTINUED | OUTPATIENT
Start: 2019-12-06 | End: 2019-12-18

## 2019-12-06 RX ORDER — HEPARIN SODIUM 5000 [USP'U]/ML
5000 INJECTION INTRAVENOUS; SUBCUTANEOUS EVERY 8 HOURS
Refills: 0 | Status: DISCONTINUED | OUTPATIENT
Start: 2019-12-06 | End: 2019-12-20

## 2019-12-06 RX ORDER — ISOSORBIDE MONONITRATE 60 MG/1
30 TABLET, EXTENDED RELEASE ORAL DAILY
Refills: 0 | Status: DISCONTINUED | OUTPATIENT
Start: 2019-12-06 | End: 2019-12-08

## 2019-12-06 RX ORDER — ACETAMINOPHEN 500 MG
650 TABLET ORAL ONCE
Refills: 0 | Status: COMPLETED | OUTPATIENT
Start: 2019-12-06 | End: 2019-12-06

## 2019-12-06 RX ORDER — ALPRAZOLAM 0.25 MG
0.25 TABLET ORAL THREE TIMES A DAY
Refills: 0 | Status: DISCONTINUED | OUTPATIENT
Start: 2019-12-06 | End: 2019-12-09

## 2019-12-06 RX ORDER — CHOLECALCIFEROL (VITAMIN D3) 125 MCG
4000 CAPSULE ORAL
Qty: 0 | Refills: 0 | DISCHARGE

## 2019-12-06 RX ORDER — SODIUM CHLORIDE 9 MG/ML
1000 INJECTION INTRAMUSCULAR; INTRAVENOUS; SUBCUTANEOUS
Refills: 0 | Status: DISCONTINUED | OUTPATIENT
Start: 2019-12-06 | End: 2019-12-20

## 2019-12-06 RX ORDER — PANTOPRAZOLE SODIUM 20 MG/1
40 TABLET, DELAYED RELEASE ORAL
Refills: 0 | Status: DISCONTINUED | OUTPATIENT
Start: 2019-12-06 | End: 2019-12-20

## 2019-12-06 RX ORDER — LEVOTHYROXINE SODIUM 125 MCG
75 TABLET ORAL DAILY
Refills: 0 | Status: DISCONTINUED | OUTPATIENT
Start: 2019-12-06 | End: 2019-12-20

## 2019-12-06 RX ORDER — METOPROLOL TARTRATE 50 MG
5 TABLET ORAL ONCE
Refills: 0 | Status: DISCONTINUED | OUTPATIENT
Start: 2019-12-06 | End: 2019-12-06

## 2019-12-06 RX ORDER — METOPROLOL TARTRATE 50 MG
25 TABLET ORAL ONCE
Refills: 0 | Status: COMPLETED | OUTPATIENT
Start: 2019-12-06 | End: 2019-12-06

## 2019-12-06 RX ORDER — ALPRAZOLAM 0.25 MG
0.25 TABLET ORAL ONCE
Refills: 0 | Status: DISCONTINUED | OUTPATIENT
Start: 2019-12-06 | End: 2019-12-06

## 2019-12-06 RX ADMIN — HEPARIN SODIUM 5000 UNIT(S): 5000 INJECTION INTRAVENOUS; SUBCUTANEOUS at 22:36

## 2019-12-06 RX ADMIN — Medication 0.25 MILLIGRAM(S): at 16:58

## 2019-12-06 RX ADMIN — Medication 25 MILLIGRAM(S): at 19:01

## 2019-12-06 RX ADMIN — Medication 650 MILLIGRAM(S): at 18:10

## 2019-12-06 RX ADMIN — SODIUM CHLORIDE 50 MILLILITER(S): 9 INJECTION INTRAMUSCULAR; INTRAVENOUS; SUBCUTANEOUS at 22:37

## 2019-12-06 RX ADMIN — Medication 1 DROP(S): at 22:36

## 2019-12-06 RX ADMIN — Medication 650 MILLIGRAM(S): at 17:40

## 2019-12-06 NOTE — H&P ADULT - NSHPLABSRESULTS_GEN_ALL_CORE
LABS: All Labs Reviewed:                        12.2   10.62 )-----------( 385      ( 06 Dec 2019 14:37 )             40.1     12-06    136  |  106  |  25<H>  ----------------------------<  86  4.4   |  23  |  1.17    Ca    9.3      06 Dec 2019 14:37  Mg     2.3     12-06    TPro  7.7  /  Alb  3.5  /  TBili  0.3  /  DBili  x   /  AST  20  /  ALT  23  /  AlkPhos  75  12-06    PT/INR - ( 06 Dec 2019 14:38 )   PT: 11.4 sec;   INR: 1.03 ratio         PTT - ( 06 Dec 2019 14:38 )  PTT:34.1 sec  CARDIAC MARKERS ( 06 Dec 2019 14:37 )  <0.015 ng/mL / x     / x     / x     / x          < from: CT Head No Cont (12.06.19 @ 15:43) >    IMPRESSION:       No acute intracranial findings.    < end of copied text >

## 2019-12-06 NOTE — ED ADULT TRIAGE NOTE - CHIEF COMPLAINT QUOTE
Pt. to the ED BIBA Son from PCP C/O Weakness, and HTN- Pt. denies AMS, Slurred Speech, Trouble Walking and/or Facial Drop- Hx. of Bladder Ca ( Currently on Tx) Son states that patient might have UTI. Pt. alert and oriented x 4

## 2019-12-06 NOTE — ED PROVIDER NOTE - CLINICAL SUMMARY MEDICAL DECISION MAKING FREE TEXT BOX
Plan: labs, CT head, chest XR, urine admission. Plan: labs, CT head, chest XR, urine admission.  Fareed ABRAHAM: Patient with extreme of age, high co morbidity profile with lightheadedness and dizziness (vertigo) suspicious for possible ACS vs CVA. Will admit for tele work up and rule out.

## 2019-12-06 NOTE — H&P ADULT - NSHPPHYSICALEXAM_GEN_ALL_CORE
PHYSICAL EXAM:    ICU Vital Signs Last 24 Hrs  T(C): 36.8 (06 Dec 2019 13:55), Max: 36.8 (06 Dec 2019 13:55)  T(F): 98.3 (06 Dec 2019 13:55), Max: 98.3 (06 Dec 2019 13:55)  HR: 71 (06 Dec 2019 16:58) (71 - 87)  BP: 208/81 (06 Dec 2019 16:58) (194/95 - 209/75)  BP(mean): --  ABP: --  ABP(mean): --  RR: 20 (06 Dec 2019 15:17) (18 - 20)  SpO2: 100% (06 Dec 2019 15:17) (96% - 100%)      Constitutional: NAD, awake and alert, well-developed  HEENT: PERR, EOMI, Normal Hearing, MMM  Neck: Soft and supple, No LAD, No JVD  Respiratory: Breath sounds are clear bilaterally, No wheezing, rales or rhonchi  Cardiovascular: S1 and S2, regular rate and rhythm, no Murmurs, gallops or rubs  Gastrointestinal: Bowel Sounds present, soft, nontender, nondistended, no guarding, no rebound  Extremities: No peripheral edema  Vascular: 2+ peripheral pulses  Neurological: A/O x 3, no focal deficits  Musculoskeletal: 5/5 strength b/l upper and lower extremities  Skin: No rashes PHYSICAL EXAM:    ICU Vital Signs Last 24 Hrs  T(C): 36.8 (06 Dec 2019 13:55), Max: 36.8 (06 Dec 2019 13:55)  T(F): 98.3 (06 Dec 2019 13:55), Max: 98.3 (06 Dec 2019 13:55)  HR: 71 (06 Dec 2019 16:58) (71 - 87)  BP: 208/81 (06 Dec 2019 16:58) (194/95 - 209/75)  BP(mean): --  ABP: --  ABP(mean): --  RR: 20 (06 Dec 2019 15:17) (18 - 20)  SpO2: 100% (06 Dec 2019 15:17) (96% - 100%)      Constitutional: NAD, awake and alert, well-developed  HEENT: PERR, EOMI, Normal Hearing, MMM  Neck: Soft and supple, No LAD, No JVD  Respiratory: Breath sounds are clear bilaterally, No wheezing, rales or rhonchi  Cardiovascular: S1 and S2, regular rate and rhythm, no Murmurs, gallops or rubs  Gastrointestinal: Bowel Sounds present, soft, nontender, nondistended, no guarding, no rebound  Extremities: No peripheral edema  Vascular: 2+ peripheral pulses  Neurological: A/O x 3, no focal deficits; CN II-XII in tact  Musculoskeletal: 4/5 strength b/l upper and lower extremities; no obvious focalities  Skin: No rashes

## 2019-12-06 NOTE — ED ADULT NURSE NOTE - OBJECTIVE STATEMENT
Pt brought in for weakness. pt was scheduled to have a bladder wash/chemo today for bladder ca however dr sent her here for bp and weakness. pts bp was low pta and now currently in the 200s. dr patel is aware of bp. son at bedside. pt aox3

## 2019-12-06 NOTE — ED PROVIDER NOTE - MUSCULOSKELETAL, MLM
Spine appears normal, range of motion is not limited, no muscle or joint tenderness Spine appears normal, range of motion is not limited, no muscle or joint tenderness. 5/5 strength b/l UE and LE. Spine appears normal, range of motion is not limited, no muscle or joint tenderness. 5/5 strength b/l UE and LE. No nuchal rigidity.

## 2019-12-06 NOTE — H&P ADULT - HISTORY OF PRESENT ILLNESS
89 yo female w/ PMHx of CAD s/p stents x5, bladder cancer (currently undergoing tx), HTN, hypothyroidism presents c/o weakness and lightheadedness. Pt was admitted on 11/14 for evaluation of similar sx but also including burning with urination after recent chemo for recurrence of bladder cancer. Urine cx grew MRSA. She was treated with 4 days of IV vanco and discharged on PO doxy. Offered rehab but pt declined at the time. Complains of similar sx today but denies any dysuria, frequency or urgency. She has had difficulty crossing even short distances across the living room, worse than prior admission, and complains of difficulty even raising her arms. Pt complains of chronic low back and hip pain for the last 8-9months associated with numbness radiating down the posterior right LE. No UE numbness or tingling.    In the ED: Negative CT head. Trace leukocytes on UA. Hypertensive with 194-209 systolically.    PAST MEDICAL & SURGICAL HISTORY:  Bladder cancer  Endometrial adenocarcinoma  Macular degeneration  Stented coronary artery  Hypothyroid  Hyperlipidemia  HTN (hypertension)  GERD (gastroesophageal reflux disease)  CAD (coronary artery disease)  [place] (hard of hearing)  History of bladder surgery  S/P EMILY (total abdominal hysterectomy)  S/P hernia repair: umbilical - 2008  S/P laparoscopic cholecystectomy: 2008  Ankle fracture, right: surgery 25 yrs ago - hardware removed  S/P coronary angiogram: 2005, 2008, 2011 - drug eluding stents    FAMILY HISTORY:  Family history of brain cancer    Social history:   Lives alone  Independent at baseline  Son nearby 93 yo female w/ PMHx of CAD s/p stents x5, bladder cancer (currently undergoing tx), HTN, hypothyroidism presents c/o weakness and lightheadedness. Pt was admitted on 11/14 for evaluation of similar sx but also including burning with urination after recent chemo for recurrence of bladder cancer. Urine cx grew MRSA. She was treated with 4 days of IV vanco and discharged on PO doxy. Offered rehab but pt declined at the time. Complains of similar sx today but denies any dysuria, frequency or urgency. She has had difficulty crossing even short distances across the living room, worse than prior admission, and complains of difficulty even raising her arms. Pt complains of chronic low back and hip pain for the last 8-9months associated with numbness radiating down the posterior right LE. No UE numbness or tingling.No bowel incontinence. is chronically incontinent of urine    In the ED: Negative CT head. Trace leukocytes on UA. Hypertensive with 194-209 systolically.    PAST MEDICAL & SURGICAL HISTORY:  Bladder cancer  Endometrial adenocarcinoma  Macular degeneration  Stented coronary artery  Hypothyroid  Hyperlipidemia  HTN (hypertension)  GERD (gastroesophageal reflux disease)  CAD (coronary artery disease)  [place] (hard of hearing)  History of bladder surgery  S/P EMILY (total abdominal hysterectomy)  S/P hernia repair: umbilical - 2008  S/P laparoscopic cholecystectomy: 2008  Ankle fracture, right: surgery 25 yrs ago - hardware removed  S/P coronary angiogram: 2005, 2008, 2011 - drug eluding stents    FAMILY HISTORY:  Family history of brain cancer    Social history:   Lives alone  Independent at baseline  Son nearby

## 2019-12-06 NOTE — ED ADULT NURSE NOTE - PMH
Bladder cancer    CAD (coronary artery disease)    Endometrial adenocarcinoma    GERD (gastroesophageal reflux disease)    [place] (hard of hearing)    HTN (hypertension)    Hyperlipidemia    Hypothyroid    Macular degeneration    Orthostatic hypertension    Stented coronary artery

## 2019-12-06 NOTE — ED PROVIDER NOTE - PROGRESS NOTE DETAILS
89 y/o F presents with increased weakness today. Pt states she ambulates with a walker at baseline, normally as difficulty ambulating, today she was unable to ambulate. She has had decreased appetite the past few days. Pt reports increased SOB over the past few months. No other complaints at this time. -Juni Cardoza PA-C

## 2019-12-06 NOTE — ED PROVIDER NOTE - OBJECTIVE STATEMENT
89 y/o female with a PMHx of abd hernia, bladder cancer, CAD with stents x5, endometrial adenocarcinoma, GERD, hypothyroid, [place], HLD, HTN, orthostatic hypertension, macular degeneration, spinal stenosis, UTI, h/o cholecystectomy presents to the ED c/o weakness. +lightheadedness, +fatigue. Allergies: Amlodipine, Clonidine. No recent travel.  No other complaints at this time. PCP/Cardio: Dr. Mayra Sánchez. OBGYN: Dr. Magana. GI: Dr. Aguilera. 89 y/o female with a PMHx of abdominal hernia, bladder cancer, CAD with stents x5, endometrial adenocarcinoma, GERD, hypothyroid, [place], HLD, HTN, orthostatic hypertension, macular degeneration, spinal stenosis, UTI, h/o cholecystectomy presents to the ED c/o weakness. +lightheadedness, dizziness (feeling of vertigo and disequilibrium), +fatigue. Allergies: Amlodipine, Clonidine. No recent travel.  No other complaints at this time. PCP/Cardio: Dr. Mayra Sánchez. OBGYN: Dr. Magana. GI: Dr. Aguilera.

## 2019-12-06 NOTE — ED PROVIDER NOTE - CONSTITUTIONAL, MLM
normal... Well appearing, awake, alert, oriented to person, place, time/situation and in no apparent distress. Well appearing, awake, alert, oriented to person, place, time/situation and in no apparent distress. Nontoxic appearing.

## 2019-12-06 NOTE — H&P ADULT - ASSESSMENT
#Weakness, no clear infectious etiology, suspect component of acute deconditioning  #Bladder cancer  #Hypothyroidism    -Admit to tele  -IVF  -PT consult  -No clear evidence of cardiac, pulmonary, or infectious etiology for weakness  -F/U blood/urine cx  -F/U MRI/MRA brain #Weakness, no clear infectious etiology, suspect component of acute deconditioning  #HTN urgency  #Bladder cancer  #Hypothyroidism    -Admit to MS/obs  -stat dose IV metoprolol now and resuem home meds  -Hold midodrine until BP is better controlled  -PT consult for weakness  -No clear evidence of cardiac, pulmonary, or infectious etiology for weakness  -F/U blood/urine cx  -F/U MRI/MRA L spine  -Obtain TSH    dvt px  IMPROVE VTE Individual Risk Assessment    RISK                                                                Points    [  ] Previous VTE                                                  3    [  ] Thrombophilia                                               2    [  ] Lower limb paralysis                                      2        (unable to hold up >15 seconds)      [  ] Current Cancer                                              2         (within 6 months)    [x  ] Immobilization > 24 hrs                                1    [  ] ICU/CCU stay > 24 hours                              1    [x  ] Age > 60                                                      1  2  IMPROVE VTE Score _________    IMPROVE Score 0-1: Low Risk, No VTE prophylaxis required for most patients, encourage ambulation.   IMPROVE Score 2-3: At risk, pharmacologic VTE prophylaxis is indicated for most patients (in the absence of a contraindication)  IMPROVE Score > or = 4: High Risk, pharmacologic VTE prophylaxis is indicated for most patients (in the absence of a contraindication)

## 2019-12-06 NOTE — ED PROVIDER NOTE - CARE PLAN
Principal Discharge DX:	Vertigo  Goal:	r/o ACS, vs. CVA  Secondary Diagnosis:	Lightheadedness  Secondary Diagnosis:	Weakness

## 2019-12-06 NOTE — ED PROVIDER NOTE - PMH
Bladder cancer    CAD (coronary artery disease)    Endometrial adenocarcinoma    GERD (gastroesophageal reflux disease)    [place] (hard of hearing)    HTN (hypertension)    Hyperlipidemia    Hypothyroid    Macular degeneration    Orthostatic hypertension    Stented coronary artery

## 2019-12-06 NOTE — ED PROVIDER NOTE - ATTENDING CONTRIBUTION TO CARE
I Harry Guerrier MD saw and examined the patient. MLP saw and examined the patient under my supervision. I discussed the care of the patient with MLP and agree with MLP's plan, assessment and care of the patient while in the ED.

## 2019-12-06 NOTE — ED PROVIDER NOTE - NEUROLOGICAL, MLM
Alert and oriented, no focal deficits, no motor or sensory deficits. Alert and oriented, no focal deficits, no motor or sensory deficits. Cranial nerves 2-12 intact. Alert and oriented, no focal deficits, no motor or sensory deficits. Cranial nerves 2-12 intact. GCS=15 NIH=0

## 2019-12-07 RX ORDER — ACETAMINOPHEN 500 MG
650 TABLET ORAL EVERY 6 HOURS
Refills: 0 | Status: DISCONTINUED | OUTPATIENT
Start: 2019-12-07 | End: 2019-12-20

## 2019-12-07 RX ADMIN — Medication 1 DROP(S): at 21:06

## 2019-12-07 RX ADMIN — Medication 650 MILLIGRAM(S): at 16:55

## 2019-12-07 RX ADMIN — Medication 1 DROP(S): at 13:14

## 2019-12-07 RX ADMIN — HEPARIN SODIUM 5000 UNIT(S): 5000 INJECTION INTRAVENOUS; SUBCUTANEOUS at 21:06

## 2019-12-07 RX ADMIN — HEPARIN SODIUM 5000 UNIT(S): 5000 INJECTION INTRAVENOUS; SUBCUTANEOUS at 05:48

## 2019-12-07 RX ADMIN — Medication 0.25 MILLIGRAM(S): at 18:01

## 2019-12-07 RX ADMIN — HEPARIN SODIUM 5000 UNIT(S): 5000 INJECTION INTRAVENOUS; SUBCUTANEOUS at 13:13

## 2019-12-07 RX ADMIN — ISOSORBIDE MONONITRATE 30 MILLIGRAM(S): 60 TABLET, EXTENDED RELEASE ORAL at 13:14

## 2019-12-07 RX ADMIN — Medication 1 DROP(S): at 05:48

## 2019-12-07 RX ADMIN — Medication 0.25 MILLIGRAM(S): at 14:04

## 2019-12-07 RX ADMIN — Medication 75 MICROGRAM(S): at 05:48

## 2019-12-07 RX ADMIN — Medication 50 MILLIGRAM(S): at 05:48

## 2019-12-07 RX ADMIN — Medication 650 MILLIGRAM(S): at 17:20

## 2019-12-07 RX ADMIN — PANTOPRAZOLE SODIUM 40 MILLIGRAM(S): 20 TABLET, DELAYED RELEASE ORAL at 05:48

## 2019-12-08 DIAGNOSIS — R42 DIZZINESS AND GIDDINESS: ICD-10-CM

## 2019-12-08 LAB
CULTURE RESULTS: SIGNIFICANT CHANGE UP
SPECIMEN SOURCE: SIGNIFICANT CHANGE UP

## 2019-12-08 PROCEDURE — 80048 BASIC METABOLIC PNL TOTAL CA: CPT

## 2019-12-08 PROCEDURE — 97116 GAIT TRAINING THERAPY: CPT | Mod: GP

## 2019-12-08 PROCEDURE — 71046 X-RAY EXAM CHEST 2 VIEWS: CPT

## 2019-12-08 PROCEDURE — 70450 CT HEAD/BRAIN W/O DYE: CPT

## 2019-12-08 PROCEDURE — 71250 CT THORAX DX C-: CPT

## 2019-12-08 PROCEDURE — 36415 COLL VENOUS BLD VENIPUNCTURE: CPT

## 2019-12-08 PROCEDURE — 85025 COMPLETE CBC W/AUTO DIFF WBC: CPT

## 2019-12-08 PROCEDURE — 97163 PT EVAL HIGH COMPLEX 45 MIN: CPT | Mod: GP

## 2019-12-08 PROCEDURE — 85027 COMPLETE CBC AUTOMATED: CPT

## 2019-12-08 PROCEDURE — 93005 ELECTROCARDIOGRAM TRACING: CPT

## 2019-12-08 RX ORDER — ISOSORBIDE MONONITRATE 60 MG/1
60 TABLET, EXTENDED RELEASE ORAL DAILY
Refills: 0 | Status: DISCONTINUED | OUTPATIENT
Start: 2019-12-08 | End: 2019-12-14

## 2019-12-08 RX ADMIN — Medication 0.25 MILLIGRAM(S): at 22:28

## 2019-12-08 RX ADMIN — Medication 50 MILLIGRAM(S): at 05:29

## 2019-12-08 RX ADMIN — Medication 650 MILLIGRAM(S): at 20:34

## 2019-12-08 RX ADMIN — Medication 650 MILLIGRAM(S): at 12:10

## 2019-12-08 RX ADMIN — ISOSORBIDE MONONITRATE 60 MILLIGRAM(S): 60 TABLET, EXTENDED RELEASE ORAL at 18:23

## 2019-12-08 RX ADMIN — HEPARIN SODIUM 5000 UNIT(S): 5000 INJECTION INTRAVENOUS; SUBCUTANEOUS at 05:28

## 2019-12-08 RX ADMIN — Medication 650 MILLIGRAM(S): at 21:34

## 2019-12-08 RX ADMIN — Medication 75 MICROGRAM(S): at 05:28

## 2019-12-08 RX ADMIN — Medication 1 DROP(S): at 05:28

## 2019-12-08 RX ADMIN — Medication 1 DROP(S): at 21:15

## 2019-12-08 RX ADMIN — Medication 1 DROP(S): at 12:30

## 2019-12-08 RX ADMIN — Medication 650 MILLIGRAM(S): at 12:38

## 2019-12-08 RX ADMIN — HEPARIN SODIUM 5000 UNIT(S): 5000 INJECTION INTRAVENOUS; SUBCUTANEOUS at 21:14

## 2019-12-08 RX ADMIN — PANTOPRAZOLE SODIUM 40 MILLIGRAM(S): 20 TABLET, DELAYED RELEASE ORAL at 05:29

## 2019-12-08 RX ADMIN — HEPARIN SODIUM 5000 UNIT(S): 5000 INJECTION INTRAVENOUS; SUBCUTANEOUS at 12:29

## 2019-12-09 LAB
CULTURE RESULTS: SIGNIFICANT CHANGE UP
SPECIMEN SOURCE: SIGNIFICANT CHANGE UP

## 2019-12-09 PROCEDURE — 71046 X-RAY EXAM CHEST 2 VIEWS: CPT | Mod: 26

## 2019-12-09 RX ORDER — POLYETHYLENE GLYCOL 3350 17 G/17G
17 POWDER, FOR SOLUTION ORAL DAILY
Refills: 0 | Status: DISCONTINUED | OUTPATIENT
Start: 2019-12-10 | End: 2019-12-20

## 2019-12-09 RX ORDER — SENNA PLUS 8.6 MG/1
2 TABLET ORAL AT BEDTIME
Refills: 0 | Status: DISCONTINUED | OUTPATIENT
Start: 2019-12-09 | End: 2019-12-20

## 2019-12-09 RX ORDER — ALPRAZOLAM 0.25 MG
0.12 TABLET ORAL THREE TIMES A DAY
Refills: 0 | Status: DISCONTINUED | OUTPATIENT
Start: 2019-12-09 | End: 2019-12-16

## 2019-12-09 RX ORDER — MIDODRINE HYDROCHLORIDE 2.5 MG/1
2.5 TABLET ORAL THREE TIMES A DAY
Refills: 0 | Status: DISCONTINUED | OUTPATIENT
Start: 2019-12-09 | End: 2019-12-09

## 2019-12-09 RX ADMIN — HEPARIN SODIUM 5000 UNIT(S): 5000 INJECTION INTRAVENOUS; SUBCUTANEOUS at 13:34

## 2019-12-09 RX ADMIN — PANTOPRAZOLE SODIUM 40 MILLIGRAM(S): 20 TABLET, DELAYED RELEASE ORAL at 05:19

## 2019-12-09 RX ADMIN — Medication 650 MILLIGRAM(S): at 20:26

## 2019-12-09 RX ADMIN — Medication 50 MILLIGRAM(S): at 05:19

## 2019-12-09 RX ADMIN — Medication 100 MILLIGRAM(S): at 22:17

## 2019-12-09 RX ADMIN — Medication 1 DROP(S): at 20:25

## 2019-12-09 RX ADMIN — Medication 75 MICROGRAM(S): at 05:19

## 2019-12-09 RX ADMIN — ISOSORBIDE MONONITRATE 60 MILLIGRAM(S): 60 TABLET, EXTENDED RELEASE ORAL at 12:20

## 2019-12-09 RX ADMIN — Medication 650 MILLIGRAM(S): at 14:04

## 2019-12-09 RX ADMIN — Medication 650 MILLIGRAM(S): at 13:34

## 2019-12-09 RX ADMIN — Medication 0.12 MILLIGRAM(S): at 20:25

## 2019-12-09 RX ADMIN — Medication 1 DROP(S): at 05:18

## 2019-12-09 RX ADMIN — Medication 650 MILLIGRAM(S): at 21:26

## 2019-12-09 RX ADMIN — Medication 1 DROP(S): at 13:34

## 2019-12-09 RX ADMIN — HEPARIN SODIUM 5000 UNIT(S): 5000 INJECTION INTRAVENOUS; SUBCUTANEOUS at 20:25

## 2019-12-09 RX ADMIN — HEPARIN SODIUM 5000 UNIT(S): 5000 INJECTION INTRAVENOUS; SUBCUTANEOUS at 05:18

## 2019-12-09 RX ADMIN — SENNA PLUS 2 TABLET(S): 8.6 TABLET ORAL at 21:19

## 2019-12-09 NOTE — PHYSICAL THERAPY INITIAL EVALUATION ADULT - PERTINENT HX OF CURRENT PROBLEM, REHAB EVAL
Pt admitted to  secondary to weakness, lightheadedness, and right LE numbness. Pt was admitted to  11/4 with similar symptoms along with burning with urination. Pt with a hx of bladder ca, still undergoing rx. Pt also with a hx of chronic LBP and right hip pain radiating down right LE. MRI L-spine: DDD. CT head: neg.

## 2019-12-09 NOTE — CONSULT NOTE ADULT - SUBJECTIVE AND OBJECTIVE BOX
HPI: Ms. Osuna is a 89y old Female coming from home with hx of hypothyroid, CAD, HTN, bladder ca (currently undergoing tx), recurrent UTI, 6th hospitalization, last admitted 11/14-20 for UTI, now admitted 12/6 for c/o increased generalized weakness.  Of note, pt went for her 2nd bladder ca tx day PTA found w/ UTI and started on macrobid, which she's been taking, developing difficulty ambulate d/t weakness (similar to prior UTI). Pt called her  office and instructed to come to ED for tx.  In ED, WBC 16, vitals stable, +UA, given rocephin IV. Palliative Care consulted to assist with establishing GOC.     Met Ms. Osuna this am, no family at bedside. Pt feeling weak still. She notes mild chronic pain in joints due to arthritis. She notes Tylenol is enough to quell this, though hesitant to take this. Reassured pt that taking Tylenol is ok and encouraged her to do so for sx relief as it is already been successful in past, which she agreed to. She also notes new cough, open to Robitussin prn. Endorses anxiety at times about how things will go for her, noting she takes half a xanax at home, but was given full dose last night, prefers half (will change order). No other sx.     Pt is frustrated about how much she has declined, losing more independence as time goes along. She explained that she lives alone, but has hired aids 4h/d, 7d/week to help her with ADLs such as bathing and some light house work, since her weakness prevents this. She blamed all of this on her chemo, saying that she notices every time she gets a treatment she ends up in the hospital, now having been here 6x this year. She thus wants to stop further chemo and is unsure of what her options are. She is adamant that she will not go back to rehab, saying that she has already been there for 6 weeks and it did not help with her weakness or her pain. She was open to us reaching out to her proxy/son Adama to arrange GOC meeting so we can review her options and help them make a plan. She was unsure if she would want to talk to her PMD and urologist about her choices. Reassured her that she did not have to make any concrete decisions with us, noting that she could hear all of her choices and opt to speak further with her docs before implementing some plans. Will call son.        PAIN: (x )Yes   ( )No  Level: mild  Location: shoulder and knees  Intensity:  3  /10  Quality: aches  Aggravating Factors: position, movement  Alleviating Factors: tylenol  Radiation: no  Duration/Timing: sporadic  Impact on ADLs: frozen shoulder makes lifting arm and showering very difficult    DYSPNEA: ( ) Yes  (x ) No    PAST MEDICAL & SURGICAL HISTORY:  Bladder cancer  Endometrial adenocarcinoma  Macular degeneration  Stented coronary artery  Hypothyroid  Hyperlipidemia  HTN (hypertension)  GERD (gastroesophageal reflux disease)  CAD (coronary artery disease)  [place] (hard of hearing)  History of bladder surgery  S/P EMILY (total abdominal hysterectomy)  S/P hernia repair: umbilical - 2008  S/P laparoscopic cholecystectomy: 2008  Ankle fracture, right: surgery 25 yrs ago - hardware removed  S/P coronary angiogram: 2005, 2008, 2011 - drug eluding stents      SOCIAL HX: Lives alone, , son    Hx opiate tolerance ( )YES  ( x)NO    Baseline ADLs  (Prior to Admission)- needs aids for majority of ADLs, walks with walker  ( ) Independent   (x )Dependent    FAMILY HISTORY:  Family history of brain cancer      Review of Systems:    Anxiety- yes, takes 1/2 tab of xanax when needed  Depression- at times, denies now  Constipation- denies  Diarrhea- denies  Nausea- denies  Vomiting- denies  Anorexia- yes  Weight Loss- unsure, but thinks about 6 lbs recently  Cough- yes  Secretions- no  Fatigue- yes  Weakness- yes  Delirium- no    All other systems reviewed and negative      PHYSICAL EXAM:    Vital Signs Last 24 Hrs  T(C): 36.7 (09 Dec 2019 05:14), Max: 36.7 (08 Dec 2019 19:14)  T(F): 98 (09 Dec 2019 05:14), Max: 98 (08 Dec 2019 19:14)  HR: 83 (09 Dec 2019 05:14) (73 - 83)  BP: 151/59 (09 Dec 2019 05:14) (139/54 - 215/72)  RR: 17 (09 Dec 2019 05:14) (17 - 18)  SpO2: 100% (09 Dec 2019 05:14) (97% - 100%)    PPSV2:  30 %    General: Elderly female sitting up in chair, friendly, worried, NAD  Mental Status: AOx4  HEENT: mmm, +mild temporal wasting  Lungs: clear  Cardiac: +s1 s2 rrr  GI: soft nt nd +bs  : voids  Ext: moves all extremities, though poor rom in right shoulder, some muscle and fat wasting noted in limbs  Neuro: no focal deficits      LABS:  Complete Blood Count (12.06.19 @ 14:37)    WBC Count: 10.62 K/uL    RBC Count: 4.63 M/uL    Hemoglobin: 12.2 g/dL    Hematocrit: 40.1 %    Mean Cell Volume: 86.6 fl    Mean Cell Hemoglobin: 26.3 pg    Mean Cell Hemoglobin Conc: 30.4 gm/dL    Red Cell Distrib Width: 14.3 %    Platelet Count - Automated: 385 K/uL    Comprehensive Metabolic Panel (12.06.19 @ 14:37)    Sodium, Serum: 136 mmol/L    Potassium, Serum: 4.4 mmol/L    Chloride, Serum: 106 mmol/L    Carbon Dioxide, Serum: 23 mmol/L    Anion Gap, Serum: 7 mmol/L    Blood Urea Nitrogen, Serum: 25 mg/dL    Creatinine, Serum: 1.17 mg/dL    Glucose, Serum: 86 mg/dL    Calcium, Total Serum: 9.3 mg/dL    Protein Total, Serum: 7.7 gm/dL    Albumin, Serum: 3.5 g/dL    Bilirubin Total, Serum: 0.3 mg/dL    Alkaline Phosphatase, Serum: 75 U/L    Aspartate Aminotransferase (AST/SGOT): 20 U/L    Alanine Aminotransferase (ALT/SGPT): 23 U/L    eGFR if Non : 40:     eGFR if African American: 47 mL/min/1.73M2      Albumin: Albumin, Serum: 3.5 g/dL (12-06 @ 14:37)      Allergies    amlodipine (Unknown)  clonidine (Unknown)  Florinef Acetate (Unknown)  hydrocodone (Unknown)  Levatol (Unknown)  Lipitor (Unknown)  Lotrel (Unknown)  methylPREDNISolone (Unknown)  morphine (Other)  Plaquenil (Unknown)  statins (Other (Unknown))  sulfa drugs (Rash)    Intolerances      MEDICATIONS  (STANDING):  artificial  tears Solution 1 Drop(s) Both EYES three times a day  heparin  Injectable 5000 Unit(s) SubCutaneous every 8 hours  isosorbide   mononitrate ER Tablet (IMDUR) 60 milliGRAM(s) Oral daily  levothyroxine 75 MICROGram(s) Oral daily  metoprolol succinate ER 50 milliGRAM(s) Oral daily  midodrine. 2.5 milliGRAM(s) Oral three times a day  pantoprazole    Tablet 40 milliGRAM(s) Oral before breakfast  sodium chloride 0.9%. 1000 milliLiter(s) (50 mL/Hr) IV Continuous <Continuous>    MEDICATIONS  (PRN):  acetaminophen   Tablet .. 650 milliGRAM(s) Oral every 6 hours PRN Mild Pain (1 - 3)  ALPRAZolam 0.25 milliGRAM(s) Oral three times a day PRN anxiety      RADIOLOGY/ADDITIONAL STUDIES:    EXAM:  CT BRAIN                        PROCEDURE DATE:  12/06/2019      IMPRESSION:       No acute intracranial findings.    RUTH OBANDO M.D., ATTENDING RADIOLOGIST  This document has been electronically signed. Dec  6 2019  3:51PM      EXAM:  XR CHEST PORTABLE IMMED 1V                        PROCEDURE DATE:  12/06/2019      Impression:     There is mild linear atelectasis at the left lung base, similar to the   prior examination. Right lung is clear. The heart size is normal. There   is moderate right rotator cuff arthropathy.    CHERI KAYE M.D., ATTENDING RADIOLOGIST  This document has been electronically signed. Dec  7 2019  8:08AM    EXAM:  MR SPINE LUMBAR                        PROCEDURE DATE:  12/06/2019      IMPRESSION:   Multilevel degenerative disc disease and spondylosis with moderate   central stenosis at L4-5 on a degenerative basis. Broad-based L4-5 3 mm   central and left paracentral disc protrusion. Tiny asymmetrical RIGHT   lateral disc protrusion at L1-2. Moderate facet osteophytic hypertrophy   at L5-S1 that impinges on the dorsal aspect of the thecal sac.    SIMRAN ACEVEDO M.D., ATTENDING RADIOLOGIST  This document has been electronically signed. Dec  7 2019  8:43AM    EXAM:  CT ABDOMEN AND PELVIS IC                        PROCEDURE DATE:  11/15/2019      IMPRESSION:     Wall thickening along the left side of the bladder in this patient with   reported bladder carcinoma. No gross evidence for metastatic disease.    Biliary dilatation which may be postsurgical. Additional findings as   above.    COSTA MARTINEZ   This document has been electronically signed. Nov 15 2019 10:25AM

## 2019-12-09 NOTE — PHYSICAL THERAPY INITIAL EVALUATION ADULT - PLANNED THERAPY INTERVENTIONS, PT EVAL
strengthening/Eval, amb, transfers, bed mobility x 15'./bed mobility training/gait training/ROM/transfer training

## 2019-12-09 NOTE — CONSULT NOTE ADULT - ASSESSMENT
89y old Female coming from home with hx of hypothyroid, CAD, HTN, uterine ca (s/p EMILY) bladder ca (currently undergoing tx), recurrent UTI, 6th hospitalization, last admitted 11/14-20 for UTI, now admitted 12/6 for c/o increased generalized weakness.  Of note, pt went for her 2nd bladder ca tx day PTA found w/ UTI and started on macrobid, which she's been taking, developing difficulty ambulate d/t weakness (similar to prior UTI). Pt called her  office and instructed to come to ED for tx.  In ED, WBC 16, vitals stable, +UA, given rocephin IV. Palliative Care consulted to assist with establishing GOC.     1) Pain  - chronic 2/2 to arthritis and degenerative disease in spine  - would c/w Tylenol prn for now  - currently effective    2) Weakness/Debility  - likely 2/2 to UTI and effect of malignancy  - PT consult appreciated- home with PT recommended  - pt continues to refuse POOL    3) Bladder Ca  - was on chemo with last tx prior to admission  - no sign of mets on last CT in November   - as per urology notes Dr. Solorzano) from last admission pt with high risk bladder ca, failed 1st BCG induction  - pt noted as poor surgical candidate and was not interested in cystectomy  - pt verbalizing desire to stop chemo  - will explore further with family/HCP present  - sugg urology consult with that service as pt noting desire to stop chemo and recurrent UTI    4) Prognosis  - appears poor  - in setting of advanced age, bladder ca with recurrent infections and multiple hospitalization, mounting debility, PPS<40%, evidence of malnutrition, and noted desire to stop chemo, would likely fit criteria for hospice. The latter must be discussed with pt and family, unclear interest at this time    5) GOC/Advanced Directives  - pt has capacity for decision making and would like to be a part of this  - HCP in [employer] naming son Adama Osuna [phone]  - [facility] on chart: DNR and DNI  - GOC meeting pending scheduling with family    Thank you for including us in Ms. Osuna's care. Will continue to follow with you.    Jovani Kapadia MD  Palliative Care Attending 89y old Female coming from home with hx of hypothyroid, CAD, HTN, uterine ca (s/p EMILY) bladder ca (currently undergoing tx), recurrent UTI, 6th hospitalization, last admitted 11/14-20 for UTI, now admitted 12/6 for c/o increased generalized weakness.  Of note, pt went for her 2nd bladder ca tx day PTA found w/ UTI and started on macrobid, which she's been taking, developing difficulty ambulate d/t weakness (similar to prior UTI). Pt called her  office and instructed to come to ED for tx.  In ED, WBC 16, vitals stable, +UA, given rocephin IV. Palliative Care consulted to assist with establishing GOC.     1) Pain  - chronic 2/2 to arthritis and degenerative disease in spine  - would c/w Tylenol prn for now  - currently effective    2) Weakness/Debility  - likely 2/2 to UTI and effect of malignancy  - PT consult appreciated- home with PT recommended  - pt continues to refuse POOL    3) Anxiety  - on 0.25mg tid prn, but pt notes she only takes half of this  - thus will reorder with 0.125 mg tid prn    4) Bladder Ca  - was on chemo with last tx prior to admission  - no sign of mets on last CT in November   - as per urology notes Dr. Solorzano) from last admission pt with high risk bladder ca, failed 1st BCG induction  - pt noted as poor surgical candidate and was not interested in cystectomy  - pt verbalizing desire to stop chemo  - will explore further with family/HCP present  - sugg urology consult with that service as pt noting desire to stop chemo and recurrent UTI    5) Prognosis  - appears poor  - in setting of advanced age, bladder ca with recurrent infections and multiple hospitalization, mounting debility, PPS<40%, evidence of malnutrition, and noted desire to stop chemo, would likely fit criteria for hospice. The latter must be discussed with pt and family, unclear interest at this time    6) GOC/Advanced Directives  - pt has capacity for decision making and would like to be a part of this  - HCP in [employer] naming dariel Osuna [phone]  - [facility] on chart: DNR and DNI  - GOC meeting pending scheduling with family    Thank you for including us in Ms. Osuna's care. Will continue to follow with you.    Jovani Kapadia MD  Palliative Care Attending

## 2019-12-09 NOTE — CHART NOTE - NSCHARTNOTEFT_GEN_A_CORE
This SW spoke with pts son Adama [phone] to schedule a family meeting. He is available to meet with our team tomorrow at 9:00am. Our team will continue to follow.

## 2019-12-09 NOTE — PHYSICAL THERAPY INITIAL EVALUATION ADULT - WORK/LEISURE ACTIVITY, REHAB EVAL
"                                                            ED Provider Note     CC:     Chief Complaint   Patient presents with     Agitation     Rule out Urinary Tract Infection          History is obtained from the patient's caregiver    HPI: Gemini Mccabe is a 33 year old female presenting with one week history of altered baseline behavior with increasing aggression and agitation.  Staff has noticed that she started to complain of some discomfort in the \"female\" area associated with increased urinary incontinence at nighttime and bathroom avoidance.  She has had 2 recent urinary tract infections on January 11 and October 20, with both urine cultures growing out E. coli.  The last culture result revealed sensitivity to almost all antibiotics except for Levaquin.  Patient has a history of 4 shoe kidney, and is prone to urinary tract infections.  These behaviors over the past week are indicative of her previous symptoms of UTI.  Staff at the group home has not noticed any fever, chills, vomiting.  There has been no gross hematuria.  They were able to collect a specimen earlier today with a sterile hat.  They report a strong odor from the urine.      Patient Active Problem List   Diagnosis     Mental retardation     Epilepsy (H)     Absence of menstruation     Constipation     Active autistic disorder     Chronic UTI     CARDIOVASCULAR SCREENING; LDL GOAL LESS THAN 160     Pneumonia     UTI (urinary tract infection)       MEDS:     No current facility-administered medications on file prior to encounter.   Current Outpatient Prescriptions on File Prior to Encounter:  ascorbic acid (VITAMIN C) 500 MG tablet Take 1 tablet (500 mg) by mouth daily   medroxyPROGESTERone (DEPO-PROVERA) 150 MG/ML injection Inject 1 mL (150 mg) into the muscle every 3 months Per verbal order of Johny Macedo PA-C for injections through 3/15/2018/ace   CHILDRENS IBUPROFEN 100 MG/5ML suspension TAKE 30 MLS BY MOUTH EVERY 6 HOURS AS NEEDED FOR " FEVER.   Disposable Gloves (NITRILE GLOVES MEDIUM) MISC USE EVERY DAY AS NEEDED  MUST MAKE APPT. BEFORE ANY ADDITIONAL REFILLS**   Disposable Gloves (VINYL GLOVES) MISC 1 Box as needed   multivitamin, therapeutic with minerals (MULTI-VITAMIN) TABS Take 1 tablet by mouth daily   citalopram (CELEXA) 20 MG tablet Take 1 tablet by mouth daily. (Patient taking differently: Take 40 mg by mouth daily )   docusate (COLACE) 50 MG/5ML liquid Take 100 mg by mouth daily.     polyethylene glycol (MIRALAX/GLYCOLAX) powder Take 1 capful by mouth daily as needed.     QUEtiapine Fumarate (SEROQUEL PO) Take by mouth daily 100 mg in the morning and 300 mg at bedtime   RISPERIDONE PO Take 0.25 mg by mouth daily    LORAZEPAM PO Take 0.5 mg by mouth 2 times daily as needed Not to exceed 2 tabs in 24 hours   triamcinolone (KENALOG) 0.1 % cream Apply to the left arm rash 3 times a day (Patient not taking: Reported on 6/22/2017)   Salicylic Acid 40 % PADS Externally apply 1 each topically. As directed (Patient not taking: Reported on 6/22/2017)   CRANBERRY FRUIT PO Take  by mouth. 500 mg, 1 capsule daily   BUTT PASTE - REGULAR (DR LOVE POOP GOOP BUTT PASTE FORMULA) Apply  topically See Admin Instructions. With each change of adult diaper.   Nebulizer nebulization 4 times daily. Use 4 times a day as instructed       Allergies: Prevnar and Sulfa drugs    Triage and ursing notes were reviewed.    ROS: All other systems were reviewed and are negative    Physical Exam:  Vitals:    03/24/18 1446   Weight: 77.1 kg (170 lb)     GENERAL APPEARANCE: Patient is laying in the right decubitus position.  She is responsive to verbal stimuli but otherwise resting  HEAD: atraumatic  EYES: PER  HENT: Unremarkable  RESP: lungs clear to auscultation - no rales, rhonchi or wheezes  CV: Distant heart sounds  ABDOMEN: soft, obese, nontender, no masses with normal bowel sounds  SKIN: no rash; as above  NEURO: patient is nonverbal    Results for orders placed or  performed during the hospital encounter of 03/24/18 (from the past 24 hour(s))   UA reflex to Microscopic   Result Value Ref Range    Color Urine Yellow     Appearance Urine Cloudy     Glucose Urine Negative NEG^Negative mg/dL    Bilirubin Urine Negative NEG^Negative    Ketones Urine Negative NEG^Negative mg/dL    Specific Gravity Urine 1.015 1.003 - 1.035    Blood Urine Negative NEG^Negative    pH Urine 6.0 5.0 - 7.0 pH    Protein Albumin Urine Negative NEG^Negative mg/dL    Urobilinogen mg/dL 2.0 0.0 - 2.0 mg/dL    Nitrite Urine Positive (A) NEG^Negative    Leukocyte Esterase Urine Moderate (A) NEG^Negative    Source Unspecified Urine     RBC Urine 3 (H) 0 - 2 /HPF    WBC Urine 43 (H) 0 - 5 /HPF    Bacteria Urine Few (A) NEG^Negative /HPF    Yeast Urine Many (A) NEG^Negative /HPF    Squamous Epithelial /HPF Urine 1 0 - 1 /HPF    Mucous Urine Present (A) NEG^Negative /LPF           Impression:  Final diagnoses:   Urinary tract infection without hematuria, site unspecified         ED Course & Medical Decision Making (Plan):  Gemini Mccabe is a 33 year old female with one-week history of increasing agitation and aggression, associated with foul-smelling urine.  The symptoms are typical for her UTIs.  Patient does not have any fever, chills, nausea or vomiting to suggest acute pyelonephritis.  Patient was seen shortly after arrival.  A urinalysis have been collected earlier in the day.  Her urinalysis reveals positive nitrites with 3 red blood cells and 43 white blood cells with few bacteria.  A urine culture is pending, and based on her previous urine cultures, she has had E. coli UTIs.  Patient will begin Macrobid twice a day for 7 days, pending urine culture results.  She apparently has a reaction to Pyridium.  She can take ibuprofen/Tylenol as needed for pain.  Encourage oral fluids and try not to hold the bladder.  Return to the ED at any time if symptoms worsen.  Written after-visit summary and instructions  were given at the time of discharge.          New Prescriptions    NITROFURANTOIN, MACROCRYSTAL-MONOHYDRATE, (MACROBID) 100 MG CAPSULE    Take 1 capsule (100 mg) by mouth 2 times daily           This note was completed in part using Dragon voice recognition, and may contain word and grammatical errors.        Timur Anaya MD  03/24/18 [id number]     needs device and assist

## 2019-12-09 NOTE — PHYSICAL THERAPY INITIAL EVALUATION ADULT - ACTIVE RANGE OF MOTION EXAMINATION, REHAB EVAL
Right LE Active ROM was WFL (within functional limits)/Bilateral UE shoulder flex ~ 90 degrees. Bilateral hip flex ~ 90 degrees, right DF ~-15 degrees, and left ~-10 degrees./Left LE Active ROM was WFL (within functional limits)/Right UE Active ROM was WFL (within functional limits)/Left UE Active ROM was WFL (within functional limits)

## 2019-12-09 NOTE — PHYSICAL THERAPY INITIAL EVALUATION ADULT - ADDITIONAL COMMENTS
pt has a Rollator walker, walk-in shower, shower chair, chair lift at home. Info obtained from kavon 11/16.

## 2019-12-10 LAB
ANION GAP SERPL CALC-SCNC: 7 MMOL/L — SIGNIFICANT CHANGE UP (ref 5–17)
BUN SERPL-MCNC: 18 MG/DL — SIGNIFICANT CHANGE UP (ref 7–23)
CALCIUM SERPL-MCNC: 9.3 MG/DL — SIGNIFICANT CHANGE UP (ref 8.5–10.1)
CHLORIDE SERPL-SCNC: 108 MMOL/L — SIGNIFICANT CHANGE UP (ref 96–108)
CO2 SERPL-SCNC: 23 MMOL/L — SIGNIFICANT CHANGE UP (ref 22–31)
CREAT SERPL-MCNC: 1.05 MG/DL — SIGNIFICANT CHANGE UP (ref 0.5–1.3)
GLUCOSE SERPL-MCNC: 96 MG/DL — SIGNIFICANT CHANGE UP (ref 70–99)
HCT VFR BLD CALC: 37.1 % — SIGNIFICANT CHANGE UP (ref 34.5–45)
HGB BLD-MCNC: 11.1 G/DL — LOW (ref 11.5–15.5)
MCHC RBC-ENTMCNC: 26 PG — LOW (ref 27–34)
MCHC RBC-ENTMCNC: 29.9 GM/DL — LOW (ref 32–36)
MCV RBC AUTO: 86.9 FL — SIGNIFICANT CHANGE UP (ref 80–100)
PLATELET # BLD AUTO: 290 K/UL — SIGNIFICANT CHANGE UP (ref 150–400)
POTASSIUM SERPL-MCNC: 4.1 MMOL/L — SIGNIFICANT CHANGE UP (ref 3.5–5.3)
POTASSIUM SERPL-SCNC: 4.1 MMOL/L — SIGNIFICANT CHANGE UP (ref 3.5–5.3)
RBC # BLD: 4.27 M/UL — SIGNIFICANT CHANGE UP (ref 3.8–5.2)
RBC # FLD: 14.6 % — HIGH (ref 10.3–14.5)
SODIUM SERPL-SCNC: 138 MMOL/L — SIGNIFICANT CHANGE UP (ref 135–145)
WBC # BLD: 10.29 K/UL — SIGNIFICANT CHANGE UP (ref 3.8–10.5)
WBC # FLD AUTO: 10.29 K/UL — SIGNIFICANT CHANGE UP (ref 3.8–10.5)

## 2019-12-10 PROCEDURE — 93010 ELECTROCARDIOGRAM REPORT: CPT

## 2019-12-10 RX ADMIN — Medication 50 MILLIGRAM(S): at 05:25

## 2019-12-10 RX ADMIN — Medication 1 DROP(S): at 13:36

## 2019-12-10 RX ADMIN — Medication 75 MICROGRAM(S): at 05:25

## 2019-12-10 RX ADMIN — Medication 100 MILLIGRAM(S): at 05:25

## 2019-12-10 RX ADMIN — HEPARIN SODIUM 5000 UNIT(S): 5000 INJECTION INTRAVENOUS; SUBCUTANEOUS at 13:35

## 2019-12-10 RX ADMIN — Medication 1 DROP(S): at 05:27

## 2019-12-10 RX ADMIN — HEPARIN SODIUM 5000 UNIT(S): 5000 INJECTION INTRAVENOUS; SUBCUTANEOUS at 22:54

## 2019-12-10 RX ADMIN — SENNA PLUS 2 TABLET(S): 8.6 TABLET ORAL at 22:53

## 2019-12-10 RX ADMIN — ISOSORBIDE MONONITRATE 60 MILLIGRAM(S): 60 TABLET, EXTENDED RELEASE ORAL at 13:35

## 2019-12-10 RX ADMIN — Medication 650 MILLIGRAM(S): at 15:13

## 2019-12-10 RX ADMIN — Medication 0.12 MILLIGRAM(S): at 23:12

## 2019-12-10 RX ADMIN — PANTOPRAZOLE SODIUM 40 MILLIGRAM(S): 20 TABLET, DELAYED RELEASE ORAL at 05:26

## 2019-12-10 RX ADMIN — HEPARIN SODIUM 5000 UNIT(S): 5000 INJECTION INTRAVENOUS; SUBCUTANEOUS at 05:25

## 2019-12-10 RX ADMIN — Medication 650 MILLIGRAM(S): at 23:10

## 2019-12-10 NOTE — DIETITIAN INITIAL EVALUATION ADULT. - ENERGY INTAKE
Pt reports one week of poor PO intake. Pt states she ate less than usual but was unable to provide detailed diet intake Poor (<50%)

## 2019-12-10 NOTE — DIETITIAN INITIAL EVALUATION ADULT. - PERTINENT MEDS FT
MEDICATIONS  (STANDING):  artificial  tears Solution 1 Drop(s) Both EYES three times a day  heparin  Injectable 5000 Unit(s) SubCutaneous every 8 hours  isosorbide   mononitrate ER Tablet (IMDUR) 60 milliGRAM(s) Oral daily  levothyroxine 75 MICROGram(s) Oral daily  metoprolol succinate ER 50 milliGRAM(s) Oral daily  pantoprazole    Tablet 40 milliGRAM(s) Oral before breakfast  senna 2 Tablet(s) Oral at bedtime  sodium chloride 0.9%. 1000 milliLiter(s) (50 mL/Hr) IV Continuous <Continuous>    MEDICATIONS  (PRN):  acetaminophen   Tablet .. 650 milliGRAM(s) Oral every 6 hours PRN Mild Pain (1 - 3)  ALPRAZolam 0.125 milliGRAM(s) Oral three times a day PRN anxiety  guaiFENesin   Syrup  (Sugar-Free) 100 milliGRAM(s) Oral every 6 hours PRN Cough  polyethylene glycol 3350 17 Gram(s) Oral daily PRN Constipation

## 2019-12-10 NOTE — DIETITIAN INITIAL EVALUATION ADULT. - OTHER INFO
89 yo female w/ PMHx of CAD s/p stents x5, bladder cancer (currently undergoing tx), HTN, hypothyroidism presents c/o weakness and lightheadedness. Pt was admitted on 11/14 for evaluation of similar sx but also including burning with urination after recent chemo for recurrence of bladder cancer. Urine cx grew MRSA. She was treated with 4 days of IV vanco and discharged on PO doxy. Offered rehab but pt declined at the time. Complains of similar sx today but denies any dysuria, frequency or urgency. She has had difficulty crossing even short distances across the living room, worse than prior admission, and complains of difficulty even raising her arms. Pt complains of chronic low back and hip pain for the last 8-9months associated with numbness radiating down the posterior right LE. No UE numbness or tingling. No bowel incontinence. is chronically incontinent of urine    Pt seen for assessment. Pt recently established GOC with palliative. Molst filled out and noted for DNR, DNI, no artificial nutrition. Pt states she is comfortable and feeling better. Pt states her appetite hasn't been too good over the past week. Prior pt states she had an ok appetite. Pt states she lost only about 6lbs in the past 2 months ( 3% of BW, not clinically sig). Pt states that her UBW was 174lbs. Pt denies n/v/d, c/o constipation. Pt denies chewing or swallowing difficulty. Pt denies food allergies. Pt appears to meet criteria for mild protein calorie malnutrition, NFPE noted below. Pt takes boost daily. Recommend Ensure once a day. Will continue to monitor nutritional status

## 2019-12-10 NOTE — GOALS OF CARE CONVERSATION - ADVANCED CARE PLANNING - CONVERSATION DETAILS
Met with Ms. Osuna and her son Adama to discuss medical issues and overall GOC. Pt explained that she was living at home alone with 4h/d, 5d/wk of private aid support for many of her ADLs. Her recurrent weakness has been the biggest issues. The pt is frustrated by recurrent episodes of UTis and feeling like every time she gets a cancer treatment she ends up in the hospital. She wishes she could be more independent, but also knows that she needs more help. She shared feeling torn about what to do next. He son agrees that she is declining and weak. He wants to honor whatever she decides, agreeing that they need some assistance figuring out what would be best.     We took time to discuss medical issues. Ms. Osuna was quite sharp, able to chronicle her complications with UTI, her admissions, and a few stays in rehab. Adama shared that they were told the treatment for her cancer is not curative, but has been keeping things controlled. Noted that we could see on last CT (last month) that there was no evidence of spread. The pt was also aware of this, but more so concerned with the complications she still has to deal with and how they take away from her quality of life, mentioning that she is 89yo now and does not want to suffer. We agreed that this deserved more conversation, noting that we would have Dr. Solorzano consulted so he can speak to her about his opinion on what would be best, since she was so unsure and in need of guidance.     In the interim, Ms. Osuna and her son Adama agreed that her wishes were paramount. Thus, we took time to discern what those wishes are via MOLST form. She confirmed that she would still like to be DNR and DNI, but the remainder of the form was more aligned with Limited Interventions since she is still deciding on whether to pursue more chemo or not. However, she was clear that she would not want to go to rehab again, not caring that she did well last time, feeling that she has already done this for many weeks and prefers not to do it again. Instead she would rather go home with PT and more aid hours. Her son was more than willing to hire more help. (See below for full list of MOLST decisions).    Given above and since pt contemplating stopping chemo also talked about hospice as an option. They shared their history with hospice for pt's  in the past, some distaste there as he had to die in hospital because his AICD (which they did not know he had) kept firing. Explained that every hospice is a little different and that she did not have to wait until her last weeks of life to take advantage of this service if she decides to focus solely on her comfort- in fact, this benefit is for at least 6 months, her true prognosis will ultimately be decided by her body. We reviewed the difference between home and inpt hospice and what this would look like for her. While she and her son were interested in knowing about this, they agreed with our recommendation to talk with Dr. Solorzano about what he thinks so they can make a confident decision. In the meantime, we noted intent to work with social work to help arrange home palliative + aid support- understanding that if they decide to pursue hospice instead as a result of that conversation that floor FANNY could then send a referral to have this in place prior to dc instead of aforementioned plan.     Ultimately, plan is to c/w current stabilization, home with pall + aids, unless conversation with Dr. Solorzano yields a choice to stop chemo in favor of full focus on comfort in which case plan will change to home hospice which floor FANNY can arrange. Will continue to follow. Above discussed with KACI Tomas, floor FANNY and , and Dr. Erickson.

## 2019-12-10 NOTE — CHART NOTE - NSCHARTNOTEFT_GEN_A_CORE
HPI:  89 yo female w/ PMHx of CAD s/p stents x5, bladder cancer (currently undergoing tx), HTN, hypothyroidism presents c/o weakness and lightheadedness. Pt was admitted on 11/14 for evaluation of similar sx but also including burning with urination after recent chemo for recurrence of bladder cancer.   PAST MEDICAL & SURGICAL HISTORY:  Bladder cancer  Endometrial adenocarcinoma  Macular degeneration  Stented coronary artery  Hypothyroid  Hyperlipidemia  HTN (hypertension)  GERD (gastroesophageal reflux disease)  CAD (coronary artery disease)  [place] (hard of hearing)  History of bladder surgery  S/P EMILY (total abdominal hysterectomy)  S/P hernia repair: umbilical - 2008  S/P laparoscopic cholecystectomy: 2008  Ankle fracture, right: surgery 25 yrs ago - hardware removed  S/P coronary angiogram: 2005, 2008, 2011 - drug eluding stents   (06 Dec 2019 18:02)      PERTINENT PMH REVIEWED:  [ X ] YES [ ] NO           Primary Contact:    HCP in [employer] naming dariel Osuna [phone]    HCP [  X ] Surrogate [   ] Guardian [   ]    Mental Status: [  X ] Alert  [  X ] Oriented [  ] Confused [  ] Lethargic [  ]  Concerns of Depression [  ]- none at identified   Anxiety [   ]- anxious at times  Baseline ADLs (prior to admission):  Independent [ ] moderately [ ] fully   Dependent   [ X ] moderately [ ]fully    Family Meeting attendees: Pt, pts son Adama, Dr. Kapadia, [facility]     Anticipated Grief: Patient [  X ] Family [ X ]    Caregiver [place] Assessed: Yes [ X ] No [  ]    Goals of Care: pt. wishes to return home, goals to be further determined after she speaks with urology Dr. Solorzano    Previous Services: Private hire aide about 4 hours a day    ADVANCE DIRECTIVES:  [ X ] YES [ ] NO   DNR [ X ] YES [ ] NO  DNI [ X ] YES [ ] NO                MOLST  [ X ] YES [ ] NO      Anticipated D/C Plan: Home with home Palliative VS home with Home Hospice-depending on pts decision regarding whether or not she wishes to continue chemo (she is thinking this over and will speak with Dr. Solorzano)                     Summary:    Team met with pt. and her son to discuss goals of acre, assist with planning and provide supportive counseling. Pt. had been residing home alone with a private aide a few hours a day. Pt. and pts son discussed how pt. has not been doing great at home over the last year. Pt. has been in and out of POOL multiple times and does not want to go back.    Pts current medical condition and goals of care discussed. Pt. has been getting chemo for Bladder Ca. She discussed the frustration that everytime she gets chemo she ends up having side effects and being in the hospital. Pt. reports that she has been contemplating whether or not she should stop the Chemo based on how she feels after it. We discussed all options including continuing with chemo and home with home Palliative VS stop chemo and focusing on comfort with the support of Hospice. Team explained hospice services and philosophy in detail. Pt. would like to speak to Dr. Solorzano and hear his thoughts before making a final decision about whether or not to continue chemo, which will further decide on the plan. Pt. declines rehab and pts son is going to work on private hire at home as she will need this regardless of if pt. goes home with home palliative or home hospice.    MOLST was reviewed and new form completed. MOLST states DNR/DNI, Limited Medical, Send to Hospital, No feeding tube, Trial of IV fluids, Determine use of antibiotics HPI:  89 yo female w/ PMHx of CAD s/p stents x5, bladder cancer (currently undergoing tx), HTN, hypothyroidism presents c/o weakness and lightheadedness. Pt was admitted on 11/14 for evaluation of similar sx but also including burning with urination after recent chemo for recurrence of bladder cancer.   PAST MEDICAL & SURGICAL HISTORY:  Bladder cancer  Endometrial adenocarcinoma  Macular degeneration  Stented coronary artery  Hypothyroid  Hyperlipidemia  HTN (hypertension)  GERD (gastroesophageal reflux disease)  CAD (coronary artery disease)  [place] (hard of hearing)  History of bladder surgery  S/P EMILY (total abdominal hysterectomy)  S/P hernia repair: umbilical - 2008  S/P laparoscopic cholecystectomy: 2008  Ankle fracture, right: surgery 25 yrs ago - hardware removed  S/P coronary angiogram: 2005, 2008, 2011 - drug eluding stents   (06 Dec 2019 18:02)      PERTINENT PMH REVIEWED:  [ X ] YES [ ] NO           Primary Contact:    HCP in [employer] naming dariel Osuna [phone]    HCP [  X ] Surrogate [   ] Guardian [   ]    Mental Status: [  X ] Alert  [  X ] Oriented [  ] Confused [  ] Lethargic [  ]  Concerns of Depression [  ]- none at identified   Anxiety [   ]- anxious at times  Baseline ADLs (prior to admission):  Independent [ ] moderately [ ] fully   Dependent   [ X ] moderately [ ]fully    Family Meeting attendees: Pt, pts son Adama, Dr. Kapadia, [facility]     Anticipated Grief: Patient [  X ] Family [ X ]    Caregiver [place] Assessed: Yes [ X ] No [  ]    Goals of Care: pt. wishes to return home, goals to be further determined after she speaks with urology Dr. Solorzano    Previous Services: Private hire aide about 4 hours a day    ADVANCE DIRECTIVES:  [ X ] YES [ ] NO   DNR [ X ] YES [ ] NO  DNI [ X ] YES [ ] NO                MOLST  [ X ] YES [ ] NO      Anticipated D/C Plan: Home with home Palliative VS home with Home Hospice-depending on pts decision regarding whether or not she wishes to continue chemo (she is thinking this over and will speak with Dr. Solorzano)                     Summary:    Team met with pt. and her son to discuss goals of acre, assist with planning and provide supportive counseling. Pt. had been residing home alone with a private aide a few hours a day. Pt. and pts son discussed how pt. has not been doing great at home over the last year. Pt. has been in and out of POOL multiple times and does not want to go back.    Pts current medical condition and goals of care discussed. Pt. has been getting chemo for Bladder Ca. She discussed the frustration that everytime she gets chemo she ends up having side effects and being in the hospital. Pt. reports that she has been contemplating whether or not she should stop the Chemo based on how she feels after it. We discussed all options including continuing with chemo and home with home Palliative VS stop chemo and focusing on comfort with the support of Hospice. Team explained hospice services and philosophy in detail. Pt. would like to speak to Dr. Solorzano and hear his thoughts before making a final decision about whether or not to continue chemo, which will further decide on the plan. Pt. declines rehab and pts son is going to work on private hire at home as she will need this regardless of if pt. goes home with home palliative or home hospice.    MOLST was reviewed and new form completed. MOLST states DNR/DNI, Limited Medical, Send to Hospital, No feeding tube, Trial of IV fluids, Determine use of antibiotics.    Plan at this time is to be further determined. Home with home Palliative VS home with [facility], depending on pts decision regarding chemotherapy. Emotional support provided. Our team will continue to follow.

## 2019-12-10 NOTE — DIETITIAN INITIAL EVALUATION ADULT. - PERTINENT LABORATORY DATA
12-10 Na138 mmol/L Glu 96 mg/dL K+ 4.1 mmol/L Cr  1.05 mg/dL BUN 18 mg/dL Phos n/a   Alb n/a   PAB n/a

## 2019-12-10 NOTE — DIETITIAN INITIAL EVALUATION ADULT. - PHYSICAL APPEARANCE
other (specify) NFPE: Mod muscle wasting in temple, mild muscle wasting in clavicle, deltoid and interosseous. No signs of wasting in calves and quads. no signs of fat wasting.

## 2019-12-10 NOTE — CHART NOTE - NSCHARTNOTEFT_GEN_A_CORE
Upon Nutritional Assessment by the Registered Dietitian your patient was determined to meet criteria / has evidence of the following diagnosis/diagnoses:          [x]  Mild Protein Calorie Malnutrition        [ ]  Moderate Protein Calorie Malnutrition        [ ] Severe Protein Calorie Malnutrition        [ ] Unspecified Protein Calorie Malnutrition        [ ] Underweight / BMI <19        [ ] Morbid Obesity / BMI > 40      Findings as based on:  •  Comprehensive nutrition assessment and consultation  •  Calorie counts (nutrient intake analysis)  •  Food acceptance and intake status from observations by staff  •  Follow up  •  Patient education  •  Intervention secondary to interdisciplinary rounds  •   concerns      Treatment:    The following diet has been recommended:  -Encourage PO intake   -Ensure once a day    PROVIDER Section:     By signing this assessment you are acknowledging and agree with the diagnosis/diagnoses assigned by the Registered Dietitian    Comments:

## 2019-12-10 NOTE — GOALS OF CARE CONVERSATION - ADVANCED CARE PLANNING - TREATMENT GUIDELINE COMMENT
MOLST decisions: DNR, limited, DNI, send to hospital, no feeding tube, trial of IVF, determine use of abx

## 2019-12-11 LAB
ANION GAP SERPL CALC-SCNC: 6 MMOL/L — SIGNIFICANT CHANGE UP (ref 5–17)
BUN SERPL-MCNC: 18 MG/DL — SIGNIFICANT CHANGE UP (ref 7–23)
CALCIUM SERPL-MCNC: 9.3 MG/DL — SIGNIFICANT CHANGE UP (ref 8.5–10.1)
CHLORIDE SERPL-SCNC: 108 MMOL/L — SIGNIFICANT CHANGE UP (ref 96–108)
CO2 SERPL-SCNC: 25 MMOL/L — SIGNIFICANT CHANGE UP (ref 22–31)
CREAT SERPL-MCNC: 1.03 MG/DL — SIGNIFICANT CHANGE UP (ref 0.5–1.3)
GLUCOSE SERPL-MCNC: 92 MG/DL — SIGNIFICANT CHANGE UP (ref 70–99)
POTASSIUM SERPL-MCNC: 4.2 MMOL/L — SIGNIFICANT CHANGE UP (ref 3.5–5.3)
POTASSIUM SERPL-SCNC: 4.2 MMOL/L — SIGNIFICANT CHANGE UP (ref 3.5–5.3)
SODIUM SERPL-SCNC: 139 MMOL/L — SIGNIFICANT CHANGE UP (ref 135–145)

## 2019-12-11 PROCEDURE — 71250 CT THORAX DX C-: CPT | Mod: 26

## 2019-12-11 RX ADMIN — Medication 100 MILLIGRAM(S): at 21:12

## 2019-12-11 RX ADMIN — PANTOPRAZOLE SODIUM 40 MILLIGRAM(S): 20 TABLET, DELAYED RELEASE ORAL at 06:30

## 2019-12-11 RX ADMIN — Medication 1 DROP(S): at 06:30

## 2019-12-11 RX ADMIN — Medication 650 MILLIGRAM(S): at 14:30

## 2019-12-11 RX ADMIN — Medication 1 DROP(S): at 21:11

## 2019-12-11 RX ADMIN — HEPARIN SODIUM 5000 UNIT(S): 5000 INJECTION INTRAVENOUS; SUBCUTANEOUS at 06:31

## 2019-12-11 RX ADMIN — HEPARIN SODIUM 5000 UNIT(S): 5000 INJECTION INTRAVENOUS; SUBCUTANEOUS at 13:44

## 2019-12-11 RX ADMIN — Medication 0.12 MILLIGRAM(S): at 21:23

## 2019-12-11 RX ADMIN — HEPARIN SODIUM 5000 UNIT(S): 5000 INJECTION INTRAVENOUS; SUBCUTANEOUS at 21:11

## 2019-12-11 RX ADMIN — Medication 650 MILLIGRAM(S): at 22:00

## 2019-12-11 RX ADMIN — Medication 1 DROP(S): at 17:46

## 2019-12-11 RX ADMIN — ISOSORBIDE MONONITRATE 60 MILLIGRAM(S): 60 TABLET, EXTENDED RELEASE ORAL at 11:37

## 2019-12-11 RX ADMIN — Medication 650 MILLIGRAM(S): at 13:45

## 2019-12-11 RX ADMIN — Medication 50 MILLIGRAM(S): at 06:30

## 2019-12-11 RX ADMIN — Medication 75 MICROGRAM(S): at 06:30

## 2019-12-11 RX ADMIN — Medication 650 MILLIGRAM(S): at 21:18

## 2019-12-11 NOTE — CONSULT NOTE ADULT - SUBJECTIVE AND OBJECTIVE BOX
89 yo female w/ PMHx of CAD s/p stents x5, bladder cancer (currently undergoing tx), HTN, hypothyroidism presents c/o weakness and lightheadedness. Pt was admitted on  for evaluation of similar sx but also including burning with urination after recent chemo for recurrence of bladder cancer. Urine cx grew MRSA. She was treated with 4 days of IV vanco and discharged on PO doxy. Offered rehab but pt declined at the time. Complains of similar sx today but denies any dysuria, frequency or urgency. She has had difficulty crossing even short distances across the living room, worse than prior admission, and complains of difficulty even raising her arms. Pt complains of chronic low back and hip pain for the last 8-9months associated with numbness radiating down the posterior right LE. No UE numbness or tingling.No bowel incontinence. is chronically incontinent of urine  In the ED: Negative CT head. Trace leukocytes on UA. Hypertensive with 194-209 systolically.      as an outpatient, she has been on midodrine for orthostatic hypotension but that was discontinued when she was admitted to the hospital.   In general, she feels best when the systolic blood pressure is above 150.   has been treated for recurrent bladder cancer. Unfortunately, she usually gets weaker after treatment and usually then contracts a UTI, resulting in a hospitalization. She is contemplating stopping chemotherapy treatment.   resting/sleeping soundly.     PAST MEDICAL & SURGICAL HISTORY:  Bladder cancer  Endometrial adenocarcinoma  Macular degeneration  Stented coronary artery  Hypothyroid  Hyperlipidemia  HTN (hypertension)  GERD (gastroesophageal reflux disease)  CAD (coronary artery disease)  [place] (hard of hearing)  History of bladder surgery  S/P EMILY (total abdominal hysterectomy)  S/P hernia repair: umbilical -   S/P laparoscopic cholecystectomy:   Ankle fracture, right: surgery 25 yrs ago - hardware removed  S/P coronary angiogram: , ,  - drug eluding stents    FAMILY HISTORY:  Family history of brain cancer    Social history:   Lives alone  Independent at baseline  Son nearby (06 Dec 2019 18:02)      PAST MEDICAL & SURGICAL HISTORY:  Bladder cancer  Endometrial adenocarcinoma  Macular degeneration  Stented coronary artery  Hypothyroid  Hyperlipidemia  HTN (hypertension)  GERD (gastroesophageal reflux disease)  CAD (coronary artery disease)  [place] (hard of hearing)  History of bladder surgery  S/P EMILY (total abdominal hysterectomy)  S/P hernia repair: umbilical -   S/P laparoscopic cholecystectomy:   Ankle fracture, right: surgery 25 yrs ago - hardware removed  S/P coronary angiogram: , , 2011 - drug eluding stents    MEDICATIONS  (STANDING):  artificial  tears Solution 1 Drop(s) Both EYES three times a day  heparin  Injectable 5000 Unit(s) SubCutaneous every 8 hours  isosorbide   mononitrate ER Tablet (IMDUR) 60 milliGRAM(s) Oral daily  levothyroxine 75 MICROGram(s) Oral daily  metoprolol succinate ER 50 milliGRAM(s) Oral daily  pantoprazole    Tablet 40 milliGRAM(s) Oral before breakfast  senna 2 Tablet(s) Oral at bedtime  sodium chloride 0.9%. 1000 milliLiter(s) (50 mL/Hr) IV Continuous <Continuous>    MEDICATIONS  (PRN):  acetaminophen   Tablet .. 650 milliGRAM(s) Oral every 6 hours PRN Mild Pain (1 - 3)  ALPRAZolam 0.125 milliGRAM(s) Oral three times a day PRN anxiety  guaiFENesin   Syrup  (Sugar-Free) 100 milliGRAM(s) Oral every 6 hours PRN Cough  polyethylene glycol 3350 17 Gram(s) Oral daily PRN Constipation    Allergies    amlodipine (Unknown)  clonidine (Unknown)  Florinef Acetate (Unknown)  hydrocodone (Unknown)  Levatol (Unknown)  Lipitor (Unknown)  Lotrel (Unknown)  methylPREDNISolone (Unknown)  morphine (Other)  Plaquenil (Unknown)  statins (Other (Unknown))  sulfa drugs (Rash)    Intolerances      FAMILY HISTORY:  Family history of brain cancer        REVIEW OF SYSTEMS:    CONSTITUTIONAL: No weakness, fevers or chills  EYES/ENT: No visual changes;  No vertigo or throat pain   NECK: No pain or stiffness  RESPIRATORY: No cough, wheezing, hemoptysis; No shortness of breath  CARDIOVASCULAR: No chest pain or palpitations  GASTROINTESTINAL: No abdominal or epigastric pain. No nausea, vomiting, or hematemesis; No diarrhea or constipation. No melena or hematochezia.  GENITOURINARY: No dysuria, frequency or hematuria  NEUROLOGICAL: No numbness or weakness  SKIN: No itching, burning, rashes, or lesions   All other review of systems is negative unless indicated above      PHYSICAL EXAM:  Daily     Daily Weight in k.4 (10 Dec 2019 16:40)  Vital Signs Last 24 Hrs  T(C): 36.2 (11 Dec 2019 06:26), Max: 36.7 (10 Dec 2019 10:20)  T(F): 97.2 (11 Dec 2019 06:26), Max: 98.1 (10 Dec 2019 10:20)  HR: 77 (11 Dec 2019 06:26) (77 - 87)  BP: 153/59 (11 Dec 2019 06:26) (130/52 - 162/74)  BP(mean): --  RR: 18 (11 Dec 2019 06:26) (16 - 18)  SpO2: 99% (11 Dec 2019 06:26) (96% - 99%)    Constitutional: NAD, awake and alert, well-developed  HEENT: PERR, EOMI, Normal Hearing, MMM  Neck: Soft and supple, No LAD, No JVD  Respiratory: Breath sounds are clear bilaterally, No wheezing, rales or rhonchi  Cardiovascular: S1 and S2, regular rate and rhythm, no Murmurs, gallops or rubs  Gastrointestinal: Bowel Sounds present, soft, nontender, nondistended, no guarding, no rebound  Extremities: No peripheral edema  Vascular: 2+ peripheral pulses  Neurological: A/O x 3, no focal deficits  Musculoskeletal: 5/5 strength b/l upper and lower extremities  Skin: No rashes    LABS: All Labs Reviewed:                        11.1   10.29 )-----------( 290      ( 10 Dec 2019 06:48 )             37.1     12-10    138  |  108  |  18  ----------------------------<  96  4.1   |  23  |  1.05    Ca    9.3      10 Dec 2019 06:48            Blood Culture: Organism --  Gram Stain Blood -- Gram Stain --  Specimen Source .Urine Clean Catch (Midstream)  Culture-Blood --    Organism --  Gram Stain Blood -- Gram Stain --  Specimen Source .Urine None  Culture-Blood --        RADIOLOGY: < from: Xray Chest 2 Views PA/Lat (19 @ 14:21) >  EXAM:  XR CHEST PA LAT 2V                            PROCEDURE DATE:  2019          INTERPRETATION:  AP erect and lateral chest on 2019 at 2:08   PM. 2 lateral views included. Patient has cough.    Heart is magnified by technique.    Rather mild atelectasis at left base laterally which is similar to   .    There may be slight increasing retrocardiac infiltrate.    IMPRESSION: Left base findings suggest slight increase.    < end of copied text >    EKG:   < from: 12 Lead ECG (12.10.19 @ 11:59) >  Ventricular Rate 77 BPM    Atrial Rate 77 BPM    P-R Interval 264 ms    QRS Duration 88 ms    Q-T Interval 362 ms    QTC Calculation(Bezet) 409 ms    P Axis 47 degrees    R Axis 29 degrees    T Axis 88 degrees    Diagnosis Line Sinus rhythm with sinus arrhythmia with 1st degree A-V block  Nonspecific ST and T wave abnormality  Abnormal ECG  When compared with ECG of 06-DEC-2019 14:20,  No significant change was found  Confirmed by Palla MD, Paulo ([id number]) on 12/10/2019 6:32:22 PM    < end of copied text >    CARDIOLOGY TESTING:< from: Transthoracic Echocardiogram (19 @ 10:19) >   EXAM:  ECHO TTE WO CON COMP W DOP         PROCEDURE DATE:  2019        INTERPRETATION:  Transthoracic Echocardiography Report (TTE)     Demographics     Patient name          DAVIDA MIRZA   Age           89 year(s)     Med Rec #   [id number]          Gender        Female     Account #             [id number]            Date of Birth 1926     Interpreting          Sj Mckeon MD  Room Number   0349   Physician     Referring Physician   Mayra Sánchez MD Sonographer   [facility]     Date of study         2019 10:01                         AM     Height                65.75 in           Weight        154.32 pounds    Type of Study:     TTE procedure: ECHO TTE WO CON COMP W DOP     HR: 89 bpmBP: 117/54 mmHg     Study Location: 3ETechnical Quality: Good    Indications   1) R55 - Syncope and collapse    M-Mode Measurements (cm)     LVEDd: 3.73 cm            LVESd: 2.23 cm   IVSEd: 1.24 cm   LVPWd: 1.03 cm            AO Root Dimension: 2.9 cm                  ACS: 1.5 cm                             LA: 3.9 cm                             LVOT: 2.1 cm    Doppler Measurements:     AV Velocity:121 cm/s                MV Peak E-Wave: 129 cm/s   AV Peak Gradient: 5.86 mmHg                 MV Peak Gradient: 6.66 mmHg     Findings     Mitral Valve   Fibrocalcific changes noted to the mitral valve leaflets with preserved   leaflet excursion. Mitral annular calcification.   Trace mitral regurgitation.     Aortic Valve   Theaortic valve is well visualized, appears mildly sclerotic. Valve   opening seems to be normal.     Tricuspid Valve   The tricuspid valve leaflets are well seen and appear thin and pliable   with preserved leaflets excursion.     Pulmonic Valve   Pulmonic valve not well seen.     Left Atrium   The left atrium is normal.     Left Ventricle   Left ventricular wall motion is normal. Estimated left ventricular   ejection fraction is 70 %.     Right Atrium   Normal appearing right atrium.     Right Ventricle   Normal appearing right ventricle structure and function.     Pericardial Effusion   No evidence of pericardial effusion.     Pleural Effusion   No evidence of pleural effusion.     Miscellaneous   All visualized extra cardiac structures appears to be normal.     Impression     Summary     Left ventricular wall motion is normal. Estimated left ventricular   ejection fraction is 70 %.   Normal appearing right ventricle structure and function.   Fibrocalcific changes noted to the mitral valve leaflets with preserved   leaflet excursion. Mitral annular calcification. Trace mitral   regurgitation.   The aortic valve is well visualized, appears mildly sclerotic.    < end of copied text >

## 2019-12-11 NOTE — CONSULT NOTE ADULT - ASSESSMENT
89 year old female with hypertension, bladder cancer, hypothyroidism, ASHD presents with near syncope.     12/11:  orthostatic vital signs. if signs of orthostasis, recommend restarting midodrine.   adjust blood pressure medication to maintain a systolic BP of 150.   physical therapy.

## 2019-12-11 NOTE — CONSULT NOTE ADULT - SUBJECTIVE AND OBJECTIVE BOX
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   She started 2nd BCG induction a few weeks ago  She has tolerated 2 out of 6 BCG instillations    Patient reports significant weakness for many months now without an identifiable cause.  CT A/P neg for metastatic disease  No anemia  Recent CT head negative    Patient seen and examined at bedside.     Current complaints: weakness    Denies dysuria, urgency, frequency, gross hematuria, fevers, chills, nausea, vomiting or weight loss      ROS  12 point ROS negative except that outlined in HPI    PMHX  Dizziness and giddiness  Family history of brain cancer  No pertinent family history in first degree relatives  Bladder cancer  Endometrial adenocarcinoma  Uterine cancer  Macular degeneration  Stented coronary artery  Hypothyroid  Hyperlipidemia  HTN (hypertension)  GERD (gastroesophageal reflux disease)  CAD (coronary artery disease)  [place] (hard of hearing)  History of bladder surgery  S/P EMILY (total abdominal hysterectomy)  S/P hernia repair  S/P laparoscopic cholecystectomy  Ankle fracture, right  S/P coronary angiogram      MEDS  acetaminophen   Tablet .. 650 milliGRAM(s) Oral every 6 hours PRN  ALPRAZolam 0.125 milliGRAM(s) Oral three times a day PRN  artificial  tears Solution 1 Drop(s) Both EYES three times a day  guaiFENesin   Syrup  (Sugar-Free) 100 milliGRAM(s) Oral every 6 hours PRN  heparin  Injectable 5000 Unit(s) SubCutaneous every 8 hours  isosorbide   mononitrate ER Tablet (IMDUR) 60 milliGRAM(s) Oral daily  levothyroxine 75 MICROGram(s) Oral daily  metoprolol succinate ER 50 milliGRAM(s) Oral daily  pantoprazole    Tablet 40 milliGRAM(s) Oral before breakfast  polyethylene glycol 3350 17 Gram(s) Oral daily PRN  senna 2 Tablet(s) Oral at bedtime  sodium chloride 0.9%. 1000 milliLiter(s) IV Continuous <Continuous>      Allergies  amlodipine (Unknown)  clonidine (Unknown)  Florinef Acetate (Unknown)  hydrocodone (Unknown)  Levatol (Unknown)  Lipitor (Unknown)  Lotrel (Unknown)  methylPREDNISolone (Unknown)  morphine (Other)  Plaquenil (Unknown)  statins (Other (Unknown))  sulfa drugs (Rash)        VITALS  T(C): 36.2 (12-11-19 @ 06:26), Max: 36.7 (12-10-19 @ 10:20)  HR: 77 (12-11-19 @ 06:26)  BP: 153/59 (12-11-19 @ 06:26)  RR: 18 (12-11-19 @ 06:26)  SpO2: 99% (12-11-19 @ 06:26)      Gen: elderly Female in NAD, well nourished  HEENT: normocephalic, anicteric sclera, moist mucus membranes; hearing intact  Chest: non labored breathing  Abd: soft, NT, ND, no masses  : no suprapubic distension or tenderness  Psych: AAOx3, normal affect & mood  Ext: moves all four extremities freely, no peripheral edema    LABS  12-10    138  |  108  |  18  ----------------------------<  96  4.1   |  23  |  1.05    Ca    9.3      10 Dec 2019 06:48      CBC Full  -  ( 10 Dec 2019 06:48 )  WBC Count : 10.29 K/uL  Hemoglobin : 11.1 g/dL  Hematocrit : 37.1 %  Platelet Count - Automated : 290 K/uL  Mean Cell Volume : 86.9 fl  Mean Cell Hemoglobin : 26.0 pg  Mean Cell Hemoglobin Concentration : 29.9 gm/dL  Auto Neutrophil # : x  Auto Lymphocyte # : x  Auto Monocyte # : x  Auto Eosinophil # : x  Auto Basophil # : x  Auto Neutrophil % : x  Auto Lymphocyte % : x  Auto Monocyte % : x  Auto Eosinophil % : x  Auto Basophil % : x      12/6/19 UA: trace LE, 3-5 WBC, 0-2 RBCs      Culture - Urine (collected 08 Dec 2019 10:33)  Source: .Urine Clean Catch (Midstream)  Final Report (09 Dec 2019 14:04):    >=3 organisms. Probable collection contamination.          ASSESSMENT & PLAN    Bladder cancer - high risk  - patient is a poor surgical candidate and is not interested in a cystectomy   - Patient again expressed her desire to stop BCG therapy which we can do however she understands that she has a very high risk of recurrence with progression to muscle invasive disease which can cause metastatic spread and eventual death.           Generalized Weakness  - No identifiable cause  - UA and Ucx negative for infection on this admission  - possible Depression? consider psychiatry evaluation and/or starting SSRIs    f/u as outpatient    Thank you for allowing me to participate in the care of this patient  Please call if there are questions or concerns     I explained the various issues and complexities regarding their current urological condition(s) and treatment options. They verbalized understanding and I answered all of their questions to satisfaction.     Gage Solorzano MD  [facility]  [phone]    12-11-19 @ 08:25

## 2019-12-12 RX ORDER — MIDODRINE HYDROCHLORIDE 2.5 MG/1
2.5 TABLET ORAL
Refills: 0 | Status: DISCONTINUED | OUTPATIENT
Start: 2019-12-12 | End: 2019-12-17

## 2019-12-12 RX ADMIN — Medication 0.12 MILLIGRAM(S): at 21:12

## 2019-12-12 RX ADMIN — Medication 75 MICROGRAM(S): at 05:18

## 2019-12-12 RX ADMIN — ISOSORBIDE MONONITRATE 60 MILLIGRAM(S): 60 TABLET, EXTENDED RELEASE ORAL at 11:38

## 2019-12-12 RX ADMIN — HEPARIN SODIUM 5000 UNIT(S): 5000 INJECTION INTRAVENOUS; SUBCUTANEOUS at 05:18

## 2019-12-12 RX ADMIN — Medication 650 MILLIGRAM(S): at 13:58

## 2019-12-12 RX ADMIN — Medication 50 MILLIGRAM(S): at 05:18

## 2019-12-12 RX ADMIN — Medication 650 MILLIGRAM(S): at 22:00

## 2019-12-12 RX ADMIN — HEPARIN SODIUM 5000 UNIT(S): 5000 INJECTION INTRAVENOUS; SUBCUTANEOUS at 13:56

## 2019-12-12 RX ADMIN — Medication 1 DROP(S): at 21:10

## 2019-12-12 RX ADMIN — Medication 1 DROP(S): at 05:18

## 2019-12-12 RX ADMIN — HEPARIN SODIUM 5000 UNIT(S): 5000 INJECTION INTRAVENOUS; SUBCUTANEOUS at 21:10

## 2019-12-12 RX ADMIN — PANTOPRAZOLE SODIUM 40 MILLIGRAM(S): 20 TABLET, DELAYED RELEASE ORAL at 05:18

## 2019-12-12 RX ADMIN — Medication 1 DROP(S): at 13:56

## 2019-12-12 RX ADMIN — Medication 650 MILLIGRAM(S): at 15:37

## 2019-12-12 RX ADMIN — Medication 650 MILLIGRAM(S): at 21:11

## 2019-12-12 RX ADMIN — MIDODRINE HYDROCHLORIDE 2.5 MILLIGRAM(S): 2.5 TABLET ORAL at 17:56

## 2019-12-13 PROCEDURE — 93010 ELECTROCARDIOGRAM REPORT: CPT

## 2019-12-13 PROCEDURE — 70450 CT HEAD/BRAIN W/O DYE: CPT | Mod: 26

## 2019-12-13 RX ORDER — LOSARTAN POTASSIUM 100 MG/1
25 TABLET, FILM COATED ORAL DAILY
Refills: 0 | Status: DISCONTINUED | OUTPATIENT
Start: 2019-12-13 | End: 2019-12-13

## 2019-12-13 RX ADMIN — PANTOPRAZOLE SODIUM 40 MILLIGRAM(S): 20 TABLET, DELAYED RELEASE ORAL at 05:23

## 2019-12-13 RX ADMIN — ISOSORBIDE MONONITRATE 60 MILLIGRAM(S): 60 TABLET, EXTENDED RELEASE ORAL at 11:21

## 2019-12-13 RX ADMIN — Medication 75 MICROGRAM(S): at 05:23

## 2019-12-13 RX ADMIN — HEPARIN SODIUM 5000 UNIT(S): 5000 INJECTION INTRAVENOUS; SUBCUTANEOUS at 20:43

## 2019-12-13 RX ADMIN — Medication 1 DROP(S): at 05:22

## 2019-12-13 RX ADMIN — HEPARIN SODIUM 5000 UNIT(S): 5000 INJECTION INTRAVENOUS; SUBCUTANEOUS at 14:22

## 2019-12-13 RX ADMIN — Medication 1 DROP(S): at 20:44

## 2019-12-13 RX ADMIN — MIDODRINE HYDROCHLORIDE 2.5 MILLIGRAM(S): 2.5 TABLET ORAL at 17:39

## 2019-12-13 RX ADMIN — Medication 650 MILLIGRAM(S): at 20:44

## 2019-12-13 RX ADMIN — Medication 0.12 MILLIGRAM(S): at 20:44

## 2019-12-13 RX ADMIN — Medication 650 MILLIGRAM(S): at 21:30

## 2019-12-13 RX ADMIN — HEPARIN SODIUM 5000 UNIT(S): 5000 INJECTION INTRAVENOUS; SUBCUTANEOUS at 05:22

## 2019-12-13 RX ADMIN — Medication 650 MILLIGRAM(S): at 14:36

## 2019-12-13 RX ADMIN — LOSARTAN POTASSIUM 25 MILLIGRAM(S): 100 TABLET, FILM COATED ORAL at 11:21

## 2019-12-13 RX ADMIN — Medication 1 DROP(S): at 14:22

## 2019-12-13 RX ADMIN — Medication 50 MILLIGRAM(S): at 05:22

## 2019-12-13 RX ADMIN — Medication 650 MILLIGRAM(S): at 15:06

## 2019-12-13 NOTE — CHART NOTE - NSCHARTNOTEFT_GEN_A_CORE
Patient's CT Head reviewed and results were unremarkable.     CT Head No Cont (12.13.19 @ 18:57) >    FINDINGS:    There is no acute intracranial hemorrhageor mass effect. There are areas   of hypodensity in the bilateral hemispheric white matter suggesting   chronic white matter microvascular ischemic change. There is cerebral   volume loss.    There is no extraaxial fluid collection.     There is no displaced calvarial fracture. Status post bilateral   intraocular lens implants. The visualized portions of the paranasal   sinuses are well aerated. The mastoid air cells are well aerated.    IMPRESSION: No acute intracranial hemorrhage or mass effect.     A/P  No further diagnostic workup needed at this time. Patient's CT Head reviewed and results were unremarkable.     CT Head No Cont (12.13.19 @ 18:57) >    FINDINGS:    There is no acute intracranial hemorrhageor mass effect. There are areas   of hypodensity in the bilateral hemispheric white matter suggesting   chronic white matter microvascular ischemic change. There is cerebral   volume loss.    There is no extraaxial fluid collection.     There is no displaced calvarial fracture. Status post bilateral   intraocular lens implants. The visualized portions of the paranasal   sinuses are well aerated. The mastoid air cells are well aerated.    IMPRESSION: No acute intracranial hemorrhage or mass effect.     A/P  No further head diagnostic workup needed at this time.

## 2019-12-14 LAB
ANION GAP SERPL CALC-SCNC: 7 MMOL/L — SIGNIFICANT CHANGE UP (ref 5–17)
BASOPHILS # BLD AUTO: 0.07 K/UL — SIGNIFICANT CHANGE UP (ref 0–0.2)
BASOPHILS NFR BLD AUTO: 0.6 % — SIGNIFICANT CHANGE UP (ref 0–2)
BUN SERPL-MCNC: 15 MG/DL — SIGNIFICANT CHANGE UP (ref 7–23)
CALCIUM SERPL-MCNC: 9.4 MG/DL — SIGNIFICANT CHANGE UP (ref 8.5–10.1)
CHLORIDE SERPL-SCNC: 107 MMOL/L — SIGNIFICANT CHANGE UP (ref 96–108)
CO2 SERPL-SCNC: 23 MMOL/L — SIGNIFICANT CHANGE UP (ref 22–31)
CREAT SERPL-MCNC: 0.92 MG/DL — SIGNIFICANT CHANGE UP (ref 0.5–1.3)
EOSINOPHIL # BLD AUTO: 0.27 K/UL — SIGNIFICANT CHANGE UP (ref 0–0.5)
EOSINOPHIL NFR BLD AUTO: 2.5 % — SIGNIFICANT CHANGE UP (ref 0–6)
GLUCOSE SERPL-MCNC: 92 MG/DL — SIGNIFICANT CHANGE UP (ref 70–99)
HCT VFR BLD CALC: 38.5 % — SIGNIFICANT CHANGE UP (ref 34.5–45)
HGB BLD-MCNC: 11.8 G/DL — SIGNIFICANT CHANGE UP (ref 11.5–15.5)
IMM GRANULOCYTES NFR BLD AUTO: 0.6 % — SIGNIFICANT CHANGE UP (ref 0–1.5)
LYMPHOCYTES # BLD AUTO: 2.95 K/UL — SIGNIFICANT CHANGE UP (ref 1–3.3)
LYMPHOCYTES # BLD AUTO: 27.1 % — SIGNIFICANT CHANGE UP (ref 13–44)
MCHC RBC-ENTMCNC: 26.6 PG — LOW (ref 27–34)
MCHC RBC-ENTMCNC: 30.6 GM/DL — LOW (ref 32–36)
MCV RBC AUTO: 86.9 FL — SIGNIFICANT CHANGE UP (ref 80–100)
MONOCYTES # BLD AUTO: 0.82 K/UL — SIGNIFICANT CHANGE UP (ref 0–0.9)
MONOCYTES NFR BLD AUTO: 7.5 % — SIGNIFICANT CHANGE UP (ref 2–14)
NEUTROPHILS # BLD AUTO: 6.69 K/UL — SIGNIFICANT CHANGE UP (ref 1.8–7.4)
NEUTROPHILS NFR BLD AUTO: 61.7 % — SIGNIFICANT CHANGE UP (ref 43–77)
PLATELET # BLD AUTO: 285 K/UL — SIGNIFICANT CHANGE UP (ref 150–400)
POTASSIUM SERPL-MCNC: 4 MMOL/L — SIGNIFICANT CHANGE UP (ref 3.5–5.3)
POTASSIUM SERPL-SCNC: 4 MMOL/L — SIGNIFICANT CHANGE UP (ref 3.5–5.3)
RBC # BLD: 4.43 M/UL — SIGNIFICANT CHANGE UP (ref 3.8–5.2)
RBC # FLD: 14.6 % — HIGH (ref 10.3–14.5)
SODIUM SERPL-SCNC: 137 MMOL/L — SIGNIFICANT CHANGE UP (ref 135–145)
WBC # BLD: 10.87 K/UL — HIGH (ref 3.8–10.5)
WBC # FLD AUTO: 10.87 K/UL — HIGH (ref 3.8–10.5)

## 2019-12-14 RX ORDER — LANOLIN ALCOHOL/MO/W.PET/CERES
3 CREAM (GRAM) TOPICAL ONCE
Refills: 0 | Status: COMPLETED | OUTPATIENT
Start: 2019-12-14 | End: 2019-12-14

## 2019-12-14 RX ORDER — ISOSORBIDE MONONITRATE 60 MG/1
30 TABLET, EXTENDED RELEASE ORAL DAILY
Refills: 0 | Status: DISCONTINUED | OUTPATIENT
Start: 2019-12-14 | End: 2019-12-17

## 2019-12-14 RX ORDER — SODIUM CHLORIDE 9 MG/ML
1000 INJECTION INTRAMUSCULAR; INTRAVENOUS; SUBCUTANEOUS ONCE
Refills: 0 | Status: COMPLETED | OUTPATIENT
Start: 2019-12-14 | End: 2019-12-14

## 2019-12-14 RX ADMIN — Medication 1 DROP(S): at 06:20

## 2019-12-14 RX ADMIN — Medication 1 DROP(S): at 22:13

## 2019-12-14 RX ADMIN — HEPARIN SODIUM 5000 UNIT(S): 5000 INJECTION INTRAVENOUS; SUBCUTANEOUS at 22:13

## 2019-12-14 RX ADMIN — Medication 650 MILLIGRAM(S): at 14:20

## 2019-12-14 RX ADMIN — Medication 1 DROP(S): at 14:20

## 2019-12-14 RX ADMIN — Medication 75 MICROGRAM(S): at 06:58

## 2019-12-14 RX ADMIN — SODIUM CHLORIDE 1000 MILLILITER(S): 9 INJECTION INTRAMUSCULAR; INTRAVENOUS; SUBCUTANEOUS at 14:18

## 2019-12-14 RX ADMIN — HEPARIN SODIUM 5000 UNIT(S): 5000 INJECTION INTRAVENOUS; SUBCUTANEOUS at 06:20

## 2019-12-14 RX ADMIN — Medication 650 MILLIGRAM(S): at 22:15

## 2019-12-14 RX ADMIN — Medication 50 MILLIGRAM(S): at 06:58

## 2019-12-14 RX ADMIN — ISOSORBIDE MONONITRATE 30 MILLIGRAM(S): 60 TABLET, EXTENDED RELEASE ORAL at 14:20

## 2019-12-14 RX ADMIN — HEPARIN SODIUM 5000 UNIT(S): 5000 INJECTION INTRAVENOUS; SUBCUTANEOUS at 14:19

## 2019-12-14 RX ADMIN — PANTOPRAZOLE SODIUM 40 MILLIGRAM(S): 20 TABLET, DELAYED RELEASE ORAL at 06:58

## 2019-12-14 RX ADMIN — SENNA PLUS 2 TABLET(S): 8.6 TABLET ORAL at 22:13

## 2019-12-14 RX ADMIN — Medication 0.12 MILLIGRAM(S): at 22:15

## 2019-12-15 LAB
ANION GAP SERPL CALC-SCNC: 6 MMOL/L — SIGNIFICANT CHANGE UP (ref 5–17)
BASOPHILS # BLD AUTO: 0.04 K/UL — SIGNIFICANT CHANGE UP (ref 0–0.2)
BASOPHILS NFR BLD AUTO: 0.5 % — SIGNIFICANT CHANGE UP (ref 0–2)
BUN SERPL-MCNC: 16 MG/DL — SIGNIFICANT CHANGE UP (ref 7–23)
CALCIUM SERPL-MCNC: 9.3 MG/DL — SIGNIFICANT CHANGE UP (ref 8.5–10.1)
CHLORIDE SERPL-SCNC: 109 MMOL/L — HIGH (ref 96–108)
CO2 SERPL-SCNC: 25 MMOL/L — SIGNIFICANT CHANGE UP (ref 22–31)
CREAT SERPL-MCNC: 0.98 MG/DL — SIGNIFICANT CHANGE UP (ref 0.5–1.3)
EOSINOPHIL # BLD AUTO: 0.23 K/UL — SIGNIFICANT CHANGE UP (ref 0–0.5)
EOSINOPHIL NFR BLD AUTO: 2.8 % — SIGNIFICANT CHANGE UP (ref 0–6)
GLUCOSE SERPL-MCNC: 92 MG/DL — SIGNIFICANT CHANGE UP (ref 70–99)
HCT VFR BLD CALC: 38.3 % — SIGNIFICANT CHANGE UP (ref 34.5–45)
HGB BLD-MCNC: 11.6 G/DL — SIGNIFICANT CHANGE UP (ref 11.5–15.5)
IMM GRANULOCYTES NFR BLD AUTO: 0.6 % — SIGNIFICANT CHANGE UP (ref 0–1.5)
LYMPHOCYTES # BLD AUTO: 2.65 K/UL — SIGNIFICANT CHANGE UP (ref 1–3.3)
LYMPHOCYTES # BLD AUTO: 32.1 % — SIGNIFICANT CHANGE UP (ref 13–44)
MCHC RBC-ENTMCNC: 26.3 PG — LOW (ref 27–34)
MCHC RBC-ENTMCNC: 30.3 GM/DL — LOW (ref 32–36)
MCV RBC AUTO: 86.8 FL — SIGNIFICANT CHANGE UP (ref 80–100)
MONOCYTES # BLD AUTO: 0.7 K/UL — SIGNIFICANT CHANGE UP (ref 0–0.9)
MONOCYTES NFR BLD AUTO: 8.5 % — SIGNIFICANT CHANGE UP (ref 2–14)
NEUTROPHILS # BLD AUTO: 4.59 K/UL — SIGNIFICANT CHANGE UP (ref 1.8–7.4)
NEUTROPHILS NFR BLD AUTO: 55.5 % — SIGNIFICANT CHANGE UP (ref 43–77)
PLATELET # BLD AUTO: 277 K/UL — SIGNIFICANT CHANGE UP (ref 150–400)
POTASSIUM SERPL-MCNC: 4.4 MMOL/L — SIGNIFICANT CHANGE UP (ref 3.5–5.3)
POTASSIUM SERPL-SCNC: 4.4 MMOL/L — SIGNIFICANT CHANGE UP (ref 3.5–5.3)
RBC # BLD: 4.41 M/UL — SIGNIFICANT CHANGE UP (ref 3.8–5.2)
RBC # FLD: 14.8 % — HIGH (ref 10.3–14.5)
SODIUM SERPL-SCNC: 140 MMOL/L — SIGNIFICANT CHANGE UP (ref 135–145)
WBC # BLD: 8.26 K/UL — SIGNIFICANT CHANGE UP (ref 3.8–10.5)
WBC # FLD AUTO: 8.26 K/UL — SIGNIFICANT CHANGE UP (ref 3.8–10.5)

## 2019-12-15 RX ORDER — SODIUM CHLORIDE 9 MG/ML
1000 INJECTION INTRAMUSCULAR; INTRAVENOUS; SUBCUTANEOUS ONCE
Refills: 0 | Status: COMPLETED | OUTPATIENT
Start: 2019-12-15 | End: 2019-12-15

## 2019-12-15 RX ADMIN — SODIUM CHLORIDE 1000 MILLILITER(S): 9 INJECTION INTRAMUSCULAR; INTRAVENOUS; SUBCUTANEOUS at 10:20

## 2019-12-15 RX ADMIN — Medication 1 DROP(S): at 06:37

## 2019-12-15 RX ADMIN — Medication 650 MILLIGRAM(S): at 21:58

## 2019-12-15 RX ADMIN — ISOSORBIDE MONONITRATE 30 MILLIGRAM(S): 60 TABLET, EXTENDED RELEASE ORAL at 11:46

## 2019-12-15 RX ADMIN — Medication 650 MILLIGRAM(S): at 16:10

## 2019-12-15 RX ADMIN — PANTOPRAZOLE SODIUM 40 MILLIGRAM(S): 20 TABLET, DELAYED RELEASE ORAL at 06:37

## 2019-12-15 RX ADMIN — Medication 75 MICROGRAM(S): at 06:37

## 2019-12-15 RX ADMIN — SENNA PLUS 2 TABLET(S): 8.6 TABLET ORAL at 21:58

## 2019-12-15 RX ADMIN — Medication 650 MILLIGRAM(S): at 15:10

## 2019-12-15 RX ADMIN — MIDODRINE HYDROCHLORIDE 2.5 MILLIGRAM(S): 2.5 TABLET ORAL at 15:06

## 2019-12-15 RX ADMIN — Medication 1 DROP(S): at 15:06

## 2019-12-15 RX ADMIN — Medication 50 MILLIGRAM(S): at 06:37

## 2019-12-15 RX ADMIN — Medication 1 DROP(S): at 21:58

## 2019-12-15 RX ADMIN — HEPARIN SODIUM 5000 UNIT(S): 5000 INJECTION INTRAVENOUS; SUBCUTANEOUS at 06:38

## 2019-12-15 RX ADMIN — Medication 650 MILLIGRAM(S): at 22:30

## 2019-12-15 RX ADMIN — Medication 0.12 MILLIGRAM(S): at 22:04

## 2019-12-15 RX ADMIN — HEPARIN SODIUM 5000 UNIT(S): 5000 INJECTION INTRAVENOUS; SUBCUTANEOUS at 21:58

## 2019-12-15 RX ADMIN — HEPARIN SODIUM 5000 UNIT(S): 5000 INJECTION INTRAVENOUS; SUBCUTANEOUS at 15:06

## 2019-12-16 ENCOUNTER — TRANSCRIPTION ENCOUNTER (OUTPATIENT)
Age: 84
End: 2019-12-16

## 2019-12-16 RX ORDER — METOPROLOL TARTRATE 50 MG
1 TABLET ORAL
Qty: 0 | Refills: 0 | DISCHARGE
Start: 2019-12-16

## 2019-12-16 RX ORDER — MIDODRINE HYDROCHLORIDE 2.5 MG/1
1 TABLET ORAL
Qty: 0 | Refills: 0 | DISCHARGE
Start: 2019-12-16

## 2019-12-16 RX ORDER — ACETAMINOPHEN 500 MG
2 TABLET ORAL
Qty: 0 | Refills: 0 | DISCHARGE

## 2019-12-16 RX ORDER — ACETAMINOPHEN 500 MG
2 TABLET ORAL
Qty: 0 | Refills: 0 | DISCHARGE
Start: 2019-12-16

## 2019-12-16 RX ORDER — METOPROLOL TARTRATE 50 MG
1 TABLET ORAL
Qty: 0 | Refills: 0 | DISCHARGE

## 2019-12-16 RX ORDER — POLYETHYLENE GLYCOL 3350 17 G/17G
17 POWDER, FOR SOLUTION ORAL
Qty: 0 | Refills: 0 | DISCHARGE
Start: 2019-12-16

## 2019-12-16 RX ORDER — ISOSORBIDE MONONITRATE 60 MG/1
1 TABLET, EXTENDED RELEASE ORAL
Qty: 0 | Refills: 0 | DISCHARGE
Start: 2019-12-16

## 2019-12-16 RX ORDER — SENNA PLUS 8.6 MG/1
2 TABLET ORAL
Qty: 0 | Refills: 0 | DISCHARGE
Start: 2019-12-16

## 2019-12-16 RX ADMIN — SENNA PLUS 2 TABLET(S): 8.6 TABLET ORAL at 21:49

## 2019-12-16 RX ADMIN — HEPARIN SODIUM 5000 UNIT(S): 5000 INJECTION INTRAVENOUS; SUBCUTANEOUS at 14:37

## 2019-12-16 RX ADMIN — Medication 650 MILLIGRAM(S): at 14:40

## 2019-12-16 RX ADMIN — PANTOPRAZOLE SODIUM 40 MILLIGRAM(S): 20 TABLET, DELAYED RELEASE ORAL at 05:55

## 2019-12-16 RX ADMIN — HEPARIN SODIUM 5000 UNIT(S): 5000 INJECTION INTRAVENOUS; SUBCUTANEOUS at 05:55

## 2019-12-16 RX ADMIN — ISOSORBIDE MONONITRATE 30 MILLIGRAM(S): 60 TABLET, EXTENDED RELEASE ORAL at 11:39

## 2019-12-16 RX ADMIN — Medication 1 DROP(S): at 05:54

## 2019-12-16 RX ADMIN — Medication 1 DROP(S): at 21:49

## 2019-12-16 RX ADMIN — Medication 0.12 MILLIGRAM(S): at 21:48

## 2019-12-16 RX ADMIN — Medication 1 DROP(S): at 14:37

## 2019-12-16 RX ADMIN — Medication 650 MILLIGRAM(S): at 22:48

## 2019-12-16 RX ADMIN — Medication 650 MILLIGRAM(S): at 21:49

## 2019-12-16 RX ADMIN — Medication 50 MILLIGRAM(S): at 05:54

## 2019-12-16 RX ADMIN — POLYETHYLENE GLYCOL 3350 17 GRAM(S): 17 POWDER, FOR SOLUTION ORAL at 12:17

## 2019-12-16 RX ADMIN — Medication 75 MICROGRAM(S): at 05:54

## 2019-12-16 RX ADMIN — Medication 650 MILLIGRAM(S): at 15:53

## 2019-12-16 RX ADMIN — HEPARIN SODIUM 5000 UNIT(S): 5000 INJECTION INTRAVENOUS; SUBCUTANEOUS at 21:49

## 2019-12-16 NOTE — DISCHARGE NOTE PROVIDER - NSDCCPCAREPLAN_GEN_ALL_CORE_FT
PRINCIPAL DISCHARGE DIAGNOSIS  Diagnosis: Weakness  Assessment and Plan of Treatment: KEEP SYSTOLIC BLOOD PRESSURE GOAL 150-180, ADVISE SLOW CHANGE OF POSITION , PLAN FOR REHAB AND FOLLOW UP WITH DR QUINTANA POST DISCHARGE      SECONDARY DISCHARGE DIAGNOSES  Diagnosis: Lightheadedness  Assessment and Plan of Treatment:     Diagnosis: Weakness  Assessment and Plan of Treatment: PRINCIPAL DISCHARGE DIAGNOSIS  Diagnosis: Orthostatic hypotension  Assessment and Plan of Treatment: PAtient with very labile BP- +orthostatic- known very well by Cardiology MD.  ON BP meds - patient goal -180 as per cardiology.  On metoprolol adn Imdur. Please see medication reconcillation for instructions and mor einformation on dosing.      SECONDARY DISCHARGE DIAGNOSES  Diagnosis: Bladder cancer  Assessment and Plan of Treatment: patient does not want further treatment. PRINCIPAL DISCHARGE DIAGNOSIS  Diagnosis: Orthostatic hypotension  Assessment and Plan of Treatment: PAtient with very labile BP- +orthostatic- known very well by Cardiology MD.  ON BP meds - patient goal -180 as per cardiology.  Continue Imdur 30mg in the morning and Imdur 30mg in the evening with metoprolol XL 25mg (around 18:00)      SECONDARY DISCHARGE DIAGNOSES  Diagnosis: Bladder cancer  Assessment and Plan of Treatment: patient does not want further treatment. PRINCIPAL DISCHARGE DIAGNOSIS  Diagnosis: Orthostatic hypotension  Assessment and Plan of Treatment: PAtient with very labile BP- +orthostatic- known very well by Cardiology MD.  ON BP meds - patient goal -180 as per cardiology.  Continue Imdur 30mg in the morning and Imdur 30mg in the evening with metoprolol XL 25mg (around 18:00)  Goal for SBP should be 140-180, hold BP meds if SBP <140 as per cardiology      SECONDARY DISCHARGE DIAGNOSES  Diagnosis: Bladder cancer  Assessment and Plan of Treatment: patient does not want further treatment.

## 2019-12-16 NOTE — DISCHARGE NOTE PROVIDER - CARE PROVIDER_API CALL
Mayra Sánchez (MD)  Cardiovascular Disease; Internal Medicine  [address] [place] [address]  Phone: [phone]  Fax: [phone]  Follow Up Time: Mayra Sánchez (MD)  Cardiovascular Disease; Internal Medicine  [address] [place] [address]  Phone: [phone]  Fax: [phone]  Follow Up Time:

## 2019-12-16 NOTE — DISCHARGE NOTE PROVIDER - HOSPITAL COURSE
89 F w/PMH hypothyroid, CAD, HTN, bladder ca (currently undergoing tx), recurrent UTI, presents c/o increased generalized weakness.  Pt went for her 2nd bladder ca tx 1 day prior to arrival to ed, found w/ UTI and started on macrobid, which she had  been taking as outpatient .  However, she was unable to ambulate d/t weakness (similar to prior UTI), called her  office and instructed to come to ED for tx.  She denies any fever/chills, n/v/d, abd pain, dysuria/freq/urg.  She had similar presentations and hospitalizations for UTI. In ED, WBC 16, vitals stable, +UA, given rocephin IV.                  89 F w/PMH hypothyroid, CAD, HTN, bladder ca (currently undergoing tx), recurrent UTI, presents c/o increased generalized weakness.  In ED, WBC 16, vitals stable, +UA, given rocephin IV.          #GENERALISED weakness and no localizing signs- ucx contaminated    #INPATIENT  NEAR SYNCOPE  from orthostatic hypotension/ANOTHER EPISODE OF SYMPTOMATIC(GENRALISED WEAKNESS AND NEAR SYNCOPE) HYPOTENSION 12/13 WITHOUT ACUTE FOCAL NEURODEFICITS    # Bladder CA does not want any further treatment        UTI WAS RULED OUT,    DIFFICULT TO FIND AN OPTIMAL BALANCE BETWEEN PATIENT ELEVATED SBP AND EPISODES OF ORTHOSTATIC HYPOTENSION    GOAL IS TO MAINTAIN -180'S OR LESS DISCUSSED WITH CARDIOLOGY DR JUSTIN    CONTINUE MIDODRINE , IMDUR 30MG , AVOID ANY FURTHER ANTIHYPERTENSIVES AS LONG AS SBP REMAINS -180'S OR LESS    12/16 PATIENT'S  AND FELT WEAK (HAD NOT YET RECEIVED HER IMDUR FOR THE DAY)EVENTUALLY NOW 'S AT GOALAFTER HER IMDUR DOSE    BUT PATIENT IS CONCERNED AND REPORTS SHE STILL IS VERY WEAK AND IS UNABLE TO GO TO REHAB TODAY    repeat ua clear ct chest shows atelectasis , no pna     tele no events    DC PLANNING TO REHAB 12/17        PHYSICAL EXAM         GEN: lying in bed, NAD    HEENT:   NC/AT, pupils equal and reactive, EOMI    CV:  +S1, +S2, RRR    RESP:   lungs clear to auscultation bilaterally, no wheeze, rales, rhonchi     GI:  abdomen soft, non-tender, non-distended, normoactive BS        MSK:   normal muscle tone    EXT:  no edema    NEURO:  AAOX3, no focal neurological deficits    SKIN:  no rashes 89 F w/PMH hypothyroid, CAD, HTN, bladder ca (currently undergoing tx), recurrent UTI, presents c/o increased generalized weakness.  Pt went for her 2nd bladder ca tx 1 day prior to arrival to ed, found w/ UTI and started on macrobid, which she had  been taking as outpatient .  However, she was unable to ambulate d/t weakness (similar to prior UTI), called her  office and instructed to come to ED for tx.  She denies any fever/chills, n/v/d, abd pain, dysuria/freq/urg.  She had similar presentations and hospitalizations for UTI. In ED, WBC 16, vitals stable, +UA, given rocephin IV.                  89 F w/PMH hypothyroid, CAD, HTN, bladder ca (currently undergoing tx), recurrent UTI, presents c/o increased generalized weakness.  In ED, WBC 16, vitals stable, +UA, given rocephin IV.          #GENERALISED weakness and no localizing signs- ucx contaminated    #INPATIENT  NEAR SYNCOPE  from orthostatic hypotension/ANOTHER EPISODE OF SYMPTOMATIC(GENRALISED WEAKNESS AND NEAR SYNCOPE) HYPOTENSION 12/13 WITHOUT ACUTE FOCAL NEURODEFICITS    # Bladder CA does not want any further treatment        UTI WAS RULED OUT,    DIFFICULT TO FIND AN OPTIMAL BALANCE BETWEEN PATIENT ELEVATED SBP AND EPISODES OF ORTHOSTATIC HYPOTENSION    GOAL IS TO MAINTAIN -180'S OR LESS DISCUSSED WITH CARDIOLOGY DR JUSTIN    CONTINUE MIDODRINE , IMDUR 30MG , metoprolol, AVOID ANY FURTHER ANTIHYPERTENSIVES AS LONG AS SBP REMAINS -180'S OR LESS    12/16 PATIENT'S  AND FELT WEAK (HAD NOT YET RECEIVED HER IMDUR FOR THE DAY)EVENTUALLY NOW 'S AT GOALAFTER HER IMDUR DOSE    BUT PATIENT IS CONCERNED AND REPORTS SHE STILL IS VERY WEAK AND IS UNABLE TO GO TO REHAB TODAY    repeat ua clear ct chest shows atelectasis , no pna     tele no events    DC PLANNING TO REHAB 12/17        PHYSICAL EXAM         GEN: lying in bed, NAD    HEENT:   NC/AT, pupils equal and reactive, EOMI    CV:  +S1, +S2, RRR    RESP:   lungs clear to auscultation bilaterally, no wheeze, rales, rhonchi     GI:  abdomen soft, non-tender, non-distended, normoactive BS        MSK:   normal muscle tone    EXT:  no edema    NEURO:  AAOX3, no focal neurological deficits    SKIN:  no rashes 89 F w/PMH hypothyroid, CAD, HTN, bladder ca (currently undergoing tx), recurrent UTI, presents c/o increased generalized weakness.  Pt went for her 2nd bladder ca tx 1 day prior to arrival to ed, found w/ UTI and started on macrobid, which she had  been taking as outpatient .  However, she was unable to ambulate d/t weakness (similar to prior UTI), called her  office and instructed to come to ED for tx.  She denies any fever/chills, n/v/d, abd pain, dysuria/freq/urg.  She had similar presentations and hospitalizations for UTI. In ED, WBC 16, vitals stable, +UA, given rocephin IV.      Hospital course complicated with Orthostatic hypotension and patient was given midodrine which made patient hypertensive with SBP>200. Further complicated with very labile BP- goal -180. BP very difficult to maintain- patient becomes symptomatic when BP <140. Numerous attempts to adjust regimen- currently on Metoprolol and Imdur. As per Cardiology Dr Álvarez- ok to dc to rehab with goal BP >150.        Vital Signs Last 24 Hrs    T(C): 36.3 (19 Dec 2019 11:38), Max: 36.5 (19 Dec 2019 06:50)    T(F): 97.4 (19 Dec 2019 11:38), Max: 97.7 (19 Dec 2019 06:50)    HR: 86 (19 Dec 2019 11:38) (82 - 86)    BP: 184/57 (19 Dec 2019 11:38) (127/56 - 185/57)    BP(mean): --    RR: 16 (19 Dec 2019 11:38) (16 - 18)    SpO2: 100% (19 Dec 2019 11:38) (97% - 100%)        PE:    Constitutional: NAD, laying in bed    HEENT: NC/AT    Back: no tenderness    Respiratory: respirations even and non labored, LCTA diminished breath sounds at the base.     Cardiovascular: S1S2 regular, no murmurs    Abdomen: soft, not tender, not distended, positive BS    Genitourinary: voiding    Musculoskeletal: no muscle tenderness, no joint swelling or tenderness    Extremities: no pedal edema     Neurological: no focal deficits        * Bladder Cancer does not want further treatment    * UTI- ruled out- completed IV antibiotics    * +orthostatic- received midodrine which in turn made her hypertensive and BP harder to control.     * Hypertension    - cardiology consulted    - will adjust antihypertensive with goal -180 as per Dr Álvarez    - on imdur- increase to 60mg PRN 30mg if BP>200 as per Cards    - PT consult- recommends rehab    - monitor BP    - patient BP very labile.         Case d/w team and MD on IDR. Plan explained to patient and all of their concerns were addressed. 89 F w/PMH hypothyroid, CAD, HTN, bladder ca (currently undergoing tx), recurrent UTI, presents c/o increased generalized weakness.  Pt went for her 2nd bladder ca tx 1 day prior to arrival to ed, found w/ UTI and started on macrobid, which she had  been taking as outpatient .  However, she was unable to ambulate d/t weakness (similar to prior UTI), called her  office and instructed to come to ED for tx.  She denies any fever/chills, n/v/d, abd pain, dysuria/freq/urg.  She had similar presentations and hospitalizations for UTI. In ED, WBC 16, vitals stable, +UA, given rocephin IV.      Hospital course complicated with Orthostatic hypotension and patient was given midodrine which made patient hypertensive with SBP>200. Further complicated with very labile BP- goal -180. BP very difficult to maintain- patient becomes symptomatic when BP <140. Numerous attempts to adjust regimen- currently on Metoprolol and Imdur. As per Cardiology Dr Álvarez- ok to dc to rehab with goal BP >150.        Vital Signs Last 24 Hrs    T(C): 36.3 (19 Dec 2019 11:38), Max: 36.5 (19 Dec 2019 06:50)    T(F): 97.4 (19 Dec 2019 11:38), Max: 97.7 (19 Dec 2019 06:50)    HR: 86 (19 Dec 2019 11:38) (82 - 86)    BP: 184/57 (19 Dec 2019 11:38) (127/56 - 185/57)    BP(mean): --    RR: 16 (19 Dec 2019 11:38) (16 - 18)    SpO2: 100% (19 Dec 2019 11:38) (97% - 100%)        PE:    Constitutional: NAD, laying in bed    HEENT: NC/AT    Back: no tenderness    Respiratory: respirations even and non labored, LCTA diminished breath sounds at the base.     Cardiovascular: S1S2 regular, no murmurs    Abdomen: soft, not tender, not distended, positive BS    Genitourinary: voiding    Musculoskeletal: no muscle tenderness, no joint swelling or tenderness    Extremities: no pedal edema     Neurological: no focal deficits        * Bladder Cancer does not want further treatment    * UTI- ruled out- completed IV antibiotics    * +orthostatic- received midodrine which in turn made her hypertensive and BP harder to control.     * Hypertension    - cardiology consulted    - will adjust antihypertensive with goal -180 as per Dr Álvarez    - on imdur- 30 mg BID and metoprolol XL 25mg daily in evening    - PT consult- recommends rehab    - monitor BP    - patient BP very labile.         Total time spent on discharge including coordination of care: 44 minutes

## 2019-12-16 NOTE — PROVIDER CONTACT NOTE (OTHER) - ASSESSMENT
Patient states "I don't feel well. My head feels foggy." Patient expressed frustration with blood pressure

## 2019-12-16 NOTE — DISCHARGE NOTE PROVIDER - NSDCMRMEDTOKEN_GEN_ALL_CORE_FT
acetaminophen 325 mg oral tablet: 2 tab(s) orally every 6 hours, As needed, Mild Pain (1 - 3)  doxycycline monohydrate 100 mg oral capsule: 1 cap(s) orally every 12 hours  ***Course Completed****  guaiFENesin 100 mg/5 mL oral liquid: 5 milliliter(s) orally every 6 hours, As needed, Cough  isosorbide mononitrate 30 mg oral tablet, extended release: 1 tab(s) orally once a day  metoprolol succinate 50 mg oral tablet, extended release: 1 tab(s) orally once a day  midodrine 2.5 mg oral tablet: 1 tab(s) orally   nitrofurantoin macrocrystals 100 mg oral capsule: 1 cap(s) orally 2 times a day  ****Course Stopped***  pantoprazole 40 mg oral delayed release tablet: 1 tab(s) orally once a day  polyethylene glycol 3350 oral powder for reconstitution: 17 gram(s) orally once a day, As needed, Constipation  Refresh ophthalmic solution: 1 drop(s) to each affected eye 3 times a day  senna oral tablet: 2 tab(s) orally once a day (at bedtime)  Synthroid 75 mcg (0.075 mg) oral tablet: 1 tab(s) orally once a day  Vitamin D3 1000 intl units oral tablet: 2 tab(s) orally once a day  Xanax 0.25 mg oral tablet: 1 tab(s) orally 3 times a day, As Needed for anxiety acetaminophen 325 mg oral tablet: 2 tab(s) orally every 6 hours, As needed, Mild Pain (1 - 3)  bisacodyl 10 mg rectal suppository: 1 suppository(ies) rectal once a day, As needed, Constipation  guaiFENesin 100 mg/5 mL oral liquid: 5 milliliter(s) orally every 6 hours, As needed, Cough  isosorbide mononitrate 30 mg oral tablet, extended release: 1 tab(s) orally once, As needed, elevated blood pressure over 190 mm HG  isosorbide mononitrate 60 mg oral tablet, extended release: 1 tab(s) orally once a day  magnesium hydroxide 8% oral suspension: 30 milliliter(s) orally once a day, As needed, Constipation  melatonin 3 mg oral tablet: 1 tab(s) orally once a day (at bedtime)  metoprolol succinate 50 mg oral tablet, extended release: 1 tab(s) orally once a day  nystatin 100,000 units/g topical powder: 1 application topically every 12 hours  pantoprazole 40 mg oral delayed release tablet: 1 tab(s) orally once a day  polyethylene glycol 3350 oral powder for reconstitution: 17 gram(s) orally once a day, As needed, Constipation  Refresh ophthalmic solution: 1 drop(s) to each affected eye 3 times a day  senna oral tablet: 2 tab(s) orally once a day (at bedtime)  Synthroid 75 mcg (0.075 mg) oral tablet: 1 tab(s) orally once a day  Vitamin D3 1000 intl units oral tablet: 2 tab(s) orally once a day  Xanax 0.25 mg oral tablet: 1 tab(s) orally 3 times a day, As Needed for anxiety acetaminophen 325 mg oral tablet: 2 tab(s) orally every 6 hours, As needed, Mild Pain (1 - 3)  bisacodyl 10 mg rectal suppository: 1 suppository(ies) rectal once a day, As needed, Constipation  guaiFENesin 100 mg/5 mL oral liquid: 5 milliliter(s) orally every 6 hours, As needed, Cough  isosorbide mononitrate 30 mg oral tablet, extended release: 1 tab(s) orally in AM  isosorbide mononitrate 30 mg oral tablet, extended release: 1 tab(s) orally at 18:00  magnesium hydroxide 8% oral suspension: 30 milliliter(s) orally once a day, As needed, Constipation  melatonin 3 mg oral tablet: 1 tab(s) orally once a day (at bedtime)  metoprolol succinate 25 mg oral tablet, extended release: 1 tab(s) orally once a day at 18:00  nystatin 100,000 units/g topical powder: 1 application topically every 12 hours  pantoprazole 40 mg oral delayed release tablet: 1 tab(s) orally once a day  polyethylene glycol 3350 oral powder for reconstitution: 17 gram(s) orally once a day, As needed, Constipation  Refresh ophthalmic solution: 1 drop(s) to each affected eye 3 times a day  senna oral tablet: 2 tab(s) orally once a day (at bedtime)  Synthroid 75 mcg (0.075 mg) oral tablet: 1 tab(s) orally once a day  Vitamin D3 1000 intl units oral tablet: 2 tab(s) orally once a day  Xanax 0.25 mg oral tablet: 1 tab(s) orally 3 times a day, As Needed for anxiety

## 2019-12-17 RX ORDER — ISOSORBIDE MONONITRATE 60 MG/1
60 TABLET, EXTENDED RELEASE ORAL DAILY
Refills: 0 | Status: DISCONTINUED | OUTPATIENT
Start: 2019-12-17 | End: 2019-12-19

## 2019-12-17 RX ORDER — LANOLIN ALCOHOL/MO/W.PET/CERES
3 CREAM (GRAM) TOPICAL AT BEDTIME
Refills: 0 | Status: DISCONTINUED | OUTPATIENT
Start: 2019-12-17 | End: 2019-12-20

## 2019-12-17 RX ORDER — SODIUM CHLORIDE 9 MG/ML
1000 INJECTION INTRAMUSCULAR; INTRAVENOUS; SUBCUTANEOUS ONCE
Refills: 0 | Status: DISCONTINUED | OUTPATIENT
Start: 2019-12-17 | End: 2019-12-17

## 2019-12-17 RX ORDER — MAGNESIUM HYDROXIDE 400 MG/1
30 TABLET, CHEWABLE ORAL DAILY
Refills: 0 | Status: DISCONTINUED | OUTPATIENT
Start: 2019-12-17 | End: 2019-12-20

## 2019-12-17 RX ORDER — NYSTATIN CREAM 100000 [USP'U]/G
1 CREAM TOPICAL EVERY 12 HOURS
Refills: 0 | Status: DISCONTINUED | OUTPATIENT
Start: 2019-12-17 | End: 2019-12-20

## 2019-12-17 RX ORDER — ISOSORBIDE MONONITRATE 60 MG/1
30 TABLET, EXTENDED RELEASE ORAL ONCE
Refills: 0 | Status: COMPLETED | OUTPATIENT
Start: 2019-12-17 | End: 2019-12-17

## 2019-12-17 RX ADMIN — Medication 650 MILLIGRAM(S): at 16:33

## 2019-12-17 RX ADMIN — ISOSORBIDE MONONITRATE 30 MILLIGRAM(S): 60 TABLET, EXTENDED RELEASE ORAL at 11:54

## 2019-12-17 RX ADMIN — Medication 650 MILLIGRAM(S): at 17:18

## 2019-12-17 RX ADMIN — MAGNESIUM HYDROXIDE 30 MILLILITER(S): 400 TABLET, CHEWABLE ORAL at 16:32

## 2019-12-17 RX ADMIN — SENNA PLUS 2 TABLET(S): 8.6 TABLET ORAL at 20:37

## 2019-12-17 RX ADMIN — Medication 1 DROP(S): at 20:38

## 2019-12-17 RX ADMIN — Medication 75 MICROGRAM(S): at 06:43

## 2019-12-17 RX ADMIN — HEPARIN SODIUM 5000 UNIT(S): 5000 INJECTION INTRAVENOUS; SUBCUTANEOUS at 20:38

## 2019-12-17 RX ADMIN — Medication 650 MILLIGRAM(S): at 12:08

## 2019-12-17 RX ADMIN — PANTOPRAZOLE SODIUM 40 MILLIGRAM(S): 20 TABLET, DELAYED RELEASE ORAL at 06:44

## 2019-12-17 RX ADMIN — Medication 3 MILLIGRAM(S): at 22:36

## 2019-12-17 RX ADMIN — Medication 650 MILLIGRAM(S): at 23:06

## 2019-12-17 RX ADMIN — Medication 650 MILLIGRAM(S): at 22:36

## 2019-12-17 RX ADMIN — Medication 1 DROP(S): at 06:44

## 2019-12-17 RX ADMIN — Medication 50 MILLIGRAM(S): at 06:43

## 2019-12-17 RX ADMIN — HEPARIN SODIUM 5000 UNIT(S): 5000 INJECTION INTRAVENOUS; SUBCUTANEOUS at 06:44

## 2019-12-17 RX ADMIN — HEPARIN SODIUM 5000 UNIT(S): 5000 INJECTION INTRAVENOUS; SUBCUTANEOUS at 14:50

## 2019-12-17 RX ADMIN — NYSTATIN CREAM 1 APPLICATION(S): 100000 CREAM TOPICAL at 16:32

## 2019-12-17 RX ADMIN — Medication 650 MILLIGRAM(S): at 12:45

## 2019-12-17 RX ADMIN — Medication 1 DROP(S): at 14:50

## 2019-12-18 LAB
ANION GAP SERPL CALC-SCNC: 7 MMOL/L — SIGNIFICANT CHANGE UP (ref 5–17)
BUN SERPL-MCNC: 17 MG/DL — SIGNIFICANT CHANGE UP (ref 7–23)
CALCIUM SERPL-MCNC: 9.6 MG/DL — SIGNIFICANT CHANGE UP (ref 8.5–10.1)
CHLORIDE SERPL-SCNC: 105 MMOL/L — SIGNIFICANT CHANGE UP (ref 96–108)
CO2 SERPL-SCNC: 25 MMOL/L — SIGNIFICANT CHANGE UP (ref 22–31)
CREAT SERPL-MCNC: 1.04 MG/DL — SIGNIFICANT CHANGE UP (ref 0.5–1.3)
GLUCOSE SERPL-MCNC: 134 MG/DL — HIGH (ref 70–99)
HCT VFR BLD CALC: 39.9 % — SIGNIFICANT CHANGE UP (ref 34.5–45)
HGB BLD-MCNC: 12.2 G/DL — SIGNIFICANT CHANGE UP (ref 11.5–15.5)
MCHC RBC-ENTMCNC: 26.5 PG — LOW (ref 27–34)
MCHC RBC-ENTMCNC: 30.6 GM/DL — LOW (ref 32–36)
MCV RBC AUTO: 86.7 FL — SIGNIFICANT CHANGE UP (ref 80–100)
PLATELET # BLD AUTO: 300 K/UL — SIGNIFICANT CHANGE UP (ref 150–400)
POTASSIUM SERPL-MCNC: 4.3 MMOL/L — SIGNIFICANT CHANGE UP (ref 3.5–5.3)
POTASSIUM SERPL-SCNC: 4.3 MMOL/L — SIGNIFICANT CHANGE UP (ref 3.5–5.3)
RBC # BLD: 4.6 M/UL — SIGNIFICANT CHANGE UP (ref 3.8–5.2)
RBC # FLD: 14.9 % — HIGH (ref 10.3–14.5)
SODIUM SERPL-SCNC: 137 MMOL/L — SIGNIFICANT CHANGE UP (ref 135–145)
WBC # BLD: 9.88 K/UL — SIGNIFICANT CHANGE UP (ref 3.8–10.5)
WBC # FLD AUTO: 9.88 K/UL — SIGNIFICANT CHANGE UP (ref 3.8–10.5)

## 2019-12-18 RX ORDER — SODIUM CHLORIDE 9 MG/ML
1000 INJECTION INTRAMUSCULAR; INTRAVENOUS; SUBCUTANEOUS ONCE
Refills: 0 | Status: COMPLETED | OUTPATIENT
Start: 2019-12-18 | End: 2019-12-18

## 2019-12-18 RX ORDER — HYDRALAZINE HCL 50 MG
25 TABLET ORAL THREE TIMES A DAY
Refills: 0 | Status: DISCONTINUED | OUTPATIENT
Start: 2019-12-19 | End: 2019-12-19

## 2019-12-18 RX ADMIN — Medication 3 MILLIGRAM(S): at 21:36

## 2019-12-18 RX ADMIN — Medication 75 MICROGRAM(S): at 05:11

## 2019-12-18 RX ADMIN — Medication 650 MILLIGRAM(S): at 22:44

## 2019-12-18 RX ADMIN — Medication 1 DROP(S): at 14:20

## 2019-12-18 RX ADMIN — Medication 1 DROP(S): at 21:37

## 2019-12-18 RX ADMIN — Medication 650 MILLIGRAM(S): at 05:13

## 2019-12-18 RX ADMIN — Medication 650 MILLIGRAM(S): at 14:20

## 2019-12-18 RX ADMIN — HEPARIN SODIUM 5000 UNIT(S): 5000 INJECTION INTRAVENOUS; SUBCUTANEOUS at 05:12

## 2019-12-18 RX ADMIN — HEPARIN SODIUM 5000 UNIT(S): 5000 INJECTION INTRAVENOUS; SUBCUTANEOUS at 21:37

## 2019-12-18 RX ADMIN — SODIUM CHLORIDE 1000 MILLILITER(S): 9 INJECTION INTRAMUSCULAR; INTRAVENOUS; SUBCUTANEOUS at 13:32

## 2019-12-18 RX ADMIN — Medication 650 MILLIGRAM(S): at 05:43

## 2019-12-18 RX ADMIN — SENNA PLUS 2 TABLET(S): 8.6 TABLET ORAL at 21:36

## 2019-12-18 RX ADMIN — NYSTATIN CREAM 1 APPLICATION(S): 100000 CREAM TOPICAL at 17:38

## 2019-12-18 RX ADMIN — NYSTATIN CREAM 1 APPLICATION(S): 100000 CREAM TOPICAL at 05:12

## 2019-12-18 RX ADMIN — Medication 1 DROP(S): at 05:12

## 2019-12-18 RX ADMIN — PANTOPRAZOLE SODIUM 40 MILLIGRAM(S): 20 TABLET, DELAYED RELEASE ORAL at 05:11

## 2019-12-18 RX ADMIN — ISOSORBIDE MONONITRATE 60 MILLIGRAM(S): 60 TABLET, EXTENDED RELEASE ORAL at 11:39

## 2019-12-18 RX ADMIN — HEPARIN SODIUM 5000 UNIT(S): 5000 INJECTION INTRAVENOUS; SUBCUTANEOUS at 14:20

## 2019-12-18 RX ADMIN — Medication 50 MILLIGRAM(S): at 05:11

## 2019-12-18 RX ADMIN — Medication 10 MILLIGRAM(S): at 11:51

## 2019-12-18 RX ADMIN — Medication 650 MILLIGRAM(S): at 21:43

## 2019-12-19 RX ORDER — LANOLIN ALCOHOL/MO/W.PET/CERES
1 CREAM (GRAM) TOPICAL
Qty: 0 | Refills: 0 | DISCHARGE
Start: 2019-12-19

## 2019-12-19 RX ORDER — ISOSORBIDE MONONITRATE 60 MG/1
30 TABLET, EXTENDED RELEASE ORAL ONCE
Refills: 0 | Status: DISCONTINUED | OUTPATIENT
Start: 2019-12-19 | End: 2019-12-20

## 2019-12-19 RX ORDER — NYSTATIN CREAM 100000 [USP'U]/G
1 CREAM TOPICAL
Qty: 0 | Refills: 0 | DISCHARGE
Start: 2019-12-19

## 2019-12-19 RX ORDER — ISOSORBIDE MONONITRATE 60 MG/1
1 TABLET, EXTENDED RELEASE ORAL
Qty: 0 | Refills: 0 | DISCHARGE
Start: 2019-12-19

## 2019-12-19 RX ORDER — MAGNESIUM HYDROXIDE 400 MG/1
30 TABLET, CHEWABLE ORAL
Qty: 0 | Refills: 0 | DISCHARGE
Start: 2019-12-19

## 2019-12-19 RX ORDER — METOPROLOL TARTRATE 50 MG
50 TABLET ORAL DAILY
Refills: 0 | Status: DISCONTINUED | OUTPATIENT
Start: 2019-12-19 | End: 2019-12-19

## 2019-12-19 RX ORDER — ISOSORBIDE MONONITRATE 60 MG/1
60 TABLET, EXTENDED RELEASE ORAL DAILY
Refills: 0 | Status: DISCONTINUED | OUTPATIENT
Start: 2019-12-19 | End: 2019-12-19

## 2019-12-19 RX ORDER — ISOSORBIDE MONONITRATE 60 MG/1
30 TABLET, EXTENDED RELEASE ORAL
Refills: 0 | Status: DISCONTINUED | OUTPATIENT
Start: 2019-12-19 | End: 2019-12-19

## 2019-12-19 RX ORDER — ISOSORBIDE MONONITRATE 60 MG/1
30 TABLET, EXTENDED RELEASE ORAL
Refills: 0 | Status: DISCONTINUED | OUTPATIENT
Start: 2019-12-19 | End: 2019-12-20

## 2019-12-19 RX ORDER — ISOSORBIDE MONONITRATE 60 MG/1
120 TABLET, EXTENDED RELEASE ORAL DAILY
Refills: 0 | Status: DISCONTINUED | OUTPATIENT
Start: 2019-12-19 | End: 2019-12-19

## 2019-12-19 RX ADMIN — Medication 3 MILLIGRAM(S): at 21:27

## 2019-12-19 RX ADMIN — PANTOPRAZOLE SODIUM 40 MILLIGRAM(S): 20 TABLET, DELAYED RELEASE ORAL at 07:21

## 2019-12-19 RX ADMIN — Medication 650 MILLIGRAM(S): at 22:10

## 2019-12-19 RX ADMIN — Medication 1 DROP(S): at 07:22

## 2019-12-19 RX ADMIN — HEPARIN SODIUM 5000 UNIT(S): 5000 INJECTION INTRAVENOUS; SUBCUTANEOUS at 13:48

## 2019-12-19 RX ADMIN — HEPARIN SODIUM 5000 UNIT(S): 5000 INJECTION INTRAVENOUS; SUBCUTANEOUS at 21:27

## 2019-12-19 RX ADMIN — Medication 1 DROP(S): at 13:48

## 2019-12-19 RX ADMIN — Medication 25 MILLIGRAM(S): at 07:21

## 2019-12-19 RX ADMIN — Medication 650 MILLIGRAM(S): at 21:27

## 2019-12-19 RX ADMIN — ISOSORBIDE MONONITRATE 60 MILLIGRAM(S): 60 TABLET, EXTENDED RELEASE ORAL at 11:40

## 2019-12-19 RX ADMIN — Medication 50 MILLIGRAM(S): at 11:40

## 2019-12-19 RX ADMIN — NYSTATIN CREAM 1 APPLICATION(S): 100000 CREAM TOPICAL at 07:22

## 2019-12-19 RX ADMIN — Medication 650 MILLIGRAM(S): at 14:39

## 2019-12-19 RX ADMIN — SENNA PLUS 2 TABLET(S): 8.6 TABLET ORAL at 21:26

## 2019-12-19 RX ADMIN — NYSTATIN CREAM 1 APPLICATION(S): 100000 CREAM TOPICAL at 20:14

## 2019-12-19 RX ADMIN — HEPARIN SODIUM 5000 UNIT(S): 5000 INJECTION INTRAVENOUS; SUBCUTANEOUS at 07:22

## 2019-12-19 RX ADMIN — Medication 1 DROP(S): at 21:27

## 2019-12-19 RX ADMIN — Medication 75 MICROGRAM(S): at 07:21

## 2019-12-19 RX ADMIN — ISOSORBIDE MONONITRATE 30 MILLIGRAM(S): 60 TABLET, EXTENDED RELEASE ORAL at 18:11

## 2019-12-19 NOTE — PROVIDER CONTACT NOTE (OTHER) - SITUATION
Office is aware of consult.
Patient hypertensive at this time. Blood pressure taken on left arm 192/73 HR 78. Repeat blood pressure on right arm 213/77 HR 78.
Spoke with Ngoc in the office regarding consult
spoke with Aditi in office regarding consult

## 2019-12-20 ENCOUNTER — TRANSCRIPTION ENCOUNTER (OUTPATIENT)
Age: 84
End: 2019-12-20

## 2019-12-20 VITALS
HEART RATE: 86 BPM | TEMPERATURE: 98 F | SYSTOLIC BLOOD PRESSURE: 136 MMHG | RESPIRATION RATE: 18 BRPM | DIASTOLIC BLOOD PRESSURE: 56 MMHG | OXYGEN SATURATION: 98 %

## 2019-12-20 RX ORDER — METOPROLOL TARTRATE 50 MG
1 TABLET ORAL
Qty: 0 | Refills: 0 | DISCHARGE
Start: 2019-12-20

## 2019-12-20 RX ORDER — ISOSORBIDE MONONITRATE 60 MG/1
1 TABLET, EXTENDED RELEASE ORAL
Qty: 0 | Refills: 0 | DISCHARGE
Start: 2019-12-20

## 2019-12-20 RX ORDER — METOPROLOL TARTRATE 50 MG
25 TABLET ORAL DAILY
Refills: 0 | Status: DISCONTINUED | OUTPATIENT
Start: 2019-12-20 | End: 2019-12-20

## 2019-12-20 RX ADMIN — HEPARIN SODIUM 5000 UNIT(S): 5000 INJECTION INTRAVENOUS; SUBCUTANEOUS at 05:24

## 2019-12-20 RX ADMIN — ISOSORBIDE MONONITRATE 30 MILLIGRAM(S): 60 TABLET, EXTENDED RELEASE ORAL at 05:24

## 2019-12-20 RX ADMIN — Medication 75 MICROGRAM(S): at 05:24

## 2019-12-20 RX ADMIN — Medication 650 MILLIGRAM(S): at 12:19

## 2019-12-20 RX ADMIN — Medication 1 DROP(S): at 05:24

## 2019-12-20 RX ADMIN — PANTOPRAZOLE SODIUM 40 MILLIGRAM(S): 20 TABLET, DELAYED RELEASE ORAL at 05:24

## 2019-12-20 RX ADMIN — NYSTATIN CREAM 1 APPLICATION(S): 100000 CREAM TOPICAL at 05:24

## 2019-12-20 NOTE — CONSULT NOTE ADULT - SUBJECTIVE AND OBJECTIVE BOX
Patient is a 89y old  Female who presents with a chief complaint of weakness, lightheadedness, right LE numbness (20 Dec 2019 07:34)    ________________________________  AWolf HIGUERA is a 89y year old Female with a past medical history of     HPI:  89 yo female w/ PMHx of CAD s/p stents x5, bladder cancer (currently undergoing tx), HTN, hypothyroidism presents c/o weakness and lightheadedness. Pt was admitted on 11/14 for evaluation of similar sx but also including burning with urination after recent chemo for recurrence of bladder cancer. Urine cx grew MRSA. She was treated with 4 days of IV vanco and discharged on PO doxy. Offered rehab but pt declined at the time. Complains of similar sx today but denies any dysuria, frequency or urgency. She has had difficulty crossing even short distances across the living room, worse than prior admission, and complains of difficulty even raising her arms. Pt complains of chronic low back and hip pain for the last 8-9months associated with numbness radiating down the posterior right LE. No UE numbness or tingling.No bowel incontinence. is chronically incontinent of urine    In the ED: Negative CT head. Trace leukocytes on UA. Hypertensive with 194-209 systolically.    PAST MEDICAL & SURGICAL HISTORY:  Bladder cancer  Endometrial adenocarcinoma  Macular degeneration  Stented coronary artery  Hypothyroid  Hyperlipidemia  HTN (hypertension)  GERD (gastroesophageal reflux disease)  CAD (coronary artery disease)  [place] (hard of hearing)  History of bladder surgery  S/P EMILY (total abdominal hysterectomy)  S/P hernia repair: umbilical - 2008  S/P laparoscopic cholecystectomy: 2008  Ankle fracture, right: surgery 25 yrs ago - hardware removed  S/P coronary angiogram: 2005, 2008, 2011 - drug eluding stents    FAMILY HISTORY:  Family history of brain cancer    Social history:   Lives alone  Independent at baseline  Son nearby (06 Dec 2019 18:02)      PREVIOUS CARDIAC WORKUP:    Echocardiogram:  Stress Test:  Cardiac Catheterization:  ________________________________  .ros    PAST MEDICAL & SURGICAL HISTORY:  Orthostatic hypertension  Bladder cancer  Endometrial adenocarcinoma  Macular degeneration  Stented coronary artery  Hypothyroid  Hyperlipidemia  HTN (hypertension)  GERD (gastroesophageal reflux disease)  CAD (coronary artery disease)  [place] (hard of hearing)  History of bladder surgery  S/P EMILY (total abdominal hysterectomy)  S/P hernia repair: umbilical - 2008  S/P laparoscopic cholecystectomy: 2008  Ankle fracture, right: surgery 25 yrs ago - hardware removed  S/P coronary angiogram: 2005, 2008, 2011 - drug eluding stents    FAMILY HISTORY:  Family history of brain cancer     The patient denies any history of premature CAD or sudden cardiac death.    SOCIAL HISTORY: The patient denies any history of tobacco abuse, alcohol abuse or illicit drug use.     Home Medications:  acetaminophen 325 mg oral tablet: 2 tab(s) orally every 6 hours, As needed, Mild Pain (1 - 3) (16 Dec 2019 19:14)  bisacodyl 10 mg rectal suppository: 1 suppository(ies) rectal once a day, As needed, Constipation (19 Dec 2019 12:05)  guaiFENesin 100 mg/5 mL oral liquid: 5 milliliter(s) orally every 6 hours, As needed, Cough (16 Dec 2019 19:14)  isosorbide mononitrate 30 mg oral tablet, extended release: 1 tab(s) orally once, As needed, elevated blood pressure over 190 mm HG (19 Dec 2019 12:05)  isosorbide mononitrate 60 mg oral tablet, extended release: 1 tab(s) orally once a day (19 Dec 2019 12:05)  magnesium hydroxide 8% oral suspension: 30 milliliter(s) orally once a day, As needed, Constipation (19 Dec 2019 12:05)  melatonin 3 mg oral tablet: 1 tab(s) orally once a day (at bedtime) (19 Dec 2019 12:05)  metoprolol succinate 50 mg oral tablet, extended release: 1 tab(s) orally once a day (16 Dec 2019 19:14)  nystatin 100,000 units/g topical powder: 1 application topically every 12 hours (19 Dec 2019 12:05)  pantoprazole 40 mg oral delayed release tablet: 1 tab(s) orally once a day (06 Dec 2019 17:44)  polyethylene glycol 3350 oral powder for reconstitution: 17 gram(s) orally once a day, As needed, Constipation (16 Dec 2019 19:14)  Refresh ophthalmic solution: 1 drop(s) to each affected eye 3 times a day (06 Dec 2019 17:44)  senna oral tablet: 2 tab(s) orally once a day (at bedtime) (16 Dec 2019 19:14)  Synthroid 75 mcg (0.075 mg) oral tablet: 1 tab(s) orally once a day (06 Dec 2019 17:44)  Vitamin D3 1000 intl units oral tablet: 2 tab(s) orally once a day (08 Dec 2019 14:26)  Xanax 0.25 mg oral tablet: 1 tab(s) orally 3 times a day, As Needed for anxiety (06 Dec 2019 17:44)    MEDICATIONS  (STANDING):  artificial  tears Solution 1 Drop(s) Both EYES three times a day  heparin  Injectable 5000 Unit(s) SubCutaneous every 8 hours  isosorbide   mononitrate ER Tablet (IMDUR) 30 milliGRAM(s) Oral <User Schedule>  isosorbide   mononitrate ER Tablet (IMDUR) 30 milliGRAM(s) Oral <User Schedule>  levothyroxine 75 MICROGram(s) Oral daily  melatonin 3 milliGRAM(s) Oral at bedtime  metoprolol succinate ER 25 milliGRAM(s) Oral daily  nystatin Powder 1 Application(s) Topical every 12 hours  pantoprazole    Tablet 40 milliGRAM(s) Oral before breakfast  senna 2 Tablet(s) Oral at bedtime  sodium chloride 0.9%. 1000 milliLiter(s) (50 mL/Hr) IV Continuous <Continuous>    MEDICATIONS  (PRN):  acetaminophen   Tablet .. 650 milliGRAM(s) Oral every 6 hours PRN Mild Pain (1 - 3)  bisacodyl Suppository 10 milliGRAM(s) Rectal daily PRN Constipation  guaiFENesin   Syrup  (Sugar-Free) 100 milliGRAM(s) Oral every 6 hours PRN Cough  isosorbide   mononitrate ER Tablet (IMDUR) 30 milliGRAM(s) Oral once PRN elevated blood pressure over 190 mm HG  magnesium hydroxide Suspension 30 milliLiter(s) Oral daily PRN Constipation  polyethylene glycol 3350 17 Gram(s) Oral daily PRN Constipation    Vital Signs Last 24 Hrs  T(C): 36.7 (20 Dec 2019 11:20), Max: 36.7 (20 Dec 2019 11:20)  T(F): 98.1 (20 Dec 2019 11:20), Max: 98.1 (20 Dec 2019 11:20)  HR: 86 (20 Dec 2019 11:20) (73 - 86)  BP: 136/56 (20 Dec 2019 11:20) (136/56 - 169/62)  BP(mean): 73 (20 Dec 2019 11:20) (73 - 73)  RR: 18 (20 Dec 2019 11:20) (16 - 18)  SpO2: 98% (20 Dec 2019 11:20) (97% - 100%)  I&O's Summary    ________________________________  .pex  ________________________________  TELEMETRY:    ECG:    LABS:                                ________________________________    RADIOLOGY & ADDITIONAL STUDIES:  ________________________________    ASSESSMENT:      PLAN:      __________________________________________________________________________  Thank you for allowing me to participate in the care of your patient. Please contact me should any questions arise.    OFE Ritchie DO, [facility]  [phone] Constance is a 89y old  Female who presents with a chief complaint of weakness, lightheadedness, right LE numbness. She has a PMX of CAD s/p PCI with patent stents noted on cath in 2017,, bladder cancer (currently undergoing tx), HTN, hypothyroidism presents c/o weakness and lightheadedness. Pt was admitted on 11/14 for evaluation of similar sx but also including burning with urination after recent chemo for recurrence of bladder cancer. Urine cx grew MRSA. She was treated with 4 days of IV vanco and discharged on PO doxy. Offered rehab but pt declined at the time. Complains of similar sx today but denies any dysuria, frequency or urgency. She has had difficulty crossing even short distances across the living room, worse than prior admission, and complains of difficulty even raising her arms. Pt complains of chronic low back and hip pain for the last 8-9months associated with numbness radiating down the posterior right LE. No UE numbness or tingling.No bowel incontinence. is chronically incontinent of urine  In the ED: Negative CT head. Trace leukocytes on UA. Hypertensive with 194-209 SBP.  Cardiology consulted for 2nd op on BP mgt. She has been intolerant to multiple therapy in the past.      Home Medications:  acetaminophen 325 mg oral tablet: 2 tab(s) orally every 6 hours, As needed, Mild Pain (1 - 3) (16 Dec 2019 19:14)  bisacodyl 10 mg rectal suppository: 1 suppository(ies) rectal once a day, As needed, Constipation (19 Dec 2019 12:05)  guaiFENesin 100 mg/5 mL oral liquid: 5 milliliter(s) orally every 6 hours, As needed, Cough (16 Dec 2019 19:14)  isosorbide mononitrate 30 mg oral tablet, extended release: 1 tab(s) orally in AM (20 Dec 2019 16:33)  isosorbide mononitrate 30 mg oral tablet, extended release: 1 tab(s) orally at 18:00 (20 Dec 2019 16:33)  magnesium hydroxide 8% oral suspension: 30 milliliter(s) orally once a day, As needed, Constipation (19 Dec 2019 12:05)  melatonin 3 mg oral tablet: 1 tab(s) orally once a day (at bedtime) (19 Dec 2019 12:05)  metoprolol succinate 25 mg oral tablet, extended release: 1 tab(s) orally once a day at 18:00 (20 Dec 2019 16:33)  nystatin 100,000 units/g topical powder: 1 application topically every 12 hours (19 Dec 2019 12:05)  pantoprazole 40 mg oral delayed release tablet: 1 tab(s) orally once a day (06 Dec 2019 17:44)  polyethylene glycol 3350 oral powder for reconstitution: 17 gram(s) orally once a day, As needed, Constipation (16 Dec 2019 19:14)  Refresh ophthalmic solution: 1 drop(s) to each affected eye 3 times a day (06 Dec 2019 17:44)  senna oral tablet: 2 tab(s) orally once a day (at bedtime) (16 Dec 2019 19:14)  Synthroid 75 mcg (0.075 mg) oral tablet: 1 tab(s) orally once a day (06 Dec 2019 17:44)  Vitamin D3 1000 intl units oral tablet: 2 tab(s) orally once a day (08 Dec 2019 14:26)  Xanax 0.25 mg oral tablet: 1 tab(s) orally 3 times a day, As Needed for anxiety (06 Dec 2019 17:44)    Allergies    amlodipine (Unknown)  clonidine (Unknown)  Florinef Acetate (Unknown)  hydrocodone (Unknown)  Levatol (Unknown)  Lipitor (Unknown)  Lotrel (Unknown)  methylPREDNISolone (Unknown)  morphine (Other)  Plaquenil (Unknown)  statins (Other (Unknown))  sulfa drugs (Rash)            PAST MEDICAL & SURGICAL HISTORY:  Bladder cancer  Endometrial adenocarcinoma  Macular degeneration  Stented coronary artery  Hypothyroid  Hyperlipidemia  HTN (hypertension)  GERD (gastroesophageal reflux disease)  CAD (coronary artery disease)  [place])  History of bladder surgery  S/P EMILY (total abdominal hysterectomy)  S/P hernia repair: umbilical - 2008  S/P laparoscopic cholecystectomy: 2008  Ankle fracture, right: surgery 25 yrs ago - hardware removed  S/P coronary angiogram: 2005, 2008, 2011 - drug eluding stents    FAMILY HISTORY:  Family history of brain cancer    Social history:   Lives alone  Independent at baseline  Son nearby (06 Dec 2019 18:02)       ________________________________  Review of systems: A 10 point review of system has been performed, and is negative except for what has been mentioned in the above history of present illness.     ug use.     MEDICATIONS  (STANDING):  artificial  tears Solution 1 Drop(s) Both EYES three times a day  heparin  Injectable 5000 Unit(s) SubCutaneous every 8 hours  isosorbide   mononitrate ER Tablet (IMDUR) 30 milliGRAM(s) Oral <User Schedule>  isosorbide   mononitrate ER Tablet (IMDUR) 30 milliGRAM(s) Oral <User Schedule>  levothyroxine 75 MICROGram(s) Oral daily  melatonin 3 milliGRAM(s) Oral at bedtime  metoprolol succinate ER 25 milliGRAM(s) Oral daily  nystatin Powder 1 Application(s) Topical every 12 hours  pantoprazole    Tablet 40 milliGRAM(s) Oral before breakfast  senna 2 Tablet(s) Oral at bedtime  sodium chloride 0.9%. 1000 milliLiter(s) (50 mL/Hr) IV Continuous <Continuous>    MEDICATIONS  (PRN):  acetaminophen   Tablet .. 650 milliGRAM(s) Oral every 6 hours PRN Mild Pain (1 - 3)  bisacodyl Suppository 10 milliGRAM(s) Rectal daily PRN Constipation  guaiFENesin   Syrup  (Sugar-Free) 100 milliGRAM(s) Oral every 6 hours PRN Cough  isosorbide   mononitrate ER Tablet (IMDUR) 30 milliGRAM(s) Oral once PRN elevated blood pressure over 190 mm HG  magnesium hydroxide Suspension 30 milliLiter(s) Oral daily PRN Constipation  polyethylene glycol 3350 17 Gram(s) Oral daily PRN Constipation    Vital Signs Last 24 Hrs  T(C): 36.7 (20 Dec 2019 11:20), Max: 36.7 (20 Dec 2019 11:20)  T(F): 98.1 (20 Dec 2019 11:20), Max: 98.1 (20 Dec 2019 11:20)  HR: 86 (20 Dec 2019 11:20) (73 - 86)  BP: 136/56 (20 Dec 2019 11:20) (136/56 - 169/62)  BP(mean): 73 (20 Dec 2019 11:20) (73 - 73)  RR: 18 (20 Dec 2019 11:20) (16 - 18)  SpO2: 98% (20 Dec 2019 11:20) (97% - 100%)  I&O's Summary    ________________________________  PHYSICAL EXAM:  GENERAL APPEARANCE:  No acute distress  HEAD: normocephalic, atraumatic  NECK: supple, no jugular venous distention, no carotid bruit    HEART: regular rate and rhythm, S1, S2 normal, 1/6 systolic murmur    CHEST:  No anterior chest wall tenderness    LUNGS:  Clear to auscultation, without any wheezing, rhonchi or rales    ABDOMEN soft, nontender, nondistended, with positive bowel sounds appreciated  EXTREMITIES: no clubbing, cyanosis, or edema.   NEURO:  Alert and oriented x3  PSYC:  Normal affect  SKIN:  Dry   ________________________________  TELEMETRY: not on tele    ECG:    LABS:              	                  ________________________________    RADIOLOGY & ADDITIONAL STUDIES:  ________________________________    ASSESSMENT:  HTN  CAD  Bladder CA    PLAN:  Agree w. primary cardio-cont BB, imdur. Dose 2nd dose of imdur and lower dose of BB at night.  Encouraged continued hydration  Full note to follow.    d/w PCP      __________________________________________________________________________  Thank you for allowing me to participate in the care of your patient. Please contact me should any questions arise.    OFE Ritchie DO, [facility]

## 2019-12-20 NOTE — CONSULT NOTE ADULT - REASON FOR ADMISSION
weakness, lightheadedness, right LE numbness

## 2019-12-20 NOTE — PROGRESS NOTE ADULT - REASON FOR ADMISSION
weakness, lightheadedness, right LE numbness
weakness, lightheadedness, right LE numbness
"weakness for a long time"
weakness, lightheadedness, right LE numbness

## 2019-12-20 NOTE — PROGRESS NOTE ADULT - SUBJECTIVE AND OBJECTIVE BOX
89 F w/PMH hypothyroid, CAD, HTN, bladder ca (currently undergoing tx), recurrent UTI, presents c/o increased generalized weakness.  Pt went for her 2nd bladder ca tx 1 day prior to arrival to ed, found w/ UTI and started on macrobid, which she had  been taking as outpatient .  However, she was unable to ambulate d/t weakness (similar to prior UTI), called her  office and instructed to come to ED for tx.  She denies any fever/chills, n/v/d, abd pain, dysuria/freq/urg.  She had similar presentations and hospitalizations for UTI. In ED, WBC 16, vitals stable, +UA, given rocephin IV.    Hospital course complicated with Orthostatic hypotension and patient was given midodrine which made patient hypertensive with SBP>200.     12/17- patient was seen and examined. +orthostatic this AM. Denies chest pain, fever, sob. Denies headaches.     Vital Signs Last 24 Hrs  T(C): 36.3 (17 Dec 2019 11:43), Max: 36.6 (16 Dec 2019 21:18)  T(F): 97.3 (17 Dec 2019 11:43), Max: 97.9 (16 Dec 2019 21:18)  HR: 78 (17 Dec 2019 11:43) (70 - 81)  BP: 188/65 (17 Dec 2019 11:43) (154/63 - 188/65)  BP(mean): --  RR: 19 (17 Dec 2019 11:43) (18 - 19)  SpO2: 100% (17 Dec 2019 11:43) (97% - 100%)    REVIEW OF SYSTEMS:    CONSTITUTIONAL: No weakness, fevers or chills  EYES/ENT: No visual changes;  No vertigo or throat pain   NECK: No pain or stiffness  RESPIRATORY: No cough, wheezing, hemoptysis; No shortness of breath  CARDIOVASCULAR: No chest pain or palpitations  GASTROINTESTINAL: No abdominal or epigastric pain. No nausea, vomiting, or hematemesis; No diarrhea or constipation. No melena or hematochezia.  GENITOURINARY: No dysuria, frequency or hematuria  NEUROLOGICAL: No numbness or weakness  SKIN: No itching, burning, rashes, or lesions   All other review of systems is negative unless indicated above.      PE:  Constitutional: NAD, laying in bed  HEENT: NC/AT  Back: no tenderness  Respiratory: respirations even and non labored, LCTA  Cardiovascular: S1S2 regular, no murmurs  Abdomen: soft, not tender, not distended, positive BS  Genitourinary: voiding  Rectal: deferred  Musculoskeletal: no muscle tenderness, no joint swelling or tenderness  Extremities: no pedal edema   Neurological: no focal deficits    MEDICATIONS  (STANDING):  artificial  tears Solution 1 Drop(s) Both EYES three times a day  heparin  Injectable 5000 Unit(s) SubCutaneous every 8 hours  isosorbide   mononitrate ER Tablet (IMDUR) 30 milliGRAM(s) Oral once  isosorbide   mononitrate ER Tablet (IMDUR) 60 milliGRAM(s) Oral daily  levothyroxine 75 MICROGram(s) Oral daily  metoprolol succinate ER 50 milliGRAM(s) Oral daily  pantoprazole    Tablet 40 milliGRAM(s) Oral before breakfast  senna 2 Tablet(s) Oral at bedtime  sodium chloride 0.9%. 1000 milliLiter(s) (50 mL/Hr) IV Continuous <Continuous>    MEDICATIONS  (PRN):  acetaminophen   Tablet .. 650 milliGRAM(s) Oral every 6 hours PRN Mild Pain (1 - 3)  guaiFENesin   Syrup  (Sugar-Free) 100 milliGRAM(s) Oral every 6 hours PRN Cough  polyethylene glycol 3350 17 Gram(s) Oral daily PRN Constipation
89 F w/PMH hypothyroid, CAD, HTN, bladder ca (currently undergoing tx), recurrent UTI, presents c/o increased generalized weakness.  Pt went for her 2nd bladder ca tx 1 day prior to arrival to ed, found w/ UTI and started on macrobid, which she had  been taking as outpatient .  However, she was unable to ambulate d/t weakness (similar to prior UTI), called her  office and instructed to come to ED for tx.  She denies any fever/chills, n/v/d, abd pain, dysuria/freq/urg.  She had similar presentations and hospitalizations for UTI. In ED, WBC 16, vitals stable, +UA, given rocephin IV.    Hospital course complicated with Orthostatic hypotension and patient was given midodrine which made patient hypertensive with SBP>200. Further complicated with very labile BP- goal -180.    12/18- patient still orthostatic this morning. Stated that she does not feel well this AM. Denies chest pain, fever or chills.    Vital Signs Last 24 Hrs  T(C): 36.6 (18 Dec 2019 11:25), Max: 36.7 (17 Dec 2019 17:19)  T(F): 97.8 (18 Dec 2019 11:25), Max: 98 (17 Dec 2019 17:19)  HR: 85 (18 Dec 2019 13:10) (72 - 85)  BP: 127/56 (18 Dec 2019 13:10) (127/56 - 211/73)  BP(mean): --  RR: 18 (18 Dec 2019 11:25) (18 - 18)  SpO2: 100% (18 Dec 2019 11:25) (97% - 100%)    REVIEW OF SYSTEMS:    CONSTITUTIONAL: No weakness, fevers or chills  EYES/ENT: No visual changes;  No vertigo or throat pain   NECK: No pain or stiffness  RESPIRATORY: No cough, wheezing, hemoptysis; No shortness of breath  CARDIOVASCULAR: No chest pain or palpitations  GASTROINTESTINAL: No abdominal or epigastric pain. No nausea, vomiting, or hematemesis; No diarrhea or constipation. No melena or hematochezia.  GENITOURINARY: No dysuria, frequency or hematuria  NEUROLOGICAL: No numbness or weakness  SKIN: No itching, burning, rashes, or lesions   All other review of systems is negative unless indicated above.    PE:  Constitutional: NAD, laying in bed  HEENT: NC/AT  Back: no tenderness  Respiratory: respirations even and non labored, LCTA- diminished at the base  Cardiovascular: S1S2 regular, no murmurs  Abdomen: soft, not tender, not distended, positive BS  Genitourinary: voiding  Rectal: deferred  Musculoskeletal: no muscle tenderness, no joint swelling or tenderness  Extremities: no pedal edema   Neurological: no focal deficits    12-18    137  |  105  |  17  ----------------------------<  134<H>  4.3   |  25  |  1.04    Ca    9.6      18 Dec 2019 08:11                          12.2   9.88  )-----------( 300      ( 18 Dec 2019 08:11 )             39.9   MEDICATIONS  (STANDING):  artificial  tears Solution 1 Drop(s) Both EYES three times a day  heparin  Injectable 5000 Unit(s) SubCutaneous every 8 hours  isosorbide   mononitrate ER Tablet (IMDUR) 60 milliGRAM(s) Oral daily  levothyroxine 75 MICROGram(s) Oral daily  melatonin 3 milliGRAM(s) Oral at bedtime  metoprolol succinate ER 50 milliGRAM(s) Oral daily  nystatin Powder 1 Application(s) Topical every 12 hours  pantoprazole    Tablet 40 milliGRAM(s) Oral before breakfast  senna 2 Tablet(s) Oral at bedtime  sodium chloride 0.9%. 1000 milliLiter(s) (50 mL/Hr) IV Continuous <Continuous>    MEDICATIONS  (PRN):  acetaminophen   Tablet .. 650 milliGRAM(s) Oral every 6 hours PRN Mild Pain (1 - 3)  bisacodyl Suppository 10 milliGRAM(s) Rectal daily PRN Constipation  guaiFENesin   Syrup  (Sugar-Free) 100 milliGRAM(s) Oral every 6 hours PRN Cough  magnesium hydroxide Suspension 30 milliLiter(s) Oral daily PRN Constipation  polyethylene glycol 3350 17 Gram(s) Oral daily PRN Constipation
89 F w/PMH hypothyroid, CAD, HTN, bladder ca (currently undergoing tx), recurrent UTI, presents c/o increased generalized weakness.  Pt went for her 2nd bladder ca tx 1 day prior to arrival to ed, found w/ UTI and started on macrobid, which she had  been taking as outpatient .  However, she was unable to ambulate d/t weakness (similar to prior UTI), called her  office and instructed to come to ED for tx.  She denies any fever/chills, n/v/d, abd pain, dysuria/freq/urg.  She had similar presentations and hospitalizations for UTI. In ED, WBC 16, vitals stable, +UA, given rocephin IV.    Hospital course complicated with Orthostatic hypotension and patient was given midodrine which made patient hypertensive with SBP>200. Further complicated with very labile BP- goal -180.    12/19- patient orthostatic again this morning. Metoprolol held yesterday due to hypotension. This AM BP >200. Patient voices frustration about medication regimen. Denies chest pain, sob or fever.     Vital Signs Last 24 Hrs  T(C): 36.3 (19 Dec 2019 11:38), Max: 36.5 (19 Dec 2019 06:50)  T(F): 97.4 (19 Dec 2019 11:38), Max: 97.7 (19 Dec 2019 06:50)  HR: 86 (19 Dec 2019 11:38) (82 - 86)  BP: 184/57 (19 Dec 2019 11:38) (127/56 - 185/57)  BP(mean): --  RR: 16 (19 Dec 2019 11:38) (16 - 18)  SpO2: 100% (19 Dec 2019 11:38) (97% - 100%)    REVIEW OF SYSTEMS:    CONSTITUTIONAL: No weakness, fevers or chills  EYES/ENT: No visual changes;  No vertigo or throat pain   NECK: No pain or stiffness  RESPIRATORY: No cough, wheezing, hemoptysis; No shortness of breath  CARDIOVASCULAR: No chest pain or palpitations  GASTROINTESTINAL: No abdominal or epigastric pain. No nausea, vomiting, or hematemesis; No diarrhea or constipation. No melena or hematochezia.  GENITOURINARY: No dysuria, frequency or hematuria  NEUROLOGICAL: No numbness or weakness  SKIN: No itching, burning, rashes, or lesions   All other review of systems is negative unless indicated above.    PE:  Constitutional: NAD, laying in bed  HEENT: NC/AT  Back: no tenderness  Respiratory: respirations even and non labored, LCTA- diminished at the base  Cardiovascular: S1S2 regular, no murmurs  Abdomen: soft, not tender, not distended, positive BS  Genitourinary: voiding  Rectal: deferred  Musculoskeletal: no muscle tenderness, no joint swelling or tenderness  Extremities: no pedal edema   Neurological: no focal deficits    12-18    137  |  105  |  17  ----------------------------<  134<H>  4.3   |  25  |  1.04    Ca    9.6      18 Dec 2019 08:11                            12.2   9.88  )-----------( 300      ( 18 Dec 2019 08:11 )             39.9   MEDICATIONS  (STANDING):  artificial  tears Solution 1 Drop(s) Both EYES three times a day  heparin  Injectable 5000 Unit(s) SubCutaneous every 8 hours  isosorbide   mononitrate ER Tablet (IMDUR) 60 milliGRAM(s) Oral daily  levothyroxine 75 MICROGram(s) Oral daily  melatonin 3 milliGRAM(s) Oral at bedtime  metoprolol succinate ER 50 milliGRAM(s) Oral daily  nystatin Powder 1 Application(s) Topical every 12 hours  pantoprazole    Tablet 40 milliGRAM(s) Oral before breakfast  senna 2 Tablet(s) Oral at bedtime  sodium chloride 0.9%. 1000 milliLiter(s) (50 mL/Hr) IV Continuous <Continuous>    MEDICATIONS  (PRN):  acetaminophen   Tablet .. 650 milliGRAM(s) Oral every 6 hours PRN Mild Pain (1 - 3)  bisacodyl Suppository 10 milliGRAM(s) Rectal daily PRN Constipation  guaiFENesin   Syrup  (Sugar-Free) 100 milliGRAM(s) Oral every 6 hours PRN Cough  magnesium hydroxide Suspension 30 milliLiter(s) Oral daily PRN Constipation  polyethylene glycol 3350 17 Gram(s) Oral daily PRN Constipation
89 F w/PMH hypothyroid, CAD, HTN, bladder ca (currently undergoing tx), recurrent UTI, presents c/o increased generalized weakness.  Pt went for her 2nd bladder ca tx 1 day prior to arrival to ed, found w/ UTI and started on macrobid, which she had  been taking as outpatient .  However, she was unable to ambulate d/t weakness (similar to prior UTI), called her  office and instructed to come to ED for tx.  She denies any fever/chills, n/v/d, abd pain, dysuria/freq/urg.  She had similar presentations and hospitalizations for UTI. In ED, WBC 16, vitals stable, +UA, given rocephin IV.  PHYSICAL EXAM     GEN: lying in bed, NAD  HEENT:   NC/AT, pupils equal and reactive, EOMI  CV:  +S1, +S2, RRR  RESP:   lungs clear to auscultation bilaterally, no wheeze, rales, rhonchi   GI:  abdomen soft, non-tender, non-distended, normoactive BS    MSK:   normal muscle tone  EXT:  no edema  NEURO:  AAOX3, no focal neurological deficits  SKIN:  no rashes
89 F w/PMH hypothyroid, CAD, HTN, bladder ca (currently undergoing tx), recurrent UTI, presents c/o increased generalized weakness.  Pt went for her 2nd bladder ca tx yesterday, found w/ UTI and started on macrobid, which she's been taking.  However, today, she was unable to ambulate d/t weakness (similar to prior UTI), called her  office and instructed to come to ED for tx.  She denies any fever/chills, n/v/d, abd pain, dysuria/freq/urg.  She had similar presentations and hospitalizations for UTI. In ED, WBC 16, vitals stable, +UA, given rocephin IV.       - feels very weak.  met with pall and plan for family meeting. pt does not want rehab but lives alone and with worsening weakness.    Vital Signs Last 24 Hrs  T(C): 36.6 (09 Dec 2019 12:13), Max: 36.7 (08 Dec 2019 19:14)  T(F): 97.9 (09 Dec 2019 12:13), Max: 98 (08 Dec 2019 19:14)  HR: 82 (09 Dec 2019 12:13) (73 - 83)  BP: 144/64 (09 Dec 2019 12:13) (139/54 - 215/72)  BP(mean): --  RR: 16 (09 Dec 2019 12:13) (16 - 18)  SpO2: 98% (09 Dec 2019 12:13) (97% - 100%)    GEN: lying in bed, NAD  HEENT:   NC/AT, pupils equal and reactive, EOMI  CV:  +S1, +S2, RRR  RESP:   lungs clear to auscultation bilaterally, no wheeze, rales, rhonchi   GI:  abdomen soft, non-tender, non-distended, normoactive BS  RECTAL:  not examined  :  not examined  MSK:   normal muscle tone  EXT:  no edema  NEURO:  AAOX3, no focal neurological deficits  SKIN:  no rashes                              12.2   10.62 )-----------( 385      ( 06 Dec 2019 14:37 )             40.1     12-06    136  |  106  |  25<H>  ----------------------------<  86  4.4   |  23  |  1.17    Ca    9.3      06 Dec 2019 14:37  Mg     2.3     12-    TPro  7.7  /  Alb  3.5  /  TBili  0.3  /  DBili  x   /  AST  20  /  ALT  23  /  AlkPhos  75  12-    CARDIAC MARKERS ( 06 Dec 2019 17:47 )  0.024 ng/mL / x     / x     / x     / x      CARDIAC MARKERS ( 06 Dec 2019 14:37 )  <0.015 ng/mL / x     / x     / x     / x          LIVER FUNCTIONS - ( 06 Dec 2019 14:37 )  Alb: 3.5 g/dL / Pro: 7.7 gm/dL / ALK PHOS: 75 U/L / ALT: 23 U/L / AST: 20 U/L / GGT: x           PT/INR - ( 06 Dec 2019 14:38 )   PT: 11.4 sec;   INR: 1.03 ratio         PTT - ( 06 Dec 2019 14:38 )  PTT:34.1 sec  Urinalysis Basic - ( 06 Dec 2019 15:15 )    Color: Yellow / Appearance: Clear / S.010 / pH: x  Gluc: x / Ketone: Negative  / Bili: Negative / Urobili: Negative mg/dL   Blood: x / Protein: 15 mg/dL / Nitrite: Negative   Leuk Esterase: Trace / RBC: 0-2 /HPF / WBC 3-5   Sq Epi: x / Non Sq Epi: Few / Bacteria: Occasional
89 F w/PMH hypothyroid, CAD, HTN, bladder ca (currently undergoing tx), recurrent UTI, presents c/o increased generalized weakness.  Pt went for her 2nd bladder ca tx yesterday, found w/ UTI and started on macrobid, which she's been taking.  However, today, she was unable to ambulate d/t weakness (similar to prior UTI), called her  office and instructed to come to ED for tx.  She denies any fever/chills, n/v/d, abd pain, dysuria/freq/urg.  She had similar presentations and hospitalizations for UTI. In ED, WBC 16, vitals stable, +UA, given rocephin IV.      12/11 - feels very weak.   pt does not want rehab but lives alone and with worsening weakness.afebrile.no further near syncopal episodes today although still orthostatic , refusing midodrine   GEN: lying in bed, NAD  HEENT:   NC/AT, pupils equal and reactive, EOMI  CV:  +S1, +S2, RRR  RESP:   lungs clear to auscultation bilaterally, no wheeze, rales, rhonchi   GI:  abdomen soft, non-tender, non-distended, normoactive BS  RECTAL:  not examined  :  not examined  MSK:   normal muscle tone  EXT:  no edema  NEURO:  AAOX3, no focal neurological deficits  SKIN:  no rashes      PHYSICAL EXAM:    Daily     Daily     ICU Vital Signs Last 24 Hrs  T(C): 36.3 (11 Dec 2019 11:36), Max: 36.6 (10 Dec 2019 21:32)  T(F): 97.3 (11 Dec 2019 11:36), Max: 97.9 (10 Dec 2019 21:32)  HR: 77 (11 Dec 2019 06:26) (77 - 79)  BP: 153/59 (11 Dec 2019 06:26) (130/52 - 153/59)  BP(mean): --  ABP: --  ABP(mean): --  RR: 19 (11 Dec 2019 11:36) (18 - 19)  SpO2: 100% (11 Dec 2019 11:36) (97% - 100%)                                11.1   10.29 )-----------( 290      ( 10 Dec 2019 06:48 )             37.1       CBC Full  -  ( 10 Dec 2019 06:48 )  WBC Count : 10.29 K/uL  RBC Count : 4.27 M/uL  Hemoglobin : 11.1 g/dL  Hematocrit : 37.1 %  Platelet Count - Automated : 290 K/uL  Mean Cell Volume : 86.9 fl  Mean Cell Hemoglobin : 26.0 pg  Mean Cell Hemoglobin Concentration : 29.9 gm/dL  Auto Neutrophil # : x  Auto Lymphocyte # : x  Auto Monocyte # : x  Auto Eosinophil # : x  Auto Basophil # : x  Auto Neutrophil % : x  Auto Lymphocyte % : x  Auto Monocyte % : x  Auto Eosinophil % : x  Auto Basophil % : x      12-11    139  |  108  |  18  ----------------------------<  92  4.2   |  25  |  1.03    Ca    9.3      11 Dec 2019 08:02                              MEDICATIONS  (STANDING):  artificial  tears Solution 1 Drop(s) Both EYES three times a day  heparin  Injectable 5000 Unit(s) SubCutaneous every 8 hours  isosorbide   mononitrate ER Tablet (IMDUR) 60 milliGRAM(s) Oral daily  levothyroxine 75 MICROGram(s) Oral daily  metoprolol succinate ER 50 milliGRAM(s) Oral daily  pantoprazole    Tablet 40 milliGRAM(s) Oral before breakfast  senna 2 Tablet(s) Oral at bedtime  sodium chloride 0.9%. 1000 milliLiter(s) (50 mL/Hr) IV Continuous <Continuous>
89 F w/PMH hypothyroid, CAD, HTN, bladder ca (currently undergoing tx), recurrent UTI, presents c/o increased generalized weakness.  Pt went for her 2nd bladder ca tx yesterday, found w/ UTI and started on macrobid, which she's been taking.  However, today, she was unable to ambulate d/t weakness (similar to prior UTI), called her  office and instructed to come to ED for tx.  She denies any fever/chills, n/v/d, abd pain, dysuria/freq/urg.  She had similar presentations and hospitalizations for UTI. In ED, WBC 16, vitals stable, +UA, given rocephin IV.      12/12 - feels very weak.   pt does not want rehab but lives alone and with worsening weakness.afebrile.no further near syncopal episodes today although still orthostatic , AGREES TO MIDODRINE      12/13-ANOTHER EPISODE OF SYMPTOMATIC(GENRALISED WEAKNESS AND NEAR SYNCOPE) HYPOTENSION 12/13 WITHOUT ACUTE FOCAL NEURODEFICITS    GEN: lying in bed, NAD  HEENT:   NC/AT, pupils equal and reactive, EOMI  CV:  +S1, +S2, RRR  RESP:   lungs clear to auscultation bilaterally, no wheeze, rales, rhonchi   GI:  abdomen soft, non-tender, non-distended, normoactive BS  RECTAL:  not examined  :  not examined  MSK:   normal muscle tone  EXT:  no edema  NEURO:  AAOX3, no focal neurological deficits  SKIN:  no rashes      PHYSICAL EXAM:    Daily     Daily     ICU Vital Signs Last 24 Hrs  T(C): 36.7 (13 Dec 2019 10:55), Max: 36.7 (13 Dec 2019 05:20)  T(F): 98 (13 Dec 2019 10:55), Max: 98 (13 Dec 2019 05:20)  HR: 86 (13 Dec 2019 10:55) (71 - 86)  BP: 121/58 (13 Dec 2019 13:03) (121/58 - 187/60)  BP(mean): --  ABP: --  ABP(mean): --  RR: 18 (13 Dec 2019 10:55) (17 - 18)  SpO2: 99% (13 Dec 2019 10:55) (99% - 100%)                                                MEDICATIONS  (STANDING):  artificial  tears Solution 1 Drop(s) Both EYES three times a day  heparin  Injectable 5000 Unit(s) SubCutaneous every 8 hours  isosorbide   mononitrate ER Tablet (IMDUR) 60 milliGRAM(s) Oral daily  levothyroxine 75 MICROGram(s) Oral daily  metoprolol succinate ER 50 milliGRAM(s) Oral daily  midodrine. 2.5 milliGRAM(s) Oral <User Schedule>  pantoprazole    Tablet 40 milliGRAM(s) Oral before breakfast  senna 2 Tablet(s) Oral at bedtime  sodium chloride 0.9%. 1000 milliLiter(s) (50 mL/Hr) IV Continuous <Continuous>
89 F w/PMH hypothyroid, CAD, HTN, bladder ca (currently undergoing tx), recurrent UTI, presents c/o increased generalized weakness.  Pt went for her 2nd bladder ca tx yesterday, found w/ UTI and started on macrobid, which she's been taking.  However, today, she was unable to ambulate d/t weakness (similar to prior UTI), called her  office and instructed to come to ED for tx.  She denies any fever/chills, n/v/d, abd pain, dysuria/freq/urg.  She had similar presentations and hospitalizations for UTI. In ED, WBC 16, vitals stable, +UA, given rocephin IV.      12/12 - feels very weak.   pt does not want rehab but lives alone and with worsening weakness.afebrile.no further near syncopal episodes today although still orthostatic , AGREES TO MIDODRINE      12/13-ANOTHER EPISODE OF SYMPTOMATIC(GENRALISED WEAKNESS AND NEAR SYNCOPE) HYPOTENSION 12/13 WITHOUT ACUTE FOCAL NEURODEFICITS  12./14 TODAY PATIENT REPORTS FEELNING BETTER BUT STILL WEAK, I DISCUSSED WITH CARDIO PLAN FOR 10 HRS OF SLOW IVF    GEN: lying in bed, NAD  HEENT:   NC/AT, pupils equal and reactive, EOMI  CV:  +S1, +S2, RRR  RESP:   lungs clear to auscultation bilaterally, no wheeze, rales, rhonchi   GI:  abdomen soft, non-tender, non-distended, normoactive BS  RECTAL:  not examined  :  not examined  MSK:   normal muscle tone  EXT:  no edema  NEURO:  AAOX3, no focal neurological deficits  SKIN:  no rashes      PHYSICAL EXAM:    Daily     Daily     ICU Vital Signs Last 24 Hrs  T(C): 36.6 (14 Dec 2019 11:59), Max: 36.7 (13 Dec 2019 20:22)  T(F): 97.9 (14 Dec 2019 11:59), Max: 98 (13 Dec 2019 20:22)  HR: 76 (14 Dec 2019 11:59) (71 - 76)  BP: 156/60 (14 Dec 2019 11:59) (152/54 - 156/60)  BP(mean): --  ABP: --  ABP(mean): --  RR: 17 (14 Dec 2019 11:59) (17 - 18)  SpO2: 100% (14 Dec 2019 11:59) (99% - 100%)                                11.8   10.87 )-----------( 285      ( 14 Dec 2019 07:22 )             38.5       CBC Full  -  ( 14 Dec 2019 07:22 )  WBC Count : 10.87 K/uL  RBC Count : 4.43 M/uL  Hemoglobin : 11.8 g/dL  Hematocrit : 38.5 %  Platelet Count - Automated : 285 K/uL  Mean Cell Volume : 86.9 fl  Mean Cell Hemoglobin : 26.6 pg  Mean Cell Hemoglobin Concentration : 30.6 gm/dL  Auto Neutrophil # : 6.69 K/uL  Auto Lymphocyte # : 2.95 K/uL  Auto Monocyte # : 0.82 K/uL  Auto Eosinophil # : 0.27 K/uL  Auto Basophil # : 0.07 K/uL  Auto Neutrophil % : 61.7 %  Auto Lymphocyte % : 27.1 %  Auto Monocyte % : 7.5 %  Auto Eosinophil % : 2.5 %  Auto Basophil % : 0.6 %      12-14    137  |  107  |  15  ----------------------------<  92  4.0   |  23  |  0.92    Ca    9.4      14 Dec 2019 07:22                              MEDICATIONS  (STANDING):  artificial  tears Solution 1 Drop(s) Both EYES three times a day  heparin  Injectable 5000 Unit(s) SubCutaneous every 8 hours  isosorbide   mononitrate ER Tablet (IMDUR) 30 milliGRAM(s) Oral daily  levothyroxine 75 MICROGram(s) Oral daily  metoprolol succinate ER 50 milliGRAM(s) Oral daily  midodrine. 2.5 milliGRAM(s) Oral <User Schedule>  pantoprazole    Tablet 40 milliGRAM(s) Oral before breakfast  senna 2 Tablet(s) Oral at bedtime  sodium chloride 0.9%. 1000 milliLiter(s) (50 mL/Hr) IV Continuous <Continuous>
89 F w/PMH hypothyroid, CAD, HTN, bladder ca (currently undergoing tx), recurrent UTI, presents c/o increased generalized weakness.  Pt went for her 2nd bladder ca tx yesterday, found w/ UTI and started on macrobid, which she's been taking.  However, today, she was unable to ambulate d/t weakness (similar to prior UTI), called her  office and instructed to come to ED for tx.  She denies any fever/chills, n/v/d, abd pain, dysuria/freq/urg.  She had similar presentations and hospitalizations for UTI. In ED, WBC 16, vitals stable, +UA, given rocephin IV.      12/12 - feels very weak.   pt does not want rehab but lives alone and with worsening weakness.afebrile.no further near syncopal episodes today although still orthostatic , AGREES TO MIDODRINE      12/13-ANOTHER EPISODE OF SYMPTOMATIC(GENRALISED WEAKNESS AND NEAR SYNCOPE) HYPOTENSION 12/13 WITHOUT ACUTE FOCAL NEURODEFICITS  12./15 TODAY PATIENT REPORTS FEELNING BETTER BUT STILL WEAK, I DISCUSSED WITH CARDIO PLAN FOR ivf    GEN: lying in bed, NAD  HEENT:   NC/AT, pupils equal and reactive, EOMI  CV:  +S1, +S2, RRR  RESP:   lungs clear to auscultation bilaterally, no wheeze, rales, rhonchi   GI:  abdomen soft, non-tender, non-distended, normoactive BS  RECTAL:  not examined  :  not examined  MSK:   normal muscle tone  EXT:  no edema  NEURO:  AAOX3, no focal neurological deficits  SKIN:  no rashes      PHYSICAL EXAM:    Daily     Daily     ICU Vital Signs Last 24 Hrs  T(C): 36.3 (15 Dec 2019 10:15), Max: 36.6 (15 Dec 2019 05:43)  T(F): 97.3 (15 Dec 2019 10:15), Max: 97.9 (15 Dec 2019 05:43)  HR: 72 (15 Dec 2019 11:44) (69 - 82)  BP: 170/58 (15 Dec 2019 11:44) (131/64 - 173/70)  BP(mean): --  ABP: --  ABP(mean): --  RR: 18 (15 Dec 2019 10:15) (17 - 18)  SpO2: 100% (15 Dec 2019 10:15) (98% - 100%)                                11.6   8.26  )-----------( 277      ( 15 Dec 2019 07:05 )             38.3       CBC Full  -  ( 15 Dec 2019 07:05 )  WBC Count : 8.26 K/uL  RBC Count : 4.41 M/uL  Hemoglobin : 11.6 g/dL  Hematocrit : 38.3 %  Platelet Count - Automated : 277 K/uL  Mean Cell Volume : 86.8 fl  Mean Cell Hemoglobin : 26.3 pg  Mean Cell Hemoglobin Concentration : 30.3 gm/dL  Auto Neutrophil # : 4.59 K/uL  Auto Lymphocyte # : 2.65 K/uL  Auto Monocyte # : 0.70 K/uL  Auto Eosinophil # : 0.23 K/uL  Auto Basophil # : 0.04 K/uL  Auto Neutrophil % : 55.5 %  Auto Lymphocyte % : 32.1 %  Auto Monocyte % : 8.5 %  Auto Eosinophil % : 2.8 %  Auto Basophil % : 0.5 %      12-15    140  |  109<H>  |  16  ----------------------------<  92  4.4   |  25  |  0.98    Ca    9.3      15 Dec 2019 07:05                              MEDICATIONS  (STANDING):  artificial  tears Solution 1 Drop(s) Both EYES three times a day  heparin  Injectable 5000 Unit(s) SubCutaneous every 8 hours  isosorbide   mononitrate ER Tablet (IMDUR) 30 milliGRAM(s) Oral daily  levothyroxine 75 MICROGram(s) Oral daily  metoprolol succinate ER 50 milliGRAM(s) Oral daily  midodrine. 2.5 milliGRAM(s) Oral <User Schedule>  pantoprazole    Tablet 40 milliGRAM(s) Oral before breakfast  senna 2 Tablet(s) Oral at bedtime  sodium chloride 0.9%. 1000 milliLiter(s) (50 mL/Hr) IV Continuous <Continuous>
89 F w/PMH hypothyroid, CAD, HTN, bladder ca (currently undergoing tx), recurrent UTI, presents c/o increased generalized weakness.  Pt went for her 2nd bladder ca tx yesterday, found w/ UTI and started on macrobid, which she's been taking.  However, today, she was unable to ambulate d/t weakness (similar to prior UTI), called her  office and instructed to come to ED for tx.  She denies any fever/chills, n/v/d, abd pain, dysuria/freq/urg.  She had similar presentations and hospitalizations for UTI. In ED, WBC 16, vitals stable, +UA, given rocephin IV.      12/12 - feels very weak.   pt does not want rehab but lives alone and with worsening weakness.afebrile.no further near syncopal episodes today although still orthostatic , AGREES TO MIDODRINE   GEN: lying in bed, NAD  HEENT:   NC/AT, pupils equal and reactive, EOMI  CV:  +S1, +S2, RRR  RESP:   lungs clear to auscultation bilaterally, no wheeze, rales, rhonchi   GI:  abdomen soft, non-tender, non-distended, normoactive BS  RECTAL:  not examined  :  not examined  MSK:   normal muscle tone  EXT:  no edema  NEURO:  AAOX3, no focal neurological deficits  SKIN:  no rashes      PHYSICAL EXAM:    Daily     Daily     ICU Vital Signs Last 24 Hrs  T(C): 36.5 (12 Dec 2019 11:21), Max: 36.6 (11 Dec 2019 20:55)  T(F): 97.7 (12 Dec 2019 11:21), Max: 97.9 (11 Dec 2019 20:55)  HR: 79 (12 Dec 2019 11:21) (79 - 79)  BP: 179/57 (12 Dec 2019 11:21) (179/57 - 179/57)  BP(mean): --  ABP: --  ABP(mean): --  RR: 18 (12 Dec 2019 11:21) (18 - 18)  SpO2: 100% (12 Dec 2019 11:21) (100% - 100%)                12-11    139  |  108  |  18  ----------------------------<  92  4.2   |  25  |  1.03    Ca    9.3      11 Dec 2019 08:02                              MEDICATIONS  (STANDING):  artificial  tears Solution 1 Drop(s) Both EYES three times a day  heparin  Injectable 5000 Unit(s) SubCutaneous every 8 hours  isosorbide   mononitrate ER Tablet (IMDUR) 60 milliGRAM(s) Oral daily  levothyroxine 75 MICROGram(s) Oral daily  metoprolol succinate ER 50 milliGRAM(s) Oral daily  pantoprazole    Tablet 40 milliGRAM(s) Oral before breakfast  senna 2 Tablet(s) Oral at bedtime  sodium chloride 0.9%. 1000 milliLiter(s) (50 mL/Hr) IV Continuous <Continuous>
89 F w/PMH hypothyroid, CAD, HTN, bladder ca (currently undergoing tx), recurrent UTI, presents c/o increased generalized weakness.  Pt went for her 2nd bladder ca tx yesterday, found w/ UTI and started on macrobid, which she's been taking.  However, today, she was unable to ambulate d/t weakness (similar to prior UTI), called her  office and instructed to come to ED for tx.  She denies any fever/chills, n/v/d, abd pain, dysuria/freq/urg.  She had similar presentations and hospitalizations for UTI. In ED, WBC 16, vitals stable, +UA, given rocephin IV.      pt c/o weakness for a long time but able to ambulate    Eyes: PERRLA, EOMI, no discharge  ENMT: Oral mucosa without exudate or lesions  Neck: Supple, no JVD, no lymphadenopathy  Respiratory: Clear to auscultation bilaterally, no wheezes, rales, or rhonchi  Cardiovascular: +S1, +S2, RRR, no murmurs, rubs, or gallops  GI: Abdomen soft, no guarding, +BS, no pain upon palpation   Extremities: No clubbing, cyanosis  Neurological: AOx4, right LE 4/5 strength, left LE 4/5 strength  Skin: Warm, no lesions, rashes, or scars  MSK: No muscle or joint tenderness        Vital Signs Last 24 Hrs  T(C): 37.1 (07 Dec 2019 12:41), Max: 37.1 (07 Dec 2019 12:41)  T(F): 98.7 (07 Dec 2019 12:41), Max: 98.7 (07 Dec 2019 12:41)  HR: 82 (07 Dec 2019 12:41) (71 - 87)  BP: 151/65 (07 Dec 2019 12:41) (110/46 - 209/75)  BP(mean): --  RR: 17 (07 Dec 2019 12:41) (16 - 20)  SpO2: 99% (07 Dec 2019 12:41) (96% - 100%)
89 F w/PMH hypothyroid, CAD, HTN, bladder ca (currently undergoing tx), recurrent UTI, presents c/o increased generalized weakness.  Pt went for her 2nd bladder ca tx yesterday, found w/ UTI and started on macrobid, which she's been taking.  However, today, she was unable to ambulate d/t weakness (similar to prior UTI), called her  office and instructed to come to ED for tx.  She denies any fever/chills, n/v/d, abd pain, dysuria/freq/urg.  She had similar presentations and hospitalizations for UTI. In ED, WBC 16, vitals stable, +UA, given rocephin IV.      pt c/o weakness for a long time but able to ambulate and two weakness "attacks" yesterday as per patient but is resistant to go to rehab.  agreeable to meet with palliative    Vital Signs Last 24 Hrs  T(C): 36.3 (08 Dec 2019 11:45), Max: 37.1 (07 Dec 2019 12:41)  T(F): 97.4 (08 Dec 2019 11:45), Max: 98.7 (07 Dec 2019 12:41)  HR: 81 (08 Dec 2019 11:45) (72 - 82)  BP: 173/68 (08 Dec 2019 11:45) (123/46 - 173/68)  BP(mean): --  RR: 18 (08 Dec 2019 11:45) (17 - 18)  SpO2: 97% (08 Dec 2019 11:45) (96% - 99%)    GEN: lying in bed, NAD  HEENT:   NC/AT, pupils equal and reactive, EOMI  CV:  +S1, +S2, RRR  RESP:   lungs clear to auscultation bilaterally, no wheeze, rales, rhonchi   GI:  abdomen soft, non-tender, non-distended, normoactive BS  RECTAL:  not examined  :  not examined  MSK:   normal muscle tone  EXT:  no edema  NEURO:  AAOX3, no focal neurological deficits  SKIN:  no rashes                              12.2   10.62 )-----------( 385      ( 06 Dec 2019 14:37 )             40.1     12-06    136  |  106  |  25<H>  ----------------------------<  86  4.4   |  23  |  1.17    Ca    9.3      06 Dec 2019 14:37  Mg     2.3     12-    TPro  7.7  /  Alb  3.5  /  TBili  0.3  /  DBili  x   /  AST  20  /  ALT  23  /  AlkPhos  75  12-    CARDIAC MARKERS ( 06 Dec 2019 17:47 )  0.024 ng/mL / x     / x     / x     / x      CARDIAC MARKERS ( 06 Dec 2019 14:37 )  <0.015 ng/mL / x     / x     / x     / x          LIVER FUNCTIONS - ( 06 Dec 2019 14:37 )  Alb: 3.5 g/dL / Pro: 7.7 gm/dL / ALK PHOS: 75 U/L / ALT: 23 U/L / AST: 20 U/L / GGT: x           PT/INR - ( 06 Dec 2019 14:38 )   PT: 11.4 sec;   INR: 1.03 ratio         PTT - ( 06 Dec 2019 14:38 )  PTT:34.1 sec  Urinalysis Basic - ( 06 Dec 2019 15:15 )    Color: Yellow / Appearance: Clear / S.010 / pH: x  Gluc: x / Ketone: Negative  / Bili: Negative / Urobili: Negative mg/dL   Blood: x / Protein: 15 mg/dL / Nitrite: Negative   Leuk Esterase: Trace / RBC: 0-2 /HPF / WBC 3-5   Sq Epi: x / Non Sq Epi: Few / Bacteria: Occasional
HPI: Pt seen and examined this am in follow up for sx and GOC, son Adama at bedside. Pt feeling ok today, still with some weakness. Cough from yesterday much better with robitussin. Anxious at times, reminded that xanax is there, which she still finds useful.     Both pt and son still on board for GOC conversation planned for this am. See GOC note for details.       PAIN: denies  DYSPNEA: denies      ROS:    Anxiety- at times  Constipation- vasovagal this am, but no loc or fall, quick recovery  Cough- improved  Weakness- yes    All other systems reviewed and negative      PHYSICAL EXAM:    Vital Signs Last 24 Hrs  T(C): 36.4 (10 Dec 2019 05:19), Max: 37 (09 Dec 2019 20:02)  T(F): 97.6 (10 Dec 2019 05:19), Max: 98.6 (09 Dec 2019 20:02)  HR: 83 (10 Dec 2019 05:19) (73 - 83)  BP: 118/96 (10 Dec 2019 05:19) (118/96 - 144/64)  RR: 19 (10 Dec 2019 05:19) (16 - 19)  SpO2: 99% (10 Dec 2019 05:19) (97% - 99%)    PPSV2:  30 %    General: Elderly female sitting up in chair, friendly, worried, NAD  Mental Status: AOx4  HEENT: mmm, +mild temporal wasting  Lungs: clear  Cardiac: +s1 s2 rrr  GI: soft nt nd +bs  : voids  Ext: moves all extremities, though poor rom in right shoulder, some muscle and fat wasting noted in limbs  Neuro: no focal deficits    LABS:                        11.1   10.29 )-----------( 290      ( 10 Dec 2019 06:48 )             37.1     12-10    138  |  108  |  18  ----------------------------<  96  4.1   |  23  |  1.05    Ca    9.3      10 Dec 2019 06:48        Albumin: Albumin, Serum: 3.5 g/dL (12-06 @ 14:37)      Allergies    amlodipine (Unknown)  clonidine (Unknown)  Florinef Acetate (Unknown)  hydrocodone (Unknown)  Levatol (Unknown)  Lipitor (Unknown)  Lotrel (Unknown)  methylPREDNISolone (Unknown)  morphine (Other)  Plaquenil (Unknown)  statins (Other (Unknown))  sulfa drugs (Rash)    Intolerances      MEDICATIONS  (STANDING):  artificial  tears Solution 1 Drop(s) Both EYES three times a day  heparin  Injectable 5000 Unit(s) SubCutaneous every 8 hours  isosorbide   mononitrate ER Tablet (IMDUR) 60 milliGRAM(s) Oral daily  levothyroxine 75 MICROGram(s) Oral daily  metoprolol succinate ER 50 milliGRAM(s) Oral daily  pantoprazole    Tablet 40 milliGRAM(s) Oral before breakfast  senna 2 Tablet(s) Oral at bedtime  sodium chloride 0.9%. 1000 milliLiter(s) (50 mL/Hr) IV Continuous <Continuous>    MEDICATIONS  (PRN):  acetaminophen   Tablet .. 650 milliGRAM(s) Oral every 6 hours PRN Mild Pain (1 - 3)  ALPRAZolam 0.125 milliGRAM(s) Oral three times a day PRN anxiety  guaiFENesin   Syrup  (Sugar-Free) 100 milliGRAM(s) Oral every 6 hours PRN Cough  polyethylene glycol 3350 17 Gram(s) Oral daily PRN Constipation      RADIOLOGY:    EXAM:  XR CHEST PA LAT 2V                        PROCEDURE DATE:  12/09/2019      INTERPRETATION:  AP erect and lateral chest on December 9, 2019 at 2:08   PM. 2 lateral views included. Patient has cough.    Heart is magnified by technique.    Rather mild atelectasis at left base laterally which is similar to   December 6.    There may be slight increasing retrocardiac infiltrate.    IMPRESSION: Left base findings suggest slight increase.    MARIAM FERNANDES   This document has been electronically signed. Dec  9 2019  3:08PM
HPI: Pt seen and examined this am in follow up for sx and GOC. Pt still notes bouts of weakness, but denies any pain. She remarks on being upgraded to tele, thus we discussed why she needed to be monitored, which she understood. She notes seeing Dr. Solorzano this am, explaining that he said the choice to continue with chemo was up to her. She clarified that she has decided to stop chemo, preferring not to be back and forth to the hospital. She went on to say that she rather be at home until her time comes. Explained that this was consistent with the hospice plan we discussed yesterday and she agreed. She was open to us reaching out to her son to share this decision and to ask if he is ok with starting referral process. Pt mentioned that her son has always said he would do whatever she decided. Noted intent to check with him to ensure this is the case and thus move forward with referral.     Called son Adama who was not surprised by this. He will be in today and would like to speak to his mother about this, really wanting her to reconsider POOL, if only for a week so she can possibly get a little stronger and give them time to get everything in place at home. However, he confirmed he will ultimately do whatever she wants, noting that they would prefer [facility] if they decide to instead go with home hospice plan. Thus, made plan to have him speak with his mom, have floor SW check in (or have him go to  to ask for her after their talk) and then have floor SW assist with either POOL or home hospice referral.     PAIN: denies  DYSPNEA: denies      ROS:    Anxiety- at times  Constipation- vasovagal this am, but no loc or fall, quick recovery  Cough- improved  Weakness- yes    All other systems reviewed and negative      PHYSICAL EXAM:    Vital Signs Last 24 Hrs  T(C): 36.2 (11 Dec 2019 06:26), Max: 36.7 (10 Dec 2019 10:20)  T(F): 97.2 (11 Dec 2019 06:26), Max: 98.1 (10 Dec 2019 10:20)  HR: 77 (11 Dec 2019 06:26) (77 - 87)  BP: 153/59 (11 Dec 2019 06:26) (130/52 - 162/74)  RR: 18 (11 Dec 2019 06:26) (16 - 18)  SpO2: 99% (11 Dec 2019 06:26) (96% - 99%)  Daily Weight in k.4 (10 Dec 2019 16:40)    PPSV2:  30 %    General: Elderly female sitting up in bed, friendly, NAD  Mental Status: AOx4  HEENT: mmm, +mild temporal wasting  Lungs: clear  Cardiac: +s1 s2 rrr  GI: soft nt nd +bs  : voids  Ext: moves all extremities, though poor rom in right shoulder, some muscle and fat wasting noted in limbs  Neuro: no focal deficits    LABS:                        11.1   10.29 )-----------( 290      ( 10 Dec 2019 06:48 )             37.1         139  |  108  |  18  ----------------------------<  92  4.2   |  25  |  1.03    Ca    9.3      11 Dec 2019 08:02        Albumin: Albumin, Serum: 3.5 g/dL ( @ 14:37)      Allergies    amlodipine (Unknown)  clonidine (Unknown)  Florinef Acetate (Unknown)  hydrocodone (Unknown)  Levatol (Unknown)  Lipitor (Unknown)  Lotrel (Unknown)  methylPREDNISolone (Unknown)  morphine (Other)  Plaquenil (Unknown)  statins (Other (Unknown))  sulfa drugs (Rash)    Intolerances      MEDICATIONS  (STANDING):  artificial  tears Solution 1 Drop(s) Both EYES three times a day  heparin  Injectable 5000 Unit(s) SubCutaneous every 8 hours  isosorbide   mononitrate ER Tablet (IMDUR) 60 milliGRAM(s) Oral daily  levothyroxine 75 MICROGram(s) Oral daily  metoprolol succinate ER 50 milliGRAM(s) Oral daily  pantoprazole    Tablet 40 milliGRAM(s) Oral before breakfast  senna 2 Tablet(s) Oral at bedtime  sodium chloride 0.9%. 1000 milliLiter(s) (50 mL/Hr) IV Continuous <Continuous>    MEDICATIONS  (PRN):  acetaminophen   Tablet .. 650 milliGRAM(s) Oral every 6 hours PRN Mild Pain (1 - 3)  ALPRAZolam 0.125 milliGRAM(s) Oral three times a day PRN anxiety  guaiFENesin   Syrup  (Sugar-Free) 100 milliGRAM(s) Oral every 6 hours PRN Cough  polyethylene glycol 3350 17 Gram(s) Oral daily PRN Constipation
HPI: Pt seen and examined this am in follow up for sx. Pt notes being kept up by roommate, but doing ok. Some pain in knees but not wanting anything for this now. Awaiting plan for POOL, noting she only plans to stay a week so she can then go home and spend holidays with her family. Reassured her that this was all her son said he wanted, so he could have time to get aides in place so she could be safe. Pt was content with this.       PAIN: as above    DYSPNEA: denies      ROS:  Weakness- yes    All other systems reviewed and negative      PHYSICAL EXAM:    Vital Signs Last 24 Hrs  T(C): 36.7 (13 Dec 2019 05:20), Max: 36.7 (13 Dec 2019 05:20)  T(F): 98 (13 Dec 2019 05:20), Max: 98 (13 Dec 2019 05:20)  HR: 71 (13 Dec 2019 05:20) (71 - 80)  BP: 187/60 (13 Dec 2019 05:20) (144/58 - 187/60)  RR: 18 (13 Dec 2019 06:00) (17 - 18)  SpO2: 100% (13 Dec 2019 06:00) (100% - 100%)     PPSV2:  30 %    General: Elderly female sitting up in bed, NAD  Mental Status: AOx4  HEENT: mmm, +mild temporal wasting  Lungs: clear  Cardiac: +s1 s2 rrr  GI: soft nt nd +bs  : voids  Ext: moves all extremities, though poor rom in right shoulder, some muscle and fat wasting noted in limbs  Neuro: no focal deficits    LABS: none today            Albumin: Albumin, Serum: 3.5 g/dL (12-06 @ 14:37)      Allergies    amlodipine (Unknown)  clonidine (Unknown)  Florinef Acetate (Unknown)  hydrocodone (Unknown)  Levatol (Unknown)  Lipitor (Unknown)  Lotrel (Unknown)  methylPREDNISolone (Unknown)  morphine (Other)  Plaquenil (Unknown)  statins (Other (Unknown))  sulfa drugs (Rash)    Intolerances      MEDICATIONS  (STANDING):  artificial  tears Solution 1 Drop(s) Both EYES three times a day  heparin  Injectable 5000 Unit(s) SubCutaneous every 8 hours  isosorbide   mononitrate ER Tablet (IMDUR) 60 milliGRAM(s) Oral daily  levothyroxine 75 MICROGram(s) Oral daily  metoprolol succinate ER 50 milliGRAM(s) Oral daily  midodrine. 2.5 milliGRAM(s) Oral <User Schedule>  pantoprazole    Tablet 40 milliGRAM(s) Oral before breakfast  senna 2 Tablet(s) Oral at bedtime  sodium chloride 0.9%. 1000 milliLiter(s) (50 mL/Hr) IV Continuous <Continuous>    MEDICATIONS  (PRN):  acetaminophen   Tablet .. 650 milliGRAM(s) Oral every 6 hours PRN Mild Pain (1 - 3)  ALPRAZolam 0.125 milliGRAM(s) Oral three times a day PRN anxiety  guaiFENesin   Syrup  (Sugar-Free) 100 milliGRAM(s) Oral every 6 hours PRN Cough  polyethylene glycol 3350 17 Gram(s) Oral daily PRN Constipation      RADIOLOGY:    EXAM:  CT CHEST                        PROCEDURE DATE:  12/11/2019      IMPRESSION: atelectasis is scarring at the lung bases. Mild emphysema   noted      SIMRAN ACEVEDO M.D., ATTENDING RADIOLOGIST  This document has been electronically signed. Dec 11 2019  8:44PM
Patient is a 89y old  Female who presents with a chief complaint of weakness, lightheadedness, right LE numbness (13 Dec 2019 17:30)    89 yo female w/ PMHx of CAD s/p stents x5, bladder cancer (currently undergoing tx), HTN, hypothyroidism presents c/o weakness and lightheadedness. Pt was admitted on 11/14 for evaluation of similar sx but also including burning with urination after recent chemo for recurrence of bladder cancer. Urine cx grew MRSA. She was treated with 4 days of IV vanco and discharged on PO doxy. Offered rehab but pt declined at the time. Complains of similar sx today but denies any dysuria, frequency or urgency. She has had difficulty crossing even short distances across the living room, worse than prior admission, and complains of difficulty even raising her arms. Pt complains of chronic low back and hip pain for the last 8-9months associated with numbness radiating down the posterior right LE. No UE numbness or tingling.No bowel incontinence. is chronically incontinent of urine  In the ED: Negative CT head. Trace leukocytes on UA. Hypertensive with 194-209 systolically.    12/11  as an outpatient, she has been on midodrine for orthostatic hypotension but that was discontinued when she was admitted to the hospital.   In general, she feels best when the systolic blood pressure is above 150.   has been treated for recurrent bladder cancer. Unfortunately, she usually gets weaker after treatment and usually then contracts a UTI, resulting in a hospitalization. She is contemplating stopping chemotherapy treatment.   resting/sleeping soundly.     12/14  she has been feeling weak and near syncopal.   In general, she feels best when her blood pressure, systolically, is between 150-180. A normal blood pressure for her is in the 160-180 range.     Allergies    amlodipine (Unknown)  clonidine (Unknown)  Florinef Acetate (Unknown)  hydrocodone (Unknown)  Levatol (Unknown)  Lipitor (Unknown)  Lotrel (Unknown)  methylPREDNISolone (Unknown)  morphine (Other)  Plaquenil (Unknown)  statins (Other (Unknown))  sulfa drugs (Rash)    Intolerances        MEDICATIONS  (STANDING):  artificial  tears Solution 1 Drop(s) Both EYES three times a day  heparin  Injectable 5000 Unit(s) SubCutaneous every 8 hours  isosorbide   mononitrate ER Tablet (IMDUR) 60 milliGRAM(s) Oral daily  levothyroxine 75 MICROGram(s) Oral daily  metoprolol succinate ER 50 milliGRAM(s) Oral daily  midodrine. 2.5 milliGRAM(s) Oral <User Schedule>  pantoprazole    Tablet 40 milliGRAM(s) Oral before breakfast  senna 2 Tablet(s) Oral at bedtime  sodium chloride 0.9%. 1000 milliLiter(s) (50 mL/Hr) IV Continuous <Continuous>    MEDICATIONS  (PRN):  acetaminophen   Tablet .. 650 milliGRAM(s) Oral every 6 hours PRN Mild Pain (1 - 3)  ALPRAZolam 0.125 milliGRAM(s) Oral three times a day PRN anxiety  guaiFENesin   Syrup  (Sugar-Free) 100 milliGRAM(s) Oral every 6 hours PRN Cough  polyethylene glycol 3350 17 Gram(s) Oral daily PRN Constipation    REVIEW OF SYSTEMS:    RESPIRATORY: No cough, wheezing, hemoptysis; No shortness of breath  CARDIOVASCULAR: No chest pain or palpitations  All other review of systems is negative unless indicated above      PHYSICAL EXAM:  Daily     Daily   Vital Signs Last 24 Hrs  T(C): 36.4 (14 Dec 2019 06:20), Max: 36.7 (13 Dec 2019 10:55)  T(F): 97.6 (14 Dec 2019 06:20), Max: 98 (13 Dec 2019 10:55)  HR: 71 (13 Dec 2019 20:22) (71 - 86)  BP: 152/54 (13 Dec 2019 20:22) (121/58 - 153/69)  BP(mean): --  RR: 18 (14 Dec 2019 06:20) (17 - 18)  SpO2: 100% (14 Dec 2019 06:20) (99% - 100%)    Constitutional: NAD, awake and alert, well-developed  HEENT: PERR, EOMI, Normal Hearing, MMM  Neck: Soft and supple, No LAD, No JVD  Respiratory: Breath sounds are clear bilaterally, No wheezing, rales or rhonchi  Cardiovascular: S1 and S2, regular rate and rhythm, no Murmurs, gallops or rubs  Gastrointestinal: Bowel Sounds present, soft, nontender, nondistended, no guarding, no rebound  Extremities: No peripheral edema  Vascular: 2+ peripheral pulses  Neurological: A/O x 3, no focal deficits  Musculoskeletal: 5/5 strength b/l upper and lower extremities  Skin: No rashes    LABS: All Labs Reviewed:                        11.8   10.87 )-----------( 285      ( 14 Dec 2019 07:22 )             38.5     12-14    137  |  107  |  15  ----------------------------<  92  4.0   |  23  |  0.92    Ca    9.4      14 Dec 2019 07:22
Patient is a 89y old  Female who presents with a chief complaint of weakness, lightheadedness, right LE numbness (14 Dec 2019 17:05)    89 yo female w/ PMHx of CAD s/p stents x5, bladder cancer (currently undergoing tx), HTN, hypothyroidism presents c/o weakness and lightheadedness. Pt was admitted on 11/14 for evaluation of similar sx but also including burning with urination after recent chemo for recurrence of bladder cancer. Urine cx grew MRSA. She was treated with 4 days of IV vanco and discharged on PO doxy. Offered rehab but pt declined at the time. Complains of similar sx today but denies any dysuria, frequency or urgency. She has had difficulty crossing even short distances across the living room, worse than prior admission, and complains of difficulty even raising her arms. Pt complains of chronic low back and hip pain for the last 8-9months associated with numbness radiating down the posterior right LE. No UE numbness or tingling.No bowel incontinence. is chronically incontinent of urine  In the ED: Negative CT head. Trace leukocytes on UA. Hypertensive with 194-209 systolically.    12/11  as an outpatient, she has been on midodrine for orthostatic hypotension but that was discontinued when she was admitted to the hospital.   In general, she feels best when the systolic blood pressure is above 150.   has been treated for recurrent bladder cancer. Unfortunately, she usually gets weaker after treatment and usually then contracts a UTI, resulting in a hospitalization. She is contemplating stopping chemotherapy treatment.   resting/sleeping soundly.     12/14  she has been feeling weak and near syncopal.   In general, she feels best when her blood pressure, systolically, is between 150-180. A normal blood pressure for her is in the 160-180 range.     12/15  did not receive her full saline bolus yesterday  didn't get out of bed yesterday.         Allergies    amlodipine (Unknown)  clonidine (Unknown)  Florinef Acetate (Unknown)  hydrocodone (Unknown)  Levatol (Unknown)  Lipitor (Unknown)  Lotrel (Unknown)  methylPREDNISolone (Unknown)  morphine (Other)  Plaquenil (Unknown)  statins (Other (Unknown))  sulfa drugs (Rash)    Intolerances        MEDICATIONS  (STANDING):  artificial  tears Solution 1 Drop(s) Both EYES three times a day  heparin  Injectable 5000 Unit(s) SubCutaneous every 8 hours  isosorbide   mononitrate ER Tablet (IMDUR) 30 milliGRAM(s) Oral daily  levothyroxine 75 MICROGram(s) Oral daily  metoprolol succinate ER 50 milliGRAM(s) Oral daily  midodrine. 2.5 milliGRAM(s) Oral <User Schedule>  pantoprazole    Tablet 40 milliGRAM(s) Oral before breakfast  senna 2 Tablet(s) Oral at bedtime  sodium chloride 0.9% Bolus 1000 milliLiter(s) IV Bolus once  sodium chloride 0.9%. 1000 milliLiter(s) (50 mL/Hr) IV Continuous <Continuous>    MEDICATIONS  (PRN):  acetaminophen   Tablet .. 650 milliGRAM(s) Oral every 6 hours PRN Mild Pain (1 - 3)  ALPRAZolam 0.125 milliGRAM(s) Oral three times a day PRN anxiety  guaiFENesin   Syrup  (Sugar-Free) 100 milliGRAM(s) Oral every 6 hours PRN Cough  polyethylene glycol 3350 17 Gram(s) Oral daily PRN Constipation    REVIEW OF SYSTEMS:    RESPIRATORY: No cough, wheezing, hemoptysis; No shortness of breath  CARDIOVASCULAR: No chest pain or palpitations  All other review of systems is negative unless indicated above      PHYSICAL EXAM:  Daily     Daily   Vital Signs Last 24 Hrs  T(C): 36.6 (15 Dec 2019 05:43), Max: 36.6 (14 Dec 2019 11:59)  T(F): 97.9 (15 Dec 2019 05:43), Max: 97.9 (14 Dec 2019 11:59)  HR: 82 (15 Dec 2019 05:43) (69 - 82)  BP: 173/70 (15 Dec 2019 05:43) (131/64 - 173/70)  BP(mean): --  RR: 18 (15 Dec 2019 05:43) (17 - 18)  SpO2: 100% (15 Dec 2019 05:43) (98% - 100%)    Constitutional: NAD, awake and alert, well-developed  HEENT: PERR, EOMI, Normal Hearing, MMM  Neck: Soft and supple, No LAD, No JVD  Respiratory: Breath sounds are clear bilaterally, No wheezing, rales or rhonchi  Cardiovascular: S1 and S2, regular rate and rhythm, no Murmurs, gallops or rubs  Gastrointestinal: Bowel Sounds present, soft, nontender, nondistended, no guarding, no rebound  Extremities: No peripheral edema  Vascular: 2+ peripheral pulses  Neurological: A/O x 3, no focal deficits  Musculoskeletal: 5/5 strength b/l upper and lower extremities  Skin: No rashes    LABS: All Labs Reviewed:                        11.6   8.26  )-----------( 277      ( 15 Dec 2019 07:05 )             38.3     12-15    140  |  109<H>  |  16  ----------------------------<  92  4.4   |  25  |  0.98    Ca    9.3      15 Dec 2019 07:05
Patient is a 89y old  Female who presents with a chief complaint of weakness, lightheadedness, right LE numbness (16 Dec 2019 19:17)      89 yo female w/ PMHx of CAD s/p stents x5, bladder cancer (currently undergoing tx), HTN, hypothyroidism presents c/o weakness and lightheadedness. Pt was admitted on 11/14 for evaluation of similar sx but also including burning with urination after recent chemo for recurrence of bladder cancer. Urine cx grew MRSA. She was treated with 4 days of IV vanco and discharged on PO doxy. Offered rehab but pt declined at the time. Complains of similar sx today but denies any dysuria, frequency or urgency. She has had difficulty crossing even short distances across the living room, worse than prior admission, and complains of difficulty even raising her arms. Pt complains of chronic low back and hip pain for the last 8-9months associated with numbness radiating down the posterior right LE. No UE numbness or tingling.No bowel incontinence. is chronically incontinent of urine  In the ED: Negative CT head. Trace leukocytes on UA. Hypertensive with 194-209 systolically.    12/11  as an outpatient, she has been on midodrine for orthostatic hypotension but that was discontinued when she was admitted to the hospital.   In general, she feels best when the systolic blood pressure is above 150.   has been treated for recurrent bladder cancer. Unfortunately, she usually gets weaker after treatment and usually then contracts a UTI, resulting in a hospitalization. She is contemplating stopping chemotherapy treatment.   resting/sleeping soundly.     12/14  she has been feeling weak and near syncopal.   In general, she feels best when her blood pressure, systolically, is between 150-180. A normal blood pressure for her is in the 160-180 range.     12/15  did not receive her full saline bolus yesterday  didn't get out of bed yesterday.     12/17  had an elevated blood pressure in the 200 range yesterday  does not like the way the midodrine makes her feel.     Allergies    amlodipine (Unknown)  clonidine (Unknown)  Florinef Acetate (Unknown)  hydrocodone (Unknown)  Levatol (Unknown)  Lipitor (Unknown)  Lotrel (Unknown)  methylPREDNISolone (Unknown)  morphine (Other)  Plaquenil (Unknown)  statins (Other (Unknown))  sulfa drugs (Rash)    Intolerances        MEDICATIONS  (STANDING):  artificial  tears Solution 1 Drop(s) Both EYES three times a day  heparin  Injectable 5000 Unit(s) SubCutaneous every 8 hours  isosorbide   mononitrate ER Tablet (IMDUR) 30 milliGRAM(s) Oral daily  levothyroxine 75 MICROGram(s) Oral daily  metoprolol succinate ER 50 milliGRAM(s) Oral daily  pantoprazole    Tablet 40 milliGRAM(s) Oral before breakfast  senna 2 Tablet(s) Oral at bedtime  sodium chloride 0.9% Bolus 1000 milliLiter(s) IV Bolus once  sodium chloride 0.9%. 1000 milliLiter(s) (50 mL/Hr) IV Continuous <Continuous>    MEDICATIONS  (PRN):  acetaminophen   Tablet .. 650 milliGRAM(s) Oral every 6 hours PRN Mild Pain (1 - 3)  guaiFENesin   Syrup  (Sugar-Free) 100 milliGRAM(s) Oral every 6 hours PRN Cough  polyethylene glycol 3350 17 Gram(s) Oral daily PRN Constipation    REVIEW OF SYSTEMS:    RESPIRATORY: No cough, wheezing, hemoptysis; No shortness of breath  CARDIOVASCULAR: No chest pain or palpitations  All other review of systems is negative unless indicated above      PHYSICAL EXAM:  Daily     Daily   Vital Signs Last 24 Hrs  T(C): 36.4 (17 Dec 2019 06:14), Max: 36.6 (16 Dec 2019 21:18)  T(F): 97.6 (17 Dec 2019 06:14), Max: 97.9 (16 Dec 2019 21:18)  HR: 81 (17 Dec 2019 06:14) (70 - 81)  BP: 185/66 (17 Dec 2019 06:14) (154/63 - 213/77)  BP(mean): --  RR: 18 (17 Dec 2019 06:14) (16 - 18)  SpO2: 99% (17 Dec 2019 06:14) (97% - 100%)    Constitutional: NAD, awake and alert, well-developed  HEENT: PERR, EOMI, Normal Hearing, MMM  Neck: Soft and supple, No LAD, No JVD  Respiratory: Breath sounds are clear bilaterally, No wheezing, rales or rhonchi  Cardiovascular: S1 and S2, regular rate and rhythm, no Murmurs, gallops or rubs  Gastrointestinal: Bowel Sounds present, soft, nontender, nondistended, no guarding, no rebound  Extremities: No peripheral edema  Vascular: 2+ peripheral pulses  Neurological: A/O x 3, no focal deficits  Musculoskeletal: 5/5 strength b/l upper and lower extremities  Skin: No rashes    LABS: All Labs Reviewed:
Patient is a 89y old  Female who presents with a chief complaint of weakness, lightheadedness, right LE numbness (17 Dec 2019 14:03)    89 yo female w/ PMHx of CAD s/p stents x5, bladder cancer (currently undergoing tx), HTN, hypothyroidism presents c/o weakness and lightheadedness. Pt was admitted on 11/14 for evaluation of similar sx but also including burning with urination after recent chemo for recurrence of bladder cancer. Urine cx grew MRSA. She was treated with 4 days of IV vanco and discharged on PO doxy. Offered rehab but pt declined at the time. Complains of similar sx today but denies any dysuria, frequency or urgency. She has had difficulty crossing even short distances across the living room, worse than prior admission, and complains of difficulty even raising her arms. Pt complains of chronic low back and hip pain for the last 8-9months associated with numbness radiating down the posterior right LE. No UE numbness or tingling.No bowel incontinence. is chronically incontinent of urine  In the ED: Negative CT head. Trace leukocytes on UA. Hypertensive with 194-209 systolically.    12/11  as an outpatient, she has been on midodrine for orthostatic hypotension but that was discontinued when she was admitted to the hospital.   In general, she feels best when the systolic blood pressure is above 150.   has been treated for recurrent bladder cancer. Unfortunately, she usually gets weaker after treatment and usually then contracts a UTI, resulting in a hospitalization. She is contemplating stopping chemotherapy treatment.   resting/sleeping soundly.     12/14  she has been feeling weak and near syncopal.   In general, she feels best when her blood pressure, systolically, is between 150-180. A normal blood pressure for her is in the 160-180 range.     12/15  did not receive her full saline bolus yesterday  didn't get out of bed yesterday.     12/17  had an elevated blood pressure in the 200 range yesterday  does not like the way the midodrine makes her feel.     12/18  Again, yesterday, her blood pressure was very elevated and she had a headache and nausea.   better today.         Allergies    amlodipine (Unknown)  clonidine (Unknown)  Florinef Acetate (Unknown)  hydrocodone (Unknown)  Levatol (Unknown)  Lipitor (Unknown)  Lotrel (Unknown)  methylPREDNISolone (Unknown)  morphine (Other)  Plaquenil (Unknown)  statins (Other (Unknown))  sulfa drugs (Rash)    Intolerances        MEDICATIONS  (STANDING):  artificial  tears Solution 1 Drop(s) Both EYES three times a day  heparin  Injectable 5000 Unit(s) SubCutaneous every 8 hours  isosorbide   mononitrate ER Tablet (IMDUR) 60 milliGRAM(s) Oral daily  levothyroxine 75 MICROGram(s) Oral daily  melatonin 3 milliGRAM(s) Oral at bedtime  metoprolol succinate ER 50 milliGRAM(s) Oral daily  nystatin Powder 1 Application(s) Topical every 12 hours  pantoprazole    Tablet 40 milliGRAM(s) Oral before breakfast  senna 2 Tablet(s) Oral at bedtime  sodium chloride 0.9%. 1000 milliLiter(s) (50 mL/Hr) IV Continuous <Continuous>    MEDICATIONS  (PRN):  acetaminophen   Tablet .. 650 milliGRAM(s) Oral every 6 hours PRN Mild Pain (1 - 3)  guaiFENesin   Syrup  (Sugar-Free) 100 milliGRAM(s) Oral every 6 hours PRN Cough  magnesium hydroxide Suspension 30 milliLiter(s) Oral daily PRN Constipation  polyethylene glycol 3350 17 Gram(s) Oral daily PRN Constipation    REVIEW OF SYSTEMS:    RESPIRATORY: No cough, wheezing, hemoptysis; No shortness of breath  CARDIOVASCULAR: No chest pain or palpitations  All other review of systems is negative unless indicated above      PHYSICAL EXAM:  Daily     Daily   Vital Signs Last 24 Hrs  T(C): 36.6 (18 Dec 2019 05:17), Max: 36.7 (17 Dec 2019 17:19)  T(F): 97.9 (18 Dec 2019 05:17), Max: 98 (17 Dec 2019 17:19)  HR: 74 (18 Dec 2019 05:17) (72 - 78)  BP: 178/61 (18 Dec 2019 05:17) (141/60 - 188/65)  BP(mean): --  RR: 18 (18 Dec 2019 05:17) (18 - 19)  SpO2: 99% (18 Dec 2019 05:17) (97% - 100%)    Constitutional: NAD, awake and alert, well-developed  HEENT: PERR, EOMI, Normal Hearing, MMM  Neck: Soft and supple, No LAD, No JVD  Respiratory: Breath sounds are clear bilaterally, No wheezing, rales or rhonchi  Cardiovascular: S1 and S2, regular rate and rhythm, no Murmurs, gallops or rubs  Gastrointestinal: Bowel Sounds present, soft, nontender, nondistended, no guarding, no rebound  Extremities: No peripheral edema  Vascular: 2+ peripheral pulses  Neurological: A/O x 3, no focal deficits  Musculoskeletal: 5/5 strength b/l upper and lower extremities  Skin: No rashes    LABS: All Labs Reviewed:
Patient is a 89y old  Female who presents with a chief complaint of weakness, lightheadedness, right LE numbness (18 Dec 2019 13:39)  89 yo female w/ PMHx of CAD s/p stents x5, bladder cancer (currently undergoing tx), HTN, hypothyroidism presents c/o weakness and lightheadedness. Pt was admitted on 11/14 for evaluation of similar sx but also including burning with urination after recent chemo for recurrence of bladder cancer. Urine cx grew MRSA. She was treated with 4 days of IV vanco and discharged on PO doxy. Offered rehab but pt declined at the time. Complains of similar sx today but denies any dysuria, frequency or urgency. She has had difficulty crossing even short distances across the living room, worse than prior admission, and complains of difficulty even raising her arms. Pt complains of chronic low back and hip pain for the last 8-9months associated with numbness radiating down the posterior right LE. No UE numbness or tingling.No bowel incontinence. is chronically incontinent of urine  In the ED: Negative CT head. Trace leukocytes on UA. Hypertensive with 194-209 systolically.    12/11  as an outpatient, she has been on midodrine for orthostatic hypotension but that was discontinued when she was admitted to the hospital.   In general, she feels best when the systolic blood pressure is above 150.   has been treated for recurrent bladder cancer. Unfortunately, she usually gets weaker after treatment and usually then contracts a UTI, resulting in a hospitalization. She is contemplating stopping chemotherapy treatment.   resting/sleeping soundly.     12/14  she has been feeling weak and near syncopal.   In general, she feels best when her blood pressure, systolically, is between 150-180. A normal blood pressure for her is in the 160-180 range.     12/15  did not receive her full saline bolus yesterday  didn't get out of bed yesterday.     12/17  had an elevated blood pressure in the 200 range yesterday  does not like the way the midodrine makes her feel.     12/18  Again, yesterday, her blood pressure was very elevated and she had a headache and nausea.   better today.     12/19  her discharge to rehab was cancelled yesterday because her blood pressure spiked above 200.   her medication was changed and she received hydralazine which drops her blood pressure too quickly and too far, making her feel weak and unsteady, lightheaded.   our goal blood pressure is between 150-180.       Allergies    amlodipine (Unknown)  clonidine (Unknown)  Florinef Acetate (Unknown)  hydrocodone (Unknown)  Levatol (Unknown)  Lipitor (Unknown)  Lotrel (Unknown)  methylPREDNISolone (Unknown)  morphine (Other)  Plaquenil (Unknown)  statins (Other (Unknown))  sulfa drugs (Rash)    Intolerances        MEDICATIONS  (STANDING):  artificial  tears Solution 1 Drop(s) Both EYES three times a day  heparin  Injectable 5000 Unit(s) SubCutaneous every 8 hours  isosorbide   mononitrate ER Tablet (IMDUR) 60 milliGRAM(s) Oral daily  levothyroxine 75 MICROGram(s) Oral daily  melatonin 3 milliGRAM(s) Oral at bedtime  metoprolol succinate ER 50 milliGRAM(s) Oral daily  nystatin Powder 1 Application(s) Topical every 12 hours  pantoprazole    Tablet 40 milliGRAM(s) Oral before breakfast  senna 2 Tablet(s) Oral at bedtime  sodium chloride 0.9%. 1000 milliLiter(s) (50 mL/Hr) IV Continuous <Continuous>    MEDICATIONS  (PRN):  acetaminophen   Tablet .. 650 milliGRAM(s) Oral every 6 hours PRN Mild Pain (1 - 3)  bisacodyl Suppository 10 milliGRAM(s) Rectal daily PRN Constipation  guaiFENesin   Syrup  (Sugar-Free) 100 milliGRAM(s) Oral every 6 hours PRN Cough  isosorbide   mononitrate ER Tablet (IMDUR) 30 milliGRAM(s) Oral once PRN elevated blood pressure over 190 mm HG  magnesium hydroxide Suspension 30 milliLiter(s) Oral daily PRN Constipation  polyethylene glycol 3350 17 Gram(s) Oral daily PRN Constipation    REVIEW OF SYSTEMS:    RESPIRATORY: No cough, wheezing, hemoptysis; No shortness of breath  CARDIOVASCULAR: No chest pain or palpitations  All other review of systems is negative unless indicated above      PHYSICAL EXAM:  Daily     Daily   Vital Signs Last 24 Hrs  T(C): 36.5 (19 Dec 2019 06:50), Max: 36.6 (18 Dec 2019 11:25)  T(F): 97.7 (19 Dec 2019 06:50), Max: 97.8 (18 Dec 2019 11:25)  HR: 82 (19 Dec 2019 08:20) (78 - 85)  BP: 185/57 (19 Dec 2019 08:20) (127/56 - 211/73)  BP(mean): --  RR: 18 (19 Dec 2019 06:50) (18 - 18)  SpO2: 100% (19 Dec 2019 06:50) (97% - 100%)    Constitutional: NAD, awake and alert, well-developed  HEENT: PERR, EOMI, Normal Hearing, MMM  Neck: Soft and supple, No LAD, No JVD  Respiratory: Breath sounds are clear bilaterally, No wheezing, rales or rhonchi  Cardiovascular: S1 and S2, regular rate and rhythm, no Murmurs, gallops or rubs  Gastrointestinal: Bowel Sounds present, soft, nontender, nondistended, no guarding, no rebound  Extremities: No peripheral edema  Vascular: 2+ peripheral pulses  Neurological: A/O x 3, no focal deficits  Musculoskeletal: 5/5 strength b/l upper and lower extremities  Skin: No rashes    LABS: All Labs Reviewed:                        12.2   9.88  )-----------( 300      ( 18 Dec 2019 08:11 )             39.9     12-18    137  |  105  |  17  ----------------------------<  134<H>  4.3   |  25  |  1.04    Ca    9.6      18 Dec 2019 08:11
Patient is a 89y old  Female who presents with a chief complaint of weakness, lightheadedness, right LE numbness known to me x years. Had ols Fx of R ankle with equinus deformity walks / orthopedic shoe  / sole & heel lift & Al Walker. (19 Dec 2019 12:11)      HPI:  89 yo female w/ PMHx of CAD s/p stents x5, bladder cancer (currently undergoing tx), HTN, hypothyroidism presents c/o weakness and lightheadedness. Pt was admitted on 11/14 for evaluation of similar sx but also including burning with urination after recent chemo for recurrence of bladder cancer. Urine cx grew MRSA. She was treated with 4 days of IV vanco and discharged on PO doxy. Offered rehab but pt declined at the time. Complains of similar sx today but denies any dysuria, frequency or urgency. She has had difficulty crossing even short distances across the living room, worse than prior admission, and complains of difficulty even raising her arms. Pt complains of chronic low back and hip pain for the last 8-9months associated with numbness radiating down the posterior right LE. No UE numbness or tingling.No bowel incontinence. is chronically incontinent of urine    In the ED: Negative CT head. Trace leukocytes on UA. Hypertensive with 194-209 systolically.    PAST MEDICAL & SURGICAL HISTORY:  Bladder cancer  Endometrial adenocarcinoma  Macular degeneration  Stented coronary artery  Hypothyroid  Hyperlipidemia  HTN (hypertension)  GERD (gastroesophageal reflux disease)  CAD (coronary artery disease)  [place] (hard of hearing)  History of bladder surgery  S/P EMILY (total abdominal hysterectomy)  S/P hernia repair: umbilical - 2008  S/P laparoscopic cholecystectomy: 2008  Ankle fracture, right: surgery 25 yrs ago - hardware removed  S/P coronary angiogram: 2005, 2008, 2011 - drug eluding stents    FAMILY HISTORY:  Family history of brain cancer    Social history:   Lives alone  Independent at baseline  Son nearby (06 Dec 2019 18:02)      Allergies    amlodipine (Unknown)  clonidine (Unknown)  Florinef Acetate (Unknown)  hydrocodone (Unknown)  Levatol (Unknown)  Lipitor (Unknown)  Lotrel (Unknown)  methylPREDNISolone (Unknown)  morphine (Other)  Plaquenil (Unknown)  statins (Other (Unknown))  sulfa drugs (Rash)    Intolerances        MEDICATIONS  (STANDING):  artificial  tears Solution 1 Drop(s) Both EYES three times a day  heparin  Injectable 5000 Unit(s) SubCutaneous every 8 hours  isosorbide   mononitrate ER Tablet (IMDUR) 30 milliGRAM(s) Oral <User Schedule>  isosorbide   mononitrate ER Tablet (IMDUR) 30 milliGRAM(s) Oral <User Schedule>  levothyroxine 75 MICROGram(s) Oral daily  melatonin 3 milliGRAM(s) Oral at bedtime  nystatin Powder 1 Application(s) Topical every 12 hours  pantoprazole    Tablet 40 milliGRAM(s) Oral before breakfast  senna 2 Tablet(s) Oral at bedtime  sodium chloride 0.9%. 1000 milliLiter(s) (50 mL/Hr) IV Continuous <Continuous>    MEDICATIONS  (PRN):  acetaminophen   Tablet .. 650 milliGRAM(s) Oral every 6 hours PRN Mild Pain (1 - 3)  bisacodyl Suppository 10 milliGRAM(s) Rectal daily PRN Constipation  guaiFENesin   Syrup  (Sugar-Free) 100 milliGRAM(s) Oral every 6 hours PRN Cough  isosorbide   mononitrate ER Tablet (IMDUR) 30 milliGRAM(s) Oral once PRN elevated blood pressure over 190 mm HG  magnesium hydroxide Suspension 30 milliLiter(s) Oral daily PRN Constipation  polyethylene glycol 3350 17 Gram(s) Oral daily PRN Constipation      PHYSICAL EXAM: Elderly W F [place] alert concerned about dry skin to feet & long toe nails. Nonpalpable DP/PT pulses, hair absent edema trophic changes, no calf pain, No overt break in skin. Muted DTRs & STRs SWM 5.07 defer. Nails thick white eczematous, generalized xerosis w/o break in skin web spaces are clear.      Constitutional:  SEE HPI  Eyes: no blepharitis    ENMT: + [place]    Neck: NO DJV    Breasts: defer    Back: no LBP    Respiratory: no cough    Cardiovascular: no SOB    Gastrointestinal:no nausea    Genitourinary: No overt complaint    Rectal:defer    Extremities:  weakness  Vascular: no ret pain    Neurological:    Skin:    Lymph Nodes:    Musculoskeletal:    Psychiatric:        RADIOLOGY    CBC Full  -  ( 18 Dec 2019 08:11 )  WBC Count : 9.88 K/uL  RBC Count : 4.60 M/uL  Hemoglobin : 12.2 g/dL  Hematocrit : 39.9 %  Platelet Count - Automated : 300 K/uL  Mean Cell Volume : 86.7 fl  Mean Cell Hemoglobin : 26.5 pg  Mean Cell Hemoglobin Concentration : 30.6 gm/dL  Auto Neutrophil # : x  Auto Lymphocyte # : x  Auto Monocyte # : x  Auto Eosinophil # : x  Auto Basophil # : x  Auto Neutrophil % : x  Auto Lymphocyte % : x  Auto Monocyte % : x  Auto Eosinophil % : x  Auto Basophil % : x      12-18    137  |  105  |  17  ----------------------------<  134<H>  4.3   |  25  |  1.04    Ca    9.6      18 Dec 2019 08:11
Patient seen and examined at bedside. States she is feeling a bit better, still with generalized weakness. Patient wanted 2nd opinion for Cardiology regarding her BP meds. Spoke with Dr. Ritchie, who agreed with primary cardiologist's recommendations. Patient and son updated, agree with plan. Patient's BP well controlled today.    Physical Exam:  GEN: lying in bed, NAD  HEENT:   NC/AT, pupils equal and reactive, EOMI  CV:  +S1, +S2, RRR  RESP:   lungs clear to auscultation bilaterally, no wheeze, rales, rhonchi   GI:  abdomen soft, non-tender, non-distended, normoactive BS  EXT:  no edema  NEURO:  AAOX3, no focal neurological deficits    Please refer to updated D/C summary for further details.
89 F w/PMH hypothyroid, CAD, HTN, bladder ca (currently undergoing tx), recurrent UTI, presents c/o increased generalized weakness.  Pt went for her 2nd bladder ca tx yesterday, found w/ UTI and started on macrobid, which she's been taking.  However, today, she was unable to ambulate d/t weakness (similar to prior UTI), called her  office and instructed to come to ED for tx.  She denies any fever/chills, n/v/d, abd pain, dysuria/freq/urg.  She had similar presentations and hospitalizations for UTI. In ED, WBC 16, vitals stable, +UA, given rocephin IV.      12/10 - feels very weak.   pt does not want rehab but lives alone and with worsening weakness.afebrile.had episode of near syncope today on toilet  seat.she felt dizzy like she was about to faint but did not fall or lose consciousness     GEN: lying in bed, NAD  HEENT:   NC/AT, pupils equal and reactive, EOMI  CV:  +S1, +S2, RRR  RESP:   lungs clear to auscultation bilaterally, no wheeze, rales, rhonchi   GI:  abdomen soft, non-tender, non-distended, normoactive BS  RECTAL:  not examined  :  not examined  MSK:   normal muscle tone  EXT:  no edema  NEURO:  AAOX3, no focal neurological deficits  SKIN:  no rashes      PHYSICAL EXAM:    Daily     Daily Weight in k (10 Dec 2019 11:09)    ICU Vital Signs Last 24 Hrs  T(C): 36.6 (10 Dec 2019 13:21), Max: 37 (09 Dec 2019 20:02)  T(F): 97.9 (10 Dec 2019 13:21), Max: 98.6 (09 Dec 2019 20:02)  HR: 87 (10 Dec 2019 13:21) (73 - 87)  BP: 155/59 (10 Dec 2019 13:21) (118/96 - 155/59)  BP(mean): --  ABP: --  ABP(mean): --  RR: 18 (10 Dec 2019 13:21) (18 - 19)  SpO2: 98% (10 Dec 2019 13:21) (97% - 99%)                              11.1   10.29 )-----------( 290      ( 10 Dec 2019 06:48 )             37.1       CBC Full  -  ( 10 Dec 2019 06:48 )  WBC Count : 10.29 K/uL  RBC Count : 4.27 M/uL  Hemoglobin : 11.1 g/dL  Hematocrit : 37.1 %  Platelet Count - Automated : 290 K/uL  Mean Cell Volume : 86.9 fl  Mean Cell Hemoglobin : 26.0 pg  Mean Cell Hemoglobin Concentration : 29.9 gm/dL  Auto Neutrophil # : x  Auto Lymphocyte # : x  Auto Monocyte # : x  Auto Eosinophil # : x  Auto Basophil # : x  Auto Neutrophil % : x  Auto Lymphocyte % : x  Auto Monocyte % : x  Auto Eosinophil % : x  Auto Basophil % : x      12-10    138  |  108  |  18  ----------------------------<  96  4.1   |  23  |  1.05    Ca    9.3      10 Dec 2019 06:48                              MEDICATIONS  (STANDING):  artificial  tears Solution 1 Drop(s) Both EYES three times a day  heparin  Injectable 5000 Unit(s) SubCutaneous every 8 hours  isosorbide   mononitrate ER Tablet (IMDUR) 60 milliGRAM(s) Oral daily  levothyroxine 75 MICROGram(s) Oral daily  metoprolol succinate ER 50 milliGRAM(s) Oral daily  pantoprazole    Tablet 40 milliGRAM(s) Oral before breakfast  senna 2 Tablet(s) Oral at bedtime  sodium chloride 0.9%. 1000 milliLiter(s) (50 mL/Hr) IV Continuous <Continuous>

## 2019-12-20 NOTE — DISCHARGE NOTE NURSING/CASE MANAGEMENT/SOCIAL WORK - PATIENT PORTAL LINK FT
You can access the FollowMyHealth Patient Portal offered by [facility] by registering at the following website: [email or web address]. By joining [email or web address] FollowMyHealth portal, you will also be able to view your health information using other applications (apps) compatible with our system.

## 2019-12-20 NOTE — PROGRESS NOTE ADULT - ASSESSMENT
89 F w/PMH hypothyroid, CAD, HTN, bladder ca (currently undergoing tx), recurrent UTI, presents c/o increased generalized weakness.  In ED, WBC 16, vitals stable, +UA, given rocephin IV.        * Bladder Cancer does not want further treatment  *UTI- ruled out- completed IV antibiotics  *+orthostatic- received midodrine which in turn made her hypertensive and BP harder to control.   *Hypertension  - cardiology consulted  - will adjust antihypertensive with goal -180 as per Dr Álvarez  - on imdur- increase to 60mg  - PT consult- recommends rehab  - monitor BP  - patient BP very labile, metoprolol held for now as this could be causing orthostasis.    Case d/w team and MD on IDR. Plan explained to patient and all of their concerns were addressed.
89 F w/PMH hypothyroid, CAD, HTN, bladder ca (currently undergoing tx), recurrent UTI, presents c/o increased generalized weakness.  In ED, WBC 16, vitals stable, +UA, given rocephin IV.        * Bladder Cancer does not want further treatment  *UTI- ruled out- completed IV antibiotics  *+orthostatic- received midodrine which in turn made her hypertensive and BP harder to control.   *Hypertension  - cardiology consulted  - will adjust antihypertensive with goal -180 as per Dr Álvarez  - on imdur- increase to 60mg  - PT consult- recommends rehab  - monitor BP  - patient BP very labile- patient becomes symptomatic when BP<150  - reinstated Lopresor this AM- patient orthostatic agian this AM.     Case d/w team and MD on IDR. Plan explained to patient and all of their concerns were addressed.
89 F w/PMH hypothyroid, CAD, HTN, bladder ca (currently undergoing tx), recurrent UTI, presents c/o increased generalized weakness.  In ED, WBC 16, vitals stable, +UA, given rocephin IV.        * Bladder Cancer does not want further treatment  *UTI- ruled out- completed IV antibiotics  *+orthostatic- received midodrine which in turn made her hypertensive and BP harder to control.   *Hypertension  - cardiology consulted  - will adjust antihypertensive with goal -180 as per Dr Álvarez  - on imdur- increase to 60mg  - continue lopressor  - PT consult- recommends rehab  - monitor BP    Case d/w team and MD on IDR. Plan explained to patient and all of their concerns were addressed.
89 F w/PMH hypothyroid, CAD, HTN, bladder ca (currently undergoing tx), recurrent UTI, presents c/o increased generalized weakness.  In ED, WBC 16, vitals stable, +UA, given rocephin IV.      #GENERALISED weakness and no localizing signs- ucx contaminated  #INPATIENT  NEAR SYNCOPE  from orthostatic hypotension/ANOTHER EPISODE OF SYMPTOMATIC(GENRALISED WEAKNESS AND NEAR SYNCOPE) HYPOTENSION 12/13 WITHOUT ACUTE FOCAL NEURODEFICITS  # Bladder CA does not want any further treatment    UTI WAS RULED OUT,  DIFFICULT TO FIND AN OPTIMAL BALANCE BETWEEN PATIENT ELEVATED SBP AND EPISODES OF ORTHOSTATIC HYPOTENSION  GOAL IS TO MAINTAIN -180'S OR LESS DISCUSSED WITH CARDIOLOGY DR JUSTIN  CONTINUE MIDODRINE , IMDUR 30MG , metoprolol, AVOID ANY FURTHER ANTIHYPERTENSIVES AS LONG AS SBP REMAINS -180'S OR LESS  12/16 PATIENT'S  AND FELT WEAK (HAD NOT YET RECEIVED HER IMDUR FOR THE DAY)EVENTUALLY NOW 'S AT GOALAFTER HER IMDUR DOSE  BUT PATIENT IS CONCERNED AND REPORTS SHE STILL IS VERY WEAK AND IS UNABLE TO GO TO REHAB TODAY  repeat ua clear ct chest shows atelectasis , no pna   tele no events  DC PLANNING TO REHAB 12/17
89 F w/PMH hypothyroid, CAD, HTN, bladder ca (currently undergoing tx), recurrent UTI, presents c/o increased generalized weakness.  In ED, WBC 16, vitals stable, +UA, given rocephin IV.      #weakness and no localizing signs- ucx contaminated  # near syncope likely from orthostatic hypotension  # Bladder CA does not want any further treatment  repeat ua clear   ct chest shows atelectasis , no pna   tele no events  Patient does not want rehab   dc planning to rehab or home palliative once CT chest rules out PNA  Keep 's  STILL ORTHOSTATIC SO WILL RESUME MIDODRINE , KEEP sbp 150-175 PER DR IAN QUINTANA  on increased imdur dose
89 F w/PMH hypothyroid, CAD, HTN, bladder ca (currently undergoing tx), recurrent UTI, presents c/o increased generalized weakness.  In ED, WBC 16, vitals stable, +UA, given rocephin IV.      #weakness and no localizing signs- ucx contaminated  # near syncope likely from orthostatic hypotension  # Bladder CA does not want any further treatment  repeat ua clear   will check ct chest as CXR showed some atelectasis vs density  tele no events  Patient does not want rehab   dc planning to rehab or home palliative once CT chest rules out PNA  Keep 's  if still orthostatic will resume midodrine  on increased imdur dose
89 F w/PMH hypothyroid, CAD, HTN, bladder ca (currently undergoing tx), recurrent UTI, presents c/o increased generalized weakness.  In ED, WBC 16, vitals stable, +UA, given rocephin IV.      #weakness and no localizing signs- ucx contaminated  # near syncope likely from orthostatic hypotension/ANOTHER EPISODE OF SYMPTOMATIC(GENRALISED WEAKNESS AND NEAR SYNCOPE) HYPOTENSION 12/13 WITHOUT ACUTE FOCAL NEURODEFICITS  # Bladder CA does not want any further treatment  STOP LOSARTAN DUE TO ORTHOSTATIC HYPOTENSION, CONTINUE MIDODRINE, DECREASED IMDUR DOSE BY CARDIO, HOLD BP MED FOR SBP,140  NO ACUTE FOCAL NEURO DEFICITS NIHHS 0, HAS CHRONIC RIGHT ARM WEAKNESS, BUT WILL DO CT HEAD TO COMPLETE EVALUATION FOR NEAR SYNCOPE  CONTINUE MIDODRINE AND IMDUR FOR NOW   CARDIO RECONSULATTION FOR MED ADJUSTMENT FOR OPTIMISATION OF  SYMPTOMATIC ORTHOSTATIC HYPOTENSION AND PERMISSIVE HTN  repeat ua clear ct chest shows atelectasis , no pna   tele no events  Patient does not want rehab   dc planning to rehab or home palliative ONCE OPTIMSATION OF ORTHOSTATIC HYPOTENSION AND PERMISSIVE HYPERTENSION ACHEIVED   WOULD CONSIDER KEEPING [id number]
89 F w/PMH hypothyroid, CAD, HTN, bladder ca (currently undergoing tx), recurrent UTI, presents c/o increased generalized weakness.  In ED, WBC 16, vitals stable, +UA, given rocephin IV.      #weakness and no localizing signs- ucx contaminated  # near syncope likely from orthostatic hypotension/ANOTHER EPISODE OF SYMPTOMATIC(GENRALISED WEAKNESS AND NEAR SYNCOPE) HYPOTENSION 12/13 WITHOUT ACUTE FOCAL NEURODEFICITS  # Bladder CA does not want any further treatment  STOP LOSARTAN DUE TO ORTHOSTATIC HYPOTENSION, CONTINUE MIDODRINE, DECREASED IMDUR DOSE BY CARDIO, HOLD BP MED FOR SBP,140  NO ACUTE FOCAL NEURO DEFICITS NIHHS 0, HAS CHRONIC RIGHT ARM WEAKNESS, BUT WILL DO CT HEAD TO COMPLETE EVALUATION FOR NEAR SYNCOPE  CONTINUE MIDODRINE AND IMDUR FOR NOW   CARDIO RECONSULATTION FOR MED ADJUSTMENT FOR OPTIMISATION OF  SYMPTOMATIC ORTHOSTATIC HYPOTENSION AND PERMISSIVE HTN  repeat ua clear ct chest shows atelectasis , no pna   tele no events  Patient does not want rehab   dc planning to rehab or home palliative ONCE OPTIMSATION OF ORTHOSTATIC HYPOTENSION AND PERMISSIVE HYPERTENSION ACHEIVED   WOULD CONSIDER KEEPING [id number]
89 F w/PMH hypothyroid, CAD, HTN, bladder ca (currently undergoing tx), recurrent UTI, presents c/o increased generalized weakness.  In ED, WBC 16, vitals stable, +UA, given rocephin IV.      #weakness and no localizing signs- ucx contaminated  # near syncope likely from orthostatic hypotension/ANOTHER EPISODE OF SYMPTOMATIC(GENRALISED WEAKNESS AND NEAR SYNCOPE) HYPOTENSION 12/13 WITHOUT ACUTE FOCAL NEURODEFICITS  # Bladder CA does not want any further treatment  STOP LOSARTAN DUE TO ORTHOSTATIC HYPOTENSION, CONTINUE MIDODRINE, TRANSFER TO TELE FOR 24 HRS,   NO ACUTE FOCAL NEURO DEFICITS NIHHS 0, HAS CHRONIC RIGHT ARM WEAKNESS, BUT WILL DO CT HEAD TO COMPLETE EVALUATION FOR NEAR SYNCOPE  CONTINUE MIDODRINE AND IMDUR FOR NOW   CARDIO RECONSULATTION FOR MED ADJUSTMENT FOR OPTIMISATION OF  SYMPTOMATIC ORTHOSTATIC HYPOTENSION AND PERMISSIVE HTN  repeat ua clear ct chest shows atelectasis , no pna   tele no events  Patient does not want rehab   dc planning to rehab or home palliative ONCE OPTIMSATION OF ORTHOSTATIC HYPOTENSION AND PERMISSIVE HYPERTENSION ACHEIVED   WOULD CONSIDER KEEPING [id number]  on increased imdur dose
89 year old female with hypertension, bladder cancer, hypothyroidism, ASHD presents with near syncope.     12/11:  orthostatic vital signs. if signs of orthostasis, recommend restarting midodrine.   adjust blood pressure medication to maintain a systolic BP of 150.   physical therapy.     12/14  IV fluids today.   hold hypertension medicines for blood pressure lower than 140   continue midodrine for orthostatic hypotension.
89 year old female with hypertension, bladder cancer, hypothyroidism, ASHD presents with near syncope.     12/11:  orthostatic vital signs. if signs of orthostasis, recommend restarting midodrine.   adjust blood pressure medication to maintain a systolic BP of 150.   physical therapy.     12/14  IV fluids today.   hold hypertension medicines for blood pressure lower than 140   continue midodrine for orthostatic hypotension.     12/15  IV fluids today.   continue blood pressure medications with parameters  continue midodrine.   referral to rehab.
89 year old female with hypertension, bladder cancer, hypothyroidism, ASHD presents with near syncope.     12/11:  orthostatic vital signs. if signs of orthostasis, recommend restarting midodrine.   adjust blood pressure medication to maintain a systolic BP of 150.   physical therapy.     12/14  IV fluids today.   hold hypertension medicines for blood pressure lower than 140   continue midodrine for orthostatic hypotension.     12/15  IV fluids today.   continue blood pressure medications with parameters  continue midodrine.   referral to rehab.      12/17  discontinue midodrine  saline bolus this morning.   continue blood pressure medication with parameters.   consider transfer to rehab today
89 year old female with hypertension, bladder cancer, hypothyroidism, ASHD presents with near syncope.     12/11:  orthostatic vital signs. if signs of orthostasis, recommend restarting midodrine.   adjust blood pressure medication to maintain a systolic BP of 150.   physical therapy.     12/14  IV fluids today.   hold hypertension medicines for blood pressure lower than 140   continue midodrine for orthostatic hypotension.     12/15  IV fluids today.   continue blood pressure medications with parameters  continue midodrine.   referral to rehab.      12/17  discontinue midodrine  saline bolus this morning.   continue blood pressure medication with parameters.   consider transfer to rehab today    12/18  agree with the increased dose of Imdur to 60 mg daily and continuation of metoprolol.   monitor the blood pressure this morning. If stable, consider transfer to rehab. They should be given the instructions to hold blood pressure medication is the blood pressure is below 140 mmHG systolic.
89 year old female with hypertension, bladder cancer, hypothyroidism, ASHD presents with near syncope.     12/11:  orthostatic vital signs. if signs of orthostasis, recommend restarting midodrine.   adjust blood pressure medication to maintain a systolic BP of 150.   physical therapy.     12/14  IV fluids today.   hold hypertension medicines for blood pressure lower than 140   continue midodrine for orthostatic hypotension.     12/15  IV fluids today.   continue blood pressure medications with parameters  continue midodrine.   referral to rehab.      12/17  discontinue midodrine  saline bolus this morning.   continue blood pressure medication with parameters.   consider transfer to rehab today    12/18  agree with the increased dose of Imdur to 60 mg daily and continuation of metoprolol.   monitor the blood pressure this morning. If stable, consider transfer to rehab. They should be given the instructions to hold blood pressure medication is the blood pressure is below 140 mmHG systolic.     12/19  she is very sensitive to medication and doesn't feel well when her blood pressure is normal ( its too low for her )  resume metoprolol today  Imdur 60 mg daily. I would prefer it being administered at lunch time. If the blood pressure during the day hits above 190 mm HG, should receive another 30 mg of Imdur rather than another medication.   Hope for discharge to rehab today.
89y old Female coming from home with hx of hypothyroid, CAD, HTN, uterine ca (s/p EMILY) bladder ca (currently undergoing tx), recurrent UTI, 6th hospitalization, last admitted 11/14-20 for UTI, now admitted 12/6 for c/o increased generalized weakness.  Of note, pt went for her 2nd bladder ca tx day PTA found w/ UTI and started on macrobid, which she's been taking, developing difficulty ambulate d/t weakness (similar to prior UTI). Pt called her  office and instructed to come to ED for tx.  In ED, WBC 16, vitals stable, +UA, given rocephin IV. Palliative Care consulted to assist with establishing GOC.     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 Solorzano from last admission pt with high risk bladder ca, failed 1st BCG induction  - pt noted as poor surgical candidate and was not interested in cystectomy  - pt verbalizing desire to stop chemo, now unsure and wants to speak further with Dr. Solorzano  - sugg urology consult with that service as pt noting desire to stop chemo and recurrent UTI    5) Prognosis  - appears poor  - in setting of advanced age, bladder ca with recurrent infections and multiple hospitalization, mounting debility, PPS<40%, evidence of malnutrition, and noted desire to stop chemo, would likely fit criteria for hospice. The latter must be discussed with pt and family, unclear interest at this time    6) GOC/Advanced Directives  - pt has capacity for decision making and would like to be a part of this  - HCP in [employer] naming son Adama Osuna [phone]  - MOLST on chart 12/6: DNR and DNI-->revised today (12/10)- DNR, limited, DNI, send to hospital, no feeding tube, trial of IVF, determine use of abx  - GOC meeting held today- pt adamant that she does not want POOL, preferring home with aids (son willing to hire) and palliative plan until able to discuss continuing chemo further with Dr. Solorzano who will be consulted. *Spent 49 minutes discussing GOC with family including Advance care planning, explanation and discussion of advance directives, reviewed all treatment/dispo options, and MOLST. See GOC note for further details.    Thank you for including us in Ms. Osuna's care. Will continue to follow with you.    Jovani Kapadia MD  Palliative Care Attending
89y old Female coming from home with hx of hypothyroid, CAD, HTN, uterine ca (s/p EMILY) bladder ca (currently undergoing tx), recurrent UTI, 6th hospitalization, last admitted 11/14-20 for UTI, now admitted 12/6 for c/o increased generalized weakness.  Of note, pt went for her 2nd bladder ca tx day PTA found w/ UTI and started on macrobid, which she's been taking, developing difficulty ambulate d/t weakness (similar to prior UTI). Pt called her  office and instructed to come to ED for tx.  In ED, WBC 16, vitals stable, +UA, given rocephin IV. Palliative Care consulted to assist with establishing GOC.     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 Solorzano from last admission pt with high risk bladder ca, failed 1st BCG induction  - pt noted as poor surgical candidate and was not interested in cystectomy  - pt verbalizing desire to stop chemo, now unsure and wants to speak further with Dr. Solorzano  - urology consult appreciated- "Patient again expressed her desire to stop BCG therapy which we can do however she understands that she has a very high risk of recurrence with progression to muscle invasive disease which can cause metastatic spread and eventual death."    5) Prognosis  - appears poor  - in setting of advanced age, bladder ca with recurrent infections and multiple hospitalization, mounting debility, PPS<40%, evidence of malnutrition, and noted desire to stop chemo, would likely fit criteria for hospice.     6) GOC/Advanced Directives  - pt has capacity for decision making and would like to be a part of this  - HCP in [employer] naming son Adama Osuna [phone]  - MOLST on chart 12/6: DNR and DNI-->revised today (12/10)- DNR, limited, DNI, send to hospital, no feeding tube, trial of IVF, determine use of abx  - [facility] meeting held 12/10- pt adamant that she does not want POOL, preferring home with aids (son willing to hire) and palliative plan until able to discuss continuing chemo further with Dr. Solorzano who was consulted. As of today, pt has had this conversation and decided to stop chemo. Called son Adama who wants to come in and discuss this with his mother cc: true plan for either POOL (he wants to sway her) vs. Home hospice with [facility]. Will ask floor SW to follow up and pursue referral as necessary. See [facility] note for further details.    Thank you for including us in Ms. Osuna's care. Will continue to follow with you.    Jovani Kapadia MD  Palliative Care Attending
89y old Female coming from home with hx of hypothyroid, CAD, HTN, uterine ca (s/p EMILY) bladder ca (currently undergoing tx), recurrent UTI, 6th hospitalization, last admitted 11/14-20 for UTI, now admitted 12/6 for c/o increased generalized weakness.  Of note, pt went for her 2nd bladder ca tx day PTA found w/ UTI and started on macrobid, which she's been taking, developing difficulty ambulate d/t weakness (similar to prior UTI). Pt called her  office and instructed to come to ED for tx.  In ED, WBC 16, vitals stable, +UA, given rocephin IV. Palliative Care consulted to assist with establishing GOC.     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 Solorzano from last admission pt with high risk bladder ca, failed 1st BCG induction  - pt noted as poor surgical candidate and was not interested in cystectomy  - pt verbalizing desire to stop chemo, now unsure and wants to speak further with Dr. Solorzano  - urology consult appreciated- "Patient again expressed her desire to stop BCG therapy which we can do however she understands that she has a very high risk of recurrence with progression to muscle invasive disease which can cause metastatic spread and eventual death."    5) Prognosis  - appears poor  - in setting of advanced age, bladder ca with recurrent infections and multiple hospitalization, mounting debility, PPS<40%, evidence of malnutrition, and noted desire to stop chemo, would likely fit criteria for hospice.     6) GOC/Advanced Directives  - pt has capacity for decision making and would like to be a part of this  - HCP in [employer] naming son Adama Osuna [phone]  - MOLST on chart 12/6: DNR and DNI-->revised today (12/10)- DNR, limited, DNI, send to hospital, no feeding tube, trial of IVF, determine use of abx  - [facility] meeting held 12/10- pt did not want OPOL, preferring home with aids (son willing to hire) and palliative plan until able to discuss continuing chemo further with Dr. Solorzano who was consulted. On 12/11, pt had this conversation and decided to stop chemo. Called son Adama who spoke to pt and she is now on board with POOL. See [facility] note for further details.    *Dispo in the works, will sign off*  Thank you for including us in Ms. Osuna's care.     Jovani Kapadia MD  Palliative Care Attending
ASSESSMENT/PLAN:  89 F w/PMH hypothyroid, CAD, HTN, bladder ca (currently undergoing tx), recurrent UTI, presents c/o increased generalized weakness.  In ED, WBC 16, vitals stable, +UA, given rocephin IV.      #weakness and no localizing signs  pending cultures r/o infection  per pt this weakness is being morelia on for months      #HTN - initially uncontrolled better ctr now      #Bladder CA  - s/p IVF  - Undergoing treatment outpatient    PT eval
ASSESSMENT/PLAN:  89 F w/PMH hypothyroid, CAD, HTN, bladder ca (currently undergoing tx), recurrent UTI, presents c/o increased generalized weakness.  In ED, WBC 16, vitals stable, +UA, given rocephin IV.      #weakness and no localizing signs- will repeat ucx since first was contaminated  -pending cultures r/o infection  - per pt this weakness is being going on for months  - wait PT eval       #HTN - initially uncontrolled better ctr now.  will increase imdur dose for better Bp control     #Bladder CA  - s/p IVF  - Undergoing treatment outpatient    dispo - PT eval and likely snf
ASSESSMENT/PLAN:  89 F w/PMH hypothyroid, CAD, HTN, bladder ca (currently undergoing tx), recurrent UTI, presents c/o increased generalized weakness.  In ED, WBC 16, vitals stable, +UA, given rocephin IV.      #weakness and no localizing signs- will repeat ucx since first was contaminated. f/u results  - per pt this weakness is being going on for months  - wait PT fredis jacobson will need rehab or full time aide since patient lives alone    #HTN - initially uncontrolled better ctr now.  will increase imdur dose for better Bp control   - no midodrine    #Bladder CA  - s/p IVF  - Undergoing treatment outpatient    dispo - PT eval and likely snf. await pall family meeting
Pt with Hx of Bladder CA/UTIs/MRSA Has generalized xerosis encourage Urea based lotion qday to feet, debride dystrophic nails will F/U if requested. THX
89 F w/PMH hypothyroid, CAD, HTN, bladder ca (currently undergoing tx), recurrent UTI, presents c/o increased generalized weakness.  In ED, WBC 16, vitals stable, +UA, given rocephin IV.      #weakness and no localizing signs- ucx contaminated  repeat ua clear   - per pt this weakness is being going on for months  - wait PT eval,patient does not want rehab  may be a candidate for home palliative vs home hospice depending on treatment plan for bladder cancer as per her urologist dr redmond   will consult dr redmond when any further chemo is recommended     # Had episode of near syncope while on toilet seat in the hospital ddx vasovagal vs orthostatic hypotension vs r/o arrhythmia  transfer to tele for 24hrs  r/o arrhythmia  orthostatic vitals    #HTN - initially uncontrolled better ctr now. had increased  imdur dose for better Bp control by dr jade  - no midodrine    #Bladder CA  - s/p IVF  - Undergoing treatment outpatient    dispo - PT eval and likely snf. await pall family meeting

## 2019-12-27 DIAGNOSIS — C67.9 MALIGNANT NEOPLASM OF BLADDER, UNSPECIFIED: ICD-10-CM

## 2019-12-27 DIAGNOSIS — K21.9 GASTRO-ESOPHAGEAL REFLUX DISEASE WITHOUT ESOPHAGITIS: ICD-10-CM

## 2019-12-27 DIAGNOSIS — H35.30 UNSPECIFIED MACULAR DEGENERATION: ICD-10-CM

## 2019-12-27 DIAGNOSIS — M47.9 SPONDYLOSIS, UNSPECIFIED: ICD-10-CM

## 2019-12-27 DIAGNOSIS — I10 ESSENTIAL (PRIMARY) HYPERTENSION: ICD-10-CM

## 2019-12-27 DIAGNOSIS — L85.3 XEROSIS CUTIS: ICD-10-CM

## 2019-12-27 DIAGNOSIS — Z85.42 PERSONAL HISTORY OF MALIGNANT NEOPLASM OF OTHER PARTS OF UTERUS: ICD-10-CM

## 2019-12-27 DIAGNOSIS — L60.3 NAIL DYSTROPHY: ICD-10-CM

## 2019-12-27 DIAGNOSIS — I95.1 ORTHOSTATIC HYPOTENSION: ICD-10-CM

## 2019-12-27 DIAGNOSIS — Z95.5 PRESENCE OF CORONARY ANGIOPLASTY IMPLANT AND GRAFT: ICD-10-CM

## 2019-12-27 DIAGNOSIS — I16.0 HYPERTENSIVE URGENCY: ICD-10-CM

## 2019-12-27 DIAGNOSIS — Z66 DO NOT RESUSCITATE: ICD-10-CM

## 2019-12-27 DIAGNOSIS — Z90.710 ACQUIRED ABSENCE OF BOTH CERVIX AND UTERUS: ICD-10-CM

## 2019-12-27 DIAGNOSIS — E03.9 HYPOTHYROIDISM, UNSPECIFIED: ICD-10-CM

## 2019-12-27 DIAGNOSIS — E78.5 HYPERLIPIDEMIA, UNSPECIFIED: ICD-10-CM

## 2019-12-27 DIAGNOSIS — E44.1 MILD PROTEIN-CALORIE MALNUTRITION: ICD-10-CM

## 2019-12-27 DIAGNOSIS — I25.10 ATHEROSCLEROTIC HEART DISEASE OF NATIVE CORONARY ARTERY WITHOUT ANGINA PECTORIS: ICD-10-CM

## 2019-12-27 DIAGNOSIS — F41.9 ANXIETY DISORDER, UNSPECIFIED: ICD-10-CM

## 2020-02-13 NOTE — PHYSICAL THERAPY INITIAL EVALUATION ADULT - LEVEL OF INDEPENDENCE: GAIT, REHAB EVAL
Patient continues to have pain in back. Patient is thinking he may need to extend his leave from work for a few more days (Mon, Tues). Patient asking if pcp would like to see him or can he extend his leave.   contact guard

## 2020-02-24 ENCOUNTER — EMERGENCY (EMERGENCY)
Facility: HOSPITAL | Age: 85
LOS: 0 days | Discharge: HOME | End: 2020-02-24
Attending: EMERGENCY MEDICINE
Payer: MEDICARE

## 2020-02-24 VITALS
WEIGHT: 179.9 LBS | HEIGHT: 64 IN | RESPIRATION RATE: 17 BRPM | SYSTOLIC BLOOD PRESSURE: 211 MMHG | OXYGEN SATURATION: 99 % | HEART RATE: 92 BPM | TEMPERATURE: 97 F | DIASTOLIC BLOOD PRESSURE: 95 MMHG

## 2020-02-24 VITALS
RESPIRATION RATE: 18 BRPM | HEART RATE: 79 BPM | TEMPERATURE: 98 F | DIASTOLIC BLOOD PRESSURE: 79 MMHG | SYSTOLIC BLOOD PRESSURE: 155 MMHG | OXYGEN SATURATION: 99 %

## 2020-02-24 DIAGNOSIS — E03.9 HYPOTHYROIDISM, UNSPECIFIED: ICD-10-CM
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
## 2020-02-24 DIAGNOSIS — Z90.710 ACQUIRED ABSENCE OF BOTH CERVIX AND UTERUS: ICD-10-CM

## 2020-02-24 DIAGNOSIS — Z88.2 ALLERGY STATUS TO SULFONAMIDES: ICD-10-CM

## 2020-02-24 DIAGNOSIS — I10 ESSENTIAL (PRIMARY) HYPERTENSION: ICD-10-CM

## 2020-02-24 DIAGNOSIS — Z95.5 PRESENCE OF CORONARY ANGIOPLASTY IMPLANT AND GRAFT: ICD-10-CM

## 2020-02-24 DIAGNOSIS — S01.21XA LACERATION WITHOUT FOREIGN BODY OF NOSE, INITIAL ENCOUNTER: ICD-10-CM

## 2020-02-24 DIAGNOSIS — Z90.710 ACQUIRED ABSENCE OF BOTH CERVIX AND UTERUS: Chronic | ICD-10-CM

## 2020-02-24 DIAGNOSIS — S02.2XXB FRACTURE OF NASAL BONES, INITIAL ENCOUNTER FOR OPEN FRACTURE: ICD-10-CM

## 2020-02-24 DIAGNOSIS — E78.5 HYPERLIPIDEMIA, UNSPECIFIED: ICD-10-CM

## 2020-02-24 DIAGNOSIS — Z85.42 PERSONAL HISTORY OF MALIGNANT NEOPLASM OF OTHER PARTS OF UTERUS: ICD-10-CM

## 2020-02-24 DIAGNOSIS — Z85.51 PERSONAL HISTORY OF MALIGNANT NEOPLASM OF BLADDER: ICD-10-CM
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
## 2020-02-24 DIAGNOSIS — Z98.890 OTHER SPECIFIED POSTPROCEDURAL STATES: Chronic | ICD-10-CM

## 2020-02-24 DIAGNOSIS — I25.10 ATHEROSCLEROTIC HEART DISEASE OF NATIVE CORONARY ARTERY WITHOUT ANGINA PECTORIS: ICD-10-CM

## 2020-02-24 DIAGNOSIS — Z88.5 ALLERGY STATUS TO NARCOTIC AGENT: ICD-10-CM

## 2020-02-24 DIAGNOSIS — M54.2 CERVICALGIA: ICD-10-CM

## 2020-02-24 DIAGNOSIS — S80.01XA CONTUSION OF RIGHT KNEE, INITIAL ENCOUNTER: ICD-10-CM

## 2020-02-24 LAB
ALBUMIN SERPL ELPH-MCNC: 3.8 G/DL — SIGNIFICANT CHANGE UP (ref 3.3–5)
ALP SERPL-CCNC: 88 U/L — SIGNIFICANT CHANGE UP (ref 40–120)
ALT FLD-CCNC: 18 U/L — SIGNIFICANT CHANGE UP (ref 12–78)
ANION GAP SERPL CALC-SCNC: 7 MMOL/L — SIGNIFICANT CHANGE UP (ref 5–17)
APPEARANCE UR: CLEAR — SIGNIFICANT CHANGE UP
APTT BLD: 33.5 SEC — SIGNIFICANT CHANGE UP (ref 27.5–36.3)
AST SERPL-CCNC: 20 U/L — SIGNIFICANT CHANGE UP (ref 15–37)
BASOPHILS # BLD AUTO: 0.08 K/UL — SIGNIFICANT CHANGE UP (ref 0–0.2)
BASOPHILS NFR BLD AUTO: 0.8 % — SIGNIFICANT CHANGE UP (ref 0–2)
BILIRUB SERPL-MCNC: 0.5 MG/DL — SIGNIFICANT CHANGE UP (ref 0.2–1.2)
BILIRUB UR-MCNC: NEGATIVE — SIGNIFICANT CHANGE UP
BUN SERPL-MCNC: 21 MG/DL — SIGNIFICANT CHANGE UP (ref 7–23)
CALCIUM SERPL-MCNC: 9.4 MG/DL — SIGNIFICANT CHANGE UP (ref 8.5–10.1)
CHLORIDE SERPL-SCNC: 104 MMOL/L — SIGNIFICANT CHANGE UP (ref 96–108)
CO2 SERPL-SCNC: 24 MMOL/L — SIGNIFICANT CHANGE UP (ref 22–31)
COLOR SPEC: YELLOW — SIGNIFICANT CHANGE UP
CREAT SERPL-MCNC: 1.21 MG/DL — SIGNIFICANT CHANGE UP (ref 0.5–1.3)
DIFF PNL FLD: ABNORMAL
EOSINOPHIL # BLD AUTO: 0.12 K/UL — SIGNIFICANT CHANGE UP (ref 0–0.5)
EOSINOPHIL NFR BLD AUTO: 1.2 % — SIGNIFICANT CHANGE UP (ref 0–6)
GLUCOSE SERPL-MCNC: 117 MG/DL — HIGH (ref 70–99)
GLUCOSE UR QL: NEGATIVE MG/DL — SIGNIFICANT CHANGE UP
HCT VFR BLD CALC: 40 % — SIGNIFICANT CHANGE UP (ref 34.5–45)
HGB BLD-MCNC: 11.9 G/DL — SIGNIFICANT CHANGE UP (ref 11.5–15.5)
IMM GRANULOCYTES NFR BLD AUTO: 0.4 % — SIGNIFICANT CHANGE UP (ref 0–1.5)
INR BLD: 0.99 RATIO — SIGNIFICANT CHANGE UP (ref 0.88–1.16)
KETONES UR-MCNC: NEGATIVE — SIGNIFICANT CHANGE UP
LEUKOCYTE ESTERASE UR-ACNC: ABNORMAL
LIDOCAIN IGE QN: 191 U/L — SIGNIFICANT CHANGE UP (ref 73–393)
LYMPHOCYTES # BLD AUTO: 2.65 K/UL — SIGNIFICANT CHANGE UP (ref 1–3.3)
LYMPHOCYTES # BLD AUTO: 25.9 % — SIGNIFICANT CHANGE UP (ref 13–44)
MCHC RBC-ENTMCNC: 25.5 PG — LOW (ref 27–34)
MCHC RBC-ENTMCNC: 29.8 GM/DL — LOW (ref 32–36)
MCV RBC AUTO: 85.8 FL — SIGNIFICANT CHANGE UP (ref 80–100)
MONOCYTES # BLD AUTO: 0.73 K/UL — SIGNIFICANT CHANGE UP (ref 0–0.9)
MONOCYTES NFR BLD AUTO: 7.1 % — SIGNIFICANT CHANGE UP (ref 2–14)
NEUTROPHILS # BLD AUTO: 6.61 K/UL — SIGNIFICANT CHANGE UP (ref 1.8–7.4)
NEUTROPHILS NFR BLD AUTO: 64.6 % — SIGNIFICANT CHANGE UP (ref 43–77)
NITRITE UR-MCNC: NEGATIVE — SIGNIFICANT CHANGE UP
PH UR: 7 — SIGNIFICANT CHANGE UP (ref 5–8)
PLATELET # BLD AUTO: 332 K/UL — SIGNIFICANT CHANGE UP (ref 150–400)
POTASSIUM SERPL-MCNC: 4 MMOL/L — SIGNIFICANT CHANGE UP (ref 3.5–5.3)
POTASSIUM SERPL-SCNC: 4 MMOL/L — SIGNIFICANT CHANGE UP (ref 3.5–5.3)
PROT SERPL-MCNC: 8 GM/DL — SIGNIFICANT CHANGE UP (ref 6–8.3)
PROT UR-MCNC: 30 MG/DL
PROTHROM AB SERPL-ACNC: 11 SEC — SIGNIFICANT CHANGE UP (ref 10–12.9)
RBC # BLD: 4.66 M/UL — SIGNIFICANT CHANGE UP (ref 3.8–5.2)
RBC # FLD: 15.4 % — HIGH (ref 10.3–14.5)
SODIUM SERPL-SCNC: 135 MMOL/L — SIGNIFICANT CHANGE UP (ref 135–145)
SP GR SPEC: 1 — LOW (ref 1.01–1.02)
UROBILINOGEN FLD QL: NEGATIVE MG/DL — SIGNIFICANT CHANGE UP
WBC # BLD: 10.23 K/UL — SIGNIFICANT CHANGE UP (ref 3.8–10.5)
WBC # FLD AUTO: 10.23 K/UL — SIGNIFICANT CHANGE UP (ref 3.8–10.5)

## 2020-02-24 PROCEDURE — 85610 PROTHROMBIN TIME: CPT

## 2020-02-24 PROCEDURE — 73562 X-RAY EXAM OF KNEE 3: CPT | Mod: RT

## 2020-02-24 PROCEDURE — 36415 COLL VENOUS BLD VENIPUNCTURE: CPT

## 2020-02-24 PROCEDURE — 96374 THER/PROPH/DIAG INJ IV PUSH: CPT | Mod: XU

## 2020-02-24 PROCEDURE — 73562 X-RAY EXAM OF KNEE 3: CPT | Mod: 26,LT

## 2020-02-24 PROCEDURE — 85025 COMPLETE CBC W/AUTO DIFF WBC: CPT

## 2020-02-24 PROCEDURE — 81001 URINALYSIS AUTO W/SCOPE: CPT

## 2020-02-24 PROCEDURE — 80053 COMPREHEN METABOLIC PANEL: CPT

## 2020-02-24 PROCEDURE — 76376 3D RENDER W/INTRP POSTPROCES: CPT

## 2020-02-24 PROCEDURE — 12051 INTMD RPR FACE/MM 2.5 CM/<: CPT

## 2020-02-24 PROCEDURE — 73552 X-RAY EXAM OF FEMUR 2/>: CPT | Mod: 26,RT

## 2020-02-24 PROCEDURE — 70486 CT MAXILLOFACIAL W/O DYE: CPT | Mod: 26

## 2020-02-24 PROCEDURE — 73502 X-RAY EXAM HIP UNI 2-3 VIEWS: CPT | Mod: RT

## 2020-02-24 PROCEDURE — 72125 CT NECK SPINE W/O DYE: CPT

## 2020-02-24 PROCEDURE — 85730 THROMBOPLASTIN TIME PARTIAL: CPT

## 2020-02-24 PROCEDURE — 71045 X-RAY EXAM CHEST 1 VIEW: CPT

## 2020-02-24 PROCEDURE — 73502 X-RAY EXAM HIP UNI 2-3 VIEWS: CPT | Mod: 26,RT

## 2020-02-24 PROCEDURE — 86901 BLOOD TYPING SEROLOGIC RH(D): CPT

## 2020-02-24 PROCEDURE — 70486 CT MAXILLOFACIAL W/O DYE: CPT

## 2020-02-24 PROCEDURE — 70450 CT HEAD/BRAIN W/O DYE: CPT

## 2020-02-24 PROCEDURE — 71045 X-RAY EXAM CHEST 1 VIEW: CPT | Mod: 26

## 2020-02-24 PROCEDURE — 76376 3D RENDER W/INTRP POSTPROCES: CPT | Mod: 26

## 2020-02-24 PROCEDURE — 99285 EMERGENCY DEPT VISIT HI MDM: CPT | Mod: 25

## 2020-02-24 PROCEDURE — 83690 ASSAY OF LIPASE: CPT

## 2020-02-24 PROCEDURE — 70450 CT HEAD/BRAIN W/O DYE: CPT | Mod: 26

## 2020-02-24 PROCEDURE — 86850 RBC ANTIBODY SCREEN: CPT

## 2020-02-24 PROCEDURE — 99285 EMERGENCY DEPT VISIT HI MDM: CPT

## 2020-02-24 PROCEDURE — 90715 TDAP VACCINE 7 YRS/> IM: CPT

## 2020-02-24 PROCEDURE — 86900 BLOOD TYPING SEROLOGIC ABO: CPT

## 2020-02-24 PROCEDURE — 73552 X-RAY EXAM OF FEMUR 2/>: CPT | Mod: RT

## 2020-02-24 PROCEDURE — 90471 IMMUNIZATION ADMIN: CPT

## 2020-02-24 PROCEDURE — 72125 CT NECK SPINE W/O DYE: CPT | Mod: 26

## 2020-02-24 RX ORDER — TETANUS TOXOID, REDUCED DIPHTHERIA TOXOID AND ACELLULAR PERTUSSIS VACCINE, ADSORBED 5; 2.5; 8; 8; 2.5 [IU]/.5ML; [IU]/.5ML; UG/.5ML; UG/.5ML; UG/.5ML
0.5 SUSPENSION INTRAMUSCULAR ONCE
Refills: 0 | Status: COMPLETED | OUTPATIENT
Start: 2020-02-24 | End: 2020-02-24

## 2020-02-24 RX ORDER — CEFAZOLIN SODIUM 1 G
1000 VIAL (EA) INJECTION ONCE
Refills: 0 | Status: COMPLETED | OUTPATIENT
Start: 2020-02-24 | End: 2020-02-24

## 2020-02-24 RX ORDER — METOPROLOL TARTRATE 50 MG
25 TABLET ORAL ONCE
Refills: 0 | Status: COMPLETED | OUTPATIENT
Start: 2020-02-24 | End: 2020-02-24

## 2020-02-24 RX ORDER — ISOSORBIDE MONONITRATE 60 MG/1
30 TABLET, EXTENDED RELEASE ORAL ONCE
Refills: 0 | Status: COMPLETED | OUTPATIENT
Start: 2020-02-24 | End: 2020-02-24

## 2020-02-24 RX ORDER — CEPHALEXIN 500 MG
1 CAPSULE ORAL
Qty: 9 | Refills: 0
Start: 2020-02-24 | End: 2020-02-26

## 2020-02-24 RX ORDER — IBUPROFEN 200 MG
200 TABLET ORAL ONCE
Refills: 0 | Status: COMPLETED | OUTPATIENT
Start: 2020-02-24 | End: 2020-02-24

## 2020-02-24 RX ADMIN — Medication 25 MILLIGRAM(S): at 19:22

## 2020-02-24 RX ADMIN — Medication 1000 MILLIGRAM(S): at 17:40

## 2020-02-24 RX ADMIN — TETANUS TOXOID, REDUCED DIPHTHERIA TOXOID AND ACELLULAR PERTUSSIS VACCINE, ADSORBED 0.5 MILLILITER(S): 5; 2.5; 8; 8; 2.5 SUSPENSION INTRAMUSCULAR at 17:42

## 2020-02-24 RX ADMIN — Medication 200 MILLIGRAM(S): at 21:44

## 2020-02-24 RX ADMIN — Medication 200 MILLIGRAM(S): at 22:14

## 2020-02-24 RX ADMIN — ISOSORBIDE MONONITRATE 30 MILLIGRAM(S): 60 TABLET, EXTENDED RELEASE ORAL at 19:21

## 2020-02-24 NOTE — ED ADULT NURSE NOTE - CHIEF COMPLAINT QUOTE
Patient presents in ED s/p unwitnessed fall at home. positive head injury. Patient also complains of right arm pain and right knee pain. denies blood thinners. denies LOC. Trauma alert called.

## 2020-02-24 NOTE — ED PROVIDER NOTE - SECONDARY DIAGNOSIS.
Open fracture of nasal bone, initial encounter Contusion of knee, unspecified laterality, initial encounter

## 2020-02-24 NOTE — ED PROVIDER NOTE - PMH
Bladder cancer    CAD (coronary artery disease)    Endometrial adenocarcinoma    GERD (gastroesophageal reflux disease)    [place] (hard of hearing)    HTN (hypertension)    Hyperlipidemia    Hypothyroid    Macular degeneration    Orthostatic hypotension    Stented coronary artery

## 2020-02-24 NOTE — ED PROVIDER NOTE - CONSTITUTIONAL, MLM
normal... Elderly white female. Well appearing, awake, alert, oriented to person, place, time/situation and in no apparent distress. No respiratory discomfort. Nontoxic.

## 2020-02-24 NOTE — ED PROVIDER NOTE - CARE PLAN
Principal Discharge DX:	Injury of head, initial encounter  Secondary Diagnosis:	Open fracture of nasal bone, initial encounter  Secondary Diagnosis:	Contusion of knee, unspecified laterality, initial encounter

## 2020-02-24 NOTE — ED PROVIDER NOTE - OBJECTIVE STATEMENT
89 y/o female with a PMHx of bladder cancer, endometrial cancer, orthostatic hypotension, HTN, HLD, hypothyroidism, CAD s/p stents, s/p EMILY, s/p cholecystectomy, GERD presents to the ED s/p fall this afternoon. Pt states she was taking something out of the refrigerator, turned and lost her balance, fell face forward onto the floor. +head injury. Denies LOC. Now c/o right arm pain, right leg pain, mild neck pain. +laceration to bridge of nose. PMD/Cardio- Dr. Sánchez. Allergic to sulfa. Unknown last tetanus. No anticoagulants.

## 2020-02-24 NOTE — ED PROVIDER NOTE - CLINICAL SUMMARY MEDICAL DECISION MAKING FREE TEXT BOX
89 y/o female h/o HTN, bladder cancer, orthostatic hypotension, advanced arthritis multiple joints, BIBA from home s/p trip and fall forward onto face/head. No LOC. Pt not on ASA, AC meds. +nasal lac with associated swelling. +forehead hematoma. +right leg/knee discomfort. +mild neck discomfort. Plan: Trauma alert initiated: CT head, face, c-spine. x-ray chest, pelvis, right hip, right knee, right femur. Labs (pt with known h/o hematuria). Tdap, nasal lac repair. Monitor, observe, reassess.

## 2020-02-24 NOTE — ED PROVIDER NOTE - NSFOLLOWUPINSTRUCTIONS_ED_ALL_ED_FT
Tylenol 325 mg 2 tabs every 6 hours as needed for headache, aches & pains.  Continue your regular medications as per routine.  Follow up with own doctor this week.  Follow up 1 week with Dr. Mann, plastic Surgery.  Take Keflex antibiotic as prescribed.      ED evaluation and management discussed with the patient and family (if available) in detail.  Close PMD follow up encouraged.  Strict ED return instructions discussed in detail and patient given the opportunity to ask any questions about their discharge diagnosis and instructions. Patient verbalized understanding.        Closed Head Injury    A closed head injury is an injury to your head that may or may not involve a traumatic brain injury (TBI). Symptoms of TBI can be short or long lasting and include headache, dizziness, interference with memory or speech, fatigue, confusion, changes in sleep, mood changes, nausea, depression/anxiety, and dulling of senses. Make sure to obtain proper rest which includes getting plenty of sleep, avoiding excessive visual stimulation, and avoiding activities that may cause physical or mental stress. Avoid any situation where there is potential for another head injury, including sports.    SEEK IMMEDIATE MEDICAL CARE IF YOU HAVE ANY OF THE FOLLOWING SYMPTOMS: unusual drowsiness, vomiting, severe dizziness, seizures, lightheadedness, muscular weakness, different pupil sizes, visual changes, or clear or bloody discharge from your ears or nose.      Nasal Fracture    WHAT YOU NEED TO KNOW:    A nasal fracture is a crack or break in your nose. You may have a break in the upper nose (bridge), the side, or the septum. The septum is in the middle of the nose and divides your nostrils.    DISCHARGE INSTRUCTIONS:    Return to the emergency department if:     You feel like one or both of your nasal passages are blocked and you have trouble breathing.      Clear fluid is leaking from your nose.      You have severe nose pain, even after you take medicine.      You have double vision or have problems moving your eyes.    Call your doctor if:     You have a fever.      You continue to have nosebleeds.      You have a headache that gets worse, even after you take pain medicine.      Your splint or packing is loose.      You have questions or concerns about your condition or care.    Medicines:     Medicine may be given to decrease pain or help prevent a bacterial infection. Ask how to take pain medicine safely. Medicine may also be given to decrease nasal swelling and help make breathing easier.       Take your medicine as directed. Contact your healthcare provider if you think your medicine is not helping or if you have side effects. Tell him or her if you are allergic to any medicine. Keep a list of the medicines, vitamins, and herbs you take. Include the amounts, and when and why you take them. Bring the list or the pill bottles to follow-up visits. Carry your medicine list with you in case of an emergency.    Wound care: Ask your healthcare provider how to care for your wounds, splint, or packing.    How to care for your nasal fracture:     Apply ice on your nose for 15 to 20 minutes every hour or as directed. Use an ice pack, or put crushed ice in a plastic bag. Cover it with a towel. Ice helps prevent tissue damage and decreases swelling and pain.      Elevate your head when you lie down. This will help decrease swelling and pain. You may need to see a specialist 3 to 5 days later for tests or more treatment after swelling has gone down.      Protect your nose to prevent bleeding, bruising, or another fracture. Try not to bump your nose on anything. You may not be able to play sports for up to 6 weeks.    Follow up with a specialist or your doctor in 2 to 5 days as directed: Write down any questions you have so you remember to ask them during your visits. Sometimes follow-up care is needed months or even years later to correct problems.        Contusion    A contusion is a deep bruise. Contusions are the result of a blunt injury to tissues and muscle fibers under the skin. The skin overlying the contusion may turn blue, purple, or yellow. Symptoms also include pain and swelling in the injured area.    SEEK IMMEDIATE MEDICAL CARE IF YOU HAVE ANY OF THE FOLLOWING SYMPTOMS: severe pain, numbness, tingling, pain, weakness, or skin color/temperature change in any part of your body distal to the injury. Tylenol 325 mg 2 tabs every 6 hours as needed for headache, aches & pains.  Continue your regular medications as per routine.  Follow up with own doctor this week.  Follow up 1 week with Dr. Mann, plastic Surgery.  Take Keflex antibiotic as prescribed.  Use walker fore assisted ambulation.      ED evaluation and management discussed with the patient and family (if available) in detail.  Close PMD follow up encouraged.  Strict ED return instructions discussed in detail and patient given the opportunity to ask any questions about their discharge diagnosis and instructions. Patient verbalized understanding.        Closed Head Injury    A closed head injury is an injury to your head that may or may not involve a traumatic brain injury (TBI). Symptoms of TBI can be short or long lasting and include headache, dizziness, interference with memory or speech, fatigue, confusion, changes in sleep, mood changes, nausea, depression/anxiety, and dulling of senses. Make sure to obtain proper rest which includes getting plenty of sleep, avoiding excessive visual stimulation, and avoiding activities that may cause physical or mental stress. Avoid any situation where there is potential for another head injury, including sports.    SEEK IMMEDIATE MEDICAL CARE IF YOU HAVE ANY OF THE FOLLOWING SYMPTOMS: unusual drowsiness, vomiting, severe dizziness, seizures, lightheadedness, muscular weakness, different pupil sizes, visual changes, or clear or bloody discharge from your ears or nose.      Nasal Fracture    WHAT YOU NEED TO KNOW:    A nasal fracture is a crack or break in your nose. You may have a break in the upper nose (bridge), the side, or the septum. The septum is in the middle of the nose and divides your nostrils.    DISCHARGE INSTRUCTIONS:    Return to the emergency department if:     You feel like one or both of your nasal passages are blocked and you have trouble breathing.      Clear fluid is leaking from your nose.      You have severe nose pain, even after you take medicine.      You have double vision or have problems moving your eyes.    Call your doctor if:     You have a fever.      You continue to have nosebleeds.      You have a headache that gets worse, even after you take pain medicine.      Your splint or packing is loose.      You have questions or concerns about your condition or care.    Medicines:     Medicine may be given to decrease pain or help prevent a bacterial infection. Ask how to take pain medicine safely. Medicine may also be given to decrease nasal swelling and help make breathing easier.       Take your medicine as directed. Contact your healthcare provider if you think your medicine is not helping or if you have side effects. Tell him or her if you are allergic to any medicine. Keep a list of the medicines, vitamins, and herbs you take. Include the amounts, and when and why you take them. Bring the list or the pill bottles to follow-up visits. Carry your medicine list with you in case of an emergency.    Wound care: Ask your healthcare provider how to care for your wounds, splint, or packing.    How to care for your nasal fracture:     Apply ice on your nose for 15 to 20 minutes every hour or as directed. Use an ice pack, or put crushed ice in a plastic bag. Cover it with a towel. Ice helps prevent tissue damage and decreases swelling and pain.      Elevate your head when you lie down. This will help decrease swelling and pain. You may need to see a specialist 3 to 5 days later for tests or more treatment after swelling has gone down.      Protect your nose to prevent bleeding, bruising, or another fracture. Try not to bump your nose on anything. You may not be able to play sports for up to 6 weeks.    Follow up with a specialist or your doctor in 2 to 5 days as directed: Write down any questions you have so you remember to ask them during your visits. Sometimes follow-up care is needed months or even years later to correct problems.        Contusion    A contusion is a deep bruise. Contusions are the result of a blunt injury to tissues and muscle fibers under the skin. The skin overlying the contusion may turn blue, purple, or yellow. Symptoms also include pain and swelling in the injured area.    SEEK IMMEDIATE MEDICAL CARE IF YOU HAVE ANY OF THE FOLLOWING SYMPTOMS: severe pain, numbness, tingling, pain, weakness, or skin color/temperature change in any part of your body distal to the injury.

## 2020-02-24 NOTE — ED ADULT NURSE REASSESSMENT NOTE - NS ED NURSE REASSESS COMMENT FT1
report received from previous rn. color good, skin warm and dry, respirations even and unlabored. bp elevated, dr. garcia aware, bp medication to be given. dr. drew at bedside. family at bedside. will continue to monitor.

## 2020-02-24 NOTE — ED PROVIDER NOTE - PATIENT PORTAL LINK FT
You can access the FollowMyHealth Patient Portal offered by [facility] by registering at the following website: [email or web address]. By joining [facility]’s FollowMyHealth portal, you will also be able to view your health information using other applications (apps) compatible with our system.

## 2020-02-24 NOTE — ED ADULT TRIAGE NOTE - CHIEF COMPLAINT QUOTE
Patient presents in ED s/p unwitnessed fall at home. positive head injury. Patient also complains of right arm pain and right knee pain. denies blood thinners. denies LOC. Patient presents in ED s/p unwitnessed fall at home. positive head injury. Patient also complains of right arm pain and right knee pain. denies blood thinners. denies LOC. Trauma alert called.

## 2020-02-24 NOTE — ED ADULT NURSE NOTE - CAS DISCH TRANSFER METHOD
Patient had his MRI results come back and patient is looking for input on next steps. Patient states that the physician recommended a few things but patient would like Dr. Ronye Larry to look at the MRI and to advise if he needs the pain injections that was recommended then would like a referral for a back surgeon. Patient states that many times back surgeries are elective, but was told that the physician who looked at the MRI states that he wouldn't recommend the patient go without a back surgery. Patient requests a call to discuss. Private car

## 2020-02-24 NOTE — ED ADULT NURSE REASSESSMENT NOTE - NS ED NURSE REASSESS COMMENT FT1
patient able to ambulate with a walker with a steady gait while maintaining safety. patient walks with a tripod cane at home. dr. garcia aware. patient medically cleared to be discharged by dr. garcia. discharge to be completed.

## 2020-02-24 NOTE — ED PROVIDER NOTE - CARE PROVIDER_API CALL
Anaya Mann)  Plastic Surgery  [address], [address]  [place] [address]  Phone: [phone]  Fax: [phone]  Follow Up Time:

## 2020-02-24 NOTE — ED PROVIDER NOTE - PROGRESS NOTE DETAILS
Scribe CD for ED attending, Doctor Umanzor. CT head and c-spine negative. CT maxillofacial shows bilateral nasal bone fractures. Paging plastics on call Dr. Mann regarding open nasal bone fracture for repair. Ordering IV Ancef 1g. Graeme CD for ED attending, Doctor Contino. Dr. Mann aware of consult. Dr. Umanzor:  plastics consult by Dr. Mann appreciated, + Abx as outpt, f/u office in 1 week. Dr. Umanzor:  informed by ED RN that pt passed ambulation trial with walker.  Will D/c for outpt f/u.

## 2020-02-24 NOTE — ED PROVIDER NOTE - MUSCULOSKELETAL, MLM
Spine appears normal. Neck with mild posterior paracervical TTP. No midline TTP. No step off or deformity. Neck supple. Back nontender, stable. bilateral SLR 10 degrees. +right knee mild effusion/ecchymosis +slight TTP right hip, thigh, and knee.

## 2020-03-04 PROBLEM — I95.1 ORTHOSTATIC HYPOTENSION: Chronic | Status: ACTIVE | Noted: 2020-02-24

## 2020-03-10 ENCOUNTER — EMERGENCY (EMERGENCY)
Facility: HOSPITAL | Age: 85
LOS: 0 days | Discharge: HOME | End: 2020-03-10
Attending: EMERGENCY MEDICINE
Payer: MEDICARE

## 2020-03-10 VITALS
RESPIRATION RATE: 16 BRPM | TEMPERATURE: 98 F | DIASTOLIC BLOOD PRESSURE: 88 MMHG | HEART RATE: 99 BPM | SYSTOLIC BLOOD PRESSURE: 187 MMHG | HEIGHT: 62 IN | OXYGEN SATURATION: 100 % | WEIGHT: 173.94 LBS
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
## 2020-03-10 DIAGNOSIS — E03.9 HYPOTHYROIDISM, UNSPECIFIED: ICD-10-CM

## 2020-03-10 DIAGNOSIS — E78.5 HYPERLIPIDEMIA, UNSPECIFIED: ICD-10-CM

## 2020-03-10 DIAGNOSIS — I10 ESSENTIAL (PRIMARY) HYPERTENSION: ICD-10-CM

## 2020-03-10 DIAGNOSIS — Z95.5 PRESENCE OF CORONARY ANGIOPLASTY IMPLANT AND GRAFT: ICD-10-CM

## 2020-03-10 DIAGNOSIS — Z85.89 PERSONAL HISTORY OF MALIGNANT NEOPLASM OF OTHER ORGANS AND SYSTEMS: ICD-10-CM

## 2020-03-10 DIAGNOSIS — Z79.2 LONG TERM (CURRENT) USE OF ANTIBIOTICS: ICD-10-CM
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
## 2020-03-10 DIAGNOSIS — Z98.818 OTHER DENTAL PROCEDURE STATUS: ICD-10-CM

## 2020-03-10 DIAGNOSIS — Z88.2 ALLERGY STATUS TO SULFONAMIDES: ICD-10-CM

## 2020-03-10 DIAGNOSIS — Z79.899 OTHER LONG TERM (CURRENT) DRUG THERAPY: ICD-10-CM

## 2020-03-10 DIAGNOSIS — M25.561 PAIN IN RIGHT KNEE: ICD-10-CM

## 2020-03-10 DIAGNOSIS — Z90.710 ACQUIRED ABSENCE OF BOTH CERVIX AND UTERUS: Chronic | ICD-10-CM

## 2020-03-10 DIAGNOSIS — I25.10 ATHEROSCLEROTIC HEART DISEASE OF NATIVE CORONARY ARTERY WITHOUT ANGINA PECTORIS: ICD-10-CM

## 2020-03-10 DIAGNOSIS — R94.31 ABNORMAL ELECTROCARDIOGRAM [ECG] [EKG]: ICD-10-CM

## 2020-03-10 DIAGNOSIS — Z98.890 OTHER SPECIFIED POSTPROCEDURAL STATES: Chronic | ICD-10-CM

## 2020-03-10 DIAGNOSIS — Z91.81 HISTORY OF FALLING: ICD-10-CM

## 2020-03-10 DIAGNOSIS — Z85.51 PERSONAL HISTORY OF MALIGNANT NEOPLASM OF BLADDER: ICD-10-CM

## 2020-03-10 DIAGNOSIS — Z88.5 ALLERGY STATUS TO NARCOTIC AGENT: ICD-10-CM

## 2020-03-10 LAB
ALBUMIN SERPL ELPH-MCNC: 3.4 G/DL — SIGNIFICANT CHANGE UP (ref 3.3–5)
ALP SERPL-CCNC: 82 U/L — SIGNIFICANT CHANGE UP (ref 40–120)
ALT FLD-CCNC: 20 U/L — SIGNIFICANT CHANGE UP (ref 12–78)
ANION GAP SERPL CALC-SCNC: 10 MMOL/L — SIGNIFICANT CHANGE UP (ref 5–17)
APPEARANCE UR: CLEAR — SIGNIFICANT CHANGE UP
AST SERPL-CCNC: 19 U/L — SIGNIFICANT CHANGE UP (ref 15–37)
BASOPHILS # BLD AUTO: 0.06 K/UL — SIGNIFICANT CHANGE UP (ref 0–0.2)
BASOPHILS NFR BLD AUTO: 0.7 % — SIGNIFICANT CHANGE UP (ref 0–2)
BILIRUB SERPL-MCNC: 0.3 MG/DL — SIGNIFICANT CHANGE UP (ref 0.2–1.2)
BILIRUB UR-MCNC: NEGATIVE — SIGNIFICANT CHANGE UP
BUN SERPL-MCNC: 21 MG/DL — SIGNIFICANT CHANGE UP (ref 7–23)
CALCIUM SERPL-MCNC: 9 MG/DL — SIGNIFICANT CHANGE UP (ref 8.5–10.1)
CHLORIDE SERPL-SCNC: 104 MMOL/L — SIGNIFICANT CHANGE UP (ref 96–108)
CO2 SERPL-SCNC: 22 MMOL/L — SIGNIFICANT CHANGE UP (ref 22–31)
COLOR SPEC: YELLOW — SIGNIFICANT CHANGE UP
CREAT SERPL-MCNC: 1.1 MG/DL — SIGNIFICANT CHANGE UP (ref 0.5–1.3)
DIFF PNL FLD: NEGATIVE — SIGNIFICANT CHANGE UP
EOSINOPHIL # BLD AUTO: 0.1 K/UL — SIGNIFICANT CHANGE UP (ref 0–0.5)
EOSINOPHIL NFR BLD AUTO: 1.2 % — SIGNIFICANT CHANGE UP (ref 0–6)
GLUCOSE SERPL-MCNC: 129 MG/DL — HIGH (ref 70–99)
GLUCOSE UR QL: NEGATIVE MG/DL — SIGNIFICANT CHANGE UP
HCT VFR BLD CALC: 39.6 % — SIGNIFICANT CHANGE UP (ref 34.5–45)
HGB BLD-MCNC: 11.7 G/DL — SIGNIFICANT CHANGE UP (ref 11.5–15.5)
IMM GRANULOCYTES NFR BLD AUTO: 0.2 % — SIGNIFICANT CHANGE UP (ref 0–1.5)
KETONES UR-MCNC: NEGATIVE — SIGNIFICANT CHANGE UP
LEUKOCYTE ESTERASE UR-ACNC: ABNORMAL
LYMPHOCYTES # BLD AUTO: 2.24 K/UL — SIGNIFICANT CHANGE UP (ref 1–3.3)
LYMPHOCYTES # BLD AUTO: 27.8 % — SIGNIFICANT CHANGE UP (ref 13–44)
MCHC RBC-ENTMCNC: 25.5 PG — LOW (ref 27–34)
MCHC RBC-ENTMCNC: 29.5 GM/DL — LOW (ref 32–36)
MCV RBC AUTO: 86.5 FL — SIGNIFICANT CHANGE UP (ref 80–100)
MONOCYTES # BLD AUTO: 0.68 K/UL — SIGNIFICANT CHANGE UP (ref 0–0.9)
MONOCYTES NFR BLD AUTO: 8.4 % — SIGNIFICANT CHANGE UP (ref 2–14)
NEUTROPHILS # BLD AUTO: 4.95 K/UL — SIGNIFICANT CHANGE UP (ref 1.8–7.4)
NEUTROPHILS NFR BLD AUTO: 61.7 % — SIGNIFICANT CHANGE UP (ref 43–77)
NITRITE UR-MCNC: NEGATIVE — SIGNIFICANT CHANGE UP
PH UR: 6.5 — SIGNIFICANT CHANGE UP (ref 5–8)
PLATELET # BLD AUTO: 309 K/UL — SIGNIFICANT CHANGE UP (ref 150–400)
POTASSIUM SERPL-MCNC: 4.3 MMOL/L — SIGNIFICANT CHANGE UP (ref 3.5–5.3)
POTASSIUM SERPL-SCNC: 4.3 MMOL/L — SIGNIFICANT CHANGE UP (ref 3.5–5.3)
PROT SERPL-MCNC: 7.5 GM/DL — SIGNIFICANT CHANGE UP (ref 6–8.3)
PROT UR-MCNC: 15 MG/DL
RBC # BLD: 4.58 M/UL — SIGNIFICANT CHANGE UP (ref 3.8–5.2)
RBC # FLD: 15.5 % — HIGH (ref 10.3–14.5)
SODIUM SERPL-SCNC: 136 MMOL/L — SIGNIFICANT CHANGE UP (ref 135–145)
SP GR SPEC: 1.01 — SIGNIFICANT CHANGE UP (ref 1.01–1.02)
TROPONIN I SERPL-MCNC: 0.03 NG/ML — SIGNIFICANT CHANGE UP (ref 0.01–0.04)
TROPONIN I SERPL-MCNC: 0.04 NG/ML — SIGNIFICANT CHANGE UP (ref 0.01–0.04)
UROBILINOGEN FLD QL: NEGATIVE MG/DL — SIGNIFICANT CHANGE UP
WBC # BLD: 8.05 K/UL — SIGNIFICANT CHANGE UP (ref 3.8–10.5)
WBC # FLD AUTO: 8.05 K/UL — SIGNIFICANT CHANGE UP (ref 3.8–10.5)

## 2020-03-10 PROCEDURE — 93010 ELECTROCARDIOGRAM REPORT: CPT

## 2020-03-10 PROCEDURE — 70450 CT HEAD/BRAIN W/O DYE: CPT | Mod: 26

## 2020-03-10 PROCEDURE — 71045 X-RAY EXAM CHEST 1 VIEW: CPT

## 2020-03-10 PROCEDURE — 83735 ASSAY OF MAGNESIUM: CPT

## 2020-03-10 PROCEDURE — 73562 X-RAY EXAM OF KNEE 3: CPT | Mod: RT

## 2020-03-10 PROCEDURE — 70450 CT HEAD/BRAIN W/O DYE: CPT

## 2020-03-10 PROCEDURE — 80053 COMPREHEN METABOLIC PANEL: CPT

## 2020-03-10 PROCEDURE — 73562 X-RAY EXAM OF KNEE 3: CPT | Mod: 26,RT

## 2020-03-10 PROCEDURE — 81001 URINALYSIS AUTO W/SCOPE: CPT

## 2020-03-10 PROCEDURE — 87086 URINE CULTURE/COLONY COUNT: CPT

## 2020-03-10 PROCEDURE — 83690 ASSAY OF LIPASE: CPT

## 2020-03-10 PROCEDURE — 87186 SC STD MICRODIL/AGAR DIL: CPT

## 2020-03-10 PROCEDURE — 83880 ASSAY OF NATRIURETIC PEPTIDE: CPT

## 2020-03-10 PROCEDURE — 71045 X-RAY EXAM CHEST 1 VIEW: CPT | Mod: 26

## 2020-03-10 PROCEDURE — 85025 COMPLETE CBC W/AUTO DIFF WBC: CPT

## 2020-03-10 PROCEDURE — 84484 ASSAY OF TROPONIN QUANT: CPT | Mod: 91

## 2020-03-10 PROCEDURE — 99284 EMERGENCY DEPT VISIT MOD MDM: CPT | Mod: 25

## 2020-03-10 PROCEDURE — 99285 EMERGENCY DEPT VISIT HI MDM: CPT

## 2020-03-10 PROCEDURE — 36415 COLL VENOUS BLD VENIPUNCTURE: CPT

## 2020-03-10 PROCEDURE — 93005 ELECTROCARDIOGRAM TRACING: CPT

## 2020-03-10 RX ORDER — SODIUM CHLORIDE 9 MG/ML
1000 INJECTION INTRAMUSCULAR; INTRAVENOUS; SUBCUTANEOUS ONCE
Refills: 0 | Status: COMPLETED | OUTPATIENT
Start: 2020-03-10 | End: 2020-03-10

## 2020-03-10 RX ORDER — ACETAMINOPHEN 500 MG
1000 TABLET ORAL ONCE
Refills: 0 | Status: COMPLETED | OUTPATIENT
Start: 2020-03-10 | End: 2020-03-10

## 2020-03-10 RX ADMIN — SODIUM CHLORIDE 1000 MILLILITER(S): 9 INJECTION INTRAMUSCULAR; INTRAVENOUS; SUBCUTANEOUS at 18:57

## 2020-03-10 RX ADMIN — Medication 1000 MILLIGRAM(S): at 18:57

## 2020-03-10 NOTE — ED PROVIDER NOTE - PATIENT PORTAL LINK FT
You can access the FollowMyHealth Patient Portal offered by [facility] by registering at the following website: [email or web address]. By joining [facility]’s FollowMyHealth portal, you will also be able to view your health information using other applications (apps) compatible with our system.

## 2020-03-10 NOTE — ED ADULT TRIAGE NOTE - CHIEF COMPLAINT QUOTE
Pt BIBEMS for evaluation of fall while using walker at home. Pt reports that she knew she was going to fall due to leg weakness, went down onto her knees and fell to right side. Pt had recent fall 2 weeks ago with healing ecchymotic areas. Pt states she has pain in right knee, right shoulder

## 2020-03-10 NOTE — ED PROVIDER NOTE - CLINICAL SUMMARY MEDICAL DECISION MAKING FREE TEXT BOX
Pt feeling fatigued, tired, feels like she might have UTI again. Also with chronic dyspnea on exertion and fatigue, will check trop and EKG. will eval for trauma. Pt is not interested in going back to rehab or nursing home. Pt feeling fatigued, tired, feels like she might have UTI again. Also with chronic dyspnea on exertion and fatigue, will check trop and EKG. will eval for trauma. Pt is not interested in going back to rehab or nursing home. family comfortable taking pt back home

## 2020-03-10 NOTE — ED ADULT NURSE NOTE - NS ED NURSE LEVEL OF CONSCIOUSNESS AFFECT
ROUNDING NOTE POD # 1    Subjective:    Pain better overall  Patient C/O: Pain  Patient denies: Chest Pain, Dizziness, Heart palpitations, Inability to urinate  and Nausea    Objective:  Vitals:   Visit Vitals  /72 (BP Location: RUE, Patient Position: Sitting)   Pulse 99   Temp 97.6 °F (36.4 °C) (Oral)   Resp 16   Ht 5' 6\" (1.676 m)   Wt 101.4 kg   SpO2 98%   BMI 36.08 kg/m²    , afebrile  A&O x 3  Skin: Dressing is C/D/I.    Musculoskeletal: Dorsiflexion/Plantarflexion intact.    Neuro: Distal sensation intact.    Vascular: 2+ D.P.  Calves soft, nontender.  Compartments soft.   H/H:   HCT (%)   Date Value   08/16/2019 32.3 (L)    / No results found for: STBLD     Intake/Output Summary (Last 24 hours) at 8/16/2019 1233  Last data filed at 8/16/2019 0833  Gross per 24 hour   Intake 4414.65 ml   Output 3700 ml   Net 714.65 ml     WBC (K/mcL)   Date Value   08/16/2019 5.8     PLT (K/mcL)   Date Value   08/16/2019 222   11/16/2012 160       Assessment: IRRIGATION AND DEBRIDEMENT  REVISION, HIP REPLACEMENT     Plan:    1. Change dressing POD #2   2. DVT prophylaxis: continue Xarelto , Nic Hose, &  ankle pumps.  3. Continue PT/OT to improve ROM, strength, and ADLs   4. Monitor Hgb.  5. Discharge planning. (expected discharge date 1-2 days)  6. Weight Bearing Status: Weight bearing as tolerated   7.  Await cx and continue IV Abx    Eligio Guallpa MD 8/16/2019 12:33 PM  [facility]         [place]

## 2020-03-10 NOTE — ED PROVIDER NOTE - PHYSICAL EXAMINATION
*GEN: NAD; well appearing; A+O x3   *HEAD: NC/AT   *EYES/NOSE: PERRL & EOMI b/l  *THROAT: airway patent, moist mucous membranes  *NECK: Neck supple, no masses  *PULMONARY: CTA b/l, symmetric breath sounds.   *CARDIAC: s1s2, regular rhythm, no Murmur  *ABDOMEN:  ND, NT, soft, no guarding, no rebound, no masses   *BACK: no CVA tenderness, Normal  spine   *EXTREMITIES: symmetric pulses, 2+ dp & radial pulses, capillary refill < 2 seconds, no cyanosis, no edema   *SKIN: no rash +old ecchymoses to bilateral knees and face  *NEUROLOGIC: alert, CN 2-12 intact, moves all 4 extremities, full active & passive ROM in all extremities, normal baseline gait with walker  *PSYCH: insight and judgment nl, memory nl, affect nl, thought nl

## 2020-03-10 NOTE — ED ADULT NURSE NOTE - INTERVENTIONS DEFINITIONS
Stretcher in lowest position, wheels locked, appropriate side rails in place/Call bell, personal items and telephone within reach/Instruct patient to call for assistance

## 2020-03-10 NOTE — ED PROVIDER NOTE - PMH
Bladder cancer    CAD (coronary artery disease)    Endometrial adenocarcinoma    GERD (gastroesophageal reflux disease)    [place] (hard of hearing)    HTN (hypertension)    Hyperlipidemia    Hypothyroid    Macular degeneration    Orthostatic hypotension    Stented coronary artery

## 2020-03-10 NOTE — ED PROVIDER NOTE - NS ED ROS FT
Review of Systems:  	•	CONSTITUTIONAL: no fever  	•	SKIN: no rash  	•	RESPIRATORY: no shortness of breath  	•	CARDIAC: no chest pain, no palpitations  	•	GI:  no abd pain, no nausea, no vomiting, no diarrhea  	•	GENITO-URINARY:  no dysuria; no hematuria    	•	MUSCULOSKELETAL:  no back pain +knee pain  	•	NEUROLOGIC: no weakness  	•	ALLERGY: no rhinitis  	•	PSYCHIATRIC: no anxiety Review of Systems:  	•	CONSTITUTIONAL: no fever +fatigue  	•	SKIN: no rash  	•	RESPIRATORY: no shortness of breath  	•	CARDIAC: no chest pain, no palpitations  	•	GI:  no abd pain, no nausea, no vomiting, no diarrhea  	•	GENITO-URINARY:  no dysuria; no hematuria    	•	MUSCULOSKELETAL:  no back pain +knee pain  	•	NEUROLOGIC: no weakness  	•	ALLERGY: no rhinitis  	•	PSYCHIATRIC: no anxiety Review of Systems:  	•	CONSTITUTIONAL: no fever +fatigue  	•	SKIN: no rash  	•	RESPIRATORY: no shortness of breath, garces  	•	CARDIAC: no chest pain, no palpitations  	•	GI:  no abd pain, no nausea, no vomiting, no diarrhea  	•	GENITO-URINARY:  no dysuria; no hematuria    	•	MUSCULOSKELETAL:  no back pain +knee pain  	•	NEUROLOGIC: no weakness  	•	ALLERGY: no rhinitis  	•	PSYCHIATRIC: no anxiety

## 2020-03-10 NOTE — ED PROVIDER NOTE - PROGRESS NOTE DETAILS
yamil: PT seen and reassessed.  Patient symptomatically improved.  Wants to be discharged AAOX3, NAD, VSS.  Discussed test results w/ patient. Patient verbalized understanding of hospital course and outpatient plans, has decisional making capacity.  Will f/u w/ pmd in the next few days; patient will call for an appointment. Will return to the ED if there is any worsening of symptoms.  Patient able to ambulate at baseline, is tolerating PO intake. Has safe way of getting home.

## 2020-03-10 NOTE — ED PROVIDER NOTE - CARE PLAN
Principal Discharge DX:	Knee pain, right  Secondary Diagnosis:	GRIDER (dyspnea on exertion)  Secondary Diagnosis:	Fall

## 2020-03-10 NOTE — ED PROVIDER NOTE - OBJECTIVE STATEMENT
Pertinent HPI/PMH/PSH/FHx/SHx and Review of Systems contained within  HPI: 89 yo female presents to the ED BIBEMS with son from home s/p mechanical fall. Pt states that she was walking with her walker when her legs felt weak and her knees gave out from under her. Pt fell forward onto her knees and then rolled onto her side. Denies LOC. Now c/o right knee pain. Pt also had a fall about 2 weeks ago, was seen in ED. Has old bruising to bilateral knees and face. Pt walks with a walker at baseline. Pt also reports she is on abx because she just had 2 teeth extracted.   PMH/PSH relevant for: bladder cancer, CAD s/p stent, HTN, HLD, hypothyroidism,   ROS negative for: fever, Chest pain, SOB, Nausea, vomiting, diarrhea, abdominal pain, dysuria, HA   FamilyHx and SocialHx not otherwise contributory Pertinent HPI/PMH/PSH/FHx/SHx and Review of Systems contained within  HPI: 89 yo female presents to the ED BIBEMS with son from home s/p mechanical fall. Pt states that she was walking with her walker when her legs felt weak and her knees gave out from under her. Pt fell forward onto her knees and then rolled onto her side. Denies LOC. Now c/o right knee pain. Pt also had a fall about 2 weeks ago, was seen in ED. Has old bruising to bilateral knees and face. Pt walks with a walker at baseline. Pt also reports she is on abx because she just had 2 teeth extracted. No anticoagulants.   PMH/PSH relevant for: bladder cancer, CAD s/p stent, HTN, HLD, hypothyroidism,   ROS negative for: fever, Chest pain, SOB, Nausea, vomiting, diarrhea, abdominal pain, dysuria, HA   FamilyHx and SocialHx not otherwise contributory Pertinent HPI/PMH/PSH/FHx/SHx and Review of Systems contained within  HPI: 89 yo F presents to the ED BIBEMS with son from home s/p mechanical fall. Pt states that she was walking with her walker when her legs felt weak and her knees gave out from under her. Pt fell forward onto her knees and then rolled onto her side. hit her head. Denies LOC. Now c/o right knee pain. Pt also had a fall about 2 weeks ago, was seen in ED. Has old bruising to bilateral knees and face. Pt walks with a walker at baseline. Pt also reports she is on abx because she just had 2 teeth extracted. No anticoagulants.  also endorsing fatigue & GRIDER that's chronic  PMH/PSH relevant for: bladder cancer, CAD s/p stent, HTN, HLD, hypothyroidism,   ROS negative for: fever, Chest pain, SOB, Nausea, vomiting, diarrhea, abdominal pain, dysuria, HA   FamilyHx and SocialHx not otherwise contributory

## 2020-03-13 NOTE — ED POST DISCHARGE NOTE - RESULT SUMMARY
Contacted Dr. Camara ID and discussed results of UC +Klebsiella oxytoca ESBL under 60,000 CFU/ml . No rx. required. MTangredi NP

## 2020-04-13 NOTE — DIETITIAN INITIAL EVALUATION ADULT. - FEEDING SKILL
From: Reese Olmstead  To: Chanel Reynolds MD  Sent: 4/13/2020 12:51 PM CDT  Subject: Other    Enmanuel Nettles's Weight and blood Pressure    Fri April 10 -- BP 112x77 Weight 197  Sat april 11--BP 132x60 Weight 195  Sun [id number]--HB 112x71 Weight Refused to get on scale    Faith Olmstead  
independent

## 2020-04-21 ENCOUNTER — EMERGENCY (EMERGENCY)
Facility: HOSPITAL | Age: 85
LOS: 1 days | Discharge: HOME | End: 2020-04-21
Attending: EMERGENCY MEDICINE
Payer: MEDICARE

## 2020-04-21 VITALS
HEIGHT: 62 IN | TEMPERATURE: 98 F | WEIGHT: 177.91 LBS | DIASTOLIC BLOOD PRESSURE: 85 MMHG | RESPIRATION RATE: 20 BRPM | SYSTOLIC BLOOD PRESSURE: 116 MMHG | OXYGEN SATURATION: 98 % | HEART RATE: 87 BPM

## 2020-04-21 DIAGNOSIS — Z88.5 ALLERGY STATUS TO NARCOTIC AGENT: ICD-10-CM

## 2020-04-21 DIAGNOSIS — Z85.51 PERSONAL HISTORY OF MALIGNANT NEOPLASM OF BLADDER: ICD-10-CM

## 2020-04-21 DIAGNOSIS — I10 ESSENTIAL (PRIMARY) HYPERTENSION: ICD-10-CM

## 2020-04-21 DIAGNOSIS — E03.9 HYPOTHYROIDISM, UNSPECIFIED: ICD-10-CM

## 2020-04-21 DIAGNOSIS — Z85.42 PERSONAL HISTORY OF MALIGNANT NEOPLASM OF OTHER PARTS OF UTERUS: ICD-10-CM

## 2020-04-21 DIAGNOSIS — Z98.890 OTHER SPECIFIED POSTPROCEDURAL STATES: Chronic | ICD-10-CM

## 2020-04-21 DIAGNOSIS — R20.2 PARESTHESIA OF SKIN: ICD-10-CM

## 2020-04-21 DIAGNOSIS — M79.604 PAIN IN RIGHT LEG: ICD-10-CM

## 2020-04-21 DIAGNOSIS — Z95.5 PRESENCE OF CORONARY ANGIOPLASTY IMPLANT AND GRAFT: ICD-10-CM

## 2020-04-21 DIAGNOSIS — E78.5 HYPERLIPIDEMIA, UNSPECIFIED: ICD-10-CM

## 2020-04-21 DIAGNOSIS — K21.9 GASTRO-ESOPHAGEAL REFLUX DISEASE WITHOUT ESOPHAGITIS: ICD-10-CM

## 2020-04-21 DIAGNOSIS — Z88.2 ALLERGY STATUS TO SULFONAMIDES: ICD-10-CM

## 2020-04-21 DIAGNOSIS — H35.30 UNSPECIFIED MACULAR DEGENERATION: ICD-10-CM

## 2020-04-21 DIAGNOSIS — H91.90 UNSPECIFIED HEARING LOSS, UNSPECIFIED EAR: ICD-10-CM

## 2020-04-21 DIAGNOSIS — M25.551 PAIN IN RIGHT HIP: ICD-10-CM

## 2020-04-21 DIAGNOSIS — M19.90 UNSPECIFIED OSTEOARTHRITIS, UNSPECIFIED SITE: ICD-10-CM

## 2020-04-21 DIAGNOSIS — I25.10 ATHEROSCLEROTIC HEART DISEASE OF NATIVE CORONARY ARTERY WITHOUT ANGINA PECTORIS: ICD-10-CM

## 2020-04-21 DIAGNOSIS — Z90.710 ACQUIRED ABSENCE OF BOTH CERVIX AND UTERUS: Chronic | ICD-10-CM

## 2020-04-21 LAB
ALBUMIN SERPL ELPH-MCNC: 4 G/DL — SIGNIFICANT CHANGE UP (ref 3.3–5)
ALP SERPL-CCNC: 88 U/L — SIGNIFICANT CHANGE UP (ref 40–120)
ALT FLD-CCNC: 20 U/L — SIGNIFICANT CHANGE UP (ref 12–78)
ANION GAP SERPL CALC-SCNC: 5 MMOL/L — SIGNIFICANT CHANGE UP (ref 5–17)
APPEARANCE UR: ABNORMAL
AST SERPL-CCNC: 17 U/L — SIGNIFICANT CHANGE UP (ref 15–37)
BASOPHILS # BLD AUTO: 0.06 K/UL — SIGNIFICANT CHANGE UP (ref 0–0.2)
BASOPHILS NFR BLD AUTO: 0.6 % — SIGNIFICANT CHANGE UP (ref 0–2)
BILIRUB SERPL-MCNC: 0.5 MG/DL — SIGNIFICANT CHANGE UP (ref 0.2–1.2)
BILIRUB UR-MCNC: NEGATIVE — SIGNIFICANT CHANGE UP
BUN SERPL-MCNC: 21 MG/DL — SIGNIFICANT CHANGE UP (ref 7–23)
CALCIUM SERPL-MCNC: 9.7 MG/DL — SIGNIFICANT CHANGE UP (ref 8.5–10.1)
CHLORIDE SERPL-SCNC: 104 MMOL/L — SIGNIFICANT CHANGE UP (ref 96–108)
CO2 SERPL-SCNC: 27 MMOL/L — SIGNIFICANT CHANGE UP (ref 22–31)
COLOR SPEC: YELLOW — SIGNIFICANT CHANGE UP
CREAT SERPL-MCNC: 0.97 MG/DL — SIGNIFICANT CHANGE UP (ref 0.5–1.3)
DIFF PNL FLD: ABNORMAL
EOSINOPHIL # BLD AUTO: 0.06 K/UL — SIGNIFICANT CHANGE UP (ref 0–0.5)
EOSINOPHIL NFR BLD AUTO: 0.6 % — SIGNIFICANT CHANGE UP (ref 0–6)
GLUCOSE SERPL-MCNC: 97 MG/DL — SIGNIFICANT CHANGE UP (ref 70–99)
GLUCOSE UR QL: NEGATIVE MG/DL — SIGNIFICANT CHANGE UP
HCT VFR BLD CALC: 41 % — SIGNIFICANT CHANGE UP (ref 34.5–45)
HGB BLD-MCNC: 12.4 G/DL — SIGNIFICANT CHANGE UP (ref 11.5–15.5)
IMM GRANULOCYTES NFR BLD AUTO: 0.3 % — SIGNIFICANT CHANGE UP (ref 0–1.5)
KETONES UR-MCNC: NEGATIVE — SIGNIFICANT CHANGE UP
LEUKOCYTE ESTERASE UR-ACNC: NEGATIVE — SIGNIFICANT CHANGE UP
LYMPHOCYTES # BLD AUTO: 2.21 K/UL — SIGNIFICANT CHANGE UP (ref 1–3.3)
LYMPHOCYTES # BLD AUTO: 21.9 % — SIGNIFICANT CHANGE UP (ref 13–44)
MCHC RBC-ENTMCNC: 25.1 PG — LOW (ref 27–34)
MCHC RBC-ENTMCNC: 30.2 GM/DL — LOW (ref 32–36)
MCV RBC AUTO: 82.8 FL — SIGNIFICANT CHANGE UP (ref 80–100)
MONOCYTES # BLD AUTO: 0.65 K/UL — SIGNIFICANT CHANGE UP (ref 0–0.9)
MONOCYTES NFR BLD AUTO: 6.5 % — SIGNIFICANT CHANGE UP (ref 2–14)
NEUTROPHILS # BLD AUTO: 7.06 K/UL — SIGNIFICANT CHANGE UP (ref 1.8–7.4)
NEUTROPHILS NFR BLD AUTO: 70.1 % — SIGNIFICANT CHANGE UP (ref 43–77)
NITRITE UR-MCNC: NEGATIVE — SIGNIFICANT CHANGE UP
PH UR: 5 — SIGNIFICANT CHANGE UP (ref 5–8)
PLATELET # BLD AUTO: 357 K/UL — SIGNIFICANT CHANGE UP (ref 150–400)
POTASSIUM SERPL-MCNC: 4.1 MMOL/L — SIGNIFICANT CHANGE UP (ref 3.5–5.3)
POTASSIUM SERPL-SCNC: 4.1 MMOL/L — SIGNIFICANT CHANGE UP (ref 3.5–5.3)
PROT SERPL-MCNC: 8.5 GM/DL — HIGH (ref 6–8.3)
PROT UR-MCNC: 30 MG/DL
RBC # BLD: 4.95 M/UL — SIGNIFICANT CHANGE UP (ref 3.8–5.2)
RBC # FLD: 15.3 % — HIGH (ref 10.3–14.5)
SARS-COV-2 RNA SPEC QL NAA+PROBE: SIGNIFICANT CHANGE UP
SODIUM SERPL-SCNC: 136 MMOL/L — SIGNIFICANT CHANGE UP (ref 135–145)
SP GR SPEC: 1.01 — SIGNIFICANT CHANGE UP (ref 1.01–1.02)
UROBILINOGEN FLD QL: NEGATIVE MG/DL — SIGNIFICANT CHANGE UP
WBC # BLD: 10.07 K/UL — SIGNIFICANT CHANGE UP (ref 3.8–10.5)
WBC # FLD AUTO: 10.07 K/UL — SIGNIFICANT CHANGE UP (ref 3.8–10.5)

## 2020-04-21 PROCEDURE — 71045 X-RAY EXAM CHEST 1 VIEW: CPT | Mod: 26

## 2020-04-21 PROCEDURE — 72100 X-RAY EXAM L-S SPINE 2/3 VWS: CPT

## 2020-04-21 PROCEDURE — 97162 PT EVAL MOD COMPLEX 30 MIN: CPT | Mod: GP

## 2020-04-21 PROCEDURE — 80053 COMPREHEN METABOLIC PANEL: CPT

## 2020-04-21 PROCEDURE — 93971 EXTREMITY STUDY: CPT | Mod: 26,RT

## 2020-04-21 PROCEDURE — 81001 URINALYSIS AUTO W/SCOPE: CPT

## 2020-04-21 PROCEDURE — 99284 EMERGENCY DEPT VISIT MOD MDM: CPT

## 2020-04-21 PROCEDURE — 72192 CT PELVIS W/O DYE: CPT

## 2020-04-21 PROCEDURE — 76376 3D RENDER W/INTRP POSTPROCES: CPT

## 2020-04-21 PROCEDURE — 93971 EXTREMITY STUDY: CPT | Mod: RT

## 2020-04-21 PROCEDURE — 72100 X-RAY EXAM L-S SPINE 2/3 VWS: CPT | Mod: 26

## 2020-04-21 PROCEDURE — 76376 3D RENDER W/INTRP POSTPROCES: CPT | Mod: 26

## 2020-04-21 PROCEDURE — 99284 EMERGENCY DEPT VISIT MOD MDM: CPT | Mod: 25,CS

## 2020-04-21 PROCEDURE — 72192 CT PELVIS W/O DYE: CPT | Mod: 26

## 2020-04-21 PROCEDURE — 93010 ELECTROCARDIOGRAM REPORT: CPT

## 2020-04-21 PROCEDURE — 93005 ELECTROCARDIOGRAM TRACING: CPT

## 2020-04-21 PROCEDURE — 87635 SARS-COV-2 COVID-19 AMP PRB: CPT

## 2020-04-21 PROCEDURE — 72170 X-RAY EXAM OF PELVIS: CPT

## 2020-04-21 PROCEDURE — 71045 X-RAY EXAM CHEST 1 VIEW: CPT

## 2020-04-21 PROCEDURE — 97116 GAIT TRAINING THERAPY: CPT | Mod: GP

## 2020-04-21 PROCEDURE — 72170 X-RAY EXAM OF PELVIS: CPT | Mod: 26

## 2020-04-21 PROCEDURE — 36415 COLL VENOUS BLD VENIPUNCTURE: CPT

## 2020-04-21 PROCEDURE — 96374 THER/PROPH/DIAG INJ IV PUSH: CPT

## 2020-04-21 PROCEDURE — 85025 COMPLETE CBC W/AUTO DIFF WBC: CPT

## 2020-04-21 RX ORDER — ACETAMINOPHEN 500 MG
650 TABLET ORAL ONCE
Refills: 0 | Status: COMPLETED | OUTPATIENT
Start: 2020-04-21 | End: 2020-04-21

## 2020-04-21 RX ORDER — ISOSORBIDE MONONITRATE 60 MG/1
30 TABLET, EXTENDED RELEASE ORAL DAILY
Refills: 0 | Status: DISCONTINUED | OUTPATIENT
Start: 2020-04-21 | End: 2020-04-25

## 2020-04-21 RX ORDER — KETOROLAC TROMETHAMINE 30 MG/ML
15 SYRINGE (ML) INJECTION ONCE
Refills: 0 | Status: DISCONTINUED | OUTPATIENT
Start: 2020-04-21 | End: 2020-04-21

## 2020-04-21 RX ORDER — METOPROLOL TARTRATE 50 MG
25 TABLET ORAL ONCE
Refills: 0 | Status: COMPLETED | OUTPATIENT
Start: 2020-04-21 | End: 2020-04-21

## 2020-04-21 RX ADMIN — Medication 25 MILLIGRAM(S): at 20:59

## 2020-04-21 RX ADMIN — Medication 650 MILLIGRAM(S): at 21:56

## 2020-04-21 RX ADMIN — Medication 15 MILLIGRAM(S): at 20:03

## 2020-04-21 RX ADMIN — ISOSORBIDE MONONITRATE 30 MILLIGRAM(S): 60 TABLET, EXTENDED RELEASE ORAL at 20:59

## 2020-04-21 NOTE — ED PROVIDER NOTE - PROGRESS NOTE DETAILS
Graeme Connelly for attending Dr. Stoddard: Initial workup negative for bony pathology or DVT. Pt states she can not go home and can not get around even with help of aide. Pt does not feel safe to go home. Pt points to hip as most intense site of pain. Will get CT pelvis and treat with NSAIDS, hold in ED overnight for SW consult in the AM. workup in ED negative for source of pain in hip.  pt states that she does not feel safe going home.  states her aides are not enough help.  will hold in ED tonight for  consult for rehab placement in the [facility].  MD Darell See SW note

## 2020-04-21 NOTE — ED PROVIDER NOTE - OBJECTIVE STATEMENT
89 y/o female with PMHX of arthritis, bladder cancer, CAD, endometrial adenocarcinoma, GERD, HTN, HLD, [place], hypothyroid, macular degeneration, orthostatic hypotension, stented coronary artery presents to the ED BIBA from home with worsening RLE pain. Pt can no longer bear weight. Denies recent trauma but reports she fell 5 weeks ago, was seen and evaluated in ED, and was d/c home. Pt states she felt fine and now has pain to RLE that she does not relate to that fall. Pain is sharp, severe, and constant. Also c/o numbness to RLE. No other complaints at this time.

## 2020-04-21 NOTE — ED PROVIDER NOTE - CARE PLAN
Principal Discharge DX:	Low back pain  Secondary Diagnosis:	Hip pain, acute, right Principal Discharge DX:	Pain of lower extremity, unspecified laterality  Secondary Diagnosis:	Hip pain, acute, right

## 2020-04-21 NOTE — ED ADULT TRIAGE NOTE - CHIEF COMPLAINT QUOTE
Patient brought in by EMS for right leg pain and swelling. Patient was seen by telehealth doctor yesterday and started on tramadol. patient reported worse pain today so sent to ER.

## 2020-04-21 NOTE — ED PROVIDER NOTE - PMH
Bladder cancer    CAD (coronary artery disease)    Endometrial adenocarcinoma    GERD (gastroesophageal reflux disease)    [place] (hard of hearing)    HTN (hypertension)    Hyperlipidemia    Hypothyroid    Macular degeneration    Orthostatic hypotension    Stented coronary artery

## 2020-04-21 NOTE — ED ADULT NURSE REASSESSMENT NOTE - NS ED NURSE REASSESS COMMENT FT1
Report received from outgoing RN Leigha for continuity of care. VSS, calm and cooperative, pending scans and SW in the morning. Patient unable to bear weight. Safety maintained, needs attended, will continue to monitor.

## 2020-04-21 NOTE — ED PROVIDER NOTE - PATIENT PORTAL LINK FT
You can access the FollowMyHealth Patient Portal offered by [facility] by registering at the following website: [email or web address]. By joining "[employer]’s FollowMyHealth portal, you will also be able to view your health information using other applications (apps) compatible with our system.

## 2020-04-21 NOTE — ED ADULT NURSE NOTE - NSIMPLEMENTINTERV_GEN_ALL_ED
Implemented All Fall with Harm Risk Interventions:  [place] to call system. Call bell, personal items and telephone within reach. Instruct patient to call for assistance. Room bathroom lighting operational. Non-slip footwear when patient is off stretcher. Physically safe environment: no spills, clutter or unnecessary equipment. Stretcher in lowest position, wheels locked, appropriate side rails in place. Provide visual cue, wrist band, yellow gown, etc. Monitor gait and stability. Monitor for mental status changes and reorient to person, place, and time. Review medications for side effects contributing to fall risk. Reinforce activity limits and safety measures with patient and family. Provide visual clues: red socks.

## 2020-04-22 VITALS — DIASTOLIC BLOOD PRESSURE: 92 MMHG | SYSTOLIC BLOOD PRESSURE: 223 MMHG

## 2020-04-22 RX ORDER — ACETAMINOPHEN 500 MG
650 TABLET ORAL ONCE
Refills: 0 | Status: COMPLETED | OUTPATIENT
Start: 2020-04-22 | End: 2020-04-22

## 2020-04-22 RX ORDER — ACETAMINOPHEN 500 MG
650 TABLET ORAL ONCE
Refills: 0 | Status: DISCONTINUED | OUTPATIENT
Start: 2020-04-22 | End: 2020-04-25

## 2020-04-22 RX ADMIN — Medication 650 MILLIGRAM(S): at 07:43

## 2020-04-22 NOTE — PHYSICAL THERAPY INITIAL EVALUATION ADULT - DISCHARGE DISPOSITION, PT EVAL
POOL but pt refusing and wants to go home, HPT  if eligible.. Ice R hip PRN, Standing hip flex, ABD, ext as jaylen w/ RW and assistance/supervision. Short distance walking w/ RW and frequent to prevent stiffness as tolerated. POOL but pt refusing and wants to go home, HPT  if eligible.. Ice R hip PRN, Standing hip flex, ABD, ext as jaylen w/ RW and assistance/supervision and Chair support behind. Short distance walking w/ RW w/ assistance and chair follow, and frequent movement to prevent stiffness as tolerated.

## 2020-04-22 NOTE — ED ADULT NURSE REASSESSMENT NOTE - NS ED NURSE REASSESS COMMENT FT1
Patient complaining multiple times an hour about pain. Repositioned many times, offered ice, denies wearing socks. Given Tylenol x2 without relief. Pending SW in the morning. Safety maintained, needs attended, will continue to monitor.

## 2020-04-22 NOTE — PHYSICAL THERAPY INITIAL EVALUATION ADULT - LIVES WITH, PROFILE
Pt lives alone in a house w/ aides am and pm and son fills in the time between, Pt walks very short distances at baseline w/ RW and has a WC

## 2020-04-22 NOTE — PHYSICAL THERAPY INITIAL EVALUATION ADULT - PERSONAL SAFETY AND JUDGMENT, REHAB EVAL
[de-identified] : At this time, I recommended ice and elevation for the left hip osteoarthritis and trochanteric bursitis.  He will be reassessed in 3-4 weeks for the right hip osteoarthritis and trochanteric bursitis.  \par  intact

## 2020-04-22 NOTE — PHYSICAL THERAPY INITIAL EVALUATION ADULT - PERTINENT HX OF CURRENT PROBLEM, REHAB EVAL
Pt is a 89 y/o F,  presents to the ED BIBA from home with worsening RLE pain. Pt can no longer bear weight. Denies recent trauma but reports she fell 5 weeks ago, was seen and evaluated in ED, and was d/c home. Films neg for DVT and Hip dx

## 2020-04-22 NOTE — PHYSICAL THERAPY INITIAL EVALUATION ADULT - GENERAL OBSERVATIONS, REHAB EVAL
Pt received in ED rm 6, supine on gurney, c/o R hip pain and R knee to foot numbness. TTP anterior/lateral and post hip, 7/10 pain c/o. Tests neg, DVT neg

## 2020-04-22 NOTE — PHYSICAL THERAPY INITIAL EVALUATION ADULT - GAIT DEVIATIONS NOTED, PT EVAL
decreased velocity of limb motion/decreased caridad/decreased weight-shifting ability/decreased step length

## 2020-04-22 NOTE — PHYSICAL THERAPY INITIAL EVALUATION ADULT - DIAGNOSIS, PT EVAL
ELSA ARGUELLO pain and numbness, Pt states she can not go home and can not get around even with help of aide. Pt does not feel safe to go home

## 2020-04-22 NOTE — PHYSICAL THERAPY INITIAL EVALUATION ADULT - MODALITIES TREATMENT COMMENTS
Pt returned supine in bed, C/o R hip pain, pt appears to be tender to palpation all around R hip, and w/ all movements, unable to give specific ex due to acute pain at this time, Standing R hip Flex, ABD performed and unable to perform hip ext due to fatigue. Returned to supine, JOSE MANUEL, RN aware, Pt resting,

## 2020-07-26 ENCOUNTER — EMERGENCY (EMERGENCY)
Facility: HOSPITAL | Age: 85
LOS: 0 days | Discharge: HOME | End: 2020-07-26
Attending: EMERGENCY MEDICINE
Payer: MEDICARE

## 2020-07-26 ENCOUNTER — EMERGENCY (EMERGENCY)
Facility: HOSPITAL | Age: 85
LOS: 0 days | Discharge: HOME | End: 2020-07-27
Attending: EMERGENCY MEDICINE
Payer: MEDICARE

## 2020-07-26 VITALS
OXYGEN SATURATION: 99 % | DIASTOLIC BLOOD PRESSURE: 53 MMHG | TEMPERATURE: 98 F | HEART RATE: 88 BPM | RESPIRATION RATE: 16 BRPM | SYSTOLIC BLOOD PRESSURE: 140 MMHG

## 2020-07-26 VITALS
OXYGEN SATURATION: 100 % | RESPIRATION RATE: 16 BRPM | WEIGHT: 169.98 LBS | DIASTOLIC BLOOD PRESSURE: 72 MMHG | HEIGHT: 62 IN | TEMPERATURE: 98 F | SYSTOLIC BLOOD PRESSURE: 205 MMHG | HEART RATE: 80 BPM

## 2020-07-26 VITALS
HEIGHT: 62 IN | DIASTOLIC BLOOD PRESSURE: 76 MMHG | WEIGHT: 169.98 LBS | TEMPERATURE: 98 F | RESPIRATION RATE: 17 BRPM | OXYGEN SATURATION: 100 % | HEART RATE: 87 BPM | SYSTOLIC BLOOD PRESSURE: 191 MMHG

## 2020-07-26 DIAGNOSIS — I10 ESSENTIAL (PRIMARY) HYPERTENSION: ICD-10-CM

## 2020-07-26 DIAGNOSIS — E03.9 HYPOTHYROIDISM, UNSPECIFIED: ICD-10-CM

## 2020-07-26 DIAGNOSIS — Z85.44 PERSONAL HISTORY OF MALIGNANT NEOPLASM OF OTHER FEMALE GENITAL ORGANS: ICD-10-CM

## 2020-07-26 DIAGNOSIS — I25.10 ATHEROSCLEROTIC HEART DISEASE OF NATIVE CORONARY ARTERY WITHOUT ANGINA PECTORIS: ICD-10-CM

## 2020-07-26 DIAGNOSIS — Z85.51 PERSONAL HISTORY OF MALIGNANT NEOPLASM OF BLADDER: ICD-10-CM

## 2020-07-26 DIAGNOSIS — H35.30 UNSPECIFIED MACULAR DEGENERATION: ICD-10-CM

## 2020-07-26 DIAGNOSIS — K21.9 GASTRO-ESOPHAGEAL REFLUX DISEASE WITHOUT ESOPHAGITIS: ICD-10-CM

## 2020-07-26 DIAGNOSIS — R10.9 UNSPECIFIED ABDOMINAL PAIN: ICD-10-CM

## 2020-07-26 DIAGNOSIS — Z95.5 PRESENCE OF CORONARY ANGIOPLASTY IMPLANT AND GRAFT: ICD-10-CM

## 2020-07-26 DIAGNOSIS — Z90.49 ACQUIRED ABSENCE OF OTHER SPECIFIED PARTS OF DIGESTIVE TRACT: ICD-10-CM

## 2020-07-26 DIAGNOSIS — Z88.5 ALLERGY STATUS TO NARCOTIC AGENT: ICD-10-CM

## 2020-07-26 DIAGNOSIS — I44.0 ATRIOVENTRICULAR BLOCK, FIRST DEGREE: ICD-10-CM

## 2020-07-26 DIAGNOSIS — Z88.8 ALLERGY STATUS TO OTHER DRUGS, MEDICAMENTS AND BIOLOGICAL SUBSTANCES: ICD-10-CM

## 2020-07-26 DIAGNOSIS — Z98.890 OTHER SPECIFIED POSTPROCEDURAL STATES: Chronic | ICD-10-CM

## 2020-07-26 DIAGNOSIS — R11.0 NAUSEA: ICD-10-CM

## 2020-07-26 DIAGNOSIS — Z90.710 ACQUIRED ABSENCE OF BOTH CERVIX AND UTERUS: ICD-10-CM

## 2020-07-26 DIAGNOSIS — Z88.2 ALLERGY STATUS TO SULFONAMIDES: ICD-10-CM

## 2020-07-26 DIAGNOSIS — M54.2 CERVICALGIA: ICD-10-CM

## 2020-07-26 DIAGNOSIS — E78.5 HYPERLIPIDEMIA, UNSPECIFIED: ICD-10-CM

## 2020-07-26 DIAGNOSIS — N39.0 URINARY TRACT INFECTION, SITE NOT SPECIFIED: ICD-10-CM

## 2020-07-26 DIAGNOSIS — Z90.710 ACQUIRED ABSENCE OF BOTH CERVIX AND UTERUS: Chronic | ICD-10-CM

## 2020-07-26 LAB
ALBUMIN SERPL ELPH-MCNC: 3.7 G/DL — SIGNIFICANT CHANGE UP (ref 3.3–5)
ALBUMIN SERPL ELPH-MCNC: 3.8 G/DL — SIGNIFICANT CHANGE UP (ref 3.3–5)
ALP SERPL-CCNC: 78 U/L — SIGNIFICANT CHANGE UP (ref 40–120)
ALP SERPL-CCNC: 79 U/L — SIGNIFICANT CHANGE UP (ref 40–120)
ALT FLD-CCNC: 19 U/L — SIGNIFICANT CHANGE UP (ref 12–78)
ALT FLD-CCNC: 20 U/L — SIGNIFICANT CHANGE UP (ref 12–78)
ANION GAP SERPL CALC-SCNC: 6 MMOL/L — SIGNIFICANT CHANGE UP (ref 5–17)
ANION GAP SERPL CALC-SCNC: 7 MMOL/L — SIGNIFICANT CHANGE UP (ref 5–17)
APPEARANCE UR: ABNORMAL
AST SERPL-CCNC: 17 U/L — SIGNIFICANT CHANGE UP (ref 15–37)
AST SERPL-CCNC: 21 U/L — SIGNIFICANT CHANGE UP (ref 15–37)
BASOPHILS # BLD AUTO: 0.06 K/UL — SIGNIFICANT CHANGE UP (ref 0–0.2)
BASOPHILS # BLD AUTO: 0.07 K/UL — SIGNIFICANT CHANGE UP (ref 0–0.2)
BASOPHILS NFR BLD AUTO: 0.5 % — SIGNIFICANT CHANGE UP (ref 0–2)
BASOPHILS NFR BLD AUTO: 0.7 % — SIGNIFICANT CHANGE UP (ref 0–2)
BILIRUB SERPL-MCNC: 0.4 MG/DL — SIGNIFICANT CHANGE UP (ref 0.2–1.2)
BILIRUB SERPL-MCNC: 0.5 MG/DL — SIGNIFICANT CHANGE UP (ref 0.2–1.2)
BILIRUB UR-MCNC: NEGATIVE — SIGNIFICANT CHANGE UP
BUN SERPL-MCNC: 18 MG/DL — SIGNIFICANT CHANGE UP (ref 7–23)
BUN SERPL-MCNC: 18 MG/DL — SIGNIFICANT CHANGE UP (ref 7–23)
CALCIUM SERPL-MCNC: 9 MG/DL — SIGNIFICANT CHANGE UP (ref 8.5–10.1)
CALCIUM SERPL-MCNC: 9.3 MG/DL — SIGNIFICANT CHANGE UP (ref 8.5–10.1)
CHLORIDE SERPL-SCNC: 107 MMOL/L — SIGNIFICANT CHANGE UP (ref 96–108)
CHLORIDE SERPL-SCNC: 108 MMOL/L — SIGNIFICANT CHANGE UP (ref 96–108)
CO2 SERPL-SCNC: 23 MMOL/L — SIGNIFICANT CHANGE UP (ref 22–31)
CO2 SERPL-SCNC: 26 MMOL/L — SIGNIFICANT CHANGE UP (ref 22–31)
COLOR SPEC: YELLOW — SIGNIFICANT CHANGE UP
CREAT SERPL-MCNC: 0.93 MG/DL — SIGNIFICANT CHANGE UP (ref 0.5–1.3)
CREAT SERPL-MCNC: 1.16 MG/DL — SIGNIFICANT CHANGE UP (ref 0.5–1.3)
DIFF PNL FLD: ABNORMAL
EOSINOPHIL # BLD AUTO: 0.13 K/UL — SIGNIFICANT CHANGE UP (ref 0–0.5)
EOSINOPHIL # BLD AUTO: 0.13 K/UL — SIGNIFICANT CHANGE UP (ref 0–0.5)
EOSINOPHIL NFR BLD AUTO: 1 % — SIGNIFICANT CHANGE UP (ref 0–6)
EOSINOPHIL NFR BLD AUTO: 1.3 % — SIGNIFICANT CHANGE UP (ref 0–6)
GLUCOSE SERPL-MCNC: 134 MG/DL — HIGH (ref 70–99)
GLUCOSE SERPL-MCNC: 88 MG/DL — SIGNIFICANT CHANGE UP (ref 70–99)
GLUCOSE UR QL: NEGATIVE MG/DL — SIGNIFICANT CHANGE UP
HCT VFR BLD CALC: 41.7 % — SIGNIFICANT CHANGE UP (ref 34.5–45)
HCT VFR BLD CALC: 41.7 % — SIGNIFICANT CHANGE UP (ref 34.5–45)
HGB BLD-MCNC: 12.7 G/DL — SIGNIFICANT CHANGE UP (ref 11.5–15.5)
HGB BLD-MCNC: 12.7 G/DL — SIGNIFICANT CHANGE UP (ref 11.5–15.5)
IMM GRANULOCYTES NFR BLD AUTO: 0.2 % — SIGNIFICANT CHANGE UP (ref 0–1.5)
IMM GRANULOCYTES NFR BLD AUTO: 0.4 % — SIGNIFICANT CHANGE UP (ref 0–1.5)
KETONES UR-MCNC: NEGATIVE — SIGNIFICANT CHANGE UP
LEUKOCYTE ESTERASE UR-ACNC: ABNORMAL
LIDOCAIN IGE QN: 163 U/L — SIGNIFICANT CHANGE UP (ref 73–393)
LIDOCAIN IGE QN: 166 U/L — SIGNIFICANT CHANGE UP (ref 73–393)
LYMPHOCYTES # BLD AUTO: 2.97 K/UL — SIGNIFICANT CHANGE UP (ref 1–3.3)
LYMPHOCYTES # BLD AUTO: 2.99 K/UL — SIGNIFICANT CHANGE UP (ref 1–3.3)
LYMPHOCYTES # BLD AUTO: 23.5 % — SIGNIFICANT CHANGE UP (ref 13–44)
LYMPHOCYTES # BLD AUTO: 28.9 % — SIGNIFICANT CHANGE UP (ref 13–44)
MCHC RBC-ENTMCNC: 25.6 PG — LOW (ref 27–34)
MCHC RBC-ENTMCNC: 25.7 PG — LOW (ref 27–34)
MCHC RBC-ENTMCNC: 30.5 GM/DL — LOW (ref 32–36)
MCHC RBC-ENTMCNC: 30.5 GM/DL — LOW (ref 32–36)
MCV RBC AUTO: 84.1 FL — SIGNIFICANT CHANGE UP (ref 80–100)
MCV RBC AUTO: 84.4 FL — SIGNIFICANT CHANGE UP (ref 80–100)
MONOCYTES # BLD AUTO: 0.77 K/UL — SIGNIFICANT CHANGE UP (ref 0–0.9)
MONOCYTES # BLD AUTO: 0.84 K/UL — SIGNIFICANT CHANGE UP (ref 0–0.9)
MONOCYTES NFR BLD AUTO: 6.6 % — SIGNIFICANT CHANGE UP (ref 2–14)
MONOCYTES NFR BLD AUTO: 7.5 % — SIGNIFICANT CHANGE UP (ref 2–14)
NEUTROPHILS # BLD AUTO: 6.33 K/UL — SIGNIFICANT CHANGE UP (ref 1.8–7.4)
NEUTROPHILS # BLD AUTO: 8.67 K/UL — HIGH (ref 1.8–7.4)
NEUTROPHILS NFR BLD AUTO: 61.4 % — SIGNIFICANT CHANGE UP (ref 43–77)
NEUTROPHILS NFR BLD AUTO: 68 % — SIGNIFICANT CHANGE UP (ref 43–77)
NITRITE UR-MCNC: NEGATIVE — SIGNIFICANT CHANGE UP
PH UR: 6 — SIGNIFICANT CHANGE UP (ref 5–8)
PLATELET # BLD AUTO: 320 K/UL — SIGNIFICANT CHANGE UP (ref 150–400)
PLATELET # BLD AUTO: 349 K/UL — SIGNIFICANT CHANGE UP (ref 150–400)
POTASSIUM SERPL-MCNC: 3.9 MMOL/L — SIGNIFICANT CHANGE UP (ref 3.5–5.3)
POTASSIUM SERPL-MCNC: 4.4 MMOL/L — SIGNIFICANT CHANGE UP (ref 3.5–5.3)
POTASSIUM SERPL-SCNC: 3.9 MMOL/L — SIGNIFICANT CHANGE UP (ref 3.5–5.3)
POTASSIUM SERPL-SCNC: 4.4 MMOL/L — SIGNIFICANT CHANGE UP (ref 3.5–5.3)
PROT SERPL-MCNC: 8.1 GM/DL — SIGNIFICANT CHANGE UP (ref 6–8.3)
PROT SERPL-MCNC: 8.2 GM/DL — SIGNIFICANT CHANGE UP (ref 6–8.3)
PROT UR-MCNC: 15 MG/DL
RBC # BLD: 4.94 M/UL — SIGNIFICANT CHANGE UP (ref 3.8–5.2)
RBC # BLD: 4.96 M/UL — SIGNIFICANT CHANGE UP (ref 3.8–5.2)
RBC # FLD: 17 % — HIGH (ref 10.3–14.5)
RBC # FLD: 17.2 % — HIGH (ref 10.3–14.5)
SODIUM SERPL-SCNC: 138 MMOL/L — SIGNIFICANT CHANGE UP (ref 135–145)
SODIUM SERPL-SCNC: 139 MMOL/L — SIGNIFICANT CHANGE UP (ref 135–145)
SP GR SPEC: 1.01 — SIGNIFICANT CHANGE UP (ref 1.01–1.02)
UROBILINOGEN FLD QL: NEGATIVE MG/DL — SIGNIFICANT CHANGE UP
WBC # BLD: 10.29 K/UL — SIGNIFICANT CHANGE UP (ref 3.8–10.5)
WBC # BLD: 12.74 K/UL — HIGH (ref 3.8–10.5)
WBC # FLD AUTO: 10.29 K/UL — SIGNIFICANT CHANGE UP (ref 3.8–10.5)
WBC # FLD AUTO: 12.74 K/UL — HIGH (ref 3.8–10.5)

## 2020-07-26 PROCEDURE — 96376 TX/PRO/DX INJ SAME DRUG ADON: CPT

## 2020-07-26 PROCEDURE — 70450 CT HEAD/BRAIN W/O DYE: CPT

## 2020-07-26 PROCEDURE — 99284 EMERGENCY DEPT VISIT MOD MDM: CPT | Mod: 25

## 2020-07-26 PROCEDURE — 93005 ELECTROCARDIOGRAM TRACING: CPT

## 2020-07-26 PROCEDURE — 85025 COMPLETE CBC W/AUTO DIFF WBC: CPT

## 2020-07-26 PROCEDURE — 93010 ELECTROCARDIOGRAM REPORT: CPT | Mod: 76

## 2020-07-26 PROCEDURE — 99284 EMERGENCY DEPT VISIT MOD MDM: CPT

## 2020-07-26 PROCEDURE — 93010 ELECTROCARDIOGRAM REPORT: CPT

## 2020-07-26 PROCEDURE — 96374 THER/PROPH/DIAG INJ IV PUSH: CPT

## 2020-07-26 PROCEDURE — 70450 CT HEAD/BRAIN W/O DYE: CPT | Mod: 26

## 2020-07-26 PROCEDURE — 96375 TX/PRO/DX INJ NEW DRUG ADDON: CPT

## 2020-07-26 PROCEDURE — 80053 COMPREHEN METABOLIC PANEL: CPT

## 2020-07-26 PROCEDURE — 83690 ASSAY OF LIPASE: CPT

## 2020-07-26 PROCEDURE — 36415 COLL VENOUS BLD VENIPUNCTURE: CPT

## 2020-07-26 RX ORDER — CEPHALEXIN 500 MG
1 CAPSULE ORAL
Qty: 20 | Refills: 0
Start: 2020-07-26 | End: 2020-08-04

## 2020-07-26 RX ORDER — SODIUM CHLORIDE 9 MG/ML
500 INJECTION INTRAMUSCULAR; INTRAVENOUS; SUBCUTANEOUS ONCE
Refills: 0 | Status: COMPLETED | OUTPATIENT
Start: 2020-07-26 | End: 2020-07-26

## 2020-07-26 RX ORDER — FAMOTIDINE 10 MG/ML
20 INJECTION INTRAVENOUS ONCE
Refills: 0 | Status: COMPLETED | OUTPATIENT
Start: 2020-07-26 | End: 2020-07-26

## 2020-07-26 RX ORDER — HYDRALAZINE HCL 50 MG
10 TABLET ORAL ONCE
Refills: 0 | Status: COMPLETED | OUTPATIENT
Start: 2020-07-26 | End: 2020-07-26

## 2020-07-26 RX ORDER — CEPHALEXIN 500 MG
500 CAPSULE ORAL ONCE
Refills: 0 | Status: COMPLETED | OUTPATIENT
Start: 2020-07-26 | End: 2020-07-26

## 2020-07-26 RX ORDER — ONDANSETRON 8 MG/1
4 TABLET, FILM COATED ORAL ONCE
Refills: 0 | Status: COMPLETED | OUTPATIENT
Start: 2020-07-26 | End: 2020-07-26

## 2020-07-26 RX ORDER — LABETALOL HCL 100 MG
10 TABLET ORAL ONCE
Refills: 0 | Status: DISCONTINUED | OUTPATIENT
Start: 2020-07-26 | End: 2020-07-26

## 2020-07-26 RX ORDER — ACETAMINOPHEN 500 MG
650 TABLET ORAL ONCE
Refills: 0 | Status: COMPLETED | OUTPATIENT
Start: 2020-07-26 | End: 2020-07-26

## 2020-07-26 RX ADMIN — FAMOTIDINE 20 MILLIGRAM(S): 10 INJECTION INTRAVENOUS at 18:00

## 2020-07-26 RX ADMIN — Medication 650 MILLIGRAM(S): at 22:18

## 2020-07-26 RX ADMIN — SODIUM CHLORIDE 500 MILLILITER(S): 9 INJECTION INTRAMUSCULAR; INTRAVENOUS; SUBCUTANEOUS at 22:43

## 2020-07-26 RX ADMIN — ONDANSETRON 4 MILLIGRAM(S): 8 TABLET, FILM COATED ORAL at 22:43

## 2020-07-26 RX ADMIN — FAMOTIDINE 20 MILLIGRAM(S): 10 INJECTION INTRAVENOUS at 22:43

## 2020-07-26 RX ADMIN — Medication 10 MILLIGRAM(S): at 18:00

## 2020-07-26 NOTE — ED ADULT TRIAGE NOTE - CHIEF COMPLAINT QUOTE
Pt. to the ED BIBA C/O Headache and Abdominal pain - Pt. denies any neurological deficits - Hx. of HTN-  Pt. Hypertensive at this time

## 2020-07-26 NOTE — ED ADULT NURSE NOTE - NSIMPLEMENTINTERV_GEN_ALL_ED
Implemented All Universal Safety Interventions:  [place] to call system. Call bell, personal items and telephone within reach. Instruct patient to call for assistance. Room bathroom lighting operational. Non-slip footwear when patient is off stretcher. Physically safe environment: no spills, clutter or unnecessary equipment. Stretcher in lowest position, wheels locked, appropriate side rails in place.

## 2020-07-26 NOTE — ED PROVIDER NOTE - PMH
Bladder cancer    CAD (coronary artery disease)    Endometrial adenocarcinoma    GERD (gastroesophageal reflux disease)    [place] (hard of hearing)    HTN (hypertension)    Hyperlipidemia    Hypothyroid    Macular degeneration    Orthostatic hypotension    Stented coronary artery

## 2020-07-26 NOTE — ED PROVIDER NOTE - PATIENT PORTAL LINK FT
You can access the FollowMyHealth Patient Portal offered by [facility] by registering at the following website: [email or web address]. By joining [employer]’s FollowMyHealth portal, you will also be able to view your health information using other applications (apps) compatible with our system.

## 2020-07-26 NOTE — ED PROVIDER NOTE - OBJECTIVE STATEMENT
89 y/o with PMHx of orthostatic hypotension, bladder CA, endometrial adenocarcinoma, macular degeneration, stented coronary artery, hypothyroidism, HLD, HTN, GERD, CAD, [place] p/w right posterior neck pain and nausea.  She was seen in the ED today and d/c a couple of hours ago after she was evaluated for HTN.  She had a CT head which showed no acute disease and normal labs prior to d/c.  She notes still feeling nauseated and weak at d/c.  She has been having nausea without vomiting and weakness for the past few days.  She notes decreased appetite.  Pt denies fever/chills, CP, SOB.  Pt spends most of the day in a wheelchair but ambulates short distances with a walker.

## 2020-07-26 NOTE — ED PROVIDER NOTE - CLINICAL SUMMARY MEDICAL DECISION MAKING FREE TEXT BOX
Reviewed labs and CT head from previous visit less than 24 hours ago. Pt alert and oriented with no neuro deficits on exam.  Will check UA/Cx and EKG

## 2020-07-26 NOTE — ED ADULT NURSE NOTE - OBJECTIVE STATEMENT
Patient presents to ED complaining of HA. Patient complaining of generalized HA starting today, patient hypertensive on arrival. Patient states she has been having difficulty controlling HTN with current medications. Patient complaining of abdominal discomfort and nausea. Patient denies NVD, CP, SOB, blurred vision, fever, chills. A&0x3

## 2020-07-26 NOTE — ED ADULT TRIAGE NOTE - CHIEF COMPLAINT QUOTE
arrives to ED from home for altered mental status. pt recent d/c from  ED for HTN. as per family when pt got home she was altered, "not acting like self with foul smelling urine." pt alert and oriented x 4 in triage. pt received xanax earlier today as per EMS.

## 2020-07-26 NOTE — ED ADULT NURSE NOTE - NSIMPLEMENTINTERV_GEN_ALL_ED
Implemented All Fall with Harm Risk Interventions:  [place] to call system. Call bell, personal items and telephone within reach. Instruct patient to call for assistance. Room bathroom lighting operational. Non-slip footwear when patient is off stretcher. Physically safe environment: no spills, clutter or unnecessary equipment. Stretcher in lowest position, wheels locked, appropriate side rails in place. Provide visual cue, wrist band, yellow gown, etc. Monitor gait and stability. Monitor for mental status changes and reorient to person, place, and time. Review medications for side effects contributing to fall risk. Reinforce activity limits and safety measures with patient and family. Provide visual clues: red socks.

## 2020-07-26 NOTE — ED PROVIDER NOTE - OBJECTIVE STATEMENT
89 y/o with PMHx of orthostatic hypotension, bladder CA, endometrial adenocarcinoma, macular degeneration, stented coronary artery, hypothyroidism, HLD, HTN, GERD, CAD, [place] presents to the ED c/o hypertension and abd pain. Pt presents hypertensive. Pt states she recently had a medication change, switched back to metoprolol, which she says "upsets her stomach". Pt states she took her medication but did not improve BP, in the 200s. Endorses weakness and decreased PO. Denies abd pain, nausea, vomiting, diarrhea, HA. Cannot typify "upset stomach." PMD: Dr. Sánchez, cardiologist Dr. Avina.

## 2020-07-26 NOTE — ED PROVIDER NOTE - PATIENT PORTAL LINK FT
You can access the FollowMyHealth Patient Portal offered by [facility] by registering at the following website: [email or web address]. By joining [employer]’s FollowMyHealth portal, you will also be able to view your health information using other applications (apps) compatible with our system.

## 2020-07-26 NOTE — ED PROVIDER NOTE - PMH
Bladder cancer    CAD (coronary artery disease)    Endometrial adenocarcinoma    GERD (gastroesophageal reflux disease)    [place] (hard of hearing)    HTN (hypertension)    Hyperlipidemia    Hypothyroid    Macular degeneration    Orthostatic hypotension    Stented coronary artery

## 2020-07-27 VITALS
OXYGEN SATURATION: 100 % | RESPIRATION RATE: 18 BRPM | DIASTOLIC BLOOD PRESSURE: 47 MMHG | SYSTOLIC BLOOD PRESSURE: 126 MMHG | HEART RATE: 82 BPM

## 2020-07-27 RX ADMIN — Medication 500 MILLIGRAM(S): at 00:01

## 2020-07-27 RX ADMIN — SODIUM CHLORIDE 500 MILLILITER(S): 9 INJECTION INTRAMUSCULAR; INTRAVENOUS; SUBCUTANEOUS at 00:01

## 2020-07-27 RX ADMIN — Medication 650 MILLIGRAM(S): at 00:01

## 2020-07-28 LAB
CULTURE RESULTS: SIGNIFICANT CHANGE UP
SPECIMEN SOURCE: SIGNIFICANT CHANGE UP

## 2020-07-29 NOTE — ED POST DISCHARGE NOTE - DETAILS
pt states she was sick from antibiotics given in ed her PMD changed her antibiotics to ciprofloxacin. Dr. Larson will repeat the Urine culture.

## 2020-09-10 ENCOUNTER — EMERGENCY (EMERGENCY)
Facility: HOSPITAL | Age: 85
LOS: 0 days | Discharge: HOME | End: 2020-09-10
Attending: EMERGENCY MEDICINE
Payer: MEDICARE

## 2020-09-10 VITALS
TEMPERATURE: 98 F | OXYGEN SATURATION: 99 % | DIASTOLIC BLOOD PRESSURE: 58 MMHG | HEART RATE: 85 BPM | RESPIRATION RATE: 18 BRPM | SYSTOLIC BLOOD PRESSURE: 139 MMHG

## 2020-09-10 VITALS
RESPIRATION RATE: 16 BRPM | DIASTOLIC BLOOD PRESSURE: 53 MMHG | TEMPERATURE: 98 F | WEIGHT: 171.96 LBS | SYSTOLIC BLOOD PRESSURE: 141 MMHG | HEIGHT: 62 IN | OXYGEN SATURATION: 99 % | HEART RATE: 89 BPM
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
## 2020-09-10 DIAGNOSIS — E78.5 HYPERLIPIDEMIA, UNSPECIFIED: ICD-10-CM

## 2020-09-10 DIAGNOSIS — M25.561 PAIN IN RIGHT KNEE: ICD-10-CM

## 2020-09-10 DIAGNOSIS — I10 ESSENTIAL (PRIMARY) HYPERTENSION: ICD-10-CM

## 2020-09-10 DIAGNOSIS — M25.511 PAIN IN RIGHT SHOULDER: ICD-10-CM

## 2020-09-10 DIAGNOSIS — Z95.5 PRESENCE OF CORONARY ANGIOPLASTY IMPLANT AND GRAFT: ICD-10-CM

## 2020-09-10 DIAGNOSIS — S00.93XA CONTUSION OF UNSPECIFIED PART OF HEAD, INITIAL ENCOUNTER: ICD-10-CM

## 2020-09-10 DIAGNOSIS — I25.10 ATHEROSCLEROTIC HEART DISEASE OF NATIVE CORONARY ARTERY WITHOUT ANGINA PECTORIS: ICD-10-CM

## 2020-09-10 DIAGNOSIS — M25.521 PAIN IN RIGHT ELBOW: ICD-10-CM

## 2020-09-10 DIAGNOSIS — Z98.890 OTHER SPECIFIED POSTPROCEDURAL STATES: Chronic | ICD-10-CM

## 2020-09-10 DIAGNOSIS — I95.1 ORTHOSTATIC HYPOTENSION: ICD-10-CM

## 2020-09-10 DIAGNOSIS — Z88.2 ALLERGY STATUS TO SULFONAMIDES: ICD-10-CM

## 2020-09-10 DIAGNOSIS — Z88.8 ALLERGY STATUS TO OTHER DRUGS, MEDICAMENTS AND BIOLOGICAL SUBSTANCES STATUS: ICD-10-CM

## 2020-09-10 DIAGNOSIS — Z85.51 PERSONAL HISTORY OF MALIGNANT NEOPLASM OF BLADDER: ICD-10-CM

## 2020-09-10 DIAGNOSIS — Z88.5 ALLERGY STATUS TO NARCOTIC AGENT: ICD-10-CM

## 2020-09-10 DIAGNOSIS — S09.90XA UNSPECIFIED INJURY OF HEAD, INITIAL ENCOUNTER: ICD-10-CM

## 2020-09-10 DIAGNOSIS — Z90.710 ACQUIRED ABSENCE OF BOTH CERVIX AND UTERUS: Chronic | ICD-10-CM

## 2020-09-10 DIAGNOSIS — Z85.42 PERSONAL HISTORY OF MALIGNANT NEOPLASM OF OTHER PARTS OF UTERUS: ICD-10-CM

## 2020-09-10 DIAGNOSIS — H91.90 UNSPECIFIED HEARING LOSS, UNSPECIFIED EAR: ICD-10-CM

## 2020-09-10 DIAGNOSIS — E03.9 HYPOTHYROIDISM, UNSPECIFIED: ICD-10-CM

## 2020-09-10 DIAGNOSIS — H35.30 UNSPECIFIED MACULAR DEGENERATION: ICD-10-CM
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
## 2020-09-10 PROCEDURE — 73564 X-RAY EXAM KNEE 4 OR MORE: CPT | Mod: 26,RT

## 2020-09-10 PROCEDURE — 73030 X-RAY EXAM OF SHOULDER: CPT | Mod: 26,RT

## 2020-09-10 PROCEDURE — 99284 EMERGENCY DEPT VISIT MOD MDM: CPT | Mod: 25

## 2020-09-10 PROCEDURE — 73080 X-RAY EXAM OF ELBOW: CPT | Mod: 26,RT

## 2020-09-10 PROCEDURE — 72125 CT NECK SPINE W/O DYE: CPT | Mod: 26

## 2020-09-10 PROCEDURE — 72125 CT NECK SPINE W/O DYE: CPT

## 2020-09-10 PROCEDURE — 70450 CT HEAD/BRAIN W/O DYE: CPT

## 2020-09-10 PROCEDURE — 73080 X-RAY EXAM OF ELBOW: CPT | Mod: RT

## 2020-09-10 PROCEDURE — 73030 X-RAY EXAM OF SHOULDER: CPT | Mod: RT

## 2020-09-10 PROCEDURE — 99283 EMERGENCY DEPT VISIT LOW MDM: CPT

## 2020-09-10 PROCEDURE — 73140 X-RAY EXAM OF FINGER(S): CPT | Mod: 26,RT

## 2020-09-10 PROCEDURE — 70450 CT HEAD/BRAIN W/O DYE: CPT | Mod: 26

## 2020-09-10 PROCEDURE — 73140 X-RAY EXAM OF FINGER(S): CPT | Mod: RT

## 2020-09-10 PROCEDURE — 73564 X-RAY EXAM KNEE 4 OR MORE: CPT | Mod: RT

## 2020-09-10 NOTE — ED PROVIDER NOTE - PMH
Bladder cancer    CAD (coronary artery disease)    Endometrial adenocarcinoma    GERD (gastroesophageal reflux disease)    [place] (hard of hearing)    HTN (hypertension)    Hyperlipidemia    Hypothyroid    Macular degeneration    Orthostatic hypotension    Stented coronary artery

## 2020-09-10 NOTE — ED ADULT NURSE NOTE - OBJECTIVE STATEMENT
Patient comes in s/p fall while trying to get into chair at home. Patient states her aide witnessed the fall. patient denies any pain but endorses headstrike. patient denies LOC. Neuro alert called. No signs of acute distress noted.

## 2020-09-10 NOTE — ED ADULT TRIAGE NOTE - CHIEF COMPLAINT QUOTE
Pt. to the ED S/P Fall from standing height. + Head  hematoma. Pt. denies LOC, and /or blood thinners. GSC 15- Pt. denies injuries to chest, abdomen or pelvis-- Hx. of HTN- Neuro alert called as ordered

## 2020-09-10 NOTE — ED PROVIDER NOTE - OBJECTIVE STATEMENT
89 y/o female with a PMHx of arthritis on right knee, bladder cancer, CAD with stents, endometrial adenocarcinoma, GERD, [place], HTN, HLD, hypothyroid, macular degeneration, orthostatic hypotension, presents to the ED s/p fall from standing height. Pt was ambulating with her walker to the chair when he right foot got caught causing her to fall. Hit head on table. No LOC. Landed on right side. Not on anticoagulants. +right shoulder pain, +right elbow pain, +right knee pain. Pt reports she has hx of right frozen shoulder. No other complaints at this time.

## 2020-09-10 NOTE — ED PROVIDER NOTE - PATIENT PORTAL LINK FT
You can access the FollowMyHealth Patient Portal offered by [facility] by registering at the following website: [email or web address]. By joining [employer]’s FollowMyHealth portal, you will also be able to view your health information using other applications (apps) compatible with our system.

## 2020-09-10 NOTE — ED ADULT NURSE NOTE - CHPI ED NUR SYMPTOMS NEG
no abrasion/no deformity/no fever/no numbness/no weakness/no loss of consciousness/no bleeding/no tingling/no confusion/no vomiting

## 2020-09-10 NOTE — ED ADULT NURSE NOTE - NSIMPLEMENTINTERV_GEN_ALL_ED
Implemented All Fall with Harm Risk Interventions:  [place] to call system. Call bell, personal items and telephone within reach. Instruct patient to call for assistance. Room bathroom lighting operational. Non-slip footwear when patient is off stretcher. Physically safe environment: no spills, clutter or unnecessary equipment. Stretcher in lowest position, wheels locked, appropriate side rails in place. Provide visual cue, wrist band, yellow gown, etc. Monitor gait and stability. Monitor for mental status changes and reorient to person, place, and time. Review medications for side effects contributing to fall risk. Reinforce activity limits and safety measures with patient and family. Provide visual clues: red socks.

## 2020-09-10 NOTE — ED PROVIDER NOTE - MUSCULOSKELETAL, MLM
Spine appears normal. Right knee swelling and ecchymosis. TTP right shoulder, right elbow, and right pinky with accompanying ecchymosis

## 2020-09-17 NOTE — ED POST DISCHARGE NOTE - RESULT SUMMARY
I spoke to pts son Adama regarding xray, + prox phalanx fx 5th digit. He notes pt is having pain and ecchymosis there. Recommend pt may return to ER for splint or advise she f/u with her orthopedic tomorrow. He will speak to pt about it today. I tried to call pts home number, no answer, son states that that phone doesn't always work. sign

## 2020-09-28 ENCOUNTER — INPATIENT (INPATIENT)
Facility: HOSPITAL | Age: 85
LOS: 3 days | Discharge: NURSING, REHAB OR LONG-TERM CARE | DRG: 884 | End: 2020-10-02
Attending: FAMILY MEDICINE | Admitting: FAMILY MEDICINE
Payer: MEDICARE

## 2020-09-28 VITALS
DIASTOLIC BLOOD PRESSURE: 62 MMHG | HEIGHT: 72 IN | SYSTOLIC BLOOD PRESSURE: 169 MMHG | OXYGEN SATURATION: 100 % | TEMPERATURE: 99 F | RESPIRATION RATE: 17 BRPM | WEIGHT: 171.96 LBS | HEART RATE: 92 BPM

## 2020-09-28 DIAGNOSIS — Z98.890 OTHER SPECIFIED POSTPROCEDURAL STATES: Chronic | ICD-10-CM

## 2020-09-28 DIAGNOSIS — R59.0 LOCALIZED ENLARGED LYMPH NODES: ICD-10-CM

## 2020-09-28 DIAGNOSIS — Z90.710 ACQUIRED ABSENCE OF BOTH CERVIX AND UTERUS: Chronic | ICD-10-CM

## 2020-09-28 DIAGNOSIS — I10 ESSENTIAL (PRIMARY) HYPERTENSION: ICD-10-CM
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
## 2020-09-28 DIAGNOSIS — M25.511 PAIN IN RIGHT SHOULDER: ICD-10-CM

## 2020-09-28 DIAGNOSIS — E03.9 HYPOTHYROIDISM, UNSPECIFIED: ICD-10-CM

## 2020-09-28 DIAGNOSIS — Z85.42 PERSONAL HISTORY OF MALIGNANT NEOPLASM OF OTHER PARTS OF UTERUS: ICD-10-CM

## 2020-09-28 DIAGNOSIS — F41.9 ANXIETY DISORDER, UNSPECIFIED: ICD-10-CM

## 2020-09-28 DIAGNOSIS — R55 SYNCOPE AND COLLAPSE: ICD-10-CM

## 2020-09-28 DIAGNOSIS — M25.571 PAIN IN RIGHT ANKLE AND JOINTS OF RIGHT FOOT: ICD-10-CM

## 2020-09-28 DIAGNOSIS — Z90.49 ACQUIRED ABSENCE OF OTHER SPECIFIED PARTS OF DIGESTIVE TRACT: ICD-10-CM

## 2020-09-28 DIAGNOSIS — G89.29 OTHER CHRONIC PAIN: ICD-10-CM

## 2020-09-28 DIAGNOSIS — M47.9 SPONDYLOSIS, UNSPECIFIED: ICD-10-CM

## 2020-09-28 DIAGNOSIS — S62.616D DISPLACED FRACTURE OF PROXIMAL PHALANX OF RIGHT LITTLE FINGER, SUBSEQUENT ENCOUNTER FOR FRACTURE WITH ROUTINE HEALING: ICD-10-CM

## 2020-09-28 DIAGNOSIS — Z66 DO NOT RESUSCITATE: ICD-10-CM

## 2020-09-28 DIAGNOSIS — E78.5 HYPERLIPIDEMIA, UNSPECIFIED: ICD-10-CM

## 2020-09-28 DIAGNOSIS — Z95.5 PRESENCE OF CORONARY ANGIOPLASTY IMPLANT AND GRAFT: ICD-10-CM

## 2020-09-28 DIAGNOSIS — M25.562 PAIN IN LEFT KNEE: ICD-10-CM

## 2020-09-28 DIAGNOSIS — I25.10 ATHEROSCLEROTIC HEART DISEASE OF NATIVE CORONARY ARTERY WITHOUT ANGINA PECTORIS: ICD-10-CM

## 2020-09-28 DIAGNOSIS — S00.03XA CONTUSION OF SCALP, INITIAL ENCOUNTER: ICD-10-CM
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
## 2020-09-28 DIAGNOSIS — C67.9 MALIGNANT NEOPLASM OF BLADDER, UNSPECIFIED: ICD-10-CM

## 2020-09-28 DIAGNOSIS — Z51.5 ENCOUNTER FOR PALLIATIVE CARE: ICD-10-CM

## 2020-09-28 DIAGNOSIS — C77.5 SECONDARY AND UNSPECIFIED MALIGNANT NEOPLASM OF INTRAPELVIC LYMPH NODES: ICD-10-CM

## 2020-09-28 DIAGNOSIS — R54 AGE-RELATED PHYSICAL DEBILITY: ICD-10-CM

## 2020-09-28 DIAGNOSIS — M25.561 PAIN IN RIGHT KNEE: ICD-10-CM

## 2020-09-28 DIAGNOSIS — M54.10 RADICULOPATHY, SITE UNSPECIFIED: ICD-10-CM

## 2020-09-28 DIAGNOSIS — N39.0 URINARY TRACT INFECTION, SITE NOT SPECIFIED: ICD-10-CM

## 2020-09-28 DIAGNOSIS — K21.9 GASTRO-ESOPHAGEAL REFLUX DISEASE WITHOUT ESOPHAGITIS: ICD-10-CM

## 2020-09-28 LAB
ALBUMIN SERPL ELPH-MCNC: 4.1 G/DL — SIGNIFICANT CHANGE UP (ref 3.3–5)
ALP SERPL-CCNC: 93 U/L — SIGNIFICANT CHANGE UP (ref 40–120)
ALT FLD-CCNC: 22 U/L — SIGNIFICANT CHANGE UP (ref 12–78)
ANION GAP SERPL CALC-SCNC: 8 MMOL/L — SIGNIFICANT CHANGE UP (ref 5–17)
APPEARANCE UR: CLEAR — SIGNIFICANT CHANGE UP
APTT BLD: 35.3 SEC — SIGNIFICANT CHANGE UP (ref 27.5–35.5)
AST SERPL-CCNC: 18 U/L — SIGNIFICANT CHANGE UP (ref 15–37)
BASOPHILS # BLD AUTO: 0.06 K/UL — SIGNIFICANT CHANGE UP (ref 0–0.2)
BASOPHILS NFR BLD AUTO: 0.4 % — SIGNIFICANT CHANGE UP (ref 0–2)
BILIRUB SERPL-MCNC: 0.7 MG/DL — SIGNIFICANT CHANGE UP (ref 0.2–1.2)
BILIRUB UR-MCNC: NEGATIVE — SIGNIFICANT CHANGE UP
BUN SERPL-MCNC: 21 MG/DL — SIGNIFICANT CHANGE UP (ref 7–23)
CALCIUM SERPL-MCNC: 9.7 MG/DL — SIGNIFICANT CHANGE UP (ref 8.5–10.1)
CHLORIDE SERPL-SCNC: 107 MMOL/L — SIGNIFICANT CHANGE UP (ref 96–108)
CO2 SERPL-SCNC: 23 MMOL/L — SIGNIFICANT CHANGE UP (ref 22–31)
COLOR SPEC: YELLOW — SIGNIFICANT CHANGE UP
CREAT SERPL-MCNC: 1.13 MG/DL — SIGNIFICANT CHANGE UP (ref 0.5–1.3)
DIFF PNL FLD: ABNORMAL
EOSINOPHIL # BLD AUTO: 0.08 K/UL — SIGNIFICANT CHANGE UP (ref 0–0.5)
EOSINOPHIL NFR BLD AUTO: 0.6 % — SIGNIFICANT CHANGE UP (ref 0–6)
GLUCOSE SERPL-MCNC: 103 MG/DL — HIGH (ref 70–99)
GLUCOSE UR QL: NEGATIVE MG/DL — SIGNIFICANT CHANGE UP
HCT VFR BLD CALC: 43.6 % — SIGNIFICANT CHANGE UP (ref 34.5–45)
HGB BLD-MCNC: 13.3 G/DL — SIGNIFICANT CHANGE UP (ref 11.5–15.5)
IMM GRANULOCYTES NFR BLD AUTO: 0.9 % — SIGNIFICANT CHANGE UP (ref 0–1.5)
INR BLD: 0.98 RATIO — SIGNIFICANT CHANGE UP (ref 0.88–1.16)
KETONES UR-MCNC: NEGATIVE — SIGNIFICANT CHANGE UP
LEUKOCYTE ESTERASE UR-ACNC: ABNORMAL
LYMPHOCYTES # BLD AUTO: 14.7 % — SIGNIFICANT CHANGE UP (ref 13–44)
LYMPHOCYTES # BLD AUTO: 2 K/UL — SIGNIFICANT CHANGE UP (ref 1–3.3)
MCHC RBC-ENTMCNC: 26 PG — LOW (ref 27–34)
MCHC RBC-ENTMCNC: 30.5 GM/DL — LOW (ref 32–36)
MCV RBC AUTO: 85.3 FL — SIGNIFICANT CHANGE UP (ref 80–100)
MONOCYTES # BLD AUTO: 0.86 K/UL — SIGNIFICANT CHANGE UP (ref 0–0.9)
MONOCYTES NFR BLD AUTO: 6.3 % — SIGNIFICANT CHANGE UP (ref 2–14)
NEUTROPHILS # BLD AUTO: 10.47 K/UL — HIGH (ref 1.8–7.4)
NEUTROPHILS NFR BLD AUTO: 77.1 % — HIGH (ref 43–77)
NITRITE UR-MCNC: NEGATIVE — SIGNIFICANT CHANGE UP
PH UR: 6.5 — SIGNIFICANT CHANGE UP (ref 5–8)
PLATELET # BLD AUTO: 325 K/UL — SIGNIFICANT CHANGE UP (ref 150–400)
POTASSIUM SERPL-MCNC: 3.9 MMOL/L — SIGNIFICANT CHANGE UP (ref 3.5–5.3)
POTASSIUM SERPL-SCNC: 3.9 MMOL/L — SIGNIFICANT CHANGE UP (ref 3.5–5.3)
PROT SERPL-MCNC: 8.6 GM/DL — HIGH (ref 6–8.3)
PROT UR-MCNC: 30 MG/DL
PROTHROM AB SERPL-ACNC: 11.5 SEC — SIGNIFICANT CHANGE UP (ref 10.6–13.6)
RBC # BLD: 5.11 M/UL — SIGNIFICANT CHANGE UP (ref 3.8–5.2)
RBC # FLD: 15.8 % — HIGH (ref 10.3–14.5)
SARS-COV-2 RNA SPEC QL NAA+PROBE: SIGNIFICANT CHANGE UP
SODIUM SERPL-SCNC: 138 MMOL/L — SIGNIFICANT CHANGE UP (ref 135–145)
SP GR SPEC: 1.01 — SIGNIFICANT CHANGE UP (ref 1.01–1.02)
TROPONIN I SERPL-MCNC: 0.04 NG/ML — SIGNIFICANT CHANGE UP (ref 0.01–0.04)
UROBILINOGEN FLD QL: NEGATIVE MG/DL — SIGNIFICANT CHANGE UP
WBC # BLD: 13.59 K/UL — HIGH (ref 3.8–10.5)
WBC # FLD AUTO: 13.59 K/UL — HIGH (ref 3.8–10.5)

## 2020-09-28 PROCEDURE — G0008: CPT

## 2020-09-28 PROCEDURE — 74176 CT ABD & PELVIS W/O CONTRAST: CPT | Mod: 26

## 2020-09-28 PROCEDURE — 72125 CT NECK SPINE W/O DYE: CPT | Mod: 26

## 2020-09-28 PROCEDURE — 97162 PT EVAL MOD COMPLEX 30 MIN: CPT | Mod: GP

## 2020-09-28 PROCEDURE — 73030 X-RAY EXAM OF SHOULDER: CPT | Mod: RT

## 2020-09-28 PROCEDURE — 80048 BASIC METABOLIC PNL TOTAL CA: CPT

## 2020-09-28 PROCEDURE — 85025 COMPLETE CBC W/AUTO DIFF WBC: CPT

## 2020-09-28 PROCEDURE — 90686 IIV4 VACC NO PRSV 0.5 ML IM: CPT

## 2020-09-28 PROCEDURE — 97110 THERAPEUTIC EXERCISES: CPT | Mod: GP

## 2020-09-28 PROCEDURE — 99223 1ST HOSP IP/OBS HIGH 75: CPT | Mod: AI

## 2020-09-28 PROCEDURE — 73030 X-RAY EXAM OF SHOULDER: CPT | Mod: 26,RT

## 2020-09-28 PROCEDURE — 97530 THERAPEUTIC ACTIVITIES: CPT | Mod: GP

## 2020-09-28 PROCEDURE — 73140 X-RAY EXAM OF FINGER(S): CPT | Mod: RT

## 2020-09-28 PROCEDURE — U0003: CPT

## 2020-09-28 PROCEDURE — 71250 CT THORAX DX C-: CPT | Mod: 26

## 2020-09-28 PROCEDURE — 73130 X-RAY EXAM OF HAND: CPT | Mod: RT

## 2020-09-28 PROCEDURE — 73502 X-RAY EXAM HIP UNI 2-3 VIEWS: CPT | Mod: LT

## 2020-09-28 PROCEDURE — 97116 GAIT TRAINING THERAPY: CPT | Mod: GP

## 2020-09-28 PROCEDURE — 93010 ELECTROCARDIOGRAM REPORT: CPT

## 2020-09-28 PROCEDURE — 70450 CT HEAD/BRAIN W/O DYE: CPT | Mod: 26

## 2020-09-28 PROCEDURE — 36415 COLL VENOUS BLD VENIPUNCTURE: CPT

## 2020-09-28 RX ORDER — CHOLECALCIFEROL (VITAMIN D3) 125 MCG
2000 CAPSULE ORAL DAILY
Refills: 0 | Status: DISCONTINUED | OUTPATIENT
Start: 2020-09-28 | End: 2020-10-02

## 2020-09-28 RX ORDER — SODIUM CHLORIDE 9 MG/ML
500 INJECTION INTRAMUSCULAR; INTRAVENOUS; SUBCUTANEOUS ONCE
Refills: 0 | Status: COMPLETED | OUTPATIENT
Start: 2020-09-28 | End: 2020-09-28

## 2020-09-28 RX ORDER — GABAPENTIN 400 MG/1
100 CAPSULE ORAL AT BEDTIME
Refills: 0 | Status: DISCONTINUED | OUTPATIENT
Start: 2020-09-28 | End: 2020-10-02

## 2020-09-28 RX ORDER — LANOLIN ALCOHOL/MO/W.PET/CERES
3 CREAM (GRAM) TOPICAL AT BEDTIME
Refills: 0 | Status: DISCONTINUED | OUTPATIENT
Start: 2020-09-28 | End: 2020-09-28

## 2020-09-28 RX ORDER — AMLODIPINE BESYLATE 2.5 MG/1
10 TABLET ORAL DAILY
Refills: 0 | Status: DISCONTINUED | OUTPATIENT
Start: 2020-09-29 | End: 2020-10-02

## 2020-09-28 RX ORDER — ALPRAZOLAM 0.25 MG
0.12 TABLET ORAL DAILY
Refills: 0 | Status: DISCONTINUED | OUTPATIENT
Start: 2020-09-28 | End: 2020-10-02

## 2020-09-28 RX ORDER — HEPARIN SODIUM 5000 [USP'U]/ML
5000 INJECTION INTRAVENOUS; SUBCUTANEOUS EVERY 12 HOURS
Refills: 0 | Status: DISCONTINUED | OUTPATIENT
Start: 2020-09-28 | End: 2020-10-02

## 2020-09-28 RX ORDER — HYDRALAZINE HCL 50 MG
25 TABLET ORAL
Refills: 0 | Status: DISCONTINUED | OUTPATIENT
Start: 2020-09-28 | End: 2020-10-02

## 2020-09-28 RX ORDER — LEVOTHYROXINE SODIUM 125 MCG
75 TABLET ORAL DAILY
Refills: 0 | Status: DISCONTINUED | OUTPATIENT
Start: 2020-09-28 | End: 2020-10-02

## 2020-09-28 RX ORDER — PANTOPRAZOLE SODIUM 20 MG/1
40 TABLET, DELAYED RELEASE ORAL
Refills: 0 | Status: DISCONTINUED | OUTPATIENT
Start: 2020-09-28 | End: 2020-10-02

## 2020-09-28 RX ORDER — POLYETHYLENE GLYCOL 3350 17 G/17G
17 POWDER, FOR SOLUTION ORAL DAILY
Refills: 0 | Status: DISCONTINUED | OUTPATIENT
Start: 2020-09-28 | End: 2020-09-28

## 2020-09-28 RX ORDER — ISOSORBIDE MONONITRATE 60 MG/1
30 TABLET, EXTENDED RELEASE ORAL
Refills: 0 | Status: DISCONTINUED | OUTPATIENT
Start: 2020-09-28 | End: 2020-10-02

## 2020-09-28 RX ORDER — CEPHALEXIN 500 MG
500 CAPSULE ORAL ONCE
Refills: 0 | Status: COMPLETED | OUTPATIENT
Start: 2020-09-28 | End: 2020-09-28

## 2020-09-28 RX ORDER — ALPRAZOLAM 0.25 MG
0.25 TABLET ORAL THREE TIMES A DAY
Refills: 0 | Status: DISCONTINUED | OUTPATIENT
Start: 2020-09-28 | End: 2020-09-28

## 2020-09-28 RX ORDER — ISOSORBIDE MONONITRATE 60 MG/1
30 TABLET, EXTENDED RELEASE ORAL DAILY
Refills: 0 | Status: DISCONTINUED | OUTPATIENT
Start: 2020-09-28 | End: 2020-10-02

## 2020-09-28 RX ORDER — SENNA PLUS 8.6 MG/1
2 TABLET ORAL AT BEDTIME
Refills: 0 | Status: DISCONTINUED | OUTPATIENT
Start: 2020-09-28 | End: 2020-10-02

## 2020-09-28 RX ORDER — METOPROLOL TARTRATE 50 MG
25 TABLET ORAL
Refills: 0 | Status: DISCONTINUED | OUTPATIENT
Start: 2020-09-28 | End: 2020-09-28

## 2020-09-28 RX ORDER — INFLUENZA VIRUS VACCINE 15; 15; 15; 15 UG/.5ML; UG/.5ML; UG/.5ML; UG/.5ML
0.5 SUSPENSION INTRAMUSCULAR ONCE
Refills: 0 | Status: COMPLETED | OUTPATIENT
Start: 2020-09-28 | End: 2020-10-02

## 2020-09-28 RX ORDER — ACETAMINOPHEN 500 MG
650 TABLET ORAL EVERY 6 HOURS
Refills: 0 | Status: DISCONTINUED | OUTPATIENT
Start: 2020-09-28 | End: 2020-10-02

## 2020-09-28 RX ADMIN — Medication 500 MILLIGRAM(S): at 12:58

## 2020-09-28 RX ADMIN — PANTOPRAZOLE SODIUM 40 MILLIGRAM(S): 20 TABLET, DELAYED RELEASE ORAL at 21:09

## 2020-09-28 RX ADMIN — SODIUM CHLORIDE 500 MILLILITER(S): 9 INJECTION INTRAMUSCULAR; INTRAVENOUS; SUBCUTANEOUS at 12:59

## 2020-09-28 RX ADMIN — Medication 2000 UNIT(S): at 21:08

## 2020-09-28 RX ADMIN — ISOSORBIDE MONONITRATE 30 MILLIGRAM(S): 60 TABLET, EXTENDED RELEASE ORAL at 18:55

## 2020-09-28 RX ADMIN — HEPARIN SODIUM 5000 UNIT(S): 5000 INJECTION INTRAVENOUS; SUBCUTANEOUS at 21:08

## 2020-09-28 RX ADMIN — Medication 0.12 MILLIGRAM(S): at 21:29

## 2020-09-28 RX ADMIN — GABAPENTIN 100 MILLIGRAM(S): 400 CAPSULE ORAL at 21:28

## 2020-09-28 RX ADMIN — SENNA PLUS 2 TABLET(S): 8.6 TABLET ORAL at 21:09

## 2020-09-28 RX ADMIN — Medication 650 MILLIGRAM(S): at 18:55

## 2020-09-28 RX ADMIN — Medication 1 DROP(S): at 21:08

## 2020-09-28 NOTE — ED ADULT NURSE NOTE - NSIMPLEMENTINTERV_GEN_ALL_ED
Implemented All Universal Safety Interventions:  [place] to call system. Call bell, personal items and telephone within reach. Instruct patient to call for assistance. Room bathroom lighting operational. Non-slip footwear when patient is off stretcher. Physically safe environment: no spills, clutter or unnecessary equipment. Stretcher in lowest position, wheels locked, appropriate side rails in place. Implemented All Fall with Harm Risk Interventions:  [place] to call system. Call bell, personal items and telephone within reach. Instruct patient to call for assistance. Room bathroom lighting operational. Non-slip footwear when patient is off stretcher. Physically safe environment: no spills, clutter or unnecessary equipment. Stretcher in lowest position, wheels locked, appropriate side rails in place. Provide visual cue, wrist band, yellow gown, etc. Monitor gait and stability. Monitor for mental status changes and reorient to person, place, and time. Review medications for side effects contributing to fall risk. Reinforce activity limits and safety measures with patient and family. Provide visual clues: red socks.

## 2020-09-28 NOTE — ED PROVIDER NOTE - CLINICAL SUMMARY MEDICAL DECISION MAKING FREE TEXT BOX
89 y/o female with a PMHx of arthritis, CAD s/p cardiac stents x5, bladder CA, endometrial adenocarcinoma s/p EMILY, GERD, HTN, HLD, hypothyroid, macular degeneration, orthostatic hypotension, s/p unwitnessed fall, second unwitnessed fall within 2 weeks. Pt has a Hx of 5 cardiac stents, likely syncope. Will obtain EKG, blood work, CT, and will admit for further work up and treatment.

## 2020-09-28 NOTE — H&P ADULT - HISTORY OF PRESENT ILLNESS
CC:  Patient is a 89y old  Female who presents with a chief complaint of unwitnessed fall.    HPI:  89F.  does not take AP/AC.  admitted 09/28/20.  presented to the ED after an unwitnessed fall at home.  fell in the bathroom and hit her forehead on the tile.  denied LOC.  a/w b/l hip and low back pain.  patient was found by her son.  lives independently in her own home w/ a part-time aid.  her aid was unable to attend to her today.  recent hx of fall on 09/10/20 were she was DC'd from ED the same day.    in ED, syncope was questioned as patient was a fair historian (not able to recall details) and unwitnessed.  EKG NSR 91.  VA 206ms.  no acute findings.    ROS:    (+) generalized weakness prior to fall.    all other review of systems are negative unless indicated above.    PAST MEDICAL & SURGICAL HISTORY:  Orthostatic hypotension    Bladder cancer    Endometrial adenocarcinoma    Macular degeneration    Stented coronary artery    Hypothyroid    Hyperlipidemia    HTN (hypertension)    GERD (gastroesophageal reflux disease)    CAD (coronary artery disease)    [place] (hard of hearing)    History of bladder surgery    S/P EMILY (total abdominal hysterectomy)    S/P hernia repair  umbilical - 2008    S/P laparoscopic cholecystectomy  2008    Ankle fracture, right  surgery 25 yrs ago - hardware removed    S/P coronary angiogram  2005, 2008, 2011 - drug eluding stents        Allergies    amlodipine (Unknown)  clonidine (Unknown)  Florinef Acetate (Unknown)  hydrocodone (Unknown)  Levatol (Unknown)  Lipitor (Unknown)  Lotrel (Unknown)  methylPREDNISolone (Unknown)  morphine (Other)  Plaquenil (Unknown)  statins (Other (Unknown))  sulfa drugs (Rash)    Intolerances        Home Medications:  acetaminophen 325 mg oral tablet: 2 tab(s) orally every 6 hours, As needed, Mild Pain (1 - 3) (21 Apr 2020 16:40)  bisacodyl 10 mg rectal suppository: 1 suppository(ies) rectal once a day, As needed, Constipation (21 Apr 2020 16:40)  guaiFENesin 100 mg/5 mL oral liquid: 5 milliliter(s) orally every 6 hours, As needed, Cough (21 Apr 2020 16:40)  isosorbide mononitrate 30 mg oral tablet, extended release: 1 tab(s) orally in AM (21 Apr 2020 16:40)  isosorbide mononitrate 30 mg oral tablet, extended release: 1 tab(s) orally at 18:00 (21 Apr 2020 16:40)  magnesium hydroxide 8% oral suspension: 30 milliliter(s) orally once a day, As needed, Constipation (19 Dec 2019 12:05)  melatonin 3 mg oral tablet: 1 tab(s) orally once a day (at bedtime) (19 Dec 2019 12:05)  metoprolol succinate 25 mg oral tablet, extended release: 1 tab(s) orally once a day at 18:00 (20 Dec 2019 16:33)  nystatin 100,000 units/g topical powder: 1 application topically every 12 hours (19 Dec 2019 12:05)  pantoprazole 40 mg oral delayed release tablet: 1 tab(s) orally once a day (06 Dec 2019 17:44)  polyethylene glycol 3350 oral powder for reconstitution: 17 gram(s) orally once a day, As needed, Constipation (16 Dec 2019 19:14)  Refresh ophthalmic solution: 1 drop(s) to each affected eye 3 times a day (06 Dec 2019 17:44)  senna oral tablet: 2 tab(s) orally once a day (at bedtime) (16 Dec 2019 19:14)  Synthroid 75 mcg (0.075 mg) oral tablet: 1 tab(s) orally once a day (06 Dec 2019 17:44)  traMADol:  (21 Apr 2020 16:40)  Vitamin D3 1000 intl units oral tablet: 2 tab(s) orally once a day (08 Dec 2019 14:26)  Xanax 0.25 mg oral tablet: 1 tab(s) orally 3 times a day, As Needed for anxiety (06 Dec 2019 17:44)      Social History:      FAMILY HISTORY:  Family history of brain cancer        Vital Signs Last 24 Hrs  T(C): 36.6 (28 Sep 2020 14:09), Max: 37 (28 Sep 2020 11:16)  T(F): 97.9 (28 Sep 2020 14:09), Max: 98.6 (28 Sep 2020 11:16)  HR: 91 (28 Sep 2020 14:09) (87 - 92)  BP: 158/50 (28 Sep 2020 14:09) (143/63 - 187/75)  BP(mean): 78 (28 Sep 2020 14:09) (71 - 104)  RR: 17 (28 Sep 2020 14:09) (13 - 17)  SpO2: 100% (28 Sep 2020 14:09) (98% - 100%)    Constitutional: NAD.   HEENT: PERRL, EOMI, MMM.  Neck: Soft and supple, No carotid bruit, No JVD  Respiratory: Breath sounds are clear bilaterally, No wheezing, rales or rhonchi  Cardiovascular: S1 and S2, regular rate and rhythm, no murmur, rub or gallop.  Gastrointestinal: Bowel Sounds present, soft, nontender, nondistended, no guarding, no rebound, no mass.  Extremities: No peripheral edema  Vascular: 2+ peripheral pulses  Neurological: A/O x , no focal deficits  Musculoskeletal: 5/5 strength b/l upper and lower extremities  Skin:  no visible rashes.                             13.3   13.59 )-----------( 325      ( 28 Sep 2020 11:44 )             43.6       PT/INR - ( 28 Sep 2020 11:44 )   PT: 11.5 sec;   INR: 0.98 ratio         PTT - ( 28 Sep 2020 11:44 )  PTT:35.3 sec    09-28    138  |  107  |  21  ----------------------------<  103<H>  3.9   |  23  |  1.13    Ca    9.7      28 Sep 2020 11:44    TPro  8.6<H>  /  Alb  4.1  /  TBili  0.7  /  DBili  x   /  AST  18  /  ALT  22  /  AlkPhos  93  09-28      LIVER FUNCTIONS - ( 28 Sep 2020 11:44 )  Alb: 4.1 g/dL / Pro: 8.6 gm/dL / ALK PHOS: 93 U/L / ALT: 22 U/L / AST: 18 U/L / GGT: x             CARDIAC MARKERS ( 28 Sep 2020 11:44 )  0.042 ng/mL / x     / x     / x     / x             CC:  Patient is a 89y old  Female who presents with a chief complaint of unwitnessed fall.    HPI:  89F.  does not take AP/AC.  admitted 09/28/20.  presented to the ED after an unwitnessed fall at home.  fell in the bathroom and hit her forehead on the tile.  denied LOC.  a/w b/l hip and low back pain.  patient was found by her son.  lives independently in her own home w/ a part-time aid.  her aid was unable to attend to her today.  recent hx of fall on 09/10/20 were she was DC'd from ED the same day.    in ED, syncope was questioned as patient was a fair historian (not able to recall details) and unwitnessed.  EKG NSR 91.  [place] 206ms.  no acute findings.    ROS:    (+) generalized weakness prior to fall.    all other review of systems are negative unless indicated above.    PAST MEDICAL & SURGICAL HISTORY:  Orthostatic hypotension    Bladder cancer    Endometrial adenocarcinoma    Macular degeneration    Stented coronary artery    Hypothyroid    Hyperlipidemia    HTN (hypertension)    GERD (gastroesophageal reflux disease)    CAD (coronary artery disease)    [place] (hard of hearing)    History of bladder surgery    S/P EMILY (total abdominal hysterectomy)    S/P hernia repair  umbilical - 2008    S/P laparoscopic cholecystectomy  2008    Ankle fracture, right  surgery 25 yrs ago - hardware removed    S/P coronary angiogram  2005, 2008, 2011 - drug eluding stents        Allergies    amlodipine (Unknown)  clonidine (Unknown)  Florinef Acetate (Unknown)  hydrocodone (Unknown)  Levatol (Unknown)  Lipitor (Unknown)  Lotrel (Unknown)  methylPREDNISolone (Unknown)  morphine (Other)  Plaquenil (Unknown)  statins (Other (Unknown))  sulfa drugs (Rash)    Intolerances        Home Medications:  acetaminophen 325 mg oral tablet: 2 tab(s) orally every 6 hours, As needed, Mild Pain (1 - 3) (21 Apr 2020 16:40)  bisacodyl 10 mg rectal suppository: 1 suppository(ies) rectal once a day, As needed, Constipation (21 Apr 2020 16:40)  guaiFENesin 100 mg/5 mL oral liquid: 5 milliliter(s) orally every 6 hours, As needed, Cough (21 Apr 2020 16:40)  isosorbide mononitrate 30 mg oral tablet, extended release: 1 tab(s) orally in AM (21 Apr 2020 16:40)  isosorbide mononitrate 30 mg oral tablet, extended release: 1 tab(s) orally at 18:00 (21 Apr 2020 16:40)  magnesium hydroxide 8% oral suspension: 30 milliliter(s) orally once a day, As needed, Constipation (19 Dec 2019 12:05)  melatonin 3 mg oral tablet: 1 tab(s) orally once a day (at bedtime) (19 Dec 2019 12:05)  metoprolol succinate 25 mg oral tablet, extended release: 1 tab(s) orally once a day at 18:00 (20 Dec 2019 16:33)  nystatin 100,000 units/g topical powder: 1 application topically every 12 hours (19 Dec 2019 12:05)  pantoprazole 40 mg oral delayed release tablet: 1 tab(s) orally once a day (06 Dec 2019 17:44)  polyethylene glycol 3350 oral powder for reconstitution: 17 gram(s) orally once a day, As needed, Constipation (16 Dec 2019 19:14)  Refresh ophthalmic solution: 1 drop(s) to each affected eye 3 times a day (06 Dec 2019 17:44)  senna oral tablet: 2 tab(s) orally once a day (at bedtime) (16 Dec 2019 19:14)  Synthroid 75 mcg (0.075 mg) oral tablet: 1 tab(s) orally once a day (06 Dec 2019 17:44)  traMADol:  (21 Apr 2020 16:40)  Vitamin D3 1000 intl units oral tablet: 2 tab(s) orally once a day (08 Dec 2019 14:26)  Xanax 0.25 mg oral tablet: 1 tab(s) orally 3 times a day, As Needed for anxiety (06 Dec 2019 17:44)      Social History:  lives independently.  has a part-time HHA.      FAMILY HISTORY:  Family history of brain cancer        Vital Signs Last 24 Hrs  T(C): 36.6 (28 Sep 2020 14:09), Max: 37 (28 Sep 2020 11:16)  T(F): 97.9 (28 Sep 2020 14:09), Max: 98.6 (28 Sep 2020 11:16)  HR: 91 (28 Sep 2020 14:09) (87 - 92)  BP: 158/50 (28 Sep 2020 14:09) (143/63 - 187/75)  BP(mean): 78 (28 Sep 2020 14:09) (71 - 104)  RR: 17 (28 Sep 2020 14:09) (13 - 17)  SpO2: 100% (28 Sep 2020 14:09) (98% - 100%)    Constitutional: NAD.   HEENT: PERRL, EOMI, MMM.  Neck: Soft and supple, No carotid bruit, No JVD  Respiratory: Breath sounds are clear bilaterally, No wheezing, rales or rhonchi  Cardiovascular: S1 and S2, regular rate and rhythm, no murmur, rub or gallop.  Gastrointestinal: Bowel Sounds present, soft, nontender, nondistended, no guarding, no rebound, no mass.  Extremities: No peripheral edema  Vascular: 2+ peripheral pulses  Neurological: A/O x , no focal deficits  Musculoskeletal: 5/5 strength b/l upper and lower extremities  Skin:  no visible rashes.                             13.3   13.59 )-----------( 325      ( 28 Sep 2020 11:44 )             43.6       PT/INR - ( 28 Sep 2020 11:44 )   PT: 11.5 sec;   INR: 0.98 ratio         PTT - ( 28 Sep 2020 11:44 )  PTT:35.3 sec    09-28    138  |  107  |  21  ----------------------------<  103<H>  3.9   |  23  |  1.13    Ca    9.7      28 Sep 2020 11:44    TPro  8.6<H>  /  Alb  4.1  /  TBili  0.7  /  DBili  x   /  AST  18  /  ALT  22  /  AlkPhos  93  09-28      LIVER FUNCTIONS - ( 28 Sep 2020 11:44 )  Alb: 4.1 g/dL / Pro: 8.6 gm/dL / ALK PHOS: 93 U/L / ALT: 22 U/L / AST: 18 U/L / GGT: x             CARDIAC MARKERS ( 28 Sep 2020 11:44 )  0.042 ng/mL / x     / x     / x     / x        < from: Xray Shoulder 2 Views, Right (09.28.20 @ 15:41) >  IMPRESSION: Degenerative findings again noted.    < end of copied text >    < from: CT Chest No Cont (09.28.20 @ 12:50) >  IMPRESSION:  No traumatic injury.    Left common and external iliac lymphadenopathy raising suspicion for metastatic disease.  Unchanged left bladder wall thickening.    < end of copied text >    < from: CT Cervical Spine No Cont (09.28.20 @ 12:50) >  IMPRESSION:  CT HEAD: There is a moderate left parietal scalp hematomaand soft tissue swelling. There is no underlying depressed calvarial fracture or acute intracranial hemorrhage.    CT CERVICAL SPINE: No fracture or acute traumatic malalignment. There are extensive degenerative changes throughout the cervical spine.    < end of copied text >

## 2020-09-28 NOTE — ED ADULT NURSE NOTE - OBJECTIVE STATEMENT
Pt brought in by ambulance from home complaining of fall. Pt lives alone. Pt states that she was in the bathroom when she slipped and fell. Pt denies LOC. Pt complains of left hip and low back pain. Pt was recently in  for stents and this is her second fall since having the stents placed.

## 2020-09-28 NOTE — ED PROVIDER NOTE - PMH
Bladder cancer    CAD (coronary artery disease)    Endometrial adenocarcinoma    GERD (gastroesophageal reflux disease)    [place] (hard of hearing)    HTN (hypertension)    Hyperlipidemia    Hypothyroid    Macular degeneration    Orthostatic hypotension    Stented coronary artery

## 2020-09-28 NOTE — H&P ADULT - ASSESSMENT
89F.  does not take AP/AC.  admitted 09/28/20.  presented to the ED after an unwitnessed fall at home.      unwitnessed fall.  r/o syncope.  -telemetry.   89F.  does not take AP/AC.  admitted 09/28/20.  presented to the ED after an unwitnessed fall at home.      unwitnessed fall.  r/o syncope.  -telemetry.  -TnI negative.  -serial TnI and EKGs.  -R shoulder xray, no fracture.  degenerative changes.  -HCT, moderate L parietal scalp hematoma and soft tissue swelling.  no ICH.  -C-spine, no fracture.  -mobilize/PT.  -Cardiology consult.    UTI.  -UCx.  -ceftriaxone.    L iliac lymphadenopathy.  hx bladder CA + endometrial CA-hysterometry and RT.  -CT L common and external iliac lymphadenopathy suspicious for metastatic disease.  -d/w patient's son.  he is aware and patient does not desire to pursue further workup at this time.    advanced directive.  -DNR/DNI.    communication.  -RN.  -patient's son Adama.  update given.

## 2020-09-28 NOTE — ED ADULT NURSE NOTE - CHPI ED NUR SYMPTOMS NEG
no vomiting/no confusion/no deformity/no fever/no numbness/no loss of consciousness/no abrasion/no bleeding/no tingling

## 2020-09-28 NOTE — ED PROVIDER NOTE - OBJECTIVE STATEMENT
89 y/o female with a PMHx of arthritis, CAD s/p cardiac stents x5, blaccer CA, endometrial adenocarcinoma s/p EMILY, GERD, HTN, HLD, hypothyroid, macular degeneration, orthostatic hypotension, presents to the ED BIBEMS from home s/p fall today. Pt reports she fell in the bathroom and hit her forehead on the tile. No LOC. Pt c/o b/l hip pain and lower back pain. Pt reports generalized weakness prior to fall today as well as fall 2 weeks ago. Pt was seen at [facility] on 09/10/2020 s/p fall and discharged home the same day. States her aide was supposed to come over her home today but did not, her son was at her house and found pt s/p fall. Not on blood thinners. Lives at home alone, has a part time home aide. No other complaints at this time. PCP: Dr. Mayra Sánchez.

## 2020-09-28 NOTE — ED ADULT TRIAGE NOTE - CHIEF COMPLAINT QUOTE
Patient BIBA from home complaining of fall. -LOC, denies anticoagulation. Patient slipped and fell in bathroom, hit forehead, Patient complaining of L hip and lower back pain. Found by son. Patient BIBA from home complaining of fall. -LOC, denies anticoagulation. Patient slipped and fell in bathroom, hit forehead, Patient complaining of L hip and lower back pain. Found by son. Patient assessed by MD Banks, no trauma alert activated at this time

## 2020-09-28 NOTE — ED PROVIDER NOTE - ENMT, MLM
+Contusion on left upper forehead. Airway patent, Nasal mucosa clear. Mouth with normal mucosa. Throat has no vesicles, no oropharyngeal exudates and uvula is midline.

## 2020-09-28 NOTE — ED ADULT NURSE NOTE - CHIEF COMPLAINT QUOTE
Patient BIBA from home complaining of fall. -LOC, denies anticoagulation. Patient slipped and fell in bathroom, hit forehead, Patient complaining of L hip and lower back pain. Found by son. Patient assessed by MD Banks, no trauma alert activated at this time

## 2020-09-29 LAB
ANION GAP SERPL CALC-SCNC: 9 MMOL/L — SIGNIFICANT CHANGE UP (ref 5–17)
BASOPHILS # BLD AUTO: 0.06 K/UL — SIGNIFICANT CHANGE UP (ref 0–0.2)
BASOPHILS NFR BLD AUTO: 0.8 % — SIGNIFICANT CHANGE UP (ref 0–2)
BUN SERPL-MCNC: 20 MG/DL — SIGNIFICANT CHANGE UP (ref 7–23)
CALCIUM SERPL-MCNC: 9.5 MG/DL — SIGNIFICANT CHANGE UP (ref 8.5–10.1)
CHLORIDE SERPL-SCNC: 109 MMOL/L — HIGH (ref 96–108)
CO2 SERPL-SCNC: 22 MMOL/L — SIGNIFICANT CHANGE UP (ref 22–31)
CREAT SERPL-MCNC: 0.89 MG/DL — SIGNIFICANT CHANGE UP (ref 0.5–1.3)
CULTURE RESULTS: SIGNIFICANT CHANGE UP
EOSINOPHIL # BLD AUTO: 0.1 K/UL — SIGNIFICANT CHANGE UP (ref 0–0.5)
EOSINOPHIL NFR BLD AUTO: 1.3 % — SIGNIFICANT CHANGE UP (ref 0–6)
GLUCOSE SERPL-MCNC: 90 MG/DL — SIGNIFICANT CHANGE UP (ref 70–99)
HCT VFR BLD CALC: 37.8 % — SIGNIFICANT CHANGE UP (ref 34.5–45)
HGB BLD-MCNC: 11.4 G/DL — LOW (ref 11.5–15.5)
IMM GRANULOCYTES NFR BLD AUTO: 0.4 % — SIGNIFICANT CHANGE UP (ref 0–1.5)
LYMPHOCYTES # BLD AUTO: 1.88 K/UL — SIGNIFICANT CHANGE UP (ref 1–3.3)
LYMPHOCYTES # BLD AUTO: 24.7 % — SIGNIFICANT CHANGE UP (ref 13–44)
MCHC RBC-ENTMCNC: 25.7 PG — LOW (ref 27–34)
MCHC RBC-ENTMCNC: 30.2 GM/DL — LOW (ref 32–36)
MCV RBC AUTO: 85.3 FL — SIGNIFICANT CHANGE UP (ref 80–100)
MONOCYTES # BLD AUTO: 0.64 K/UL — SIGNIFICANT CHANGE UP (ref 0–0.9)
MONOCYTES NFR BLD AUTO: 8.4 % — SIGNIFICANT CHANGE UP (ref 2–14)
NEUTROPHILS # BLD AUTO: 4.9 K/UL — SIGNIFICANT CHANGE UP (ref 1.8–7.4)
NEUTROPHILS NFR BLD AUTO: 64.4 % — SIGNIFICANT CHANGE UP (ref 43–77)
PLATELET # BLD AUTO: 292 K/UL — SIGNIFICANT CHANGE UP (ref 150–400)
POTASSIUM SERPL-MCNC: 3.7 MMOL/L — SIGNIFICANT CHANGE UP (ref 3.5–5.3)
POTASSIUM SERPL-SCNC: 3.7 MMOL/L — SIGNIFICANT CHANGE UP (ref 3.5–5.3)
RBC # BLD: 4.43 M/UL — SIGNIFICANT CHANGE UP (ref 3.8–5.2)
RBC # FLD: 15.8 % — HIGH (ref 10.3–14.5)
SARS-COV-2 IGG SERPL QL IA: NEGATIVE — SIGNIFICANT CHANGE UP
SARS-COV-2 IGM SERPL IA-ACNC: 0.08 INDEX — SIGNIFICANT CHANGE UP
SODIUM SERPL-SCNC: 140 MMOL/L — SIGNIFICANT CHANGE UP (ref 135–145)
SPECIMEN SOURCE: SIGNIFICANT CHANGE UP
WBC # BLD: 7.61 K/UL — SIGNIFICANT CHANGE UP (ref 3.8–10.5)
WBC # FLD AUTO: 7.61 K/UL — SIGNIFICANT CHANGE UP (ref 3.8–10.5)

## 2020-09-29 PROCEDURE — 73130 X-RAY EXAM OF HAND: CPT | Mod: 26,RT

## 2020-09-29 PROCEDURE — 99232 SBSQ HOSP IP/OBS MODERATE 35: CPT

## 2020-09-29 PROCEDURE — 73140 X-RAY EXAM OF FINGER(S): CPT | Mod: 26,59,RT

## 2020-09-29 PROCEDURE — 99233 SBSQ HOSP IP/OBS HIGH 50: CPT

## 2020-09-29 RX ADMIN — Medication 650 MILLIGRAM(S): at 03:30

## 2020-09-29 RX ADMIN — Medication 650 MILLIGRAM(S): at 09:55

## 2020-09-29 RX ADMIN — HEPARIN SODIUM 5000 UNIT(S): 5000 INJECTION INTRAVENOUS; SUBCUTANEOUS at 21:44

## 2020-09-29 RX ADMIN — Medication 75 MICROGRAM(S): at 06:18

## 2020-09-29 RX ADMIN — ISOSORBIDE MONONITRATE 30 MILLIGRAM(S): 60 TABLET, EXTENDED RELEASE ORAL at 09:55

## 2020-09-29 RX ADMIN — Medication 2000 UNIT(S): at 09:55

## 2020-09-29 RX ADMIN — HEPARIN SODIUM 5000 UNIT(S): 5000 INJECTION INTRAVENOUS; SUBCUTANEOUS at 09:55

## 2020-09-29 RX ADMIN — ISOSORBIDE MONONITRATE 30 MILLIGRAM(S): 60 TABLET, EXTENDED RELEASE ORAL at 21:44

## 2020-09-29 RX ADMIN — Medication 650 MILLIGRAM(S): at 15:52

## 2020-09-29 RX ADMIN — GABAPENTIN 100 MILLIGRAM(S): 400 CAPSULE ORAL at 21:43

## 2020-09-29 RX ADMIN — Medication 1 DROP(S): at 09:55

## 2020-09-29 RX ADMIN — AMLODIPINE BESYLATE 10 MILLIGRAM(S): 2.5 TABLET ORAL at 09:55

## 2020-09-29 RX ADMIN — Medication 1 DROP(S): at 21:44

## 2020-09-29 RX ADMIN — Medication 650 MILLIGRAM(S): at 02:56

## 2020-09-29 RX ADMIN — PANTOPRAZOLE SODIUM 40 MILLIGRAM(S): 20 TABLET, DELAYED RELEASE ORAL at 09:55

## 2020-09-29 RX ADMIN — Medication 0.12 MILLIGRAM(S): at 21:43

## 2020-09-29 NOTE — PHYSICAL THERAPY INITIAL EVALUATION ADULT - PATIENT/FAMILY/SIGNIFICANT OTHER GOALS STATEMENT, PT EVAL
pt very frustrated by recurrent episodes of generalized weakness with near syncope/collapse ,feels no one has gotten to the bottom of it and it will continue to happen

## 2020-09-29 NOTE — PHYSICAL THERAPY INITIAL EVALUATION ADULT - PERTINENT HX OF CURRENT PROBLEM, REHAB EVAL
pt with h/o polyarthritis,DDD spine ,chronic R frozen shoulder,B knee pains,R ankle pain ,reports after she is up awhile will get very weak and just collapse,go down without warning pt with h/o polyarthritis, DDD spine ,chronic R "frozen shoulder" ,B knee pains, R ankle pain with h/o compound fx >30 yrs ago required 3 surgeries and extensive PT >1year  ,reports after she is up awhile will get very weak and just collapse,go down without warning

## 2020-09-29 NOTE — PHYSICAL THERAPY INITIAL EVALUATION ADULT - PASSIVE RANGE OF MOTION EXAMINATION, REHAB EVAL
PROM R shoulder FLEX/ ,ER 60; R elbow FLEX full; EXT -5 degrees ; R ankle WFL/Right LE Passive ROM was WFL (within functional limits)/deficits as listed below

## 2020-09-29 NOTE — PROGRESS NOTE ADULT - SUBJECTIVE AND OBJECTIVE BOX
CC:  Patient is a 89y old  Female who presents with a chief complaint of unwitnessed fall.    HPI:  89F.  does not take AP/AC.  admitted 20.  presented to the ED after an unwitnessed fall at home.  fell in the bathroom and hit her forehead on the tile.  denied LOC.  a/w b/l hip and low back pain.  patient was found by her son.  lives independently in her own home w/ a part-time aid.  her aid was unable to attend to her today.  recent hx of fall on 09/10/20 were she was DC'd from ED the same day.    in ED, syncope was questioned as patient was a fair historian (not able to recall details) and unwitnessed.  EKG NSR 91.  NM 206ms.  no acute findings.    ROS:    (+) generalized weakness prior to fall.    Physical Exam:   GENERAL APPEARANCE:  NAD, hemodynamically stable  T(C): 36.7 (20 @ 08:03), Max: 36.7 (20 @ 16:45)  HR: 76 (20 @ 08:03) (76 - 91)  BP: 156/55 (20 @ 08:03) (129/55 - 156/55)  RR: 18 (20 @ 08:03) (18 - 19)  SpO2: 98% (20 @ 08:03) (96% - 100%)  Wt(kg): --  HEENT:  Head is normocephalic    Skin:  Warm and dry without any rash   NECK:  Supple without lymphadenopathy.   HEART:  Regular rate and rhythm. normal S1 and S2, No M/R/G  LUNGS:  Good ins/exp effort, no W/R/R/C  ABDOMEN:  Soft, nontender, nondistended with good bowel sounds heard  EXTREMITIES:  Without cyanosis, clubbing or edema.   NEUROLOGICAL:  Gross nonfocal       CBC Full  -  ( 29 Sep 2020 08:11 )  WBC Count : 7.61 K/uL  RBC Count : 4.43 M/uL  Hemoglobin : 11.4 g/dL  Hematocrit : 37.8 %  Platelet Count - Automated : 292 K/uL  Mean Cell Volume : 85.3 fl  Mean Cell Hemoglobin : 25.7 pg  Mean Cell Hemoglobin Concentration : 30.2 gm/dL  Auto Neutrophil # : 4.90 K/uL  Auto Lymphocyte # : 1.88 K/uL  Auto Monocyte # : 0.64 K/uL  Auto Eosinophil # : 0.10 K/uL  Auto Basophil # : 0.06 K/uL  Auto Neutrophil % : 64.4 %  Auto Lymphocyte % : 24.7 %  Auto Monocyte % : 8.4 %  Auto Eosinophil % : 1.3 %  Auto Basophil % : 0.8 %    PT/INR - ( 28 Sep 2020 11:44 )   PT: 11.5 sec;   INR: 0.98 ratio         PTT - ( 28 Sep 2020 11:44 )  PTT:35.3 sec  Urinalysis Basic - ( 28 Sep 2020 11:49 )    Color: Yellow / Appearance: Clear / S.010 / pH: x  Gluc: x / Ketone: Negative  / Bili: Negative / Urobili: Negative mg/dL   Blood: x / Protein: 30 mg/dL / Nitrite: Negative   Leuk Esterase: Trace / RBC: 0-2 /HPF / WBC 6-10   Sq Epi: x / Non Sq Epi: Moderate / Bacteria: Occasional          140  |  109<H>  |  20  ----------------------------<  90  3.7   |  22  |  0.89    Ca    9.5      29 Sep 2020 08:11    TPro  8.6<H>  /  Alb  4.1  /  TBili  0.7  /  DBili  x   /  AST  18  /  ALT  22  /  AlkPhos  93        89F.  does not take AP/AC.  admitted 20.  presented to the ED after an unwitnessed fall at home.      # unwitnessed fall.  r/o syncope.  -TnI negative.  -serial TnI and EKGs.  -R shoulder xray, no fracture.  degenerative changes.  -HCT, moderate L parietal scalp hematoma and soft tissue swelling.  no ICH.  -C-spine, no fracture.  -mobilize/PT    # UTI.  - UCx.  - doubt patient has a UTI as patient does not have clinical signs and symptoms of UTI    # (L) iliac lymphadenopathy.  hx bladder CA + endometrial CA-hysterometry and RT.  - CT L common and external iliac lymphadenopathy suspicious for metastatic disease  - Patient is not on chemo -  as patient did not tolerate chemo therapy in the past  - d/w patient's son.  he is aware and patient does not desire to pursue further workup at this time.    # advanced directive.  - patient is a good candidate for palliative care evaluation as patient has a active cancer  - DNR /  DNI

## 2020-09-29 NOTE — PHYSICAL THERAPY INITIAL EVALUATION ADULT - LEVEL OF INDEPENDENCE: STAND/SIT, REHAB EVAL
pt felt generalized weakness,appeared pale,less responsive momentarily and was setaed in chair  LB=938/52/moderate assist (50% patients effort)/minimum assist (75% patients effort)

## 2020-09-29 NOTE — PHYSICAL THERAPY INITIAL EVALUATION ADULT - GENERAL OBSERVATIONS, REHAB EVAL
Pt supine in bed ,able to answer telephone R hand ,awake,alert,Ox3,reports she took a fall yesterday after HHA did not show up,+arthritic changes noted B hands with ulnar deviation of multiple IP joints ,short splint in place to distal R 5th digit does NOT support fx prox phalanx at all

## 2020-09-29 NOTE — PHYSICAL THERAPY INITIAL EVALUATION ADULT - CRITERIA FOR SKILLED THERAPEUTIC INTERVENTIONS
risk reduction/prevention/therapy frequency/predicted duration of therapy intervention/impairments found/functional limitations in following categories/anticipated discharge recommendation/rehab potential/anticipated equipment needs at discharge

## 2020-09-29 NOTE — PHYSICAL THERAPY INITIAL EVALUATION ADULT - PLANNED THERAPY INTERVENTIONS, PT EVAL
ADLs ,protection  R 5th digit/gait training/strengthening/transfer training/ROM/bed mobility training

## 2020-09-29 NOTE — PHYSICAL THERAPY INITIAL EVALUATION ADULT - DIAGNOSIS, PT EVAL
recurrent ?near syncope/collapse ,generalized weakness ,r/o UTI,+bladder CA, polyarthritis,DDD L/S ,h/o R lumbar radiculopathy in past ,h/o orthostatic hypotension recurrent ?near syncope/collapse ,generalized weakness ,r/o UTI, +bladder CA, polyarthritis, DDD L/S ,h/o R lumbar radiculopathy in past ,h/o orthostatic hypotension,

## 2020-09-29 NOTE — PHYSICAL THERAPY INITIAL EVALUATION ADULT - ADDITIONAL COMMENTS
pt reports she requires help of HHA but that she is frustrated growing old and needing their help ,pt c/o difficulty using hands due to arthritic changes and now fx R 5th digit (sustained in similar fall 9/10/20) pt reports she requires help of HHA but that she is frustrated growing old and needing their help ,pt c/o difficulty using hands due to arthritic changes and now fx R 5th digit (sustained in similar fall 9/10/20)evaluated in ED and discharged home,son bought 2 different finger splints to protect digit ,currently wearing smaller one (does not support fx site at base of 5th prox.phalanx)

## 2020-09-29 NOTE — PHYSICAL THERAPY INITIAL EVALUATION ADULT - IMPAIRMENTS FOUND, PT EVAL
ROM/gait, locomotion, and balance/aerobic capacity/endurance/fine motor/joint integrity and mobility/decreased standing /amb tolerance, R frozen shoulder ,arthritis B hands,knees ,fx R 5th digit dominant hand

## 2020-09-29 NOTE — PHYSICAL THERAPY INITIAL EVALUATION ADULT - RANGE OF MOTION EXAMINATION, REHAB EVAL
R 5th digit not tested/splinted ,min. decreased composite FLEX fingers both hands,R shoulder with marked stiffness mod-severe limitation of elevation to ~35 degrees ER <20 ; R elbow mildly limited FLEX/EXT endranges ,B knees/ankles  WFL  (L >R )

## 2020-09-29 NOTE — PHYSICAL THERAPY INITIAL EVALUATION ADULT - LEVEL OF INDEPENDENCE: SIT/SUPINE, REHAB EVAL
out of bed to upright recliner chair after encounter and needed for repeat XRAY R hand ,recent injury R 5th digit

## 2020-09-29 NOTE — CONSULT NOTE ADULT - ASSESSMENT
89F.  does not take AP/AC.  admitted 09/28/20.  presented to the ED after an unwitnessed fall at home.  fell in the bathroom and hit her forehead on the tile.  denied LOC.  a/w b/l hip and low back pain.  patient was found by her son.  lives independently in her own home w/ a part-time aid.  her aid was unable to attend to her today.  recent hx of fall on 09/10/20 were she was DC'd from ED the same day.    9/29  would continue to monitor on telemetry for another day, although based on past evaluations, unlikely that her falls and weakness are secondary to a cardiac rhythm problem.   consider a CT scan of the back to rule our lumbar disc disease.   physical therapy evaluation.   because of her history of recurrent urine infections and bladder cancer, would also repeat a clean catch urine culture.   orthopedic evaluation for hand pain and swelling.

## 2020-09-29 NOTE — PHYSICAL THERAPY INITIAL EVALUATION ADULT - PRECAUTIONS/LIMITATIONS, REHAB EVAL
splint in place to R 5th digit (son bought at store) ,had similar fall 9/10/20 evaluated in ED ad discharged home ,+ND fx R proximal phalanx 5th digit dominant hand at that time/fall precautions

## 2020-09-29 NOTE — CONSULT NOTE ADULT - SUBJECTIVE AND OBJECTIVE BOX
Patient is a 89y old  Female who presents with a chief complaint of unwitnessed fall.    HPI:  89F.  does not take AP/AC.  admitted 09/28/20.  presented to the ED after an unwitnessed fall at home.  fell in the bathroom and hit her forehead on the tile.  denied LOC.  a/w b/l hip and low back pain.  patient was found by her son.  lives independently in her own home w/ a part-time aid.  her aid was unable to attend to her today.  recent hx of fall on 09/10/20 were she was DC'd from ED the same day.  in ED, syncope was questioned as patient was a fair historian (not able to recall details) and unwitnessed.  EKG NSR 91.  AZ 206ms.  no acute findings.    9/29  she describes her weakness as her legs get weak and give out. she walks a short distance and has to sit down. some shortness of breath. no fogginess in the head or dizziness. no vertigo. No rhythm changes on EKG.  since fall, has had hand pain.           PAST MEDICAL & SURGICAL HISTORY:  Orthostatic hypotension  Bladder cancer  Endometrial adenocarcinoma  Macular degeneration  Stented coronary artery  Hypothyroid  Hyperlipidemia  HTN (hypertension)  GERD (gastroesophageal reflux disease)  CAD (coronary artery disease)  [place] (hard of hearing)  History of bladder surgery  S/P EMILY (total abdominal hysterectomy)  S/P hernia repair  umbilical - 2008  S/P laparoscopic cholecystectomy  2008  Ankle fracture, right  surgery 25 yrs ago - hardware removed  S/P coronary angiogram  2005, 2008, 2011 - drug eluding stents    Allergies    amlodipine (Unknown)  clonidine (Unknown)  Florinef Acetate (Unknown)  hydrocodone (Unknown)  Levatol (Unknown)  Lipitor (Unknown)  Lotrel (Unknown)  methylPREDNISolone (Unknown)  morphine (Other)  Plaquenil (Unknown)  statins (Other (Unknown))  sulfa drugs (Rash)    Intolerances        Home Medications:  acetaminophen 325 mg oral tablet: 2 tab(s) orally every 6 hours, As needed, Mild Pain (1 - 3) (21 Apr 2020 16:40)  bisacodyl 10 mg rectal suppository: 1 suppository(ies) rectal once a day, As needed, Constipation (21 Apr 2020 16:40)  guaiFENesin 100 mg/5 mL oral liquid: 5 milliliter(s) orally every 6 hours, As needed, Cough (21 Apr 2020 16:40)  isosorbide mononitrate 30 mg oral tablet, extended release: 1 tab(s) orally in AM (21 Apr 2020 16:40)  isosorbide mononitrate 30 mg oral tablet, extended release: 1 tab(s) orally at 18:00 (21 Apr 2020 16:40)  magnesium hydroxide 8% oral suspension: 30 milliliter(s) orally once a day, As needed, Constipation (19 Dec 2019 12:05)  melatonin 3 mg oral tablet: 1 tab(s) orally once a day (at bedtime) (19 Dec 2019 12:05)  metoprolol succinate 25 mg oral tablet, extended release: 1 tab(s) orally once a day at 18:00 (20 Dec 2019 16:33)  nystatin 100,000 units/g topical powder: 1 application topically every 12 hours (19 Dec 2019 12:05)  pantoprazole 40 mg oral delayed release tablet: 1 tab(s) orally once a day (06 Dec 2019 17:44)  polyethylene glycol 3350 oral powder for reconstitution: 17 gram(s) orally once a day, As needed, Constipation (16 Dec 2019 19:14)  Refresh ophthalmic solution: 1 drop(s) to each affected eye 3 times a day (06 Dec 2019 17:44)  senna oral tablet: 2 tab(s) orally once a day (at bedtime) (16 Dec 2019 19:14)  Synthroid 75 mcg (0.075 mg) oral tablet: 1 tab(s) orally once a day (06 Dec 2019 17:44)  traMADol:  (21 Apr 2020 16:40)  Vitamin D3 1000 intl units oral tablet: 2 tab(s) orally once a day (08 Dec 2019 14:26)  Xanax 0.25 mg oral tablet: 1 tab(s) orally 3 times a day, As Needed for anxiety (06 Dec 2019 17:44)      Social History:  lives independently.  has a part-time HHA.      FAMILY HISTORY:  Family history of brain cancer        Vital Signs Last 24 Hrs  T(C): 36.6 (28 Sep 2020 14:09), Max: 37 (28 Sep 2020 11:16)  T(F): 97.9 (28 Sep 2020 14:09), Max: 98.6 (28 Sep 2020 11:16)  HR: 91 (28 Sep 2020 14:09) (87 - 92)  BP: 158/50 (28 Sep 2020 14:09) (143/63 - 187/75)  BP(mean): 78 (28 Sep 2020 14:09) (71 - 104)  RR: 17 (28 Sep 2020 14:09) (13 - 17)  SpO2: 100% (28 Sep 2020 14:09) (98% - 100%)    Constitutional: NAD.   HEENT: PERRL, EOMI, MMM.  Neck: Soft and supple, No carotid bruit, No JVD  Respiratory: Breath sounds are clear bilaterally, No wheezing, rales or rhonchi  Cardiovascular: S1 and S2, regular rate and rhythm, no murmur, rub or gallop.  Gastrointestinal: Bowel Sounds present, soft, nontender, nondistended, no guarding, no rebound, no mass.  Extremities: No peripheral edema  Vascular: 2+ peripheral pulses  Neurological: A/O x , no focal deficits  Musculoskeletal: 5/5 strength b/l upper and lower extremities  Skin:  no visible rashes.                             13.3   13.59 )-----------( 325      ( 28 Sep 2020 11:44 )             43.6       PT/INR - ( 28 Sep 2020 11:44 )   PT: 11.5 sec;   INR: 0.98 ratio         PTT - ( 28 Sep 2020 11:44 )  PTT:35.3 sec    09-28    138  |  107  |  21  ----------------------------<  103<H>  3.9   |  23  |  1.13    Ca    9.7      28 Sep 2020 11:44    TPro  8.6<H>  /  Alb  4.1  /  TBili  0.7  /  DBili  x   /  AST  18  /  ALT  22  /  AlkPhos  93  09-28      LIVER FUNCTIONS - ( 28 Sep 2020 11:44 )  Alb: 4.1 g/dL / Pro: 8.6 gm/dL / ALK PHOS: 93 U/L / ALT: 22 U/L / AST: 18 U/L / GGT: x             CARDIAC MARKERS ( 28 Sep 2020 11:44 )  0.042 ng/mL / x     / x     / x     / x        < from: Xray Shoulder 2 Views, Right (09.28.20 @ 15:41) >  IMPRESSION: Degenerative findings again noted.    < end of copied text >    < from: CT Chest No Cont (09.28.20 @ 12:50) >  IMPRESSION:  No traumatic injury.    Left common and external iliac lymphadenopathy raising suspicion for metastatic disease.  Unchanged left bladder wall thickening.    < end of copied text >    < from: CT Cervical Spine No Cont (09.28.20 @ 12:50) >  IMPRESSION:  CT HEAD: There is a moderate left parietal scalp hematomaand soft tissue swelling. There is no underlying depressed calvarial fracture or acute intracranial hemorrhage.    CT CERVICAL SPINE: No fracture or acute traumatic malalignment. There are extensive degenerative changes throughout the cervical spine.    < end of copied text >   (28 Sep 2020 16:10)    LABS: All Labs Reviewed:                        13.3   13.59 )-----------( 325      ( 28 Sep 2020 11:44 )             43.6     09-28    138  |  107  |  21  ----------------------------<  103<H>  3.9   |  23  |  1.13    Ca    9.7      28 Sep 2020 11:44    TPro  8.6<H>  /  Alb  4.1  /  TBili  0.7  /  DBili  x   /  AST  18  /  ALT  22  /  AlkPhos  93  09-28    PT/INR - ( 28 Sep 2020 11:44 )   PT: 11.5 sec;   INR: 0.98 ratio         PTT - ( 28 Sep 2020 11:44 )  PTT:35.3 sec  CARDIAC MARKERS ( 28 Sep 2020 11:44 )  0.042 ng/mL / x     / x     / x     / x          Blood Culture:       CARDIOLOGY TESTING:   < from: Transthoracic Echocardiogram (04.17.19 @ 10:19) >   EXAM:  ECHO TTE WO CON COMP W DOP         PROCEDURE DATE:  04/17/2019        INTERPRETATION:  Transthoracic Echocardiography Report (TTE)     Demographics     Patient name          DAVIDA MIRZA   Age           89 year(s)     Med Rec #   [id number]          Gender        Female     Account #             [id number]            Date of Birth 11/24/1926     Interpreting          Sj Mckeon MD  Room Number   0349   Physician     Referring Physician   Mayra Sánchez MD Sonographer   Cole Smith KKIE     Date of study         04/17/2019 10:01                         AM     Height                65.75 in           Weight        154.32 pounds    Type of Study:     TTE procedure: ECHO TTE WO CON COMP W DOP     HR: 89 bpmBP: 117/54 mmHg     Study Location: 3ETechnical Quality: Good    Indications   1) R55 - Syncope and collapse    M-Mode Measurements (cm)     LVEDd: 3.73 cm            LVESd: 2.23 cm   IVSEd: 1.24 cm   LVPWd: 1.03 cm            AO Root Dimension: 2.9 cm                  ACS: 1.5 cm                             LA: 3.9 cm                             LVOT: 2.1 cm    Doppler Measurements:     AV Velocity:121 cm/s                MV Peak E-Wave: 129 cm/s   AV Peak Gradient: 5.86 mmHg                 MV Peak Gradient: 6.66 mmHg     Findings     Mitral Valve   Fibrocalcific changes noted to the mitral valve leaflets with preserved   leaflet excursion. Mitral annular calcification.   Trace mitral regurgitation.     Aortic Valve   Theaortic valve is well visualized, appears mildly sclerotic. Valve   opening seems to be normal.     Tricuspid Valve   The tricuspid valve leaflets are well seen and appear thin and pliable   with preserved leaflets excursion.     Pulmonic Valve   Pulmonic valve not well seen.     Left Atrium   The left atrium is normal.     Left Ventricle   Left ventricular wall motion is normal. Estimated left ventricular   ejection fraction is 70 %.     Right Atrium   Normal appearing right atrium.     Right Ventricle   Normal appearing right ventricle structure and function.     Pericardial Effusion   No evidence of pericardial effusion.     Pleural Effusion   No evidence of pleural effusion.     Miscellaneous   All visualized extra cardiac structures appears to be normal.     Impression     Summary     Left ventricular wall motion is normal. Estimated left ventricular   ejection fraction is 70 %.   Normal appearing right ventricle structure and function.   Fibrocalcific changes noted to the mitral valve leaflets with preserved   leaflet excursion. Mitral annular calcification. Trace mitral   regurgitation.   The aortic valve is well visualized, appears mildly sclerotic.    < end of copied text >

## 2020-09-29 NOTE — CONSULT NOTE ADULT - SUBJECTIVE AND OBJECTIVE BOX
Orthopedics Consult Note:    This is a 89y Female admitted to medical service for unwitnessed fall yesterday. Orthopedics consult placed to evaluate right 5th proximal phalanx fracture. Pt reports frequent falls recently with a fall about 3 weeks ago where she fractured her right pinky finger. Pt reports her son bought a splint from the drug store that she has been wearing. Pt denies any  new injury. Pt admits to chronic right shoulder pain, chronic right ankle pain, and chronic bilateral knee pains; none of which are significantly increased from baseline. Pt reports she did hit her head but denies LOC. Pt denies any numbness, tingling or parethesias. Pt is RHD.    Past Medical & Surgical History:  Syncope and collapse  Yes  Family history of brain cancer  No pertinent family history in first degree relatives  Handoff  MEWS Score  Orthostatic hypotension  Orthostatic hypertension  Bladder cancer  Endometrial adenocarcinoma  Uterine cancer  Macular degeneration  Stented coronary artery  Hypothyroid  Hyperlipidemia  HTN (hypertension)  GERD (gastroesophageal reflux disease)  CAD (coronary artery disease)  [place] (hard of hearing)  Orthostatic hypotension  Orthostatic hypertension  Syncope and collapse  History of bladder surgery  S/P EMILY (total abdominal hysterectomy)  S/P hernia repair  S/P laparoscopic cholecystectomy  Ankle fracture, right  S/P coronary angiogram  fall  17  [email or web address]      Allergies:  amlodipine (Unknown)  clonidine (Unknown)  Florinef Acetate (Unknown)  hydrocodone (Unknown)  Levatol (Unknown)  Lipitor (Unknown)  Lotrel (Unknown)  methylPREDNISolone (Unknown)  morphine (Other)  Plaquenil (Unknown)  statins (Other (Unknown))  sulfa drugs (Rash)      Vital Signs:  T(C): 36.7 (09-29-20 @ 08:03), Max: 36.7 (09-29-20 @ 08:03)  HR: 97 (09-29-20 @ 13:30) (76 - 97)  BP: 113/52 (09-29-20 @ 13:30) (113/52 - 156/55)  RR: 16 (09-29-20 @ 13:30) (16 - 19)  SpO2: 98% (09-29-20 @ 13:30) (96% - 99%)    Labs:                        11.4   7.61  )-----------( 292      ( 29 Sep 2020 08:11 )             37.8   09-29    140  |  109<H>  |  20  ----------------------------<  90  3.7   |  22  |  0.89    Ca    9.5      29 Sep 2020 08:11    TPro  8.6<H>  /  Alb  4.1  /  TBili  0.7  /  DBili  x   /  AST  18  /  ALT  22  /  AlkPhos  93  09-28    PT/INR - ( 28 Sep 2020 11:44 )   PT: 11.5 sec;   INR: 0.98 ratio         PTT - ( 28 Sep 2020 11:44 )  PTT:35.3 sec        Imaging:  X-rays of the right hand a 5th proximal phalanx fracture with grossly unchanged alignment compared with previous imaging.    PE right hand:  +no significant swelling, mild-moderate swelling, no ecchymosis, no erythema, superficial abrasions about MCPs, normothermic. +TTP over 5th metacarpal. LROM 2' pain. Moving all fingers, sensation intact. Radial pulse 2+, cap refill <2secs.    Secondary Survey:  +small ecchymosis dorsal wrist ulnarly without any bony tenderness and painless wrist ROM; otherwise Right shoulder, humerus, elbow, forearm and wrist, LUE and B/L LEs with no swelling, no ecchymoses, no abrasions, no lacerations or any other signs of acute injury with painless baseline ROM and no bony TTP. Sensation intact distally, moving all digits. Distal pulses intact.     Spine and ribs with no bony TTP.     Assessment:  89y Female with a right 5th proximal phalanx fracture s/p injury 3 weeks ago.    Procedure:  Right hand placed in ulnar gutter splint. Pt tolerated procedure well with improved pain, able to wiggle fingers/finger tips, sensation intact, cap refill <2secs.    Plan:  Post-splinting x-rays of the right hand ordered; will follow.  NWB RUE with ulnar gutter splint; can bear weight through palm/wrist with walker as needed, otherwise platform walker.  Keep splint clean, dry and intact.  Pain control.  Ice application.  RUE elevation.  Follow-up with Dr. Mayo in the office 7-10 days after discharge 3-5 days; call office for appointment.r.     Case discussed with and plan as per Dr. Mayo. Orthopedics Consult Note:    This is a 89y Female admitted to medical service for unwitnessed fall yesterday. Orthopedics consult placed to evaluate right 5th proximal phalanx fracture. Pt reports frequent falls recently with a fall about 3 weeks ago where she fractured her right pinky finger. Pt reports her son bought a splint from the drug store that she has been wearing. Pt denies any  new injury. Pt admits to chronic right shoulder pain, chronic right ankle pain, and chronic bilateral knee pains; none of which are significantly increased from baseline. Pt reports she did hit her head but denies LOC. Pt denies any numbness, tingling or parethesias. Pt is RHD.    Past Medical & Surgical History:  Syncope and collapse  Yes  Family history of brain cancer  No pertinent family history in first degree relatives  Handoff  MEWS Score  Orthostatic hypotension  Orthostatic hypertension  Bladder cancer  Endometrial adenocarcinoma  Uterine cancer  Macular degeneration  Stented coronary artery  Hypothyroid  Hyperlipidemia  HTN (hypertension)  GERD (gastroesophageal reflux disease)  CAD (coronary artery disease)  [place] (hard of hearing)  Orthostatic hypotension  Orthostatic hypertension  Syncope and collapse  History of bladder surgery  S/P EMILY (total abdominal hysterectomy)  S/P hernia repair  S/P laparoscopic cholecystectomy  Ankle fracture, right  S/P coronary angiogram  fall  17  [email or web address]      Allergies:  amlodipine (Unknown)  clonidine (Unknown)  Florinef Acetate (Unknown)  hydrocodone (Unknown)  Levatol (Unknown)  Lipitor (Unknown)  Lotrel (Unknown)  methylPREDNISolone (Unknown)  morphine (Other)  Plaquenil (Unknown)  statins (Other (Unknown))  sulfa drugs (Rash)      Vital Signs:  T(C): 36.7 (09-29-20 @ 08:03), Max: 36.7 (09-29-20 @ 08:03)  HR: 97 (09-29-20 @ 13:30) (76 - 97)  BP: 113/52 (09-29-20 @ 13:30) (113/52 - 156/55)  RR: 16 (09-29-20 @ 13:30) (16 - 19)  SpO2: 98% (09-29-20 @ 13:30) (96% - 99%)    Labs:                        11.4   7.61  )-----------( 292      ( 29 Sep 2020 08:11 )             37.8   09-29    140  |  109<H>  |  20  ----------------------------<  90  3.7   |  22  |  0.89    Ca    9.5      29 Sep 2020 08:11    TPro  8.6<H>  /  Alb  4.1  /  TBili  0.7  /  DBili  x   /  AST  18  /  ALT  22  /  AlkPhos  93  09-28    PT/INR - ( 28 Sep 2020 11:44 )   PT: 11.5 sec;   INR: 0.98 ratio         PTT - ( 28 Sep 2020 11:44 )  PTT:35.3 sec        Imaging:  X-rays of the right hand a 5th proximal phalanx fracture with grossly unchanged alignment compared with previous imaging.    PE right hand:  +no significant swelling, mild-moderate swelling, no ecchymosis, no erythema, superficial abrasions about MCPs, normothermic. +TTP over 5th metacarpal. LROM 2' pain. Moving all fingers, sensation intact. Radial pulse 2+, cap refill <2secs.    Secondary Survey:  +small ecchymosis dorsal wrist ulnarly without any bony tenderness and painless wrist ROM; otherwise Right shoulder, humerus, elbow, forearm and wrist, LUE and B/L LEs with no swelling, no ecchymoses, no abrasions, no lacerations or any other signs of acute injury with painless baseline ROM and no bony TTP. Sensation intact distally, moving all digits. Distal pulses intact.     Spine and ribs with no bony TTP.     Assessment:  89y Female with a right 5th proximal phalanx fracture s/p injury 3 weeks ago.    Procedure:  Right hand placed in ulnar gutter splint. Pt tolerated procedure well with improved pain, able to wiggle fingers/finger tips, sensation intact, cap refill <2secs.    Plan:  Post-splinting x-rays of the right hand ordered; will follow.  NWB RUE with ulnar gutter splint; can bear weight through palm/wrist with walker as needed, otherwise platform walker.  Keep splint clean, dry and intact.  Pain control.  Ice application.  RUE elevation.  Ortho stable for discharge  Follow-up with Dr. Mayo in the office 7-10 days after discharge 3-5 days; call office for appointment.r.     Case discussed with and plan as per Dr. Mayo. Orthopedics Consult Note:    This is a 89y Female admitted to medical service for unwitnessed fall yesterday. Orthopedics consult placed to evaluate right 5th proximal phalanx fracture. Pt reports frequent falls recently with a fall about 3 weeks ago where she fractured her right pinky finger. Pt reports her son bought a splint from the drug store that she has been wearing. Pt denies any  new injury. Pt admits to chronic right shoulder pain, chronic right ankle pain, and chronic bilateral knee pains; none of which are significantly increased from baseline. Pt reports she did hit her head but denies LOC. Pt denies any numbness, tingling or parethesias. Pt is RHD.    Past Medical & Surgical History:  Syncope and collapse  Yes  Family history of brain cancer  No pertinent family history in first degree relatives  Handoff  MEWS Score  Orthostatic hypotension  Orthostatic hypertension  Bladder cancer  Endometrial adenocarcinoma  Uterine cancer  Macular degeneration  Stented coronary artery  Hypothyroid  Hyperlipidemia  HTN (hypertension)  GERD (gastroesophageal reflux disease)  CAD (coronary artery disease)  [place] (hard of hearing)  Orthostatic hypotension  Orthostatic hypertension  Syncope and collapse  History of bladder surgery  S/P EMILY (total abdominal hysterectomy)  S/P hernia repair  S/P laparoscopic cholecystectomy  Ankle fracture, right  S/P coronary angiogram  fall  17  [email or web address]      Allergies:  amlodipine (Unknown)  clonidine (Unknown)  Florinef Acetate (Unknown)  hydrocodone (Unknown)  Levatol (Unknown)  Lipitor (Unknown)  Lotrel (Unknown)  methylPREDNISolone (Unknown)  morphine (Other)  Plaquenil (Unknown)  statins (Other (Unknown))  sulfa drugs (Rash)      Vital Signs:  T(C): 36.7 (09-29-20 @ 08:03), Max: 36.7 (09-29-20 @ 08:03)  HR: 97 (09-29-20 @ 13:30) (76 - 97)  BP: 113/52 (09-29-20 @ 13:30) (113/52 - 156/55)  RR: 16 (09-29-20 @ 13:30) (16 - 19)  SpO2: 98% (09-29-20 @ 13:30) (96% - 99%)    Labs:                        11.4   7.61  )-----------( 292      ( 29 Sep 2020 08:11 )             37.8   09-29    140  |  109<H>  |  20  ----------------------------<  90  3.7   |  22  |  0.89    Ca    9.5      29 Sep 2020 08:11    TPro  8.6<H>  /  Alb  4.1  /  TBili  0.7  /  DBili  x   /  AST  18  /  ALT  22  /  AlkPhos  93  09-28    PT/INR - ( 28 Sep 2020 11:44 )   PT: 11.5 sec;   INR: 0.98 ratio         PTT - ( 28 Sep 2020 11:44 )  PTT:35.3 sec        Imaging:  X-rays of the right hand a 5th proximal phalanx fracture with grossly unchanged alignment compared with previous imaging.    PE right hand:  +no significant swelling, mild-moderate swelling, no ecchymosis, no erythema, superficial abrasions about MCPs, normothermic. +TTP over 5th metacarpal. LROM 2' pain. Moving all fingers, sensation intact. Radial pulse 2+, cap refill <2secs.    Secondary Survey:  +small ecchymosis dorsal wrist ulnarly without any bony tenderness and painless wrist ROM; otherwise Right shoulder, humerus, elbow, forearm and wrist, LUE and B/L LEs with no swelling, no ecchymoses, no abrasions, no lacerations or any other signs of acute injury with painless baseline ROM and no bony TTP. Sensation intact distally, moving all digits. Distal pulses intact.     Spine and ribs with no bony TTP.     Assessment:  89y Female with a right 5th proximal phalanx fracture s/p injury 3 weeks ago.    Procedure:  Right hand placed in ulnar gutter splint. Pt tolerated procedure well with improved pain, able to wiggle fingers/finger tips, sensation intact, cap refill <2secs.    Plan:  Post-splinting x-rays of the right hand ordered; will follow.  NWB RUE with ulnar gutter splint; can bear weight through palm/wrist with walker as needed, otherwise platform walker.  Keep splint clean, dry and intact.  Pain control.  Ice application.  RUE elevation.  Ortho stable for discharge  Follow-up with Dr. Mayo in the office 7-10 days after discharge; call office for appointment.    Case discussed with and plan as per Dr. Mayo.      Addendum 9/30/20 1200:  X-rays of the right hand demonstrate unchanged fracture alignment.  Plan as above.

## 2020-09-29 NOTE — PHYSICAL THERAPY INITIAL EVALUATION ADULT - DISCHARGE DISPOSITION, PT EVAL
TBD ,pt prefers to be in her own home vs POOL ,may need to increase HHA hours for safety/home w/ home PT/rehabilitation facility home w/ home PT/rehabilitation facility/TBD ,pt prefers to be in her own home vs POOL ,may need to increase HHA hours for safety; would advise [facility] for PT/OT

## 2020-09-29 NOTE — PHYSICAL THERAPY INITIAL EVALUATION ADULT - ACTIVE RANGE OF MOTION EXAMINATION, REHAB EVAL
mild decreased global AROM R ankle; R shoulder moderately limited FLEX/ABD ~50 degrees,ER 30 degrees, R elbow FLEX /EXT mildly limited/deficits as listed below

## 2020-09-30 PROCEDURE — 99233 SBSQ HOSP IP/OBS HIGH 50: CPT

## 2020-09-30 PROCEDURE — 99223 1ST HOSP IP/OBS HIGH 75: CPT

## 2020-09-30 RX ADMIN — HEPARIN SODIUM 5000 UNIT(S): 5000 INJECTION INTRAVENOUS; SUBCUTANEOUS at 22:09

## 2020-09-30 RX ADMIN — ISOSORBIDE MONONITRATE 30 MILLIGRAM(S): 60 TABLET, EXTENDED RELEASE ORAL at 09:54

## 2020-09-30 RX ADMIN — Medication 650 MILLIGRAM(S): at 06:08

## 2020-09-30 RX ADMIN — PANTOPRAZOLE SODIUM 40 MILLIGRAM(S): 20 TABLET, DELAYED RELEASE ORAL at 09:54

## 2020-09-30 RX ADMIN — SENNA PLUS 2 TABLET(S): 8.6 TABLET ORAL at 22:08

## 2020-09-30 RX ADMIN — AMLODIPINE BESYLATE 10 MILLIGRAM(S): 2.5 TABLET ORAL at 09:53

## 2020-09-30 RX ADMIN — Medication 650 MILLIGRAM(S): at 15:16

## 2020-09-30 RX ADMIN — Medication 75 MICROGRAM(S): at 06:05

## 2020-09-30 RX ADMIN — Medication 650 MILLIGRAM(S): at 06:34

## 2020-09-30 RX ADMIN — ISOSORBIDE MONONITRATE 30 MILLIGRAM(S): 60 TABLET, EXTENDED RELEASE ORAL at 22:08

## 2020-09-30 RX ADMIN — Medication 650 MILLIGRAM(S): at 23:02

## 2020-09-30 RX ADMIN — Medication 650 MILLIGRAM(S): at 16:15

## 2020-09-30 RX ADMIN — Medication 0.12 MILLIGRAM(S): at 22:19

## 2020-09-30 RX ADMIN — HEPARIN SODIUM 5000 UNIT(S): 5000 INJECTION INTRAVENOUS; SUBCUTANEOUS at 09:54

## 2020-09-30 RX ADMIN — Medication 25 MILLIGRAM(S): at 12:30

## 2020-09-30 RX ADMIN — Medication 1 DROP(S): at 09:53

## 2020-09-30 RX ADMIN — Medication 2000 UNIT(S): at 09:53

## 2020-09-30 RX ADMIN — Medication 1 DROP(S): at 22:08

## 2020-09-30 RX ADMIN — GABAPENTIN 100 MILLIGRAM(S): 400 CAPSULE ORAL at 22:08

## 2020-09-30 NOTE — CONSULT NOTE ADULT - ASSESSMENT
Pt is a 89y old Female with hx of  MsWolf Osuna is a 89y old Female coming from home with hx of hypothyroid, CAD, HTN, bladder ca not getting any further treatment or workup, recurrent UTI Patient came in through the ED because she experienced some weakness and dizziness, and unwitnessed fall at home. Fell in the bathroom and hit her forehead on the tile. Denied LOC, a/w b/l hip and low back pain.  States if  he is up for more that 45 minutes at a time, States it has been getting increasingly difficult to ambulate at to complete things throughout the day. Palliative Care consulted to assist with establishing GOC.     1) unwitnessed fall  - r/o syncope  - fall precautions   - monitor on telemetry   - PT consult appreciated     2) Pain  - chronic 2/2 to arthritis and degenerative disease in spine  - status post fall - R shoulder xray, no fracture.  degenerative changes.  - would c/w Tylenol prn for now - patient stating she does not want anything stronger     2) Weakness/Debility  - likely 2/2 to UTI and effect of malignancy  - PT consult appreciated  - pt to go to [facility] to work of getting back to baseline  - family and patient has noticed decline in patietns fuction status more recently     4) Bladder Ca  - nor further treatment or workup as per patient and family - patient did not tolerate chemo well in the past   - as per urology notes Dr. Solorzano) from last admission pt with high risk bladder ca, failed 1st BCG induction  - pt noted as poor surgical candidate and was not interested in cystectomy  - left  iliac lymphadenopathy - CT L common and external iliac lymphadenopathy suspicious for metastatic disease    5) GOC/Advanced Directives  - pt has capacity for decision making and would like to be a part of this  - HCP in [employer] naming son Adama Osuna [phone]  - [facility] on chart: DNR and DNI  - GOC meeting held today please refer to SW note     Thank you for including us in Ms. sOuna's care. Will continue to follow with you.

## 2020-09-30 NOTE — CDI QUERY NOTE - NSCDIOTHERTXTBX_GEN_ALL_CORE_HH
Patient admitted with unwitnessed fall at home  second unwitnessed fall within 2 weeks.    Has a scalp hematoma  Has Right 5th proximal phalanx fracture from previous fall    Patient's chief complaint of weakness   Patient is 89 years old    Are the above clinical indicators and frequent falls  indicative of a further diagnosis  a) Falls due to age related physical debility  b) Falls due to senile/ physical debility  c) Other, please clarify

## 2020-09-30 NOTE — CHART NOTE - NSCHARTNOTEFT_GEN_A_CORE
This SW spoke with pts son Adama via phone. Pt. is alert and oriented and gave team permission to speak with him. Pt. had been living at home with Medicaid aides for most of the day/night with only a couple hours of a gap. Pts son reports that they plan for pt. to go to [facility] upon d/c to see if she can regain strength and that she has done well with rehab in the past. Pts son reports pt. has a history of bladder cancer and has had some treatment in the past but the last option provided was a biopsy and she refused as she did not want to go through further aggressive interventions. We discussed home hospice for the future if pt. were to return home from rehab and wanted to focus on her comfort. Pts son expressed understanding of this however, for now the plan is for pt. to go to [facility] with the hopes of returning home after. This SW spoke with pts son Adama via phone. Pt. is alert and oriented and gave team permission to speak with him. Pt. had been living at home with Medicaid aides for most of the day/night with only a couple hours of a gap. Pts son reports that they plan for pt. to go to [facility] upon d/c to see if she can regain strength and that she has done well with rehab in the past. Pts son reports pt. has a history of bladder cancer and has had some treatment in the past but is not receiving further treatment. We discussed home hospice for the future if pt. were to return home from rehab and wanted to focus on her comfort. Pts son expressed understanding of this however, for now the plan is for pt. to go to [facility] with the hopes of returning home after. This SW then met with pt. and son at bedside. Please see goals of care note.

## 2020-09-30 NOTE — CHART NOTE - NSCHARTNOTEFT_GEN_A_CORE
This SW left message for pts son Adama [phone] to schedule a family meeting. Awaiting a call back. Our team will continue to follow.

## 2020-09-30 NOTE — PROGRESS NOTE ADULT - ASSESSMENT
89F.  does not take AP/AC.  admitted 09/28/20.  presented to the ED after an unwitnessed fall at home.  fell in the bathroom and hit her forehead on the tile.  denied LOC.  a/w b/l hip and low back pain.  patient was found by her son.  lives independently in her own home w/ a part-time aid.  her aid was unable to attend to her today.  recent hx of fall on 09/10/20 were she was DC'd from ED the same day.    9/29  would continue to monitor on telemetry for another day, although based on past evaluations, unlikely that her falls and weakness are secondary to a cardiac rhythm problem.   consider a CT scan of the back to rule our lumbar disc disease.   physical therapy evaluation.   because of her history of recurrent urine infections and bladder cancer, would also repeat a clean catch urine culture.   orthopedic evaluation for hand pain and swelling.     9/30  consider pain management.   physical therapy.  may discontinue telemetry. No evidence of a cardiac source to her weakness.

## 2020-09-30 NOTE — CONSULT NOTE ADULT - SUBJECTIVE AND OBJECTIVE BOX
HPI: Pt is a 89y old Female with hx of  MsWolf Osuna is a 89y old Female coming from home with hx of hypothyroid, CAD, HTN, bladder ca not getting any further treatment or workup, recurrent UTI Patient came in through the ED because she experienced some weakness and dizziness, and unwitnessed fall at home. Fell in the bathroom and hit her forehead on the tile. Denied LOC, a/w b/l hip and low back pain.  States if  he is up for more that 45 minutes at a time, States it has been getting increasingly difficult to ambulate at to complete things throughout the day. Palliative Care consulted to assist with establishing GOC.     10/1/[id number] patient seen and examined with no family at bedside. Pt feeling weak still and is complaining of brace on right hand because she states is to heavy with her shoulder injuries.  She notes mild chronic pain in joints due to arthritis.  Patient denies any SOB at this time.  Patient appears anxious but is denying feeling anxious she states she is annoyed that she doesn't feel well and doesn't want to be here, she verbalized that is had been very difficult since she has been declining being able to carry our ADL.  Patient does have aides at home and stated that she is only home for a few hours a day and her son Adama lives near by and helps her also.  GOC scheduled with son this afternoon at 1pm     PAIN: (x )Yes   ( )No  Level: mild  Location: shoulder, knees, and right hand   Intensity:  3  /10  Quality: aches  Aggravating Factors: position, movement  Alleviating Factors: Tylenol  Radiation: no  Duration/Timing: sporadic  Impact on ADLs: frozen shoulder makes lifting arm and showering very difficult especially now with brace in place     DYSPNEA: ( ) Yes  (x ) No    PAST MEDICAL & SURGICAL HISTORY:  Orthostatic hypotension    Bladder cancer    Endometrial adenocarcinoma    Macular degeneration    Stented coronary artery    Hypothyroid    Hyperlipidemia    HTN (hypertension)    GERD (gastroesophageal reflux disease)    CAD (coronary artery disease)    [place]    History of bladder surgery    S/P EMILY (total abdominal hysterectomy)    S/P hernia repair  umbilical - 2008    S/P laparoscopic cholecystectomy  2008    Ankle fracture, right  surgery 25 yrs ago - hardware removed    S/P coronary angiogram  2005, 2008, 2011 - drug eluding stents        SOCIAL HX:    Hx opiate tolerance ( )YES  ( x)NO    Baseline ADLs  (Prior to Admission)  ( ) Independent   (x )Dependent    FAMILY HISTORY:  Family history of brain cancer      Review of Systems:    Anxiety- denies has taken xanax in the past but refusing at this time   Depression- at times, denies now  Constipation- denies  Diarrhea- denies  Nausea- denies  Vomiting- denies  Anorexia- yes  Weight Loss- unsure, but thinks about 6 lbs recently  Cough- yes  Secretions- no  Fatigue- yes  Weakness- yes  Delirium- no    All other systems reviewed and negative    PHYSICAL EXAM:    Vital Signs Last 24 Hrs  T(C): 36.3 (01 Oct 2020 08:45), Max: 36.4 (30 Sep 2020 19:18)  T(F): 97.4 (01 Oct 2020 08:45), Max: 97.5 (30 Sep 2020 19:18)  HR: 82 (01 Oct 2020 08:45) (81 - 89)  BP: 167/65 (01 Oct 2020 08:45) (141/54 - 167/65)  BP(mean): --  RR: 17 (01 Oct 2020 08:45) (17 - 17)  SpO2: 100% (01 Oct 2020 08:45) (100% - 100%)      PPSV2: 30-40  %    General: Elderly female sitting up in chair, friendly, worried, NAD  Mental Status: AOx4  HEENT: mmm, +mild temporal wasting  Lungs: clear  Cardiac: +s1 s2 rrr  GI: soft nt nd +bs  : voids  Ext: moves all extremities, though poor rom in right shoulder,  and discomfort in left hip   Neuro: no focal deficits      LABS:    Albumin: Albumin, Serum: 4.1 g/dL (09-28 @ 11:44)      Allergies    amlodipine (Unknown)  clonidine (Unknown)  Florinef Acetate (Unknown)  hydrocodone (Unknown)  Levatol (Unknown)  Lipitor (Unknown)  Lotrel (Unknown)  methylPREDNISolone (Unknown)  morphine (Other)  Plaquenil (Unknown)  statins (Other (Unknown))  sulfa drugs (Rash)    Intolerances      MEDICATIONS  (STANDING):  amLODIPine   Tablet 10 milliGRAM(s) Oral daily  artificial  tears Solution 1 Drop(s) Both EYES two times a day  cholecalciferol 2000 Unit(s) Oral daily  gabapentin   Solution 100 milliGRAM(s) Oral at bedtime  heparin   Injectable 5000 Unit(s) SubCutaneous every 12 hours  hydrALAZINE 25 milliGRAM(s) Oral <User Schedule>  influenza   Vaccine 0.5 milliLiter(s) IntraMuscular once  isosorbide   mononitrate ER Tablet (IMDUR) 30 milliGRAM(s) Oral daily  isosorbide   mononitrate ER Tablet (IMDUR) 30 milliGRAM(s) Oral <User Schedule>  levothyroxine 75 MICROGram(s) Oral daily  pantoprazole    Tablet 40 milliGRAM(s) Oral before breakfast    MEDICATIONS  (PRN):  acetaminophen   Tablet .. 650 milliGRAM(s) Oral every 6 hours PRN Mild Pain (1 - 3)  ALPRAZolam 0.125 milliGRAM(s) Oral daily PRN anxiety  senna 2 Tablet(s) Oral at bedtime PRN Constipation  traMADol 25 milliGRAM(s) Oral four times a day PRN Moderate Pain (4 - 6)      RADIOLOGY/ADDITIONAL STUDIES:  < from: Xray Hand 3 Views, Right (09.29.20 @ 20:15) >      PROCEDURE DATE:  09/29/2020          INTERPRETATION:  Right hand. 3 views. Prior imaging showed fracture of the proximal phalanx of the fifth right digit.    On present exam splint is appliedobscuring detail. Alignment appears good.    IMPRESSION: Splinting.    < end of copied text >  < from: Xray Finger, Right Hand (09.29.20 @ 13:47) >  EXAM:  XR FINGER(S) MIN 2 VIEWS RT                            PROCEDURE DATE:  09/29/2020          INTERPRETATION:  AP, lateral, and oblique views of the RIGHT fifth digit are submitted.    Clinical data; trauma with pain.    There is an oblique proximal fifth phalanx of diaphyseal fracture. There is erosive osteoarthritis of the DIP joint. No other fracture deformity.    IMPRESSION: Diaphyseal oblique fracture fifth proximal phalanx..      < end of copied text >  < from: Xray Shoulder 2 Views, Right (09.28.20 @ 15:41) >  EXAM:  XR SHOULDER COMP MIN 2V RT                            PROCEDURE DATE:  09/28/2020          INTERPRETATION:  Right shoulder. 3 views.    Patient had a fall and has local pain.    There is chronic supraspinatus degeneration and mild to moderate degeneration at the corners of the humeral articular surface.    There is no bone destruction or fracture.    Findings are similar to September 10.    IMPRESSION: Degenerative findings again noted.      < end of copied text >  < from: CT Abdomen and Pelvis No Cont (09.28.20 @ 12:50) >  EXAM:  CT ABDOMEN AND PELVIS                          EXAM:  CT CHEST                            PROCEDURE DATE:  09/28/2020          INTERPRETATION:  CLINICAL INFORMATION: Unwitnessed fall. Lower back and bilateral hip pain. History of bladder cancer and endometrial cancer.    COMPARISON: CT pelvis April 21, 2020, report from CT abdomen/pelvis November 15, 2019    PROCEDURE:  CT of the Chest, Abdomen and Pelvis was performed without intravenous contrast.  Intravenous contrast: None.  Oral contrast: None.  Sagittal and coronal reformats were performed.    FINDINGS:  CHEST:  LUNGS AND LARGE AIRWAYS: Patent central airways. No pulmonary nodules. Mild peripheral reticular opacities bilaterally suggestive of mild fibrosis.  PLEURA: No pleural effusion.  VESSELS: Atherosclerotic changes of the aorta and coronary arteries.  HEART: Heart size is normal. No pericardial effusion.  MEDIASTINUM AND MICHAEL: No lymphadenopathy.  CHEST WALL AND LOWER NECK: Within normal limits.    ABDOMEN AND PELVIS:  LIVER: Within normal limits.  BILE DUCTS: Normal caliber.  GALLBLADDER: Within normal limits.  SPLEEN: Within normal limits.  PANCREAS: Within normal limits.  ADRENALS: Within normal limits.  KIDNEYS/URETERS: Within normal limits.    BLADDER: Unchanged mild thickening of the left bladder wall.  REPRODUCTIVE ORGANS: Hysterectomy.    BOWEL: No bowel obstruction. Small hiatal hernia. Appendix is normal.  PERITONEUM: No ascites.  VESSELS: Atherosclerotic changes.  RETROPERITONEUM/LYMPH NODES: No lymphadenopathy. Enlarged distal left external iliac lymph nodes measuring up to 1.6 cm (2; 142). Subcentimeter left common iliac lymph nodes, nonspecific but new.  ABDOMINAL WALL: Ventral abdominal wall mesh in place.  BONES: No acute fracture.    IMPRESSION:  No traumatic injury.    Left common and external iliac lymphadenopathy raising suspicion for metastatic disease.  Unchanged left bladder wall thickening.    < end of copied text >

## 2020-09-30 NOTE — GOALS OF CARE CONVERSATION - ADVANCED CARE PLANNING - CONVERSATION DETAILS
This met with pt. and her son at bedside to discuss goals of care assist with planning and provide supportive counseling.  Pt. is alert and oriented and able to make her own decisions. Pt. had been living at home with Medicaid aides for most of the day/night with only a couple hours of a gap. Pt. discussed her frustration that the aides do not always show up. Pt discussed her Bladder cancer and that she had a chemo wash in the past. She reports that they wanted to do another Biopsy however, the pt. and the doctor decided that pt. would not benefit from further treatment as she was feeling horrible after each treatment. Pt. is no longer receive any treatment for the Bladder Cancer and reports that she is ok with this. Pt. expressed that whatever happens to her happens and she doesn't want to worry about it.     Pt. and pts son report the plan is for pt. to go to [facility] upon d/c to see if she can regain strength and that she has done well with rehab in the past. Pt. would like to go to [facility] as she has been there many times. They would like to see how pt. does at [facility] and will then decide if she will return home from there or transition to LTC. We discussed the option of a comfort focus at any point and home hospice for the future if pt. were to return home from rehab and wanted to focus on her comfort. Pt. is familiar with home hospice as her  had it. Pt. expressed not being ready for a comfort focus at this time. She would still like to come back and forth to the hospital as needed. Pt. confirmed DNR/DNI. She is aware that at any point she can shift her focus of care to comfort at any point if her goals changed.     Plan at this time is for [facility] upon d/c. Pt. is not ready for a comfort focus and still wishes to come back and forth to the hospital as it is needed. Emotional support provided. Our team will continue to follow.

## 2020-09-30 NOTE — PROGRESS NOTE ADULT - SUBJECTIVE AND OBJECTIVE BOX
Patient is a 89y old  Female who presents with a chief complaint of weakness (29 Sep 2020 07:18)    89F.  does not take AP/AC.  admitted 09/28/20.  presented to the ED after an unwitnessed fall at home.  fell in the bathroom and hit her forehead on the tile.  denied LOC.  a/w b/l hip and low back pain.  patient was found by her son.  lives independently in her own home w/ a part-time aid.  her aid was unable to attend to her today.  recent hx of fall on 09/10/20 were she was DC'd from ED the same day.  in ED, syncope was questioned as patient was a fair historian (not able to recall details) and unwitnessed.  EKG NSR 91.  ND 206ms.  no acute findings.    9/29  she describes her weakness as her legs get weak and give out. she walks a short distance and has to sit down. some shortness of breath. no fogginess in the head or dizziness. no vertigo. No rhythm changes on EKG.  since fall, has had hand pain.     9/30  resting but awake.   no rhythm abnormalities on telemetry.  orthopedics placed a cast on the right arm. No pain but she complaints its heavy.  also complaining of right shoulder and hip pain. had 2 extra strength tylenol but didn't help.       Allergies    amlodipine (Unknown)  clonidine (Unknown)  Florinef Acetate (Unknown)  hydrocodone (Unknown)  Levatol (Unknown)  Lipitor (Unknown)  Lotrel (Unknown)  methylPREDNISolone (Unknown)  morphine (Other)  Plaquenil (Unknown)  statins (Other (Unknown))  sulfa drugs (Rash)    Intolerances        MEDICATIONS  (STANDING):  amLODIPine   Tablet 10 milliGRAM(s) Oral daily  artificial  tears Solution 1 Drop(s) Both EYES two times a day  cholecalciferol 2000 Unit(s) Oral daily  gabapentin   Solution 100 milliGRAM(s) Oral at bedtime  heparin   Injectable 5000 Unit(s) SubCutaneous every 12 hours  hydrALAZINE 25 milliGRAM(s) Oral <User Schedule>  influenza   Vaccine 0.5 milliLiter(s) IntraMuscular once  isosorbide   mononitrate ER Tablet (IMDUR) 30 milliGRAM(s) Oral daily  isosorbide   mononitrate ER Tablet (IMDUR) 30 milliGRAM(s) Oral <User Schedule>  levothyroxine 75 MICROGram(s) Oral daily  pantoprazole    Tablet 40 milliGRAM(s) Oral before breakfast    MEDICATIONS  (PRN):  acetaminophen   Tablet .. 650 milliGRAM(s) Oral every 6 hours PRN Mild Pain (1 - 3)  ALPRAZolam 0.125 milliGRAM(s) Oral daily PRN anxiety  senna 2 Tablet(s) Oral at bedtime PRN Constipation    REVIEW OF SYSTEMS:    RESPIRATORY: No cough, wheezing, hemoptysis; No shortness of breath  CARDIOVASCULAR: No chest pain or palpitations  All other review of systems is negative unless indicated above      PHYSICAL EXAM:  Daily     Daily   Vital Signs Last 24 Hrs  T(C): 36.4 (29 Sep 2020 21:40), Max: 36.7 (29 Sep 2020 08:03)  T(F): 97.5 (29 Sep 2020 21:40), Max: 98.1 (29 Sep 2020 08:03)  HR: 83 (29 Sep 2020 21:40) (76 - 97)  BP: 137/56 (29 Sep 2020 21:40) (113/52 - 156/55)  BP(mean): --  RR: 17 (29 Sep 2020 21:40) (16 - 18)  SpO2: 97% (29 Sep 2020 21:40) (97% - 100%)    Constitutional: NAD, awake and alert, well-developed  HEENT: PERR, EOMI, Normal Hearing, MMM  Neck: Soft and supple, No LAD, No JVD  Respiratory: Breath sounds are clear bilaterally, No wheezing, rales or rhonchi  Cardiovascular: S1 and S2, regular rate and rhythm, no Murmurs, gallops or rubs  Gastrointestinal: Bowel Sounds present, soft, nontender, nondistended, no guarding, no rebound  Extremities: No peripheral edema  Vascular: 2+ peripheral pulses  Neurological: A/O x 3, no focal deficits  Musculoskeletal: 5/5 strength b/l upper and lower extremities  Skin: No rashes    LABS: All Labs Reviewed:                        11.4   7.61  )-----------( 292      ( 29 Sep 2020 08:11 )             37.8     09-29    140  |  109<H>  |  20  ----------------------------<  90  3.7   |  22  |  0.89    Ca    9.5      29 Sep 2020 08:11    TPro  8.6<H>  /  Alb  4.1  /  TBili  0.7  /  DBili  x   /  AST  18  /  ALT  22  /  AlkPhos  93  09-28    PT/INR - ( 28 Sep 2020 11:44 )   PT: 11.5 sec;   INR: 0.98 ratio         PTT - ( 28 Sep 2020 11:44 )  PTT:35.3 sec  CARDIAC MARKERS ( 28 Sep 2020 11:44 )  0.042 ng/mL / x     / x     / x     / x              TELEMETRY/EKG: NSR

## 2020-10-01 ENCOUNTER — TRANSCRIPTION ENCOUNTER (OUTPATIENT)
Age: 85
End: 2020-10-01

## 2020-10-01 PROCEDURE — 99222 1ST HOSP IP/OBS MODERATE 55: CPT

## 2020-10-01 PROCEDURE — 73502 X-RAY EXAM HIP UNI 2-3 VIEWS: CPT | Mod: 26,LT

## 2020-10-01 PROCEDURE — 99232 SBSQ HOSP IP/OBS MODERATE 35: CPT

## 2020-10-01 PROCEDURE — 99233 SBSQ HOSP IP/OBS HIGH 50: CPT

## 2020-10-01 RX ORDER — ACETAMINOPHEN 500 MG
1000 TABLET ORAL ONCE
Refills: 0 | Status: COMPLETED | OUTPATIENT
Start: 2020-10-01 | End: 2020-10-01

## 2020-10-01 RX ORDER — LIDOCAINE 4 G/100G
1 CREAM TOPICAL ONCE
Refills: 0 | Status: COMPLETED | OUTPATIENT
Start: 2020-10-01 | End: 2020-10-01

## 2020-10-01 RX ORDER — TRAMADOL HYDROCHLORIDE 50 MG/1
25 TABLET ORAL
Refills: 0 | Status: DISCONTINUED | OUTPATIENT
Start: 2020-10-01 | End: 2020-10-02

## 2020-10-01 RX ORDER — TRAMADOL HYDROCHLORIDE 50 MG/1
50 TABLET ORAL ONCE
Refills: 0 | Status: DISCONTINUED | OUTPATIENT
Start: 2020-10-01 | End: 2020-10-01

## 2020-10-01 RX ADMIN — LIDOCAINE 1 PATCH: 4 CREAM TOPICAL at 15:45

## 2020-10-01 RX ADMIN — ISOSORBIDE MONONITRATE 30 MILLIGRAM(S): 60 TABLET, EXTENDED RELEASE ORAL at 21:17

## 2020-10-01 RX ADMIN — Medication 75 MICROGRAM(S): at 06:42

## 2020-10-01 RX ADMIN — TRAMADOL HYDROCHLORIDE 50 MILLIGRAM(S): 50 TABLET ORAL at 16:19

## 2020-10-01 RX ADMIN — GABAPENTIN 100 MILLIGRAM(S): 400 CAPSULE ORAL at 21:14

## 2020-10-01 RX ADMIN — Medication 1 DROP(S): at 21:22

## 2020-10-01 RX ADMIN — ISOSORBIDE MONONITRATE 30 MILLIGRAM(S): 60 TABLET, EXTENDED RELEASE ORAL at 09:44

## 2020-10-01 RX ADMIN — Medication 25 MILLIGRAM(S): at 12:28

## 2020-10-01 RX ADMIN — PANTOPRAZOLE SODIUM 40 MILLIGRAM(S): 20 TABLET, DELAYED RELEASE ORAL at 09:44

## 2020-10-01 RX ADMIN — Medication 1 DROP(S): at 09:44

## 2020-10-01 RX ADMIN — TRAMADOL HYDROCHLORIDE 25 MILLIGRAM(S): 50 TABLET ORAL at 10:26

## 2020-10-01 RX ADMIN — Medication 0.12 MILLIGRAM(S): at 22:58

## 2020-10-01 RX ADMIN — AMLODIPINE BESYLATE 10 MILLIGRAM(S): 2.5 TABLET ORAL at 09:43

## 2020-10-01 RX ADMIN — Medication 400 MILLIGRAM(S): at 21:15

## 2020-10-01 RX ADMIN — Medication 2000 UNIT(S): at 09:43

## 2020-10-01 RX ADMIN — HEPARIN SODIUM 5000 UNIT(S): 5000 INJECTION INTRAVENOUS; SUBCUTANEOUS at 09:44

## 2020-10-01 RX ADMIN — HEPARIN SODIUM 5000 UNIT(S): 5000 INJECTION INTRAVENOUS; SUBCUTANEOUS at 21:16

## 2020-10-01 RX ADMIN — LIDOCAINE 1 PATCH: 4 CREAM TOPICAL at 21:23

## 2020-10-01 RX ADMIN — Medication 650 MILLIGRAM(S): at 07:00

## 2020-10-01 NOTE — PROGRESS NOTE ADULT - ASSESSMENT
Pt is a 89y old Female with hx of  MsWolf Osuna is a 89y old Female coming from home with hx of hypothyroid, CAD, HTN, bladder ca not getting any further treatment or workup, recurrent UTI Patient came in through the ED because she experienced some weakness and dizziness, and unwitnessed fall at home. Fell in the bathroom and hit her forehead on the tile. Denied LOC, a/w b/l hip and low back pain.  States if  he is up for more that 45 minutes at a time, States it has been getting increasingly difficult to ambulate at to complete things throughout the day. Palliative Care consulted to assist with establishing GOC.     1) unwitnessed fall  - r/o syncope  - fall precautions   - monitor on telemetry   - PT consult appreciated     2) Pain  - chronic 2/2 to arthritis and degenerative disease in spine  - status post fall - R shoulder xray, no fracture.  degenerative changes.  - would c/w Tylenol prn for now - patient stating she does not want anything stronger   - Ultram 25mg PO four times a day      3) Weakness/Debility  - likely 2/2 to UTI and effect of malignancy  - PT consult appreciated  - pt to go to [facility] to work of getting back to baseline  - family and patient has noticed decline in patietns fuction status more recently     4) Bladder Ca  - nor further treatment or workup as per patient and family - patient did not tolerate chemo well in the past   - as per urology notes Dr. Solorzano) from last admission pt with high risk bladder ca, failed 1st BCG induction  - pt noted as poor surgical candidate and was not interested in cystectomy  - left  iliac lymphadenopathy - CT L common and external iliac lymphadenopathy suspicious for metastatic disease    5) Anxiety   - patient denies at this time   - has taken xanax in the past - doesn't feel like she needs at this time, but aware that it is ordered for her   - xanax 0.125mg Daily PRN     5) GOC/Advanced Directives  - pt has capacity for decision making and would like to be a part of this  - HCP in [employer] naming son Adama Osuna [phone]  - [facility] on chart: DNR and DNI  - GOC meeting held today please refer to SW note     Thank you for including us in Ms. Osuna's care. Will continue to follow with you.

## 2020-10-01 NOTE — DISCHARGE NOTE NURSING/CASE MANAGEMENT/SOCIAL WORK - NSDCVIVACCINE_GEN_ALL_CORE_FT
Influenza , 2017/9/9 14:31 , Kerri Covington (RN)  Influenza , 2017/9/9 14:53 , Kerri Covington (RN)  Influenza , 2018/9/12 14:23 , Anisha Odom (RN)  Tdap , 2020/2/24 17:42 , Martha Pemberton (RN)   Influenza , 2017/9/9 14:31 , Kerri Covington (RN)  Influenza , 2017/9/9 14:53 , Kerri Covington (RN)  Influenza , 2018/9/12 14:23 , Anisha Odom (RN)  Influenza , 2020/10/2 14:07 , Jeannette Luque (RN)  Tdap , 2020/2/24 17:42 , Martha Pemberton (RN)

## 2020-10-01 NOTE — DISCHARGE NOTE NURSING/CASE MANAGEMENT/SOCIAL WORK - PATIENT PORTAL LINK FT
You can access the FollowMyHealth Patient Portal offered by [facility] by registering at the following website: [email or web address]. By joining [employer]’s FollowMyHealth portal, you will also be able to view your health information using other applications (apps) compatible with our system.

## 2020-10-01 NOTE — CONSULT NOTE ADULT - ASSESSMENT
89 yo female PMH HTN, HLD, hypothyroid, CAD s/p stents presented to the ED after an unwitnessed fall at home. Pt c/o b/l hip pain, low back pain, and Left LE radicular pain. Pt did not have these pains prior to most recent fall. CT scans negative for acute traumatic injury.     - No acute neurosurgical intervention  - May try IV Tylenol  - Consider adding Robaxin for muscle pain / spasm  - If radicular pain persists can try adding Neurontin  - Warm compress to lower back  - Lidoderm patches  - Will sign off for now, reconsult PRN    Discussed with Dr Pond

## 2020-10-01 NOTE — PROGRESS NOTE ADULT - SUBJECTIVE AND OBJECTIVE BOX
Patient is a 89y old  Female who presents with a chief complaint of weakness, fall (30 Sep 2020 07:12)      89F.  does not take AP/AC.  admitted 09/28/20.  presented to the ED after an unwitnessed fall at home.  fell in the bathroom and hit her forehead on the tile.  denied LOC.  a/w b/l hip and low back pain.  patient was found by her son.  lives independently in her own home w/ a part-time aid.  her aid was unable to attend to her today.  recent hx of fall on 09/10/20 were she was DC'd from ED the same day.  in ED, syncope was questioned as patient was a fair historian (not able to recall details) and unwitnessed.  EKG NSR 91.  [place] 206ms.  no acute findings.    9/29  she describes her weakness as her legs get weak and give out. she walks a short distance and has to sit down. some shortness of breath. no fogginess in the head or dizziness. no vertigo. No rhythm changes on EKG.  since fall, has had hand pain.     9/30  resting but awake.   no rhythm abnormalities on telemetry.  orthopedics placed a cast on the right arm. No pain but she complaints its heavy.  also complaining of right shoulder and hip pain. had 2 extra strength tylenol but didn't help.     10/1  pain in the lower back and legs. painful to move.   has been hesitant in trying anything for pain, except tylenol, in the past.       Allergies    amlodipine (Unknown)  clonidine (Unknown)  Florinef Acetate (Unknown)  hydrocodone (Unknown)  Levatol (Unknown)  Lipitor (Unknown)  Lotrel (Unknown)  methylPREDNISolone (Unknown)  morphine (Other)  Plaquenil (Unknown)  statins (Other (Unknown))  sulfa drugs (Rash)    Intolerances        MEDICATIONS  (STANDING):  amLODIPine   Tablet 10 milliGRAM(s) Oral daily  artificial  tears Solution 1 Drop(s) Both EYES two times a day  cholecalciferol 2000 Unit(s) Oral daily  gabapentin   Solution 100 milliGRAM(s) Oral at bedtime  heparin   Injectable 5000 Unit(s) SubCutaneous every 12 hours  hydrALAZINE 25 milliGRAM(s) Oral <User Schedule>  influenza   Vaccine 0.5 milliLiter(s) IntraMuscular once  isosorbide   mononitrate ER Tablet (IMDUR) 30 milliGRAM(s) Oral daily  isosorbide   mononitrate ER Tablet (IMDUR) 30 milliGRAM(s) Oral <User Schedule>  levothyroxine 75 MICROGram(s) Oral daily  pantoprazole    Tablet 40 milliGRAM(s) Oral before breakfast    MEDICATIONS  (PRN):  acetaminophen   Tablet .. 650 milliGRAM(s) Oral every 6 hours PRN Mild Pain (1 - 3)  ALPRAZolam 0.125 milliGRAM(s) Oral daily PRN anxiety  senna 2 Tablet(s) Oral at bedtime PRN Constipation  traMADol 25 milliGRAM(s) Oral four times a day PRN Moderate Pain (4 - 6)    REVIEW OF SYSTEMS:    RESPIRATORY: No cough, wheezing, hemoptysis; No shortness of breath  CARDIOVASCULAR: No chest pain or palpitations  All other review of systems is negative unless indicated above      PHYSICAL EXAM:  Daily     Daily   Vital Signs Last 24 Hrs  T(C): 36.4 (30 Sep 2020 19:18), Max: 36.4 (30 Sep 2020 19:18)  T(F): 97.5 (30 Sep 2020 19:18), Max: 97.5 (30 Sep 2020 19:18)  HR: 81 (30 Sep 2020 21:39) (81 - 89)  BP: 148/56 (30 Sep 2020 21:39) (141/54 - 148/56)  BP(mean): --  RR: 17 (30 Sep 2020 08:05) (17 - 17)  SpO2: 100% (30 Sep 2020 19:18) (100% - 100%)    Constitutional: NAD, awake and alert, well-developed  HEENT: PERR, EOMI, Normal Hearing, MMM  Neck: Soft and supple, No LAD, No JVD  Respiratory: Breath sounds are clear bilaterally, No wheezing, rales or rhonchi  Cardiovascular: S1 and S2, regular rate and rhythm, no Murmurs, gallops or rubs  Gastrointestinal: Bowel Sounds present, soft, nontender, nondistended, no guarding, no rebound  Extremities: No peripheral edema  Vascular: 2+ peripheral pulses  Neurological: A/O x 3, no focal deficits  Musculoskeletal: 5/5 strength b/l upper and lower extremities  Skin: No rashes    LABS: All Labs Reviewed:                        11.4   7.61  )-----------( 292      ( 29 Sep 2020 08:11 )             37.8     09-29    140  |  109<H>  |  20  ----------------------------<  90  3.7   |  22  |  0.89    Ca    9.5      29 Sep 2020 08:11

## 2020-10-01 NOTE — PROGRESS NOTE ADULT - SUBJECTIVE AND OBJECTIVE BOX
HPI: Pt is a 89y old Female with hx of  Ms. Thao is a 89y old Female coming from home with hx of hypothyroid, CAD, HTN, bladder ca not getting any further treatment or workup, recurrent UTI Patient came in through the ED because she experienced some weakness and dizziness, and unwitnessed fall at home. Fell in the bathroom and hit her forehead on the tile. Denied LOC, a/w b/l hip and low back pain.  States if  he is up for more that 45 minutes at a time, States it has been getting increasingly difficult to ambulate at to complete things throughout the day. Palliative Care consulted to assist with establishing GOC.     10/1/[id number] patient seen and examined with no family at bedside. Pt feeling weak still and is complaining of brace on right hand because she states is to heavy with her shoulder injuries.  She notes mild chronic pain in joints due to arthritis.  Patient denies any SOB at this time.  Patient appears anxious but is denying feeling anxious she states she is annoyed that she doesn't feel well and doesn't want to be here, she verbalized that is had been very difficult since she has been declining being able to carry our ADL.  Patient does have aides at home and stated that she is only home for a few hours a day and her son Adama lives near by and helps her also.  GOC scheduled with son this afternoon at 1pm   10/2/2020 patient seen and examined with no family at bedside. Patient appears to be anxious is denying stating that she is just frustrated.  Patient Pain in hip is worse today, explained that could give her medications but states she doesn't want meds she would like orthopedics to come back and see her.      PAIN: (x )Yes   ( )No  Level: mild  Location: shoulder, knees, and right hand   Intensity:  3 -5 /10  Quality: aches  Aggravating Factors: position, movement  Alleviating Factors: Tylenol  Radiation: no  Duration/Timing: sporadic  Impact on ADLs: frozen shoulder makes lifting arm and showering very difficult especially now with brace in place     DYSPNEA: ( ) Yes  (x ) No    Review of Systems:    Anxiety- denies has taken xanax in the past but refusing at this time   Depression- at times, denies now  Constipation- denies  Diarrhea- denies  Nausea- denies  Vomiting- denies  Anorexia- yes  Weight Loss- unsure, but thinks about 6 lbs recently  Cough- yes  Secretions- no  Fatigue- yes  Weakness- yes  Delirium- no    All other systems reviewed and negative    PHYSICAL EXAM:    Vital Signs Last 24 Hrs  T(C): 36.3 (01 Oct 2020 08:45), Max: 36.4 (30 Sep 2020 19:18)  T(F): 97.4 (01 Oct 2020 08:45), Max: 97.5 (30 Sep 2020 19:18)  HR: 84 (01 Oct 2020 12:29) (81 - 84)  BP: 150/54 (01 Oct 2020 12:29) (141/54 - 167/65)  RR: 17 (01 Oct 2020 08:45) (17 - 17)  SpO2: 100% (01 Oct 2020 08:45) (100% - 100%)    PPSV2: 30-40  %    General: Elderly female sitting up in chair, friendly, worried, NAD  Mental Status: AOx4  HEENT: mmm, +mild temporal wasting  Lungs: clear  Cardiac: +s1 s2 rrr  GI: soft nt nd +bs  : voids  Ext: moves all extremities, though poor rom in right shoulder,  and discomfort in left hip   Neuro: no focal deficits      LABS:    Albumin: Albumin, Serum: 4.1 g/dL (09-28 @ 11:44)      Allergies    amlodipine (Unknown)  clonidine (Unknown)  Florinef Acetate (Unknown)  hydrocodone (Unknown)  Levatol (Unknown)  Lipitor (Unknown)  Lotrel (Unknown)  methylPREDNISolone (Unknown)  morphine (Other)  Plaquenil (Unknown)  statins (Other (Unknown))  sulfa drugs (Rash)    Intolerances      MEDICATIONS  (STANDING):  amLODIPine   Tablet 10 milliGRAM(s) Oral daily  artificial  tears Solution 1 Drop(s) Both EYES two times a day  cholecalciferol 2000 Unit(s) Oral daily  gabapentin   Solution 100 milliGRAM(s) Oral at bedtime  heparin   Injectable 5000 Unit(s) SubCutaneous every 12 hours  hydrALAZINE 25 milliGRAM(s) Oral <User Schedule>  influenza   Vaccine 0.5 milliLiter(s) IntraMuscular once  isosorbide   mononitrate ER Tablet (IMDUR) 30 milliGRAM(s) Oral daily  isosorbide   mononitrate ER Tablet (IMDUR) 30 milliGRAM(s) Oral <User Schedule>  levothyroxine 75 MICROGram(s) Oral daily  pantoprazole    Tablet 40 milliGRAM(s) Oral before breakfast    MEDICATIONS  (PRN):  acetaminophen   Tablet .. 650 milliGRAM(s) Oral every 6 hours PRN Mild Pain (1 - 3)  ALPRAZolam 0.125 milliGRAM(s) Oral daily PRN anxiety  senna 2 Tablet(s) Oral at bedtime PRN Constipation  traMADol 25 milliGRAM(s) Oral four times a day PRN Moderate Pain (4 - 6)      RADIOLOGY:

## 2020-10-01 NOTE — CONSULT NOTE ADULT - SUBJECTIVE AND OBJECTIVE BOX
Patient is a 89y old  Female who presents with a chief complaint of unwitnessed fall (01 Oct 2020 12:39)    HPI:  89 yo female PMH HTN, HLD, hypothyroid, CAD s/p stents presented to the ED after an unwitnessed fall at home. As per medical records, pt fell in the bathroom and hit her forehead on the tile. She was found down by her son who called EMS. Pt lives independently in her own home w/ a part-time aid who was unable to attend to her today. Pt also had fall on 09/10/20 were she was d/c'd from ED the same day. in ED, syncope was questioned as patient was a fair historian (not able to recall details) and unwitnessed. Pt c/o b/l hip pain, low back pain, and Left LE radicular pain. Pt did not have these pains prior to most recent fall. CT scans negative for acute traumatic injury. At the time of my exam pt appears to be comfortable, in NAD. Pt denies HA, visual changes, focal numb / ting / weak, word finding difficulty, neck stiffness, photophobia.         PAST MEDICAL & SURGICAL HISTORY:  Orthostatic hypotension  Bladder cancer  Endometrial adenocarcinoma  Macular degeneration  Stented coronary artery  Hypothyroid  Hyperlipidemia  HTN (hypertension)  GERD (gastroesophageal reflux disease)  CAD (coronary artery disease)  [place] (hard of hearing)  History of bladder surgery  Ankle fracture, right  surgery 25 yrs ago - hardware removed        FAMILY HISTORY:  Family history of brain cancer  Noncontributory         Social History:  lives independently, has a part-time HHA, Nonsmoker, no drug or alcohol use        Allergies  amlodipine (Unknown)  clonidine (Unknown)  Florinef Acetate (Unknown)  hydrocodone (Unknown)  Levatol (Unknown)  Lipitor (Unknown)  Lotrel (Unknown)  methylPREDNISolone (Unknown)  morphine (Other)  Plaquenil (Unknown)  statins (Other (Unknown))  sulfa drugs (Rash)        MEDICATIONS  (STANDING):  acetaminophen  IVPB .. 1000 milliGRAM(s) IV Intermittent once  amLODIPine   Tablet 10 milliGRAM(s) Oral daily  artificial  tears Solution 1 Drop(s) Both EYES two times a day  cholecalciferol 2000 Unit(s) Oral daily  gabapentin   Solution 100 milliGRAM(s) Oral at bedtime  heparin   Injectable 5000 Unit(s) SubCutaneous every 12 hours  hydrALAZINE 25 milliGRAM(s) Oral <User Schedule>  influenza   Vaccine 0.5 milliLiter(s) IntraMuscular once  isosorbide   mononitrate ER Tablet (IMDUR) 30 milliGRAM(s) Oral daily  isosorbide   mononitrate ER Tablet (IMDUR) 30 milliGRAM(s) Oral <User Schedule>  levothyroxine 75 MICROGram(s) Oral daily  pantoprazole    Tablet 40 milliGRAM(s) Oral before breakfast         ROS: Pertinent positives in HPI, all other ROS were reviewed and are negative.          Vital Signs Last 24 Hrs  T(C): 36.6 (01 Oct 2020 17:09), Max: 36.6 (01 Oct 2020 17:09)  T(F): 97.8 (01 Oct 2020 17:09), Max: 97.8 (01 Oct 2020 17:09)  HR: 91 (01 Oct 2020 17:09) (81 - 91)  BP: 133/60 (01 Oct 2020 17:09) (133/60 - 167/65)  RR: 17 (01 Oct 2020 08:45) (17 - 17)  SpO2: 98% (01 Oct 2020 17:09) (98% - 100%)        Labs:                      13.3   13.59 )-----------( 325      ( 28 Sep 2020 11:44 )             43.6       PT/INR - ( 28 Sep 2020 11:44 )   PT: 11.5 sec;   INR: 0.98 ratio       PTT - ( 28 Sep 2020 11:44 )  PTT:35.3 sec      138  |  107  |  21  ----------------------------<  103<H>  3.9   |  23  |  1.13    Ca    9.7      28 Sep 2020 11:44    TPro  8.6<H>  /  Alb  4.1  /  TBili  0.7  /  DBili  x   /  AST  18  /  ALT  22  /  AlkPhos  93  09-28    LIVER FUNCTIONS - ( 28 Sep 2020 11:44 )  Alb: 4.1 g/dL / Pro: 8.6 gm/dL / ALK PHOS: 93 U/L / ALT: 22 U/L / AST: 18 U/L / GGT: x           CARDIAC MARKERS ( 28 Sep 2020 11:44 )  0.042 ng/mL / x     / x     / x     / x            Radiology report:  CT Chest No Cont (09.28.20 @ 12:50) >  No traumatic injury. Left common and external iliac lymphadenopathy raising suspicion for metastatic disease.  Unchanged left bladder wall thickening.    CT Cervical Spine No Cont (09.28.20 @ 12:50) >  No fracture or acute traumatic malalignment. There are extensive degenerative changes throughout the cervical spine.    CT HEAD  There is a moderate left parietal scalp hematoma and soft tissue swelling. There is no underlying depressed calvarial fracture or acute intracranial hemorrhage.        Physical Exam:  Constitutional: Awake / alert  HEENT: PERRLA, EOMI  Neck: Supple  Respiratory: Breath sounds are clear bilaterally  Cardiovascular: S1 and S2, regular rhythm  Gastrointestinal: Obese, soft, NT/ND  Extremities:  no edema  Vascular: No carotid Bruit  Musculoskeletal: no joint swelling/tenderness, no abnormal movements  Skin: No rashes    Neurological Exam:  HF: A x O x 2-3, appropriately interactive, normal affect, speech fluent, no aphasia or paraphasic errors. Naming /repetition intact   CN: PERRL, EOMI, facial sensation normal, no NLFD, tongue midline  Motor: ZEE x 4 grossly antigravity  Sens: Intact to light touch  Coord:  No FNFA, dysmetria, CLEVE intact   Gait/Balance: Ambulates with 1 person assit and walker

## 2020-10-01 NOTE — PROGRESS NOTE ADULT - SUBJECTIVE AND OBJECTIVE BOX
CC:  Patient is a 89y old  Female who presents with a chief complaint of unwitnessed fall.    HPI:  89F.  does not take AP/AC.  admitted 09/28/20.  presented to the ED after an unwitnessed fall at home.  fell in the bathroom and hit her forehead on the tile.  denied LOC.  a/w b/l hip and low back pain.  patient was found by her son.  lives independently in her own home w/ a part-time aid.  her aid was unable to attend to her today.  recent hx of fall on 09/10/20 were she was DC'd from ED the same day.    in ED, syncope was questioned as patient was a fair historian (not able to recall details) and unwitnessed.  EKG NSR 91.  [place] 206ms.  no acute findings.    10/1: patient now is complaining of severe pain around the (L) leg which is sharp in nature, radiating, unable to move her (L) leg. Patient has not had a similar pain in the past as per patient. Patient's son has a hard time taking care of her at home. Denies any HA, CP, SOB. Patient will be seen by the neursossurgery team for the evaluation of the radiculopathy.     ROS:    (+) generalized weakness prior to fall.    Physical Exam:   GENERAL APPEARANCE:  NAD, hemodynamically stable  ICU Vital Signs Last 24 Hrs  T(C): 36.3 (01 Oct 2020 08:45), Max: 36.4 (30 Sep 2020 19:18)  T(F): 97.4 (01 Oct 2020 08:45), Max: 97.5 (30 Sep 2020 19:18)  HR: 84 (01 Oct 2020 12:29) (81 - 84)  BP: 150/54 (01 Oct 2020 12:29) (141/54 - 167/65)  BP(mean): --  ABP: --  ABP(mean): --  RR: 17 (01 Oct 2020 08:45) (17 - 17)  SpO2: 100% (01 Oct 2020 08:45) (100% - 100%)    HEENT:  Head is normocephalic    Skin:  Warm and dry without any rash   NECK:  Supple without lymphadenopathy.   HEART:  Regular rate and rhythm. normal S1 and S2, No M/R/G  LUNGS:  Good ins/exp effort, no W/R/R/C  ABDOMEN:  Soft, nontender, nondistended with good bowel sounds heard  EXTREMITIES:  Without cyanosis, clubbing or edema.   NEUROLOGICAL:  Gross nonfocal           89F.  does not take AP/AC.  admitted 09/28/20.  presented to the ED after an unwitnessed fall at home.      # Unwitnessed fall r/o syncope.  - TnI negative.  -serial TnI and EKGs.  -R shoulder xray, no fracture.  degenerative changes.  -HCT, moderate L parietal scalp hematoma and soft tissue swelling.  no ICH.  -C-spine, no fracture.  -mobilize/PT    # UTI.  - UCx.  - doubt patient has a UTI as patient does not have clinical signs and symptoms of UTI    # (L) iliac lymphadenopathy.  hx bladder CA + endometrial CA-hysterometry and RT.  - CT L common and external iliac lymphadenopathy suspicious for metastatic disease  - Patient is not on chemo -  as patient did not tolerate chemo therapy in the past  - d/w patient's son.  he is aware and patient does not desire to pursue further workup at this time.  - pallaitive care eval  - care discussed with patient's son JUAN, who notes that patient is very difficult to take care with falls and ongoing pain    # (L) sided leg pain  - patient's pain appears to be radiculopathy  - neurosurgical eval    # (L) sided pinky fracture  - now in cast    # advanced directive.  - patient is a good candidate for palliative care evaluation as patient has a active cancer  - DNR /  DNI             CC:  Patient is a 89y old  Female who presents with a chief complaint of unwitnessed fall.    HPI:  89F.  does not take AP/AC.  admitted 09/28/20.  presented to the ED after an unwitnessed fall at home.  fell in the bathroom and hit her forehead on the tile.  denied LOC.  a/w b/l hip and low back pain.  patient was found by her son.  lives independently in her own home w/ a part-time aid.  her aid was unable to attend to her today.  recent hx of fall on 09/10/20 were she was DC'd from ED the same day.    in ED, syncope was questioned as patient was a fair historian (not able to recall details) and unwitnessed.  EKG NSR 91.  [place] 206ms.  no acute findings.    10/1: patient now is complaining of severe pain around the (L) leg which is sharp in nature, radiating, unable to move her (L) leg. Patient has not had a similar pain in the past as per patient. Patient's son has a hard time taking care of her at home. Denies any HA, CP, SOB. Patient will be seen by the neursossurgery team for the evaluation of the radiculopathy.       ROS:    (+) generalized weakness prior to fall.    Physical Exam:   GENERAL APPEARANCE:  NAD, hemodynamically stable  T(C): 36.3 (01 Oct 2020 08:45), Max: 36.4 (30 Sep 2020 19:18)  T(F): 97.4 (01 Oct 2020 08:45), Max: 97.5 (30 Sep 2020 19:18)  HR: 84 (01 Oct 2020 12:29) (81 - 84)  BP: 150/54 (01 Oct 2020 12:29) (141/54 - 167/65)  RR: 17 (01 Oct 2020 08:45) (17 - 17)  SpO2: 100% (01 Oct 2020 08:45) (100% - 100%)  HEENT:  Head is normocephalic    Skin:  Warm and dry without any rash   NECK:  Supple without lymphadenopathy.   HEART:  Regular rate and rhythm. normal S1 and S2, No M/R/G  LUNGS:  Good ins/exp effort, no W/R/R/C  ABDOMEN:  Soft, nontender, nondistended with good bowel sounds heard  EXTREMITIES:  Without cyanosis, clubbing or edema.   NEUROLOGICAL:  Gross nonfocal     89F.  does not take AP/AC.  admitted 09/28/20.  presented to the ED after an unwitnessed fall at home.      # Unwitnessed fall r/o syncope.  - TnI negative.  - serial TnI and EKGs.  - R shoulder xray, no fracture.  degenerative changes.  - HCT, moderate L parietal scalp hematoma and soft tissue swelling.  no ICH.  - C-spine, no fracture.  - mobilize/PT    # UTI.  - UCx.  - doubt patient has a UTI as patient does not have clinical signs and symptoms of UTI    # (L) iliac lymphadenopathy.  hx bladder CA + endometrial CA-hysterometry and RT.  - CT L common and external iliac lymphadenopathy suspicious for metastatic disease  - Patient is not on chemo -  as patient did not tolerate chemo therapy in the past  - d/w patient's son.  he is aware and patient does not desire to pursue further workup at this time.  - pallaitive care eval  - care discussed with patient's son JUAN, who notes that patient is very difficult to take care with falls and ongoing pain    # (L) sided leg pain  - patient's pain appears to be radiculopathy  - neurosurgical eval    # (L) sided pinky fracture  - now in cast    # advanced directive.  - patient is a good candidate for palliative care evaluation as patient has a active cancer  - DNR /  DNI             CC:  Patient is a 89y old  Female who presents with a chief complaint of unwitnessed fall.    HPI:  89F.  does not take AP/AC.  admitted 09/28/20.  presented to the ED after an unwitnessed fall at home.  fell in the bathroom and hit her forehead on the tile.  denied LOC.  a/w b/l hip and low back pain.  patient was found by her son.  lives independently in her own home w/ a part-time aid.  her aid was unable to attend to her today.  recent hx of fall on 09/10/20 were she was DC'd from ED the same day.    in ED, syncope was questioned as patient was a fair historian (not able to recall details) and unwitnessed.  EKG NSR 91.  [place] 206ms.  no acute findings.    10/1: patient now is complaining of severe pain around the (L) leg which is sharp in nature, radiating, unable to move her (L) leg. Patient has not had a similar pain in the past as per patient. Patient's son has a hard time taking care of her at home. Denies any HA, CP, SOB. Patient will be seen by the neursossurgery team for the evaluation of the radiculopathy.       ROS:    (+) generalized weakness prior to fall.    Physical Exam:   GENERAL APPEARANCE:  NAD, hemodynamically stable  T(C): 36.3 (01 Oct 2020 08:45), Max: 36.4 (30 Sep 2020 19:18)  T(F): 97.4 (01 Oct 2020 08:45), Max: 97.5 (30 Sep 2020 19:18)  HR: 84 (01 Oct 2020 12:29) (81 - 84)  BP: 150/54 (01 Oct 2020 12:29) (141/54 - 167/65)  RR: 17 (01 Oct 2020 08:45) (17 - 17)  SpO2: 100% (01 Oct 2020 08:45) (100% - 100%)  HEENT:  Head is normocephalic    Skin:  Warm and dry without any rash   NECK:  Supple without lymphadenopathy.   HEART:  Regular rate and rhythm. normal S1 and S2, No M/R/G  LUNGS:  Good ins/exp effort, no W/R/R/C  ABDOMEN:  Soft, nontender, nondistended with good bowel sounds heard  EXTREMITIES:  Without cyanosis, clubbing or edema.   NEUROLOGICAL:  Gross nonfocal     89F.  does not take AP/AC.  admitted 09/28/20.  presented to the ED after an unwitnessed fall at home.      # Unwitnessed fall r/o syncope.  # Falls due to age related physical debility  - TnI negative.  - serial TnI and EKGs.  - R shoulder xray, no fracture.  degenerative changes.  - HCT, moderate L parietal scalp hematoma and soft tissue swelling.  no ICH.  - C-spine, no fracture.  - mobilize/PT    # UTI.  - UCx.  - doubt patient has a UTI as patient does not have clinical signs and symptoms of UTI    # (L) iliac lymphadenopathy.  hx bladder CA + endometrial CA-hysterometry and RT.  - CT L common and external iliac lymphadenopathy suspicious for metastatic disease  - Patient is not on chemo -  as patient did not tolerate chemo therapy in the past  - d/w patient's son.  he is aware and patient does not desire to pursue further workup at this time.  - pallaitive care eval  - care discussed with patient's son JUAN, who notes that patient is very difficult to take care with falls and ongoing pain    # (L) sided leg pain  - patient's pain appears to be radiculopathy  - neurosurgical eval    # (L) sided pinky fracture  - now in cast    # advanced directive.  - patient is a good candidate for palliative care evaluation as patient has a active cancer  - DNR /  DNI

## 2020-10-01 NOTE — PROGRESS NOTE ADULT - ASSESSMENT
89F.  does not take AP/AC.  admitted 09/28/20.  presented to the ED after an unwitnessed fall at home.  fell in the bathroom and hit her forehead on the tile.  denied LOC.  a/w b/l hip and low back pain.  patient was found by her son.  lives independently in her own home w/ a part-time aid.  her aid was unable to attend to her today.  recent hx of fall on 09/10/20 were she was DC'd from ED the same day.    9/29  would continue to monitor on telemetry for another day, although based on past evaluations, unlikely that her falls and weakness are secondary to a cardiac rhythm problem.   consider a CT scan of the back to rule our lumbar disc disease.   physical therapy evaluation.   because of her history of recurrent urine infections and bladder cancer, would also repeat a clean catch urine culture.   orthopedic evaluation for hand pain and swelling.     9/30  consider pain management.   physical therapy.  may discontinue telemetry. No evidence of a cardiac source to her weakness.     10/1  trial of Tramadol 25 mg for pain.   consider pain management consultation.

## 2020-10-02 ENCOUNTER — TRANSCRIPTION ENCOUNTER (OUTPATIENT)
Age: 85
End: 2020-10-02

## 2020-10-02 VITALS — DIASTOLIC BLOOD PRESSURE: 58 MMHG | SYSTOLIC BLOOD PRESSURE: 127 MMHG | HEART RATE: 94 BPM

## 2020-10-02 LAB — SARS-COV-2 RNA SPEC QL NAA+PROBE: SIGNIFICANT CHANGE UP

## 2020-10-02 PROCEDURE — 99238 HOSP IP/OBS DSCHRG MGMT 30/<: CPT

## 2020-10-02 RX ORDER — HYDRALAZINE HCL 50 MG
1 TABLET ORAL
Qty: 0 | Refills: 0 | DISCHARGE
Start: 2020-10-02

## 2020-10-02 RX ADMIN — Medication 75 MICROGRAM(S): at 05:15

## 2020-10-02 RX ADMIN — Medication 650 MILLIGRAM(S): at 10:40

## 2020-10-02 RX ADMIN — HEPARIN SODIUM 5000 UNIT(S): 5000 INJECTION INTRAVENOUS; SUBCUTANEOUS at 10:18

## 2020-10-02 RX ADMIN — AMLODIPINE BESYLATE 10 MILLIGRAM(S): 2.5 TABLET ORAL at 10:18

## 2020-10-02 RX ADMIN — ISOSORBIDE MONONITRATE 30 MILLIGRAM(S): 60 TABLET, EXTENDED RELEASE ORAL at 10:18

## 2020-10-02 RX ADMIN — PANTOPRAZOLE SODIUM 40 MILLIGRAM(S): 20 TABLET, DELAYED RELEASE ORAL at 10:40

## 2020-10-02 RX ADMIN — Medication 2000 UNIT(S): at 10:38

## 2020-10-02 RX ADMIN — INFLUENZA VIRUS VACCINE 0.5 MILLILITER(S): 15; 15; 15; 15 SUSPENSION INTRAMUSCULAR at 14:07

## 2020-10-02 RX ADMIN — Medication 1 DROP(S): at 10:40

## 2020-10-02 RX ADMIN — Medication 25 MILLIGRAM(S): at 12:37

## 2020-10-02 NOTE — DISCHARGE NOTE PROVIDER - HOSPITAL COURSE
HPI:  89F.  does not take AP/AC.  admitted 09/28/20.  presented to the ED after an unwitnessed fall at home.  fell in the bathroom and hit her forehead on the tile.  denied LOC.  a/w b/l hip and low back pain.  patient was found by her son.  lives independently in her own home w/ a part-time aid.  her aid was unable to attend to her today.  recent hx of fall on 09/10/20 were she was DC'd from ED the same day.    in ED, syncope was questioned as patient was a fair historian (not able to recall details) and unwitnessed.  EKG NSR 91.  AZ 206ms.  no acute findings.    10/2: Pain is better controlled. Denies any HA, CP, SOB. Pain around the cast region. Pain all over. Patient responds to tylenol well. D/C planning.     Physical Exam:   GENERAL APPEARANCE:  deconditioned, frail, chronically sick  T(C): 37.3 (10-02-20 @ 08:22), Max: 37.3 (10-02-20 @ 08:22)  HR: 91 (10-02-20 @ 08:22) (84 - 105)  BP: 132/55 (10-02-20 @ 08:22) (132/55 - 150/54)  RR: 17 (10-02-20 @ 08:22) (17 - 17)  SpO2: 100% (10-02-20 @ 08:22) (98% - 100%)  HEENT:  Head is normocephalic    Skin:  Warm and dry without any rash   NECK:  Supple without lymphadenopathy.   HEART:  Regular rate and rhythm. normal S1 and S2, No M/R/G  LUNGS:  Good ins/exp effort, no W/R/R/C  ABDOMEN:  Soft, nontender, nondistended with good bowel sounds heard  EXTREMITIES:  Without cyanosis, clubbing or edema.   NEUROLOGICAL:  Gross nonfocal

## 2020-10-02 NOTE — DISCHARGE NOTE PROVIDER - NSDCMRMEDTOKEN_GEN_ALL_CORE_FT
acetaminophen 325 mg oral tablet: 2 tab(s) orally every 6 hours, As needed, Mild Pain (1 - 3)  bisacodyl 10 mg rectal suppository: 1 suppository(ies) rectal once a day, As needed, Constipation  guaiFENesin 100 mg/5 mL oral liquid: 5 milliliter(s) orally every 6 hours, As needed, Cough  hydrALAZINE 25 mg oral tablet: 1 tab(s) orally   isosorbide mononitrate 30 mg oral tablet, extended release: 1 tab(s) orally in AM  isosorbide mononitrate 30 mg oral tablet, extended release: 1 tab(s) orally at 18:00  melatonin 3 mg oral tablet: 1 tab(s) orally once a day (at bedtime)  metoprolol succinate 25 mg oral tablet, extended release: 1 tab(s) orally once a day at 18:00  nystatin 100,000 units/g topical powder: 1 application topically every 12 hours  pantoprazole 40 mg oral delayed release tablet: 1 tab(s) orally once a day  polyethylene glycol 3350 oral powder for reconstitution: 17 gram(s) orally once a day, As needed, Constipation  Refresh ophthalmic solution: 1 drop(s) to each affected eye 3 times a day  senna oral tablet: 2 tab(s) orally once a day (at bedtime)  Synthroid 75 mcg (0.075 mg) oral tablet: 1 tab(s) orally once a day  traMADol:   Vitamin D3 1000 intl units oral tablet: 2 tab(s) orally once a day  Xanax 0.25 mg oral tablet: 1 tab(s) orally 3 times a day, As Needed for anxiety

## 2020-10-02 NOTE — DISCHARGE NOTE PROVIDER - NSDCCPCAREPLAN_GEN_ALL_CORE_FT
PRINCIPAL DISCHARGE DIAGNOSIS  Diagnosis: Syncope and collapse  Assessment and Plan of Treatment: # Unwitnessed fall r/o syncope.  # Falls due to age related physical debility  - TnI negative.  - serial TnI and EKGs.  - R shoulder xray, no fracture.  degenerative changes.  - HCT, moderate L parietal scalp hematoma and soft tissue swelling.  no ICH.  - C-spine, no fracture.  - mobilize/PT      SECONDARY DISCHARGE DIAGNOSES  Diagnosis: Goals of care, counseling/discussion  Assessment and Plan of Treatment: - pt has capacity for decision making and would like to be a part of this  - HCP in [facility] naming son Adama Osuna [phone]  - MOLST on chart: DNR and DNI    Diagnosis: Pain due to fracture  Assessment and Plan of Treatment: # (L) sided leg pain  - patient's pain appears to be radiculopathy  - po tylenol /  lidoderm patch   - neurosurgical eval  # (L) sided pinky fracture  - now in cast      Diagnosis: Bladder cancer  Assessment and Plan of Treatment: - nor further treatment or workup as per patient and family - patient did not tolerate chemo well in the past   - as per urology notes Dr. Solorzano) from last admission pt with high risk bladder ca, failed 1st BCG induction  - pt noted as poor surgical candidate and was not interested in cystectomy  - left  iliac lymphadenopathy - CT L common and external iliac lymphadenopathy suspicious for metastatic disease

## 2020-10-09 NOTE — ED PROVIDER NOTE - LIVES WITH, PROFILE
[facility] Hematology/Oncology  PROGRESS NOTE - Follow-up Visit       Subjective:       Patient ID:   NAME: Vince Kwon : 1963     56 y.o. male    Referring Doc: Ugo Johnson  Other Physicians: Natalya Gutierrez    Chief Complaint:  Colon ca f/u    History of Present Illness:     Patient is being seen today in person    The patient is here by himself. He is doing ok with no new issues.  Bowel movements have been normal. He denies any CP, SOB, HA's or N/V.     He last saw Dr Crockett with pulmonary on 10/2/2019 and he saw Dr Hoang on 10/2/2020.     He recent CT scans on 10/9/2020    Discussed covid19 precautions         .         ROS:   GEN: normal without any fever, night sweats or weight loss  HEENT: normal with no HA's, sore throat, stiff neck, changes in vision  CV: normal with no CP, SOB, PND, LIGHT or orthopnea  PULM: normal with no SOB, cough, hemoptysis, sputum or pleuritic pain  GI: normal with no abdominal pain, nausea, vomiting, constipation, diarrhea, melanotic stools, BRBPR, or hematemesis  : normal with no hematuria, dysuria  BREAST: normal with no mass, discharge, pain  SKIN: normal with no rash, erythema, bruising, or swelling    Allergies:  Review of patient's allergies indicates:  No Known Allergies    Medications:    Current Outpatient Medications:     amLODIPine (NORVASC) 10 MG tablet, Take 1 tablet (10 mg total) by mouth once daily., Disp: 90 tablet, Rfl: 1    aspirin (ECOTRIN) 81 MG EC tablet, Take 81 mg by mouth., Disp: , Rfl:     atorvastatin (LIPITOR) 20 MG tablet, Take 1 tablet (20 mg total) by mouth once daily. Take one tablet daily., Disp: 90 tablet, Rfl: 1    azilsartan med-chlorthalidone (EDARBYCLOR) 40-25 mg Tab, Take 1 tablet by mouth once daily., Disp: 90 tablet, Rfl: 1    labetaloL (NORMODYNE) 300 MG tablet, Take 1 tablet (300 mg total) by mouth 2 (two) times daily., Disp: 180 tablet, Rfl: 1    omeprazole (PRILOSEC) 20 MG capsule, Take 1 capsule (20 mg total) by  mouth once daily., Disp: 90 capsule, Rfl: 1    potassium chloride SA (K-DUR,KLOR-CON) 20 MEQ tablet, Take 1 tablet (20 mEq total) by mouth 3 (three) times daily., Disp: 270 tablet, Rfl: 1    PMHx/PSHx Updates:  See patient's last visit with me on 3/31/2020  See H&P on 2/20/2018        Pathology:    3/7/2018:    FINAL DIAGNOSIS:  RIGHT COLON, HEMICOLECTOMY:    ULCERATED MODERATE TO MODERATELY WELL 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    THE TUMOR PENETRATES THE MUSCULARIS PROPRIA AND INVADES THE  PERICOLONIC FAT.    THE PROXIMAL (ILEAL) AND DISTAL (COLONIC) SURGICAL MARGINS OF  RESECTION ARE FREE OF    MALIGNANCY.    A TOTAL OF TWENTY-SEVEN (27) PERICOLONIC LYMPH NODES ARE FREE OF  METASTATIC TUMOR.    APPENDIX: REACTIVE MUCOSAL LYMPHOID HYPERPLASIA.     NO EVIDENCE OF METASTATIC CARCINOMA.    Procedure:  Right hemicolectomy  Specimen Length (if applicable):  <CR-*Specimen Length (if applicable):     12.0 cm>  Tumor Site:  Cecum  Tumor Location  Tumor Size:  Greatest dimension:  4.5 cm  Macroscopic Tumor Perforation:  Not identified  Macroscopic Intactness of Mesorectum:  Histologic Type:  Adenocarcinoma, focal mucinous (colloid) carcinoma  component.  Histologic Grade:  Low grade (well differentiated to moderately  differentiated)  Histologic Features Suggestive of Microsatellite Instability:  Intratumoral Lymphocytic Response (tumor-infiltrating lymphocytes):  Peritumor Lymphocytic Response (Crohn-like response):  Tumor Subtype and Differentiation:  Microscopic Tumor Extension:  Tumor invades through the muscularis  propria into the subserosal adipose tissue or the nonperitonealized  pericolic or perirectal soft tissues but does not extend to the serosal  surface  Margins    TNM Descriptors:  Primary Tumor (pT):  pT3:  Tumor invades through the muscularis propria  into pericolorectal tissues  Regional Lymph  Nodes (pN):  pN0:  No regional lymph node metastasis  Distant Metastasis:  Not applicable.      Objective:     Vitals:  Blood pressure (!) 140/87, pulse 89, temperature 97.3 °F (36.3 °C), resp. rate 17, weight 117.5 kg (259 lb).        Physical Examination:   GEN: no apparent distress, comfortable; AAOx3  HEAD: atraumatic and normocephalic  EYES: no conjunctival pallor or muddiness, no icterus; normal pupil reaction to ambient light  ENT: OMM, no pharyngeal erythema, external bilateral ears WNL; no visible thrush or ulcers  NECK: no masses or swelling, trachea midline, no visible LAD/LN's   CV: no palpitations; no pedal edema; no noticeable JVD or neck vein distension  CHEST: Normal respiratory effort; chest wall breath movements symmetrical; no audible wheezing  ABDOM: non-distended; no bloating  MUSC/Skeletal: ROM normal; joints visibly normal; no deformities or arthropathy  EXTREM: no clubbing, cyanosis, inflammation or swelling  SKIN: no rashes, lesions, ulcers, petechiae or subcutaneous nodules  : no escamilla  NEURO: moving all 4 extremities; AAOx3; no tremors  PSYCH: normal mood, affect and behavior  LYMPH: no visible LN's or LAD              Labs:        Lab Results   Component Value Date    WBC 5.21 10/02/2020    HGB 14.6 10/02/2020    HCT 45.7 10/02/2020    MCV 85 10/02/2020     10/02/2020     BMP  Lab Results   Component Value Date     10/02/2020     10/02/2020    K 3.5 10/02/2020    K 3.5 10/02/2020     10/02/2020     10/02/2020    CO2 28 10/02/2020    CO2 28 10/02/2020    BUN 21 (H) 10/02/2020    BUN 21 (H) 10/02/2020    CREATININE 1.4 10/02/2020    CREATININE 1.4 10/02/2020    CALCIUM 9.5 10/02/2020    CALCIUM 9.5 10/02/2020    ANIONGAP 11 10/02/2020    ANIONGAP 11 10/02/2020    ESTGFRAFRICA >60.0 10/02/2020    ESTGFRAFRICA >60.0 10/02/2020    EGFRNONAA 55.8 (A) 10/02/2020    EGFRNONAA 55.8 (A) 10/02/2020     Lab Results   Component Value Date    ALT 25 10/02/2020    ALT 25  10/02/2020    AST 27 10/02/2020    AST 27 10/02/2020    ALKPHOS 66 10/02/2020    ALKPHOS 66 10/02/2020    BILITOT 1.2 (H) 10/02/2020    BILITOT 1.2 (H) 10/02/2020     Lab Results   Component Value Date    CEA 1.4 10/02/2020           Radiology/Diagnostic Studies:    CT scans  10/9/2020:    IMPRESSION:     1.  No acute intra-abdominal abnormalities identified.  2.  Small focal groundglass nodular-like density in the right  lateral ventricle lobes unchanged.  3.  Mild bilateral dependent subsegmental atelectasis.        CT chest scan  3/27/2020:    Impression       Stable soft tissue attenuation noncalcified pulmonary nodules with no new concerning pulmonary nodule or mass identified.  No findings to specifically suggest metastatic disease.           CT scans  9/20/2019:    Impression       No evidence of recurrence or metastatic disease    Stable tiny noncalcified nodules in the right lung           Ct scan 2/1/2019:    IMPRESSION:  1. Interval development of several prominent to borderline enlarged lymph nodes  within the central mesentery and right lower quadrant, nonspecific. These are  suspicious for metastatic disease, given lymphadenopathy seen on the patient's  original preoperative CT of 02/23/2018. Correlation with serum tumor markers,  and consideration for further evaluation with PET CT, are recommended.  2. No additional evidence for metastatic disease in the abdomen or the pelvis.          CT scans 7/27/2018:    IMPRESSION:    1. Stable appearance of subcentimeter right middle lobe and right upper lobe  pulmonary nodules.  2. Status post partial colonic resection with expected postoperative changes,  as discussed above. No residual mass or pathologically enlarged lymph nodes in  the abdomen or pelvis.  3. Incidental findings as above.            PET/CT: 4/11/2018    IMPRESSION:    1. Persistent mild wall thickening centered about site of ileocolonic  anastomosis with associated FDG uptake is most likely  postoperative in nature  and due to residual inflammation. Residual malignancy cannot be excluded on the  basis of imaging alone, and correlation with surgical resection margins is  requested.    2. No current convincing evidence of metastatic disease.    3. 3 mm nodular density which is vague in superior right middle lobe is nonspecific. CT thorax without IV contrast follow-up is recommended within 3-6  months to simply document stability.          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 Comparison: None Findings: Cardiac silhouette size is normal. Lungs are clear without effusion. No pneumothorax. No acute osseous abnormality. IMPRESSION: No acute pulmonary process. Read and electronically signed by: Shubham Marino MD on 2/28/2018 6:22 PM CST SHUBHAM MARINO MD    Ct Abdomen Pelvis With Contrast    Result Date: 2/23/2018  CMS MANDATED QUALITY DATA - CT RADIATION ? 436 All CT scans at this facility utilize dose modulation, iterative reconstruction, and/or weight based dosing when appropriate to reduce radiation dose to as low as reasonably achievable. CLINICAL HISTORY: 54 years (1963) Male K63.9 TECHNIQUE: MDI ABDOMEN AND PELVIS W  CONTRAST CT. 296 images obtained. Axial CT images of the abdomen and pelvis were obtained from the dome of the diaphragm to the proximal thigh. CONTRAST: 100 mL of IV Omni 350 was administered uneventfully by IV. Oral contrast was also administered COMPARISON: None available. FINDINGS: Lower Thorax: The lung bases are clear. The heart is normal in size and there is no pericardial effusion. CT Abdomen: Liver: The liver is normal size and imaging appearance. Gallbladder: The gallbladder is unremarkable without acute cholecystitis. Biliary Tree: There is no intra or extrahepatic duct dilation. Spleen: The spleen is normal. Pancreas: The pancreas is normal. Adrenal  Glands: The adrenal glands appear within normal limits. Kidneys: The kidneys are normal in imaging appearance without hydronephrosis or hydroureter. Vasculature: The aorta is normal in course and caliber. Lymph nodes: No abdominal lymphadenopathy is seen by size criteria. Intraperitoneal structures: There is no ascites. Bowel:  There is an apple core like lesion in the ascending colon extending over a length of approximately 5 cm (10 o'clock-8 o'clock position involving a majority of the circumference) the soft tissue mass measures approximately 2 cm in thickness. There are numerous small rounded lymph nodes in the cecal mesentery, none of which are enlarged by size criteria, however given the morphology and location these may be involved, for example a 15 x 7 mm node (image 121). Abdominal wall: The abdominal wall and musculature are normal. Musculoskeletal: There is a transitional lumbosacral vertebral body (S1) with a hypoplastic disc at S1-S2. There is moderate to severe disc height loss at L4-L5. No aggressive appearing lytic or blastic lesion is identified. CT Pelvis: Bladder: The urinary bladder is within normal limits. Reproductive Organs: The prostate and seminal vesicles are within normal limits. Pelvic Lymph nodes: No pelvic lymphadenopathy or pelvic mass is identified. IMPRESSION: 1. Apple-core like lesion in the descending colon most consistent with a primary colonic adenocarcinoma, there is no evidence of obstruction, pneumatosis intra-abdominal free air or abscess. 2. No lymphadenopathy by size criteria and no finding to specifically suggest metastatic disease in the abdomen or pelvis. Read and electronically signed by: Singh Pineda MD on 2/23/2018 9:53 AM CST SINGH PINEDA MD      I have reviewed all available lab results and radiology reports.    Assessment/Plan:   (1) 56 y.o. male with diagnosis of right colon mass found on recent colonoscopy on 2/16/2018 with Dr Johnson.  - he had been  "seen by Dr Gutierrez with [facility]   - s/p right colectomy with Dr Gutierrez on 3/7/2018  - 27 LN's were all negative  - T3 N0 and Stage IIA  - low grade adenocarcinoma  - PET scan on 4/11/2018 with no definitive evidence of ant metastatic process  - repeat CEA was 2.1  - will most likely not need chemotherapy if he remains a Stage II  - MMR was negative but all of all JESSICA was "stable" which has been shown to illicit a poorer prognosis in general but would not changes the current plan of therapy (discussed with the patient in detail)  - prior CT scans on 7/27/2018 which appeared to be stable; however, repeat CT on 2/1/2019 showed some increase in LN's which needed to be addressed with PET scans but his insurance declined   - latest CT's on 9/20/2019 with no evidence of recurrent cancer  - last colonoscopy was good per patient with another one planned for 3 yrs      10/12/2020:  - latest CT scans on 10/9/2020 appear to be stable  - CEA level good at 1.4  - no new bowel issues  - repeat scope in about 2 yrs per patient       (2) HTN and hypercholesterolemia     (3) Prior mini-CVA - HTN related; no subsequent deficits; sounds like he had a TIA    (4) RML lung nodule on PEt at 3mm with no FDG uptake; CT stable;  - he last saw Dr Crockett with pulmonary on 4/2/2019  - latest Ct was stable on 9/20/2019 and again on 3/27/2020    1. Primary adenocarcinoma of ascending colon     2. Multiple pulmonary nodules           PLAN:  1.  Repeat scans in 6 months - April 2021  2. F/u with GI, PCP, Gen [facility], pulm etc  3. F/u with Dr Gutierrez, Dr Crockett, Dr Johnson, and PCP   4. Encouraged compliance with f/u with Pulm  5.  Check up to date labs incl CEA  6. RTC 6 months    Fax note to Jose Johnson Jr, MD, [facility]    Discussion:       Total Time spent on patient:    I spent over 25 mins of time with the patient. Reviewed results of the recently ordered labs, tests, reports and studies; made directives with " regards to the results. Over half of this time was spent couseling and coordinating care, making treatment and analytical decisions; ordering necessary labs, tests and studies; and discussing treatment options and setting up treatment plan(s) if indicated.    COVID-19 Discussion:    I had long discussion with patient and any applicable family about the COVID-19 coronavirus epidemic and the recommended precautions with regard to cancer and/or hematology patients. I have re-iterated the CDC recommendations for adequate hand washing, use of hand -like products, and coughing into elbow, etc. In addition, especially for our patients who are on chemotherapy and/or our otherwise immunocompromised patients, I have recommended avoidance of crowds, including movie theaters, restaurants, churches, etc. I have recommended avoidance of any sick or symptomatic family members and/or friends. I have also recommended avoidance of any raw and unwashed food products, and general avoidance of food items that have not been prepared by themselves. The patient has been asked to call us immediately with any symptom developments, issues, questions or other general concerns.            I have explained all of the above in detail and the patient understands all of the current recommendation(s). I have answered all of their questions to the best of my ability and to their complete satisfaction.   The patient is to continue with the current management plan.            Electronically signed by Carlo Welch MD           spouse

## 2020-11-07 ENCOUNTER — INPATIENT (INPATIENT)
Facility: HOSPITAL | Age: 85
LOS: 9 days | Discharge: NURSING, REHAB OR LONG-TERM CARE | DRG: 65 | End: 2020-11-17
Attending: INTERNAL MEDICINE | Admitting: FAMILY MEDICINE
Payer: MEDICARE

## 2020-11-07 VITALS
RESPIRATION RATE: 17 BRPM | HEIGHT: 72 IN | HEART RATE: 85 BPM | TEMPERATURE: 98 F | SYSTOLIC BLOOD PRESSURE: 187 MMHG | OXYGEN SATURATION: 99 % | DIASTOLIC BLOOD PRESSURE: 73 MMHG | WEIGHT: 171.96 LBS

## 2020-11-07 DIAGNOSIS — Z90.710 ACQUIRED ABSENCE OF BOTH CERVIX AND UTERUS: Chronic | ICD-10-CM

## 2020-11-07 DIAGNOSIS — Z98.890 OTHER SPECIFIED POSTPROCEDURAL STATES: Chronic | ICD-10-CM

## 2020-11-07 LAB
ADD ON TEST-SPECIMEN IN LAB: SIGNIFICANT CHANGE UP
ALBUMIN SERPL ELPH-MCNC: 3.2 G/DL — LOW (ref 3.3–5)
ALP SERPL-CCNC: 107 U/L — SIGNIFICANT CHANGE UP (ref 40–120)
ALT FLD-CCNC: 20 U/L — SIGNIFICANT CHANGE UP (ref 12–78)
ANION GAP SERPL CALC-SCNC: 6 MMOL/L — SIGNIFICANT CHANGE UP (ref 5–17)
APPEARANCE UR: CLEAR — SIGNIFICANT CHANGE UP
APTT BLD: 35.4 SEC — SIGNIFICANT CHANGE UP (ref 27.5–35.5)
AST SERPL-CCNC: 17 U/L — SIGNIFICANT CHANGE UP (ref 15–37)
BASOPHILS # BLD AUTO: 0.06 K/UL — SIGNIFICANT CHANGE UP (ref 0–0.2)
BASOPHILS NFR BLD AUTO: 0.6 % — SIGNIFICANT CHANGE UP (ref 0–2)
BILIRUB SERPL-MCNC: 0.4 MG/DL — SIGNIFICANT CHANGE UP (ref 0.2–1.2)
BILIRUB UR-MCNC: NEGATIVE — SIGNIFICANT CHANGE UP
BUN SERPL-MCNC: 18 MG/DL — SIGNIFICANT CHANGE UP (ref 7–23)
CALCIUM SERPL-MCNC: 9.3 MG/DL — SIGNIFICANT CHANGE UP (ref 8.5–10.1)
CHLORIDE SERPL-SCNC: 106 MMOL/L — SIGNIFICANT CHANGE UP (ref 96–108)
CO2 SERPL-SCNC: 27 MMOL/L — SIGNIFICANT CHANGE UP (ref 22–31)
COLOR SPEC: SIGNIFICANT CHANGE UP
CREAT SERPL-MCNC: 0.98 MG/DL — SIGNIFICANT CHANGE UP (ref 0.5–1.3)
D DIMER BLD IA.RAPID-MCNC: 821 NG/ML DDU — HIGH
DIFF PNL FLD: NEGATIVE — SIGNIFICANT CHANGE UP
EOSINOPHIL # BLD AUTO: 0.11 K/UL — SIGNIFICANT CHANGE UP (ref 0–0.5)
EOSINOPHIL NFR BLD AUTO: 1.1 % — SIGNIFICANT CHANGE UP (ref 0–6)
GLUCOSE SERPL-MCNC: 87 MG/DL — SIGNIFICANT CHANGE UP (ref 70–99)
GLUCOSE UR QL: NEGATIVE MG/DL — SIGNIFICANT CHANGE UP
HCT VFR BLD CALC: 42.4 % — SIGNIFICANT CHANGE UP (ref 34.5–45)
HGB BLD-MCNC: 12.8 G/DL — SIGNIFICANT CHANGE UP (ref 11.5–15.5)
IMM GRANULOCYTES NFR BLD AUTO: 0.3 % — SIGNIFICANT CHANGE UP (ref 0–1.5)
INR BLD: 1.02 RATIO — SIGNIFICANT CHANGE UP (ref 0.88–1.16)
KETONES UR-MCNC: NEGATIVE — SIGNIFICANT CHANGE UP
LEUKOCYTE ESTERASE UR-ACNC: NEGATIVE — SIGNIFICANT CHANGE UP
LYMPHOCYTES # BLD AUTO: 2.08 K/UL — SIGNIFICANT CHANGE UP (ref 1–3.3)
LYMPHOCYTES # BLD AUTO: 21.6 % — SIGNIFICANT CHANGE UP (ref 13–44)
MCHC RBC-ENTMCNC: 25.5 PG — LOW (ref 27–34)
MCHC RBC-ENTMCNC: 30.2 GM/DL — LOW (ref 32–36)
MCV RBC AUTO: 84.5 FL — SIGNIFICANT CHANGE UP (ref 80–100)
MONOCYTES # BLD AUTO: 0.78 K/UL — SIGNIFICANT CHANGE UP (ref 0–0.9)
MONOCYTES NFR BLD AUTO: 8.1 % — SIGNIFICANT CHANGE UP (ref 2–14)
NEUTROPHILS # BLD AUTO: 6.57 K/UL — SIGNIFICANT CHANGE UP (ref 1.8–7.4)
NEUTROPHILS NFR BLD AUTO: 68.3 % — SIGNIFICANT CHANGE UP (ref 43–77)
NITRITE UR-MCNC: NEGATIVE — SIGNIFICANT CHANGE UP
NT-PROBNP SERPL-SCNC: 345 PG/ML — SIGNIFICANT CHANGE UP (ref 0–450)
PH UR: 7 — SIGNIFICANT CHANGE UP (ref 5–8)
PLATELET # BLD AUTO: 341 K/UL — SIGNIFICANT CHANGE UP (ref 150–400)
POTASSIUM SERPL-MCNC: 4.4 MMOL/L — SIGNIFICANT CHANGE UP (ref 3.5–5.3)
POTASSIUM SERPL-SCNC: 4.4 MMOL/L — SIGNIFICANT CHANGE UP (ref 3.5–5.3)
PROT SERPL-MCNC: 7.7 GM/DL — SIGNIFICANT CHANGE UP (ref 6–8.3)
PROT UR-MCNC: NEGATIVE MG/DL — SIGNIFICANT CHANGE UP
PROTHROM AB SERPL-ACNC: 11.9 SEC — SIGNIFICANT CHANGE UP (ref 10.6–13.6)
RBC # BLD: 5.02 M/UL — SIGNIFICANT CHANGE UP (ref 3.8–5.2)
RBC # FLD: 15.4 % — HIGH (ref 10.3–14.5)
SARS-COV-2 RNA SPEC QL NAA+PROBE: SIGNIFICANT CHANGE UP
SODIUM SERPL-SCNC: 139 MMOL/L — SIGNIFICANT CHANGE UP (ref 135–145)
SP GR SPEC: 1 — LOW (ref 1.01–1.02)
TROPONIN I SERPL-MCNC: 0.03 NG/ML — SIGNIFICANT CHANGE UP (ref 0.01–0.04)
UROBILINOGEN FLD QL: NEGATIVE MG/DL — SIGNIFICANT CHANGE UP
WBC # BLD: 9.63 K/UL — SIGNIFICANT CHANGE UP (ref 3.8–10.5)
WBC # FLD AUTO: 9.63 K/UL — SIGNIFICANT CHANGE UP (ref 3.8–10.5)

## 2020-11-07 PROCEDURE — 70450 CT HEAD/BRAIN W/O DYE: CPT | Mod: 26

## 2020-11-07 PROCEDURE — 71275 CT ANGIOGRAPHY CHEST: CPT | Mod: 26

## 2020-11-07 PROCEDURE — 71045 X-RAY EXAM CHEST 1 VIEW: CPT | Mod: 26

## 2020-11-07 RX ORDER — ACETAMINOPHEN 500 MG
650 TABLET ORAL ONCE
Refills: 0 | Status: COMPLETED | OUTPATIENT
Start: 2020-11-07 | End: 2020-11-07

## 2020-11-07 RX ORDER — LEVOTHYROXINE SODIUM 125 MCG
75 TABLET ORAL DAILY
Refills: 0 | Status: DISCONTINUED | OUTPATIENT
Start: 2020-11-07 | End: 2020-11-17

## 2020-11-07 RX ORDER — ISOSORBIDE MONONITRATE 60 MG/1
30 TABLET, EXTENDED RELEASE ORAL
Refills: 0 | Status: DISCONTINUED | OUTPATIENT
Start: 2020-11-07 | End: 2020-11-08

## 2020-11-07 RX ORDER — POLYETHYLENE GLYCOL 3350 17 G/17G
17 POWDER, FOR SOLUTION ORAL DAILY
Refills: 0 | Status: DISCONTINUED | OUTPATIENT
Start: 2020-11-07 | End: 2020-11-17

## 2020-11-07 RX ORDER — ALPRAZOLAM 0.25 MG
0.25 TABLET ORAL THREE TIMES A DAY
Refills: 0 | Status: DISCONTINUED | OUTPATIENT
Start: 2020-11-07 | End: 2020-11-14

## 2020-11-07 RX ORDER — TRAMADOL HYDROCHLORIDE 50 MG/1
25 TABLET ORAL THREE TIMES A DAY
Refills: 0 | Status: DISCONTINUED | OUTPATIENT
Start: 2020-11-07 | End: 2020-11-08

## 2020-11-07 RX ORDER — ISOSORBIDE MONONITRATE 60 MG/1
30 TABLET, EXTENDED RELEASE ORAL DAILY
Refills: 0 | Status: DISCONTINUED | OUTPATIENT
Start: 2020-11-07 | End: 2020-11-08

## 2020-11-07 RX ORDER — SENNA PLUS 8.6 MG/1
2 TABLET ORAL AT BEDTIME
Refills: 0 | Status: DISCONTINUED | OUTPATIENT
Start: 2020-11-07 | End: 2020-11-17

## 2020-11-07 RX ORDER — ONDANSETRON 8 MG/1
4 TABLET, FILM COATED ORAL EVERY 6 HOURS
Refills: 0 | Status: DISCONTINUED | OUTPATIENT
Start: 2020-11-07 | End: 2020-11-17

## 2020-11-07 RX ORDER — HYDRALAZINE HCL 50 MG
25 TABLET ORAL DAILY
Refills: 0 | Status: DISCONTINUED | OUTPATIENT
Start: 2020-11-07 | End: 2020-11-08

## 2020-11-07 RX ORDER — PANTOPRAZOLE SODIUM 20 MG/1
40 TABLET, DELAYED RELEASE ORAL DAILY
Refills: 0 | Status: DISCONTINUED | OUTPATIENT
Start: 2020-11-07 | End: 2020-11-17

## 2020-11-07 RX ORDER — HEPARIN SODIUM 5000 [USP'U]/ML
5000 INJECTION INTRAVENOUS; SUBCUTANEOUS ONCE
Refills: 0 | Status: DISCONTINUED | OUTPATIENT
Start: 2020-11-07 | End: 2020-11-07

## 2020-11-07 RX ORDER — CHOLECALCIFEROL (VITAMIN D3) 125 MCG
1000 CAPSULE ORAL DAILY
Refills: 0 | Status: DISCONTINUED | OUTPATIENT
Start: 2020-11-07 | End: 2020-11-17

## 2020-11-07 RX ORDER — METOPROLOL TARTRATE 50 MG
25 TABLET ORAL DAILY
Refills: 0 | Status: DISCONTINUED | OUTPATIENT
Start: 2020-11-07 | End: 2020-11-09

## 2020-11-07 RX ORDER — HEPARIN SODIUM 5000 [USP'U]/ML
5000 INJECTION INTRAVENOUS; SUBCUTANEOUS EVERY 12 HOURS
Refills: 0 | Status: DISCONTINUED | OUTPATIENT
Start: 2020-11-07 | End: 2020-11-17

## 2020-11-07 RX ORDER — SODIUM CHLORIDE 9 MG/ML
1000 INJECTION INTRAMUSCULAR; INTRAVENOUS; SUBCUTANEOUS
Refills: 0 | Status: DISCONTINUED | OUTPATIENT
Start: 2020-11-07 | End: 2020-11-08

## 2020-11-07 RX ORDER — ACETAMINOPHEN 500 MG
650 TABLET ORAL EVERY 4 HOURS
Refills: 0 | Status: DISCONTINUED | OUTPATIENT
Start: 2020-11-07 | End: 2020-11-17

## 2020-11-07 RX ORDER — LANOLIN ALCOHOL/MO/W.PET/CERES
3 CREAM (GRAM) TOPICAL AT BEDTIME
Refills: 0 | Status: DISCONTINUED | OUTPATIENT
Start: 2020-11-07 | End: 2020-11-17

## 2020-11-07 RX ADMIN — SENNA PLUS 2 TABLET(S): 8.6 TABLET ORAL at 21:07

## 2020-11-07 RX ADMIN — ISOSORBIDE MONONITRATE 30 MILLIGRAM(S): 60 TABLET, EXTENDED RELEASE ORAL at 21:07

## 2020-11-07 RX ADMIN — SODIUM CHLORIDE 75 MILLILITER(S): 9 INJECTION INTRAMUSCULAR; INTRAVENOUS; SUBCUTANEOUS at 21:06

## 2020-11-07 RX ADMIN — Medication 650 MILLIGRAM(S): at 22:26

## 2020-11-07 RX ADMIN — HEPARIN SODIUM 5000 UNIT(S): 5000 INJECTION INTRAVENOUS; SUBCUTANEOUS at 21:07

## 2020-11-07 RX ADMIN — Medication 0.25 MILLIGRAM(S): at 21:11

## 2020-11-07 RX ADMIN — Medication 650 MILLIGRAM(S): at 16:30

## 2020-11-07 RX ADMIN — Medication 650 MILLIGRAM(S): at 21:52

## 2020-11-07 RX ADMIN — Medication 650 MILLIGRAM(S): at 15:30

## 2020-11-07 RX ADMIN — Medication 3 MILLIGRAM(S): at 21:07

## 2020-11-07 RX ADMIN — Medication 1 DROP(S): at 21:53

## 2020-11-07 NOTE — ED PROVIDER NOTE - PROGRESS NOTE DETAILS
Given prolonged downtime of unclear etiology will admit for further observation.  Consider syncope vs tia.  Discussed with Dr. Kay who has accepted for further w/u and management. Further care per inpatient treatment team.

## 2020-11-07 NOTE — ED ADULT NURSE REASSESSMENT NOTE - NS ED NURSE REASSESS COMMENT FT1
Patient returned from CT. Resting comfortably. No distress noted. Patient updated on plan of care. Will ccontinue to monitor.

## 2020-11-07 NOTE — H&P ADULT - HISTORY OF PRESENT ILLNESS
89 year old female patient with pertinent history of orthostatic hypotension presented to the ED after a witnessed syncopal episode at her facility. Patient does not recall event but endorses 8 month history of progressive weakness, frequent falls. Patient states both of her legs and arms are extremely weak and she has a hard time ambulating due to weakness. Patient voiced concerns at facility but was not able to see a specialist. Patient with intermittent headaches but denies any slurred speech, facial droop, visual changes, sob, nausea or vomiting.      In the ED patient with negative CTA chest and CT brain

## 2020-11-07 NOTE — ED ADULT NURSE REASSESSMENT NOTE - NS ED NURSE REASSESS COMMENT FT1
Hospitalist at bedside. Patient resting comfortably. Updated on plan of care. Will continue to monitor.

## 2020-11-07 NOTE — ED ADULT NURSE REASSESSMENT NOTE - NS ED NURSE REASSESS COMMENT FT1
Toileting assistance provided. Patient turned and positioned. Updated on plan of care. Will continue to monitor.

## 2020-11-07 NOTE — ED ADULT NURSE NOTE - CHIEF COMPLAINT QUOTE
BIBA to ed from [facility] for syncopal episode @10:30 that lasted 1-2 minutes as per nursing staff. staff reports sluggish right pupil and right arm weakness. 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 PT a&ox4, no distress. pt evaluated by Dr. Varela.

## 2020-11-07 NOTE — ED ADULT NURSE REASSESSMENT NOTE - NS ED NURSE REASSESS COMMENT FT1
Meal provided. Patient turned and positioned. Patient updated on plan of care. Will continue to monitor.

## 2020-11-07 NOTE — ED PROVIDER NOTE - PHYSICAL EXAMINATION
Constitutional: NAD, well appearing  HEENT: no rhinorrhea, PERRL, no oropharyngeal erythema or exudates, midline uvula.  TMs clear.  CVS:  RRR, no m/r/g  Resp:  CTAB  GI: soft, ntnd  MSK:  no restriction to rom, full ROM to all extremities  Neuro:  A&Ox3, 5/5 strength to all extremities,  SILT to all extremities  Skin: no rash  psych: clear thought content  Heme/lymph:  No LAD Constitutional: NAD, well appearing  HEENT: no rhinorrhea, PERRL, no oropharyngeal erythema or exudates, midline uvula.  TMs clear.  CVS:  RRR, no m/r/g  Resp:  CTAB  GI: soft, ntnd  MSK:  no restriction to rom, full ROM to all extremities. Chronic RUE weakness, no acute change. No facial droop.  Neuro:  A&Ox3.   Skin: no rash  psych: clear thought content  Heme/lymph:  No LAD

## 2020-11-07 NOTE — H&P ADULT - ASSESSMENT
89 year old female patient with witnessed syncopal episode and progressively worsening weakness        - Admit to Tele Observation        #Syncope  -monitor on tele  -IVF hydration  -check orthostatics  --get morning echo  -monitor vitals    #Elevated D-Dimer  -CTA chest negative for PE    #Progressive weakness  -pt with 8 month history of intermittent weakness and falls  -get PT evaluation  -get MRI brain  -get Neurology consult    #Protein calorie malnutrition  Nutritionist consult    #CAD  - on Metoprolol    #Hx of Hypothyroidism   -on synthroid    # HTN  -bp stable  -on imdur      # GERD  - on protonix    # Left Iliac lymphadenopathy/ Hx of Bladder cancer  -currently off chemo  -supportive care    # Advanced directives  -DNR/DNI    #DVT ppx  -heparin sq

## 2020-11-07 NOTE — ED ADULT NURSE NOTE - NSSUHOSCREENINGYN_ED_ALL_ED
Yes - the patient is able to be screened Methotrexate Pregnancy And Lactation Text: This medication is Pregnancy Category X and is known to cause fetal harm. This medication is excreted in breast milk.

## 2020-11-07 NOTE — H&P ADULT - NSHPPHYSICALEXAM_GEN_ALL_CORE
Vital Signs Last 24 Hrs  T(C): 37.2 (07 Nov 2020 18:59), Max: 37.3 (07 Nov 2020 14:57)  T(F): 98.9 (07 Nov 2020 18:59), Max: 99.1 (07 Nov 2020 14:57)  HR: 77 (07 Nov 2020 18:59) (76 - 85)  BP: 132/58 (07 Nov 2020 18:59) (132/58 - 192/82)  BP(mean): 96 (07 Nov 2020 17:02) (96 - 96)  RR: 17 (07 Nov 2020 18:59) (16 - 18)  SpO2: 99% (07 Nov 2020 18:59) (99% - 99%)

## 2020-11-07 NOTE — ED ADULT NURSE REASSESSMENT NOTE - NS ED NURSE REASSESS COMMENT FT1
Patient resting comfortably. Turned and positioned. Updated on plan of care. Will continue to monitor.

## 2020-11-07 NOTE — ED PROVIDER NOTE - PMH
Bladder cancer    CAD (coronary artery disease)    Endometrial adenocarcinoma    GERD (gastroesophageal reflux disease)    [place] (hard of hearing)    HTN (hypertension)    Hyperlipidemia    Hypothyroid    Macular degeneration    Orthostatic hypotension    Stented coronary artery

## 2020-11-07 NOTE — ED PROVIDER NOTE - OBJECTIVE STATEMENT
89 y/o female with PMHx of orthostatic hypotension, bladder cancer s/p surgery, endometrial adenocarcinoma s/p total hysterectomy, macular degeneration, stented coronary artery, hypothyroidism, HLD, HTN, GERD, CAD, Andreafski, hernia repair, s/p cholecystectomy, right ankle fracture presents to the ED from [facility] s/p syncopal episode. Per nursing home nurses, pt was on toilet when lost consciousness, was not barring down. Pt reports worsening, intermittent weakness. States she "feels fine one minute, then completely weak for a few hours." Yesterday, too weak to walk or exercise. Denies cough, blood in urine, burning when urinating, CP. +occasional SOB. No recent sick contacts.

## 2020-11-07 NOTE — ED PROVIDER NOTE - CLINICAL SUMMARY MEDICAL DECISION MAKING FREE TEXT BOX
Discussed pt with [facility]: pt was reportedly totally unresponsive for 2 minutes. Had similar episode about 1 month ago. Appears to be back at baseline now but c/o diffuse weakness, longstanding pain to right side, unchanged. Do not suspect CVA. Per pt, weakness ongoing since yesterday. Not candidate for TPA or interventional, do not suspect CVA. Will evaluate for infectious source, check cardiac enzymes, reassess, and anticipate admission.

## 2020-11-07 NOTE — ED ADULT TRIAGE NOTE - CHIEF COMPLAINT QUOTE
BIBA to ed from [facility] for syncopal episode that lasted 1-2 minutes as per nursing staff. staff reports sluggish right pupil and right arm weakness. 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 PT a&ox4, no distress. pt evaluated by Dr. Varela. BIBA to ed from [facility] for syncopal episode @10:30 that lasted 1-2 minutes as per nursing staff. staff reports sluggish right pupil and right arm weakness. 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 PT a&ox4, no distress. pt evaluated by Dr. Varela.

## 2020-11-07 NOTE — H&P ADULT - NSICDXPASTMEDICALHX_GEN_ALL_CORE_FT
PAST MEDICAL HISTORY:  Bladder cancer     CAD (coronary artery disease)     Endometrial adenocarcinoma     GERD (gastroesophageal reflux disease)     [place] (hard of hearing)     HTN (hypertension)     Hyperlipidemia     Hypothyroid     Macular degeneration     Orthostatic hypotension     Stented coronary artery

## 2020-11-07 NOTE — ED PROVIDER NOTE - NEURO NEGATIVE STATEMENT, MLM
States she needs her Percocet sent in. Explained unsure if will be able to get narcotic script on the weekend but will notify the on call provider of request. She is agreeable. States she still uses [facility] [employer]. Secure chat sent to Dr. Cooley, on call provider.   no loss of consciousness, no gait abnormality, no headache, no sensory deficits, and no weakness.

## 2020-11-07 NOTE — ED PROVIDER NOTE - NS ED ROS FT
Constitutional: nad, well appearing  HEENT:  no nasal congestion, eye drainage or ear pain.    CVS:  no cp  Resp:  No sob, no cough  GI:  no abdominal pain, no nausea or vomiting  :  no dysuria  MSK: no joint pain or limited ROM  Skin: no rash  Neuro: no change in mental status. +sudden right sided weakness. +LOC  Heme/lymph: no bleeding

## 2020-11-08 LAB
ADD ON TEST-SPECIMEN IN LAB: SIGNIFICANT CHANGE UP
ADD ON TEST-SPECIMEN IN LAB: SIGNIFICANT CHANGE UP
ALBUMIN SERPL ELPH-MCNC: 2.8 G/DL — LOW (ref 3.3–5)
ALP SERPL-CCNC: 89 U/L — SIGNIFICANT CHANGE UP (ref 40–120)
ALT FLD-CCNC: 17 U/L — SIGNIFICANT CHANGE UP (ref 12–78)
ANION GAP SERPL CALC-SCNC: 7 MMOL/L — SIGNIFICANT CHANGE UP (ref 5–17)
AST SERPL-CCNC: 14 U/L — LOW (ref 15–37)
BASOPHILS # BLD AUTO: 0.06 K/UL — SIGNIFICANT CHANGE UP (ref 0–0.2)
BASOPHILS NFR BLD AUTO: 0.7 % — SIGNIFICANT CHANGE UP (ref 0–2)
BILIRUB SERPL-MCNC: 0.4 MG/DL — SIGNIFICANT CHANGE UP (ref 0.2–1.2)
BUN SERPL-MCNC: 25 MG/DL — HIGH (ref 7–23)
CALCIUM SERPL-MCNC: 9.1 MG/DL — SIGNIFICANT CHANGE UP (ref 8.5–10.1)
CHLORIDE SERPL-SCNC: 109 MMOL/L — HIGH (ref 96–108)
CO2 SERPL-SCNC: 24 MMOL/L — SIGNIFICANT CHANGE UP (ref 22–31)
CREAT SERPL-MCNC: 1.03 MG/DL — SIGNIFICANT CHANGE UP (ref 0.5–1.3)
EOSINOPHIL # BLD AUTO: 0.21 K/UL — SIGNIFICANT CHANGE UP (ref 0–0.5)
EOSINOPHIL NFR BLD AUTO: 2.4 % — SIGNIFICANT CHANGE UP (ref 0–6)
GLUCOSE SERPL-MCNC: 90 MG/DL — SIGNIFICANT CHANGE UP (ref 70–99)
HCT VFR BLD CALC: 36.7 % — SIGNIFICANT CHANGE UP (ref 34.5–45)
HGB BLD-MCNC: 10.9 G/DL — LOW (ref 11.5–15.5)
IMM GRANULOCYTES NFR BLD AUTO: 0.5 % — SIGNIFICANT CHANGE UP (ref 0–1.5)
INR BLD: 1.07 RATIO — SIGNIFICANT CHANGE UP (ref 0.88–1.16)
LYMPHOCYTES # BLD AUTO: 2.78 K/UL — SIGNIFICANT CHANGE UP (ref 1–3.3)
LYMPHOCYTES # BLD AUTO: 32.4 % — SIGNIFICANT CHANGE UP (ref 13–44)
MAGNESIUM SERPL-MCNC: 2.4 MG/DL — SIGNIFICANT CHANGE UP (ref 1.6–2.6)
MCHC RBC-ENTMCNC: 25.4 PG — LOW (ref 27–34)
MCHC RBC-ENTMCNC: 29.7 GM/DL — LOW (ref 32–36)
MCV RBC AUTO: 85.5 FL — SIGNIFICANT CHANGE UP (ref 80–100)
MONOCYTES # BLD AUTO: 0.72 K/UL — SIGNIFICANT CHANGE UP (ref 0–0.9)
MONOCYTES NFR BLD AUTO: 8.4 % — SIGNIFICANT CHANGE UP (ref 2–14)
NEUTROPHILS # BLD AUTO: 4.78 K/UL — SIGNIFICANT CHANGE UP (ref 1.8–7.4)
NEUTROPHILS NFR BLD AUTO: 55.6 % — SIGNIFICANT CHANGE UP (ref 43–77)
PHOSPHATE SERPL-MCNC: 4.4 MG/DL — SIGNIFICANT CHANGE UP (ref 2.5–4.5)
PLATELET # BLD AUTO: 319 K/UL — SIGNIFICANT CHANGE UP (ref 150–400)
POTASSIUM SERPL-MCNC: 4.3 MMOL/L — SIGNIFICANT CHANGE UP (ref 3.5–5.3)
POTASSIUM SERPL-SCNC: 4.3 MMOL/L — SIGNIFICANT CHANGE UP (ref 3.5–5.3)
PROT SERPL-MCNC: 6.7 GM/DL — SIGNIFICANT CHANGE UP (ref 6–8.3)
PROTHROM AB SERPL-ACNC: 12.4 SEC — SIGNIFICANT CHANGE UP (ref 10.6–13.6)
RBC # BLD: 4.29 M/UL — SIGNIFICANT CHANGE UP (ref 3.8–5.2)
RBC # FLD: 15.5 % — HIGH (ref 10.3–14.5)
SARS-COV-2 IGG SERPL QL IA: NEGATIVE — SIGNIFICANT CHANGE UP
SARS-COV-2 IGM SERPL IA-ACNC: 0.09 INDEX — SIGNIFICANT CHANGE UP
SODIUM SERPL-SCNC: 140 MMOL/L — SIGNIFICANT CHANGE UP (ref 135–145)
WBC # BLD: 8.59 K/UL — SIGNIFICANT CHANGE UP (ref 3.8–10.5)
WBC # FLD AUTO: 8.59 K/UL — SIGNIFICANT CHANGE UP (ref 3.8–10.5)

## 2020-11-08 PROCEDURE — 70551 MRI BRAIN STEM W/O DYE: CPT | Mod: 26

## 2020-11-08 RX ORDER — ASPIRIN/CALCIUM CARB/MAGNESIUM 324 MG
81 TABLET ORAL DAILY
Refills: 0 | Status: DISCONTINUED | OUTPATIENT
Start: 2020-11-08 | End: 2020-11-08

## 2020-11-08 RX ORDER — HYDRALAZINE HCL 50 MG
25 TABLET ORAL EVERY 6 HOURS
Refills: 0 | Status: DISCONTINUED | OUTPATIENT
Start: 2020-11-08 | End: 2020-11-10

## 2020-11-08 RX ORDER — ASPIRIN/CALCIUM CARB/MAGNESIUM 324 MG
81 TABLET ORAL DAILY
Refills: 0 | Status: DISCONTINUED | OUTPATIENT
Start: 2020-11-08 | End: 2020-11-17

## 2020-11-08 RX ADMIN — Medication 1 DROP(S): at 13:15

## 2020-11-08 RX ADMIN — Medication 0.25 MILLIGRAM(S): at 21:01

## 2020-11-08 RX ADMIN — Medication 1000 UNIT(S): at 10:23

## 2020-11-08 RX ADMIN — Medication 25 MILLIGRAM(S): at 10:23

## 2020-11-08 RX ADMIN — Medication 650 MILLIGRAM(S): at 05:48

## 2020-11-08 RX ADMIN — ISOSORBIDE MONONITRATE 30 MILLIGRAM(S): 60 TABLET, EXTENDED RELEASE ORAL at 10:23

## 2020-11-08 RX ADMIN — POLYETHYLENE GLYCOL 3350 17 GRAM(S): 17 POWDER, FOR SOLUTION ORAL at 10:24

## 2020-11-08 RX ADMIN — Medication 81 MILLIGRAM(S): at 12:56

## 2020-11-08 RX ADMIN — HEPARIN SODIUM 5000 UNIT(S): 5000 INJECTION INTRAVENOUS; SUBCUTANEOUS at 10:23

## 2020-11-08 RX ADMIN — Medication 75 MICROGRAM(S): at 04:37

## 2020-11-08 RX ADMIN — Medication 3 MILLIGRAM(S): at 21:02

## 2020-11-08 RX ADMIN — SODIUM CHLORIDE 75 MILLILITER(S): 9 INJECTION INTRAMUSCULAR; INTRAVENOUS; SUBCUTANEOUS at 13:14

## 2020-11-08 RX ADMIN — Medication 650 MILLIGRAM(S): at 15:26

## 2020-11-08 RX ADMIN — SENNA PLUS 2 TABLET(S): 8.6 TABLET ORAL at 21:02

## 2020-11-08 RX ADMIN — HEPARIN SODIUM 5000 UNIT(S): 5000 INJECTION INTRAVENOUS; SUBCUTANEOUS at 21:02

## 2020-11-08 RX ADMIN — TRAMADOL HYDROCHLORIDE 25 MILLIGRAM(S): 50 TABLET ORAL at 13:03

## 2020-11-08 RX ADMIN — Medication 650 MILLIGRAM(S): at 04:36

## 2020-11-08 RX ADMIN — Medication 1 DROP(S): at 04:37

## 2020-11-08 RX ADMIN — Medication 1 DROP(S): at 21:02

## 2020-11-08 RX ADMIN — PANTOPRAZOLE SODIUM 40 MILLIGRAM(S): 20 TABLET, DELAYED RELEASE ORAL at 10:23

## 2020-11-08 NOTE — CONSULT NOTE ADULT - ASSESSMENT
89 year old F with PMHx of Bladder CA, CAD, HTN, HLD and OH; has had evolving weakness; while walking her legs give way, it has resulted in frequent falls, was admitted to [facility] in Oct 2020, d/c to [facility]; at [facility] facility she has had epiopsdic lightheadedness and near syncope in addition to weakness, these occur when she is seated and not ambulating, she reports right side is weaker than left side, although right arm weakness is chronic.    CT brain done was negative for acute lesions.    # Rule out CVA , other possibility OH and remote possibility syncope/seizure due to cerebral hypoperfusion.    - MRI brain done, just reported - positive for acute infarct left CC - genu.  - Start ASA, Atorvastatin  - Carotid dopplers  - Echo  - Rule out arrythmias  - DVT prophylaxis  - PT    Above management d/w Pt and DR. Gisele Payne will f/u from 11/9/20 89 year old F with PMHx of Bladder CA, CAD, HTN, HLD and OH; has had evolving weakness; while walking her legs give way, it has resulted in frequent falls, was admitted to [facility] in Oct 2020, d/c to [facility]; at Health system facility she has had epiopsdic lightheadedness and near syncope in addition to weakness, these occur when she is seated and not ambulating, she reports right side is weaker than left side, although right arm weakness is chronic.    CT brain done was negative for acute lesions.    # Rule out CVA , other possibility OH and remote possibility syncope/seizure due to cerebral hypoperfusion.    - MRI brain done, just reported - positive for acute infarct left CC - genu.  - Start ASA,   - Atorvastatin (statin listed as allergy- will enquire further then start)  - Carotid dopplers  - Echo  - Rule out arrythmias - rule out embolic source  - DVT prophylaxis  - PT    Above management d/w Pt and DR. Gisele Payne will f/u from 11/9/20

## 2020-11-08 NOTE — DIETITIAN INITIAL EVALUATION ADULT. - OTHER INFO
89 year old female patient with pertinent history of orthostatic hypotension presented to the ED after a witnessed syncopal episode at her facility. Patient does not recall event but endorses 8 month history of progressive weakness, frequent falls. Patient states both of her legs and arms are extremely weak and she has a hard time ambulating due to weakness. Patient voiced concerns at facility but was not able to see a specialist. Patient with intermittent headaches but denies any slurred speech, facial droop, visual changes, sob, nausea or vomiting.    RD consulted for nutrition assessment. Pt is from facility and noted pt has been c/o weakness and frequent falls over the past 8 mths. Pt reports # which was confirmed by previous nutrition assessment. Based off admission wt pt w/ unintentional wt loss of 6kg (7.5% BW)  however can not recall time frame. As per EMR pt w/ ED visit noting wt 78kg indicating wt loss of 4.5kg (5.7% BW) over 1.5 mths which is clinically significant. Pt denies swallowing difficulties however reports some trouble chewing 2/2 missing teeth. Pt agreeable to having soft foods to ease chewing. Pt w/ difficulty cutting up foods 2/2 increased weakness. Recommend cut up foods for pt. As per pt NKFA. Pt w/ some complaints of constipation, took prune juice this AM. Reports no BM x 2 days. Noted order for miralax PRN.

## 2020-11-08 NOTE — SWALLOW BEDSIDE ASSESSMENT ADULT - ORAL PHASE
Within functional limits/immediate bolus formation, anterior dental bite for chewable with lingual and labial retrieval .  mastication normally rotary-lateral with li ngual sweep for oral clearance. No overt debris post swallow.  Timely smooth AP transfer across bolus consistencies consumed.

## 2020-11-08 NOTE — SWALLOW BEDSIDE ASSESSMENT ADULT - SWALLOW EVAL: DIAGNOSIS
1. oral-pharyngeal swallow skills within normal age-appropriate limits for regular consistency solid and liquid, in a setting of advanced age, orthostatic hypotension, underlying GERD, macular degeneration. ,

## 2020-11-08 NOTE — DIETITIAN INITIAL EVALUATION ADULT. - ORAL INTAKE PTA/DIET HISTORY
PT reports that her appetite has been decreased over the past week 2/2 not feeling well. Pt reports consuming small amounts of PO throughout the day however nothing significant. Length of time po intake questionable and possibly longer as pt had a difficult time recalling and noted pt complaining of increased weakness and frequent falls over the past 8 mths

## 2020-11-08 NOTE — PROGRESS NOTE ADULT - ASSESSMENT
89 year old female w/ PMHx of CAD s/p stents x5, bladder cancer,  HTN, hypothyroidism, multiple falls and h/o syncope, orthostatic hypotension admitted for:       # Syncope, likely related to significant orthostatic hypotension, less likely 2/2 stroke   -monitor on tele  -IVF hydration  -monitor orthostatics  - echo  - Trop neg x 1  -  CArdio eval       #  RLE/RUE weakness 2/2 Acute L Corpus Callosum genu infarct   - C/w tele: SR with episode of SVT but brief  - past EKGs reviewed, one from July 2020 read as AFIB  - Start ASA, plan for statins ( listed allergy but will clarify with family)   - lipid panel  - ECHO   - Speech and swallow  - PT      #Elevated D-Dimer  -CTA chest negative for PE    #Progressive weakness  -pt with 8 month history of intermittent weakness and falls  - unlikely related to acute stroke as its completely new and no old strokes on MRI   -Check TSH, ESR/CRP, B12/folate   - PT       # Bladder CA, not on TX  could contribute to weakness and poor appetite   supportive care     #Protein calorie malnutrition  Nutritionist consult    #CAD  - on Metoprolol    #Hx of Hypothyroidism   -on synthroid    # HTN  -on imdur and metoprolol       # GERD  - on protonix    # Left Iliac lymphadenopathy/ Hx of Bladder cancer  -currently off chemo  -supportive care    # Advanced directives  -DNR/DNI    #DVT ppx  -heparin sq

## 2020-11-08 NOTE — CONSULT NOTE ADULT - SUBJECTIVE AND OBJECTIVE BOX
CC: 89y old  Female who presents with a chief complaint of Syncope (07 Nov 2020 19:09)      HPI:  89 year old female patient with pertinent history of orthostatic hypotension presented to the ED after a witnessed syncopal episode at her facility. Patient does not recall event but endorses 8 month history of progressive weakness, frequent falls. Patient states both of her legs and arms are extremely weak and she has a hard time ambulating due to weakness. Patient voiced concerns at facility but was not able to see a specialist. Patient with intermittent headaches but denies any slurred speech, facial droop, visual changes, sob, nausea or vomiting.      In the ED patient with negative CTA chest and CT brain (07 Nov 2020 19:09)      PAST MEDICAL & SURGICAL HISTORY:  Orthostatic hypotension  Bladder cancer  Endometrial adenocarcinoma  Macular degeneration  Stented coronary artery  Hypothyroid  Hyperlipidemia  HTN (hypertension)  GERD (gastroesophageal reflux disease)  CAD (coronary artery disease)  history of bladder surgery  S/P EMILY (total abdominal hysterectomy)  S/P hernia repair  S/P laparoscopic cholecystectomy      FAMILY HISTORY:  Family history of brain cancer      Social Hx:  Nonsmoker, no drug or alcohol use      MEDICATIONS  (STANDING):  artificial  tears Solution 1 Drop(s) Both EYES three times a day  cholecalciferol 1000 Unit(s) Oral daily  heparin   Injectable 5000 Unit(s) SubCutaneous every 12 hours  hydrALAZINE 25 milliGRAM(s) Oral daily  isosorbide   mononitrate ER Tablet (IMDUR) 30 milliGRAM(s) Oral daily  isosorbide   mononitrate ER Tablet (IMDUR) 30 milliGRAM(s) Oral <User Schedule>  levothyroxine 75 MICROGram(s) Oral daily  melatonin 3 milliGRAM(s) Oral at bedtime  metoprolol succinate ER 25 milliGRAM(s) Oral daily  pantoprazole    Tablet 40 milliGRAM(s) Oral daily  senna 2 Tablet(s) Oral at bedtime  sodium chloride 0.9%. 1000 milliLiter(s) (75 mL/Hr) IV Continuous <Continuous>       Allergies    amlodipine (Unknown)  clonidine (Unknown)  Florinef Acetate (Unknown)  hydrocodone (Unknown)  Levatol (Unknown)  Lipitor (Unknown)  Lotrel (Unknown)  methylPREDNISolone (Unknown)  morphine (Other)  Plaquenil (Unknown)  statins (Other (Unknown))  sulfa drugs (Rash)      ROS: Pertinent positives in HPI, all other ROS were reviewed and are negative.        Vital Signs Last 24 Hrs  T(C): 36.2 (07 Nov 2020 23:35), Max: 37.3 (07 Nov 2020 14:57)  T(F): 97.2 (07 Nov 2020 23:35), Max: 99.1 (07 Nov 2020 14:57)  HR: 71 (07 Nov 2020 23:35) (71 - 85)  BP: 117/32 (07 Nov 2020 23:35) (117/32 - 192/82)  BP(mean): 96 (07 Nov 2020 17:02) (96 - 96)  RR: 16 (07 Nov 2020 23:35) (16 - 18)  SpO2: 100% (07 Nov 2020 23:35) (99% - 100%)      Gen exam:  Constitutional: awake and alert.  HEENT: PERRLA, EOMI,   Neck: Supple.  Respiratory: Breath sounds are clear bilaterally  Cardiovascular: S1 and S2, regular  Extremities:  no edema  Vascular: Caritid Bruit - no  Musculoskeletal: no joint swelling/tenderness, no abnormal movements  Skin: No rashes      Neurological exam:  HF: A x O x 3. Appropriately interactive, normal affect. Speech fluent, No Aphasia or paraphasic errors. Naming /repetition intact   CN: RENE, EOMI, VFF, facial sensation normal, no NLFD, tongue midline, Palate moves equally, SCM equal bilaterally  Motor: No pronator drift, Strength 5/5 in all 4 ext, normal bulk and tone, no tremor, rigidity or bradykinesia.    Sens: Intact to light touch / PP/ VS/ JS    Reflexes: Symmetric and normal . BJ 2+, BR 2+, KJ 2+, AJ 2+, downgoing toes b/l  Coord:  No FNFA, dysmetria, CLEVE intact   Gait/Balance: Normal/Cannot test    NIHSS:          Labs:   11-08    140  |  109<H>  |  25<H>  ----------------------------<  90  4.3   |  24  |  1.03    Ca    9.1      08 Nov 2020 07:31  Phos  4.4     11-08  Mg     2.4     11-08    TPro  6.7  /  Alb  2.8<L>  /  TBili  0.4  /  DBili  x   /  AST  14<L>  /  ALT  17  /  AlkPhos  89  11-08                          10.9   8.59  )-----------( 319      ( 08 Nov 2020 07:31 )             36.7       Radiology:  - CT Head:  < from: CT Head No Cont (11.07.20 @ 14:17) >  PROCEDURE DATE:  11/07/2020          INTERPRETATION:  Clinical indications: Weakness.    Multiple axial sections were performed from base of skull to vertex without contrast enhancement. Coronal and sagittal reconstructions were performed as well    Parenchymal volume loss and chronic microvascular ischemic changes are identified    There is no acute hemorrhage mass or mass effect seen.    Evaluation of the osseous structures with the appropriate window appears unremarkable.    The visualized paranasal sinuses mastoid and middle ear regions appear clear.    IMPRESSION: No acute hemorrhage mass or mass effect.         CC: 89y old  Female who presents with a chief complaint of Syncope (07 Nov 2020 19:09)      HPI:  89 year old female patient with PMHx of Bladder CA, CAD, HTN, HLD and orthostatic hypotension presented to the ED after a witnessed syncopal episode at [facility]. Patient reports that since past 7-8 month she has been having progressive weakness, while walking   her legs give way, it has resulted in frequent falls, she has had difficulty ambulating, she was admitted to [facility] in Oct 2020 abd d/c to [facility]. At the facility she reports she has been having epiosdes of lightheadedness and near syncope, in addition to weakness, she reports the lightheadedness and near syncope occur even when she is seated and not ambulating.     Patient has intermittent headaches and reports right side is weaker than left side (chronic)  denies any slurred speech, facial droop, visual changes, sob, nausea or vomiting.      In the ED patient with negative CT brain.      PAST MEDICAL & SURGICAL HISTORY:  Orthostatic hypotension  Bladder cancer  Endometrial adenocarcinoma  Macular degeneration  Stented coronary artery  Hypothyroid  Hyperlipidemia  HTN (hypertension)  GERD (gastroesophageal reflux disease)  CAD (coronary artery disease)  history of bladder surgery  S/P EMILY (total abdominal hysterectomy)  S/P hernia repair  S/P laparoscopic cholecystectomy      FAMILY HISTORY:  Family history of brain cancer      Social Hx:  Nonsmoker, no drug or alcohol use      MEDICATIONS  (STANDING):  artificial  tears Solution 1 Drop(s) Both EYES three times a day  cholecalciferol 1000 Unit(s) Oral daily  heparin   Injectable 5000 Unit(s) SubCutaneous every 12 hours  hydrALAZINE 25 milliGRAM(s) Oral daily  isosorbide   mononitrate ER Tablet (IMDUR) 30 milliGRAM(s) Oral daily  isosorbide   mononitrate ER Tablet (IMDUR) 30 milliGRAM(s) Oral <User Schedule>  levothyroxine 75 MICROGram(s) Oral daily  melatonin 3 milliGRAM(s) Oral at bedtime  metoprolol succinate ER 25 milliGRAM(s) Oral daily  pantoprazole    Tablet 40 milliGRAM(s) Oral daily  senna 2 Tablet(s) Oral at bedtime  sodium chloride 0.9%. 1000 milliLiter(s) (75 mL/Hr) IV Continuous <Continuous>       Allergies    amlodipine (Unknown)  clonidine (Unknown)  Florinef Acetate (Unknown)  hydrocodone (Unknown)  Levatol (Unknown)  Lipitor (Unknown)  Lotrel (Unknown)  methylPREDNISolone (Unknown)  morphine (Other)  Plaquenil (Unknown)  statins (Other (Unknown))  sulfa drugs (Rash)      ROS: Pertinent positives in HPI, all other ROS were reviewed and are negative.        Vital Signs Last 24 Hrs  T(C): 36.2 (07 Nov 2020 23:35), Max: 37.3 (07 Nov 2020 14:57)  T(F): 97.2 (07 Nov 2020 23:35), Max: 99.1 (07 Nov 2020 14:57)  HR: 71 (07 Nov 2020 23:35) (71 - 85)  BP: 117/32 (07 Nov 2020 23:35) (117/32 - 192/82)  BP(mean): 96 (07 Nov 2020 17:02) (96 - 96)  RR: 16 (07 Nov 2020 23:35) (16 - 18)  SpO2: 100% (07 Nov 2020 23:35) (99% - 100%)      Gen exam:  Normocephalic, awake and alert.  HEENT: PERRLA, EOMI,   Neck: Supple.  Respiratory: Breath sounds are clear bilaterally  Cardiovascular: S1 and S2, regular  Extremities:  no edema  Vascular: Caritid Bruit - no  Musculoskeletal: Right frozen shoulder, no abnormal movements  Skin: No rashes      Neurological exam:  HF: A x O x 3. Appropriately interactive, normal affect. Speech fluent, No Aphasia or paraphasic errors. Naming /repetition intact   CN: RENE, EOMI, VFF, facial sensation normal, no NLFD, tongue midline, Palate moves equally, SCM equal bilaterally  Motor: No pronator drift, Right UE 4/5 PROX, Right LE 4+/5 Left side 5.5 no tremor, rigidity or bradykinesia.    Sens: Intact to light touch  Reflexes: Symmetric 1+, AJ 2+, downgoing toes b/l  Coord:  No FNFA, dysmetria   Gait/Balance: Not tested    NIHSS: 2          Labs:   11-08    140  |  109<H>  |  25<H>  ----------------------------<  90  4.3   |  24  |  1.03    Ca    9.1      08 Nov 2020 07:31  Phos  4.4     11-08  Mg     2.4     11-08    TPro  6.7  /  Alb  2.8<L>  /  TBili  0.4  /  DBili  x   /  AST  14<L>  /  ALT  17  /  AlkPhos  89  11-08                          10.9   8.59  )-----------( 319      ( 08 Nov 2020 07:31 )             36.7       Radiology:  - CT Head:  < from: CT Head No Cont (11.07.20 @ 14:17) >    INTERPRETATION:  Clinical indications: Weakness.    Multiple axial sections were performed from base of skull to vertex without contrast enhancement. Coronal and sagittal reconstructions were performed as well    Parenchymal volume loss and chronic microvascular ischemic changes are identified    There is no acute hemorrhage mass or mass effect seen.    Evaluation of the osseous structures with the appropriate window appears unremarkable.    The visualized paranasal sinuses mastoid and middle ear regions appear clear.    IMPRESSION: No acute hemorrhage mass or mass effect.         CC: 89y old  Female who presents with a chief complaint of Syncope (07 Nov 2020 19:09)      HPI:  89 year old female patient with PMHx of Bladder CA, CAD, HTN, HLD and orthostatic hypotension presented to the ED after a witnessed syncopal episode at [facility]. Patient reports that since past 7-8 month she has been having progressive weakness, while walking   her legs give way, it has resulted in frequent falls, she has had difficulty ambulating, she was admitted to [facility] in Oct 2020 and d/c to [facility]. At the facility, she reports she has been having epiosdes of lightheadedness and near syncope in addition to weakness, the lightheadedness and near syncope occur even when she is seated and not ambulating, reports right side is weaker than left side, although right arm weakness is chronic).    Patient has intermittent headaches,  denies any slurred speech, facial droop, visual changes, diplopia, nausea or vomiting ot vertigo. CT brain done was negative for acute lesions.      PAST MEDICAL & SURGICAL HISTORY:  Orthostatic hypotension  Bladder cancer  Endometrial adenocarcinoma  Macular degeneration  Stented coronary artery  Hypothyroid  Hyperlipidemia  HTN (hypertension)  GERD (gastroesophageal reflux disease)  CAD (coronary artery disease)  history of bladder surgery  S/P EMILY (total abdominal hysterectomy)  S/P hernia repair  S/P laparoscopic cholecystectomy      FAMILY HISTORY:  Family history of brain cancer      Social Hx:  Nonsmoker, no drug or alcohol use      MEDICATIONS  (STANDING):  artificial  tears Solution 1 Drop(s) Both EYES three times a day  cholecalciferol 1000 Unit(s) Oral daily  heparin   Injectable 5000 Unit(s) SubCutaneous every 12 hours  hydrALAZINE 25 milliGRAM(s) Oral daily  isosorbide   mononitrate ER Tablet (IMDUR) 30 milliGRAM(s) Oral daily  isosorbide   mononitrate ER Tablet (IMDUR) 30 milliGRAM(s) Oral <User Schedule>  levothyroxine 75 MICROGram(s) Oral daily  melatonin 3 milliGRAM(s) Oral at bedtime  metoprolol succinate ER 25 milliGRAM(s) Oral daily  pantoprazole    Tablet 40 milliGRAM(s) Oral daily  senna 2 Tablet(s) Oral at bedtime  sodium chloride 0.9%. 1000 milliLiter(s) (75 mL/Hr) IV Continuous <Continuous>       Allergies    amlodipine (Unknown)  clonidine (Unknown)  Florinef Acetate (Unknown)  hydrocodone (Unknown)  Levatol (Unknown)  Lipitor (Unknown)  Lotrel (Unknown)  methylPREDNISolone (Unknown)  morphine (Other)  Plaquenil (Unknown)  statins (Other (Unknown))  sulfa drugs (Rash)      ROS: Pertinent positives in HPI, all other ROS were reviewed and are negative.        Vital Signs Last 24 Hrs  T(C): 36.2 (07 Nov 2020 23:35), Max: 37.3 (07 Nov 2020 14:57)  T(F): 97.2 (07 Nov 2020 23:35), Max: 99.1 (07 Nov 2020 14:57)  HR: 71 (07 Nov 2020 23:35) (71 - 85)  BP: 117/32 (07 Nov 2020 23:35) (117/32 - 192/82)  BP(mean): 96 (07 Nov 2020 17:02) (96 - 96)  RR: 16 (07 Nov 2020 23:35) (16 - 18)  SpO2: 100% (07 Nov 2020 23:35) (99% - 100%)      Gen exam:  Normocephalic, in no distress, awake and alert.  HEENT: PERRLA, EOMI,   Neck: Supple.  Respiratory: Breath sounds are clear bilaterally  Cardiovascular: S1 and S2, regular  Extremities:  no edema  Vascular: Caritid Bruit - no  Musculoskeletal: Right frozen shoulder, no abnormal movements  Skin: No rashes      Neurological exam:  HF: A x O x 3. Appropriately interactive, normal affect. Speech fluent, No Aphasia or paraphasic errors. Naming /repetition intact   CN: RENE, EOMI, VFF, facial sensation normal, no NLFD, tongue midline, Palate moves equally, SCM equal bilaterally  Motor: No pronator drift, Right UE PROX 4-/5, Right LE 4+/5; Left side 5/5. No tremor, rigidity or bradykinesia.    Sens: Intact to light touch  Reflexes: Symmetric 1+, AJ 0, downgoing toes b/l  Coord:  No FNFA, dysmetria   Gait/Balance: Not tested    NIHSS: 2          Labs:   11-08    140  |  109<H>  |  25<H>  ----------------------------<  90  4.3   |  24  |  1.03    Ca    9.1      08 Nov 2020 07:31  Phos  4.4     11-08  Mg     2.4     11-08    TPro  6.7  /  Alb  2.8<L>  /  TBili  0.4  /  DBili  x   /  AST  14<L>  /  ALT  17  /  AlkPhos  89  11-08                          10.9   8.59  )-----------( 319      ( 08 Nov 2020 07:31 )             36.7       Radiology:  - CT Head:  < from: CT Head No Cont (11.07.20 @ 14:17) >    INTERPRETATION:  Clinical indications: Weakness.    Multiple axial sections were performed from base of skull to vertex without contrast enhancement. Coronal and sagittal reconstructions were performed as well    Parenchymal volume loss and chronic microvascular ischemic changes are identified    There is no acute hemorrhage mass or mass effect seen.    Evaluation of the osseous structures with the appropriate window appears unremarkable.    The visualized paranasal sinuses mastoid and middle ear regions appear clear.    IMPRESSION: No acute hemorrhage mass or mass effect.

## 2020-11-08 NOTE — SWALLOW BEDSIDE ASSESSMENT ADULT - COMMENTS
89 y/o F: PMHx of Bladder CA, CAD, HTN, HLD and orthostatic hypotension presented to the ED after a witnessed syncopal episode at [facility]. Patient reports that since past 7-8 month she has been having progressive weakness, while walking   her legs give way, it has resulted in frequent falls, she has had difficulty ambulating, she was admitted to [facility] in Oct 2020 abd d/c to [facility]. At the facility she reports she has been having episodes of lightheadedness and near syncope, in addition to weakness, she reports the lightheadedness and near syncope occur even when she is seated and not ambulating.   Patient has intermittent headaches and reports right side is weaker than left side (chronic)  denies any slurred speech, facial droop, visual changes, sob, nausea or vomiting.    In the ED patient with negative CT brain.

## 2020-11-08 NOTE — SWALLOW BEDSIDE ASSESSMENT ADULT - SWALLOW EVAL: RECOMMENDED DIET
Regular consistency, regular liquids. meds as tolerated, Upright for meals. assist in opening containers.

## 2020-11-08 NOTE — PROGRESS NOTE ADULT - SUBJECTIVE AND OBJECTIVE BOX
CC: Syncope (2020 11:37)    HPI: 89 year old female w/ PMHx of CAD s/p stents x5, bladder cancer,  HTN, hypothyroidism, multiple falls and h/o syncope, orthostatic hypotension presented to the ED after a witnessed syncopal episode at her facility. Patient does not recall event but endorses 8 month history of progressive weakness, frequent falls. Patient states both of her legs and arms are extremely weak and she has a hard time ambulating due to weakness. Patient voiced concerns at facility but was not able to see a specialist. Patient with intermittent headaches but denies any slurred speech, facial droop, visual changes, sob, nausea or vomiting.    In the ED patient with negative CTA chest and CT brain (2020 19:09)    INTERVAL HPI/ OVERNIGHT EVENTS: Chart reviewed, Pt was seen and examined, c/o not feeling well, but mostly refers to long standing complains including lightheadedness and near syncope episodes, Pt mentions that a lot if happens while sitting. also generalized weakness, at times unable to walk as " legs are giving out". + Poor appetite. Pt confirmed that has unTx bladder CA. Results and further plan discussed      Vital Signs Last 24 Hrs  T(C): 36.2 (2020 23:35), Max: 37.3 (2020 14:57)  T(F): 97.2 (2020 23:35), Max: 99.1 (2020 14:57)  HR: 73 (2020 10:22) (71 - 84)  BP: 161/64 (2020 10:22) (117/32 - 192/82)  BP(mean): 96 (2020 17:02) (96 - 96)  RR: 16 (2020 23:35) (16 - 17)  SpO2: 100% (2020 23:35) (99% - 100%)        REVIEW OF SYSTEMS:   All other review of systems is negative unless indicated above.      PHYSICAL EXAM:  General: Well developed;  in no acute distress, looks weak   Eyes: PERRLA, EOMI; conjunctiva and sclera clear  Head: Normocephalic; atraumatic  ENMT: No nasal discharge; airway clear  Neck: Supple; non tender; no masses  Respiratory: No wheezes, rales or rhonchi  Cardiovascular: Regular rate and rhythm. S1 and S2 Normal; No murmurs  Gastrointestinal: Soft non-tender non-distended; Normal bowel sounds  Genitourinary: No  suprapubic  tenderness  Extremities: No  edema  Vascular: Peripheral pulses palpable 2+ bilaterally  Neurological: Alert and oriented x3, has R sided weakness ( but also has R frozen shoulder so difficult to asses) , speech fluid, no aphasia   Skin: Warm and dry. No acute rash  Lymph Nodes: No acute cervical adenopathy  Musculoskeletal: Normal muscle tone  Psychiatric: Cooperative    LABS:   CARDIAC MARKERS ( 2020 12:26 )  0.031 ng/mL / x     / x     / x     / x                                10.9   8.59  )-----------( 319      ( 2020 07:31 )             36.7     2020 07:31    140    |  109    |  25     ----------------------------<  90     4.3     |  24     |  1.03     Ca    9.1        2020 07:31  Phos  4.4       2020 07:31  Mg     2.4       2020 07:31    TPro  6.7    /  Alb  2.8    /  TBili  0.4    /  DBili  x      /  AST  14     /  ALT  17     /  AlkPhos  89     2020 07:31  PT/INR - ( 2020 07:31 )   PT: 12.4 sec;   INR: 1.07 ratio    PTT - ( 2020 12:26 )  PTT:35.4 sec      LIVER FUNCTIONS - ( 2020 07:31 )  Alb: 2.8 g/dL / Pro: 6.7 gm/dL / ALK PHOS: 89 U/L / ALT: 17 U/L / AST: 14 U/L / GGT: x           Urinalysis Basic - ( 2020 12:48 )    Color: Pale Yellow / Appearance: Clear / S.005 / pH: x  Gluc: x / Ketone: Negative  / Bili: Negative / Urobili: Negative mg/dL   Blood: x / Protein: Negative mg/dL / Nitrite: Negative   Leuk Esterase: Negative / RBC: x / WBC x   Sq Epi: x / Non Sq Epi: x / Bacteria: x        MEDICATIONS  (STANDING):  artificial  tears Solution 1 Drop(s) Both EYES three times a day  aspirin enteric coated 81 milliGRAM(s) Oral daily  cholecalciferol 1000 Unit(s) Oral daily  heparin   Injectable 5000 Unit(s) SubCutaneous every 12 hours  hydrALAZINE 25 milliGRAM(s) Oral daily  isosorbide   mononitrate ER Tablet (IMDUR) 30 milliGRAM(s) Oral daily  isosorbide   mononitrate ER Tablet (IMDUR) 30 milliGRAM(s) Oral <User Schedule>  levothyroxine 75 MICROGram(s) Oral daily  melatonin 3 milliGRAM(s) Oral at bedtime  metoprolol succinate ER 25 milliGRAM(s) Oral daily  pantoprazole    Tablet 40 milliGRAM(s) Oral daily  senna 2 Tablet(s) Oral at bedtime  sodium chloride 0.9%. 1000 milliLiter(s) (75 mL/Hr) IV Continuous <Continuous>    MEDICATIONS  (PRN):  acetaminophen   Tablet .. 650 milliGRAM(s) Oral every 4 hours PRN Mild Pain (1 - 3)  ALPRAZolam 0.25 milliGRAM(s) Oral three times a day PRN anxiety  bisacodyl Suppository 10 milliGRAM(s) Rectal daily PRN Constipation  guaiFENesin   Syrup  (Sugar-Free) 100 milliGRAM(s) Oral every 6 hours PRN Cough  ondansetron Injectable 4 milliGRAM(s) IV Push every 6 hours PRN Nausea  polyethylene glycol 3350 17 Gram(s) Oral daily PRN Constipation  traMADol 25 milliGRAM(s) Oral three times a day PRN Severe Pain (7 - 10)      RADIOLOGY & ADDITIONAL TESTS:  < from: MR Head No Cont (20 @ 08:58) >    EXAM:  MR BRAIN                            PROCEDURE DATE:  2020          INTERPRETATION:  CLINICAL INDICATION: Weakness and frequent falls.    TECHNIQUE: Multi-planar multi-sequential MR imaging of the brain was performed without intravenouscontrast.    COMPARISON: MRI brain 2017. CT head 2020.    FINDINGS:    There is subtle restricted diffusion with corresponding signal dropout on ADC maps and associated T2/STIR hyperintense cytotoxic edema in the left genu of the corpus callosum (series 5 images [id number]), findings consistent with evolving acute infarct. There is no evidence of associated hemorrhage.    There are scattered T2/FLAIR hyperintense changes in the . to the subcortical white matter, nonspecific finding, but mostlikely representing sequelae of moderate chronic microvascular ischemic disease.    There is cortical sulcal prominence related to underlying brain parenchymal volume.    There is no intracranial mass or hydrocephalus. There are no extra-axial fluid collections.    The visualized skull base flow voids appear patent.    The patient is status post bilateral lens replacement. There are mild mucosal inflammatory changes of the paranasal sinuses. There is trace right mastoid air effusion. The left mastoid air cells are clear. The visualized soft tissues and osseous structures appear unremarkable.    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 No hemorrhage.          EXAM:  CTA CHEST PE PROTOCOL (W)AW IC                        PROCEDURE DATE:  2020      INTERPRETATION:      Tubes/Lines: None    Mediastinum and Heart: Aorta and pulmonary arteries are normal in size. Thyroid gland is unremarkable. No lymphadenopathy. No pericardial effusion.    Lungs, Pleura, and Airways: There is no pulmonary embolus. No consolidations, edema, effusion, or pneumothorax. Minimal areas of high attenuation within the lungs bilaterally are likely due to expiratory phase of scanning. Minimal bibasilar reticulation is unchanged.    Visualized Abdomen: Unremarkable.    Bones and soft tissues: Unremarkable.    IMPRESSION:    No pulmonary embolus.

## 2020-11-08 NOTE — DIETITIAN INITIAL EVALUATION ADULT. - ADD RECOMMEND
1. add soft food restrictions to DASH diet t 2. add ensure enlive 1x/day and gelatein 1x/day 3. monitor po intake and document in EMR 4. cut up foods to optimize intake 5. encourage ONS between meals 6. add MVI w/ minerals daily 7. weekly wt.

## 2020-11-08 NOTE — SWALLOW BEDSIDE ASSESSMENT ADULT - ORAL PREPARATORY PHASE
orientation and focus; judgement for bolus size; oral acceptance and containment./Within functional limits

## 2020-11-08 NOTE — PHYSICAL THERAPY INITIAL EVALUATION ADULT - PERTINENT HX OF CURRENT PROBLEM, REHAB EVAL
89 year old female patient with pertinent history of orthostatic hypotension presented to the ED after a witnessed syncopal episode at her facility. Patient does not recall event but endorses 8 month history of progressive weakness, frequent falls. Patient states both of her legs and arms are extremely weak and she has a hard time ambulating due to weakness. ED patient with negative CTA chest and CT brain

## 2020-11-08 NOTE — SWALLOW BEDSIDE ASSESSMENT ADULT - NS SPL SWALLOW CLINIC TRIAL FT
No contraindication for the maintenance of REGULAR consistency diet.  meds as tolerated (pt states she takes meds whole with water).   Assist with setting up tray and open the containers.  pt to be upright for meals and for 20-30 minutes afterwards (pt has GERD).      pt's swallow function is at baseline and therefore service will not follow at this time.  Please re-consult prn.

## 2020-11-08 NOTE — SWALLOW BEDSIDE ASSESSMENT ADULT - SWALLOW EVAL: CRITERIA FOR SKILLED INTERVENTION MET
Swallow function is at baseline and Service will not follow/no problems identified which require skilled intervention

## 2020-11-08 NOTE — DIETITIAN INITIAL EVALUATION ADULT. - PERTINENT LABORATORY DATA
11-08 Na140 mmol/L Glu 90 mg/dL K+ 4.3 mmol/L Cr  1.03 mg/dL BUN 25 mg/dL<H> Phos 4.4 mg/dL Alb 2.8 g/dL<L> PAB n/a

## 2020-11-08 NOTE — SWALLOW BEDSIDE ASSESSMENT ADULT - PHARYNGEAL PHASE
Within functional limits/onset of pharyngeal swallow within age-appropriate time parameters with observable laryngeal lift.  No overt behavioral s/s aspiration, or of residue in pharynx.

## 2020-11-08 NOTE — DIETITIAN INITIAL EVALUATION ADULT. - PERTINENT MEDS FT
MEDICATIONS  (STANDING):  artificial  tears Solution 1 Drop(s) Both EYES three times a day  aspirin enteric coated 81 milliGRAM(s) Oral daily  cholecalciferol 1000 Unit(s) Oral daily  heparin   Injectable 5000 Unit(s) SubCutaneous every 12 hours  hydrALAZINE 25 milliGRAM(s) Oral daily  isosorbide   mononitrate ER Tablet (IMDUR) 30 milliGRAM(s) Oral daily  isosorbide   mononitrate ER Tablet (IMDUR) 30 milliGRAM(s) Oral <User Schedule>  levothyroxine 75 MICROGram(s) Oral daily  melatonin 3 milliGRAM(s) Oral at bedtime  metoprolol succinate ER 25 milliGRAM(s) Oral daily  pantoprazole    Tablet 40 milliGRAM(s) Oral daily  senna 2 Tablet(s) Oral at bedtime  sodium chloride 0.9%. 1000 milliLiter(s) (75 mL/Hr) IV Continuous <Continuous>    MEDICATIONS  (PRN):  acetaminophen   Tablet .. 650 milliGRAM(s) Oral every 4 hours PRN Mild Pain (1 - 3)  ALPRAZolam 0.25 milliGRAM(s) Oral three times a day PRN anxiety  bisacodyl Suppository 10 milliGRAM(s) Rectal daily PRN Constipation  guaiFENesin   Syrup  (Sugar-Free) 100 milliGRAM(s) Oral every 6 hours PRN Cough  ondansetron Injectable 4 milliGRAM(s) IV Push every 6 hours PRN Nausea  polyethylene glycol 3350 17 Gram(s) Oral daily PRN Constipation  traMADol 25 milliGRAM(s) Oral three times a day PRN Severe Pain (7 - 10)

## 2020-11-08 NOTE — PHYSICAL THERAPY INITIAL EVALUATION ADULT - PLANNED THERAPY INTERVENTIONS, PT EVAL
bed mobility training/Eval was completed on 11/9. SEE Further Evaluation details on PT notes from 11/9/strengthening/postural re-education/transfer training/gait training

## 2020-11-08 NOTE — CHART NOTE - FINDINGS AS BASED ON:
Food acceptance and intake status from observations by staff/ concerns/Intervention secondary to interdisciplinary rounds/Comprehensive nutrition assessment and consultation

## 2020-11-08 NOTE — CHART NOTE - TREATMENT: THE FOLLOWING DIET HAS BEEN RECOMMENDED
Diet, DASH/TLC:   Sodium & Cholesterol Restricted  Soft  Prosource Gelatein Plus     Qty per Day:  1x/day  Supplement Feeding Modality:  Oral  Ensure Enlive Cans or Servings Per Day:  1       Frequency:  Daily (11-08-20 @ 12:09) [Pending Verification By Attending]  Diet, DASH/TLC:   Sodium & Cholesterol Restricted (11-07-20 @ 14:58) [Active]    Pt meets criteria for moderate malnutrition in the context of social/environmental circumstances AEB mild/moderate muscle wasting and wt loss of 5.7% BW x 1. 5 mths    1. add soft food restrictions to DASH diet t 2. add ensure enlive 1x/day and gelatein 1x/day 3. monitor po intake and document in EMR 4. cut up foods to optimize intake 5. encourage ONS between meals 6. add MVI w/ minerals daily 7. weekly wt.

## 2020-11-08 NOTE — DIETITIAN INITIAL EVALUATION ADULT. - MALNUTRITION
Pt meets criteria for moderate malnutrition in the context of social/environmental circumstances AEB mild/moderate muscle wasting and wt loss of 5.7% BW x 1. 5 mths

## 2020-11-08 NOTE — SWALLOW BEDSIDE ASSESSMENT ADULT - SWALLOW EVAL: RECOMMENDED FEEDING/EATING TECHNIQUES
position upright (90 degrees)/maintain upright posture during/after eating for 30 mins/assist in setting up tray./alternate food with liquid

## 2020-11-09 DIAGNOSIS — G45.9 TRANSIENT CEREBRAL ISCHEMIC ATTACK, UNSPECIFIED: ICD-10-CM

## 2020-11-09 LAB
ANION GAP SERPL CALC-SCNC: 3 MMOL/L — LOW (ref 5–17)
ANION GAP SERPL CALC-SCNC: 5 MMOL/L — SIGNIFICANT CHANGE UP (ref 5–17)
BUN SERPL-MCNC: 22 MG/DL — SIGNIFICANT CHANGE UP (ref 7–23)
BUN SERPL-MCNC: 25 MG/DL — HIGH (ref 7–23)
CALCIUM SERPL-MCNC: 9 MG/DL — SIGNIFICANT CHANGE UP (ref 8.5–10.1)
CALCIUM SERPL-MCNC: 9.1 MG/DL — SIGNIFICANT CHANGE UP (ref 8.5–10.1)
CHLORIDE SERPL-SCNC: 111 MMOL/L — HIGH (ref 96–108)
CHLORIDE SERPL-SCNC: 111 MMOL/L — HIGH (ref 96–108)
CHOLEST SERPL-MCNC: 180 MG/DL — SIGNIFICANT CHANGE UP
CO2 SERPL-SCNC: 25 MMOL/L — SIGNIFICANT CHANGE UP (ref 22–31)
CO2 SERPL-SCNC: 26 MMOL/L — SIGNIFICANT CHANGE UP (ref 22–31)
CREAT SERPL-MCNC: 0.9 MG/DL — SIGNIFICANT CHANGE UP (ref 0.5–1.3)
CREAT SERPL-MCNC: 1.02 MG/DL — SIGNIFICANT CHANGE UP (ref 0.5–1.3)
CULTURE RESULTS: SIGNIFICANT CHANGE UP
GLUCOSE SERPL-MCNC: 96 MG/DL — SIGNIFICANT CHANGE UP (ref 70–99)
GLUCOSE SERPL-MCNC: 99 MG/DL — SIGNIFICANT CHANGE UP (ref 70–99)
HCT VFR BLD CALC: 37 % — SIGNIFICANT CHANGE UP (ref 34.5–45)
HDLC SERPL-MCNC: 54 MG/DL — SIGNIFICANT CHANGE UP
HGB BLD-MCNC: 11 G/DL — LOW (ref 11.5–15.5)
LIPID PNL WITH DIRECT LDL SERPL: 100 MG/DL — HIGH
MAGNESIUM SERPL-MCNC: 2.3 MG/DL — SIGNIFICANT CHANGE UP (ref 1.6–2.6)
MCHC RBC-ENTMCNC: 25.3 PG — LOW (ref 27–34)
MCHC RBC-ENTMCNC: 29.7 GM/DL — LOW (ref 32–36)
MCV RBC AUTO: 85.1 FL — SIGNIFICANT CHANGE UP (ref 80–100)
NON HDL CHOLESTEROL: 126 MG/DL — SIGNIFICANT CHANGE UP
PHOSPHATE SERPL-MCNC: 3.9 MG/DL — SIGNIFICANT CHANGE UP (ref 2.5–4.5)
PLATELET # BLD AUTO: 328 K/UL — SIGNIFICANT CHANGE UP (ref 150–400)
POTASSIUM SERPL-MCNC: 4.1 MMOL/L — SIGNIFICANT CHANGE UP (ref 3.5–5.3)
POTASSIUM SERPL-MCNC: 4.1 MMOL/L — SIGNIFICANT CHANGE UP (ref 3.5–5.3)
POTASSIUM SERPL-SCNC: 4.1 MMOL/L — SIGNIFICANT CHANGE UP (ref 3.5–5.3)
POTASSIUM SERPL-SCNC: 4.1 MMOL/L — SIGNIFICANT CHANGE UP (ref 3.5–5.3)
RBC # BLD: 4.35 M/UL — SIGNIFICANT CHANGE UP (ref 3.8–5.2)
RBC # FLD: 15.4 % — HIGH (ref 10.3–14.5)
SODIUM SERPL-SCNC: 140 MMOL/L — SIGNIFICANT CHANGE UP (ref 135–145)
SODIUM SERPL-SCNC: 141 MMOL/L — SIGNIFICANT CHANGE UP (ref 135–145)
SPECIMEN SOURCE: SIGNIFICANT CHANGE UP
TRIGL SERPL-MCNC: 128 MG/DL — SIGNIFICANT CHANGE UP
TROPONIN I SERPL-MCNC: 0.04 NG/ML — SIGNIFICANT CHANGE UP (ref 0.01–0.04)
WBC # BLD: 10.38 K/UL — SIGNIFICANT CHANGE UP (ref 3.8–10.5)
WBC # FLD AUTO: 10.38 K/UL — SIGNIFICANT CHANGE UP (ref 3.8–10.5)

## 2020-11-09 PROCEDURE — 97162 PT EVAL MOD COMPLEX 30 MIN: CPT | Mod: GP

## 2020-11-09 PROCEDURE — 99232 SBSQ HOSP IP/OBS MODERATE 35: CPT

## 2020-11-09 PROCEDURE — 93880 EXTRACRANIAL BILAT STUDY: CPT | Mod: 26

## 2020-11-09 PROCEDURE — 80048 BASIC METABOLIC PNL TOTAL CA: CPT

## 2020-11-09 PROCEDURE — 85027 COMPLETE CBC AUTOMATED: CPT

## 2020-11-09 PROCEDURE — 36415 COLL VENOUS BLD VENIPUNCTURE: CPT

## 2020-11-09 PROCEDURE — 93010 ELECTROCARDIOGRAM REPORT: CPT

## 2020-11-09 PROCEDURE — 99233 SBSQ HOSP IP/OBS HIGH 50: CPT

## 2020-11-09 PROCEDURE — 97116 GAIT TRAINING THERAPY: CPT | Mod: GP

## 2020-11-09 PROCEDURE — 93306 TTE W/DOPPLER COMPLETE: CPT | Mod: 26

## 2020-11-09 PROCEDURE — 83036 HEMOGLOBIN GLYCOSYLATED A1C: CPT

## 2020-11-09 PROCEDURE — 74177 CT ABD & PELVIS W/CONTRAST: CPT

## 2020-11-09 PROCEDURE — U0003: CPT

## 2020-11-09 PROCEDURE — 97530 THERAPEUTIC ACTIVITIES: CPT | Mod: GP

## 2020-11-09 RX ORDER — TRAMADOL HYDROCHLORIDE 50 MG/1
0 TABLET ORAL
Qty: 0 | Refills: 0 | DISCHARGE

## 2020-11-09 RX ORDER — ALPRAZOLAM 0.25 MG
1 TABLET ORAL
Qty: 0 | Refills: 0 | DISCHARGE

## 2020-11-09 RX ORDER — PREGABALIN 225 MG/1
1000 CAPSULE ORAL DAILY
Refills: 0 | Status: DISCONTINUED | OUTPATIENT
Start: 2020-11-09 | End: 2020-11-17

## 2020-11-09 RX ORDER — PANTOPRAZOLE SODIUM 20 MG/1
1 TABLET, DELAYED RELEASE ORAL
Qty: 0 | Refills: 0 | DISCHARGE

## 2020-11-09 RX ORDER — METOPROLOL TARTRATE 50 MG
50 TABLET ORAL DAILY
Refills: 0 | Status: DISCONTINUED | OUTPATIENT
Start: 2020-11-09 | End: 2020-11-14

## 2020-11-09 RX ADMIN — Medication 1000 UNIT(S): at 11:37

## 2020-11-09 RX ADMIN — POLYETHYLENE GLYCOL 3350 17 GRAM(S): 17 POWDER, FOR SOLUTION ORAL at 11:43

## 2020-11-09 RX ADMIN — HEPARIN SODIUM 5000 UNIT(S): 5000 INJECTION INTRAVENOUS; SUBCUTANEOUS at 21:16

## 2020-11-09 RX ADMIN — Medication 650 MILLIGRAM(S): at 11:41

## 2020-11-09 RX ADMIN — PANTOPRAZOLE SODIUM 40 MILLIGRAM(S): 20 TABLET, DELAYED RELEASE ORAL at 11:37

## 2020-11-09 RX ADMIN — Medication 40 MILLIGRAM(S): at 21:15

## 2020-11-09 RX ADMIN — Medication 650 MILLIGRAM(S): at 15:54

## 2020-11-09 RX ADMIN — Medication 0.25 MILLIGRAM(S): at 21:15

## 2020-11-09 RX ADMIN — Medication 75 MICROGRAM(S): at 05:38

## 2020-11-09 RX ADMIN — Medication 650 MILLIGRAM(S): at 21:16

## 2020-11-09 RX ADMIN — Medication 81 MILLIGRAM(S): at 11:37

## 2020-11-09 RX ADMIN — Medication 25 MILLIGRAM(S): at 11:37

## 2020-11-09 RX ADMIN — SENNA PLUS 2 TABLET(S): 8.6 TABLET ORAL at 21:15

## 2020-11-09 RX ADMIN — Medication 3 MILLIGRAM(S): at 21:15

## 2020-11-09 RX ADMIN — Medication 650 MILLIGRAM(S): at 22:15

## 2020-11-09 RX ADMIN — Medication 1 DROP(S): at 21:15

## 2020-11-09 RX ADMIN — ONDANSETRON 4 MILLIGRAM(S): 8 TABLET, FILM COATED ORAL at 05:38

## 2020-11-09 RX ADMIN — HEPARIN SODIUM 5000 UNIT(S): 5000 INJECTION INTRAVENOUS; SUBCUTANEOUS at 11:37

## 2020-11-09 RX ADMIN — Medication 650 MILLIGRAM(S): at 03:42

## 2020-11-09 RX ADMIN — Medication 650 MILLIGRAM(S): at 04:12

## 2020-11-09 NOTE — PROGRESS NOTE ADULT - ASSESSMENT
89 year old F with PMHx of Bladder CA, CAD, HTN, HLD and OH; has had evolving weakness; while walking her legs give way, it has resulted in frequent falls, was admitted to [facility] in Oct 2020, d/c to [facility]; at [facility] facility she has had epiopsdic lightheadedness and near syncope in addition to weakness, these occur when she is seated and not ambulating, she reports right side is weaker than left side, although right arm weakness is chronic.    MRI brain shows acute infarct in left corpus callosum    Acute Stroke  -Has paroxysmal a-fib but has been felt to be at risk for AC due to falls and bladder CA.   -Would eval with PT and if we can minimize risk of falls, would reconsider anticoagulation  -Continue aspirin for now  -Reports allergies to statins in the past. Can try alternative statin  -Carotid doppler report appreciated. Discussed with patient if she would consider carotid surgery and she states that at this time she would not.   -Physical therapy.    Will follow intermittently as needed.

## 2020-11-09 NOTE — PROVIDER CONTACT NOTE (CHANGE IN STATUS NOTIFICATION) - SITUATION
pt admitted for syncopal episode appears to be afib on tele-monitor; no documented hx of afib; pt stated she "felt like I was suffocating"

## 2020-11-09 NOTE — PROGRESS NOTE ADULT - ASSESSMENT
89 year old female w/ PMHx of CAD s/p stents x5, bladder cancer,  HTN, hypothyroidism, multiple falls and h/o syncope, orthostatic hypotension admitted for:       # Syncope, likely related to significant orthostatic hypotension, and probably symptomatic AFIB,  less likely 2/2 stroke   -C/w  tele: SR with 1st degree AVB, had episode of AFIB rate controlled   -  S/p IVF hydration  -monitor orthostatics  - echo pending   - D/c Nitrates   - Trop neg x 1  -D/w Dr Sánchez        #  RLE/RUE weakness 2/2 Acute L Corpus Callosum genu infarct   - MRI reviewed  - Had episode of AFIB overnight   - past EKGs reviewed, one from July 2020 read as AFIB  - C/w ASA, start statins   - lipid panel  - ECHO: pending   - Speech and swallow  - PT   - D/w DR Payne       * Paroxysmal AFIB   C/w tele   On Toprol, will titrate dose up in am    [id number] score is7, high risk.  Also high risk for bleeding due to age, deconditioning, multiple falls, and bladder CA   D/w Dr Sánchez, does  not recommend A/c due to high bleeding risk      #Elevated D-Dimer  -CTA chest negative for PE    #Progressive weakness, deconditioning =  -pt with 8 month history of intermittent weakness and falls  - unlikely related to acute stroke as its completely new and no old strokes on MRI   - TSH WNL  -  ESR/CRP min elevated  -  B12 low normal will start Po supplementation, folate WNL  - PT       # Bladder CA, with L iliac Lymphopathy,  not on TX  could contribute to weakness and poor appetite   supportive care     #Moderate Protein calorie malnutrition  Nutritionist consult appreciated     #CAD  - on Metoprolol    #Hx of Hypothyroidism   -on synthroid    # HTN  -c/w  metoprolol       # GERD  - on protonix        # Advanced directives  -DNR/DNI    #DVT ppx  -heparin sq

## 2020-11-09 NOTE — PROGRESS NOTE ADULT - SUBJECTIVE AND OBJECTIVE BOX
CC: Syncope (2020 11:37)    HPI: 89 year old female w/ PMHx of CAD s/p stents x5, bladder cancer,  HTN, hypothyroidism, multiple falls and h/o syncope, orthostatic hypotension presented to the ED after a witnessed syncopal episode at her facility. Patient does not recall event but endorses 8 month history of progressive weakness, frequent falls. Patient states both of her legs and arms are extremely weak and she has a hard time ambulating due to weakness. Patient voiced concerns at facility but was not able to see a specialist. Patient with intermittent headaches but denies any slurred speech, facial droop, visual changes, sob, nausea or vomiting.    In the ED patient with negative CTA chest and CT brain (2020 19:09)    INTERVAL HPI/ OVERNIGHT EVENTS:  Pt was seen and examined, feels fine this am, but was not feeling great overnight , " I had stomach upset"       Vital Signs Last 24 Hrs  T(C): 36.6 (2020 02:39), Max: 36.6 (2020 21:34)  T(F): 97.9 (2020 02:39), Max: 97.9 (2020 02:39)  HR: 97 (2020 08:30) (73 - 97)  BP: 164/64 (2020 08:30) (148/63 - 172/62)  RR: 18 (2020 08:30) (18 - 18)  SpO2: 98% (2020 08:30) (98% - 99%)      REVIEW OF SYSTEMS:   All other review of systems is negative unless indicated above.      PHYSICAL EXAM:  General: Well developed;  in no acute distress, looks weak   Eyes: PERRLA, EOMI; conjunctiva and sclera clear  Head: Normocephalic; atraumatic  ENMT: No nasal discharge; airway clear  Neck: Supple; non tender; no masses  Respiratory: No wheezes, rales or rhonchi  Cardiovascular: Regular rate and rhythm. S1 and S2 Normal; No murmurs  Gastrointestinal: Soft non-tender non-distended; Normal bowel sounds  Genitourinary: No  suprapubic  tenderness  Extremities: No  edema  Vascular: Peripheral pulses palpable 2+ bilaterally  Neurological: Alert and oriented x3, has R sided weakness ( but also has R frozen shoulder so difficult to asses) , speech fluid, no aphasia   Skin: Warm and dry. No acute rash  Lymph Nodes: No acute cervical adenopathy  Musculoskeletal: Normal muscle tone  Psychiatric: Cooperative    LABS:                           11.0   10.38 )-----------( 328      ( 2020 06:59 )             37.0     11-09    141  |  111<H>  |  22  ----------------------------<  96  4.1   |  25  |  0.90    Ca    9.0      2020 06:59  Phos  3.9     11-  Mg     2.3     11-    TPro  6.7  /  Alb  2.8<L>  /  TBili  0.4  /  DBili  x   /  AST  14<L>  /  ALT  17  /  AlkPhos  89  11-08    CARDIAC MARKERS ( 2020 03:03 )  0.041 ng/mL / x     / x     / x     / x          CARDIAC MARKERS ( 2020 12:26 )  0.031 ng/mL / x     / x     / x     / x                                10.9   8.59  )-----------( 319      ( 2020 07:31 )             36.7     2020 07:31    140    |  109    |  25     ----------------------------<  90     4.3     |  24     |  1.03     Ca    9.1        2020 07:31  Phos  4.4       2020 07:31  Mg     2.4       2020 07:31    TPro  6.7    /  Alb  2.8    /  TBili  0.4    /  DBili  x      /  AST  14     /  ALT  17     /  AlkPhos  89     2020 07:31  PT/INR - ( 2020 07:31 )   PT: 12.4 sec;   INR: 1.07 ratio    PTT - ( 2020 12:26 )  PTT:35.4 sec      LIVER FUNCTIONS - ( 2020 07:31 )  Alb: 2.8 g/dL / Pro: 6.7 gm/dL / ALK PHOS: 89 U/L / ALT: 17 U/L / AST: 14 U/L / GGT: x           Urinalysis Basic - ( 2020 12:48 )    Color: Pale Yellow / Appearance: Clear / S.005 / pH: x  Gluc: x / Ketone: Negative  / Bili: Negative / Urobili: Negative mg/dL   Blood: x / Protein: Negative mg/dL / Nitrite: Negative   Leuk Esterase: Negative / RBC: x / WBC x   Sq Epi: x / Non Sq Epi: x / Bacteria: x      MEDICATIONS  (STANDING):  artificial  tears Solution 1 Drop(s) Both EYES three times a day  aspirin enteric coated 81 milliGRAM(s) Oral daily  cholecalciferol 1000 Unit(s) Oral daily  heparin   Injectable 5000 Unit(s) SubCutaneous every 12 hours  levothyroxine 75 MICROGram(s) Oral daily  melatonin 3 milliGRAM(s) Oral at bedtime  metoprolol succinate ER 25 milliGRAM(s) Oral daily  pantoprazole    Tablet 40 milliGRAM(s) Oral daily  pravastatin 40 milliGRAM(s) Oral at bedtime  senna 2 Tablet(s) Oral at bedtime    MEDICATIONS  (PRN):  acetaminophen   Tablet .. 650 milliGRAM(s) Oral every 4 hours PRN Mild Pain (1 - 3)  ALPRAZolam 0.25 milliGRAM(s) Oral three times a day PRN anxiety  bisacodyl Suppository 10 milliGRAM(s) Rectal daily PRN Constipation  guaiFENesin   Syrup  (Sugar-Free) 100 milliGRAM(s) Oral every 6 hours PRN Cough  hydrALAZINE 25 milliGRAM(s) Oral every 6 hours PRN Systolic/Diastolic > 165/90  ondansetron Injectable 4 milliGRAM(s) IV Push every 6 hours PRN Nausea  polyethylene glycol 3350 17 Gram(s) Oral daily PRN Constipation  traMADol 25 milliGRAM(s) Oral three times a day PRN Severe Pain (7 - 10)        RADIOLOGY & ADDITIONAL TESTS:      EXAM:  MR BRAIN                       PROCEDURE DATE:  2020        FINDINGS:    There is subtle restricted diffusion with corresponding signal dropout on ADC maps and associated T2/STIR hyperintense cytotoxic edema in the left genu of the corpus callosum (series 5 images 46-48), findings consistent with evolving acute infarct. There is no evidence of associated hemorrhage.    There are scattered T2/FLAIR hyperintense changes in the . to the subcortical white matter, nonspecific finding, but mostlikely representing sequelae of moderate chronic microvascular ischemic disease.    There is cortical sulcal prominence related to underlying brain parenchymal volume.    There is no intracranial mass or hydrocephalus. There are no extra-axial fluid collections.    The visualized skull base flow voids appear patent.    The patient is status post bilateral lens replacement. There are mild mucosal inflammatory changes of the paranasal sinuses. There is trace right mastoid air effusion. The left mastoid air cells are clear. The visualized soft tissues and osseous structures appear unremarkable.    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 No hemorrhage.          EXAM:  CTA CHEST PE PROTOCOL (W)AW IC                        PROCEDURE DATE:  2020      INTERPRETATION:      Tubes/Lines: None    Mediastinum and Heart: Aorta and pulmonary arteries are normal in size. Thyroid gland is unremarkable. No lymphadenopathy. No pericardial effusion.    Lungs, Pleura, and Airways: There is no pulmonary embolus. No consolidations, edema, effusion, or pneumothorax. Minimal areas of high attenuation within the lungs bilaterally are likely due to expiratory phase of scanning. Minimal bibasilar reticulation is unchanged.    Visualized Abdomen: Unremarkable.    Bones and soft tissues: Unremarkable.    IMPRESSION:    No pulmonary embolus.

## 2020-11-09 NOTE — PROGRESS NOTE ADULT - SUBJECTIVE AND OBJECTIVE BOX
Interval History:  20: No new complaints    MEDICATIONS  (STANDING):  artificial  tears Solution 1 Drop(s) Both EYES three times a day  aspirin enteric coated 81 milliGRAM(s) Oral daily  cholecalciferol 1000 Unit(s) Oral daily  heparin   Injectable 5000 Unit(s) SubCutaneous every 12 hours  levothyroxine 75 MICROGram(s) Oral daily  melatonin 3 milliGRAM(s) Oral at bedtime  metoprolol succinate ER 25 milliGRAM(s) Oral daily  pantoprazole    Tablet 40 milliGRAM(s) Oral daily  pravastatin 40 milliGRAM(s) Oral at bedtime  senna 2 Tablet(s) Oral at bedtime    MEDICATIONS  (PRN):  acetaminophen   Tablet .. 650 milliGRAM(s) Oral every 4 hours PRN Mild Pain (1 - 3)  ALPRAZolam 0.25 milliGRAM(s) Oral three times a day PRN anxiety  bisacodyl Suppository 10 milliGRAM(s) Rectal daily PRN Constipation  guaiFENesin   Syrup  (Sugar-Free) 100 milliGRAM(s) Oral every 6 hours PRN Cough  hydrALAZINE 25 milliGRAM(s) Oral every 6 hours PRN Systolic/Diastolic > 165/90  ondansetron Injectable 4 milliGRAM(s) IV Push every 6 hours PRN Nausea  polyethylene glycol 3350 17 Gram(s) Oral daily PRN Constipation  traMADol 25 milliGRAM(s) Oral three times a day PRN Severe Pain (7 - 10)      Allergies    amlodipine (Unknown)  clonidine (Unknown)  Florinef Acetate (Unknown)  hydrocodone (Unknown)  Levatol (Unknown)  Lipitor (Unknown)  Lotrel (Unknown)  methylPREDNISolone (Unknown)  morphine (Other)  Plaquenil (Unknown)  statins (Other (Unknown))  sulfa drugs (Rash)    Intolerances        PHYSICAL EXAM:  Vital Signs Last 24 Hrs  T(F): 97.9 (20 @ 02:39)  HR: 97 (20 @ 08:30)  BP: 164/64 (20 @ 08:30)  RR: 18 (20 @ 08:30)    HF: A x O x 3. Appropriately interactive, normal affect. Speech fluent, No Aphasia or paraphasic errors. Naming /repetition intact   CN: RENE, EOMI, VFF, facial sensation normal, no NLFD, tongue midline, Palate moves equally, SCM equal bilaterally  Motor: No pronator drift, Right UE PROX 4-/5, Right LE 4+/5; Left side 5/5. No tremor, rigidity or bradykinesia.    Sens: Intact to light touch  Reflexes: Symmetric 1+, AJ 0, downgoing toes b/l  Coord:  No FNFA, dysmetria   Gait/Balance: Not tested    LABS:                        11.0   10.38 )-----------( 328      ( 2020 06:59 )             37.0     11    141  |  111<H>  |  22  ----------------------------<  96  4.1   |  25  |  0.90    Ca    9.0      2020 06:59  Phos  3.9       Mg     2.3         TPro  6.7  /  Alb  2.8<L>  /  TBili  0.4  /  DBili  x   /  AST  14<L>  /  ALT  17  /  AlkPhos  89  1108    PT/INR - ( 2020 07:31 )   PT: 12.4 sec;   INR: 1.07 ratio         PTT - ( 2020 12:26 )  PTT:35.4 sec  Urinalysis Basic - ( 2020 12:48 )    Color: Pale Yellow / Appearance: Clear / S.005 / pH: x  Gluc: x / Ketone: Negative  / Bili: Negative / Urobili: Negative mg/dL   Blood: x / Protein: Negative mg/dL / Nitrite: Negative   Leuk Esterase: Negative / RBC: x / WBC x   Sq Epi: x / Non Sq Epi: x / Bacteria: x        RADIOLOGY & ADDITIONAL STUDIES:    CT head 20: No acute hemorrhage mass or mass effect.    MRI head no contrast 20:   Evolving acute infarct in the left genu of the corpus callosum. No hemorrhage..    carotid ultrasound 20:   Mild to moderate plaque in the left carotid bulb and proximal left internal carotid artery with evaluation limited secondary to shadowing from the plaque and an elevated peak systolic velocity suggestive of a 50-69% stenosis.    Mild plaque in the right carotid bulb and proximal right internal carotid artery with less than 50% right internal carotid artery stenosis.    Mild to moderate mixed plaque in the left external carotid artery with an elevated peak systolic velocity.

## 2020-11-10 ENCOUNTER — TRANSCRIPTION ENCOUNTER (OUTPATIENT)
Age: 85
End: 2020-11-10

## 2020-11-10 LAB
A1C WITH ESTIMATED AVERAGE GLUCOSE RESULT: 6 % — HIGH (ref 4–5.6)
ESTIMATED AVERAGE GLUCOSE: 126 MG/DL — HIGH (ref 68–114)

## 2020-11-10 PROCEDURE — 99232 SBSQ HOSP IP/OBS MODERATE 35: CPT

## 2020-11-10 PROCEDURE — 99233 SBSQ HOSP IP/OBS HIGH 50: CPT

## 2020-11-10 RX ORDER — SODIUM CHLORIDE 9 MG/ML
500 INJECTION INTRAMUSCULAR; INTRAVENOUS; SUBCUTANEOUS
Refills: 0 | Status: COMPLETED | OUTPATIENT
Start: 2020-11-10 | End: 2020-11-10

## 2020-11-10 RX ORDER — HYDRALAZINE HCL 50 MG
25 TABLET ORAL ONCE
Refills: 0 | Status: COMPLETED | OUTPATIENT
Start: 2020-11-10 | End: 2020-11-10

## 2020-11-10 RX ORDER — MAGNESIUM HYDROXIDE 400 MG/1
30 TABLET, CHEWABLE ORAL DAILY
Refills: 0 | Status: DISCONTINUED | OUTPATIENT
Start: 2020-11-10 | End: 2020-11-17

## 2020-11-10 RX ORDER — METOPROLOL TARTRATE 50 MG
25 TABLET ORAL ONCE
Refills: 0 | Status: COMPLETED | OUTPATIENT
Start: 2020-11-10 | End: 2020-11-10

## 2020-11-10 RX ADMIN — Medication 24 MILLIGRAM(S): at 09:15

## 2020-11-10 RX ADMIN — Medication 1000 UNIT(S): at 09:15

## 2020-11-10 RX ADMIN — Medication 25 MILLIGRAM(S): at 16:38

## 2020-11-10 RX ADMIN — Medication 650 MILLIGRAM(S): at 18:15

## 2020-11-10 RX ADMIN — Medication 75 MICROGRAM(S): at 05:26

## 2020-11-10 RX ADMIN — HEPARIN SODIUM 5000 UNIT(S): 5000 INJECTION INTRAVENOUS; SUBCUTANEOUS at 09:15

## 2020-11-10 RX ADMIN — POLYETHYLENE GLYCOL 3350 17 GRAM(S): 17 POWDER, FOR SOLUTION ORAL at 09:14

## 2020-11-10 RX ADMIN — Medication 0.25 MILLIGRAM(S): at 23:55

## 2020-11-10 RX ADMIN — MAGNESIUM HYDROXIDE 30 MILLILITER(S): 400 TABLET, CHEWABLE ORAL at 16:37

## 2020-11-10 RX ADMIN — Medication 3 MILLIGRAM(S): at 21:37

## 2020-11-10 RX ADMIN — Medication 50 MILLIGRAM(S): at 09:15

## 2020-11-10 RX ADMIN — Medication 650 MILLIGRAM(S): at 16:38

## 2020-11-10 RX ADMIN — Medication 1 DROP(S): at 05:26

## 2020-11-10 RX ADMIN — PANTOPRAZOLE SODIUM 40 MILLIGRAM(S): 20 TABLET, DELAYED RELEASE ORAL at 09:15

## 2020-11-10 RX ADMIN — SENNA PLUS 2 TABLET(S): 8.6 TABLET ORAL at 21:37

## 2020-11-10 RX ADMIN — Medication 40 MILLIGRAM(S): at 21:36

## 2020-11-10 RX ADMIN — Medication 25 MILLIGRAM(S): at 18:12

## 2020-11-10 RX ADMIN — Medication 81 MILLIGRAM(S): at 09:15

## 2020-11-10 RX ADMIN — SODIUM CHLORIDE 75 MILLILITER(S): 9 INJECTION INTRAMUSCULAR; INTRAVENOUS; SUBCUTANEOUS at 16:39

## 2020-11-10 RX ADMIN — PREGABALIN 1000 MICROGRAM(S): 225 CAPSULE ORAL at 09:15

## 2020-11-10 RX ADMIN — HEPARIN SODIUM 5000 UNIT(S): 5000 INJECTION INTRAVENOUS; SUBCUTANEOUS at 21:36

## 2020-11-10 RX ADMIN — Medication 1 DROP(S): at 21:37

## 2020-11-10 RX ADMIN — Medication 650 MILLIGRAM(S): at 09:15

## 2020-11-10 NOTE — DISCHARGE NOTE NURSING/CASE MANAGEMENT/SOCIAL WORK - PATIENT PORTAL LINK FT
You can access the FollowMyHealth Patient Portal offered by [facility] by registering at the following website: [email or web address]. By joining [employer]’s FollowMyHealth portal, you will also be able to view your health information using other applications (apps) compatible with our system.

## 2020-11-10 NOTE — PROGRESS NOTE ADULT - ASSESSMENT
89 year old F with PMHx of Bladder CA, CAD, HTN, HLD and OH; has had evolving weakness; while walking her legs give way, it has resulted in frequent falls, was admitted to [facility] in Oct 2020, d/c to [facility]; at [facility] facility she has had epiopsdic lightheadedness and near syncope in addition to weakness, these occur when she is seated and not ambulating, she reports right side is weaker than left side, although right arm weakness is chronic.    MRI brain shows acute infarct in left corpus callosum    Acute Stroke  -Has paroxysmal a-fib but has been felt to be at risk for AC due to falls and bladder CA.   -Needs PT   -Continue aspirin for now  -Reports allergies to statins in the past. Started on pravastatin  -Carotid doppler report appreciated. Discussed with patient if she would consider carotid surgery and she states that at this time she would not.   -Physical therapy.  -Dr. Sánchez's note appreciated    Dizziness/Syncope  -Has had documented orthostatic hypotension, however, some events occurring while sitting.      Will follow intermittently as needed.

## 2020-11-10 NOTE — CONSULT NOTE ADULT - SUBJECTIVE AND OBJECTIVE BOX
89 year old female patient with pertinent history of orthostatic hypotension presented to the ED after a witnessed syncopal episode at her facility. Patient does not recall event but endorses 8 month history of progressive weakness, frequent falls. Patient states both of her legs and arms are extremely weak and she has a hard time ambulating due to weakness. Patient voiced concerns at facility but was not able to see a specialist. Patient with intermittent headaches but denies any slurred speech, facial droop, visual changes, sob, nausea or vomiting.  In the ED patient with negative CTA chest and CT brain (07 Nov 2020 19:09)    11/10/2020  she describes the incident that she was sitting in a chair and had the syncopal episode.   very weak, although this has been a complaint for many years.   her arthritis in the hands is worse and very painful.   On telemetry, there was a few episodes of paroxysmal Afib although there are runs with clear p waves.   Yesterday, was sitting in a chair for 2 hours and then requested back to bed. She hasn't been walking yet.   Carotid US shows mild-moderate plaque.   Echo essentially normal.   bladder cancer is not being treated at this time. She seemed to end up in the hospital after each chemo treatment, with an infection.       PAST MEDICAL & SURGICAL HISTORY:  Orthostatic hypotension    Bladder cancer    Endometrial adenocarcinoma    Macular degeneration    Stented coronary artery    Hypothyroid    Hyperlipidemia    HTN (hypertension)    GERD (gastroesophageal reflux disease)    CAD (coronary artery disease)    [place] (hard of hearing)    History of bladder surgery    S/P EMILY (total abdominal hysterectomy)    S/P hernia repair  umbilical - 2008    S/P laparoscopic cholecystectomy  2008    Ankle fracture, right  surgery 25 yrs ago - hardware removed    S/P coronary angiogram  2005, 2008, 2011 - drug eluding stents      MEDICATIONS  (STANDING):  artificial  tears Solution 1 Drop(s) Both EYES three times a day  aspirin enteric coated 81 milliGRAM(s) Oral daily  cholecalciferol 1000 Unit(s) Oral daily  cyanocobalamin 1000 MICROGram(s) Oral daily  heparin   Injectable 5000 Unit(s) SubCutaneous every 12 hours  levothyroxine 75 MICROGram(s) Oral daily  melatonin 3 milliGRAM(s) Oral at bedtime  metoprolol succinate ER 50 milliGRAM(s) Oral daily  pantoprazole    Tablet 40 milliGRAM(s) Oral daily  pravastatin 40 milliGRAM(s) Oral at bedtime  senna 2 Tablet(s) Oral at bedtime    MEDICATIONS  (PRN):  acetaminophen   Tablet .. 650 milliGRAM(s) Oral every 4 hours PRN Mild Pain (1 - 3)  ALPRAZolam 0.25 milliGRAM(s) Oral three times a day PRN anxiety  bisacodyl Suppository 10 milliGRAM(s) Rectal daily PRN Constipation  guaiFENesin   Syrup  (Sugar-Free) 100 milliGRAM(s) Oral every 6 hours PRN Cough  hydrALAZINE 25 milliGRAM(s) Oral every 6 hours PRN Systolic/Diastolic > 165/90  ondansetron Injectable 4 milliGRAM(s) IV Push every 6 hours PRN Nausea  polyethylene glycol 3350 17 Gram(s) Oral daily PRN Constipation  traMADol 25 milliGRAM(s) Oral three times a day PRN Severe Pain (7 - 10)    Allergies    amlodipine (Unknown)  clonidine (Unknown)  Florinef Acetate (Unknown)  hydrocodone (Unknown)  Levatol (Unknown)  Lipitor (Unknown)  Lotrel (Unknown)  methylPREDNISolone (Unknown)  morphine (Other)  Plaquenil (Unknown)  statins (Other (Unknown))  sulfa drugs (Rash)    Intolerances      FAMILY HISTORY:  Family history of brain cancer          REVIEW OF SYSTEMS:    CONSTITUTIONAL: No weakness, fevers or chills  EYES/ENT: No visual changes;  No vertigo or throat pain   NECK: No pain or stiffness  RESPIRATORY: No cough, wheezing, hemoptysis; No shortness of breath  CARDIOVASCULAR: No chest pain or palpitations  GASTROINTESTINAL: No abdominal or epigastric pain. No nausea, vomiting, or hematemesis; No diarrhea or constipation. No melena or hematochezia.  GENITOURINARY: No dysuria, frequency or hematuria  NEUROLOGICAL: No numbness or weakness  SKIN: No itching, burning, rashes, or lesions   All other review of systems is negative unless indicated above      PHYSICAL EXAM:  Daily     Daily   Vital Signs Last 24 Hrs  T(C): 36.4 (10 Nov 2020 05:25), Max: 36.6 (09 Nov 2020 22:48)  T(F): 97.5 (10 Nov 2020 05:25), Max: 97.8 (09 Nov 2020 22:48)  HR: 73 (09 Nov 2020 22:48) (73 - 97)  BP: 138/46 (09 Nov 2020 22:48) (135/54 - 164/64)  BP(mean): --  RR: 16 (10 Nov 2020 05:25) (16 - 18)  SpO2: 99% (10 Nov 2020 05:25) (98% - 99%)    Constitutional: NAD, awake and alert, well-developed  HEENT: PERR, EOMI, Normal Hearing, MMM  Neck: Soft and supple, No LAD, No JVD  Respiratory: Breath sounds are clear bilaterally, No wheezing, rales or rhonchi  Cardiovascular: S1 and S2, regular rate and rhythm, no Murmurs, gallops or rubs  Gastrointestinal: Bowel Sounds present, soft, nontender, nondistended, no guarding, no rebound  Extremities: No peripheral edema  Vascular: 2+ peripheral pulses  Neurological: A/O x 3, no focal deficits  Musculoskeletal: 5/5 strength b/l upper and lower extremities  Skin: No rashes    LABS: All Labs Reviewed:                        11.0   10.38 )-----------( 328      ( 09 Nov 2020 06:59 )             37.0     11-09    141  |  111<H>  |  22  ----------------------------<  96  4.1   |  25  |  0.90    Ca    9.0      09 Nov 2020 06:59  Phos  3.9     11-09  Mg     2.3     11-09        CARDIAC MARKERS ( 09 Nov 2020 03:03 )  0.041 ng/mL / x     / x     / x     / x          Blood Culture: Organism --  Gram Stain Blood -- Gram Stain --  Specimen Source .Urine Clean Catch (Midstream)  Culture-Blood --        RADIOLOGY: < from: US Duplex Carotid Arteries Complete, Bilateral (11.09.20 @ 09:12) >  EXAM:  US DPLX CAROTIDS COMPL BI                            PROCEDURE DATE:  11/09/2020          INTERPRETATION:  CLINICAL INFORMATION: Syncope. Acute CVA.    COMPARISON: None available.    TECHNIQUE: Grayscale, color and spectral Doppler examination of both carotid arteries was performed.    FINDINGS:    Mild plaque in the right carotid bulb and proximal right internal carotid artery. Peak systolic velocities in the right internal carotid artery are within normal limits.    Mild to moderate plaque in the left carotid bulb and proximal left internal carotid artery. Evaluation of left internal carotid artery was limited secondary to shadowing from the plaque. Elevated peak systolic velocity in the mid left internal carotid artery of 157 cm/s.    Mild to moderate mixed plaque in the left external carotid artery. Elevated peak systolic velocity in the left external carotid artery.    Intimal thickening is noted in the common carotid arteries.    Blood flow velocities are as follows:    RIGHT:  PROX CCA = 56 ;  DIST CCA = 84 ;  PROX ICA = 90 ;  DIST ICA = 114 ;  ECA = 142    LEFT:  PROX CCA = 98 ;  DIST CCA = 90 ;  PROX ICA = 83 ;  DIST ICA = 114 ;  ECA = 217    Antegrade flow is noted within both vertebral arteries.    IMPRESSION:    Mild to moderate plaque in the left carotid bulb and proximal left internal carotid artery with evaluation limited secondary to shadowing from the plaque and an elevated peak systolic velocity suggestive of a 50-69% stenosis.    Mild plaque in the right carotid bulb and proximal right internal carotid artery with less than 50% right internal carotid artery stenosis.    Mild to moderate mixed plaque in the left external carotid artery with an elevated peak systolic velocity.    Measurement of carotid stenosis is based on velocity parameters that correlate the residual internal carotid diameter with that of the more distal vessel in accordance with a method such as the [employer] Symptomatic Carotid Endarterectomy Trial (NASCET).    < end of copied text >  < from: MR Head No Cont (11.08.20 @ 08:58) >  EXAM:  MR BRAIN                            PROCEDURE DATE:  11/08/2020          INTERPRETATION:  CLINICAL INDICATION: Weakness and frequent falls.    TECHNIQUE: Multi-planar multi-sequential MR imaging of the brain was performed without intravenouscontrast.    COMPARISON: MRI brain 1/14/2017. CT head 11/7/2020.    FINDINGS:    There is subtle restricted diffusion with corresponding signal dropout on ADC maps and associated T2/STIR hyperintense cytotoxic edema in the left genu of the corpus callosum (series 5 images 46-48), findings consistent with evolving acute infarct. There is no evidence of associated hemorrhage.    There are scattered T2/FLAIR hyperintense changes in the . to the subcortical white matter, nonspecific finding, but mostlikely representing sequelae of moderate chronic microvascular ischemic disease.    There is cortical sulcal prominence related to underlying brain parenchymal volume.    There is no intracranial mass or hydrocephalus. There are no extra-axial fluid collections.    The visualized skull base flow voids appear patent.    The patient is status post bilateral lens replacement. There are mild mucosal inflammatory changes of the paranasal sinuses. There is trace right mastoid air effusion. The left mastoid air cells are clear. The visualized soft tissues and osseous structures appear unremarkable.    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 No hemorrhage..    < end of copied text >  < from: CT Angio Chest PE Protocol w/ IV Cont (11.07.20 @ 14:24) >  EXAM:  CTA CHEST PE PROTOCOL (W)AW IC                            PROCEDURE DATE:  11/07/2020          INTERPRETATION:  Reason for Exam: Shortness of breath. Bladder cancer.    CTA of the chest was performed from the thoracic inlet to the level of the adrenal glands following IV contrast injection of  80 cc of Omnipaque 350. No immediate complications were reported.  MIP images were also created and reviewed.    Comparison: September 28, 2020    Tubes/Lines: None    Mediastinum and Heart: Aorta and pulmonary arteries are normal in size. Thyroid gland is unremarkable. No lymphadenopathy. No pericardial effusion.    Lungs, Pleura, and Airways: There is no pulmonary embolus. No consolidations, edema, effusion, or pneumothorax. Minimal areas of high attenuation within the lungs bilaterally are likely due to expiratory phase of scanning. Minimal bibasilar reticulation is unchanged.    Visualized Abdomen: Unremarkable.    Bones and soft tissues: Unremarkable.    IMPRESSION:    No pulmonary embolus.    < end of copied text >    EKG: NSR    CARDIOLOGY TESTING:  < from: TTE Echo Complete w/o Contrast w/ Doppler (11.09.20 @ 09:30) >   EXAM:  ECHO TTE WO CON COMP W DOPP         PROCEDURE DATE:  11/09/2020        INTERPRETATION:  Transthoracic Echocardiography Report (TTE)     Demographics     Patient name        DAVIDA MIRZA  Age           89 year(s)     Med Rec #           [id number]         Gender        Female     Account #           [id number]      Date of Birth 11/24/1926     Interpreting        Mike Caruso MD  Room Number   0303   Physician     Referring Physician Mike Barrios      Sonographer   Camron Escoto                                                       [facility]     Date of study       11/09/2020 08:19                       AM     Height                                Weight    Type of Study:     TTE procedure: ECHO TTE WO CON COMP W DOP     BP: 153/67 mmHg     Study Location: 3NT[employer]chnical Quality: Fair    Indications   1) R55 - Syncope and collapse    M-Mode Measurements (cm)     LVEDd: 4.8 cm             LVESd: 2.98 cm   IVSEd: 1.06 cm   LVPWd: 0.99 cm            AO Root Dimension: 2.9 cm                          ACS: 1.7 cm                             LA: 3.9 cm    Doppler Measurements:     AV Velocity:179 cm/s   AV Peak Gradient: 12.82 mmHg     TR Velocity:162 cm/s   TR Gradient:10.4976 mmHg   Estimated RAP:5 mmHg   RVSP:15 mmHg     Findings     Mitral Valve   Fibrocalcific changes noted to the mitral valve leaflets with preserved   leaflet excursion. Mitral annular calcification.   Mild mitral regurgitation.     Aortic Valve   The aortic valve appears mildly sclerotic. Valve opening seems to be   normal.     Tricuspid Valve   Normal appearing tricuspid valve structure and function.   Trace tricuspid valve regurgitation is present.     Pulmonic Valve   Pulmonic valve not well seen, probably normal pulmonic valve function.    Left Atrium   The left atrium is normal.     Left Ventricle   Left ventricular wall motion is normal. Estimated left ventricular   ejection fraction is 65-70 %.     Right Atrium   Normal appearing right atrium.     Right Ventricle   Normal appearing right ventricle structure and function.     Pericardial Effusion   No evidence of pericardial effusion.     Pleural Effusion   No evidence of pleural effusion.     Miscellaneous   All visualized extra cardiac structures appears to be normal.     Impression     Summary     Left ventricular wall motion is normal. Estimated left ventricular   ejection fraction is 65-70 %.   The left atrium is normal.   Normal appearing right atrium.   Normal appearing right ventricle structure and function.   The aortic valve appears mildly sclerotic. Valve opening seems to be   normal.   Fibrocalcific changes noted to the mitral valve leaflets with preserved   leaflet excursion. Mitral annular calcification.   Mild mitral regurgitation.   Normal appearing tricuspid valve structure and function.   Trace tricuspid valve regurgitation is present.   Pulmonic valve not well seen, probably normal pulmonic valve function.   No evidence of pericardial effusion.   All visualized extra cardiac structures appears to benormal.    < end of copied text >

## 2020-11-10 NOTE — PROVIDER CONTACT NOTE (MEDICATION) - SITUATION
Contacted Dr Jaquez about /85, pt asymptomatic. Was instructed to call Dr Sánchez because she is controlling her blood pressure. Dr Sánchez instructed to keep fluids going and to give 25mg PO Hydralazine.

## 2020-11-10 NOTE — PROGRESS NOTE ADULT - SUBJECTIVE AND OBJECTIVE BOX
Interval History:  11/10/20: Patient sitting in chair and reports feeling sick. Reports nausea and generalized weakness.    MEDICATIONS  (STANDING):  artificial  tears Solution 1 Drop(s) Both EYES three times a day  aspirin enteric coated 81 milliGRAM(s) Oral daily  cholecalciferol 1000 Unit(s) Oral daily  cyanocobalamin 1000 MICROGram(s) Oral daily  heparin   Injectable 5000 Unit(s) SubCutaneous every 12 hours  levothyroxine 75 MICROGram(s) Oral daily  melatonin 3 milliGRAM(s) Oral at bedtime  methylPREDNISolone   Oral   metoprolol succinate ER 50 milliGRAM(s) Oral daily  pantoprazole    Tablet 40 milliGRAM(s) Oral daily  pravastatin 40 milliGRAM(s) Oral at bedtime  senna 2 Tablet(s) Oral at bedtime    MEDICATIONS  (PRN):  acetaminophen   Tablet .. 650 milliGRAM(s) Oral every 4 hours PRN Mild Pain (1 - 3)  ALPRAZolam 0.25 milliGRAM(s) Oral three times a day PRN anxiety  bisacodyl Suppository 10 milliGRAM(s) Rectal daily PRN Constipation  guaiFENesin   Syrup  (Sugar-Free) 100 milliGRAM(s) Oral every 6 hours PRN Cough  magnesium hydroxide Suspension 30 milliLiter(s) Oral daily PRN Constipation  ondansetron Injectable 4 milliGRAM(s) IV Push every 6 hours PRN Nausea  polyethylene glycol 3350 17 Gram(s) Oral daily PRN Constipation  traMADol 25 milliGRAM(s) Oral three times a day PRN Severe Pain (7 - 10)      Allergies    amlodipine (Unknown)  clonidine (Unknown)  Florinef Acetate (Unknown)  hydrocodone (Unknown)  Levatol (Unknown)  Lipitor (Unknown)  Lotrel (Unknown)  methylPREDNISolone (Unknown)  morphine (Other)  Plaquenil (Unknown)  statins (Other (Unknown))  sulfa drugs (Rash)    Intolerances        PHYSICAL EXAM:  Vital Signs Last 24 Hrs  T(F): 98 (11-10-20 @ 09:00)  HR: 76 (11-10-20 @ 09:00)  BP: 150/50 (11-10-20 @ 09:00)  RR: 16 (11-10-20 @ 09:00)    GENERAL: NAD, well-groomed, well-developed  HF: A x O x 3. Appropriately interactive, normal affect. Speech fluent, No Aphasia or paraphasic errors. Naming /repetition intact   CN: RENE, EOMI, VFF, facial sensation normal, no NLFD, tongue midline, Palate moves equally, SCM equal bilaterally  Motor: No pronator drift, Right UE PROX 4-/5, Right LE 4+/5; Left side 5/5. No tremor, rigidity or bradykinesia.    Sens: Intact to light touch  Reflexes: Symmetric 1+, AJ 0, downgoing toes b/l  Coord:  No FNFA, dysmetria   Gait/Balance: Not tested      LABS:                        11.0   10.38 )-----------( 328      ( 09 Nov 2020 06:59 )             37.0     11-09    141  |  111<H>  |  22  ----------------------------<  96  4.1   |  25  |  0.90    Ca    9.0      09 Nov 2020 06:59  Phos  3.9     11-09  Mg     2.3     11-09            RADIOLOGY & ADDITIONAL STUDIES:    CT head 11/7/20: No acute hemorrhage mass or mass effect.    MRI head no contrast 11/8/20:   Evolving acute infarct in the left genu of the corpus callosum. No hemorrhage..    carotid ultrasound 11/9/20:   Mild to moderate plaque in the left carotid bulb and proximal left internal carotid artery with evaluation limited secondary to shadowing from the plaque and an elevated peak systolic velocity suggestive of a 50-69% stenosis.    Mild plaque in the right carotid bulb and proximal right internal carotid artery with less than 50% right internal carotid artery stenosis.    Mild to moderate mixed plaque in the left external carotid artery with an elevated peak systolic velocity.

## 2020-11-10 NOTE — PROGRESS NOTE ADULT - ASSESSMENT
89 year old female w/ PMHx of CAD s/p stents x5, bladder cancer,  HTN, hypothyroidism, multiple falls and h/o syncope, orthostatic hypotension admitted for:       # Syncope, likely related to significant orthostatic hypotension, and probably symptomatic AFIB,  less likely 2/2 stroke   -C/w  tele: SR with 1st degree AVB, had episode of AFIB rate controlled   -  S/p IVF hydration  -monitor orthostatics  - echo pending   - D/c Nitrates   - Trop neg x 1  -D/w Dr Sánchez        #  RLE/RUE weakness 2/2 Acute L Corpus Callosum genu infarct   - MRI reviewed  - Had episode of AFIB overnight   - past EKGs reviewed, one from July 2020 read as AFIB  - C/w ASA, start statins   - lipid panel  - ECHO: pending   - Speech and swallow  - PT   - D/w DR Payne       * Paroxysmal AFIB   C/w tele   On Toprol, will titrate dose up in am    [id number] score is7, high risk.  Also high risk for bleeding due to age, deconditioning, multiple falls, and bladder CA   D/w Dr Sánchez, does  not recommend A/c due to high bleeding risk      #Elevated D-Dimer  -CTA chest negative for PE    #Progressive weakness, deconditioning =  -pt with 8 month history of intermittent weakness and falls  - unlikely related to acute stroke as its completely new and no old strokes on MRI   - TSH WNL  -  ESR/CRP min elevated  -  B12 low normal will start Po supplementation, folate WNL  - PT       # Bladder CA, with L iliac Lymphopathy,  not on TX  could contribute to weakness and poor appetite   supportive care     #Moderate Protein calorie malnutrition  Nutritionist consult appreciated     #CAD  - on Metoprolol    #Hx of Hypothyroidism   -on synthroid    # HTN  -c/w  metoprolol       # GERD  - on protonix        # Advanced directives  -DNR/DNI    #DVT ppx  -heparin sq 89 year old female w/ PMHx of CAD s/p stents x5, bladder cancer,  HTN, hypothyroidism, multiple falls and h/o syncope, orthostatic hypotension admitted for:       # Syncope, likely related to significant orthostatic hypotension, and probably symptomatic AFIB,  less likely 2/2 stroke   -C/w  tele: SR with 1st degree AVB, had episode of AFIB rate controlled   - will give gentle IVF 500ml bolus   -monitor orthostatics, still positive   - echo: EF 65-70%  - Off  Nitrates and hydralazine   - Trop neg x 1  -D/w Dr Sánchez  possible florinef vs midodrine, recommends IVF and Florinef.       #  RLE/RUE weakness 2/2 Acute L Corpus Callosum genu infarct   - MRI reviewed  - Had episode of AFIB overnight   - past EKGs reviewed, one from July 2020 read as AFIB  - C/w ASA, start statins   - lipid panel  - ECHO: preserved EF   - Speech and swallow  - PT   - D/w DR Payne       * Paroxysmal AFIB   C/w tele   On Toprol, will titrate dose up in am    [id number] score is7, high risk.  Also high risk for bleeding due to age, deconditioning, multiple falls, and bladder CA   D/w Dr Sánchez, does  not recommend A/c due to high bleeding risk      #Elevated D-Dimer  -CTA chest negative for PE    #Progressive weakness, deconditioning   -pt with 8 month history of intermittent weakness and falls  - unlikely related to acute stroke as its completely new and no old strokes on MRI   - TSH WNL  -  ESR/CRP min elevated  -  B12 low normal will start Po supplementation, folate WNL  - PT       # Arthralgia   Started on Medrol dose pack as per cardio  Monitor response   Doubt rheum Dz as ESR nit significantly elevated     # Bladder CA, with L iliac Lymphopathy,  not on TX  could contribute to weakness and poor appetite   supportive care     #Moderate Protein calorie malnutrition  Nutritionist consult appreciated     #CAD  - on Metoprolol, ASA    #Hx of Hypothyroidism   -on synthroid    # HTN  -c/w  metoprolol   - BP elevated, will hold off on Florinef for now       # GERD  - on protonix      # Advanced directives  -DNR/DNI    #DVT ppx  -heparin sq

## 2020-11-10 NOTE — PROGRESS NOTE ADULT - SUBJECTIVE AND OBJECTIVE BOX
CC: Syncope (2020 11:37)    HPI: 89 year old female w/ PMHx of CAD s/p stents x5, bladder cancer,  HTN, hypothyroidism, multiple falls and h/o syncope, orthostatic hypotension presented to the ED after a witnessed syncopal episode at her facility. Patient does not recall event but endorses 8 month history of progressive weakness, frequent falls. Patient states both of her legs and arms are extremely weak and she has a hard time ambulating due to weakness. Patient voiced concerns at facility but was not able to see a specialist. Patient with intermittent headaches but denies any slurred speech, facial droop, visual changes, sob, nausea or vomiting.    In the ED patient with negative CTA chest and CT brain (2020 19:09)    INTERVAL HPI/ OVERNIGHT EVENTS:  Pt was seen and examined, no new complains. OOB to chair, but was not able to walk with PT due to weakness.     Vital Signs Last 24 Hrs  T(C): 36.7 (10 Nov 2020 15:20), Max: 36.7 (10 Nov 2020 09:00)  T(F): 98.1 (10 Nov 2020 15:20), Max: 98.1 (10 Nov 2020 15:20)  HR: 74 (10 Nov 2020 15:22) (73 - 77)  BP: 191/77 (10 Nov 2020 15:22) (138/46 - 191/77)  RR: 16 (10 Nov 2020 09:00) (16 - 16)  SpO2: 97% (10 Nov 2020 15:20) (97% - 99%)      REVIEW OF SYSTEMS:   All other review of systems is negative unless indicated above.      PHYSICAL EXAM:  General: Well developed;  in no acute distress, looks weak   Eyes: PERRLA, EOMI; conjunctiva and sclera clear  Head: Normocephalic; atraumatic  ENMT: No nasal discharge; airway clear  Neck: Supple; non tender; no masses  Respiratory: No wheezes, rales or rhonchi  Cardiovascular: Regular rate and rhythm. S1 and S2 Normal; No murmurs  Gastrointestinal: Soft non-tender non-distended; Normal bowel sounds  Genitourinary: No  suprapubic  tenderness  Extremities: No  edema  Vascular: Peripheral pulses palpable 2+ bilaterally  Neurological: Alert and oriented x3, has R sided weakness ( but also has R frozen shoulder so difficult to asses) , speech fluid, no aphasia   Skin: Warm and dry. No acute rash  Lymph Nodes: No acute cervical adenopathy  Musculoskeletal: Normal muscle tone  Psychiatric: Cooperative    LABS:                         11.0   10.38 )-----------( 328      ( 2020 06:59 )             37.0     11-09    141  |  111<H>  |  22  ----------------------------<  96  4.1   |  25  |  0.90    Ca    9.0      2020 06:59  Phos  3.9     11-09  Mg     2.3     11-09      CARDIAC MARKERS ( 2020 03:03 )  0.041 ng/mL / x     / x     / x     / x                            11.0   10.38 )-----------( 328      ( 2020 06:59 )             37.0     11-09    141  |  111<H>  |  22  ----------------------------<  96  4.1   |  25  |  0.90    Ca    9.0      2020 06:59  Phos  3.9     11-09  Mg     2.3     11-09    TPro  6.7  /  Alb  2.8<L>  /  TBili  0.4  /  DBili  x   /  AST  14<L>  /  ALT  17  /  AlkPhos  89  11-08    CARDIAC MARKERS ( 2020 03:03 )  0.041 ng/mL / x     / x     / x     / x          CARDIAC MARKERS ( 2020 12:26 )  0.031 ng/mL / x     / x     / x     / x                                10.9   8.59  )-----------( 319      ( 2020 07:31 )             36.7     2020 07:31    140    |  109    |  25     ----------------------------<  90     4.3     |  24     |  1.03     Ca    9.1        2020 07:31  Phos  4.4       2020 07:31  Mg     2.4       2020 07:31    TPro  6.7    /  Alb  2.8    /  TBili  0.4    /  DBili  x      /  AST  14     /  ALT  17     /  AlkPhos  89     2020 07:31  PT/INR - ( 2020 07:31 )   PT: 12.4 sec;   INR: 1.07 ratio    PTT - ( 2020 12:26 )  PTT:35.4 sec      LIVER FUNCTIONS - ( 2020 07:31 )  Alb: 2.8 g/dL / Pro: 6.7 gm/dL / ALK PHOS: 89 U/L / ALT: 17 U/L / AST: 14 U/L / GGT: x           Urinalysis Basic - ( 2020 12:48 )    Color: Pale Yellow / Appearance: Clear / S.005 / pH: x  Gluc: x / Ketone: Negative  / Bili: Negative / Urobili: Negative mg/dL   Blood: x / Protein: Negative mg/dL / Nitrite: Negative   Leuk Esterase: Negative / RBC: x / WBC x   Sq Epi: x / Non Sq Epi: x / Bacteria: x      MEDICATIONS  (STANDING):  artificial  tears Solution 1 Drop(s) Both EYES three times a day  aspirin enteric coated 81 milliGRAM(s) Oral daily  cholecalciferol 1000 Unit(s) Oral daily  heparin   Injectable 5000 Unit(s) SubCutaneous every 12 hours  levothyroxine 75 MICROGram(s) Oral daily  melatonin 3 milliGRAM(s) Oral at bedtime  metoprolol succinate ER 25 milliGRAM(s) Oral daily  pantoprazole    Tablet 40 milliGRAM(s) Oral daily  pravastatin 40 milliGRAM(s) Oral at bedtime  senna 2 Tablet(s) Oral at bedtime    MEDICATIONS  (PRN):  acetaminophen   Tablet .. 650 milliGRAM(s) Oral every 4 hours PRN Mild Pain (1 - 3)  ALPRAZolam 0.25 milliGRAM(s) Oral three times a day PRN anxiety  bisacodyl Suppository 10 milliGRAM(s) Rectal daily PRN Constipation  guaiFENesin   Syrup  (Sugar-Free) 100 milliGRAM(s) Oral every 6 hours PRN Cough  hydrALAZINE 25 milliGRAM(s) Oral every 6 hours PRN Systolic/Diastolic > 165/90  ondansetron Injectable 4 milliGRAM(s) IV Push every 6 hours PRN Nausea  polyethylene glycol 3350 17 Gram(s) Oral daily PRN Constipation  traMADol 25 milliGRAM(s) Oral three times a day PRN Severe Pain (7 - 10)        RADIOLOGY & ADDITIONAL TESTS:      EXAM:  MR BRAIN                       PROCEDURE DATE:  2020        FINDINGS:    There is subtle restricted diffusion with corresponding signal dropout on ADC maps and associated T2/STIR hyperintense cytotoxic edema in the left genu of the corpus callosum (series 5 images 46-48), findings consistent with evolving acute infarct. There is no evidence of associated hemorrhage.    There are scattered T2/FLAIR hyperintense changes in the . to the subcortical white matter, nonspecific finding, but mostlikely representing sequelae of moderate chronic microvascular ischemic disease.    There is cortical sulcal prominence related to underlying brain parenchymal volume.    There is no intracranial mass or hydrocephalus. There are no extra-axial fluid collections.    The visualized skull base flow voids appear patent.    The patient is status post bilateral lens replacement. There are mild mucosal inflammatory changes of the paranasal sinuses. There is trace right mastoid air effusion. The left mastoid air cells are clear. The visualized soft tissues and osseous structures appear unremarkable.    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 No hemorrhage.          EXAM:  CTA CHEST PE PROTOCOL (W)AW IC                        PROCEDURE DATE:  2020      INTERPRETATION:      Tubes/Lines: None    Mediastinum and Heart: Aorta and pulmonary arteries are normal in size. Thyroid gland is unremarkable. No lymphadenopathy. No pericardial effusion.    Lungs, Pleura, and Airways: There is no pulmonary embolus. No consolidations, edema, effusion, or pneumothorax. Minimal areas of high attenuation within the lungs bilaterally are likely due to expiratory phase of scanning. Minimal bibasilar reticulation is unchanged.    Visualized Abdomen: Unremarkable.    Bones and soft tissues: Unremarkable.    IMPRESSION:    No pulmonary embolus.     CC: Syncope (2020 11:37)    HPI: 89 year old female w/ PMHx of CAD s/p stents x5, bladder cancer,  HTN, hypothyroidism, multiple falls and h/o syncope, orthostatic hypotension presented to the ED after a witnessed syncopal episode at her facility. Patient does not recall event but endorses 8 month history of progressive weakness, frequent falls. Patient states both of her legs and arms are extremely weak and she has a hard time ambulating due to weakness. Patient voiced concerns at facility but was not able to see a specialist. Patient with intermittent headaches but denies any slurred speech, facial droop, visual changes, sob, nausea or vomiting.    In the ED patient with negative CTA chest and CT brain (2020 19:09)    INTERVAL HPI/ OVERNIGHT EVENTS:  Pt was seen and examined, no new complains. OOB to chair, but was not able to walk with PT due to weakness.     Vital Signs Last 24 Hrs  T(C): 36.7 (10 Nov 2020 15:20), Max: 36.7 (10 Nov 2020 09:00)  T(F): 98.1 (10 Nov 2020 15:20), Max: 98.1 (10 Nov 2020 15:20)  HR: 74 (10 Nov 2020 15:22) (73 - 77)  BP: 191/77 (10 Nov 2020 15:22) (138/46 - 191/77)  RR: 16 (10 Nov 2020 09:00) (16 - 16)  SpO2: 97% (10 Nov 2020 15:20) (97% - 99%)      REVIEW OF SYSTEMS:   All other review of systems is negative unless indicated above.      PHYSICAL EXAM:  General: Well developed;  in no acute distress, looks weak but comfortable   Eyes: PERRLA, EOMI; conjunctiva and sclera clear  Head: Normocephalic; atraumatic  ENMT: No nasal discharge; airway clear  Neck: Supple; non tender; no masses  Respiratory: No wheezes, rales or rhonchi  Cardiovascular: Regular rate and rhythm. S1 and S2 Normal; No murmurs  Gastrointestinal: Soft non-tender non-distended; Normal bowel sounds  Genitourinary: No  suprapubic  tenderness  Extremities: No  edema  Vascular: Peripheral pulses palpable 2+ bilaterally  Neurological: Alert and oriented x3, has R sided weakness ( but also has R frozen shoulder so difficult to asses) , speech fluid, no aphasia   Skin: Warm and dry. No acute rash  Lymph Nodes: No acute cervical adenopathy  Musculoskeletal: Normal muscle tone  Psychiatric: Cooperative    LABS:                                      11.0   10.38 )-----------( 328      ( 2020 06:59 )             37.0     11-09    141  |  111<H>  |  22  ----------------------------<  96  4.1   |  25  |  0.90    Ca    9.0      :59  Phos  3.9     11-09  Mg     2.3     11-09      CARDIAC MARKERS ( 2020 03:03 )  0.041 ng/mL / x     / x     / x     / x                     11.0   10.38 )-----------( 328      ( :59 )             37.0     11-    141  |  111<H>  |  22  ----------------------------<  96  4.1   |  25  |  0.90    Ca    9.0      :59  Phos  3.9     11-09  Mg     2.3     11-09      CARDIAC MARKERS ( 2020 03:03 )  0.041 ng/mL / x     / x     / x     / x                            11.0   10.38 )-----------( 328      ( :59 )             37.0     11-09    141  |  111<H>  |  22  ----------------------------<  96  4.1   |  25  |  0.90    Ca    9.0      :59  Phos  3.9     11-09  Mg     2.3     11-09    TPro  6.7  /  Alb  2.8<L>  /  TBili  0.4  /  DBili  x   /  AST  14<L>  /  ALT  17  /  AlkPhos  89  11-08    CARDIAC MARKERS ( 2020 03:03 )  0.041 ng/mL / x     / x     / x     / x          CARDIAC MARKERS ( 2020 12:26 )  0.031 ng/mL / x     / x     / x     / x                                10.9   8.59  )-----------( 319      ( 2020 07:31 )             36.7     2020 07:31    140    |  109    |  25     ----------------------------<  90     4.3     |  24     |  1.03     Ca    9.1        2020 07:31  Phos  4.4       2020 07:31  Mg     2.4       2020 07:31    TPro  6.7    /  Alb  2.8    /  TBili  0.4    /  DBili  x      /  AST  14     /  ALT  17     /  AlkPhos  89     2020 07:31  PT/INR - ( 2020 07:31 )   PT: 12.4 sec;   INR: 1.07 ratio    PTT - ( 2020 12:26 )  PTT:35.4 sec      LIVER FUNCTIONS - ( 2020 07:31 )  Alb: 2.8 g/dL / Pro: 6.7 gm/dL / ALK PHOS: 89 U/L / ALT: 17 U/L / AST: 14 U/L / GGT: x           Urinalysis Basic - ( 2020 12:48 )    Color: Pale Yellow / Appearance: Clear / S.005 / pH: x  Gluc: x / Ketone: Negative  / Bili: Negative / Urobili: Negative mg/dL   Blood: x / Protein: Negative mg/dL / Nitrite: Negative   Leuk Esterase: Negative / RBC: x / WBC x   Sq Epi: x / Non Sq Epi: x / Bacteria: x      MEDICATIONS  (STANDING):  artificial  tears Solution 1 Drop(s) Both EYES three times a day  aspirin enteric coated 81 milliGRAM(s) Oral daily  cholecalciferol 1000 Unit(s) Oral daily  heparin   Injectable 5000 Unit(s) SubCutaneous every 12 hours  levothyroxine 75 MICROGram(s) Oral daily  melatonin 3 milliGRAM(s) Oral at bedtime  metoprolol succinate ER 25 milliGRAM(s) Oral daily  pantoprazole    Tablet 40 milliGRAM(s) Oral daily  pravastatin 40 milliGRAM(s) Oral at bedtime  senna 2 Tablet(s) Oral at bedtime    MEDICATIONS  (PRN):  acetaminophen   Tablet .. 650 milliGRAM(s) Oral every 4 hours PRN Mild Pain (1 - 3)  ALPRAZolam 0.25 milliGRAM(s) Oral three times a day PRN anxiety  bisacodyl Suppository 10 milliGRAM(s) Rectal daily PRN Constipation  guaiFENesin   Syrup  (Sugar-Free) 100 milliGRAM(s) Oral every 6 hours PRN Cough  hydrALAZINE 25 milliGRAM(s) Oral every 6 hours PRN Systolic/Diastolic > 165/90  ondansetron Injectable 4 milliGRAM(s) IV Push every 6 hours PRN Nausea  polyethylene glycol 3350 17 Gram(s) Oral daily PRN Constipation  traMADol 25 milliGRAM(s) Oral three times a day PRN Severe Pain (7 - 10)        RADIOLOGY & ADDITIONAL TESTS:      EXAM:  MR BRAIN                       PROCEDURE DATE:  2020        FINDINGS:    There is subtle restricted diffusion with corresponding signal dropout on ADC maps and associated T2/STIR hyperintense cytotoxic edema in the left genu of the corpus callosum (series 5 images 46-48), findings consistent with evolving acute infarct. There is no evidence of associated hemorrhage.    There are scattered T2/FLAIR hyperintense changes in the . to the subcortical white matter, nonspecific finding, but mostlikely representing sequelae of moderate chronic microvascular ischemic disease.    There is cortical sulcal prominence related to underlying brain parenchymal volume.    There is no intracranial mass or hydrocephalus. There are no extra-axial fluid collections.    The visualized skull base flow voids appear patent.    The patient is status post bilateral lens replacement. There are mild mucosal inflammatory changes of the paranasal sinuses. There is trace right mastoid air effusion. The left mastoid air cells are clear. The visualized soft tissues and osseous structures appear unremarkable.    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 No hemorrhage.          EXAM:  CTA CHEST PE PROTOCOL (W)AW IC                        PROCEDURE DATE:  2020      INTERPRETATION:      Tubes/Lines: None    Mediastinum and Heart: Aorta and pulmonary arteries are normal in size. Thyroid gland is unremarkable. No lymphadenopathy. No pericardial effusion.    Lungs, Pleura, and Airways: There is no pulmonary embolus. No consolidations, edema, effusion, or pneumothorax. Minimal areas of high attenuation within the lungs bilaterally are likely due to expiratory phase of scanning. Minimal bibasilar reticulation is unchanged.    Visualized Abdomen: Unremarkable.    Bones and soft tissues: Unremarkable.    IMPRESSION:    No pulmonary embolus.

## 2020-11-10 NOTE — CONSULT NOTE ADULT - ASSESSMENT
89 year old female patient with pertinent history of orthostatic hypotension presented to the ED after a witnessed syncopal episode at her facility. Patient does not recall event but endorses 8 month history of progressive weakness, frequent falls. Patient states both of her legs and arms are extremely weak and she has a hard time ambulating due to weakness.    11/10  appreciate neurology note. I agree that she should have a good physical therapy evaluation to determine if she can tolerate anticoagulation.   arthritis is particularly painful. will give her a medrol dose pack to see if it helps the pain and discomfort. if it does, will need to speak to rheum about further treatment.   syncope: she states she was sitting at the time of the incident. this makes it less likely that it came from orthostatic hypotension. Still, need to follow the pressures and then determine if she needs more fluid while she is here in the hospital.

## 2020-11-10 NOTE — DISCHARGE NOTE NURSING/CASE MANAGEMENT/SOCIAL WORK - NSDCVIVACCINE_GEN_ALL_CORE_FT
Influenza , 2017/9/9 14:31 , Kerri Covington (RN)  Influenza , 2017/9/9 14:53 , Kerri Covington (RN)  Influenza , 2018/9/12 14:23 , Anisha Odom (RN)  Influenza , 2020/10/2 14:07 , Jeannette Luque (RN)  Tdap , 2020/2/24 17:42 , Martha Pemberton (RN)

## 2020-11-11 LAB
ANION GAP SERPL CALC-SCNC: 4 MMOL/L — LOW (ref 5–17)
BUN SERPL-MCNC: 26 MG/DL — HIGH (ref 7–23)
CALCIUM SERPL-MCNC: 9.2 MG/DL — SIGNIFICANT CHANGE UP (ref 8.5–10.1)
CHLORIDE SERPL-SCNC: 109 MMOL/L — HIGH (ref 96–108)
CO2 SERPL-SCNC: 27 MMOL/L — SIGNIFICANT CHANGE UP (ref 22–31)
CREAT SERPL-MCNC: 1.07 MG/DL — SIGNIFICANT CHANGE UP (ref 0.5–1.3)
GLUCOSE SERPL-MCNC: 100 MG/DL — HIGH (ref 70–99)
HCT VFR BLD CALC: 37.7 % — SIGNIFICANT CHANGE UP (ref 34.5–45)
HGB BLD-MCNC: 11 G/DL — LOW (ref 11.5–15.5)
MCHC RBC-ENTMCNC: 24.9 PG — LOW (ref 27–34)
MCHC RBC-ENTMCNC: 29.2 GM/DL — LOW (ref 32–36)
MCV RBC AUTO: 85.5 FL — SIGNIFICANT CHANGE UP (ref 80–100)
PLATELET # BLD AUTO: 360 K/UL — SIGNIFICANT CHANGE UP (ref 150–400)
POTASSIUM SERPL-MCNC: 4 MMOL/L — SIGNIFICANT CHANGE UP (ref 3.5–5.3)
POTASSIUM SERPL-SCNC: 4 MMOL/L — SIGNIFICANT CHANGE UP (ref 3.5–5.3)
RBC # BLD: 4.41 M/UL — SIGNIFICANT CHANGE UP (ref 3.8–5.2)
RBC # FLD: 15.4 % — HIGH (ref 10.3–14.5)
SODIUM SERPL-SCNC: 140 MMOL/L — SIGNIFICANT CHANGE UP (ref 135–145)
WBC # BLD: 12.24 K/UL — HIGH (ref 3.8–10.5)
WBC # FLD AUTO: 12.24 K/UL — HIGH (ref 3.8–10.5)

## 2020-11-11 PROCEDURE — 99233 SBSQ HOSP IP/OBS HIGH 50: CPT

## 2020-11-11 PROCEDURE — 99232 SBSQ HOSP IP/OBS MODERATE 35: CPT

## 2020-11-11 RX ADMIN — Medication 1 DROP(S): at 05:22

## 2020-11-11 RX ADMIN — Medication 75 MICROGRAM(S): at 06:19

## 2020-11-11 RX ADMIN — MAGNESIUM HYDROXIDE 30 MILLILITER(S): 400 TABLET, CHEWABLE ORAL at 11:10

## 2020-11-11 RX ADMIN — Medication 1 DROP(S): at 14:56

## 2020-11-11 RX ADMIN — HEPARIN SODIUM 5000 UNIT(S): 5000 INJECTION INTRAVENOUS; SUBCUTANEOUS at 11:02

## 2020-11-11 RX ADMIN — Medication 1 DROP(S): at 21:59

## 2020-11-11 RX ADMIN — Medication 4 MILLIGRAM(S): at 06:19

## 2020-11-11 RX ADMIN — Medication 81 MILLIGRAM(S): at 11:01

## 2020-11-11 RX ADMIN — Medication 0.25 MILLIGRAM(S): at 21:59

## 2020-11-11 RX ADMIN — SENNA PLUS 2 TABLET(S): 8.6 TABLET ORAL at 21:59

## 2020-11-11 RX ADMIN — Medication 4 MILLIGRAM(S): at 17:13

## 2020-11-11 RX ADMIN — PANTOPRAZOLE SODIUM 40 MILLIGRAM(S): 20 TABLET, DELAYED RELEASE ORAL at 11:10

## 2020-11-11 RX ADMIN — Medication 8 MILLIGRAM(S): at 21:59

## 2020-11-11 RX ADMIN — Medication 1000 UNIT(S): at 11:01

## 2020-11-11 RX ADMIN — Medication 4 MILLIGRAM(S): at 14:56

## 2020-11-11 RX ADMIN — Medication 40 MILLIGRAM(S): at 21:59

## 2020-11-11 RX ADMIN — Medication 50 MILLIGRAM(S): at 11:10

## 2020-11-11 RX ADMIN — Medication 10 MILLIGRAM(S): at 11:10

## 2020-11-11 RX ADMIN — PREGABALIN 1000 MICROGRAM(S): 225 CAPSULE ORAL at 11:02

## 2020-11-11 RX ADMIN — HEPARIN SODIUM 5000 UNIT(S): 5000 INJECTION INTRAVENOUS; SUBCUTANEOUS at 21:59

## 2020-11-11 RX ADMIN — Medication 0.25 MILLIGRAM(S): at 11:10

## 2020-11-11 RX ADMIN — Medication 3 MILLIGRAM(S): at 21:59

## 2020-11-11 NOTE — PROVIDER CONTACT NOTE (OTHER) - SITUATION
Spoke with Koby
Spoke with May
Patient's blood pressure 187/83, repeat 191/77. Heart rate 77
progressive weakness, frequent falls  service aware - Koby

## 2020-11-11 NOTE — PROGRESS NOTE ADULT - ASSESSMENT
89 year old female patient with pertinent history of orthostatic hypotension presented to the ED after a witnessed syncopal episode at her facility. Patient does not recall event but endorses 8 month history of progressive weakness, frequent falls. Patient states both of her legs and arms are extremely weak and she has a hard time ambulating due to weakness.    11/10  appreciate neurology note. I agree that she should have a good physical therapy evaluation to determine if she can tolerate anticoagulation.   arthritis is particularly painful. will give her a medrol dose pack to see if it helps the pain and discomfort. if it does, will need to speak to rheum about further treatment.   syncope: she states she was sitting at the time of the incident. this makes it less likely that it came from orthostatic hypotension. Still, need to follow the pressures and then determine if she needs more fluid while she is here in the hospital.     11/11/2020  if blood pressure allows, she should start on Florinef. I have explained to her that the blood pressure medication she takes every day keeps the blood pressure from going to high and the florinef will keep it from going too low.   the medrol dose pack seems to have helped the pain and stiffness in the hand.   encouraging ambulation today  no further Afib on telemetry the past 24 hours. at this time, based on her ambulation, I am uncomfortable with starting anticoagulation. I will reassess once she has started walking with her walker.

## 2020-11-11 NOTE — PROGRESS NOTE ADULT - SUBJECTIVE AND OBJECTIVE BOX
CC: Syncope (08 Nov 2020 11:37)    HPI: 89 year old female w/ PMHx of CAD s/p stents x5, bladder cancer,  HTN, hypothyroidism, multiple falls and h/o syncope, orthostatic hypotension presented to the ED after a witnessed syncopal episode at her facility. Patient does not recall event but endorses 8 month history of progressive weakness, frequent falls. Patient states both of her legs and arms are extremely weak and she has a hard time ambulating due to weakness. Patient voiced concerns at facility but was not able to see a specialist. Patient with intermittent headaches but denies any slurred speech, facial droop, visual changes, sob, nausea or vomiting.    In the ED patient with negative CTA chest and CT brain (07 Nov 2020 19:09)    INTERVAL HPI/ OVERNIGHT EVENTS:  Pt was seen and examined, no new complains. OOB to chair, still weak       REVIEW OF SYSTEMS:  All other review of systems is negative unless indicated above    Vital Signs Last 24 Hrs  T(C): --  T(F): --  HR: 86 (10 Nov 2020 20:32) (84 - 86)  BP: 157/79 (10 Nov 2020 20:32) (157/79 - 213/84)  BP(mean): --  RR: --  SpO2: --    I&O's Summary    10 Nov 2020 07:01  -  11 Nov 2020 07:00  --------------------------------------------------------  IN: 0 mL / OUT: 1700 mL / NET: -1700 mL    PHYSICAL EXAM:    General: Well developed;  in no acute distress, looks weak but comfortable   Eyes: PERRLA, EOMI; conjunctiva and sclera clear  Head: Normocephalic; atraumatic  ENMT: No nasal discharge; airway clear  Neck: Supple; non tender; no masses  Respiratory: No wheezes, rales or rhonchi  Cardiovascular: Regular rate and rhythm. S1 and S2 Normal; No murmurs  Gastrointestinal: Soft non-tender non-distended; Normal bowel sounds  Genitourinary: No  suprapubic  tenderness  Extremities: No  edema  Vascular: Peripheral pulses palpable 2+ bilaterally  Neurological: Alert and oriented x3, has R sided weakness ( but also has R frozen shoulder so difficult to asses) , speech fluid, no aphasia   Skin: Warm and dry. No acute rash  Lymph Nodes: No acute cervical adenopathy  Musculoskeletal: Normal muscle tone  Psychiatric: Cooperative    Medications:  MEDICATIONS  (STANDING):  artificial  tears Solution 1 Drop(s) Both EYES three times a day  aspirin enteric coated 81 milliGRAM(s) Oral daily  cholecalciferol 1000 Unit(s) Oral daily  cyanocobalamin 1000 MICROGram(s) Oral daily  heparin   Injectable 5000 Unit(s) SubCutaneous every 12 hours  levothyroxine 75 MICROGram(s) Oral daily  melatonin 3 milliGRAM(s) Oral at bedtime  methylPREDNISolone 4 milliGRAM(s) Oral after dinner  methylPREDNISolone 8 milliGRAM(s) Oral at bedtime  methylPREDNISolone 4 milliGRAM(s) Oral before breakfast  methylPREDNISolone   Oral   methylPREDNISolone 4 milliGRAM(s) Oral after lunch  metoprolol succinate ER 50 milliGRAM(s) Oral daily  pantoprazole    Tablet 40 milliGRAM(s) Oral daily  pravastatin 40 milliGRAM(s) Oral at bedtime  senna 2 Tablet(s) Oral at bedtime      Labs: All Labs Reviewed:                        11.0   12.24 )-----------( 360      ( 11 Nov 2020 07:07 )             37.7     11-11    140  |  109<H>  |  26<H>  ----------------------------<  100<H>  4.0   |  27  |  1.07    Ca    9.2      11 Nov 2020 07:07    Blood Culture: 11-07 @ 12:50  Organism --  Gram Stain Blood -- Gram Stain --  Specimen Source .Urine Clean Catch (Midstream)  Culture-Blood --    >=3 organisms. Probable collection contamination.    Urine Culture: Organism: --  gram stain: --  11-07 @ 12:50     >=3 organisms. Probable collection contamination.  no     RADIOLOGY & ADDITIONAL TESTS: all tests were reviewed     EXAM:  MR BRAIN                       PROCEDURE DATE:  11/08/2020        FINDINGS:    There is subtle restricted diffusion with corresponding signal dropout on ADC maps and associated T2/STIR hyperintense cytotoxic edema in the left genu of the corpus callosum (series 5 images 46-48), findings consistent with evolving acute infarct. There is no evidence of associated hemorrhage.    There are scattered T2/FLAIR hyperintense changes in the . to the subcortical white matter, nonspecific finding, but mostlikely representing sequelae of moderate chronic microvascular ischemic disease.    There is cortical sulcal prominence related to underlying brain parenchymal volume.    There is no intracranial mass or hydrocephalus. There are no extra-axial fluid collections.    The visualized skull base flow voids appear patent.    The patient is status post bilateral lens replacement. There are mild mucosal inflammatory changes of the paranasal sinuses. There is trace right mastoid air effusion. The left mastoid air cells are clear. The visualized soft tissues and osseous structures appear unremarkable.    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 No hemorrhage.      EXAM:  CTA CHEST PE PROTOCOL (W)AW IC                        PROCEDURE DATE:  11/07/2020      INTERPRETATION:      Tubes/Lines: None    Mediastinum and Heart: Aorta and pulmonary arteries are normal in size. Thyroid gland is unremarkable. No lymphadenopathy. No pericardial effusion.    Lungs, Pleura, and Airways: There is no pulmonary embolus. No consolidations, edema, effusion, or pneumothorax. Minimal areas of high attenuation within the lungs bilaterally are likely due to expiratory phase of scanning. Minimal bibasilar reticulation is unchanged.    Visualized Abdomen: Unremarkable.    Bones and soft tissues: Unremarkable.    IMPRESSION:    No pulmonary embolus.

## 2020-11-11 NOTE — PROGRESS NOTE ADULT - ASSESSMENT
89 year old female w/ PMHx of CAD s/p stents x5, bladder cancer,  HTN, hypothyroidism, multiple falls and h/o syncope, orthostatic hypotension admitted for:       # Syncope, likely related to significant orthostatic hypotension, and probably symptomatic AFIB,  less likely 2/2 stroke   -C/w  tele: SR with 1st degree AVB,   -monitor orthostatics, still positive   - echo: EF 65-70%  - Off  Nitrates and hydralazine   - Trop neg x 1  -D/w Dr Sánchez: consider starting florinef, no AC for now, will reassess when able o ambulate     #  RLE/RUE weakness 2/2 Acute L Corpus Callosum genu infarct   - MRI reviewed  - Had episode of AFIB overnight   - past EKGs reviewed, one from July 2020 read as AFIB  - C/w ASA, start statins   - lipid panel  - ECHO: preserved EF   - D/w DR Payne - pt. refuses carotid surgery       * Paroxysmal AFIB   C/w tele   On Toprol, will titrate dose up in am    [id number] score is7, high risk.  Also high risk for bleeding due to age, deconditioning, multiple falls, and bladder CA   D/w Dr Sánchez, does  not recommend A/c due to high bleeding risk      #Elevated D-Dimer  -CTA chest negative for PE    #Progressive weakness, deconditioning   -pt with 8 month history of intermittent weakness and falls  - unlikely related to acute stroke as its completely new and no old strokes on MRI   - TSH WNL  -  ESR/CRP min elevated  -  B12 low normal will start Po supplementation, folate WNL  - PT       # Arthralgia   Started on Medrol dose pack as per cardio  Monitor response   Doubt rheum Dz as ESR nit significantly elevated     # Bladder CA, with L iliac Lymphopathy,  not on TX  could contribute to weakness and poor appetite   supportive care     #Moderate Protein calorie malnutrition  Nutritionist consult appreciated     #CAD  - on Metoprolol, ASA    #Hx of Hypothyroidism   -on synthroid    # HTN  -c/w  metoprolol   - BP elevated, will hold off on Florinef for now       # GERD  - on protonix      # Advanced directives  -DNR/DNI    #DVT ppx  -heparin sq

## 2020-11-11 NOTE — PROGRESS NOTE ADULT - ASSESSMENT
89 year old F with PMHx of Bladder CA, CAD, HTN, HLD and OH; has had evolving weakness; while walking her legs give way, it has resulted in frequent falls, was admitted to [facility] in Oct 2020, d/c to [facility]; at [facility] facility she has had epiopsdic lightheadedness and near syncope in addition to weakness, these occur when she is seated and not ambulating, she reports right side is weaker than left side, although right arm weakness is chronic.    MRI brain shows acute infarct in left corpus callosum    Acute Stroke  -Has paroxysmal a-fib but has been felt to be at risk for AC due to falls and bladder CA.   -Needs PT   -Continue aspirin for now. Can add Plavix 75 mg/day x 30 days and then discontinue  -Reports allergies to statins in the past. Started on pravastatin  -Carotid doppler report appreciated. Discussed with patient if she would consider carotid surgery and she states that at this time she would not.   -Physical therapy.  -Dr. Sánchez's recs appreciated    Dizziness/Syncope  -Has had documented orthostatic hypotension, however, some events occurring while sitting.      Will follow intermittently as needed.

## 2020-11-11 NOTE — PROGRESS NOTE ADULT - SUBJECTIVE AND OBJECTIVE BOX
Interval History:   11/11/20: No new complaints    MEDICATIONS  (STANDING):  artificial  tears Solution 1 Drop(s) Both EYES three times a day  aspirin enteric coated 81 milliGRAM(s) Oral daily  cholecalciferol 1000 Unit(s) Oral daily  cyanocobalamin 1000 MICROGram(s) Oral daily  heparin   Injectable 5000 Unit(s) SubCutaneous every 12 hours  levothyroxine 75 MICROGram(s) Oral daily  melatonin 3 milliGRAM(s) Oral at bedtime  methylPREDNISolone 4 milliGRAM(s) Oral before breakfast  methylPREDNISolone   Oral   methylPREDNISolone 4 milliGRAM(s) Oral after lunch  methylPREDNISolone 4 milliGRAM(s) Oral after dinner  methylPREDNISolone 8 milliGRAM(s) Oral at bedtime  metoprolol succinate ER 50 milliGRAM(s) Oral daily  pantoprazole    Tablet 40 milliGRAM(s) Oral daily  pravastatin 40 milliGRAM(s) Oral at bedtime  senna 2 Tablet(s) Oral at bedtime    MEDICATIONS  (PRN):  acetaminophen   Tablet .. 650 milliGRAM(s) Oral every 4 hours PRN Mild Pain (1 - 3)  ALPRAZolam 0.25 milliGRAM(s) Oral three times a day PRN anxiety  bisacodyl Suppository 10 milliGRAM(s) Rectal daily PRN Constipation  guaiFENesin   Syrup  (Sugar-Free) 100 milliGRAM(s) Oral every 6 hours PRN Cough  magnesium hydroxide Suspension 30 milliLiter(s) Oral daily PRN Constipation  ondansetron Injectable 4 milliGRAM(s) IV Push every 6 hours PRN Nausea  polyethylene glycol 3350 17 Gram(s) Oral daily PRN Constipation  traMADol 25 milliGRAM(s) Oral three times a day PRN Severe Pain (7 - 10)      Allergies    amlodipine (Unknown)  clonidine (Unknown)  Florinef Acetate (Unknown)  hydrocodone (Unknown)  Levatol (Unknown)  Lipitor (Unknown)  Lotrel (Unknown)  methylPREDNISolone (Unknown)  morphine (Other)  Plaquenil (Unknown)  statins (Other (Unknown))  sulfa drugs (Rash)    Intolerances        PHYSICAL EXAM:  Vital Signs Last 24 Hrs  T(F): 98.1 (11-10-20 @ 15:20)  HR: 86 (11-10-20 @ 20:32)  BP: 157/79 (11-10-20 @ 20:32)  RR: --    GENERAL: NAD, well-groomed, well-developed  HEAD:  Atraumatic, Normocephalic  Neuro:  GENERAL: NAD, well-groomed, well-developed  HF: A x O x 3. Appropriately interactive, normal affect. Speech fluent, No Aphasia or paraphasic errors. Naming /repetition intact   CN: RENE, EOMI, VFF, facial sensation normal, no NLFD, tongue midline, Palate moves equally, SCM equal bilaterally  Motor: No pronator drift, Right UE PROX 4-/5 (frozen shoulder),  Right LE 4+/5; Left side 5/5. No tremor, rigidity or bradykinesia.    Sens: Intact to light touch  Reflexes: Symmetric 1+, AJ 0, downgoing toes b/l  Coord:  No FNFA, dysmetria   Gait/Balance: Not tested      LABS:                        11.0   12.24 )-----------( 360      ( 11 Nov 2020 07:07 )             37.7     11-11    140  |  109<H>  |  26<H>  ----------------------------<  100<H>  4.0   |  27  |  1.07    Ca    9.2      11 Nov 2020 07:07            RADIOLOGY & ADDITIONAL STUDIES:      CT head 11/7/20: No acute hemorrhage mass or mass effect.    MRI head no contrast 11/8/20:   Evolving acute infarct in the left genu of the corpus callosum. No hemorrhage..    carotid ultrasound 11/9/20:   Mild to moderate plaque in the left carotid bulb and proximal left internal carotid artery with evaluation limited secondary to shadowing from the plaque and an elevated peak systolic velocity suggestive of a 50-69% stenosis.    Mild plaque in the right carotid bulb and proximal right internal carotid artery with less than 50% right internal carotid artery stenosis.    Mild to moderate mixed plaque in the left external carotid artery with an elevated peak systolic velocity.

## 2020-11-11 NOTE — PROGRESS NOTE ADULT - SUBJECTIVE AND OBJECTIVE BOX
Patient is a 89y old  Female who presents with a chief complaint of Syncope (10 Nov 2020 10:45)  89 year old female patient with pertinent history of orthostatic hypotension presented to the ED after a witnessed syncopal episode at her facility. Patient does not recall event but endorses 8 month history of progressive weakness, frequent falls. Patient states both of her legs and arms are extremely weak and she has a hard time ambulating due to weakness. Patient voiced concerns at facility but was not able to see a specialist. Patient with intermittent headaches but denies any slurred speech, facial droop, visual changes, sob, nausea or vomiting.  In the ED patient with negative CTA chest and CT brain (07 Nov 2020 19:09)    11/10/2020  she describes the incident that she was sitting in a chair and had the syncopal episode.   very weak, although this has been a complaint for many years.   her arthritis in the hands is worse and very painful.   On telemetry, there was a few episodes of paroxysmal Afib although there are runs with clear p waves.   Yesterday, was sitting in a chair for 2 hours and then requested back to bed. She hasn't been walking yet.   Carotid US shows mild-moderate plaque.   Echo essentially normal.   bladder cancer is not being treated at this time. She seemed to end up in the hospital after each chemo treatment, with an infection.     11/11/2020  yesterday, her blood pressure went very high and she received extra Metoprolol 25 mg and Hydralazine.   she also received her 1st dose of a medrol dose pack and IV fluids.   she hasn't yet started on the florinef. She didn't have a bad reaction in the past as far as I can remember.   she hasn't walked yet with physical therapy        Allergies    amlodipine (Unknown)  clonidine (Unknown)  Florinef Acetate (Unknown)  hydrocodone (Unknown)  Levatol (Unknown)  Lipitor (Unknown)  Lotrel (Unknown)  methylPREDNISolone (Unknown)  morphine (Other)  Plaquenil (Unknown)  statins (Other (Unknown))  sulfa drugs (Rash)    Intolerances        MEDICATIONS  (STANDING):  artificial  tears Solution 1 Drop(s) Both EYES three times a day  aspirin enteric coated 81 milliGRAM(s) Oral daily  cholecalciferol 1000 Unit(s) Oral daily  cyanocobalamin 1000 MICROGram(s) Oral daily  heparin   Injectable 5000 Unit(s) SubCutaneous every 12 hours  levothyroxine 75 MICROGram(s) Oral daily  melatonin 3 milliGRAM(s) Oral at bedtime  methylPREDNISolone 4 milliGRAM(s) Oral before breakfast  methylPREDNISolone   Oral   methylPREDNISolone 4 milliGRAM(s) Oral after lunch  methylPREDNISolone 4 milliGRAM(s) Oral after dinner  methylPREDNISolone 8 milliGRAM(s) Oral at bedtime  metoprolol succinate ER 50 milliGRAM(s) Oral daily  pantoprazole    Tablet 40 milliGRAM(s) Oral daily  pravastatin 40 milliGRAM(s) Oral at bedtime  senna 2 Tablet(s) Oral at bedtime    MEDICATIONS  (PRN):  acetaminophen   Tablet .. 650 milliGRAM(s) Oral every 4 hours PRN Mild Pain (1 - 3)  ALPRAZolam 0.25 milliGRAM(s) Oral three times a day PRN anxiety  bisacodyl Suppository 10 milliGRAM(s) Rectal daily PRN Constipation  guaiFENesin   Syrup  (Sugar-Free) 100 milliGRAM(s) Oral every 6 hours PRN Cough  magnesium hydroxide Suspension 30 milliLiter(s) Oral daily PRN Constipation  ondansetron Injectable 4 milliGRAM(s) IV Push every 6 hours PRN Nausea  polyethylene glycol 3350 17 Gram(s) Oral daily PRN Constipation  traMADol 25 milliGRAM(s) Oral three times a day PRN Severe Pain (7 - 10)    REVIEW OF SYSTEMS:    RESPIRATORY: No cough, wheezing, hemoptysis; No shortness of breath  CARDIOVASCULAR: No chest pain or palpitations  All other review of systems is negative unless indicated above      PHYSICAL EXAM:  Daily     Daily   Vital Signs Last 24 Hrs  T(C): 36.7 (10 Nov 2020 15:20), Max: 36.7 (10 Nov 2020 09:00)  T(F): 98.1 (10 Nov 2020 15:20), Max: 98.1 (10 Nov 2020 15:20)  HR: 86 (10 Nov 2020 20:32) (74 - 86)  BP: 157/79 (10 Nov 2020 20:32) (150/50 - 213/84)  BP(mean): --  RR: 16 (10 Nov 2020 09:00) (16 - 16)  SpO2: 97% (10 Nov 2020 15:20) (97% - 99%)    Constitutional: NAD, awake and alert, well-developed  HEENT: PERR, EOMI, Normal Hearing, MMM  Neck: Soft and supple, No LAD, No JVD  Respiratory: Breath sounds are clear bilaterally, No wheezing, rales or rhonchi  Cardiovascular: S1 and S2, regular rate and rhythm, no Murmurs, gallops or rubs  Gastrointestinal: Bowel Sounds present, soft, nontender, nondistended, no guarding, no rebound  Extremities: No peripheral edema  Vascular: 2+ peripheral pulses  Neurological: A/O x 3, no focal deficits  Musculoskeletal: 5/5 strength b/l upper and lower extremities  Skin: No rashes    LABS: All Labs Reviewed:                        11.0   12.24 )-----------( 360      ( 11 Nov 2020 07:07 )             37.7       TELEMETRY/EKG: NSR. No Afib

## 2020-11-12 LAB
ANION GAP SERPL CALC-SCNC: 4 MMOL/L — LOW (ref 5–17)
BUN SERPL-MCNC: 36 MG/DL — HIGH (ref 7–23)
CALCIUM SERPL-MCNC: 9.6 MG/DL — SIGNIFICANT CHANGE UP (ref 8.5–10.1)
CHLORIDE SERPL-SCNC: 107 MMOL/L — SIGNIFICANT CHANGE UP (ref 96–108)
CO2 SERPL-SCNC: 27 MMOL/L — SIGNIFICANT CHANGE UP (ref 22–31)
CREAT SERPL-MCNC: 1.1 MG/DL — SIGNIFICANT CHANGE UP (ref 0.5–1.3)
GLUCOSE SERPL-MCNC: 105 MG/DL — HIGH (ref 70–99)
HCT VFR BLD CALC: 40 % — SIGNIFICANT CHANGE UP (ref 34.5–45)
HGB BLD-MCNC: 11.7 G/DL — SIGNIFICANT CHANGE UP (ref 11.5–15.5)
MCHC RBC-ENTMCNC: 25 PG — LOW (ref 27–34)
MCHC RBC-ENTMCNC: 29.3 GM/DL — LOW (ref 32–36)
MCV RBC AUTO: 85.5 FL — SIGNIFICANT CHANGE UP (ref 80–100)
PLATELET # BLD AUTO: 346 K/UL — SIGNIFICANT CHANGE UP (ref 150–400)
POTASSIUM SERPL-MCNC: 4.5 MMOL/L — SIGNIFICANT CHANGE UP (ref 3.5–5.3)
POTASSIUM SERPL-SCNC: 4.5 MMOL/L — SIGNIFICANT CHANGE UP (ref 3.5–5.3)
RBC # BLD: 4.68 M/UL — SIGNIFICANT CHANGE UP (ref 3.8–5.2)
RBC # FLD: 15.6 % — HIGH (ref 10.3–14.5)
SODIUM SERPL-SCNC: 138 MMOL/L — SIGNIFICANT CHANGE UP (ref 135–145)
WBC # BLD: 12.06 K/UL — HIGH (ref 3.8–10.5)
WBC # FLD AUTO: 12.06 K/UL — HIGH (ref 3.8–10.5)

## 2020-11-12 PROCEDURE — 99232 SBSQ HOSP IP/OBS MODERATE 35: CPT

## 2020-11-12 RX ORDER — HYDRALAZINE HCL 50 MG
10 TABLET ORAL ONCE
Refills: 0 | Status: COMPLETED | OUTPATIENT
Start: 2020-11-12 | End: 2020-11-12

## 2020-11-12 RX ORDER — POLYETHYLENE GLYCOL 3350 17 G/17G
17 POWDER, FOR SOLUTION ORAL DAILY
Refills: 0 | Status: DISCONTINUED | OUTPATIENT
Start: 2020-11-12 | End: 2020-11-17

## 2020-11-12 RX ADMIN — Medication 50 MILLIGRAM(S): at 09:49

## 2020-11-12 RX ADMIN — Medication 75 MICROGRAM(S): at 06:02

## 2020-11-12 RX ADMIN — Medication 1000 UNIT(S): at 09:48

## 2020-11-12 RX ADMIN — Medication 4 MILLIGRAM(S): at 21:09

## 2020-11-12 RX ADMIN — Medication 1 DROP(S): at 21:09

## 2020-11-12 RX ADMIN — POLYETHYLENE GLYCOL 3350 17 GRAM(S): 17 POWDER, FOR SOLUTION ORAL at 09:56

## 2020-11-12 RX ADMIN — Medication 40 MILLIGRAM(S): at 21:09

## 2020-11-12 RX ADMIN — Medication 650 MILLIGRAM(S): at 21:08

## 2020-11-12 RX ADMIN — Medication 1 DROP(S): at 06:02

## 2020-11-12 RX ADMIN — SENNA PLUS 2 TABLET(S): 8.6 TABLET ORAL at 21:09

## 2020-11-12 RX ADMIN — Medication 1 DROP(S): at 13:01

## 2020-11-12 RX ADMIN — PANTOPRAZOLE SODIUM 40 MILLIGRAM(S): 20 TABLET, DELAYED RELEASE ORAL at 09:48

## 2020-11-12 RX ADMIN — Medication 4 MILLIGRAM(S): at 13:01

## 2020-11-12 RX ADMIN — Medication 4 MILLIGRAM(S): at 18:36

## 2020-11-12 RX ADMIN — MAGNESIUM HYDROXIDE 30 MILLILITER(S): 400 TABLET, CHEWABLE ORAL at 10:01

## 2020-11-12 RX ADMIN — PREGABALIN 1000 MICROGRAM(S): 225 CAPSULE ORAL at 09:55

## 2020-11-12 RX ADMIN — HEPARIN SODIUM 5000 UNIT(S): 5000 INJECTION INTRAVENOUS; SUBCUTANEOUS at 21:09

## 2020-11-12 RX ADMIN — Medication 10 MILLIGRAM(S): at 16:26

## 2020-11-12 RX ADMIN — Medication 1 ENEMA: at 16:25

## 2020-11-12 RX ADMIN — Medication 3 MILLIGRAM(S): at 21:08

## 2020-11-12 RX ADMIN — HEPARIN SODIUM 5000 UNIT(S): 5000 INJECTION INTRAVENOUS; SUBCUTANEOUS at 09:49

## 2020-11-12 RX ADMIN — Medication 81 MILLIGRAM(S): at 09:48

## 2020-11-12 RX ADMIN — Medication 4 MILLIGRAM(S): at 06:02

## 2020-11-12 RX ADMIN — Medication 0.25 MILLIGRAM(S): at 21:08

## 2020-11-12 NOTE — PROGRESS NOTE ADULT - SUBJECTIVE AND OBJECTIVE BOX
Patient is a 89y old  Female who presents with a chief complaint of Syncope (11 Nov 2020 15:35)    Patient is a 89y old  Female who presents with a chief complaint of Syncope (10 Nov 2020 10:45)  89 year old female patient with pertinent history of orthostatic hypotension presented to the ED after a witnessed syncopal episode at her facility. Patient does not recall event but endorses 8 month history of progressive weakness, frequent falls. Patient states both of her legs and arms are extremely weak and she has a hard time ambulating due to weakness. Patient voiced concerns at facility but was not able to see a specialist. Patient with intermittent headaches but denies any slurred speech, facial droop, visual changes, sob, nausea or vomiting.  In the ED patient with negative CTA chest and CT brain (07 Nov 2020 19:09)    11/10/2020  she describes the incident that she was sitting in a chair and had the syncopal episode.   very weak, although this has been a complaint for many years.   her arthritis in the hands is worse and very painful.   On telemetry, there was a few episodes of paroxysmal Afib although there are runs with clear p waves.   Yesterday, was sitting in a chair for 2 hours and then requested back to bed. She hasn't been walking yet.   Carotid US shows mild-moderate plaque.   Echo essentially normal.   bladder cancer is not being treated at this time. She seemed to end up in the hospital after each chemo treatment, with an infection.     11/11/2020  yesterday, her blood pressure went very high and she received extra Metoprolol 25 mg and Hydralazine.   she also received her 1st dose of a medrol dose pack and IV fluids.   she hasn't yet started on the florinef. She didn't have a bad reaction in the past as far as I can remember.   she hasn't walked yet with physical therapy    11/12  complains that she hasn't had a bowel movement for 6 days. received a suppository yesterday but she says it hasn't helped.   Hasn't walked with walker yet but has been sitting in the chair.       Allergies    amlodipine (Unknown)  clonidine (Unknown)  Florinef Acetate (Unknown)  hydrocodone (Unknown)  Levatol (Unknown)  Lipitor (Unknown)  Lotrel (Unknown)  methylPREDNISolone (Unknown)  morphine (Other)  Plaquenil (Unknown)  statins (Other (Unknown))  sulfa drugs (Rash)    Intolerances        MEDICATIONS  (STANDING):  artificial  tears Solution 1 Drop(s) Both EYES three times a day  aspirin enteric coated 81 milliGRAM(s) Oral daily  cholecalciferol 1000 Unit(s) Oral daily  cyanocobalamin 1000 MICROGram(s) Oral daily  heparin   Injectable 5000 Unit(s) SubCutaneous every 12 hours  levothyroxine 75 MICROGram(s) Oral daily  melatonin 3 milliGRAM(s) Oral at bedtime  methylPREDNISolone 4 milliGRAM(s) Oral before breakfast  methylPREDNISolone   Oral   methylPREDNISolone 4 milliGRAM(s) Oral after lunch  methylPREDNISolone 4 milliGRAM(s) Oral after dinner  methylPREDNISolone 4 milliGRAM(s) Oral at bedtime  metoprolol succinate ER 50 milliGRAM(s) Oral daily  pantoprazole    Tablet 40 milliGRAM(s) Oral daily  polyethylene glycol 3350 17 Gram(s) Oral daily  pravastatin 40 milliGRAM(s) Oral at bedtime  senna 2 Tablet(s) Oral at bedtime    MEDICATIONS  (PRN):  acetaminophen   Tablet .. 650 milliGRAM(s) Oral every 4 hours PRN Mild Pain (1 - 3)  ALPRAZolam 0.25 milliGRAM(s) Oral three times a day PRN anxiety  bisacodyl Suppository 10 milliGRAM(s) Rectal daily PRN Constipation  guaiFENesin   Syrup  (Sugar-Free) 100 milliGRAM(s) Oral every 6 hours PRN Cough  magnesium hydroxide Suspension 30 milliLiter(s) Oral daily PRN Constipation  ondansetron Injectable 4 milliGRAM(s) IV Push every 6 hours PRN Nausea  polyethylene glycol 3350 17 Gram(s) Oral daily PRN Constipation  traMADol 25 milliGRAM(s) Oral three times a day PRN Severe Pain (7 - 10)    REVIEW OF SYSTEMS:    RESPIRATORY: No cough, wheezing, hemoptysis; No shortness of breath  CARDIOVASCULAR: No chest pain or palpitations  All other review of systems is negative unless indicated above      PHYSICAL EXAM:  Daily     Daily   Vital Signs Last 24 Hrs  T(C): 36.6 (11 Nov 2020 21:29), Max: 36.7 (11 Nov 2020 15:49)  T(F): 97.9 (11 Nov 2020 21:29), Max: 98.1 (11 Nov 2020 15:49)  HR: 72 (11 Nov 2020 21:29) (72 - 72)  BP: 151/50 (11 Nov 2020 15:49) (151/50 - 151/50)  BP(mean): --  RR: 18 (11 Nov 2020 15:49) (18 - 18)  SpO2: 100% (11 Nov 2020 15:49) (100% - 100%)    Constitutional: NAD, awake and alert, well-developed  HEENT: PERR, EOMI, Normal Hearing, MMM  Neck: Soft and supple, No LAD, No JVD  Respiratory: Breath sounds are clear bilaterally, No wheezing, rales or rhonchi  Cardiovascular: S1 and S2, regular rate and rhythm, no Murmurs, gallops or rubs  Gastrointestinal: Bowel Sounds present, soft, nontender, nondistended, no guarding, no rebound  Extremities: No peripheral edema  Vascular: 2+ peripheral pulses  Neurological: A/O x 3, no focal deficits  Musculoskeletal: 5/5 strength b/l upper and lower extremities  Skin: No rashes    LABS: All Labs Reviewed:                        11.7   12.06 )-----------( 346      ( 12 Nov 2020 06:56 )             40.0     11-11    140  |  109<H>  |  26<H>  ----------------------------<  100<H>  4.0   |  27  |  1.07    Ca    9.2      11 Nov 2020 07:07

## 2020-11-12 NOTE — PROGRESS NOTE ADULT - ASSESSMENT
89 year old female patient with pertinent history of orthostatic hypotension presented to the ED after a witnessed syncopal episode at her facility. Patient does not recall event but endorses 8 month history of progressive weakness, frequent falls. Patient states both of her legs and arms are extremely weak and she has a hard time ambulating due to weakness.    11/10  appreciate neurology note. I agree that she should have a good physical therapy evaluation to determine if she can tolerate anticoagulation.   arthritis is particularly painful. will give her a medrol dose pack to see if it helps the pain and discomfort. if it does, will need to speak to rheum about further treatment.   syncope: she states she was sitting at the time of the incident. this makes it less likely that it came from orthostatic hypotension. Still, need to follow the pressures and then determine if she needs more fluid while she is here in the hospital.     11/11/2020  if blood pressure allows, she should start on Florinef. I have explained to her that the blood pressure medication she takes every day keeps the blood pressure from going to high and the florinef will keep it from going too low.   the medrol dose pack seems to have helped the pain and stiffness in the hand.   encouraging ambulation today  no further Afib on telemetry the past 24 hours. at this time, based on her ambulation, I am uncomfortable with starting anticoagulation. I will reassess once she has started walking with her walker.     11/12  miralax daily for bowels.   no anticoagulation at this time. she is weak and has an increased risk of fall and complications from anticoagulation.   blood pressure better.   continue to monitor orthostatics.   physical therapy. needs to be encouraged to start walking with a walker.

## 2020-11-12 NOTE — PROGRESS NOTE ADULT - SUBJECTIVE AND OBJECTIVE BOX
Interval History:  11/12/20: When asked about pain, she reports, "I have pain everywhere".  Denies dizziness at this time.     MEDICATIONS  (STANDING):  artificial  tears Solution 1 Drop(s) Both EYES three times a day  aspirin enteric coated 81 milliGRAM(s) Oral daily  cholecalciferol 1000 Unit(s) Oral daily  cyanocobalamin 1000 MICROGram(s) Oral daily  heparin   Injectable 5000 Unit(s) SubCutaneous every 12 hours  levothyroxine 75 MICROGram(s) Oral daily  melatonin 3 milliGRAM(s) Oral at bedtime  methylPREDNISolone 4 milliGRAM(s) Oral before breakfast  methylPREDNISolone   Oral   methylPREDNISolone 4 milliGRAM(s) Oral after lunch  methylPREDNISolone 4 milliGRAM(s) Oral after dinner  methylPREDNISolone 4 milliGRAM(s) Oral at bedtime  metoprolol succinate ER 50 milliGRAM(s) Oral daily  pantoprazole    Tablet 40 milliGRAM(s) Oral daily  polyethylene glycol 3350 17 Gram(s) Oral daily  pravastatin 40 milliGRAM(s) Oral at bedtime  senna 2 Tablet(s) Oral at bedtime    MEDICATIONS  (PRN):  acetaminophen   Tablet .. 650 milliGRAM(s) Oral every 4 hours PRN Mild Pain (1 - 3)  ALPRAZolam 0.25 milliGRAM(s) Oral three times a day PRN anxiety  bisacodyl Suppository 10 milliGRAM(s) Rectal daily PRN Constipation  guaiFENesin   Syrup  (Sugar-Free) 100 milliGRAM(s) Oral every 6 hours PRN Cough  magnesium hydroxide Suspension 30 milliLiter(s) Oral daily PRN Constipation  ondansetron Injectable 4 milliGRAM(s) IV Push every 6 hours PRN Nausea  polyethylene glycol 3350 17 Gram(s) Oral daily PRN Constipation  traMADol 25 milliGRAM(s) Oral three times a day PRN Severe Pain (7 - 10)      Allergies    amlodipine (Unknown)  clonidine (Unknown)  Florinef Acetate (Unknown)  hydrocodone (Unknown)  Levatol (Unknown)  Lipitor (Unknown)  Lotrel (Unknown)  methylPREDNISolone (Unknown)  morphine (Other)  Plaquenil (Unknown)  statins (Other (Unknown))  sulfa drugs (Rash)    Intolerances        PHYSICAL EXAM:  Vital Signs Last 24 Hrs  T(F): 98.2 (11-12-20 @ 08:55)  HR: 76 (11-12-20 @ 08:55)  BP: 137/62 (11-12-20 @ 08:55)  RR: 18 (11-12-20 @ 08:55)    GENERAL: NAD, well-groomed, well-developed  HEAD:  Atraumatic, Normocephalic  Neuro:  GENERAL: NAD, well-groomed, well-developed  HF: A x O x 3. Appropriately interactive, normal affect. Speech fluent, No Aphasia or paraphasic errors. Naming /repetition intact   CN: RENE, EOMI, VFF, facial sensation normal, no NLFD, tongue midline, Palate moves equally, SCM equal bilaterally  Motor: No pronator drift, Right UE PROX 4-/5 (frozen shoulder),  Right LE 4+/5; Left side 5/5. No tremor, rigidity or bradykinesia.    Sens: Intact to light touch  Reflexes: Symmetric 1+, AJ 0, downgoing toes b/l  Coord:  No FNFA, dysmetria   Gait/Balance: Not tested      LABS:                        11.7   12.06 )-----------( 346      ( 12 Nov 2020 06:56 )             40.0     11-12    138  |  107  |  36<H>  ----------------------------<  105<H>  4.5   |  27  |  1.10    Ca    9.6      12 Nov 2020 06:56            RADIOLOGY & ADDITIONAL STUDIES:  CT head 11/7/20: No acute hemorrhage mass or mass effect.    MRI head no contrast 11/8/20:   Evolving acute infarct in the left genu of the corpus callosum. No hemorrhage..    carotid ultrasound 11/9/20:   Mild to moderate plaque in the left carotid bulb and proximal left internal carotid artery with evaluation limited secondary to shadowing from the plaque and an elevated peak systolic velocity suggestive of a 50-69% stenosis.    Mild plaque in the right carotid bulb and proximal right internal carotid artery with less than 50% right internal carotid artery stenosis.    Mild to moderate mixed plaque in the left external carotid artery with an elevated peak systolic velocity.

## 2020-11-12 NOTE — PROGRESS NOTE ADULT - SUBJECTIVE AND OBJECTIVE BOX
CC: Syncope (08 Nov 2020 11:37)    HPI: 89 year old female w/ PMHx of CAD s/p stents x5, bladder cancer,  HTN, hypothyroidism, multiple falls and h/o syncope, orthostatic hypotension presented to the ED after a witnessed syncopal episode at her facility. Patient does not recall event but endorses 8 month history of progressive weakness, frequent falls. Patient states both of her legs and arms are extremely weak and she has a hard time ambulating due to weakness. Patient voiced concerns at facility but was not able to see a specialist. Patient with intermittent headaches but denies any slurred speech, facial droop, visual changes, sob, nausea or vomiting.    In the ED patient with negative CTA chest and CT brain (07 Nov 2020 19:09)    INTERVAL HPI/ OVERNIGHT EVENTS:  Pt was seen and examined at bedside, c/o constipation, she would like PT to spend more time with her and help her move,  PT was called - David RN manager called PT supervisor     REVIEW OF SYSTEMS:  All other review of systems is negative unless indicated above    Vital Signs Last 24 Hrs  T(C): 36.8 (12 Nov 2020 08:55), Max: 36.8 (12 Nov 2020 08:55)  T(F): 98.2 (12 Nov 2020 08:55), Max: 98.2 (12 Nov 2020 08:55)  HR: 76 (12 Nov 2020 08:55) (72 - 76)  BP: 137/62 (12 Nov 2020 08:55) (137/62 - 151/50)  BP(mean): --  RR: 18 (12 Nov 2020 08:55) (18 - 18)  SpO2: 98% (12 Nov 2020 08:55) (98% - 100%)    I&O's Summary    11 Nov 2020 07:01  -  12 Nov 2020 07:00  --------------------------------------------------------  IN: 60 mL / OUT: 2050 mL / NET: -1990 mL      PHYSICAL EXAM:  General: Well developed;  in no acute distress, looks weak but comfortable   Eyes: PERRLA, EOMI; conjunctiva and sclera clear  Head: Normocephalic; atraumatic  ENMT: No nasal discharge; airway clear  Neck: Supple; non tender; no masses  Respiratory: No wheezes, rales or rhonchi  Cardiovascular: Regular rate and rhythm. S1 and S2 Normal; No murmurs  Gastrointestinal: Soft non-tender non-distended; Normal bowel sounds  Genitourinary: No  suprapubic  tenderness  Extremities: No  edema  Vascular: Peripheral pulses palpable 2+ bilaterally  Neurological: Alert and oriented x3, has R sided weakness ( but also has R frozen shoulder so difficult to asses) , speech fluid, no aphasia   Skin: Warm and dry. No acute rash  Lymph Nodes: No acute cervical adenopathy  Musculoskeletal: Normal muscle tone  Psychiatric: Cooperative    Medications:  MEDICATIONS  (STANDING):  artificial  tears Solution 1 Drop(s) Both EYES three times a day  aspirin enteric coated 81 milliGRAM(s) Oral daily  cholecalciferol 1000 Unit(s) Oral daily  cyanocobalamin 1000 MICROGram(s) Oral daily  heparin   Injectable 5000 Unit(s) SubCutaneous every 12 hours  levothyroxine 75 MICROGram(s) Oral daily  melatonin 3 milliGRAM(s) Oral at bedtime  methylPREDNISolone 4 milliGRAM(s) Oral before breakfast  methylPREDNISolone   Oral   methylPREDNISolone 4 milliGRAM(s) Oral after lunch  methylPREDNISolone 4 milliGRAM(s) Oral after dinner  methylPREDNISolone 4 milliGRAM(s) Oral at bedtime  metoprolol succinate ER 50 milliGRAM(s) Oral daily  pantoprazole    Tablet 40 milliGRAM(s) Oral daily  polyethylene glycol 3350 17 Gram(s) Oral daily  pravastatin 40 milliGRAM(s) Oral at bedtime  senna 2 Tablet(s) Oral at bedtime      Labs: All Labs Reviewed:                        11.7   12.06 )-----------( 346      ( 12 Nov 2020 06:56 )             40.0     11-12    138  |  107  |  36<H>  ----------------------------<  105<H>  4.5   |  27  |  1.10    Ca    9.6      12 Nov 2020 06:56      Blood Culture: 11-07 @ 12:50  Organism --  Gram Stain Blood -- Gram Stain --  Specimen Source .Urine Clean Catch (Midstream)  Culture-Blood --    >=3 organisms. Probable collection contamination.    Urine Culture: Organism: --  gram stain: --  11-07 @ 12:50     >=3 organisms. Probable collection contamination.      RADIOLOGY & ADDITIONAL TESTS: all tests were reviewed     EXAM:  MR BRAIN                       PROCEDURE DATE:  11/08/2020        FINDINGS:    There is subtle restricted diffusion with corresponding signal dropout on ADC maps and associated T2/STIR hyperintense cytotoxic edema in the left genu of the corpus callosum (series 5 images 46-48), findings consistent with evolving acute infarct. There is no evidence of associated hemorrhage.    There are scattered T2/FLAIR hyperintense changes in the . to the subcortical white matter, nonspecific finding, but mostlikely representing sequelae of moderate chronic microvascular ischemic disease.    There is cortical sulcal prominence related to underlying brain parenchymal volume.    There is no intracranial mass or hydrocephalus. There are no extra-axial fluid collections.    The visualized skull base flow voids appear patent.    The patient is status post bilateral lens replacement. There are mild mucosal inflammatory changes of the paranasal sinuses. There is trace right mastoid air effusion. The left mastoid air cells are clear. The visualized soft tissues and osseous structures appear unremarkable.    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 No hemorrhage.      EXAM:  CTA CHEST PE PROTOCOL (W)AW IC                        PROCEDURE DATE:  11/07/2020      INTERPRETATION:      Tubes/Lines: None    Mediastinum and Heart: Aorta and pulmonary arteries are normal in size. Thyroid gland is unremarkable. No lymphadenopathy. No pericardial effusion.    Lungs, Pleura, and Airways: There is no pulmonary embolus. No consolidations, edema, effusion, or pneumothorax. Minimal areas of high attenuation within the lungs bilaterally are likely due to expiratory phase of scanning. Minimal bibasilar reticulation is unchanged.    Visualized Abdomen: Unremarkable.    Bones and soft tissues: Unremarkable.    IMPRESSION:    No pulmonary embolus.

## 2020-11-12 NOTE — PROGRESS NOTE ADULT - ASSESSMENT
89 year old F with PMHx of Bladder CA, CAD, HTN, HLD and OH; has had evolving weakness; while walking her legs give way, it has resulted in frequent falls, was admitted to [facility] in Oct 2020, d/c to [facility]; at [facility] facility she has had epiopsdic lightheadedness and near syncope in addition to weakness, these occur when she is seated and not ambulating, she reports right side is weaker than left side, although right arm weakness is chronic.    MRI brain shows acute infarct in left corpus callosum    Acute Stroke  -Has paroxysmal a-fib but has been felt to be at risk for AC due to falls and bladder CA.   -Aspirin + Plavix x 30 days and then aspirin monotherapy  -Reports allergies to statins in the past. Started on pravastatin  -Carotid doppler report appreciated. Patient not agreeable to any carotid surgery at this time.   -Physical therapy.      Dizziness/Syncope  -Has had documented orthostatic hypotension    Will follow intermittently as needed.

## 2020-11-13 LAB
ANION GAP SERPL CALC-SCNC: 6 MMOL/L — SIGNIFICANT CHANGE UP (ref 5–17)
BUN SERPL-MCNC: 34 MG/DL — HIGH (ref 7–23)
CALCIUM SERPL-MCNC: 9.8 MG/DL — SIGNIFICANT CHANGE UP (ref 8.5–10.1)
CHLORIDE SERPL-SCNC: 107 MMOL/L — SIGNIFICANT CHANGE UP (ref 96–108)
CO2 SERPL-SCNC: 25 MMOL/L — SIGNIFICANT CHANGE UP (ref 22–31)
CREAT SERPL-MCNC: 1.01 MG/DL — SIGNIFICANT CHANGE UP (ref 0.5–1.3)
GLUCOSE SERPL-MCNC: 105 MG/DL — HIGH (ref 70–99)
HCT VFR BLD CALC: 43.4 % — SIGNIFICANT CHANGE UP (ref 34.5–45)
HGB BLD-MCNC: 12.9 G/DL — SIGNIFICANT CHANGE UP (ref 11.5–15.5)
MCHC RBC-ENTMCNC: 25.1 PG — LOW (ref 27–34)
MCHC RBC-ENTMCNC: 29.7 GM/DL — LOW (ref 32–36)
MCV RBC AUTO: 84.4 FL — SIGNIFICANT CHANGE UP (ref 80–100)
PLATELET # BLD AUTO: 387 K/UL — SIGNIFICANT CHANGE UP (ref 150–400)
POTASSIUM SERPL-MCNC: 4.5 MMOL/L — SIGNIFICANT CHANGE UP (ref 3.5–5.3)
POTASSIUM SERPL-SCNC: 4.5 MMOL/L — SIGNIFICANT CHANGE UP (ref 3.5–5.3)
RBC # BLD: 5.14 M/UL — SIGNIFICANT CHANGE UP (ref 3.8–5.2)
RBC # FLD: 15.7 % — HIGH (ref 10.3–14.5)
SODIUM SERPL-SCNC: 138 MMOL/L — SIGNIFICANT CHANGE UP (ref 135–145)
WBC # BLD: 11.93 K/UL — HIGH (ref 3.8–10.5)
WBC # FLD AUTO: 11.93 K/UL — HIGH (ref 3.8–10.5)

## 2020-11-13 PROCEDURE — 99232 SBSQ HOSP IP/OBS MODERATE 35: CPT

## 2020-11-13 RX ORDER — SPIRONOLACTONE 25 MG/1
25 TABLET, FILM COATED ORAL DAILY
Refills: 0 | Status: DISCONTINUED | OUTPATIENT
Start: 2020-11-13 | End: 2020-11-17

## 2020-11-13 RX ADMIN — PREGABALIN 1000 MICROGRAM(S): 225 CAPSULE ORAL at 10:54

## 2020-11-13 RX ADMIN — Medication 3 MILLIGRAM(S): at 21:16

## 2020-11-13 RX ADMIN — Medication 1 DROP(S): at 21:21

## 2020-11-13 RX ADMIN — HEPARIN SODIUM 5000 UNIT(S): 5000 INJECTION INTRAVENOUS; SUBCUTANEOUS at 21:16

## 2020-11-13 RX ADMIN — Medication 4 MILLIGRAM(S): at 06:02

## 2020-11-13 RX ADMIN — HEPARIN SODIUM 5000 UNIT(S): 5000 INJECTION INTRAVENOUS; SUBCUTANEOUS at 10:54

## 2020-11-13 RX ADMIN — Medication 4 MILLIGRAM(S): at 21:16

## 2020-11-13 RX ADMIN — Medication 50 MILLIGRAM(S): at 10:53

## 2020-11-13 RX ADMIN — Medication 1000 UNIT(S): at 10:53

## 2020-11-13 RX ADMIN — Medication 1 DROP(S): at 06:01

## 2020-11-13 RX ADMIN — Medication 40 MILLIGRAM(S): at 21:16

## 2020-11-13 RX ADMIN — Medication 0.25 MILLIGRAM(S): at 21:28

## 2020-11-13 RX ADMIN — Medication 75 MICROGRAM(S): at 06:02

## 2020-11-13 RX ADMIN — Medication 4 MILLIGRAM(S): at 14:23

## 2020-11-13 RX ADMIN — Medication 81 MILLIGRAM(S): at 10:53

## 2020-11-13 RX ADMIN — PANTOPRAZOLE SODIUM 40 MILLIGRAM(S): 20 TABLET, DELAYED RELEASE ORAL at 10:53

## 2020-11-13 RX ADMIN — SENNA PLUS 2 TABLET(S): 8.6 TABLET ORAL at 21:16

## 2020-11-13 RX ADMIN — SPIRONOLACTONE 25 MILLIGRAM(S): 25 TABLET, FILM COATED ORAL at 10:53

## 2020-11-13 RX ADMIN — POLYETHYLENE GLYCOL 3350 17 GRAM(S): 17 POWDER, FOR SOLUTION ORAL at 10:54

## 2020-11-13 NOTE — PROGRESS NOTE ADULT - ASSESSMENT
89 year old F with PMHx of Bladder CA, CAD, HTN, HLD and OH; has had evolving weakness; while walking her legs give way, it has resulted in frequent falls, was admitted to [facility] in Oct 2020, d/c to [facility]; at [facility] facility she has had epiopsdic lightheadedness and near syncope in addition to weakness, these occur when she is seated and not ambulating, she reports right side is weaker than left side, although right arm weakness is chronic.    MRI brain shows acute infarct in left corpus callosum    Acute Stroke  -Has paroxysmal a-fib but has been felt to be at risk for AC due to falls and bladder CA.   -Aspirin + Plavix x 30 days and then aspirin monotherapy  -Reports allergies to statins in the past. Started on pravastatin  -Carotid doppler report appreciated. Patient not agreeable to any carotid surgery at this time.   -Physical therapy.    Dizziness/Syncope  -Has had documented orthostatic hypotension  -However, BP has also been elevated so not a candidate for midodrine    Dr. Yung will take over neurology coverage on 11/14/20 and can be called as needed.

## 2020-11-13 NOTE — PROGRESS NOTE ADULT - SUBJECTIVE AND OBJECTIVE BOX
CC: Syncope (08 Nov 2020 11:37)    HPI: 89 year old female w/ PMHx of CAD s/p stents x5, bladder cancer,  HTN, hypothyroidism, multiple falls and h/o syncope, orthostatic hypotension presented to the ED after a witnessed syncopal episode at her facility. Patient does not recall event but endorses 8 month history of progressive weakness, frequent falls. Patient states both of her legs and arms are extremely weak and she has a hard time ambulating due to weakness. Patient voiced concerns at facility but was not able to see a specialist. Patient with intermittent headaches but denies any slurred speech, facial droop, visual changes, sob, nausea or vomiting.    In the ED patient with negative CTA chest and CT brain (07 Nov 2020 19:09)    INTERVAL HPI/ OVERNIGHT EVENTS:  Pt was seen and examined at bedside, c/o constipation, she would like PT to spend more time with her and help her move,  PT was called - David RN manager called PT supervisor - pt. ambulated; still + orthostatics, cannot start forinef due to high BP - added spironalctone     REVIEW OF SYSTEMS:  All other review of systems is negative unless indicated above    Vital Signs Last 24 Hrs  T(C): 37.2 (13 Nov 2020 09:05), Max: 37.2 (13 Nov 2020 09:05)  T(F): 98.9 (13 Nov 2020 09:05), Max: 98.9 (13 Nov 2020 09:05)  HR: 78 (13 Nov 2020 09:05) (68 - 78)  BP: 152/64 (13 Nov 2020 09:05) (142/49 - 209/75)  BP(mean): --  RR: 18 (13 Nov 2020 09:05) (18 - 18)  SpO2: 99% (13 Nov 2020 09:05) (98% - 99%)    PHYSICAL EXAM:    General: Well developed;  in no acute distress, looks weak but comfortable   Eyes: PERRLA, EOMI; conjunctiva and sclera clear  Head: Normocephalic; atraumatic  ENMT: No nasal discharge; airway clear  Neck: Supple; non tender; no masses  Respiratory: No wheezes, rales or rhonchi  Cardiovascular: Regular rate and rhythm. S1 and S2 Normal; No murmurs  Gastrointestinal: Soft non-tender non-distended; Normal bowel sounds  Genitourinary: No  suprapubic  tenderness  Extremities: No  edema  Vascular: Peripheral pulses palpable 2+ bilaterally  Neurological: Alert and oriented x3, has R sided weakness ( but also has R frozen shoulder so difficult to asses) , speech fluid, no aphasia   Skin: Warm and dry. No acute rash  Lymph Nodes: No acute cervical adenopathy  Musculoskeletal: Normal muscle tone  Psychiatric: Cooperative        Medications:  MEDICATIONS  (STANDING):  artificial  tears Solution 1 Drop(s) Both EYES three times a day  aspirin enteric coated 81 milliGRAM(s) Oral daily  cholecalciferol 1000 Unit(s) Oral daily  cyanocobalamin 1000 MICROGram(s) Oral daily  heparin   Injectable 5000 Unit(s) SubCutaneous every 12 hours  levothyroxine 75 MICROGram(s) Oral daily  melatonin 3 milliGRAM(s) Oral at bedtime  methylPREDNISolone 4 milliGRAM(s) Oral after lunch  methylPREDNISolone 4 milliGRAM(s) Oral before breakfast  methylPREDNISolone   Oral   methylPREDNISolone 4 milliGRAM(s) Oral at bedtime  metoprolol succinate ER 50 milliGRAM(s) Oral daily  pantoprazole    Tablet 40 milliGRAM(s) Oral daily  polyethylene glycol 3350 17 Gram(s) Oral daily  pravastatin 40 milliGRAM(s) Oral at bedtime  senna 2 Tablet(s) Oral at bedtime  spironolactone 25 milliGRAM(s) Oral daily      Labs: All Labs Reviewed:                        12.9   11.93 )-----------( 387      ( 13 Nov 2020 07:23 )             43.4     11-13    138  |  107  |  34<H>  ----------------------------<  105<H>  4.5   |  25  |  1.01    Ca    9.8      13 Nov 2020 07:23      Blood Culture: 11-07 @ 12:50  Organism --  Gram Stain Blood -- Gram Stain --  Specimen Source .Urine Clean Catch (Midstream)  Culture-Blood --    >=3 organisms. Probable collection contamination.    Urine Culture: Organism: --  gram stain: --  11-07 @ 12:50     >=3 organisms. Probable collection contamination.      RADIOLOGY & ADDITIONAL TESTS: all tests were reviewed     EXAM:  MR BRAIN                       PROCEDURE DATE:  11/08/2020        FINDINGS:    There is subtle restricted diffusion with corresponding signal dropout on ADC maps and associated T2/STIR hyperintense cytotoxic edema in the left genu of the corpus callosum (series 5 images 46-48), findings consistent with evolving acute infarct. There is no evidence of associated hemorrhage.    There are scattered T2/FLAIR hyperintense changes in the . to the subcortical white matter, nonspecific finding, but mostlikely representing sequelae of moderate chronic microvascular ischemic disease.    There is cortical sulcal prominence related to underlying brain parenchymal volume.    There is no intracranial mass or hydrocephalus. There are no extra-axial fluid collections.    The visualized skull base flow voids appear patent.    The patient is status post bilateral lens replacement. There are mild mucosal inflammatory changes of the paranasal sinuses. There is trace right mastoid air effusion. The left mastoid air cells are clear. The visualized soft tissues and osseous structures appear unremarkable.    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 No hemorrhage.      EXAM:  CTA CHEST PE PROTOCOL (W)AW IC                        PROCEDURE DATE:  11/07/2020      INTERPRETATION:      Tubes/Lines: None    Mediastinum and Heart: Aorta and pulmonary arteries are normal in size. Thyroid gland is unremarkable. No lymphadenopathy. No pericardial effusion.    Lungs, Pleura, and Airways: There is no pulmonary embolus. No consolidations, edema, effusion, or pneumothorax. Minimal areas of high attenuation within the lungs bilaterally are likely due to expiratory phase of scanning. Minimal bibasilar reticulation is unchanged.    Visualized Abdomen: Unremarkable.    Bones and soft tissues: Unremarkable.    IMPRESSION:    No pulmonary embolus.

## 2020-11-13 NOTE — PROGRESS NOTE ADULT - SUBJECTIVE AND OBJECTIVE BOX
Interval History:  11/13/20: Patient sitting in chair. Denies dizziness.    MEDICATIONS  (STANDING):  artificial  tears Solution 1 Drop(s) Both EYES three times a day  aspirin enteric coated 81 milliGRAM(s) Oral daily  cholecalciferol 1000 Unit(s) Oral daily  cyanocobalamin 1000 MICROGram(s) Oral daily  heparin   Injectable 5000 Unit(s) SubCutaneous every 12 hours  levothyroxine 75 MICROGram(s) Oral daily  melatonin 3 milliGRAM(s) Oral at bedtime  methylPREDNISolone 4 milliGRAM(s) Oral before breakfast  methylPREDNISolone   Oral   methylPREDNISolone 4 milliGRAM(s) Oral after lunch  methylPREDNISolone 4 milliGRAM(s) Oral at bedtime  metoprolol succinate ER 50 milliGRAM(s) Oral daily  pantoprazole    Tablet 40 milliGRAM(s) Oral daily  polyethylene glycol 3350 17 Gram(s) Oral daily  pravastatin 40 milliGRAM(s) Oral at bedtime  senna 2 Tablet(s) Oral at bedtime  spironolactone 25 milliGRAM(s) Oral daily    MEDICATIONS  (PRN):  acetaminophen   Tablet .. 650 milliGRAM(s) Oral every 4 hours PRN Mild Pain (1 - 3)  ALPRAZolam 0.25 milliGRAM(s) Oral three times a day PRN anxiety  bisacodyl Suppository 10 milliGRAM(s) Rectal daily PRN Constipation  guaiFENesin   Syrup  (Sugar-Free) 100 milliGRAM(s) Oral every 6 hours PRN Cough  magnesium hydroxide Suspension 30 milliLiter(s) Oral daily PRN Constipation  ondansetron Injectable 4 milliGRAM(s) IV Push every 6 hours PRN Nausea  polyethylene glycol 3350 17 Gram(s) Oral daily PRN Constipation  traMADol 25 milliGRAM(s) Oral three times a day PRN Severe Pain (7 - 10)      Allergies    amlodipine (Unknown)  clonidine (Unknown)  Florinef Acetate (Unknown)  hydrocodone (Unknown)  Levatol (Unknown)  Lipitor (Unknown)  Lotrel (Unknown)  methylPREDNISolone (Unknown)  morphine (Other)  Plaquenil (Unknown)  statins (Other (Unknown))  sulfa drugs (Rash)    Intolerances        PHYSICAL EXAM:  Vital Signs Last 24 Hrs  T(F): 98.9 (11-13-20 @ 09:05)  HR: 78 (11-13-20 @ 09:05)  BP: 152/64 (11-13-20 @ 09:05)  RR: 18 (11-13-20 @ 09:05)      GENERAL: NAD, well-groomed, well-developed  HEAD:  Atraumatic, Normocephalic  Neuro:  GENERAL: NAD, well-groomed, well-developed  HF: A x O x 3. Appropriately interactive, normal affect. Speech fluent, No Aphasia or paraphasic errors. Naming /repetition intact   CN: RENE, EOMI, VFF, facial sensation normal, no NLFD, tongue midline, Palate moves equally, SCM equal bilaterally  Motor: No pronator drift, Right UE PROX 4-/5 (frozen shoulder),  Right LE 4+/5; Left side 5/5. No tremor, rigidity or bradykinesia.    Sens: Intact to light touch  Reflexes: Symmetric 1+, AJ 0, downgoing toes b/l  Coord:  No FNFA, dysmetria   Gait/Balance: Not tested      LABS:                        12.9   11.93 )-----------( 387      ( 13 Nov 2020 07:23 )             43.4     11-13    138  |  107  |  34<H>  ----------------------------<  105<H>  4.5   |  25  |  1.01    Ca    9.8      13 Nov 2020 07:23            RADIOLOGY & ADDITIONAL STUDIES:  CT head 11/7/20: No acute hemorrhage mass or mass effect.    MRI head no contrast 11/8/20:   Evolving acute infarct in the left genu of the corpus callosum. No hemorrhage..    carotid ultrasound 11/9/20:   Mild to moderate plaque in the left carotid bulb and proximal left internal carotid artery with evaluation limited secondary to shadowing from the plaque and an elevated peak systolic velocity suggestive of a 50-69% stenosis.    Mild plaque in the right carotid bulb and proximal right internal carotid artery with less than 50% right internal carotid artery stenosis.    Mild to moderate mixed plaque in the left external carotid artery with an elevated peak systolic velocity.

## 2020-11-13 NOTE — PROGRESS NOTE ADULT - ASSESSMENT
89 year old female w/ PMHx of CAD s/p stents x5, bladder cancer,  HTN, hypothyroidism, multiple falls and h/o syncope, orthostatic hypotension admitted for:     # Syncope, likely related to significant orthostatic hypotension, and probably symptomatic AFIB,  less likely 2/2 stroke   -C/w  tele: SR with 1st degree AVB,   -monitor orthostatics, still positive   - echo: EF 65-70%  - Off  Nitrates and hydralazine   - Trop neg x 1  -D/w Dr Sánchez: no AC for now, will reassess when able to ambulate, start florinef when BP allows  - pt. with recent hypertensive crisis, at present SBP 150s   - no events on cardiac monitor - will d/c tele    # HTN - not controlled  still on medrol pack  add spironlactone to currrent regimen     #  RLE/RUE weakness 2/2 Acute L Corpus Callosum genu infarct   - MRI reviewed  - Had episode of AFIB overnight   - past EKGs reviewed, one from July 2020 read as AFIB  - C/w ASA, start statins   - lipid panel - wnl   - ECHO: preserved EF   - D/w DR Payne - pt. refuses carotid surgery     * Paroxysmal AFIB   C/w tele   On Toprol  [id number] score is7, high risk.  Also high risk for bleeding due to age, deconditioning, multiple falls, and bladder CA   D/w Dr Sánchez, does  not recommend A/c due to high bleeding risk    * Leukocytosis 2/2 steroid use  - WBC trending down - pt. is on medrol pack   - monitor WBC     #Elevated D-Dimer  -CTA chest negative for PE    #Progressive weakness, deconditioning   -pt with 8 month history of intermittent weakness and falls  - unlikely related to acute stroke as its completely new and no old strokes on MRI   - TSH WNL  -  ESR/CRP min elevated  -  B12 low normal will start Po supplementation, folate WNL  - PT     # Arthralgia   Started on Medrol dose pack as per cardio  Monitor response   Doubt rheum Dz as ESR nit significantly elevated     # Bladder CA, with L iliac Lymphopathy,  not on TX  could contribute to weakness and poor appetite   supportive care     #Moderate Protein calorie malnutrition  Nutritionist consult appreciated     #CAD  - on Metoprolol, ASA    #Hx of Hypothyroidism   -on synthroid    # HTN  -c/w  metoprolol   - BP elevated, will hold off on Florinef for now       # GERD  - on protonix      # Advanced directives  -DNR/DNI    #DVT ppx  -heparin sq

## 2020-11-14 LAB
ANION GAP SERPL CALC-SCNC: 5 MMOL/L — SIGNIFICANT CHANGE UP (ref 5–17)
BUN SERPL-MCNC: 37 MG/DL — HIGH (ref 7–23)
CALCIUM SERPL-MCNC: 9.6 MG/DL — SIGNIFICANT CHANGE UP (ref 8.5–10.1)
CHLORIDE SERPL-SCNC: 105 MMOL/L — SIGNIFICANT CHANGE UP (ref 96–108)
CO2 SERPL-SCNC: 28 MMOL/L — SIGNIFICANT CHANGE UP (ref 22–31)
CREAT SERPL-MCNC: 1.05 MG/DL — SIGNIFICANT CHANGE UP (ref 0.5–1.3)
GLUCOSE SERPL-MCNC: 91 MG/DL — SIGNIFICANT CHANGE UP (ref 70–99)
HCT VFR BLD CALC: 41.8 % — SIGNIFICANT CHANGE UP (ref 34.5–45)
HGB BLD-MCNC: 12.4 G/DL — SIGNIFICANT CHANGE UP (ref 11.5–15.5)
MCHC RBC-ENTMCNC: 25.3 PG — LOW (ref 27–34)
MCHC RBC-ENTMCNC: 29.7 GM/DL — LOW (ref 32–36)
MCV RBC AUTO: 85.3 FL — SIGNIFICANT CHANGE UP (ref 80–100)
PLATELET # BLD AUTO: 381 K/UL — SIGNIFICANT CHANGE UP (ref 150–400)
POTASSIUM SERPL-MCNC: 4.7 MMOL/L — SIGNIFICANT CHANGE UP (ref 3.5–5.3)
POTASSIUM SERPL-SCNC: 4.7 MMOL/L — SIGNIFICANT CHANGE UP (ref 3.5–5.3)
RBC # BLD: 4.9 M/UL — SIGNIFICANT CHANGE UP (ref 3.8–5.2)
RBC # FLD: 16 % — HIGH (ref 10.3–14.5)
SODIUM SERPL-SCNC: 138 MMOL/L — SIGNIFICANT CHANGE UP (ref 135–145)
WBC # BLD: 12.12 K/UL — HIGH (ref 3.8–10.5)
WBC # FLD AUTO: 12.12 K/UL — HIGH (ref 3.8–10.5)

## 2020-11-14 PROCEDURE — 99232 SBSQ HOSP IP/OBS MODERATE 35: CPT

## 2020-11-14 RX ORDER — HYDRALAZINE HCL 50 MG
5 TABLET ORAL ONCE
Refills: 0 | Status: COMPLETED | OUTPATIENT
Start: 2020-11-14 | End: 2020-11-14

## 2020-11-14 RX ORDER — METOPROLOL TARTRATE 50 MG
75 TABLET ORAL DAILY
Refills: 0 | Status: DISCONTINUED | OUTPATIENT
Start: 2020-11-14 | End: 2020-11-17

## 2020-11-14 RX ORDER — HYDRALAZINE HCL 50 MG
25 TABLET ORAL EVERY 8 HOURS
Refills: 0 | Status: DISCONTINUED | OUTPATIENT
Start: 2020-11-14 | End: 2020-11-15

## 2020-11-14 RX ADMIN — Medication 3 MILLIGRAM(S): at 22:37

## 2020-11-14 RX ADMIN — Medication 25 MILLIGRAM(S): at 13:37

## 2020-11-14 RX ADMIN — Medication 4 MILLIGRAM(S): at 05:15

## 2020-11-14 RX ADMIN — POLYETHYLENE GLYCOL 3350 17 GRAM(S): 17 POWDER, FOR SOLUTION ORAL at 09:53

## 2020-11-14 RX ADMIN — Medication 81 MILLIGRAM(S): at 09:52

## 2020-11-14 RX ADMIN — Medication 650 MILLIGRAM(S): at 22:41

## 2020-11-14 RX ADMIN — Medication 40 MILLIGRAM(S): at 22:36

## 2020-11-14 RX ADMIN — Medication 5 MILLIGRAM(S): at 18:45

## 2020-11-14 RX ADMIN — Medication 1 DROP(S): at 05:17

## 2020-11-14 RX ADMIN — Medication 650 MILLIGRAM(S): at 23:10

## 2020-11-14 RX ADMIN — Medication 1 DROP(S): at 13:37

## 2020-11-14 RX ADMIN — SENNA PLUS 2 TABLET(S): 8.6 TABLET ORAL at 22:37

## 2020-11-14 RX ADMIN — Medication 0.25 MILLIGRAM(S): at 23:20

## 2020-11-14 RX ADMIN — Medication 75 MICROGRAM(S): at 05:14

## 2020-11-14 RX ADMIN — Medication 1 DROP(S): at 22:37

## 2020-11-14 RX ADMIN — HEPARIN SODIUM 5000 UNIT(S): 5000 INJECTION INTRAVENOUS; SUBCUTANEOUS at 09:52

## 2020-11-14 RX ADMIN — Medication 1000 UNIT(S): at 09:51

## 2020-11-14 RX ADMIN — SPIRONOLACTONE 25 MILLIGRAM(S): 25 TABLET, FILM COATED ORAL at 12:17

## 2020-11-14 RX ADMIN — PANTOPRAZOLE SODIUM 40 MILLIGRAM(S): 20 TABLET, DELAYED RELEASE ORAL at 09:52

## 2020-11-14 RX ADMIN — Medication 650 MILLIGRAM(S): at 13:37

## 2020-11-14 RX ADMIN — Medication 50 MILLIGRAM(S): at 09:51

## 2020-11-14 RX ADMIN — PREGABALIN 1000 MICROGRAM(S): 225 CAPSULE ORAL at 09:52

## 2020-11-14 RX ADMIN — HEPARIN SODIUM 5000 UNIT(S): 5000 INJECTION INTRAVENOUS; SUBCUTANEOUS at 22:36

## 2020-11-14 NOTE — CHART NOTE - NSCHARTNOTEFT_GEN_A_CORE
Informed by RN at 6:27pm of sbp persistently elevated >180 on b/l UE.  Pt BP difficult to control per EMR. BP medications being titrated, next dose due at 10 pm hydralazine 25mg po. Will give a one time dose of IV hydralazine 5 mg and monitor as pt with known orthostatic hypotension, orthostatics at this time still with sbp>180. Will administer IVP and continue to monitor with plan to give po medication as scheduled.

## 2020-11-14 NOTE — PROGRESS NOTE ADULT - SUBJECTIVE AND OBJECTIVE BOX
CC: Syncope (08 Nov 2020 11:37)    HPI: 89 year old female w/ PMHx of CAD s/p stents x5, bladder cancer,  HTN, hypothyroidism, multiple falls and h/o syncope, orthostatic hypotension presented to the ED after a witnessed syncopal episode at her facility. Patient does not recall event but endorses 8 month history of progressive weakness, frequent falls. Patient states both of her legs and arms are extremely weak and she has a hard time ambulating due to weakness. Patient voiced concerns at facility but was not able to see a specialist. Patient with intermittent headaches but denies any slurred speech, facial droop, visual changes, sob, nausea or vomiting.    In the ED patient with negative CTA chest and CT brain (07 Nov 2020 19:09)    This is a difficult case as patient has a very high BPs with + orthostatics with significant drop in BPs. Plan is to increase BP meds to better control the BPs at lower BP I will try to work with orthostatics. Patient is a very difficult patient as patient is elderly and it has been hard controlling patient's BPs.     REVIEW OF SYSTEMS:  All other review of systems is negative unless indicated above    PHYSICAL EXAM:  T(C): 36.4 (13 Nov 2020 23:00), Max: 36.4 (13 Nov 2020 23:00)  T(F): 97.5 (13 Nov 2020 23:00), Max: 97.5 (13 Nov 2020 23:00)  HR: 70 (13 Nov 2020 23:00) (70 - 82)  BP: 147/56 (13 Nov 2020 23:00) (108/54 - 147/56)  RR: 18 (13 Nov 2020 23:00) (18 - 18)  SpO2: 98% (13 Nov 2020 23:00) (97% - 98%)  General: Well developed; deconditioned frail, sick  Eyes: PERRLA, EOMI; conjunctiva and sclera clear  Head: Normocephalic; atraumatic  ENMT: No nasal discharge; airway clear  Neck: Supple; non tender; no masses  Respiratory: No wheezes, rales or rhonchi  Cardiovascular: Regular rate and rhythm. S1 and S2 Normal; No murmurs  Gastrointestinal: Soft non-tender non-distended; Normal bowel sounds  Genitourinary: No  suprapubic  tenderness  Extremities: No  edema  Vascular: Peripheral pulses palpable 2+ bilaterally  Neurological: Alert and oriented x3, has R sided weakness ( but also has R frozen shoulder so difficult to asses) , speech fluid, no aphasia   Skin: Warm and dry. No acute rash  Lymph Nodes: No acute cervical adenopathy  Musculoskeletal: Normal muscle tone  Psychiatric: Cooperative    Labs:       CBC Full  -  ( 14 Nov 2020 07:24 )  WBC Count : 12.12 K/uL  RBC Count : 4.90 M/uL  Hemoglobin : 12.4 g/dL  Hematocrit : 41.8 %  Platelet Count - Automated : 381 K/uL  Mean Cell Volume : 85.3 fl  Mean Cell Hemoglobin : 25.3 pg  Mean Cell Hemoglobin Concentration : 29.7 gm/dL  Auto Neutrophil # : x  Auto Lymphocyte # : x  Auto Monocyte # : x  Auto Eosinophil # : x  Auto Basophil # : x  Auto Neutrophil % : x  Auto Lymphocyte % : x  Auto Monocyte % : x  Auto Eosinophil % : x  Auto Basophil % : x        11-14    138  |  105  |  37<H>  ----------------------------<  91  4.7   |  28  |  1.05    Ca    9.6      14 Nov 2020 07:24                   89 year old female w/ PMHx of CAD s/p stents x5, bladder cancer,  HTN, hypothyroidism, multiple falls and h/o syncope, orthostatic hypotension admitted for:     # Syncope is multifactorial in a setting of orthostatic hypotension /  atrial fibrillation   - Poorly controlled BPs in 200s  - orthostatics with significant drop in SBP    - echo: EF 65-70%  - Increase to toprol XL 75 mg po qdaily  - add spironolactone 25 mg po qdaily  - started on hydralazin 25 mg po q8h  - plan is to have a better BP control. once that is achived, will ? orthostatics    #  RLE/RUE weakness 2/2 Acute L Corpus Callosum genu infarct   - MRI reviewed  - Had episode of AFIB overnight   - past EKGs reviewed, one from July 2020 read as AFIB  - C/w ASA, start statins   - lipid panel - wnl   - ECHO: preserved EF   - D/w DR Payne - pt. refuses carotid surgery     * Paroxysmal AFIB   - C/w tele   - On Toprol  - [id number] score is7, high risk.  Also high risk for bleeding due to age, deconditioning, multiple falls, and bladder CA   - D/w Dr Sánchez, does  not recommend A/c due to high bleeding risk, high risk of fall    * Leukocytosis 2/2 steroid use  - stop steroids     #Elevated D-Dimer  -CTA chest negative for PE    #Progressive weakness, deconditioning   - pt with 8 month history of intermittent weakness and falls  - unlikely related to acute stroke as its completely new and no old strokes on MRI   - TSH WNL  - ESR/CRP min elevated  - B12 low normal will start Po supplementation, folate WNL  - PT     # Bladder CA, with L iliac Lymphopathy,  not on TX  - could contribute to weakness and poor appetite   - supportive care     # Moderate Protein calorie malnutrition  Nutritionist consult appreciated     # CAD  - on Metoprolol, ASA    # Hx of Hypothyroidism   -on synthroid    # HTN  -c/w  metoprolol   - BP elevated, will hold off on Florinef for now       # GERD  - on protonix      # Advanced directives  -DNR/DNI     CC: Syncope (08 Nov 2020 11:37)    HPI: 89 year old female w/ PMHx of CAD s/p stents x5, bladder cancer,  HTN, hypothyroidism, multiple falls and h/o syncope, orthostatic hypotension presented to the ED after a witnessed syncopal episode at her facility. Patient does not recall event but endorses 8 month history of progressive weakness, frequent falls. Patient states both of her legs and arms are extremely weak and she has a hard time ambulating due to weakness. Patient voiced concerns at facility but was not able to see a specialist. Patient with intermittent headaches but denies any slurred speech, facial droop, visual changes, sob, nausea or vomiting.    In the ED patient with negative CTA chest and CT brain (07 Nov 2020 19:09)    This is a difficult case as patient has a very high BPs with + orthostatics with significant drop in BPs. Plan is to increase BP meds to better control the BPs at lower BP I will try to work with orthostatics. Patient is a very difficult patient as patient is elderly and it has been hard controlling patient's BPs.     REVIEW OF SYSTEMS:  All other review of systems is negative unless indicated above    PHYSICAL EXAM:  T(C): 36.4 (13 Nov 2020 23:00), Max: 36.4 (13 Nov 2020 23:00)  T(F): 97.5 (13 Nov 2020 23:00), Max: 97.5 (13 Nov 2020 23:00)  HR: 70 (13 Nov 2020 23:00) (70 - 82)  BP: 147/56 (13 Nov 2020 23:00) (108/54 - 147/56)  RR: 18 (13 Nov 2020 23:00) (18 - 18)  SpO2: 98% (13 Nov 2020 23:00) (97% - 98%)  General: Well developed; deconditioned frail, sick  Eyes: PERRLA, EOMI; conjunctiva and sclera clear  Head: Normocephalic; atraumatic  ENMT: No nasal discharge; airway clear  Neck: Supple; non tender; no masses  Respiratory: No wheezes, rales or rhonchi  Cardiovascular: Regular rate and rhythm. S1 and S2 Normal; No murmurs  Gastrointestinal: Soft non-tender non-distended; Normal bowel sounds  Genitourinary: No  suprapubic  tenderness  Extremities: No  edema  Vascular: Peripheral pulses palpable 2+ bilaterally  Neurological: Alert and oriented x3, has R sided weakness ( but also has R frozen shoulder so difficult to asses) , speech fluid, no aphasia   Skin: Warm and dry. No acute rash  Lymph Nodes: No acute cervical adenopathy  Musculoskeletal: Normal muscle tone  Psychiatric: Cooperative    Labs:       CBC Full  -  ( 14 Nov 2020 07:24 )  WBC Count : 12.12 K/uL  RBC Count : 4.90 M/uL  Hemoglobin : 12.4 g/dL  Hematocrit : 41.8 %  Platelet Count - Automated : 381 K/uL  Mean Cell Volume : 85.3 fl  Mean Cell Hemoglobin : 25.3 pg  Mean Cell Hemoglobin Concentration : 29.7 gm/dL  Auto Neutrophil # : x  Auto Lymphocyte # : x  Auto Monocyte # : x  Auto Eosinophil # : x  Auto Basophil # : x  Auto Neutrophil % : x  Auto Lymphocyte % : x  Auto Monocyte % : x  Auto Eosinophil % : x  Auto Basophil % : x        11-14    138  |  105  |  37<H>  ----------------------------<  91  4.7   |  28  |  1.05    Ca    9.6      14 Nov 2020 07:24                   89 year old female w/ PMHx of CAD s/p stents x5, bladder cancer,  HTN, hypothyroidism, multiple falls and h/o syncope, orthostatic hypotension admitted for:     # Syncope is multifactorial in a setting of orthostatic hypotension /  atrial fibrillation   - Poorly controlled BPs in 200s  - orthostatics with significant drop in SBP    - echo: EF 65-70%  - Increase to toprol XL 75 mg po qdaily  - add spironolactone 25 mg po qdaily  - started on hydralazin 25 mg po q8h  - plan is to have a better BP control. once that is achived, will ? orthostatics    #  RLE/RUE weakness 2/2 Acute L Corpus Callosum genu infarct   - MRI reviewed  - Had episode of AFIB overnight   - past EKGs reviewed, one from July 2020 read as AFIB  - C/w ASA, start statins   - lipid panel - wnl   - ECHO: preserved EF   - D/w DR Payne - pt. refuses carotid surgery     * Paroxysmal AFIB   - C/w tele   - On Toprol  - [id number] score is7, high risk.  Also high risk for bleeding due to age, deconditioning, multiple falls, and bladder CA   - D/w Dr Sánchez, does  not recommend A/c due to high bleeding risk, high risk of fall    * Leukocytosis 2/2 steroid use  - stop steroids     #Elevated D-Dimer  -CTA chest negative for PE    #Progressive weakness, deconditioning   - pt with 8 month history of intermittent weakness and falls  - unlikely related to acute stroke as its completely new and no old strokes on MRI   - TSH WNL  - ESR/CRP min elevated  - B12 low normal will start Po supplementation, folate WNL  - PT     # Bladder CA, with L iliac Lymphopathy,  not on TX  - nor further treatment or workup as per patient and family - patient did not tolerate chemo well in the past   - as per urology notes Dr. Solorzano) from last admission pt with high risk bladder ca, failed 1st BCG induction  - pt noted as poor surgical candidate and was not interested in cystectomy  - left  iliac lymphadenopathy - CT L common and external iliac lymphadenopathy suspicious for metastatic disease    # Moderate Protein calorie malnutrition  Nutritionist consult appreciated     # CAD  - on Metoprolol, ASA    # Hx of Hypothyroidism   -on synthroid    # HTN  -c/w  metoprolol   - BP elevated, will hold off on Florinef for now       # GERD  - on protonix      # Advanced directives  -DNR/DNI

## 2020-11-15 PROCEDURE — 99233 SBSQ HOSP IP/OBS HIGH 50: CPT

## 2020-11-15 RX ORDER — HYDRALAZINE HCL 50 MG
50 TABLET ORAL EVERY 8 HOURS
Refills: 0 | Status: DISCONTINUED | OUTPATIENT
Start: 2020-11-15 | End: 2020-11-16

## 2020-11-15 RX ORDER — CLOPIDOGREL BISULFATE 75 MG/1
75 TABLET, FILM COATED ORAL DAILY
Refills: 0 | Status: DISCONTINUED | OUTPATIENT
Start: 2020-11-15 | End: 2020-11-17

## 2020-11-15 RX ADMIN — Medication 75 MICROGRAM(S): at 06:17

## 2020-11-15 RX ADMIN — Medication 75 MILLIGRAM(S): at 10:25

## 2020-11-15 RX ADMIN — Medication 650 MILLIGRAM(S): at 11:49

## 2020-11-15 RX ADMIN — Medication 40 MILLIGRAM(S): at 22:13

## 2020-11-15 RX ADMIN — HEPARIN SODIUM 5000 UNIT(S): 5000 INJECTION INTRAVENOUS; SUBCUTANEOUS at 10:26

## 2020-11-15 RX ADMIN — SENNA PLUS 2 TABLET(S): 8.6 TABLET ORAL at 22:13

## 2020-11-15 RX ADMIN — SPIRONOLACTONE 25 MILLIGRAM(S): 25 TABLET, FILM COATED ORAL at 10:25

## 2020-11-15 RX ADMIN — Medication 1000 UNIT(S): at 10:24

## 2020-11-15 RX ADMIN — Medication 25 MILLIGRAM(S): at 06:17

## 2020-11-15 RX ADMIN — POLYETHYLENE GLYCOL 3350 17 GRAM(S): 17 POWDER, FOR SOLUTION ORAL at 10:26

## 2020-11-15 RX ADMIN — Medication 3 MILLIGRAM(S): at 22:12

## 2020-11-15 RX ADMIN — Medication 1 DROP(S): at 15:03

## 2020-11-15 RX ADMIN — Medication 1 DROP(S): at 06:17

## 2020-11-15 RX ADMIN — Medication 1 DROP(S): at 22:13

## 2020-11-15 RX ADMIN — PREGABALIN 1000 MICROGRAM(S): 225 CAPSULE ORAL at 10:24

## 2020-11-15 RX ADMIN — PANTOPRAZOLE SODIUM 40 MILLIGRAM(S): 20 TABLET, DELAYED RELEASE ORAL at 10:25

## 2020-11-15 RX ADMIN — Medication 650 MILLIGRAM(S): at 12:45

## 2020-11-15 RX ADMIN — Medication 81 MILLIGRAM(S): at 10:25

## 2020-11-15 RX ADMIN — HEPARIN SODIUM 5000 UNIT(S): 5000 INJECTION INTRAVENOUS; SUBCUTANEOUS at 22:13

## 2020-11-15 NOTE — PROGRESS NOTE ADULT - ASSESSMENT
89 year old female w/ PMHx of CAD s/p stents x5, bladder cancer,  HTN, hypothyroidism, multiple falls and h/o syncope, orthostatic hypotension admitted for:     # Syncope is multifactorial in a setting of orthostatic hypotension /  atrial fibrillation   - Poorly controlled BPs in 200s  - orthostatics with significant drop in SBP    - echo: EF 65-70%  - Increase to toprol XL 75 mg po qdaily  - add spironolactone 25 mg po qdaily  - started on hydralazin 25 mg po q8h  - plan is to have a better BP control. once that is achived, will ? orthostatics  11/15 - increased hydralazine    #  RLE/RUE weakness 2/2 Acute L Corpus Callosum genu infarct   - MRI reviewed  - Had episode of AFIB overnight   - past EKGs reviewed, one from July 2020 read as AFIB  - REVIEWED NEURO RECS - Aspirin + Plavix x 30 days and then aspirin monotherapy  CONT WITH ASA, will ADD PLAVIX   - lipid panel - wnl ; on statin   - ECHO: preserved EF   - D/w DR Payne - pt. refuses carotid surgery     * Paroxysmal AFIB   - C/w tele   - On Toprol  - [id number]-[id number] score is7, high risk.  Also high risk for bleeding due to age, deconditioning, multiple falls, and bladder CA   - D/w Dr Sánchez, does  not recommend A/c due to high bleeding risk, high risk of fall    * Leukocytosis 2/2 steroid use  - stop steroids     #Elevated D-Dimer  -CTA chest negative for PE    #Progressive weakness, deconditioning   - pt with 8 month history of intermittent weakness and falls  - unlikely related to acute stroke as its completely new and no old strokes on MRI   - TSH WNL  - ESR/CRP min elevated  - B12 low normal will start Po supplementation, folate WNL  - PT     # Bladder CA, with L iliac Lymphopathy,  not on TX  - nor further treatment or workup as per patient and family - patient did not tolerate chemo well in the past   - as per urology notes Dr. Solorzano) from last admission pt with high risk bladder ca, failed 1st BCG induction  - pt noted as poor surgical candidate and was not interested in cystectomy  - left  iliac lymphadenopathy - CT L common and external iliac lymphadenopathy suspicious for metastatic disease    # Moderate Protein calorie malnutrition  Nutritionist consult appreciated     # CAD  - on Metoprolol, ASA    # Hx of Hypothyroidism   -on synthroid    # HTN  -c/w  metoprolol   - BP elevated, will hold off on Florinef for now       # GERD  - on protonix      # Advanced directives  -DNR/DNI    discussed with RN

## 2020-11-15 NOTE — PROGRESS NOTE ADULT - SUBJECTIVE AND OBJECTIVE BOX
CC: Syncope (08 Nov 2020 11:37)    HPI: 89 year old female w/ PMHx of CAD s/p stents x5, bladder cancer,  HTN, hypothyroidism, multiple falls and h/o syncope, orthostatic hypotension presented to the ED after a witnessed syncopal episode at her facility. Patient does not recall event but endorses 8 month history of progressive weakness, frequent falls. Patient states both of her legs and arms are extremely weak and she has a hard time ambulating due to weakness. Patient voiced concerns at facility but was not able to see a specialist. Patient with intermittent headaches but denies any slurred speech, facial droop, visual changes, sob, nausea or vomiting.    In the ED patient with negative CTA chest and CT brain (07 Nov 2020 19:09)    This is a difficult case as patient has a very high BPs with + orthostatics with significant drop in BPs. Plan is to increase BP meds to better control the BPs at lower BP I will try to work with orthostatics. Patient is a very difficult patient as patient is elderly and it has been hard controlling patient's BPs.     11/15 - no cp palps sob abdo pain, last BM was 4d ago and getting miralax, wants to hold off on enema for now though she has needed it in the past.  Pt had numerous q re her meds and we discussed each of them     PHYSICAL EXAM:  General: Well developed; sitting up in chair  Eyes: PERRLA, EOMI; conjunctiva and sclera clear  Head: Normocephalic; atraumatic  Neck: Supple; non tender; no masses  Respiratory: No wheezes, rales or rhonchi  Cardiovascular: Regular rate and rhythm. S1 and S2 Normal; No murmurs  Gastrointestinal: Soft non-tender non-distended; Normal bowel sounds  Vascular: Peripheral pulses palpable 2+ bilaterally  Neurological: Alert and oriented x3, has R sided weakness ( but also has R frozen shoulder so difficult to asses) , speech fluid, no aphasia   Skin: Warm and dry. No acute rash  Musculoskeletal: Normal muscle tone  Psychiatric: Cooperative    LABS: All Labs Reviewed:                        12.4   12.12 )-----------( 381      ( 14 Nov 2020 07:24 )             41.8     11-14    138  |  105  |  37<H>  ----------------------------<  91  4.7   |  28  |  1.05    Ca    9.6      14 Nov 2020 07:24    RADIOLOGY/EKG:  < from: MR Head No Cont (11.08.20 @ 08:58) >  Evolving acute infarct in the left genu of the corpus callosum. No hemorrhage..      < from: US Duplex Carotid Arteries Complete, Bilateral (11.09.20 @ 09:12) >  Mild to moderate plaque in the left carotid bulb and proximal left internal carotid artery with evaluation limited secondary to shadowing from the plaque and an elevated peak systolic velocity suggestive of a 50-69% stenosis.          acetaminophen   Tablet .. 650 milliGRAM(s) Oral every 4 hours PRN  artificial  tears Solution 1 Drop(s) Both EYES three times a day  aspirin enteric coated 81 milliGRAM(s) Oral daily  bisacodyl Suppository 10 milliGRAM(s) Rectal daily PRN  cholecalciferol 1000 Unit(s) Oral daily  cyanocobalamin 1000 MICROGram(s) Oral daily  guaiFENesin   Syrup  (Sugar-Free) 100 milliGRAM(s) Oral every 6 hours PRN  heparin   Injectable 5000 Unit(s) SubCutaneous every 12 hours  hydrALAZINE 50 milliGRAM(s) Oral every 8 hours  levothyroxine 75 MICROGram(s) Oral daily  magnesium hydroxide Suspension 30 milliLiter(s) Oral daily PRN  melatonin 3 milliGRAM(s) Oral at bedtime  metoprolol succinate ER 75 milliGRAM(s) Oral daily  ondansetron Injectable 4 milliGRAM(s) IV Push every 6 hours PRN  pantoprazole    Tablet 40 milliGRAM(s) Oral daily  polyethylene glycol 3350 17 Gram(s) Oral daily PRN  polyethylene glycol 3350 17 Gram(s) Oral daily  pravastatin 40 milliGRAM(s) Oral at bedtime  senna 2 Tablet(s) Oral at bedtime  spironolactone 25 milliGRAM(s) Oral daily

## 2020-11-16 LAB — SARS-COV-2 RNA SPEC QL NAA+PROBE: SIGNIFICANT CHANGE UP

## 2020-11-16 PROCEDURE — 74177 CT ABD & PELVIS W/CONTRAST: CPT | Mod: 26

## 2020-11-16 PROCEDURE — 99233 SBSQ HOSP IP/OBS HIGH 50: CPT

## 2020-11-16 RX ORDER — ALPRAZOLAM 0.25 MG
0.5 TABLET ORAL ONCE
Refills: 0 | Status: DISCONTINUED | OUTPATIENT
Start: 2020-11-16 | End: 2020-11-16

## 2020-11-16 RX ORDER — SOD SULF/SODIUM/NAHCO3/KCL/PEG
4000 SOLUTION, RECONSTITUTED, ORAL ORAL ONCE
Refills: 0 | Status: COMPLETED | OUTPATIENT
Start: 2020-11-17 | End: 2020-11-17

## 2020-11-16 RX ORDER — ALPRAZOLAM 0.25 MG
0.25 TABLET ORAL ONCE
Refills: 0 | Status: DISCONTINUED | OUTPATIENT
Start: 2020-11-16 | End: 2020-11-16

## 2020-11-16 RX ORDER — HYDRALAZINE HCL 50 MG
25 TABLET ORAL EVERY 8 HOURS
Refills: 0 | Status: DISCONTINUED | OUTPATIENT
Start: 2020-11-16 | End: 2020-11-17

## 2020-11-16 RX ADMIN — Medication 0.25 MILLIGRAM(S): at 22:29

## 2020-11-16 RX ADMIN — CLOPIDOGREL BISULFATE 75 MILLIGRAM(S): 75 TABLET, FILM COATED ORAL at 09:17

## 2020-11-16 RX ADMIN — PANTOPRAZOLE SODIUM 40 MILLIGRAM(S): 20 TABLET, DELAYED RELEASE ORAL at 09:17

## 2020-11-16 RX ADMIN — SENNA PLUS 2 TABLET(S): 8.6 TABLET ORAL at 21:51

## 2020-11-16 RX ADMIN — Medication 1 DROP(S): at 21:51

## 2020-11-16 RX ADMIN — Medication 81 MILLIGRAM(S): at 09:17

## 2020-11-16 RX ADMIN — Medication 40 MILLIGRAM(S): at 21:51

## 2020-11-16 RX ADMIN — MAGNESIUM HYDROXIDE 30 MILLILITER(S): 400 TABLET, CHEWABLE ORAL at 21:51

## 2020-11-16 RX ADMIN — Medication 1000 UNIT(S): at 09:18

## 2020-11-16 RX ADMIN — Medication 10 MILLIGRAM(S): at 21:51

## 2020-11-16 RX ADMIN — Medication 75 MICROGRAM(S): at 06:12

## 2020-11-16 RX ADMIN — Medication 3 MILLIGRAM(S): at 21:51

## 2020-11-16 RX ADMIN — Medication 1 DROP(S): at 06:12

## 2020-11-16 RX ADMIN — SPIRONOLACTONE 25 MILLIGRAM(S): 25 TABLET, FILM COATED ORAL at 09:17

## 2020-11-16 RX ADMIN — POLYETHYLENE GLYCOL 3350 17 GRAM(S): 17 POWDER, FOR SOLUTION ORAL at 09:18

## 2020-11-16 RX ADMIN — HEPARIN SODIUM 5000 UNIT(S): 5000 INJECTION INTRAVENOUS; SUBCUTANEOUS at 09:19

## 2020-11-16 RX ADMIN — PREGABALIN 1000 MICROGRAM(S): 225 CAPSULE ORAL at 09:17

## 2020-11-16 RX ADMIN — Medication 75 MILLIGRAM(S): at 09:17

## 2020-11-16 RX ADMIN — HEPARIN SODIUM 5000 UNIT(S): 5000 INJECTION INTRAVENOUS; SUBCUTANEOUS at 21:51

## 2020-11-16 RX ADMIN — Medication 650 MILLIGRAM(S): at 14:30

## 2020-11-16 NOTE — PROGRESS NOTE ADULT - ASSESSMENT
89 year old female w/ PMHx of CAD s/p stents x5, bladder cancer,  HTN, hypothyroidism, multiple falls and h/o syncope, orthostatic hypotension admitted for:     # Syncope is multifactorial in a setting of orthostatic hypotension /  atrial fibrillation   - Poorly controlled BPs in 200s  - orthostatics with significant drop in SBP    - echo: EF 65-70%  - Increase to toprol XL 75 mg po qdaily  - add spironolactone 25 mg po qdaily  - started on hydralazin 25 mg po q8h  - plan is to have a better BP control. once that is achived, will ? orthostatics  11/15 - increased hydralazine    #  RLE/RUE weakness 2/2 Acute L Corpus Callosum genu infarct   - MRI reviewed  - Had episode of AFIB overnight   - past EKGs reviewed, one from July 2020 read as AFIB  - REVIEWED NEURO RECS - Aspirin + Plavix x 30 days and then aspirin monotherapy  CONT WITH ASA, will ADD PLAVIX   - lipid panel - wnl ; on statin   - ECHO: preserved EF   - D/w DR Payne - pt. refuses carotid surgery     * Paroxysmal AFIB   - C/w tele   - On Toprol  - [id number] score is7, high risk.  Also high risk for bleeding due to age, deconditioning, multiple falls, and bladder CA   - D/w Dr Sánchez, does  not recommend A/c due to high bleeding risk, high risk of fall    * Leukocytosis 2/2 steroid use  - stop steroids     #Elevated D-Dimer  -CTA chest negative for PE    #Progressive weakness, deconditioning   - pt with 8 month history of intermittent weakness and falls  - unlikely related to acute stroke as its completely new and no old strokes on MRI   - TSH WNL  - ESR/CRP min elevated  - B12 low normal will start Po supplementation, folate WNL  - PT     # Bladder CA, with L iliac Lymphopathy,  not on TX  - nor further treatment or workup as per patient and family - patient did not tolerate chemo well in the past   - as per urology notes Dr. Solorzano) from last admission pt with high risk bladder ca, failed 1st BCG induction  - pt noted as poor surgical candidate and was not interested in cystectomy  - left  iliac lymphadenopathy - CT L common and external iliac lymphadenopathy suspicious for metastatic disease    # Moderate Protein calorie malnutrition  Nutritionist consult appreciated     # CAD  - on Metoprolol, ASA    # Hx of Hypothyroidism   -on synthroid    # HTN  -c/w  metoprolol   - BP elevated, will hold off on Florinef for now       # GERD  - on protonix    #11/16 - right-sided abdo pain  likely from stool retention, will order enemas and miralax   noted compression fracture etc - but patient does not report back pain  noted also left iliac SANTANA etc - rec f/u with her oncologist    # Advanced directives  -DNR/DNI    discussed with RN   [facility] planning - rehab tomorrow

## 2020-11-16 NOTE — PROGRESS NOTE ADULT - NUTRITIONAL ASSESSMENT
This patient has been assessed with a concern for Malnutrition and has been determined to have a diagnosis/diagnoses of Moderate protein-calorie malnutrition.    This patient is being managed with:   Diet DASH/TLC-  Sodium & Cholesterol Restricted  Soft  Prosource Gelatein Plus     Qty per Day:  1x/day  Supplement Feeding Modality:  Oral  Ensure Enlive Cans or Servings Per Day:  1       Frequency:  Daily  Entered: Nov 8 2020 12:08PM    Diet DASH/TLC-  Sodium & Cholesterol Restricted  Entered: Nov 7 2020  2:57PM    The following pending diet order is being considered for treatment of Moderate protein-calorie malnutrition:null

## 2020-11-16 NOTE — PROGRESS NOTE ADULT - SUBJECTIVE AND OBJECTIVE BOX
CC: Syncope (08 Nov 2020 11:37)    HPI: 89 year old female w/ PMHx of CAD s/p stents x5, bladder cancer,  HTN, hypothyroidism, multiple falls and h/o syncope, orthostatic hypotension presented to the ED after a witnessed syncopal episode at her facility. Patient does not recall event but endorses 8 month history of progressive weakness, frequent falls. Patient states both of her legs and arms are extremely weak and she has a hard time ambulating due to weakness. Patient voiced concerns at facility but was not able to see a specialist. Patient with intermittent headaches but denies any slurred speech, facial droop, visual changes, sob, nausea or vomiting.    In the ED patient with negative CTA chest and CT brain (07 Nov 2020 19:09)    This is a difficult case as patient has a very high BPs with + orthostatics with significant drop in BPs. Plan is to increase BP meds to better control the BPs at lower BP I will try to work with orthostatics. Patient is a very difficult patient as patient is elderly and it has been hard controlling patient's BPs.     11/15 - no cp palps sob abdo pain, last BM was 4d ago and getting miralax, wants to hold off on enema for now though she has needed it in the past.  Pt had numerous q re her meds and we discussed each of them   11/16 - BPs are better, no cp palps sob; pt reports that she is constipated still, is passing gas, has abdo pain right sided - can't describe it, is tolerating a po diet    PHYSICAL EXAM:  General: Well developed; sitting up in chair  Eyes: PERRLA, EOMI; conjunctiva and sclera clear  Head: Normocephalic; atraumatic  Neck: Supple; non tender; no masses  Respiratory: No wheezes, rales or rhonchi  Cardiovascular: Regular rate and rhythm. S1 and S2 Normal; No murmurs  Gastrointestinal: Soft, some tenderness RLQ,  non-distended; Normal bowel sounds  Vascular: Peripheral pulses palpable 2+ bilaterally  Neurological: Alert and oriented x3, has R sided weakness ( but also has R frozen shoulder so difficult to asses) , speech fluid, no aphasia   Skin: Warm and dry. No acute rash  Musculoskeletal: Normal muscle tone  Psychiatric: Cooperative    RADIOLOGY/EKG:  < from: MR Head No Cont (11.08.20 @ 08:58) >  Evolving acute infarct in the left genu of the corpus callosum. No hemorrhage..    < from: US Duplex Carotid Arteries Complete, Bilateral (11.09.20 @ 09:12) >  Mild to moderate plaque in the left carotid bulb and proximal left internal carotid artery with evaluation limited secondary to shadowing from the plaque and an elevated peak systolic velocity suggestive of a 50-69% stenosis.    < from: CT Abdomen and Pelvis w/ Oral Cont and w/ IV Cont (11.16.20 @ 15:58) >  Moderate amount retained fecal material in the colon.  Wall thickening again seen involving the left lateral; clinical correlation is recommended.  Left iliac chain lymphadenopathy suspicious for metastatic disease, not significantly changed since 9/28/2020.  New compression deformity of the superior endplate of L1 since 9/28/2020; correlation is recommended for symptoms; an MRI of the lumbar spine could be obtained for further evaluation.

## 2020-11-17 ENCOUNTER — TRANSCRIPTION ENCOUNTER (OUTPATIENT)
Age: 85
End: 2020-11-17

## 2020-11-17 VITALS
DIASTOLIC BLOOD PRESSURE: 52 MMHG | TEMPERATURE: 98 F | HEART RATE: 80 BPM | SYSTOLIC BLOOD PRESSURE: 131 MMHG | OXYGEN SATURATION: 98 % | RESPIRATION RATE: 16 BRPM

## 2020-11-17 PROCEDURE — 99239 HOSP IP/OBS DSCHRG MGMT >30: CPT

## 2020-11-17 RX ORDER — MAGNESIUM HYDROXIDE 400 MG/1
30 TABLET, CHEWABLE ORAL
Qty: 0 | Refills: 0 | DISCHARGE
Start: 2020-11-17

## 2020-11-17 RX ORDER — ACETAMINOPHEN 500 MG
1 TABLET ORAL
Qty: 0 | Refills: 0 | DISCHARGE

## 2020-11-17 RX ORDER — ACETAMINOPHEN 500 MG
2 TABLET ORAL
Qty: 0 | Refills: 0 | DISCHARGE
Start: 2020-11-17

## 2020-11-17 RX ORDER — LEVOTHYROXINE SODIUM 125 MCG
1 TABLET ORAL
Qty: 0 | Refills: 0 | DISCHARGE

## 2020-11-17 RX ORDER — PREGABALIN 225 MG/1
1 CAPSULE ORAL
Qty: 0 | Refills: 0 | DISCHARGE
Start: 2020-11-17

## 2020-11-17 RX ORDER — ASPIRIN/CALCIUM CARB/MAGNESIUM 324 MG
1 TABLET ORAL
Qty: 0 | Refills: 0 | DISCHARGE
Start: 2020-11-17

## 2020-11-17 RX ORDER — CHOLECALCIFEROL (VITAMIN D3) 125 MCG
2 CAPSULE ORAL
Qty: 0 | Refills: 0 | DISCHARGE

## 2020-11-17 RX ORDER — PANTOPRAZOLE SODIUM 20 MG/1
1 TABLET, DELAYED RELEASE ORAL
Qty: 0 | Refills: 0 | DISCHARGE
Start: 2020-11-17

## 2020-11-17 RX ORDER — LEVOTHYROXINE SODIUM 125 MCG
1 TABLET ORAL
Qty: 0 | Refills: 0 | DISCHARGE
Start: 2020-11-17

## 2020-11-17 RX ORDER — POLYETHYLENE GLYCOL 3350 17 G/17G
17 POWDER, FOR SOLUTION ORAL
Qty: 0 | Refills: 0 | DISCHARGE
Start: 2020-11-17

## 2020-11-17 RX ORDER — CLOPIDOGREL BISULFATE 75 MG/1
1 TABLET, FILM COATED ORAL
Qty: 0 | Refills: 0 | DISCHARGE
Start: 2020-11-17 | End: 2020-12-15

## 2020-11-17 RX ORDER — SPIRONOLACTONE 25 MG/1
1 TABLET, FILM COATED ORAL
Qty: 0 | Refills: 0 | DISCHARGE
Start: 2020-11-17

## 2020-11-17 RX ORDER — PANTOPRAZOLE SODIUM 20 MG/1
1 TABLET, DELAYED RELEASE ORAL
Qty: 0 | Refills: 0 | DISCHARGE

## 2020-11-17 RX ORDER — METOPROLOL TARTRATE 50 MG
3 TABLET ORAL
Qty: 0 | Refills: 0 | DISCHARGE
Start: 2020-11-17

## 2020-11-17 RX ORDER — CHOLECALCIFEROL (VITAMIN D3) 125 MCG
1000 CAPSULE ORAL
Qty: 0 | Refills: 0 | DISCHARGE
Start: 2020-11-17

## 2020-11-17 RX ORDER — LANOLIN ALCOHOL/MO/W.PET/CERES
1 CREAM (GRAM) TOPICAL
Qty: 0 | Refills: 0 | DISCHARGE
Start: 2020-11-17

## 2020-11-17 RX ADMIN — Medication 75 MICROGRAM(S): at 05:24

## 2020-11-17 RX ADMIN — CLOPIDOGREL BISULFATE 75 MILLIGRAM(S): 75 TABLET, FILM COATED ORAL at 10:31

## 2020-11-17 RX ADMIN — Medication 1 DROP(S): at 14:37

## 2020-11-17 RX ADMIN — SPIRONOLACTONE 25 MILLIGRAM(S): 25 TABLET, FILM COATED ORAL at 10:32

## 2020-11-17 RX ADMIN — Medication 81 MILLIGRAM(S): at 10:30

## 2020-11-17 RX ADMIN — HEPARIN SODIUM 5000 UNIT(S): 5000 INJECTION INTRAVENOUS; SUBCUTANEOUS at 10:32

## 2020-11-17 RX ADMIN — POLYETHYLENE GLYCOL 3350 17 GRAM(S): 17 POWDER, FOR SOLUTION ORAL at 10:32

## 2020-11-17 RX ADMIN — Medication 1000 UNIT(S): at 10:31

## 2020-11-17 RX ADMIN — Medication 1 DROP(S): at 05:24

## 2020-11-17 RX ADMIN — Medication 75 MILLIGRAM(S): at 10:24

## 2020-11-17 RX ADMIN — PREGABALIN 1000 MICROGRAM(S): 225 CAPSULE ORAL at 10:31

## 2020-11-17 RX ADMIN — PANTOPRAZOLE SODIUM 40 MILLIGRAM(S): 20 TABLET, DELAYED RELEASE ORAL at 10:25

## 2020-11-17 NOTE — DISCHARGE NOTE PROVIDER - HOSPITAL COURSE
HPI: 89 year old female w/ PMHx of CAD s/p stents x5, bladder cancer,  HTN, hypothyroidism, multiple falls and h/o syncope, orthostatic hypotension presented to the ED after a witnessed syncopal episode at her facility. Patient does not recall event but endorses 8 month history of progressive weakness, frequent falls. Patient states both of her legs and arms are extremely weak and she has a hard time ambulating due to weakness. Patient voiced concerns at facility but was not able to see a specialist. Patient with intermittent headaches but denies any slurred speech, facial droop, visual changes, sob, nausea or vomiting.    In the ED patient with negative CTA chest and CT brain (07 Nov 2020 19:09)    HOSPITAL COURSE:   This is a pleasant 89 year old female w/ PMHx of CAD s/p stents x5, bladder cancer,  HTN, hypothyroidism, multiple falls and h/o syncope, orthostatic hypotension admitted for:     # Syncope is multifactorial in a setting of orthostatic hypotension /  atrial fibrillation   - Poorly controlled BPs in 200s; off lorinef due to elevated BP on admission   - orthostatics with significant drop in SBP    - echo: EF 65-70%  - Increase to toprol XL 75 mg po qdaily  - add spironolactone 25 mg po qdaily  - started on hydralazin 25 mg po q8h but later discontinued as BPs are okay off it   - pt to use compression stockings when ambulating       #  RLE/RUE weakness 2/2 Acute L Corpus Callosum genu infarct   - MRI reviewed, see results below  - REVIEWED NEURO RECS - Aspirin + Plavix x 30 days and then aspirin monotherapy  CONT WITH ASA, will ADD PLAVIX   - lipid panel - wnl ; on statin   - ECHO: preserved EF   - D/w DR Payne - pt. refuses carotid surgery     * Paroxysmal AFIB   - On Toprol; [id number] score is7, high risk.  Also high risk for bleeding due to age, deconditioning, multiple falls, and bladder CA   - D/w Dr Sánchez, does  not recommend A/c due to high bleeding risk, high risk of fall       #Elevated D-Dimer  -CTA chest negative for PE    #Progressive weakness, deconditioning   - pt with 8 month history of intermittent weakness and falls  - unlikely related to acute stroke as its completely new and no old strokes on MRI   - TSH WNL   - B12 low normal will start Po supplementation, folate WNL- PT     # Bladder CA, with L iliac Lymphopathy,  not on TX  - nor further treatment or workup as per patient and family - patient did not tolerate chemo well in the past   - as per urology notes Dr. Solorzano) from last admission pt with high risk bladder ca, failed 1st BCG induction  - pt noted as poor surgical candidate and was not interested in cystectomy  - left  iliac lymphadenopathy - CT L common and external iliac lymphadenopathy suspicious for metastatic disease    # Moderate Protein calorie malnutrition  Nutritionist consult appreciated     # CAD  - on Metoprolol, ASA    # Hx of Hypothyroidism   -on synthroid    # GERD  - on protonix  #11/16 - right-sided abdo pain  likely from stool retention, will order enemas and miralax   noted compression fracture etc - but patient does not report back pain  noted also left iliac SANTANA etc - rec f/u with her oncologist    # Advanced directives  -DNR/DNI    discussed with RN   DC planning - rehab today    Other details:  11/17 - Pt had re[ported abdo pain yesterday, CT scan showed stool retention, got laxatives etc and golytely, now has had BMs and feels better  no cp palps sob or abdo pain    PHYSICAL EXAM:  GENERAL: NAD, sitting up in chair, conversant   HEAD:  Atraumatic, Normocephalic  EYES: EOMI, PERRLA, normal sclera  CHEST/LUNG: Clear to auscultation bilaterally; No rales, rhonchi, wheezing, or rubs. Unlabored respirations  HEART: Regular rate and rhythm; No murmurs, rubs, or gallops  ABDOMEN: Bowel sounds present; Soft, Nontender, Nondistended. No hepatomegaly  EXTREMITIES:  no pitting bilaterally  NERVOUS SYSTEM:  Alert & Oriented X3, speech clear. No focal motor or sensory deficits  MSK: FROM all 4 extremities, full and equal strength  SKIN: No rashes or lesions    LABS: All Labs Reviewed:  < from: CT Abdomen and Pelvis w/ Oral Cont and w/ IV Cont (11.16.20 @ 15:58) >  Moderate amount retained fecal material in the colon.  Wall thickening again seen involving the left lateral; clinical correlation is recommended.  Left iliac chain lymphadenopathy suspicious for metastatic disease, not significantly changed since 9/28/2020.  New compression deformity of the superior endplate of L1 since 9/28/2020; correlation is recommended for symptoms; an MRI of the lumbar spine could be obtained for further evaluatio    time spent on discharge - 55 mins  discussed with team at [facility]

## 2020-11-17 NOTE — DISCHARGE NOTE PROVIDER - CARE PROVIDER_API CALL
FOllow-up Physician at [facility],   Phone: (   )    -  Fax: (   )    -  Follow Up Time:     Anila Payne  CLINICAL NEUROPHYSIOLOGY  [address] [facility], [place] [address]  Phone: [phone]  Fax: [phone]  Follow Up Time: 1 month   Anila Payne  CLINICAL NEUROPHYSIOLOGY  [address], [address]  [place] [address]  Phone: [phone]  Fax: [phone]  Follow Up Time: 1 month    FOllow-up Physician at [facility],   Phone: (   )    -  Fax: (   )    -  Follow Up Time:     Mayra Sánchez  CARDIOVASCULAR DISEASE  [address] [place] [address]  Phone: [phone]  Fax: [phone]  Follow Up Time: 2 weeks

## 2020-11-17 NOTE — DISCHARGE NOTE PROVIDER - PROVIDER TOKENS
FREE:[LAST:[FOllow-up Physician at [facility]],PHONE:[(   )    -],FAX:[(   )    -]],PROVIDER:[TOKEN:[[id number]],FOLLOWUP:[1 month]] PROVIDER:[TOKEN:[[id number]],FOLLOWUP:[1 month]],FREE:[LAST:[FOllow-up Physician at [facility]],PHONE:[(   )    -],FAX:[(   )    -]],PROVIDER:[TOKEN:[[id number]],FOLLOWUP:[2 weeks]]

## 2020-11-17 NOTE — DISCHARGE NOTE PROVIDER - CARE PROVIDERS DIRECT ADDRESSES
,DirectAddress_Unknown,[email or web address] ,[email or web address],DirectAddress_Unknown,[email or web address]

## 2020-11-17 NOTE — DISCHARGE NOTE PROVIDER - NSDCMRMEDTOKEN_GEN_ALL_CORE_FT
acetaminophen 325 mg oral tablet: 2 tab(s) orally every 4 hours, As needed, Mild Pain (1 - 3)  aspirin 81 mg oral delayed release tablet: 1 tab(s) orally once a day  bisacodyl 10 mg rectal suppository: 1 suppository(ies) rectal once a day, As needed, Constipation  cholecalciferol oral tablet: 1000 unit(s) orally once a day  clopidogrel 75 mg oral tablet: 1 tab(s) orally once a day. STOP PLAVIX on 12/15/20 and continue ASA 81 mg daily.   cyanocobalamin 1000 mcg oral tablet: 1 tab(s) orally once a day  gabapentin 100 mg oral capsule: 1 cap(s) orally 2 times a day  guaiFENesin 100 mg/5 mL oral liquid: 5 milliliter(s) orally every 6 hours, As needed, Cough  levothyroxine 75 mcg (0.075 mg) oral tablet: 1 tab(s) orally once a day  magnesium hydroxide 8% oral suspension: 30 milliliter(s) orally once a day, As needed, Constipation  melatonin 3 mg oral tablet: 1 tab(s) orally once a day (at bedtime)  metoprolol succinate 25 mg oral tablet, extended release: 3 tab(s) orally once a day  ocular lubricant ophthalmic solution: 1 drop(s) to each affected eye 3 times a day  pantoprazole 40 mg oral delayed release tablet: 1 tab(s) orally once a day  polyethylene glycol 3350 oral powder for reconstitution: 17 gram(s) orally once a day, As needed, Constipation  polyethylene glycol 3350 oral powder for reconstitution: 17 gram(s) orally once a day  pravastatin 40 mg oral tablet: 1 tab(s) orally once a day (at bedtime)  Senna Plus 50 mg-8.6 mg oral tablet: 2 tab(s) orally once a day (at bedtime)  spironolactone 25 mg oral tablet: 1 tab(s) orally once a day  Xanax 0.25 mg oral tablet: 0.5 tab(s) orally once a day (at bedtime)

## 2020-11-17 NOTE — DISCHARGE NOTE PROVIDER - NSDCCPCAREPLAN_GEN_ALL_CORE_FT
PRINCIPAL DISCHARGE DIAGNOSIS  Diagnosis: Acute CVA (cerebrovascular accident)  Assessment and Plan of Treatment: now on ASA and plavix, statin      SECONDARY DISCHARGE DIAGNOSES  Diagnosis: Hypertension  Assessment and Plan of Treatment: continue medications. watch for orthostatic hypotension, wear above knee compression stockings during the day time and remove them at night

## 2020-11-20 DIAGNOSIS — M75.01 ADHESIVE CAPSULITIS OF RIGHT SHOULDER: ICD-10-CM

## 2020-11-20 DIAGNOSIS — K21.9 GASTRO-ESOPHAGEAL REFLUX DISEASE WITHOUT ESOPHAGITIS: ICD-10-CM

## 2020-11-20 DIAGNOSIS — Z95.5 PRESENCE OF CORONARY ANGIOPLASTY IMPLANT AND GRAFT: ICD-10-CM

## 2020-11-20 DIAGNOSIS — I25.10 ATHEROSCLEROTIC HEART DISEASE OF NATIVE CORONARY ARTERY WITHOUT ANGINA PECTORIS: ICD-10-CM

## 2020-11-20 DIAGNOSIS — H91.90 UNSPECIFIED HEARING LOSS, UNSPECIFIED EAR: ICD-10-CM

## 2020-11-20 DIAGNOSIS — M25.50 PAIN IN UNSPECIFIED JOINT: ICD-10-CM

## 2020-11-20 DIAGNOSIS — M84.48XA PATHOLOGICAL FRACTURE, OTHER SITE, INITIAL ENCOUNTER FOR FRACTURE: ICD-10-CM

## 2020-11-20 DIAGNOSIS — Z85.42 PERSONAL HISTORY OF MALIGNANT NEOPLASM OF OTHER PARTS OF UTERUS: ICD-10-CM

## 2020-11-20 DIAGNOSIS — Z90.711 ACQUIRED ABSENCE OF UTERUS WITH REMAINING CERVICAL STUMP: ICD-10-CM

## 2020-11-20 DIAGNOSIS — H35.30 UNSPECIFIED MACULAR DEGENERATION: ICD-10-CM

## 2020-11-20 DIAGNOSIS — R29.6 REPEATED FALLS: ICD-10-CM

## 2020-11-20 DIAGNOSIS — E03.9 HYPOTHYROIDISM, UNSPECIFIED: ICD-10-CM

## 2020-11-20 DIAGNOSIS — M19.042 PRIMARY OSTEOARTHRITIS, LEFT HAND: ICD-10-CM

## 2020-11-20 DIAGNOSIS — Z88.2 ALLERGY STATUS TO SULFONAMIDES: ICD-10-CM

## 2020-11-20 DIAGNOSIS — Z88.8 ALLERGY STATUS TO OTHER DRUGS, MEDICAMENTS AND BIOLOGICAL SUBSTANCES: ICD-10-CM

## 2020-11-20 DIAGNOSIS — I63.59 CEREBRAL INFARCTION DUE TO UNSPECIFIED OCCLUSION OR STENOSIS OF OTHER CEREBRAL ARTERY: ICD-10-CM

## 2020-11-20 DIAGNOSIS — I44.0 ATRIOVENTRICULAR BLOCK, FIRST DEGREE: ICD-10-CM

## 2020-11-20 DIAGNOSIS — Z90.49 ACQUIRED ABSENCE OF OTHER SPECIFIED PARTS OF DIGESTIVE TRACT: ICD-10-CM

## 2020-11-20 DIAGNOSIS — C77.5 SECONDARY AND UNSPECIFIED MALIGNANT NEOPLASM OF INTRAPELVIC LYMPH NODES: ICD-10-CM

## 2020-11-20 DIAGNOSIS — K59.00 CONSTIPATION, UNSPECIFIED: ICD-10-CM

## 2020-11-20 DIAGNOSIS — Z92.21 PERSONAL HISTORY OF ANTINEOPLASTIC CHEMOTHERAPY: ICD-10-CM

## 2020-11-20 DIAGNOSIS — I95.1 ORTHOSTATIC HYPOTENSION: ICD-10-CM

## 2020-11-20 DIAGNOSIS — M19.041 PRIMARY OSTEOARTHRITIS, RIGHT HAND: ICD-10-CM

## 2020-11-20 DIAGNOSIS — E44.0 MODERATE PROTEIN-CALORIE MALNUTRITION: ICD-10-CM

## 2020-11-20 DIAGNOSIS — I10 ESSENTIAL (PRIMARY) HYPERTENSION: ICD-10-CM

## 2020-11-20 DIAGNOSIS — G81.91 HEMIPLEGIA, UNSPECIFIED AFFECTING RIGHT DOMINANT SIDE: ICD-10-CM

## 2020-11-20 DIAGNOSIS — Z66 DO NOT RESUSCITATE: ICD-10-CM

## 2020-11-20 DIAGNOSIS — D72.829 ELEVATED WHITE BLOOD CELL COUNT, UNSPECIFIED: ICD-10-CM

## 2020-11-20 DIAGNOSIS — C67.9 MALIGNANT NEOPLASM OF BLADDER, UNSPECIFIED: ICD-10-CM

## 2020-11-20 DIAGNOSIS — T38.0X5A ADVERSE EFFECT OF GLUCOCORTICOIDS AND SYNTHETIC ANALOGUES, INITIAL ENCOUNTER: ICD-10-CM

## 2020-11-20 DIAGNOSIS — Z88.5 ALLERGY STATUS TO NARCOTIC AGENT: ICD-10-CM

## 2020-11-20 DIAGNOSIS — I47.1 SUPRAVENTRICULAR TACHYCARDIA: ICD-10-CM

## 2020-11-20 DIAGNOSIS — I48.0 PAROXYSMAL ATRIAL FIBRILLATION: ICD-10-CM

## 2020-11-23 NOTE — H&P ADULT - HISTORY OF PRESENT ILLNESS
Pt is a 89 y/o F PMHx CAD 5 stents, Bladder CA and Intrauterine CA presenting to the ED after an episode of weakness and nearly falling to the ground in her bathroom yesterday afternoon. Her daughter was with her and was able to catch her before she fell. Pt was recently discharged from  the previous day 8/14/19. Pt states she felt weak during her last admission and even after discharge. Once home, had difficulty moving and adjusting herself in the bed due to a heavy feeling all throughout her body. She also had the urge to urinate many times through the night but was too weak to get up. The next morning, she was able to eat breakfast and lunch, ambulate from her room to kitchen until her episode around 3 pm. Statement Selected Pt is a 89 y/o F PMHx CAD w stents, Bladder CA and Intrauterine CA presenting to the ED after an episode of weakness and nearly falling to the ground in her bathroom yesterday afternoon. Her daughter was with her and was able to catch her before she fell. Pt was recently discharged from  the previous day 8/14/19, hospitalized for leukocytosis and weakness. Pt states she felt weak during her last admission and even after discharge. Once home, had difficulty moving and adjusting herself in the bed due to a heavy feeling all throughout her body. She also had the urge to urinate many times through the night but was too weak to get up. The next morning, she was able to eat breakfast and lunch, ambulate from her room to kitchen until her episode around 3 pm. Denies Fever, chills, dizziness, lightheadedness. Pt is a 89 y/o F PMHx CAD w stents, HTN, Orthostatic Hypotension, Bladder CA and Intrauterine CA presenting to the ED after an episode of weakness and nearly falling to the ground in her bathroom yesterday afternoon. Her daughter was with her and was able to catch her before she fell. Pt was recently discharged from  the previous day 8/14/19, hospitalized for leukocytosis and weakness. Pt states she felt weak during her last admission and even after discharge. Once home, had difficulty moving and adjusting herself in the bed due to a heavy feeling all throughout her body. She also had the urge to urinate many times through the night but was too weak to get up. The next morning, she was able to eat breakfast and lunch, ambulate from her room to kitchen until her episode around 3 pm. Denies Fever, chills, dizziness, lightheadedness. 89 y/o Female with PMHx CAD w stents, HTN, Orthostatic Hypotension, Bladder CA and Intrauterine CA presenting to the ED after an episode of weakness and nearly falling to the ground in her bathroom yesterday afternoon. Her daughter was with her and was able to catch her before she fell. Pt was recently discharged from  the previous day 8/14/19, hospitalized for leukocytosis and weakness. Pt states she felt weak during her last admission and even after discharge. Once home, had difficulty moving and adjusting herself in the bed due to a heavy feeling all throughout her body. She also had the urge to urinate through the night but was too weak to get up. The next morning, she was able to eat breakfast and lunch, ambulate from her room to kitchen until her episode around 3 pm. Denies Fever, chills, dizziness, lightheadedness.

## 2020-12-04 NOTE — ED PROVIDER NOTE - CONTEXT
Bedside shift change report given to Yefri Quesada RN (oncoming nurse) by Cony Shelton RN (offgoing nurse). Report included the following information SBAR, Kardex, Intake/Output and MAR. unknown

## 2020-12-09 NOTE — DISCHARGE NOTE ADULT - WEIGHT IN LBS
no rashes , no suspicious lesions , no areas of discoloration , no jaundice present , good turgor , no masses , no tenderness on palpation 169

## 2021-01-01 ENCOUNTER — OUTPATIENT (OUTPATIENT)
Dept: OUTPATIENT SERVICES | Facility: HOSPITAL | Age: 86
LOS: 1 days | End: 2021-01-01
Payer: MEDICARE

## 2021-01-01 DIAGNOSIS — Z98.890 OTHER SPECIFIED POSTPROCEDURAL STATES: Chronic | ICD-10-CM

## 2021-01-01 DIAGNOSIS — Z90.710 ACQUIRED ABSENCE OF BOTH CERVIX AND UTERUS: Chronic | ICD-10-CM

## 2021-01-11 ENCOUNTER — INPATIENT (INPATIENT)
Facility: HOSPITAL | Age: 86
LOS: 1 days | Discharge: HOME | DRG: 641 | End: 2021-01-13
Attending: STUDENT IN AN ORGANIZED HEALTH CARE EDUCATION/TRAINING PROGRAM | Admitting: INTERNAL MEDICINE
Payer: MEDICARE

## 2021-01-11 VITALS
WEIGHT: 134.92 LBS | TEMPERATURE: 98 F | HEART RATE: 95 BPM | OXYGEN SATURATION: 98 % | DIASTOLIC BLOOD PRESSURE: 116 MMHG | RESPIRATION RATE: 18 BRPM | SYSTOLIC BLOOD PRESSURE: 254 MMHG | HEIGHT: 72 IN

## 2021-01-11 DIAGNOSIS — R55 SYNCOPE AND COLLAPSE: ICD-10-CM

## 2021-01-11 DIAGNOSIS — R53.81 OTHER MALAISE: ICD-10-CM

## 2021-01-11 DIAGNOSIS — K59.00 CONSTIPATION, UNSPECIFIED: ICD-10-CM

## 2021-01-11 DIAGNOSIS — Z90.710 ACQUIRED ABSENCE OF BOTH CERVIX AND UTERUS: Chronic | ICD-10-CM

## 2021-01-11 DIAGNOSIS — I10 ESSENTIAL (PRIMARY) HYPERTENSION: ICD-10-CM

## 2021-01-11 DIAGNOSIS — Z29.9 ENCOUNTER FOR PROPHYLACTIC MEASURES, UNSPECIFIED: ICD-10-CM

## 2021-01-11 DIAGNOSIS — Z98.890 OTHER SPECIFIED POSTPROCEDURAL STATES: Chronic | ICD-10-CM

## 2021-01-11 DIAGNOSIS — R77.8 OTHER SPECIFIED ABNORMALITIES OF PLASMA PROTEINS: ICD-10-CM

## 2021-01-11 LAB
ALBUMIN SERPL ELPH-MCNC: 3.7 G/DL — SIGNIFICANT CHANGE UP (ref 3.3–5)
ALP SERPL-CCNC: 83 U/L — SIGNIFICANT CHANGE UP (ref 40–120)
ALT FLD-CCNC: 21 U/L — SIGNIFICANT CHANGE UP (ref 12–78)
ANION GAP SERPL CALC-SCNC: 8 MMOL/L — SIGNIFICANT CHANGE UP (ref 5–17)
APTT BLD: 36.7 SEC — HIGH (ref 27.5–35.5)
AST SERPL-CCNC: 25 U/L — SIGNIFICANT CHANGE UP (ref 15–37)
BASOPHILS # BLD AUTO: 0.07 K/UL — SIGNIFICANT CHANGE UP (ref 0–0.2)
BASOPHILS NFR BLD AUTO: 0.6 % — SIGNIFICANT CHANGE UP (ref 0–2)
BILIRUB SERPL-MCNC: 0.6 MG/DL — SIGNIFICANT CHANGE UP (ref 0.2–1.2)
BUN SERPL-MCNC: 19 MG/DL — SIGNIFICANT CHANGE UP (ref 7–23)
CALCIUM SERPL-MCNC: 9.5 MG/DL — SIGNIFICANT CHANGE UP (ref 8.5–10.1)
CHLORIDE SERPL-SCNC: 106 MMOL/L — SIGNIFICANT CHANGE UP (ref 96–108)
CO2 SERPL-SCNC: 23 MMOL/L — SIGNIFICANT CHANGE UP (ref 22–31)
CREAT SERPL-MCNC: 1.18 MG/DL — SIGNIFICANT CHANGE UP (ref 0.5–1.3)
EOSINOPHIL # BLD AUTO: 0.15 K/UL — SIGNIFICANT CHANGE UP (ref 0–0.5)
EOSINOPHIL NFR BLD AUTO: 1.3 % — SIGNIFICANT CHANGE UP (ref 0–6)
GLUCOSE SERPL-MCNC: 126 MG/DL — HIGH (ref 70–99)
HCT VFR BLD CALC: 43.6 % — SIGNIFICANT CHANGE UP (ref 34.5–45)
HGB BLD-MCNC: 12.9 G/DL — SIGNIFICANT CHANGE UP (ref 11.5–15.5)
IMM GRANULOCYTES NFR BLD AUTO: 0.3 % — SIGNIFICANT CHANGE UP (ref 0–1.5)
INR BLD: 1.03 RATIO — SIGNIFICANT CHANGE UP (ref 0.88–1.16)
LIDOCAIN IGE QN: 162 U/L — SIGNIFICANT CHANGE UP (ref 73–393)
LYMPHOCYTES # BLD AUTO: 27.7 % — SIGNIFICANT CHANGE UP (ref 13–44)
LYMPHOCYTES # BLD AUTO: 3.18 K/UL — SIGNIFICANT CHANGE UP (ref 1–3.3)
MAGNESIUM SERPL-MCNC: 2.5 MG/DL — SIGNIFICANT CHANGE UP (ref 1.6–2.6)
MCHC RBC-ENTMCNC: 25.4 PG — LOW (ref 27–34)
MCHC RBC-ENTMCNC: 29.6 GM/DL — LOW (ref 32–36)
MCV RBC AUTO: 85.8 FL — SIGNIFICANT CHANGE UP (ref 80–100)
MONOCYTES # BLD AUTO: 0.66 K/UL — SIGNIFICANT CHANGE UP (ref 0–0.9)
MONOCYTES NFR BLD AUTO: 5.7 % — SIGNIFICANT CHANGE UP (ref 2–14)
NEUTROPHILS # BLD AUTO: 7.38 K/UL — SIGNIFICANT CHANGE UP (ref 1.8–7.4)
NEUTROPHILS NFR BLD AUTO: 64.4 % — SIGNIFICANT CHANGE UP (ref 43–77)
NT-PROBNP SERPL-SCNC: 164 PG/ML — SIGNIFICANT CHANGE UP (ref 0–450)
PLATELET # BLD AUTO: 355 K/UL — SIGNIFICANT CHANGE UP (ref 150–400)
POTASSIUM SERPL-MCNC: 4.4 MMOL/L — SIGNIFICANT CHANGE UP (ref 3.5–5.3)
POTASSIUM SERPL-SCNC: 4.4 MMOL/L — SIGNIFICANT CHANGE UP (ref 3.5–5.3)
PROT SERPL-MCNC: 8.3 GM/DL — SIGNIFICANT CHANGE UP (ref 6–8.3)
PROTHROM AB SERPL-ACNC: 11.9 SEC — SIGNIFICANT CHANGE UP (ref 10.6–13.6)
RBC # BLD: 5.08 M/UL — SIGNIFICANT CHANGE UP (ref 3.8–5.2)
RBC # FLD: 16.4 % — HIGH (ref 10.3–14.5)
SARS-COV-2 RNA SPEC QL NAA+PROBE: SIGNIFICANT CHANGE UP
SODIUM SERPL-SCNC: 137 MMOL/L — SIGNIFICANT CHANGE UP (ref 135–145)
TROPONIN I SERPL-MCNC: 0.03 NG/ML — SIGNIFICANT CHANGE UP (ref 0.01–0.04)
TROPONIN I SERPL-MCNC: 0.07 NG/ML — HIGH (ref 0.01–0.04)
TROPONIN I SERPL-MCNC: 0.12 NG/ML — HIGH (ref 0.01–0.04)
WBC # BLD: 11.48 K/UL — HIGH (ref 3.8–10.5)
WBC # FLD AUTO: 11.48 K/UL — HIGH (ref 3.8–10.5)

## 2021-01-11 PROCEDURE — 97116 GAIT TRAINING THERAPY: CPT | Mod: GP

## 2021-01-11 PROCEDURE — 85027 COMPLETE CBC AUTOMATED: CPT

## 2021-01-11 PROCEDURE — 95819 EEG AWAKE AND ASLEEP: CPT

## 2021-01-11 PROCEDURE — 71045 X-RAY EXAM CHEST 1 VIEW: CPT | Mod: 26

## 2021-01-11 PROCEDURE — 97163 PT EVAL HIGH COMPLEX 45 MIN: CPT | Mod: GP

## 2021-01-11 PROCEDURE — 36415 COLL VENOUS BLD VENIPUNCTURE: CPT

## 2021-01-11 PROCEDURE — 84484 ASSAY OF TROPONIN QUANT: CPT

## 2021-01-11 PROCEDURE — U0005: CPT

## 2021-01-11 PROCEDURE — 93010 ELECTROCARDIOGRAM REPORT: CPT

## 2021-01-11 PROCEDURE — 86769 SARS-COV-2 COVID-19 ANTIBODY: CPT

## 2021-01-11 PROCEDURE — 99223 1ST HOSP IP/OBS HIGH 75: CPT

## 2021-01-11 PROCEDURE — 93005 ELECTROCARDIOGRAM TRACING: CPT

## 2021-01-11 PROCEDURE — 80048 BASIC METABOLIC PNL TOTAL CA: CPT

## 2021-01-11 PROCEDURE — U0003: CPT

## 2021-01-11 RX ORDER — ASPIRIN/CALCIUM CARB/MAGNESIUM 324 MG
162 TABLET ORAL ONCE
Refills: 0 | Status: COMPLETED | OUTPATIENT
Start: 2021-01-11 | End: 2021-01-11

## 2021-01-11 RX ORDER — ONDANSETRON 8 MG/1
4 TABLET, FILM COATED ORAL EVERY 6 HOURS
Refills: 0 | Status: DISCONTINUED | OUTPATIENT
Start: 2021-01-11 | End: 2021-01-13

## 2021-01-11 RX ORDER — ASPIRIN/CALCIUM CARB/MAGNESIUM 324 MG
81 TABLET ORAL DAILY
Refills: 0 | Status: DISCONTINUED | OUTPATIENT
Start: 2021-01-11 | End: 2021-01-13

## 2021-01-11 RX ORDER — ONDANSETRON 8 MG/1
4 TABLET, FILM COATED ORAL ONCE
Refills: 0 | Status: COMPLETED | OUTPATIENT
Start: 2021-01-11 | End: 2021-01-11

## 2021-01-11 RX ORDER — SENNOSIDES/DOCUSATE SODIUM 8.6MG-50MG
2 TABLET ORAL
Qty: 0 | Refills: 0 | DISCHARGE

## 2021-01-11 RX ORDER — SPIRONOLACTONE 25 MG/1
25 TABLET, FILM COATED ORAL ONCE
Refills: 0 | Status: COMPLETED | OUTPATIENT
Start: 2021-01-11 | End: 2021-01-11

## 2021-01-11 RX ORDER — ACETAMINOPHEN 500 MG
650 TABLET ORAL EVERY 6 HOURS
Refills: 0 | Status: DISCONTINUED | OUTPATIENT
Start: 2021-01-11 | End: 2021-01-13

## 2021-01-11 RX ORDER — ALPRAZOLAM 0.25 MG
0.5 TABLET ORAL
Qty: 0 | Refills: 0 | DISCHARGE

## 2021-01-11 RX ORDER — METOPROLOL TARTRATE 50 MG
25 TABLET ORAL ONCE
Refills: 0 | Status: COMPLETED | OUTPATIENT
Start: 2021-01-11 | End: 2021-01-11

## 2021-01-11 RX ORDER — SENNA PLUS 8.6 MG/1
2 TABLET ORAL AT BEDTIME
Refills: 0 | Status: DISCONTINUED | OUTPATIENT
Start: 2021-01-11 | End: 2021-01-13

## 2021-01-11 RX ORDER — METOPROLOL TARTRATE 50 MG
75 TABLET ORAL DAILY
Refills: 0 | Status: DISCONTINUED | OUTPATIENT
Start: 2021-01-11 | End: 2021-01-13

## 2021-01-11 RX ORDER — ATORVASTATIN CALCIUM 80 MG/1
10 TABLET, FILM COATED ORAL AT BEDTIME
Refills: 0 | Status: DISCONTINUED | OUTPATIENT
Start: 2021-01-11 | End: 2021-01-13

## 2021-01-11 RX ORDER — MULTIVIT WITH MIN/MFOLATE/K2 340-15/3 G
0.5 POWDER (GRAM) ORAL ONCE
Refills: 0 | Status: COMPLETED | OUTPATIENT
Start: 2021-01-11 | End: 2021-01-11

## 2021-01-11 RX ORDER — GABAPENTIN 400 MG/1
100 CAPSULE ORAL
Refills: 0 | Status: DISCONTINUED | OUTPATIENT
Start: 2021-01-11 | End: 2021-01-13

## 2021-01-11 RX ORDER — METOPROLOL TARTRATE 50 MG
75 TABLET ORAL ONCE
Refills: 0 | Status: COMPLETED | OUTPATIENT
Start: 2021-01-11 | End: 2021-01-11

## 2021-01-11 RX ORDER — ALPRAZOLAM 0.25 MG
0.5 TABLET ORAL AT BEDTIME
Refills: 0 | Status: DISCONTINUED | OUTPATIENT
Start: 2021-01-11 | End: 2021-01-13

## 2021-01-11 RX ORDER — LEVOTHYROXINE SODIUM 125 MCG
88 TABLET ORAL DAILY
Refills: 0 | Status: DISCONTINUED | OUTPATIENT
Start: 2021-01-11 | End: 2021-01-13

## 2021-01-11 RX ORDER — ENOXAPARIN SODIUM 100 MG/ML
40 INJECTION SUBCUTANEOUS DAILY
Refills: 0 | Status: DISCONTINUED | OUTPATIENT
Start: 2021-01-11 | End: 2021-01-13

## 2021-01-11 RX ADMIN — Medication 650 MILLIGRAM(S): at 16:04

## 2021-01-11 RX ADMIN — Medication 75 MILLIGRAM(S): at 12:47

## 2021-01-11 RX ADMIN — SPIRONOLACTONE 25 MILLIGRAM(S): 25 TABLET, FILM COATED ORAL at 12:41

## 2021-01-11 RX ADMIN — ATORVASTATIN CALCIUM 10 MILLIGRAM(S): 80 TABLET, FILM COATED ORAL at 23:08

## 2021-01-11 RX ADMIN — Medication 0.5 BOTTLE: at 23:00

## 2021-01-11 RX ADMIN — Medication 0.5 MILLIGRAM(S): at 23:08

## 2021-01-11 RX ADMIN — GABAPENTIN 100 MILLIGRAM(S): 400 CAPSULE ORAL at 23:08

## 2021-01-11 RX ADMIN — ENOXAPARIN SODIUM 40 MILLIGRAM(S): 100 INJECTION SUBCUTANEOUS at 23:01

## 2021-01-11 RX ADMIN — Medication 162 MILLIGRAM(S): at 12:41

## 2021-01-11 RX ADMIN — ONDANSETRON 4 MILLIGRAM(S): 8 TABLET, FILM COATED ORAL at 12:41

## 2021-01-11 NOTE — ED PROVIDER NOTE - DATE/TIME 1
Quality 431: Preventive Care And Screening: Unhealthy Alcohol Use - Screening: Patient screened for unhealthy alcohol use using a single question and scores less than 2 times per year Quality 110: Preventive Care And Screening: Influenza Immunization: Influenza Immunization Administered during Influenza season Quality 226: Preventive Care And Screening: Tobacco Use: Screening And Cessation Intervention: Patient screened for tobacco use and is an ex/non-smoker Detail Level: Detailed Quality 130: Documentation Of Current Medications In The Medical Record: Current Medications Documented 11-Jan-2021 12:46

## 2021-01-11 NOTE — H&P ADULT - ASSESSMENT
89F w/PMH orthostatic hypotension, stroke w/ Rt residual weakness, endometrial (s/p hysterectomy) and bladder (no tx) CA, CAD w/stents, has 24H aides, presents today BIBEMS w/ report of syncope.   +vomiting  In ED, wbc 11, trop 0.03-->0.07 (slightly higher than baseline), CT a/p: mod retained fecal material in colon, Left iliac LAD not changed since Sept, new compression fracture of L1, cxr neg, echo on 11/20,

## 2021-01-11 NOTE — ED ADULT NURSE REASSESSMENT NOTE - NS ED NURSE REASSESS COMMENT FT1
Received report from Kentrell RN at 15:50, patient oriented to room P6, Dinner menu provided/ dinner ordered, stretcher on lowest position, VSS, will continue to monitor pt at this time.

## 2021-01-11 NOTE — ED PROVIDER NOTE - PHYSICAL EXAMINATION
*GEN: No acute distress, well appearing   *HEAD: Normocephalic, Atraumatic  *EYES/NOSE: b/l Pupils symmetric & Reactive to ligth, EOMI b/l  *THROAT: airway patent, moist mucous membranes  *NECK: Neck supple  *PULMONARY: No Respiratory distress, symmetric b/l chest rise  *CARDIAC: s1s2, regular rhythm   *ABDOMEN:  Non Tender, Non Distended, soft, no guarding, no rebound, no masses   *BACK: no CVA tenderness, No midline vertebral tenderness to palpation   *EXTREMITIES: symmetric pulses, 2+ DP & radial pulses, no cyanosis, no edema   *SKIN: no rash, no bruising   *NEUROLOGIC: alert,  full active & passive ROM in all 4 extremities. +right sided weakness due to stroke  *PSYCH: appropriate concern about symptoms, pleasant

## 2021-01-11 NOTE — ED PROVIDER NOTE - CARE PLAN
Principal Discharge DX:	Syncope  Secondary Diagnosis:	Chest pain with moderate risk for cardiac etiology

## 2021-01-11 NOTE — ED PROVIDER NOTE - OBJECTIVE STATEMENT
Pertinent HPI/PMH/PSH/FHx/SHx and Review of Systems contained within  HPI:  Patient p/w syncope x  today, new onset vs acute on chronic.   PMH/PSH relevant for: CAD s/p stents x5, bladder cancer,  HTN, hypothyroidism, multiple falls and h/o syncope, orthostatic hypotension  ROS negative for: fever, Chest pain, SOB, Nausea, vomiting, diarrhea, abdominal pain, dysuria    FamilyHx and SocialHx not otherwise contributory Pertinent HPI/PMH/PSH/FHx/SHx and Review of Systems contained within  HPI:  Patient p/w syncope x  today, new onset. Pt states she lost consciousness in shower, did not fall as she was caught by her aid, did not hit her head. Here, c/o left sided CP, mild HA, generalized weakness, upset stomach with one episode of vomiting. Did not take blood pressure medication today.   PMH/PSH relevant for: CAD s/p stents x5, bladder cancer,  HTN, hypothyroidism, multiple falls and h/o syncope, orthostatic hypotension, right sided weakness due to CVA 2 months ago, paroxysmal Afib, GERD.  ROS negative for: fever, SOB, diarrhea, abdominal pain, dysuria    FamilyHx and SocialHx not otherwise contributory Pertinent HPI/PMH/PSH/FHx/SHx and Review of Systems contained within  HPI:  Patient p/w syncope x  today, new onset. Pt states she lost consciousness in shower, did not fall as she was caught by her aid, did not hit her head. Here, c/o left sided CP, , generalized weakness, upset stomach with one episode of vomiting. Did not take blood pressure medication today.   PMH/PSH relevant for: CAD s/p stents x5, bladder cancer,  HTN, hypothyroidism, multiple falls and h/o syncope, orthostatic hypotension, right sided weakness due to CVA 2 months ago, paroxysmal Afib, GERD.  ROS negative for: fever, SOB, diarrhea, abdominal pain, dysuria    FamilyHx and SocialHx not otherwise contributory

## 2021-01-11 NOTE — ED PROVIDER NOTE - CLINICAL SUMMARY MEDICAL DECISION MAKING FREE TEXT BOX
Witnessed syncopal episode without head trauma. BP elevated likely because pt did not take medication today. Will admit.

## 2021-01-11 NOTE — ED PROVIDER NOTE - NS ED ROS FT
Review of Systems:  	•	CONSTITUTIONAL: no fever. +generalized weakness.  	•	SKIN: no rash  	•	RESPIRATORY: no shortness of breath  	•	CARDIAC: +left sided CP  	•	GI:  no abd pain, no diarrhea. +one episode of vomiting  	•	GENITO-URINARY:  no dysuria  	•	MUSCULOSKELETAL:  no back pain  	•	NEUROLOGIC: +right sided weakness due to past CVA, +mild HA.  	•	ALLERGY: no rhinorrhea  	•	PSYCHIATRIC: appropriate concern about symptoms

## 2021-01-11 NOTE — ED PROVIDER NOTE - PMH
Bladder cancer    CAD (coronary artery disease)    Endometrial adenocarcinoma    GERD (gastroesophageal reflux disease)    [place] (hard of hearing)    HTN (hypertension)    Hyperlipidemia    Hypothyroid    Macular degeneration    Orthostatic hypotension    Stented coronary artery

## 2021-01-11 NOTE — H&P ADULT - PROBLEM SELECTOR PLAN 6
Adult Nutrition  Assessment/PES    Patient Name:  Hannah Ngo  YOB: 1958  MRN: [id number]  Admit Date:  2/10/2019    Assessment Date:  2/11/lovenox

## 2021-01-11 NOTE — H&P ADULT - HISTORY OF PRESENT ILLNESS
HPI:  89F w/PMH orthostatic hypotension, stroke w/ Rt residual weakness, endometrial and bladder CA, CAD w/stents, has 24H aides, presents today BIBEMS w/ report of syncope.  Per pt, she had just finished showering, the last thing she recalls, she was coming out of the shower w/ help from her aide. She thinks she passed out, she's not sure for how long. Her aide sat  her on the toilet and called EMS.  Pt endorses she had several episodes of vomiting.      In ED,     PAST MEDICAL & SURGICAL HISTORY:  Orthostatic hypotension  Bladder cancer  Endometrial adenocarcinoma  Macular degeneration  Stented coronary artery  Hypothyroid  Hyperlipidemia  HTN (hypertension)  GERD (gastroesophageal reflux disease)  CAD (coronary artery disease)  [place] (hard of hearing)  History of bladder surgery  S/P EMILY (total abdominal hysterectomy)  S/P hernia repair umbilical - 2008  S/P laparoscopic cholecystectomy 2008  Ankle fracture, right surgery 25 yrs ago - hardware removed  S/P coronary angiogram 2005, 2008, 2011 - drug eluding stents    Review of Systems:   CONSTITUTIONAL: No fever.  EYES: No eye pain or discharge.  ENMT:  No sinus or throat pain  NECK: No pain or stiffness  RESPIRATORY: No cough, wheezing, chills or hemoptysis; No shortness of breath  CARDIOVASCULAR: No chest pain, palpitations, dizziness, or leg swelling  GASTROINTESTINAL: No abdominal or epigastric pain. No nausea, vomiting, or hematemesis; No diarrhea or constipation. No melena or hematochezia.  GENITOURINARY: No dysuria or incontinence  NEUROLOGICAL: No headaches, memory loss, loss of strength, numbness, or tremors  SKIN: No rashes.  LYMPH NODES: No enlarged glands  ENDOCRINE: No heat or cold intolerance; No hair loss  MUSCULOSKELETAL: No joint pain or swelling; No muscle, back, or extremity pain  PSYCHIATRIC: No depression, anxiety, mood swings, or difficulty sleeping  HEME/LYMPH: No easy bruising, or bleeding gums  ALLERY AND IMMUNOLOGIC: No hives or eczema    Allergies    amlodipine (Unknown)  clonidine (Unknown)  Florinef Acetate (Unknown)  hydrocodone (Unknown)  Levatol (Unknown)  Lipitor (Unknown)  Lotrel (Unknown)  methylPREDNISolone (Unknown)  morphine (Other)  Plaquenil (Unknown)  statins (Other (Unknown))  sulfa drugs (Rash)    Intolerances        Social History:     FAMILY HISTORY:  Family history of brain cancer        Home Medications:  ALPRAZolam 0.5 mg oral tablet: 1 tab(s) orally once a day (at bedtime) (11 Jan 2021 13:08)  aspirin 81 mg oral tablet, chewable: 1 tab(s) orally once a day (11 Jan 2021 13:08)  cholecalciferol oral tablet: 1000 unit(s) orally once a day (11 Jan 2021 13:08)  cyanocobalamin 1000 mcg oral tablet: 1 tab(s) orally once a day (11 Jan 2021 13:08)  gabapentin 100 mg oral capsule: 1 cap(s) orally 2 times a day (11 Jan 2021 13:08)  metoprolol succinate 25 mg oral tablet, extended release: 3 tab(s) orally once a day (11 Jan 2021 13:08)  pantoprazole 40 mg oral delayed release tablet: 1 tab(s) orally once a day (11 Jan 2021 13:08)  polyethylene glycol 3350 oral powder for reconstitution: 17 gram(s) orally once a day, As Needed (11 Jan 2021 13:08)  pravastatin 40 mg oral tablet: 1 tab(s) orally once a day (at bedtime) (11 Jan 2021 13:08)  spironolactone 25 mg oral tablet: 1 tab(s) orally once a day (11 Jan 2021 13:08)  Synthroid 88 mcg (0.088 mg) oral tablet: 1 tab(s) orally once a day (11 Jan 2021 13:08)      MEDICATIONS  (STANDING):  ALPRAZolam 0.5 milliGRAM(s) Oral at bedtime  aspirin  chewable 81 milliGRAM(s) Oral daily  atorvastatin 10 milliGRAM(s) Oral at bedtime  enoxaparin Injectable 40 milliGRAM(s) SubCutaneous daily  gabapentin 100 milliGRAM(s) Oral two times a day  levothyroxine 88 MICROGram(s) Oral daily  magnesium citrate Oral Solution 0.5 Bottle Oral once  metoprolol succinate ER 75 milliGRAM(s) Oral daily    MEDICATIONS  (PRN):  acetaminophen   Tablet .. 650 milliGRAM(s) Oral every 6 hours PRN Mild Pain (1 - 3)  ondansetron Injectable 4 milliGRAM(s) IV Push every 6 hours PRN Nausea  senna 2 Tablet(s) Oral at bedtime PRN Constipation      CAPILLARY BLOOD GLUCOSE        I&O's Summary      PHYSICAL EXAM:  Vital Signs Last 24 Hrs  T(C): 36.6 (11 Jan 2021 16:38), Max: 36.6 (11 Jan 2021 16:38)  T(F): 97.8 (11 Jan 2021 16:38), Max: 97.8 (11 Jan 2021 16:38)  HR: 91 (11 Jan 2021 16:38) (74 - 95)  BP: 167/84 (11 Jan 2021 16:38) (167/84 - 254/116)  BP(mean): 112 (11 Jan 2021 10:37) (112 - 112)  RR: 18 (11 Jan 2021 16:38) (17 - 19)  SpO2: 98% (11 Jan 2021 16:38) (98% - 100%)  GENERAL: NAD, well-developed  HEAD:  Atraumatic, Normocephalic  EYES: EOMI, PERRLA, conjunctiva and sclera clear  ENT: normal hearing, no nasal discharge, throat clear, dentition normal  NECK: Supple, No JVD, no LAD, no thyromegaly   CHEST/LUNG: Clear to auscultation bilaterally; No wheeze, respirations unlabored  HEART: Regular rate and rhythm; No murmurs, rubs, or gallops  ABDOMEN: Soft, Nontender, Nondistended; Bowel sounds present, no HSM  EXTREMITIES:  2+ Peripheral Pulses, No clubbing, cyanosis, or edema  PSYCH: AAOx3, normal behavior  NEUROLOGY: non-focal, sensory and cn 2-12 intact, speech/language intact  SKIN: No visible rashes or lesions    LABS:                        12.9   11.48 )-----------( 355      ( 11 Jan 2021 10:45 )             43.6     01-11    137  |  106  |  19  ----------------------------<  126<H>  4.4   |  23  |  1.18    Ca    9.5      11 Jan 2021 10:45  Mg     2.5     01-11    TPro  8.3  /  Alb  3.7  /  TBili  0.6  /  DBili  x   /  AST  25  /  ALT  21  /  AlkPhos  83  01-11    PT/INR - ( 11 Jan 2021 10:45 )   PT: 11.9 sec;   INR: 1.03 ratio         PTT - ( 11 Jan 2021 10:45 )  PTT:36.7 sec  CARDIAC MARKERS ( 11 Jan 2021 13:34 )  0.072 ng/mL / x     / x     / x     / x      CARDIAC MARKERS ( 11 Jan 2021 10:45 )  0.031 ng/mL / x     / x     / x     / x              RADIOLOGY & ADDITIONAL TESTS:    Imaging Personally Reviewed:  cxr neg     EKG Personally Reviewed:  n/a  Consultant(s) Notes Reviewed:    n/a  Care Discussed with Consultants/Other Providers:   HPI:  89F w/PMH orthostatic hypotension, stroke w/ Rt residual weakness, endometrial (s/p hysterectomy) and bladder (no tx) CA, CAD w/stents, has 24H aides, presents today BIBEMS w/ report of syncope.  Per pt, she had just finished showering, the last thing she recalls, she was coming out of the shower w/ help from her aide. She thinks she passed out, she's not sure for how long. Her aide sat  her on the toilet and called EMS.  Pt endorses she had several episodes of vomiting.  +Constipation, no BM for past 2 days.  Per EMS record, aide reported pt was "shaking and foaming at mouth", concern for seizure like activity.  Pt denies any fever, chills, sob, cp, abd pain, dysuria.  She endorses mild tingling in her chest.  She also endorses feeling very weak (chronically).     In ED, wbc 11, trop 0.03-->0.07 (slightly higher than baseline), CT a/p: mod retained fecal material in colon, Left iliac LAD not changed since Sept, new compression fracture of L1, cxr neg, echo on 11/20,     PAST MEDICAL & SURGICAL HISTORY:  Orthostatic hypotension  Bladder cancer  Endometrial adenocarcinoma  Macular degeneration  Stented coronary artery  Hypothyroid  Hyperlipidemia  HTN (hypertension)  GERD (gastroesophageal reflux disease)  CAD (coronary artery disease)  [place] (hard of hearing)  History of bladder surgery  S/P EMILY (total abdominal hysterectomy)  S/P hernia repair umbilical - 2008  S/P laparoscopic cholecystectomy 2008  Ankle fracture, right surgery 25 yrs ago - hardware removed  S/P coronary angiogram 2005, 2008, 2011 - drug eluding stents    Review of Systems:   CONSTITUTIONAL: No fever. +weakness   EYES: No eye pain or discharge.  ENMT:  No sinus or throat pain  NECK: No pain or stiffness  RESPIRATORY: No cough, wheezing, chills or hemoptysis; No shortness of breath  CARDIOVASCULAR: No chest pain, palpitations, dizziness, or leg swelling  GASTROINTESTINAL: No abdominal or epigastric pain. ++ nausea, vomiting, No hematemesis; No diarrhea  ++Constipation. No melena or hematochezia.  GENITOURINARY: No dysuria or incontinence  NEUROLOGICAL: No headaches, memory loss, loss of strength, numbness, or tremors  SKIN: No rashes.  MUSCULOSKELETAL: No joint pain or swelling; No muscle, back, or extremity pain  PSYCHIATRIC: No depression, anxiety, mood swings, or difficulty sleeping    Allergies  amlodipine (Unknown)  clonidine (Unknown)  Florinef Acetate (Unknown)  hydrocodone (Unknown)  Levatol (Unknown)  Lipitor (Unknown)  Lotrel (Unknown)  methylPREDNISolone (Unknown)  morphine (Other)  Plaquenil (Unknown)  statins (Other (Unknown))  sulfa drugs (Rash)      Social History:   lives alone and has 24H aides  use [facility]     FAMILY HISTORY:  Family history of brain cancer      Home Medications:  ALPRAZolam 0.5 mg oral tablet: 1 tab(s) orally once a day (at bedtime) (11 Jan 2021 13:08)  aspirin 81 mg oral tablet, chewable: 1 tab(s) orally once a day (11 Jan 2021 13:08)  cholecalciferol oral tablet: 1000 unit(s) orally once a day (11 Jan 2021 13:08)  cyanocobalamin 1000 mcg oral tablet: 1 tab(s) orally once a day (11 Jan 2021 13:08)  gabapentin 100 mg oral capsule: 1 cap(s) orally 2 times a day (11 Jan 2021 13:08)  metoprolol succinate 25 mg oral tablet, extended release: 3 tab(s) orally once a day (11 Jan 2021 13:08)  pantoprazole 40 mg oral delayed release tablet: 1 tab(s) orally once a day (11 Jan 2021 13:08)  polyethylene glycol 3350 oral powder for reconstitution: 17 gram(s) orally once a day, As Needed (11 Jan 2021 13:08)  pravastatin 40 mg oral tablet: 1 tab(s) orally once a day (at bedtime) (11 Jan 2021 13:08)  spironolactone 25 mg oral tablet: 1 tab(s) orally once a day (11 Jan 2021 13:08)  Synthroid 88 mcg (0.088 mg) oral tablet: 1 tab(s) orally once a day (11 Jan 2021 13:08)      MEDICATIONS  (STANDING):  ALPRAZolam 0.5 milliGRAM(s) Oral at bedtime  aspirin  chewable 81 milliGRAM(s) Oral daily  atorvastatin 10 milliGRAM(s) Oral at bedtime  enoxaparin Injectable 40 milliGRAM(s) SubCutaneous daily  gabapentin 100 milliGRAM(s) Oral two times a day  levothyroxine 88 MICROGram(s) Oral daily  magnesium citrate Oral Solution 0.5 Bottle Oral once  metoprolol succinate ER 75 milliGRAM(s) Oral daily    MEDICATIONS  (PRN):  acetaminophen   Tablet .. 650 milliGRAM(s) Oral every 6 hours PRN Mild Pain (1 - 3)  ondansetron Injectable 4 milliGRAM(s) IV Push every 6 hours PRN Nausea  senna 2 Tablet(s) Oral at bedtime PRN Constipation    PHYSICAL EXAM:  Vital Signs Last 24 Hrs  T(C): 36.6 (11 Jan 2021 16:38), Max: 36.6 (11 Jan 2021 16:38)  T(F): 97.8 (11 Jan 2021 16:38), Max: 97.8 (11 Jan 2021 16:38)  HR: 91 (11 Jan 2021 16:38) (74 - 95)  BP: 167/84 (11 Jan 2021 16:38) (167/84 - 254/116)  BP(mean): 112 (11 Jan 2021 10:37) (112 - 112)  RR: 18 (11 Jan 2021 16:38) (17 - 19)  SpO2: 98% (11 Jan 2021 16:38) (98% - 100%)  GENERAL: NAD, frail  HEAD:  Atraumatic, Normocephalic  EYES: EOMI, PERRLA, conjunctiva and sclera clear  ENT: hard of hearing, no nasal discharge, throat clear, dentition normal  NECK: Supple, No JVD, no LAD, no thyromegaly   CHEST/LUNG: Clear to auscultation bilaterally; No wheeze, respirations unlabored  HEART: Regular rate and rhythm; +VICTORAINO  ABDOMEN: Soft, Nontender, Nondistended; Bowel sounds present, no HSM  EXTREMITIES:  2+ Peripheral Pulses, No clubbing, cyanosis, or edema  PSYCH: AAOx3, normal behavior  NEUROLOGY: non-focal, sensory and cn 2-12 intact, speech/language intact  SKIN: No visible rashes or lesions    LABS:                        12.9   11.48 )-----------( 355      ( 11 Jan 2021 10:45 )             43.6     01-11    137  |  106  |  19  ----------------------------<  126<H>  4.4   |  23  |  1.18    Ca    9.5      11 Jan 2021 10:45  Mg     2.5     01-11    TPro  8.3  /  Alb  3.7  /  TBili  0.6  /  DBili  x   /  AST  25  /  ALT  21  /  AlkPhos  83  01-11    PT/INR - ( 11 Jan 2021 10:45 )   PT: 11.9 sec;   INR: 1.03 ratio         PTT - ( 11 Jan 2021 10:45 )  PTT:36.7 sec  CARDIAC MARKERS ( 11 Jan 2021 13:34 )  0.072 ng/mL / x     / x     / x     / x      CARDIAC MARKERS ( 11 Jan 2021 10:45 )  0.031 ng/mL / x     / x     / x     / x              RADIOLOGY & ADDITIONAL TESTS:    Imaging Personally Reviewed:  cxr neg     EKG Personally Reviewed:  n/a  Consultant(s) Notes Reviewed:    n/a  Care Discussed with Consultants/Other Providers:

## 2021-01-11 NOTE — H&P ADULT - PROBLEM SELECTOR PLAN 5
w/ deconditioning d/t chronic condition   ambulate w/ assist/walker  fall precautions   supportive care   Nutrition cs

## 2021-01-11 NOTE — H&P ADULT - PROBLEM SELECTOR PLAN 1
admit to tele  check orthostatic vitals  will hold spironolactone- resume if orthos are neg  defer echo- pt had one in Nov  cardio cs  neuro cs  EEG

## 2021-01-11 NOTE — ED PROVIDER NOTE - PROGRESS NOTE DETAILS
yamil: Discussed need to admit with patient & discussed risk and benefits.  Patient agreed to admission.  Discussed case w/ admitting doctor - agreed to admit to their service. will place bridge orders. Accepting physician said: APPROVE PT TO MOVE    prior to inpatient team evaluation

## 2021-01-11 NOTE — ED ADULT TRIAGE NOTE - CHIEF COMPLAINT QUOTE
Pt. to the ED BIBA from Home S/P Syncope Episode at Shower. + Nausea at this time. + Cardias hx and Stroke x 5 weeks ago- Denies CP, SOB and Palpitations

## 2021-01-12 LAB
ANION GAP SERPL CALC-SCNC: 4 MMOL/L — LOW (ref 5–17)
BUN SERPL-MCNC: 22 MG/DL — SIGNIFICANT CHANGE UP (ref 7–23)
CALCIUM SERPL-MCNC: 9.4 MG/DL — SIGNIFICANT CHANGE UP (ref 8.5–10.1)
CHLORIDE SERPL-SCNC: 105 MMOL/L — SIGNIFICANT CHANGE UP (ref 96–108)
CO2 SERPL-SCNC: 28 MMOL/L — SIGNIFICANT CHANGE UP (ref 22–31)
CREAT SERPL-MCNC: 1.08 MG/DL — SIGNIFICANT CHANGE UP (ref 0.5–1.3)
GLUCOSE SERPL-MCNC: 95 MG/DL — SIGNIFICANT CHANGE UP (ref 70–99)
HCT VFR BLD CALC: 41.6 % — SIGNIFICANT CHANGE UP (ref 34.5–45)
HGB BLD-MCNC: 12.7 G/DL — SIGNIFICANT CHANGE UP (ref 11.5–15.5)
MCHC RBC-ENTMCNC: 26.1 PG — LOW (ref 27–34)
MCHC RBC-ENTMCNC: 30.5 GM/DL — LOW (ref 32–36)
MCV RBC AUTO: 85.6 FL — SIGNIFICANT CHANGE UP (ref 80–100)
PLATELET # BLD AUTO: 234 K/UL — SIGNIFICANT CHANGE UP (ref 150–400)
POTASSIUM SERPL-MCNC: 5 MMOL/L — SIGNIFICANT CHANGE UP (ref 3.5–5.3)
POTASSIUM SERPL-SCNC: 5 MMOL/L — SIGNIFICANT CHANGE UP (ref 3.5–5.3)
RBC # BLD: 4.86 M/UL — SIGNIFICANT CHANGE UP (ref 3.8–5.2)
RBC # FLD: 16.6 % — HIGH (ref 10.3–14.5)
SARS-COV-2 IGG SERPL QL IA: NEGATIVE — SIGNIFICANT CHANGE UP
SARS-COV-2 IGM SERPL IA-ACNC: <0.1 INDEX — SIGNIFICANT CHANGE UP
SODIUM SERPL-SCNC: 137 MMOL/L — SIGNIFICANT CHANGE UP (ref 135–145)
WBC # BLD: 9.69 K/UL — SIGNIFICANT CHANGE UP (ref 3.8–10.5)
WBC # FLD AUTO: 9.69 K/UL — SIGNIFICANT CHANGE UP (ref 3.8–10.5)

## 2021-01-12 PROCEDURE — 99223 1ST HOSP IP/OBS HIGH 75: CPT

## 2021-01-12 PROCEDURE — 95819 EEG AWAKE AND ASLEEP: CPT | Mod: 26

## 2021-01-12 PROCEDURE — 93010 ELECTROCARDIOGRAM REPORT: CPT

## 2021-01-12 PROCEDURE — 99233 SBSQ HOSP IP/OBS HIGH 50: CPT

## 2021-01-12 RX ORDER — SODIUM CHLORIDE 9 MG/ML
1000 INJECTION INTRAMUSCULAR; INTRAVENOUS; SUBCUTANEOUS ONCE
Refills: 0 | Status: COMPLETED | OUTPATIENT
Start: 2021-01-12 | End: 2021-01-12

## 2021-01-12 RX ORDER — LANOLIN ALCOHOL/MO/W.PET/CERES
5 CREAM (GRAM) TOPICAL AT BEDTIME
Refills: 0 | Status: DISCONTINUED | OUTPATIENT
Start: 2021-01-12 | End: 2021-01-13

## 2021-01-12 RX ADMIN — Medication 5 MILLIGRAM(S): at 01:56

## 2021-01-12 RX ADMIN — Medication 75 MILLIGRAM(S): at 11:05

## 2021-01-12 RX ADMIN — GABAPENTIN 100 MILLIGRAM(S): 400 CAPSULE ORAL at 11:03

## 2021-01-12 RX ADMIN — Medication 0.5 MILLIGRAM(S): at 21:13

## 2021-01-12 RX ADMIN — Medication 81 MILLIGRAM(S): at 11:03

## 2021-01-12 RX ADMIN — SODIUM CHLORIDE 250 MILLILITER(S): 9 INJECTION INTRAMUSCULAR; INTRAVENOUS; SUBCUTANEOUS at 10:00

## 2021-01-12 RX ADMIN — Medication 5 MILLIGRAM(S): at 21:13

## 2021-01-12 RX ADMIN — ENOXAPARIN SODIUM 40 MILLIGRAM(S): 100 INJECTION SUBCUTANEOUS at 11:04

## 2021-01-12 RX ADMIN — Medication 88 MICROGRAM(S): at 06:26

## 2021-01-12 RX ADMIN — GABAPENTIN 100 MILLIGRAM(S): 400 CAPSULE ORAL at 21:13

## 2021-01-12 RX ADMIN — ATORVASTATIN CALCIUM 10 MILLIGRAM(S): 80 TABLET, FILM COATED ORAL at 21:13

## 2021-01-12 NOTE — CONSULT NOTE ADULT - SUBJECTIVE AND OBJECTIVE BOX
Neurology Consult requested by:   Patient is a 89y old  Female who presents with a chief complaint of syncope (12 Jan 2021 10:04)     HPI:  HPI:  89F w/PMH orthostatic hypotension, stroke w/ Rt residual weakness, endometrial (s/p hysterectomy) and bladder (no tx) CA, CAD w/stents, has 24H aides, presents today BIBEMS w/ report of syncope.  Per pt, she had just finished showering, the last thing she recalls, she was coming out of the shower w/ help from her aide. She thinks she passed out, she's not sure for how long. Her aide sat  her on the toilet and called EMS.  Pt endorses she had several episodes of vomiting.  +Constipation, no BM for past 2 days.  Per EMS record, aide reported pt was "shaking and foaming at mouth", concern for seizure like activity.  Pt denies any fever, chills, sob, cp, abd pain, dysuria.  She endorses mild tingling in her chest.  She also endorses feeling very weak (chronically).   Today feels tired.     PAST MEDICAL & SURGICAL HISTORY:  Orthostatic hypotension    Bladder cancer    Endometrial adenocarcinoma    Macular degeneration    Stented coronary artery    Hypothyroid    Hyperlipidemia    HTN (hypertension)    GERD (gastroesophageal reflux disease)    CAD (coronary artery disease)    [place] (hard of hearing)    History of bladder surgery    S/P EMILY (total abdominal hysterectomy)    S/P hernia repair  umbilical - 2008    S/P laparoscopic cholecystectomy  2008    Ankle fracture, right  surgery 25 yrs ago - hardware removed    S/P coronary angiogram  2005, 2008, 2011 - drug eluding stents      FAMILY HISTORY:  Family history of brain cancer      Social Hx:  Nonsmoker, no drug or alcohol use  Medications and Allergies ReviewedMEDICATIONS  (STANDING):  ALPRAZolam 0.5 milliGRAM(s) Oral at bedtime  aspirin  chewable 81 milliGRAM(s) Oral daily  atorvastatin 10 milliGRAM(s) Oral at bedtime  enoxaparin Injectable 40 milliGRAM(s) SubCutaneous daily  gabapentin 100 milliGRAM(s) Oral two times a day  levothyroxine 88 MICROGram(s) Oral daily  melatonin 5 milliGRAM(s) Oral at bedtime  metoprolol succinate ER 75 milliGRAM(s) Oral daily     ROS: Pertinent positives in HPI, all other ROS were reviewed and are negative.      Examination:   Vital Signs Last 24 Hrs  T(C): 36.3 (12 Jan 2021 08:42), Max: 36.7 (11 Jan 2021 22:30)  T(F): 97.4 (12 Jan 2021 08:42), Max: 98.1 (12 Jan 2021 06:40)  HR: 74 (12 Jan 2021 08:42) (66 - 91)  BP: 134/56 (12 Jan 2021 08:42) (125/54 - 167/84)  RR: 18 (12 Jan 2021 08:42) (18 - 18)  SpO2: 97% (12 Jan 2021 08:42) (97% - 98%)  General: Cooperative, NAD   NECK: supple, no masses  ENT: Normal hearing   Vascular : no carotid bruits,   Lungs: CTAB  Chest: RRR, no murmurs  Extremities: nontender, no edema  Musculoskeletal: no adventitious movements, + joint stiffness  Skin: no rash    Neurological Examination:  NIHSS:  MS: AOx3. Appropriately interactive, flat affect. Speech fluent w/o paraphasic error, repetition, naming, reading is intact   CN: VFF, PERLL, EOMI, V1-3 sensation intact, face symmetric, hearing intact, palate elevates symmetrically, tongue midline, SCM equal bilaterally  Motor: normal bulk and tone, no tremor, rigidity or bradykinesia.  4/5 all over   Sens: Intact to light touch.    Reflexes: 0-1/4 all over, downgoing toes b/l  Coord:  No dysmetria, CLEVE intact   Gait: Cannot test    Labs: Reviewed  Basic Metabolic Panel (01.12.21 @ 07:03)   Sodium, Serum: 137 mmol/L   Potassium, Serum: 5.0 mmol/L   Chloride, Serum: 105 mmol/L   Carbon Dioxide, Serum: 28 mmol/L   Anion Gap, Serum: 4 mmol/L   Blood Urea Nitrogen, Serum: 22 mg/dL   Creatinine, Serum: 1.08 mg/dL   Glucose, Serum: 95 mg/dL   Calcium, Total Serum: 9.4 mg/dL   eGFR if Non : 44          Imaging:   t< from: US Duplex Carotid Arteries Complete, Bilateral (11.09.20 @ 09:12) >  Antegrade flow is noted within both vertebral arteries.    IMPRESSION:    Mild to moderate plaque in the left carotid bulb and proximal left internal carotid artery with evaluation limited secondary to shadowing from the plaque and an elevated peak systolic velocity suggestive of a 50-69% stenosis.    Mild plaque in the right carotid bulb and proximal right internal carotid artery with less than 50% right internal carotid artery stenosis.    Mild to moderate mixed plaque in the left external carotid artery with an elevated peak systolic velocity.        e< from: EEG Awake and Asleep (01.12.21 @ 10:30) >  PROCEDURE DATE:  01/12/2021        INTERPRETATION:  16-channel EEG recording for for this 89-year-old woman with a syncopal episode, rule out seizures. Presently on gabapentin.    The background activity consist of bilateral symmetrical occipitally dominant low amplitude 9-10 Hz activity which attenuates with eye opening. There is bilateral diffuse 15-20, low amplitude activities.  Drowsiness characterized by symmetrical attenuation of the background activity.    No focal slowing or epileptiform activity noted.    Stepped photic stimulation did not demonstrate any abnormalities.    Impression: Normal EEG performed with the patient awake and drowsy    < end of copied text >

## 2021-01-12 NOTE — DIETITIAN INITIAL EVALUATION ADULT. - PROBLEM SELECTOR PLAN 3
improved now w/ tx in ED, but still very high  will resume home meds and monitor, titrate meds as needed.

## 2021-01-12 NOTE — PROGRESS NOTE ADULT - ASSESSMENT
89F w/PMH orthostatic hypotension, stroke w/ Rt residual weakness, endometrial (s/p hysterectomy) and bladder (no tx) CA, CAD w/stents, has 24H aides, presents today BIBEMS w/ report of syncope.   +vomiting  In ED, wbc 11, trop 0.03-->0.07 (slightly higher than baseline), CT a/p: mod retained fecal material in colon, Left iliac LAD not changed since Sept, new compression fracture of L1, cxr neg, echo on 11/20,       #  Syncope - most likely vasovagal 2/2 dehydration   - orthostatics - negative   check orthostatic vitals  will hold spironolactone for now   defer echo- pt had one in Nov  cardio cs  neuro cs  EEG awake and asleep - normal   - Cardiology and neurology consults appreciated     # Elevated trops due to demand ischemia  - pt. denies chest pain SOB  - cardiology consult appreciated - no jose ramon      # Malignant HTN    improved now w/ tx in ED, but still very high  will resume home meds and monitor, titrate meds as needed.     # Constipation   s/p 1/2 bottle of mag citrate   bowel regimen     #Debility.     w/ deconditioning d/t chronic condition   ambulate w/ assist/walker  fall precautions   supportive care   Nutrition cs.

## 2021-01-12 NOTE — DIETITIAN INITIAL EVALUATION ADULT. - OTHER INFO
89F w/PMH orthostatic hypotension, stroke w/ Rt residual weakness, endometrial (s/p hysterectomy) and bladder (no tx) CA, CAD w/stents, has 24H aides, presents today BIBEMS w/ report of syncope.  Per pt, she had just finished showering, the last thing she recalls, she was coming out of the shower w/ help from her aide. She thinks she passed out, she's not sure for how long. Her aide sat  her on the toilet and called EMS.  Pt endorses she had several episodes of vomiting.  +Constipation, no BM for past 2 days.  Per EMS record, aide reported pt was "shaking and foaming at mouth", concern for seizure like activity.  Pt denies any fever, chills, sob, cp, abd pain, dysuria.  She endorses mild tingling in her chest.  She also endorses feeling very weak (chronically).

## 2021-01-12 NOTE — PROGRESS NOTE ADULT - SUBJECTIVE AND OBJECTIVE BOX
89F w/PMH orthostatic hypotension, stroke w/ Rt residual weakness, endometrial (s/p hysterectomy) and bladder (no tx) CA, CAD w/stents, has 24H aides, presents today BIBEMS w/ report of syncope.  Per pt, she had just finished showering, the last thing she recalls, she was coming out of the shower w/ help from her aide. She thinks she passed out, she's not sure for how long. Her aide sat  her on the toilet and called EMS.  Pt endorses she had several episodes of vomiting.  +Constipation, no BM for past 2 days.  Per EMS record, aide reported pt was "shaking and foaming at mouth", concern for seizure like activity.  Pt denies any fever, chills, sob, cp, abd pain, dysuria.  She endorses mild tingling in her chest.  She also endorses feeling very weak (chronically).   In ED, wbc 11, trop 0.03-->0.07 (slightly higher than baseline), CT a/p: mod retained fecal material in colon, Left iliac LAD not changed since Sept, new compression fracture of L1, cxr neg, echo on 11/20,     1/12  resting this morning.   awake and alert.   admits she hasn't been drinking enough fluid.   urine has been yellow colored    REVIEW OF SYSTEMS:  All other review of systems is negative unless indicated above    Vital Signs Last 24 Hrs  T(C): 36.3 (12 Jan 2021 08:42), Max: 36.7 (11 Jan 2021 22:30)  T(F): 97.4 (12 Jan 2021 08:42), Max: 98.1 (12 Jan 2021 06:40)  HR: 74 (12 Jan 2021 08:42) (66 - 91)  BP: 134/56 (12 Jan 2021 08:42) (125/54 - 167/84)  BP(mean): --  RR: 18 (12 Jan 2021 08:42) (18 - 18)  SpO2: 97% (12 Jan 2021 08:42) (97% - 98%)    PHYSICAL EXAM:  Constitutional: NAD, awake and alert, well-developed  HEENT: PERR, EOMI, Normal Hearing, MMM  Neck: Soft and supple, No LAD, No JVD  Respiratory: Breath sounds are clear bilaterally, No wheezing, rales or rhonchi  Cardiovascular: S1 and S2, regular rate and rhythm, no Murmurs, gallops or rubs  Gastrointestinal: Bowel Sounds present, soft, nontender, nondistended, no guarding, no rebound  Extremities: No peripheral edema  Vascular: 2+ peripheral pulses  Neurological: A/O x 3, no focal deficits  Musculoskeletal: 5/5 strength b/l upper and lower extremities  Skin: No rashes    Medications:  MEDICATIONS  (STANDING):  ALPRAZolam 0.5 milliGRAM(s) Oral at bedtime  aspirin  chewable 81 milliGRAM(s) Oral daily  atorvastatin 10 milliGRAM(s) Oral at bedtime  enoxaparin Injectable 40 milliGRAM(s) SubCutaneous daily  gabapentin 100 milliGRAM(s) Oral two times a day  levothyroxine 88 MICROGram(s) Oral daily  melatonin 5 milliGRAM(s) Oral at bedtime  metoprolol succinate ER 75 milliGRAM(s) Oral daily      Labs: All Labs Reviewed:                        12.7   9.69  )-----------( 234      ( 12 Jan 2021 07:03 )             41.6     01-12    137  |  105  |  22  ----------------------------<  95  5.0   |  28  |  1.08    Ca    9.4      12 Jan 2021 07:03  Mg     2.5     01-11    TPro  8.3  /  Alb  3.7  /  TBili  0.6  /  DBili  x   /  AST  25  /  ALT  21  /  AlkPhos  83  01-11    PT/INR - ( 11 Jan 2021 10:45 )   PT: 11.9 sec;   INR: 1.03 ratio         PTT - ( 11 Jan 2021 10:45 )  PTT:36.7 sec      CARDIAC MARKERS ( 11 Jan 2021 19:12 )  0.119 ng/mL / x     / x     / x     / x      CARDIAC MARKERS ( 11 Jan 2021 13:34 )  0.072 ng/mL / x     / x     / x     / x      CARDIAC MARKERS ( 11 Jan 2021 10:45 )  0.031 ng/mL / x     / x     / x     / x        RADIOLOGY/EKG: all tests were reviewed   rad< from: Xray Chest 1 View- PORTABLE-Urgent (01.11.21 @ 11:00) >    EXAM:  XR CHEST PORTABLE URGENT 1V                            PROCEDURE DATE:  01/11/2021          INTERPRETATION:  AP semierect chest on January 11, 2021 at 10:43 AM. Patient had a syncopal episode.    Heart is magnified by technique. Shallow inspiration crowds the chest.    Slight basilar atelectases are noted.    Chest is similar to November 7, 2020.    IMPRESSION: Unchanged chest as above.    < end of copied text >    EEG 1/12/21    INTERPRETATION:  16-channel EEG recording for for this 89-year-old woman with a syncopal episode, rule out seizures. Presently on gabapentin.    The background activity consist of bilateral symmetrical occipitally dominant low amplitude 9-10 Hz activity which attenuates with eye opening. There is bilateral diffuse 15-20, low amplitude activities.  Drowsiness characterized by symmetrical attenuation of the background activity.    No focal slowing or epileptiform activity noted.    Stepped photic stimulation did not demonstrate any abnormalities.    Impression: Normal EEG performed with the patient awake and drowsy        DVT PPX: lovenox     Advance Directive: full code     Disposition: observe overnight

## 2021-01-12 NOTE — DIETITIAN INITIAL EVALUATION ADULT. - PERTINENT LABORATORY DATA
01-12 Na137 mmol/L Glu 95 mg/dL K+ 5.0 mmol/L Cr  1.08 mg/dL BUN 22 mg/dL Phos n/a   Alb n/a   PAB n/a       01-12    137  |  105  |  22  ----------------------------<  95  5.0   |  28  |  1.08    Ca    9.4      12 Jan 2021 07:03  Mg     2.5     01-11    TPro  8.3  /  Alb  3.7  /  TBili  0.6  /  DBili  x   /  AST  25  /  ALT  21  /  AlkPhos  83  01-11

## 2021-01-12 NOTE — CONSULT NOTE ADULT - ASSESSMENT
89F w/PMH orthostatic hypotension, stroke w/ Rt residual weakness, endometrial (s/p hysterectomy) and bladder (no tx) CA, CAD w/stents, has 24H aides, presents today BIBEMS w/ report of syncope.  Per pt, she had just finished showering, the last thing she recalls, she was coming out of the shower w/ help from her aide. She thinks she passed out, she's not sure for how long. Her aide sat  her on the toilet and called EMS.  Pt endorses she had several episodes of vomiting.  +Constipation, no BM for past 2 days.     1/12  suspect dehydration was the inciting factor.   will give IV fluids today and recommend a PT consult.   No cardiac studies at this time. 
89 year old woman w/PMH orthostatic hypotension, stroke w/ Rt residual weakness, endometrial (s/p hysterectomy) and bladder (no tx) CA, CAD w/stents,  presented with syncope.  Non focal exam, EEG is normal. Most likely vaso vagal, doubt CVA/TIA, no hx of seizures.  Suggest:   encourage fluids  no further neuro recommendations

## 2021-01-12 NOTE — CONSULT NOTE ADULT - SUBJECTIVE AND OBJECTIVE BOX
89F w/PMH orthostatic hypotension, stroke w/ Rt residual weakness, endometrial (s/p hysterectomy) and bladder (no tx) CA, CAD w/stents, has 24H aides, presents today BIBEMS w/ report of syncope.  Per pt, she had just finished showering, the last thing she recalls, she was coming out of the shower w/ help from her aide. She thinks she passed out, she's not sure for how long. Her aide sat  her on the toilet and called EMS.  Pt endorses she had several episodes of vomiting.  +Constipation, no BM for past 2 days.  Per EMS record, aide reported pt was "shaking and foaming at mouth", concern for seizure like activity.  Pt denies any fever, chills, sob, cp, abd pain, dysuria.  She endorses mild tingling in her chest.  She also endorses feeling very weak (chronically).   In ED, wbc 11, trop 0.03-->0.07 (slightly higher than baseline), CT a/p: mod retained fecal material in colon, Left iliac LAD not changed since Sept, new compression fracture of L1, cxr neg, echo on 11/20,     1/12  resting this morning.   awake and alert.   admits she hasn't been drinking enough fluid.   urine has been yellow colored    PAST MEDICAL & SURGICAL HISTORY:  Orthostatic hypotension    Bladder cancer    Endometrial adenocarcinoma    Macular degeneration    Stented coronary artery    Hypothyroid    Hyperlipidemia    HTN (hypertension)    GERD (gastroesophageal reflux disease)    CAD (coronary artery disease)    [place] (hard of hearing)    History of bladder surgery    S/P EMILY (total abdominal hysterectomy)    S/P hernia repair  umbilical - 2008    S/P laparoscopic cholecystectomy  2008    Ankle fracture, right  surgery 25 yrs ago - hardware removed    S/P coronary angiogram  2005, 2008, 2011 - drug eluding stents      MEDICATIONS  (STANDING):  ALPRAZolam 0.5 milliGRAM(s) Oral at bedtime  aspirin  chewable 81 milliGRAM(s) Oral daily  atorvastatin 10 milliGRAM(s) Oral at bedtime  enoxaparin Injectable 40 milliGRAM(s) SubCutaneous daily  gabapentin 100 milliGRAM(s) Oral two times a day  levothyroxine 88 MICROGram(s) Oral daily  melatonin 5 milliGRAM(s) Oral at bedtime  metoprolol succinate ER 75 milliGRAM(s) Oral daily    MEDICATIONS  (PRN):  acetaminophen   Tablet .. 650 milliGRAM(s) Oral every 6 hours PRN Mild Pain (1 - 3)  ondansetron Injectable 4 milliGRAM(s) IV Push every 6 hours PRN Nausea  senna 2 Tablet(s) Oral at bedtime PRN Constipation    Allergies    amlodipine (Unknown)  clonidine (Unknown)  Florinef Acetate (Unknown)  hydrocodone (Unknown)  Levatol (Unknown)  Lipitor (Unknown)  Lotrel (Unknown)  methylPREDNISolone (Unknown)  morphine (Other)  Plaquenil (Unknown)  statins (Other (Unknown))  sulfa drugs (Rash)    Intolerances      FAMILY HISTORY:  Family history of brain cancer          REVIEW OF SYSTEMS:    CONSTITUTIONAL: No weakness, fevers or chills  EYES/ENT: No visual changes;  No vertigo or throat pain   NECK: No pain or stiffness  RESPIRATORY: No cough, wheezing, hemoptysis; No shortness of breath  CARDIOVASCULAR: No chest pain or palpitations  GASTROINTESTINAL: No abdominal or epigastric pain. No nausea, vomiting, or hematemesis; No diarrhea or constipation. No melena or hematochezia.  GENITOURINARY: No dysuria, frequency or hematuria  NEUROLOGICAL: No numbness or weakness  SKIN: No itching, burning, rashes, or lesions   All other review of systems is negative unless indicated above      PHYSICAL EXAM:  Daily Height in cm: 309.88 (11 Jan 2021 10:10)    Daily   Vital Signs Last 24 Hrs  T(C): 36.7 (12 Jan 2021 06:40), Max: 36.7 (11 Jan 2021 22:30)  T(F): 98.1 (12 Jan 2021 06:40), Max: 98.1 (12 Jan 2021 06:40)  HR: 86 (12 Jan 2021 06:40) (66 - 95)  BP: 125/54 (12 Jan 2021 06:40) (125/54 - 254/116)  BP(mean): 112 (11 Jan 2021 10:37) (112 - 112)  RR: 18 (11 Jan 2021 22:30) (17 - 19)  SpO2: 97% (12 Jan 2021 06:40) (97% - 100%)    Constitutional: NAD, awake and alert, well-developed  HEENT: PERR, EOMI, Normal Hearing, MMM  Neck: Soft and supple, No LAD, No JVD  Respiratory: Breath sounds are clear bilaterally, No wheezing, rales or rhonchi  Cardiovascular: S1 and S2, regular rate and rhythm, no Murmurs, gallops or rubs  Gastrointestinal: Bowel Sounds present, soft, nontender, nondistended, no guarding, no rebound  Extremities: No peripheral edema  Vascular: 2+ peripheral pulses  Neurological: A/O x 3, no focal deficits  Musculoskeletal: 5/5 strength b/l upper and lower extremities  Skin: No rashes    LABS: All Labs Reviewed:                        12.7   9.69  )-----------( 234      ( 12 Jan 2021 07:03 )             41.6     01-12    137  |  105  |  22  ----------------------------<  95  5.0   |  28  |  1.08    Ca    9.4      12 Jan 2021 07:03  Mg     2.5     01-11    TPro  8.3  /  Alb  3.7  /  TBili  0.6  /  DBili  x   /  AST  25  /  ALT  21  /  AlkPhos  83  01-11    PT/INR - ( 11 Jan 2021 10:45 )   PT: 11.9 sec;   INR: 1.03 ratio         PTT - ( 11 Jan 2021 10:45 )  PTT:36.7 sec  CARDIAC MARKERS ( 11 Jan 2021 19:12 )  0.119 ng/mL / x     / x     / x     / x      CARDIAC MARKERS ( 11 Jan 2021 13:34 )  0.072 ng/mL / x     / x     / x     / x      CARDIAC MARKERS ( 11 Jan 2021 10:45 )  0.031 ng/mL / x     / x     / x     / x          Blood Culture:     RADIOLOGY:   < from: Xray Chest 1 View- PORTABLE-Urgent (01.11.21 @ 11:00) >  EXAM:  XR CHEST PORTABLE URGENT 1V                            PROCEDURE DATE:  01/11/2021          INTERPRETATION:  AP semierect chest on January 11, 2021 at 10:43 AM. Patient had a syncopal episode.    Heart is magnified by technique. Shallow inspiration crowds the chest.    Slight basilar atelectases are noted.    Chest is similar to November 7, 2020.    IMPRESSION: Unchanged chest as above.    < end of copied text >  EKG:  < from: 12 Lead ECG (01.11.21 @ 10:21) >  Ventricular Rate 80 BPM    Atrial Rate 80 BPM    P-R Interval 248 ms    QRS Duration 80 ms    Q-T Interval 368 ms    QTC Calculation(Bazett) 424 ms    P Axis 72 degrees    R Axis 26 degrees    T Axis 61 degrees    Diagnosis Line Sinus rhythm with 1st degree A-V block  Nonspecific ST abnormality  Abnormal ECG  No previous ECGs available    < end of copied text >    CARDIOLOGY TESTING:< from: TTE Echo Complete w/o Contrast w/ Doppler (11.09.20 @ 09:30) >   EXAM:  ECHO TTE WO CON COMP W DOPP         PROCEDURE DATE:  11/09/2020        INTERPRETATION:  Transthoracic Echocardiography Report (TTE)     Demographics     Patient name        DAVIDA MIRZA  Age           89 year(s)     Med Rec #           [id number]         Gender        Female     Account #           [id number]      Date of Birth 11/24/1926     Interpreting        Mike Caruso MD  Room Number   0303   Physician     Referring Physician Mike Barrios      Sonographer   Camron Escoto,                                                       [facility]     Date of study       11/09/2020 08:19                       AM     Height                                Weight    Type of Study:     TTE procedure: ECHO TTE WO CON COMP W DOP     BP: 153/67 mmHg     Study Location: [address] [place] [address] Quality: Fair    Indications   1) R55 - Syncope and collapse    M-Mode Measurements (cm)     LVEDd: 4.8 cm             LVESd: 2.98 cm   IVSEd: 1.06 cm   LVPWd: 0.99 cm            AO Root Dimension: 2.9 cm                          ACS: 1.7 cm                             LA: 3.9 cm    Doppler Measurements:     AV Velocity:179 cm/s   AV Peak Gradient: 12.82 mmHg     TR Velocity:162 cm/s   TR Gradient:10.4976 mmHg   Estimated RAP:5 mmHg   RVSP:15 mmHg     Findings     Mitral Valve   Fibrocalcific changes noted to the mitral valve leaflets with preserved   leaflet excursion. Mitral annular calcification.   Mild mitral regurgitation.     Aortic Valve   The aortic valve appears mildly sclerotic. Valve opening seems to be   normal.     Tricuspid Valve   Normal appearing tricuspid valve structure and function.   Trace tricuspid valve regurgitation is present.     Pulmonic Valve   Pulmonic valve not well seen, probably normal pulmonic valve function.    Left Atrium   The left atrium is normal.     Left Ventricle   Left ventricular wall motion is normal. Estimated left ventricular   ejection fraction is 65-70 %.     Right Atrium   Normal appearing right atrium.     Right Ventricle   Normal appearing right ventricle structure and function.     Pericardial Effusion   No evidence of pericardial effusion.     Pleural Effusion   No evidence of pleural effusion.     Miscellaneous   All visualized extra cardiac structures appears to be normal.     Impression     Summary     Left ventricular wall motion is normal. Estimated left ventricular   ejection fraction is 65-70 %.   The left atrium is normal.   Normal appearing right atrium.   Normal appearing right ventricle structure and function.   The aortic valve appears mildly sclerotic. Valve opening seems to be   normal.   Fibrocalcific changes noted to the mitral valve leaflets with preserved   leaflet excursion. Mitral annular calcification.   Mild mitral regurgitation.   Normal appearing tricuspid valve structure and function.   Trace tricuspid valve regurgitation is present.   Pulmonic valve not well seen, probably normal pulmonic valve function.   No evidence of pericardial effusion.   All visualized extra cardiac structures appears to benormal.    < end of copied text >

## 2021-01-12 NOTE — DIETITIAN INITIAL EVALUATION ADULT. - PERTINENT MEDS FT
MEDICATIONS  (STANDING):  ALPRAZolam 0.5 milliGRAM(s) Oral at bedtime  aspirin  chewable 81 milliGRAM(s) Oral daily  atorvastatin 10 milliGRAM(s) Oral at bedtime  enoxaparin Injectable 40 milliGRAM(s) SubCutaneous daily  gabapentin 100 milliGRAM(s) Oral two times a day  levothyroxine 88 MICROGram(s) Oral daily  melatonin 5 milliGRAM(s) Oral at bedtime  metoprolol succinate ER 75 milliGRAM(s) Oral daily  sodium chloride 0.9% Bolus 1000 milliLiter(s) IV Bolus once    MEDICATIONS  (PRN):  acetaminophen   Tablet .. 650 milliGRAM(s) Oral every 6 hours PRN Mild Pain (1 - 3)  ondansetron Injectable 4 milliGRAM(s) IV Push every 6 hours PRN Nausea  senna 2 Tablet(s) Oral at bedtime PRN Constipation

## 2021-01-13 ENCOUNTER — TRANSCRIPTION ENCOUNTER (OUTPATIENT)
Age: 86
End: 2021-01-13

## 2021-01-13 VITALS
SYSTOLIC BLOOD PRESSURE: 137 MMHG | OXYGEN SATURATION: 99 % | RESPIRATION RATE: 18 BRPM | HEART RATE: 85 BPM | TEMPERATURE: 98 F | DIASTOLIC BLOOD PRESSURE: 55 MMHG

## 2021-01-13 LAB
ANION GAP SERPL CALC-SCNC: 4 MMOL/L — LOW (ref 5–17)
BUN SERPL-MCNC: 23 MG/DL — SIGNIFICANT CHANGE UP (ref 7–23)
CALCIUM SERPL-MCNC: 8.9 MG/DL — SIGNIFICANT CHANGE UP (ref 8.5–10.1)
CHLORIDE SERPL-SCNC: 107 MMOL/L — SIGNIFICANT CHANGE UP (ref 96–108)
CO2 SERPL-SCNC: 28 MMOL/L — SIGNIFICANT CHANGE UP (ref 22–31)
CREAT SERPL-MCNC: 0.95 MG/DL — SIGNIFICANT CHANGE UP (ref 0.5–1.3)
GLUCOSE SERPL-MCNC: 91 MG/DL — SIGNIFICANT CHANGE UP (ref 70–99)
HCT VFR BLD CALC: 38 % — SIGNIFICANT CHANGE UP (ref 34.5–45)
HGB BLD-MCNC: 11.3 G/DL — LOW (ref 11.5–15.5)
MCHC RBC-ENTMCNC: 25.5 PG — LOW (ref 27–34)
MCHC RBC-ENTMCNC: 29.7 GM/DL — LOW (ref 32–36)
MCV RBC AUTO: 85.6 FL — SIGNIFICANT CHANGE UP (ref 80–100)
PLATELET # BLD AUTO: 273 K/UL — SIGNIFICANT CHANGE UP (ref 150–400)
POTASSIUM SERPL-MCNC: 4.4 MMOL/L — SIGNIFICANT CHANGE UP (ref 3.5–5.3)
POTASSIUM SERPL-SCNC: 4.4 MMOL/L — SIGNIFICANT CHANGE UP (ref 3.5–5.3)
RBC # BLD: 4.44 M/UL — SIGNIFICANT CHANGE UP (ref 3.8–5.2)
RBC # FLD: 16.4 % — HIGH (ref 10.3–14.5)
SODIUM SERPL-SCNC: 139 MMOL/L — SIGNIFICANT CHANGE UP (ref 135–145)
WBC # BLD: 9.83 K/UL — SIGNIFICANT CHANGE UP (ref 3.8–10.5)
WBC # FLD AUTO: 9.83 K/UL — SIGNIFICANT CHANGE UP (ref 3.8–10.5)

## 2021-01-13 PROCEDURE — 99232 SBSQ HOSP IP/OBS MODERATE 35: CPT

## 2021-01-13 RX ADMIN — Medication 88 MICROGRAM(S): at 06:03

## 2021-01-13 RX ADMIN — Medication 75 MILLIGRAM(S): at 09:17

## 2021-01-13 RX ADMIN — GABAPENTIN 100 MILLIGRAM(S): 400 CAPSULE ORAL at 09:16

## 2021-01-13 RX ADMIN — Medication 81 MILLIGRAM(S): at 09:17

## 2021-01-13 RX ADMIN — Medication 650 MILLIGRAM(S): at 14:34

## 2021-01-13 RX ADMIN — ENOXAPARIN SODIUM 40 MILLIGRAM(S): 100 INJECTION SUBCUTANEOUS at 09:17

## 2021-01-13 NOTE — DISCHARGE NOTE NURSING/CASE MANAGEMENT/SOCIAL WORK - PATIENT PORTAL LINK FT
You can access the FollowMyHealth Patient Portal offered by [facility] by registering at the following website: [email or web address]. By joining [facility]’s FollowMyHealth portal, you will also be able to view your health information using other applications (apps) compatible with our system.

## 2021-01-13 NOTE — DISCHARGE NOTE PROVIDER - CARE PROVIDERS DIRECT ADDRESSES
[email or web address]

## 2021-01-13 NOTE — DISCHARGE NOTE PROVIDER - NSDCCPCAREPLAN_GEN_ALL_CORE_FT
PRINCIPAL DISCHARGE DIAGNOSIS  Diagnosis: Syncope  Assessment and Plan of Treatment: #  Syncope - most likely vasovagal 2/2 dehydration   - orthostatics - negative   resume spironolactone on discharge    defer echo- pt had one in Nov  EEG awake and asleep - normal   - Cardiology and neurology consults appreciated         SECONDARY DISCHARGE DIAGNOSES  Diagnosis: Elevated troponin  Assessment and Plan of Treatment: # Elevated trops due to demand ischemia  - pt. denies chest pain SOB  - cardiology consult appreciated - no additional tests           PRINCIPAL DISCHARGE DIAGNOSIS  Diagnosis: Syncope  Assessment and Plan of Treatment: #  Syncope - most likely vasovagal 2/2 dehydration   - orthostatics - negative   - resume spironolactone on discharge    - defer echo- pt had one in Nov  - EEG awake and asleep - normal   - Cardiology and neurology consults appreciated         SECONDARY DISCHARGE DIAGNOSES  Diagnosis: Elevated troponin  Assessment and Plan of Treatment: # Elevated trops due to demand ischemia  - pt. denies chest pain SOB  - cardiology consult appreciated - no additional tests

## 2021-01-13 NOTE — PHYSICAL THERAPY INITIAL EVALUATION ADULT - ADDITIONAL COMMENTS
pt reports she requires help of [facility] but that she is frustrated growing old and needing their help ,pt c/o difficulty using hands due to arthritic changes and now fx R 5th digit (sustained in similar fall 9/10/20)evaluated in ED and discharged home,son bought 2 different finger splints to protect digit ,currently wearing smaller one (does not support fx site at base of 5th prox.phalanx). info obtained from [facility]on 9/29/2020. Update 1/13/2021: pt has 24/7 aide at home

## 2021-01-13 NOTE — PROGRESS NOTE ADULT - ASSESSMENT
89F w/PMH orthostatic hypotension, stroke w/ Rt residual weakness, endometrial (s/p hysterectomy) and bladder (no tx) CA, CAD w/stents, has 24H aides, presents today BIBEMS w/ report of syncope.  Per pt, she had just finished showering, the last thing she recalls, she was coming out of the shower w/ help from her aide. She thinks she passed out, she's not sure for how long. Her aide sat  her on the toilet and called EMS.  Pt endorses she had several episodes of vomiting.  +Constipation, no BM for past 2 days.     1/12  suspect dehydration was the inciting factor.   will give IV fluids today and recommend a PT consult.   No cardiac studies at this time.     1/13  no indications for a pacemaker  physical therapy evaluation.   encourage PO fluids.   consider discharge home if stable.   no further cardiac studies indicated.

## 2021-01-13 NOTE — PHYSICAL THERAPY INITIAL EVALUATION ADULT - ACTIVE RANGE OF MOTION EXAMINATION, REHAB EVAL
Bilateral shoulder flex ~ 120 degrees. Bilateral hip flex ~ 90 degrees, and bilateral DF neutral./Left UE Active ROM was WFL (within functional limits)/Right UE Active ROM was WFL (within functional limits)/Left LE Active ROM was WFL (within functional limits)/Right LE Active ROM was WFL (within functional limits)

## 2021-01-13 NOTE — PHYSICAL THERAPY INITIAL EVALUATION ADULT - GAIT PATTERN USED, PT EVAL
Pt required verbal cues to increase posture, to increase step length bilaterally, to keep appropriate navigation of RW, and to stay on task.

## 2021-01-13 NOTE — PHYSICAL THERAPY INITIAL EVALUATION ADULT - PERTINENT HX OF CURRENT PROBLEM, REHAB EVAL
Pt admitted to  secondary to syncope, and weakness. Pt also with several episodes of vomiting and no BM for 2 days PTA. Pt with a hx of CVA with right residual weakness. Troponins: 1/11- 0.072, 0.119. EEG- normal. COVID 19: neg.

## 2021-01-13 NOTE — DISCHARGE NOTE PROVIDER - NSDCCPTREATMENT_GEN_ALL_CORE_FT
PRINCIPAL PROCEDURE  Procedure: EEG, awake and drowsy  Findings and Treatment:    EXAM:  EEG AWAKE AND ASLEEP    PROCEDURE DATE:  01/12/2021    INTERPRETATION:  16-channel EEG recording for for this 89-year-old woman with a syncopal episode, rule out seizures. Presently on gabapentin.  The background activity consist of bilateral symmetrical occipitally dominant low amplitude 9-10 Hz activity which attenuates with eye opening. There is bilateral diffuse 15-20, low amplitude activities.  Drowsiness characterized by symmetrical attenuation of the background activity.  No focal slowing or epileptiform activity noted.  Stepped photic stimulation did not demonstrate any abnormalities.  Impression: Normal EEG performed with the patient awake and drowsy

## 2021-01-13 NOTE — PHYSICAL THERAPY INITIAL EVALUATION ADULT - PLANNED THERAPY INTERVENTIONS, PT EVAL
Eval, amb, transfers, bed mobility x 15'./bed mobility training/gait training/ROM/strengthening/transfer training

## 2021-01-13 NOTE — PROGRESS NOTE ADULT - SUBJECTIVE AND OBJECTIVE BOX
Patient is a 89y old  Female who presents with a chief complaint of syncope (12 Jan 2021 15:22)    89F w/PMH orthostatic hypotension, stroke w/ Rt residual weakness, endometrial (s/p hysterectomy) and bladder (no tx) CA, CAD w/stents, has 24H aides, presents today BIBEMS w/ report of syncope.  Per pt, she had just finished showering, the last thing she recalls, she was coming out of the shower w/ help from her aide. She thinks she passed out, she's not sure for how long. Her aide sat  her on the toilet and called EMS.  Pt endorses she had several episodes of vomiting.  +Constipation, no BM for past 2 days.  Per EMS record, aide reported pt was "shaking and foaming at mouth", concern for seizure like activity.  Pt denies any fever, chills, sob, cp, abd pain, dysuria.  She endorses mild tingling in her chest.  She also endorses feeling very weak (chronically).   In ED, wbc 11, trop 0.03-->0.07 (slightly higher than baseline), CT a/p: mod retained fecal material in colon, Left iliac LAD not changed since Sept, new compression fracture of L1, cxr neg, echo on 11/20,     1/12  resting this morning.   awake and alert.   admits she hasn't been drinking enough fluid.   urine has been yellow colored    1/13  tolerated IV fluids.   sleeping soundly  Telemetry shows periods of Mobitz1 AV block.     Allergies    amlodipine (Unknown)  clonidine (Unknown)  Florinef Acetate (Unknown)  hydrocodone (Unknown)  Levatol (Unknown)  Lipitor (Unknown)  Lotrel (Unknown)  methylPREDNISolone (Unknown)  morphine (Other)  Plaquenil (Unknown)  statins (Other (Unknown))  sulfa drugs (Rash)    Intolerances        MEDICATIONS  (STANDING):  ALPRAZolam 0.5 milliGRAM(s) Oral at bedtime  aspirin  chewable 81 milliGRAM(s) Oral daily  atorvastatin 10 milliGRAM(s) Oral at bedtime  enoxaparin Injectable 40 milliGRAM(s) SubCutaneous daily  gabapentin 100 milliGRAM(s) Oral two times a day  levothyroxine 88 MICROGram(s) Oral daily  melatonin 5 milliGRAM(s) Oral at bedtime  metoprolol succinate ER 75 milliGRAM(s) Oral daily    MEDICATIONS  (PRN):  acetaminophen   Tablet .. 650 milliGRAM(s) Oral every 6 hours PRN Mild Pain (1 - 3)  ondansetron Injectable 4 milliGRAM(s) IV Push every 6 hours PRN Nausea  senna 2 Tablet(s) Oral at bedtime PRN Constipation    REVIEW OF SYSTEMS:    RESPIRATORY: No cough, wheezing, hemoptysis; No shortness of breath  CARDIOVASCULAR: No chest pain or palpitations  All other review of systems is negative unless indicated above      PHYSICAL EXAM:  Daily     Daily   Vital Signs Last 24 Hrs  T(C): 36.8 (12 Jan 2021 23:09), Max: 36.8 (12 Jan 2021 23:09)  T(F): 98.3 (12 Jan 2021 23:09), Max: 98.3 (12 Jan 2021 23:09)  HR: 81 (12 Jan 2021 23:09) (73 - 81)  BP: 134/52 (12 Jan 2021 23:09) (134/52 - 138/51)  BP(mean): --  RR: 18 (12 Jan 2021 16:43) (18 - 18)  SpO2: 100% (12 Jan 2021 23:09) (97% - 100%)    Constitutional: NAD, awake and alert, well-developed  HEENT: PERR, EOMI, Normal Hearing, MMM  Neck: Soft and supple, No LAD, No JVD  Respiratory: Breath sounds are clear bilaterally, No wheezing, rales or rhonchi  Cardiovascular: S1 and S2, regular rate and rhythm, no Murmurs, gallops or rubs  Gastrointestinal: Bowel Sounds present, soft, nontender, nondistended, no guarding, no rebound  Extremities: No peripheral edema  Vascular: 2+ peripheral pulses  Neurological: A/O x 3, no focal deficits  Musculoskeletal: 5/5 strength b/l upper and lower extremities  Skin: No rashes    LABS: All Labs Reviewed:                        11.3   9.83  )-----------( 273      ( 13 Jan 2021 07:01 )             38.0     01-13    139  |  107  |  23  ----------------------------<  91  4.4   |  28  |  0.95    Ca    8.9      13 Jan 2021 07:01  Mg     2.5     01-11    TPro  8.3  /  Alb  3.7  /  TBili  0.6  /  DBili  x   /  AST  25  /  ALT  21  /  AlkPhos  83  01-11    PT/INR - ( 11 Jan 2021 10:45 )   PT: 11.9 sec;   INR: 1.03 ratio         PTT - ( 11 Jan 2021 10:45 )  PTT:36.7 sec  CARDIAC MARKERS ( 11 Jan 2021 19:12 )  0.119 ng/mL / x     / x     / x     / x      CARDIAC MARKERS ( 11 Jan 2021 13:34 )  0.072 ng/mL / x     / x     / x     / x      CARDIAC MARKERS ( 11 Jan 2021 10:45 )  0.031 ng/mL / x     / x     / x     / x              TELEMETRY/EKG: periods of Mobitz 1

## 2021-01-13 NOTE — DISCHARGE NOTE PROVIDER - CARE PROVIDER_API CALL
Mayra Sánchez J  CARDIOVASCULAR DISEASE  [address] [place] [address]  Phone: [phone]  Fax: [phone]  Follow Up Time:

## 2021-01-13 NOTE — DISCHARGE NOTE PROVIDER - HOSPITAL COURSE
89F w/PMH orthostatic hypotension, stroke w/ Rt residual weakness, endometrial (s/p hysterectomy) and bladder (no tx) CA, CAD w/stents, has 24H aides, presents today BIBEMS w/ report of syncope.     #  Syncope - most likely vasovagal 2/2 dehydration   - orthostatics - negative   resume spironolactone on discharge    defer echo- pt had one in Nov  EEG awake and asleep - normal   - Cardiology and neurology consults appreciated     # Elevated trops due to demand ischemia  - pt. denies chest pain SOB  - cardiology consult appreciated - no additional tests      # Malignant HTN  controlled at present   improved now w/ tx in ED, but still very high  will resume home meds and monitor    # Constipation   s/p 1/2 bottle of mag citrate   bowel regimen     #Debility   w/ deconditioning d/t chronic condition   ambulate w/ assist/walker  fall precautions   supportive care   Home PT    Pt. is stable for discharge, will follow up with cardiology/PCP within one week.     PHYSICAL EXAM:  Vital Signs Last 24 Hrs  T(C): 36.7 (13 Jan 2021 09:39), Max: 36.8 (12 Jan 2021 23:09)  T(F): 98.1 (13 Jan 2021 09:39), Max: 98.3 (12 Jan 2021 23:09)  HR: 85 (13 Jan 2021 09:39) (73 - 85)  BP: 137/55 (13 Jan 2021 09:39) (134/52 - 138/51)  BP(mean): --  RR: 18 (13 Jan 2021 09:39) (18 - 18)  SpO2: 99% (13 Jan 2021 09:39) (98% - 100%)  Constitutional: NAD, awake and alert, well-developed  HEENT: PERR, EOMI, Normal Hearing, MMM  Neck: Soft and supple, No LAD, No JVD  Respiratory: Breath sounds are clear bilaterally, No wheezing, rales or rhonchi  Cardiovascular: S1 and S2, regular rate and rhythm, no Murmurs, gallops or rubs  Gastrointestinal: Bowel Sounds present, soft, nontender, nondistended, no guarding, no rebound  Extremities: No peripheral edema  Vascular: 2+ peripheral pulses  Neurological: A/O x 3, no focal deficits  Musculoskeletal: 5/5 strength b/l upper and lower extremities  Skin: No rashes   89F w/PMH orthostatic hypotension, stroke w/ Rt residual weakness, endometrial (s/p hysterectomy) and bladder (no tx) CA, CAD w/stents, has 24H aides, presents today BIBEMS w/ report of syncope.     d/c planning. Denies any HA, CP, SOB. d/c planning. Care discussed with NP.     #  Syncope - most likely vasovagal 2/2 dehydration   - orthostatics - negative   resume spironolactone on discharge    defer echo- pt had one in Nov  EEG awake and asleep - normal   - Cardiology and neurology consults appreciated     # Elevated trops due to demand ischemia  - pt. denies chest pain SOB  - cardiology consult appreciated - no additional tests      # Malignant HTN  controlled at present   improved now w/ tx in ED, but still very high  will resume home meds and monitor    # Constipation   s/p 1/2 bottle of mag citrate   bowel regimen     #Debility   w/ deconditioning d/t chronic condition   ambulate w/ assist/walker  fall precautions   supportive care   Home PT    Pt. is stable for discharge, will follow up with cardiology/PCP within one week.     PHYSICAL EXAM:  Vital Signs Last 24 Hrs  T(C): 36.7 (13 Jan 2021 09:39), Max: 36.8 (12 Jan 2021 23:09)  T(F): 98.1 (13 Jan 2021 09:39), Max: 98.3 (12 Jan 2021 23:09)  HR: 85 (13 Jan 2021 09:39) (73 - 85)  BP: 137/55 (13 Jan 2021 09:39) (134/52 - 138/51)  BP(mean): --  RR: 18 (13 Jan 2021 09:39) (18 - 18)  SpO2: 99% (13 Jan 2021 09:39) (98% - 100%)  Constitutional: NAD, awake and alert, well-developed  HEENT: PERR, EOMI, Normal Hearing, MMM  Neck: Soft and supple, No LAD, No JVD  Respiratory: Breath sounds are clear bilaterally, No wheezing, rales or rhonchi  Cardiovascular: S1 and S2, regular rate and rhythm, no Murmurs, gallops or rubs  Gastrointestinal: Bowel Sounds present, soft, nontender, nondistended, no guarding, no rebound  Extremities: No peripheral edema  Vascular: 2+ peripheral pulses  Neurological: A/O x 3, no focal deficits  Musculoskeletal: 5/5 strength b/l upper and lower extremities  Skin: No rashes

## 2021-01-13 NOTE — DISCHARGE NOTE PROVIDER - NSDCMRMEDTOKEN_GEN_ALL_CORE_FT
ALPRAZolam 0.5 mg oral tablet: 1 tab(s) orally once a day (at bedtime)  aspirin 81 mg oral tablet, chewable: 1 tab(s) orally once a day  cholecalciferol oral tablet: 1000 unit(s) orally once a day  cyanocobalamin 1000 mcg oral tablet: 1 tab(s) orally once a day  gabapentin 100 mg oral capsule: 1 cap(s) orally 2 times a day  metoprolol succinate 25 mg oral tablet, extended release: 3 tab(s) orally once a day  pantoprazole 40 mg oral delayed release tablet: 1 tab(s) orally once a day  polyethylene glycol 3350 oral powder for reconstitution: 17 gram(s) orally once a day, As Needed  pravastatin 40 mg oral tablet: 1 tab(s) orally once a day (at bedtime)  spironolactone 25 mg oral tablet: 1 tab(s) orally once a day  Synthroid 88 mcg (0.088 mg) oral tablet: 1 tab(s) orally once a day

## 2021-01-13 NOTE — DISCHARGE NOTE NURSING/CASE MANAGEMENT/SOCIAL WORK - NSDCPEPT PROEDMA_GEN_ALL_CORE
Patient's father Surya calls, wondering if his son can have refill of hydrocortisone? Uses for irritation that he gets due to running long marathons.   States his son has small amount of this medication left. Not sure if enough. Told he could use otc hydrocortisone 1% cream.    [facility] [address] [place] [address] pharmacy  Dad's work phone: [phone] ok to state personal phone call, when calling    Yes

## 2021-01-15 NOTE — ED ADULT NURSE NOTE - CAS EDP DISCH DISPOSITION ADMI
Sabas Santana  [address] [facility] [address]          Account#: [id number]  01/15/21      Dear Sabas Santana,     This letter is being sent to you as a reminder that it is necessary for you to get your INR/PT checked regularly so that we can optimize your care. Our records indicate that you missed one scheduled blood draw.     Please make an appointment at your earliest convenience.     It is very important that you have your INR checked. If you have any questions,  Please call [phone] or [phone].     Sincerely,     [facility]  [facility] Cardiology   [address] [place] [id number]  [phone]  
BELINDA/Jenny

## 2021-01-17 DIAGNOSIS — H35.30 UNSPECIFIED MACULAR DEGENERATION: ICD-10-CM

## 2021-01-17 DIAGNOSIS — I24.8 OTHER FORMS OF ACUTE ISCHEMIC HEART DISEASE: ICD-10-CM

## 2021-01-17 DIAGNOSIS — Z79.82 LONG TERM (CURRENT) USE OF ASPIRIN: ICD-10-CM

## 2021-01-17 DIAGNOSIS — Z88.8 ALLERGY STATUS TO OTHER DRUGS, MEDICAMENTS AND BIOLOGICAL SUBSTANCES: ICD-10-CM

## 2021-01-17 DIAGNOSIS — I10 ESSENTIAL (PRIMARY) HYPERTENSION: ICD-10-CM

## 2021-01-17 DIAGNOSIS — Z79.890 HORMONE REPLACEMENT THERAPY: ICD-10-CM

## 2021-01-17 DIAGNOSIS — Z88.5 ALLERGY STATUS TO NARCOTIC AGENT: ICD-10-CM

## 2021-01-17 DIAGNOSIS — E78.5 HYPERLIPIDEMIA, UNSPECIFIED: ICD-10-CM

## 2021-01-17 DIAGNOSIS — R53.81 OTHER MALAISE: ICD-10-CM

## 2021-01-17 DIAGNOSIS — R55 SYNCOPE AND COLLAPSE: ICD-10-CM

## 2021-01-17 DIAGNOSIS — I25.10 ATHEROSCLEROTIC HEART DISEASE OF NATIVE CORONARY ARTERY WITHOUT ANGINA PECTORIS: ICD-10-CM

## 2021-01-17 DIAGNOSIS — Z79.02 LONG TERM (CURRENT) USE OF ANTITHROMBOTICS/ANTIPLATELETS: ICD-10-CM

## 2021-01-17 DIAGNOSIS — Z88.2 ALLERGY STATUS TO SULFONAMIDES: ICD-10-CM

## 2021-01-17 DIAGNOSIS — Z90.710 ACQUIRED ABSENCE OF BOTH CERVIX AND UTERUS: ICD-10-CM

## 2021-01-17 DIAGNOSIS — E86.0 DEHYDRATION: ICD-10-CM

## 2021-01-17 DIAGNOSIS — I48.0 PAROXYSMAL ATRIAL FIBRILLATION: ICD-10-CM

## 2021-01-17 DIAGNOSIS — I69.951 HEMIPLEGIA AND HEMIPARESIS FOLLOWING UNSPECIFIED CEREBROVASCULAR DISEASE AFFECTING RIGHT DOMINANT SIDE: ICD-10-CM

## 2021-01-17 DIAGNOSIS — M48.56XA COLLAPSED VERTEBRA, NOT ELSEWHERE CLASSIFIED, LUMBAR REGION, INITIAL ENCOUNTER FOR FRACTURE: ICD-10-CM

## 2021-01-17 DIAGNOSIS — Z85.51 PERSONAL HISTORY OF MALIGNANT NEOPLASM OF BLADDER: ICD-10-CM

## 2021-01-17 DIAGNOSIS — E03.9 HYPOTHYROIDISM, UNSPECIFIED: ICD-10-CM

## 2021-01-17 DIAGNOSIS — I95.1 ORTHOSTATIC HYPOTENSION: ICD-10-CM

## 2021-01-17 DIAGNOSIS — H91.90 UNSPECIFIED HEARING LOSS, UNSPECIFIED EAR: ICD-10-CM

## 2021-01-17 DIAGNOSIS — K59.00 CONSTIPATION, UNSPECIFIED: ICD-10-CM

## 2021-01-17 DIAGNOSIS — Z95.5 PRESENCE OF CORONARY ANGIOPLASTY IMPLANT AND GRAFT: ICD-10-CM

## 2021-01-17 DIAGNOSIS — K21.9 GASTRO-ESOPHAGEAL REFLUX DISEASE WITHOUT ESOPHAGITIS: ICD-10-CM

## 2021-01-22 NOTE — PROVIDER CONTACT NOTE (CHANGE IN STATUS NOTIFICATION) - NAME OF MD/NP/PA/DO NOTIFIED:
Medication refill requested: Simvastatin, Pantoprazole, metropolol, metformin, Lisinopril, Levothyroxine (300 mg), Levothyroxine (25 mg), gabapentin   Last office visit: 12/10/20  Next office visit: None   Last refilled:   Simvastatin 8/10/20, 90 x 1  Pantoprazole 8/10/20, 90 x 1  Metroprolol 8/10/20, 180 x 1   Metformin 8/10/20, 90 x 1   Lisinopril 8/10/20, 90 x 1  Levothyroxine (300 mg) 8/10/2020, 90 x 1  Levothyroxine (25 mg) 8/10/20, 90 x 1  Gabapentin 8/10/20, 270 x 1    [facility]   [place] [address]  Phone: [phone] Fax: [phone]         Pended: See [facility] Response Team

## 2021-01-25 DIAGNOSIS — Z71.89 OTHER SPECIFIED COUNSELING: ICD-10-CM

## 2021-02-01 PROCEDURE — G9005: CPT

## 2021-02-06 ENCOUNTER — INPATIENT (INPATIENT)
Facility: HOSPITAL | Age: 86
LOS: 9 days | Discharge: NURSING, REHAB OR LONG-TERM CARE | DRG: 69 | End: 2021-02-16
Attending: HOSPITALIST | Admitting: EMERGENCY MEDICINE
Payer: MEDICARE

## 2021-02-06 VITALS
WEIGHT: 169.98 LBS | RESPIRATION RATE: 18 BRPM | SYSTOLIC BLOOD PRESSURE: 196 MMHG | OXYGEN SATURATION: 98 % | DIASTOLIC BLOOD PRESSURE: 68 MMHG | HEIGHT: 72 IN | HEART RATE: 72 BPM | TEMPERATURE: 98 F

## 2021-02-06 DIAGNOSIS — I10 ESSENTIAL (PRIMARY) HYPERTENSION: ICD-10-CM

## 2021-02-06 DIAGNOSIS — Z90.710 ACQUIRED ABSENCE OF BOTH CERVIX AND UTERUS: Chronic | ICD-10-CM

## 2021-02-06 DIAGNOSIS — Z98.890 OTHER SPECIFIED POSTPROCEDURAL STATES: Chronic | ICD-10-CM

## 2021-02-06 DIAGNOSIS — L89.312 PRESSURE ULCER OF RIGHT BUTTOCK, STAGE 2: ICD-10-CM

## 2021-02-06 LAB
ALBUMIN SERPL ELPH-MCNC: 3.5 G/DL — SIGNIFICANT CHANGE UP (ref 3.3–5)
ALP SERPL-CCNC: 79 U/L — SIGNIFICANT CHANGE UP (ref 40–120)
ALT FLD-CCNC: 20 U/L — SIGNIFICANT CHANGE UP (ref 12–78)
ANION GAP SERPL CALC-SCNC: 8 MMOL/L — SIGNIFICANT CHANGE UP (ref 5–17)
APTT BLD: 32.7 SEC — SIGNIFICANT CHANGE UP (ref 27.5–35.5)
AST SERPL-CCNC: 17 U/L — SIGNIFICANT CHANGE UP (ref 15–37)
BASOPHILS # BLD AUTO: 0.05 K/UL — SIGNIFICANT CHANGE UP (ref 0–0.2)
BASOPHILS NFR BLD AUTO: 0.5 % — SIGNIFICANT CHANGE UP (ref 0–2)
BILIRUB SERPL-MCNC: 0.4 MG/DL — SIGNIFICANT CHANGE UP (ref 0.2–1.2)
BUN SERPL-MCNC: 21 MG/DL — SIGNIFICANT CHANGE UP (ref 7–23)
CALCIUM SERPL-MCNC: 9.9 MG/DL — SIGNIFICANT CHANGE UP (ref 8.5–10.1)
CHLORIDE SERPL-SCNC: 104 MMOL/L — SIGNIFICANT CHANGE UP (ref 96–108)
CO2 SERPL-SCNC: 26 MMOL/L — SIGNIFICANT CHANGE UP (ref 22–31)
CREAT SERPL-MCNC: 0.95 MG/DL — SIGNIFICANT CHANGE UP (ref 0.5–1.3)
EOSINOPHIL # BLD AUTO: 0.14 K/UL — SIGNIFICANT CHANGE UP (ref 0–0.5)
EOSINOPHIL NFR BLD AUTO: 1.4 % — SIGNIFICANT CHANGE UP (ref 0–6)
GLUCOSE SERPL-MCNC: 87 MG/DL — SIGNIFICANT CHANGE UP (ref 70–99)
HCT VFR BLD CALC: 40.4 % — SIGNIFICANT CHANGE UP (ref 34.5–45)
HGB BLD-MCNC: 12.2 G/DL — SIGNIFICANT CHANGE UP (ref 11.5–15.5)
IMM GRANULOCYTES NFR BLD AUTO: 0.2 % — SIGNIFICANT CHANGE UP (ref 0–1.5)
INR BLD: 1.02 RATIO — SIGNIFICANT CHANGE UP (ref 0.88–1.16)
LACTATE SERPL-SCNC: 0.9 MMOL/L — SIGNIFICANT CHANGE UP (ref 0.7–2)
LIDOCAIN IGE QN: 124 U/L — SIGNIFICANT CHANGE UP (ref 73–393)
LYMPHOCYTES # BLD AUTO: 2.23 K/UL — SIGNIFICANT CHANGE UP (ref 1–3.3)
LYMPHOCYTES # BLD AUTO: 22.9 % — SIGNIFICANT CHANGE UP (ref 13–44)
MCHC RBC-ENTMCNC: 25.5 PG — LOW (ref 27–34)
MCHC RBC-ENTMCNC: 30.2 GM/DL — LOW (ref 32–36)
MCV RBC AUTO: 84.3 FL — SIGNIFICANT CHANGE UP (ref 80–100)
MONOCYTES # BLD AUTO: 0.74 K/UL — SIGNIFICANT CHANGE UP (ref 0–0.9)
MONOCYTES NFR BLD AUTO: 7.6 % — SIGNIFICANT CHANGE UP (ref 2–14)
NEUTROPHILS # BLD AUTO: 6.57 K/UL — SIGNIFICANT CHANGE UP (ref 1.8–7.4)
NEUTROPHILS NFR BLD AUTO: 67.4 % — SIGNIFICANT CHANGE UP (ref 43–77)
PLATELET # BLD AUTO: 344 K/UL — SIGNIFICANT CHANGE UP (ref 150–400)
POTASSIUM SERPL-MCNC: 4.5 MMOL/L — SIGNIFICANT CHANGE UP (ref 3.5–5.3)
POTASSIUM SERPL-SCNC: 4.5 MMOL/L — SIGNIFICANT CHANGE UP (ref 3.5–5.3)
PROT SERPL-MCNC: 8.3 GM/DL — SIGNIFICANT CHANGE UP (ref 6–8.3)
PROTHROM AB SERPL-ACNC: 11.9 SEC — SIGNIFICANT CHANGE UP (ref 10.6–13.6)
RBC # BLD: 4.79 M/UL — SIGNIFICANT CHANGE UP (ref 3.8–5.2)
RBC # FLD: 16.1 % — HIGH (ref 10.3–14.5)
SARS-COV-2 RNA SPEC QL NAA+PROBE: SIGNIFICANT CHANGE UP
SODIUM SERPL-SCNC: 138 MMOL/L — SIGNIFICANT CHANGE UP (ref 135–145)
TROPONIN I SERPL-MCNC: 0.03 NG/ML — SIGNIFICANT CHANGE UP (ref 0.01–0.04)
WBC # BLD: 9.75 K/UL — SIGNIFICANT CHANGE UP (ref 3.8–10.5)
WBC # FLD AUTO: 9.75 K/UL — SIGNIFICANT CHANGE UP (ref 3.8–10.5)

## 2021-02-06 PROCEDURE — 97116 GAIT TRAINING THERAPY: CPT | Mod: GP

## 2021-02-06 PROCEDURE — 70450 CT HEAD/BRAIN W/O DYE: CPT

## 2021-02-06 PROCEDURE — 95816 EEG AWAKE AND DROWSY: CPT

## 2021-02-06 PROCEDURE — 93005 ELECTROCARDIOGRAM TRACING: CPT

## 2021-02-06 PROCEDURE — 97162 PT EVAL MOD COMPLEX 30 MIN: CPT | Mod: GP

## 2021-02-06 PROCEDURE — 95714 VEEG EA 12-26 HR UNMNTR: CPT

## 2021-02-06 PROCEDURE — 84100 ASSAY OF PHOSPHORUS: CPT

## 2021-02-06 PROCEDURE — 93010 ELECTROCARDIOGRAM REPORT: CPT

## 2021-02-06 PROCEDURE — 82962 GLUCOSE BLOOD TEST: CPT

## 2021-02-06 PROCEDURE — 80053 COMPREHEN METABOLIC PANEL: CPT

## 2021-02-06 PROCEDURE — 70551 MRI BRAIN STEM W/O DYE: CPT

## 2021-02-06 PROCEDURE — 80048 BASIC METABOLIC PNL TOTAL CA: CPT

## 2021-02-06 PROCEDURE — U0003: CPT

## 2021-02-06 PROCEDURE — 85025 COMPLETE CBC W/AUTO DIFF WBC: CPT

## 2021-02-06 PROCEDURE — 70498 CT ANGIOGRAPHY NECK: CPT

## 2021-02-06 PROCEDURE — 93970 EXTREMITY STUDY: CPT

## 2021-02-06 PROCEDURE — 85730 THROMBOPLASTIN TIME PARTIAL: CPT

## 2021-02-06 PROCEDURE — 70450 CT HEAD/BRAIN W/O DYE: CPT | Mod: 26

## 2021-02-06 PROCEDURE — U0005: CPT

## 2021-02-06 PROCEDURE — 84443 ASSAY THYROID STIM HORMONE: CPT

## 2021-02-06 PROCEDURE — 95700 EEG CONT REC W/VID EEG TECH: CPT

## 2021-02-06 PROCEDURE — 70496 CT ANGIOGRAPHY HEAD: CPT

## 2021-02-06 PROCEDURE — 0042T: CPT

## 2021-02-06 PROCEDURE — 83735 ASSAY OF MAGNESIUM: CPT

## 2021-02-06 PROCEDURE — 86769 SARS-COV-2 COVID-19 ANTIBODY: CPT

## 2021-02-06 PROCEDURE — 85610 PROTHROMBIN TIME: CPT

## 2021-02-06 PROCEDURE — 97530 THERAPEUTIC ACTIVITIES: CPT | Mod: GP

## 2021-02-06 PROCEDURE — 82550 ASSAY OF CK (CPK): CPT

## 2021-02-06 PROCEDURE — 71045 X-RAY EXAM CHEST 1 VIEW: CPT | Mod: 26

## 2021-02-06 PROCEDURE — 36415 COLL VENOUS BLD VENIPUNCTURE: CPT

## 2021-02-06 PROCEDURE — 84484 ASSAY OF TROPONIN QUANT: CPT

## 2021-02-06 PROCEDURE — 93975 VASCULAR STUDY: CPT

## 2021-02-06 PROCEDURE — 80061 LIPID PANEL: CPT

## 2021-02-06 PROCEDURE — 99222 1ST HOSP IP/OBS MODERATE 55: CPT

## 2021-02-06 PROCEDURE — 83036 HEMOGLOBIN GLYCOSYLATED A1C: CPT

## 2021-02-06 PROCEDURE — 85027 COMPLETE CBC AUTOMATED: CPT

## 2021-02-06 PROCEDURE — 93308 TTE F-UP OR LMTD: CPT

## 2021-02-06 PROCEDURE — 81001 URINALYSIS AUTO W/SCOPE: CPT

## 2021-02-06 RX ORDER — LEVOTHYROXINE SODIUM 125 MCG
1 TABLET ORAL
Qty: 0 | Refills: 0 | DISCHARGE

## 2021-02-06 RX ORDER — CHOLECALCIFEROL (VITAMIN D3) 125 MCG
1000 CAPSULE ORAL DAILY
Refills: 0 | Status: DISCONTINUED | OUTPATIENT
Start: 2021-02-06 | End: 2021-02-16

## 2021-02-06 RX ORDER — ACETAMINOPHEN 500 MG
650 TABLET ORAL EVERY 6 HOURS
Refills: 0 | Status: DISCONTINUED | OUTPATIENT
Start: 2021-02-06 | End: 2021-02-16

## 2021-02-06 RX ORDER — PANTOPRAZOLE SODIUM 20 MG/1
40 TABLET, DELAYED RELEASE ORAL
Refills: 0 | Status: DISCONTINUED | OUTPATIENT
Start: 2021-02-06 | End: 2021-02-16

## 2021-02-06 RX ORDER — HYDRALAZINE HCL 50 MG
0 TABLET ORAL
Qty: 0 | Refills: 0 | DISCHARGE

## 2021-02-06 RX ORDER — ENOXAPARIN SODIUM 100 MG/ML
40 INJECTION SUBCUTANEOUS DAILY
Refills: 0 | Status: DISCONTINUED | OUTPATIENT
Start: 2021-02-06 | End: 2021-02-16

## 2021-02-06 RX ORDER — ASPIRIN/CALCIUM CARB/MAGNESIUM 324 MG
81 TABLET ORAL DAILY
Refills: 0 | Status: DISCONTINUED | OUTPATIENT
Start: 2021-02-06 | End: 2021-02-16

## 2021-02-06 RX ORDER — SPIRONOLACTONE 25 MG/1
25 TABLET, FILM COATED ORAL DAILY
Refills: 0 | Status: DISCONTINUED | OUTPATIENT
Start: 2021-02-06 | End: 2021-02-16

## 2021-02-06 RX ORDER — PREGABALIN 225 MG/1
1000 CAPSULE ORAL DAILY
Refills: 0 | Status: DISCONTINUED | OUTPATIENT
Start: 2021-02-06 | End: 2021-02-16

## 2021-02-06 RX ORDER — ALPRAZOLAM 0.25 MG
0.5 TABLET ORAL AT BEDTIME
Refills: 0 | Status: DISCONTINUED | OUTPATIENT
Start: 2021-02-06 | End: 2021-02-13

## 2021-02-06 RX ORDER — ISOSORBIDE MONONITRATE 60 MG/1
3 TABLET, EXTENDED RELEASE ORAL
Qty: 0 | Refills: 0 | DISCHARGE

## 2021-02-06 RX ORDER — POLYETHYLENE GLYCOL 3350 17 G/17G
17 POWDER, FOR SOLUTION ORAL DAILY
Refills: 0 | Status: DISCONTINUED | OUTPATIENT
Start: 2021-02-06 | End: 2021-02-16

## 2021-02-06 RX ORDER — GABAPENTIN 400 MG/1
100 CAPSULE ORAL
Refills: 0 | Status: DISCONTINUED | OUTPATIENT
Start: 2021-02-06 | End: 2021-02-16

## 2021-02-06 RX ORDER — LEVOTHYROXINE SODIUM 125 MCG
75 TABLET ORAL DAILY
Refills: 0 | Status: DISCONTINUED | OUTPATIENT
Start: 2021-02-06 | End: 2021-02-08

## 2021-02-06 RX ORDER — METOPROLOL TARTRATE 50 MG
75 TABLET ORAL DAILY
Refills: 0 | Status: DISCONTINUED | OUTPATIENT
Start: 2021-02-06 | End: 2021-02-10

## 2021-02-06 RX ORDER — NITROGLYCERIN 6.5 MG
0.5 CAPSULE, EXTENDED RELEASE ORAL ONCE
Refills: 0 | Status: COMPLETED | OUTPATIENT
Start: 2021-02-06 | End: 2021-02-06

## 2021-02-06 RX ADMIN — Medication 650 MILLIGRAM(S): at 22:35

## 2021-02-06 RX ADMIN — Medication 0.5 MILLIGRAM(S): at 22:34

## 2021-02-06 RX ADMIN — GABAPENTIN 100 MILLIGRAM(S): 400 CAPSULE ORAL at 22:34

## 2021-02-06 RX ADMIN — Medication 0.5 INCH(S): at 17:47

## 2021-02-06 NOTE — ED PROVIDER NOTE - SKIN, MLM
Skin normal color for race, warm, dry and intact. No evidence of rash. Skin normal color for race, warm, dry + 1.5 cm right buttock stage 2 decubitus

## 2021-02-06 NOTE — ED PROVIDER NOTE - PMH
Bladder cancer    CAD (coronary artery disease)    Endometrial adenocarcinoma    GERD (gastroesophageal reflux disease)    [place] (hard of hearing)    HTN (hypertension)    Hyperlipidemia    Hypothyroid    Macular degeneration    Orthostatic hypotension    Stented coronary artery

## 2021-02-06 NOTE — ED PROVIDER NOTE - CLINICAL SUMMARY MEDICAL DECISION MAKING FREE TEXT BOX
Pt with hx of recent stroke, hypotension and syncope, HTN, coronary stents here with 2 days of accelerated HTN, pt has been having abd discomfort on and off for weeks, no headache or chest pain, will do labs, CT, give antihypertensives

## 2021-02-06 NOTE — H&P ADULT - HISTORY OF PRESENT ILLNESS
89 y/o F w/ PMH of CAD s/p PCI, HTN, dyslipidemia, CVA, hypothyroidism, GERD, macular degeneration, orthostatic hypotension, p/w hypertensive urgency and weakness. Patient states her BP has been in the 200s intermittently over the last few days. Today in the morning it was 200s again and she was referred to ED. As per ED chart patient had weakness around 10am. However, patient herself denies to me that weakness is new, states she has chronic RLE weakness since previous CVA. Denies any new weakness, facial droop, slurred speech or sensory deficits. Denies CP, SOB, cough, runny nose, sore throat, nausea, vomiting, abdominal pain, fever, chills       PSH: Ankle fracture, bladder surgery, hernia repair, cholecystectomy, hysterectomy     Social Hx: Denies x 3    Family Hx: Denies

## 2021-02-06 NOTE — H&P ADULT - ASSESSMENT
89 y/o F w/ PMH of CAD s/p PCI, HTN, dyslipidemia, CVA, hypothyroidism, GERD, macular degeneration, orthostatic hypotension, p/w hypertensive urgency and weakness      *Questionable weakness w/ h/o CVA  -CTH - CT reports Small focus of encephalomalacia in the anteromedial left frontal lobe adjoining the left frontal horn extending to the cortex, likely represents an evolved infarct from prior examinations   -Neuro consult  -Neuro checks  -Fall precautions  -Given that weakness is questionable, will defer to neuro regarding full CVA work up  -ASA   -Allergy to statin?? Patient is on pravastatin at home, which is non-formulary   -PT consult     *Hypertensive urgency  -Latest BP is 143/61, c/w home meds for now given h/o orthostatic hypotension as well.   -Low salt diet     *Stage II decubitus ulcer  -Wound consult    *H/o Dyslipidemia / hypothyroidism / GERD / macular degeneration     *Social Work consult for coordinating home health care    *DVT ppx  -Lovenox       IMPROVE VTE Individual Risk Assessment    RISK                                                                Points    [  ] Previous VTE                                                  3    [  ] Thrombophilia                                               2    [  ] Lower limb paralysis                                      2        (unable to hold up >15 seconds)      [  ] Current Cancer                                              2         (within 6 months)    [1  ] Immobilization > 24 hrs                                1    [  ] ICU/CCU stay > 24 hours                              1    [1  ] Age > 60                                                      1    IMPROVE VTE Score __2_______    IMPROVE Score 0-1: Low Risk, No VTE prophylaxis required for most patients, encourage ambulation.   IMPROVE Score 2-3: At risk, pharmacologic VTE prophylaxis is indicated for most patients (in the absence of a contraindication)  IMPROVE Score > or = 4: High Risk, pharmacologic VTE prophylaxis is indicated for most patients (in the absence of a contraindication) 89 y/o F w/ PMH of CAD s/p PCI, HTN, dyslipidemia, CVA, hypothyroidism, GERD, macular degeneration, orthostatic hypotension, p/w hypertensive urgency and weakness      *Questionable weakness w/ h/o CVA  -CTH - CT reports Small focus of encephalomalacia in the anteromedial left frontal lobe adjoining the left frontal horn extending to the cortex, likely represents an evolved infarct from prior examinations   -Neuro consult  -Neuro checks  -Fall precautions  -Given that weakness is questionable, will defer to neuro regarding full CVA work up  -ASA   -Allergy to statin?? Patient is on pravastatin at home, which is non-formulary   -PT consult     *Hypertensive urgency  -Latest BP is 143/61, c/w home meds for now given h/o orthostatic hypotension as well.   -Low salt diet     *Stage II decubitus ulcer  -Wound consult    *H/o Dyslipidemia / hypothyroidism / GERD / macular degeneration   -C/w home meds    *Social Work consult for coordinating home health care    *DVT ppx  -Lovenox             IMPROVE VTE Individual Risk Assessment    RISK                                                                Points    [  ] Previous VTE                                                  3    [  ] Thrombophilia                                               2    [  ] Lower limb paralysis                                      2        (unable to hold up >15 seconds)      [  ] Current Cancer                                              2         (within 6 months)    [1  ] Immobilization > 24 hrs                                1    [  ] ICU/CCU stay > 24 hours                              1    [1  ] Age > 60                                                      1    IMPROVE VTE Score __2_______    IMPROVE Score 0-1: Low Risk, No VTE prophylaxis required for most patients, encourage ambulation.   IMPROVE Score 2-3: At risk, pharmacologic VTE prophylaxis is indicated for most patients (in the absence of a contraindication)  IMPROVE Score > or = 4: High Risk, pharmacologic VTE prophylaxis is indicated for most patients (in the absence of a contraindication)

## 2021-02-06 NOTE — ED ADULT NURSE NOTE - OBJECTIVE STATEMENT
Patient brought in by ambulance for hypertension at home. Patient states her aid has been taking her BP and it has been elevated. Patient states she has not been taking care of at home for this past week because her aide has not been there and her son won't change her diaper. Patient has a Stage II right buttock.

## 2021-02-06 NOTE — ED PROVIDER NOTE - OBJECTIVE STATEMENT
89 y/o female with a PMHx of CAD s/p stents, HTN, HLD, CVA, Hypothyroidism presents to the ED c/o HTN. Pt states that she was told to come in by Dr. Evangelista (PCP) for her elevated BP. Pt states that her systolic BP was in the 200s today, due to elevated BP pt's aide called her PCP and pt was advised to come in to the ED. Yesterday pt's Bp was 212/96, pt called her PCP then and was prescribed medications (pt unsure of the name of the medications). Pt also states that 10am this morning she started to feel weak and the left side of her face started to turn red. Denies headache, chest pain. Pt reports some intermittent abd discomfort for some time, unsure of when the abd pain started.

## 2021-02-06 NOTE — ED ADULT NURSE NOTE - NSIMPLEMENTINTERV_GEN_ALL_ED
Implemented All Universal Safety Interventions:  [place] to call system. Call bell, personal items and telephone within reach. Instruct patient to call for assistance. Room bathroom lighting operational. Non-slip footwear when patient is off stretcher. Physically safe environment: no spills, clutter or unnecessary equipment. Stretcher in lowest position, wheels locked, appropriate side rails in place.

## 2021-02-06 NOTE — ED PROVIDER NOTE - PROGRESS NOTE DETAILS
Graeme AS for Dr. Cummings: pt states that she had a bed sore and that aide did not come in for a week and her reema cannot change her if she comes and there is no aide to take care of her if she goes home. Graeme AS for Dr. Cummings: spoke with son over the phone, states that lately the aides they hired have not been staying with the pt at night, causing him to stay with the pt.

## 2021-02-06 NOTE — ED ADULT TRIAGE NOTE - CHIEF COMPLAINT QUOTE
pt a/ox3, BIBEMS c/o hypertension. pt lives at home with HHA. pt on Metoprolol and Amlodipine, denies dose changes. pt denies complaints at this time.

## 2021-02-06 NOTE — ED ADULT NURSE NOTE - BREATH SOUNDS, LEFT
SOB that started this am, hx of COPD, was here recently due to exacerbation (finished steroid tx). Pain with breathing that is described as tightness.   Denies fevers at home
clear

## 2021-02-07 ENCOUNTER — TRANSCRIPTION ENCOUNTER (OUTPATIENT)
Age: 86
End: 2021-02-07

## 2021-02-07 LAB
A1C WITH ESTIMATED AVERAGE GLUCOSE RESULT: 6 % — HIGH (ref 4–5.6)
ALBUMIN SERPL ELPH-MCNC: 3.5 G/DL — SIGNIFICANT CHANGE UP (ref 3.3–5)
ALP SERPL-CCNC: 79 U/L — SIGNIFICANT CHANGE UP (ref 40–120)
ALT FLD-CCNC: 17 U/L — SIGNIFICANT CHANGE UP (ref 12–78)
ANION GAP SERPL CALC-SCNC: 7 MMOL/L — SIGNIFICANT CHANGE UP (ref 5–17)
APTT BLD: 34.8 SEC — SIGNIFICANT CHANGE UP (ref 27.5–35.5)
AST SERPL-CCNC: 12 U/L — LOW (ref 15–37)
BASOPHILS # BLD AUTO: 0.09 K/UL — SIGNIFICANT CHANGE UP (ref 0–0.2)
BASOPHILS NFR BLD AUTO: 1.1 % — SIGNIFICANT CHANGE UP (ref 0–2)
BILIRUB SERPL-MCNC: 0.5 MG/DL — SIGNIFICANT CHANGE UP (ref 0.2–1.2)
BUN SERPL-MCNC: 22 MG/DL — SIGNIFICANT CHANGE UP (ref 7–23)
CALCIUM SERPL-MCNC: 9.6 MG/DL — SIGNIFICANT CHANGE UP (ref 8.5–10.1)
CHLORIDE SERPL-SCNC: 104 MMOL/L — SIGNIFICANT CHANGE UP (ref 96–108)
CHOLEST SERPL-MCNC: 150 MG/DL — SIGNIFICANT CHANGE UP
CO2 SERPL-SCNC: 27 MMOL/L — SIGNIFICANT CHANGE UP (ref 22–31)
CREAT SERPL-MCNC: 1.06 MG/DL — SIGNIFICANT CHANGE UP (ref 0.5–1.3)
EOSINOPHIL # BLD AUTO: 0.26 K/UL — SIGNIFICANT CHANGE UP (ref 0–0.5)
EOSINOPHIL NFR BLD AUTO: 3.3 % — SIGNIFICANT CHANGE UP (ref 0–6)
ESTIMATED AVERAGE GLUCOSE: 126 MG/DL — HIGH (ref 68–114)
GLUCOSE SERPL-MCNC: 81 MG/DL — SIGNIFICANT CHANGE UP (ref 70–99)
HCT VFR BLD CALC: 41.9 % — SIGNIFICANT CHANGE UP (ref 34.5–45)
HDLC SERPL-MCNC: 59 MG/DL — SIGNIFICANT CHANGE UP
HGB BLD-MCNC: 12.1 G/DL — SIGNIFICANT CHANGE UP (ref 11.5–15.5)
IMM GRANULOCYTES NFR BLD AUTO: 0.4 % — SIGNIFICANT CHANGE UP (ref 0–1.5)
INR BLD: 1.05 RATIO — SIGNIFICANT CHANGE UP (ref 0.88–1.16)
LIPID PNL WITH DIRECT LDL SERPL: 68 MG/DL — SIGNIFICANT CHANGE UP
LYMPHOCYTES # BLD AUTO: 2.2 K/UL — SIGNIFICANT CHANGE UP (ref 1–3.3)
LYMPHOCYTES # BLD AUTO: 27.8 % — SIGNIFICANT CHANGE UP (ref 13–44)
MAGNESIUM SERPL-MCNC: 2.9 MG/DL — HIGH (ref 1.6–2.6)
MCHC RBC-ENTMCNC: 24.9 PG — LOW (ref 27–34)
MCHC RBC-ENTMCNC: 28.9 GM/DL — LOW (ref 32–36)
MCV RBC AUTO: 86.4 FL — SIGNIFICANT CHANGE UP (ref 80–100)
MONOCYTES # BLD AUTO: 0.67 K/UL — SIGNIFICANT CHANGE UP (ref 0–0.9)
MONOCYTES NFR BLD AUTO: 8.5 % — SIGNIFICANT CHANGE UP (ref 2–14)
NEUTROPHILS # BLD AUTO: 4.66 K/UL — SIGNIFICANT CHANGE UP (ref 1.8–7.4)
NEUTROPHILS NFR BLD AUTO: 58.9 % — SIGNIFICANT CHANGE UP (ref 43–77)
NON HDL CHOLESTEROL: 91 MG/DL — SIGNIFICANT CHANGE UP
PHOSPHATE SERPL-MCNC: 4.1 MG/DL — SIGNIFICANT CHANGE UP (ref 2.5–4.5)
PLATELET # BLD AUTO: 319 K/UL — SIGNIFICANT CHANGE UP (ref 150–400)
POTASSIUM SERPL-MCNC: 4.1 MMOL/L — SIGNIFICANT CHANGE UP (ref 3.5–5.3)
POTASSIUM SERPL-SCNC: 4.1 MMOL/L — SIGNIFICANT CHANGE UP (ref 3.5–5.3)
PROT SERPL-MCNC: 7.6 GM/DL — SIGNIFICANT CHANGE UP (ref 6–8.3)
PROTHROM AB SERPL-ACNC: 12.1 SEC — SIGNIFICANT CHANGE UP (ref 10.6–13.6)
RBC # BLD: 4.85 M/UL — SIGNIFICANT CHANGE UP (ref 3.8–5.2)
RBC # FLD: 16.4 % — HIGH (ref 10.3–14.5)
SODIUM SERPL-SCNC: 138 MMOL/L — SIGNIFICANT CHANGE UP (ref 135–145)
TRIGL SERPL-MCNC: 116 MG/DL — SIGNIFICANT CHANGE UP
WBC # BLD: 7.91 K/UL — SIGNIFICANT CHANGE UP (ref 3.8–10.5)
WBC # FLD AUTO: 7.91 K/UL — SIGNIFICANT CHANGE UP (ref 3.8–10.5)

## 2021-02-07 PROCEDURE — 99223 1ST HOSP IP/OBS HIGH 75: CPT

## 2021-02-07 PROCEDURE — 99232 SBSQ HOSP IP/OBS MODERATE 35: CPT

## 2021-02-07 RX ADMIN — SPIRONOLACTONE 25 MILLIGRAM(S): 25 TABLET, FILM COATED ORAL at 11:45

## 2021-02-07 RX ADMIN — GABAPENTIN 100 MILLIGRAM(S): 400 CAPSULE ORAL at 11:45

## 2021-02-07 RX ADMIN — Medication 75 MICROGRAM(S): at 11:45

## 2021-02-07 RX ADMIN — Medication 0.5 INCH(S): at 05:55

## 2021-02-07 RX ADMIN — PANTOPRAZOLE SODIUM 40 MILLIGRAM(S): 20 TABLET, DELAYED RELEASE ORAL at 11:45

## 2021-02-07 RX ADMIN — GABAPENTIN 100 MILLIGRAM(S): 400 CAPSULE ORAL at 21:08

## 2021-02-07 RX ADMIN — ENOXAPARIN SODIUM 40 MILLIGRAM(S): 100 INJECTION SUBCUTANEOUS at 11:45

## 2021-02-07 RX ADMIN — Medication 650 MILLIGRAM(S): at 20:02

## 2021-02-07 RX ADMIN — Medication 81 MILLIGRAM(S): at 11:44

## 2021-02-07 RX ADMIN — Medication 1000 UNIT(S): at 11:44

## 2021-02-07 RX ADMIN — Medication 75 MILLIGRAM(S): at 11:45

## 2021-02-07 RX ADMIN — Medication 0.5 MILLIGRAM(S): at 21:08

## 2021-02-07 RX ADMIN — PREGABALIN 1000 MICROGRAM(S): 225 CAPSULE ORAL at 11:45

## 2021-02-07 NOTE — CONSULT NOTE ADULT - ASSESSMENT
89 y/o F w/ PMH of CAD s/p PCI, HTN, dyslipidemia, CVA, hypothyroidism, GERD, macular degeneration, orthostatic hypotension, p/w hypertensive urgency and weakness. Patient states her BP has been in the 200s intermittently over the last few days.     -Unclear if patient has a new neurological event vs recrudescence of prior stroke symptoms in setting of uncontrolled HTN.  -Manage BP  -There is no indication of new infarct on CT head  -If we can document new focal symptoms, can get MRI brain (but this is unclear at this time)  -Continue aspirin  -I spoke to her son Adama, who does not feel that she is safe living at home since aides have been unreliable and she needs assistance with all ADLs. He would like for her to go to rehab at [facility].  -PT eval    Will f/u as needed.

## 2021-02-07 NOTE — PATIENT PROFILE ADULT - ABILITY TO HEAR (WITH HEARING AID OR HEARING APPLIANCE IF NORMALLY USED):
did not bring hearing aids/Mildly to Moderately Impaired: difficulty hearing in some environments or speaker may need to increase volume or speak distinctly

## 2021-02-07 NOTE — PROGRESS NOTE ADULT - ASSESSMENT
Patient brought down for urgent hypertension; treatment in ER included     1-neuro Questionable weakness w/ h/o CVA-CTH - CT reports Small focus of encephalomalacia in the anteromedial left frontal lobe adjoining the left frontal horn extending to the cortex, likely represents an evolved infarct from prior examinations.  Neuro consult done, MRI pending, Neuro checks, Fall precautions  -Allergy to statin?? Patient is on pravastatin at home, which is non-formulary-PT consult   2-Hypertensive urgency; Latest BP is 143/61, c/w home meds for now given h/o orthostatic hypotension as well.  Low salt diet.  On spironolactone and metoprolol-could consider hydralazine for any urgent spikes; and her cardiologist may consider adjustments long term-Dr. Dinora Álvarez  3-Stage II decubitus ulcer-Wound consult      -C/w home meds  *Social Work consult for coordinating home health care  *DVT ppx  -Lovenox

## 2021-02-07 NOTE — ED ADULT NURSE REASSESSMENT NOTE - NS ED NURSE REASSESS COMMENT FT1
received patient alert and oriented to self, able to make needs known, no c/o pain, no acute respiratory distress, incontinent of urine, pericare provided, left buttock stage 2 wound noted, pressure dressing maintained, turned and positioned for comfort, tolerating po diet well, denies nausea/vomiting.
Assumed care of patient from Isabelle Mckeon RN at 16:21. Patient A&Ox4, nervousness and anxious behavior noted. Denies pain at this time, no acute signs of distress noted, all needs addressed and all questions encouraged and answered at this time, patient resting in bed, safety and comfort maintained, will continue to monitor pt at this time.

## 2021-02-07 NOTE — PROGRESS NOTE ADULT - SUBJECTIVE AND OBJECTIVE BOX
CHIEF COMPLAINT: Patient is a 89y old  Female who presents with a chief complaint of hypertensive urgency and weakness (07 Feb 2021 08:10)      HPI:  89 y/o F w/ PMH of CAD s/p PCI, HTN, dyslipidemia, CVA, hypothyroidism, GERD, macular degeneration, orthostatic hypotension, p/w hypertensive urgency and weakness. Patient states her BP has been in the 200s intermittently over the last few days. Today in the morning it was 200s again and she was referred to ED. As per ED chart patient had weakness around 10am. However, patient herself denies to me that weakness is new, states she has chronic RLE weakness since previous CVA. Denies any new weakness, facial droop, slurred speech or sensory deficits. Denies CP, SOB, cough, runny nose, sore throat, nausea, vomiting, abdominal pain, fever, chills     2/7-Patient in ER with urgent hypertension, now under control in ER.  Has right residual weakness and some questionable face weakness-She had questionable neurological weakness worked up by neurology-Unclear if patient has a new neurological event vs recrudescence of prior stroke symptoms in setting of uncontrolled HTN.  There is no indication of new infarct on CT head and can get MRI brain (but this is unclear at this time).  Patient with prior PAFib in the past-only short episodes documented but no AC.  Unknown bleeding risk but patient is a fall risk-has had orthostatic hypotension and syncope in the past      PSH: Ankle fracture, bladder surgery, hernia repair, cholecystectomy, hysterectomy     Social Hx: Denies x 3    Family Hx: Denies  (06 Feb 2021 21:48)      PMHx: PAST MEDICAL & SURGICAL HISTORY:  Orthostatic hypotension    Bladder cancer    Endometrial adenocarcinoma    Macular degeneration    Stented coronary artery    Hypothyroid    Hyperlipidemia    HTN (hypertension)    GERD (gastroesophageal reflux disease)    CAD (coronary artery disease)    [place] (hard of hearing)    History of bladder surgery    S/P EMILY (total abdominal hysterectomy)    S/P hernia repair  umbilical - 2008    S/P laparoscopic cholecystectomy  2008    Ankle fracture, right  surgery 25 yrs ago - hardware removed    S/P coronary angiogram  2005, 2008, 2011 - drug eluding stents        Allergies: Allergies    amlodipine (Unknown)  clonidine (Unknown)  Florinef Acetate (Unknown)  hydrocodone (Unknown)  Levatol (Unknown)  Lipitor (Unknown)  Lotrel (Unknown)  methylPREDNISolone (Unknown)  morphine (Other)  Plaquenil (Unknown)  statins (Other (Unknown))  sulfa drugs (Rash)    Intolerances          REVIEW OF SYSTEMS:    CONSTITUTIONAL: No weakness, fevers or chills  EYES/ENT: No visual changes;  No vertigo or throat pain   NECK: No pain or stiffness  RESPIRATORY: No cough, wheezing, hemoptysis; No shortness of breath  CARDIOVASCULAR: No chest pain or palpitations  GASTROINTESTINAL: No abdominal or epigastric pain. No nausea, vomiting, or hematemesis; No diarrhea or constipation. No melena or hematochezia.  GENITOURINARY: No dysuria, frequency or hematuria  NEUROLOGICAL: numbness or weakness  SKIN: No itching, burning, rashes, or lesions   All other review of systems is negative unless indicated above    Vital Signs Last 24 Hrs  T(C): 36.2 (07 Feb 2021 07:03), Max: 36.7 (06 Feb 2021 20:40)  T(F): 97.2 (07 Feb 2021 07:03), Max: 98 (06 Feb 2021 20:40)  HR: 70 (07 Feb 2021 07:03) (67 - 72)  BP: 125/65 (07 Feb 2021 07:03) (122/52 - 196/68)  BP(mean): 79 (06 Feb 2021 20:40) (77 - 79)  RR: 18 (07 Feb 2021 07:03) (18 - 18)  SpO2: 96% (07 Feb 2021 07:03) (96% - 98%)    I&O's Summary          PHYSICAL EXAM:   Constitutional: NAD, awake and alert, well-developed  HEENT: PERR, EOMI, Normal Hearing, MMM  Neck: JVD  Respiratory: Breath sounds are clear bilaterally, No wheezing, rales or rhonchi  Cardiovascular: S1 and S2, regular rate and rhythm, Murmurs  Gastrointestinal: Bowel Sounds present, soft, nontender, nondistended, no guarding, no rebound  Extremities: No peripheral edema  Vascular: 2+ peripheral pulses  Neurological: A/O x 3, no focal deficits  Musculoskeletal: mildly diminished strength b/l upper and lower extremities  Skin: No rashes    MEDICATIONS:  MEDICATIONS  (STANDING):  ALPRAZolam 0.5 milliGRAM(s) Oral at bedtime  aspirin  chewable 81 milliGRAM(s) Oral daily  cholecalciferol Oral Tab/Cap - Peds 1000 Unit(s) Oral daily  cyanocobalamin 1000 MICROGram(s) Oral daily  enoxaparin Injectable 40 milliGRAM(s) SubCutaneous daily  gabapentin 100 milliGRAM(s) Oral two times a day  levothyroxine 75 MICROGram(s) Oral daily  metoprolol succinate ER 75 milliGRAM(s) Oral daily  pantoprazole    Tablet 40 milliGRAM(s) Oral before breakfast  spironolactone 25 milliGRAM(s) Oral daily      LABS: All Labs Reviewed:                        12.1   7.91  )-----------( 319      ( 07 Feb 2021 08:20 )             41.9     02-07    138  |  104  |  22  ----------------------------<  81  4.1   |  27  |  1.06    Ca    9.6      07 Feb 2021 08:20  Phos  4.1     02-07  Mg     2.9     02-07    TPro  7.6  /  Alb  3.5  /  TBili  0.5  /  DBili  x   /  AST  12<L>  /  ALT  17  /  AlkPhos  79  02-07    PT/INR - ( 07 Feb 2021 08:20 )   PT: 12.1 sec;   INR: 1.05 ratio         PTT - ( 07 Feb 2021 08:20 )  PTT:34.8 sec  CARDIAC MARKERS ( 06 Feb 2021 16:56 )  0.026 ng/mL / x     / x     / x     / x          Blood Culture:       BNP     RADIOLOGY:    < from: CT Head No Cont (02.06.21 @ 17:36) >  Impression:  Head CT without contrast  1.  No acute intracranial hemorrhage, extra-axial collection, hydrocephalus, midline shift or space-occupying mass lesion.  2.  Small focus of encephalomalacia in the anteromedial left frontal lobe adjoining the left frontal horn extending to the cortex, likely represents an evolved infarct from prior examinations as described.  3.  No evidence of posterior reversible encephalopathy in the clinical setting of hypertension.  4.  Chronic and incidental findings as detailed above.    < end of copied text >  EKG:    sinus rhythm, 1st degree AV block    Telemetry:    ECHO:    < from: TTE Echo Complete w/o Contrast w/ Doppler (11.09.20 @ 09:30) >   Left ventricular wall motion is normal. Estimated left ventricular   ejection fraction is 65-70 %.   The left atrium is normal.   Normal appearing right atrium.   Normal appearing right ventricle structure and function.   The aortic valve appears mildly sclerotic. Valve opening seems to be   normal.   Fibrocalcific changes noted to the mitral valve leaflets with preserved   leaflet excursion. Mitral annular calcification.   Mild mitral regurgitation.   Normal appearing tricuspid valve structure and function.   Trace tricuspid valve regurgitation is present.   Pulmonic valve not well seen, probably normal pulmonic valve function.   No evidence of pericardial effusion.   All visualized extra cardiac structures appears to benormal.    < end of copied text >

## 2021-02-07 NOTE — ED ADULT NURSE REASSESSMENT NOTE - COMFORT CARE
darkened lights/meal provided/plan of care explained/po fluids offered/repositioned/wait time explained/warm blanket provided

## 2021-02-07 NOTE — ED ADULT NURSE REASSESSMENT NOTE - INTERVENTIONS DEFINITIONS
[place] to call system/Instruct patient to call for assistance/Non-slip footwear when patient is off stretcher/Physically safe environment: no spills, clutter or unnecessary equipment/Stretcher in lowest position, wheels locked, appropriate side rails in place/Review medications for side effects contributing to fall risk

## 2021-02-08 LAB
APPEARANCE UR: CLEAR — SIGNIFICANT CHANGE UP
BILIRUB UR-MCNC: NEGATIVE — SIGNIFICANT CHANGE UP
COLOR SPEC: YELLOW — SIGNIFICANT CHANGE UP
DIFF PNL FLD: ABNORMAL
GLUCOSE UR QL: NEGATIVE MG/DL — SIGNIFICANT CHANGE UP
KETONES UR-MCNC: NEGATIVE — SIGNIFICANT CHANGE UP
LEUKOCYTE ESTERASE UR-ACNC: NEGATIVE — SIGNIFICANT CHANGE UP
NITRITE UR-MCNC: NEGATIVE — SIGNIFICANT CHANGE UP
PH UR: 7 — SIGNIFICANT CHANGE UP (ref 5–8)
PROT UR-MCNC: 15 MG/DL
SARS-COV-2 IGG SERPL QL IA: NEGATIVE — SIGNIFICANT CHANGE UP
SARS-COV-2 IGM SERPL IA-ACNC: 0.07 INDEX — SIGNIFICANT CHANGE UP
SP GR SPEC: 1.01 — SIGNIFICANT CHANGE UP (ref 1.01–1.02)
UROBILINOGEN FLD QL: NEGATIVE MG/DL — SIGNIFICANT CHANGE UP

## 2021-02-08 PROCEDURE — 93308 TTE F-UP OR LMTD: CPT | Mod: 26

## 2021-02-08 PROCEDURE — 99232 SBSQ HOSP IP/OBS MODERATE 35: CPT

## 2021-02-08 PROCEDURE — 93010 ELECTROCARDIOGRAM REPORT: CPT

## 2021-02-08 PROCEDURE — 70551 MRI BRAIN STEM W/O DYE: CPT | Mod: 26

## 2021-02-08 RX ORDER — LEVOTHYROXINE SODIUM 125 MCG
1 TABLET ORAL
Qty: 0 | Refills: 0 | DISCHARGE

## 2021-02-08 RX ORDER — ALPRAZOLAM 0.25 MG
0.12 TABLET ORAL DAILY
Refills: 0 | Status: DISCONTINUED | OUTPATIENT
Start: 2021-02-08 | End: 2021-02-15

## 2021-02-08 RX ORDER — LEVOTHYROXINE SODIUM 125 MCG
100 TABLET ORAL DAILY
Refills: 0 | Status: DISCONTINUED | OUTPATIENT
Start: 2021-02-09 | End: 2021-02-16

## 2021-02-08 RX ADMIN — GABAPENTIN 100 MILLIGRAM(S): 400 CAPSULE ORAL at 22:12

## 2021-02-08 RX ADMIN — SPIRONOLACTONE 25 MILLIGRAM(S): 25 TABLET, FILM COATED ORAL at 13:05

## 2021-02-08 RX ADMIN — Medication 81 MILLIGRAM(S): at 13:04

## 2021-02-08 RX ADMIN — Medication 1000 UNIT(S): at 13:04

## 2021-02-08 RX ADMIN — Medication 75 MILLIGRAM(S): at 13:05

## 2021-02-08 RX ADMIN — PREGABALIN 1000 MICROGRAM(S): 225 CAPSULE ORAL at 13:04

## 2021-02-08 RX ADMIN — GABAPENTIN 100 MILLIGRAM(S): 400 CAPSULE ORAL at 13:05

## 2021-02-08 RX ADMIN — Medication 0.5 MILLIGRAM(S): at 22:12

## 2021-02-08 RX ADMIN — Medication 75 MICROGRAM(S): at 05:02

## 2021-02-08 RX ADMIN — PANTOPRAZOLE SODIUM 40 MILLIGRAM(S): 20 TABLET, DELAYED RELEASE ORAL at 05:02

## 2021-02-08 RX ADMIN — ENOXAPARIN SODIUM 40 MILLIGRAM(S): 100 INJECTION SUBCUTANEOUS at 13:04

## 2021-02-08 NOTE — DIETITIAN INITIAL EVALUATION ADULT. - OTHER INFO
89 y/o F w/ PMH of CAD s/p PCI, HTN, dyslipidemia, CVA, hypothyroidism, GERD, macular degeneration, orthostatic hypotension, p/w hypertensive urgency and weakness. Patient states her BP has been in the 200s intermittently over the last few days. Today in the morning it was 200s again and she was referred to ED. As per ED chart patient had weakness around 10am. However, patient herself denies to me that weakness is new, states she has chronic RLE weakness since previous CVA. Denies any new weakness, facial droop, slurred speech or sensory deficits. Denies CP, SOB, cough, runny nose, sore throat, nausea, vomiting, abdominal pain, fever, chills 89 y/o F w/ PMH of CAD s/p PCI, HTN, dyslipidemia, CVA, hypothyroidism, GERD, macular degeneration, orthostatic hypotension, p/w hypertensive urgency and weakness. Patient states her BP has been in the 200s intermittently over the last few days. Today in the morning it was 200s again and she was referred to ED. As per ED chart patient had weakness around 10am. However, patient herself denies to me that weakness is new, states she has chronic RLE weakness since previous CVA. Denies any new weakness, facial droop, slurred speech or sensory deficits. Denies CP, SOB, cough, runny nose, sore throat, nausea, vomiting, abdominal pain, fever, chills  Visited with pt, who seems somewhat confused,is [place].  Pt reports not eating well PTA, and says that this is likely the last time she will be at [facility].   Pt receives vit B12, is on DASH diet 89 y/o F w/ PMH of CAD s/p PCI, HTN, dyslipidemia, CVA, hypothyroidism, GERD, macular degeneration, orthostatic hypotension, p/w hypertensive urgency and weakness. Patient states her BP has been in the 200s intermittently over the last few days. Today in the morning it was 200s again and she was referred to ED. As per ED chart patient had weakness around 10am. However, patient herself denies to me that weakness is new, states she has chronic RLE weakness since previous CVA. Denies any new weakness, facial droop, slurred speech or sensory deficits. Denies CP, SOB, cough, runny nose, sore throat, nausea, vomiting, abdominal pain, fever, chills  Visited with pt, who seems somewhat confused,is [place].  Pt reports not eating well PTA, and says that this is likely the last time she will be at [facility].   Pt receives vit B12, is on DASH diet.

## 2021-02-08 NOTE — DIETITIAN INITIAL EVALUATION ADULT. - PERTINENT LABORATORY DATA
02-07    138  |  104  |  22  ----------------------------<  81  4.1   |  27  |  1.06    Ca    9.6      07 Feb 2021 08:20  Phos  4.1     02-07  Mg     2.9     02-07    TPro  7.6  /  Alb  3.5  /  TBili  0.5  /  DBili  x   /  AST  12<L>  /  ALT  17  /  AlkPhos  79  02-07  BMI: BMI (kg/m2): 8 (02-06-21 @ 15:47)  HbA1c: A1C with Estimated Average Glucose Result: 6.0 % (02-07-21 @ 08:20)    Glucose:   BP: 145/60 (02-08-21 @ 07:57) (121/63 - 196/68)  Lipid Panel: Date/Time: 02-07-21 @ 08:20  Cholesterol, Serum: 150  Direct LDL: --  HDL Cholesterol, Serum: 59  Total Cholesterol/HDL Ration Measurement: --  Triglycerides, Serum: 116

## 2021-02-08 NOTE — PROGRESS NOTE ADULT - SUBJECTIVE AND OBJECTIVE BOX
CHIEF COMPLAINT/DIAGNOSIS: hypertensive urgency and weakness    HPI: 89 y/o F w/ PMH of CAD s/p PCI, HTN, dyslipidemia, CVA, hypothyroidism, GERD, macular degeneration, orthostatic hypotension, p/w hypertensive urgency and weakness. Patient states her BP has been in the 200s intermittently over the last few days. Today in the morning it was 200s again and she was referred to ED. As per ED chart patient had weakness around 10am. However, patient herself denies to me that weakness is new, states she has chronic RLE weakness since previous CVA. Denies any new weakness, facial droop, slurred speech or sensory deficits. Denies CP, SOB, cough, runny nose, sore throat, nausea, vomiting, abdominal pain, fever, chills     2/7 - Patient in ER with urgent hypertension, now under control in ER.  Has right residual weakness and some questionable face weakness-She had questionable neurological weakness worked up by neurology-Unclear if patient has a new neurological event vs recrudescence of prior stroke symptoms in setting of uncontrolled HTN.  There is no indication of new infarct on CT head and can get MRI brain (but this is unclear at this time).  Patient with prior PAFib in the past-only short episodes documented but no AC.  Unknown bleeding risk but patient is a fall risk-has had orthostatic hypotension and syncope in the past  2/8 -   REVIEW OF SYSTEMS:  All other review of systems is negative unless indicated above    PHYSICAL EXAM:  Constitutional: NAD, awake and alert, well-developed  HEENT: PERR, EOMI, Normal Hearing, MMM  Neck: Soft and supple, No LAD, No JVD  Respiratory: Breath sounds are clear bilaterally, No wheezing, rales or rhonchi  Cardiovascular: S1 and S2, regular rate and rhythm, no Murmurs, gallops or rubs  Gastrointestinal: Bowel Sounds present, soft, nontender, nondistended, no guarding, no rebound  Extremities: No peripheral edema  Vascular: 2+ peripheral pulses  Neurological: A/O x 3, no focal deficits  Musculoskeletal: 5/5 strength b/l upper and lower extremities  Skin: No rashes    Vital Signs Last 24 Hrs  T(C): 36.4 (08 Feb 2021 07:57), Max: 36.7 (07 Feb 2021 13:26)  T(F): 97.5 (08 Feb 2021 07:57), Max: 98 (07 Feb 2021 13:26)  HR: 64 (08 Feb 2021 07:57) (64 - 72)  BP: 145/60 (08 Feb 2021 07:57) (121/63 - 173/63)  BP(mean): 75 (07 Feb 2021 15:45) (75 - 91)  RR: 18 (08 Feb 2021 07:57) (16 - 19)  SpO2: 97% (08 Feb 2021 07:57) (97% - 100%)    LABS: All Labs Reviewed:                        12.1   7.91  )-----------( 319      ( 07 Feb 2021 08:20 )             41.9     02-07    138  |  104  |  22  ----------------------------<  81  4.1   |  27  |  1.06    Ca    9.6      07 Feb 2021 08:20  Phos  4.1     02-07  Mg     2.9     02-07    TPro  7.6  /  Alb  3.5  /  TBili  0.5  /  DBili  x   /  AST  12<L>  /  ALT  17  /  AlkPhos  79  02-07    PT/INR - ( 07 Feb 2021 08:20 )   PT: 12.1 sec;   INR: 1.05 ratio         PTT - ( 07 Feb 2021 08:20 )  PTT:34.8 sec  CARDIAC MARKERS ( 06 Feb 2021 16:56 )  0.026 ng/mL / x     / x     / x     / x        RADIOLOGY:  < from: Xray Chest 1 View- PORTABLE-Urgent (Xray Chest 1 View- PORTABLE-Urgent .) (02.06.21 @ 17:36) >  FINDINGS:  The lungs are clear of airspace consolidations or effusions. No pneumothorax.  The heart and mediastinum are within normal limits.  Atherosclerotic calcified intimal wall plaques within nondilated thoracic aorta  Visualized osseous structures are intact.  < end of copied text >    < from: CT Head No Cont (02.06.21 @ 17:36) >  Impression:  Head CT without contrast  1.  No acute intracranial hemorrhage, extra-axial collection, hydrocephalus, midline shift or space-occupying mass lesion.  2.  Small focus of encephalomalacia in the anteromedial left frontal lobe adjoining the left frontal horn extending to the cortex, likely represents an evolved infarct from prior examinations as described.  3.  No evidence of posterior reversible encephalopathy in the clinical setting of hypertension.  4.  Chronic and incidental findings as detailed above.  < end of copied text >    ECHOCARDIOGRAM:  < from: TTE Echo Complete w/o Contrast w/ Doppler (11.09.20 @ 09:30) >   Left ventricular wall motion is normal. Estimated left ventricular   ejection fraction is 65-70 %.   The left atrium is normal.   Normal appearing right atrium.   Normal appearing right ventricle structure and function.   The aortic valve appears mildly sclerotic. Valve opening seems to be   normal.   Fibrocalcific changes noted to the mitral valve leaflets with preserved   leaflet excursion. Mitral annular calcification.   Mild mitral regurgitation.   Normal appearing tricuspid valve structure and function.   Trace tricuspid valve regurgitation is present.   Pulmonic valve not well seen, probably normal pulmonic valve function.   No evidence of pericardial effusion.   All visualized extra cardiac structures appears to benormal.  < end of copied text >    ECG:  < from: 12 Lead ECG (02.06.21 @ 15:58) >  Diagnosis Line Sinus rhythm with 1st degree A-V block  Abnormal ECG  When compared with ECG of 12-JAN-2021 10:32,  Sinus rhythm is no longer with 2nd degree A-V block (Mobitz I)  < end of copied text >    MEDICATIONS  (STANDING):  ALPRAZolam 0.5 milliGRAM(s) Oral at bedtime  aspirin  chewable 81 milliGRAM(s) Oral daily  cholecalciferol Oral Tab/Cap - Peds 1000 Unit(s) Oral daily  cyanocobalamin 1000 MICROGram(s) Oral daily  enoxaparin Injectable 40 milliGRAM(s) SubCutaneous daily  gabapentin 100 milliGRAM(s) Oral two times a day  levothyroxine 75 MICROGram(s) Oral daily  metoprolol succinate ER 75 milliGRAM(s) Oral daily  pantoprazole    Tablet 40 milliGRAM(s) Oral before breakfast  spironolactone 25 milliGRAM(s) Oral daily    MEDICATIONS  (PRN):  acetaminophen   Tablet .. 650 milliGRAM(s) Oral every 6 hours PRN Moderate Pain (4 - 6)  polyethylene glycol 3350 17 Gram(s) Oral daily PRN Constipation    TELEMETRY REVIEW:  2/8:     ASSESSMENT AND PLAN:    89 y/o F w/ PMH of CAD s/p PCI, HTN, dyslipidemia, CVA, hypothyroidism, GERD, macular degeneration, orthostatic hypotension, p/w hypertensive urgency and weakness. Patient states her BP has been in the 200s intermittently over the last few days.     1) Blurred Vision, weakness - r/o CVA  - Unclear if patient has a new neurological event vs recrudescence of prior stroke symptoms in setting of uncontrolled HTN  - f/u MRI, orthostatic VS  - BP management.  - Infectious w/u negative: CXR, UA negative   - Cont. ASA, allergy to statin - on pravastatin (non formulary here)   - neuro f/u appreciated     2) HTN urgency   possible underlying uncontrolled anxiety component as well.  - Cont. BB, spirolactone   - add low dose standing xanax daily, and standing PM dose (home dose)  - f/u echo   - Cardio eval appreciated    3)                 1-neuro Questionable weakness w/ h/o CVA-CTH - CT reports Small focus of encephalomalacia in the anteromedial left frontal lobe adjoining the left frontal horn extending to the cortex, likely represents an evolved infarct from prior examinations.  Neuro consult done, MRI pending, Neuro checks, Fall precautions  -Allergy to statin?? Patient is on pravastatin at home, which is non-formulary-PT consult   2-Hypertensive urgency; Latest BP is 143/61, c/w home meds for now given h/o orthostatic hypotension as well.  Low salt diet.  On spironolactone and metoprolol-could consider hydralazine for any urgent spikes; and her cardiologist may consider adjustments long term-Dr. Dinora Álvarez  3-Stage II decubitus ulcer-Wound consult      -C/w home meds  *Social Work consult for coordinating home health care  *DVT ppx  -Lovenox  CHIEF COMPLAINT/DIAGNOSIS: hypertensive urgency and weakness    HPI: 89 y/o F w/ PMH of CAD s/p PCI, HTN, dyslipidemia, CVA, hypothyroidism, GERD, macular degeneration, orthostatic hypotension, p/w hypertensive urgency and weakness. Patient states her BP has been in the 200s intermittently over the last few days. Today in the morning it was 200s again and she was referred to ED. As per ED chart patient had weakness around 10am. However, patient herself denies to me that weakness is new, states she has chronic RLE weakness since previous CVA. Denies any new weakness, facial droop, slurred speech or sensory deficits. Denies CP, SOB, cough, runny nose, sore throat, nausea, vomiting, abdominal pain, fever, chills     2/7 - Patient in ER with urgent hypertension, now under control in ER.  Has right residual weakness and some questionable face weakness-She had questionable neurological weakness worked up by neurology-Unclear if patient has a new neurological event vs recrudescence of prior stroke symptoms in setting of uncontrolled HTN.  There is no indication of new infarct on CT head and can get MRI brain (but this is unclear at this time).  Patient with prior PAFib in the past-only short episodes documented but no AC.  Unknown bleeding risk but patient is a fall risk-has had orthostatic hypotension and syncope in the past  2/8 -   REVIEW OF SYSTEMS:  All other review of systems is negative unless indicated above    PHYSICAL EXAM:  Constitutional: NAD, awake and alert, well-developed  HEENT: PERR, EOMI, Normal Hearing, MMM  Neck: Soft and supple, No LAD, No JVD  Respiratory: Breath sounds are clear bilaterally, No wheezing, rales or rhonchi  Cardiovascular: S1 and S2, regular rate and rhythm, no Murmurs, gallops or rubs  Gastrointestinal: Bowel Sounds present, soft, nontender, nondistended, no guarding, no rebound  Extremities: No peripheral edema  Vascular: 2+ peripheral pulses  Neurological: A/O x 3, no focal deficits  Musculoskeletal: 5/5 strength b/l upper and lower extremities  Skin: No rashes    Vital Signs Last 24 Hrs  T(C): 36.4 (08 Feb 2021 07:57), Max: 36.7 (07 Feb 2021 13:26)  T(F): 97.5 (08 Feb 2021 07:57), Max: 98 (07 Feb 2021 13:26)  HR: 64 (08 Feb 2021 07:57) (64 - 72)  BP: 145/60 (08 Feb 2021 07:57) (121/63 - 173/63)  BP(mean): 75 (07 Feb 2021 15:45) (75 - 91)  RR: 18 (08 Feb 2021 07:57) (16 - 19)  SpO2: 97% (08 Feb 2021 07:57) (97% - 100%)    LABS: All Labs Reviewed:                        12.1   7.91  )-----------( 319      ( 07 Feb 2021 08:20 )             41.9     02-07    138  |  104  |  22  ----------------------------<  81  4.1   |  27  |  1.06    Ca    9.6      07 Feb 2021 08:20  Phos  4.1     02-07  Mg     2.9     02-07    TPro  7.6  /  Alb  3.5  /  TBili  0.5  /  DBili  x   /  AST  12<L>  /  ALT  17  /  AlkPhos  79  02-07    PT/INR - ( 07 Feb 2021 08:20 )   PT: 12.1 sec;   INR: 1.05 ratio         PTT - ( 07 Feb 2021 08:20 )  PTT:34.8 sec  CARDIAC MARKERS ( 06 Feb 2021 16:56 )  0.026 ng/mL / x     / x     / x     / x        RADIOLOGY:  < from: Xray Chest 1 View- PORTABLE-Urgent (Xray Chest 1 View- PORTABLE-Urgent .) (02.06.21 @ 17:36) >  FINDINGS:  The lungs are clear of airspace consolidations or effusions. No pneumothorax.  The heart and mediastinum are within normal limits.  Atherosclerotic calcified intimal wall plaques within nondilated thoracic aorta  Visualized osseous structures are intact.  < end of copied text >    < from: CT Head No Cont (02.06.21 @ 17:36) >  Impression:  Head CT without contrast  1.  No acute intracranial hemorrhage, extra-axial collection, hydrocephalus, midline shift or space-occupying mass lesion.  2.  Small focus of encephalomalacia in the anteromedial left frontal lobe adjoining the left frontal horn extending to the cortex, likely represents an evolved infarct from prior examinations as described.  3.  No evidence of posterior reversible encephalopathy in the clinical setting of hypertension.  4.  Chronic and incidental findings as detailed above.  < end of copied text >    ECHOCARDIOGRAM:  < from: TTE Echo Complete w/o Contrast w/ Doppler (11.09.20 @ 09:30) >   Left ventricular wall motion is normal. Estimated left ventricular   ejection fraction is 65-70 %.   The left atrium is normal.   Normal appearing right atrium.   Normal appearing right ventricle structure and function.   The aortic valve appears mildly sclerotic. Valve opening seems to be   normal.   Fibrocalcific changes noted to the mitral valve leaflets with preserved   leaflet excursion. Mitral annular calcification.   Mild mitral regurgitation.   Normal appearing tricuspid valve structure and function.   Trace tricuspid valve regurgitation is present.   Pulmonic valve not well seen, probably normal pulmonic valve function.   No evidence of pericardial effusion.   All visualized extra cardiac structures appears to benormal.  < end of copied text >    ECG:  < from: 12 Lead ECG (02.06.21 @ 15:58) >  Diagnosis Line Sinus rhythm with 1st degree A-V block  Abnormal ECG  When compared with ECG of 12-JAN-2021 10:32,  Sinus rhythm is no longer with 2nd degree A-V block (Mobitz I)  < end of copied text >    MEDICATIONS  (STANDING):  ALPRAZolam 0.5 milliGRAM(s) Oral at bedtime  aspirin  chewable 81 milliGRAM(s) Oral daily  cholecalciferol Oral Tab/Cap - Peds 1000 Unit(s) Oral daily  cyanocobalamin 1000 MICROGram(s) Oral daily  enoxaparin Injectable 40 milliGRAM(s) SubCutaneous daily  gabapentin 100 milliGRAM(s) Oral two times a day  levothyroxine 75 MICROGram(s) Oral daily  metoprolol succinate ER 75 milliGRAM(s) Oral daily  pantoprazole    Tablet 40 milliGRAM(s) Oral before breakfast  spironolactone 25 milliGRAM(s) Oral daily    MEDICATIONS  (PRN):  acetaminophen   Tablet .. 650 milliGRAM(s) Oral every 6 hours PRN Moderate Pain (4 - 6)  polyethylene glycol 3350 17 Gram(s) Oral daily PRN Constipation    TELEMETRY REVIEW:  2/8:     ASSESSMENT AND PLAN:    89 y/o F w/ PMH of CAD s/p PCI, HTN, dyslipidemia, CVA, hypothyroidism, GERD, macular degeneration, orthostatic hypotension, p/w hypertensive urgency and weakness. Patient states her BP has been in the 200s intermittently over the last few days.     1) Blurred Vision, weakness - r/o CVA  - Unclear if patient has a new neurological event vs recrudescence of prior stroke symptoms in setting of uncontrolled HTN  - f/u MRI, orthostatic VS  - BP management.  - Infectious w/u negative: CXR, UA negative   - Cont. ASA, allergy to statin - on pravastatin (non formulary here)   - neuro f/u appreciated     2) HTN urgency   possible underlying uncontrolled anxiety component as well.  - Cont. BB, spirolactone   - add low dose standing xanax daily, and standing PM dose (home dose)  - f/u echo   - Cardio eval appreciated    3) Anxiety  - Cont. Xanax prn.    4) Stage II decubitus ulcer   - Cont. wound care.       CHIEF COMPLAINT/DIAGNOSIS: hypertensive urgency and weakness    HPI: 89 y/o F w/ PMH of CAD s/p PCI, HTN, dyslipidemia, CVA, hypothyroidism, GERD, macular degeneration, orthostatic hypotension, p/w hypertensive urgency and weakness. Patient states her BP has been in the 200s intermittently over the last few days. Today in the morning it was 200s again and she was referred to ED. As per ED chart patient had weakness around 10am. However, patient herself denies to me that weakness is new, states she has chronic RLE weakness since previous CVA. Denies any new weakness, facial droop, slurred speech or sensory deficits. Denies CP, SOB, cough, runny nose, sore throat, nausea, vomiting, abdominal pain, fever, chills     2/7 - Patient in ER with urgent hypertension, now under control in ER.  Has right residual weakness and some questionable face weakness-She had questionable neurological weakness worked up by neurology-Unclear if patient has a new neurological event vs recrudescence of prior stroke symptoms in setting of uncontrolled HTN.  There is no indication of new infarct on CT head and can get MRI brain (but this is unclear at this time).  Patient with prior PAFib in the past-only short episodes documented but no AC.  Unknown bleeding risk but patient is a fall risk-has had orthostatic hypotension and syncope in the past  2/8 - c/o anxiety related to not being able to take care of hrslef and aides not coming. also w/ RT facial numbness and RT eye double vision.   REVIEW OF SYSTEMS:  All other review of systems is negative unless indicated above    PHYSICAL EXAM:  Constitutional: Awake and alert, well-developed  HEENT: [place], MMM  Neck: Soft and supple, No LAD, No JVD  Respiratory: Breath sounds are clear bilaterally, No wheezing, rales or rhonchi  Cardiovascular: S1 and S2, regular rate and rhythm, no Murmurs, gallops or rubs  Gastrointestinal: Bowel Sounds present, soft, nontender, nondistended, no guarding, no rebound  Extremities: No peripheral edema  Vascular: 2+ peripheral pulses  Neurological: A/O x 3, no focal deficits. fluent speech, no facial droop, EOMI. moves all extremities   Musculoskeletal: 5/5 strength b/l upper and lower extremities  Skin: No rashes    Vital Signs Last 24 Hrs  T(C): 36.4 (08 Feb 2021 07:57), Max: 36.7 (07 Feb 2021 13:26)  T(F): 97.5 (08 Feb 2021 07:57), Max: 98 (07 Feb 2021 13:26)  HR: 64 (08 Feb 2021 07:57) (64 - 72)  BP: 145/60 (08 Feb 2021 07:57) (121/63 - 173/63)  BP(mean): 75 (07 Feb 2021 15:45) (75 - 91)  RR: 18 (08 Feb 2021 07:57) (16 - 19)  SpO2: 97% (08 Feb 2021 07:57) (97% - 100%) -- room air     LABS: All Labs Reviewed:                        12.1   7.91  )-----------( 319      ( 07 Feb 2021 08:20 )             41.9     02-07    138  |  104  |  22  ----------------------------<  81  4.1   |  27  |  1.06    Ca    9.6      07 Feb 2021 08:20  Phos  4.1     02-07  Mg     2.9     02-07    TPro  7.6  /  Alb  3.5  /  TBili  0.5  /  DBili  x   /  AST  12<L>  /  ALT  17  /  AlkPhos  79  02-07    PT/INR - ( 07 Feb 2021 08:20 )   PT: 12.1 sec;   INR: 1.05 ratio         PTT - ( 07 Feb 2021 08:20 )  PTT:34.8 sec  CARDIAC MARKERS ( 06 Feb 2021 16:56 )  0.026 ng/mL / x     / x     / x     / x        RADIOLOGY:  < from: Xray Chest 1 View- PORTABLE-Urgent (Xray Chest 1 View- PORTABLE-Urgent .) (02.06.21 @ 17:36) >  FINDINGS:  The lungs are clear of airspace consolidations or effusions. No pneumothorax.  The heart and mediastinum are within normal limits.  Atherosclerotic calcified intimal wall plaques within nondilated thoracic aorta  Visualized osseous structures are intact.  < end of copied text >    < from: CT Head No Cont (02.06.21 @ 17:36) >  Impression:  Head CT without contrast  1.  No acute intracranial hemorrhage, extra-axial collection, hydrocephalus, midline shift or space-occupying mass lesion.  2.  Small focus of encephalomalacia in the anteromedial left frontal lobe adjoining the left frontal horn extending to the cortex, likely represents an evolved infarct from prior examinations as described.  3.  No evidence of posterior reversible encephalopathy in the clinical setting of hypertension.  4.  Chronic and incidental findings as detailed above.  < end of copied text >    ECHOCARDIOGRAM:  < from: TTE Echo Complete w/o Contrast w/ Doppler (11.09.20 @ 09:30) >   Left ventricular wall motion is normal. Estimated left ventricular   ejection fraction is 65-70 %.   The left atrium is normal.   Normal appearing right atrium.   Normal appearing right ventricle structure and function.   The aortic valve appears mildly sclerotic. Valve opening seems to be   normal.   Fibrocalcific changes noted to the mitral valve leaflets with preserved   leaflet excursion. Mitral annular calcification.   Mild mitral regurgitation.   Normal appearing tricuspid valve structure and function.   Trace tricuspid valve regurgitation is present.   Pulmonic valve not well seen, probably normal pulmonic valve function.   No evidence of pericardial effusion.   All visualized extra cardiac structures appears to benormal.  < end of copied text >    ECG:  < from: 12 Lead ECG (02.06.21 @ 15:58) >  Diagnosis Line Sinus rhythm with 1st degree A-V block  Abnormal ECG  When compared with ECG of 12-JAN-2021 10:32,  Sinus rhythm is no longer with 2nd degree A-V block (Mobitz I)  < end of copied text >    MEDICATIONS  (STANDING):  ALPRAZolam 0.5 milliGRAM(s) Oral at bedtime  aspirin  chewable 81 milliGRAM(s) Oral daily  cholecalciferol Oral Tab/Cap - Peds 1000 Unit(s) Oral daily  cyanocobalamin 1000 MICROGram(s) Oral daily  enoxaparin Injectable 40 milliGRAM(s) SubCutaneous daily  gabapentin 100 milliGRAM(s) Oral two times a day  levothyroxine 75 MICROGram(s) Oral daily  metoprolol succinate ER 75 milliGRAM(s) Oral daily  pantoprazole    Tablet 40 milliGRAM(s) Oral before breakfast  spironolactone 25 milliGRAM(s) Oral daily    MEDICATIONS  (PRN):  acetaminophen   Tablet .. 650 milliGRAM(s) Oral every 6 hours PRN Moderate Pain (4 - 6)  polyethylene glycol 3350 17 Gram(s) Oral daily PRN Constipation    TELEMETRY REVIEW:  2/8: sinus 50-70s, HB overnight     ASSESSMENT AND PLAN:    89 y/o F w/ PMH of CAD s/p PCI, HTN, dyslipidemia, CVA, hypothyroidism, GERD, macular degeneration, orthostatic hypotension, p/w hypertensive urgency and weakness. Patient states her BP has been in the 200s intermittently over the last few days.     1) Blurred Vision, weakness - r/o CVA  - Unclear if patient has a new neurological event vs recrudescence of prior stroke symptoms in setting of uncontrolled HTN  - f/u MRI, orthostatic VS  - BP management.  - Infectious w/u negative: CXR, UA negative   - Cont. ASA, allergy to statin - on pravastatin (non formulary here)   - neuro f/u appreciated     2) HTN urgency   possible underlying uncontrolled anxiety component as well.  - Cont. BB, spirolactone   - add low dose standing xanax daily, and standing PM dose (home dose)  - f/u echo   - Cardio eval appreciated    3) Anxiety  - Cont. Xanax prn.    4) Stage II decubitus ulcer   - Cont. wound care.    5) DVT ppx - SQ Lovenox    GOC: DNR/ DNI - [facility]    -- spoke w/ son Adama. all questions and concerns addressed. 2/8.

## 2021-02-08 NOTE — DIETITIAN INITIAL EVALUATION ADULT. - PERTINENT MEDS FT
MEDICATIONS  (STANDING):  ALPRAZolam 0.5 milliGRAM(s) Oral at bedtime  aspirin  chewable 81 milliGRAM(s) Oral daily  cholecalciferol Oral Tab/Cap - Peds 1000 Unit(s) Oral daily  cyanocobalamin 1000 MICROGram(s) Oral daily  enoxaparin Injectable 40 milliGRAM(s) SubCutaneous daily  gabapentin 100 milliGRAM(s) Oral two times a day  levothyroxine 75 MICROGram(s) Oral daily  metoprolol succinate ER 75 milliGRAM(s) Oral daily  pantoprazole    Tablet 40 milliGRAM(s) Oral before breakfast  spironolactone 25 milliGRAM(s) Oral daily    MEDICATIONS  (PRN):  acetaminophen   Tablet .. 650 milliGRAM(s) Oral every 6 hours PRN Moderate Pain (4 - 6)  polyethylene glycol 3350 17 Gram(s) Oral daily PRN Constipation

## 2021-02-08 NOTE — DIETITIAN NUTRITION RISK NOTIFICATION - TREATMENT: THE FOLLOWING DIET HAS BEEN RECOMMENDED
Diet, Regular:   DASH/TLC {Sodium & Cholesterol Restricted} (DASH) (02-06-21 @ 22:10) [Active]

## 2021-02-08 NOTE — GOALS OF CARE CONVERSATION - ADVANCED CARE PLANNING - CONVERSATION DETAILS
Discussed with patient at bedside. Patient stated she had a DNR in place prior to admission. DNR/DNI - [facility] filled out with patient at bedside. no other limitation addressed at this time. HCP - Adama (son) has a daughter as well - lives in [place].

## 2021-02-08 NOTE — PROGRESS NOTE ADULT - ASSESSMENT
89 y/o F w/ PMH of CAD s/p PCI, HTN, dyslipidemia, CVA, hypothyroidism, GERD, macular degeneration, orthostatic hypotension, p/w hypertensive urgency and weakness. Patient states her BP has been in the 200s intermittently over the last few days.     -Unclear if patient has a new neurological event vs recrudescence of prior stroke symptoms in setting of uncontrolled HTN.  -Manage BP  -There is no indication of new infarct on CT head  -Continue aspirin  -will get MRI brain  -Likely needs rehab    Discussed with medical team.    Will f/u as needed.

## 2021-02-08 NOTE — CONSULT NOTE ADULT - ASSESSMENT
89 y/o F w/ PMH of CAD s/p PCI, HTN, dyslipidemia, CVA, hypothyroidism, GERD, macular degeneration, orthostatic hypotension, p/w hypertensive urgency and weakness. Patient states her BP has been in the 200s intermittently over the last few days. Today in the morning it was 200s again and she was referred to ED.     2/8  having an extensive neurology evaluation.   as for the blood pressure, we need to establish a plan for her so she knows what to do when the blood pressure increases. I have told her that her goal systolic blood pressure is between 140-160. She should check the blood pressure twice daily and if the systolic blood pressure is above 160, and it is at least 1 hour after she has taken her metoprolol dose, she should take an extra 25 mg of metoprolol. My hope is that if we give a plan that is easy to follow, we can avoid frequent hospitalizations.

## 2021-02-08 NOTE — DIETITIAN NUTRITION RISK NOTIFICATION - ADDITIONAL COMMENTS/DIETITIAN RECOMMENDATIONS
Add ensure enlive 8 oz tid  add gelatein bid  Weekly weights  Suggest add Vit C 500 mg BID, add Zinc Sulfate 220 mg x 10 days to promote wound healing   Record PO intake in EMR after each meal (nursing.)   Monitor PO intake, tolerance, labs and weight.

## 2021-02-08 NOTE — DIETITIAN INITIAL EVALUATION ADULT. - ADD RECOMMEND
Record PO intake in EMR after each meal (nursing.) Suggest add Vit C 500 mg BID, add Zinc Sulfate 220 mg x 10 days to promote wound healing . Record PO intake in EMR after each meal (nursing.) Monitor PO intake, tolerance, labs and weight.

## 2021-02-08 NOTE — PROGRESS NOTE ADULT - SUBJECTIVE AND OBJECTIVE BOX
Interval History:  21: No new events.  Continues to reports feeling "sick". She is very vague but says she cannot use her hands and complains about bruising on her hands    MEDICATIONS  (STANDING):  ALPRAZolam 0.5 milliGRAM(s) Oral at bedtime  aspirin  chewable 81 milliGRAM(s) Oral daily  cholecalciferol Oral Tab/Cap - Peds 1000 Unit(s) Oral daily  cyanocobalamin 1000 MICROGram(s) Oral daily  enoxaparin Injectable 40 milliGRAM(s) SubCutaneous daily  gabapentin 100 milliGRAM(s) Oral two times a day  levothyroxine 75 MICROGram(s) Oral daily  metoprolol succinate ER 75 milliGRAM(s) Oral daily  pantoprazole    Tablet 40 milliGRAM(s) Oral before breakfast  spironolactone 25 milliGRAM(s) Oral daily    MEDICATIONS  (PRN):  acetaminophen   Tablet .. 650 milliGRAM(s) Oral every 6 hours PRN Moderate Pain (4 - 6)  polyethylene glycol 3350 17 Gram(s) Oral daily PRN Constipation      Allergies    amlodipine (Unknown)  clonidine (Unknown)  Florinef Acetate (Unknown)  hydrocodone (Unknown)  Levatol (Unknown)  Lipitor (Unknown)  Lotrel (Unknown)  methylPREDNISolone (Unknown)  morphine (Other)  Plaquenil (Unknown)  statins (Other (Unknown))  sulfa drugs (Rash)    Intolerances        PHYSICAL EXAM:  Vital Signs Last 24 Hrs  T(F): 97.5 (21 @ 07:57)  HR: 64 (21 @ 07:57)  BP: 145/60 (21 @ 07:57)  RR: 18 (21 @ 07:57)    GENERAL: NAD, well-groomed, well-developed  Neuro:  HF: A x O x 3. Appropriately interactive, normal affect. Speech fluent, No Aphasia or paraphasic errors.   CN: RENE, EOMI, VFF, facial sensation normal, decreased nasolabial fold on left, tongue with slight deviation to left and ? left atrophy,  Palate moves equally, SCM equal bilaterally  Motor: Reluctant to participate in testing. Able to elevate both arms against gravity. No apparent weakness seen. Weakness in b/l lower extremities  Sens: Intact to light touch  Reflexes: Symmetric and normal . BJ 1+, BR 1+, KJ trace,  downgoing toes b/l  Coord:  no tremors, refused finger to nose  Gait/Balance: Not tested      LABS:                        12.1   7.91  )-----------( 319      ( 2021 08:20 )             41.9     02-07    138  |  104  |  22  ----------------------------<  81  4.1   |  27  |  1.06    Ca    9.6      2021 08:20  Phos  4.1     02-07  Mg     2.9     02-07    TPro  7.6  /  Alb  3.5  /  TBili  0.5  /  DBili  x   /  AST  12<L>  /  ALT  17  /  AlkPhos  79  02-07    PT/INR - ( 2021 08:20 )   PT: 12.1 sec;   INR: 1.05 ratio         PTT - ( 2021 08:20 )  PTT:34.8 sec  Urinalysis Basic - ( 2021 02:15 )    Color: Yellow / Appearance: Clear / S.010 / pH: x  Gluc: x / Ketone: Negative  / Bili: Negative / Urobili: Negative mg/dL   Blood: x / Protein: 15 mg/dL / Nitrite: Negative   Leuk Esterase: Negative / RBC: 0-2 /HPF / WBC 0-2   Sq Epi: x / Non Sq Epi: Occasional / Bacteria: Occasional        RADIOLOGY & ADDITIONAL STUDIES:    CT head no contrast 20:  Head CT without contrast  1.  No acute intracranial hemorrhage, extra-axial collection, hydrocephalus, midline shift or space-occupying mass lesion.  2.  Small focus of encephalomalacia in the anteromedial left frontal lobe adjoining the left frontal horn extending to the cortex, likely represents an evolved infarct from prior examinations as described.  3.  No evidence of posterior reversible encephalopathy in the clinical setting of hypertension.  4.  Chronic and incidental findings as detailed above.

## 2021-02-09 LAB
ANION GAP SERPL CALC-SCNC: 8 MMOL/L — SIGNIFICANT CHANGE UP (ref 5–17)
BUN SERPL-MCNC: 21 MG/DL — SIGNIFICANT CHANGE UP (ref 7–23)
CALCIUM SERPL-MCNC: 9.6 MG/DL — SIGNIFICANT CHANGE UP (ref 8.5–10.1)
CHLORIDE SERPL-SCNC: 105 MMOL/L — SIGNIFICANT CHANGE UP (ref 96–108)
CO2 SERPL-SCNC: 24 MMOL/L — SIGNIFICANT CHANGE UP (ref 22–31)
CREAT SERPL-MCNC: 0.88 MG/DL — SIGNIFICANT CHANGE UP (ref 0.5–1.3)
GLUCOSE SERPL-MCNC: 98 MG/DL — SIGNIFICANT CHANGE UP (ref 70–99)
HCT VFR BLD CALC: 40.1 % — SIGNIFICANT CHANGE UP (ref 34.5–45)
HGB BLD-MCNC: 11.9 G/DL — SIGNIFICANT CHANGE UP (ref 11.5–15.5)
MAGNESIUM SERPL-MCNC: 2.4 MG/DL — SIGNIFICANT CHANGE UP (ref 1.6–2.6)
MCHC RBC-ENTMCNC: 25.3 PG — LOW (ref 27–34)
MCHC RBC-ENTMCNC: 29.7 GM/DL — LOW (ref 32–36)
MCV RBC AUTO: 85.1 FL — SIGNIFICANT CHANGE UP (ref 80–100)
PLATELET # BLD AUTO: 311 K/UL — SIGNIFICANT CHANGE UP (ref 150–400)
POTASSIUM SERPL-MCNC: 4.1 MMOL/L — SIGNIFICANT CHANGE UP (ref 3.5–5.3)
POTASSIUM SERPL-SCNC: 4.1 MMOL/L — SIGNIFICANT CHANGE UP (ref 3.5–5.3)
RBC # BLD: 4.71 M/UL — SIGNIFICANT CHANGE UP (ref 3.8–5.2)
RBC # FLD: 16.2 % — HIGH (ref 10.3–14.5)
SARS-COV-2 RNA SPEC QL NAA+PROBE: SIGNIFICANT CHANGE UP
SODIUM SERPL-SCNC: 137 MMOL/L — SIGNIFICANT CHANGE UP (ref 135–145)
TSH SERPL-MCNC: 1.51 UU/ML — SIGNIFICANT CHANGE UP (ref 0.34–4.82)
WBC # BLD: 10.29 K/UL — SIGNIFICANT CHANGE UP (ref 3.8–10.5)
WBC # FLD AUTO: 10.29 K/UL — SIGNIFICANT CHANGE UP (ref 3.8–10.5)

## 2021-02-09 PROCEDURE — 99232 SBSQ HOSP IP/OBS MODERATE 35: CPT

## 2021-02-09 RX ORDER — SENNA PLUS 8.6 MG/1
2 TABLET ORAL AT BEDTIME
Refills: 0 | Status: DISCONTINUED | OUTPATIENT
Start: 2021-02-09 | End: 2021-02-16

## 2021-02-09 RX ORDER — MIRTAZAPINE 45 MG/1
3.75 TABLET, ORALLY DISINTEGRATING ORAL AT BEDTIME
Refills: 0 | Status: DISCONTINUED | OUTPATIENT
Start: 2021-02-09 | End: 2021-02-16

## 2021-02-09 RX ORDER — SODIUM CHLORIDE 9 MG/ML
1000 INJECTION INTRAMUSCULAR; INTRAVENOUS; SUBCUTANEOUS ONCE
Refills: 0 | Status: COMPLETED | OUTPATIENT
Start: 2021-02-09 | End: 2021-02-09

## 2021-02-09 RX ADMIN — Medication 75 MILLIGRAM(S): at 09:15

## 2021-02-09 RX ADMIN — GABAPENTIN 100 MILLIGRAM(S): 400 CAPSULE ORAL at 09:15

## 2021-02-09 RX ADMIN — SPIRONOLACTONE 25 MILLIGRAM(S): 25 TABLET, FILM COATED ORAL at 09:15

## 2021-02-09 RX ADMIN — SENNA PLUS 2 TABLET(S): 8.6 TABLET ORAL at 22:42

## 2021-02-09 RX ADMIN — PREGABALIN 1000 MICROGRAM(S): 225 CAPSULE ORAL at 09:15

## 2021-02-09 RX ADMIN — SODIUM CHLORIDE 50 MILLILITER(S): 9 INJECTION INTRAMUSCULAR; INTRAVENOUS; SUBCUTANEOUS at 14:38

## 2021-02-09 RX ADMIN — ENOXAPARIN SODIUM 40 MILLIGRAM(S): 100 INJECTION SUBCUTANEOUS at 09:14

## 2021-02-09 RX ADMIN — Medication 1000 UNIT(S): at 09:15

## 2021-02-09 RX ADMIN — Medication 100 MICROGRAM(S): at 05:00

## 2021-02-09 RX ADMIN — POLYETHYLENE GLYCOL 3350 17 GRAM(S): 17 POWDER, FOR SOLUTION ORAL at 09:15

## 2021-02-09 RX ADMIN — MIRTAZAPINE 3.75 MILLIGRAM(S): 45 TABLET, ORALLY DISINTEGRATING ORAL at 21:56

## 2021-02-09 RX ADMIN — Medication 650 MILLIGRAM(S): at 11:19

## 2021-02-09 RX ADMIN — Medication 0.5 MILLIGRAM(S): at 21:33

## 2021-02-09 RX ADMIN — PANTOPRAZOLE SODIUM 40 MILLIGRAM(S): 20 TABLET, DELAYED RELEASE ORAL at 05:00

## 2021-02-09 RX ADMIN — Medication 81 MILLIGRAM(S): at 09:15

## 2021-02-09 RX ADMIN — GABAPENTIN 100 MILLIGRAM(S): 400 CAPSULE ORAL at 21:33

## 2021-02-09 RX ADMIN — Medication 650 MILLIGRAM(S): at 21:33

## 2021-02-09 RX ADMIN — Medication 650 MILLIGRAM(S): at 04:57

## 2021-02-09 NOTE — PROGRESS NOTE ADULT - SUBJECTIVE AND OBJECTIVE BOX
Interval History:  21: States she feels cold    MEDICATIONS  (STANDING):  ALPRAZolam 0.125 milliGRAM(s) Oral daily  ALPRAZolam 0.5 milliGRAM(s) Oral at bedtime  aspirin  chewable 81 milliGRAM(s) Oral daily  cholecalciferol Oral Tab/Cap - Peds 1000 Unit(s) Oral daily  cyanocobalamin 1000 MICROGram(s) Oral daily  enoxaparin Injectable 40 milliGRAM(s) SubCutaneous daily  gabapentin 100 milliGRAM(s) Oral two times a day  levothyroxine 100 MICROGram(s) Oral daily  metoprolol succinate ER 75 milliGRAM(s) Oral daily  pantoprazole    Tablet 40 milliGRAM(s) Oral before breakfast  spironolactone 25 milliGRAM(s) Oral daily    MEDICATIONS  (PRN):  acetaminophen   Tablet .. 650 milliGRAM(s) Oral every 6 hours PRN Moderate Pain (4 - 6)  polyethylene glycol 3350 17 Gram(s) Oral daily PRN Constipation      Allergies    amlodipine (Unknown)  clonidine (Unknown)  Florinef Acetate (Unknown)  hydrocodone (Unknown)  Levatol (Unknown)  Lipitor (Unknown)  Lotrel (Unknown)  methylPREDNISolone (Unknown)  morphine (Other)  Plaquenil (Unknown)  statins (Other (Unknown))  sulfa drugs (Rash)    Intolerances        PHYSICAL EXAM:  Vital Signs Last 24 Hrs  T(F): 97.5 (21 @ 08:19)  HR: 68 (21 @ 08:19)  BP: 154/58 (21 @ 08:19)  RR: 18 (21 @ 08:19)    GENERAL: NAD, well-groomed, well-developed  HEAD:  Atraumatic, Normocephalic  Neuro:  GENERAL: NAD, well-groomed, well-developed  Neuro:  HF: A x O x 3. Appropriately interactive, normal affect. Speech fluent, No Aphasia or paraphasic errors.   CN: RENE, EOMI, VFF, facial sensation normal, decreased nasolabial fold on left, tongue with slight deviation to left, palate moves equally, SCM equal bilaterally  Motor: Reluctant to participate in testing. Able to elevate both arms against gravity. No apparent weakness seen. Weakness in b/l lower extremities  Sens: Intact to light touch  Gait/Balance: Not tested      LABS:                        11.9   10.29 )-----------( 311      ( 2021 08:12 )             40.1     02-    137  |  105  |  21  ----------------------------<  98  4.1   |  24  |  0.88    Ca    9.6      2021 08:12  Mg     2.4     02-09        Urinalysis Basic - ( 2021 02:15 )    Color: Yellow / Appearance: Clear / S.010 / pH: x  Gluc: x / Ketone: Negative  / Bili: Negative / Urobili: Negative mg/dL   Blood: x / Protein: 15 mg/dL / Nitrite: Negative   Leuk Esterase: Negative / RBC: 0-2 /HPF / WBC 0-2   Sq Epi: x / Non Sq Epi: Occasional / Bacteria: Occasional        RADIOLOGY & ADDITIONAL STUDIES:  CT head no contrast 20:  Head CT without contrast  1.  No acute intracranial hemorrhage, extra-axial collection, hydrocephalus, midline shift or space-occupying mass lesion.  2.  Small focus of encephalomalacia in the anteromedial left frontal lobe adjoining the left frontal horn extending to the cortex, likely represents an evolved infarct from prior examinations as described.  3.  No evidence of posterior reversible encephalopathy in the clinical setting of hypertension.  4.  Chronic and incidental findings as detailed above.      MRI head no contrast 21:  There is no mass, acute intracranial hemorrhage, or acute infarction. Chronic infarction and chronic microvascular ischemic changes as described.

## 2021-02-09 NOTE — PHYSICAL THERAPY INITIAL EVALUATION ADULT - PRECAUTIONS/LIMITATIONS, REHAB EVAL
Stage II decubitus ulcer -pt blames on HHA not coming x 3d/cardiac precautions/fall precautions/hearing precautions

## 2021-02-09 NOTE — PHYSICAL THERAPY INITIAL EVALUATION ADULT - MODALITIES TREATMENT COMMENTS
pt not achieving erect posture prior to sitting suddenly-hip/knee flexed/FF; c/o L knee pain-chronic w/ +edema

## 2021-02-09 NOTE — PHYSICAL THERAPY INITIAL EVALUATION ADULT - GENERAL OBSERVATIONS, REHAB EVAL
pt rec'd sitting in Bs chair on 3E, HM, primafit-initially declining PT wanting to finish breakfast (muffin)-agreeable w/ coaxing. pt hypophonetic, flat affect, bradykinetic -?parkinsonian features

## 2021-02-09 NOTE — PHYSICAL THERAPY INITIAL EVALUATION ADULT - MANUAL MUSCLE TESTING RESULTS, REHAB EVAL
R shld 2-, elbow 3+, L shld 3+, elbow 4/5, R hip flex 3-, knee 3+, DF ~2+, B PF 3+, L hip flex 3+, knee 3+

## 2021-02-09 NOTE — PHYSICAL THERAPY INITIAL EVALUATION ADULT - PERTINENT HX OF CURRENT PROBLEM, REHAB EVAL
Patient in ER with urgent hypertension, now under control in ER.  Has right residual weakness and some questionable face weakness-She had questionable neurological weakness worked up by neurology-Unclear if patient has a new neurological event vs recrudescence of prior stroke symptoms in setting of uncontrolled HTN.  There is no indication of new infarct on CT head . MRI neg. ac infarct

## 2021-02-09 NOTE — PHYSICAL THERAPY INITIAL EVALUATION ADULT - ACTIVE RANGE OF MOTION EXAMINATION, REHAB EVAL
except min R shld elev, L shld elev ~90 deg, 1/2 fist bilat, R hip flex ~70, lacks R TKE, R DF -10-15 deg, L knee -20 deg, flex ~80 deg, DF ~0/bilateral upper extremity Active ROM was WFL (within functional limits)/bilateral  lower extremity Active ROM was WFL (within functional limits)

## 2021-02-09 NOTE — PROGRESS NOTE ADULT - SUBJECTIVE AND OBJECTIVE BOX
CHIEF COMPLAINT/DIAGNOSIS: hypertensive urgency and weakness    HPI: 89 y/o F w/ PMH of CAD s/p PCI, HTN, dyslipidemia, CVA, hypothyroidism, GERD, macular degeneration, orthostatic hypotension, p/w hypertensive urgency and weakness. Patient states her BP has been in the 200s intermittently over the last few days. Today in the morning it was 200s again and she was referred to ED. As per ED chart patient had weakness around 10am. However, patient herself denies to me that weakness is new, states she has chronic RLE weakness since previous CVA. Denies any new weakness, facial droop, slurred speech or sensory deficits. Denies CP, SOB, cough, runny nose, sore throat, nausea, vomiting, abdominal pain, fever, chills     2/7 - Patient in ER with urgent hypertension, now under control in ER.  Has right residual weakness and some questionable face weakness-She had questionable neurological weakness worked up by neurology-Unclear if patient has a new neurological event vs recrudescence of prior stroke symptoms in setting of uncontrolled HTN.  There is no indication of new infarct on CT head and can get MRI brain (but this is unclear at this time).  Patient with prior PAFib in the past-only short episodes documented but no AC.  Unknown bleeding risk but patient is a fall risk-has had orthostatic hypotension and syncope in the past  2/8 - c/o anxiety related to not being able to take care of hrslef and aides not coming. also w/ RT facial numbness and RT eye double vision.   2/9 still w/ weakness. no other complaints.  REVIEW OF SYSTEMS:  All other review of systems is negative unless indicated above    PHYSICAL EXAM:  Constitutional: Awake and alert, well-developed  HEENT: [place], MMM  Neck: Soft and supple, No LAD, No JVD  Respiratory: Breath sounds are clear bilaterally, No wheezing, rales or rhonchi  Cardiovascular: S1 and S2, regular rate and rhythm, no Murmurs, gallops or rubs  Gastrointestinal: Bowel Sounds present, soft, nontender, nondistended, no guarding, no rebound  Extremities: No peripheral edema  Vascular: 2+ peripheral pulses  Neurological: A/O x 3, no focal deficits. fluent speech, no facial droop, EOMI. moves all extremities   Musculoskeletal: 5/5 strength b/l upper and lower extremities  Skin: No rashes    Vital Signs Last 24 Hrs  T(C): 36.4 (09 Feb 2021 08:19), Max: 36.8 (08 Feb 2021 19:53)  T(F): 97.5 (09 Feb 2021 08:19), Max: 98.2 (08 Feb 2021 19:53)  HR: 68 (09 Feb 2021 08:19) (68 - 75)  BP: 154/58 (09 Feb 2021 08:19) (103/59 - 154/58)  BP(mean): --  RR: 18 (09 Feb 2021 08:19) (18 - 18)  SpO2: 98% (09 Feb 2021 08:19) (97% - 98%)    LABS: All Labs Reviewed:                        11.9   10.29 )-----------( 311      ( 09 Feb 2021 08:12 )             40.1     02-09    137  |  105  |  21  ----------------------------<  98  4.1   |  24  |  0.88    Ca    9.6      09 Feb 2021 08:12  Mg     2.4     02-09    RADIOLOGY:  < from: Xray Chest 1 View- PORTABLE-Urgent (Xray Chest 1 View- PORTABLE-Urgent .) (02.06.21 @ 17:36) >  FINDINGS:  The lungs are clear of airspace consolidations or effusions. No pneumothorax.  The heart and mediastinum are within normal limits.  Atherosclerotic calcified intimal wall plaques within nondilated thoracic aorta  Visualized osseous structures are intact.  < end of copied text >    < from: CT Head No Cont (02.06.21 @ 17:36) >  Impression:  Head CT without contrast  1.  No acute intracranial hemorrhage, extra-axial collection, hydrocephalus, midline shift or space-occupying mass lesion.  2.  Small focus of encephalomalacia in the anteromedial left frontal lobe adjoining the left frontal horn extending to the cortex, likely represents an evolved infarct from prior examinations as described.  3.  No evidence of posterior reversible encephalopathy in the clinical setting of hypertension.  4.  Chronic and incidental findings as detailed above.  < end of copied text >    ECHOCARDIOGRAM:  < from: TTE Echo Complete w/o Contrast w/ Doppler (11.09.20 @ 09:30) >   Left ventricular wall motion is normal. Estimated left ventricular   ejection fraction is 65-70 %.   The left atrium is normal.   Normal appearing right atrium.   Normal appearing right ventricle structure and function.   The aortic valve appears mildly sclerotic. Valve opening seems to be   normal.   Fibrocalcific changes noted to the mitral valve leaflets with preserved   leaflet excursion. Mitral annular calcification.   Mild mitral regurgitation.   Normal appearing tricuspid valve structure and function.   Trace tricuspid valve regurgitation is present.   Pulmonic valve not well seen, probably normal pulmonic valve function.   No evidence of pericardial effusion.   All visualized extra cardiac structures appears to be normal.  < end of copied text >    ECG:  < from: 12 Lead ECG (02.06.21 @ 15:58) >  Diagnosis Line Sinus rhythm with 1st degree A-V block  Abnormal ECG  When compared with ECG of 12-JAN-2021 10:32,  Sinus rhythm is no longer with 2nd degree A-V block (Mobitz I)  < end of copied text >    MEDICATIONS  (STANDING):  ALPRAZolam 0.5 milliGRAM(s) Oral at bedtime  aspirin  chewable 81 milliGRAM(s) Oral daily  cholecalciferol Oral Tab/Cap - Peds 1000 Unit(s) Oral daily  cyanocobalamin 1000 MICROGram(s) Oral daily  enoxaparin Injectable 40 milliGRAM(s) SubCutaneous daily  gabapentin 100 milliGRAM(s) Oral two times a day  levothyroxine 75 MICROGram(s) Oral daily  metoprolol succinate ER 75 milliGRAM(s) Oral daily  pantoprazole    Tablet 40 milliGRAM(s) Oral before breakfast  spironolactone 25 milliGRAM(s) Oral daily    MEDICATIONS  (PRN):  acetaminophen   Tablet .. 650 milliGRAM(s) Oral every 6 hours PRN Moderate Pain (4 - 6)  polyethylene glycol 3350 17 Gram(s) Oral daily PRN Constipation    TELEMETRY REVIEW:  2/8: sinus 50-70s, HB overnight   2/8: sinus no further wenchbacke on tele.    ASSESSMENT AND PLAN:    89 y/o F w/ PMH of CAD s/p PCI, HTN, dyslipidemia, CVA, hypothyroidism, GERD, macular degeneration, orthostatic hypotension, p/w hypertensive urgency and weakness. Patient states her BP has been in the 200s intermittently over the last few days.     1) Blurred Vision, weakness - r/o CVA  - Unclear if patient has a new neurological event vs recrudescence of prior stroke symptoms in setting of uncontrolled HTN  - f/u MRI negative  - check orthostatic VS >>  - BP management.  - Infectious w/u negative: CXR, UA negative   - Cont. ASA, allergy to statin - on pravastatin (non formulary here)   - neuro f/u appreciated     2) HTN urgency - resolved  possible underlying uncontrolled anxiety component as well.  - Cont. BB, spirolactone   - add low dose standing xanax daily, and standing PM dose (home dose)  - f/u echo > EF 65-70 %  - Cardio eval appreciated    3) Wenckebach  - episode on 2/8 early AM. no further episodes  EP eval     4) Anxiety / Depression  - Cont. Xanax prn. encouraged to take daily xanax low dose to avoid sedative effects.  - start low dose Remeron     5) Stage II decubitus ulcer   - Cont. wound care.    6) DVT ppx - SQ Lovenox    GOC: DNR/ DNI - MOLST    -- spoke w/ son Adama. all questions and concerns addressed. 2/9.       CHIEF COMPLAINT/DIAGNOSIS: hypertensive urgency and weakness    HPI: 89 y/o F w/ PMH of CAD s/p PCI, HTN, dyslipidemia, CVA, hypothyroidism, GERD, macular degeneration, orthostatic hypotension, p/w hypertensive urgency and weakness. Patient states her BP has been in the 200s intermittently over the last few days. Today in the morning it was 200s again and she was referred to ED. As per ED chart patient had weakness around 10am. However, patient herself denies to me that weakness is new, states she has chronic RLE weakness since previous CVA. Denies any new weakness, facial droop, slurred speech or sensory deficits. Denies CP, SOB, cough, runny nose, sore throat, nausea, vomiting, abdominal pain, fever, chills     2/7 - Patient in ER with urgent hypertension, now under control in ER.  Has right residual weakness and some questionable face weakness-She had questionable neurological weakness worked up by neurology-Unclear if patient has a new neurological event vs recrudescence of prior stroke symptoms in setting of uncontrolled HTN.  There is no indication of new infarct on CT head and can get MRI brain (but this is unclear at this time).  Patient with prior PAFib in the past-only short episodes documented but no AC.  Unknown bleeding risk but patient is a fall risk-has had orthostatic hypotension and syncope in the past  2/8 - c/o anxiety related to not being able to take care of hrslef and aides not coming. also w/ RT facial numbness and RT eye double vision.   2/9 still w/ weakness. no other complaints.  REVIEW OF SYSTEMS:  All other review of systems is negative unless indicated above    PHYSICAL EXAM:  Constitutional: Awake and alert, well-developed  HEENT: [place], MMM  Neck: Soft and supple, No LAD, No JVD  Respiratory: Breath sounds are clear bilaterally, No wheezing, rales or rhonchi  Cardiovascular: S1 and S2, regular rate and rhythm, no Murmurs, gallops or rubs  Gastrointestinal: Bowel Sounds present, soft, nontender, nondistended, no guarding, no rebound  Extremities: No peripheral edema  Vascular: 2+ peripheral pulses  Neurological: A/O x 3, no focal deficits. fluent speech, no facial droop, EOMI. moves all extremities   Musculoskeletal: 5/5 strength b/l upper and lower extremities  Skin: No rashes    Vital Signs Last 24 Hrs  T(C): 36.4 (09 Feb 2021 08:19), Max: 36.8 (08 Feb 2021 19:53)  T(F): 97.5 (09 Feb 2021 08:19), Max: 98.2 (08 Feb 2021 19:53)  HR: 68 (09 Feb 2021 08:19) (68 - 75)  BP: 154/58 (09 Feb 2021 08:19) (103/59 - 154/58)  BP(mean): --  RR: 18 (09 Feb 2021 08:19) (18 - 18)  SpO2: 98% (09 Feb 2021 08:19) (97% - 98%)    LABS: All Labs Reviewed:                        11.9   10.29 )-----------( 311      ( 09 Feb 2021 08:12 )             40.1     02-09    137  |  105  |  21  ----------------------------<  98  4.1   |  24  |  0.88    Ca    9.6      09 Feb 2021 08:12  Mg     2.4     02-09    RADIOLOGY:  < from: MR Head No Cont (02.08.21 @ 15:14) >  IMPRESSION:  There is no mass, acute intracranial hemorrhage, or acute infarction. Chronic infarction and chronic microvascular ischemic changes as described.  < end of copied text >    < from: Xray Chest 1 View- PORTABLE-Urgent (Xray Chest 1 View- PORTABLE-Urgent .) (02.06.21 @ 17:36) >  FINDINGS:  The lungs are clear of airspace consolidations or effusions. No pneumothorax.  The heart and mediastinum are within normal limits.  Atherosclerotic calcified intimal wall plaques within nondilated thoracic aorta  Visualized osseous structures are intact.  < end of copied text >    < from: CT Head No Cont (02.06.21 @ 17:36) >  Impression:  Head CT without contrast  1.  No acute intracranial hemorrhage, extra-axial collection, hydrocephalus, midline shift or space-occupying mass lesion.  2.  Small focus of encephalomalacia in the anteromedial left frontal lobe adjoining the left frontal horn extending to the cortex, likely represents an evolved infarct from prior examinations as described.  3.  No evidence of posterior reversible encephalopathy in the clinical setting of hypertension.  4.  Chronic and incidental findings as detailed above.  < end of copied text >    ECHOCARDIOGRAM:  < from: TTE Echo Complete w/o Contrast w/ Doppler (11.09.20 @ 09:30) >   Left ventricular wall motion is normal. Estimated left ventricular   ejection fraction is 65-70 %.   The left atrium is normal.   Normal appearing right atrium.   Normal appearing right ventricle structure and function.   The aortic valve appears mildly sclerotic. Valve opening seems to be   normal.   Fibrocalcific changes noted to the mitral valve leaflets with preserved   leaflet excursion. Mitral annular calcification.   Mild mitral regurgitation.   Normal appearing tricuspid valve structure and function.   Trace tricuspid valve regurgitation is present.   Pulmonic valve not well seen, probably normal pulmonic valve function.   No evidence of pericardial effusion.   All visualized extra cardiac structures appears to be normal.  < end of copied text >    ECG:  < from: 12 Lead ECG (02.06.21 @ 15:58) >  Diagnosis Line Sinus rhythm with 1st degree A-V block  Abnormal ECG  When compared with ECG of 12-JAN-2021 10:32,  Sinus rhythm is no longer with 2nd degree A-V block (Mobitz I)  < end of copied text >    MEDICATIONS  (STANDING):  ALPRAZolam 0.5 milliGRAM(s) Oral at bedtime  aspirin  chewable 81 milliGRAM(s) Oral daily  cholecalciferol Oral Tab/Cap - Peds 1000 Unit(s) Oral daily  cyanocobalamin 1000 MICROGram(s) Oral daily  enoxaparin Injectable 40 milliGRAM(s) SubCutaneous daily  gabapentin 100 milliGRAM(s) Oral two times a day  levothyroxine 75 MICROGram(s) Oral daily  metoprolol succinate ER 75 milliGRAM(s) Oral daily  pantoprazole    Tablet 40 milliGRAM(s) Oral before breakfast  spironolactone 25 milliGRAM(s) Oral daily    MEDICATIONS  (PRN):  acetaminophen   Tablet .. 650 milliGRAM(s) Oral every 6 hours PRN Moderate Pain (4 - 6)  polyethylene glycol 3350 17 Gram(s) Oral daily PRN Constipation    TELEMETRY REVIEW:  2/8: sinus 50-70s, HB overnight   2/8: sinus no further wenchbacke on tele.    ASSESSMENT AND PLAN:    89 y/o F w/ PMH of CAD s/p PCI, HTN, dyslipidemia, CVA, hypothyroidism, GERD, macular degeneration, orthostatic hypotension, p/w hypertensive urgency and weakness. Patient states her BP has been in the 200s intermittently over the last few days.     1) Blurred Vision, weakness  -- unclear etiology.  - Unclear if patient has a new neurological event vs recrudescence of prior stroke symptoms in setting of uncontrolled HTN  - f/u MRI negative  - check orthostatic VS >> positive.  - BP management.  - Infectious w/u negative: CXR, UA negative   - Cont. ASA, allergy to statin - on pravastatin (non formulary here)   - neuro f/u appreciated     2) Orthostatic hypotension  - IVF. repeat in AM    3) HTN urgency - resolved  possible underlying uncontrolled anxiety component as well.  - Cont. BB, spirolactone   - add low dose standing xanax daily, and standing PM dose (home dose)  - f/u echo > EF 65-70 %  - Cardio eval appreciated    4) Wenckebach  - episode on 2/8 early AM. no further episodes  EP eval - no further interventions.     5) Anxiety / Depression  - Cont. Xanax prn. encouraged to take daily xanax low dose to avoid sedative effects.  - start low dose Remeron     6) Stage II decubitus ulcer   - Cont. wound care.    7) DVT ppx - SQ Lovenox    GOC: DNR/ DNI - MOLST    -- spoke w/ son Adama. all questions and concerns addressed. 2/9.

## 2021-02-09 NOTE — PHYSICAL THERAPY INITIAL EVALUATION ADULT - ADDITIONAL COMMENTS
pt lives in 2 level house, stays ground floor, reports amb w/ difficulty w/ RW w/ SBA, h/o falls, reports assist w/ transfers. pt reports [facility] "supposed" to come 8am-4pm x 7d but sometimes "don't show up"-reports last wk didn't come for 3d. pt has RW, WC, commode-reports commode not in good shape

## 2021-02-09 NOTE — CONSULT NOTE ADULT - ASSESSMENT
89 yr old female with above history admitted with hypertensive urgency.  Had neuro work-up. LVEF 65-70%.  Finding of transient Wenckebach on telemetry, she was asymptomatic.  She is on beta blockers for HTN.  She denies syncope, she has had weakness for many years.    A/P: Second degree HB Mobitz type I, transient.  Continue meds per cardiology for BP control.  No EP intervention warranted at this time.  Keep lytes repleted.  Anti-anxiety meds.  F/U as outpatient with cardiologist.  Plan discussed with Dr. Pires.

## 2021-02-09 NOTE — ED PROVIDER NOTE - MEDICAL DECISION MAKING DETAILS
Elderly female with PMHx of uterine CA, CAD, s/p 5 stent. Plan: orthostatics, EKG, serial troponin, cardiac monitor, CT. arm

## 2021-02-09 NOTE — PROGRESS NOTE ADULT - ASSESSMENT
89 y/o F w/ PMH of CAD s/p PCI, HTN, dyslipidemia, CVA, hypothyroidism, GERD, macular degeneration, orthostatic hypotension, p/w hypertensive urgency and weakness. Patient states her BP has been in the 200s intermittently over the last few days.     2/9/21:  -No acute stroke seen on MRI brain  -Continue aspirin  -Has reported allergy to statin  -Management of HTN  -Patient does seem depressed. Consider treatment with an anti-depressant.  -PT kavon appreciated - would benefit from POOL      Please call back if additional input is needed from neurology service.

## 2021-02-10 ENCOUNTER — TRANSCRIPTION ENCOUNTER (OUTPATIENT)
Age: 86
End: 2021-02-10

## 2021-02-10 LAB
ANION GAP SERPL CALC-SCNC: 7 MMOL/L — SIGNIFICANT CHANGE UP (ref 5–17)
BUN SERPL-MCNC: 20 MG/DL — SIGNIFICANT CHANGE UP (ref 7–23)
CALCIUM SERPL-MCNC: 9.3 MG/DL — SIGNIFICANT CHANGE UP (ref 8.5–10.1)
CHLORIDE SERPL-SCNC: 110 MMOL/L — HIGH (ref 96–108)
CO2 SERPL-SCNC: 23 MMOL/L — SIGNIFICANT CHANGE UP (ref 22–31)
CREAT SERPL-MCNC: 0.91 MG/DL — SIGNIFICANT CHANGE UP (ref 0.5–1.3)
GLUCOSE SERPL-MCNC: 93 MG/DL — SIGNIFICANT CHANGE UP (ref 70–99)
HCT VFR BLD CALC: 38.5 % — SIGNIFICANT CHANGE UP (ref 34.5–45)
HGB BLD-MCNC: 11.5 G/DL — SIGNIFICANT CHANGE UP (ref 11.5–15.5)
MCHC RBC-ENTMCNC: 25.6 PG — LOW (ref 27–34)
MCHC RBC-ENTMCNC: 29.9 GM/DL — LOW (ref 32–36)
MCV RBC AUTO: 85.6 FL — SIGNIFICANT CHANGE UP (ref 80–100)
PLATELET # BLD AUTO: 296 K/UL — SIGNIFICANT CHANGE UP (ref 150–400)
POTASSIUM SERPL-MCNC: 4 MMOL/L — SIGNIFICANT CHANGE UP (ref 3.5–5.3)
POTASSIUM SERPL-SCNC: 4 MMOL/L — SIGNIFICANT CHANGE UP (ref 3.5–5.3)
RBC # BLD: 4.5 M/UL — SIGNIFICANT CHANGE UP (ref 3.8–5.2)
RBC # FLD: 16.3 % — HIGH (ref 10.3–14.5)
SODIUM SERPL-SCNC: 140 MMOL/L — SIGNIFICANT CHANGE UP (ref 135–145)
WBC # BLD: 8.76 K/UL — SIGNIFICANT CHANGE UP (ref 3.8–10.5)
WBC # FLD AUTO: 8.76 K/UL — SIGNIFICANT CHANGE UP (ref 3.8–10.5)

## 2021-02-10 PROCEDURE — 99232 SBSQ HOSP IP/OBS MODERATE 35: CPT

## 2021-02-10 PROCEDURE — 93010 ELECTROCARDIOGRAM REPORT: CPT

## 2021-02-10 RX ORDER — HYDRALAZINE HCL 50 MG
25 TABLET ORAL
Refills: 0 | Status: DISCONTINUED | OUTPATIENT
Start: 2021-02-10 | End: 2021-02-11

## 2021-02-10 RX ORDER — MIRTAZAPINE 45 MG/1
0.5 TABLET, ORALLY DISINTEGRATING ORAL
Qty: 0 | Refills: 0 | DISCHARGE
Start: 2021-02-10

## 2021-02-10 RX ORDER — METOPROLOL TARTRATE 50 MG
50 TABLET ORAL DAILY
Refills: 0 | Status: DISCONTINUED | OUTPATIENT
Start: 2021-02-11 | End: 2021-02-11

## 2021-02-10 RX ORDER — MIRTAZAPINE 45 MG/1
3.75 TABLET, ORALLY DISINTEGRATING ORAL
Qty: 0 | Refills: 0 | DISCHARGE
Start: 2021-02-10

## 2021-02-10 RX ORDER — ALPRAZOLAM 0.25 MG
1 TABLET ORAL
Qty: 0 | Refills: 0 | DISCHARGE

## 2021-02-10 RX ORDER — HYDRALAZINE HCL 50 MG
25 TABLET ORAL ONCE
Refills: 0 | Status: COMPLETED | OUTPATIENT
Start: 2021-02-10 | End: 2021-02-10

## 2021-02-10 RX ORDER — METOPROLOL TARTRATE 50 MG
25 TABLET ORAL AT BEDTIME
Refills: 0 | Status: DISCONTINUED | OUTPATIENT
Start: 2021-02-11 | End: 2021-02-11

## 2021-02-10 RX ORDER — ALPRAZOLAM 0.25 MG
1 TABLET ORAL
Qty: 0 | Refills: 0 | DISCHARGE
Start: 2021-02-10

## 2021-02-10 RX ADMIN — GABAPENTIN 100 MILLIGRAM(S): 400 CAPSULE ORAL at 22:15

## 2021-02-10 RX ADMIN — Medication 81 MILLIGRAM(S): at 10:16

## 2021-02-10 RX ADMIN — Medication 25 MILLIGRAM(S): at 22:15

## 2021-02-10 RX ADMIN — MIRTAZAPINE 3.75 MILLIGRAM(S): 45 TABLET, ORALLY DISINTEGRATING ORAL at 22:15

## 2021-02-10 RX ADMIN — Medication 1000 UNIT(S): at 10:16

## 2021-02-10 RX ADMIN — POLYETHYLENE GLYCOL 3350 17 GRAM(S): 17 POWDER, FOR SOLUTION ORAL at 10:17

## 2021-02-10 RX ADMIN — ENOXAPARIN SODIUM 40 MILLIGRAM(S): 100 INJECTION SUBCUTANEOUS at 10:17

## 2021-02-10 RX ADMIN — Medication 75 MILLIGRAM(S): at 10:16

## 2021-02-10 RX ADMIN — SENNA PLUS 2 TABLET(S): 8.6 TABLET ORAL at 22:15

## 2021-02-10 RX ADMIN — PANTOPRAZOLE SODIUM 40 MILLIGRAM(S): 20 TABLET, DELAYED RELEASE ORAL at 06:25

## 2021-02-10 RX ADMIN — Medication 25 MILLIGRAM(S): at 11:42

## 2021-02-10 RX ADMIN — Medication 100 MICROGRAM(S): at 06:25

## 2021-02-10 RX ADMIN — SPIRONOLACTONE 25 MILLIGRAM(S): 25 TABLET, FILM COATED ORAL at 10:16

## 2021-02-10 RX ADMIN — Medication 650 MILLIGRAM(S): at 22:15

## 2021-02-10 RX ADMIN — PREGABALIN 1000 MICROGRAM(S): 225 CAPSULE ORAL at 10:16

## 2021-02-10 RX ADMIN — Medication 0.5 MILLIGRAM(S): at 22:15

## 2021-02-10 RX ADMIN — GABAPENTIN 100 MILLIGRAM(S): 400 CAPSULE ORAL at 10:18

## 2021-02-10 NOTE — DISCHARGE NOTE PROVIDER - INSTRUCTIONS
Low Sodium. Low Cholesterol. Choose foods that are low in salt - examples include: fresh meats, poultry, fish, eggs, milk and yogurt. Avoid packaged/processed foods and excessive table salt use.

## 2021-02-10 NOTE — DISCHARGE NOTE PROVIDER - NSDCMRMEDTOKEN_GEN_ALL_CORE_FT
ALPRAZolam: 0.125 tab(s) orally once a day  ALPRAZolam 0.5 mg oral tablet: 1 tab(s) orally once a day (at bedtime)  aspirin 81 mg oral tablet, chewable: 1 tab(s) orally once a day  cholecalciferol oral tablet: 1000 unit(s) orally once a day  cyanocobalamin 1000 mcg oral tablet: 1 tab(s) orally once a day  gabapentin 100 mg oral capsule: 1 cap(s) orally 2 times a day  metoprolol succinate 25 mg oral tablet, extended release: 3 tab(s) orally once a day  mirtazapine: 3.75 tab(s) orally once a day (at bedtime)  pantoprazole 40 mg oral delayed release tablet: 1 tab(s) orally once a day  polyethylene glycol 3350 oral powder for reconstitution: 17 gram(s) orally once a day, As Needed  pravastatin 40 mg oral tablet: 1 tab(s) orally once a day (at bedtime)  spironolactone 25 mg oral tablet: 1 tab(s) orally once a day  Synthroid 100 mcg (0.1 mg) oral tablet: 1 tab(s) orally once a day   ALPRAZolam: 0.125 tab(s) orally once a day  ALPRAZolam 0.5 mg oral tablet: 1 tab(s) orally once a day (at bedtime)  aspirin 81 mg oral delayed release tablet: 1 tab(s) orally once a day  cholecalciferol oral tablet: 1000 unit(s) orally once a day  clopidogrel 75 mg oral tablet: 1 tab(s) orally once a day  cyanocobalamin 1000 mcg oral tablet: 1 tab(s) orally once a day  gabapentin 100 mg oral capsule: 1 cap(s) orally 2 times a day  isosorbide mononitrate 30 mg oral tablet, extended release: 1 tab(s) orally once a day  lidocaine 5% topical film: Apply topically to affected area once a day - LT knee  metoprolol succinate 25 mg oral tablet, extended release: 3 tab(s) orally once a day  mirtazapine: 3.75 tab(s) orally once a day (at bedtime)  pantoprazole 40 mg oral delayed release tablet: 1 tab(s) orally once a day  polyethylene glycol 3350 oral powder for reconstitution: 17 gram(s) orally once a day, As Needed  pravastatin 40 mg oral tablet: 1 tab(s) orally once a day (at bedtime)  spironolactone 25 mg oral tablet: 1 tab(s) orally once a day  Synthroid 100 mcg (0.1 mg) oral tablet: 1 tab(s) orally once a day   ALPRAZolam: 0.125 tab(s) orally once a day  ALPRAZolam 0.5 mg oral tablet: 1 tab(s) orally once a day (at bedtime)  aspirin 81 mg oral delayed release tablet: 1 tab(s) orally once a day  cholecalciferol oral tablet: 1000 unit(s) orally once a day  clopidogrel 75 mg oral tablet: 1 tab(s) orally once a day  cyanocobalamin 1000 mcg oral tablet: 1 tab(s) orally once a day  gabapentin 100 mg oral capsule: 1 cap(s) orally 2 times a day  isosorbide mononitrate 30 mg oral tablet, extended release: 1 tab(s) orally once a day  lidocaine 5% topical film: Apply topically to affected area once a day - LT knee  mirtazapine: 3.75 tab(s) orally once a day (at bedtime)  pantoprazole 40 mg oral delayed release tablet: 1 tab(s) orally once a day  polyethylene glycol 3350 oral powder for reconstitution: 17 gram(s) orally once a day, As Needed  pravastatin 40 mg oral tablet: 1 tab(s) orally once a day (at bedtime)  spironolactone 25 mg oral tablet: 1 tab(s) orally once a day  Synthroid 100 mcg (0.1 mg) oral tablet: 1 tab(s) orally once a day

## 2021-02-10 NOTE — PROGRESS NOTE ADULT - SUBJECTIVE AND OBJECTIVE BOX
CHIEF COMPLAINT/DIAGNOSIS: hypertensive urgency and weakness    HPI: 89 y/o F w/ PMH of CAD s/p PCI, HTN, dyslipidemia, CVA, hypothyroidism, GERD, macular degeneration, orthostatic hypotension, p/w hypertensive urgency and weakness. Patient states her BP has been in the 200s intermittently over the last few days. Today in the morning it was 200s again and she was referred to ED. As per ED chart patient had weakness around 10am. However, patient herself denies to me that weakness is new, states she has chronic RLE weakness since previous CVA. Denies any new weakness, facial droop, slurred speech or sensory deficits. Denies CP, SOB, cough, runny nose, sore throat, nausea, vomiting, abdominal pain, fever, chills     2/7 - Patient in ER with urgent hypertension, now under control in ER.  Has right residual weakness and some questionable face weakness-She had questionable neurological weakness worked up by neurology-Unclear if patient has a new neurological event vs recrudescence of prior stroke symptoms in setting of uncontrolled HTN.  There is no indication of new infarct on CT head and can get MRI brain (but this is unclear at this time).  Patient with prior PAFib in the past-only short episodes documented but no AC.  Unknown bleeding risk but patient is a fall risk-has had orthostatic hypotension and syncope in the past  2/8 - c/o anxiety related to not being able to take care of hrslef and aides not coming. also w/ RT facial numbness and RT eye double vision.   2/9 still w/ weakness. no other complaints.  2/10 -     REVIEW OF SYSTEMS:  All other review of systems is negative unless indicated above    PHYSICAL EXAM:  Constitutional: Awake and alert, well-developed  HEENT: [place], MMM  Neck: Soft and supple, No LAD, No JVD  Respiratory: Breath sounds are clear bilaterally, No wheezing, rales or rhonchi  Cardiovascular: S1 and S2, regular rate and rhythm, no Murmurs, gallops or rubs  Gastrointestinal: Bowel Sounds present, soft, nontender, nondistended, no guarding, no rebound  Extremities: No peripheral edema  Vascular: 2+ peripheral pulses  Neurological: A/O x 3, no focal deficits. fluent speech, no facial droop, EOMI. moves all extremities   Musculoskeletal: 5/5 strength b/l upper and lower extremities  Skin: No rashes    Vital Signs Last 24 Hrs  T(C): 36.4 (09 Feb 2021 08:19), Max: 36.8 (08 Feb 2021 19:53)  T(F): 97.5 (09 Feb 2021 08:19), Max: 98.2 (08 Feb 2021 19:53)  HR: 68 (09 Feb 2021 08:19) (68 - 75)  BP: 154/58 (09 Feb 2021 08:19) (103/59 - 154/58)  BP(mean): --  RR: 18 (09 Feb 2021 08:19) (18 - 18)  SpO2: 98% (09 Feb 2021 08:19) (97% - 98%)    LABS: All Labs Reviewed:                        11.9   10.29 )-----------( 311      ( 09 Feb 2021 08:12 )             40.1     02-09    137  |  105  |  21  ----------------------------<  98  4.1   |  24  |  0.88    Ca    9.6      09 Feb 2021 08:12  Mg     2.4     02-09    RADIOLOGY:  < from: MR Head No Cont (02.08.21 @ 15:14) >  IMPRESSION:  There is no mass, acute intracranial hemorrhage, or acute infarction. Chronic infarction and chronic microvascular ischemic changes as described.  < end of copied text >    < from: Xray Chest 1 View- PORTABLE-Urgent (Xray Chest 1 View- PORTABLE-Urgent .) (02.06.21 @ 17:36) >  FINDINGS:  The lungs are clear of airspace consolidations or effusions. No pneumothorax.  The heart and mediastinum are within normal limits.  Atherosclerotic calcified intimal wall plaques within nondilated thoracic aorta  Visualized osseous structures are intact.  < end of copied text >    < from: CT Head No Cont (02.06.21 @ 17:36) >  Impression:  Head CT without contrast  1.  No acute intracranial hemorrhage, extra-axial collection, hydrocephalus, midline shift or space-occupying mass lesion.  2.  Small focus of encephalomalacia in the anteromedial left frontal lobe adjoining the left frontal horn extending to the cortex, likely represents an evolved infarct from prior examinations as described.  3.  No evidence of posterior reversible encephalopathy in the clinical setting of hypertension.  4.  Chronic and incidental findings as detailed above.  < end of copied text >    ECHOCARDIOGRAM:  < from: TTE Echo Complete w/o Contrast w/ Doppler (11.09.20 @ 09:30) >   Left ventricular wall motion is normal. Estimated left ventricular   ejection fraction is 65-70 %.   The left atrium is normal.   Normal appearing right atrium.   Normal appearing right ventricle structure and function.   The aortic valve appears mildly sclerotic. Valve opening seems to be   normal.   Fibrocalcific changes noted to the mitral valve leaflets with preserved   leaflet excursion. Mitral annular calcification.   Mild mitral regurgitation.   Normal appearing tricuspid valve structure and function.   Trace tricuspid valve regurgitation is present.   Pulmonic valve not well seen, probably normal pulmonic valve function.   No evidence of pericardial effusion.   All visualized extra cardiac structures appears to be normal.  < end of copied text >    ECG:  < from: 12 Lead ECG (02.06.21 @ 15:58) >  Diagnosis Line Sinus rhythm with 1st degree A-V block  Abnormal ECG  When compared with ECG of 12-JAN-2021 10:32,  Sinus rhythm is no longer with 2nd degree A-V block (Mobitz I)  < end of copied text >    MEDICATIONS  (STANDING):  ALPRAZolam 0.5 milliGRAM(s) Oral at bedtime  aspirin  chewable 81 milliGRAM(s) Oral daily  cholecalciferol Oral Tab/Cap - Peds 1000 Unit(s) Oral daily  cyanocobalamin 1000 MICROGram(s) Oral daily  enoxaparin Injectable 40 milliGRAM(s) SubCutaneous daily  gabapentin 100 milliGRAM(s) Oral two times a day  levothyroxine 75 MICROGram(s) Oral daily  metoprolol succinate ER 75 milliGRAM(s) Oral daily  pantoprazole    Tablet 40 milliGRAM(s) Oral before breakfast  spironolactone 25 milliGRAM(s) Oral daily    MEDICATIONS  (PRN):  acetaminophen   Tablet .. 650 milliGRAM(s) Oral every 6 hours PRN Moderate Pain (4 - 6)  polyethylene glycol 3350 17 Gram(s) Oral daily PRN Constipation    TELEMETRY REVIEW:  2/8: sinus 50-70s, HB overnight   2/8: sinus no further wenchbacke on tele.    ASSESSMENT AND PLAN:    89 y/o F w/ PMH of CAD s/p PCI, HTN, dyslipidemia, CVA, hypothyroidism, GERD, macular degeneration, orthostatic hypotension, p/w hypertensive urgency and weakness. Patient states her BP has been in the 200s intermittently over the last few days.     1) Blurred Vision, weakness  -- unclear etiology.  - Unclear if patient has a new neurological event vs recrudescence of prior stroke symptoms in setting of uncontrolled HTN  - f/u MRI negative  - check orthostatic VS >> positive. repeat OS VS this AM >  - BP management.  - Infectious w/u negative: CXR, UA negative   - Cont. ASA, allergy to statin - on pravastatin (non formulary here)   - neuro f/u appreciated     2) Orthostatic hypotension  - IVF. repeat in AM    3) HTN urgency - resolved  possible underlying uncontrolled anxiety component as well.  - Cont. BB, spirolactone   - add low dose standing xanax daily, and standing PM dose (home dose)  - f/u echo > EF 65-70 %  - Cardio eval appreciated    4) Wenckebach  - episode on 2/8 early AM. no further episodes  EP eval - no further interventions.     5) Anxiety / Depression  - Cont. Xanax prn. encouraged to take daily xanax low dose to avoid sedative effects.  - start low dose Remeron     6) Stage II decubitus ulcer   - Cont. wound care.    7) DVT ppx - SQ Lovenox    GOC: DNR/ DNI - MOLST    -- spoke w/ son Adama. all questions and concerns addressed. 2/9.       CHIEF COMPLAINT/DIAGNOSIS: hypertensive urgency and weakness    HPI: 89 y/o F w/ PMH of CAD s/p PCI, HTN, dyslipidemia, CVA, hypothyroidism, GERD, macular degeneration, orthostatic hypotension, p/w hypertensive urgency and weakness. Patient states her BP has been in the 200s intermittently over the last few days. Today in the morning it was 200s again and she was referred to ED. As per ED chart patient had weakness around 10am. However, patient herself denies to me that weakness is new, states she has chronic RLE weakness since previous CVA. Denies any new weakness, facial droop, slurred speech or sensory deficits. Denies CP, SOB, cough, runny nose, sore throat, nausea, vomiting, abdominal pain, fever, chills     2/7 - Patient in ER with urgent hypertension, now under control in ER.  Has right residual weakness and some questionable face weakness-She had questionable neurological weakness worked up by neurology-Unclear if patient has a new neurological event vs recrudescence of prior stroke symptoms in setting of uncontrolled HTN.  There is no indication of new infarct on CT head and can get MRI brain (but this is unclear at this time).  Patient with prior PAFib in the past-only short episodes documented but no AC.  Unknown bleeding risk but patient is a fall risk-has had orthostatic hypotension and syncope in the past  2/8 - c/o anxiety related to not being able to take care of hrslef and aides not coming. also w/ RT facial numbness and RT eye double vision.   2/9 still w/ weakness. no other complaints.  2/10 - c/o of fingers being stiff/tips blue, LEFT knee pain and stiffness. BP still elevated - asymptomatic. weakness, unable to carry out her ADLs anymore.    REVIEW OF SYSTEMS:  All other review of systems is negative unless indicated above    PHYSICAL EXAM:  Constitutional: Awake and alert, well-developed  HEENT: [place], MMM  Neck: Soft and supple, No LAD, No JVD  Respiratory: Breath sounds are clear bilaterally, No wheezing, rales or rhonchi  Cardiovascular: S1 and S2, regular rate and rhythm, no Murmurs, gallops or rubs  Gastrointestinal: Bowel Sounds present, soft, nontender, nondistended, no guarding, no rebound  Extremities: trace b/l peripheral edema  Vascular: 2+ peripheral pulses  Neurological: A/O x 3, no focal deficits. fluent speech, no facial droop, EOMI. moves all extremities   Musculoskeletal: 5/5 strength b/l upper and lower extremities. weakness. fingers tips blue. likely Raynaud's   Skin: No rashes    Vital Signs Last 24 Hrs  T(C): 36.4 (10 Feb 2021 08:04), Max: 36.7 (09 Feb 2021 16:19)  T(F): 97.6 (10 Feb 2021 08:04), Max: 98.1 (09 Feb 2021 16:19)  HR: 75 (10 Feb 2021 11:45) (66 - 98)  BP: 189/66 (10 Feb 2021 11:45) (154/52 - 189/66)  BP(mean): --  RR: 18 (10 Feb 2021 08:04) (18 - 18)  SpO2: 100% (10 Feb 2021 08:04) (98% - 100%) -- room air    LABS: All Labs Reviewed:                        11.5   8.76  )-----------( 296      ( 10 Feb 2021 06:52 )             38.5     02-10    140  |  110<H>  |  20  ----------------------------<  93  4.0   |  23  |  0.91    Ca    9.3      10 Feb 2021 06:52  Mg     2.4     02-09    RADIOLOGY:  < from: MR Head No Cont (02.08.21 @ 15:14) >  IMPRESSION:  There is no mass, acute intracranial hemorrhage, or acute infarction. Chronic infarction and chronic microvascular ischemic changes as described.  < end of copied text >    < from: Xray Chest 1 View- PORTABLE-Urgent (Xray Chest 1 View- PORTABLE-Urgent .) (02.06.21 @ 17:36) >  FINDINGS:  The lungs are clear of airspace consolidations or effusions. No pneumothorax.  The heart and mediastinum are within normal limits.  Atherosclerotic calcified intimal wall plaques within nondilated thoracic aorta  Visualized osseous structures are intact.  < end of copied text >    < from: CT Head No Cont (02.06.21 @ 17:36) >  Impression:  Head CT without contrast  1.  No acute intracranial hemorrhage, extra-axial collection, hydrocephalus, midline shift or space-occupying mass lesion.  2.  Small focus of encephalomalacia in the anteromedial left frontal lobe adjoining the left frontal horn extending to the cortex, likely represents an evolved infarct from prior examinations as described.  3.  No evidence of posterior reversible encephalopathy in the clinical setting of hypertension.  4.  Chronic and incidental findings as detailed above.  < end of copied text >    ECHOCARDIOGRAM:  < from: TTE Echo Complete w/o Contrast w/ Doppler (11.09.20 @ 09:30) >   Left ventricular wall motion is normal. Estimated left ventricular   ejection fraction is 65-70 %.   The left atrium is normal.   Normal appearing right atrium.   Normal appearing right ventricle structure and function.   The aortic valve appears mildly sclerotic. Valve opening seems to be   normal.   Fibrocalcific changes noted to the mitral valve leaflets with preserved   leaflet excursion. Mitral annular calcification.   Mild mitral regurgitation.   Normal appearing tricuspid valve structure and function.   Trace tricuspid valve regurgitation is present.   Pulmonic valve not well seen, probably normal pulmonic valve function.   No evidence of pericardial effusion.   All visualized extra cardiac structures appears to be normal.  < end of copied text >    ECG:  < from: 12 Lead ECG (02.06.21 @ 15:58) >  Diagnosis Line Sinus rhythm with 1st degree A-V block  Abnormal ECG  When compared with ECG of 12-JAN-2021 10:32,  Sinus rhythm is no longer with 2nd degree A-V block (Mobitz I)  < end of copied text >    MEDICATIONS  (STANDING):  ALPRAZolam 0.125 milliGRAM(s) Oral daily  ALPRAZolam 0.5 milliGRAM(s) Oral at bedtime  aspirin  chewable 81 milliGRAM(s) Oral daily  cholecalciferol Oral Tab/Cap - Peds 1000 Unit(s) Oral daily  cyanocobalamin 1000 MICROGram(s) Oral daily  enoxaparin Injectable 40 milliGRAM(s) SubCutaneous daily  gabapentin 100 milliGRAM(s) Oral two times a day  hydrALAZINE 25 milliGRAM(s) Oral two times a day  levothyroxine 100 MICROGram(s) Oral daily  mirtazapine 3.75 milliGRAM(s) Oral at bedtime  pantoprazole    Tablet 40 milliGRAM(s) Oral before breakfast  senna 2 Tablet(s) Oral at bedtime  spironolactone 25 milliGRAM(s) Oral daily    MEDICATIONS  (PRN):  acetaminophen   Tablet .. 650 milliGRAM(s) Oral every 6 hours PRN Moderate Pain (4 - 6)  polyethylene glycol 3350 17 Gram(s) Oral daily PRN Constipation    TELEMETRY REVIEW:  2/8: sinus 50-70s, HB overnight   2/9: sinus no further wenchbacke on tele.  2/10: sinus 70s, no further wenchbacke on tele.    ASSESSMENT AND PLAN:    89 y/o F w/ PMH of CAD s/p PCI, HTN, dyslipidemia, CVA, hypothyroidism, GERD, macular degeneration, orthostatic hypotension, p/w hypertensive urgency and weakness. Patient states her BP has been in the 200s intermittently over the last few days.     1) HTN urgency   possible underlying uncontrolled anxiety component as well.  - Cont. BB, spirolactone   - add low dose standing xanax daily, and standing PM dose (home dose)  - f/u echo > EF 65-70 %  - Cardio eval appreciated  2/10: sbp > 180. adjusted BP meds. BB 50/25HS, add hydralazine BID. monitor BP.    2) Blurred Vision, weakness  -- unclear etiology.  - Unclear if patient has a new neurological event vs recrudescence of prior stroke symptoms in setting of uncontrolled HTN  - f/u MRI negative  - check orthostatic VS >> positive. repeat OS VS this AM >  - BP management.  - Infectious w/u negative: CXR, UA negative   - Cont. ASA, allergy to statin - on pravastatin (non formulary here)   - neuro f/u appreciated     3) Orthostatic hypotension  - IVF. repeat in AM    4) Wenckebach  - episode on 2/8 early AM. no further episodes  EP eval - no further interventions.     5) Anxiety / Depression  - Cont. Xanax prn. encouraged to take daily xanax low dose to avoid sedative effects.  - start low dose Remeron     6) Stage II decubitus ulcer   - Cont. wound care.    7) DVT ppx - SQ Lovenox    2/10 ~~ (Adama, Son) contacted and updated. will monitor overnight.    GOC: DNR/ DNI - MOLST

## 2021-02-10 NOTE — DISCHARGE NOTE PROVIDER - CARE PROVIDER_API CALL
Mayra Sánchez J  CARDIOVASCULAR DISEASE  [address] [place] [address]  Phone: [phone]  Fax: [phone]  Follow Up Time:     Anila Payne)  Clinical Neurophysiology; EEGEpilepsy; Neurology; Sleep Medicine  [address] [place] [address], [address]  [place] [address]  Phone: [phone]  Fax: [phone]  Follow Up Time:

## 2021-02-10 NOTE — DISCHARGE NOTE PROVIDER - CARE PROVIDERS DIRECT ADDRESSES
[email or web address]

## 2021-02-10 NOTE — DISCHARGE NOTE PROVIDER - NSDCCPCAREPLAN_GEN_ALL_CORE_FT
PRINCIPAL DISCHARGE DIAGNOSIS  Diagnosis: Weakness  Assessment and Plan of Treatment: - You were admitted due to weakness and blurred vision. You imgagin studies (CT of your head and MRI) did not reveal a stroke. You were found to have orthostatic hypotension and recieved IV fluids. COntinue to stay hydrated. Your symtpoms were likley due to anxiety. COntinue Xanax and remeron as prescribed.         SECONDARY DISCHARGE DIAGNOSES  Diagnosis: Anxiety  Assessment and Plan of Treatment: You have history of anxiety. It is likely your anxiety is uncontrooled leading to elevated blood pressure and panic attacks.  - You were started on low dose Xanax 0.125mg daily, continue your bedtime Xanax 0.5mg.   - Continue Remeron (new medication) at bedtime to help with your anxiety and depression. Low dose remeron started - follow up with your primary care provider to further adjust your medicatons if needed.    Diagnosis: HTN (hypertension)  Assessment and Plan of Treatment: - You have a history of high blood pressure.  Continue to take Metoprolol 75mg once a day to control your blood pressure. Continue to monitor your blood pressure 1 to 2 times a day and keep a log for your cardiologist.     PRINCIPAL DISCHARGE DIAGNOSIS  Diagnosis: Weakness  Assessment and Plan of Treatment: - You were admitted due to weakness and blurred vision. Your imaging studies (CT of your head and MRI) did not reveal an acute stroke. It is likely you had a TIA. Your EEG did not show any seizure activity.   - Continue to take Aspirin 81 mg once a day and Plavix 75mg once a day  - Continue to stay hydrated.   - Continue Xanax and remeron as prescribed for anxiety.      SECONDARY DISCHARGE DIAGNOSES  Diagnosis: Wenckebach block  Assessment and Plan of Treatment: - Your telemetry monitor was revealing heart bliock rhythm and bradycardia  - STOP Metoprolol    Diagnosis: Anxiety  Assessment and Plan of Treatment: You have history of anxiety. It is likely your anxiety is uncontrolled leading to elevated blood pressure and panic attacks.  - You were started on low dose Xanax 0.125mg daily, continue your bedtime Xanax 0.5mg.   - Continue Remeron (new medication) at bedtime to help with your anxiety and depression. Low dose remeron started - follow up with your primary care provider to further adjust your medicatons if needed.    Diagnosis: HTN (hypertension)  Assessment and Plan of Treatment: - You have a history of high blood pressure.    - Continue to take Imdur 30mg once a day and Spirolactone 25mg once a day to control your blood pressure. Continue to monitor your blood pressure 1 to 2 times a day and keep a log for your cardiologist.     PRINCIPAL DISCHARGE DIAGNOSIS  Diagnosis: Weakness  Assessment and Plan of Treatment: - You were admitted due to weakness and blurred vision which have improved.  Your imaging studies (CT of your head and MRI) did not reveal an acute stroke.  Your EEG did not show any seizure activity.   - Continue to take Aspirin 81 mg once a day and Plavix 75mg once a day  - Continue to stay hydrated.   - Continue Xanax and remeron as prescribed for anxiety.  - Follow up with Neurology  - Physical therapy at rehab      SECONDARY DISCHARGE DIAGNOSES  Diagnosis: Wenckebach block  Assessment and Plan of Treatment: - Your telemetry monitor was revealing heart bliock rhythm and bradycardia  - STOP Metoprolol  - You were seen by Cardiology- follow up outpatient as well    Diagnosis: Anxiety  Assessment and Plan of Treatment: You have history of anxiety. It is likely your anxiety is uncontrolled leading to elevated blood pressure and panic attacks.  - You were started on low dose Xanax 0.125mg daily, continue your bedtime Xanax 0.5mg.   - Continue Remeron (new medication) at bedtime to help with your anxiety and depression. Low dose remeron started - follow up with your primary care provider to further adjust your medicatons if needed.    Diagnosis: HTN (hypertension)  Assessment and Plan of Treatment: - You have a history of high blood pressure.    - Continue to take Imdur 30mg once a day and Spirolactone 25mg once a day to control your blood pressure. Continue to monitor your blood pressure 1 to 2 times a day and keep a log for your cardiologist.

## 2021-02-10 NOTE — DISCHARGE NOTE PROVIDER - DETAILS OF MALNUTRITION DIAGNOSIS/DIAGNOSES
This patient has been assessed with a concern for Malnutrition and was treated during this hospitalization for the following Nutrition diagnosis/diagnoses:     -  02/08/2021: Moderate protein-calorie malnutrition   This patient has been assessed with a concern for Malnutrition and was treated during this hospitalization for the following Nutrition diagnosis/diagnoses:     -  02/08/2021: Moderate protein-calorie malnutrition    This patient has been assessed with a concern for Malnutrition and was treated during this hospitalization for the following Nutrition diagnosis/diagnoses:     -  02/08/2021: Moderate protein-calorie malnutrition   This patient has been assessed with a concern for Malnutrition and was treated during this hospitalization for the following Nutrition diagnosis/diagnoses:     -  02/08/2021: Moderate protein-calorie malnutrition    This patient has been assessed with a concern for Malnutrition and was treated during this hospitalization for the following Nutrition diagnosis/diagnoses:     -  02/08/2021: Moderate protein-calorie malnutrition    This patient has been assessed with a concern for Malnutrition and was treated during this hospitalization for the following Nutrition diagnosis/diagnoses:     -  02/08/2021: Moderate protein-calorie malnutrition   This patient has been assessed with a concern for Malnutrition and was treated during this hospitalization for the following Nutrition diagnosis/diagnoses:     -  02/08/2021: Moderate protein-calorie malnutrition    This patient has been assessed with a concern for Malnutrition and was treated during this hospitalization for the following Nutrition diagnosis/diagnoses:     -  02/08/2021: Moderate protein-calorie malnutrition    This patient has been assessed with a concern for Malnutrition and was treated during this hospitalization for the following Nutrition diagnosis/diagnoses:     -  02/08/2021: Moderate protein-calorie malnutrition    This patient has been assessed with a concern for Malnutrition and was treated during this hospitalization for the following Nutrition diagnosis/diagnoses:     -  02/08/2021: Moderate protein-calorie malnutrition

## 2021-02-10 NOTE — DISCHARGE NOTE PROVIDER - HOSPITAL COURSE
CHIEF COMPLAINT/DIAGNOSIS: hypertensive urgency and weakness    HPI: 89 y/o F w/ PMH of CAD s/p PCI, HTN, dyslipidemia, CVA, hypothyroidism, GERD, macular degeneration, orthostatic hypotension, p/w hypertensive urgency and weakness. Patient states her BP has been in the 200s intermittently over the last few days. Today in the morning it was 200s again and she was referred to ED. As per ED chart patient had weakness around 10am. However, patient herself denies to me that weakness is new, states she has chronic RLE weakness since previous CVA. Denies any new weakness, facial droop, slurred speech or sensory deficits. Denies CP, SOB, cough, runny nose, sore throat, nausea, vomiting, abdominal pain, fever, chills     2/10 -   PHYSICAL EXAM:  Constitutional: Awake and alert, well-developed  HEENT: [place], MMM  Neck: Soft and supple, No LAD, No JVD  Respiratory: Breath sounds are clear bilaterally, No wheezing, rales or rhonchi  Cardiovascular: S1 and S2, regular rate and rhythm, no Murmurs, gallops or rubs  Gastrointestinal: Bowel Sounds present, soft, nontender, nondistended, no guarding, no rebound  Extremities: No peripheral edema  Vascular: 2+ peripheral pulses  Neurological: A/O x 3, no focal deficits. fluent speech, no facial droop, EOMI. moves all extremities   Musculoskeletal: 5/5 strength b/l upper and lower extremities  Skin: No rashes    Vital Signs Last 24 Hrs  T(C): 36.4 (09 Feb 2021 08:19), Max: 36.8 (08 Feb 2021 19:53)  T(F): 97.5 (09 Feb 2021 08:19), Max: 98.2 (08 Feb 2021 19:53)  HR: 68 (09 Feb 2021 08:19) (68 - 75)  BP: 154/58 (09 Feb 2021 08:19) (103/59 - 154/58)  BP(mean): --  RR: 18 (09 Feb 2021 08:19) (18 - 18)  SpO2: 98% (09 Feb 2021 08:19) (97% - 98%)    Labs, Radiology, Echo, Meds, Tele - all reviewed     ASSESSMENT AND PLAN:    89 y/o F w/ PMH of CAD s/p PCI, HTN, dyslipidemia, CVA, hypothyroidism, GERD, macular degeneration, orthostatic hypotension, p/w hypertensive urgency and weakness. Patient states her BP has been in the 200s intermittently over the last few days.     1) Blurred Vision, weakness  -- unclear etiology.  - Unclear if patient has a new neurological event vs recrudescence of prior stroke symptoms in setting of uncontrolled HTN  - f/u MRI negative  - check orthostatic VS >> positive. repeat OS VS this AM >  - BP management.  - Infectious w/u negative: CXR, UA negative   - Cont. ASA, allergy to statin - on pravastatin (non formulary here)   - neuro f/u appreciated     2) Orthostatic hypotension  - IVF. repeat in AM    3) HTN urgency - resolved  possible underlying uncontrolled anxiety component as well.  - Cont. BB, spirolactone   - add low dose standing xanax daily, and standing PM dose (home dose)  - f/u echo > EF 65-70 %  - Cardio eval appreciated    4) Wenckebach  - episode on 2/8 early AM. no further episodes  EP eval - no further interventions.     5) Anxiety / Depression  - Cont. Xanax prn. encouraged to take daily xanax low dose to avoid sedative effects.  - start low dose Remeron     6) Stage II decubitus ulcer   - Cont. wound care.    7) DVT ppx - SQ Lovenox    GOC: DNR/ DNI - [facility]OL[facility]    Dispo: discharge to rehab in stable condition    Final diagnosis, treatment plan, and follow-up recommendations were discussed and explained to the patient. The patient was given an opportunity to ask questions concerning the diagnosis and treatment plan. The patient acknowledged understanding of the diagnosis, treatment, and follow-up recommendations. The patient was advised to seek urgent care upon discharge if worsening symptoms develop prior to scheduled follow-up. Time spent on discharge included time with the patient, and also coordinating discharge care as outlined below.    Total time spent: 50 min CHIEF COMPLAINT/DIAGNOSIS: hypertensive urgency and weakness    HPI: 89 y/o F w/ PMH of CAD s/p PCI, HTN, dyslipidemia, CVA, hypothyroidism, GERD, macular degeneration, orthostatic hypotension, p/w hypertensive urgency and weakness. Patient states her BP has been in the 200s intermittently over the last few days. Today in the morning it was 200s again and she was referred to ED. As per ED chart patient had weakness around 10am. However, patient herself denies to me that weakness is new, states she has chronic RLE weakness since previous CVA. Denies any new weakness, facial droop, slurred speech or sensory deficits. Denies CP, SOB, cough, runny nose, sore throat, nausea, vomiting, abdominal pain, fever, chills     2/15: seen this AM, lethargic, no answering or responding to verbal questions. following simple commands w/ eyes closed.  2/16: c/o of generalized weakness     REVIEW OF SYSTEMS:  All other review of systems is negative unless indicated above    Vital Signs Last 24 Hrs  T(C): 36.9 (16 Feb 2021 09:21), Max: 36.9 (16 Feb 2021 09:21)  T(F): 98.5 (16 Feb 2021 09:21), Max: 98.5 (16 Feb 2021 09:21)  HR: 74 (16 Feb 2021 09:21) (74 - 83)  BP: 122/41 (16 Feb 2021 09:21) (114/50 - 122/41)  BP(mean): --  RR: 18 (16 Feb 2021 09:21) (18 - 18)  SpO2: 96% (16 Feb 2021 09:21) (96% - 97%)    PHYSICAL EXAM:  Constitutional: Awake and alert, well-developed  HEENT: [place], MMM, EOMI  Neck: Soft and supple, No LAD, No JVD  Respiratory: Breath sounds are clear bilaterally, No wheezing, rales or rhonchi  Cardiovascular: S1 and S2, regular rate and rhythm, no Murmurs, gallops or rubs  Gastrointestinal: Bowel Sounds present, soft, nontender, nondistended, no guarding, no rebound  Extremities: trace b/l peripheral edema  Vascular: 2+ peripheral pulses  Neurological: A/O x 3, no focal deficits. fluent speech, no facial droop, EOMI. moves all extremities. weakness, generalized   Musculoskeletal: 5/5 strength b/l upper and lower extremities.   Skin: No rashes, no jaundice    Labs, Radiology, Echo, Meds, Tele - all reviewed     ASSESSMENT AND PLAN:    89 y/o F w/ PMH of CAD s/p PCI, HTN, dyslipidemia, CVA, hypothyroidism, GERD, macular degeneration, orthostatic hypotension, p/w hypertensive urgency and weakness. Patient states her BP has been in the 200s intermittently over the last few days.     1) HTN urgency   possible underlying uncontrolled anxiety component as well.  - on low dose standing xanax daily, and standing PM dose (home dose)  - f/u echo > EF 65-70 %  - Cardio eval appreciated  - cannot tolerate BB, stopped metoprolol. had radha and 2:1 block while on coreg. will stop coreg  - Cont. spirolactone, imdur.   - cannot tolerate many other BP meds due to side effects or allergic reactions or pt refusal.   - goal -160's per cardiology. Dr. Ritchie recommending cardura if uncontrolled BP    2) Blurred Vision, weakness  -- unclear etiology. likely TIA - no seizure activity   - check orthostatic VS >> positive. repeat OS VS this AM >still positive but improved and asymptomatic   - BP management.  - Infectious w/u negative: CXR, UA negative   - Cont. ASA, allergy to statin - on pravastatin (non formulary here), started Plavix  - neuro f/u appreciated   - 2/12: Code Stroke overnight. Had episode of aphasia and left sided weakness overnight with fairly rapid improvement of symptoms  - repeat MRI brain neg for acute stroke (had 2 MRI's this admission) and EEG negative for seizure  - stop keppra as rec by neurology  - 2/15: CT to r/o CVA repeated again. no acute CVA. start 24 hour EEG --- wnl    3) Orthostatic hypotension  - s/p IV fluids, improved    4) Wenckebach | 2:1 block and sinus radha during sleep  - episode on 2/8 and 2/10  - hold BB  - EP eval - no further interventions.     5) Anxiety / Depression  - Cont. Xanax prn. encouraged to take daily xanax low dose to avoid sedative effects.  - started low dose Remeron     6) Stage II decubitus ulcer   - Cont. wound care.    7) DVT ppx - SQ Lovenox    GOC: DNR/ DNI - [facility]OL[facility]    Dispo: discharge to rehab in stable condition    Final diagnosis, treatment plan, and follow-up recommendations were discussed and explained to the patient. The patient was given an opportunity to ask questions concerning the diagnosis and treatment plan. The patient acknowledged understanding of the diagnosis, treatment, and follow-up recommendations. The patient was advised to seek urgent care upon discharge if worsening symptoms develop prior to scheduled follow-up. Time spent on discharge included time with the patient, and also coordinating discharge care as outlined below.    Total time spent: 50 min CHIEF COMPLAINT/DIAGNOSIS: hypertensive urgency and weakness    HPI: 89 y/o F w/ PMH of CAD s/p PCI, HTN, dyslipidemia, CVA, hypothyroidism, GERD, macular degeneration, orthostatic hypotension, hx bladder ca (stopped treatment) p/w hypertensive urgency and weakness. Patient states her BP has been in the 200s intermittently over the last few days. Today in the morning it was 200s again and she was referred to ED. As per ED chart patient had weakness around 10am. However, patient herself denies to me that weakness is new, states she has chronic RLE weakness since previous CVA. Denies any new weakness, facial droop, slurred speech or sensory deficits. Denies CP, SOB, cough, runny nose, sore throat, nausea, vomiting, abdominal pain, fever, chills     2/15: seen this AM, lethargic, no answering or responding to verbal questions. following simple commands w/ eyes closed.  2/16: c/o of generalized weakness     REVIEW OF SYSTEMS:  All other review of systems is negative unless indicated above    Vital Signs Last 24 Hrs  T(C): 36.9 (16 Feb 2021 09:21), Max: 36.9 (16 Feb 2021 09:21)  T(F): 98.5 (16 Feb 2021 09:21), Max: 98.5 (16 Feb 2021 09:21)  HR: 74 (16 Feb 2021 09:21) (74 - 83)  BP: 122/41 (16 Feb 2021 09:21) (114/50 - 122/41)  BP(mean): --  RR: 18 (16 Feb 2021 09:21) (18 - 18)  SpO2: 96% (16 Feb 2021 09:21) (96% - 97%)    PHYSICAL EXAM:  Constitutional: Awake and alert, well-developed  HEENT: [place], MMM, EOMI  Neck: Soft and supple, No LAD, No JVD  Respiratory: Breath sounds are clear bilaterally, No wheezing, rales or rhonchi  Cardiovascular: S1 and S2, regular rate and rhythm, no Murmurs, gallops or rubs  Gastrointestinal: Bowel Sounds present, soft, nontender, nondistended, no guarding, no rebound  Extremities: trace b/l peripheral edema  Vascular: 2+ peripheral pulses  Neurological: A/O x 3, no focal deficits. fluent speech, no facial droop, EOMI. moves all extremities. weakness, generalized   Musculoskeletal: 5/5 strength b/l upper and lower extremities.   Skin: No rashes, no jaundice    Labs, Radiology, Echo, Meds, Tele - all reviewed     ASSESSMENT AND PLAN:    89 y/o F w/ PMH of CAD s/p PCI, HTN, dyslipidemia, CVA, hypothyroidism, GERD, macular degeneration, orthostatic hypotension, p/w hypertensive urgency and weakness. Patient states her BP has been in the 200s intermittently over the last few days.     1) HTN urgency   possible underlying uncontrolled anxiety component as well.  - on low dose standing xanax daily, and standing PM dose (home dose)  - f/u echo > EF 65-70 %  - Cardio eval appreciated  - cannot tolerate BB, stopped metoprolol. had radha and 2:1 block while on coreg. will stop coreg  - Cont. spirolactone, imdur.   - cannot tolerate many other BP meds due to side effects or allergic reactions or pt refusal.   - goal -160's per cardiology. Dr. Ritchie recommending cardura if uncontrolled BP    2) Blurred Vision, weakness  -- unclear etiology. likely TIA - no seizure activity   - check orthostatic VS >> positive. repeat OS VS this AM >still positive but improved and asymptomatic   - BP management.  - Infectious w/u negative: CXR, UA negative   - Cont. ASA, allergy to statin - on pravastatin (non formulary here), started Plavix  - neuro f/u appreciated   - 2/12: Code Stroke overnight. Had episode of aphasia and left sided weakness overnight with fairly rapid improvement of symptoms  - repeat MRI brain neg for acute stroke (had 2 MRI's this admission) and EEG negative for seizure  - stop keppra as rec by neurology  - 2/15: CT to r/o CVA repeated again. no acute CVA. start 24 hour EEG --- wnl    3) Orthostatic hypotension  - s/p IV fluids, improved    4) Wenckebach | 2:1 block and sinus radha during sleep  - episode on 2/8 and 2/10  - hold BB  - EP eval - no further interventions.     5) Anxiety / Depression  - Cont. Xanax prn. encouraged to take daily xanax low dose to avoid sedative effects.  - started low dose Remeron     6) Stage II decubitus ulcer   - Cont. wound care.    7) DVT ppx - SQ Lovenox    GOC: DNR/ DNI - [facility]OL[facility]    Dispo: discharge to rehab in stable condition    Final diagnosis, treatment plan, and follow-up recommendations were discussed and explained to the patient. The patient was given an opportunity to ask questions concerning the diagnosis and treatment plan. The patient acknowledged understanding of the diagnosis, treatment, and follow-up recommendations. The patient was advised to seek urgent care upon discharge if worsening symptoms develop prior to scheduled follow-up. Time spent on discharge included time with the patient, and also coordinating discharge care as outlined below.    Total time spent: 50 min CHIEF COMPLAINT/DIAGNOSIS: hypertensive urgency and weakness    HPI: 89 y/o F w/ PMH of CAD s/p PCI, HTN, dyslipidemia, CVA, hypothyroidism, GERD, macular degeneration, orthostatic hypotension, hx bladder ca (stopped treatment) p/w hypertensive urgency and weakness. Patient states her BP has been in the 200s intermittently over the last few days. Today in the morning it was 200s again and she was referred to ED. As per ED chart patient had weakness around 10am. However, patient herself denies to me that weakness is new, states she has chronic RLE weakness since previous CVA. Denies any new weakness, facial droop, slurred speech or sensory deficits. Denies CP, SOB, cough, runny nose, sore throat, nausea, vomiting, abdominal pain, fever, chills     2/15: seen this AM, lethargic, no answering or responding to verbal questions. following simple commands w/ eyes closed.  2/16: c/o of generalized weakness  .      REVIEW OF SYSTEMS:  All other review of systems is negative unless indicated above    Vital Signs Last 24 Hrs  T(C): 36.9 (16 Feb 2021 09:21), Max: 36.9 (16 Feb 2021 09:21)  T(F): 98.5 (16 Feb 2021 09:21), Max: 98.5 (16 Feb 2021 09:21)  HR: 74 (16 Feb 2021 09:21) (74 - 83)  BP: 122/41 (16 Feb 2021 09:21) (114/50 - 122/41)  BP(mean): --  RR: 18 (16 Feb 2021 09:21) (18 - 18)  SpO2: 96% (16 Feb 2021 09:21) (96% - 97%)    PHYSICAL EXAM:  Constitutional: Awake and alert, well-developed,  comfortable, NAD  HEENT: [place], MMM, EOMI  Neck: Soft and supple, No LAD, No JVD  Respiratory: Breath sounds are clear bilaterally, No wheezing, rales or rhonchi  Cardiovascular: S1 and S2, regular rate and rhythm, no Murmurs, gallops or rubs  Gastrointestinal: Bowel Sounds present, soft, nontender, nondistended, no guarding, no rebound  Extremities: trace b/l peripheral edema  Vascular: 2+ peripheral pulses  Neurological: A/O x 3, no focal deficits. fluent speech, no facial droop, EOMI. moves all extremities. weakness, generalized   Musculoskeletal: 5/5 strength b/l upper and lower extremities.   Skin: No rashes, no jaundice    Labs, Radiology, Echo, Meds, Tele - all reviewed     ASSESSMENT AND PLAN:    89 y/o F w/ PMH of CAD s/p PCI, HTN, dyslipidemia, CVA, hypothyroidism, GERD, macular degeneration, orthostatic hypotension, p/w hypertensive urgency and weakness. Patient states her BP has been in the 200s intermittently over the last few days.     1) HTN urgency   possible underlying uncontrolled anxiety component as well.  - on low dose standing xanax daily, and standing PM dose (home dose)  - f/u echo > EF 65-70 %  - Cardio eval appreciated  - cannot tolerate BB, stopped metoprolol. had radha and 2:1 block while on coreg. will stop coreg  - Cont. spirolactone, imdur.   - cannot tolerate many other BP meds due to side effects or allergic reactions or pt refusal.   - goal -160's per cardiology. Dr. Ritchie recommending cardura if uncontrolled BP    2) Blurred Vision, weakness  -- unclear etiology. likely TIA - no seizure activity   - check orthostatic VS >> positive. repeat OS VS this AM >still positive but improved and asymptomatic   - BP management.  - Infectious w/u negative: CXR, UA negative   - Cont. ASA, allergy to statin - on pravastatin (non formulary here), started Plavix  - neuro f/u appreciated   - 2/12: Code Stroke overnight. Had episode of aphasia and left sided weakness overnight with fairly rapid improvement of symptoms  - repeat MRI brain neg for acute stroke (had 2 MRI's this admission) and EEG negative for seizure  - stop keppra as rec by neurology  - 2/15: CT to r/o CVA repeated again. no acute CVA. start 24 hour EEG --- wnl    3) Orthostatic hypotension  - s/p IV fluids, improved    4) Wenckebach | 2:1 block and sinus radha during sleep  - episode on 2/8 and 2/10  - hold BB  - EP eval - no further interventions.     5) Anxiety / Depression  - Cont. Xanax prn. encouraged to take daily xanax low dose to avoid sedative effects.  - started low dose Remeron     6) Stage II decubitus ulcer   - Cont. wound care.    7) DVT ppx - SQ Lovenox    GOC: DNR/ DNI - [facility]OL[facility]    Dispo: discharge to rehab in stable condition    Final diagnosis, treatment plan, and follow-up recommendations were discussed and explained to the patient. The patient was given an opportunity to ask questions concerning the diagnosis and treatment plan. The patient acknowledged understanding of the diagnosis, treatment, and follow-up recommendations. The patient was advised to seek urgent care upon discharge if worsening symptoms develop prior to scheduled follow-up. Time spent on discharge included time with the patient, and also coordinating discharge care as outlined below.    Total time spent: 50 min    Patient seen/examined,  reviewed and agree w/ NP Anaya rich and changes made where necessary.

## 2021-02-11 LAB
ADD ON TEST-SPECIMEN IN LAB: SIGNIFICANT CHANGE UP
APPEARANCE UR: CLEAR — SIGNIFICANT CHANGE UP
BACTERIA # UR AUTO: ABNORMAL
BILIRUB UR-MCNC: NEGATIVE — SIGNIFICANT CHANGE UP
CK SERPL-CCNC: 29 U/L — SIGNIFICANT CHANGE UP (ref 26–192)
COLOR SPEC: YELLOW — SIGNIFICANT CHANGE UP
DIFF PNL FLD: NEGATIVE — SIGNIFICANT CHANGE UP
EPI CELLS # UR: SIGNIFICANT CHANGE UP
GLUCOSE UR QL: NEGATIVE MG/DL — SIGNIFICANT CHANGE UP
KETONES UR-MCNC: NEGATIVE — SIGNIFICANT CHANGE UP
LEUKOCYTE ESTERASE UR-ACNC: ABNORMAL
NITRITE UR-MCNC: NEGATIVE — SIGNIFICANT CHANGE UP
PH UR: 6.5 — SIGNIFICANT CHANGE UP (ref 5–8)
PROT UR-MCNC: NEGATIVE MG/DL — SIGNIFICANT CHANGE UP
RBC CASTS # UR COMP ASSIST: ABNORMAL /HPF (ref 0–4)
SP GR SPEC: 1.01 — SIGNIFICANT CHANGE UP (ref 1.01–1.02)
TROPONIN I SERPL-MCNC: 0.02 NG/ML — SIGNIFICANT CHANGE UP (ref 0.01–0.04)
UROBILINOGEN FLD QL: NEGATIVE MG/DL — SIGNIFICANT CHANGE UP
WBC UR QL: SIGNIFICANT CHANGE UP

## 2021-02-11 PROCEDURE — 0042T: CPT

## 2021-02-11 PROCEDURE — 99232 SBSQ HOSP IP/OBS MODERATE 35: CPT

## 2021-02-11 PROCEDURE — 70496 CT ANGIOGRAPHY HEAD: CPT | Mod: 26

## 2021-02-11 PROCEDURE — 70450 CT HEAD/BRAIN W/O DYE: CPT | Mod: 26,59

## 2021-02-11 PROCEDURE — 70498 CT ANGIOGRAPHY NECK: CPT | Mod: 26

## 2021-02-11 RX ORDER — HYDRALAZINE HCL 50 MG
25 TABLET ORAL THREE TIMES A DAY
Refills: 0 | Status: DISCONTINUED | OUTPATIENT
Start: 2021-02-11 | End: 2021-02-11

## 2021-02-11 RX ORDER — CLOPIDOGREL BISULFATE 75 MG/1
75 TABLET, FILM COATED ORAL DAILY
Refills: 0 | Status: DISCONTINUED | OUTPATIENT
Start: 2021-02-11 | End: 2021-02-16

## 2021-02-11 RX ORDER — HYDRALAZINE HCL 50 MG
10 TABLET ORAL ONCE
Refills: 0 | Status: COMPLETED | OUTPATIENT
Start: 2021-02-11 | End: 2021-02-11

## 2021-02-11 RX ORDER — CARVEDILOL PHOSPHATE 80 MG/1
25 CAPSULE, EXTENDED RELEASE ORAL EVERY 12 HOURS
Refills: 0 | Status: DISCONTINUED | OUTPATIENT
Start: 2021-02-11 | End: 2021-02-14

## 2021-02-11 RX ORDER — CARVEDILOL PHOSPHATE 80 MG/1
12.5 CAPSULE, EXTENDED RELEASE ORAL EVERY 12 HOURS
Refills: 0 | Status: DISCONTINUED | OUTPATIENT
Start: 2021-02-11 | End: 2021-02-11

## 2021-02-11 RX ORDER — ISOSORBIDE MONONITRATE 60 MG/1
30 TABLET, EXTENDED RELEASE ORAL DAILY
Refills: 0 | Status: DISCONTINUED | OUTPATIENT
Start: 2021-02-11 | End: 2021-02-16

## 2021-02-11 RX ORDER — LIDOCAINE 4 G/100G
1 CREAM TOPICAL DAILY
Refills: 0 | Status: DISCONTINUED | OUTPATIENT
Start: 2021-02-11 | End: 2021-02-16

## 2021-02-11 RX ORDER — LEVETIRACETAM 250 MG/1
250 TABLET, FILM COATED ORAL EVERY 12 HOURS
Refills: 0 | Status: DISCONTINUED | OUTPATIENT
Start: 2021-02-11 | End: 2021-02-13

## 2021-02-11 RX ADMIN — Medication 81 MILLIGRAM(S): at 11:18

## 2021-02-11 RX ADMIN — PANTOPRAZOLE SODIUM 40 MILLIGRAM(S): 20 TABLET, DELAYED RELEASE ORAL at 06:16

## 2021-02-11 RX ADMIN — SPIRONOLACTONE 25 MILLIGRAM(S): 25 TABLET, FILM COATED ORAL at 11:18

## 2021-02-11 RX ADMIN — PREGABALIN 1000 MICROGRAM(S): 225 CAPSULE ORAL at 11:18

## 2021-02-11 RX ADMIN — Medication 100 MICROGRAM(S): at 06:16

## 2021-02-11 RX ADMIN — LIDOCAINE 1 PATCH: 4 CREAM TOPICAL at 15:48

## 2021-02-11 RX ADMIN — LIDOCAINE 1 PATCH: 4 CREAM TOPICAL at 19:00

## 2021-02-11 RX ADMIN — ENOXAPARIN SODIUM 40 MILLIGRAM(S): 100 INJECTION SUBCUTANEOUS at 11:18

## 2021-02-11 RX ADMIN — Medication 1000 UNIT(S): at 11:18

## 2021-02-11 RX ADMIN — Medication 25 MILLIGRAM(S): at 11:18

## 2021-02-11 RX ADMIN — Medication 25 MILLIGRAM(S): at 15:48

## 2021-02-11 RX ADMIN — Medication 650 MILLIGRAM(S): at 19:48

## 2021-02-11 RX ADMIN — POLYETHYLENE GLYCOL 3350 17 GRAM(S): 17 POWDER, FOR SOLUTION ORAL at 11:18

## 2021-02-11 RX ADMIN — Medication 10 MILLIGRAM(S): at 17:12

## 2021-02-11 RX ADMIN — GABAPENTIN 100 MILLIGRAM(S): 400 CAPSULE ORAL at 11:18

## 2021-02-11 NOTE — PHARMACOTHERAPY INTERVENTION NOTE - COMMENTS
patient w/ high BP and multiple allergies to medications recommended switching TOprol to Coreg for additional Alpha blocking effects

## 2021-02-11 NOTE — PROVIDER CONTACT NOTE (CHANGE IN STATUS NOTIFICATION) - SITUATION
Acute onset of new lethargy, minimally responsive to pain. Pt also aphasic & noted to have new left sided weakness. Pt baseline A+Ox3

## 2021-02-11 NOTE — PROGRESS NOTE ADULT - SUBJECTIVE AND OBJECTIVE BOX
HPI: 89 year old female had stroke code called this evening at 10:54pm Neuro responded at 10:59pm. Patient was lethargic, aphasic and had left sided weakness. CT head performed. NIHSS on initial event was 20, however rapidly improved to baseline within about 20 minutes. TPA not given due to rapid improvement of symptoms.     Vital Signs Last 24 Hrs  T(C): 36.4 (11 Feb 2021 22:30), Max: 36.4 (11 Feb 2021 22:30)  T(F): 97.6 (11 Feb 2021 22:30), Max: 97.6 (11 Feb 2021 22:30)  HR: 80 (11 Feb 2021 22:30) (80 - 92)  BP: 164/54 (11 Feb 2021 22:30) (150/54 - 184/69)  BP(mean): --  RR: 18 (11 Feb 2021 22:30) (18 - 18)  SpO2: 99% (11 Feb 2021 22:30) (99% - 99%)    MEDICATIONS  (STANDING):  ALPRAZolam 0.125 milliGRAM(s) Oral daily  ALPRAZolam 0.5 milliGRAM(s) Oral at bedtime  aspirin  chewable 81 milliGRAM(s) Oral daily  carvedilol 25 milliGRAM(s) Oral every 12 hours  cholecalciferol Oral Tab/Cap - Peds 1000 Unit(s) Oral daily  cyanocobalamin 1000 MICROGram(s) Oral daily  enoxaparin Injectable 40 milliGRAM(s) SubCutaneous daily  gabapentin 100 milliGRAM(s) Oral two times a day  isosorbide   mononitrate ER Tablet (IMDUR) 30 milliGRAM(s) Oral daily  levothyroxine 100 MICROGram(s) Oral daily  lidocaine   Patch 1 Patch Transdermal daily  mirtazapine 3.75 milliGRAM(s) Oral at bedtime  pantoprazole    Tablet 40 milliGRAM(s) Oral before breakfast  senna 2 Tablet(s) Oral at bedtime  spironolactone 25 milliGRAM(s) Oral daily    MEDICATIONS  (PRN):  acetaminophen   Tablet .. 650 milliGRAM(s) Oral every 6 hours PRN Moderate Pain (4 - 6)  polyethylene glycol 3350 17 Gram(s) Oral daily PRN Constipation      Neuro exam   initially lethargic only localizing to pain not following commands, PERRL 3mm, now awake and alert. Initially globally aphasic with mild right NL flattening, Baseline RUE weakness localized to pain and LUE flaccid. able to moan to pain with noxious stim of b/l LE, mild w/d of b/l LE but with more LLE weakness. toes down.     exam rapidly improved. now Awake and alert face appears more symmetrical PERRL EOMI, following commands able to speak clearly says "are you on the phone with my son Adama?" has baseline mild RUE weakness from previous infarct now able to move LUE antigravity. B/L LE able to move spontaneously AG. sensation grossly intact to light touch. Does have tremoring at rest of RUE with episodes of sleepiness briefly but briskly awakens and responds however cannot control the tremor in the RUE.     NIHSS: 20 now at baseline.           LABS:                         11.5   8.76  )-----------( 296      ( 10 Feb 2021 06:52 )             38.5     02-10    140  |  110<H>  |  20  ----------------------------<  93  4.0   |  23  |  0.91    Ca    9.3      10 Feb 2021 06:52        02-07 Chol 150 LDL -- HDL 59 Trig 116      RADIOLOGY: < from: CT Brain Stroke Protocol (02.11.21 @ 23:13) >    IMPRESSION:  No CT evidence of acute intracranial hemorrhage, mass effect or acute territorial infarct.    CRITICAL VALUE: I discussed the major findings in this report with  CELINE Hernandez on 02/11/2021 at 11:15 PM  Critical value policy of the hospital was followed. Read back and confirmation of receipt of this communication was performed. This verbal communication supplements the text report of this document.    < end of copied text >  CT perfusion verbal report from Dr. Lipscomb- grossly negative. CTA h/n does show per verbal report moderate ICA stenosis- follow up study report however reported as no acute vessel occlusion.     < from: MR Head No Cont (02.08.21 @ 15:14) >  IMPRESSION:    There is no mass, acute intracranial hemorrhage, or acute infarction. Chronic infarction and chronic microvascular ischemic changes as described.            LITO GARBER M.D., ATTENDING RADIOLOGIST    < end of copied text >

## 2021-02-11 NOTE — PROVIDER CONTACT NOTE (CHANGE IN STATUS NOTIFICATION) - ASSESSMENT
Pt lethargic, minimally responsive to pain. Pt not able to follow commands. Pt also aphasic & noted to have new left sided weakness. Bp: 164/54, HR: 80, RR:17, 02: 98% on RA.

## 2021-02-11 NOTE — PROVIDER CONTACT NOTE (CHANGE IN STATUS NOTIFICATION) - BACKGROUND
Admit with HTN & weakness. HX: CVA w/ residual R sided weakness. MRI head during admission showed old infarct. Pt on subq lovenox and aspirin

## 2021-02-11 NOTE — STROKE CODE NOTE - MRS SCORE
(4) Moderately severe disabilty.  Unable to attend to own bodily needs without assistance, and unable to walk unassisted.

## 2021-02-11 NOTE — PROGRESS NOTE ADULT - SUBJECTIVE AND OBJECTIVE BOX
CHIEF COMPLAINT/DIAGNOSIS: hypertensive urgency and weakness    HPI: 89 y/o F w/ PMH of CAD s/p PCI, HTN, dyslipidemia, CVA, hypothyroidism, GERD, macular degeneration, orthostatic hypotension, p/w hypertensive urgency and weakness. Patient states her BP has been in the 200s intermittently over the last few days. Today in the morning it was 200s again and she was referred to ED. As per ED chart patient had weakness around 10am. However, patient herself denies to me that weakness is new, states she has chronic RLE weakness since previous CVA. Denies any new weakness, facial droop, slurred speech or sensory deficits. Denies CP, SOB, cough, runny nose, sore throat, nausea, vomiting, abdominal pain, fever, chills     2/7 - Patient in ER with urgent hypertension, now under control in ER.  Has right residual weakness and some questionable face weakness-She had questionable neurological weakness worked up by neurology-Unclear if patient has a new neurological event vs recrudescence of prior stroke symptoms in setting of uncontrolled HTN.  There is no indication of new infarct on CT head and can get MRI brain (but this is unclear at this time).  Patient with prior PAFib in the past-only short episodes documented but no AC.  Unknown bleeding risk but patient is a fall risk-has had orthostatic hypotension and syncope in the past  2/8 - c/o anxiety related to not being able to take care of hrslef and aides not coming. also w/ RT facial numbness and RT eye double vision.   2/9 still w/ weakness. no other complaints.  2/10 - c/o of fingers being stiff/tips blue, LEFT knee pain and stiffness. BP still elevated - asymptomatic. weakness, unable to carry out her ADLs anymore.  2/11 - still w/ weakness. LT knee pain/stiff.    REVIEW OF SYSTEMS:  All other review of systems is negative unless indicated above    PHYSICAL EXAM:  Constitutional: Awake and alert, well-developed  HEENT: [place], MMM  Neck: Soft and supple, No LAD, No JVD  Respiratory: Breath sounds are clear bilaterally, No wheezing, rales or rhonchi  Cardiovascular: S1 and S2, regular rate and rhythm, no Murmurs, gallops or rubs  Gastrointestinal: Bowel Sounds present, soft, nontender, nondistended, no guarding, no rebound  Extremities: trace b/l peripheral edema  Vascular: 2+ peripheral pulses  Neurological: A/O x 3, no focal deficits. fluent speech, no facial droop, EOMI. moves all extremities   Musculoskeletal: 5/5 strength b/l upper and lower extremities. weakness. fingers tips blue. likely Raynaud's   Skin: No rashes    Vital Signs Last 24 Hrs  T(C): 36.3 (10 Feb 2021 20:37), Max: 36.5 (10 Feb 2021 18:34)  T(F): 97.4 (10 Feb 2021 20:37), Max: 97.7 (10 Feb 2021 18:34)  HR: 83 (10 Feb 2021 20:37) (72 - 83)  BP: 167/54 (10 Feb 2021 20:37) (143/45 - 167/54)  BP(mean): --  RR: 18 (10 Feb 2021 20:37) (18 - 18)  SpO2: 93% (10 Feb 2021 20:37) (93% - 100%) -- room air    LABS: All Labs Reviewed:                        11.5   8.76  )-----------( 296      ( 10 Feb 2021 06:52 )             38.5     02-10    140  |  110<H>  |  20  ----------------------------<  93  4.0   |  23  |  0.91    Ca    9.3      10 Feb 2021 06:52    RADIOLOGY:  < from: MR Head No Cont (02.08.21 @ 15:14) >  IMPRESSION:  There is no mass, acute intracranial hemorrhage, or acute infarction. Chronic infarction and chronic microvascular ischemic changes as described.  < end of copied text >    < from: Xray Chest 1 View- PORTABLE-Urgent (Xray Chest 1 View- PORTABLE-Urgent .) (02.06.21 @ 17:36) >  FINDINGS:  The lungs are clear of airspace consolidations or effusions. No pneumothorax.  The heart and mediastinum are within normal limits.  Atherosclerotic calcified intimal wall plaques within nondilated thoracic aorta  Visualized osseous structures are intact.  < end of copied text >    < from: CT Head No Cont (02.06.21 @ 17:36) >  Impression:  Head CT without contrast  1.  No acute intracranial hemorrhage, extra-axial collection, hydrocephalus, midline shift or space-occupying mass lesion.  2.  Small focus of encephalomalacia in the anteromedial left frontal lobe adjoining the left frontal horn extending to the cortex, likely represents an evolved infarct from prior examinations as described.  3.  No evidence of posterior reversible encephalopathy in the clinical setting of hypertension.  4.  Chronic and incidental findings as detailed above.  < end of copied text >    ECHOCARDIOGRAM:  < from: TTE Echo Complete w/o Contrast w/ Doppler (11.09.20 @ 09:30) >   Left ventricular wall motion is normal. Estimated left ventricular   ejection fraction is 65-70 %.   The left atrium is normal.   Normal appearing right atrium.   Normal appearing right ventricle structure and function.   The aortic valve appears mildly sclerotic. Valve opening seems to be   normal.   Fibrocalcific changes noted to the mitral valve leaflets with preserved   leaflet excursion. Mitral annular calcification.   Mild mitral regurgitation.   Normal appearing tricuspid valve structure and function.   Trace tricuspid valve regurgitation is present.   Pulmonic valve not well seen, probably normal pulmonic valve function.   No evidence of pericardial effusion.   All visualized extra cardiac structures appears to be normal.  < end of copied text >    ECG:  < from: 12 Lead ECG (02.06.21 @ 15:58) >  Diagnosis Line Sinus rhythm with 1st degree A-V block  Abnormal ECG  When compared with ECG of 12-JAN-2021 10:32,  Sinus rhythm is no longer with 2nd degree A-V block ([place]ob[place] I)  < end of copied text >    MEDICATIONS  (STANDING):  ALPRAZolam 0.125 milliGRAM(s) Oral daily  ALPRAZolam 0.5 milliGRAM(s) Oral at bedtime  aspirin  chewable 81 milliGRAM(s) Oral daily  cholecalciferol Oral Tab/Cap - Peds 1000 Unit(s) Oral daily  cyanocobalamin 1000 MICROGram(s) Oral daily  enoxaparin Injectable 40 milliGRAM(s) SubCutaneous daily  gabapentin 100 milliGRAM(s) Oral two times a day  hydrALAZINE 25 milliGRAM(s) Oral two times a day  levothyroxine 100 MICROGram(s) Oral daily  mirtazapine 3.75 milliGRAM(s) Oral at bedtime  pantoprazole    Tablet 40 milliGRAM(s) Oral before breakfast  senna 2 Tablet(s) Oral at bedtime  spironolactone 25 milliGRAM(s) Oral daily    MEDICATIONS  (PRN):  acetaminophen   Tablet .. 650 milliGRAM(s) Oral every 6 hours PRN Moderate Pain (4 - 6)  polyethylene glycol 3350 17 Gram(s) Oral daily PRN Constipation    TELEMETRY REVIEW:  2/8: sinus 50-70s, HB overnight   2/9: sinus no further  Wenckebach on tele.  2/10: sinus 70s, no further  Wenckebach on tele.  2/11: short run of Wenckebach yesterday at 6pm. sinus     ASSESSMENT AND PLAN:    89 y/o F w/ PMH of CAD s/p PCI, HTN, dyslipidemia, CVA, hypothyroidism, GERD, macular degeneration, orthostatic hypotension, p/w hypertensive urgency and weakness. Patient states her BP has been in the 200s intermittently over the last few days.     1) HTN urgency   possible underlying uncontrolled anxiety component as well.  - Cont. BB, spirolactone   - add low dose standing xanax daily, and standing PM dose (home dose)  - f/u echo > EF 65-70 %  - Cardio eval appreciated  2/10: sbp > 180. adjusted BP meds. BB 50/25HS, add hydralazine BID. monitor BP.  2/11: switched to Coreg 12.5mg BID. Increase hydralazine to 25mg TID    2) Blurred Vision, weakness  -- unclear etiology.  - Unclear if patient has a new neurological event vs recrudescence of prior stroke symptoms in setting of uncontrolled HTN  - f/u MRI negative  - check orthostatic VS >> positive. repeat OS VS this AM >still positive but improved and asymptomatic   - BP management.  - Infectious w/u negative: CXR, UA negative   - Cont. ASA, allergy to statin - on pravastatin (non formulary here)   - neuro f/u appreciated     3) Orthostatic hypotension  - IVF. repeat still positive but improved and asymptomatic     4) Wenckebach  - episode on 2/8 and 2/10  EP eval - no further interventions.     5) Anxiety / Depression  - Cont. Xanax prn. encouraged to take daily xanax low dose to avoid sedative effects.  - start low dose Remeron     6) Stage II decubitus ulcer   - Cont. wound care.    7) DVT ppx - SQ Lovenox    2/11 ~~ (Adama, Son) contacted/ updated. all questions addressed  GOC: DNR/ DNI - MOLST

## 2021-02-11 NOTE — CHART NOTE - NSCHARTNOTEFT_GEN_A_CORE
Called by RN, patient feeling extreme weakness, SBP > 180. will order hydralazine 10mg x1 IV. of note patient with multiple allergies to HTN medications.   - patient stating hydralazine making her feel weakness, refusing hydralazine.   - Will increase coreg to 25mg BID, start Imdur 30mg QD, cont. Spirolactone 35mg  - keep NPO at midnight for RA US in AM. Called by RN, patient feeling extreme weakness, SBP > 180. will order hydralazine 10mg x1 IV. of note patient with multiple allergies to HTN medications.   - patient stating hydralazine making her feel weakness, refusing hydralazine.   - Will increase coreg to 25mg BID, start Imdur 30mg QD, cont. Spirolactone 35mg  - keep NPO at midnight for RA US in AM.  - check trops x2 sets. check ecg Called by RN, patient feeling extreme weakness, b/l calf pain, SBP > 180. will order hydralazine 10mg x1 IV. of note patient with multiple allergies to HTN medications.   - patient stating hydralazine making her feel weakness, refusing hydralazine.   - Will increase coreg to 25mg BID, start Imdur 30mg QD, cont. Spirolactone 35mg  - keep NPO at midnight for RA US in AM.  - check trops x2 sets. check ecg  - check doppler r/o dvt  - check UA

## 2021-02-11 NOTE — PROGRESS NOTE ADULT - ASSESSMENT
89 year old female with hx of CVA, HTN admitted with stroke like symptoms this evening.   Last known normal was 9pm.   TPA not given due to rapid improvement of symptoms   Discussed at length with son Adama and Dr. Payne.   will add plavix for possible TIA   recommended keppra 250mg BID for possible myoclonus and consider EEG.

## 2021-02-12 PROCEDURE — 93975 VASCULAR STUDY: CPT | Mod: 26

## 2021-02-12 PROCEDURE — 70551 MRI BRAIN STEM W/O DYE: CPT | Mod: 26

## 2021-02-12 PROCEDURE — 95816 EEG AWAKE AND DROWSY: CPT | Mod: 26

## 2021-02-12 PROCEDURE — 99232 SBSQ HOSP IP/OBS MODERATE 35: CPT

## 2021-02-12 PROCEDURE — 93970 EXTREMITY STUDY: CPT | Mod: 26

## 2021-02-12 PROCEDURE — 93010 ELECTROCARDIOGRAM REPORT: CPT

## 2021-02-12 RX ORDER — MINERAL OIL
133 OIL (ML) MISCELLANEOUS ONCE
Refills: 0 | Status: COMPLETED | OUTPATIENT
Start: 2021-02-12 | End: 2021-02-12

## 2021-02-12 RX ADMIN — GABAPENTIN 100 MILLIGRAM(S): 400 CAPSULE ORAL at 11:29

## 2021-02-12 RX ADMIN — ISOSORBIDE MONONITRATE 30 MILLIGRAM(S): 60 TABLET, EXTENDED RELEASE ORAL at 11:29

## 2021-02-12 RX ADMIN — LEVETIRACETAM 400 MILLIGRAM(S): 250 TABLET, FILM COATED ORAL at 00:28

## 2021-02-12 RX ADMIN — POLYETHYLENE GLYCOL 3350 17 GRAM(S): 17 POWDER, FOR SOLUTION ORAL at 11:28

## 2021-02-12 RX ADMIN — Medication 81 MILLIGRAM(S): at 11:28

## 2021-02-12 RX ADMIN — MIRTAZAPINE 3.75 MILLIGRAM(S): 45 TABLET, ORALLY DISINTEGRATING ORAL at 21:29

## 2021-02-12 RX ADMIN — GABAPENTIN 100 MILLIGRAM(S): 400 CAPSULE ORAL at 21:29

## 2021-02-12 RX ADMIN — Medication 133 MILLILITER(S): at 11:46

## 2021-02-12 RX ADMIN — CARVEDILOL PHOSPHATE 25 MILLIGRAM(S): 80 CAPSULE, EXTENDED RELEASE ORAL at 21:29

## 2021-02-12 RX ADMIN — LEVETIRACETAM 400 MILLIGRAM(S): 250 TABLET, FILM COATED ORAL at 11:28

## 2021-02-12 RX ADMIN — LIDOCAINE 1 PATCH: 4 CREAM TOPICAL at 03:30

## 2021-02-12 RX ADMIN — CLOPIDOGREL BISULFATE 75 MILLIGRAM(S): 75 TABLET, FILM COATED ORAL at 11:29

## 2021-02-12 RX ADMIN — LEVETIRACETAM 400 MILLIGRAM(S): 250 TABLET, FILM COATED ORAL at 21:29

## 2021-02-12 RX ADMIN — CLOPIDOGREL BISULFATE 75 MILLIGRAM(S): 75 TABLET, FILM COATED ORAL at 00:28

## 2021-02-12 RX ADMIN — LIDOCAINE 1 PATCH: 4 CREAM TOPICAL at 11:28

## 2021-02-12 RX ADMIN — LIDOCAINE 1 PATCH: 4 CREAM TOPICAL at 20:07

## 2021-02-12 RX ADMIN — CARVEDILOL PHOSPHATE 25 MILLIGRAM(S): 80 CAPSULE, EXTENDED RELEASE ORAL at 11:29

## 2021-02-12 RX ADMIN — ENOXAPARIN SODIUM 40 MILLIGRAM(S): 100 INJECTION SUBCUTANEOUS at 11:28

## 2021-02-12 RX ADMIN — Medication 0.5 MILLIGRAM(S): at 21:28

## 2021-02-12 RX ADMIN — PREGABALIN 1000 MICROGRAM(S): 225 CAPSULE ORAL at 18:23

## 2021-02-12 RX ADMIN — Medication 1000 UNIT(S): at 11:28

## 2021-02-12 RX ADMIN — LIDOCAINE 1 PATCH: 4 CREAM TOPICAL at 23:11

## 2021-02-12 NOTE — PROGRESS NOTE ADULT - SUBJECTIVE AND OBJECTIVE BOX
Interval History:  21: Overnight had episode of decreased responsiveness, left sided weakness, aphasia.  She was also noted to have some tremors/twitching of right upper extremity.    MEDICATIONS  (STANDING):  ALPRAZolam 0.125 milliGRAM(s) Oral daily  ALPRAZolam 0.5 milliGRAM(s) Oral at bedtime  aspirin  chewable 81 milliGRAM(s) Oral daily  carvedilol 25 milliGRAM(s) Oral every 12 hours  cholecalciferol Oral Tab/Cap - Peds 1000 Unit(s) Oral daily  clopidogrel Tablet 75 milliGRAM(s) Oral daily  cyanocobalamin 1000 MICROGram(s) Oral daily  enoxaparin Injectable 40 milliGRAM(s) SubCutaneous daily  gabapentin 100 milliGRAM(s) Oral two times a day  isosorbide   mononitrate ER Tablet (IMDUR) 30 milliGRAM(s) Oral daily  levETIRAcetam  IVPB 250 milliGRAM(s) IV Intermittent every 12 hours  levothyroxine 100 MICROGram(s) Oral daily  lidocaine   Patch 1 Patch Transdermal daily  mirtazapine 3.75 milliGRAM(s) Oral at bedtime  pantoprazole    Tablet 40 milliGRAM(s) Oral before breakfast  senna 2 Tablet(s) Oral at bedtime  spironolactone 25 milliGRAM(s) Oral daily    MEDICATIONS  (PRN):  acetaminophen   Tablet .. 650 milliGRAM(s) Oral every 6 hours PRN Moderate Pain (4 - 6)  polyethylene glycol 3350 17 Gram(s) Oral daily PRN Constipation      Allergies    amlodipine (Unknown)  clonidine (Unknown)  Florinef Acetate (Unknown)  hydrocodone (Unknown)  Levatol (Unknown)  Lipitor (Unknown)  Lotrel (Unknown)  methylPREDNISolone (Unknown)  morphine (Other)  Plaquenil (Unknown)  statins (Other (Unknown))  sulfa drugs (Rash)    Intolerances        PHYSICAL EXAM:  Vital Signs Last 24 Hrs  T(F): 97.6 (21 @ 08:32)  HR: 73 (21 @ 08:32)  BP: 142/42 (21 @ 08:32)  RR: 17 (21 @ 08:32)    GENERAL: NAD, well-groomed, well-developed  HEAD:  Atraumatic, Normocephalic  Neuro:  Awake, alert, no aphasia  CN: PERRL, EOMI, no nystagmus, slight decreased nasolabial fold on left,  tongue protrudes in the midline  motor: normal tone, testing limited by lack of effort, but at this time she appears to be moving all extremities equally  sensory: subjective decreased to light touch on right leg compared to left  coordination: finger to nose intact bilaterally  DTRs: hypoactive but symmetric          Urinalysis Basic - ( 2021 19:56 )    Color: Yellow / Appearance: Clear / S.010 / pH: x  Gluc: x / Ketone: Negative  / Bili: Negative / Urobili: Negative mg/dL   Blood: x / Protein: Negative mg/dL / Nitrite: Negative   Leuk Esterase: Small / RBC: 3-5 /HPF / WBC 3-5   Sq Epi: x / Non Sq Epi: Occasional / Bacteria: Occasional        RADIOLOGY & ADDITIONAL STUDIES:  CT head no contrast 20:  Head CT without contrast  1.  No acute intracranial hemorrhage, extra-axial collection, hydrocephalus, midline shift or space-occupying mass lesion.  2.  Small focus of encephalomalacia in the anteromedial left frontal lobe adjoining the left frontal horn extending to the cortex, likely represents an evolved infarct from prior examinations as described.  3.  No evidence of posterior reversible encephalopathy in the clinical setting of hypertension.  4.  Chronic and incidental findings as detailed above.      MRI head no contrast 21:  There is no mass, acute intracranial hemorrhage, or acute infarction. Chronic infarction and chronic microvascular ischemic changes as described.      CT PERFUSION 21  1.  No core infarct or ischemic penumbra is identified by CT technique.  2.  There is a Tmax >4s abnormality with volume of 35 mL located in the right posterior cerebral artery vascular territory; this appears associated with elevations rCBV and rCBF in this region, suggesting that the findings are compensated.  3. Follow-up MRI may be obtained for further evaluation.    CT ANGIOGRAPHY NECK 21:  1.  Cervical vasculature is patent without evidence of dissection  2.  There is large amount of noncalcified plaque in the proximal left internal carotid artery associated with moderate versus moderate to severe stenosis that measures approximately 65% using NASCET criteria.  3.  Borderline mild stenosis in the proximal right internal carotid artery measuring approximately 25% using NASCET criteria.  4.  Short segment moderate stenosis at the origin of the left vertebral artery.    CT ANGIOGRAPHY BRAIN 21  1.  Anterior circulation: The intracranial internal carotid arteries, anterior cerebral arteries and middle cerebral arteries are patent  bilaterally.  There is moderate stenosis in the M1 segment of the left middle cerebral artery.  There is mild stenosis in the M1 segment of the right middle cerebral artery.  There are mild stenoses in the distal cavernous and proximal supraclinoid segments of both internal carotid arteries.

## 2021-02-12 NOTE — PROGRESS NOTE ADULT - ASSESSMENT
89 y/o F w/ PMH of CAD s/p PCI, HTN, dyslipidemia, CVA, hypothyroidism, GERD, macular degeneration, orthostatic hypotension, p/w hypertensive urgency and weakness. Patient states her BP has been in the 200s intermittently over the last few days.     2/12/21:  -Had episode of aphasia and left sided weakness overnight with fairly rapid improvement of symptoms  -Unusual for aphasia to be associated with left sided weakness but cannot r/o TIA vs focal seizures  -No acute stroke seen on MRI brain of 2/8/21  -Will repeat MRI brain  -Routine EEG  -No major stenosis on CTA head and neck   -Continue aspirin for now. If MRI brain shows new infarct or EEG is normal and TIA suspected, can change aspirin to Plavix      Dr. Danielson will cover neurology service on 2/13 and Dr. Yung from 2/14 onward if additional follow up is needed.     89 y/o F w/ PMH of CAD s/p PCI, HTN, dyslipidemia, CVA, hypothyroidism, GERD, macular degeneration, orthostatic hypotension, p/w hypertensive urgency and weakness. Patient states her BP has been in the 200s intermittently over the last few days.     2/12/21:  -Had episode of aphasia and left sided weakness overnight with fairly rapid improvement of symptoms  -Unusual for aphasia to be associated with left sided weakness but cannot r/o TIA vs focal seizures  -No acute stroke seen on MRI brain of 2/8/21  -Will repeat MRI brain  -Was started on low dose Keppra overnight for possible seizure. Continue Keppra 250 mg BID for now.  -Routine EEG  -No major stenosis on CTA head and neck   -Continue aspirin for now. If MRI brain shows new infarct or EEG is normal and TIA suspected, can change aspirin to Plavix      Dr. Danielson will cover neurology service on 2/13 and Dr. Yung from 2/14 onward if additional follow up is needed.

## 2021-02-12 NOTE — PROGRESS NOTE ADULT - SUBJECTIVE AND OBJECTIVE BOX
CHIEF COMPLAINT/DIAGNOSIS: hypertensive urgency and weakness    HPI: 89 y/o F w/ PMH of CAD s/p PCI, HTN, dyslipidemia, CVA, hypothyroidism, GERD, macular degeneration, orthostatic hypotension, p/w hypertensive urgency and weakness. Patient states her BP has been in the 200s intermittently over the last few days. Today in the morning it was 200s again and she was referred to ED. As per ED chart patient had weakness around 10am. However, patient herself denies to me that weakness is new, states she has chronic RLE weakness since previous CVA. Denies any new weakness, facial droop, slurred speech or sensory deficits. Denies CP, SOB, cough, runny nose, sore throat, nausea, vomiting, abdominal pain, fever, chills     2/7 - Patient in ER with urgent hypertension, now under control in ER.  Has right residual weakness and some questionable face weakness-She had questionable neurological weakness worked up by neurology-Unclear if patient has a new neurological event vs recrudescence of prior stroke symptoms in setting of uncontrolled HTN.  There is no indication of new infarct on CT head and can get MRI brain (but this is unclear at this time).  Patient with prior PAFib in the past-only short episodes documented but no AC.  Unknown bleeding risk but patient is a fall risk-has had orthostatic hypotension and syncope in the past  2/8 - c/o anxiety related to not being able to take care of hrslef and aides not coming. also w/ RT facial numbness and RT eye double vision.   2/9 still w/ weakness. no other complaints.  2/10 - c/o of fingers being stiff/tips blue, LEFT knee pain and stiffness. BP still elevated - asymptomatic. weakness, unable to carry out her ADLs anymore.  2/11 - still w/ weakness. LT knee pain/stiff.  2/12 -     REVIEW OF SYSTEMS:  All other review of systems is negative unless indicated above    PHYSICAL EXAM:  Constitutional: Awake and alert, well-developed  HEENT: [place], MMM  Neck: Soft and supple, No LAD, No JVD  Respiratory: Breath sounds are clear bilaterally, No wheezing, rales or rhonchi  Cardiovascular: S1 and S2, regular rate and rhythm, no Murmurs, gallops or rubs  Gastrointestinal: Bowel Sounds present, soft, nontender, nondistended, no guarding, no rebound  Extremities: trace b/l peripheral edema  Vascular: 2+ peripheral pulses  Neurological: A/O x 3, no focal deficits. fluent speech, no facial droop, EOMI. moves all extremities   Musculoskeletal: 5/5 strength b/l upper and lower extremities. weakness. fingers tips blue. likely Raynaud's   Skin: No rashes    Vital Signs Last 24 Hrs  T(C): 36.4 (12 Feb 2021 08:32), Max: 36.4 (11 Feb 2021 22:30)  T(F): 97.6 (12 Feb 2021 08:32), Max: 97.6 (11 Feb 2021 22:30)  HR: 73 (12 Feb 2021 08:32) (72 - 93)  BP: 142/42 (12 Feb 2021 08:32) (133/49 - 184/69)  BP(mean): 86 (12 Feb 2021 04:58) (86 - 86)  RR: 17 (12 Feb 2021 08:32) (17 - 18)  SpO2: 99% (12 Feb 2021 08:32) (98% - 99%) -- room air    LABS: All Labs Reviewed    RADIOLOGY:  < from: MR Head No Cont (02.08.21 @ 15:14) >  IMPRESSION:  There is no mass, acute intracranial hemorrhage, or acute infarction. Chronic infarction and chronic microvascular ischemic changes as described.  < end of copied text >    < from: Xray Chest 1 View- PORTABLE-Urgent (Xray Chest 1 View- PORTABLE-Urgent .) (02.06.21 @ 17:36) >  FINDINGS:  The lungs are clear of airspace consolidations or effusions. No pneumothorax.  The heart and mediastinum are within normal limits.  Atherosclerotic calcified intimal wall plaques within nondilated thoracic aorta  Visualized osseous structures are intact.  < end of copied text >    < from: CT Head No Cont (02.06.21 @ 17:36) >  Impression:  Head CT without contrast  1.  No acute intracranial hemorrhage, extra-axial collection, hydrocephalus, midline shift or space-occupying mass lesion.  2.  Small focus of encephalomalacia in the anteromedial left frontal lobe adjoining the left frontal horn extending to the cortex, likely represents an evolved infarct from prior examinations as described.  3.  No evidence of posterior reversible encephalopathy in the clinical setting of hypertension.  4.  Chronic and incidental findings as detailed above.  < end of copied text >    ECHOCARDIOGRAM:  < from: TTE Echo Complete w/o Contrast w/ Doppler (11.09.20 @ 09:30) >   Left ventricular wall motion is normal. Estimated left ventricular   ejection fraction is 65-70 %.   The left atrium is normal.   Normal appearing right atrium.   Normal appearing right ventricle structure and function.   The aortic valve appears mildly sclerotic. Valve opening seems to be   normal.   Fibrocalcific changes noted to the mitral valve leaflets with preserved   leaflet excursion. Mitral annular calcification.   Mild mitral regurgitation.   Normal appearing tricuspid valve structure and function.   Trace tricuspid valve regurgitation is present.   Pulmonic valve not well seen, probably normal pulmonic valve function.   No evidence of pericardial effusion.   All visualized extra cardiac structures appears to be normal.  < end of copied text >    ECG:  < from: 12 Lead ECG (02.06.21 @ 15:58) >  Diagnosis Line Sinus rhythm with 1st degree A-V block  Abnormal ECG  When compared with ECG of 12-JAN-2021 10:32,  Sinus rhythm is no longer with 2nd degree A-V block (Mobitz I)  < end of copied text >    MEDICATIONS  (STANDING):  ALPRAZolam 0.125 milliGRAM(s) Oral daily  ALPRAZolam 0.5 milliGRAM(s) Oral at bedtime  aspirin  chewable 81 milliGRAM(s) Oral daily  cholecalciferol Oral Tab/Cap - Peds 1000 Unit(s) Oral daily  cyanocobalamin 1000 MICROGram(s) Oral daily  enoxaparin Injectable 40 milliGRAM(s) SubCutaneous daily  gabapentin 100 milliGRAM(s) Oral two times a day  hydrALAZINE 25 milliGRAM(s) Oral two times a day  levothyroxine 100 MICROGram(s) Oral daily  mirtazapine 3.75 milliGRAM(s) Oral at bedtime  pantoprazole    Tablet 40 milliGRAM(s) Oral before breakfast  senna 2 Tablet(s) Oral at bedtime  spironolactone 25 milliGRAM(s) Oral daily    MEDICATIONS  (PRN):  acetaminophen   Tablet .. 650 milliGRAM(s) Oral every 6 hours PRN Moderate Pain (4 - 6)  polyethylene glycol 3350 17 Gram(s) Oral daily PRN Constipation    TELEMETRY REVIEW:  2/8: sinus 50-70s, HB overnight   2/9: sinus no further  Wenckebach on tele.  2/10: sinus 70s, no further  Wenckebach on tele.  2/11: short run of Wenckebach yesterday at 6pm. sinus   2/12:    ASSESSMENT AND PLAN:    89 y/o F w/ PMH of CAD s/p PCI, HTN, dyslipidemia, CVA, hypothyroidism, GERD, macular degeneration, orthostatic hypotension, p/w hypertensive urgency and weakness. Patient states her BP has been in the 200s intermittently over the last few days.     2/12: Code Stroke overnight. Had episode of aphasia and left sided weakness overnight with fairly rapid improvement of symptoms. Repeat MRI brain. Started on low dose Keppra overnight for possible seizure. Continue Keppra 250 mg BID for now. check EEG.    1) HTN urgency   possible underlying uncontrolled anxiety component as well.  - Cont. BB, spirolactone   - add low dose standing xanax daily, and standing PM dose (home dose)  - f/u echo > EF 65-70 %  - Cardio eval appreciated  2/10: sbp > 180. adjusted BP meds. BB 50/25HS, add hydralazine BID. monitor BP.  2/11: switched to Coreg 12.5mg BID. Increase hydralazine to 25mg TID  2/12: pt. refusing hydralazine. d/c increase coreg to BID. add Imdur 30mg    2) Blurred Vision, weakness  -- unclear etiology.  - Unclear if patient has a new neurological event vs recrudescence of prior stroke symptoms in setting of uncontrolled HTN  - f/u MRI negative  - check orthostatic VS >> positive. repeat OS VS this AM >still positive but improved and asymptomatic   - BP management.  - Infectious w/u negative: CXR, UA negative   - Cont. ASA, allergy to statin - on pravastatin (non formulary here)   - neuro f/u appreciated     3) Orthostatic hypotension  - IVF. repeat still positive but improved and asymptomatic     4) Wenckebach  - episode on 2/8 and 2/10  EP eval - no further interventions.     5) Anxiety / Depression  - Cont. Xanax prn. encouraged to take daily xanax low dose to avoid sedative effects.  - start low dose Remeron     6) Stage II decubitus ulcer   - Cont. wound care.    7) DVT ppx - SQ Lovenox    2/11 ~~ (Adama, Son) contacted/ updated. all questions addressed  GOC: DNR/ DNI - [facility] CHIEF COMPLAINT/DIAGNOSIS: hypertensive urgency and weakness    HPI: 89 y/o F w/ PMH of CAD s/p PCI, HTN, dyslipidemia, CVA, hypothyroidism, GERD, macular degeneration, orthostatic hypotension, p/w hypertensive urgency and weakness. Patient states her BP has been in the 200s intermittently over the last few days. Today in the morning it was 200s again and she was referred to ED. As per ED chart patient had weakness around 10am. However, patient herself denies to me that weakness is new, states she has chronic RLE weakness since previous CVA. Denies any new weakness, facial droop, slurred speech or sensory deficits. Denies CP, SOB, cough, runny nose, sore throat, nausea, vomiting, abdominal pain, fever, chills     2/7 - Patient in ER with urgent hypertension, now under control in ER.  Has right residual weakness and some questionable face weakness-She had questionable neurological weakness worked up by neurology-Unclear if patient has a new neurological event vs recrudescence of prior stroke symptoms in setting of uncontrolled HTN.  There is no indication of new infarct on CT head and can get MRI brain (but this is unclear at this time).  Patient with prior PAFib in the past-only short episodes documented but no AC.  Unknown bleeding risk but patient is a fall risk-has had orthostatic hypotension and syncope in the past  2/8 - c/o anxiety related to not being able to take care of hrslef and aides not coming. also w/ RT facial numbness and RT eye double vision.   2/9 still w/ weakness. no other complaints.  2/10 - c/o of fingers being stiff/tips blue, LEFT knee pain and stiffness. BP still elevated - asymptomatic. weakness, unable to carry out her ADLs anymore.  2/11 - still w/ weakness. LT knee pain/stiff.  2/12 - sill w/ generalized weakness, speech fluent. no deficits w/ strength bilaterally, +BM    REVIEW OF SYSTEMS:  All other review of systems is negative unless indicated above    PHYSICAL EXAM:  Constitutional: Awake and alert, well-developed  HEENT: [place], MMM  Neck: Soft and supple, No LAD, No JVD  Respiratory: Breath sounds are clear bilaterally, No wheezing, rales or rhonchi  Cardiovascular: S1 and S2, regular rate and rhythm, no Murmurs, gallops or rubs  Gastrointestinal: Bowel Sounds present, soft, nontender, nondistended, no guarding, no rebound  Extremities: trace b/l peripheral edema  Vascular: 2+ peripheral pulses  Neurological: A/O x 3, no focal deficits. fluent speech, no facial droop, EOMI. moves all extremities. weakness, generalized   Musculoskeletal: 5/5 strength b/l upper and lower extremities. weakness. fingers tips blue. likely Raynaud's   Skin: No rashes    Vital Signs Last 24 Hrs  T(C): 36.4 (12 Feb 2021 08:32), Max: 36.4 (11 Feb 2021 22:30)  T(F): 97.6 (12 Feb 2021 08:32), Max: 97.6 (11 Feb 2021 22:30)  HR: 73 (12 Feb 2021 08:32) (72 - 93)  BP: 142/42 (12 Feb 2021 08:32) (133/49 - 184/69)  BP(mean): 86 (12 Feb 2021 04:58) (86 - 86)  RR: 17 (12 Feb 2021 08:32) (17 - 18)  SpO2: 99% (12 Feb 2021 08:32) (98% - 99%) -- room air    LABS: All Labs Reviewed    RADIOLOGY:  < from: MR Head No Cont (02.08.21 @ 15:14) >  IMPRESSION:  There is no mass, acute intracranial hemorrhage, or acute infarction. Chronic infarction and chronic microvascular ischemic changes as described.  < end of copied text >    < from: Xray Chest 1 View- PORTABLE-Urgent (Xray Chest 1 View- PORTABLE-Urgent .) (02.06.21 @ 17:36) >  FINDINGS:  The lungs are clear of airspace consolidations or effusions. No pneumothorax.  The heart and mediastinum are within normal limits.  Atherosclerotic calcified intimal wall plaques within nondilated thoracic aorta  Visualized osseous structures are intact.  < end of copied text >    < from: CT Head No Cont (02.06.21 @ 17:36) >  Impression:  Head CT without contrast  1.  No acute intracranial hemorrhage, extra-axial collection, hydrocephalus, midline shift or space-occupying mass lesion.  2.  Small focus of encephalomalacia in the anteromedial left frontal lobe adjoining the left frontal horn extending to the cortex, likely represents an evolved infarct from prior examinations as described.  3.  No evidence of posterior reversible encephalopathy in the clinical setting of hypertension.  4.  Chronic and incidental findings as detailed above.  < end of copied text >    ECHOCARDIOGRAM:  < from: TTE Echo Complete w/o Contrast w/ Doppler (11.09.20 @ 09:30) >   Left ventricular wall motion is normal. Estimated left ventricular   ejection fraction is 65-70 %.   The left atrium is normal.   Normal appearing right atrium.   Normal appearing right ventricle structure and function.   The aortic valve appears mildly sclerotic. Valve opening seems to be   normal.   Fibrocalcific changes noted to the mitral valve leaflets with preserved   leaflet excursion. Mitral annular calcification.   Mild mitral regurgitation.   Normal appearing tricuspid valve structure and function.   Trace tricuspid valve regurgitation is present.   Pulmonic valve not well seen, probably normal pulmonic valve function.   No evidence of pericardial effusion.   All visualized extra cardiac structures appears to be normal.  < end of copied text >    ECG:  < from: 12 Lead ECG (02.06.21 @ 15:58) >  Diagnosis Line Sinus rhythm with 1st degree A-V block  Abnormal ECG  When compared with ECG of 12-JAN-2021 10:32,  Sinus rhythm is no longer with 2nd degree A-V block (Mobitz I)  < end of copied text >    MEDICATIONS  (STANDING):  ALPRAZolam 0.125 milliGRAM(s) Oral daily  ALPRAZolam 0.5 milliGRAM(s) Oral at bedtime  aspirin  chewable 81 milliGRAM(s) Oral daily  cholecalciferol Oral Tab/Cap - Peds 1000 Unit(s) Oral daily  cyanocobalamin 1000 MICROGram(s) Oral daily  enoxaparin Injectable 40 milliGRAM(s) SubCutaneous daily  gabapentin 100 milliGRAM(s) Oral two times a day  hydrALAZINE 25 milliGRAM(s) Oral two times a day  levothyroxine 100 MICROGram(s) Oral daily  mirtazapine 3.75 milliGRAM(s) Oral at bedtime  pantoprazole    Tablet 40 milliGRAM(s) Oral before breakfast  senna 2 Tablet(s) Oral at bedtime  spironolactone 25 milliGRAM(s) Oral daily    MEDICATIONS  (PRN):  acetaminophen   Tablet .. 650 milliGRAM(s) Oral every 6 hours PRN Moderate Pain (4 - 6)  polyethylene glycol 3350 17 Gram(s) Oral daily PRN Constipation    TELEMETRY REVIEW:  2/8: sinus 50-70s, HB overnight   2/9: sinus no further  Wenckebach on tele.  2/10: sinus 70s, no further  Wenckebach on tele.  2/11: short run of Wenckebach yesterday at 6pm. sinus   2/12:    ASSESSMENT AND PLAN:    89 y/o F w/ PMH of CAD s/p PCI, HTN, dyslipidemia, CVA, hypothyroidism, GERD, macular degeneration, orthostatic hypotension, p/w hypertensive urgency and weakness. Patient states her BP has been in the 200s intermittently over the last few days.     2/12: Code Stroke overnight. Had episode of aphasia and left sided weakness overnight with fairly rapid improvement of symptoms. Repeat MRI brain. Started on low dose Keppra overnight for possible seizure. Continue Keppra 250 mg BID for now. check EEG.    1) HTN urgency   possible underlying uncontrolled anxiety component as well.  - Cont. BB, spirolactone   - add low dose standing xanax daily, and standing PM dose (home dose)  - f/u echo > EF 65-70 %  - Cardio eval appreciated  2/10: sbp > 180. adjusted BP meds. BB 50/25HS, add hydralazine BID. monitor BP.  2/11: switched to Coreg 12.5mg BID. Increase hydralazine to 25mg TID  2/12: pt. refusing hydralazine. d/c increase coreg to BID. add Imdur 30mg    2) Blurred Vision, weakness  -- unclear etiology.  - Unclear if patient has a new neurological event vs recrudescence of prior stroke symptoms in setting of uncontrolled HTN  - f/u MRI negative  - check orthostatic VS >> positive. repeat OS VS this AM >still positive but improved and asymptomatic   - BP management.  - Infectious w/u negative: CXR, UA negative   - Cont. ASA, allergy to statin - on pravastatin (non formulary here)   - neuro f/u appreciated   2/12: Code Stroke overnight. Had episode of aphasia and left sided weakness overnight with fairly rapid improvement of symptoms. Repeat MRI brain. Started on low dose Keppra overnight for possible seizure. Continue Keppra 250 mg BID for now. check EEG.    3) Orthostatic hypotension  - IVF. repeat still positive but improved and asymptomatic     4) Wenckebach  - episode on 2/8 and 2/10  EP eval - no further interventions.     5) Anxiety / Depression  - Cont. Xanax prn. encouraged to take daily xanax low dose to avoid sedative effects.  - start low dose Remeron     6) Stage II decubitus ulcer   - Cont. wound care.    7) DVT ppx - SQ Lovenox    2/11 ~~ (Adama, Son) contacted/ updated. all questions addressed  GOC: DNR/ DNI - [facility]

## 2021-02-13 LAB
ANION GAP SERPL CALC-SCNC: 8 MMOL/L — SIGNIFICANT CHANGE UP (ref 5–17)
BUN SERPL-MCNC: 24 MG/DL — HIGH (ref 7–23)
CALCIUM SERPL-MCNC: 8.9 MG/DL — SIGNIFICANT CHANGE UP (ref 8.5–10.1)
CHLORIDE SERPL-SCNC: 108 MMOL/L — SIGNIFICANT CHANGE UP (ref 96–108)
CO2 SERPL-SCNC: 24 MMOL/L — SIGNIFICANT CHANGE UP (ref 22–31)
CREAT SERPL-MCNC: 0.94 MG/DL — SIGNIFICANT CHANGE UP (ref 0.5–1.3)
GLUCOSE SERPL-MCNC: 102 MG/DL — HIGH (ref 70–99)
HCT VFR BLD CALC: 34.8 % — SIGNIFICANT CHANGE UP (ref 34.5–45)
HGB BLD-MCNC: 10.4 G/DL — LOW (ref 11.5–15.5)
MAGNESIUM SERPL-MCNC: 2.4 MG/DL — SIGNIFICANT CHANGE UP (ref 1.6–2.6)
MCHC RBC-ENTMCNC: 25.2 PG — LOW (ref 27–34)
MCHC RBC-ENTMCNC: 29.9 GM/DL — LOW (ref 32–36)
MCV RBC AUTO: 84.5 FL — SIGNIFICANT CHANGE UP (ref 80–100)
PLATELET # BLD AUTO: 276 K/UL — SIGNIFICANT CHANGE UP (ref 150–400)
POTASSIUM SERPL-MCNC: 4.2 MMOL/L — SIGNIFICANT CHANGE UP (ref 3.5–5.3)
POTASSIUM SERPL-SCNC: 4.2 MMOL/L — SIGNIFICANT CHANGE UP (ref 3.5–5.3)
RBC # BLD: 4.12 M/UL — SIGNIFICANT CHANGE UP (ref 3.8–5.2)
RBC # FLD: 16.2 % — HIGH (ref 10.3–14.5)
SODIUM SERPL-SCNC: 140 MMOL/L — SIGNIFICANT CHANGE UP (ref 135–145)
WBC # BLD: 8.36 K/UL — SIGNIFICANT CHANGE UP (ref 3.8–10.5)
WBC # FLD AUTO: 8.36 K/UL — SIGNIFICANT CHANGE UP (ref 3.8–10.5)

## 2021-02-13 PROCEDURE — 99232 SBSQ HOSP IP/OBS MODERATE 35: CPT

## 2021-02-13 RX ADMIN — CARVEDILOL PHOSPHATE 25 MILLIGRAM(S): 80 CAPSULE, EXTENDED RELEASE ORAL at 11:06

## 2021-02-13 RX ADMIN — LIDOCAINE 1 PATCH: 4 CREAM TOPICAL at 10:02

## 2021-02-13 RX ADMIN — CARVEDILOL PHOSPHATE 25 MILLIGRAM(S): 80 CAPSULE, EXTENDED RELEASE ORAL at 22:27

## 2021-02-13 RX ADMIN — Medication 81 MILLIGRAM(S): at 10:00

## 2021-02-13 RX ADMIN — Medication 0.5 MILLIGRAM(S): at 22:27

## 2021-02-13 RX ADMIN — ENOXAPARIN SODIUM 40 MILLIGRAM(S): 100 INJECTION SUBCUTANEOUS at 10:03

## 2021-02-13 RX ADMIN — LIDOCAINE 1 PATCH: 4 CREAM TOPICAL at 19:00

## 2021-02-13 RX ADMIN — Medication 1000 UNIT(S): at 09:59

## 2021-02-13 RX ADMIN — Medication 100 MICROGRAM(S): at 06:12

## 2021-02-13 RX ADMIN — GABAPENTIN 100 MILLIGRAM(S): 400 CAPSULE ORAL at 09:59

## 2021-02-13 RX ADMIN — SPIRONOLACTONE 25 MILLIGRAM(S): 25 TABLET, FILM COATED ORAL at 10:00

## 2021-02-13 RX ADMIN — LIDOCAINE 1 PATCH: 4 CREAM TOPICAL at 22:00

## 2021-02-13 RX ADMIN — CLOPIDOGREL BISULFATE 75 MILLIGRAM(S): 75 TABLET, FILM COATED ORAL at 10:03

## 2021-02-13 RX ADMIN — SENNA PLUS 2 TABLET(S): 8.6 TABLET ORAL at 22:27

## 2021-02-13 RX ADMIN — PREGABALIN 1000 MICROGRAM(S): 225 CAPSULE ORAL at 10:00

## 2021-02-13 RX ADMIN — GABAPENTIN 100 MILLIGRAM(S): 400 CAPSULE ORAL at 22:27

## 2021-02-13 RX ADMIN — ISOSORBIDE MONONITRATE 30 MILLIGRAM(S): 60 TABLET, EXTENDED RELEASE ORAL at 09:59

## 2021-02-13 RX ADMIN — Medication 650 MILLIGRAM(S): at 14:24

## 2021-02-13 RX ADMIN — PANTOPRAZOLE SODIUM 40 MILLIGRAM(S): 20 TABLET, DELAYED RELEASE ORAL at 06:12

## 2021-02-13 RX ADMIN — MIRTAZAPINE 3.75 MILLIGRAM(S): 45 TABLET, ORALLY DISINTEGRATING ORAL at 22:27

## 2021-02-13 RX ADMIN — LEVETIRACETAM 400 MILLIGRAM(S): 250 TABLET, FILM COATED ORAL at 09:59

## 2021-02-13 NOTE — PROGRESS NOTE ADULT - ASSESSMENT
89 y/o woman  PMH of CAD s/p PCI, HTN, dyslipidemia, CVA, hypothyroidism, GERD, macular degeneration, orthostatic hypotension, p/w hypertensive urgency and weakness. episode of aphasia and left sided weakness, improvement of symptoms. No acute stroke seen on MRI brain of 2/8/21 and repeat 2/12. EEG x 2 are normal.   Suggest:   can D/C Keppra, doubt seizure  continue asa,statin  supportive care

## 2021-02-13 NOTE — PROGRESS NOTE ADULT - SUBJECTIVE AND OBJECTIVE BOX
CHIEF COMPLAINT/DIAGNOSIS: hypertensive urgency and weakness    HPI: 89 y/o F w/ PMH of CAD s/p PCI, HTN, dyslipidemia, CVA, hypothyroidism, GERD, macular degeneration, orthostatic hypotension, p/w hypertensive urgency and weakness. Patient states her BP has been in the 200s intermittently over the last few days. Today in the morning it was 200s again and she was referred to ED. As per ED chart patient had weakness around 10am. However, patient herself denies to me that weakness is new, states she has chronic RLE weakness since previous CVA. Denies any new weakness, facial droop, slurred speech or sensory deficits. Denies CP, SOB, cough, runny nose, sore throat, nausea, vomiting, abdominal pain, fever, chills     SUBJECTIVE: *    REVIEW OF SYSTEMS:  All other review of systems is negative unless indicated above    PHYSICAL EXAM:  Constitutional: Awake and alert, well-developed  HEENT: [place], MMM  Neck: Soft and supple, No LAD, No JVD  Respiratory: Breath sounds are clear bilaterally, No wheezing, rales or rhonchi  Cardiovascular: S1 and S2, regular rate and rhythm, no Murmurs, gallops or rubs  Gastrointestinal: Bowel Sounds present, soft, nontender, nondistended, no guarding, no rebound  Extremities: trace b/l peripheral edema  Vascular: 2+ peripheral pulses  Neurological: A/O x 3, no focal deficits. fluent speech, no facial droop, EOMI. moves all extremities. weakness, generalized   Musculoskeletal: 5/5 strength b/l upper and lower extremities. weakness. fingers tips blue. likely Raynaud's   Skin: No rashes    T(C): 36.4 (02-13-21 @ 19:30), Max: 36.5 (02-13-21 @ 08:58)  HR: 70 (02-13-21 @ 19:30) (67 - 70)  BP: 126/46 (02-13-21 @ 19:30) (121/52 - 130/53)  RR: 18 (02-13-21 @ 19:30) (18 - 18)  SpO2: 98% (02-13-21 @ 19:30) (98% - 100%)    LABS: All Labs Reviewed    RADIOLOGY:  < from: MR Head No Cont (02.08.21 @ 15:14) >  IMPRESSION:  There is no mass, acute intracranial hemorrhage, or acute infarction. Chronic infarction and chronic microvascular ischemic changes as described.  < end of copied text >    < from: Xray Chest 1 View- PORTABLE-Urgent (Xray Chest 1 View- PORTABLE-Urgent .) (02.06.21 @ 17:36) >  FINDINGS:  The lungs are clear of airspace consolidations or effusions. No pneumothorax.  The heart and mediastinum are within normal limits.  Atherosclerotic calcified intimal wall plaques within nondilated thoracic aorta  Visualized osseous structures are intact.  < end of copied text >    < from: CT Head No Cont (02.06.21 @ 17:36) >  Impression:  Head CT without contrast  1.  No acute intracranial hemorrhage, extra-axial collection, hydrocephalus, midline shift or space-occupying mass lesion.  2.  Small focus of encephalomalacia in the anteromedial left frontal lobe adjoining the left frontal horn extending to the cortex, likely represents an evolved infarct from prior examinations as described.  3.  No evidence of posterior reversible encephalopathy in the clinical setting of hypertension.  4.  Chronic and incidental findings as detailed above.  < end of copied text >    ECHOCARDIOGRAM:  < from: TTE Echo Complete w/o Contrast w/ Doppler (11.09.20 @ 09:30) >   Left ventricular wall motion is normal. Estimated left ventricular   ejection fraction is 65-70 %.   The left atrium is normal.   Normal appearing right atrium.   Normal appearing right ventricle structure and function.   The aortic valve appears mildly sclerotic. Valve opening seems to be   normal.   Fibrocalcific changes noted to the mitral valve leaflets with preserved   leaflet excursion. Mitral annular calcification.   Mild mitral regurgitation.   Normal appearing tricuspid valve structure and function.   Trace tricuspid valve regurgitation is present.   Pulmonic valve not well seen, probably normal pulmonic valve function.   No evidence of pericardial effusion.   All visualized extra cardiac structures appears to be normal.  < end of copied text >    ECG:  < from: 12 Lead ECG (02.06.21 @ 15:58) >  Diagnosis Line Sinus rhythm with 1st degree A-V block  Abnormal ECG  When compared with ECG of 12-JAN-2021 10:32,  Sinus rhythm is no longer with 2nd degree A-V block (Mobitz I)  < end of copied text >    MEDICATIONS  (STANDING):  ALPRAZolam 0.125 milliGRAM(s) Oral daily  ALPRAZolam 0.5 milliGRAM(s) Oral at bedtime  aspirin  chewable 81 milliGRAM(s) Oral daily  cholecalciferol Oral Tab/Cap - Peds 1000 Unit(s) Oral daily  cyanocobalamin 1000 MICROGram(s) Oral daily  enoxaparin Injectable 40 milliGRAM(s) SubCutaneous daily  gabapentin 100 milliGRAM(s) Oral two times a day  hydrALAZINE 25 milliGRAM(s) Oral two times a day  levothyroxine 100 MICROGram(s) Oral daily  mirtazapine 3.75 milliGRAM(s) Oral at bedtime  pantoprazole    Tablet 40 milliGRAM(s) Oral before breakfast  senna 2 Tablet(s) Oral at bedtime  spironolactone 25 milliGRAM(s) Oral daily    MEDICATIONS  (PRN):  acetaminophen   Tablet .. 650 milliGRAM(s) Oral every 6 hours PRN Moderate Pain (4 - 6)  polyethylene glycol 3350 17 Gram(s) Oral daily PRN Constipation    TELEMETRY REVIEW:  2/8: sinus 50-70s, HB overnight   2/9: sinus no further  Wenckebach on tele.  2/10: sinus 70s, no further  Wenckebach on tele.  2/11: short run of Wenckebach yesterday at 6pm. sinus   2/12:    ASSESSMENT AND PLAN:    89 y/o F w/ PMH of CAD s/p PCI, HTN, dyslipidemia, CVA, hypothyroidism, GERD, macular degeneration, orthostatic hypotension, p/w hypertensive urgency and weakness. Patient states her BP has been in the 200s intermittently over the last few days.     2/12: Code Stroke overnight. Had episode of aphasia and left sided weakness overnight with fairly rapid improvement of symptoms. Repeat MRI brain. Started on low dose Keppra overnight for possible seizure. Continue Keppra 250 mg BID for now. check EEG.    1) HTN urgency   possible underlying uncontrolled anxiety component as well.  - Cont. BB, spirolactone   - add low dose standing xanax daily, and standing PM dose (home dose)  - f/u echo > EF 65-70 %  - Cardio eval appreciated  2/10: sbp > 180. adjusted BP meds. BB 50/25HS, add hydralazine BID. monitor BP.  2/11: switched to Coreg 12.5mg BID. Increase hydralazine to 25mg TID  2/12: pt. refusing hydralazine. d/c increase coreg to BID. add Imdur 30mg    2) Blurred Vision, weakness  -- unclear etiology.  - Unclear if patient has a new neurological event vs recrudescence of prior stroke symptoms in setting of uncontrolled HTN  - f/u MRI negative  - check orthostatic VS >> positive. repeat OS VS this AM >still positive but improved and asymptomatic   - BP management.  - Infectious w/u negative: CXR, UA negative   - Cont. ASA, allergy to statin - on pravastatin (non formulary here)   - neuro f/u appreciated   2/12: Code Stroke overnight. Had episode of aphasia and left sided weakness overnight with fairly rapid improvement of symptoms. Repeat MRI brain. Started on low dose Keppra overnight for possible seizure. Continue Keppra 250 mg BID for now. check EEG.    3) Orthostatic hypotension  - IVF. repeat still positive but improved and asymptomatic     4) Wenckebach  - episode on 2/8 and 2/10  EP eval - no further interventions.     5) Anxiety / Depression  - Cont. Xanax prn. encouraged to take daily xanax low dose to avoid sedative effects.  - start low dose Remeron     6) Stage II decubitus ulcer   - Cont. wound care.    7) DVT ppx - SQ Lovenox    2/11 ~~ (Adama, Son) contacted/ updated. all questions addressed  GOC: DNR/ DNI - [facility] CHIEF COMPLAINT/DIAGNOSIS: hypertensive urgency and weakness    HPI: 89 y/o F w/ PMH of CAD s/p PCI, HTN, dyslipidemia, CVA, hypothyroidism, GERD, macular degeneration, orthostatic hypotension, p/w hypertensive urgency and weakness. Patient states her BP has been in the 200s intermittently over the last few days. Today in the morning it was 200s again and she was referred to ED. As per ED chart patient had weakness around 10am. However, patient herself denies to me that weakness is new, states she has chronic RLE weakness since previous CVA. Denies any new weakness, facial droop, slurred speech or sensory deficits. Denies CP, SOB, cough, runny nose, sore throat, nausea, vomiting, abdominal pain, fever, chills     SUBJECTIVE: c/o disliking the "new" seizure medicine and says it's giving her a headache, making her feel foggy and "i don't feel well". denies any chest pain or dyspnea.     REVIEW OF SYSTEMS:  All other review of systems is negative unless indicated above    PHYSICAL EXAM:  Constitutional: Awake and alert, well-developed  HEENT: [place], MMM, EOMI  Neck: Soft and supple, No LAD, No JVD  Respiratory: Breath sounds are clear bilaterally, No wheezing, rales or rhonchi  Cardiovascular: S1 and S2, regular rate and rhythm, no Murmurs, gallops or rubs  Gastrointestinal: Bowel Sounds present, soft, nontender, nondistended, no guarding, no rebound  Extremities: trace b/l peripheral edema  Vascular: 2+ peripheral pulses  Neurological: A/O x 3, no focal deficits. fluent speech, no facial droop, EOMI. moves all extremities. weakness, generalized   Musculoskeletal: 5/5 strength b/l upper and lower extremities.   Skin: No rashes, no jaundice     T(C): 36.4 (02-13-21 @ 19:30), Max: 36.5 (02-13-21 @ 08:58)  HR: 70 (02-13-21 @ 19:30) (67 - 70)  BP: 126/46 (02-13-21 @ 19:30) (121/52 - 130/53)  RR: 18 (02-13-21 @ 19:30) (18 - 18)  SpO2: 98% (02-13-21 @ 19:30) (98% - 100%)    LABS: All Labs Reviewed    RADIOLOGY:  < from: MR Head No Cont (02.08.21 @ 15:14) >  IMPRESSION:  There is no mass, acute intracranial hemorrhage, or acute infarction. Chronic infarction and chronic microvascular ischemic changes as described.  < end of copied text >    < from: Xray Chest 1 View- PORTABLE-Urgent (Xray Chest 1 View- PORTABLE-Urgent .) (02.06.21 @ 17:36) >  FINDINGS:  The lungs are clear of airspace consolidations or effusions. No pneumothorax.  The heart and mediastinum are within normal limits.  Atherosclerotic calcified intimal wall plaques within nondilated thoracic aorta  Visualized osseous structures are intact.  < end of copied text >    < from: CT Head No Cont (02.06.21 @ 17:36) >  Impression:  Head CT without contrast  1.  No acute intracranial hemorrhage, extra-axial collection, hydrocephalus, midline shift or space-occupying mass lesion.  2.  Small focus of encephalomalacia in the anteromedial left frontal lobe adjoining the left frontal horn extending to the cortex, likely represents an evolved infarct from prior examinations as described.  3.  No evidence of posterior reversible encephalopathy in the clinical setting of hypertension.  4.  Chronic and incidental findings as detailed above.  < end of copied text >    ECHOCARDIOGRAM:  < from: TTE Echo Complete w/o Contrast w/ Doppler (11.09.20 @ 09:30) >   Left ventricular wall motion is normal. Estimated left ventricular   ejection fraction is 65-70 %.   The left atrium is normal.   Normal appearing right atrium.   Normal appearing right ventricle structure and function.   The aortic valve appears mildly sclerotic. Valve opening seems to be   normal.   Fibrocalcific changes noted to the mitral valve leaflets with preserved   leaflet excursion. Mitral annular calcification.   Mild mitral regurgitation.   Normal appearing tricuspid valve structure and function.   Trace tricuspid valve regurgitation is present.   Pulmonic valve not well seen, probably normal pulmonic valve function.   No evidence of pericardial effusion.   All visualized extra cardiac structures appears to be normal.  < end of copied text >    ECG:  < from: 12 Lead ECG (02.06.21 @ 15:58) >  Diagnosis Line Sinus rhythm with 1st degree A-V block  Abnormal ECG  When compared with ECG of 12-JAN-2021 10:32,  Sinus rhythm is no longer with 2nd degree A-V block (Mobitz I)  < end of copied text >    MEDICATIONS  (STANDING):  ALPRAZolam 0.125 milliGRAM(s) Oral daily  ALPRAZolam 0.5 milliGRAM(s) Oral at bedtime  aspirin  chewable 81 milliGRAM(s) Oral daily  cholecalciferol Oral Tab/Cap - Peds 1000 Unit(s) Oral daily  cyanocobalamin 1000 MICROGram(s) Oral daily  enoxaparin Injectable 40 milliGRAM(s) SubCutaneous daily  gabapentin 100 milliGRAM(s) Oral two times a day  hydrALAZINE 25 milliGRAM(s) Oral two times a day  levothyroxine 100 MICROGram(s) Oral daily  mirtazapine 3.75 milliGRAM(s) Oral at bedtime  pantoprazole    Tablet 40 milliGRAM(s) Oral before breakfast  senna 2 Tablet(s) Oral at bedtime  spironolactone 25 milliGRAM(s) Oral daily    MEDICATIONS  (PRN):  acetaminophen   Tablet .. 650 milliGRAM(s) Oral every 6 hours PRN Moderate Pain (4 - 6)  polyethylene glycol 3350 17 Gram(s) Oral daily PRN Constipation    TELEMETRY REVIEW:  2/8: sinus 50-70s, HB overnight   2/9: sinus no further  Wenckebach on tele.  2/10: sinus 70s, no further  Wenckebach on tele.  2/11: short run of Wenckebach yesterday at 6pm. sinus   2/12:    ASSESSMENT AND PLAN:    89 y/o F w/ PMH of CAD s/p PCI, HTN, dyslipidemia, CVA, hypothyroidism, GERD, macular degeneration, orthostatic hypotension, p/w hypertensive urgency and weakness. Patient states her BP has been in the 200s intermittently over the last few days.     1) HTN urgency   possible underlying uncontrolled anxiety component as well.  - Cont. BB, spirolactone   - add low dose standing xanax daily, and standing PM dose (home dose)  - f/u echo > EF 65-70 %  - Cardio eval appreciated  2/10: sbp > 180. adjusted BP meds. BB 50/25HS, add hydralazine BID. monitor BP.  2/11: switched to Coreg 12.5mg BID. Increase hydralazine to 25mg TID  2/12: pt. refusing hydralazine. d/c increase coreg to BID. add Imdur 30mg    2) Blurred Vision, weakness  -- unclear etiology. likely TIA?  - Unclear if patient has a new neurological event vs recrudescence of prior stroke symptoms in setting of uncontrolled HTN  - f/u MRI negative  - check orthostatic VS >> positive. repeat OS VS this AM >still positive but improved and asymptomatic   - BP management.  - Infectious w/u negative: CXR, UA negative   - Cont. ASA, allergy to statin - on pravastatin (non formulary here)   - neuro f/u appreciated   - 2/12: Code Stroke overnight. Had episode of aphasia and left sided weakness overnight with fairly rapid improvement of symptoms  - repeat MRI brain neg for stroke and EEG negative for seizure  - stop keppra as rec by neurology    3) Orthostatic hypotension  - s/p IV fluids, improved    4) Wenckebach  - episode on 2/8 and 2/10  EP eval - no further interventions.     5) Anxiety / Depression  - Cont. Xanax prn. encouraged to take daily xanax low dose to avoid sedative effects.  - started low dose Remeron     6) Stage II decubitus ulcer   - Cont. wound care.    7) DVT ppx - SQ Lovenox    GOC: DNR/ DNI - [facility]    d/w pt's son, Adama, updates given and all questions answered. 2/13    Dispo: discharge planning to rehab, likely monday. will repeat covid swab tomorrow

## 2021-02-13 NOTE — PROGRESS NOTE ADULT - SUBJECTIVE AND OBJECTIVE BOX
HPI:  89 y/o woman PMH of CAD s/p PCI, HTN, dyslipidemia, CVA, hypothyroidism, GERD, macular degeneration, orthostatic hypotension, p/w hypertensive urgency and weakness. Reported episode of decreased responsiveness, left sided weakness, aphasia. She was also noted to have some tremors/twitching of right upper extremity.  Denies headaches, dizziness, no CP, palpitations.    MEDICATIONS  (STANDING):  ALPRAZolam 0.125 milliGRAM(s) Oral daily  ALPRAZolam 0.5 milliGRAM(s) Oral at bedtime  aspirin  chewable 81 milliGRAM(s) Oral daily  carvedilol 25 milliGRAM(s) Oral every 12 hours  cholecalciferol Oral Tab/Cap - Peds 1000 Unit(s) Oral daily  clopidogrel Tablet 75 milliGRAM(s) Oral daily  cyanocobalamin 1000 MICROGram(s) Oral daily  enoxaparin Injectable 40 milliGRAM(s) SubCutaneous daily  gabapentin 100 milliGRAM(s) Oral two times a day  isosorbide   mononitrate ER Tablet (IMDUR) 30 milliGRAM(s) Oral daily  levETIRAcetam  IVPB 250 milliGRAM(s) IV Intermittent every 12 hours  levothyroxine 100 MICROGram(s) Oral daily  lidocaine   Patch 1 Patch Transdermal daily  mirtazapine 3.75 milliGRAM(s) Oral at bedtime  pantoprazole    Tablet 40 milliGRAM(s) Oral before breakfast  senna 2 Tablet(s) Oral at bedtime  spironolactone 25 milliGRAM(s) Oral daily    MEDICATIONS  (PRN):  acetaminophen   Tablet .. 650 milliGRAM(s) Oral every 6 hours PRN Moderate Pain (4 - 6)  polyethylene glycol 3350 17 Gram(s) Oral daily PRN Constipation      Vital Signs Last 24 Hrs  T(C): 36.5 (13 Feb 2021 08:58), Max: 36.5 (13 Feb 2021 08:58)  T(F): 97.7 (13 Feb 2021 08:58), Max: 97.7 (13 Feb 2021 08:58)  HR: 70 (13 Feb 2021 08:58) (70 - 79)  BP: 121/52 (13 Feb 2021 08:58) (121/52 - 127/55)  RR: 18 (13 Feb 2021 08:58) (16 - 18)  SpO2: 99% (13 Feb 2021 08:58) (99% - 100%)  Neurological Exam:  Awake, alert, no aphasia, follows commands  CN: PERRL, EOMI, no nystagmus, slight decreased nasolabial fold on left,  tongue protrudes in the midline  motor: normal tone, testing  appears to be moving all extremities equally, ~3+/5  sensory: symm  light touch   coordination: finger to nose intact bilaterally  DTRs: 1-2/4 bilat      < from: MR Head No Cont (02.12.21 @ 18:05) >  FINDINGS:  There is extensive patchy and small foci of T2/FLAIR hyperintense signal in the periventricular white matter, compatible with moderate chronic microvascular ischemic changes. Very small chronic appearing infarct in the left genu of the corpus callosum is reidentified.  There is no evidence of acute infarct, hemorrhage or mass lesion. There is no midline shift or herniationpattern. No mass effect is found in the brain.    The ventricles, sulci and basal cisterns appear unremarkable.    The vertebral and internal carotid arteries demonstrate expected flow voids indicating their patency.    The paranasal sinuses are clear.    IMPRESSION:    No evidence of acute infarct or acute hemorrhage.  Extensive patchy and small foci of T2/FLAIR hyperintense signal in the periventricular white matter, compatible with moderate chronic microvascular ischemic changes. Very small chronic appearing infarct in the left genu of the corpus callosum is reidentified.    < end of copied text >  < from: CT Angio Neck w/ IV Cont (02.11.21 @ 23:19) >  IMPRESSION:    CT PERFUSION:  1.  No core infarct or ischemic penumbra is identified by CT technique.  2.  There is a Tmax >4s abnormality with volume of 35 mL located in the right posterior cerebral artery vascular territory; this appears associated with elevations rCBV and rCBF in this region, suggesting that the findings are compensated.  3. Follow-up MRI may be obtained for further evaluation.    CT ANGIOGRAPHY NECK:  1.  Cervical vasculature is patent without evidence of dissection  2.  There is large amount of noncalcified plaque in the proximal left internal carotid artery associated with moderate versus moderate to severe stenosis that measures approximately 65% using NASCET criteria.  3.  Borderline mildstenosis in the proximal right internal carotid artery measuring approximately 25% using NASCET criteria.  4.  Short segment moderate stenosis at the origin of the left vertebral artery.    CT ANGIOGRAPHY BRAIN:  1.  Anterior circulation: The intracranial internal carotid arteries, anterior cerebral arteries and middle cerebral arteries are patent patent bilaterally.  There is moderate stenosis in the M1 segment of the left middle cerebral artery.  There is mild stenosis in the M1 segment of the right middle cerebral artery.  There are mild stenoses in the distal cavernous and proximal supraclinoid segments of both internal carotid arteries.    < end of copied text >        < from: EEG Awake or Drowsy (02.12.21 @ 11:30) >  EEG Summary/Classification:  This was a normal EEG study in the awake and drowsy states.    EEG Impression/Clinical Correlate:  No epileptiform activity was seen and no clinical events or seizures were recorded. Consider a repeat study if clinically indicated.    < end of copied text >  < from: EEG Awake and Asleep (01.12.21 @ 10:30) >  INTERPRETATION:  16-channel EEG recording for for this 89-year-old woman with a syncopal episode, rule out seizures. Presently on gabapentin.    The background activity consist of bilateral symmetrical occipitally dominant low amplitude 9-10 Hz activity which attenuates with eye opening. There is bilateral diffuse 15-20, low amplitude activities.  Drowsiness characterized by symmetrical attenuation of the background activity.    No focal slowing or epileptiform activity noted.    Stepped photic stimulation did not demonstrate any abnormalities.    Impression: Normal EEG performed with the patient awake and drowsy    < end of copied text >

## 2021-02-14 PROCEDURE — 99232 SBSQ HOSP IP/OBS MODERATE 35: CPT

## 2021-02-14 RX ORDER — ALPRAZOLAM 0.25 MG
0.5 TABLET ORAL AT BEDTIME
Refills: 0 | Status: DISCONTINUED | OUTPATIENT
Start: 2021-02-14 | End: 2021-02-16

## 2021-02-14 RX ADMIN — PANTOPRAZOLE SODIUM 40 MILLIGRAM(S): 20 TABLET, DELAYED RELEASE ORAL at 05:55

## 2021-02-14 RX ADMIN — PREGABALIN 1000 MICROGRAM(S): 225 CAPSULE ORAL at 10:32

## 2021-02-14 RX ADMIN — SENNA PLUS 2 TABLET(S): 8.6 TABLET ORAL at 21:43

## 2021-02-14 RX ADMIN — CLOPIDOGREL BISULFATE 75 MILLIGRAM(S): 75 TABLET, FILM COATED ORAL at 10:32

## 2021-02-14 RX ADMIN — GABAPENTIN 100 MILLIGRAM(S): 400 CAPSULE ORAL at 10:32

## 2021-02-14 RX ADMIN — Medication 650 MILLIGRAM(S): at 16:23

## 2021-02-14 RX ADMIN — Medication 0.5 MILLIGRAM(S): at 23:45

## 2021-02-14 RX ADMIN — LIDOCAINE 1 PATCH: 4 CREAM TOPICAL at 10:31

## 2021-02-14 RX ADMIN — Medication 100 MICROGRAM(S): at 05:55

## 2021-02-14 RX ADMIN — ENOXAPARIN SODIUM 40 MILLIGRAM(S): 100 INJECTION SUBCUTANEOUS at 10:32

## 2021-02-14 RX ADMIN — ISOSORBIDE MONONITRATE 30 MILLIGRAM(S): 60 TABLET, EXTENDED RELEASE ORAL at 10:32

## 2021-02-14 RX ADMIN — Medication 81 MILLIGRAM(S): at 10:32

## 2021-02-14 RX ADMIN — SPIRONOLACTONE 25 MILLIGRAM(S): 25 TABLET, FILM COATED ORAL at 10:32

## 2021-02-14 RX ADMIN — LIDOCAINE 1 PATCH: 4 CREAM TOPICAL at 19:00

## 2021-02-14 RX ADMIN — LIDOCAINE 1 PATCH: 4 CREAM TOPICAL at 22:00

## 2021-02-14 RX ADMIN — Medication 1000 UNIT(S): at 10:32

## 2021-02-14 RX ADMIN — GABAPENTIN 100 MILLIGRAM(S): 400 CAPSULE ORAL at 21:43

## 2021-02-14 NOTE — PROGRESS NOTE ADULT - SUBJECTIVE AND OBJECTIVE BOX
CHIEF COMPLAINT/DIAGNOSIS: hypertensive urgency and weakness    HPI: 89 y/o F w/ PMH of CAD s/p PCI, HTN, dyslipidemia, CVA, hypothyroidism, GERD, macular degeneration, orthostatic hypotension, p/w hypertensive urgency and weakness. Patient states her BP has been in the 200s intermittently over the last few days. Today in the morning it was 200s again and she was referred to ED. As per ED chart patient had weakness around 10am. However, patient herself denies to me that weakness is new, states she has chronic RLE weakness since previous CVA. Denies any new weakness, facial droop, slurred speech or sensory deficits. Denies CP, SOB, cough, runny nose, sore throat, nausea, vomiting, abdominal pain, fever, chills     SUBJECTIVE: *    REVIEW OF SYSTEMS:  All other review of systems is negative unless indicated above    T(C): 36.6 (02-14-21 @ 08:35), Max: 36.6 (02-14-21 @ 08:35)  HR: 80 (02-14-21 @ 08:35) (67 - 80)  BP: 122/54 (02-14-21 @ 08:35) (122/54 - 130/53)  RR: 18 (02-14-21 @ 08:35) (18 - 18)  SpO2: 98% (02-14-21 @ 08:35) (98% - 100%)    PHYSICAL EXAM:  Constitutional: Awake and alert, well-developed  HEENT: [place] ([place] MMM, EOMI  Neck: Soft and supple, No LAD, No JVD  Respiratory: Breath sounds are clear bilaterally, No wheezing, rales or rhonchi  Cardiovascular: S1 and S2, regular rate and rhythm, no Murmurs, gallops or rubs  Gastrointestinal: Bowel Sounds present, soft, nontender, nondistended, no guarding, no rebound  Extremities: trace b/l peripheral edema  Vascular: 2+ peripheral pulses  Neurological: A/O x 3, no focal deficits. fluent speech, no facial droop, EOMI. moves all extremities. weakness, generalized   Musculoskeletal: 5/5 strength b/l upper and lower extremities.   Skin: No rashes, no jaundice       LABS: All Labs Reviewed    RADIOLOGY:  < from: MR Head No Cont (02.08.21 @ 15:14) >  IMPRESSION:  There is no mass, acute intracranial hemorrhage, or acute infarction. Chronic infarction and chronic microvascular ischemic changes as described.  < end of copied text >    < from: Xray Chest 1 View- PORTABLE-Urgent (Xray Chest 1 View- PORTABLE-Urgent .) (02.06.21 @ 17:36) >  FINDINGS:  The lungs are clear of airspace consolidations or effusions. No pneumothorax.  The heart and mediastinum are within normal limits.  Atherosclerotic calcified intimal wall plaques within nondilated thoracic aorta  Visualized osseous structures are intact.  < end of copied text >    < from: CT Head No Cont (02.06.21 @ 17:36) >  Impression:  Head CT without contrast  1.  No acute intracranial hemorrhage, extra-axial collection, hydrocephalus, midline shift or space-occupying mass lesion.  2.  Small focus of encephalomalacia in the anteromedial left frontal lobe adjoining the left frontal horn extending to the cortex, likely represents an evolved infarct from prior examinations as described.  3.  No evidence of posterior reversible encephalopathy in the clinical setting of hypertension.  4.  Chronic and incidental findings as detailed above.  < end of copied text >    ECHOCARDIOGRAM:  < from: TTE Echo Complete w/o Contrast w/ Doppler (11.09.20 @ 09:30) >   Left ventricular wall motion is normal. Estimated left ventricular   ejection fraction is 65-70 %.   The left atrium is normal.   Normal appearing right atrium.   Normal appearing right ventricle structure and function.   The aortic valve appears mildly sclerotic. Valve opening seems to be   normal.   Fibrocalcific changes noted to the mitral valve leaflets with preserved   leaflet excursion. Mitral annular calcification.   Mild mitral regurgitation.   Normal appearing tricuspid valve structure and function.   Trace tricuspid valve regurgitation is present.   Pulmonic valve not well seen, probably normal pulmonic valve function.   No evidence of pericardial effusion.   All visualized extra cardiac structures appears to be normal.  < end of copied text >    ECG:  < from: 12 Lead ECG (02.06.21 @ 15:58) >  Diagnosis Line Sinus rhythm with 1st degree A-V block  Abnormal ECG  When compared with ECG of 12-JAN-2021 10:32,  Sinus rhythm is no longer with 2nd degree A-V block (Mobitz I)  < end of copied text >    MEDICATIONS  (STANDING):  ALPRAZolam 0.125 milliGRAM(s) Oral daily  ALPRAZolam 0.5 milliGRAM(s) Oral at bedtime  aspirin  chewable 81 milliGRAM(s) Oral daily  cholecalciferol Oral Tab/Cap - Peds 1000 Unit(s) Oral daily  cyanocobalamin 1000 MICROGram(s) Oral daily  enoxaparin Injectable 40 milliGRAM(s) SubCutaneous daily  gabapentin 100 milliGRAM(s) Oral two times a day  hydrALAZINE 25 milliGRAM(s) Oral two times a day  levothyroxine 100 MICROGram(s) Oral daily  mirtazapine 3.75 milliGRAM(s) Oral at bedtime  pantoprazole    Tablet 40 milliGRAM(s) Oral before breakfast  senna 2 Tablet(s) Oral at bedtime  spironolactone 25 milliGRAM(s) Oral daily    MEDICATIONS  (PRN):  acetaminophen   Tablet .. 650 milliGRAM(s) Oral every 6 hours PRN Moderate Pain (4 - 6)  polyethylene glycol 3350 17 Gram(s) Oral daily PRN Constipation    TELEMETRY REVIEW:  2/8: sinus 50-70s, HB overnight   2/9: sinus no further  Wenckebach on tele.  2/10: sinus 70s, no further  Wenckebach on tele.  2/11: short run of Wenckebach yesterday at 6pm. sinus   2/14:     ASSESSMENT AND PLAN:    89 y/o F w/ PMH of CAD s/p PCI, HTN, dyslipidemia, CVA, hypothyroidism, GERD, macular degeneration, orthostatic hypotension, p/w hypertensive urgency and weakness. Patient states her BP has been in the 200s intermittently over the last few days.     1) HTN urgency   possible underlying uncontrolled anxiety component as well.  - add low dose standing xanax daily, and standing PM dose (home dose)  - f/u echo > EF 65-70 %  - Cardio eval appreciated  - cannot tolerate BB, stopped metoprolol. had radha and 2:1 block while on coreg. will stop coreg  - Cont. spirolactone   - started on imdur.   - cannot tolerate many other BP meds due to side effects or allergic reactions or pt refusal    2) Blurred Vision, weakness  -- unclear etiology. likely TIA?  - Unclear if patient has a new neurological event vs recrudescence of prior stroke symptoms in setting of uncontrolled HTN  - f/u MRI negative  - check orthostatic VS >> positive. repeat OS VS this AM >still positive but improved and asymptomatic   - BP management.  - Infectious w/u negative: CXR, UA negative   - Cont. ASA, allergy to statin - on pravastatin (non formulary here)   - neuro f/u appreciated   - 2/12: Code Stroke overnight. Had episode of aphasia and left sided weakness overnight with fairly rapid improvement of symptoms  - repeat MRI brain neg for stroke and EEG negative for seizure  - stop keppra as rec by neurology    3) Orthostatic hypotension  - s/p IV fluids, improved    4) Wenckebach | 2:1 block and sinus radha  - episode on 2/8 and 2/10  EP eval - no further interventions.   -hold BB    5) Anxiety / Depression  - Cont. Xanax prn. encouraged to take daily xanax low dose to avoid sedative effects.  - started low dose Remeron     6) Stage II decubitus ulcer   - Cont. wound care.    7) DVT ppx - SQ Lovenox    GOC: DNR/ DNI - [facility]OL[facility]    d/w pt's son, Adama, updates given and all questions answered. 2/13    Dispo: discharge planning to rehab, likely monday. will repeat covid swab tomorrow   CHIEF COMPLAINT/DIAGNOSIS: hypertensive urgency and weakness    HPI: 89 y/o F w/ PMH of CAD s/p PCI, HTN, dyslipidemia, CVA, hypothyroidism, GERD, macular degeneration, orthostatic hypotension, p/w hypertensive urgency and weakness. Patient states her BP has been in the 200s intermittently over the last few days. Today in the morning it was 200s again and she was referred to ED. As per ED chart patient had weakness around 10am. However, patient herself denies to me that weakness is new, states she has chronic RLE weakness since previous CVA. Denies any new weakness, facial droop, slurred speech or sensory deficits. Denies CP, SOB, cough, runny nose, sore throat, nausea, vomiting, abdominal pain, fever, chills     SUBJECTIVE: says she doesn't feel good. doesn't like being on too many meds. as    REVIEW OF SYSTEMS:  All other review of systems is negative unless indicated above    T(C): 36.6 (02-14-21 @ 08:35), Max: 36.6 (02-14-21 @ 08:35)  HR: 80 (02-14-21 @ 08:35) (67 - 80)  BP: 122/54 (02-14-21 @ 08:35) (122/54 - 130/53)  RR: 18 (02-14-21 @ 08:35) (18 - 18)  SpO2: 98% (02-14-21 @ 08:35) (98% - 100%)    PHYSICAL EXAM:  Constitutional: Awake and alert, well-developed  HEENT: [place], MMM, EOMI  Neck: Soft and supple, No LAD, No JVD  Respiratory: Breath sounds are clear bilaterally, No wheezing, rales or rhonchi  Cardiovascular: S1 and S2, regular rate and rhythm, no Murmurs, gallops or rubs  Gastrointestinal: Bowel Sounds present, soft, nontender, nondistended, no guarding, no rebound  Extremities: trace b/l peripheral edema  Vascular: 2+ peripheral pulses  Neurological: A/O x 3, no focal deficits. fluent speech, no facial droop, EOMI. moves all extremities. weakness, generalized   Musculoskeletal: 5/5 strength b/l upper and lower extremities.   Skin: No rashes, no jaundice       LABS: All Labs Reviewed    RADIOLOGY:  < from: MR Head No Cont (02.08.21 @ 15:14) >  IMPRESSION:  There is no mass, acute intracranial hemorrhage, or acute infarction. Chronic infarction and chronic microvascular ischemic changes as described.  < end of copied text >    < from: Xray Chest 1 View- PORTABLE-Urgent (Xray Chest 1 View- PORTABLE-Urgent .) (02.06.21 @ 17:36) >  FINDINGS:  The lungs are clear of airspace consolidations or effusions. No pneumothorax.  The heart and mediastinum are within normal limits.  Atherosclerotic calcified intimal wall plaques within nondilated thoracic aorta  Visualized osseous structures are intact.  < end of copied text >    < from: CT Head No Cont (02.06.21 @ 17:36) >  Impression:  Head CT without contrast  1.  No acute intracranial hemorrhage, extra-axial collection, hydrocephalus, midline shift or space-occupying mass lesion.  2.  Small focus of encephalomalacia in the anteromedial left frontal lobe adjoining the left frontal horn extending to the cortex, likely represents an evolved infarct from prior examinations as described.  3.  No evidence of posterior reversible encephalopathy in the clinical setting of hypertension.  4.  Chronic and incidental findings as detailed above.  < end of copied text >    ECHOCARDIOGRAM:  < from: TTE Echo Complete w/o Contrast w/ Doppler (11.09.20 @ 09:30) >   Left ventricular wall motion is normal. Estimated left ventricular   ejection fraction is 65-70 %.   The left atrium is normal.   Normal appearing right atrium.   Normal appearing right ventricle structure and function.   The aortic valve appears mildly sclerotic. Valve opening seems to be   normal.   Fibrocalcific changes noted to the mitral valve leaflets with preserved   leaflet excursion. Mitral annular calcification.   Mild mitral regurgitation.   Normal appearing tricuspid valve structure and function.   Trace tricuspid valve regurgitation is present.   Pulmonic valve not well seen, probably normal pulmonic valve function.   No evidence of pericardial effusion.   All visualized extra cardiac structures appears to be normal.  < end of copied text >    ECG:  < from: 12 Lead ECG (02.06.21 @ 15:58) >  Diagnosis Line Sinus rhythm with 1st degree A-V block  Abnormal ECG  When compared with ECG of 12-JAN-2021 10:32,  Sinus rhythm is no longer with 2nd degree A-V block (Mobitz I)  < end of copied text >    MEDICATIONS  (STANDING):  ALPRAZolam 0.125 milliGRAM(s) Oral daily  ALPRAZolam 0.5 milliGRAM(s) Oral at bedtime  aspirin  chewable 81 milliGRAM(s) Oral daily  cholecalciferol Oral Tab/Cap - Peds 1000 Unit(s) Oral daily  cyanocobalamin 1000 MICROGram(s) Oral daily  enoxaparin Injectable 40 milliGRAM(s) SubCutaneous daily  gabapentin 100 milliGRAM(s) Oral two times a day  hydrALAZINE 25 milliGRAM(s) Oral two times a day  levothyroxine 100 MICROGram(s) Oral daily  mirtazapine 3.75 milliGRAM(s) Oral at bedtime  pantoprazole    Tablet 40 milliGRAM(s) Oral before breakfast  senna 2 Tablet(s) Oral at bedtime  spironolactone 25 milliGRAM(s) Oral daily    MEDICATIONS  (PRN):  acetaminophen   Tablet .. 650 milliGRAM(s) Oral every 6 hours PRN Moderate Pain (4 - 6)  polyethylene glycol 3350 17 Gram(s) Oral daily PRN Constipation    TELEMETRY REVIEW:  2/8: sinus 50-70s, HB overnight   2/9: sinus no further  Wenckebach on tele.  2/10: sinus 70s, no further  Wenckebach on tele.  2/11: short run of Wenckebach yesterday at 6pm. sinus   2/14: 2:1 heart block and episodes of radha    ASSESSMENT AND PLAN:    89 y/o F w/ PMH of CAD s/p PCI, HTN, dyslipidemia, CVA, hypothyroidism, GERD, macular degeneration, orthostatic hypotension, p/w hypertensive urgency and weakness. Patient states her BP has been in the 200s intermittently over the last few days.     1) HTN urgency   possible underlying uncontrolled anxiety component as well.  - on low dose standing xanax daily, and standing PM dose (home dose)  - f/u echo > EF 65-70 %  - Cardio eval appreciated  - cannot tolerate BB, stopped metoprolol. had radha and 2:1 block while on coreg. will stop coreg  - Cont. spirolactone   - started on imdur.   - cannot tolerate many other BP meds due to side effects or allergic reactions or pt refusal.   - goal -160's per cardiology. Dr. Ritchie recommending cardura if uncontrolled BP    2) Blurred Vision, weakness  -- unclear etiology. likely TIA?  - Unclear if patient has a new neurological event vs recrudescence of prior stroke symptoms in setting of uncontrolled HTN  - check orthostatic VS >> positive. repeat OS VS this AM >still positive but improved and asymptomatic   - BP management.  - Infectious w/u negative: CXR, UA negative   - Cont. ASA, allergy to statin - on pravastatin (non formulary here)   - neuro f/u appreciated   - 2/12: Code Stroke overnight. Had episode of aphasia and left sided weakness overnight with fairly rapid improvement of symptoms  - repeat MRI brain neg for acute stroke (had 2 MRI's this admission) and EEG negative for seizure  - stop keppra as rec by neurology    3) Orthostatic hypotension  - s/p IV fluids, improved    4) Wenckebach | 2:1 block and sinus radha during sleep  - episode on 2/8 and 2/10  EP eval - no further interventions.   -hold BB    5) Anxiety / Depression  - Cont. Xanax prn. encouraged to take daily xanax low dose to avoid sedative effects.  - started low dose Remeron     6) Stage II decubitus ulcer   - Cont. wound care.    7) DVT ppx - SQ Lovenox    GOC: DNR/ DNI - [facility]OL[facility]    d/w pt's son, Adama, updates given and all questions answered. 2/13    Dispo: discharge planning to rehab, likely Monday. will repeat covid swab today  -stroke symptoms on presentation and code stroke during hospital stay but MRI brain neg for acute stroke both times. ?TIA or recrudescence of prior stroke symptoms  -bradycardia/2:1 block during sleep. metoprolol and coreg discontinued. cannot tolerate many other BP meds due to side effects or allergic reactions or pt refusal. goal -160's per cardiology. Dr. Ritchie recommending cardura if uncontrolled BP

## 2021-02-14 NOTE — CONSULT NOTE ADULT - SUBJECTIVE AND OBJECTIVE BOX
Patient is a 89y old  Female who presents with a chief complaint of hypertensive urgency and weakness (06 Feb 2021 21:48)    HPI:  89 y/o F w/ PMH of CAD s/p PCI, HTN, dyslipidemia, CVA, hypothyroidism, GERD, macular degeneration, orthostatic hypotension, p/w hypertensive urgency and weakness. Patient states her BP has been in the 200s intermittently over the last few days. On 2/6 in the morning it was 200s again and she was referred to ED. As per ED chart patient had weakness around 10am. However, patient herself denies to me that weakness is new, states she has chronic RLE weakness since previous CVA. Denies any new weakness, facial droop, slurred speech or sensory deficits. Denies CP, SOB, cough, runny nose, sore throat, nausea, vomiting, abdominal pain, fever, chills     Patient has an old left sided stroke resulting in right sided weakness.    She lives alone and has aides but they frequently do not show up.  She uses a walker but walks very little.    I spoke to her son Adama, who is not aware of any facial weakness.      PSH: Ankle fracture, bladder surgery, hernia repair, cholecystectomy, hysterectomy     Social Hx: Denies x 3    Family Hx: Denies  (06 Feb 2021 21:48)      PAST MEDICAL & SURGICAL HISTORY:  Orthostatic hypotension    Bladder cancer    Endometrial adenocarcinoma    Macular degeneration    Stented coronary artery    Hypothyroid    Hyperlipidemia    HTN (hypertension)    GERD (gastroesophageal reflux disease)    CAD (coronary artery disease)    [place] (hard of hearing)    History of bladder surgery    S/P EMILY (total abdominal hysterectomy)    S/P hernia repair  umbilical - 2008    S/P laparoscopic cholecystectomy  2008    Ankle fracture, right  surgery 25 yrs ago - hardware removed    S/P coronary angiogram  2005, 2008, 2011 - drug eluding stents        FAMILY HISTORY:  Family history of brain cancer        Social Hx:  Nonsmoker, no drug or alcohol use. Lives alone but has aides 8 hours per day.    MEDICATIONS  (STANDING):  ALPRAZolam 0.5 milliGRAM(s) Oral at bedtime  aspirin  chewable 81 milliGRAM(s) Oral daily  cholecalciferol Oral Tab/Cap - Peds 1000 Unit(s) Oral daily  cyanocobalamin 1000 MICROGram(s) Oral daily  enoxaparin Injectable 40 milliGRAM(s) SubCutaneous daily  gabapentin 100 milliGRAM(s) Oral two times a day  levothyroxine 75 MICROGram(s) Oral daily  metoprolol succinate ER 75 milliGRAM(s) Oral daily  pantoprazole    Tablet 40 milliGRAM(s) Oral before breakfast  spironolactone 25 milliGRAM(s) Oral daily       Allergies    amlodipine (Unknown)  clonidine (Unknown)  Florinef Acetate (Unknown)  hydrocodone (Unknown)  Levatol (Unknown)  Lipitor (Unknown)  Lotrel (Unknown)  methylPREDNISolone (Unknown)  morphine (Other)  Plaquenil (Unknown)  statins (Other (Unknown))  sulfa drugs (Rash)    Intolerances        ROS: Pertinent positives in HPI, all other ROS were reviewed and are negative.      Vital Signs Last 24 Hrs  T(C): 36.2 (07 Feb 2021 07:03), Max: 36.7 (06 Feb 2021 20:40)  T(F): 97.2 (07 Feb 2021 07:03), Max: 98 (06 Feb 2021 20:40)  HR: 70 (07 Feb 2021 07:03) (67 - 72)  BP: 125/65 (07 Feb 2021 07:03) (122/52 - 196/68)  BP(mean): 79 (06 Feb 2021 20:40) (77 - 79)  RR: 18 (07 Feb 2021 07:03) (18 - 18)  SpO2: 96% (07 Feb 2021 07:03) (96% - 98%)        Constitutional: awake and alert.  HEENT: PERRLA, EOMI,   Neck: Supple.  Respiratory: Breath sounds are clear bilaterally  Cardiovascular: S1 and S2, regular / irregular rhythm  Gastrointestinal: soft, nontender  Extremities:  no edema  Vascular: Caritid Bruit - no  Musculoskeletal: no joint swelling/tenderness, no abnormal movements  Skin: No rashes    Neurological exam:  HF: A x O x 3. Appropriately interactive, normal affect. Speech fluent, No Aphasia or paraphasic errors.   CN: RENE, EOMI, VFF, facial sensation normal, decreased nasolabial fold on left, tongue with slight deviation to left and ? left atrophy,  Palate moves equally, SCM equal bilaterally  Motor: Reluctant to participate in testing. Able to elevate both arms against gravity. No apparent weakness seen. Weakness in b/l lower extremities  Sens: Intact to light touch  Reflexes: Symmetric and normal . BJ 1+, BR 1+, KJ trace,  downgoing toes b/l  Coord:  no tremors, refused finger to nose  Gait/Balance: Not tested            Labs:   02-06    138  |  104  |  21  ----------------------------<  87  4.5   |  26  |  0.95    Ca    9.9      06 Feb 2021 16:56    TPro  8.3  /  Alb  3.5  /  TBili  0.4  /  DBili  x   /  AST  17  /  ALT  20  /  AlkPhos  79  02-06                              12.2   9.75  )-----------( 344      ( 06 Feb 2021 16:56 )             40.4       Radiology:  CT head no contrast 2/6/20:  Head CT without contrast  1.  No acute intracranial hemorrhage, extra-axial collection, hydrocephalus, midline shift or space-occupying mass lesion.  2.  Small focus of encephalomalacia in the anteromedial left frontal lobe adjoining the left frontal horn extending to the cortex, likely represents an evolved infarct from prior examinations as described.  3.  No evidence of posterior reversible encephalopathy in the clinical setting of hypertension.  4.  Chronic and incidental findings as detailed above.  
  Patient is a 89y old  Female who presents with a chief complaint of hypertensive urgency and weakness (14 Feb 2021 16:05)    ________________________________  AWolf HIGUERA is a 89y year old Female with a past medical history of  Coronary disease status post PCI, hypertension, hyperlipidemia, history of CVA, hypothyroidism, who resides at home.   she was admitted for hypertensive urgency.  She has blurry vision, but no CVA on CAT scan.  She denies any chest pain.  No palpitations.  No syncope.  On telemetry she had 2-1 AV block during sleep.  EKG showed sinus rhythm with first-degree AV block.        Review of systems: A 10 point review of system has been performed, and is negative except for what has been mentioned in the above history of present illness.     PAST MEDICAL & SURGICAL HISTORY:  Orthostatic hypotension    Bladder cancer    Endometrial adenocarcinoma    Macular degeneration    Stented coronary artery    Hypothyroid    Hyperlipidemia    HTN (hypertension)    GERD (gastroesophageal reflux disease)    CAD (coronary artery disease)    [place] (hard of hearing)    History of bladder surgery    S/P EMILY (total abdominal hysterectomy)    S/P hernia repair  umbilical - 2008    S/P laparoscopic cholecystectomy  2008    Ankle fracture, right  surgery 25 yrs ago - hardware removed    S/P coronary angiogram  2005, 2008, 2011 - drug eluding stents      FAMILY HISTORY:  Family history of brain cancer     SOCIAL HISTORY: The patient denies any history of tobacco abuse, alcohol abuse or illicit drug use.    ALLERGIES:  amlodipine (Unknown)  clonidine (Unknown)  Florinef Acetate (Unknown)  hydrocodone (Unknown)  Levatol (Unknown)  Lipitor (Unknown)  Lotrel (Unknown)  methylPREDNISolone (Unknown)  morphine (Other)  Plaquenil (Unknown)  statins (Other (Unknown))  sulfa drugs (Rash)    Home Medications:  ALPRAZolam: 0.125 tab(s) orally once a day (10 Feb 2021 08:10)  ALPRAZolam 0.5 mg oral tablet: 1 tab(s) orally once a day (at bedtime) (10 Feb 2021 08:10)  aspirin 81 mg oral tablet, chewable: 1 tab(s) orally once a day (08 Feb 2021 13:29)  cholecalciferol oral tablet: 1000 unit(s) orally once a day (08 Feb 2021 13:29)  cyanocobalamin 1000 mcg oral tablet: 1 tab(s) orally once a day (08 Feb 2021 13:29)  gabapentin 100 mg oral capsule: 1 cap(s) orally 2 times a day (08 Feb 2021 13:29)  metoprolol succinate 25 mg oral tablet, extended release: 3 tab(s) orally once a day (08 Feb 2021 13:29)  mirtazapine: 3.75 tab(s) orally once a day (at bedtime) (10 Feb 2021 08:10)  pantoprazole 40 mg oral delayed release tablet: 1 tab(s) orally once a day (08 Feb 2021 13:29)  polyethylene glycol 3350 oral powder for reconstitution: 17 gram(s) orally once a day, As Needed (08 Feb 2021 13:29)  pravastatin 40 mg oral tablet: 1 tab(s) orally once a day (at bedtime) (08 Feb 2021 13:29)  spironolactone 25 mg oral tablet: 1 tab(s) orally once a day (08 Feb 2021 13:29)  Synthroid 100 mcg (0.1 mg) oral tablet: 1 tab(s) orally once a day (08 Feb 2021 13:29)    MEDICATIONS  (STANDING):  ALPRAZolam 0.125 milliGRAM(s) Oral daily  aspirin  chewable 81 milliGRAM(s) Oral daily  cholecalciferol Oral Tab/Cap - Peds 1000 Unit(s) Oral daily  clopidogrel Tablet 75 milliGRAM(s) Oral daily  cyanocobalamin 1000 MICROGram(s) Oral daily  enoxaparin Injectable 40 milliGRAM(s) SubCutaneous daily  gabapentin 100 milliGRAM(s) Oral two times a day  isosorbide   mononitrate ER Tablet (IMDUR) 30 milliGRAM(s) Oral daily  levothyroxine 100 MICROGram(s) Oral daily  lidocaine   Patch 1 Patch Transdermal daily  mirtazapine 3.75 milliGRAM(s) Oral at bedtime  pantoprazole    Tablet 40 milliGRAM(s) Oral before breakfast  senna 2 Tablet(s) Oral at bedtime  spironolactone 25 milliGRAM(s) Oral daily    MEDICATIONS  (PRN):  acetaminophen   Tablet .. 650 milliGRAM(s) Oral every 6 hours PRN Moderate Pain (4 - 6)  polyethylene glycol 3350 17 Gram(s) Oral daily PRN Constipation    Vital Signs Last 24 Hrs  T(C): 36.6 (14 Feb 2021 08:35), Max: 36.6 (14 Feb 2021 08:35)  T(F): 97.8 (14 Feb 2021 08:35), Max: 97.8 (14 Feb 2021 08:35)  HR: 80 (14 Feb 2021 08:35) (70 - 80)  BP: 122/54 (14 Feb 2021 08:35) (122/54 - 126/46)  BP(mean): --  RR: 18 (14 Feb 2021 08:35) (18 - 18)  SpO2: 98% (14 Feb 2021 08:35) (98% - 98%)  I&O's Summary    ________________________________  GENERAL APPEARANCE:  No acute distress  HEAD: normocephalic, atraumatic  NECK: supple, no jugular venous distention, no carotid bruit    HEART: Regular rate and rhythm, S1, S2 normal,1/6 murmur    CHEST:  No anterior chest wall tenderness    LUNGS:  Clear to auscultation, without any wheezing, rhonchi or rales    ABDOMEN: soft, nontender, nondistended, with positive bowel sounds appreciated  EXTREMITIES: no clubbing, cyanosis, or edema.   NEURO: Alert and oriented x3  PSYC:  Normal affect  SKIN:  Dry  ________________________________   TELEMETRY: Sinus rhythm with 2 1 AV block on telemetry during sleep.  No evidence of high degree block during awake hours.  Rate related bundle branch block    ECG: Sinus rhythm, nonspecific changes and first-degree AV block at 238 ms    LABS:                        10.4   8.36  )-----------( 276      ( 13 Feb 2021 16:45 )             34.8             02-13    140  |  108  |  24<H>  ----------------------------<  102<H>  4.2   |  24  |  0.94    Ca    8.9      13 Feb 2021 16:45  Mg     2.4     02-13            02-11 @ 18:57  Trop-I  0.022  CK      --  CK-MB   --    02-11 @ 16:54  Trop-I  0.019  CK      29  CK-MB   --            LIZ HIGUERA      ________________________________    RADIOLOGY & ADDITIONAL STUDIES:       No evidence of acute infarct or acute hemorrhage.  Extensive patchy and small foci of T2/FLAIR hyperintense signal in the periventricular white matter, compatible with moderate chronic microvascular ischemic changes. Very small chronic appearing infarct in the left genu of the corpus callosum is reidentified.      IMPRESSION:  No evidence of deep venous thrombosis in either lower extremity.    CT PERFUSION:  1.  No core infarct or ischemic penumbra is identified by CT technique.  2.  There is a Tmax >4s abnormality with volume of 35 mL located in the right posterior cerebral artery vascular territory; this appears associated with elevations rCBV and rCBF in this region, suggesting that the findings are compensated.  3. Follow-up MRI may be obtained for further evaluation.    CT ANGIOGRAPHY NECK:  1.  Cervical vasculature is patent without evidence of dissection  2.  There is large amount of noncalcified plaque in the proximal left internal carotid artery associated with moderate versus moderate to severe stenosis that measures approximately 65% using NASCET criteria.  3.  Borderline mildstenosis in the proximal right internal carotid artery measuring approximately 25% using NASCET criteria.  4.  Short segment moderate stenosis at the origin of the left vertebral artery.    CT ANGIOGRAPHY BRAIN:  1.  Anterior circulation: The intracranial internal carotid arteries, anterior cerebral arteries and middle cerebral arteries are patent patent bilaterally.  There is moderate stenosis in the M1 segment of the left middle cerebral artery.  There is mild stenosis in the M1 segment of the right middle cerebral artery.  There are mild stenoses in the distal cavernous and proximal supraclinoid segments of both internal carotid arteries.  2.  Posterior circulation: The intradural vertebral arteries, basilar artery and left posterior cerebral artery are patent.  There is trace intermittent flow seen in the proximal right posterior cerebral artery, which appears occluded in the P2 segment; which corresponds to elevated Tmax >4s seen in the right posterior cerebral artery vascular territory on perfusion imaging.  There are stenoses in the intradural left vertebral artery and right superior cerebellar artery.    ________________________________  Echo 2/21     Limited study to asses left ventricular function.   Estimated left ventricular ejection fraction is 70 %.   No evidence of pericardial effusion.   No evidence of pleural effusion.   The IVC appears normal.    ASSESSMENT:   First-degree AV block with 2-1 AV block during sleep  Hypertension with HTN ugency  Intracranial stenosis as above  Blurry vision, resolved ? TIA  Hx of CVA    PLAN:  In summary, this is a 89-year-old female, who was seen for AV block and hypertension.  Heart rate on telemetry overnight was in the 40s, with evidence of 2-1 AV block during sleep.  No evidence of 2-1 AV block during awake hours.  No history of syncope.  Agree with discontinuation of beta-blocker.  Continue current antihypertensive therapy, monitor blood pressure.  If blood pressure becomes elevated, add Cardura.  Echocardiogram noted, normal LV function.  DVT and GI prophylaxis.    __________________________________________________________________________  Thank you for allowing me to participate in the care of your patient. Please contact me should any questions arise.    OFE Ritchie DO, [facility]  [phone]  
  89 y/o F w/ PMH of CAD s/p PCI, HTN, dyslipidemia, CVA, hypothyroidism, GERD, macular degeneration, orthostatic hypotension, p/w hypertensive urgency and weakness. Patient states her BP has been in the 200s intermittently over the last few days. Today in the morning it was 200s again and she was referred to ED. As per ED chart patient had weakness around 10am. However, patient herself denies to me that weakness is new, states she has chronic RLE weakness since previous CVA. Denies any new weakness, facial droop, slurred speech or sensory deficits. Denies CP, SOB, cough, runny nose, sore throat, nausea, vomiting, abdominal pain, fever, chills     2021  patient well known to me. I share outpatient care with Dr Sequeira.  She was been having increased episodes of elevated blood pressure and notices the right side of her face becomes hot and red.   her weakness has been a long standing problem, has been bothering her for years.   She is intolerant of alot of medications, particularly antidepressants.         PSH: Ankle fracture, bladder surgery, hernia repair, cholecystectomy, hysterectomy     Social Hx: Denies x 3    Family Hx: Denies  (2021 21:48)      PAST MEDICAL & SURGICAL HISTORY:  Orthostatic hypotension    Bladder cancer    Endometrial adenocarcinoma    Macular degeneration    Stented coronary artery    Hypothyroid    Hyperlipidemia    HTN (hypertension)    GERD (gastroesophageal reflux disease)    CAD (coronary artery disease)    [place] (hard [place] hearing[place]    History of bladder surgery    S/P EMILY (total abdominal hysterectomy)    S/P hernia repair  umbilical -     S/P laparoscopic cholecystectomy      Ankle fracture, right  surgery 25 yrs ago - hardware removed    S/P coronary angiogram  , ,  - drug eluding stents      MEDICATIONS  (STANDING):  ALPRAZolam 0.5 milliGRAM(s) Oral at bedtime  aspirin  chewable 81 milliGRAM(s) Oral daily  cholecalciferol Oral Tab/Cap - Peds 1000 Unit(s) Oral daily  cyanocobalamin 1000 MICROGram(s) Oral daily  enoxaparin Injectable 40 milliGRAM(s) SubCutaneous daily  gabapentin 100 milliGRAM(s) Oral two times a day  levothyroxine 75 MICROGram(s) Oral daily  metoprolol succinate ER 75 milliGRAM(s) Oral daily  pantoprazole    Tablet 40 milliGRAM(s) Oral before breakfast  spironolactone 25 milliGRAM(s) Oral daily    MEDICATIONS  (PRN):  acetaminophen   Tablet .. 650 milliGRAM(s) Oral every 6 hours PRN Moderate Pain (4 - 6)  polyethylene glycol 3350 17 Gram(s) Oral daily PRN Constipation    Allergies    amlodipine (Unknown)  clonidine (Unknown)  Florinef Acetate (Unknown)  hydrocodone (Unknown)  Levatol (Unknown)  Lipitor (Unknown)  Lotrel (Unknown)  methylPREDNISolone (Unknown)  morphine (Other)  Plaquenil (Unknown)  statins (Other (Unknown))  sulfa drugs (Rash)    Intolerances      FAMILY HISTORY:  Family history of brain cancer          REVIEW OF SYSTEMS:    CONSTITUTIONAL: No weakness, fevers or chills  EYES/ENT: No visual changes;  No vertigo or throat pain   NECK: No pain or stiffness  RESPIRATORY: No cough, wheezing, hemoptysis; No shortness of breath  CARDIOVASCULAR: No chest pain or palpitations  GASTROINTESTINAL: No abdominal or epigastric pain. No nausea, vomiting, or hematemesis; No diarrhea or constipation. No melena or hematochezia.  GENITOURINARY: No dysuria, frequency or hematuria  NEUROLOGICAL: No numbness or weakness  SKIN: No itching, burning, rashes, or lesions   All other review of systems is negative unless indicated above      PHYSICAL EXAM:  Daily     Daily Weight in k.5 (2021 18:33)  Vital Signs Last 24 Hrs  T(C): 36.4 (2021 07:57), Max: 36.7 (2021 13:26)  T(F): 97.5 (2021 07:57), Max: 98 (2021 13:26)  HR: 64 (2021 07:57) (64 - 72)  BP: 145/60 (2021 07:57) (121/63 - 173/63)  BP(mean): 75 (2021 15:45) (75 - 91)  RR: 18 (2021 07:57) (16 - 19)  SpO2: 97% (2021 07:57) (97% - 100%)    Constitutional: NAD, awake and alert, well-developed  HEENT: PERR, EOMI, Normal Hearing, MMM  Neck: Soft and supple, No LAD, No JVD  Respiratory: Breath sounds are clear bilaterally, No wheezing, rales or rhonchi  Cardiovascular: S1 and S2, regular rate and rhythm, no Murmurs, gallops or rubs  Gastrointestinal: Bowel Sounds present, soft, nontender, nondistended, no guarding, no rebound  Extremities: No peripheral edema  Vascular: 2+ peripheral pulses  Neurological: A/O x 3, no focal deficits  Musculoskeletal: 5/5 strength b/l upper and lower extremities  Skin: No rashes    LABS: All Labs Reviewed:                        12.1   7.91  )-----------( 319      ( 2021 08:20 )             41.9     02-07    138  |  104  |  22  ----------------------------<  81  4.1   |  27  |  1.06    Ca    9.6      2021 08:20  Phos  4.1     02-07  Mg     2.9     02-07    TPro  7.6  /  Alb  3.5  /  TBili  0.5  /  DBili  x   /  AST  12<L>  /  ALT  17  /  AlkPhos  79  02-07    PT/INR - ( 2021 08:20 )   PT: 12.1 sec;   INR: 1.05 ratio         PTT - ( 2021 08:20 )  PTT:34.8 sec  CARDIAC MARKERS ( 2021 16:56 )  0.026 ng/mL / x     / x     / x     / x          Blood Culture:     RADIOLOGY:   < from: Xray Chest 1 View- PORTABLE-Urgent (Xray Chest 1 View- PORTABLE-Urgent .) (21 @ 17:36) >  EXAM:  XR CHEST PORTABLE URGENT 1V                            PROCEDURE DATE:  2021          INTERPRETATION:  Portable chest radiograph    CLINICAL INFORMATION: Accelerated hypertension.    TECHNIQUE:  Portable  AP view of the chest was obtained.    COMPARISON: 2021 chest available for review.    FINDINGS:  The lungs are clear of airspace consolidations or effusions. No pneumothorax.    The heart and mediastinum are within normal limits.    Atherosclerotic calcified intimal wall plaques within nondilated thoracic aorta    Visualized osseous structures are intact.        IMPRESSION:   No evidence of active chest disease.    < end of copied text >  < from: CT Head No Cont (21 @ 17:36) >  EXAM:  CT BRAIN                            PROCEDURE DATE:  2021          INTERPRETATION:  CT HEAD    History:  89-year-old with hypertension with weakness. History of recent CVA, hypertension.  Flushed face.    Technique:  Computed tomography of the head was performed without intravenous contrast.  Sagittal and coronal 2-D reformatted images were also obtained.    Comparison:  Prior head CT dated 2020 and intervening MRI dated 2020.    Findings:  Surgical Changes: None.  Brain:No acute intracranial hemorrhage or acute large vessel infarct is identified. Small cortical defect along the left frontal horn extending to the cortical gray matter, not convincingly demonstrated on the prior head CT, but suggested on the MRI T2 FLAIR images (MRI series 8: Image 16). Small old lacunar infarct in the anterior limb of the left internal capsule, unchanged.    Chronic white matter microvascular ischemic changes predominantly periventricular white matter is stable in appearance. No evidence of posterior reversible encephalopathy. No large mass, mass effect, or midline shift is seen. The midline structures are unremarkable. The brain demonstrates unremarkable morphology and volume for age.    Ventricles: No hydrocephalus. Stable in size.  Extra-axial spaces: No subdural or epidural collection. The convexity sulci and basal cisterns are preserved.  Vascular, intracranial: Moderate intracranial atherosclerotic changes are noted.    Calvarium: The calvarium is intact without focal osseous pathology. No significant scalp contusion identified.  Skull base: The middle ears and mastoid air cells are clear. TMJs are aligned.    Orbits/facial bones: Non dedicated views of the orbits are grossly unremarkable suffer chronic postsurgical changes in native ocular lenses. No visualized facial fracture.  Paranasal sinuses: The visualized paranasal sinuses are well aerated.    Impression:  Head CT without contrast  1.  No acute intracranial hemorrhage, extra-axial collection, hydrocephalus, midline shift or space-occupying mass lesion.  2.  Small focus of encephalomalacia in the anteromedial left frontal lobe adjoining the left frontal horn extending to the cortex, likely represents an evolved infarct from prior examinations as described.  3.  No evidence of posterior reversible encephalopathy in the clinical setting of hypertension.  4.  Chronic and incidental findings as detailed above.    < end of copied text >    EKG: NSR, no ischemic changes.    CARDIOLOGY TESTING:
HPI:  89 y/o F w/ PMH of CAD s/p PCI, HTN, dyslipidemia, CVA, hypothyroidism, GERD, macular degeneration, orthostatic hypotension, p/w hypertensive urgency and weakness. Patient states her BP has been in the 200s intermittently over the last few days. Today in the morning it was 200s again and she was referred to ED. As per ED chart patient had weakness around 10am. However, patient herself denies to me that weakness is new, states she has chronic RLE weakness since previous CVA. Denies any new weakness, facial droop, slurred speech or sensory deficits. Denies CP, SOB, cough, runny nose, sore throat, nausea, vomiting, abdominal pain, fever, chills       21:  EP asked to see pt for incidental finding of Wenckebach on tele.  She is asymptomatic.  TELE: SR 50-70 bpm, occasional Wenckbach noted. No significant pauses seen.      PAST MEDICAL & SURGICAL HISTORY:  Orthostatic hypotension  Bladder cancer  Endometrial adenocarcinoma  Macular degeneration  Stented coronary artery  Hypothyroid  Hyperlipidemia  HTN (hypertension)  GERD (gastroesophageal reflux disease)  CAD (coronary artery disease)  [place] (hard of hearing)  History of bladder surgery  S/P EMILY (total abdominal hysterectomy)  S/P hernia repair  umbilical -   S/P laparoscopic cholecystectomy  2008  Ankle fracture, right  surgery 25 yrs ago - hardware removed  S/P coronary angiogram  , ,  - drug eluding stents      Social Hx: Lives in her home, has aides that come.  Denies smoking or etoh.    Family Hx:  Family history of brain cancer (2021 21:48)        MEDICATIONS  (STANDING):  ALPRAZolam 0.125 milliGRAM(s) Oral daily  ALPRAZolam 0.5 milliGRAM(s) Oral at bedtime  aspirin  chewable 81 milliGRAM(s) Oral daily  cholecalciferol Oral Tab/Cap - Peds 1000 Unit(s) Oral daily  cyanocobalamin 1000 MICROGram(s) Oral daily  enoxaparin Injectable 40 milliGRAM(s) SubCutaneous daily  gabapentin 100 milliGRAM(s) Oral two times a day  levothyroxine 100 MICROGram(s) Oral daily  metoprolol succinate ER 75 milliGRAM(s) Oral daily  mirtazapine 3.75 milliGRAM(s) Oral at bedtime  pantoprazole    Tablet 40 milliGRAM(s) Oral before breakfast  spironolactone 25 milliGRAM(s) Oral daily    MEDICATIONS  (PRN):  acetaminophen   Tablet .. 650 milliGRAM(s) Oral every 6 hours PRN Moderate Pain (4 - 6)  polyethylene glycol 3350 17 Gram(s) Oral daily PRN Constipation      Allergies    amlodipine (Unknown)  clonidine (Unknown)  Florinef Acetate (Unknown)  hydrocodone (Unknown)  Levatol (Unknown)  Lipitor (Unknown)  Lotrel (Unknown)  methylPREDNISolone (Unknown)  morphine (Other)  Plaquenil (Unknown)  statins (Other (Unknown))  sulfa drugs (Rash)    Intolerances      Vital Signs Last 24 Hrs  T(C): 36.4 (2021 08:19), Max: 36.8 (2021 19:53)  T(F): 97.5 (2021 08:19), Max: 98.2 (2021 19:53)  HR: 68 (2021 08:19) (68 - 75)  BP: 154/58 (2021 08:19) (103/59 - 154/58)  BP(mean): --  RR: 18 (2021 08:19) (18 - 18)  SpO2: 98% (2021 08:19) (97% - 98%)    REVIEW OF SYSTEMS:    CONSTITUTIONAL:  As per HPI.  HEENT:  Eyes:  No diplopia or blurred vision. ENT:  No earache, sore throat or runny nose.  CARDIOVASCULAR:  No pressure, squeezing, strangling, tightness, heaviness or aching about the chest, neck, axilla or epigastrium.  RESPIRATORY:  No cough, shortness of breath, PND or orthopnea.  GASTROINTESTINAL:  No nausea, vomiting or diarrhea.  GENITOURINARY:  No dysuria, frequency or urgency.  MUSCULOSKELETAL:  As per HPI.  SKIN:  No change in skin, hair or nails.  NEUROLOGIC:  No paresthesias, fasciculations, seizures or weakness.  PSYCHIATRIC:  No disorder of thought or mood.  ENDOCRINE:  No heat or cold intolerance, polyuria or polydipsia.  HEMATOLOGICAL:  No easy bruising or bleedings:  .     PHYSICAL EXAMINATION:    GENERAL APPEARANCE:  Pt. is not currently dyspneic, in no distress. Pt. is alert, oriented, and pleasant, +anxious  HEENT:  Pupils are normal and react normally. No icterus. Mucous membranes well colored.  NECK:  Supple. No lymphadenopathy. Jugular venous pressure not elevated. Carotids equal.   HEART:   The cardiac impulse has a normal quality. + systolic murmur II/VI, no rubs or gallops noted  CHEST:  Chest is clear to auscultation. Normal respiratory effort.  ABDOMEN:  Soft and nontender.   EXTREMITIES:  There is no edema.   SKIN: Stage II decubitus ulcer    I&O's Summary    2021 07:01  -  2021 07:00  --------------------------------------------------------  IN: 0 mL / OUT: 650 mL / NET: -650 mL        LABS:                        11.9   10.29 )-----------( 311      ( 2021 08:12 )             40.1     02-09    137  |  105  |  21  ----------------------------<  98  4.1   |  24  |  0.88    Ca    9.6      2021 08:12  Mg     2.4     02-09        Urinalysis Basic - ( 2021 02:15 )    Color: Yellow / Appearance: Clear / S.010 / pH: x  Gluc: x / Ketone: Negative  / Bili: Negative / Urobili: Negative mg/dL   Blood: x / Protein: 15 mg/dL / Nitrite: Negative   Leuk Esterase: Negative / RBC: 0-2 /HPF / WBC 0-2   Sq Epi: x / Non Sq Epi: Occasional / Bacteria: Occasional          < from: Transthoracic Echocardiogram Follow Up (21 @ 14:18) >   Impression     Summary     Limited study to asses left ventricular function.   Estimated left ventricular ejection fraction is 70 %.   No evidence of pericardial effusion.   No evidence of pleural effusion.   The IVC appears normal.

## 2021-02-14 NOTE — CONSULT NOTE ADULT - REASON FOR ADMISSION
hypertensive urgency and weakness

## 2021-02-15 LAB
ANION GAP SERPL CALC-SCNC: 9 MMOL/L — SIGNIFICANT CHANGE UP (ref 5–17)
BUN SERPL-MCNC: 25 MG/DL — HIGH (ref 7–23)
CALCIUM SERPL-MCNC: 9.1 MG/DL — SIGNIFICANT CHANGE UP (ref 8.5–10.1)
CHLORIDE SERPL-SCNC: 110 MMOL/L — HIGH (ref 96–108)
CO2 SERPL-SCNC: 22 MMOL/L — SIGNIFICANT CHANGE UP (ref 22–31)
CREAT SERPL-MCNC: 1.1 MG/DL — SIGNIFICANT CHANGE UP (ref 0.5–1.3)
GLUCOSE SERPL-MCNC: 107 MG/DL — HIGH (ref 70–99)
HCT VFR BLD CALC: 37.3 % — SIGNIFICANT CHANGE UP (ref 34.5–45)
HGB BLD-MCNC: 11.4 G/DL — LOW (ref 11.5–15.5)
MAGNESIUM SERPL-MCNC: 2.6 MG/DL — SIGNIFICANT CHANGE UP (ref 1.6–2.6)
MCHC RBC-ENTMCNC: 25.8 PG — LOW (ref 27–34)
MCHC RBC-ENTMCNC: 30.6 GM/DL — LOW (ref 32–36)
MCV RBC AUTO: 84.4 FL — SIGNIFICANT CHANGE UP (ref 80–100)
PHOSPHATE SERPL-MCNC: 3.3 MG/DL — SIGNIFICANT CHANGE UP (ref 2.5–4.5)
PLATELET # BLD AUTO: 293 K/UL — SIGNIFICANT CHANGE UP (ref 150–400)
POTASSIUM SERPL-MCNC: 4.2 MMOL/L — SIGNIFICANT CHANGE UP (ref 3.5–5.3)
POTASSIUM SERPL-SCNC: 4.2 MMOL/L — SIGNIFICANT CHANGE UP (ref 3.5–5.3)
RBC # BLD: 4.42 M/UL — SIGNIFICANT CHANGE UP (ref 3.8–5.2)
RBC # FLD: 16.3 % — HIGH (ref 10.3–14.5)
SARS-COV-2 RNA SPEC QL NAA+PROBE: SIGNIFICANT CHANGE UP
SODIUM SERPL-SCNC: 141 MMOL/L — SIGNIFICANT CHANGE UP (ref 135–145)
WBC # BLD: 9.69 K/UL — SIGNIFICANT CHANGE UP (ref 3.8–10.5)
WBC # FLD AUTO: 9.69 K/UL — SIGNIFICANT CHANGE UP (ref 3.8–10.5)

## 2021-02-15 PROCEDURE — 70450 CT HEAD/BRAIN W/O DYE: CPT | Mod: 26

## 2021-02-15 PROCEDURE — 99232 SBSQ HOSP IP/OBS MODERATE 35: CPT

## 2021-02-15 RX ADMIN — Medication 100 MICROGRAM(S): at 05:56

## 2021-02-15 RX ADMIN — GABAPENTIN 100 MILLIGRAM(S): 400 CAPSULE ORAL at 21:54

## 2021-02-15 RX ADMIN — PREGABALIN 1000 MICROGRAM(S): 225 CAPSULE ORAL at 14:18

## 2021-02-15 RX ADMIN — PANTOPRAZOLE SODIUM 40 MILLIGRAM(S): 20 TABLET, DELAYED RELEASE ORAL at 05:56

## 2021-02-15 RX ADMIN — Medication 0.5 MILLIGRAM(S): at 21:54

## 2021-02-15 RX ADMIN — ISOSORBIDE MONONITRATE 30 MILLIGRAM(S): 60 TABLET, EXTENDED RELEASE ORAL at 14:17

## 2021-02-15 RX ADMIN — SPIRONOLACTONE 25 MILLIGRAM(S): 25 TABLET, FILM COATED ORAL at 14:17

## 2021-02-15 RX ADMIN — LIDOCAINE 1 PATCH: 4 CREAM TOPICAL at 22:00

## 2021-02-15 RX ADMIN — LIDOCAINE 1 PATCH: 4 CREAM TOPICAL at 10:17

## 2021-02-15 RX ADMIN — LIDOCAINE 1 PATCH: 4 CREAM TOPICAL at 19:00

## 2021-02-15 RX ADMIN — Medication 81 MILLIGRAM(S): at 14:17

## 2021-02-15 RX ADMIN — CLOPIDOGREL BISULFATE 75 MILLIGRAM(S): 75 TABLET, FILM COATED ORAL at 14:17

## 2021-02-15 RX ADMIN — ENOXAPARIN SODIUM 40 MILLIGRAM(S): 100 INJECTION SUBCUTANEOUS at 10:16

## 2021-02-15 RX ADMIN — GABAPENTIN 100 MILLIGRAM(S): 400 CAPSULE ORAL at 14:17

## 2021-02-15 RX ADMIN — SENNA PLUS 2 TABLET(S): 8.6 TABLET ORAL at 21:54

## 2021-02-15 RX ADMIN — Medication 1000 UNIT(S): at 14:17

## 2021-02-15 NOTE — PROGRESS NOTE ADULT - ASSESSMENT
89 y/o F with PMH of CAD s/p PCI, HTN, dyslipidemia, CVA, hypothyroidism, macular degeneration, OH; p/w hypertensive urgency and weakness, episode of aphasia and left sided weakness, improvement of symptoms. No acute stroke seen on MRI brain of 2/8/21 and repeat 2/12. EEG x 2 are normal.     # Fluctuating mental status, rule out non-convulsive seizures    - obtain 24 hr EEG, consider restarting Keppra 250 mg bid    # TIA -  MRI brain of 2/8/21 and 2/12 no acute infarct, however, intracranial atherosclerosis / stenosis of multiple vessels    - continue asa,   - start Atorvastatin 20 mg today    Above D/W staff and ESTEPHANIE Heredia

## 2021-02-15 NOTE — PROGRESS NOTE ADULT - SUBJECTIVE AND OBJECTIVE BOX
CHIEF COMPLAINT/DIAGNOSIS: hypertensive urgency and weakness    HPI: 89 y/o F w/ PMH of CAD s/p PCI, HTN, dyslipidemia, CVA, hypothyroidism, GERD, macular degeneration, orthostatic hypotension, p/w hypertensive urgency and weakness. Patient states her BP has been in the 200s intermittently over the last few days. Today in the morning it was 200s again and she was referred to ED. As per ED chart patient had weakness around 10am. However, patient herself denies to me that weakness is new, states she has chronic RLE weakness since previous CVA. Denies any new weakness, facial droop, slurred speech or sensory deficits. Denies CP, SOB, cough, runny nose, sore throat, nausea, vomiting, abdominal pain, fever, chills     2/15: seen this AM, lethargic, no answering or responding to verbal questions. following simple commands w/ eyes closed.    REVIEW OF SYSTEMS:  All other review of systems is negative unless indicated above    Vital Signs Last 24 Hrs  T(C): 36.5 (15 Feb 2021 10:09), Max: 37.1 (14 Feb 2021 21:05)  T(F): 97.7 (15 Feb 2021 10:09), Max: 98.7 (14 Feb 2021 21:05)  HR: 82 (15 Feb 2021 10:09) (72 - 82)  BP: 171/68 (15 Feb 2021 10:42) (116/46 - 171/68)  BP(mean): --  RR: 18 (15 Feb 2021 10:09) (18 - 18)  SpO2: 99% (15 Feb 2021 10:09) (94% - 99%)    PHYSICAL EXAM:  Constitutional: Lethargic. alertness fluctuating throughout day   HEENT: unable to assess  Neck: Soft and supple, No LAD, No JVD  Respiratory: Breath sounds are clear bilaterally, No wheezing, rales or rhonchi  Cardiovascular: S1 and S2, regular rate and rhythm, no Murmurs, gallops or rubs  Gastrointestinal: Bowel Sounds present, soft, nontender, nondistended, no guarding, no rebound  Extremities: trace b/l peripheral edema  Vascular: 2+ peripheral pulses  Neurological: lethargic, , not answering or responding to verbal questions. following simple commands w/ eyes closed.  Musculoskeletal: 5/5 strength b/l upper and lower extremities.   Skin: No rashes, no jaundice     LABS: All Labs Reviewed    RADIOLOGY:  < from: MR Head No Cont (02.08.21 @ 15:14) >  IMPRESSION:  There is no mass, acute intracranial hemorrhage, or acute infarction. Chronic infarction and chronic microvascular ischemic changes as described.  < end of copied text >    < from: Xray Chest 1 View- PORTABLE-Urgent (Xray Chest 1 View- PORTABLE-Urgent .) (02.06.21 @ 17:36) >  FINDINGS:  The lungs are clear of airspace consolidations or effusions. No pneumothorax.  The heart and mediastinum are within normal limits.  Atherosclerotic calcified intimal wall plaques within nondilated thoracic aorta  Visualized osseous structures are intact.  < end of copied text >    < from: CT Head No Cont (02.06.21 @ 17:36) >  Impression:  Head CT without contrast  1.  No acute intracranial hemorrhage, extra-axial collection, hydrocephalus, midline shift or space-occupying mass lesion.  2.  Small focus of encephalomalacia in the anteromedial left frontal lobe adjoining the left frontal horn extending to the cortex, likely represents an evolved infarct from prior examinations as described.  3.  No evidence of posterior reversible encephalopathy in the clinical setting of hypertension.  4.  Chronic and incidental findings as detailed above.  < end of copied text >    ECHOCARDIOGRAM:  < from: TTE Echo Complete w/o Contrast w/ Doppler (11.09.20 @ 09:30) >   Left ventricular wall motion is normal. Estimated left ventricular   ejection fraction is 65-70 %.   The left atrium is normal.   Normal appearing right atrium.   Normal appearing right ventricle structure and function.   The aortic valve appears mildly sclerotic. Valve opening seems to be   normal.   Fibrocalcific changes noted to the mitral valve leaflets with preserved   leaflet excursion. Mitral annular calcification.   Mild mitral regurgitation.   Normal appearing tricuspid valve structure and function.   Trace tricuspid valve regurgitation is present.   Pulmonic valve not well seen, probably normal pulmonic valve function.   No evidence of pericardial effusion.   All visualized extra cardiac structures appears to be normal.  < end of copied text >    ECG:  < from: 12 Lead ECG (02.06.21 @ 15:58) >  Diagnosis Line Sinus rhythm with 1st degree A-V block  Abnormal ECG  When compared with ECG of 12-JAN-2021 10:32,  Sinus rhythm is no longer with 2nd degree A-V block (Mobitz I)  < end of copied text >    MEDICATIONS  (STANDING):  ALPRAZolam 0.125 milliGRAM(s) Oral daily  ALPRAZolam 0.5 milliGRAM(s) Oral at bedtime  aspirin  chewable 81 milliGRAM(s) Oral daily  cholecalciferol Oral Tab/Cap - Peds 1000 Unit(s) Oral daily  cyanocobalamin 1000 MICROGram(s) Oral daily  enoxaparin Injectable 40 milliGRAM(s) SubCutaneous daily  gabapentin 100 milliGRAM(s) Oral two times a day  hydrALAZINE 25 milliGRAM(s) Oral two times a day  levothyroxine 100 MICROGram(s) Oral daily  mirtazapine 3.75 milliGRAM(s) Oral at bedtime  pantoprazole    Tablet 40 milliGRAM(s) Oral before breakfast  senna 2 Tablet(s) Oral at bedtime  spironolactone 25 milliGRAM(s) Oral daily    MEDICATIONS  (PRN):  acetaminophen   Tablet .. 650 milliGRAM(s) Oral every 6 hours PRN Moderate Pain (4 - 6)  polyethylene glycol 3350 17 Gram(s) Oral daily PRN Constipation    TELEMETRY REVIEW:  2/8: sinus 50-70s, HB overnight   2/9: sinus no further  Wenckebach on tele.  2/10: sinus 70s, no further  Wenckebach on tele.  2/11: short run of Wenckebach yesterday at 6pm. sinus   2/14: 2:1 heart block and episodes of radha    ASSESSMENT AND PLAN:    89 y/o F w/ PMH of CAD s/p PCI, HTN, dyslipidemia, CVA, hypothyroidism, GERD, macular degeneration, orthostatic hypotension, p/w hypertensive urgency and weakness. Patient states her BP has been in the 200s intermittently over the last few days.     1) HTN urgency   possible underlying uncontrolled anxiety component as well.  - on low dose standing xanax daily, and standing PM dose (home dose)  - f/u echo > EF 65-70 %  - Cardio eval appreciated  - cannot tolerate BB, stopped metoprolol. had radha and 2:1 block while on coreg. will stop coreg  - Cont. spirolactone   - started on imdur.   - cannot tolerate many other BP meds due to side effects or allergic reactions or pt refusal.   - goal -160's per cardiology. Dr. Ritchie recommending cardura if uncontrolled BP    2) Blurred Vision, weakness  -- unclear etiology. likely TIA?  - Unclear if patient has a new neurological event vs recrudescence of prior stroke symptoms in setting of uncontrolled HTN  - check orthostatic VS >> positive. repeat OS VS this AM >still positive but improved and asymptomatic   - BP management.  - Infectious w/u negative: CXR, UA negative   - Cont. ASA, allergy to statin - on pravastatin (non formulary here)   - neuro f/u appreciated   - 2/12: Code Stroke overnight. Had episode of aphasia and left sided weakness overnight with fairly rapid improvement of symptoms  - repeat MRI brain neg for acute stroke (had 2 MRI's this admission) and EEG negative for seizure  - stop keppra as rec by neurology  - 2/15: CT to r/o CVA repeated again. no acute CVA. start 24 hour EEG    3) Orthostatic hypotension  - s/p IV fluids, improved    4) Wenckebach | 2:1 block and sinus radha during sleep  - episode on 2/8 and 2/10  - hold BB  - EP eval - no further interventions.     5) Anxiety / Depression  - Cont. Xanax prn. encouraged to take daily xanax low dose to avoid sedative effects.  - started low dose Remeron     6) Stage II decubitus ulcer   - Cont. wound care.    7) DVT ppx - SQ Lovenox    GOC: DNR/ DNI - MOLST    d/w pt's son, Adama, updates given and all questions answered. 2/13

## 2021-02-15 NOTE — PROGRESS NOTE ADULT - SUBJECTIVE AND OBJECTIVE BOX
Called by ESTEPHANIE Hair to f/u as pt is more lethargic, not answering or responding to verbal questions. Pt last seen on 2/13 by Vinicio Em was d/c    No seizure like events, no new weakness, no tremors/twitching of right upper extremity.     Repeat MR Head 02.12.21 No evidence of acute infarct or acute hemorrhage.    ROS: As above, other ROS unable to obtain     MEDICATIONS  (STANDING):  ALPRAZolam 0.5 milliGRAM(s) Oral at bedtime  aspirin  chewable 81 milliGRAM(s) Oral daily  cholecalciferol Oral Tab/Cap - Peds 1000 Unit(s) Oral daily  clopidogrel Tablet 75 milliGRAM(s) Oral daily  cyanocobalamin 1000 MICROGram(s) Oral daily  enoxaparin Injectable 40 milliGRAM(s) SubCutaneous daily  gabapentin 100 milliGRAM(s) Oral two times a day  isosorbide   mononitrate ER Tablet (IMDUR) 30 milliGRAM(s) Oral daily  levothyroxine 100 MICROGram(s) Oral daily  lidocaine   Patch 1 Patch Transdermal daily  mirtazapine 3.75 milliGRAM(s) Oral at bedtime  pantoprazole    Tablet 40 milliGRAM(s) Oral before breakfast  senna 2 Tablet(s) Oral at bedtime  spironolactone 25 milliGRAM(s) Oral daily      Vital Signs Last 24 Hrs  T(C): 36.5 (15 Feb 2021 10:09), Max: 37.1 (14 Feb 2021 21:05)  T(F): 97.7 (15 Feb 2021 10:09), Max: 98.7 (14 Feb 2021 21:05)  HR: 82 (15 Feb 2021 10:09) (72 - 82)  BP: 171/68 (15 Feb 2021 10:42) (116/46 - 171/68)  BP(mean): --  RR: 18 (15 Feb 2021 10:09) (18 - 18)  SpO2: 99% (15 Feb 2021 10:09) (94% - 99%)    GENERAL: NAD, Normocephalic    Neuro:  HF: Awake but inattentive, follows some simple commands, feeble voice, no aphasia  CN: PERRL, EOMI, no nystagmus, slight decreased nasolabial fold on left,  tongue protrudes in the midline  motor: normal tone, testing limited by lack of effort, but at this time she appears to be moving all extremities equally  sensory: subjective decreased to light touch on right leg compared to left  coordination: finger to nose intact bilaterally  DTRs: hypoactive but symmetric                          11.4   9.69  )-----------( 293      ( 15 Feb 2021 10:48 )             37.3     02-15    141  |  110<H>  |  25<H>  ----------------------------<  107<H>  4.2   |  22  |  1.10    Ca    9.1      15 Feb 2021 10:48  Phos  3.3     02-15  Mg     2.6     02-15    02-07 Chol 150 LDL -- HDL 59 Trig 116    Radiology report:  MR Head 02.12.21 No evidence of acute infarct or acute hemorrhage.  Extensive patchy and small foci of T2/FLAIR hyperintense signal in the periventricular white matter, compatible with moderate chronic microvascular ischemic changes. Very small chronic appearing infarct in the left genu of the corpus callosum is reidentified.    CT PERFUSION 2/11/21  1.  No core infarct or ischemic penumbra is identified by CT technique.  2.  There is a Tmax >4s abnormality with volume of 35 mL located in the right posterior cerebral artery vascular territory; this appears associated with elevations rCBV and rCBF in this region, suggesting that the findings are compensated.  3. Follow-up MRI may be obtained for further evaluation.    CT ANGIOGRAPHY NECK 2/11/21:  1.  Cervical vasculature is patent without evidence of dissection  2.  There is large amount of noncalcified plaque in the proximal left internal carotid artery associated with moderate versus moderate to severe stenosis that measures approximately 65% using NASCET criteria.  3.  Borderline mild stenosis in the proximal right internal carotid artery   4.  Short segment moderate stenosis at the origin of the left vertebral artery.    CT ANGIOGRAPHY BRAIN 2/11/21  1.  Anterior circulation: The intracranial internal carotid arteries, anterior cerebral arteries and middle cerebral arteries are patent  bilaterally.  There is moderate stenosis in the M1 segment of the left middle cerebral artery.  There is mild stenosis in the M1 segment of the right middle cerebral artery.  There are mild stenoses in the distal cavernous and proximal supraclinoid segments of both internal carotid arteries.

## 2021-02-15 NOTE — PROVIDER CONTACT NOTE (OTHER) - SITUATION
ANSWERING SERVICE AWARE OF CONSULT.
Pt experiencing 2:1 heart block on tele. VSS. electrolytes WDL
Service aware of consult
Pt lethargic, increasing weakness- BP increasing

## 2021-02-16 ENCOUNTER — TRANSCRIPTION ENCOUNTER (OUTPATIENT)
Age: 86
End: 2021-02-16

## 2021-02-16 VITALS
TEMPERATURE: 98 F | SYSTOLIC BLOOD PRESSURE: 122 MMHG | OXYGEN SATURATION: 96 % | RESPIRATION RATE: 18 BRPM | DIASTOLIC BLOOD PRESSURE: 41 MMHG | HEART RATE: 74 BPM

## 2021-02-16 LAB
ANION GAP SERPL CALC-SCNC: 10 MMOL/L — SIGNIFICANT CHANGE UP (ref 5–17)
BUN SERPL-MCNC: 26 MG/DL — HIGH (ref 7–23)
CALCIUM SERPL-MCNC: 9.2 MG/DL — SIGNIFICANT CHANGE UP (ref 8.5–10.1)
CHLORIDE SERPL-SCNC: 108 MMOL/L — SIGNIFICANT CHANGE UP (ref 96–108)
CO2 SERPL-SCNC: 21 MMOL/L — LOW (ref 22–31)
CREAT SERPL-MCNC: 1.03 MG/DL — SIGNIFICANT CHANGE UP (ref 0.5–1.3)
GLUCOSE SERPL-MCNC: 93 MG/DL — SIGNIFICANT CHANGE UP (ref 70–99)
HCT VFR BLD CALC: 36.2 % — SIGNIFICANT CHANGE UP (ref 34.5–45)
HGB BLD-MCNC: 11 G/DL — LOW (ref 11.5–15.5)
MCHC RBC-ENTMCNC: 25.5 PG — LOW (ref 27–34)
MCHC RBC-ENTMCNC: 30.4 GM/DL — LOW (ref 32–36)
MCV RBC AUTO: 83.8 FL — SIGNIFICANT CHANGE UP (ref 80–100)
PLATELET # BLD AUTO: 315 K/UL — SIGNIFICANT CHANGE UP (ref 150–400)
POTASSIUM SERPL-MCNC: 4.1 MMOL/L — SIGNIFICANT CHANGE UP (ref 3.5–5.3)
POTASSIUM SERPL-SCNC: 4.1 MMOL/L — SIGNIFICANT CHANGE UP (ref 3.5–5.3)
RBC # BLD: 4.32 M/UL — SIGNIFICANT CHANGE UP (ref 3.8–5.2)
RBC # FLD: 16.3 % — HIGH (ref 10.3–14.5)
SODIUM SERPL-SCNC: 139 MMOL/L — SIGNIFICANT CHANGE UP (ref 135–145)
WBC # BLD: 11.93 K/UL — HIGH (ref 3.8–10.5)
WBC # FLD AUTO: 11.93 K/UL — HIGH (ref 3.8–10.5)

## 2021-02-16 PROCEDURE — 99232 SBSQ HOSP IP/OBS MODERATE 35: CPT

## 2021-02-16 PROCEDURE — 99239 HOSP IP/OBS DSCHRG MGMT >30: CPT

## 2021-02-16 PROCEDURE — 95720 EEG PHY/QHP EA INCR W/VEEG: CPT

## 2021-02-16 RX ORDER — LIDOCAINE 4 G/100G
1 CREAM TOPICAL
Qty: 0 | Refills: 0 | DISCHARGE
Start: 2021-02-16

## 2021-02-16 RX ORDER — LEVETIRACETAM 250 MG/1
250 TABLET, FILM COATED ORAL
Refills: 0 | Status: DISCONTINUED | OUTPATIENT
Start: 2021-02-16 | End: 2021-02-16

## 2021-02-16 RX ORDER — CLOPIDOGREL BISULFATE 75 MG/1
1 TABLET, FILM COATED ORAL
Qty: 0 | Refills: 0 | DISCHARGE
Start: 2021-02-16

## 2021-02-16 RX ORDER — ISOSORBIDE MONONITRATE 60 MG/1
1 TABLET, EXTENDED RELEASE ORAL
Qty: 0 | Refills: 0 | DISCHARGE
Start: 2021-02-16

## 2021-02-16 RX ORDER — ASPIRIN/CALCIUM CARB/MAGNESIUM 324 MG
1 TABLET ORAL
Qty: 0 | Refills: 0 | DISCHARGE
Start: 2021-02-16

## 2021-02-16 RX ORDER — ASPIRIN/CALCIUM CARB/MAGNESIUM 324 MG
1 TABLET ORAL
Qty: 0 | Refills: 0 | DISCHARGE

## 2021-02-16 RX ORDER — ASPIRIN/CALCIUM CARB/MAGNESIUM 324 MG
81 TABLET ORAL DAILY
Refills: 0 | Status: DISCONTINUED | OUTPATIENT
Start: 2021-02-16 | End: 2021-02-16

## 2021-02-16 RX ADMIN — CLOPIDOGREL BISULFATE 75 MILLIGRAM(S): 75 TABLET, FILM COATED ORAL at 10:39

## 2021-02-16 RX ADMIN — GABAPENTIN 100 MILLIGRAM(S): 400 CAPSULE ORAL at 10:45

## 2021-02-16 RX ADMIN — Medication 100 MICROGRAM(S): at 05:33

## 2021-02-16 RX ADMIN — SPIRONOLACTONE 25 MILLIGRAM(S): 25 TABLET, FILM COATED ORAL at 10:38

## 2021-02-16 RX ADMIN — PREGABALIN 1000 MICROGRAM(S): 225 CAPSULE ORAL at 10:38

## 2021-02-16 RX ADMIN — Medication 1000 UNIT(S): at 10:38

## 2021-02-16 RX ADMIN — Medication 81 MILLIGRAM(S): at 10:38

## 2021-02-16 RX ADMIN — PANTOPRAZOLE SODIUM 40 MILLIGRAM(S): 20 TABLET, DELAYED RELEASE ORAL at 05:33

## 2021-02-16 RX ADMIN — ENOXAPARIN SODIUM 40 MILLIGRAM(S): 100 INJECTION SUBCUTANEOUS at 10:39

## 2021-02-16 RX ADMIN — ISOSORBIDE MONONITRATE 30 MILLIGRAM(S): 60 TABLET, EXTENDED RELEASE ORAL at 10:38

## 2021-02-16 NOTE — PROGRESS NOTE ADULT - REASON FOR ADMISSION
hypertensive urgency and weakness

## 2021-02-16 NOTE — DISCHARGE NOTE NURSING/CASE MANAGEMENT/SOCIAL WORK - NSDCVIVACCINE_GEN_ALL_CORE_FT
Influenza , 2017/9/9 14:31 , Kerri Covington (RN)  Influenza , 2017/9/9 14:53 , Kerri Covington (RN)  Influenza , 2018/9/12 14:23 , Anisha Odom (RN)  Influenza , 2020/10/2 14:07 , Jeannette Luque (RN)  Tdap , 2020/2/24 17:42 , Martha Pemberton (RN)  
Influenza , 2017/9/9 14:31 , Kerri Covington (RN)  Influenza , 2017/9/9 14:53 , Kerri Covington (RN)  Influenza , 2018/9/12 14:23 , Anisha Odom (RN)  Influenza , 2020/10/2 14:07 , Jeannette Luque (RN)  Tdap , 2020/2/24 17:42 , Martha Pemberton (RN)

## 2021-02-16 NOTE — PROGRESS NOTE ADULT - PROVIDER SPECIALTY LIST ADULT
Hospitalist
Neurology
Hospitalist
Hospitalist
Neurology

## 2021-02-16 NOTE — PROGRESS NOTE ADULT - SUBJECTIVE AND OBJECTIVE BOX
CHIEF COMPLAINT/DIAGNOSIS: hypertensive urgency and weakness    HPI: 89 y/o F w/ PMH of CAD s/p PCI, HTN, dyslipidemia, CVA, hypothyroidism, GERD, macular degeneration, orthostatic hypotension, p/w hypertensive urgency and weakness. Patient states her BP has been in the 200s intermittently over the last few days. Today in the morning it was 200s again and she was referred to ED. As per ED chart patient had weakness around 10am. However, patient herself denies to me that weakness is new, states she has chronic RLE weakness since previous CVA. Denies any new weakness, facial droop, slurred speech or sensory deficits. Denies CP, SOB, cough, runny nose, sore throat, nausea, vomiting, abdominal pain, fever, chills     2/15: seen this AM, lethargic, no answering or responding to verbal questions. following simple commands w/ eyes closed.  2/16: c/o of generalized weakness     REVIEW OF SYSTEMS:  All other review of systems is negative unless indicated above    Vital Signs Last 24 Hrs  T(C): 36.9 (16 Feb 2021 09:21), Max: 36.9 (16 Feb 2021 09:21)  T(F): 98.5 (16 Feb 2021 09:21), Max: 98.5 (16 Feb 2021 09:21)  HR: 74 (16 Feb 2021 09:21) (74 - 83)  BP: 122/41 (16 Feb 2021 09:21) (114/50 - 122/41)  BP(mean): --  RR: 18 (16 Feb 2021 09:21) (18 - 18)  SpO2: 96% (16 Feb 2021 09:21) (96% - 97%)    PHYSICAL EXAM:  Constitutional: Awake and alert, well-developed  HEENT: [place], MMM, EOMI  Neck: Soft and supple, No LAD, No JVD  Respiratory: Breath sounds are clear bilaterally, No wheezing, rales or rhonchi  Cardiovascular: S1 and S2, regular rate and rhythm, no Murmurs, gallops or rubs  Gastrointestinal: Bowel Sounds present, soft, nontender, nondistended, no guarding, no rebound  Extremities: trace b/l peripheral edema  Vascular: 2+ peripheral pulses  Neurological: A/O x 3, no focal deficits. fluent speech, no facial droop, EOMI. moves all extremities. weakness, generalized   Musculoskeletal: 5/5 strength b/l upper and lower extremities.   Skin: No rashes, no jaundice    LABS: All Labs Reviewed:                        11.0   11.93 )-----------( 315      ( 16 Feb 2021 07:46 )             36.2     02-16    139  |  108  |  26<H>  ----------------------------<  93  4.1   |  21<L>  |  1.03    Ca    9.2      16 Feb 2021 07:46  Phos  3.3     02-15  Mg     2.6     02-15    RADIOLOGY:  < from: MR Head No Cont (02.08.21 @ 15:14) >  IMPRESSION:  There is no mass, acute intracranial hemorrhage, or acute infarction. Chronic infarction and chronic microvascular ischemic changes as described.  < end of copied text >    < from: Xray Chest 1 View- PORTABLE-Urgent (Xray Chest 1 View- PORTABLE-Urgent .) (02.06.21 @ 17:36) >  FINDINGS:  The lungs are clear of airspace consolidations or effusions. No pneumothorax.  The heart and mediastinum are within normal limits.  Atherosclerotic calcified intimal wall plaques within nondilated thoracic aorta  Visualized osseous structures are intact.  < end of copied text >    < from: CT Head No Cont (02.06.21 @ 17:36) >  Impression:  Head CT without contrast  1.  No acute intracranial hemorrhage, extra-axial collection, hydrocephalus, midline shift or space-occupying mass lesion.  2.  Small focus of encephalomalacia in the anteromedial left frontal lobe adjoining the left frontal horn extending to the cortex, likely represents an evolved infarct from prior examinations as described.  3.  No evidence of posterior reversible encephalopathy in the clinical setting of hypertension.  4.  Chronic and incidental findings as detailed above.  < end of copied text >    ECHOCARDIOGRAM:  < from: TTE Echo Complete w/o Contrast w/ Doppler (11.09.20 @ 09:30) >   Left ventricular wall motion is normal. Estimated left ventricular   ejection fraction is 65-70 %.   The left atrium is normal.   Normal appearing right atrium.   Normal appearing right ventricle structure and function.   The aortic valve appears mildly sclerotic. Valve opening seems to be   normal.   Fibrocalcific changes noted to the mitral valve leaflets with preserved   leaflet excursion. Mitral annular calcification.   Mild mitral regurgitation.   Normal appearing tricuspid valve structure and function.   Trace tricuspid valve regurgitation is present.   Pulmonic valve not well seen, probably normal pulmonic valve function.   No evidence of pericardial effusion.   All visualized extra cardiac structures appears to be normal.  < end of copied text >    ECG:  < from: 12 Lead ECG (02.06.21 @ 15:58) >  Diagnosis Line Sinus rhythm with 1st degree A-V block  Abnormal ECG  When compared with ECG of 12-JAN-2021 10:32,  Sinus rhythm is no longer with 2nd degree A-V block (Mobitz I)  < end of copied text >    MEDICATIONS  (STANDING):  ALPRAZolam 0.125 milliGRAM(s) Oral daily  ALPRAZolam 0.5 milliGRAM(s) Oral at bedtime  aspirin  chewable 81 milliGRAM(s) Oral daily  cholecalciferol Oral Tab/Cap - Peds 1000 Unit(s) Oral daily  cyanocobalamin 1000 MICROGram(s) Oral daily  enoxaparin Injectable 40 milliGRAM(s) SubCutaneous daily  gabapentin 100 milliGRAM(s) Oral two times a day  hydrALAZINE 25 milliGRAM(s) Oral two times a day  levothyroxine 100 MICROGram(s) Oral daily  mirtazapine 3.75 milliGRAM(s) Oral at bedtime  pantoprazole    Tablet 40 milliGRAM(s) Oral before breakfast  senna 2 Tablet(s) Oral at bedtime  spironolactone 25 milliGRAM(s) Oral daily    MEDICATIONS  (PRN):  acetaminophen   Tablet .. 650 milliGRAM(s) Oral every 6 hours PRN Moderate Pain (4 - 6)  polyethylene glycol 3350 17 Gram(s) Oral daily PRN Constipation      ASSESSMENT AND PLAN:    89 y/o F w/ PMH of CAD s/p PCI, HTN, dyslipidemia, CVA, hypothyroidism, GERD, macular degeneration, orthostatic hypotension, p/w hypertensive urgency and weakness. Patient states her BP has been in the 200s intermittently over the last few days.     1) HTN urgency   possible underlying uncontrolled anxiety component as well.  - on low dose standing xanax daily, and standing PM dose (home dose)  - f/u echo > EF 65-70 %  - Cardio eval appreciated  - cannot tolerate BB, stopped metoprolol. had radha and 2:1 block while on coreg. will stop coreg  - Cont. spirolactone, imdur.   - cannot tolerate many other BP meds due to side effects or allergic reactions or pt refusal.   - goal -160's per cardiology. Dr. Ritchie recommending cardura if uncontrolled BP    2) Blurred Vision, weakness  -- unclear etiology. likely TIA?  - Unclear if patient has a new neurological event vs recrudescence of prior stroke symptoms in setting of uncontrolled HTN  - check orthostatic VS >> positive. repeat OS VS this AM >still positive but improved and asymptomatic   - BP management.  - Infectious w/u negative: CXR, UA negative   - Cont. ASA, allergy to statin - on pravastatin (non formulary here)   - neuro f/u appreciated   - 2/12: Code Stroke overnight. Had episode of aphasia and left sided weakness overnight with fairly rapid improvement of symptoms  - repeat MRI brain neg for acute stroke (had 2 MRI's this admission) and EEG negative for seizure  - stop keppra as rec by neurology  - 2/15: CT to r/o CVA repeated again. no acute CVA. start 24 hour EEG --- wnl    3) Orthostatic hypotension  - s/p IV fluids, improved    4) Wenckebach | 2:1 block and sinus radha during sleep  - episode on 2/8 and 2/10  - hold BB  - EP eval - no further interventions.     5) Anxiety / Depression  - Cont. Xanax prn. encouraged to take daily xanax low dose to avoid sedative effects.  - started low dose Remeron     6) Stage II decubitus ulcer   - Cont. wound care.    7) DVT ppx - SQ Lovenox    GOC: DNR/ DNI - MOLST   CHIEF COMPLAINT/DIAGNOSIS: hypertensive urgency and weakness    HPI: 89 y/o F w/ PMH of CAD s/p PCI, HTN, dyslipidemia, CVA, hypothyroidism, GERD, macular degeneration, orthostatic hypotension, p/w hypertensive urgency and weakness. Patient states her BP has been in the 200s intermittently over the last few days. Today in the morning it was 200s again and she was referred to ED. As per ED chart patient had weakness around 10am. However, patient herself denies to me that weakness is new, states she has chronic RLE weakness since previous CVA. Denies any new weakness, facial droop, slurred speech or sensory deficits. Denies CP, SOB, cough, runny nose, sore throat, nausea, vomiting, abdominal pain, fever, chills     2/15: seen this AM, lethargic, no answering or responding to verbal questions. following simple commands w/ eyes closed.  2/16: c/o of generalized weakness     REVIEW OF SYSTEMS:  All other review of systems is negative unless indicated above    Vital Signs Last 24 Hrs  T(C): 36.9 (16 Feb 2021 09:21), Max: 36.9 (16 Feb 2021 09:21)  T(F): 98.5 (16 Feb 2021 09:21), Max: 98.5 (16 Feb 2021 09:21)  HR: 74 (16 Feb 2021 09:21) (74 - 83)  BP: 122/41 (16 Feb 2021 09:21) (114/50 - 122/41)  BP(mean): --  RR: 18 (16 Feb 2021 09:21) (18 - 18)  SpO2: 96% (16 Feb 2021 09:21) (96% - 97%)    PHYSICAL EXAM:  Constitutional: Awake and alert, well-developed  HEENT: [place], MMM, EOMI  Neck: Soft and supple, No LAD, No JVD  Respiratory: Breath sounds are clear bilaterally, No wheezing, rales or rhonchi  Cardiovascular: S1 and S2, regular rate and rhythm, no Murmurs, gallops or rubs  Gastrointestinal: Bowel Sounds present, soft, nontender, nondistended, no guarding, no rebound  Extremities: trace b/l peripheral edema  Vascular: 2+ peripheral pulses  Neurological: A/O x 3, no focal deficits. fluent speech, no facial droop, EOMI. moves all extremities. weakness, generalized   Musculoskeletal: 5/5 strength b/l upper and lower extremities.   Skin: No rashes, no jaundice    LABS: All Labs Reviewed:                        11.0   11.93 )-----------( 315      ( 16 Feb 2021 07:46 )             36.2     02-16    139  |  108  |  26<H>  ----------------------------<  93  4.1   |  21<L>  |  1.03    Ca    9.2      16 Feb 2021 07:46  Phos  3.3     02-15  Mg     2.6     02-15    RADIOLOGY:  < from: MR Head No Cont (02.08.21 @ 15:14) >  IMPRESSION:  There is no mass, acute intracranial hemorrhage, or acute infarction. Chronic infarction and chronic microvascular ischemic changes as described.  < end of copied text >    < from: Xray Chest 1 View- PORTABLE-Urgent (Xray Chest 1 View- PORTABLE-Urgent .) (02.06.21 @ 17:36) >  FINDINGS:  The lungs are clear of airspace consolidations or effusions. No pneumothorax.  The heart and mediastinum are within normal limits.  Atherosclerotic calcified intimal wall plaques within nondilated thoracic aorta  Visualized osseous structures are intact.  < end of copied text >    < from: CT Head No Cont (02.06.21 @ 17:36) >  Impression:  Head CT without contrast  1.  No acute intracranial hemorrhage, extra-axial collection, hydrocephalus, midline shift or space-occupying mass lesion.  2.  Small focus of encephalomalacia in the anteromedial left frontal lobe adjoining the left frontal horn extending to the cortex, likely represents an evolved infarct from prior examinations as described.  3.  No evidence of posterior reversible encephalopathy in the clinical setting of hypertension.  4.  Chronic and incidental findings as detailed above.  < end of copied text >    ECHOCARDIOGRAM:  < from: TTE Echo Complete w/o Contrast w/ Doppler (11.09.20 @ 09:30) >   Left ventricular wall motion is normal. Estimated left ventricular   ejection fraction is 65-70 %.   The left atrium is normal.   Normal appearing right atrium.   Normal appearing right ventricle structure and function.   The aortic valve appears mildly sclerotic. Valve opening seems to be   normal.   Fibrocalcific changes noted to the mitral valve leaflets with preserved   leaflet excursion. Mitral annular calcification.   Mild mitral regurgitation.   Normal appearing tricuspid valve structure and function.   Trace tricuspid valve regurgitation is present.   Pulmonic valve not well seen, probably normal pulmonic valve function.   No evidence of pericardial effusion.   All visualized extra cardiac structures appears to be normal.  < end of copied text >    ECG:  < from: 12 Lead ECG (02.06.21 @ 15:58) >  Diagnosis Line Sinus rhythm with 1st degree A-V block  Abnormal ECG  When compared with ECG of 12-JAN-2021 10:32,  Sinus rhythm is no longer with 2nd degree A-V block (Mobitz I)  < end of copied text >    MEDICATIONS  (STANDING):  ALPRAZolam 0.125 milliGRAM(s) Oral daily  ALPRAZolam 0.5 milliGRAM(s) Oral at bedtime  aspirin  chewable 81 milliGRAM(s) Oral daily  cholecalciferol Oral Tab/Cap - Peds 1000 Unit(s) Oral daily  cyanocobalamin 1000 MICROGram(s) Oral daily  enoxaparin Injectable 40 milliGRAM(s) SubCutaneous daily  gabapentin 100 milliGRAM(s) Oral two times a day  hydrALAZINE 25 milliGRAM(s) Oral two times a day  levothyroxine 100 MICROGram(s) Oral daily  mirtazapine 3.75 milliGRAM(s) Oral at bedtime  pantoprazole    Tablet 40 milliGRAM(s) Oral before breakfast  senna 2 Tablet(s) Oral at bedtime  spironolactone 25 milliGRAM(s) Oral daily    MEDICATIONS  (PRN):  acetaminophen   Tablet .. 650 milliGRAM(s) Oral every 6 hours PRN Moderate Pain (4 - 6)  polyethylene glycol 3350 17 Gram(s) Oral daily PRN Constipation      ASSESSMENT AND PLAN:    89 y/o F w/ PMH of CAD s/p PCI, HTN, dyslipidemia, CVA, hypothyroidism, GERD, macular degeneration, orthostatic hypotension, p/w hypertensive urgency and weakness. Patient states her BP has been in the 200s intermittently over the last few days.     1) HTN urgency   possible underlying uncontrolled anxiety component as well.  - on low dose standing xanax daily, and standing PM dose (home dose)  - f/u echo > EF 65-70 %  - Cardio eval appreciated  - cannot tolerate BB, stopped metoprolol. had radha and 2:1 block while on coreg. will stop coreg  - Cont. spirolactone, imdur.   - cannot tolerate many other BP meds due to side effects or allergic reactions or pt refusal.   - goal -160's per cardiology. Dr. Ritchie recommending cardura if uncontrolled BP    2) Blurred Vision, weakness  -- unclear etiology. likely TIA - no seizure activity   - check orthostatic VS >> positive. repeat OS VS this AM >still positive but improved and asymptomatic   - BP management.  - Infectious w/u negative: CXR, UA negative   - Cont. ASA, allergy to statin - on pravastatin (non formulary here), started Plavix  - neuro f/u appreciated   - 2/12: Code Stroke overnight. Had episode of aphasia and left sided weakness overnight with fairly rapid improvement of symptoms  - repeat MRI brain neg for acute stroke (had 2 MRI's this admission) and EEG negative for seizure  - stop keppra as rec by neurology  - 2/15: CT to r/o CVA repeated again. no acute CVA. start 24 hour EEG --- wnl    3) Orthostatic hypotension  - s/p IV fluids, improved    4) Wenckebach | 2:1 block and sinus radha during sleep  - episode on 2/8 and 2/10  - hold BB  - EP eval - no further interventions.     5) Anxiety / Depression  - Cont. Xanax prn. encouraged to take daily xanax low dose to avoid sedative effects.  - started low dose Remeron     6) Stage II decubitus ulcer   - Cont. wound care.    7) DVT ppx - SQ Lovenox    GOC: DNR/ DNI - MOLST    d/c POOL [facility]kins. CHIEF COMPLAINT/DIAGNOSIS: hypertensive urgency and weakness    HPI: 89 y/o F w/ PMH of CAD s/p PCI, HTN, dyslipidemia, CVA, hypothyroidism, GERD, macular degeneration, orthostatic hypotension, p/w hypertensive urgency and weakness. Patient states her BP has been in the 200s intermittently over the last few days. Today in the morning it was 200s again and she was referred to ED. As per ED chart patient had weakness around 10am. However, patient herself denies to me that weakness is new, states she has chronic RLE weakness since previous CVA. Denies any new weakness, facial droop, slurred speech or sensory deficits. Denies CP, SOB, cough, runny nose, sore throat, nausea, vomiting, abdominal pain, fever, chills     2/15: seen this AM, lethargic, no answering or responding to verbal questions. following simple commands w/ eyes closed.  2/16: c/o of generalized weakness     REVIEW OF SYSTEMS:  All other review of systems is negative unless indicated above    Vital Signs Last 24 Hrs  T(C): 36.9 (16 Feb 2021 09:21), Max: 36.9 (16 Feb 2021 09:21)  T(F): 98.5 (16 Feb 2021 09:21), Max: 98.5 (16 Feb 2021 09:21)  HR: 74 (16 Feb 2021 09:21) (74 - 83)  BP: 122/41 (16 Feb 2021 09:21) (114/50 - 122/41)  BP(mean): --  RR: 18 (16 Feb 2021 09:21) (18 - 18)  SpO2: 96% (16 Feb 2021 09:21) (96% - 97%)    PHYSICAL EXAM:  Constitutional: Awake and alert, well-developed  HEENT: [place], MMM, EOMI  Neck: Soft and supple, No LAD, No JVD  Respiratory: Breath sounds are clear bilaterally, No wheezing, rales or rhonchi  Cardiovascular: S1 and S2, regular rate and rhythm, no Murmurs, gallops or rubs  Gastrointestinal: Bowel Sounds present, soft, nontender, nondistended, no guarding, no rebound  Extremities: trace b/l peripheral edema  Vascular: 2+ peripheral pulses  Neurological: A/O x 3, no focal deficits. fluent speech, no facial droop, EOMI. moves all extremities. weakness, generalized   Musculoskeletal: 5/5 strength b/l upper and lower extremities.   Skin: No rashes, no jaundice    LABS: All Labs Reviewed:                        11.0   11.93 )-----------( 315      ( 16 Feb 2021 07:46 )             36.2     02-16    139  |  108  |  26<H>  ----------------------------<  93  4.1   |  21<L>  |  1.03    Ca    9.2      16 Feb 2021 07:46  Phos  3.3     02-15  Mg     2.6     02-15    RADIOLOGY:  < from: MR Head No Cont (02.08.21 @ 15:14) >  IMPRESSION:  There is no mass, acute intracranial hemorrhage, or acute infarction. Chronic infarction and chronic microvascular ischemic changes as described.  < end of copied text >    < from: Xray Chest 1 View- PORTABLE-Urgent (Xray Chest 1 View- PORTABLE-Urgent .) (02.06.21 @ 17:36) >  FINDINGS:  The lungs are clear of airspace consolidations or effusions. No pneumothorax.  The heart and mediastinum are within normal limits.  Atherosclerotic calcified intimal wall plaques within nondilated thoracic aorta  Visualized osseous structures are intact.  < end of copied text >    < from: CT Head No Cont (02.06.21 @ 17:36) >  Impression:  Head CT without contrast  1.  No acute intracranial hemorrhage, extra-axial collection, hydrocephalus, midline shift or space-occupying mass lesion.  2.  Small focus of encephalomalacia in the anteromedial left frontal lobe adjoining the left frontal horn extending to the cortex, likely represents an evolved infarct from prior examinations as described.  3.  No evidence of posterior reversible encephalopathy in the clinical setting of hypertension.  4.  Chronic and incidental findings as detailed above.  < end of copied text >    ECHOCARDIOGRAM:  < from: TTE Echo Complete w/o Contrast w/ Doppler (11.09.20 @ 09:30) >   Left ventricular wall motion is normal. Estimated left ventricular   ejection fraction is 65-70 %.   The left atrium is normal.   Normal appearing right atrium.   Normal appearing right ventricle structure and function.   The aortic valve appears mildly sclerotic. Valve opening seems to be   normal.   Fibrocalcific changes noted to the mitral valve leaflets with preserved   leaflet excursion. Mitral annular calcification.   Mild mitral regurgitation.   Normal appearing tricuspid valve structure and function.   Trace tricuspid valve regurgitation is present.   Pulmonic valve not well seen, probably normal pulmonic valve function.   No evidence of pericardial effusion.   All visualized extra cardiac structures appears to be normal.  < end of copied text >    ECG:  < from: 12 Lead ECG (02.06.21 @ 15:58) >  Diagnosis Line Sinus rhythm with 1st degree A-V block  Abnormal ECG  When compared with ECG of 12-JAN-2021 10:32,  Sinus rhythm is no longer with 2nd degree A-V block (Mobitz I)  < end of copied text >    MEDICATIONS  (STANDING):  ALPRAZolam 0.125 milliGRAM(s) Oral daily  ALPRAZolam 0.5 milliGRAM(s) Oral at bedtime  aspirin  chewable 81 milliGRAM(s) Oral daily  cholecalciferol Oral Tab/Cap - Peds 1000 Unit(s) Oral daily  cyanocobalamin 1000 MICROGram(s) Oral daily  enoxaparin Injectable 40 milliGRAM(s) SubCutaneous daily  gabapentin 100 milliGRAM(s) Oral two times a day  hydrALAZINE 25 milliGRAM(s) Oral two times a day  levothyroxine 100 MICROGram(s) Oral daily  mirtazapine 3.75 milliGRAM(s) Oral at bedtime  pantoprazole    Tablet 40 milliGRAM(s) Oral before breakfast  senna 2 Tablet(s) Oral at bedtime  spironolactone 25 milliGRAM(s) Oral daily    MEDICATIONS  (PRN):  acetaminophen   Tablet .. 650 milliGRAM(s) Oral every 6 hours PRN Moderate Pain (4 - 6)  polyethylene glycol 3350 17 Gram(s) Oral daily PRN Constipation      ASSESSMENT AND PLAN:    89 y/o F w/ PMH of CAD s/p PCI, HTN, dyslipidemia, CVA, hypothyroidism, GERD, macular degeneration, orthostatic hypotension, HX BLADDER ca (STOOPED TREATMENT)p/w hypertensive urgency and weakness. Patient states her BP has been in the 200s intermittently over the last few days.     1) HTN urgency   possible underlying uncontrolled anxiety component as well.  - on low dose standing xanax daily, and standing PM dose (home dose)  - f/u echo > EF 65-70 %  - Cardio eval appreciated  - cannot tolerate BB, stopped metoprolol. had radha and 2:1 block while on coreg. will stop coreg  - Cont. spirolactone, imdur.   - cannot tolerate many other BP meds due to side effects or allergic reactions or pt refusal.   - goal -160's per cardiology. Dr. Ritchie recommending cardura if uncontrolled BP    2) Blurred Vision, weakness  -- unclear etiology. likely TIA - no seizure activity   - check orthostatic VS >> positive. repeat OS VS this AM >still positive but improved and asymptomatic   - BP management.  - Infectious w/u negative: CXR, UA negative   - Cont. ASA, allergy to statin - on pravastatin (non formulary here), started Plavix  - neuro f/u appreciated   - 2/12: Code Stroke overnight. Had episode of aphasia and left sided weakness overnight with fairly rapid improvement of symptoms  - repeat MRI brain neg for acute stroke (had 2 MRI's this admission) and EEG negative for seizure  - stop keppra as rec by neurology  - 2/15: CT to r/o CVA repeated again. no acute CVA. start 24 hour EEG --- wnl    3) Orthostatic hypotension  - s/p IV fluids, improved    4) Wenckebach | 2:1 block and sinus radha during sleep  - episode on 2/8 and 2/10  - hold BB  - EP eval - no further interventions.     5) Anxiety / Depression  - Cont. Xanax prn. encouraged to take daily xanax low dose to avoid sedative effects.  - started low dose Remeron     6) Stage II decubitus ulcer   - Cont. wound care.    7) DVT ppx - SQ Lovenox    GOC: DNR/ DNI - MOLST    d/c POOL td.

## 2021-02-16 NOTE — DISCHARGE NOTE NURSING/CASE MANAGEMENT/SOCIAL WORK - PATIENT PORTAL LINK FT
You can access the FollowMyHealth Patient Portal offered by [facility] by registering at the following website: [email or web address]. By joining [employer]’s FollowMyHealth portal, you will also be able to view your health information using other applications (apps) compatible with our system.
You can access the FollowMyHealth Patient Portal offered by [facility] by registering at the following website: [email or web address]. By joining [employer]’s FollowMyHealth portal, you will also be able to view your health information using other applications (apps) compatible with our system.

## 2021-02-16 NOTE — PROGRESS NOTE ADULT - NUTRITIONAL ASSESSMENT
This patient has been assessed with a concern for Malnutrition and has been determined to have a diagnosis/diagnoses of Moderate protein-calorie malnutrition.    This patient is being managed with:   Diet NPO after Midnight-     NPO Start Date: 11-Feb-2021   NPO Start Time: 23:59  Entered: Feb 11 2021  4:15PM    Diet Regular-  DASH/TLC {Sodium & Cholesterol Restricted} (DASH)  Entered: Feb 6 2021 10:08PM    
This patient has been assessed with a concern for Malnutrition and has been determined to have a diagnosis/diagnoses of Moderate protein-calorie malnutrition.    This patient is being managed with:   Diet Regular-  DASH/TLC {Sodium & Cholesterol Restricted} (DASH)  Entered: Feb 6 2021 10:08PM    
This patient has been assessed with a concern for Malnutrition and has been determined to have a diagnosis/diagnoses of Moderate protein-calorie malnutrition.    This patient is being managed with:   Diet DASH/TLC-  Sodium & Cholesterol Restricted  Supplement Feeding Modality:  Oral  Ensure Enlive Cans or Servings Per Day:  1       Frequency:  Two Times a day  Entered: Feb 13 2021  1:38PM    
This patient has been assessed with a concern for Malnutrition and has been determined to have a diagnosis/diagnoses of Moderate protein-calorie malnutrition.    This patient is being managed with:   Diet DASH/TLC-  Sodium & Cholesterol Restricted  Supplement Feeding Modality:  Oral  Ensure Enlive Cans or Servings Per Day:  1       Frequency:  Two Times a day  Entered: Feb 13 2021  1:38PM    
This patient has been assessed with a concern for Malnutrition and has been determined to have a diagnosis/diagnoses of Moderate protein-calorie malnutrition.    This patient is being managed with:   Diet Regular-  DASH/TLC {Sodium & Cholesterol Restricted} (DASH)  Entered: Feb 6 2021 10:08PM    
This patient has been assessed with a concern for Malnutrition and has been determined to have a diagnosis/diagnoses of Moderate protein-calorie malnutrition.    This patient is being managed with:   Diet DASH/TLC-  Sodium & Cholesterol Restricted  Supplement Feeding Modality:  Oral  Ensure Enlive Cans or Servings Per Day:  1       Frequency:  Two Times a day  Entered: Feb 13 2021  1:38PM    
This patient has been assessed with a concern for Malnutrition and has been determined to have a diagnosis/diagnoses of Moderate protein-calorie malnutrition.    This patient is being managed with:   Diet DASH/TLC-  Sodium & Cholesterol Restricted  Supplement Feeding Modality:  Oral  Ensure Enlive Cans or Servings Per Day:  1       Frequency:  Two Times a day  Entered: Feb 13 2021  1:38PM    
This patient has been assessed with a concern for Malnutrition and has been determined to have a diagnosis/diagnoses of Moderate protein-calorie malnutrition.    This patient is being managed with:   Diet NPO after Midnight-     NPO Start Date: 11-Feb-2021   NPO Start Time: 23:59  Entered: Feb 11 2021  4:15PM    Diet Regular-  DASH/TLC {Sodium & Cholesterol Restricted} (DASH)  Entered: Feb 6 2021 10:08PM    
This patient has been assessed with a concern for Malnutrition and has been determined to have a diagnosis/diagnoses of Moderate protein-calorie malnutrition.    This patient is being managed with:   Diet Regular-  DASH/TLC {Sodium & Cholesterol Restricted} (DASH)  Entered: Feb 6 2021 10:08PM

## 2021-02-16 NOTE — PROGRESS NOTE ADULT - SUBJECTIVE AND OBJECTIVE BOX
Pt more alert and attentive today, converses well, no complaints    24 hr EEG is on    ROS: As above, other ROS negative    MEDICATIONS  (STANDING):  ALPRAZolam 0.5 milliGRAM(s) Oral at bedtime  aspirin  chewable 81 milliGRAM(s) Oral daily  cholecalciferol Oral Tab/Cap - Peds 1000 Unit(s) Oral daily  clopidogrel Tablet 75 milliGRAM(s) Oral daily  cyanocobalamin 1000 MICROGram(s) Oral daily  enoxaparin Injectable 40 milliGRAM(s) SubCutaneous daily  gabapentin 100 milliGRAM(s) Oral two times a day  isosorbide   mononitrate ER Tablet (IMDUR) 30 milliGRAM(s) Oral daily  levETIRAcetam 250 milliGRAM(s) Oral two times a day  levothyroxine 100 MICROGram(s) Oral daily  lidocaine   Patch 1 Patch Transdermal daily  mirtazapine 3.75 milliGRAM(s) Oral at bedtime  pantoprazole    Tablet 40 milliGRAM(s) Oral before breakfast  senna 2 Tablet(s) Oral at bedtime  spironolactone 25 milliGRAM(s) Oral daily      Vital Signs Last 24 Hrs  T(C): 36.9 (16 Feb 2021 09:21), Max: 36.9 (16 Feb 2021 09:21)  T(F): 98.5 (16 Feb 2021 09:21), Max: 98.5 (16 Feb 2021 09:21)  HR: 74 (16 Feb 2021 09:21) (74 - 83)  BP: 122/41 (16 Feb 2021 09:21) (114/50 - 171/68)  BP(mean): --  RR: 18 (16 Feb 2021 09:21) (18 - 18)  SpO2: 96% (16 Feb 2021 09:21) (96% - 97%)    Neuro:  HF: Awake, attentive, follows simple commands,  no aphasia  CN: PERRL, EOMI, no nystagmus, slight decreased nasolabial fold on left,  tongue protrudes in the midline  motor: normal tone, testing limited by lack of effort, but at this time she appears to be moving all extremities equally  sensory: subjective decreased to light touch on right leg compared to left  coordination: finger to nose intact bilaterally  DTRs: hypoactive but symmetric             Radiology report:  MR Head 02.12.21 No evidence of acute infarct or acute hemorrhage.  Extensive patchy and small foci of T2/FLAIR hyperintense signal in the periventricular white matter, compatible with moderate chronic microvascular ischemic changes. Very small chronic appearing infarct in the left genu of the corpus callosum is reidentified.               11.0   11.93 )-----------( 315      ( 16 Feb 2021 07:46 )             36.2     02-16    139  |  108  |  26<H>  ----------------------------<  93  4.1   |  21<L>  |  1.03    Ca    9.2      16 Feb 2021 07:46  Phos  3.3     02-15  Mg     2.6     02-15    02-07 Chol 150 LDL -- HDL 59 Trig 116

## 2021-02-16 NOTE — PROGRESS NOTE ADULT - ASSESSMENT
89 y/o F with PMH of CAD s/p PCI, HTN, dyslipidemia, CVA, hypothyroidism, macular degeneration, OH; p/w hypertensive urgency and weakness, episode of aphasia and left sided weakness, improvement of symptoms. No acute stroke seen on MRI brain of 2/8/21 and repeat 2/12. EEG x 2 are normal.     # Fluctuating mental status, rule out non-convulsive seizures    - will f/u 24 hr EEG, consider restarting Keppra 250 mg bid    # TIA -  MRI brain of 2/8/21 and 2/12 no acute infarct, however, intracranial atherosclerosis / stenosis of multiple vessels    - continue asa,   - start Atorvastatin 20 mg today    Above D/W staff and ESTEPHANIE Heredia    Call neuro if needed henceforth

## 2021-02-17 NOTE — PATIENT PROFILE ADULT. - EXTENSIONS OF SELF_ADULT
Faxed pre-op visit note with labs and EKG tracing to both fax per request.  Received fax confirmation.    None

## 2021-02-23 DIAGNOSIS — I95.1 ORTHOSTATIC HYPOTENSION: ICD-10-CM

## 2021-02-23 DIAGNOSIS — E78.5 HYPERLIPIDEMIA, UNSPECIFIED: ICD-10-CM

## 2021-02-23 DIAGNOSIS — G45.9 TRANSIENT CEREBRAL ISCHEMIC ATTACK, UNSPECIFIED: ICD-10-CM

## 2021-02-23 DIAGNOSIS — I10 ESSENTIAL (PRIMARY) HYPERTENSION: ICD-10-CM

## 2021-02-23 DIAGNOSIS — Z80.8 FAMILY HISTORY OF MALIGNANT NEOPLASM OF OTHER ORGANS OR SYSTEMS: ICD-10-CM

## 2021-02-23 DIAGNOSIS — Z88.2 ALLERGY STATUS TO SULFONAMIDES: ICD-10-CM

## 2021-02-23 DIAGNOSIS — Z79.82 LONG TERM (CURRENT) USE OF ASPIRIN: ICD-10-CM

## 2021-02-23 DIAGNOSIS — K21.9 GASTRO-ESOPHAGEAL REFLUX DISEASE WITHOUT ESOPHAGITIS: ICD-10-CM

## 2021-02-23 DIAGNOSIS — E03.9 HYPOTHYROIDISM, UNSPECIFIED: ICD-10-CM

## 2021-02-23 DIAGNOSIS — I44.1 ATRIOVENTRICULAR BLOCK, SECOND DEGREE: ICD-10-CM

## 2021-02-23 DIAGNOSIS — I16.0 HYPERTENSIVE URGENCY: ICD-10-CM

## 2021-02-23 DIAGNOSIS — Z88.8 ALLERGY STATUS TO OTHER DRUGS, MEDICAMENTS AND BIOLOGICAL SUBSTANCES: ICD-10-CM

## 2021-02-23 DIAGNOSIS — Z85.51 PERSONAL HISTORY OF MALIGNANT NEOPLASM OF BLADDER: ICD-10-CM

## 2021-02-23 DIAGNOSIS — F32.9 MAJOR DEPRESSIVE DISORDER, SINGLE EPISODE, UNSPECIFIED: ICD-10-CM

## 2021-02-23 DIAGNOSIS — Z88.5 ALLERGY STATUS TO NARCOTIC AGENT: ICD-10-CM

## 2021-02-23 DIAGNOSIS — H35.30 UNSPECIFIED MACULAR DEGENERATION: ICD-10-CM

## 2021-02-23 DIAGNOSIS — Z95.5 PRESENCE OF CORONARY ANGIOPLASTY IMPLANT AND GRAFT: ICD-10-CM

## 2021-02-23 DIAGNOSIS — L89.92 PRESSURE ULCER OF UNSPECIFIED SITE, STAGE 2: ICD-10-CM

## 2021-02-23 DIAGNOSIS — R53.1 WEAKNESS: ICD-10-CM

## 2021-02-23 DIAGNOSIS — I25.10 ATHEROSCLEROTIC HEART DISEASE OF NATIVE CORONARY ARTERY WITHOUT ANGINA PECTORIS: ICD-10-CM

## 2021-02-23 DIAGNOSIS — F41.9 ANXIETY DISORDER, UNSPECIFIED: ICD-10-CM

## 2021-02-23 DIAGNOSIS — Z85.44 PERSONAL HISTORY OF MALIGNANT NEOPLASM OF OTHER FEMALE GENITAL ORGANS: ICD-10-CM

## 2021-02-23 DIAGNOSIS — Z66 DO NOT RESUSCITATE: ICD-10-CM

## 2021-02-23 DIAGNOSIS — E44.0 MODERATE PROTEIN-CALORIE MALNUTRITION: ICD-10-CM

## 2021-02-23 DIAGNOSIS — Z90.710 ACQUIRED ABSENCE OF BOTH CERVIX AND UTERUS: ICD-10-CM

## 2021-02-23 DIAGNOSIS — Z86.73 PERSONAL HISTORY OF TRANSIENT ISCHEMIC ATTACK (TIA), AND CEREBRAL INFARCTION WITHOUT RESIDUAL DEFICITS: ICD-10-CM

## 2021-03-06 NOTE — PATIENT PROFILE ADULT - NSPROPTRIGHTBILLOFRIGHTS_GEN_A_NUR
Problem: Impaired respiratory status  Goal: Clear lung sounds  Outcome: Ongoing  Note: Mdi to maintain open airways patient

## 2021-03-10 NOTE — ED ADULT TRIAGE NOTE - HEIGHT IN CM
165.1 Split-Thickness Skin Graft Text: The defect edges were debeveled with a #15 scalpel blade.  Given the location of the defect, shape of the defect and the proximity to free margins a split thickness skin graft was deemed most appropriate.  Using a sterile surgical marker, the primary defect shape was transferred to the donor site. The split thickness graft was then harvested.  The skin graft was then placed in the primary defect and oriented appropriately.

## 2021-03-15 ENCOUNTER — RESULT REVIEW (OUTPATIENT)
Age: 86
End: 2021-03-15

## 2021-03-16 ENCOUNTER — INPATIENT (INPATIENT)
Facility: HOSPITAL | Age: 86
LOS: 2 days | Discharge: HOSPICE | DRG: 871 | End: 2021-03-19
Attending: HOSPITALIST | Admitting: INTERNAL MEDICINE
Payer: MEDICARE

## 2021-03-16 VITALS
WEIGHT: 179.9 LBS | SYSTOLIC BLOOD PRESSURE: 141 MMHG | HEIGHT: 62 IN | OXYGEN SATURATION: 100 % | DIASTOLIC BLOOD PRESSURE: 74 MMHG | TEMPERATURE: 98 F | HEART RATE: 96 BPM | RESPIRATION RATE: 18 BRPM

## 2021-03-16 DIAGNOSIS — Z98.890 OTHER SPECIFIED POSTPROCEDURAL STATES: Chronic | ICD-10-CM

## 2021-03-16 DIAGNOSIS — Z90.710 ACQUIRED ABSENCE OF BOTH CERVIX AND UTERUS: Chronic | ICD-10-CM

## 2021-03-16 DIAGNOSIS — N12 TUBULO-INTERSTITIAL NEPHRITIS, NOT SPECIFIED AS ACUTE OR CHRONIC: ICD-10-CM

## 2021-03-16 LAB
ADD ON TEST-SPECIMEN IN LAB: SIGNIFICANT CHANGE UP
ALBUMIN SERPL ELPH-MCNC: 2.9 G/DL — LOW (ref 3.3–5)
ALP SERPL-CCNC: 76 U/L — SIGNIFICANT CHANGE UP (ref 40–120)
ALT FLD-CCNC: 16 U/L — SIGNIFICANT CHANGE UP (ref 12–78)
ANION GAP SERPL CALC-SCNC: 7 MMOL/L — SIGNIFICANT CHANGE UP (ref 5–17)
APPEARANCE UR: ABNORMAL
AST SERPL-CCNC: 12 U/L — LOW (ref 15–37)
BASOPHILS # BLD AUTO: 0.06 K/UL — SIGNIFICANT CHANGE UP (ref 0–0.2)
BASOPHILS NFR BLD AUTO: 0.5 % — SIGNIFICANT CHANGE UP (ref 0–2)
BILIRUB SERPL-MCNC: 0.3 MG/DL — SIGNIFICANT CHANGE UP (ref 0.2–1.2)
BILIRUB UR-MCNC: NEGATIVE — SIGNIFICANT CHANGE UP
BLD GP AB SCN SERPL QL: SIGNIFICANT CHANGE UP
BUN SERPL-MCNC: 21 MG/DL — SIGNIFICANT CHANGE UP (ref 7–23)
CALCIUM SERPL-MCNC: 9.2 MG/DL — SIGNIFICANT CHANGE UP (ref 8.5–10.1)
CHLORIDE SERPL-SCNC: 104 MMOL/L — SIGNIFICANT CHANGE UP (ref 96–108)
CO2 SERPL-SCNC: 24 MMOL/L — SIGNIFICANT CHANGE UP (ref 22–31)
COLOR SPEC: ABNORMAL
CREAT SERPL-MCNC: 1.12 MG/DL — SIGNIFICANT CHANGE UP (ref 0.5–1.3)
CRP SERPL-MCNC: 53 MG/L — HIGH
D DIMER BLD IA.RAPID-MCNC: 796 NG/ML DDU — HIGH
DIFF PNL FLD: ABNORMAL
E COLI DNA BLD POS QL NAA+NON-PROBE: SIGNIFICANT CHANGE UP
EOSINOPHIL # BLD AUTO: 0.13 K/UL — SIGNIFICANT CHANGE UP (ref 0–0.5)
EOSINOPHIL NFR BLD AUTO: 1 % — SIGNIFICANT CHANGE UP (ref 0–6)
GLUCOSE SERPL-MCNC: 118 MG/DL — HIGH (ref 70–99)
GLUCOSE UR QL: NEGATIVE — SIGNIFICANT CHANGE UP
GRAM STN FLD: SIGNIFICANT CHANGE UP
HCT VFR BLD CALC: 26.3 % — LOW (ref 34.5–45)
HCT VFR BLD CALC: 31.9 % — LOW (ref 34.5–45)
HCT VFR BLD CALC: 35 % — SIGNIFICANT CHANGE UP (ref 34.5–45)
HGB BLD-MCNC: 10.5 G/DL — LOW (ref 11.5–15.5)
HGB BLD-MCNC: 7.9 G/DL — LOW (ref 11.5–15.5)
HGB BLD-MCNC: 9.5 G/DL — LOW (ref 11.5–15.5)
IMM GRANULOCYTES NFR BLD AUTO: 0.5 % — SIGNIFICANT CHANGE UP (ref 0–1.5)
KETONES UR-MCNC: NEGATIVE — SIGNIFICANT CHANGE UP
LACTATE SERPL-SCNC: 1.5 MMOL/L — SIGNIFICANT CHANGE UP (ref 0.7–2)
LACTATE SERPL-SCNC: 5.4 MMOL/L — CRITICAL HIGH (ref 0.7–2)
LACTATE SERPL-SCNC: 7.7 MMOL/L — CRITICAL HIGH (ref 0.7–2)
LDH SERPL L TO P-CCNC: 141 U/L — SIGNIFICANT CHANGE UP (ref 84–241)
LEUKOCYTE ESTERASE UR-ACNC: ABNORMAL
LIDOCAIN IGE QN: 129 U/L — SIGNIFICANT CHANGE UP (ref 73–393)
LYMPHOCYTES # BLD AUTO: 2.98 K/UL — SIGNIFICANT CHANGE UP (ref 1–3.3)
LYMPHOCYTES # BLD AUTO: 22.4 % — SIGNIFICANT CHANGE UP (ref 13–44)
MCHC RBC-ENTMCNC: 24.4 PG — LOW (ref 27–34)
MCHC RBC-ENTMCNC: 25 PG — LOW (ref 27–34)
MCHC RBC-ENTMCNC: 29.8 GM/DL — LOW (ref 32–36)
MCHC RBC-ENTMCNC: 30 GM/DL — LOW (ref 32–36)
MCV RBC AUTO: 81.4 FL — SIGNIFICANT CHANGE UP (ref 80–100)
MCV RBC AUTO: 83.9 FL — SIGNIFICANT CHANGE UP (ref 80–100)
METHOD TYPE: SIGNIFICANT CHANGE UP
MONOCYTES # BLD AUTO: 1.1 K/UL — HIGH (ref 0–0.9)
MONOCYTES NFR BLD AUTO: 8.3 % — SIGNIFICANT CHANGE UP (ref 2–14)
NEUTROPHILS # BLD AUTO: 8.95 K/UL — HIGH (ref 1.8–7.4)
NEUTROPHILS NFR BLD AUTO: 67.3 % — SIGNIFICANT CHANGE UP (ref 43–77)
NITRITE UR-MCNC: SIGNIFICANT CHANGE UP
PH UR: 6 — SIGNIFICANT CHANGE UP (ref 5–8)
PLATELET # BLD AUTO: 373 K/UL — SIGNIFICANT CHANGE UP (ref 150–400)
PLATELET # BLD AUTO: 403 K/UL — HIGH (ref 150–400)
POTASSIUM SERPL-MCNC: 4.6 MMOL/L — SIGNIFICANT CHANGE UP (ref 3.5–5.3)
POTASSIUM SERPL-SCNC: 4.6 MMOL/L — SIGNIFICANT CHANGE UP (ref 3.5–5.3)
PROT SERPL-MCNC: 7 GM/DL — SIGNIFICANT CHANGE UP (ref 6–8.3)
PROT UR-MCNC: 500 MG/DL
RBC # BLD: 3.8 M/UL — SIGNIFICANT CHANGE UP (ref 3.8–5.2)
RBC # BLD: 4.3 M/UL — SIGNIFICANT CHANGE UP (ref 3.8–5.2)
RBC # FLD: 15.9 % — HIGH (ref 10.3–14.5)
RBC # FLD: 16 % — HIGH (ref 10.3–14.5)
SODIUM SERPL-SCNC: 135 MMOL/L — SIGNIFICANT CHANGE UP (ref 135–145)
SP GR SPEC: 1.01 — SIGNIFICANT CHANGE UP (ref 1.01–1.02)
SPECIMEN SOURCE: SIGNIFICANT CHANGE UP
SPECIMEN SOURCE: SIGNIFICANT CHANGE UP
TROPONIN I SERPL-MCNC: 0.03 NG/ML — SIGNIFICANT CHANGE UP (ref 0.01–0.04)
UROBILINOGEN FLD QL: NEGATIVE — SIGNIFICANT CHANGE UP
WBC # BLD: 13.29 K/UL — HIGH (ref 3.8–10.5)
WBC # BLD: 27.55 K/UL — HIGH (ref 3.8–10.5)
WBC # FLD AUTO: 13.29 K/UL — HIGH (ref 3.8–10.5)
WBC # FLD AUTO: 27.55 K/UL — HIGH (ref 3.8–10.5)

## 2021-03-16 PROCEDURE — 71045 X-RAY EXAM CHEST 1 VIEW: CPT

## 2021-03-16 PROCEDURE — 83735 ASSAY OF MAGNESIUM: CPT

## 2021-03-16 PROCEDURE — 83615 LACTATE (LD) (LDH) ENZYME: CPT

## 2021-03-16 PROCEDURE — 85379 FIBRIN DEGRADATION QUANT: CPT

## 2021-03-16 PROCEDURE — 86850 RBC ANTIBODY SCREEN: CPT

## 2021-03-16 PROCEDURE — 74177 CT ABD & PELVIS W/CONTRAST: CPT | Mod: 26

## 2021-03-16 PROCEDURE — 87040 BLOOD CULTURE FOR BACTERIA: CPT

## 2021-03-16 PROCEDURE — 82728 ASSAY OF FERRITIN: CPT

## 2021-03-16 PROCEDURE — 99222 1ST HOSP IP/OBS MODERATE 55: CPT

## 2021-03-16 PROCEDURE — 87186 SC STD MICRODIL/AGAR DIL: CPT

## 2021-03-16 PROCEDURE — 87086 URINE CULTURE/COLONY COUNT: CPT

## 2021-03-16 PROCEDURE — 80053 COMPREHEN METABOLIC PANEL: CPT

## 2021-03-16 PROCEDURE — 85027 COMPLETE CBC AUTOMATED: CPT

## 2021-03-16 PROCEDURE — 86769 SARS-COV-2 COVID-19 ANTIBODY: CPT

## 2021-03-16 PROCEDURE — 84100 ASSAY OF PHOSPHORUS: CPT

## 2021-03-16 PROCEDURE — 85014 HEMATOCRIT: CPT

## 2021-03-16 PROCEDURE — 99223 1ST HOSP IP/OBS HIGH 75: CPT | Mod: 25

## 2021-03-16 PROCEDURE — 81001 URINALYSIS AUTO W/SCOPE: CPT

## 2021-03-16 PROCEDURE — 36415 COLL VENOUS BLD VENIPUNCTURE: CPT

## 2021-03-16 PROCEDURE — 87150 DNA/RNA AMPLIFIED PROBE: CPT

## 2021-03-16 PROCEDURE — 85018 HEMOGLOBIN: CPT

## 2021-03-16 PROCEDURE — 86140 C-REACTIVE PROTEIN: CPT

## 2021-03-16 PROCEDURE — U0005: CPT

## 2021-03-16 PROCEDURE — 85025 COMPLETE CBC W/AUTO DIFF WBC: CPT

## 2021-03-16 PROCEDURE — 99291 CRITICAL CARE FIRST HOUR: CPT

## 2021-03-16 PROCEDURE — 83605 ASSAY OF LACTIC ACID: CPT

## 2021-03-16 PROCEDURE — 99285 EMERGENCY DEPT VISIT HI MDM: CPT | Mod: CS

## 2021-03-16 PROCEDURE — 86901 BLOOD TYPING SEROLOGIC RH(D): CPT

## 2021-03-16 PROCEDURE — 86900 BLOOD TYPING SEROLOGIC ABO: CPT

## 2021-03-16 PROCEDURE — U0003: CPT

## 2021-03-16 RX ORDER — ACETAMINOPHEN 500 MG
1000 TABLET ORAL ONCE
Refills: 0 | Status: COMPLETED | OUTPATIENT
Start: 2021-03-16 | End: 2021-03-16

## 2021-03-16 RX ORDER — VANCOMYCIN HCL 1 G
1000 VIAL (EA) INTRAVENOUS ONCE
Refills: 0 | Status: COMPLETED | OUTPATIENT
Start: 2021-03-16 | End: 2021-03-16

## 2021-03-16 RX ORDER — ACETAMINOPHEN 500 MG
1000 TABLET ORAL EVERY 6 HOURS
Refills: 0 | Status: DISCONTINUED | OUTPATIENT
Start: 2021-03-16 | End: 2021-03-18

## 2021-03-16 RX ORDER — PIPERACILLIN AND TAZOBACTAM 4; .5 G/20ML; G/20ML
3.38 INJECTION, POWDER, LYOPHILIZED, FOR SOLUTION INTRAVENOUS ONCE
Refills: 0 | Status: COMPLETED | OUTPATIENT
Start: 2021-03-16 | End: 2021-03-16

## 2021-03-16 RX ORDER — SODIUM CHLORIDE 9 MG/ML
1000 INJECTION INTRAMUSCULAR; INTRAVENOUS; SUBCUTANEOUS ONCE
Refills: 0 | Status: COMPLETED | OUTPATIENT
Start: 2021-03-16 | End: 2021-03-16

## 2021-03-16 RX ORDER — POLYETHYLENE GLYCOL 3350 17 G/17G
17 POWDER, FOR SOLUTION ORAL DAILY
Refills: 0 | Status: DISCONTINUED | OUTPATIENT
Start: 2021-03-16 | End: 2021-03-18

## 2021-03-16 RX ORDER — ACETAMINOPHEN 500 MG
650 TABLET ORAL ONCE
Refills: 0 | Status: COMPLETED | OUTPATIENT
Start: 2021-03-16 | End: 2021-03-16

## 2021-03-16 RX ORDER — ALPRAZOLAM 0.25 MG
0.12 TABLET ORAL
Qty: 0 | Refills: 0 | DISCHARGE

## 2021-03-16 RX ORDER — ACETAMINOPHEN 500 MG
650 TABLET ORAL EVERY 4 HOURS
Refills: 0 | Status: DISCONTINUED | OUTPATIENT
Start: 2021-03-16 | End: 2021-03-19

## 2021-03-16 RX ORDER — SODIUM CHLORIDE 9 MG/ML
1000 INJECTION INTRAMUSCULAR; INTRAVENOUS; SUBCUTANEOUS
Refills: 0 | Status: DISCONTINUED | OUTPATIENT
Start: 2021-03-16 | End: 2021-03-17

## 2021-03-16 RX ORDER — MEROPENEM 1 G/30ML
1000 INJECTION INTRAVENOUS ONCE
Refills: 0 | Status: COMPLETED | OUTPATIENT
Start: 2021-03-16 | End: 2021-03-16

## 2021-03-16 RX ORDER — ROBINUL 0.2 MG/ML
0.1 INJECTION INTRAMUSCULAR; INTRAVENOUS EVERY 4 HOURS
Refills: 0 | Status: DISCONTINUED | OUTPATIENT
Start: 2021-03-16 | End: 2021-03-19

## 2021-03-16 RX ORDER — ONDANSETRON 8 MG/1
4 TABLET, FILM COATED ORAL EVERY 6 HOURS
Refills: 0 | Status: DISCONTINUED | OUTPATIENT
Start: 2021-03-16 | End: 2021-03-18

## 2021-03-16 RX ORDER — SODIUM CHLORIDE 9 MG/ML
500 INJECTION INTRAMUSCULAR; INTRAVENOUS; SUBCUTANEOUS ONCE
Refills: 0 | Status: COMPLETED | OUTPATIENT
Start: 2021-03-16 | End: 2021-03-16

## 2021-03-16 RX ORDER — PANTOPRAZOLE SODIUM 20 MG/1
40 TABLET, DELAYED RELEASE ORAL
Refills: 0 | Status: DISCONTINUED | OUTPATIENT
Start: 2021-03-16 | End: 2021-03-18

## 2021-03-16 RX ORDER — MEROPENEM 1 G/30ML
INJECTION INTRAVENOUS
Refills: 0 | Status: DISCONTINUED | OUTPATIENT
Start: 2021-03-16 | End: 2021-03-18

## 2021-03-16 RX ORDER — LEVOTHYROXINE SODIUM 125 MCG
100 TABLET ORAL DAILY
Refills: 0 | Status: DISCONTINUED | OUTPATIENT
Start: 2021-03-16 | End: 2021-03-18

## 2021-03-16 RX ORDER — MENTHOL AND METHYL SALICYLATE 10; 30 G/100G; G/100G
1 CREAM TOPICAL
Qty: 0 | Refills: 0 | DISCHARGE

## 2021-03-16 RX ORDER — ALPRAZOLAM 0.25 MG
0.5 TABLET ORAL AT BEDTIME
Refills: 0 | Status: DISCONTINUED | OUTPATIENT
Start: 2021-03-16 | End: 2021-03-16

## 2021-03-16 RX ORDER — GABAPENTIN 400 MG/1
100 CAPSULE ORAL
Refills: 0 | Status: DISCONTINUED | OUTPATIENT
Start: 2021-03-16 | End: 2021-03-16

## 2021-03-16 RX ORDER — MEROPENEM 1 G/30ML
1000 INJECTION INTRAVENOUS EVERY 12 HOURS
Refills: 0 | Status: DISCONTINUED | OUTPATIENT
Start: 2021-03-16 | End: 2021-03-18

## 2021-03-16 RX ORDER — MIRTAZAPINE 45 MG/1
3.75 TABLET, ORALLY DISINTEGRATING ORAL AT BEDTIME
Refills: 0 | Status: DISCONTINUED | OUTPATIENT
Start: 2021-03-16 | End: 2021-03-16

## 2021-03-16 RX ORDER — IBUPROFEN 200 MG
400 TABLET ORAL ONCE
Refills: 0 | Status: COMPLETED | OUTPATIENT
Start: 2021-03-16 | End: 2021-03-16

## 2021-03-16 RX ORDER — HYDROMORPHONE HYDROCHLORIDE 2 MG/ML
0.25 INJECTION INTRAMUSCULAR; INTRAVENOUS; SUBCUTANEOUS EVERY 4 HOURS
Refills: 0 | Status: DISCONTINUED | OUTPATIENT
Start: 2021-03-16 | End: 2021-03-19

## 2021-03-16 RX ADMIN — SODIUM CHLORIDE 1000 MILLILITER(S): 9 INJECTION INTRAMUSCULAR; INTRAVENOUS; SUBCUTANEOUS at 02:40

## 2021-03-16 RX ADMIN — SODIUM CHLORIDE 500 MILLILITER(S): 9 INJECTION INTRAMUSCULAR; INTRAVENOUS; SUBCUTANEOUS at 06:59

## 2021-03-16 RX ADMIN — MEROPENEM 100 MILLIGRAM(S): 1 INJECTION INTRAVENOUS at 09:11

## 2021-03-16 RX ADMIN — Medication 400 MILLIGRAM(S): at 04:33

## 2021-03-16 RX ADMIN — PIPERACILLIN AND TAZOBACTAM 200 GRAM(S): 4; .5 INJECTION, POWDER, LYOPHILIZED, FOR SOLUTION INTRAVENOUS at 06:59

## 2021-03-16 RX ADMIN — Medication 650 MILLIGRAM(S): at 09:11

## 2021-03-16 RX ADMIN — Medication 650 MILLIGRAM(S): at 09:35

## 2021-03-16 RX ADMIN — SODIUM CHLORIDE 1000 MILLILITER(S): 9 INJECTION INTRAMUSCULAR; INTRAVENOUS; SUBCUTANEOUS at 09:15

## 2021-03-16 RX ADMIN — Medication 250 MILLIGRAM(S): at 08:00

## 2021-03-16 RX ADMIN — MEROPENEM 100 MILLIGRAM(S): 1 INJECTION INTRAVENOUS at 21:16

## 2021-03-16 RX ADMIN — SODIUM CHLORIDE 1000 MILLILITER(S): 9 INJECTION INTRAMUSCULAR; INTRAVENOUS; SUBCUTANEOUS at 10:05

## 2021-03-16 RX ADMIN — Medication 400 MILLIGRAM(S): at 08:00

## 2021-03-16 RX ADMIN — SODIUM CHLORIDE 100 MILLILITER(S): 9 INJECTION INTRAMUSCULAR; INTRAVENOUS; SUBCUTANEOUS at 15:36

## 2021-03-16 RX ADMIN — Medication 1000 MILLIGRAM(S): at 09:02

## 2021-03-16 RX ADMIN — Medication 400 MILLIGRAM(S): at 09:35

## 2021-03-16 NOTE — PROVIDER CONTACT NOTE (CRITICAL VALUE NOTIFICATION) - ACTION/TREATMENT ORDERED:
awaiting call back, report given to night RN, may pass info to night shift MD in case no call back. awaiting call back, report given to night RN, may pass info to night shift MD in case no call back.  continue antibiotics

## 2021-03-16 NOTE — ED ADULT TRIAGE NOTE - CHIEF COMPLAINT QUOTE
Patient presents to ED co lower abdominal pain and vaginal pain starting earlier in the day on 3/15. Pt also states that she has been passing blood clots and endorses bleeding. Pt with hx of bladder ca.

## 2021-03-16 NOTE — H&P ADULT - NSHPREVIEWOFSYSTEMS_GEN_ALL_CORE
ROS:  General:  fever, weakness  Skin: No rash or bothersome skin lesions  Musculoskeletal: No arthalgias, myalgias or joint swelling  Eyes: No visual changes or eye pain  Ears: No hearing loss , otorrhea or ear pain  Nose, Mouth, Throat: No nasal congestion, rhinorrhea, oral lesions, postnasal drip or sore throat  Cardio: No chest pain or palpitations. no lower extremity edema. no syncope. no claudication.   Respiratory: sob and cough.    GI: No diarrhea, constipation, blood in stools, abdominal pain, vomiting or heartburn  : hematuria  Neurologic: weakness  Psychiatric:  No anxiety or depression  Lymphatic:  No easy bruising, easy bleeding or swollen glands  Allergic: No itching, sneezing , watery eyes, clear rhinorrhea or recurrent infections

## 2021-03-16 NOTE — ED ADULT NURSE REASSESSMENT NOTE - NS ED NURSE REASSESS COMMENT FT1
spoke to doctor patel. Blood cultures drawn-will administer antibiotics as per order. As per doctor patel no need to wait for urine culture at this time-no need for straight cath at this time either. Pt already received tylenol for fever-will give 500cc bolus as per doctor order.

## 2021-03-16 NOTE — ED PROVIDER NOTE - PMH
Bladder cancer    CAD (coronary artery disease)    Endometrial adenocarcinoma    GERD (gastroesophageal reflux disease)    [place] (hard of hearing)    HTN (hypertension)    Hyperlipidemia    Hypothyroid    Macular degeneration    Orthostatic hypotension    Stented coronary artery

## 2021-03-16 NOTE — CONSULT NOTE ADULT - SUBJECTIVE AND OBJECTIVE BOX
HPI:  89 y/o female with PMHx of HTN, HLD, CAD s/p PCI, CVA, hypothyroidism, GERD, macular degeneration, orthostatic hypotension, and Bladder cancer who presented to  with CC of blood in urine.  Patient was transferred from [facility] where she had been staying for REHAB since last hospitalization in 2021.  As per notes, patient started having gross hematuria at SNF.  In the ER, patient had labs and imaging consistent with acute left sided pyelonephritis with hemorrhagic cystitis.  Patient has ongoing diagnosis of bladder cancer (just recently stopped treatments due to side effects) which is likely contributing to hematuria.  Urology was consulted who recommended inserting a 3 way catheter for CBI.  Patient initially with fairly stable hemodynamics but then ~9am spiked a temp to 101 and became tachy to the 130's and borderline hypotensive to 100 systolic.  Repeat lactate increased from normal to 7.7.  WBC increased from 13 to 27.  Patient given total of 3.5 L and IV ABX-- zosyn/ vanco/ meropenem with hx of ESBL in urine 3/2020.  Of note, as per [facility], patient also tested positive for COVID and has been on a COVID unit @ [facility].       (16 Mar 2021 10:05)    89 yo female with PMH as above admitted for hematuria. Patient has history of bladder ca, as per son Adama patient has history of bladder ca for which she had TURBT with Dr. Neri many years ago and then had recurrence of bladder ca and was getting bladder instillations which were stopped because patient was not able to tolerate and she was getting  "sick" from them as per pt's son. Patient's son states patient at that time had decided she does not want any invasive procedures for her bladder ca. Patient is a poor historian, laying bed opens eyes to verbal stimuli but unable to provide any hx at this time.     PAST MEDICAL & SURGICAL HISTORY:  Orthostatic hypotension    Bladder cancer    Endometrial adenocarcinoma    Macular degeneration    Stented coronary artery    Hypothyroid    Hyperlipidemia    HTN (hypertension)    GERD (gastroesophageal reflux disease)    CAD (coronary artery disease)    [place]    History of bladder surgery    S/P EMILY (total abdominal hysterectomy)    S/P hernia repair  umbilical -     S/P laparoscopic cholecystectomy      Ankle fracture, right  surgery 25 yrs ago - hardware removed    S/P coronary angiogram  , ,  - drug eluding stents        REVIEW OF SYSTEMS  Patient is currently a poor historian, unable to assess      MEDICATIONS  (STANDING):  ALPRAZolam 0.5 milliGRAM(s) Oral at bedtime  artificial  tears Solution 1 Drop(s) Both EYES four times a day  gabapentin 100 milliGRAM(s) Oral two times a day  levothyroxine 100 MICROGram(s) Oral daily  meropenem  IVPB      meropenem  IVPB 1000 milliGRAM(s) IV Intermittent every 12 hours  mirtazapine 3.75 milliGRAM(s) Oral at bedtime  pantoprazole    Tablet 40 milliGRAM(s) Oral before breakfast  polyethylene glycol 3350 17 Gram(s) Oral daily  sodium chloride 0.9%. 1000 milliLiter(s) (100 mL/Hr) IV Continuous <Continuous>    MEDICATIONS  (PRN):  acetaminophen   Tablet .. 1000 milliGRAM(s) Oral every 6 hours PRN Mild Pain (1 - 3)  acetaminophen  Suppository .. 650 milliGRAM(s) Rectal every 4 hours PRN Temp greater or equal to 38.5C (101.3F)  aluminum hydroxide/magnesium hydroxide/simethicone Suspension 30 milliLiter(s) Oral every 4 hours PRN Dyspepsia  ondansetron Injectable 4 milliGRAM(s) IV Push every 6 hours PRN Nausea      Allergies    amlodipine (Unknown)  clonidine (Unknown)  Florinef Acetate (Unknown)  hydrocodone (Unknown)  Levatol (Unknown)  Lipitor (Unknown)  Lotrel (Unknown)  methylPREDNISolone (Unknown)  morphine (Other)  Plaquenil (Unknown)  statins (Other (Unknown))  sulfa drugs (Rash)    Intolerances        SOCIAL HISTORY:    FAMILY HISTORY:  Family history of brain cancer        Vital Signs Last 24 Hrs  T(C): 37 (16 Mar 2021 09:35), Max: 38.8 (16 Mar 2021 07:33)  T(F): 98.6 (16 Mar 2021 09:35), Max: 101.9 (16 Mar 2021 07:33)  HR: 124 (16 Mar 2021 09:35) (96 - 124)  BP: 103/58 (16 Mar 2021 09:35) (103/58 - 160/46)  BP(mean): 72 (16 Mar 2021 09:35) (72 - 72)  RR: 18 (16 Mar 2021 09:35) (15 - 19)  SpO2: 100% (16 Mar 2021 09:35) (98% - 100%)    PHYSICAL EXAM:    General: No distress, No anxiety  VITALS  T(C): 37 (21 @ 09:35), Max: 38.8 (21 @ 07:33)  HR: 124 (21 @ 09:35) (96 - 124)  BP: 103/58 (21 @ 09:35) (103/58 - 160/46)  RR: 18 (21 @ 09:35) (15 - 19)  SpO2: 100% (21 @ 09:35) (98% - 100%)            Skin     : No jaundice   HEENT: No icterus , EOM full , No epistaxis  Lung    : No resp distress  Abdo:   : Soft, Non tender, No guarding, No distension   Back    : No CVAT  Extremity: No calf tenderness   Genitalia Female: 3 way verma with CBI on medium drip with clear very light pink tinge urine         LABS:                        7.9    x     )-----------( x        ( 16 Mar 2021 11:15 )             26.3         135  |  104  |  21  ----------------------------<  118<H>  4.6   |  24  |  1.12    Ca    9.2      16 Mar 2021 02:38    TPro  7.0  /  Alb  2.9<L>  /  TBili  0.3  /  DBili  x   /  AST  12<L>  /  ALT  16  /  AlkPhos  76        Urinalysis Basic - ( 16 Mar 2021 07:44 )    Color: Red / Appearance: bloody / S.015 / pH: x  Gluc: x / Ketone: Negative  / Bili: Negative / Urobili: Negative   Blood: x / Protein: 500 mg/dL / Nitrite: See Note   Leuk Esterase: Moderate / RBC: TNTC /HPF / WBC >50   Sq Epi: x / Non Sq Epi: Occasional / Bacteria: Few        RADIOLOGY & ADDITIONAL STUDIES:  `< from: CT Abdomen and Pelvis w/ IV Cont (21 @ 06:00) >  EXAM:  CT ABDOMEN AND PELVIS IC                            PROCEDURE DATE:  2021          INTERPRETATION:  CLINICAL INFORMATION: Abdominal pain.    COMPARISON: 2020    CONTRAST/COMPLICATIONS:  IV Contrast: Omnipaque 350 / 90 cc administered / 10 cc discarded.  Oral Contrast: None  Complications: None    PROCEDURE:  CT of the Abdomen and Pelvis was performed.  Sagittal and coronal reformats were performed.    FINDINGS:  LOWER CHEST: Mild bibasilar atelectasis. Coronary atherosclerosis.    LIVER: Within normal limits.  BILE DUCTS: Stable CBD and mild intrahepatic ductal dilatation likely related to cholecystectomy.  GALLBLADDER: Within normal limits.  SPLEEN: Within normal limits.  PANCREAS: Within normal limits.  ADRENALS: Withinnormal limits.  KIDNEYS/URETERS: Mild bilateral hydroureteronephrosis to the level of the bladder. No obstructing stone identified. Mild bilateral urothelial enhancement of the mid ureters. Left renal hypodensities too small to characterize. Peripheral region of  hypoattenuation within the mid left kidney (602, 67).    BLADDER: Large amount of layering hyperdense material within the bladder, likely hemorrhage. Small foci of air within the bladder. Perivesicular stranding.  REPRODUCTIVE ORGANS: Hysterectomy or atrophic uterus.    BOWEL: Moderate distention of the rectum with stool. No bowel obstruction. Appendix is not visualized. No evidence of inflammation in the pericecal region. Question small hiatal hernia. Thickening of the gastric antrum/pylorus, similar to prior.  PERITONEUM: No ascites.  VESSELS: Atherosclerotic changes.  RETROPERITONEUM/LYMPH NODES: No lymphadenopathy.  ABDOMINAL WALL: Ventral abdominal wall hernia repair. Small fat-containing bilateral inguinal hernias.  BONES: Degenerative changes. Stable superior endplate compression deformity of L1.    IMPRESSION:    1. Findings overall compatible with cystitis, bilateral ureteritis, and left pyelonephritis with associated mild bilateral hydroureteronephrosis.  Large amount of layering hemorrhage within the bladder. Underlying mass is not excluded and follow-up cystoscopy or CT urogram can be obtained for further evaluation. Small foci of air within the bladder may be related to instrumentation. Clinical correlation is recommended.  2. Thickening of the gastric antrum and pylorus, similar to prior. Further evaluation with follow-up endoscopy can be obtained.            AUDREY ISIDRO MD; Attending Radiologist  This document has been electronically signed. Mar 16 2021  6:10AM    < end of copied text >  
Patient is a 89y old  Female who presents with a chief complaint of bleeding in urine (16 Mar 2021 12:20)    HPI:  89 y/o female with PMHx of HTN, HLD, CAD s/p PCI, CVA, hypothyroidism, GERD, macular degeneration, orthostatic hypotension, and Bladder cancer admitted from [facility] on 3/16 for evaluation of blood in the urine; patient has CBI in place; upon admission the patient became febrile, hypotensive and given iv fluids, multiple antibiotics; history per medical record as patient unable to provide history. Recently diagnosed with COVID-19 at the [facility].      PAST MEDICAL & SURGICAL HISTORY:  Bladder cancer  Orthostatic hypotension  Bladder cancer  Endometrial adenocarcinoma  Macular degeneration  Stented coronary artery  Hypothyroid  Hyperlipidemia  HTN (hypertension)  GERD (gastroesophageal reflux disease)  CAD (coronary artery disease)  [place] (hard of hearing)  History of bladder surgery  S/P EMILY (total abdominal hysterectomy)  S/P hernia repair  umbilical -   S/P laparoscopic cholecystectomy    Ankle fracture, right  surgery 25 yrs ago - hardware removed  S/P coronary angiogram  , ,  - drug eluding stents    PMH: as above  PSH: as above  Meds: per reconciliation sheet, noted below  MEDICATIONS  (STANDING):  ALPRAZolam 0.5 milliGRAM(s) Oral at bedtime  artificial  tears Solution 1 Drop(s) Both EYES four times a day  gabapentin 100 milliGRAM(s) Oral two times a day  levothyroxine 100 MICROGram(s) Oral daily  meropenem  IVPB      meropenem  IVPB 1000 milliGRAM(s) IV Intermittent every 12 hours  mirtazapine 3.75 milliGRAM(s) Oral at bedtime  pantoprazole    Tablet 40 milliGRAM(s) Oral before breakfast  polyethylene glycol 3350 17 Gram(s) Oral daily  sodium chloride 0.9%. 1000 milliLiter(s) (100 mL/Hr) IV Continuous <Continuous>    MEDICATIONS  (PRN):  acetaminophen   Tablet .. 1000 milliGRAM(s) Oral every 6 hours PRN Mild Pain (1 - 3)  acetaminophen  Suppository .. 650 milliGRAM(s) Rectal every 4 hours PRN Temp greater or equal to 38.5C (101.3F)  aluminum hydroxide/magnesium hydroxide/simethicone Suspension 30 milliLiter(s) Oral every 4 hours PRN Dyspepsia  ondansetron Injectable 4 milliGRAM(s) IV Push every 6 hours PRN Nausea    Allergies    amlodipine (Unknown)  clonidine (Unknown)  Florinef Acetate (Unknown)  hydrocodone (Unknown)  Levatol (Unknown)  Lipitor (Unknown)  Lotrel (Unknown)  methylPREDNISolone (Unknown)  morphine (Other)  Plaquenil (Unknown)  statins (Other (Unknown))  sulfa drugs (Rash)    Intolerances      Social: no smoking, no alcohol, no illegal drugs; no recent travel, no exposure to TB  FAMILY HISTORY:  Family history of brain cancer       ROS unable to obtain secondary to patient medical condition     Vital Signs Last 24 Hrs  T(C): 35.2 (16 Mar 2021 14:48), Max: 38.8 (16 Mar 2021 07:33)  T(F): 95.4 (16 Mar 2021 14:48), Max: 101.9 (16 Mar 2021 07:33)  HR: 119 (16 Mar 2021 14:48) (96 - 124)  BP: 82/31 (16 Mar 2021 14:48) (71/58 - 160/46)  BP(mean): 64 (16 Mar 2021 13:15) (37 - 72)  RR: 22 (16 Mar 2021 14:48) (15 - 22)  SpO2: 78% (16 Mar 2021 14:48) (78% - 100%)  Daily Height in cm: 157.48 (16 Mar 2021 02:12)    Daily     PE:    Constitutional: frail looking  HEENT: NC/AT  Neck: supple; thyroid not palpable  Back: no tenderness  Respiratory: respiratory effort normal; clear to auscultation  Cardiovascular: S1S2 regular, no murmurs  Abdomen: soft, not tender, not distended, positive BS; no liver or spleen organomegaly  Genitourinary: no suprapubic tenderness  Musculoskeletal: no muscle tenderness, no joint swelling or tenderness  Neurological/ Psychiatric:  moving all extremities  Skin: no rashes; no palpable lesions    Labs: all available labs reviewed                        7.9    x     )-----------( x        ( 16 Mar 2021 11:15 )             26.3     03-16    135  |  104  |  21  ----------------------------<  118<H>  4.6   |  24  |  1.12    Ca    9.2      16 Mar 2021 02:38    TPro  7.0  /  Alb  2.9<L>  /  TBili  0.3  /  DBili  x   /  AST  12<L>  /  ALT  16  /  AlkPhos  76  03-16     LIVER FUNCTIONS - ( 16 Mar 2021 02:38 )  Alb: 2.9 g/dL / Pro: 7.0 gm/dL / ALK PHOS: 76 U/L / ALT: 16 U/L / AST: 12 U/L / GGT: x           Urinalysis Basic - ( 16 Mar 2021 07:44 )    Color: Red / Appearance: bloody / S.015 / pH: x  Gluc: x / Ketone: Negative  / Bili: Negative / Urobili: Negative   Blood: x / Protein: 500 mg/dL / Nitrite: See Note   Leuk Esterase: Moderate / RBC: TNTC /HPF / WBC >50   Sq Epi: x / Non Sq Epi: Occasional / Bacteria: Few      Lactate, Blood (21 @ 11:15)    Lactate, Blood: 5.4: Test Name:LA  Called by:MINOR  Called to:JIM HERRERA  Read back 2 Pt IDs:Y Read back values:Y Date/Tm:2021 11:52:00 EDT mmol/L            < from: CT Abdomen and Pelvis w/ IV Cont (21 @ 06:00) >    EXAM:  CT ABDOMEN AND PELVIS IC                            PROCEDURE DATE:  2021          INTERPRETATION:  CLINICAL INFORMATION: Abdominal pain.    COMPARISON: 2020    CONTRAST/COMPLICATIONS:  IV Contrast: Omnipaque 350 / 90 cc administered / 10 cc discarded.  Oral Contrast: None  Complications: None    PROCEDURE:  CT of the Abdomen and Pelvis was performed.  Sagittal and coronal reformats were performed.    FINDINGS:  LOWER CHEST: Mild bibasilar atelectasis. Coronary atherosclerosis.    LIVER: Within normal limits.  BILE DUCTS: Stable CBD and mild intrahepatic ductal dilatation likely related to cholecystectomy.  GALLBLADDER: Within normal limits.  SPLEEN: Within normal limits.  PANCREAS: Within normal limits.  ADRENALS: Withinnormal limits.  KIDNEYS/URETERS: Mild bilateral hydroureteronephrosis to the level of the bladder. No obstructing stone identified. Mild bilateral urothelial enhancement of the mid ureters. Left renal hypodensities too small to characterize. Peripheral region of  hypoattenuation within the mid left kidney (602, 67).    BLADDER: Large amount of layering hyperdense material within the bladder, likely hemorrhage. Small foci of air within the bladder. Perivesicular stranding.  REPRODUCTIVE ORGANS: Hysterectomy or atrophic uterus.    BOWEL: Moderate distention of the rectum with stool. No bowel obstruction. Appendix is not visualized. No evidence of inflammation in the pericecal region. Question small hiatal hernia. Thickening of the gastric antrum/pylorus, similar to prior.  PERITONEUM: No ascites.  VESSELS: Atherosclerotic changes.  RETROPERITONEUM/LYMPH NODES: No lymphadenopathy.  ABDOMINAL WALL: Ventral abdominal wall hernia repair. Small fat-containing bilateral inguinal hernias.  BONES: Degenerative changes. Stable superior endplate compression deformity of L1.    IMPRESSION:    1. Findings overall compatible with cystitis, bilateral ureteritis, and left pyelonephritis with associated mild bilateral hydroureteronephrosis.  Large amount of layering hemorrhage within the bladder. Underlying mass is not excluded and follow-up cystoscopy or CT urogram can be obtained for further evaluation. Small foci of air within the bladder may be related to instrumentation. Clinical correlation is recommended.  2. Thickening of the gastric antrum and pylorus, similar to prior. Further evaluation with follow-up endoscopy can be obtained.      < end of copied text >        Radiology: all available radiological tests reviewed    Advanced directives addressed: full resuscitation

## 2021-03-16 NOTE — ED PROVIDER NOTE - CARDIAC, MLM
Normal rate, regular rhythm.  Heart sounds S1, S2.  No rubs or gallops. 2+ pulses in bilateral dp and radial arteries.

## 2021-03-16 NOTE — ED ADULT NURSE REASSESSMENT NOTE - NS ED NURSE REASSESS COMMENT FT1
pt unresponsive, pt grimaces to painful stimuli. MD Leigha Mckeon made aware, MD Varela evaluated pt at bedside. Son was previously called, DNR/DNI was confirmed. pts tachycardiac, slightly hypotensive, more IV fluids given. total 3.5 L given. Son called by MD Mckeon and informed of patients declining status. will ctm.

## 2021-03-16 NOTE — H&P ADULT - NSHPLABSRESULTS_GEN_ALL_CORE
9.5    27.55 )-----------( 373      ( 16 Mar 2021 08:48 )             31.9     03-16    135  |  104  |  21  ----------------------------<  118<H>  4.6   |  24  |  1.12    Ca    9.2      16 Mar 2021 02:38    TPro  7.0  /  Alb  2.9<L>  /  TBili  0.3  /  DBili  x   /  AST  12<L>  /  ALT  16  /  AlkPhos  76  03-16      Urinalysis Basic - ( 16 Mar 2021 07:44 )  Color: Red / Appearance: bloody / S.015 / pH: x  Gluc: x / Ketone: Negative  / Bili: Negative / Urobili: Negative   Blood: x / Protein: 500 mg/dL / Nitrite: See Note   Leuk Esterase: Moderate / RBC: TNTC /HPF / WBC >50   Sq Epi: x / Non Sq Epi: Occasional / Bacteria: Few

## 2021-03-16 NOTE — H&P ADULT - HISTORY OF PRESENT ILLNESS
89 y/o female with PMHx of HTN, HLD, CAD s/p PCI, CVA, hypothyroidism, GERD, macular degeneration, orthostatic hypotension, and Bladder cancer who presented to  with CC of blood in urine.  Patient was transferred from [facility] where she had been staying for REHAB since last hospitalization in FEB 2021.  As per notes, patient started having gross hematuria at SNF.  In the ER, patient had labs and imaging consistent with acute left sided pyelonephritis with hemorrhagic cystitis.  Patient has ongoing diagnosis of bladder cancer (just recently stopped treatments due to side effects) which is likely contributing to hematuria.  Urology was consulted who recommended inserting a 3 way catheter for CBI.  Patient initially with fairly stable hemodynamics but then ~9am spiked a temp to 101 and became tachy to the 130's and borderline hypotensive to 100 systolic.  Repeat lactate increased from normal to 7.7.  WBC increased from 13 to 27.  Patient given total of 3.5 L and IV ABX-- zosyn/ vanco/ meropenem with hx of ESBL in urine 3/2020.  Of note, as per [facility], patient also tested positive for COVID and has been on a COVID unit @ [facility].        PAST MEDICAL & SURGICAL HISTORY:  Bladder cancer  Orthostatic hypotension  Bladder cancer  Endometrial adenocarcinoma  Macular degeneration  Stented coronary artery  Hypothyroid  Hyperlipidemia  HTN (hypertension)  GERD (gastroesophageal reflux disease)  CAD (coronary artery disease)  [place] (hard of hearing)  History of bladder surgery  S/P EMILY (total abdominal hysterectomy)  S/P hernia repair  umbilical - 2008  S/P laparoscopic cholecystectomy  2008  Ankle fracture, right  surgery 25 yrs ago - hardware removed  S/P coronary angiogram  2005, 2008, 2011 - drug eluding stents    FAMILY HISTORY:  Family history of brain cancer    Social History:    No tob/ etoh.  Presently living in REHAB since last hospitalization.      Allergies:    amlodipine (Unknown)  clonidine (Unknown)  Florinef Acetate (Unknown)  hydrocodone (Unknown)  Levatol (Unknown)  Lipitor (Unknown)  Lotrel (Unknown)  methylPREDNISolone (Unknown)  morphine (Other)  Plaquenil (Unknown)  statins (Other (Unknown))  sulfa drugs (Rash)    Home Medications:  acetaminophen 500 mg oral tablet: 1 tab(s) orally every 4 hours, As Needed for MINOR PAIN/TEMP 101F+ (16 Mar 2021 08:48)  ALPRAZolam 0.5 mg oral tablet: 1 tab(s) orally once a day (at bedtime) (16 Mar 2021 08:48)  Artificial Tears ophthalmic solution: 1 drop(s) to each eye 2 times a day (16 Mar 2021 08:48)  aspirin 81 mg oral delayed release tablet: 1 tab(s) orally once a day (16 Mar 2021 08:48)  cholecalciferol oral tablet: 1000 unit(s) orally once a day (16 Mar 2021 08:48)  clopidogrel 75 mg oral tablet: 1 tab(s) orally once a day (16 Mar 2021 08:48)  cyanocobalamin 1000 mcg oral tablet: 1 tab(s) orally once a day (16 Mar 2021 08:48)  gabapentin 100 mg oral capsule: 1 cap(s) orally 2 times a day (16 Mar 2021 08:48)  isosorbide mononitrate 30 mg oral tablet, extended release: 1 tab(s) orally once a day (16 Mar 2021 08:48)  menthol-methyl salicylate 10%-15% topical cream: Apply topically to left knee every 6 hours, As Needed (16 Mar 2021 08:48)  mirtazapine 7.5 mg oral tablet: 0.5 tab(s) orally once a day (at bedtime) (16 Mar 2021 08:48)  pantoprazole 40 mg oral delayed release tablet: 1 tab(s) orally once a day (16 Mar 2021 08:48)  polyethylene glycol 3350 oral powder for reconstitution: 17 gram(s) orally once a day (16 Mar 2021 08:48)  pravastatin 40 mg oral tablet: 1 tab(s) orally once a day (at bedtime) (16 Mar 2021 08:48)  PreserVision AREDS 2 oral capsule: 1 cap(s) orally 2 times a day (16 Mar 2021 08:48)  spironolactone 25 mg oral tablet: 1 tab(s) orally once a day (16 Mar 2021 08:48)  Synthroid 100 mcg (0.1 mg) oral tablet: 1 tab(s) orally once a day (16 Mar 2021 08:48)

## 2021-03-16 NOTE — H&P ADULT - ASSESSMENT
89 y/o female with PMHx of HTN, HLD, CAD s/p PCI, CVA, hypothyroidism, GERD, macular degeneration, orthostatic hypotension, bladder cancer, and recent dx of COVID-19 @ SNF who presented to  with gross hematuria found to have septic shock secondary to acute left pyelonephritis with hemorrhagic cystitis.      #Septic Shock secondary to Acute Left sided Pyelonephritis and hemorrhagic cystitis:    S/p 1.5 L NSS in ER.  Give another 2 L NSS STAT.    Lactate increased from 1.5 to 7.7 since admission.  Trend.     S/p Vanco and zosyn.  Change IV ABX to Meropenem with hx of ESBL.    Follow hemodynamics closely.    Goals of care reviewed with son-- DNR/ DNI/ no central line/ no pressors.    F/u pancultures.      #COVID-19:    As per son, patient was dx @ [facility] 3/3/21.    No significant hypoxia.    Check inflammatory markers.    Check CXR and f/u.      #Hemorrhagic Cystitis:    Presumed related to infection in setting of bladder cancer.    Follow H&H.    Transfuse as necessary.    Urology eval.    Cont CBI for now.      #CAD s/p PCI in the past:    Hold ASA/ Plavix with hematuria.    Hold nitrates/ spironolactone with hypotension.      #CVA:  as above.  DAPT on hold.      #Bladder Cancer:    As per son, patient refused further treatments due to side effects.      #Hypothyroid:  cont synthroid.      #Advanced directives/ Goals of Care:    D/w son Adama Osuna.  DNR/ DNI/ No central line/ No pressors.  Cont IVF/ IV ABX.    No aggressive measures/ interventions.    Total time spent discussing goals of care:  20 min.      Critical Care time spent: 60 min.

## 2021-03-16 NOTE — CONSULT NOTE ADULT - ASSESSMENT
89 yo female with PMH as above admitted for hematuria. Patient has history of bladder ca, as per son Adama patient has history of bladder ca for which she had TURBT with Dr. Neri many years ago and then had recurrence of bladder ca and was getting bladder instillations which were stopped because patient was not able to tolerate and she was getting  "sick" from them as per pt's son. Pt's son states he is the HCP and reports patient and him do not want any invasive procedures including cystoscopy. He states CBI is acceptable for hematuria but would not want any procedures if she continues to have hematuria.   Recommend  - Keep CBI on medium drip titrate to clear light pink tinge, hand irrigate PRN- If patient leaks around catheter, clots in drainage tube, catheter not draining or if patient complains of abdominal pain  - F/U urine c/s and adjust abx accordingly    Case discussed with Dr. Hoyt
89 y/o female with PMHx of HTN, HLD, CAD s/p PCI, CVA, hypothyroidism, GERD, macular degeneration, orthostatic hypotension, and Bladder cancer admitted from [facility] on 3/16 for evaluation of blood in the urine; patient has CBI in place; upon admission the patient became febrile, hypotensive and given iv fluids, multiple antibiotics; history per medical record as patient unable to provide history. Recently diagnosed with COVID-19 at the snf.  1. Patient admitted with sepsis secondary to urinary origin, has history of ESBL E coli in the urine in the past  - follow up cultures   - serial cbc and monitor temperature   - reviewed prior medical records to evaluate for resistant or atypical pathogens   - iv hydration and supportive care   - urology evaluation in progress and continue CBI  - will continue meropenem as ordered  - isolation for COVID-19 in fection  - to have palliative care evaluation  2. other issues: per medicine

## 2021-03-16 NOTE — H&P ADULT - NSHPPHYSICALEXAM_GEN_ALL_CORE
PEx  T(C): 37 (03-16-21 @ 09:35), Max: 38.8 (03-16-21 @ 07:33)  HR: 124 (03-16-21 @ 09:35) (96 - 124)  BP: 103/58 (03-16-21 @ 09:35) (103/58 - 160/46)  RR: 18 (03-16-21 @ 09:35) (15 - 19)  SpO2: 100% (03-16-21 @ 09:35) (98% - 100%)    General:  ill appearing.  weak.    Skin: no rash or prominent lesions  Head: normocephalic, atraumatic     Sinuses: non-tender  Nose: no external lesions, mucosa non-inflamed, septum and turbinates normal  Throat: no erythema, exudates or lesions.  Neck: Supple without lymphadenopathy. Thyroid no thyromegaly, no palpable thyroid nodules, no palpable nodules or masses, carotid arteries no bruits.   Breasts: No palpable masses or lesions.  Heart: tachy 130's  Lungs: decreased BS @ bases  Chest wall: Normal insp   Abdomen:  Soft, NT/ND, normal bowel sounds, no HSM, no masses.  No peritoneal signs.   Back: spine normal without deformity or tenderness.  Normal ROM   : Exam normal.  no inguinal hernias.  Extremities: No deformities, clubbing, cyanosis, or edema.  Musculoskeletal: Normal gait and station. No decreased range of motion, instability, atrophy or abnormal strength or tone in the head, neck, spine, ribs, pelvis or extremities.   Neurologic: CN 2-12 normal. Sensation to pain, touch and proprioception normal. DTRs normal in upper and lower extremities. No pathologic reflexes.  Motor normal.  Psychiatric: Oriented X3, intact recent and remote memory, judgement and insight, normal mood and affect.

## 2021-03-16 NOTE — ED PROVIDER NOTE - OBJECTIVE STATEMENT
89 year old female with PMH of HTN, HLD, CVA, endometrical CA, bladder CA, cholecystectomy, hx of hernia repair, resident of a SNF presents to the ED for complaint of vaginal bleeding vs. hematuria. No cp, sob, palpitation. No recent trauma. No recent exotic travel. Hx of COVID-19 dx. No melena or hematochezia. No syncope. No neck pain or stiffness. No skin rash. No IVDU. No ETOH abuse. No visual or focal neurological complaints.

## 2021-03-16 NOTE — ED PROVIDER NOTE - CLINICAL SUMMARY MEDICAL DECISION MAKING FREE TEXT BOX
Fareed ABRAHAM: hematuria vs. vaginal bleeding, hx of bladder and endometrial cancer, residnet of SNF, high risk for abdominal carcinomatosis, UTI, pyelo. Will admit pending CT and labs.

## 2021-03-16 NOTE — ED ADULT NURSE NOTE - NSIMPLEMENTINTERV_GEN_ALL_ED
Implemented All Fall with Harm Risk Interventions:  [place] to call system. Call bell, personal items and telephone within reach. Instruct patient to call for assistance. Room bathroom lighting operational. Non-slip footwear when patient is off stretcher. Physically safe environment: no spills, clutter or unnecessary equipment. Stretcher in lowest position, wheels locked, appropriate side rails in place. Provide visual cue, wrist band, yellow gown, etc. Monitor gait and stability. Monitor for mental status changes and reorient to person, place, and time. Review medications for side effects contributing to fall risk. Reinforce activity limits and safety measures with patient and family. Provide visual clues: red socks.

## 2021-03-16 NOTE — ED ADULT NURSE NOTE - OBJECTIVE STATEMENT
pt covid + with hx of bladder cancer presents to the ED c/o lower quadrant abdominal pain with associated vaginal bleeding vs. hematuria. Pt awake and alert, following commands appropriately. Denies chest pain, palpitations, SOB, dizziness. States abdominal cramping is associated with intermittent nausea. Pt changed and repositioned in bed-passing small clots at this time. Pt given call bell and educated on use. All safety measures in place at this time.

## 2021-03-17 DIAGNOSIS — R31.0 GROSS HEMATURIA: ICD-10-CM

## 2021-03-17 DIAGNOSIS — C67.9 MALIGNANT NEOPLASM OF BLADDER, UNSPECIFIED: ICD-10-CM

## 2021-03-17 LAB
ALBUMIN SERPL ELPH-MCNC: 2.2 G/DL — LOW (ref 3.3–5)
ALP SERPL-CCNC: 74 U/L — SIGNIFICANT CHANGE UP (ref 40–120)
ALT FLD-CCNC: 13 U/L — SIGNIFICANT CHANGE UP (ref 12–78)
ANION GAP SERPL CALC-SCNC: 8 MMOL/L — SIGNIFICANT CHANGE UP (ref 5–17)
AST SERPL-CCNC: 31 U/L — SIGNIFICANT CHANGE UP (ref 15–37)
BASOPHILS # BLD AUTO: 0 K/UL — SIGNIFICANT CHANGE UP (ref 0–0.2)
BASOPHILS NFR BLD AUTO: 0 % — SIGNIFICANT CHANGE UP (ref 0–2)
BILIRUB SERPL-MCNC: 0.3 MG/DL — SIGNIFICANT CHANGE UP (ref 0.2–1.2)
BUN SERPL-MCNC: 21 MG/DL — SIGNIFICANT CHANGE UP (ref 7–23)
CALCIUM SERPL-MCNC: 7.9 MG/DL — LOW (ref 8.5–10.1)
CHLORIDE SERPL-SCNC: 115 MMOL/L — HIGH (ref 96–108)
CO2 SERPL-SCNC: 19 MMOL/L — LOW (ref 22–31)
CREAT SERPL-MCNC: 1.26 MG/DL — SIGNIFICANT CHANGE UP (ref 0.5–1.3)
EOSINOPHIL # BLD AUTO: 0 K/UL — SIGNIFICANT CHANGE UP (ref 0–0.5)
EOSINOPHIL NFR BLD AUTO: 0 % — SIGNIFICANT CHANGE UP (ref 0–6)
FERRITIN SERPL-MCNC: 49 NG/ML — SIGNIFICANT CHANGE UP (ref 15–150)
GLUCOSE SERPL-MCNC: 91 MG/DL — SIGNIFICANT CHANGE UP (ref 70–99)
HCT VFR BLD CALC: 24.4 % — LOW (ref 34.5–45)
HGB BLD-MCNC: 7.3 G/DL — LOW (ref 11.5–15.5)
LACTATE SERPL-SCNC: 1.4 MMOL/L — SIGNIFICANT CHANGE UP (ref 0.7–2)
LYMPHOCYTES # BLD AUTO: 2.61 K/UL — SIGNIFICANT CHANGE UP (ref 1–3.3)
LYMPHOCYTES # BLD AUTO: 9 % — LOW (ref 13–44)
MAGNESIUM SERPL-MCNC: 2.5 MG/DL — SIGNIFICANT CHANGE UP (ref 1.6–2.6)
MCHC RBC-ENTMCNC: 24.8 PG — LOW (ref 27–34)
MCHC RBC-ENTMCNC: 29.9 GM/DL — LOW (ref 32–36)
MCV RBC AUTO: 83 FL — SIGNIFICANT CHANGE UP (ref 80–100)
MONOCYTES # BLD AUTO: 0.58 K/UL — SIGNIFICANT CHANGE UP (ref 0–0.9)
MONOCYTES NFR BLD AUTO: 2 % — SIGNIFICANT CHANGE UP (ref 2–14)
NEUTROPHILS # BLD AUTO: 25.83 K/UL — HIGH (ref 1.8–7.4)
NEUTROPHILS NFR BLD AUTO: 82 % — HIGH (ref 43–77)
NRBC # BLD: SIGNIFICANT CHANGE UP /100 WBCS (ref 0–0)
PHOSPHATE SERPL-MCNC: 3.8 MG/DL — SIGNIFICANT CHANGE UP (ref 2.5–4.5)
PLATELET # BLD AUTO: 309 K/UL — SIGNIFICANT CHANGE UP (ref 150–400)
POTASSIUM SERPL-MCNC: 4.5 MMOL/L — SIGNIFICANT CHANGE UP (ref 3.5–5.3)
POTASSIUM SERPL-SCNC: 4.5 MMOL/L — SIGNIFICANT CHANGE UP (ref 3.5–5.3)
PROT SERPL-MCNC: 5.9 GM/DL — LOW (ref 6–8.3)
RBC # BLD: 2.94 M/UL — LOW (ref 3.8–5.2)
RBC # FLD: 16.3 % — HIGH (ref 10.3–14.5)
SARS-COV-2 IGG SERPL QL IA: POSITIVE
SARS-COV-2 IGM SERPL IA-ACNC: 2.67 INDEX — HIGH
SARS-COV-2 RNA SPEC QL NAA+PROBE: DETECTED
SODIUM SERPL-SCNC: 142 MMOL/L — SIGNIFICANT CHANGE UP (ref 135–145)
WBC # BLD: 29.02 K/UL — HIGH (ref 3.8–10.5)
WBC # FLD AUTO: 29.02 K/UL — HIGH (ref 3.8–10.5)

## 2021-03-17 PROCEDURE — 99233 SBSQ HOSP IP/OBS HIGH 50: CPT

## 2021-03-17 PROCEDURE — 71045 X-RAY EXAM CHEST 1 VIEW: CPT | Mod: 26

## 2021-03-17 PROCEDURE — 99232 SBSQ HOSP IP/OBS MODERATE 35: CPT

## 2021-03-17 RX ORDER — DEXTROSE MONOHYDRATE, SODIUM CHLORIDE, AND POTASSIUM CHLORIDE 50; .745; 4.5 G/1000ML; G/1000ML; G/1000ML
1000 INJECTION, SOLUTION INTRAVENOUS
Refills: 0 | Status: DISCONTINUED | OUTPATIENT
Start: 2021-03-17 | End: 2021-03-17

## 2021-03-17 RX ORDER — SODIUM CHLORIDE 9 MG/ML
1000 INJECTION, SOLUTION INTRAVENOUS
Refills: 0 | Status: DISCONTINUED | OUTPATIENT
Start: 2021-03-17 | End: 2021-03-18

## 2021-03-17 RX ADMIN — Medication 1 DROP(S): at 11:30

## 2021-03-17 RX ADMIN — Medication 1 DROP(S): at 23:24

## 2021-03-17 RX ADMIN — Medication 1 DROP(S): at 05:06

## 2021-03-17 RX ADMIN — MEROPENEM 100 MILLIGRAM(S): 1 INJECTION INTRAVENOUS at 17:01

## 2021-03-17 RX ADMIN — SODIUM CHLORIDE 75 MILLILITER(S): 9 INJECTION, SOLUTION INTRAVENOUS at 17:42

## 2021-03-17 RX ADMIN — Medication 1 DROP(S): at 17:01

## 2021-03-17 RX ADMIN — MEROPENEM 100 MILLIGRAM(S): 1 INJECTION INTRAVENOUS at 05:06

## 2021-03-17 RX ADMIN — SODIUM CHLORIDE 100 MILLILITER(S): 9 INJECTION INTRAMUSCULAR; INTRAVENOUS; SUBCUTANEOUS at 11:30

## 2021-03-17 RX ADMIN — Medication 650 MILLIGRAM(S): at 23:33

## 2021-03-17 RX ADMIN — SODIUM CHLORIDE 100 MILLILITER(S): 9 INJECTION INTRAMUSCULAR; INTRAVENOUS; SUBCUTANEOUS at 01:04

## 2021-03-17 NOTE — PROGRESS NOTE ADULT - ASSESSMENT
89 yo female with PMH as above admitted for hematuria. Patient has history of bladder ca, as per son Adama patient has history of bladder ca for which she had TURBT with Dr. Neri many years ago and then had recurrence of bladder ca and was getting bladder instillations which were stopped because patient was not able to tolerate and she was getting  "sick" from them as per pt's son. Pt's son states he is the HCP and reports patient and him do not want any invasive procedures including cystoscopy. Hematuria resolved with CBI, now with yellow urine off CBI.  Recommend  - F/U urine c/s and adjust abx accordingly  - Trial of void, Check PVR, if significant re insert verma  - Outpatient follow up with her own urologist or Dr. Hoyt    Case discussed with Dr. Hoyt   89 yo female with PMH as above admitted for hematuria. Patient has history of bladder ca, as per son Adama patient has history of bladder ca for which she had TURBT with Dr. Neri many years ago and then had recurrence of bladder ca and was getting bladder instillations which were stopped because patient was not able to tolerate and she was getting  "sick" from them as per pt's son. Pt's son states he is the HCP and reports patient and him do not want any invasive procedures including cystoscopy. Hematuria resolved with CBI, now with yellow urine off CBI.  Recommend  - F/U urine c/s and adjust abx accordingly  - Trial of void, Check PVR, if significant re insert verma  - Outpatient follow up with her own urologist or Dr. Hoyt    Case discussed with Dr. Hoyt      Pt seen and examined.   Verma removed earlier today, voided. PVR around 200 ml.   Will monitor.

## 2021-03-17 NOTE — CHART NOTE - NSCHARTNOTEFT_GEN_A_CORE
Provider aware of Blood culture results.  Patient is on Meropenem  ID following.  No change to plan.

## 2021-03-17 NOTE — PROGRESS NOTE ADULT - ASSESSMENT
MEDICATIONS  (STANDING):  artificial  tears Solution 1 Drop(s) Both EYES four times a day  levothyroxine 100 MICROGram(s) Oral daily  meropenem  IVPB      meropenem  IVPB 1000 milliGRAM(s) IV Intermittent every 12 hours  pantoprazole    Tablet 40 milliGRAM(s) Oral before breakfast  polyethylene glycol 3350 17 Gram(s) Oral daily  sodium chloride 0.9%. 1000 milliLiter(s) (100 mL/Hr) IV Continuous <Continuous>    MEDICATIONS  (PRN):  acetaminophen   Tablet .. 1000 milliGRAM(s) Oral every 6 hours PRN Mild Pain (1 - 3)  acetaminophen  Suppository .. 650 milliGRAM(s) Rectal every 4 hours PRN Temp greater or equal to 38.5C (101.3F)  aluminum hydroxide/magnesium hydroxide/simethicone Suspension 30 milliLiter(s) Oral every 4 hours PRN Dyspepsia  glycopyrrolate Injectable 0.1 milliGRAM(s) IV Push every 4 hours PRN secretions  HYDROmorphone  Injectable 0.25 milliGRAM(s) IV Push every 4 hours PRN dyspnea  LORazepam   Injectable 0.5 milliGRAM(s) IV Push every 4 hours PRN Anxiety  ondansetron Injectable 4 milliGRAM(s) IV Push every 6 hours PRN Nausea      ASSESSMENT    Severe Sepsis secondary to E. Coli Pyelonephritis/GNR Bacteremia  COVID-19 positive however likely recovered from COVID-19 infection.   Personal history of Bladder Cancer  Gross Hematuria secondary to Hemorrhagic Cystitis and bladder cancer s/p CBI  Dementia  Hypothyroidism  Dementia    PLAN    Admitted to medicine  Urine and Blood Cutlures positive for E.Coli/GNR.  Merrem. ID on board  COVID-19 PCR positive but also positive >2 weeks ago and has antibiotics. Likely not active infection. Continue Isolation precautions as per hospital protocol.   CBI completed. Valdes removed. Bladder scan and straight cath PRN  Urology on board  Continue Levothyroxine  IVF as needed for fluid balance.   Fall Precautions    Patient is DNR/DNI. No pressor  As of now OK to administer IVF/Antibiotics    Disposition: Patient has made some interval improvement however long term prognosis is still poor. Still with decreased PO intake. Discussed with son. Will reassess daily and if no dramatic improvement in 24-48 hours, may consider comfort care measures.

## 2021-03-17 NOTE — PROGRESS NOTE ADULT - ASSESSMENT
89 y/o female with PMHx of HTN, HLD, CAD s/p PCI, CVA, hypothyroidism, GERD, macular degeneration, orthostatic hypotension, and Bladder cancer admitted from [facility] on 3/16 for evaluation of blood in the urine; patient has CBI in place; upon admission the patient became febrile, hypotensive and given iv fluids, multiple antibiotics; history per medical record as patient unable to provide history. Recently diagnosed with COVID-19 at the snf.  1. Patient admitted with sepsis secondary to urinary origin, has history of ESBL E coli in the urine in the past  - follow up cultures --- found to be septic with E coli in all blood culture bottles  - serial cbc and monitor temperature   - reviewed prior medical records to evaluate for resistant or atypical pathogens   - iv hydration and supportive care   - urology evaluation in progress and continue CBI, now stopped and verma is removed  - will continue meropenem as ordered, day #2  - tolerating antibiotics without rashes or side effects   - isolation for COVID-19 infection  - to have palliative care evaluation  2. other issues: per medicine

## 2021-03-18 LAB
-  AMIKACIN: SIGNIFICANT CHANGE UP
-  AMIKACIN: SIGNIFICANT CHANGE UP
-  AMOXICILLIN/CLAVULANIC ACID: SIGNIFICANT CHANGE UP
-  AMPICILLIN/SULBACTAM: SIGNIFICANT CHANGE UP
-  AMPICILLIN/SULBACTAM: SIGNIFICANT CHANGE UP
-  AMPICILLIN: SIGNIFICANT CHANGE UP
-  AMPICILLIN: SIGNIFICANT CHANGE UP
-  AZTREONAM: SIGNIFICANT CHANGE UP
-  AZTREONAM: SIGNIFICANT CHANGE UP
-  CEFAZOLIN: SIGNIFICANT CHANGE UP
-  CEFAZOLIN: SIGNIFICANT CHANGE UP
-  CEFEPIME: SIGNIFICANT CHANGE UP
-  CEFEPIME: SIGNIFICANT CHANGE UP
-  CEFOXITIN: SIGNIFICANT CHANGE UP
-  CEFOXITIN: SIGNIFICANT CHANGE UP
-  CEFTRIAXONE: SIGNIFICANT CHANGE UP
-  CEFTRIAXONE: SIGNIFICANT CHANGE UP
-  CIPROFLOXACIN: SIGNIFICANT CHANGE UP
-  CIPROFLOXACIN: SIGNIFICANT CHANGE UP
-  ERTAPENEM: SIGNIFICANT CHANGE UP
-  ERTAPENEM: SIGNIFICANT CHANGE UP
-  GENTAMICIN: SIGNIFICANT CHANGE UP
-  GENTAMICIN: SIGNIFICANT CHANGE UP
-  IMIPENEM: SIGNIFICANT CHANGE UP
-  IMIPENEM: SIGNIFICANT CHANGE UP
-  LEVOFLOXACIN: SIGNIFICANT CHANGE UP
-  LEVOFLOXACIN: SIGNIFICANT CHANGE UP
-  MEROPENEM: SIGNIFICANT CHANGE UP
-  MEROPENEM: SIGNIFICANT CHANGE UP
-  NITROFURANTOIN: SIGNIFICANT CHANGE UP
-  PIPERACILLIN/TAZOBACTAM: SIGNIFICANT CHANGE UP
-  PIPERACILLIN/TAZOBACTAM: SIGNIFICANT CHANGE UP
-  TIGECYCLINE: SIGNIFICANT CHANGE UP
-  TOBRAMYCIN: SIGNIFICANT CHANGE UP
-  TOBRAMYCIN: SIGNIFICANT CHANGE UP
-  TRIMETHOPRIM/SULFAMETHOXAZOLE: SIGNIFICANT CHANGE UP
-  TRIMETHOPRIM/SULFAMETHOXAZOLE: SIGNIFICANT CHANGE UP
ALBUMIN SERPL ELPH-MCNC: 2.1 G/DL — LOW (ref 3.3–5)
ALP SERPL-CCNC: 77 U/L — SIGNIFICANT CHANGE UP (ref 40–120)
ALT FLD-CCNC: 18 U/L — SIGNIFICANT CHANGE UP (ref 12–78)
ANION GAP SERPL CALC-SCNC: 9 MMOL/L — SIGNIFICANT CHANGE UP (ref 5–17)
AST SERPL-CCNC: 36 U/L — SIGNIFICANT CHANGE UP (ref 15–37)
BASOPHILS # BLD AUTO: 0.06 K/UL — SIGNIFICANT CHANGE UP (ref 0–0.2)
BASOPHILS NFR BLD AUTO: 0.3 % — SIGNIFICANT CHANGE UP (ref 0–2)
BILIRUB SERPL-MCNC: 0.4 MG/DL — SIGNIFICANT CHANGE UP (ref 0.2–1.2)
BUN SERPL-MCNC: 19 MG/DL — SIGNIFICANT CHANGE UP (ref 7–23)
CALCIUM SERPL-MCNC: 7.9 MG/DL — LOW (ref 8.5–10.1)
CHLORIDE SERPL-SCNC: 113 MMOL/L — HIGH (ref 96–108)
CO2 SERPL-SCNC: 19 MMOL/L — LOW (ref 22–31)
CREAT SERPL-MCNC: 0.97 MG/DL — SIGNIFICANT CHANGE UP (ref 0.5–1.3)
CULTURE RESULTS: SIGNIFICANT CHANGE UP
EOSINOPHIL # BLD AUTO: 0.14 K/UL — SIGNIFICANT CHANGE UP (ref 0–0.5)
EOSINOPHIL NFR BLD AUTO: 0.6 % — SIGNIFICANT CHANGE UP (ref 0–6)
GLUCOSE SERPL-MCNC: 109 MG/DL — HIGH (ref 70–99)
HCT VFR BLD CALC: 22 % — LOW (ref 34.5–45)
HGB BLD-MCNC: 6.7 G/DL — CRITICAL LOW (ref 11.5–15.5)
IMM GRANULOCYTES NFR BLD AUTO: 1.2 % — SIGNIFICANT CHANGE UP (ref 0–1.5)
LYMPHOCYTES # BLD AUTO: 1.89 K/UL — SIGNIFICANT CHANGE UP (ref 1–3.3)
LYMPHOCYTES # BLD AUTO: 8.3 % — LOW (ref 13–44)
MAGNESIUM SERPL-MCNC: 2.3 MG/DL — SIGNIFICANT CHANGE UP (ref 1.6–2.6)
MCHC RBC-ENTMCNC: 25 PG — LOW (ref 27–34)
MCHC RBC-ENTMCNC: 30.5 GM/DL — LOW (ref 32–36)
MCV RBC AUTO: 82.1 FL — SIGNIFICANT CHANGE UP (ref 80–100)
METHOD TYPE: SIGNIFICANT CHANGE UP
METHOD TYPE: SIGNIFICANT CHANGE UP
MONOCYTES # BLD AUTO: 1.35 K/UL — HIGH (ref 0–0.9)
MONOCYTES NFR BLD AUTO: 5.9 % — SIGNIFICANT CHANGE UP (ref 2–14)
NEUTROPHILS # BLD AUTO: 19.11 K/UL — HIGH (ref 1.8–7.4)
NEUTROPHILS NFR BLD AUTO: 83.7 % — HIGH (ref 43–77)
ORGANISM # SPEC MICROSCOPIC CNT: SIGNIFICANT CHANGE UP
PHOSPHATE SERPL-MCNC: 2.3 MG/DL — LOW (ref 2.5–4.5)
PLATELET # BLD AUTO: 317 K/UL — SIGNIFICANT CHANGE UP (ref 150–400)
POTASSIUM SERPL-MCNC: 3.7 MMOL/L — SIGNIFICANT CHANGE UP (ref 3.5–5.3)
POTASSIUM SERPL-SCNC: 3.7 MMOL/L — SIGNIFICANT CHANGE UP (ref 3.5–5.3)
PROT SERPL-MCNC: 5.8 GM/DL — LOW (ref 6–8.3)
RBC # BLD: 2.68 M/UL — LOW (ref 3.8–5.2)
RBC # FLD: 16.1 % — HIGH (ref 10.3–14.5)
SODIUM SERPL-SCNC: 141 MMOL/L — SIGNIFICANT CHANGE UP (ref 135–145)
SPECIMEN SOURCE: SIGNIFICANT CHANGE UP
WBC # BLD: 22.82 K/UL — HIGH (ref 3.8–10.5)
WBC # FLD AUTO: 22.82 K/UL — HIGH (ref 3.8–10.5)

## 2021-03-18 PROCEDURE — 99233 SBSQ HOSP IP/OBS HIGH 50: CPT

## 2021-03-18 PROCEDURE — 99232 SBSQ HOSP IP/OBS MODERATE 35: CPT

## 2021-03-18 PROCEDURE — 99497 ADVNCD CARE PLAN 30 MIN: CPT

## 2021-03-18 RX ADMIN — Medication 650 MILLIGRAM(S): at 17:41

## 2021-03-18 RX ADMIN — MEROPENEM 100 MILLIGRAM(S): 1 INJECTION INTRAVENOUS at 06:04

## 2021-03-18 RX ADMIN — Medication 650 MILLIGRAM(S): at 09:27

## 2021-03-18 RX ADMIN — Medication 1 DROP(S): at 06:04

## 2021-03-18 RX ADMIN — Medication 650 MILLIGRAM(S): at 17:33

## 2021-03-18 RX ADMIN — Medication 1 DROP(S): at 17:32

## 2021-03-18 RX ADMIN — Medication 1 DROP(S): at 11:59

## 2021-03-18 RX ADMIN — Medication 650 MILLIGRAM(S): at 13:37

## 2021-03-18 RX ADMIN — Medication 100 MICROGRAM(S): at 06:04

## 2021-03-18 RX ADMIN — Medication 650 MILLIGRAM(S): at 06:04

## 2021-03-18 RX ADMIN — Medication 650 MILLIGRAM(S): at 14:24

## 2021-03-18 RX ADMIN — SODIUM CHLORIDE 75 MILLILITER(S): 9 INJECTION, SOLUTION INTRAVENOUS at 06:04

## 2021-03-18 RX ADMIN — Medication 650 MILLIGRAM(S): at 22:00

## 2021-03-18 NOTE — DIETITIAN INITIAL EVALUATION ADULT. - PERTINENT MEDS FT
MEDICATIONS  (STANDING):  artificial  tears Solution 1 Drop(s) Both EYES four times a day  dextrose 5%. 1000 milliLiter(s) (75 mL/Hr) IV Continuous <Continuous>  levothyroxine 100 MICROGram(s) Oral daily  meropenem  IVPB      meropenem  IVPB 1000 milliGRAM(s) IV Intermittent every 12 hours  pantoprazole    Tablet 40 milliGRAM(s) Oral before breakfast  polyethylene glycol 3350 17 Gram(s) Oral daily    MEDICATIONS  (PRN):  acetaminophen   Tablet .. 1000 milliGRAM(s) Oral every 6 hours PRN Mild Pain (1 - 3)  acetaminophen  Suppository .. 650 milliGRAM(s) Rectal every 4 hours PRN Temp greater or equal to 38.5C (101.3F)  aluminum hydroxide/magnesium hydroxide/simethicone Suspension 30 milliLiter(s) Oral every 4 hours PRN Dyspepsia  glycopyrrolate Injectable 0.1 milliGRAM(s) IV Push every 4 hours PRN secretions  HYDROmorphone  Injectable 0.25 milliGRAM(s) IV Push every 4 hours PRN dyspnea  LORazepam   Injectable 0.5 milliGRAM(s) IV Push every 4 hours PRN Anxiety  ondansetron Injectable 4 milliGRAM(s) IV Push every 6 hours PRN Nausea

## 2021-03-18 NOTE — PROVIDER CONTACT NOTE (CRITICAL VALUE NOTIFICATION) - TEST AND RESULT REPORTED:
BC from 3/16 1st set Gr-rods in aerobic and anearobic, 2nd set Gr - rods,
hemoglobin 6.7
lactate 7.7

## 2021-03-18 NOTE — PROGRESS NOTE ADULT - REASON FOR ADMISSION
bleeding in urine
bleeding in urine/Severe Sepsis/Bacteremia
Urosepsis/Hematuria/Bladder Cancer
bleeding in urine

## 2021-03-18 NOTE — DIETITIAN INITIAL EVALUATION ADULT. - PERTINENT LABORATORY DATA
03-18    141  |  113<H>  |  19  ----------------------------<  109<H>  3.7   |  19<L>  |  0.97    Ca    7.9<L>      18 Mar 2021 09:01  Phos  2.3     03-18  Mg     2.3     03-18    TPro  5.8<L>  /  Alb  2.1<L>  /  TBili  0.4  /  DBili  x   /  AST  36  /  ALT  18  /  AlkPhos  77  03-18  BMI: BMI (kg/m2): 32.9 (03-16-21 @ 15:27)  HbA1c: A1C with Estimated Average Glucose Result: 6.0 % (02-07-21 @ 08:20)    Glucose: POCT Blood Glucose.: 122 mg/dL (02-15-21 @ 10:30)    BP: 143/55 (03-18-21 @ 08:57) (71/58 - 160/46)  Lipid Panel: Date/Time: 02-07-21 @ 08:20  Cholesterol, Serum: 150  Direct LDL: --  HDL Cholesterol, Serum: 59  Total Cholesterol/HDL Ration Measurement: --  Triglycerides, Serum: 116  Folate, Serum: 15.4 ng/mL (11-08-20 @ 13:01)  Vitamin B12, Serum: 492 pg/mL (11-08-20 @ 13:01)  Iron Total, Serum: 24 ug/dL (08-11-19 @ 11:51)  Vitamin B12, Serum: 554 pg/mL (08-11-19 @ 11:51)  Vitamin B12, Serum: 433 pg/mL (06-19-19 @ 06:27)

## 2021-03-18 NOTE — DIETITIAN INITIAL EVALUATION ADULT. - OTHER INFO
89yo female with PMH significant for HTN, HLD, CAD, CVA, hypothyroidism, GERD, macular degeneration, orthostatic hypotension, and bladder CA p/w blood in urine.  Pt admitted with septic shock 2/2 acute left sided pyelonephritis and hemorrhagic cystitis, COVID-19 (+).  Pt is DNR/DNI.  Pt with dementia.  Pt is arousable, only moans; no words spoken.  Per MD note, GOC completed on 3/18, pt's family has decided to make patient comfort care measures only.  Pending hospice referral.

## 2021-03-18 NOTE — PROGRESS NOTE ADULT - SUBJECTIVE AND OBJECTIVE BOX
Patient seen at bedside, opens eyes to verbal stimuli but is poor historian. Valdes with CBI port clamped draining clear yellow urine, no clots.    PE  General: No distress, No anxiety  Vital Signs Last 24 Hrs  T(C): 37.7 (18 Mar 2021 10:07), Max: 38.2 (18 Mar 2021 06:08)  T(F): 99.8 (18 Mar 2021 10:07), Max: 100.8 (18 Mar 2021 06:08)  HR: 83 (18 Mar 2021 08:57) (83 - 97)  BP: 143/55 (18 Mar 2021 08:57) (143/55 - 152/54)  BP(mean): --  RR: 17 (18 Mar 2021 08:57) (16 - 17)  SpO2: 97% (18 Mar 2021 08:57) (97% - 99%)    Skin     : No jaundice, No lesions, No swelling  HEENT: No icterus , EOM full , No epistaxis  Abdo:   : Soft, Non tender- no grimacing No guarding, No distension    Back    : No CVAT- no grimacing  Genitalia Female: primafit in place draining yellow urine    LABS                          6.7    22.82 )-----------( 317      ( 18 Mar 2021 09:01 )             22.0     03-18    141  |  113<H>  |  19  ----------------------------<  109<H>  3.7   |  19<L>  |  0.97    Ca    7.9<L>      18 Mar 2021 09:01  Phos  2.3     03-18  Mg     2.3     03-18    TPro  5.8<L>  /  Alb  2.1<L>  /  TBili  0.4  /  DBili  x   /  AST  36  /  ALT  18  /  AlkPhos  77  03-18         
Date of service: 03-17-21 @ 14:33    Patient lying in bed; awake, more alert, though nonverbal        ROS unable to obtain secondary to patient medical condition     MEDICATIONS  (STANDING):  artificial  tears Solution 1 Drop(s) Both EYES four times a day  levothyroxine 100 MICROGram(s) Oral daily  meropenem  IVPB      meropenem  IVPB 1000 milliGRAM(s) IV Intermittent every 12 hours  pantoprazole    Tablet 40 milliGRAM(s) Oral before breakfast  polyethylene glycol 3350 17 Gram(s) Oral daily  sodium chloride 0.9%. 1000 milliLiter(s) (100 mL/Hr) IV Continuous <Continuous>    MEDICATIONS  (PRN):  acetaminophen   Tablet .. 1000 milliGRAM(s) Oral every 6 hours PRN Mild Pain (1 - 3)  acetaminophen  Suppository .. 650 milliGRAM(s) Rectal every 4 hours PRN Temp greater or equal to 38.5C (101.3F)  aluminum hydroxide/magnesium hydroxide/simethicone Suspension 30 milliLiter(s) Oral every 4 hours PRN Dyspepsia  glycopyrrolate Injectable 0.1 milliGRAM(s) IV Push every 4 hours PRN secretions  HYDROmorphone  Injectable 0.25 milliGRAM(s) IV Push every 4 hours PRN dyspnea  LORazepam   Injectable 0.5 milliGRAM(s) IV Push every 4 hours PRN Anxiety  ondansetron Injectable 4 milliGRAM(s) IV Push every 6 hours PRN Nausea      Vital Signs Last 24 Hrs  T(C): 35.8 (17 Mar 2021 09:04), Max: 36.9 (16 Mar 2021 23:56)  T(F): 96.5 (17 Mar 2021 09:04), Max: 98.5 (16 Mar 2021 23:56)  HR: 96 (17 Mar 2021 09:04) (96 - 119)  BP: 136/42 (17 Mar 2021 09:04) (82/31 - 136/42)  BP(mean): --  RR: 17 (17 Mar 2021 09:04) (17 - 22)  SpO2: 95% (17 Mar 2021 09:04) (78% - 100%)        Physical Exam:          Constitutional: frail looking  HEENT: NC/AT  Neck: supple; thyroid not palpable  Back: no tenderness  Respiratory: respiratory effort normal; clear to auscultation  Cardiovascular: S1S2 regular, no murmurs  Abdomen: soft, not tender, not distended, positive BS; no liver or spleen organomegaly  Genitourinary: no suprapubic tenderness  Musculoskeletal: no muscle tenderness, no joint swelling or tenderness  Neurological/ Psychiatric:  moving all extremities  Skin: no rashes; no palpable lesions    Labs: all available labs reviewed                          Labs:                        7.3    29.02 )-----------( 309      ( 17 Mar 2021 10:12 )             24.4     03-17    142  |  115<H>  |  21  ----------------------------<  91  4.5   |  19<L>  |  1.26    Ca    7.9<L>      17 Mar 2021 10:12  Phos  3.8     03-17  Mg     2.5     03-17    TPro  5.9<L>  /  Alb  2.2<L>  /  TBili  0.3  /  DBili  x   /  AST  31  /  ALT  13  /  AlkPhos  74  03-17           Cultures:       Culture - Urine (collected 03-16-21 @ 07:44)  Source: .Urine None  Preliminary Report (03-17-21 @ 10:59):    >100,000 CFU/ml Gram Negative Rods    Culture - Blood (collected 03-16-21 @ 06:41)  Source: .Blood None  Gram Stain (03-16-21 @ 20:30):    Growth in anaerobic bottle: Gram Negative Rods    Growth in aerobic bottle: Gram Negative Rods  Preliminary Report (03-16-21 @ 20:31):    Growth in anaerobic bottle: Gram Negative Rods    Growth in aerobic bottle: Gram Negative Rods    Culture - Blood (collected 03-16-21 @ 06:41)  Source: .Blood None  Gram Stain (03-16-21 @ 18:49):    Aerobic and Anaerobic Bottle: Gram Negative Rods  Preliminary Report (03-16-21 @ 18:49):    Aerobic and Anaerobic Bottle: Gram Negative Rods    ***Blood Panel PCR results on this specimen are available    approximately 3 hours after the Gram stain result.***    Gram stain, PCR, and/or culture results may not always    correspond due to differencein methodologies.    ************************************************************    This PCR assay was performed by multiplex PCR. This    Assay tests for 66 bacterial and resistance gene targets.    Please refer to the [employer] test directory    at [email or web address] for details.  Organism: Blood Culture PCR (03-16-21 @ 20:40)  Organism: Blood Culture PCR (03-16-21 @ 20:40)      -  Escherichia coli: Detec      Method Type: PCR      C-Reactive Protein, Serum: 53 mg/L (03-16-21 @ 11:15)  D-Dimer Assay, Quantitative: 796 ng/mL DDU (03-16-21 @ 11:15)  Ferritin, Serum: 49 ng/mL (03-16-21 @ 11:15)        < from: CT Abdomen and Pelvis w/ IV Cont (03.16.21 @ 06:00) >    EXAM:  CT ABDOMEN AND PELVIS IC                            PROCEDURE DATE:  03/16/2021          INTERPRETATION:  CLINICAL INFORMATION: Abdominal pain.    COMPARISON: 11/16/2020    CONTRAST/COMPLICATIONS:  IV Contrast: Omnipaque 350 / 90 cc administered / 10 cc discarded.  Oral Contrast: None  Complications: None    PROCEDURE:  CT of the Abdomen and Pelvis was performed.  Sagittal and coronal reformats were performed.    FINDINGS:  LOWER CHEST: Mild bibasilar atelectasis. Coronary atherosclerosis.    LIVER: Within normal limits.  BILE DUCTS: Stable CBD and mild intrahepatic ductal dilatation likely related to cholecystectomy.  GALLBLADDER: Within normal limits.  SPLEEN: Within normal limits.  PANCREAS: Within normal limits.  ADRENALS: Withinnormal limits.  KIDNEYS/URETERS: Mild bilateral hydroureteronephrosis to the level of the bladder. No obstructing stone identified. Mild bilateral urothelial enhancement of the mid ureters. Left renal hypodensities too small to characterize. Peripheral region of  hypoattenuation within the mid left kidney (602, 67).    BLADDER: Large amount of layering hyperdense material within the bladder, likely hemorrhage. Small foci of air within the bladder. Perivesicular stranding.  REPRODUCTIVE ORGANS: Hysterectomy or atrophic uterus.    BOWEL: Moderate distention of the rectum with stool. No bowel obstruction. Appendix is not visualized. No evidence of inflammation in the pericecal region. Question small hiatal hernia. Thickening of the gastric antrum/pylorus, similar to prior.  PERITONEUM: No ascites.  VESSELS: Atherosclerotic changes.  RETROPERITONEUM/LYMPH NODES: No lymphadenopathy.  ABDOMINAL WALL: Ventral abdominal wall hernia repair. Small fat-containing bilateral inguinal hernias.  BONES: Degenerative changes. Stable superior endplate compression deformity of L1.    IMPRESSION:    1. Findings overall compatible with cystitis, bilateral ureteritis, and left pyelonephritis with associated mild bilateral hydroureteronephrosis.  Large amount of layering hemorrhage within the bladder. Underlying mass is not excluded and follow-up cystoscopy or CT urogram can be obtained for further evaluation. Small foci of air within the bladder may be related to instrumentation. Clinical correlation is recommended.  2. Thickening of the gastric antrum and pylorus, similar to prior. Further evaluation with follow-up endoscopy can be obtained.      < end of copied text >        Radiology: all available radiological tests reviewed    Advanced directives addressed: full resuscitation
Date of service: 03-18-21 @ 10:36      Patient lying in bed; having intermittent fevers; also with drop in hemoglobin; does open eyes to her name      ROS: unable to obtain secondary to patient medical condition     MEDICATIONS  (STANDING):  artificial  tears Solution 1 Drop(s) Both EYES four times a day  dextrose 5%. 1000 milliLiter(s) (75 mL/Hr) IV Continuous <Continuous>  levothyroxine 100 MICROGram(s) Oral daily  meropenem  IVPB      meropenem  IVPB 1000 milliGRAM(s) IV Intermittent every 12 hours  pantoprazole    Tablet 40 milliGRAM(s) Oral before breakfast  polyethylene glycol 3350 17 Gram(s) Oral daily    MEDICATIONS  (PRN):  acetaminophen   Tablet .. 1000 milliGRAM(s) Oral every 6 hours PRN Mild Pain (1 - 3)  acetaminophen  Suppository .. 650 milliGRAM(s) Rectal every 4 hours PRN Temp greater or equal to 38.5C (101.3F)  aluminum hydroxide/magnesium hydroxide/simethicone Suspension 30 milliLiter(s) Oral every 4 hours PRN Dyspepsia  glycopyrrolate Injectable 0.1 milliGRAM(s) IV Push every 4 hours PRN secretions  HYDROmorphone  Injectable 0.25 milliGRAM(s) IV Push every 4 hours PRN dyspnea  LORazepam   Injectable 0.5 milliGRAM(s) IV Push every 4 hours PRN Anxiety  ondansetron Injectable 4 milliGRAM(s) IV Push every 6 hours PRN Nausea      Vital Signs Last 24 Hrs  T(C): 37.7 (18 Mar 2021 10:07), Max: 38.2 (18 Mar 2021 06:08)  T(F): 99.8 (18 Mar 2021 10:07), Max: 100.8 (18 Mar 2021 06:08)  HR: 83 (18 Mar 2021 08:57) (83 - 97)  BP: 143/55 (18 Mar 2021 08:57) (143/55 - 152/54)  BP(mean): --  RR: 17 (18 Mar 2021 08:57) (16 - 17)  SpO2: 97% (18 Mar 2021 08:57) (97% - 99%)        Physical Exam:          Constitutional: frail looking  HEENT: NC/AT  Neck: supple; thyroid not palpable  Back: no tenderness  Respiratory: respiratory effort normal; clear to auscultation  Cardiovascular: S1S2 regular, no murmurs  Abdomen: soft, not tender, not distended, positive BS; no liver or spleen organomegaly  Genitourinary: no suprapubic tenderness  Musculoskeletal: no muscle tenderness, no joint swelling or tenderness  Neurological/ Psychiatric:  moving all extremities  Skin: no rashes; no palpable lesions    Labs: all available labs reviewed                          Labs:          Labs:                        6.7    22.82 )-----------( 317      ( 18 Mar 2021 09:01 )             22.0     03-18    141  |  113<H>  |  19  ----------------------------<  109<H>  3.7   |  19<L>  |  0.97    Ca    7.9<L>      18 Mar 2021 09:01  Phos  2.3     03-18  Mg     2.3     03-18    TPro  5.8<L>  /  Alb  2.1<L>  /  TBili  0.4  /  DBili  x   /  AST  36  /  ALT  18  /  AlkPhos  77  03-18           Cultures:       Culture - Urine (collected 03-16-21 @ 07:44)  Source: .Urine None  Preliminary Report (03-17-21 @ 22:52):    >100,000 CFU/ml Escherichia coli    Culture - Blood (collected 03-16-21 @ 06:41)  Source: .Blood None  Gram Stain (03-16-21 @ 20:30):    Growth in anaerobic bottle: Gram Negative Rods    Growth in aerobic bottle: Gram Negative Rods  Preliminary Report (03-17-21 @ 16:59):    Growth in aerobic and anaerobic bottles: Escherichia coli    See previous culture [id number]    Culture - Blood (collected 03-16-21 @ 06:41)  Source: .Blood None  Gram Stain (03-16-21 @ 18:49):    Aerobic and Anaerobic Bottle: Gram Negative Rods  Preliminary Report (03-17-21 @ 16:51):    Growth in aerobic and anaerobic bottles: Escherichia coli    ***Blood Panel PCR results on this specimen are available    approximately 3 hours after the Gram stain result.***    Gram stain, PCR, and/or culture results may not always    correspond due to difference in methodologies.    ************************************************************    This PCR assay was performed by multiplex PCR. This    Assay tests for 66 bacterial and resistance gene targets.    Please refer to the [facility] test directory    at [email or web address] for details.  Organism: Blood Culture PCR (03-16-21 @ 20:40)  Organism: Blood Culture PCR (03-16-21 @ 20:40)      -  Escherichia coli: Detec      Method Type: PCR      C-Reactive Protein, Serum: 53 mg/L (03-16-21 @ 11:15)  D-Dimer Assay, Quantitative: 796 ng/mL DDU (03-16-21 @ 11:15)  Ferritin, Serum: 49 ng/mL (03-16-21 @ 11:15)      < from: CT Abdomen and Pelvis w/ IV Cont (03.16.21 @ 06:00) >    EXAM:  CT ABDOMEN AND PELVIS IC                            PROCEDURE DATE:  03/16/2021          INTERPRETATION:  CLINICAL INFORMATION: Abdominal pain.    COMPARISON: 11/16/2020    CONTRAST/COMPLICATIONS:  IV Contrast: Omnipaque 350 / 90 cc administered / 10 cc discarded.  Oral Contrast: None  Complications: None    PROCEDURE:  CT of the Abdomen and Pelvis was performed.  Sagittal and coronal reformats were performed.    FINDINGS:  LOWER CHEST: Mild bibasilar atelectasis. Coronary atherosclerosis.    LIVER: Within normal limits.  BILE DUCTS: Stable CBD and mild intrahepatic ductal dilatation likely related to cholecystectomy.  GALLBLADDER: Within normal limits.  SPLEEN: Within normal limits.  PANCREAS: Within normal limits.  ADRENALS: Withinnormal limits.  KIDNEYS/URETERS: Mild bilateral hydroureteronephrosis to the level of the bladder. No obstructing stone identified. Mild bilateral urothelial enhancement of the mid ureters. Left renal hypodensities too small to characterize. Peripheral region of  hypoattenuation within the mid left kidney (602, 67).    BLADDER: Large amount of layering hyperdense material within the bladder, likely hemorrhage. Small foci of air within the bladder. Perivesicular stranding.  REPRODUCTIVE ORGANS: Hysterectomy or atrophic uterus.    BOWEL: Moderate distention of the rectum with stool. No bowel obstruction. Appendix is not visualized. No evidence of inflammation in the pericecal region. Question small hiatal hernia. Thickening of the gastric antrum/pylorus, similar to prior.  PERITONEUM: No ascites.  VESSELS: Atherosclerotic changes.  RETROPERITONEUM/LYMPH NODES: No lymphadenopathy.  ABDOMINAL WALL: Ventral abdominal wall hernia repair. Small fat-containing bilateral inguinal hernias.  BONES: Degenerative changes. Stable superior endplate compression deformity of L1.    IMPRESSION:    1. Findings overall compatible with cystitis, bilateral ureteritis, and left pyelonephritis with associated mild bilateral hydroureteronephrosis.  Large amount of layering hemorrhage within the bladder. Underlying mass is not excluded and follow-up cystoscopy or CT urogram can be obtained for further evaluation. Small foci of air within the bladder may be related to instrumentation. Clinical correlation is recommended.  2. Thickening of the gastric antrum and pylorus, similar to prior. Further evaluation with follow-up endoscopy can be obtained.      < end of copied text >        Radiology: all available radiological tests reviewed    Advanced directives addressed: full resuscitation
Patient seen at bedside, opens eyes to verbal stimuli but is poor historian. Valdes with CBI port clamped draining clear yellow urine, no clots.    PE  General: No distress, No anxiety  VITALS  T(C): 35.8 (03-17-21 @ 09:04), Max: 36.9 (03-16-21 @ 23:56)  HR: 96 (03-17-21 @ 09:04) (96 - 119)  BP: 136/42 (03-17-21 @ 09:04) (71/58 - 136/42)  RR: 17 (03-17-21 @ 09:04) (17 - 22)  SpO2: 95% (03-17-21 @ 09:04) (78% - 100%)            Skin     : No jaundice, No lesions, No swelling  HEENT: No icterus , EOM full , No epistaxis  Abdo:   : Soft, Non tender- no gimarcing, No guarding, No distension    Back    : No CVAT- no grimacing  Genitalia Female: Valdes draining clear yellow urine off CBI    LABS                          7.3    29.02 )-----------( 309      ( 17 Mar 2021 10:12 )             24.4   03-17    142  |  115<H>  |  21  ----------------------------<  91  4.5   |  19<L>  |  1.26    Ca    7.9<L>      17 Mar 2021 10:12    TPro  5.9<L>  /  Alb  2.2<L>  /  TBili  0.3  /  DBili  x   /  AST  31  /  ALT  13  /  AlkPhos  74  03-17       
SUBJECTIVE:    Patient is arousable and moans but no words. Does not follow commands.   Limited ROS/CC in the setting of AMS on top of baseline Dementia    ROS: Unable to obtain/as above      FROM H&P:    "89 y/o female with PMHx of HTN, HLD, CAD s/p PCI, CVA, hypothyroidism, GERD, macular degeneration, orthostatic hypotension, and Bladder cancer who presented to  with CC of blood in urine.  Patient was transferred from [facility] where she had been staying for REHAB since last hospitalization in FEB 2021.  As per notes, patient started having gross hematuria at SNF.  In the ER, patient had labs and imaging consistent with acute left sided pyelonephritis with hemorrhagic cystitis.  Patient has ongoing diagnosis of bladder cancer (just recently stopped treatments due to side effects) which is likely contributing to hematuria.  Urology was consulted who recommended inserting a 3 way catheter for CBI.  Patient initially with fairly stable hemodynamics but then ~9am spiked a temp to 101 and became tachy to the 130's and borderline hypotensive to 100 systolic.  Repeat lactate increased from normal to 7.7.  WBC increased from 13 to 27.  Patient given total of 3.5 L and IV ABX-- zosyn/ vanco/ meropenem with hx of ESBL in urine 3/2020.  Of note, as per [facility], patient also tested positive for COVID and has been on a COVID unit @ [facility]. "      PHYSICAL EXAM:    T(C): 36.8 (03-17-21 @ 15:50), Max: 36.9 (03-16-21 @ 23:56)  HR: 86 (03-17-21 @ 15:50) (86 - 105)  BP: 152/54 (03-17-21 @ 15:50) (89/29 - 152/54)  RR: 17 (03-17-21 @ 15:50) (17 - 21)  SpO2: 99% (03-17-21 @ 15:50) (95% - 100%)    General: AAOx0; Lethargic; Frail  Head: AT/NC  ENT: Dry Mucous Membranes; No Injury  Neck: Non-tender; No JVD  CVS: RRR, S1&S2, II/VI systolic murmur, LE edema  Respiratory: Lungs CTA B/L; Normal Respiratory Effort  Abdomen/GI: Soft, non-tender, non-distended, no guarding, no rebound, normal bowel sounds  : No bladder distention, No Valdes  Extremites: No cyanosis, No clubbing, LE edema  MSK: No CVA tenderness, Limited exam in the setting of AMS  Neuro: AAOx0; arousable. Limited exam in the setting of lack of cooperation.   Psych: Unable to evaluated  Skin: Clean, Dry and Intact                          7.3    29.02 )-----------( 309      ( 17 Mar 2021 10:12 )             24.4     03-17    142  |  115<H>  |  21  ----------------------------<  91  4.5   |  19<L>  |  1.26    Ca    7.9<L>      17 Mar 2021 10:12  Phos  3.8     03-17  Mg     2.5     03-17    TPro  5.9<L>  /  Alb  2.2<L>  /  TBili  0.3  /  DBili  x   /  AST  31  /  ALT  13  /  AlkPhos  74  03-17    COVID-19 PCR: Detected (17 Mar 2021 11:30)  COVID-19 PCR: NotDetec (14 Feb 2021 23:50)  COVID-19 PCR: NotDetec (09 Feb 2021 16:30)  COVID-19 PCR: NotDetec (06 Feb 2021 18:29)  COVID-19 PCR: NotDetec (11 Jan 2021 13:34)  COVID-19 PCR: NotDetec (16 Nov 2020 12:30)  COVID-19 PCR: NotDetec (07 Nov 2020 13:02)  COVID-19 PCR: NotDetec (02 Oct 2020 10:45)  COVID-19 PCR: NotDetec (28 Sep 2020 11:44)    CAPILLARY BLOOD GLUCOSE          Culture - Urine (collected 16 Mar 2021 07:44)  Source: .Urine None  Preliminary Report (17 Mar 2021 10:59):    >100,000 CFU/ml Gram Negative Rods    Culture - Blood (collected 16 Mar 2021 06:41)  Source: .Blood None  Gram Stain (16 Mar 2021 20:30):    Growth in anaerobic bottle: Gram Negative Rods    Growth in aerobic bottle: Gram Negative Rods  Preliminary Report (17 Mar 2021 16:59):  Lactate, Blood (03.17.21 @ 13:44)   Lactate, Blood: 1.4 mmol/L       Historical Values  Lactate, Blood (03.17.21 @ 13:44)   Lactate, Blood: 1.4 mmol/L   Lactate, Blood (03.16.21 @ 11:15)   Lactate, Blood: 5.4: < from: CT Abdomen and Pelvis w/ IV Cont (03.16.21 @ 06:00) >  1. Findings overall compatible with cystitis, bilateral ureteritis, and left pyelonephritis with associated mild bilateral hydroureteronephrosis.  Large amount of layering hemorrhage within the bladder. Underlying mass is not excluded and follow-up cystoscopy or CT urogram can be obtained for further evaluation. Small foci of air within the bladder may be related to instrumentation. Clinical correlation is recommended.  2. Thickening of the gastric antrum and pylorus, similar to prior. Further evaluation with follow-up endoscopy can be obtained.    < end of copied text >  < from: CT Head No Cont (02.15.21 @ 12:00) >  Impression:    No acute hemorrhage, mass effect or extra-axial collections and no change compared with prior study dated 2/11/2021    < end of copied text >    Growth in aerobic and anaerobic bottles: Escherichia coli    See previous culture [id number]    Culture - Blood (collected 16 Mar 2021 06:41)  Source: .Blood None  Gram Stain (16 Mar 2021 18:49):    Aerobic and Anaerobic Bottle: Gram Negative Rods  Preliminary Report (17 Mar 2021 16:51):    Growth in aerobic and anaerobic bottles: Escherichia coli    ***Blood Panel PCR results on this specimen are available    approximately 3 hours after the Gram stain result.***    Gram stain, PCR, and/or culture results may not always    correspond due to difference in methodologies.    ************************************************************    This PCR assay was performed by multiplex PCR. This    Assay tests for 66 bacterial and resistance gene targets.    Please refer to the [employer] test directory    at [email or web address] for details.  Organism: Blood Culture PCR (16 Mar 2021 20:40)  Organism: Blood Culture PCR (16 Mar 2021 20:40)    I reviewed labs, imaging, orders, vitals
SUBJECTIVE:    Patient is arousable and moans but no words. Does not follow commands.   Limited ROS/CC in the setting of AMS on top of baseline Dementia  Nursing: Recurrent urinary retention. This morning with 520cc on bladder scan. Valdes ordered.     ROS: Unable to obtain/as above      FROM H&P:    "89 y/o female with PMHx of HTN, HLD, CAD s/p PCI, CVA, hypothyroidism, GERD, macular degeneration, orthostatic hypotension, and Bladder cancer who presented to  with CC of blood in urine.  Patient was transferred from [facility] where she had been staying for REHAB since last hospitalization in FEB 2021.  As per notes, patient started having gross hematuria at SNF.  In the ER, patient had labs and imaging consistent with acute left sided pyelonephritis with hemorrhagic cystitis.  Patient has ongoing diagnosis of bladder cancer (just recently stopped treatments due to side effects) which is likely contributing to hematuria.  Urology was consulted who recommended inserting a 3 way catheter for CBI.  Patient initially with fairly stable hemodynamics but then ~9am spiked a temp to 101 and became tachy to the 130's and borderline hypotensive to 100 systolic.  Repeat lactate increased from normal to 7.7.  WBC increased from 13 to 27.  Patient given total of 3.5 L and IV ABX-- zosyn/ vanco/ meropenem with hx of ESBL in urine 3/2020.  Of note, as per [facility], patient also tested positive for COVID and has been on a COVID unit @ [facility]. "      PHYSICAL EXAM:    T(C): 37.7 (03-18-21 @ 10:07), Max: 38.2 (03-18-21 @ 06:08)  HR: 83 (03-18-21 @ 08:57) (83 - 97)  BP: 143/55 (03-18-21 @ 08:57) (143/55 - 152/54)  RR: 17 (03-18-21 @ 08:57) (16 - 17)  SpO2: 97% (03-18-21 @ 08:57) (97% - 99%)    General: AAOx0; Lethargic; Frail  Head: AT/NC  ENT: Dry Mucous Membranes; No Injury  Neck: Non-tender; No JVD  CVS: RRR, S1&S2, II/VI systolic murmur, LE edema  Respiratory: Lungs CTA B/L; Normal Respiratory Effort  Abdomen/GI: Soft, non-tender, non-distended, no guarding, no rebound, normal bowel sounds  : No bladder distention, No Valdes  Extremites: No cyanosis, No clubbing, LE edema  MSK: No CVA tenderness, Limited exam in the setting of AMS  Neuro: AAOx0; arousable. Limited exam in the setting of lack of cooperation.   Psych: Unable to evaluated  Skin: Clean, Dry and Intact                          6.7    22.82 )-----------( 317      ( 18 Mar 2021 09:01 )             22.0     03-18    141  |  113<H>  |  19  ----------------------------<  109<H>  3.7   |  19<L>  |  0.97    Ca    7.9<L>      18 Mar 2021 09:01  Phos  2.3     03-18  Mg     2.3     03-18    TPro  5.8<L>  /  Alb  2.1<L>  /  TBili  0.4  /  DBili  x   /  AST  36  /  ALT  18  /  AlkPhos  77  03-18    COVID-19 PCR: Detected (17 Mar 2021 11:30)  COVID-19 PCR: NotDetec (14 Feb 2021 23:50)  COVID-19 PCR: NotDetec (09 Feb 2021 16:30)  COVID-19 PCR: NotDetec (06 Feb 2021 18:29)  COVID-19 PCR: NotDetec (11 Jan 2021 13:34)  COVID-19 PCR: NotDetec (16 Nov 2020 12:30)  COVID-19 PCR: NotDetec (07 Nov 2020 13:02)  COVID-19 PCR: NotDetec (02 Oct 2020 10:45)  COVID-19 PCR: NotDetec (28 Sep 2020 11:44)    CAPILLARY BLOOD GLUCOSE          Culture - Urine (collected 16 Mar 2021 07:44)  Source: .Urine None  Preliminary Report (17 Mar 2021 22:52):    >100,000 CFU/ml Escherichia coli    Culture - Blood (collected 16 Mar 2021 06:41)  Source: .Blood None  Gram Stain (16 Mar 2021 20:30):    Growth in anaerobic bottle: Gram Negative Rods    Growth in aerobic bottle: Gram Negative Rods  Preliminary Report (17 Mar 2021 16:59):    Growth in aerobic and anaerobic bottles: Escherichia coli    See previous culture [id number]    Culture - Blood (collected 16 Mar 2021 06:41)  Source: .Blood None  Gram Stain (16 Mar 2021 18:49):    Aerobic and Anaerobic Bottle: Gram Negative Rods  Preliminary Report (17 Mar 2021 16:51):    Growth in aerobic and anaerobic bottles: Escherichia coli    ***Blood Panel PCR results on this specimen are available    approximately 3 hours after the Gram stain result.***    Gram stain, PCR, and/or culture results may not always    correspond due to difference in methodologies.    ************************************************************    This PCR assay was performed by multiplex PCR. This    Assay tests for 66 bacterial and resistance gene targets.    Please refer to the [facility] test directory    at [email or web address] for details.  Organism: Blood Culture PCR (16 Mar 2021 20:40)  Organism: Blood Culture PCR (16 Mar 2021 20:40)                              7.3    29.02 )-----------( 309      ( 17 Mar 2021 10:12 )             24.4     03-17    142  |  115<H>  |  21  ----------------------------<  91  4.5   |  19<L>  |  1.26    Ca    7.9<L>      17 Mar 2021 10:12  Phos  3.8     03-17  Mg     2.5     03-17    TPro  5.9<L>  /  Alb  2.2<L>  /  TBili  0.3  /  DBili  x   /  AST  31  /  ALT  13  /  AlkPhos  74  03-17    COVID-19 PCR: Detected (17 Mar 2021 11:30)  COVID-19 PCR: NotDetec (14 Feb 2021 23:50)  COVID-19 PCR: NotDetec (09 Feb 2021 16:30)  COVID-19 PCR: NotDetec (06 Feb 2021 18:29)  COVID-19 PCR: NotDetec (11 Jan 2021 13:34)  COVID-19 PCR: NotDetec (16 Nov 2020 12:30)  COVID-19 PCR: NotDetec (07 Nov 2020 13:02)  COVID-19 PCR: NotDetec (02 Oct 2020 10:45)  COVID-19 PCR: NotDetec (28 Sep 2020 11:44)    CAPILLARY BLOOD GLUCOSE          Culture - Urine (collected 16 Mar 2021 07:44)  Source: .Urine None  Preliminary Report (17 Mar 2021 10:59):    >100,000 CFU/ml Gram Negative Rods    Culture - Blood (collected 16 Mar 2021 06:41)  Source: .Blood None  Gram Stain (16 Mar 2021 20:30):    Growth in anaerobic bottle: Gram Negative Rods    Growth in aerobic bottle: Gram Negative Rods  Preliminary Report (17 Mar 2021 16:59):  Lactate, Blood (03.17.21 @ 13:44)   Lactate, Blood: 1.4 mmol/L       Historical Values  Lactate, Blood (03.17.21 @ 13:44)   Lactate, Blood: 1.4 mmol/L   Lactate, Blood (03.16.21 @ 11:15)   Lactate, Blood: 5.4: < from: CT Abdomen and Pelvis w/ IV Cont (03.16.21 @ 06:00) >  1. Findings overall compatible with cystitis, bilateral ureteritis, and left pyelonephritis with associated mild bilateral hydroureteronephrosis.  Large amount of layering hemorrhage within the bladder. Underlying mass is not excluded and follow-up cystoscopy or CT urogram can be obtained for further evaluation. Small foci of air within the bladder may be related to instrumentation. Clinical correlation is recommended.  2. Thickening of the gastric antrum and pylorus, similar to prior. Further evaluation with follow-up endoscopy can be obtained.    < end of copied text >  < from: CT Head No Cont (02.15.21 @ 12:00) >  Impression:    No acute hemorrhage, mass effect or extra-axial collections and no change compared with prior study dated 2/11/2021    < end of copied text >    Growth in aerobic and anaerobic bottles: Escherichia coli    See previous culture [id number]    Culture - Blood (collected 16 Mar 2021 06:41)  Source: .Blood None  Gram Stain (16 Mar 2021 18:49):    Aerobic and Anaerobic Bottle: Gram Negative Rods  Preliminary Report (17 Mar 2021 16:51):    Growth in aerobic and anaerobic bottles: Escherichia coli    ***Blood Panel PCR results on this specimen are available    approximately 3 hours after the Gram stain result.***    Gram stain, PCR, and/or culture results may not always    correspond due to difference in methodologies.    ************************************************************    This PCR assay was performed by multiplex PCR. This    Assay tests for 66 bacterial and resistance gene targets.    Please refer to the [facility] test directory    at [email or web address] for details.  Organism: Blood Culture PCR (16 Mar 2021 20:40)  Organism: Blood Culture PCR (16 Mar 2021 20:40)    I reviewed labs, imaging, orders, vitals

## 2021-03-18 NOTE — PROGRESS NOTE ADULT - ASSESSMENT
89 y/o female with PMHx of HTN, HLD, CAD s/p PCI, CVA, hypothyroidism, GERD, macular degeneration, orthostatic hypotension, and Bladder cancer admitted from [facility] on 3/16 for evaluation of blood in the urine; patient has CBI in place; upon admission the patient became febrile, hypotensive and given iv fluids, multiple antibiotics; history per medical record as patient unable to provide history. Recently diagnosed with COVID-19 at the snf.  1. Patient admitted with sepsis secondary to urinary origin, has history of ESBL E coli in the urine in the past  - follow up cultures --- found to be septic with E coli in all blood culture bottles  - serial cbc and monitor temperature   - reviewed prior medical records to evaluate for resistant or atypical pathogens   - iv hydration and supportive care   - urology evaluation in progress and continue CBI, now stopped and verma is removed  - will continue meropenem as ordered, day #3  - tolerating antibiotics without rashes or side effects   - isolation for COVID-19 infection  - to have palliative care evaluation  2. other issues: per medicine

## 2021-03-18 NOTE — PROGRESS NOTE ADULT - ASSESSMENT
ASSESSMENT    Severe Sepsis secondary to E. Coli Pyelonephritis/GNR Bacteremia  COVID-19 positive however likely recovered from COVID-19 infection.   Personal history of Bladder Cancer  Gross Hematuria secondary to Hemorrhagic Cystitis and bladder cancer s/p CBI  Anemia. Multifactorial. Acute blood loss from hematuria + hemodilutional + nutritional  Dementia  Hypothyroidism  Dementia    PLAN    Admitted to medicine  Urine and Blood Cutlures positive for E.Coli/GNR.  Merrem. ID on board  COVID-19 PCR positive but also positive >2 weeks ago and has antibiotics. Likely not active infection. Continue Isolation precautions as per hospital protocol.   CBI completed. Valdes removed. Bladder scan and straight cath PRN. Recurrent Urinary retention >500cc (3/18)->Valdes inserted.   Hgb 6.7 on 3/18. After goals of care discussion, all blood products deferred.   Urology on board  Continue Levothyroxine  IVF as needed for fluid balance.   Fall Precautions    GOC on admission:  Patient is DNR/DNI. No pressors  As of now OK to administer IVF/Antibiotics    GOC discussion 3/18:  After explaining and discussing patient's medical, physical and mental status in detail, the family (Adama and other two children on the phone) has elected for comfort care measures and hospice care measures only.   Currently patient does not have decision making capacity.   Instructions delineated by the Family:  DNR/DNI  Comfort care measures only  No IV Antibiotics  No IV Fluids  No blood products  No Pressors,  No more blood draws  No escalation in care.   Comfort care measures with withdrawl to be initiated once family at bedside.     MOL Completed   Hospice Referral Requested.   Discussed with RN, Family, SW/CM

## 2021-03-18 NOTE — DIETITIAN INITIAL EVALUATION ADULT. - ADD RECOMMEND
1) liberalize diet to regular and allow all food preferences when pt fully alert 2) provide assistance with PO intake 3) monitor hydration

## 2021-03-19 ENCOUNTER — TRANSCRIPTION ENCOUNTER (OUTPATIENT)
Age: 86
End: 2021-03-19

## 2021-03-19 VITALS
SYSTOLIC BLOOD PRESSURE: 164 MMHG | TEMPERATURE: 100 F | RESPIRATION RATE: 18 BRPM | OXYGEN SATURATION: 97 % | HEART RATE: 83 BPM | DIASTOLIC BLOOD PRESSURE: 67 MMHG

## 2021-03-19 PROCEDURE — 99239 HOSP IP/OBS DSCHRG MGMT >30: CPT

## 2021-03-19 RX ORDER — ROBINUL 0.2 MG/ML
0.1 INJECTION INTRAMUSCULAR; INTRAVENOUS
Qty: 0 | Refills: 0 | DISCHARGE
Start: 2021-03-19

## 2021-03-19 RX ORDER — MULTIVIT-MIN/FERROUS GLUCONATE 9 MG/15 ML
1 LIQUID (ML) ORAL
Qty: 0 | Refills: 0 | DISCHARGE

## 2021-03-19 RX ORDER — HYDROMORPHONE HYDROCHLORIDE 2 MG/ML
0.25 INJECTION INTRAMUSCULAR; INTRAVENOUS; SUBCUTANEOUS
Qty: 0 | Refills: 0 | DISCHARGE
Start: 2021-03-19

## 2021-03-19 RX ORDER — ACETAMINOPHEN 500 MG
1 TABLET ORAL
Qty: 0 | Refills: 0 | DISCHARGE
Start: 2021-03-19

## 2021-03-19 RX ORDER — LEVOTHYROXINE SODIUM 125 MCG
1 TABLET ORAL
Qty: 0 | Refills: 0 | DISCHARGE

## 2021-03-19 RX ORDER — ACETAMINOPHEN 500 MG
1 TABLET ORAL
Qty: 0 | Refills: 0 | DISCHARGE

## 2021-03-19 RX ORDER — GABAPENTIN 400 MG/1
1 CAPSULE ORAL
Qty: 0 | Refills: 0 | DISCHARGE

## 2021-03-19 RX ADMIN — Medication 0.5 MILLIGRAM(S): at 13:34

## 2021-03-19 RX ADMIN — Medication 1 DROP(S): at 13:24

## 2021-03-19 RX ADMIN — Medication 1 DROP(S): at 05:26

## 2021-03-19 RX ADMIN — Medication 650 MILLIGRAM(S): at 05:26

## 2021-03-19 RX ADMIN — Medication 1 DROP(S): at 00:38

## 2021-03-19 NOTE — DISCHARGE NOTE PROVIDER - NSDCCAREPROVSEEN_GEN_ALL_CORE_FT
Ivy, Murphy Anderson, Rinku Palla, Paulo Mckeon, Kayli Hoyt, Juan C Estrella, Aryles Alvarez, Sandoval Ball, Becky ALAMO

## 2021-03-19 NOTE — DISCHARGE NOTE PROVIDER - PROVIDER TOKENS
FREE:[LAST:[HOSPICE],PHONE:[(   )    -],FAX:[(   )    -],FOLLOWUP:[Routine]],PROVIDER:[TOKEN:[[id number]],FOLLOWUP:[Routine],ESTABLISHEDPATIENT:[T]]

## 2021-03-19 NOTE — DISCHARGE NOTE PROVIDER - NSDCMRMEDTOKEN_GEN_ALL_CORE_FT
acetaminophen 650 mg rectal suppository: 1 suppository(ies) rectal every 4 hours, As needed, Temp greater or equal to 38.5C (101.3F)  glycopyrrolate 0.2 mg/mL injectable solution: 0.1 milligram(s) injectable every 4 hours, As Needed Secretions  HYDROmorphone: 0.25 milligram(s) intravenous every 4 hours, As Needed Pain/Discomfort/Air Hunger  LORazepam: 0.5 milligram(s) intravenous every 4 hours, As Needed Anxiety/Air Hunger  menthol-methyl salicylate 10%-15% topical cream: Apply topically to left knee every 6 hours, As Needed  ocular lubricant ophthalmic solution: 1 drop(s) to each affected eye 4 times a day

## 2021-03-19 NOTE — DISCHARGE NOTE PROVIDER - HOSPITAL COURSE
FROM H&P:    "89 y/o female with PMHx of HTN, HLD, CAD s/p PCI, CVA, hypothyroidism, GERD, macular degeneration, orthostatic hypotension, and Bladder cancer who presented to  with CC of blood in urine.  Patient was transferred from [facility] where she had been staying for REHAB since last hospitalization in FEB 2021.  As per notes, patient started having gross hematuria at SNF.  In the ER, patient had labs and imaging consistent with acute left sided pyelonephritis with hemorrhagic cystitis.  Patient has ongoing diagnosis of bladder cancer (just recently stopped treatments due to side effects) which is likely contributing to hematuria.  Urology was consulted who recommended inserting a 3 way catheter for CBI.  Patient initially with fairly stable hemodynamics but then ~9am spiked a temp to 101 and became tachy to the 130's and borderline hypotensive to 100 systolic.  Repeat lactate increased from normal to 7.7.  WBC increased from 13 to 27.  Patient given total of 3.5 L and IV ABX-- zosyn/ vanco/ meropenem with hx of ESBL in urine 3/2020.  Of note, as per [facility], patient also tested positive for COVID and has been on a COVID unit @ [facility].  "    HOSPITAL COURSE:    Admitted. SBP in the 70s and 80s. Aggressive IVF initiated. Started on empiric antibiotics. Urine culture positive for E. Coli and Blood culture positive for GNR. CT with pyelonephritis. Also with Urinary retention and Hematuria. Urology consulted and started on CBI with subsequent improvement. Patient's overall functional status and mental status at baseline were poor as per family. ID was consulted as well. Patient had interval improvement in blood pressure.  Hgb dropped to 6.7. GOC of discussion on admission included to maintain patient as DNR/DNI. After discussion for consent for blood product transfusions and further discussion of overall prognosis and quality of life and that she has been deterioting physically and mentally, the son and family decided to make the patient comfort care measures only. Withdrawal of care orders and comfort care orders initiated and patient remains stable. Hospice consulted and patient discharged to hospice in stable condition.     Physical Exam at Discharge  `T(C): 38 (03-19-21 @ 08:22), Max: 38.5 (03-19-21 @ 05:20)  HR: 83 (03-19-21 @ 08:22) (83 - 83)  BP: 164/67 (03-19-21 @ 08:22) (154/53 - 164/67)  RR: 18 (03-19-21 @ 08:22) (18 - 19)  SpO2: 97% (03-19-21 @ 08:22) (97% - 98%)  General: AAOx1 (Person); Lethargic; Frail  Head: AT/NC  ENT: Dry Mucous Membranes; No Injury  Neck: Non-tender; No JVD  CVS: RRR, S1&S2, II/VI systolic murmur, LE edema  Respiratory: Lungs CTA B/L; Normal Respiratory Effort  Abdomen/GI: Soft, non-tender, non-distended, no guarding, no rebound, normal bowel sounds  : No bladder distention, Valdes present  Extremites: No cyanosis, No clubbing, LE edema  MSK: No CVA tenderness, Limited exam in the setting of AMS  Neuro: AAOx1; arousable. Limited exam in the setting of lack of cooperation.   Psych: Unable to evaluated  Skin: Clean, Dry and Intact                          6.7    22.82 )-----------( 317      ( 18 Mar 2021 09:01 )             22.0     03-18    141  |  113<H>  |  19  ----------------------------<  109<H>  3.7   |  19<L>  |  0.97    Ca    7.9<L>      18 Mar 2021 09:01  Phos  2.3     03-18  Mg     2.3     03-18    TPro  5.8<L>  /  Alb  2.1<L>  /  TBili  0.4  /  DBili  x   /  AST  36  /  ALT  18  /  AlkPhos  77  03-18    COVID-19 PCR: Detected (17 Mar 2021 11:30)  COVID-19 PCR: NotDetec (14 Feb 2021 23:50)  COVID-19 PCR: NotDetec (09 Feb 2021 16:30)  COVID-19 PCR: NotDetec (06 Feb 2021 18:29)  COVID-19 PCR: NotDetec (11 Jan 2021 13:34)  COVID-19 PCR: NotDetec (16 Nov 2020 12:30)  COVID-19 PCR: NotDetec (07 Nov 2020 13:02)  COVID-19 PCR: NotDetec (02 Oct 2020 10:45)  COVID-19 PCR: NotDetec (28 Sep 2020 11:44)    CAPILLARY BLOOD GLUCOSE    Lactate, Blood (03.17.21 @ 13:44)   Lactate, Blood: 1.4 mmol/L       Historical Values  Lactate, Blood (03.17.21 @ 13:44)   Lactate, Blood: 1.4 mmol/L   Lactate, Blood (03.16.21 @ 11:15)   Lactate, Blood: 5.4: Complete Blood Count + Automated Diff (03.17.21 @ 10:12)   WBC Count: 29.02 K/uL   RBC Count: 2.94 M/uL   Hemoglobin: 7.3 g/dL   Hematocrit: 24.4 %   Mean Cell Volume: 83.0 fl   Mean Cell Hemoglobin: 24.8 pg   Mean Cell Hemoglobin Conc: 29.9 gm/dL   Red Cell Distrib Width: 16.3 %   Platelet Count - Automated: 309 K/uL C-Reactive Protein, Serum (03.16.21 @ 11:15)   C-Reactive Protein, Serum: 53: Please note: Reference range has changed due to units of measure change. mg/L D-Dimer Assay, Quantitative (03.16.21 @ 11:15)   D-Dimer Assay, Quantitative: 796 ng/mL DDU Lactate Dehydrogenase, Serum (03.16.21 @ 11:15)   Lactate Dehydrogenase, Serum: 141 U/L Culture - Urine (03.16.21 @ 07:44)   - Gentamicin: S <=2   - Imipenem: S <=1   - Levofloxacin: S <=0.5   - Meropenem: S <=1   - Nitrofurantoin: S <=32 Should not be used to treat pyelonephritis   - Piperacillin/Tazobactam: S <=8   - Tigecycline: S <=2   - Tobramycin: S <=2   - Trimethoprim/Sulfamethoxazole: S <=0.5/9.5   - Amikacin: S <=16   - Amoxicillin/Clavulanic Acid: S <=8/4   - Ampicillin: R >16 These ampicillin results predict results for amoxicillin   - Ampicillin/Sulbactam: R >16/8 Enterobacter, Citrobacter, and Serratia may develop resistance during prolonged therapy (3-4 days)

## 2021-03-19 NOTE — DISCHARGE NOTE NURSING/CASE MANAGEMENT/SOCIAL WORK - PATIENT PORTAL LINK FT
You can access the FollowMyHealth Patient Portal offered by [facility] by registering at the following website: [email or web address]. By joining [facility]’s FollowMyHealth portal, you will also be able to view your health information using other applications (apps) compatible with our system.

## 2021-03-19 NOTE — DISCHARGE NOTE PROVIDER - CARE PROVIDER_API CALL
HOSPICE,   Phone: (   )    -  Fax: (   )    -  Follow Up Time: Routine    Mayra Sánchez  CARDIOVASCULAR DISEASE  [address] [place] [address]  Phone: [phone]  Fax: [phone]  Established Patient  Follow Up Time: Routine

## 2021-03-19 NOTE — DISCHARGE NOTE PROVIDER - NSDCCPCAREPLAN_GEN_ALL_CORE_FT
PRINCIPAL DISCHARGE DIAGNOSIS  Diagnosis: Pyelonephritis  Assessment and Plan of Treatment: E. Coli      SECONDARY DISCHARGE DIAGNOSES  Diagnosis: Bladder cancer  Assessment and Plan of Treatment: with reported metastasis    Diagnosis: Acute blood loss anemia  Assessment and Plan of Treatment: Secondary to Hematuria also with Hemodilutional Component as well.    Diagnosis: Dementia  Assessment and Plan of Treatment: Advanced with progressive anorexia, decreased functional status and worsening mental status.    Diagnosis: Severe sepsis  Assessment and Plan of Treatment: Secondary to E. Coli Pyelonephritis and bacteremia    Diagnosis: Bacteremia  Assessment and Plan of Treatment:     Diagnosis: Hemorrhagic cystitis  Assessment and Plan of Treatment:

## 2021-04-01 DIAGNOSIS — Z66 DO NOT RESUSCITATE: ICD-10-CM

## 2021-04-01 DIAGNOSIS — Z90.710 ACQUIRED ABSENCE OF BOTH CERVIX AND UTERUS: ICD-10-CM

## 2021-04-01 DIAGNOSIS — N10 ACUTE PYELONEPHRITIS: ICD-10-CM

## 2021-04-01 DIAGNOSIS — Z90.49 ACQUIRED ABSENCE OF OTHER SPECIFIED PARTS OF DIGESTIVE TRACT: ICD-10-CM

## 2021-04-01 DIAGNOSIS — U07.1 COVID-19: ICD-10-CM

## 2021-04-01 DIAGNOSIS — Z95.5 PRESENCE OF CORONARY ANGIOPLASTY IMPLANT AND GRAFT: ICD-10-CM

## 2021-04-01 DIAGNOSIS — H35.30 UNSPECIFIED MACULAR DEGENERATION: ICD-10-CM

## 2021-04-01 DIAGNOSIS — D62 ACUTE POSTHEMORRHAGIC ANEMIA: ICD-10-CM

## 2021-04-01 DIAGNOSIS — I25.10 ATHEROSCLEROTIC HEART DISEASE OF NATIVE CORONARY ARTERY WITHOUT ANGINA PECTORIS: ICD-10-CM

## 2021-04-01 DIAGNOSIS — R33.9 RETENTION OF URINE, UNSPECIFIED: ICD-10-CM

## 2021-04-01 DIAGNOSIS — R65.21 SEVERE SEPSIS WITH SEPTIC SHOCK: ICD-10-CM

## 2021-04-01 DIAGNOSIS — K21.9 GASTRO-ESOPHAGEAL REFLUX DISEASE WITHOUT ESOPHAGITIS: ICD-10-CM

## 2021-04-01 DIAGNOSIS — F03.90 UNSPECIFIED DEMENTIA WITHOUT BEHAVIORAL DISTURBANCE: ICD-10-CM

## 2021-04-01 DIAGNOSIS — Z79.82 LONG TERM (CURRENT) USE OF ASPIRIN: ICD-10-CM

## 2021-04-01 DIAGNOSIS — Z79.02 LONG TERM (CURRENT) USE OF ANTITHROMBOTICS/ANTIPLATELETS: ICD-10-CM

## 2021-04-01 DIAGNOSIS — Z88.2 ALLERGY STATUS TO SULFONAMIDES: ICD-10-CM

## 2021-04-01 DIAGNOSIS — Z86.73 PERSONAL HISTORY OF TRANSIENT ISCHEMIC ATTACK (TIA), AND CEREBRAL INFARCTION WITHOUT RESIDUAL DEFICITS: ICD-10-CM

## 2021-04-01 DIAGNOSIS — I10 ESSENTIAL (PRIMARY) HYPERTENSION: ICD-10-CM

## 2021-04-01 DIAGNOSIS — C67.9 MALIGNANT NEOPLASM OF BLADDER, UNSPECIFIED: ICD-10-CM

## 2021-04-01 DIAGNOSIS — Z98.890 OTHER SPECIFIED POSTPROCEDURAL STATES: ICD-10-CM

## 2021-04-01 DIAGNOSIS — R31.0 GROSS HEMATURIA: ICD-10-CM

## 2021-04-01 DIAGNOSIS — R63.0 ANOREXIA: ICD-10-CM

## 2021-04-01 DIAGNOSIS — N30.81 OTHER CYSTITIS WITH HEMATURIA: ICD-10-CM

## 2021-04-01 DIAGNOSIS — E78.5 HYPERLIPIDEMIA, UNSPECIFIED: ICD-10-CM

## 2021-04-01 DIAGNOSIS — Z51.5 ENCOUNTER FOR PALLIATIVE CARE: ICD-10-CM

## 2021-04-01 DIAGNOSIS — E03.9 HYPOTHYROIDISM, UNSPECIFIED: ICD-10-CM

## 2021-04-01 DIAGNOSIS — Z85.42 PERSONAL HISTORY OF MALIGNANT NEOPLASM OF OTHER PARTS OF UTERUS: ICD-10-CM

## 2021-04-01 DIAGNOSIS — A41.51 SEPSIS DUE TO ESCHERICHIA COLI [E. COLI]: ICD-10-CM

## 2021-04-09 NOTE — ED ADULT NURSE NOTE - PSH
Phone: Xuan    Fax: [phone]                       Outpatient Occupational Therapy                 DAILY TREATMENT NOTE    Date: 4/9/2021  Patients Name:  Sarah Parks  YOB: 2017 (3 y.o.)  Gender:  male  MRN:  [id number]  [facility] #: [id number]  Referring Physician: Wilbur MIN  Diagnosis: Diagnosis: Delayed Milestones (R62.0); Sensory Integration (F88)    Precautions:      INSURANCE  OT Insurance Information: [employer]      Total # of Visits Approved: 100   Total # of Visits to Date: 6     PAIN  [x]No     []Yes      Location:  N/A  Pain Rating (0-10 pain scale): 0/10  Pain Description:  N/A    SUBJECTIVE  Patient present to clinic with mother. Mother very tearful in waiting room when MCCALLUM asked how things have been at home over the last 2 weeks. Mother stated that pt has been very aggressive with hitting, punching, and kicking her when upset and screams when he cant get his way or when asked to complete a non-preferred tasks. Mother stated that she thinks pt is emotionally stressed due to change in schedule with seeing his father every Sunday. Mother stated they have very different parenting techniques and mother follows therapy recommendations for behaviors and visual schedule throughout the day but father does not due to father not thinking pt has \"anything wrong with him\". Mother stated that father told her she's a horrible mother. Mother demo being very upset and sad to see her son start having increased behaviors and decreased self regulation. GOALS/ TREATMENT SESSION:    Current Progress   Long Term Goal:  Long term goal 1: Child will demonstrate improved self-regulation, as measured by his ability to participate in therapist-directed tasks during a session with minimal negative behaviors.     See Short Term Goal Notes Below for Present Levels []Met  [x]Partially met  []Not met     Long term goal 2: Child will demonstrate improved fine motor skills as measured by his ability to complete age-appropriate tasks with Radha. []Met  [x]Partially met  []Not met   Short Term Goals:  Time Frame for Short term goals: 90 days    Short term goal 1: Child will engage in pencil/paper activities 50% of the time. Pt was able to imitate vertical, horizontal, and circular strokes 3x each with 80% accuracy demo. VC for engagement and visual demo given for increased participation. []Met  [x]Partially met  []Not met   Short term goal 2: Following sensory input, child will complete 2 therapist directed task from start to finish with mod VC's throughout. Pt completed sensory motor tasks throughout entire session for movement and sensory input with Good response. []Met  [x]Partially met  []Not met   Short term goal 3: Child will engage in x10 reps of a FM task with 75% engagement. Pt was able to complete 12 reps with Mr Potato head task in 12 minute time frame with 50% VC throughout and 80% tactile cues to remain engaged in tasks. Pt also completed 9 reps with form board puzzle but required 3 physical assist due to throwing board and pieces and therapist making pt pick them up and put them back on the table. [x]Met  []Partially met  []Not met   Short term goal 4: Initiate education/sensory diet HEP. Discussed with mother to keep using visual schedule, visual timers, and previous therapy techniques at home for consistency with her and structure. [x]Met  []Partially met  []Not met      []Met  []Partially met  []Not met      []Met  []Partially met  []Not met   OBJECTIVE  Co-tx with PT.           EDUCATION  Education provided to patient/family/caregiver: Discussed with mother to keep using visual schedule, visual timers, and previous therapy techniques at home for consistency with her and structure.      Method of Education:     [x]Discussion     []Demonstration    []Written     []Other  Evaluation of Patients Response to Education:        [x]Patient and or Caregiver verbalized understanding  []Patient and or Caregiver Demonstrated without assistance   []Patient and or Caregiver Demonstrated with assistance  []Needs additional instruction to demonstrate understanding of education    ASSESSMENT  Patient tolerated todays treatment session:    [x]Good   []Fair   []Poor  Limitations/difficulties with treatment session due to:   Goal Assessment: []No Change    [x]Improved  Comments:    PLAN  [x]Continue with current plan of care  [][facility]  []IHold per patient request  []Change Treatment plan:  []Insurance hold  []Other     TIME   Time Treatment session was INITIATED 0900   Time Treatment session was STOPPED 0930   Timed Code Treatment Minutes 30       Electronically signed by:    Stephy MOLINA             Date:4/9/2021 Ankle fracture, right  surgery 25 yrs ago - hardware removed  History of bladder surgery    S/P coronary angiogram  2005, 2008, 2011 - drug eluding stents  S/P hernia repair  umbilical - 2008  S/P laparoscopic cholecystectomy  2008  S/P EMILY (total abdominal hysterectomy)

## 2021-04-13 NOTE — PATIENT PROFILE ADULT - NSASFUNCLEVELADLAMBULATE_GEN_A_NUR
This is a repeat encounter. Please void and refer to other encounter. 3 = assistive equipment and person

## 2021-04-13 NOTE — INPATIENT CERTIFICATION FOR MEDICARE PATIENTS - IN ORDER TO MEET MEDICARE REQUIREMENTS.
In order to meet Medicare requirements, the clinical documentation must support the information cited in the admission order.  Please be sure to provide detailed and clear documentation about the following in the admitting note/history and physical: Complex Repair Preamble Text (Leave Blank If You Do Not Want): Extensive wide undermining was performed.

## 2021-06-10 NOTE — PATIENT PROFILE ADULT - NSPROEXTENSIONSOFSELF_GEN_A_NUR
N/v x 2 days s/p abd surgery 14 days ago.  Pt wearing mask on arrival. I was wearing mask during triage  
eyeglasses

## 2021-06-22 NOTE — PATIENT PROFILE ADULT - FAMILY NEEDS
Is This A New Presentation, Or A Follow-Up?: Skin Lesion How Severe Is Your Skin Lesion?: mild Have Your Skin Lesions Been Treated?: not been treated no

## 2021-07-15 NOTE — PROGRESS NOTE ADULT - SUBJECTIVE AND OBJECTIVE BOX
89 yo F a with h/o endometrial cancer, CAD s/p stents, Hypothyroidism, GERD and HLD, presenting with hematuria.      10/10/18: Patient seen and examined. No new complaints. Discussed with patient in length regarding management and d/c plan.  10/11/18: Patient seen and examined. No active issues.    10/12/18: Patient seen and examined. Feels weak and tired today. Wants to go home tomorrow.       Vital Signs Last 24 Hrs  T(C): 36.3 (12 Oct 2018 11:13), Max: 36.6 (11 Oct 2018 21:15)  T(F): 97.3 (12 Oct 2018 11:13), Max: 97.9 (11 Oct 2018 21:15)  HR: 83 (12 Oct 2018 11:13) (72 - 83)  BP: 160/71 (12 Oct 2018 11:13) (136/68 - 160/71)  BP(mean): --  RR: 19 (12 Oct 2018 11:13) (17 - 19)  SpO2: 97% (12 Oct 2018 11:13) (97% - 100%)      PHYSICAL EXAM:    Constitutional: NAD, awake and alert, well-appearing  HEENT: EOMI, Normal Hearing, MMM  Neck: Soft and supple, No LAD, No JVD  Respiratory: Breath sounds are clear bilaterally, No wheezing, rales or rhonchi  Cardiovascular: S1 and S2, regular rate and rhythm, no Murmurs, gallops or rubs  Gastrointestinal: Bowel sounds present, soft, nontender, nondistended, no guarding, no rebound  Extremities: No peripheral edema  Vascular: 2+ peripheral pulses  Neurological: A/O x 3, no focal deficits  Musculoskeletal: 5/5 strength b/l upper and lower extremities  Skin: No rashes          MEDICATIONS:    ampicillin/sulbactam  IVPB 1.5 Gram(s) IV Intermittent every 8 hours  artificial  tears Solution 1 Drop(s) Both EYES three times a day  aspirin 325 milliGRAM(s) Oral daily  cholecalciferol 1000 Unit(s) Oral daily  heparin  Injectable 5000 Unit(s) SubCutaneous every 12 hours  levothyroxine 75 MICROGram(s) Oral daily  metoprolol succinate ER 50 milliGRAM(s) Oral two times a day  pantoprazole    Tablet 40 milliGRAM(s) Oral before breakfast  polyethylene glycol 3350 17 Gram(s) Oral daily  spironolactone 25 milliGRAM(s) Oral daily    MEDICATIONS  (PRN):  acetaminophen   Tablet .. 500 milliGRAM(s) Oral every 6 hours PRN Mild Pain (1 - 3)  ALPRAZolam 0.25 milliGRAM(s) Oral at bedtime PRN anxiety  aluminum hydroxide/magnesium hydroxide/simethicone Suspension 30 milliLiter(s) Oral every 6 hours PRN Dyspepsia  FIRST- Mouthwash  BLM 10 milliLiter(s) Swish and Spit every 4 hours PRN pain              89 yo F a with h/o endometrial cancer, CAD s/p stents, Hypothyroidism, GERD and HLD, presenting with hematuria    * s/p sepsis sec to UTI ( 50,000 enteroccocus)  - abx per ID- Unasyn, chage to po from tomrrow on discharge  - verma dc 10/8- check postvoidal- pt came with verma  -ID follow up appreciated  -Follow repeat urine cultures: no growth so far    * Stable CAD  Contiue aspirin, lopressor.    * Hypothyroidism-  Continue synthroid.    * Bladder ca- treatment planned in the near future   follow up appreciated    Possible d/c tomorrow.
89 yo F a with h/o endometrial cancer, CAD s/p stents, Hypothyroidism, GERD and HLD, presenting with hematuria.      10/10/18: Patient seen and examined. No new complaints. Discussed with patient in length regarding management and d/c plan.         Vital Signs Last 24 Hrs  T(C): 36.6 (09 Oct 2018 11:35), Max: 36.6 (09 Oct 2018 11:35)  T(F): 97.8 (09 Oct 2018 11:35), Max: 97.8 (09 Oct 2018 11:35)  HR: 77 (09 Oct 2018 11:35) (70 - 80)  BP: 151/58 (09 Oct 2018 11:35) (145/54 - 160/66)  BP(mean): --  RR: 18 (09 Oct 2018 11:35) (18 - 19)  SpO2: 100% (09 Oct 2018 11:35) (95% - 100%)      PHYSICAL EXAM:    Constitutional: NAD, awake and alert, well-appearing  HEENT: EOMI, Normal Hearing, MMM  Neck: Soft and supple, No LAD, No JVD  Respiratory: Breath sounds are clear bilaterally, No wheezing, rales or rhonchi  Cardiovascular: S1 and S2, regular rate and rhythm, no Murmurs, gallops or rubs  Gastrointestinal: Bowel sounds present, soft, nontender, nondistended, no guarding, no rebound  Extremities: No peripheral edema  Vascular: 2+ peripheral pulses  Neurological: A/O x 3, no focal deficits  Musculoskeletal: 5/5 strength b/l upper and lower extremities  Skin: No rashes          MEDICATIONS:    ampicillin/sulbactam  IVPB 1.5 Gram(s) IV Intermittent every 8 hours  artificial  tears Solution 1 Drop(s) Both EYES three times a day  aspirin 325 milliGRAM(s) Oral daily  cholecalciferol 1000 Unit(s) Oral daily  heparin  Injectable 5000 Unit(s) SubCutaneous every 12 hours  levothyroxine 75 MICROGram(s) Oral daily  metoprolol succinate ER 50 milliGRAM(s) Oral two times a day  pantoprazole    Tablet 40 milliGRAM(s) Oral before breakfast  polyethylene glycol 3350 17 Gram(s) Oral daily  spironolactone 25 milliGRAM(s) Oral daily    MEDICATIONS  (PRN):  acetaminophen   Tablet .. 500 milliGRAM(s) Oral every 6 hours PRN Mild Pain (1 - 3)  ALPRAZolam 0.25 milliGRAM(s) Oral at bedtime PRN anxiety  aluminum hydroxide/magnesium hydroxide/simethicone Suspension 30 milliLiter(s) Oral every 6 hours PRN Dyspepsia  FIRST- Mouthwash  BLM 10 milliLiter(s) Swish and Spit every 4 hours PRN pain              89 yo F a with h/o endometrial cancer, CAD s/p stents, Hypothyroidism, GERD and HLD, presenting with hematuria    * s/p sepsis sec to UTI ( 50,000 enteroccocus)  - abx per ID- Unasyn  - urology wants neg ucx before dc b/o  planned further Rx  - verma dc 10/8- check postvoidal- pt came with verma  -ID follow up appreciated    * Stable CAD  Contiue aspirin, lopressor.    * Hypothyroidism-  Continue synthroid.    * Bladder ca- treatment planned in the near future
89 yo F a with h/o endometrial cancer, CAD s/p stents, Hypothyroidism, GERD and HLD, presenting with hematuria.      10/10/18: Patient seen and examined. No new complaints. Discussed with patient in length regarding management and d/c plan.  10/11/18: Patient seen and examined. No active issues.          Vital Signs Last 24 Hrs  T(C): 36.3 (11 Oct 2018 10:31), Max: 36.6 (11 Oct 2018 05:12)  T(F): 97.3 (11 Oct 2018 10:31), Max: 97.9 (11 Oct 2018 05:12)  HR: 79 (11 Oct 2018 10:31) (72 - 85)  BP: 123/53 (11 Oct 2018 10:31) (123/53 - 169/67)  BP(mean): --  RR: 18 (11 Oct 2018 10:31) (17 - 18)  SpO2: 100% (11 Oct 2018 10:31) (99% - 100%)      PHYSICAL EXAM:    Constitutional: NAD, awake and alert, well-appearing  HEENT: EOMI, Normal Hearing, MMM  Neck: Soft and supple, No LAD, No JVD  Respiratory: Breath sounds are clear bilaterally, No wheezing, rales or rhonchi  Cardiovascular: S1 and S2, regular rate and rhythm, no Murmurs, gallops or rubs  Gastrointestinal: Bowel sounds present, soft, nontender, nondistended, no guarding, no rebound  Extremities: No peripheral edema  Vascular: 2+ peripheral pulses  Neurological: A/O x 3, no focal deficits  Musculoskeletal: 5/5 strength b/l upper and lower extremities  Skin: No rashes          MEDICATIONS:    ampicillin/sulbactam  IVPB 1.5 Gram(s) IV Intermittent every 8 hours  artificial  tears Solution 1 Drop(s) Both EYES three times a day  aspirin 325 milliGRAM(s) Oral daily  cholecalciferol 1000 Unit(s) Oral daily  heparin  Injectable 5000 Unit(s) SubCutaneous every 12 hours  levothyroxine 75 MICROGram(s) Oral daily  metoprolol succinate ER 50 milliGRAM(s) Oral two times a day  pantoprazole    Tablet 40 milliGRAM(s) Oral before breakfast  polyethylene glycol 3350 17 Gram(s) Oral daily  spironolactone 25 milliGRAM(s) Oral daily    MEDICATIONS  (PRN):  acetaminophen   Tablet .. 500 milliGRAM(s) Oral every 6 hours PRN Mild Pain (1 - 3)  ALPRAZolam 0.25 milliGRAM(s) Oral at bedtime PRN anxiety  aluminum hydroxide/magnesium hydroxide/simethicone Suspension 30 milliLiter(s) Oral every 6 hours PRN Dyspepsia  FIRST- Mouthwash  BLM 10 milliLiter(s) Swish and Spit every 4 hours PRN pain              89 yo F a with h/o endometrial cancer, CAD s/p stents, Hypothyroidism, GERD and HLD, presenting with hematuria    * s/p sepsis sec to UTI ( 50,000 enteroccocus)  - abx per ID- Unasyn  - urology wants neg ucx before dc b/o  planned further Rx  - verma dc 10/8- check postvoidal- pt came with verma  -ID follow up appreciated  -Follow repeat urine cultures    * Stable CAD  Contiue aspirin, lopressor.    * Hypothyroidism-  Continue synthroid.    * Bladder ca- treatment planned in the near future
89 yo F a with h/o endometrial cancer, CAD s/p stents, Hypothyroidism, GERD and HLD, presenting with hematuria.      10/10/18: Patient seen and examined. No new complaints. Discussed with patient in length regarding management and d/c plan.  10/11/18: Patient seen and examined. No active issues.    10/12/18: Patient seen and examined. Feels weak and tired today. Wants to go home tomorrow.   10/13/18: Patient seen and examined. Complaining of flushed face and weakness. Lying in bed.       Vital Signs Last 24 Hrs  T(C): 36.8 (13 Oct 2018 10:44), Max: 36.8 (13 Oct 2018 05:05)  T(F): 98.3 (13 Oct 2018 10:44), Max: 98.3 (13 Oct 2018 10:44)  HR: 81 (13 Oct 2018 10:44) (75 - 85)  BP: 155/60 (13 Oct 2018 10:44) (153/54 - 162/65)  BP(mean): --  RR: 18 (13 Oct 2018 10:44) (18 - 19)  SpO2: 96% (13 Oct 2018 10:44) (96% - 100%)      PHYSICAL EXAM:    Constitutional: NAD, awake and alert, well-appearing  HEENT: EOMI, Normal Hearing, MMM  Neck: Soft and supple, No LAD, No JVD  Respiratory: Breath sounds are clear bilaterally, No wheezing, rales or rhonchi  Cardiovascular: S1 and S2, regular rate and rhythm, no Murmurs, gallops or rubs  Gastrointestinal: Bowel sounds present, soft, nontender, nondistended, no guarding, no rebound  Extremities: No peripheral edema  Vascular: 2+ peripheral pulses  Neurological: A/O x 3, no focal deficits  Musculoskeletal: 5/5 strength b/l upper and lower extremities  Skin: No rashes          MEDICATIONS:    MEDICATIONS  (STANDING):  amoxicillin  500 milliGRAM(s)/clavulanate 1 Tablet(s) Oral two times a day  artificial  tears Solution 1 Drop(s) Both EYES three times a day  aspirin 325 milliGRAM(s) Oral daily  cholecalciferol 1000 Unit(s) Oral daily  heparin  Injectable 5000 Unit(s) SubCutaneous every 12 hours  levothyroxine 75 MICROGram(s) Oral daily  metoprolol succinate ER 50 milliGRAM(s) Oral two times a day  pantoprazole    Tablet 40 milliGRAM(s) Oral before breakfast  polyethylene glycol 3350 17 Gram(s) Oral daily  spironolactone 25 milliGRAM(s) Oral daily    MEDICATIONS  (PRN):  acetaminophen   Tablet .. 500 milliGRAM(s) Oral every 6 hours PRN Mild Pain (1 - 3)  ALPRAZolam 0.25 milliGRAM(s) Oral at bedtime PRN anxiety  aluminum hydroxide/magnesium hydroxide/simethicone Suspension 30 milliLiter(s) Oral every 6 hours PRN Dyspepsia  FIRST- Mouthwash  BLM 10 milliLiter(s) Swish and Spit every 4 hours PRN pain        89 yo F a with h/o endometrial cancer, CAD s/p stents, Hypothyroidism, GERD and HLD, presenting with hematuria    * s/p sepsis sec to UTI ( 50,000 enteroccocus)  - abx per ID- On Unasyn, change to po augmentin  - verma dc 10/8- check postvoidal- pt came with verma  -ID follow up appreciated  -Follow repeat urine cultures: no growth so far    * Stable CAD  Continue  aspirin, lopressor.    * Hypothyroidism-  Continue synthroid.    * Bladder ca- treatment planned in the near future   follow up appreciated    Possible d/c tomorrow.
Date of service: 10-10-18 @ 12:03    89 yo F a with h/o Bladder CA s/p ,TURBT on 09/10/2018  , CAD s/p stents, Hypothyroidism,DJD, GERD,HLD,Uterine CA s/p Hysterectomy and radiation, cholecystectomy,  who developed fever 101, weakness, nausea 10/5 She had intermittent  hematuria and urine retention after her TURBT and she had a Verma catheter placed. She  had recent urology visit 2 days ago and she was started on Macrobid for UTI. Here noted with leukocytosis, grossly abnormal UA, JAYRO, xray with early marking in LLL ? PNA, denies resp sxs, was given IV rocephin.       pt seen and examined   feels better  verma out  urinating without difficulty     ROS: no fever or chills; denies dizziness, no HA, no SOB or cough, no abdominal pain, no diarrhea or constipation; no dysuria, no urinary frequency, no legs pain, no rashes    MEDICATIONS  (STANDING):  ampicillin/sulbactam  IVPB 1.5 Gram(s) IV Intermittent every 8 hours  artificial  tears Solution 1 Drop(s) Both EYES three times a day  aspirin 325 milliGRAM(s) Oral daily  cholecalciferol 1000 Unit(s) Oral daily  heparin  Injectable 5000 Unit(s) SubCutaneous every 12 hours  levothyroxine 75 MICROGram(s) Oral daily  metoprolol succinate ER 50 milliGRAM(s) Oral two times a day  pantoprazole    Tablet 40 milliGRAM(s) Oral before breakfast  polyethylene glycol 3350 17 Gram(s) Oral daily  spironolactone 25 milliGRAM(s) Oral daily      Vital Signs Last 24 Hrs  T(C): 36.3 (10 Oct 2018 11:28), Max: 36.7 (10 Oct 2018 05:12)  T(F): 97.4 (10 Oct 2018 11:28), Max: 98 (10 Oct 2018 05:12)  HR: 68 (10 Oct 2018 11:28) (68 - 75)  BP: 153/54 (10 Oct 2018 11:28) (152/59 - 174/68)  BP(mean): --  RR: 18 (10 Oct 2018 11:28) (17 - 18)  SpO2: 99% (10 Oct 2018 11:28) (95% - 100%)    PE:  Constitutional: frail looking  HEENT: NC/AT, EOMI, PERRLA, conjunctivae clear; ears and nose atraumatic; pharynx benign  Neck: supple; thyroid not palpable  Back: no tenderness  Respiratory: respiratory effort normal; clear to auscultation  Cardiovascular: S1S2 regular, no murmurs  Abdomen: soft, not tender, not distended, positive BS; liver and spleen WNL  Genitourinary: no suprapubic tenderness verma in place clear   Lymphatic: no LN palpable  Musculoskeletal: no muscle tenderness, no joint swelling or tenderness  Extremities: no pedal edema  Neurological/ Psychiatric: moving all extremities  Skin: no rashes; no palpable lesions    Labs: all available labs reviewed                     Cultures:          Culture - Urine (10.06.18 @ 09:48)    -  Ampicillin: S <=2 Predicts results to ampicillin/sulbactam, amoxacillin-clavulanate and  piperacillin-tazobactam.    -  Ciprofloxacin: S <=1    -  Levofloxacin: S 1    -  Nitrofurantoin: S <=32 Should not be used to treat pyelonephritis.    -  Tetra/Doxy: R >8    -  Vancomycin: S 1    Specimen Source: .Urine None    Culture Results:   10,000 - 49,000 CFU/mL Enterococcus faecalis    Organism Identification: Enterococcus faecalis    Organism: Enterococcus faecalis    Method Type: TINA    Culture - Blood (10.06.18 @ 09:37)    Specimen Source: .Blood Blood-Venous    Culture Results:   No growth to date.            Radiology: all available radiological tests reviewed      < from: Xray Chest 1 View AP/PA. (10.06.18 @ 11:48) >    EXAM:  XR CHEST AP OR PA 1V                            PROCEDURE DATE:  10/06/2018          INTERPRETATION:  Exam Date: 10/6/2018 11:48 AM    History: Weakness    Technique: Single frontal portable view of the chest with comparison to    8/1/2018    Findings:    The heart is mildly enlarged. Slight increased markings in left lower   lobe may represent early pneumonia.. The apices and hemidiaphragms are   unremarkable. Degenerative changes of the visualized osseous structures.        Impression:    Slight increased markings in left lower lobe may represent early   pneumonia.      Advanced directives addressed: full resuscitation
No c/o    Vital Signs Last 24 Hrs  T(C): 36.9 (07 Oct 2018 05:24), Max: 37.7 (06 Oct 2018 15:15)  T(F): 98.5 (07 Oct 2018 05:24), Max: 99.9 (06 Oct 2018 15:15)  HR: 73 (07 Oct 2018 05:24) (73 - 73)  BP: 128/51 (07 Oct 2018 05:24) (127/45 - 141/53)  BP(mean): --  RR: 18 (07 Oct 2018 05:24) (17 - 18)  SpO2: 95% (07 Oct 2018 05:24) (95% - 99%)        PHYSICAL EXAM:  Constitutional: NAD, awake and alert, well-appearing  HEENT: EOMI, Normal Hearing, MMM  Neck: Soft and supple, No LAD, No JVD  Respiratory: Breath sounds are clear bilaterally, No wheezing, rales or rhonchi  Cardiovascular: S1 and S2, regular rate and rhythm, no Murmurs, gallops or rubs  Gastrointestinal: Bowel sounds present, soft, nontender, nondistended, no guarding, no rebound  Extremities: No peripheral edema  Vascular: 2+ peripheral pulses  Neurological: A/O x 3, no focal deficits  Musculoskeletal: 5/5 strength b/l upper and lower extremities  Skin: No rashes      89 yo F a with h/o endometrial cancer, CAD s/p stents, Hypothyroidism, GERD and HLD, presenting with hematuria
Patient seen and examined at bedside this morning. Did not sleep well last night. Voiding adequately without dysuria, hematuria. +urinary urgency. No fevers, chills, nausea, vomiting.  continues to have upset stomach and flushing associated with Unasyn which was started recently. No rashes, SOB, CP, dizziness.    Vital Signs Last 24 Hrs  T(C): 36.6 (11 Oct 2018 05:12), Max: 36.6 (11 Oct 2018 05:12)  T(F): 97.9 (11 Oct 2018 05:12), Max: 97.9 (11 Oct 2018 05:12)  HR: 76 (11 Oct 2018 05:12) (68 - 85)  BP: 169/67 (11 Oct 2018 05:12) (125/56 - 169/67)  BP(mean): --  RR: 17 (11 Oct 2018 05:12) (17 - 18)  SpO2: 100% (11 Oct 2018 05:12) (99% - 100%)    MEDICATIONS  (STANDING):  ampicillin/sulbactam  IVPB 1.5 Gram(s) IV Intermittent every 8 hours  artificial  tears Solution 1 Drop(s) Both EYES three times a day  aspirin 325 milliGRAM(s) Oral daily  cholecalciferol 1000 Unit(s) Oral daily  heparin  Injectable 5000 Unit(s) SubCutaneous every 12 hours  levothyroxine 75 MICROGram(s) Oral daily  metoprolol succinate ER 50 milliGRAM(s) Oral two times a day  pantoprazole    Tablet 40 milliGRAM(s) Oral before breakfast  polyethylene glycol 3350 17 Gram(s) Oral daily  spironolactone 25 milliGRAM(s) Oral daily      CBC Full  -  ( 11 Oct 2018 06:30 )  WBC Count : 10.53 K/uL  Hemoglobin : 12.3 g/dL  Hematocrit : 40.6 %  Platelet Count - Automated : 326 K/uL  Mean Cell Volume : 88.8 fl  Mean Cell Hemoglobin : 26.9 pg  Mean Cell Hemoglobin Concentration : 30.3 gm/dL      PE:  Gen: NAD, sitting in chair comfortably  Neck: soft, no JVD or lymphadenopathy  Abd: soft, NT, ND, no CVAT b/l, bladder non-palpable  Back: no spinal tenderness  Neuro: A&O x 3, no focal deficits
Patient seen and examined at bedside. Doing okay. Ambulating. Voiding without dysuria, hematuria. +urinary urgency. No fevers, chills, nausea, vomiting.  Complaining of upset stomach and flushing associated with Unasyn which was started recently. No rashes, SOB, CP, dizziness.     Vital Signs Last 24 Hrs  T(C): 36.3 (10 Oct 2018 11:28), Max: 36.7 (10 Oct 2018 05:12)  T(F): 97.4 (10 Oct 2018 11:28), Max: 98 (10 Oct 2018 05:12)  HR: 85 (10 Oct 2018 17:08) (68 - 85)  BP: 125/56 (10 Oct 2018 17:08) (125/56 - 155/54)  BP(mean): --  RR: 18 (10 Oct 2018 11:28) (17 - 18)  SpO2: 99% (10 Oct 2018 11:28) (95% - 100%)    PE:  Gen: NAD, sitting in chair comfortably  Neck: soft, no JVD or lymphadenopathy  Abd: soft, NT, ND, no CVAT b/l, bladder non-palpable  Back: no spinal tenderness  Neuro: A&O x 3, no focal deficits      MEDICATIONS  (STANDING):  ampicillin/sulbactam  IVPB 1.5 Gram(s) IV Intermittent every 8 hours  artificial  tears Solution 1 Drop(s) Both EYES three times a day  aspirin 325 milliGRAM(s) Oral daily  cholecalciferol 1000 Unit(s) Oral daily  heparin  Injectable 5000 Unit(s) SubCutaneous every 12 hours  levothyroxine 75 MICROGram(s) Oral daily  metoprolol succinate ER 50 milliGRAM(s) Oral two times a day  pantoprazole    Tablet 40 milliGRAM(s) Oral before breakfast  polyethylene glycol 3350 17 Gram(s) Oral daily      spironolactone 25 milliGRAM(s) Oral daily
Patient seen and examined at bedside. Feeling okay. Wants to go home tomorrow. Ambulating. Voiding without dysuria, hematuria. No incontinence. No fevers, chills, nausea, vomiting.     Recent urine culture - negative.     Vital Signs Last 24 Hrs  T(C): 36.3 (12 Oct 2018 04:43), Max: 36.6 (11 Oct 2018 21:15)  T(F): 97.4 (12 Oct 2018 04:43), Max: 97.9 (11 Oct 2018 21:15)  HR: 79 (12 Oct 2018 04:43) (72 - 80)  BP: 153/61 (12 Oct 2018 04:43) (136/68 - 153/61)  BP(mean): --  RR: 17 (12 Oct 2018 04:43) (17 - 18)  SpO2: 98% (12 Oct 2018 04:43) (98% - 100%)    MEDICATIONS  (STANDING):  ampicillin/sulbactam  IVPB 1.5 Gram(s) IV Intermittent every 8 hours  artificial  tears Solution 1 Drop(s) Both EYES three times a day  aspirin 325 milliGRAM(s) Oral daily  cholecalciferol 1000 Unit(s) Oral daily  heparin  Injectable 5000 Unit(s) SubCutaneous every 12 hours  levothyroxine 75 MICROGram(s) Oral daily  metoprolol succinate ER 50 milliGRAM(s) Oral two times a day  pantoprazole    Tablet 40 milliGRAM(s) Oral before breakfast  polyethylene glycol 3350 17 Gram(s) Oral daily  spironolactone 25 milliGRAM(s) Oral daily      PE:  Gen: NAD, sitting in chair comfortably  Neck: soft, no JVD or lymphadenopathy  Abd: soft, NT, ND, no CVAT b/l, bladder non-palpable  Back: no spinal tenderness  Neuro: A&O x 3, no focal deficits
Vital Signs Last 24 Hrs  T(C): 36.6 (09 Oct 2018 11:35), Max: 36.6 (09 Oct 2018 11:35)  T(F): 97.8 (09 Oct 2018 11:35), Max: 97.8 (09 Oct 2018 11:35)  HR: 77 (09 Oct 2018 11:35) (70 - 80)  BP: 151/58 (09 Oct 2018 11:35) (145/54 - 160/66)  BP(mean): --  RR: 18 (09 Oct 2018 11:35) (18 - 19)  SpO2: 100% (09 Oct 2018 11:35) (95% - 100%)      10/9: no change in her status, refused PT,         PHYSICAL EXAM:  Constitutional: NAD, awake and alert, well-appearing  HEENT: EOMI, Normal Hearing, MMM  Neck: Soft and supple, No LAD, No JVD  Respiratory: Breath sounds are clear bilaterally, No wheezing, rales or rhonchi  Cardiovascular: S1 and S2, regular rate and rhythm, no Murmurs, gallops or rubs  Gastrointestinal: Bowel sounds present, soft, nontender, nondistended, no guarding, no rebound  Extremities: No peripheral edema  Vascular: 2+ peripheral pulses  Neurological: A/O x 3, no focal deficits  Musculoskeletal: 5/5 strength b/l upper and lower extremities  Skin: No rashes      89 yo F a with h/o endometrial cancer, CAD s/p stents, Hypothyroidism, GERD and HLD, presenting with hematuria    * s/p sepsis sec to UTI ( 50,000 enteroccocus)  - abx per ID- Unasyn  - urology wants neg ucx before dc b/o  planned further Rx  - pt refusing PT  - verma dc 10/8- check postvoidal- pt came with verma    * Stable CAD    * Bladder ca- treatment planned in the near future
UTI (urinary tract infection)
UTI (urinary tract infection)

## 2021-08-17 NOTE — ED PROVIDER NOTE - CPE EDP HEME LYMPH NORM
Price (Do Not Change): 0.00
Instructions: This plan will send the code [id number] to the PM system.  DO NOT or CHANGE the price.
Detail Level: Simple
normal...

## 2021-08-19 NOTE — DISCHARGE NOTE PROVIDER - PROVIDER TOKENS
PROVIDER:[TOKEN:[[id number]]],PROVIDER:[TOKEN:[[id number]]]
Gen: A&Ox4   HEENT: Atraumatic. Pale skin. Mucous membranes moist, no scleral icterus   CV: RRR. No murmurs. 2+ bilat LE edema. Distal pulses 2+. Capillary refill < 2 seconds.  Resp: Respirations unlabored. Scattered crackles bilat.   GI: Abdomen non tender to palpation, soft and non-distended. + BS.  Skin/MSK: No open wounds. No ecchymosis appreciated.  Neuro: Following commands. Moving extremities spontaneously.  Psych: Appropriate mood, cooperative

## 2021-12-09 NOTE — H&P ADULT - NSHPPOACENTRALVENOUSCATHETER_GEN_ALL_CORE
Pt brought in by EMS for mid-abdominal pain and N/V x 2 days after eating \"raw meat.\" no OTC meds taken for symptoms.  
no

## 2021-12-30 NOTE — ED ADULT NURSE NOTE - DISCHARGE DATE/TIME
Called and spoke to patient's son, Mr. Joana Boggs and explained the findings on brain MRI. Also discussed about referral to neurosurgeon. Patient understood that they have an appointment next week on 1/6/2022 at 1:40 PM.  I will give the information handout about this condition shown on brain MRI. Patient's son voiced understanding all of those instructions.   Thank you.   -dr. Tammy Garcia 05-Sep-2017 15:23

## 2022-03-10 NOTE — ED ADULT NURSE NOTE - NS_HUMANTRAFFICKQ2_ED_ALL_ED
Group Psychotherapy     Site: [facility]     Clinical status of patient: Outpatient     2/21/2022     Length of service:[id number]     Referred by: Functional [facility]oration Program      Number of patients in attendance: 4     Target symptoms: Chronic Pain Management      Patient's response to intervention:  The patient's response to intervention is active listening.     Progress toward goals and other mental status changes:  The patient's progress toward goals is good.     Plan: group psychotherapy     Topics Covered: Pt attended CBT for Chronic Pain as part of her participation in Functional Restoration. Topics discussed today included a discussion on goal setting, how to set SMART goals and how this impacts anxiety, depression, other mood disorders and chronic pain. Will f/u in 1 day.         No

## 2022-06-04 NOTE — ED PROVIDER NOTE - CCCP TRG CHIEF CMPLNT
CM following for needs at discharge. Current plan is home with Home Health Care [facility] for RN PT OT HHA.     Therapy changed rec to home with home PT today.    weakness

## 2022-06-06 NOTE — DISCHARGE NOTE PROVIDER - EXTENDED VTE YES NO FOR MLM ENOXAPARIN
[facility]  Adult Nutrition  Consult Note    SUMMARY   Intervention:carbohydrate modified diet     Recommendations  Recommendation/Intervention:   1.) Continue with Consistent Carb Renal diet, 1500 mL FR  Goals: 1.) diet will advance within 48 hrs. 2.) pt will meet >50% of EEN   Nutrition Goal Status: 1.) goal met 2.) goal met/ongoing   Communication of RD Recs: other (comment) (POC)    1. Hypoglycemia    2. Seizure    3. PRES (posterior reversible encephalopathy syndrome)      Past Medical History:   Diagnosis Date    CKD (chronic kidney disease), stage IV 4/12/2022    Diabetes mellitus due to underlying condition with unspecified complications 4/12/2022    Gastroparesis 4/12/2022    Heart failure with preserved ejection fraction 4/12/2022    EF 55% on 3/22    History of gastroesophageal reflux (GERD)     History of supraventricular tachycardia     Sickle cell trait 4/12/2022    Type 2 diabetes mellitus          Assessment and Plan  Nutrition Problem  Inadequate energy intake    Related to (etiology):   Acute illness    Signs and Symptoms (as evidenced by):   NPO, nausea/vomiting     Interventions/Recommendations (treatment strategy):  Advance diet when appropriate     Nutrition Diagnosis Status:   Improving         Malnutrition Assessment  Edema (Fluid Accumulation): 3-->moderate   Fluid Accumulation Evaluation: moderate    Body mass index is 30.93 kg/m².        Reason for Assessment  Reason For Assessment: RD follow up   General Information Comments: admits with seizure, epilepsy + nausea/vomiting, lethargy. Remains NPO and drowsy per chart review. RD consulted for malnutrition, will f/u with [facility] after diet advances. Per chart review, no weight loss noted, but edema noted (+3, +4).  6/1: Diet advanced to renal; tolerating. Adequate PO intake with 100% at breakfast, and 75% at lunch, good appetite per RN.   6/6: Patient laying in bed, hard to get pt to answer questions. But stated  "she is eating her meals and tolerating her diet. Reports she consumed a hamburger last night for dinner. Will continue to monitor.     Nutrition Risk Screen  Nutrition Risk Screen: no indicators present    Nutrition/Diet History  Food Allergies: NKFA  Factors Affecting Nutritional Intake: NPO, nausea/vomiting    Anthropometrics  Temp: 96.7 °F (35.9 °C)  Height Method: Estimated  Height: 5' 2"  Height (inches): 62 in  Weight Method: Bed Scale  Weight: 76.7 kg (169 lb 1.5 oz)  Weight (lb): 169.09 lb  Ideal Body Weight (IBW), Female: 110 lb  % Ideal Body Weight, Female (lb): 142.1 %  BMI (Calculated): 30.9  BMI Grade: 25 - 29.9 - overweight  Usual Body Weight (UBW), k kg  % Usual Body Weight: 118.41  % Weight Change From Usual Weight: 18.17 %       Lab/Procedures/Meds  Pertinent Labs Reviewed: reviewed  BMP  Lab Results   Component Value Date     2022    K 4.0 2022     2022    CO2 22 (L) 2022    BUN 50 (H) 2022    CREATININE 4.0 (H) 2022    CALCIUM 8.2 (L) 2022    ANIONGAP 10 2022    ESTGFRAFRICA 16 (A) 2022    EGFRNONAA 14 (A) 2022     Lab Results   Component Value Date    ALBUMIN 1.7 (L) 2022     Lab Results   Component Value Date    CALCIUM 8.2 (L) 2022    PHOS 4.2 2022     Recent Labs   Lab 22  1300   POCTGLUCOSE 149*       Pertinent Medications Reviewed: reviewed  Scheduled Meds:   FLUoxetine  20 mg Oral Daily    furosemide (LASIX) injection  60 mg Intravenous Q12H    heparin (porcine)  5,000 Units Subcutaneous Q8H    hydrALAZINE  100 mg Oral Q8H    insulin detemir U-100  10 Units Subcutaneous Daily    isosorbide mononitrate  60 mg Oral Daily    levetiracetam IV  500 mg Intravenous Q12H    pantoprazole  40 mg Intravenous Daily     Continuous Infusions:        Estimated/Assessed Needs  Weight Used For Calorie Calculations: 70.9 kg (156 lb 4.9 oz)  Energy Calorie Requirements (kcal): 9970-4336 " kcals/day  Energy Need Method: Davidson-St Blue (x1.3af)  Protein Requirements: 71-85g  Weight Used For Protein Calculations: 70.9 kg (156 lb 4.9 oz)  Estimated Fluid Requirement Method: RDA Method  RDA Method (mL): 1700      Nutrition Prescription Ordered  Current Diet Order: Renal, Consistent Carbohydrate , 1500 mL fluid     Evaluation of Received Nutrient/Fluid Intake  I/Os: -1380  Tolerance:  Tolerating   % Intake of Estimated Energy Needs: 50 - 75 %  % Meal Intake: 50 - 75 %    Nutrition Risk  Level of Risk/Frequency of Follow-up:  (x2 weekly)       Monitor and Evaluation  Food and Nutrient Intake: energy intake, food and beverage intake  Food and Nutrient Adminstration: diet order  Anthropometric Measurements: weight, weight change, body mass index  Biochemical Data, Medical Tests and Procedures: electrolyte and renal panel, glucose/endocrine profile, lipid profile  Nutrition-Focused Physical Findings: overall appearance       Nutrition Follow-Up  RD Follow-up?: Yes   ,

## 2022-06-18 NOTE — ED PROVIDER NOTE - CADM POA URETHRAL CATHETER
No Mildly to Moderately Impaired: difficulty hearing in some environments or speaker may need to increase volume or speak distinctly

## 2022-06-22 NOTE — ED ADULT TRIAGE NOTE - MEANS OF ARRIVAL
Reason For Visit:   Chief Complaint   Patient presents with     Follow Up     Foot care.                 Allergies   Allergen Reactions     Seasonal Allergies            Whitley Munoz LPN    
ambulatory

## 2022-08-09 NOTE — STROKE CODE NOTE - ICH HIDDEN
Specialty Pharmacy Refill Coordination Note     Dominga is a 34 y.o. female contacted today regarding refills of Aimovig specialty medication(s). Patient's last injection of Aimovig was given on 7/19/2022. Patient's next injection of Aimovig due 8/16/2022.    Reviewed and verified with patient: Yes  Specialty medication(s) and dose(s) confirmed: yes    Refill Questions    Flowsheet Row Most Recent Value   Changes to allergies? No   Changes to medications? No   New conditions since last clinic visit No   Unplanned office visit, urgent care, ED, or hospital admission in the last 4 weeks  No   How does patient/caregiver feel medication is working? Excellent   Financial problems or insurance changes  No   If yes, describe changes in insurance or financial issues. N/A   Since the previous refill, were any specialty medication doses or scheduled injections missed or delayed?  No   If yes, please provide the amount N/A   Why were doses missed? N/A   Does this patient require a clinical escalation to a pharmacist? No          Delivery Questions    Flowsheet Row Most Recent Value   Delivery method [employer]  [MEDICAID SIGNATURE REQUIRED. Ship Friday 8/12/2022. Delivery Saturday 8/13/2022.)]   Delivery address correct? Yes   Preferred delivery time? Anytime   Number of medications in delivery 1   Medication being filled and delivered Aimovig   Doses left of specialty medications None. Once monthly injection.   Is there any medication that is due not being filled? No   Supplies needed? No supplies needed   Cooler needed? Yes   Copay form of payment Credit card on file   Questions or concerns for the pharmacist? No   Are any medications first time fills? No            Medication Adherence    Adherence tools used: calendar  Support network for adherence: healthcare provider   Other support network: Pharmacy           Follow-up:  28 days.     Janel Doll, Pharmacy Technician  Specialty Pharmacy Technician      
hide

## 2022-08-19 NOTE — PATIENT PROFILE ADULT. - HAS THE PATIENT HAD A SIGNIFICANT CHANGE IN FUNCTIONAL STATUS DUE TO CVA, HEAD TRAUMA, ORTHOPEDIC TRAUMA/SURGERY, OR FALL, WITH THE WEEK PRIOR TO ADMISSION
Opioid Pregnancy And Lactation Text: These medications can lead to premature delivery and should be avoided during pregnancy. These medications are also present in breast milk in small amounts. yes

## 2022-09-13 NOTE — ASU PREOP CHECKLIST - IDENTIFICATION BAND VERIFIED
(1) please see up CT scan abdomen/pelvis ordered today for 10/4/2022 before Lenard sees me on that day   ThanksGENE
done

## 2022-10-05 NOTE — ED ADULT NURSE NOTE - TEMPLATE LIST FOR HEAD TO TOE ASSESSMENT
Called and spoke with pt about the following:     Please arrive to [facility] main lobby at 0800 am on 10/6/22 for your scheduled procedure. NOTHING to eat or drink after midnight unless instructed otherwise by your Surgeon. Take only the medications instructed by your Pre Admit Nurse with small sip of water.    Thank you,  -[facility].  Mon-Fri 8 am - 4 pm [phone]    
General

## 2022-10-05 NOTE — DIETITIAN INITIAL EVALUATION ADULT. - HEIGHT FOR BMI (CENTIMETERS)
Group Psychotherapy (with Psychology Intern)    Site: [facility]    Clinical status of patient: Intensive Outpatient Program (IOP)    Date: 10/05/2022    Group Focus: Psychodynamic Group Psychotherapy    Length of service: 45-50 minutes    Number of patients in attendance: 6    Referred by: Addictive Behavior Unit Treatment Team    Target symptoms: Alcohol Abuse    Patient's response to treatment: Active Listening and Self-disclosure, provided feedback to another patient.    Progress toward goals: Progressing adequately    Interval History: Pt reports continued sobriety. He reported having a relaxing weekend. He stated that he decided to have a conversation with his wife instead of completing the letter. Pt reported that the conversation went well.     Diagnosis: Alcohol Use Disorder    Plan: Continue treatment on ABU         165.1

## 2022-10-13 NOTE — ED PROVIDER NOTE - TOBACCO USE
Unknown if ever smoked Otezla Pregnancy And Lactation Text: This medication is Pregnancy Category C and it isn't known if it is safe during pregnancy. It is unknown if it is excreted in breast milk.

## 2022-10-19 NOTE — ED PROVIDER NOTE - CPE EDP MUSC NORM
Next visit: 3/28/2023   Last visit: 9/27/2022    Refill request for:  Disp Refills Start End     glipiZIDE (GLUCOTROL) 10 MG tablet 135 tablet 0 7/13/2022     Sig: TAKE 1 TABLET BY MOUTH IN THE MORNING AND 1/2 (ONE-HALF) IN THE EVENING      Sulfonylurea Antihyperglycemics Refill Protocol Failed 10/19/2022 09:39 AM   Protocol Details  Hgb A1c less than 8 within last 12 months looking at last value -- IF CRITERIA FAILED REFER TO PROTOCOL DETAILS     Refill unable to be completed per standing protocol due to; failed protocol.  Orders pended, and routed to provider for approval.  Please route any notes back to your nursing pool via patient call, NOT Rx Auth.    Thank you, Refill Center Staff    
normal...

## 2022-11-23 NOTE — DIETITIAN INITIAL EVALUATION ADULT. - IDEAL BODY WEIGHT (LBS)
Physical Therapy  Visit Type: initial evaluation, treatment and discharge  Precautions:    Lines:     Basic Lines: bilateral surgical shoes.  Lower Extremity:    Left:  weight bearing: partial (surgical shoe).    SUBJECTIVE  Patient agreed to participate in therapy this date.  \"I don't want crutches or a walker, and I don't want therapy.\"  Patient / Family Goal: return home    Pain     Location: denies     OBJECTIVE     Oriented to person, place and time     Affect/Behavior: cooperative  Patient activity tolerance: 1 to 1 activity to rest  Functional Communication/Cognition    Overall status:  Within functional limits    Form of communication:  Verbal    Commands: follows all commands and directions consistently.    Transition between tasks: transitions tasks without difficulty.  Range of Motion (measured in degrees unless otherwise noted, active unless indicated)  WFL: RLE, LLE  Comments / Details: Bilateral TMA  Strength (out of 5 unless otherwise indicated)   WFL: LLE, RLE  Balance (trials measured in sec unless otherwise indicted)    Sitting: , Dynamic: independent    Standing - Firm Surface - Eyes Open:   Dynamic: modified independent single upper extremity support    Bed Mobility:        Supine to sit: independent    Sit to supine: independent  Transfers:    Assistive devices: none    Sit to stand: independent    Stand to sit: independent    Stand pivot: independent  Training completed:      Education details: patient demonstrates understanding  Gait/Ambulation:     Assistance: modified independent   Assistive device: small base quad cane    Distance (ft): 20    Pattern: within functional limits (PWB LLE)  Training Completed:    Tasks: gait training on level surfaces and assistive device use    Education details: body mechanics      Interventions     Training provided: gait training, use of assistive device and body mechanics    Skilled input: Verbal instruction/cues       ASSESSMENT    Pt is 54 y/o M admitted  for dizziness, s/p debridement of L TMA, PWB LLE.  Pt refuses to use crutches or walker for optimal LLE off-loading, in agreement to utilize quad cane with L heel WB.  Rec pt f/u with MD/prosthetist for bilateral TMA footwear.         Discharge Recommendations  Recommendation for Discharge: PT IL: Patient does not need Physical Therapy  PT/OT Mobility Equipment for Discharge: PT dispensed large base quad cane  PT/OT ADL Equipment for Discharge: shower chair     • Skilled therapy is not required due to pt functioning at baseline.     • Predicted patient presentation: Low (stable) - Patient comorbidities and complexities, as defined above, will have little effect on progress for prescribed plan of care.    Patient at End of Session:   Location: in bed  Handoff to: nurse    PLAN   Suggestions for next session as indicated: Frequency Comments: DC from PT        Documented in the chart in the following areas: Assessment.      Therapy procedure time and total treatment time can be found documented on the Time Entry flowsheet   125.6

## 2022-12-26 NOTE — INPATIENT CERTIFICATION FOR MEDICARE PATIENTS - NS ICMP CERT SIG IND
Problem: POSTPARTUM  Goal: Long Term Goal:Experiences normal postpartum course  Description: INTERVENTIONS:  - Assess and monitor vital signs and lab values. - Assess fundus and lochia. - Provide ice/sitz baths for perineum discomfort. - Monitor healing of incision/episiotomy/laceration, and assess for signs and symptoms of infection and hematoma. - Assess bladder function and monitor for bladder distention.  - Provide/instruct/assist with pericare as needed. - Provide VTE prophylaxis as needed. - Monitor bowel function.  - Encourage ambulation and provide assistance as needed. - Assess and monitor emotional status and provide social service/psych resources as needed. - Utilize standard precautions and use personal protective equipment as indicated. Ensure aseptic care of all intravenous lines and invasive tubes/drains.  - Obtain immunization and exposure to communicable diseases history. Outcome: Progressing  Goal: Optimize infant feeding at the breast  Description: INTERVENTIONS:  - Initiate breast feeding within first hour after birth. - Monitor effectiveness of current breast feeding efforts. - Assess support systems available to mother/family.  - Identify cultural beliefs/practices regarding lactation, letdown techniques, maternal food preferences. - Assess mother's knowledge and previous experience with breast feeding.  - Provide information as needed about early infant feeding cues (e.g., rooting, lip smacking, sucking fingers/hand) versus late cue of crying.  - Discuss/demonstrate breast feeding aids (e.g., infant sling, nursing footstool/pillows, and breast pumps). - Encourage mother/other family members to express feelings/concerns, and actively listen. - Educate father/SO about benefits of breast feeding and how to manage common lactation challenges.   - Recommend avoidance of specific medications or substances incompatible with breast feeding.  - Assess and monitor for signs of nipple pain/trauma. - Instruct and provide assistance with proper latch. - Review techniques for milk expression (breast pumping) and storage of breast milk. Provide pumping equipment/supplies, instructions and assistance, as needed. - Encourage rooming-in and breast feeding on demand.  - Encourage skin-to-skin contact. - Provide LC support as needed. - Assess for and manage engorgement. - Provide breast feeding education handouts and information on community breast feeding support. Outcome: Progressing  Goal: Establishment of adequate milk supply with medication/procedure interruptions  Description: INTERVENTIONS:  - Review techniques for milk expression (breast pumping). - Provide pumping equipment/supplies, instructions, and assistance until it is safe to breastfeed infant. Outcome: Progressing  Goal: Experiences normal breast weaning course  Description: INTERVENTIONS:  - Assess for and manage engorgement. - Instruct on breast care. - Provide comfort measures. Outcome: Progressing  Goal: Appropriate maternal -  bonding  Description: INTERVENTIONS:  - Assess caregiver- interactions. - Assess caregiver's emotional status and coping mechanisms. - Encourage caregiver to participate in  daily care. - Assess support systems available to mother/family.  - Provide /case management support as needed.   Outcome: Progressing I certify as stated above.

## 2022-12-29 NOTE — ED ADULT NURSE NOTE - NS ED NOTE  TALK SOMEONE YN
RADIATION ONCOLOGY FOLLOW UP NOTE    Patient Name: COLLEEN JACOBSON  YOB: 1962  MRN: [id number]  Account Number: [id number]  Dictating MD: Gómez Swenson M.D.    Date of Service: October 02, 2020     Diagnosis:  [ICD10] C51.8 Malignant neoplasm of overlapping sites of vulva IIIC    History: The patient is a 58-year-old woman who presented with a right vulvar lesion which she had noted approximately 6 months prior to presentation in early August 2022 her gynecologic oncology colleagues.  She developed intermittent bleeding in the summer 2020.  Upon initial examination a 4.5 x 3 cm indurated and superficially ulcerative lesion was noted in the right vulva extending to the distal urethra.  The vaginal mucosa appeared unremarkable.  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  Subsequently MRI pelvis without and with contrast was performed (please see below) this revealed a 3.5 cm ulcerative vulvar mass there were a few prominent bilateral inguinal lymph nodes. PET/CT was subsequently performed on August 24, 2020 which did reveal hypermetabolic uptake lower vaginal region concerning for possible mass lesion direct visualization was advised although this could represent unusual urine contamination.  Hypermetabolic activity corresponding to the area of the known vulvar mass was noted.  No regional hypermetabolic lymph nodes were identified.  There was significant aneurysmal dilatation of the ascending aorta measuring up to 6.4 cm.On physical exam, as per gynecologic oncology, there was question of urethral involvement the patient was brought into the hospital for directed biopsies for evaluation of extent of local disease.  Secondary to the 6.4 cm ascending aortic aneurysm anesthesia could not be given and mapping biopsies were not performed.  We are being consulted for evaluation and suggestions as well as discussions in  regard to definitive radiation as well as adjuvant radiotherapy in a postoperative setting    Subsequent biopsies were performed on August 27, 2020 revealed left anterior vulva which confirmed poorly differentiated invasive squamous cell carcinoma.  Multiple vulvar biopsies were also done on September 3, 2020 with 4 of 14 biopsy showing invasive squamous cell carcinoma, poorly differentiated the depth of invasion was at least 5.5 mm.  Pelvic exam under anesthesia at that same time was performed.    Subsequently on September 17, 2020 she was taken to the operating room for bilateral inguinofemoral lymph node debulking and radical vulvectomy with complex multilayered wound closure. 1 of 3 left inguinal femoral and 1 of 4 right inguinal femoral lymph nodes were positive.  Pericapsular invasion was noted on the right.  A 6.9 x 5.9 x 2.6 cm vulvar tumor was noted, grade 3.  Depth of invasion was 26 mm.  Closest lateral margin was 3 mm.  The margin was clear by 8 mm.  Therefore, her disease was pathologically staged as a T1bN2c squamous cell cancer.    She is seen today and relates that she is doing extremely well.  She is accompanied by her mother throughout the entire evaluation and examination.  Examination was done in conjunction with Dr. Pham of gynecologic oncology.  MARIALUISA drains remain in place with some drainage.    Pathology:  Pathologic Diagnosis :   (Sept. 17, 2020)      A: Fs-left inguinofemoral lymph node:     - One lymph node with metastatic squamous cell carcinoma; three lymph nodes     examined.         B: Additional superficial left inguinofemoral lymph node:     - Fibroadipose tissue. Negative for malignancy.         C: Left deep inguinofemoral lymph node:     - One lymph node negative for malignancy.         D: Right inguinofemoral lymph node:     - One lymph node with metastatic squamous cell carcinoma and pericapsular     invasion; four lymph nodes examined.   (Extracapsular extension as per   August)      E: Deep right inguinal lymph node:     - One lymph node negative for malignancy.         F: Anterior vulva:     - Poorly differentiated invasive squamous cell carcinoma.     - Resection margins negative for carcinoma.         Diagnosis Comment:     VULVA     SPECIMEN          Procedure:   Radical vulvectomy     TUMOR          Tumor Site:   Right vulva, Left vulva     Tumor Size:   6.5 x 5.9 x 2.6 cm     Histologic Type:   Squamous cell carcinoma, keratinizing     Histologic Grade:   G3: Poorly differentiated     Tumor Focality:   Two foci (a second adjacent focus - 1.2 cm in greatest     dimension)     Other Tissue / Organ Involvement:   Not applicable     Depth of Invasion (Millimeters):   26 mm     Lymphovascular Invasion:   Not identified     MARGINS          Peripheral Margin:   Uninvolved by invasive carcinoma     Closest lateral margin (3 to 6 o'clock) - 3mm          Status of Intraepithelial Neoplasia at Margin:   Uninvolved by     intraepithelial neoplasia          Deep Margin:   Uninvolved by invasive carcinoma - 8 mm closest          Status of Intraepithelial Neoplasia at Margin:   Uninvolved by     intraepithelial neoplasia     LYMPH NODES          Regional Lymph Nodes:   Positive for tumor cells          Number of Nodes with Metastasis 5 mm or Greater:   1     Number of Nodes with Metastasis Less than 5 mm (excludes isolated tumor     cells):   1     Additional Lymph Node Findings:   Extranodal extension          Total Number of Lymph Nodes Examined:   9     PATHOLOGIC STAGE CLASSIFICATION (pTNM, AJCC 8th ed.)          Primary Tumor (pT):   pT1b: Lesions more than 2 cm, or any size with     stromal invasion of more than 1.0 mm, confined to the vulva and / or perineum     Regional Lymph Nodes (pN)          Category (pN):   pN2c: Lymph node metastasis with extranodal extension        Past Medical History:   The patient reported the following problems: Type 2 diabetes mellitus without  complications.     Medication List (Reported By Patient):     The patient reported the following medications:  metformin; carvedilol; atorvastatin; ibuprofen; Eliquis [apixaban].     Allergies (Reported By Patient):     The patient reported the following allergies:  No Known Drug Allergies.      Review of Systems:   Constitutional: denies fatigue and night sweats  HEENT: denies dysphagia and odynophagia  Cardiovascular: denies chest pain, palpitations, and syncope  Pulmonary: denies shortness of breath, cough, and wheezing  Gastrointestinal: denies vomiting, diarrhea, and constipation  Genitourinary: denies dysuria, hematuria, and incontinence  Neurological: denies headaches, numbness, weakness, and changes in vision or hearing  Psychiatric: denies any recent depression or anxiety   Musculoskeletal: denies muscle, joint, and bone pain  Endocrine: denies polyuria, polydypsia and cold intolerance  Integumentary: Discomfort in the regions of the bilateral inguinal femoral drains  Immunologic: denies recent infections    Physical Exam:    General: WD, WN female in no acute distress.  Vital Signs:    Date 10/2/2020   Time 1:26 PM     Height (cm) (cm) 163.7     Weight (kg) (kg) 101.7     R 16     P 85     B/P 144/81     Oxygen Saturation (%) 94   ECOG Performance Status: Record date - 10/2/2020 1:26 PM; ECOG Grade 1 - Restricted strenuous activity; able to carry out light work      HEENT: Sclera anicteric. Oral cavity and oropharynx clear.   Lungs: Clear without rales, rhonchi, or wheezes.  Heart: RRR with no murmurs, rubs, or gallops  Musculoskeletal: No spine or CVA tenderness.  Abdomen: Soft without organomegaly. Large pannus.  Drains in the region of the bilateral inguinal well-healing incisions.  Mild hyperpigmentation in that region with moisture.  Extremities: No clubbing, cyanosis or edema.  Gyn: Vulvar region healing extremely well without erythema.  Reviewed and evaluated with Dr. Pham.  Neurologic: Alert and  oriented, displays no tremor, Gait normal.  Psychiatric: Normal mood and affect, speech normal, behavior is normal    Impression: Brittany Guallpa is a pleasant 58 year old female with pathologic T1BN2C grade 3 squamous cell carcinoma of the vulva with a single lymph node in the bilateral inguinal femoral region with the right side having extracapsular extension.  26 mm invasion with margin at the 3-6 o'clock being at 3 mm.  Margins otherwise were negative.  Lymphovascular invasion was not identified..     Plan:   I reviewed the case with gynecologic as well as medical oncology.  Based upon a number of pathologic findings including lymph nodes, depth of invasion I have suggested utilization of external beam radiotherapy to the vulvar bed as well as inguinal femoral lymph node regions.  After discussions with medical oncology the decision was made to offer the patient concurrent chemoradiotherapy with a cisplatin-based regimen.  I explained in layman's terms to the patient techniques and technologies of external beam radiation as well as the possible risk, benefits and side effects.  This was explained in an organ based very explicit manner particularly in relation to short, intermediate and long-term side effects related to integumentary, bowel, bladder and other regions.  She and her mother reiterated these back to me to confirm understanding.  They related all questions were answered to their full satisfaction.  The decision was made to not pull the patient's drains today by Dr. Pham as there was some fluid continued to drain.  She will be reevaluated in approximately 1 week for removal of the drains.  She is to meet with medical oncology immediately following this appointment.  We will bring her back next week for simulation.      Authenticated by Josh Swenson M.D. on October 2, 2020 at 2:55 PM  JOSH SWENSON MD    CC: JOSH SWENSON MD      No Mauc Instructions: By selecting yes to the question below the MAUC number will be added into the note.  This will be calculated automatically based on the diagnosis chosen, the size entered, the body zone selected (H,M,L) and the specific indications you chose. You will also have the option to override the Mohs AUC if you disagree with the automatically calculated number and this option is found in the Case Summary tab.

## 2023-01-24 NOTE — ED PROVIDER NOTE - NSFOLLOWUPINSTRUCTIONS_ED_ALL_ED_FT
[facility]. [facility] informed her he is having trouble getting [facility]; also that he was referred to a dermatologist. Vivi states pt cannot be referred by an outside provider. Informed Vivi pt informed Helen he was seeing his PCP in 2 weeks & would have his PCP send him to a dermatologist. Vivi asked for pt's office note to be faxed so pt's PCP would have note as pt does not have office visit & to either e-scribe or fax [facility] to [facility].    Vivi's fax:[phone]  Pharmacy fax [phone]    [facility]  
FOLLOW UP WITH PMD  WITHIN 1-2DAYS, CALL TO MAKE APPOINTMENT  COME BACK TO ED IF YOUR CONDITION WORSENS OR IF YOU DEVELOP FEVER GREATER THAN 100.4F, CHEST PAIN,  SHORTNESS OF BREATH OR ANY OTHER SYMPTOMS CONCERNING TO YOU  TAKE TYLENOL (ACETAMINOPHEN) 650 MG EVERY 6 HOURS AS NEEDED FOR PAIN    IF YOU WERE PRESRCIBED ANY MEDICATIONS FROM TODAY'S VISIT, TAKE THEM AS DIRECTED     Fall Prevention    WHAT YOU NEED TO KNOW:    Fall prevention includes ways to make your home and other areas safer. It also includes ways you can move more carefully to prevent a fall. Health conditions that cause changes in your blood pressure, vision, or muscle strength and coordination may increase your risk for falls. Medicines may also increase your risk for falls if they make you dizzy, weak, or sleepy.     DISCHARGE INSTRUCTIONS:    Call 911 or have someone else call if:     You have fallen and are unconscious.      You have fallen and cannot move part of your body.    Contact your healthcare provider if:     You have fallen and have pain or a headache.      You have questions or concerns about your condition or care.    Fall prevention tips:     Stand or sit up slowly. This may help you keep your balance and prevent falls.      Use assistive devices as directed. Your healthcare provider may suggest that you use a cane or walker to help you keep your balance. You may need to have grab bars put in your bathroom near the toilet or in the shower.      Wear shoes that fit well and have soles that . Wear shoes both inside and outside. Use slippers with good . Do not wear shoes with high heels.      Wear a personal alarm. This is a device that allows you to call 911 if you fall and need help. Ask your healthcare provider for more information.      Stay active. Exercise can help strengthen your muscles and improve your balance. Your healthcare provider may recommend water aerobics or walking. He or she may also recommend physical therapy to improve your coordination. Never start an exercise program without talking to your healthcare provider first.       Manage your medical conditions. Keep all appointments with your healthcare providers. Visit your eye doctor as directed.           Home safety tips:     Add items to prevent falls in the bathroom. Put nonslip strips on your bath or shower floor to prevent you from slipping. Use a bath mat if you do not have carpet in the bathroom. This will prevent you from falling when you step out of the bath or shower. Use a shower seat so you do not need to stand while you shower. Sit on the toilet or a chair in your bathroom to dry yourself and put on clothing. This will prevent you from losing your balance from drying or dressing yourself while you are standing.       Keep paths clear. Remove books, shoes, and other objects from walkways and stairs. Place cords for telephones and lamps out of the way so that you do not need to walk over them. Tape them down if you cannot move them. Remove small rugs. If you cannot remove a rug, secure it with double-sided tape. This will prevent you from tripping.       Install bright lights in your home. Use night lights to help light paths to the bathroom or kitchen. Always turn on the light before you start walking.      Keep items you use often on shelves within reach. Do not use a step stool to help you reach an item.      Paint or place reflective tape on the edges of your stairs. This will help you see the stairs better.    Follow up with your healthcare provider as directed: Write down your questions so you remember to ask them during your visits.

## 2023-01-25 NOTE — ED ADULT TRIAGE NOTE - SOURCE OF INFORMATION
Simple Simulation Preamble Text Will Be Included With Simple Simulations (.......... Indications): Simple simulation was performed today for the following reasons: Patient/EMS

## 2023-02-27 NOTE — ED ADULT NURSE NOTE - SUICIDE SCREENING DEPRESSION
"Chief Complaint  Cough, Nasal Congestion, Fussy, Diarrhea, and Fever    Subjective        Cris Gonzalez presents to [facility] for evaluation.    Fever   This is a new problem. The current episode started today. The problem occurs constantly. The problem has been unchanged. The maximum temperature noted was 101 to 101.9 F. Associated symptoms include congestion, coughing and diarrhea. Pertinent negatives include no ear pain, rash, vomiting or wheezing. He has tried acetaminophen (last given approximately 4 hours PTA) for the symptoms. The treatment provided mild relief.   Risk factors: sick contacts  Contaminated food: attends .         Review of Systems   Constitutional: Positive for activity change (fussy) and fever. Negative for appetite change.   HENT: Positive for congestion and rhinorrhea. Negative for ear discharge and ear pain.    Respiratory: Positive for cough. Negative for wheezing.    Gastrointestinal: Positive for diarrhea. Negative for vomiting.   Genitourinary: Negative for decreased urine volume.   Skin: Negative for rash.       Objective   Vital Signs:  Temp 98.1 °F (36.7 °C)   Ht 66 cm (26\")   Wt (!) 8822 g (19 lb 7.2 oz)   BMI 20.23 kg/m²      Estimated body mass index is 20.23 kg/m² as calculated from the following:    Height as of this encounter: 66 cm (26\").    Weight as of this encounter: 8822 g (19 lb 7.2 oz).       BMI is within normal parameters. No other follow-up for BMI required.      Physical Exam  Vitals and nursing note reviewed.   Constitutional:       General: He is awake. He is consolable and not in acute distress.     Appearance: Normal appearance. He is well-developed. He is not ill-appearing or toxic-appearing.   HENT:      Head: Normocephalic and atraumatic. Anterior fontanelle is flat.      Right Ear: Tympanic membrane, ear canal and external ear normal.      Left Ear: Tympanic membrane, ear canal and external ear normal. "      Nose: Congestion present.      Mouth/Throat:      Lips: Pink.      Mouth: Mucous membranes are moist.      Dentition: None present.      Pharynx: Oropharynx is clear.   Eyes:      Conjunctiva/sclera: Conjunctivae normal.   Cardiovascular:      Rate and Rhythm: Regular rhythm.      Heart sounds: S1 normal and S2 normal.   Pulmonary:      Effort: Pulmonary effort is normal. No respiratory distress.      Breath sounds: Normal breath sounds. No decreased breath sounds, wheezing, rhonchi or rales.   Abdominal:      General: Abdomen is flat. Bowel sounds are normal. There is no distension.      Palpations: Abdomen is soft.      Tenderness: There is no abdominal tenderness.   Musculoskeletal:      Cervical back: Normal range of motion and neck supple.   Skin:     General: Skin is warm and dry.      Capillary Refill: Capillary refill takes less than 2 seconds.      Findings: No rash.   Neurological:      Mental Status: He is alert.          Result Review :                      Assessment and Plan   Diagnoses and all orders for this visit:    1. URI, acute (Primary)    2. Fever in pediatric patient  -     POC Influenza A / B  -     POC Respiratory Syncytial Virus      Flu and RSV negative  Discussed viral URI's, cause, typical course and treatment options. Discussed that antibiotics do not shorten the duration of viral illnesses. Nasal saline/suction bulb, cool mist humidifier, postural drainage discussed in office today.  Ok to use zarbee's infant for cough and congestion as well.  Reviewed s/s needing further investigation and those for which to present to ER. Discussed that viral illnesses may progress to OM and to call if fever persists more than 5 days, ear pain or if symptoms > 10-14 days and no improvement, any difficulty breathing or increased work of breathing or wheezing.           Follow Up   Return if symptoms worsen or fail to improve.          This document has been electronically signed by Sofía ORTEZ  JOSE Tracy on February 27, 2023 14:24 CST.           Negative

## 2023-05-25 NOTE — DISCHARGE NOTE PROVIDER - NSDCCPGOAL_GEN_ALL_CORE_FT
Please let your family help you while you are still living up here in [place]  You should NOT travel to [facility] alone as you are having significant memory troubles  Please follow up locally here instead within the next 1-2 weeks in [place] with [facility] - I have placed their phone number in your paperwork  Please have family drive you to your appointments  We are providing enough prescriptions to last you until you move in with your granddaughter in [place]  Please start to set up medical follow up appointments for when you arrive in [place]  You will need a primary doctor/PCP as well as a neurologist and psychiatrist      In terms of medication changes - please continue your home blood pressure medications and we have added 2 medications:  Donepezil/Aricept - take 5 mg daily at bedtime  Vitamin B12 - take 1000 micrograms (1 tablet) daily for 2-3 months   Please get your vitamin B12 rechecked in [place] and restart injections if needed
To get better and follow your care plan as instructed.

## 2023-06-16 NOTE — H&P ADULT - ENMT
Next appt none  Last appt 11/14/2022    Refill request for   Disp Refills Start End    lisdexamfetamine (VYVANSE) 20 MG capsule 30 capsule 0 4/20/2023     Sig - Route: Take 1 capsule by mouth every morning. - Oral      NON PROTOCOL MEDICATION    Routed to provider's pool to review, thanks.        No oral lesions; no gross abnormalities

## 2023-08-22 NOTE — PATIENT PROFILE ADULT. - FUNCTIONAL LEVEL PRIOR: COMMUNICATION
Mirvaso Pregnancy And Lactation Text: This medication has not been assigned a Pregnancy Risk Category. It is unknown if the medication is excreted in breast milk. (0) understands/communicates without difficulty

## 2023-12-20 NOTE — ED PROVIDER NOTE - NS ED ATTENDING STATEMENT MOD
----- Message from DARLINE Lewis sent at 12/18/2023  9:27 AM CST -----  Child has tested positive for strep , allergies reviewed, will send Amoxicillin for 10 days to pharmacy. Change toothbrush 48 hours after starting antibiotics.  
Attending Only

## 2024-01-01 NOTE — DIETITIAN INITIAL EVALUATION ADULT. - CHIEF COMPLAINT
:    Notified by Judit Brenner-Supervisor with [facility] that they received the warrant for protective custody.  Foster mother is Rody Raygoza ([phone]) and she is allowed to visit baby and start learning the feeds.  Debbie with [facility] is trying to locate the family to serve them with the warrant.  Discussed with Judit that if parents come to visit baby, we are to direct them to speak with their CPS worker-Debbie at [phone].  Parents are allowed to receive updates, but cannot visit baby due to [facility] getting a warrant and foster mother coming in to provide cares.       The patient is a 89y Female complaining of syncope.

## 2024-01-04 NOTE — ED PROVIDER NOTE - PRINCIPAL DIAGNOSIS
Rx Refill Note  Requested Prescriptions     Pending Prescriptions Disp Refills    losartan (COZAAR) 100 MG tablet [Pharmacy Med Name: LOSARTAN 100MG TABLETS] 90 tablet 1     Sig: TAKE 1 TABLET BY MOUTH DAILY      Last office visit with prescribing clinician: 3/18/2022   Last telemedicine visit with prescribing clinician: Visit date not found   Next office visit with prescribing clinician: Visit date not found                         Would you like a call back once the refill request has been completed: [] Yes [] No    If the office needs to give you a call back, can they leave a voicemail: [] Yes [] No    Haylie Perez MA  01/04/24, 15:13 EST  
Parotitis, acute

## 2024-01-13 NOTE — CONSULT NOTE ADULT - CONSULT REQUESTED DATE/TIME
Patient: Rachele Major    Procedure Information       Date/Time: 01/13/24 1000    Procedure: Cholecystectomy Laparoscopy with Cholangiogram    Location: [facility] / [facility]    Surgeons: Robe Lieberman MD            Relevant Problems   Anesthesia   (+) PONV (postoperative nausea and vomiting)      Cardiovascular   (+) Hypertension      Endocrine   (+) Controlled type 2 diabetes mellitus with microalbuminuria, without long-term current use of insulin (CMS/HCC)   (+) Hyponatremia   (+) Obesity   (+) Type 2 diabetes mellitus (CMS/HCC)      GI   (+) GERD (gastroesophageal reflux disease)   (+) Gastroesophageal reflux disease      /Renal   (+) Chronic renal insufficiency      Neuro/Psych   (+) CTS (carpal tunnel syndrome)      GI/Hepatic   (+) Gallstones      Musculoskeletal   (+) CTS (carpal tunnel syndrome)      Other   (+) Arthritis of knee       Clinical information reviewed:   Tobacco  Allergies  Meds   Med Hx  Surg Hx   Fam Hx  Soc Hx        NPO Detail:  NPO/Void Status  Carbohydrate Drink Given Prior to Surgery? : N  Date of Last Liquid: 01/12/24  Time of Last Liquid: 0300  Date of Last Solid: 01/12/24  Time of Last Solid: 0300  Last Intake Type: Clear fluids  Time of Last Void: 1154         PHYSICAL EXAM    Anesthesia Plan    History of general anesthesia?: yes  History of complications of general anesthesia?: no    ASA 2 - emergent     Use of blood products discussed with who consented to blood products.       05-Sep-2017 15:07

## 2024-01-25 NOTE — DISCHARGE NOTE NURSING/CASE MANAGEMENT/SOCIAL WORK - NSDCPETBCESMAN_GEN_ALL_CORE
Cardioversion Op Note/Event Note
If you are a smoker, it is important for your health to stop smoking. Please be aware that second hand smoke is also harmful.

## 2024-02-15 NOTE — ED ADULT NURSE NOTE - HOW OFTEN DO YOU HAVE A DRINK CONTAINING ALCOHOL?
"Goal Outcome Evaluation:     Assessment: Loyda Tirado presents with ADL impairments affecting function including balance, cognition, coordination, endurance / activity tolerance, grasp, motor planning, muscle tone abnormal, postural / trunk control, range of motion (ROM), and strength. Demonstrated functioning below baseline abilities indicate the need for continued skilled intervention while inpatient. Tolerating session today without incident. Pt is near her baseline functioning and dtr is ever present and assisting pt with meals per their normal routine. Pt needs mabel for transfers. Family would like a new hospital bed and are hoping for [facility] though pt does already have a home health aide service through [facility]. Defer to  for benefits advice to family. Nursing can continue their care and use of mabel to get pt OOB. No anticipated functional progress to be made via acute OT at this time. Family report they do not want rehab referral but rather to continue their care at home. They are well aware of all pertinent PROM HEP and techniques to manage care and mobility. Pt has developed leg contractures at this time preventing safe attempts at ambulation or pivot transfers. At home pt son lifts her dependently into chairs or cars. Will sign off as pt appears at her functional baseline.     Plan/Recommendations:   Low Intensity Therapy recommended post-acute care - This is recommended as therapy feels this patient would require 2-3 visits per week. OP or HH would be the best option depending on patient's home bound status. Consider, if the patient has other  \"skilled\" needs such as wounds, IV antibiotics, etc. Combined with \"low intensity\" could also equate to a SNF. If patient is medically complex, consider LTAC.. Pt requires hospital bed at discharge.      Pt desires Home with Home Health and Home with family assist at discharge. Pt cooperative; agreeable to therapeutic recommendations and plan of care.   " Never

## 2024-03-06 NOTE — DISCHARGE NOTE ADULT - NS DC ANGIO PCI YN
-- DO NOT REPLY / DO NOT REPLY ALL --  -- Message is from [facility] ([facility]) --    General Patient Message: Patient calling back to schedule her Derm referral. Please call back *Patient stated she will take any appointment within the next 2 weeks and make it work if possible.     Caller Information         Type Contact Phone/Fax    03/06/2024 01:15 PM CST Phone (Incoming) [employer] (Self) [phone] (M)          Alternative phone number: n/a    Can a detailed message be left? Yes    Message Turnaround:                no

## 2024-03-12 NOTE — INPATIENT CERTIFICATION FOR MEDICARE PATIENTS - THE SEVERITY OF SIGNS/SYMPTOMS. (SEE ED/ADMIT DOCUMENTS)
Upper Endoscopy and Colonoscopy   WHAT YOU NEED TO KNOW:   An upper endoscopy is also called an upper gastrointestinal (GI) endoscopy, or an esophagogastroduodenoscopy (EGD). It is a procedure to examine the inside of your esophagus, stomach, and duodenum (first part of the small intestine) with a scope. You may feel bloated, gassy, or have some abdominal discomfort after your procedure. Your throat may be sore for 24 to 36 hours. You may burp or pass gas from air that is still inside your body.                A colonoscopy is a procedure to examine the inside of your colon (intestine) with a scope. Polyps or tissue growths may have been removed during your colonoscopy. It is normal to feel bloated and to have some abdominal discomfort. You should be passing gas. If you have hemorrhoids or you had polyps removed, you may have a small amount of bleeding.          DISCHARGE INSTRUCTIONS:   Seek care immediately if:   You have sudden, severe abdominal pain.     You have problems swallowing.     You have a large amount of black, sticky bowel movements or blood in your bowel movements.     You have sudden trouble breathing.     You feel weak, lightheaded, or faint or your heart beats faster than normal for you.     Contact your healthcare provider if:   You have a fever and chills.      You have nausea or are vomiting.      Your abdomen is bloated or feels full and hard.     You have abdominal pain.    You have a large amount of black, sticky bowel movements or blood in your bowel movements.    You have not had a bowel movement for 3 days after your procedure.    You have rash or hives.    You have questions or concerns about your procedure.     Activity:   ·       Do not lift, strain, or run for 24 hours after your procedure.     ·       Rest after your procedure. You have been given medicine to relax you. Do not drive or make important decisions until the day after your procedure. Return to your normal activity as  directed.     ·       Relieve gas and discomfort from bloating by lying on your right side with a heating pad on your abdomen. You may need to take short walks to help the gas move out. Eat small meals until bloating is relieved.  Follow up with your healthcare provider as directed: Write down your questions so you remember to ask them during your visits.      If you take a “blood thinner”, please review the specific instructions from your endoscopist about when you should resume it. These can be found in the “Recommendation” and “Your Medication list” sections of this After Visit Summary.      1. The severity of signs/symptoms.(See ED/admit documents)

## 2024-03-26 NOTE — ED ADULT NURSE NOTE - NSIMPLEMENTINTERV_GEN_ALL_ED
no Implemented All Fall with Harm Risk Interventions:  [place] to call system. Call bell, personal items and telephone within reach. Instruct patient to call for assistance. Room bathroom lighting operational. Non-slip footwear when patient is off stretcher. Physically safe environment: no spills, clutter or unnecessary equipment. Stretcher in lowest position, wheels locked, appropriate side rails in place. Provide visual cue, wrist band, yellow gown, etc. Monitor gait and stability. Monitor for mental status changes and reorient to person, place, and time. Review medications for side effects contributing to fall risk. Reinforce activity limits and safety measures with patient and family. Provide visual clues: red socks.

## 2024-04-09 NOTE — CONSULT NOTE ADULT - CONSULT REASON
[FreeTextEntry1] : Presents today with concerns of on going issues with mirgaine headaches.    These headaches are associated with vomiting.  She takes Eletriptan with good results.  Interested in seeing if there is a preventative medication. Syncope, CVA

## 2024-05-07 NOTE — PROGRESS NOTE ADULT - SUBJECTIVE AND OBJECTIVE BOX
HPI:  89 F w/PMH hypothyroid, CAD, HTN, bladder ca (currently undergoing tx), recurrent UTI, presents c/o increased generalized weakness.  Pt went for her 2nd bladder ca tx yesterday, found w/ UTI and started on macrobid, which she's been taking.  However, today, she was unable to ambulate d/t weakness (similar to prior UTI), called her  office and instructed to come to ED for tx.  She denies any fever/chills, n/v/d, abd pain, dysuria/freq/urg.  She had similar presentations and hospitalizations for UTI. In ED, WBC 16, vitals stable, +UA, given rocephin IV.      11/15: Pt was seen and examined with pt's daughter at bedside. She says she's feeling somewhat better but these UTIs recur every time the pt receives therapy for the bladder CA. Pt and daughter were inquiring about prophylactic measures since the pt has 5 more therapies. Pt is still experiencing weakness in LEs, difficulty walking and standing. Pt lives alone with no aides at home.    11/16: Pt seen and examined    ROS: All systems reviewed and negative with the exception of above.    Vital Signs Last 24 Hrs  T(C): 37.2 (16 Nov 2019 05:58), Max: 37.2 (16 Nov 2019 05:58)  T(F): 98.9 (16 Nov 2019 05:58), Max: 98.9 (16 Nov 2019 05:58)  HR: 82 (16 Nov 2019 05:58) (82 - 90)  BP: 160/73 (16 Nov 2019 05:58) (150/57 - 160/73)  BP(mean): --  RR: 18 (16 Nov 2019 05:58) (18 - 20)  SpO2: 99% (16 Nov 2019 05:58) (97% - 99%)    General: Elderly female, NAD  Eyes: PERRLA, EOMI, no discharge  ENMT: Oral mucosa without exudate or lesions  Neck: Supple, no JVD, no lymphadenopathy  Respiratory: Clear to auscultation bilaterally, no wheezes, rales, or rhonchi  Cardiovascular: +S1, +S2, RRR, no murmurs, rubs, or gallops  GI: Abdomen soft, no guarding, +BS, no pain upon palpation   Extremities: No clubbing, cyanosis  Neurological: AOx4, right LE 4/5 strength, left LE 4/5 strength  Skin: Warm, no lesions, rashes, or scars  MSK: No muscle or joint tenderness    Labs:                        10.9   7.54  )-----------( 261      ( 16 Nov 2019 07:14 )             35.4     11-16    143  |  115<H>  |  15  ----------------------------<  101<H>  3.6   |  22  |  0.92    Ca    8.3<L>      16 Nov 2019 07:14    TPro  7.2  /  Alb  3.5  /  TBili  0.6  /  DBili  x   /  AST  19  /  ALT  20  /  AlkPhos  76  11-14    Lactate: 1.9 (14-Nov-2019 20:19) 16

## 2024-05-16 NOTE — PROGRESS NOTE ADULT - ASSESSMENT
[facility]   Physical Therapy Treatment Note        Name: Kevin Méndez [facility] Number: [id number]    Therapy Diagnosis:   Encounter Diagnoses   Name Primary?    Decreased range of motion of lumbar spine Yes    Weakness of left lower extremity     Gait difficulty      Physician: Order, Paper    Visit Date: 5/17/2024    Physician Orders: PT Eval and Treat  Medical Diagnosis from Referral: M47.816 (ICD-10-CM) - Spondylosis of lumbar region without myelopathy or radiculopathy   Evaluation Date: 3/25/2024  Authorization Period Expiration: 12/31/2024  Plan of Care Expiration: 7/8/2024  Visit # / Visits authorized: 14/ 20   FOTO #2: 21% on 4/19/2024  FOTO: #3: 31% on 5/3/2024    Time In: 1000  Time Out: 1100  Total Billable Time: 55 minutes    Precautions: Standard and Smoker    Date of Surgery: 2/27/2024  Procedure Performed:  Left L4-5 oblique interbody fusion, placement of interbody spacer, DePuy Conduit LLIF cage filled with allograft BMP and DBM  L5-S1 anterior interbody fusion, placement of interbody spacer, DePuy Conduit ALIF cage filled with allograft BMP and DBM  L4-S1 posterior segmental instrumentation using DePuy Viper Prime system  Registration of navigated instruments using intraoperative 3D imaging [facility] and [facility] station    SUBJECTIVE     Pt reports: having some sharper left sided low back pain when walking.  She was compliant with home exercise program.  Response to previous treatment: soreness  Functional change: Ongoing    Pain: 5/10  Location: Lumbar    OBJECTIVE     Objective Measures updated at progress report unless specified.     TREATMENT     Kevin received the treatments listed below:      Therapeutic Exercises to develop strength, endurance, ROM, posture, and core stabilization for 55 minutes including:  Recumbent Bike - 8 minutes  Shuttle Leg Press - double leg, 3.5 bands + brace; x30  Shuttle Leg Press - Left lower extremity, 2.5 bands + brace;  x30  Stomps - 3 trials (100 bmp)  Step Ups - 4 inch box; x20 bilateral  Agility Ladder - no assistive device; in-outs; 1 lap each    Not Today:  DKTC with ball - x20  DKTC with ball + Pallof twist - x20 bilateral  Bridges - 3x10  Physioball - marches and overhead lift; x30  Pallof Press - orange bands; 2x10 bilateral  Sled Push - 45#; 2 turf lap  Pallof Marches - 5x8      Manual Therapy Techniques: Joint mobilizations were applied to the: hip and ankle for 00 minutes, including:  Left lower extremity long axis distraction - grade V  Left talocrural distraction - grade V      PATIENT EDUCATION AND HOME EXERCISES      Home Exercises Provided and Patient Education Provided     Education provided:   Anatomy and Pathology.  Symptom management and plan of care progressions.  Home Exercise Program.    Written Home Exercises Provided: Patient instructed to cont prior HEP. Exercises were reviewed and Kevin was able to demonstrate them prior to the end of the session.  Kevin demonstrated good  understanding of the education provided. See EMR under Patient Instructions for exercises provided during therapy sessions    ASSESSMENT     Kevin continues to show gradual progressions in Left lower extremity sensation and speed as noted by gait speed. Continued current exercise program secondary to increased levels of soreness following the last visit. Returns to surgeon on 5/29/2024. She will continue to benefit from skilled physical therapy to improve her strength and motor control for functional, independent activities of daily living.    From evaluation on 3/25/2024 - Kevin is a 39 y.o. female referred to outpatient Physical Therapy with a medical diagnosis of Spondylosis of lumbar region without myelopathy or radiculopathy. She is status-post L4-S1 fusion performed on 2/272024 by Dr. Bakari Zaragoza. Patient presents with decreased range of motion, decreased strength, decreased balance, and risk for falls. Signs and  89 year old female w/ PMHx of CAD s/p stents x5, bladder cancer,  HTN, hypothyroidism, multiple falls and h/o syncope, orthostatic hypotension admitted for:     # Syncope, likely related to significant orthostatic hypotension, and probably symptomatic AFIB,  less likely 2/2 stroke   -C/w  tele: SR with 1st degree AVB,   -monitor orthostatics, still positive   - echo: EF 65-70%  - Off  Nitrates and hydralazine   - Trop neg x 1  -D/w Dr Sánchez: no AC for now, will reassess when able to ambulate, start florinef when BP allows  - pt. with recent hypertensive crisis, at present SBP 150s   - no events on cardiac monitor - will d/c tele    #  RLE/RUE weakness 2/2 Acute L Corpus Callosum genu infarct   - MRI reviewed  - Had episode of AFIB overnight   - past EKGs reviewed, one from July 2020 read as AFIB  - C/w ASA, start statins   - lipid panel - wnl   - ECHO: preserved EF   - D/w DR Payne - pt. refuses carotid surgery     * Paroxysmal AFIB   C/w tele   On Toprol  [id number]-dork score is7, high risk.  Also high risk for bleeding due to age, deconditioning, multiple falls, and bladder CA   D/w Dr Sánchez, does  not recommend A/c due to high bleeding risk    * Leukocytosis 2/2 steroid use  - WBC mildly elevated - pt. is on medrol pack   - monitor WBC     #Elevated D-Dimer  -CTA chest negative for PE    #Progressive weakness, deconditioning   -pt with 8 month history of intermittent weakness and falls  - unlikely related to acute stroke as its completely new and no old strokes on MRI   - TSH WNL  -  ESR/CRP min elevated  -  B12 low normal will start Po supplementation, folate WNL  - PT     # Arthralgia   Started on Medrol dose pack as per cardio  Monitor response   Doubt rheum Dz as ESR nit significantly elevated     # Bladder CA, with L iliac Lymphopathy,  not on TX  could contribute to weakness and poor appetite   supportive care     #Moderate Protein calorie malnutrition  Nutritionist consult appreciated     #CAD  - on Metoprolol, ASA    #Hx of Hypothyroidism   -on synthroid    # HTN  -c/w  metoprolol   - BP elevated, will hold off on Florinef for now       # GERD  - on protonix      # Advanced directives  -DNR/DNI    #DVT ppx  -heparin sq    symptoms are consistent with the above medical procedure. She will benefit from skilled physical therapy addressing her lower extremity strength, core stabilization, and proprioceptive retraining.    Kevin Is progressing well towards her goals.   Pt prognosis is Fair.     Pt will continue to benefit from skilled outpatient physical therapy to address the deficits listed in the problem list box on initial evaluation, provide pt/family education and to maximize pt's level of independence in the home and community environment. Pt's spiritual, cultural and educational needs considered and pt agreeable to plan of care and goals.     Anticipated barriers to physical therapy: history of low back pain; multiple surgeries.     Goals:  Short Term Goals: 6 weeks   Patient will have reduced pain complaints from 10/10 to less than or equal to 6/10. (Met - 4/30/2024)  Patient will demonstrate increased AROM/PROM by approximately 25% to 50% of initial measurements. (Not Met - Progressing)  Patient will demonstrate increased muscle strength of at least one-half grade as compared to the initial measurements. (Not Met - Progressing)     Long Term Goals: 12 weeks   Patient will have reduced pain complaints from 6/10 to less than or equal to 2/10. (Not Met - Progressing)  Patient will perform the 5 time sit to stand test in under 10 seconds. (Not Met - Progressing)  Patient will demonstrate increased muscle strength of at least one grade as compared to the initial measurements. (Not Met - Progressing)  Patient will improve Lumbar Spine FOTO Intake score from 9% to greater than or equal to 33%. (Met - 5/3/2024)  Patient will be independent with their home exercise program. (Not Met - Progressing)    PLAN     Core stabilization  Left lower extremity strengthening  Proprioceptive retraining.    Plan of care Certification: 3/25/2024 to 7/8/2024.  Outpatient Physical Therapy 1 times weekly for 12 weeks.    Faraz Doe, PT , DPT, OCS

## 2024-08-24 NOTE — ED PROVIDER NOTE - NSTIMEPROVIDERCAREINITIATE_GEN_ER
Bed in lowest position, wheels locked, appropriate side rails in place/Call bell, personal items and telephone in reach/Instruct patient to call for assistance before getting out of bed or chair/Non-slip footwear when patient is out of bed/[place] to call system/Physically safe environment - no spills, clutter or unnecessary equipment/Purposeful Proactive Rounding/Room/bathroom lighting operational, light cord in reach
06-Oct-2018 09:12

## 2024-12-27 NOTE — PATIENT PROFILE ADULT - ...
I called patient re: bone scan results -negative for uptake in areas of lytic lesion noted on CT scan.  Advised her no further follow-up with me or evaluation needed unless she is having issues related to her surgery.  She was appreciative of the phone call.  Electronically signed by Xiao Unger MD, 12/27/24, 5:43 PM EST.         28-Sep-2020 18:51:42

## 2025-01-10 NOTE — PATIENT PROFILE ADULT - ABILITY TO HEAR (WITH HEARING AID OR HEARING APPLIANCE IF NORMALLY USED):
Mildly to Moderately Impaired: difficulty hearing in some environments or speaker may need to increase volume or speak distinctly
Patient with deficits in ADL status and functional mobility due to impaired balance, strength, ROM, coordination

## 2025-02-19 NOTE — DISCHARGE NOTE PROVIDER - NSDCQMSTROKE_NEU_ALL_CORE
Increase sertraline to 150 mg    Orders:    Referral to [facility]    sertraline (Zoloft) 50 mg tablet; Take 3 tablets (150 mg) by mouth once daily.    Follow Up In [facility] - PCP - Health Maintenance; Future     Yes...

## 2025-05-21 NOTE — PATIENT PROFILE ADULT - NSPROCHRONICPAIN_GEN_A_NUR
